# Patient Record
Sex: MALE | Race: WHITE | NOT HISPANIC OR LATINO | Employment: UNEMPLOYED | URBAN - METROPOLITAN AREA
[De-identification: names, ages, dates, MRNs, and addresses within clinical notes are randomized per-mention and may not be internally consistent; named-entity substitution may affect disease eponyms.]

---

## 2023-07-30 DIAGNOSIS — E78.2 MIXED HYPERLIPIDEMIA: Primary | ICD-10-CM

## 2023-07-31 RX ORDER — ATORVASTATIN CALCIUM 40 MG/1
40 TABLET, FILM COATED ORAL DAILY
Qty: 90 TABLET | Refills: 3 | Status: SHIPPED | OUTPATIENT
Start: 2023-07-31

## 2023-08-03 PROBLEM — R17 SERUM TOTAL BILIRUBIN ELEVATED: Status: ACTIVE | Noted: 2023-08-03

## 2023-08-03 PROBLEM — R20.2 NUMBNESS AND TINGLING OF BOTH FEET: Status: ACTIVE | Noted: 2023-08-03

## 2023-08-03 PROBLEM — R19.7 INTERMITTENT DIARRHEA: Status: ACTIVE | Noted: 2023-08-03

## 2023-08-03 PROBLEM — Z90.49 STATUS POST CHOLECYSTECTOMY: Status: ACTIVE | Noted: 2023-08-03

## 2023-08-03 PROBLEM — G89.29 CHRONIC PAIN OF TOE OF LEFT FOOT: Status: ACTIVE | Noted: 2023-08-03

## 2023-08-03 PROBLEM — R20.0 NUMBNESS AND TINGLING OF BOTH FEET: Status: ACTIVE | Noted: 2023-08-03

## 2023-08-03 PROBLEM — M79.674 CHRONIC PAIN OF TOE OF RIGHT FOOT: Status: ACTIVE | Noted: 2023-08-03

## 2023-08-03 PROBLEM — E78.5 HYPERLIPIDEMIA: Status: ACTIVE | Noted: 2023-08-03

## 2023-08-03 PROBLEM — M79.675 CHRONIC PAIN OF TOE OF LEFT FOOT: Status: ACTIVE | Noted: 2023-08-03

## 2023-08-03 PROBLEM — K22.70 BARRETT ESOPHAGUS: Status: ACTIVE | Noted: 2023-08-03

## 2023-08-03 PROBLEM — K86.9 PANCREATIC LESION (HHS-HCC): Status: ACTIVE | Noted: 2023-08-03

## 2023-08-03 PROBLEM — R10.9 ABDOMINAL PAIN: Status: ACTIVE | Noted: 2023-08-03

## 2023-08-03 PROBLEM — B35.1 ONYCHOMYCOSIS: Status: ACTIVE | Noted: 2023-08-03

## 2023-08-03 PROBLEM — G47.30 OBSERVED SLEEP APNEA: Status: ACTIVE | Noted: 2023-08-03

## 2023-08-03 PROBLEM — D49.0: Status: ACTIVE | Noted: 2023-08-03

## 2023-08-03 PROBLEM — R94.31 ABNORMAL EKG: Status: ACTIVE | Noted: 2023-08-03
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
## 2023-08-03 PROBLEM — G89.29 CHRONIC PAIN OF TOE OF RIGHT FOOT: Status: ACTIVE | Noted: 2023-08-03

## 2023-08-03 RX ORDER — FOLIC ACID/MULTIVIT,IRON,MINER 0.4MG-18MG
1 TABLET ORAL DAILY
COMMUNITY

## 2023-08-03 RX ORDER — NAPROXEN SODIUM 220 MG/1
81 TABLET, FILM COATED ORAL DAILY
COMMUNITY
End: 2024-01-02 | Stop reason: HOSPADM

## 2023-08-03 RX ORDER — ALBUTEROL SULFATE 90 UG/1
AEROSOL, METERED RESPIRATORY (INHALATION)
COMMUNITY
Start: 2022-12-07 | End: 2023-08-07 | Stop reason: ALTCHOICE

## 2023-08-03 RX ORDER — SILDENAFIL 100 MG/1
TABLET, FILM COATED ORAL
COMMUNITY
End: 2023-08-07 | Stop reason: SDUPTHER

## 2023-08-03 RX ORDER — PANTOPRAZOLE SODIUM 40 MG/1
TABLET, DELAYED RELEASE ORAL
COMMUNITY
End: 2023-10-10

## 2023-08-07 ENCOUNTER — LAB (OUTPATIENT)
Dept: LAB | Facility: LAB | Age: 68
End: 2023-08-07
Payer: MEDICARE

## 2023-08-07 ENCOUNTER — OFFICE VISIT (OUTPATIENT)
Dept: PRIMARY CARE | Facility: CLINIC | Age: 68
End: 2023-08-07
Payer: MEDICARE

## 2023-08-07 VITALS
HEIGHT: 67 IN | WEIGHT: 172 LBS | TEMPERATURE: 97.6 F | OXYGEN SATURATION: 97 % | DIASTOLIC BLOOD PRESSURE: 76 MMHG | RESPIRATION RATE: 18 BRPM | SYSTOLIC BLOOD PRESSURE: 110 MMHG | BODY MASS INDEX: 27 KG/M2 | HEART RATE: 92 BPM

## 2023-08-07 DIAGNOSIS — K22.719 BARRETT'S ESOPHAGUS WITH DYSPLASIA: ICD-10-CM

## 2023-08-07 DIAGNOSIS — R73.09 ELEVATED HEMOGLOBIN A1C: ICD-10-CM

## 2023-08-07 DIAGNOSIS — N52.9 ERECTILE DYSFUNCTION, UNSPECIFIED ERECTILE DYSFUNCTION TYPE: ICD-10-CM

## 2023-08-07 DIAGNOSIS — Z00.00 MEDICARE WELCOME EXAM: Primary | ICD-10-CM

## 2023-08-07 DIAGNOSIS — Z00.00 ROUTINE GENERAL MEDICAL EXAMINATION AT A HEALTH CARE FACILITY: ICD-10-CM

## 2023-08-07 DIAGNOSIS — Z12.5 PROSTATE CANCER SCREENING: ICD-10-CM

## 2023-08-07 DIAGNOSIS — R94.31 ABNORMAL EKG: ICD-10-CM

## 2023-08-07 DIAGNOSIS — E78.2 MIXED HYPERLIPIDEMIA: ICD-10-CM

## 2023-08-07 DIAGNOSIS — Z00.00 WELL ADULT EXAM: ICD-10-CM

## 2023-08-07 LAB
ALANINE AMINOTRANSFERASE (SGPT) (U/L) IN SER/PLAS: 29 U/L (ref 10–52)
ALBUMIN (G/DL) IN SER/PLAS: 4.5 G/DL (ref 3.4–5)
ALKALINE PHOSPHATASE (U/L) IN SER/PLAS: 90 U/L (ref 33–136)
ANION GAP IN SER/PLAS: 15 MMOL/L (ref 10–20)
ASPARTATE AMINOTRANSFERASE (SGOT) (U/L) IN SER/PLAS: 24 U/L (ref 9–39)
BILIRUBIN TOTAL (MG/DL) IN SER/PLAS: 1.6 MG/DL (ref 0–1.2)
CALCIUM (MG/DL) IN SER/PLAS: 9.7 MG/DL (ref 8.6–10.3)
CARBON DIOXIDE, TOTAL (MMOL/L) IN SER/PLAS: 26 MMOL/L (ref 21–32)
CHLORIDE (MMOL/L) IN SER/PLAS: 104 MMOL/L (ref 98–107)
CHOLESTEROL (MG/DL) IN SER/PLAS: 102 MG/DL (ref 0–199)
CHOLESTEROL IN HDL (MG/DL) IN SER/PLAS: 37.3 MG/DL
CHOLESTEROL/HDL RATIO: 2.7
CREATININE (MG/DL) IN SER/PLAS: 1.06 MG/DL (ref 0.5–1.3)
ERYTHROCYTE DISTRIBUTION WIDTH (RATIO) BY AUTOMATED COUNT: 11.8 % (ref 11.5–14.5)
ERYTHROCYTE MEAN CORPUSCULAR HEMOGLOBIN CONCENTRATION (G/DL) BY AUTOMATED: 34.6 G/DL (ref 32–36)
ERYTHROCYTE MEAN CORPUSCULAR VOLUME (FL) BY AUTOMATED COUNT: 90 FL (ref 80–100)
ERYTHROCYTES (10*6/UL) IN BLOOD BY AUTOMATED COUNT: 5.62 X10E12/L (ref 4.5–5.9)
ESTIMATED AVERAGE GLUCOSE FOR HBA1C: 117 MG/DL
GFR MALE: 78 ML/MIN/1.73M2
GLUCOSE (MG/DL) IN SER/PLAS: 113 MG/DL (ref 74–99)
HEMATOCRIT (%) IN BLOOD BY AUTOMATED COUNT: 50.8 % (ref 41–52)
HEMOGLOBIN (G/DL) IN BLOOD: 17.6 G/DL (ref 13.5–17.5)
HEMOGLOBIN A1C/HEMOGLOBIN TOTAL IN BLOOD: 5.7 %
LDL: 37 MG/DL (ref 0–99)
LEUKOCYTES (10*3/UL) IN BLOOD BY AUTOMATED COUNT: 5.5 X10E9/L (ref 4.4–11.3)
PLATELETS (10*3/UL) IN BLOOD AUTOMATED COUNT: 164 X10E9/L (ref 150–450)
POTASSIUM (MMOL/L) IN SER/PLAS: 4.7 MMOL/L (ref 3.5–5.3)
PROSTATE SPECIFIC AG (NG/ML) IN SER/PLAS: 0.4 NG/ML (ref 0–4)
PROTEIN TOTAL: 7.2 G/DL (ref 6.4–8.2)
SODIUM (MMOL/L) IN SER/PLAS: 140 MMOL/L (ref 136–145)
THYROTROPIN (MIU/L) IN SER/PLAS BY DETECTION LIMIT <= 0.05 MIU/L: 2.45 MIU/L (ref 0.44–3.98)
TRIGLYCERIDE (MG/DL) IN SER/PLAS: 141 MG/DL (ref 0–149)
UREA NITROGEN (MG/DL) IN SER/PLAS: 15 MG/DL (ref 6–23)
VLDL: 28 MG/DL (ref 0–40)

## 2023-08-07 PROCEDURE — 85027 COMPLETE CBC AUTOMATED: CPT

## 2023-08-07 PROCEDURE — 83036 HEMOGLOBIN GLYCOSYLATED A1C: CPT

## 2023-08-07 PROCEDURE — 80053 COMPREHEN METABOLIC PANEL: CPT

## 2023-08-07 PROCEDURE — 36415 COLL VENOUS BLD VENIPUNCTURE: CPT

## 2023-08-07 PROCEDURE — 93000 ELECTROCARDIOGRAM COMPLETE: CPT | Performed by: FAMILY MEDICINE

## 2023-08-07 PROCEDURE — G0103 PSA SCREENING: HCPCS

## 2023-08-07 PROCEDURE — 80061 LIPID PANEL: CPT

## 2023-08-07 PROCEDURE — 84443 ASSAY THYROID STIM HORMONE: CPT

## 2023-08-07 PROCEDURE — G0438 PPPS, INITIAL VISIT: HCPCS | Performed by: FAMILY MEDICINE

## 2023-08-07 PROCEDURE — 99397 PER PM REEVAL EST PAT 65+ YR: CPT | Performed by: FAMILY MEDICINE

## 2023-08-07 RX ORDER — SILDENAFIL 100 MG/1
100 TABLET, FILM COATED ORAL DAILY PRN
Qty: 10 TABLET | Refills: 11 | Status: SHIPPED | OUTPATIENT
Start: 2023-08-07 | End: 2024-02-19 | Stop reason: WASHOUT

## 2023-08-07 ASSESSMENT — PATIENT HEALTH QUESTIONNAIRE - PHQ9
2. FEELING DOWN, DEPRESSED OR HOPELESS: NOT AT ALL
1. LITTLE INTEREST OR PLEASURE IN DOING THINGS: NOT AT ALL
SUM OF ALL RESPONSES TO PHQ9 QUESTIONS 1 AND 2: 0

## 2023-08-07 ASSESSMENT — ACTIVITIES OF DAILY LIVING (ADL)
BATHING: INDEPENDENT
GROCERY_SHOPPING: INDEPENDENT
DRESSING: INDEPENDENT
TAKING_MEDICATION: INDEPENDENT
DOING_HOUSEWORK: INDEPENDENT
MANAGING_FINANCES: INDEPENDENT

## 2023-08-07 ASSESSMENT — ENCOUNTER SYMPTOMS
MUSCULOSKELETAL NEGATIVE: 1
CARDIOVASCULAR NEGATIVE: 1
EYES NEGATIVE: 1
GASTROINTESTINAL NEGATIVE: 1
RESPIRATORY NEGATIVE: 1
ALLERGIC/IMMUNOLOGIC NEGATIVE: 1
PSYCHIATRIC NEGATIVE: 1
ENDOCRINE NEGATIVE: 1
NEUROLOGICAL NEGATIVE: 1
CONSTITUTIONAL NEGATIVE: 1
HEMATOLOGIC/LYMPHATIC NEGATIVE: 1

## 2023-08-07 NOTE — PROGRESS NOTES
"Subjective   Reason for Visit: Enmanuel Ahumada is an 67 y.o. male here for a Medicare Wellness visit.     Past Medical, Surgical, and Family History reviewed and updated in chart.    Reviewed all medications by prescribing practitioner or clinical pharmacist (such as prescriptions, OTCs, herbal therapies and supplements) and documented in the medical record.    HPI    Patient Care Team:  Ricardo Youngblood DO as PCP - General     Review of Systems   Constitutional: Negative.    HENT: Negative.     Eyes: Negative.    Respiratory: Negative.     Cardiovascular: Negative.    Gastrointestinal: Negative.    Endocrine: Negative.    Genitourinary: Negative.    Musculoskeletal: Negative.    Skin: Negative.    Allergic/Immunologic: Negative.    Neurological: Negative.    Hematological: Negative.    Psychiatric/Behavioral: Negative.         Objective   Vitals:  /76   Pulse 92   Temp 36.4 °C (97.6 °F)   Resp 18   Ht 1.702 m (5' 7\")   Wt 78 kg (172 lb)   SpO2 97%   BMI 26.94 kg/m²       Physical Exam  Vitals and nursing note reviewed.   Constitutional:       Appearance: Normal appearance.   HENT:      Head: Normocephalic and atraumatic.      Nose: Nose normal.      Mouth/Throat:      Pharynx: Oropharynx is clear.   Eyes:      Extraocular Movements: Extraocular movements intact.      Conjunctiva/sclera: Conjunctivae normal.      Pupils: Pupils are equal, round, and reactive to light.   Neck:      Vascular: No carotid bruit.   Cardiovascular:      Rate and Rhythm: Normal rate and regular rhythm.      Pulses: Normal pulses.      Heart sounds: Normal heart sounds.   Pulmonary:      Effort: Pulmonary effort is normal. No respiratory distress.      Breath sounds: Normal breath sounds. No wheezing, rhonchi or rales.   Chest:          Comments: Slight chest wall pain in area of fall  Abdominal:      General: Abdomen is flat. Bowel sounds are normal. There is no distension.      Palpations: Abdomen is soft.      " Tenderness: There is no abdominal tenderness.      Hernia: No hernia is present.   Musculoskeletal:         General: No swelling or tenderness. Normal range of motion.      Cervical back: Normal range of motion and neck supple. No tenderness.   Lymphadenopathy:      Cervical: No cervical adenopathy.   Skin:     General: Skin is warm and dry.      Capillary Refill: Capillary refill takes less than 2 seconds.   Neurological:      General: No focal deficit present.      Mental Status: He is alert and oriented to person, place, and time.      Cranial Nerves: No cranial nerve deficit.   Psychiatric:         Attention and Perception: Attention and perception normal.         Mood and Affect: Mood normal.         Behavior: Behavior normal.         Thought Content: Thought content normal.         Judgment: Judgment normal.         Assessment/Plan   1. Medicare welcome exam        2. Well adult exam        3. Routine general medical examination at a health care facility  ECG 12 lead    Comprehensive Metabolic Panel    Hemoglobin A1C    TSH with reflex to Free T4 if abnormal    sildenafil (Viagra) 100 mg tablet      4. Mixed hyperlipidemia  CBC    Comprehensive Metabolic Panel    Hemoglobin A1C    Lipid Panel    TSH with reflex to Free T4 if abnormal      5. Osborn's esophagus with dysplasia  CBC    Comprehensive Metabolic Panel    Hemoglobin A1C    TSH with reflex to Free T4 if abnormal      6. Abnormal EKG  CBC    Comprehensive Metabolic Panel    Hemoglobin A1C    TSH with reflex to Free T4 if abnormal      7. Prostate cancer screening  CBC    Prostate Specific Antigen      8. Elevated hemoglobin A1c  Hemoglobin A1C      9. Erectile dysfunction, unspecified erectile dysfunction type  sildenafil (Viagra) 100 mg tablet

## 2023-08-08 NOTE — TELEPHONE ENCOUNTER
----- Message from Ricardo Youngblood DO sent at 8/7/2023  6:46 PM EDT -----  Call Enmanuel Ahumada Their labs were normal

## 2023-10-10 DIAGNOSIS — K22.70 BARRETT'S ESOPHAGUS WITHOUT DYSPLASIA: Primary | ICD-10-CM

## 2023-10-10 RX ORDER — PANTOPRAZOLE SODIUM 40 MG/1
TABLET, DELAYED RELEASE ORAL
Qty: 30 TABLET | Refills: 0 | Status: SHIPPED | OUTPATIENT
Start: 2023-10-10 | End: 2023-10-27

## 2023-10-27 DIAGNOSIS — K22.70 BARRETT'S ESOPHAGUS WITHOUT DYSPLASIA: ICD-10-CM

## 2023-10-27 RX ORDER — PANTOPRAZOLE SODIUM 40 MG/1
TABLET, DELAYED RELEASE ORAL
Qty: 30 TABLET | Refills: 0 | Status: SHIPPED | OUTPATIENT
Start: 2023-10-27 | End: 2023-11-20

## 2023-11-18 DIAGNOSIS — K22.70 BARRETT'S ESOPHAGUS WITHOUT DYSPLASIA: ICD-10-CM

## 2023-11-20 RX ORDER — PANTOPRAZOLE SODIUM 40 MG/1
TABLET, DELAYED RELEASE ORAL
Qty: 30 TABLET | Refills: 0 | Status: SHIPPED | OUTPATIENT
Start: 2023-11-20 | End: 2023-12-11

## 2023-12-10 DIAGNOSIS — K22.70 BARRETT'S ESOPHAGUS WITHOUT DYSPLASIA: ICD-10-CM

## 2023-12-11 RX ORDER — PANTOPRAZOLE SODIUM 40 MG/1
TABLET, DELAYED RELEASE ORAL
Qty: 30 TABLET | Refills: 0 | Status: SHIPPED | OUTPATIENT
Start: 2023-12-11 | End: 2024-01-15

## 2023-12-24 ENCOUNTER — HOSPITAL ENCOUNTER (EMERGENCY)
Facility: HOSPITAL | Age: 68
Discharge: OTHER INSTITUTION | End: 2023-12-25
Attending: STUDENT IN AN ORGANIZED HEALTH CARE EDUCATION/TRAINING PROGRAM
Payer: MEDICARE

## 2023-12-24 ENCOUNTER — APPOINTMENT (OUTPATIENT)
Dept: RADIOLOGY | Facility: HOSPITAL | Age: 68
End: 2023-12-24
Payer: MEDICARE

## 2023-12-24 ENCOUNTER — APPOINTMENT (OUTPATIENT)
Dept: CARDIOLOGY | Facility: HOSPITAL | Age: 68
End: 2023-12-24
Payer: MEDICARE

## 2023-12-24 DIAGNOSIS — G93.89 INTRACEREBRAL MASS: Primary | ICD-10-CM

## 2023-12-24 DIAGNOSIS — G93.5: ICD-10-CM

## 2023-12-24 LAB
ALBUMIN SERPL BCP-MCNC: 4.6 G/DL (ref 3.4–5)
ALP SERPL-CCNC: 100 U/L (ref 33–136)
ALT SERPL W P-5'-P-CCNC: 31 U/L (ref 10–52)
ANION GAP SERPL CALC-SCNC: 11 MMOL/L (ref 10–20)
APPEARANCE UR: CLEAR
AST SERPL W P-5'-P-CCNC: 20 U/L (ref 9–39)
BILIRUB SERPL-MCNC: 1.2 MG/DL (ref 0–1.2)
BILIRUB UR STRIP.AUTO-MCNC: NEGATIVE MG/DL
BUN SERPL-MCNC: 14 MG/DL (ref 6–23)
CALCIUM SERPL-MCNC: 9.4 MG/DL (ref 8.6–10.3)
CHLORIDE SERPL-SCNC: 103 MMOL/L (ref 98–107)
CO2 SERPL-SCNC: 30 MMOL/L (ref 21–32)
COLOR UR: NORMAL
CREAT SERPL-MCNC: 0.95 MG/DL (ref 0.5–1.3)
ERYTHROCYTE [DISTWIDTH] IN BLOOD BY AUTOMATED COUNT: 11.5 % (ref 11.5–14.5)
GFR SERPL CREATININE-BSD FRML MDRD: 89 ML/MIN/1.73M*2
GLUCOSE SERPL-MCNC: 135 MG/DL (ref 74–99)
GLUCOSE UR STRIP.AUTO-MCNC: NEGATIVE MG/DL
HCT VFR BLD AUTO: 49.9 % (ref 41–52)
HGB BLD-MCNC: 16.9 G/DL (ref 13.5–17.5)
KETONES UR STRIP.AUTO-MCNC: NEGATIVE MG/DL
LEUKOCYTE ESTERASE UR QL STRIP.AUTO: NEGATIVE
MCH RBC QN AUTO: 30.8 PG (ref 26–34)
MCHC RBC AUTO-ENTMCNC: 33.9 G/DL (ref 32–36)
MCV RBC AUTO: 91 FL (ref 80–100)
NITRITE UR QL STRIP.AUTO: NEGATIVE
NRBC BLD-RTO: 0 /100 WBCS (ref 0–0)
PH UR STRIP.AUTO: 6 [PH]
PLATELET # BLD AUTO: 197 X10*3/UL (ref 150–450)
POTASSIUM SERPL-SCNC: 3.7 MMOL/L (ref 3.5–5.3)
PROT SERPL-MCNC: 7.2 G/DL (ref 6.4–8.2)
PROT UR STRIP.AUTO-MCNC: NEGATIVE MG/DL
RBC # BLD AUTO: 5.48 X10*6/UL (ref 4.5–5.9)
RBC # UR STRIP.AUTO: NEGATIVE /UL
SODIUM SERPL-SCNC: 140 MMOL/L (ref 136–145)
SP GR UR STRIP.AUTO: 1.01
TSH SERPL-ACNC: 1.67 MIU/L (ref 0.44–3.98)
UROBILINOGEN UR STRIP.AUTO-MCNC: <2 MG/DL
WBC # BLD AUTO: 7.1 X10*3/UL (ref 4.4–11.3)

## 2023-12-24 PROCEDURE — 84443 ASSAY THYROID STIM HORMONE: CPT | Performed by: STUDENT IN AN ORGANIZED HEALTH CARE EDUCATION/TRAINING PROGRAM

## 2023-12-24 PROCEDURE — 80053 COMPREHEN METABOLIC PANEL: CPT | Performed by: STUDENT IN AN ORGANIZED HEALTH CARE EDUCATION/TRAINING PROGRAM

## 2023-12-24 PROCEDURE — 96372 THER/PROPH/DIAG INJ SC/IM: CPT | Mod: 59

## 2023-12-24 PROCEDURE — 93005 ELECTROCARDIOGRAM TRACING: CPT

## 2023-12-24 PROCEDURE — 99285 EMERGENCY DEPT VISIT HI MDM: CPT | Performed by: STUDENT IN AN ORGANIZED HEALTH CARE EDUCATION/TRAINING PROGRAM

## 2023-12-24 PROCEDURE — 36415 COLL VENOUS BLD VENIPUNCTURE: CPT | Performed by: STUDENT IN AN ORGANIZED HEALTH CARE EDUCATION/TRAINING PROGRAM

## 2023-12-24 PROCEDURE — 81003 URINALYSIS AUTO W/O SCOPE: CPT | Performed by: STUDENT IN AN ORGANIZED HEALTH CARE EDUCATION/TRAINING PROGRAM

## 2023-12-24 PROCEDURE — 70470 CT HEAD/BRAIN W/O & W/DYE: CPT

## 2023-12-24 PROCEDURE — 85027 COMPLETE CBC AUTOMATED: CPT | Performed by: STUDENT IN AN ORGANIZED HEALTH CARE EDUCATION/TRAINING PROGRAM

## 2023-12-24 PROCEDURE — 71045 X-RAY EXAM CHEST 1 VIEW: CPT

## 2023-12-24 PROCEDURE — 93010 ELECTROCARDIOGRAM REPORT: CPT | Performed by: STUDENT IN AN ORGANIZED HEALTH CARE EDUCATION/TRAINING PROGRAM

## 2023-12-24 PROCEDURE — 2500000004 HC RX 250 GENERAL PHARMACY W/ HCPCS (ALT 636 FOR OP/ED)

## 2023-12-24 PROCEDURE — 2550000001 HC RX 255 CONTRASTS: Performed by: STUDENT IN AN ORGANIZED HEALTH CARE EDUCATION/TRAINING PROGRAM

## 2023-12-24 PROCEDURE — 71045 X-RAY EXAM CHEST 1 VIEW: CPT | Mod: FOREIGN READ | Performed by: RADIOLOGY

## 2023-12-24 RX ORDER — DEXAMETHASONE SODIUM PHOSPHATE 4 MG/ML
10 INJECTION, SOLUTION INTRA-ARTICULAR; INTRALESIONAL; INTRAMUSCULAR; INTRAVENOUS; SOFT TISSUE ONCE
Status: COMPLETED | OUTPATIENT
Start: 2023-12-24 | End: 2023-12-24

## 2023-12-24 RX ADMIN — IOHEXOL 50 ML: 350 INJECTION, SOLUTION INTRAVENOUS at 17:52

## 2023-12-24 RX ADMIN — DEXAMETHASONE SODIUM PHOSPHATE 10 MG: 4 INJECTION, SOLUTION INTRAMUSCULAR; INTRAVENOUS at 20:04

## 2023-12-24 ASSESSMENT — PAIN DESCRIPTION - ORIENTATION: ORIENTATION: ANTERIOR

## 2023-12-24 ASSESSMENT — PAIN DESCRIPTION - LOCATION: LOCATION: HEAD

## 2023-12-24 ASSESSMENT — LIFESTYLE VARIABLES
HAVE YOU EVER FELT YOU SHOULD CUT DOWN ON YOUR DRINKING: NO
EVER HAD A DRINK FIRST THING IN THE MORNING TO STEADY YOUR NERVES TO GET RID OF A HANGOVER: NO
HAVE PEOPLE ANNOYED YOU BY CRITICIZING YOUR DRINKING: NO
EVER FELT BAD OR GUILTY ABOUT YOUR DRINKING: NO
REASON UNABLE TO ASSESS: NO

## 2023-12-24 ASSESSMENT — PAIN DESCRIPTION - DESCRIPTORS: DESCRIPTORS: ACHING

## 2023-12-24 ASSESSMENT — COLUMBIA-SUICIDE SEVERITY RATING SCALE - C-SSRS
1. IN THE PAST MONTH, HAVE YOU WISHED YOU WERE DEAD OR WISHED YOU COULD GO TO SLEEP AND NOT WAKE UP?: NO
2. HAVE YOU ACTUALLY HAD ANY THOUGHTS OF KILLING YOURSELF?: NO
6. HAVE YOU EVER DONE ANYTHING, STARTED TO DO ANYTHING, OR PREPARED TO DO ANYTHING TO END YOUR LIFE?: NO

## 2023-12-24 ASSESSMENT — PAIN DESCRIPTION - PAIN TYPE: TYPE: ACUTE PAIN

## 2023-12-24 ASSESSMENT — PAIN SCALES - GENERAL: PAINLEVEL_OUTOF10: 1

## 2023-12-24 ASSESSMENT — PAIN - FUNCTIONAL ASSESSMENT: PAIN_FUNCTIONAL_ASSESSMENT: 0-10

## 2023-12-24 ASSESSMENT — PAIN DESCRIPTION - FREQUENCY: FREQUENCY: CONSTANT/CONTINUOUS

## 2023-12-24 ASSESSMENT — PAIN DESCRIPTION - ONSET: ONSET: ONGOING

## 2023-12-24 ASSESSMENT — PAIN DESCRIPTION - PROGRESSION: CLINICAL_PROGRESSION: NOT CHANGED

## 2023-12-24 NOTE — ED PROVIDER NOTES
"EMERGENCY DEPARTMENT ENCOUNTER      Pt Name: Enmanuel Ahumada  MRN: [id number]  Birthdate 1955  Date of evaluation: 12/24/2023  Provider: Harry Romero MD    CHIEF COMPLAINT       Chief Complaint   Patient presents with    Headache     Intermittent headaches 3 weeks. Pt stated he was seen at [facility] stated to [facility] prob had covid on 12/6. Patient states continues to have intermittent headaches. Pt denies changes in vision or speech. Pt denies chest pain. Pt denies nausea, vomiting, or diarrhea. Pt states feels \" confused\" intermittently for 3 weeks          HISTORY OF PRESENT ILLNESS    67-year-old male presenting to the ED with complaint of a headache.  Reports the headache is to the front of his head at a 1-4/10 for the past 6 weeks in which she presented to urgent care 12/6 and was told it was possible residual COVID as he was having coughing at the time.  Reports presenting today as he has also had increased confusion for the past week in which he mistakenly attempted to go into the neighbors houses today and misspelled a 4 letter name 3 times which is new for him.  Wife denies any difficulty word finding or slurred speech.  Endorses his mother had dementia in her 80s.  Patient denies weakness, numbness, nausea, vomiting, fevers, urinary symptoms, or diarrhea.      History provided by:  Patient and spouse      Nursing Notes were reviewed.    PAST MEDICAL HISTORY   History reviewed. No pertinent past medical history.      SURGICAL HISTORY       Past Surgical History:   Procedure Laterality Date    OTHER SURGICAL HISTORY  07/24/2019    Colonoscopy    OTHER SURGICAL HISTORY  07/26/2021    Endoscopy         CURRENT MEDICATIONS       Previous Medications    ASPIRIN 81 MG CHEWABLE TABLET    Chew.    ATORVASTATIN (LIPITOR) 40 MG TABLET    TAKE 1 TABLET BY MOUTH EVERY DAY    CALCIUM CARBONATE-VITAMIN D3 ORAL    Take by mouth.    KRILL-OMEGA-3-DHA-EPA-LIPIDS 598-83-17-50 MG CAPSULE    Take by mouth.    PANTOPRAZOLE " (PROTONIX) 40 MG EC TABLET    TAKE 1 TABLET BY MOUTH IN THE MORNING 30 MINUTES BEFORE BREAKFAST.    SILDENAFIL (VIAGRA) 100 MG TABLET    Take 1 tablet (100 mg) by mouth once daily as needed for erectile dysfunction.    UBIDECARENONE (COENZYME Q10, BULK, MISC)    Take by mouth.       ALLERGIES     Patient has no known allergies.    FAMILY HISTORY     No family history on file.       SOCIAL HISTORY       Social History     Socioeconomic History    Marital status:      Spouse name: None    Number of children: None    Years of education: None    Highest education level: None   Occupational History    None   Tobacco Use    Smoking status: Never    Smokeless tobacco: Never   Substance and Sexual Activity    Alcohol use: Never    Drug use: Never    Sexual activity: Yes   Other Topics Concern    None   Social History Narrative    None     Social Determinants of Health     Financial Resource Strain: Not on file   Food Insecurity: Not on file   Transportation Needs: Not on file   Physical Activity: Not on file   Stress: Not on file   Social Connections: Not on file   Intimate Partner Violence: Not on file   Housing Stability: Not on file       SCREENINGS                        PHYSICAL EXAM    (up to 7 for level 4, 8 or more for level 5)     ED Triage Vitals   Temp Heart Rate Resp BP   12/24/23 1559 12/24/23 1559 12/24/23 1559 12/24/23 1605   36.7 °C (98.1 °F) 94 18 148/87      SpO2 Temp Source Heart Rate Source Patient Position   12/24/23 1559 12/24/23 1559 12/24/23 1559 12/24/23 1559   99 % Temporal Monitor Sitting      BP Location FiO2 (%)     12/24/23 1559 --     Right arm        Physical Exam  HENT:      Head: Normocephalic and atraumatic.      Mouth/Throat:      Mouth: Mucous membranes are moist.   Eyes:      General: Visual field deficit (Left-sided hemianopia) present. No scleral icterus.     Extraocular Movements: Extraocular movements intact.      Right eye: Normal extraocular motion.      Left eye: Normal  extraocular motion.      Pupils: Pupils are equal, round, and reactive to light.   Musculoskeletal:         General: No swelling or tenderness. Normal range of motion.      Cervical back: Normal range of motion. No rigidity.   Skin:     General: Skin is warm and dry.   Neurological:      Mental Status: He is alert and oriented to person, place, and time.      Cranial Nerves: No cranial nerve deficit, dysarthria or facial asymmetry.      Sensory: Sensation is intact.      Motor: Motor function is intact. No weakness, abnormal muscle tone or pronator drift.      Coordination: Coordination is intact. Coordination normal. Finger-Nose-Finger Test and Heel to Shin Test normal.   Psychiatric:         Mood and Affect: Mood normal.         Behavior: Behavior normal.          DIAGNOSTIC RESULTS     LABS:  Labs Reviewed   COMPREHENSIVE METABOLIC PANEL - Abnormal       Result Value    Glucose 135 (*)     Sodium 140      Potassium 3.7      Chloride 103      Bicarbonate 30      Anion Gap 11      Urea Nitrogen 14      Creatinine 0.95      eGFR 89      Calcium 9.4      Albumin 4.6      Alkaline Phosphatase 100      Total Protein 7.2      AST 20      Bilirubin, Total 1.2      ALT 31     CBC - Normal    WBC 7.1      nRBC 0.0      RBC 5.48      Hemoglobin 16.9      Hematocrit 49.9      MCV 91      MCH 30.8      MCHC 33.9      RDW 11.5      Platelets 197     TSH WITH REFLEX TO FREE T4 IF ABNORMAL - Normal    Thyroid Stimulating Hormone 1.67      Narrative:     TSH testing is performed using different testing methodology at [facility] than at other [facility] hospitals. Direct result comparisons should only be made within the same method.     URINALYSIS WITH REFLEX MICROSCOPIC - Normal    Color, Urine Straw      Appearance, Urine Clear      Specific Gravity, Urine 1.006      pH, Urine 6.0      Protein, Urine NEGATIVE      Glucose, Urine NEGATIVE      Blood, Urine NEGATIVE      Ketones, Urine NEGATIVE      Bilirubin, Urine  NEGATIVE      Urobilinogen, Urine <2.0      Nitrite, Urine NEGATIVE      Leukocyte Esterase, Urine NEGATIVE         All other labs were within normal range or not returned as of this dictation.    Imaging  CT head w and wo IV contrast   Final Result   1. Large centrally necrotic mass in the right parietal lobe measuring   7.3 cm x 7.1 cm x 5 cm resulting in 1 cm leftward midline shift,   effacement of the right perimesencephalic cistern, diffuse sulcal   effacement of the right cerebral hemisphere. Findings are most   suspicious for glioblastoma. Neuro surgical consultation is   recommended.        2. Possible 0.5 cm focus of petechial hemorrhage in the solid portion   of the mass. Otherwise no acute intracranial hemorrhage.             MACRO:   Jeff Diana discussed the significance and urgency of this   critical finding by telephone with  MICHAEL KYLE on 12/24/2023   at 7:28 pm.  (**-RCF-**) Findings:  See findings.        Signed by: Jeff Diana 12/24/2023 7:28 PM   Dictation workstation:   [id number]      XR chest 1 view   Final Result   No radiographic evidence of acute cardiopulmonary disease.   Signed by Tayo Morales MD           Procedures  Procedures     EMERGENCY DEPARTMENT COURSE/MDM:     ED Course as of 12/24/23 2141   Sun Dec 24, 2023   1925 CT head show 1-2mm midline shift with findings concerning for GBM. [ES]   1927 Spoke with neurosurgery, Dr. Davis, who recommends patient be transferred for likely need for biopsy. [ES]   1956 Spoke with neurosurgeon at [facility], Dr. Lamar, who agreed with transfer should beds be available. Recommended non-emergent transfer and anticipate 1-2 day delay. 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  Patient is afebrile, hemodynamically stable on room air, and in no acute distress.  Head with and without contrast ordered, CT head with and without contrast, CBC, CMP, UA, TSH, and CXR ordered.  CT head independently analyzed and revealed a large right-sided hypodensity.  EKG ordered and revealed rate of 80bpm, sinus rhythm with PVCs, and no acute ischemic changes.     CT head report notes 1 mm midline leftward shift with a 7.3 cm x 7.1 cm x 5 cm right parietal mass.  Consults per ED course.  Decadron 10 mg provided.    Family updated on findings and recommendation in which they are agreeable to transfer for further testing and treatment. Patient accepted for transfer to [facility] by neurosurgeon, Dr. Lamar.         Patient and or family in agreement and understanding of treatment plan.  All questions answered.      I reviewed the case with the attending ED physician. The attending ED physician agrees with the plan. Patient and/or patient´s representative was counseled regarding labs, imaging, likely diagnosis, and plan. All questions were answered.    ED Medications administered this visit:    Medications   iohexol (OMNIPaque) 350 mg iodine/mL solution 50 mL (50 mL intravenous Given 12/24/23 1752)   dexAMETHasone (Decadron) injection 10 mg (10 mg intramuscular Given 12/24/23 2004)       New Prescriptions from this visit:    New Prescriptions    No medications on file       Follow-up:  No follow-up provider specified.      Final Impression:   1. Intracerebral mass    2. 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  Efforts were made to edit the dictations but occasionally words are mis-transcribed.)     Harry Romero MD  Resident  12/24/23 2138       Harry Romero MD  Resident  12/24/23 2141

## 2023-12-25 ENCOUNTER — APPOINTMENT (OUTPATIENT)
Dept: RADIOLOGY | Facility: HOSPITAL | Age: 68
DRG: 025 | End: 2023-12-25
Payer: MEDICARE

## 2023-12-25 ENCOUNTER — HOSPITAL ENCOUNTER (INPATIENT)
Facility: HOSPITAL | Age: 68
LOS: 8 days | Discharge: HOME | DRG: 025 | End: 2024-01-02
Attending: NEUROLOGICAL SURGERY | Admitting: NEUROLOGICAL SURGERY
Payer: MEDICARE

## 2023-12-25 VITALS
TEMPERATURE: 98.1 F | HEIGHT: 67 IN | OXYGEN SATURATION: 95 % | BODY MASS INDEX: 26.68 KG/M2 | DIASTOLIC BLOOD PRESSURE: 89 MMHG | HEART RATE: 93 BPM | SYSTOLIC BLOOD PRESSURE: 126 MMHG | WEIGHT: 170 LBS | RESPIRATION RATE: 20 BRPM

## 2023-12-25 DIAGNOSIS — Z01.818 PRE-OPERATIVE EXAMINATION FOR INTERNAL MEDICINE: ICD-10-CM

## 2023-12-25 DIAGNOSIS — G93.89 BRAIN MASS: Primary | ICD-10-CM

## 2023-12-25 DIAGNOSIS — R94.31 ABNORMAL ELECTROCARDIOGRAM (ECG) (EKG): ICD-10-CM

## 2023-12-25 DIAGNOSIS — R94.31 ABNORMAL EKG: ICD-10-CM

## 2023-12-25 LAB
ABO GROUP (TYPE) IN BLOOD: NORMAL
ALBUMIN SERPL BCP-MCNC: 4.6 G/DL (ref 3.4–5)
ANION GAP SERPL CALC-SCNC: 17 MMOL/L (ref 10–20)
ANTIBODY SCREEN: NORMAL
APTT PPP: 31 SECONDS (ref 27–38)
BUN SERPL-MCNC: 13 MG/DL (ref 6–23)
CALCIUM SERPL-MCNC: 10 MG/DL (ref 8.6–10.6)
CHLORIDE SERPL-SCNC: 104 MMOL/L (ref 98–107)
CO2 SERPL-SCNC: 23 MMOL/L (ref 21–32)
CREAT SERPL-MCNC: 0.8 MG/DL (ref 0.5–1.3)
ERYTHROCYTE [DISTWIDTH] IN BLOOD BY AUTOMATED COUNT: 11.7 % (ref 11.5–14.5)
GFR SERPL CREATININE-BSD FRML MDRD: >90 ML/MIN/1.73M*2
GLUCOSE SERPL-MCNC: 123 MG/DL (ref 74–99)
HCT VFR BLD AUTO: 50.4 % (ref 41–52)
HGB BLD-MCNC: 17.2 G/DL (ref 13.5–17.5)
INR PPP: 1.2 (ref 0.9–1.1)
MCH RBC QN AUTO: 30.6 PG (ref 26–34)
MCHC RBC AUTO-ENTMCNC: 34.1 G/DL (ref 32–36)
MCV RBC AUTO: 90 FL (ref 80–100)
NRBC BLD-RTO: 0 /100 WBCS (ref 0–0)
PHOSPHATE SERPL-MCNC: 2.9 MG/DL (ref 2.5–4.9)
PLATELET # BLD AUTO: 222 X10*3/UL (ref 150–450)
POTASSIUM SERPL-SCNC: 3.8 MMOL/L (ref 3.5–5.3)
PROTHROMBIN TIME: 13.5 SECONDS (ref 9.8–12.8)
RBC # BLD AUTO: 5.62 X10*6/UL (ref 4.5–5.9)
RBC #/AREA URNS AUTO: NORMAL /HPF
RH FACTOR (ANTIGEN D): NORMAL
SODIUM SERPL-SCNC: 140 MMOL/L (ref 136–145)
WBC # BLD AUTO: 7.6 X10*3/UL (ref 4.4–11.3)
WBC #/AREA URNS AUTO: NORMAL /HPF

## 2023-12-25 PROCEDURE — 70553 MRI BRAIN STEM W/O & W/DYE: CPT | Performed by: RADIOLOGY

## 2023-12-25 PROCEDURE — 81001 URINALYSIS AUTO W/SCOPE: CPT | Performed by: STUDENT IN AN ORGANIZED HEALTH CARE EDUCATION/TRAINING PROGRAM

## 2023-12-25 PROCEDURE — 2500000001 HC RX 250 WO HCPCS SELF ADMINISTERED DRUGS (ALT 637 FOR MEDICARE OP): Performed by: STUDENT IN AN ORGANIZED HEALTH CARE EDUCATION/TRAINING PROGRAM

## 2023-12-25 PROCEDURE — 70553 MRI BRAIN STEM W/O & W/DYE: CPT

## 2023-12-25 PROCEDURE — 74177 CT ABD & PELVIS W/CONTRAST: CPT

## 2023-12-25 PROCEDURE — 82310 ASSAY OF CALCIUM: CPT | Performed by: STUDENT IN AN ORGANIZED HEALTH CARE EDUCATION/TRAINING PROGRAM

## 2023-12-25 PROCEDURE — 71045 X-RAY EXAM CHEST 1 VIEW: CPT

## 2023-12-25 PROCEDURE — 2550000001 HC RX 255 CONTRASTS: Performed by: NEUROLOGICAL SURGERY

## 2023-12-25 PROCEDURE — 85027 COMPLETE CBC AUTOMATED: CPT | Performed by: STUDENT IN AN ORGANIZED HEALTH CARE EDUCATION/TRAINING PROGRAM

## 2023-12-25 PROCEDURE — 96372 THER/PROPH/DIAG INJ SC/IM: CPT | Performed by: STUDENT IN AN ORGANIZED HEALTH CARE EDUCATION/TRAINING PROGRAM

## 2023-12-25 PROCEDURE — 74177 CT ABD & PELVIS W/CONTRAST: CPT | Performed by: RADIOLOGY

## 2023-12-25 PROCEDURE — 86900 BLOOD TYPING SEROLOGIC ABO: CPT | Mod: 91 | Performed by: STUDENT IN AN ORGANIZED HEALTH CARE EDUCATION/TRAINING PROGRAM

## 2023-12-25 PROCEDURE — 85730 THROMBOPLASTIN TIME PARTIAL: CPT | Performed by: STUDENT IN AN ORGANIZED HEALTH CARE EDUCATION/TRAINING PROGRAM

## 2023-12-25 PROCEDURE — 36415 COLL VENOUS BLD VENIPUNCTURE: CPT | Performed by: STUDENT IN AN ORGANIZED HEALTH CARE EDUCATION/TRAINING PROGRAM

## 2023-12-25 PROCEDURE — 71045 X-RAY EXAM CHEST 1 VIEW: CPT | Performed by: RADIOLOGY

## 2023-12-25 PROCEDURE — A9575 INJ GADOTERATE MEGLUMI 0.1ML: HCPCS | Performed by: NEUROLOGICAL SURGERY

## 2023-12-25 PROCEDURE — 2500000004 HC RX 250 GENERAL PHARMACY W/ HCPCS (ALT 636 FOR OP/ED): Performed by: STUDENT IN AN ORGANIZED HEALTH CARE EDUCATION/TRAINING PROGRAM

## 2023-12-25 PROCEDURE — 2500000002 HC RX 250 W HCPCS SELF ADMINISTERED DRUGS (ALT 637 FOR MEDICARE OP, ALT 636 FOR OP/ED)

## 2023-12-25 PROCEDURE — 1100000001 HC PRIVATE ROOM DAILY

## 2023-12-25 PROCEDURE — 94760 N-INVAS EAR/PLS OXIMETRY 1: CPT

## 2023-12-25 RX ORDER — AMOXICILLIN 250 MG
2 CAPSULE ORAL 2 TIMES DAILY
Status: DISCONTINUED | OUTPATIENT
Start: 2023-12-25 | End: 2023-12-29

## 2023-12-25 RX ORDER — HEPARIN SODIUM 5000 [USP'U]/ML
5000 INJECTION, SOLUTION INTRAVENOUS; SUBCUTANEOUS EVERY 8 HOURS
Status: COMPLETED | OUTPATIENT
Start: 2023-12-25 | End: 2023-12-26

## 2023-12-25 RX ORDER — ACETAMINOPHEN 500 MG
2000 TABLET ORAL DAILY
COMMUNITY
End: 2024-02-21 | Stop reason: ALTCHOICE

## 2023-12-25 RX ORDER — TALC
3 POWDER (GRAM) TOPICAL DAILY
Status: DISCONTINUED | OUTPATIENT
Start: 2023-12-25 | End: 2023-12-29

## 2023-12-25 RX ORDER — ATORVASTATIN CALCIUM 40 MG/1
40 TABLET, FILM COATED ORAL DAILY
Status: DISCONTINUED | OUTPATIENT
Start: 2023-12-25 | End: 2024-01-02 | Stop reason: HOSPADM

## 2023-12-25 RX ORDER — ZOLPIDEM TARTRATE 5 MG/1
5 TABLET ORAL ONCE
Status: COMPLETED | OUTPATIENT
Start: 2023-12-25 | End: 2023-12-25

## 2023-12-25 RX ORDER — ONDANSETRON 4 MG/1
4 TABLET, FILM COATED ORAL EVERY 8 HOURS PRN
Status: DISCONTINUED | OUTPATIENT
Start: 2023-12-25 | End: 2023-12-29

## 2023-12-25 RX ORDER — ONDANSETRON HYDROCHLORIDE 2 MG/ML
4 INJECTION, SOLUTION INTRAVENOUS EVERY 8 HOURS PRN
Status: DISCONTINUED | OUTPATIENT
Start: 2023-12-25 | End: 2023-12-29

## 2023-12-25 RX ORDER — PANTOPRAZOLE SODIUM 40 MG/1
40 TABLET, DELAYED RELEASE ORAL
Status: DISCONTINUED | OUTPATIENT
Start: 2023-12-25 | End: 2023-12-29

## 2023-12-25 RX ORDER — GADOTERATE MEGLUMINE 376.9 MG/ML
20 INJECTION INTRAVENOUS
Status: COMPLETED | OUTPATIENT
Start: 2023-12-25 | End: 2023-12-25

## 2023-12-25 RX ORDER — POLYETHYLENE GLYCOL 3350 17 G/17G
17 POWDER, FOR SOLUTION ORAL DAILY
Status: DISCONTINUED | OUTPATIENT
Start: 2023-12-25 | End: 2023-12-29

## 2023-12-25 RX ORDER — ACETAMINOPHEN 325 MG/1
650 TABLET ORAL EVERY 6 HOURS SCHEDULED
Status: DISCONTINUED | OUTPATIENT
Start: 2023-12-25 | End: 2023-12-29

## 2023-12-25 RX ORDER — LEVETIRACETAM 500 MG/1
500 TABLET ORAL 2 TIMES DAILY
Status: DISCONTINUED | OUTPATIENT
Start: 2023-12-25 | End: 2023-12-29

## 2023-12-25 RX ORDER — SODIUM CHLORIDE 9 MG/ML
75 INJECTION, SOLUTION INTRAVENOUS CONTINUOUS
Status: DISCONTINUED | OUTPATIENT
Start: 2023-12-25 | End: 2023-12-28

## 2023-12-25 RX ORDER — OXYCODONE HYDROCHLORIDE 5 MG/1
2.5 TABLET ORAL EVERY 4 HOURS PRN
Status: DISCONTINUED | OUTPATIENT
Start: 2023-12-25 | End: 2023-12-29

## 2023-12-25 RX ORDER — NALOXONE HYDROCHLORIDE 0.4 MG/ML
0.2 INJECTION, SOLUTION INTRAMUSCULAR; INTRAVENOUS; SUBCUTANEOUS EVERY 5 MIN PRN
Status: DISCONTINUED | OUTPATIENT
Start: 2023-12-25 | End: 2023-12-29

## 2023-12-25 RX ADMIN — SENNOSIDES AND DOCUSATE SODIUM 2 TABLET: 8.6; 5 TABLET ORAL at 08:52

## 2023-12-25 RX ADMIN — HEPARIN SODIUM 5000 UNITS: 5000 INJECTION INTRAVENOUS; SUBCUTANEOUS at 02:34

## 2023-12-25 RX ADMIN — ATORVASTATIN CALCIUM 40 MG: 40 TABLET, FILM COATED ORAL at 08:52

## 2023-12-25 RX ADMIN — HEPARIN SODIUM 5000 UNITS: 5000 INJECTION INTRAVENOUS; SUBCUTANEOUS at 10:15

## 2023-12-25 RX ADMIN — IOHEXOL 80 ML: 350 INJECTION, SOLUTION INTRAVENOUS at 07:41

## 2023-12-25 RX ADMIN — ZOLPIDEM TARTRATE 5 MG: 5 TABLET ORAL at 22:54

## 2023-12-25 RX ADMIN — SODIUM CHLORIDE 75 ML/HR: 9 INJECTION, SOLUTION INTRAVENOUS at 04:37

## 2023-12-25 RX ADMIN — PANTOPRAZOLE SODIUM 40 MG: 40 TABLET, DELAYED RELEASE ORAL at 06:38

## 2023-12-25 RX ADMIN — ACETAMINOPHEN 650 MG: 325 TABLET ORAL at 06:38

## 2023-12-25 RX ADMIN — GADOTERATE MEGLUMINE 20 ML: 376.9 INJECTION INTRAVENOUS at 03:17

## 2023-12-25 RX ADMIN — LEVETIRACETAM 500 MG: 250 TABLET, FILM COATED ORAL at 20:44

## 2023-12-25 RX ADMIN — ACETAMINOPHEN 650 MG: 325 TABLET ORAL at 12:40

## 2023-12-25 RX ADMIN — ACETAMINOPHEN 650 MG: 325 TABLET ORAL at 17:19

## 2023-12-25 RX ADMIN — LEVETIRACETAM 500 MG: 250 TABLET, FILM COATED ORAL at 02:34

## 2023-12-25 RX ADMIN — LEVETIRACETAM 500 MG: 250 TABLET, FILM COATED ORAL at 08:52

## 2023-12-25 RX ADMIN — MELATONIN 3 MG: 3 TAB ORAL at 22:54

## 2023-12-25 SDOH — ECONOMIC STABILITY: INCOME INSECURITY: HOW HARD IS IT FOR YOU TO PAY FOR THE VERY BASICS LIKE FOOD, HOUSING, MEDICAL CARE, AND HEATING?: VERY HARD

## 2023-12-25 SDOH — ECONOMIC STABILITY: INCOME INSECURITY: IN THE LAST 12 MONTHS, WAS THERE A TIME WHEN YOU WERE NOT ABLE TO PAY THE MORTGAGE OR RENT ON TIME?: NO

## 2023-12-25 SDOH — SOCIAL STABILITY: SOCIAL INSECURITY: ABUSE: ADULT

## 2023-12-25 SDOH — SOCIAL STABILITY: SOCIAL INSECURITY: ARE YOU OR HAVE YOU BEEN THREATENED OR ABUSED PHYSICALLY, EMOTIONALLY, OR SEXUALLY BY ANYONE?: NO

## 2023-12-25 SDOH — SOCIAL STABILITY: SOCIAL INSECURITY: WERE YOU ABLE TO COMPLETE ALL THE BEHAVIORAL HEALTH SCREENINGS?: YES

## 2023-12-25 SDOH — SOCIAL STABILITY: SOCIAL INSECURITY: DOES ANYONE TRY TO KEEP YOU FROM HAVING/CONTACTING OTHER FRIENDS OR DOING THINGS OUTSIDE YOUR HOME?: NO

## 2023-12-25 SDOH — SOCIAL STABILITY: SOCIAL INSECURITY: HAS ANYONE EVER THREATENED TO HURT YOUR FAMILY OR YOUR PETS?: NO

## 2023-12-25 SDOH — SOCIAL STABILITY: SOCIAL INSECURITY: HAVE YOU HAD THOUGHTS OF HARMING ANYONE ELSE?: NO

## 2023-12-25 SDOH — SOCIAL STABILITY: SOCIAL INSECURITY: DO YOU FEEL UNSAFE GOING BACK TO THE PLACE WHERE YOU ARE LIVING?: NO

## 2023-12-25 SDOH — SOCIAL STABILITY: SOCIAL INSECURITY: ARE THERE ANY APPARENT SIGNS OF INJURIES/BEHAVIORS THAT COULD BE RELATED TO ABUSE/NEGLECT?: NO

## 2023-12-25 SDOH — SOCIAL STABILITY: SOCIAL INSECURITY: DO YOU FEEL ANYONE HAS EXPLOITED OR TAKEN ADVANTAGE OF YOU FINANCIALLY OR OF YOUR PERSONAL PROPERTY?: NO

## 2023-12-25 ASSESSMENT — ACTIVITIES OF DAILY LIVING (ADL)
FEEDING YOURSELF: INDEPENDENT
BATHING: INDEPENDENT
ADEQUATE_TO_COMPLETE_ADL: YES
JUDGMENT_ADEQUATE_SAFELY_COMPLETE_DAILY_ACTIVITIES: YES
HEARING - LEFT EAR: FUNCTIONAL
PATIENT'S MEMORY ADEQUATE TO SAFELY COMPLETE DAILY ACTIVITIES?: YES
DRESSING YOURSELF: INDEPENDENT
LACK_OF_TRANSPORTATION: NO
WALKS IN HOME: INDEPENDENT
TOILETING: INDEPENDENT
HEARING - RIGHT EAR: FUNCTIONAL
GROOMING: INDEPENDENT

## 2023-12-25 ASSESSMENT — COGNITIVE AND FUNCTIONAL STATUS - GENERAL
PATIENT BASELINE BEDBOUND: NO
MOBILITY SCORE: 24
DAILY ACTIVITIY SCORE: 24

## 2023-12-25 ASSESSMENT — COLUMBIA-SUICIDE SEVERITY RATING SCALE - C-SSRS
1. IN THE PAST MONTH, HAVE YOU WISHED YOU WERE DEAD OR WISHED YOU COULD GO TO SLEEP AND NOT WAKE UP?: NO
6. HAVE YOU EVER DONE ANYTHING, STARTED TO DO ANYTHING, OR PREPARED TO DO ANYTHING TO END YOUR LIFE?: NO
2. HAVE YOU ACTUALLY HAD ANY THOUGHTS OF KILLING YOURSELF?: NO

## 2023-12-25 ASSESSMENT — LIFESTYLE VARIABLES
HOW OFTEN DO YOU HAVE A DRINK CONTAINING ALCOHOL: MONTHLY OR LESS
SKIP TO QUESTIONS 9-10: 1
HOW MANY STANDARD DRINKS CONTAINING ALCOHOL DO YOU HAVE ON A TYPICAL DAY: 1 OR 2
HOW OFTEN DO YOU HAVE 6 OR MORE DRINKS ON ONE OCCASION: NEVER
AUDIT-C TOTAL SCORE: 1
AUDIT-C TOTAL SCORE: 1

## 2023-12-25 ASSESSMENT — PAIN SCALES - GENERAL
PAINLEVEL_OUTOF10: 0 - NO PAIN

## 2023-12-25 ASSESSMENT — PAIN - FUNCTIONAL ASSESSMENT
PAIN_FUNCTIONAL_ASSESSMENT: 0-10

## 2023-12-25 ASSESSMENT — PATIENT HEALTH QUESTIONNAIRE - PHQ9
1. LITTLE INTEREST OR PLEASURE IN DOING THINGS: NOT AT ALL
SUM OF ALL RESPONSES TO PHQ9 QUESTIONS 1 & 2: 0
2. FEELING DOWN, DEPRESSED OR HOPELESS: NOT AT ALL

## 2023-12-25 NOTE — PROGRESS NOTES
"Pharmacy Medication History Review    Enmanuel Ahumada \"Dash\" is a 68 y.o. male admitted for Brain mass. Pharmacy reviewed the patient's [id number] medications and allergies for accuracy.    The list below reflects the updated PTA list. Comments regarding how patient may be taking medications differently can be found in the Admit Orders Activity  Prior to Admission Medications   Prescriptions Last Dose Informant   CALCIUM CARBONATE-VITAMIN D3 ORAL  Self   Sig: Take by mouth.   aspirin 81 mg chewable tablet  Self   Sig: Chew 1 tablet (81 mg) once daily.   atorvastatin (Lipitor) 40 mg tablet  Self   Sig: TAKE 1 TABLET BY MOUTH EVERY DAY   cholecalciferol (Vitamin D3) 50 mcg (2,000 unit) capsule  Self   Sig: Take 1 capsule (50 mcg) by mouth once daily.   krill-omega-3-dha-epa-lipids 410-81-97-50 mg capsule  Self   Sig: Take 1 capsule by mouth once daily.   pantoprazole (ProtoNix) 40 mg EC tablet  Self   Sig: TAKE 1 TABLET BY MOUTH IN THE MORNING 30 MINUTES BEFORE BREAKFAST.   sildenafil (Viagra) 100 mg tablet  Self   Sig: Take 1 tablet (100 mg) by mouth once daily as needed for erectile dysfunction.   ubidecarenone (COENZYME Q10, BULK, MISC)  Self   Sig: Take 1 Capful by mouth once daily.      Facility-Administered Medications: None        The list below reflects the updated allergy list. Please review each documented allergy for additional clarification and justification.  Allergies  Reviewed by Beverley Hubbard PharmD on 12/25/2023   No Known Allergies       Patient declines M2B at discharge. Pharmacy has been updated to [facility] [place], [place].    Sources used to complete the med history include medication dispense history, PCP visit 8/7/23 with Dr. Youngblood, and patient interview    Below are additional concerns with the patient's PTA list.  None, patient was a reliable historian. Patient verified he takes both the combination vitamin D3 and calcium capsule as well as the single vitamin D3 capsule. "     Beverley Hubbard, PharmD   [facility] PGY1 Community Pharmacy Resident   Medication Reconciliation Complete   Please reach out via EPIC chat with questions, or if no response call [phone] or vocera [facility]

## 2023-12-25 NOTE — H&P
"History Of Present Illness  Enmanuel Ahumada \"Dash\" is a 68 y.o. male HANDEDNESS: RIGHT presenting with 6 weeks of intermittent headache, and increased confusion noticed by his wife.    Per patient he was going to urgent care for headaches but never found the source.  Reports his headaches are moderate and usually resolve on their own.  He denies any associated symptoms with the headaches but did note that his wife has been noticing increased confusion for a few weeks and that he has noticed he has been running into things at the grocery store and into people in public for a similar time period.      Denies any current neurologic symptoms including current headache, weakness/numbness/parasthesias.        Patient denies etoh use, denies tobacco use, denies illicit drug use.      Past Medical History  He has no past medical history on file.    Surgical History  He has a past surgical history that includes Other surgical history (07/24/2019) and Other surgical history (07/26/2021).     Social History  He reports that he has never smoked. He has never used smokeless tobacco. He reports that he does not drink alcohol and does not use drugs.     Allergies  Patient has no known allergies.    Medications  Medications Prior to Admission   Medication Sig Dispense Refill Last Dose    aspirin 81 mg chewable tablet Chew.       atorvastatin (Lipitor) 40 mg tablet TAKE 1 TABLET BY MOUTH EVERY DAY 90 tablet 3     CALCIUM CARBONATE-VITAMIN D3 ORAL Take by mouth.       krill-omega-3-dha-epa-lipids 534-15-64-50 mg capsule Take by mouth.       pantoprazole (ProtoNix) 40 mg EC tablet TAKE 1 TABLET BY MOUTH IN THE MORNING 30 MINUTES BEFORE BREAKFAST. 30 tablet 0     sildenafil (Viagra) 100 mg tablet Take 1 tablet (100 mg) by mouth once daily as needed for erectile dysfunction. 10 tablet 11     ubidecarenone (COENZYME Q10, BULK, MISC) Take by mouth.          Review of Systems   10 point ROS is obtained and negative except the ones " mentioned in the HPI   Neurological Exam  Mental Status  Alert.    Cranial Nerves  CN III, IV, VI: Extraocular movements intact bilaterally. Pupils equal round and reactive to light bilaterally.  NAD, A&Ox3  Cranial Nerves II-XII: PERRL, EOMI, L HH, Face symmetric, Facial SILT, Palate/Tongue midline and symmetric, shoulder shrugs symmetric  Motor: 5/5, no dysmetria on finger to nose, no pronator drift  Sensation: SILT throughout all extremities  DTRS: 2+ Throughout, No Hoffmans or Clonus .    Physical Exam  Constitutional:       Appearance: Normal appearance.   HENT:      Head: Normocephalic and atraumatic.      Mouth/Throat:      Mouth: Mucous membranes are moist.   Eyes:      Extraocular Movements: Extraocular movements intact.      Pupils: Pupils are equal, round, and reactive to light.   Cardiovascular:      Rate and Rhythm: Normal rate and regular rhythm.   Pulmonary:      Effort: Pulmonary effort is normal.   Abdominal:      General: Abdomen is flat.      Palpations: Abdomen is soft.   Musculoskeletal:         General: Normal range of motion.      Cervical back: Normal range of motion.   Skin:     General: Skin is warm and dry.   Neurological:      Mental Status: He is alert.      Comments: NAD, A&Ox3  Cranial Nerves II-XII: PERRL, EOMI, L HH, Face symmetric, Facial SILT, Palate/Tongue midline and symmetric, shoulder shrugs symmetric  Motor: 5/5, no dysmetria on finger to nose, no pronator drift  Sensation: SILT throughout all extremities  DTRS: 2+ Throughout, No Hoffmans or Clonus        Spine Musculoskeletal Exam    Range of Motion    Cervical Spine    Cervical spine range of motion is normal.         General    Neurological: alert      Last Recorded Vitals  There were no vitals taken for this visit.    Relevant Results  Patient's imaging was personally reviewed and notable for : CTH without contrast with right occipital hypodense lesion.  There is mild brain compression and midline shift.        SCORES    GCS:   - Motor: 6 - Follows simple motor commands  - Verbal: 5 - Alert and oriented  - Eyes: 4 - Opens eyes on own  - TOTAL: 15      Assessment/Plan   Principal Problem:    Brain mass      Enmanuel Ahumada is a 68 year old M with h/o HLD, cholecystectomy, Osborn's esophagus, on ASA 81 for cardioprotection , who p/w 6 wks HA, 3 wks incr confusion and running into things, CTH RO hypodensity c/f mass    Plan:  Admit to Neurosurgery, floor  Continue kera   MRI brain with and without contrast (MR brain PPF if able)  Hold steroids until MRI   Home meds as applicable, hold ASA  SCDs, SQH   PTOT      Bernie Jiang MD  Note authored by resident on neurosurgery team, with all questions or to contact team please page at [phone]

## 2023-12-26 ENCOUNTER — APPOINTMENT (OUTPATIENT)
Dept: RADIOLOGY | Facility: HOSPITAL | Age: 68
DRG: 025 | End: 2023-12-26
Payer: MEDICARE

## 2023-12-26 ENCOUNTER — PREP FOR PROCEDURE (OUTPATIENT)
Dept: NEUROSURGERY | Facility: HOSPITAL | Age: 68
End: 2023-12-26

## 2023-12-26 LAB
ALBUMIN SERPL BCP-MCNC: 4.2 G/DL (ref 3.4–5)
ALP SERPL-CCNC: 82 U/L (ref 33–136)
ALT SERPL W P-5'-P-CCNC: 27 U/L (ref 10–52)
ANION GAP SERPL CALC-SCNC: 11 MMOL/L (ref 10–20)
AST SERPL W P-5'-P-CCNC: 20 U/L (ref 9–39)
BILIRUB SERPL-MCNC: 1.1 MG/DL (ref 0–1.2)
BUN SERPL-MCNC: 22 MG/DL (ref 6–23)
CALCIUM SERPL-MCNC: 9.5 MG/DL (ref 8.6–10.6)
CHLORIDE SERPL-SCNC: 104 MMOL/L (ref 98–107)
CO2 SERPL-SCNC: 30 MMOL/L (ref 21–32)
CREAT SERPL-MCNC: 0.94 MG/DL (ref 0.5–1.3)
GFR SERPL CREATININE-BSD FRML MDRD: 89 ML/MIN/1.73M*2
GLUCOSE SERPL-MCNC: 111 MG/DL (ref 74–99)
POTASSIUM SERPL-SCNC: 3.8 MMOL/L (ref 3.5–5.3)
PROT SERPL-MCNC: 6.1 G/DL (ref 6.4–8.2)
SODIUM SERPL-SCNC: 141 MMOL/L (ref 136–145)

## 2023-12-26 PROCEDURE — 96372 THER/PROPH/DIAG INJ SC/IM: CPT | Performed by: STUDENT IN AN ORGANIZED HEALTH CARE EDUCATION/TRAINING PROGRAM

## 2023-12-26 PROCEDURE — 2500000004 HC RX 250 GENERAL PHARMACY W/ HCPCS (ALT 636 FOR OP/ED): Performed by: STUDENT IN AN ORGANIZED HEALTH CARE EDUCATION/TRAINING PROGRAM

## 2023-12-26 PROCEDURE — 2500000001 HC RX 250 WO HCPCS SELF ADMINISTERED DRUGS (ALT 637 FOR MEDICARE OP): Performed by: STUDENT IN AN ORGANIZED HEALTH CARE EDUCATION/TRAINING PROGRAM

## 2023-12-26 PROCEDURE — 80053 COMPREHEN METABOLIC PANEL: CPT

## 2023-12-26 PROCEDURE — 2500000004 HC RX 250 GENERAL PHARMACY W/ HCPCS (ALT 636 FOR OP/ED)

## 2023-12-26 PROCEDURE — 96020 FUNCTIONAL BRAIN MAPPING: CPT

## 2023-12-26 PROCEDURE — 1100000001 HC PRIVATE ROOM DAILY

## 2023-12-26 PROCEDURE — 70551 MRI BRAIN STEM W/O DYE: CPT | Performed by: STUDENT IN AN ORGANIZED HEALTH CARE EDUCATION/TRAINING PROGRAM

## 2023-12-26 PROCEDURE — 36415 COLL VENOUS BLD VENIPUNCTURE: CPT

## 2023-12-26 PROCEDURE — 96372 THER/PROPH/DIAG INJ SC/IM: CPT

## 2023-12-26 PROCEDURE — 99223 1ST HOSP IP/OBS HIGH 75: CPT

## 2023-12-26 PROCEDURE — 99232 SBSQ HOSP IP/OBS MODERATE 35: CPT | Performed by: NEUROLOGICAL SURGERY

## 2023-12-26 RX ORDER — HEPARIN SODIUM 5000 [USP'U]/ML
5000 INJECTION, SOLUTION INTRAVENOUS; SUBCUTANEOUS EVERY 8 HOURS
Status: COMPLETED | OUTPATIENT
Start: 2023-12-27 | End: 2023-12-28

## 2023-12-26 RX ORDER — SODIUM CHLORIDE 9 MG/ML
75 INJECTION, SOLUTION INTRAVENOUS CONTINUOUS
Status: ACTIVE | OUTPATIENT
Start: 2023-12-27 | End: 2023-12-27

## 2023-12-26 RX ADMIN — ATORVASTATIN CALCIUM 40 MG: 40 TABLET, FILM COATED ORAL at 08:37

## 2023-12-26 RX ADMIN — HEPARIN SODIUM 5000 UNITS: 5000 INJECTION INTRAVENOUS; SUBCUTANEOUS at 16:33

## 2023-12-26 RX ADMIN — OXYCODONE HYDROCHLORIDE 2.5 MG: 5 TABLET ORAL at 16:33

## 2023-12-26 RX ADMIN — MELATONIN 3 MG: 3 TAB ORAL at 20:39

## 2023-12-26 RX ADMIN — OXYCODONE HYDROCHLORIDE 2.5 MG: 5 TABLET ORAL at 08:38

## 2023-12-26 RX ADMIN — HEPARIN SODIUM 5000 UNITS: 5000 INJECTION INTRAVENOUS; SUBCUTANEOUS at 08:38

## 2023-12-26 RX ADMIN — ACETAMINOPHEN 650 MG: 325 TABLET ORAL at 16:33

## 2023-12-26 RX ADMIN — POLYETHYLENE GLYCOL 3350 17 G: 17 POWDER, FOR SOLUTION ORAL at 08:38

## 2023-12-26 RX ADMIN — LEVETIRACETAM 500 MG: 250 TABLET, FILM COATED ORAL at 20:39

## 2023-12-26 RX ADMIN — LEVETIRACETAM 500 MG: 250 TABLET, FILM COATED ORAL at 08:37

## 2023-12-26 RX ADMIN — SENNOSIDES AND DOCUSATE SODIUM 2 TABLET: 8.6; 5 TABLET ORAL at 20:39

## 2023-12-26 RX ADMIN — SENNOSIDES AND DOCUSATE SODIUM 2 TABLET: 8.6; 5 TABLET ORAL at 08:38

## 2023-12-26 RX ADMIN — PANTOPRAZOLE SODIUM 40 MG: 40 TABLET, DELAYED RELEASE ORAL at 06:15

## 2023-12-26 RX ADMIN — HEPARIN SODIUM 5000 UNITS: 5000 INJECTION INTRAVENOUS; SUBCUTANEOUS at 03:11

## 2023-12-26 RX ADMIN — ACETAMINOPHEN 650 MG: 325 TABLET ORAL at 06:15

## 2023-12-26 ASSESSMENT — COGNITIVE AND FUNCTIONAL STATUS - GENERAL
DAILY ACTIVITIY SCORE: 24
MOBILITY SCORE: 24

## 2023-12-26 ASSESSMENT — PAIN SCALES - GENERAL
PAINLEVEL_OUTOF10: 0 - NO PAIN
PAINLEVEL_OUTOF10: 3
PAINLEVEL_OUTOF10: 0 - NO PAIN

## 2023-12-26 ASSESSMENT — ENCOUNTER SYMPTOMS
HEADACHES: 1
CONFUSION: 1
ACTIVITY CHANGE: 1

## 2023-12-26 ASSESSMENT — PAIN - FUNCTIONAL ASSESSMENT
PAIN_FUNCTIONAL_ASSESSMENT: 0-10

## 2023-12-26 NOTE — CONSULTS
Reason For Consult  Preoperative Medicine Consult. Evaluated 12/26/23      History Of Present Illness  Dash Ahumada is a 68 y.o. male, hx of HLD and Osborn's esophagus, who presents w/ 6-wk hx of HA and 3-wk hx of confusion, found to have rt parietal mass. Perioperative medicine consulted for preoperative evaluation.    Pt reports 6-wk hx of HA (abnormal for him) along w/ 3-wk hx of confusion (misplacing things, getting lost) which prompted him to go to the ED where he was found to have a rt parietal mass.    Pt denies hx of HTN, ACS, CHF, or current chest pain. Pt reports previous cardiac workup (prompted by abnormal EKG showing possible prior MI) to include a stress test, however, he doesn't remember anything abnormal with the results and he wasn't started on any new medications. Pt currently only takes a statin for HLD. Pt reports no issue completing ADLs (prior to sx) and would walk 1-2miles per day w/ his wife, workout 3x/week lifting weights, and use the rowing machine at home.     No evidence of asthma, COPD, ESRD, or cirrhosis.     Past Medical History  As above    Surgical History  Cholecystectomy      Social History  He reports that he has never smoked. He has never used smokeless tobacco. He reports that he does not drink alcohol and does not use drugs.    Family History  No family history on file.     Allergies  Patient has no known allergies.    Review of Systems  Review of Systems   Constitutional:  Positive for activity change.   Eyes:  Positive for visual disturbance (decreased vision in left peripheral field).   Neurological:  Positive for headaches.   Psychiatric/Behavioral:  Positive for confusion.    All other systems reviewed and are negative.    Physical Exam  Physical Exam  Vitals reviewed.   Constitutional:       Appearance: Normal appearance. He is normal weight.   HENT:      Head: Normocephalic and atraumatic.   Eyes:      General: Visual field deficit (Decreased left outer quadrant  "peripheral vision) present.      Extraocular Movements: Extraocular movements intact.   Cardiovascular:      Rate and Rhythm: Normal rate and regular rhythm.      Heart sounds: Normal heart sounds.   Pulmonary:      Effort: Pulmonary effort is normal.      Breath sounds: Normal breath sounds.   Musculoskeletal:      Cervical back: Normal range of motion.   Skin:     General: Skin is warm and dry.   Neurological:      Mental Status: He is alert and oriented to person, place, and time.   Psychiatric:         Mood and Affect: Mood normal.         Behavior: Behavior normal.        Last Recorded Vitals  Blood pressure 107/68, pulse 70, temperature 36.5 °C (97.7 °F), temperature source Temporal, resp. rate 18, height 1.702 m (5' 7.01\"), weight 77.1 kg (170 lb), SpO2 96 %.    Relevant Results  No results found for this or any previous visit (from the past 24 hour(s)).     CT abdomen pelvis w IV contrast    Result Date: 12/26/2023  Interpreted By:  Rosalind Maradiaga, STUDY: CT ABDOMEN PELVIS W IV CONTRAST;  12/25/2023 7:40 am   INDICATION: Signs/Symptoms:metastatic workup. 68-year-old male with a newly diagnosed brain mass.   COMPARISON: None.   ACCESSION NUMBER(S): [id number]   ORDERING CLINICIAN: CHERYL GOMEZ   TECHNIQUE: CT of the abdomen and pelvis was performed.  Standard contiguous axial images were obtained at 3 mm slice thickness through the abdomen and pelvis. Coronal and sagittal reconstructions at 3 mm slice thickness were performed.   80 ml of contrast Omnipaque 350 were administered intravenously without immediate complication.   FINDINGS: LOWER CHEST: The visualized lung base is unremarkable. The heart is normal in size without pericardial effusion. No pleural effusion is present. Small hiatal hernia.   ABDOMEN:   LIVER: The liver is normal in size and demonstrates diffusely decreased attenuation suggesting steatosis. 0.4 cm hypodensity within the left lobe of the liver (series 201, image 28), too small to " characterize.   BILE DUCTS: The intrahepatic and extrahepatic ducts are not dilated.   GALLBLADDER: The gallbladder is surgically absent.   PANCREAS: The pancreas appears unremarkable without evidence of ductal dilatation or masses.   SPLEEN: The spleen is normal in size without focal lesions.   ADRENAL GLANDS: Bilateral adrenal glands appear normal.   KIDNEYS AND URETERS: Scattered hypodensities throughout the bilateral kidneys, the largest measuring 1.4 cm in the left kidney, likely representing benign cysts. No hydroureteronephrosis or nephroureterolithiasis is identified.   PELVIS:   BLADDER: The urinary bladder appears normal without abnormal wall thickening.   REPRODUCTIVE ORGANS: The prostate is unremarkable.   BOWEL: Small hiatal hernia. The stomach is otherwise unremarkable. The small and large bowel are normal in caliber and demonstrate no wall thickening.   The appendix appears normal.   VESSELS: Mild atherosclerosis of the abdominal aorta and its major branches. The IVC is normal.   PERITONEUM/RETROPERITONEUM/LYMPH NODES: There is no free or loculated fluid collection, no free intraperitoneal air. The retroperitoneum appears normal.  No abdominopelvic lymphadenopathy is present.   BONES AND ABDOMINAL WALL: No suspicious osseous lesions are identified.  Small fat containing umbilical hernia. Otherwise the abdominal wall soft tissues appear normal.       1. No acute abnormality in the abdomen or pelvis. There is a punctate hypodensity in the left liver lobe that is too small to characterize. Recommend continued attention on follow-up versus consideration of liver MRI for further assessment.   I personally reviewed the images/study and resident's interpretation and I agree with the findings as stated by Rosalind Maradiaga MD (resident radiologist). This study was analyzed and interpreted at [facility], [place], [place].   MACRO: None     Dictation workstation:    [id number]    XR chest 1 view    Result Date: 12/25/2023  Interpreted By:  Kelly Corbin, STUDY: XR CHEST 1 VIEW;  12/25/2023 5:37 am   INDICATION: Signs/Symptoms:preop baseline.   COMPARISON: None   ACCESSION NUMBER(S): [id number]   ORDERING CLINICIAN: MUKUL LOBO   FINDINGS: The cardiac silhouette size is within normal limits. There is no focal consolidation, edema or pneumothorax. No sizeable pleural effusion. No acute osseous abnormality.       No radiographic evidence of acute cardiopulmonary process.     Signed by: Kelly Jacques 12/25/2023 11:13 AM Dictation workstation:   [id number]    ECG 12 lead    Result Date: 12/25/2023  Sinus rhythm with frequent and consecutive Premature ventricular complexes and Fusion complexes Left axis deviation Possible Anterior infarct (cited on or before 25-JAN-2020) Abnormal ECG When compared with ECG of 25-JAN-2020 20:04, Premature ventricular complexes are now Present Questionable change in QRS duration Criteria for Inferior infarct are no longer Present Questionable change in initial forces of Anterolateral leads    MR brain tumor perfusion protocol w and wo IV contrast    Result Date: 12/25/2023  Interpreted By:  Gen Phelps and Bera Kaustav STUDY: MR BRAIN TUMOR PERFUSION PROTOCOL W AND WO IV CONTRAST;  12/25/2023 3:40 am   INDICATION: New brain mass seen on CT.   COMPARISON: CT head with and without contrast on 12/24/2023.   ACCESSION NUMBER(S): [id number]   ORDERING CLINICIAN: MUKUL LOBO   TECHNIQUE: Axial T2, FLAIR, DWI, gradient echo T2 and T1 weighted images of the brain were acquired. After bolus administration of 20 mL Dotarem, both DSC and DCE perfusion weighted images of the brain were acquired. Perfusion maps were generated on an independent workstation and reviewed. Postcontrast axial and volumetric T1 weighted images of the brain were also obtained.   FINDINGS: There is a 7.4 x 3.7 x 6.3 cm predominantly peripherally  enhancing mass centered within the right parietal lobe, with a central cystic/necrotic area, and patchy central areas of susceptibility artifact seen on gradient echo T2 images. The mass abuts the right inner table of the skull, with dural enhancement visualized. There is deep extension to the ependymal surface of the right atrium. Nonenhancing expansile T2 FLAIR hyperintense signal involving the right periatrial white matter and lateral aspect of the right splenium of the corpus callosum.   There is associated extensive mass effect, sulcal effacement and 0.9 cm right-to-left midline shift. The mass also causes effacement of the right lateral ventricle, with the body and frontal horn appearing slit-like, and decreased in size as compared to the left lateral ventricle. There is also effacement of the 3rd ventricle which is deviated leftward. There is also partial effacement of the right mesencephalic cistern, asymmetric enlargement of the right temporal horn. There is no uncal herniation.   Punctate diffusion restriction within the cortex of the right frontal lobe (series 12, image 74). No associated hemorrhage or mass effect.   There are a few foci of predominantly T2 and FLAIR hyperintensities, which are nonspecific, however likely relate to sequela of chronic small vessel disease.   There is a tiny mucosal retention cyst/polyp within the right maxillary sinus as well as within the right posterior ethmoidal air cells. Visualized mastoids are clear.   Calvarium is unremarkable.       Please note perfusion imaging will be reported as an addendum after post processing is performed. 1. Centrally cystic/necrotic mass centered within the right parietal lobe with thick irregular rim demonstrating high cellularity, avid enhancement, and hemorrhage. The lesion abuts the overlying dura and extends to the ependymal surface of the atrium of the right lateral ventricle. 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  An addendum will be performed with interpretation of advanced imaging when post processing is performed. 2. Expansile nonenhancing FLAIR hyperintense signal extending into the right periatrial white matter and lateral right splenium of the corpus callosum, which may represent infiltrative tumor and/or edema. 3. Effacement of the right lateral ventricle, 3rd ventricle, right perimesencephalic cistern with about 0.9 cm midline shift, similar to prior CT.   I personally reviewed the images/study and I agree with the findings as stated. This study was interpreted at [place], [place].   Signed by: Gen Phelps 12/25/2023 8:43 AM Dictation workstation:   [id number]    CT head w and wo IV contrast    Result Date: 12/24/2023  Interpreted By:  Jeff Diana, STUDY: CT HEAD W AND WO IV CONTRAST;  12/24/2023 5:58 pm   INDICATION: Signs/Symptoms:L hemianopsia, headache, confusion x6 weeks.   COMPARISON: None.   ACCESSION NUMBER(S): [id number]   ORDERING CLINICIAN: MICHAEL WRIGHT   TECHNIQUE: Noncontrast axial CT images of head were obtained.   FINDINGS: BRAIN PARENCHYMA: There is a large centrally necrotic mass within the right parietal lobe measuring approximately 7.3 cm x 7.1 cm x 5 cm. It demonstrates a thick rind of solid tumor enhancement and central cystic area of non enhancement. There are areas of neovascularity within and adjacent to the mass. There is a 0.5 cm hyperdense focus within the solid portion of the tumor that may represent petechial hemorrhage. It result in up to 1 cm of leftward midline shift. There is no acute intraparenchymal hemorrhage. There is no evidence of a large territory acute ischemic infarct.   VENTRICLES and EXTRA-AXIAL SPACES: There is moderate mass effect on the right lateral ventricle with complete effacement of the body of right lateral ventricle. There is moderate mass effect on the 3rd ventricle with near complete effacement and  leftward deviation. There is diffuse sulcal effacement throughout the right cerebral hemisphere. There is partial effacement of the right perimesencephalic cistern.   PARANASAL SINUSES/MASTOIDS:  No hemorrhage or air-fluid levels within the visualized paranasal sinuses. The mastoids are well aerated.   CALVARIUM/ORBITS:  No skull fracture.  The orbits and globes are intact to the extent visualized.   EXTRACRANIAL SOFT TISSUES: No discernible abnormality.       1. Large centrally necrotic mass in the right parietal lobe measuring 7.3 cm x 7.1 cm x 5 cm resulting in 1 cm leftward midline shift, effacement of the right perimesencephalic cistern, diffuse sulcal effacement of the right cerebral hemisphere. Findings are most suspicious for glioblastoma. Neuro surgical consultation is recommended.   2. Possible 0.5 cm focus of petechial hemorrhage in the solid portion of the mass. Otherwise no acute intracranial hemorrhage.     MACRO: Jeff Diana discussed the significance and urgency of this critical finding by telephone with  MICHAEL WRIGHT on 12/24/2023 at 7:28 pm.  (**-RCF-**) Findings:  See findings.   Signed by: Jeff Diana 12/24/2023 7:28 PM Dictation workstation:   [id number]    XR chest 1 view    Result Date: 12/24/2023  STUDY: Chest Radiograph;  12/24/2023 at 4:50 PM INDICATION: Cough. COMPARISON: 01/28/20 XR Chest ACCESSION NUMBER(S): [id number] ORDERING CLINICIAN: MICHAEL WRIGHT TECHNIQUE:  Frontal chest was obtained at 16:50 hours. FINDINGS: CARDIOMEDIASTINAL SILHOUETTE: Cardiomediastinal silhouette is normal in size and configuration.  LUNGS: Lungs are clear.  ABDOMEN: No remarkable upper abdominal findings.  BONES: No acute osseous changes.    No radiographic evidence of acute cardiopulmonary disease. Signed by Tayo Morales MD       Assessment/Plan   68 y.o. male, hx of HLD and Osborn's esophagus, who presents w/ 6-wk hx of HA and 3-wk hx of confusion + balance issues, found to have  rt parietal mass. Perioperative medicine consulted for preoperative evaluation.     Timing- Urgent to Time-Sensitive     Surgery- Right Craniotomy w/ tumor resection     Pt does not have any evidence of ACS, Acute CHF, Valvular Abnormalities, or fatal arrhythmias (recent EKG shows evidence of PVCs and possible prior MI, recommend repeat EKG)     His RCRI risk score is 0/5 and he is due for intermediate risk procedure.  Recommend repeat EKG and new echo given prior cardiac hx (per pt)     Based on DASI, his functional capacity until a few weeks prior is >4 METS    Based on above info, pt is at an elevated but acceptable risk for right craniotomy w/ tumor resection.     #Cardiac risk stratification  -Recommend repeat EKG  -Recommend Echo (would not defer case if unable to get preoperatively)  -Recommend BNP    Staffed w/ Perioperative Medicine Attending, Dr. Lewis.    Discussed w/ NSGY    Jacky Lyon MD

## 2023-12-26 NOTE — PROGRESS NOTES
12/26/23        Transitional Care Coordination Progress Note:   Patient discussed during interdisciplinary rounds.   Team members present: RN TCC CLARK NUNO   Plan per Medical/Surgical team: Possible OR Wednesday 12/27  Continue keppra   MRI brain with and without contrast (MR brain PPF if able)  Hold steroids until MRI   Home meds as applicable, hold ASA  SCDs, SQH   PTOT  Discharge disposition: TBD   Status-Inpatient   Payer- Payor: [employer] [facility] MEDICARE / Plan: [employer] [facility] MEDICARE / Product Type: *No Product type* /    Potential Barriers: None   ADOD: 12/29/2023         Ward Davis RN [facility] [phone]

## 2023-12-26 NOTE — PROGRESS NOTES
"Enmanuel Ahumada \"Dash\" is a 68 y.o. male on day 1 of admission presenting with Brain mass.    Subjective   NAEO       Objective     Physical Exam  Extraocular movements intact bilaterally. Pupils equal round and reactive to light bilaterally.  NAD, A&Ox3  Cranial Nerves II-XII: PERRL, EOMI, L HH, Face symmetric, Facial SILT, Palate/Tongue midline and symmetric, shoulder shrugs symmetric  Motor: 5/5, no dysmetria on finger to nose, no pronator drift  Sensation: SILT throughout all extremities  DTRS: 2+ Throughout, No Hoffmans or Clonus .    Last Recorded Vitals  Blood pressure 115/74, pulse 83, temperature 35.9 °C (96.6 °F), temperature source Temporal, resp. rate 18, height 1.702 m (5' 7.01\"), weight 77.1 kg (170 lb), SpO2 97 %.  Intake/Output last 3 Shifts:  I/O last 3 completed shifts:  In: 318.8 (4.1 mL/kg) [I.V.:318.8 (4.1 mL/kg)]  Out: - (0 mL/kg)   Weight: 77.1 kg     Current Medications  acetaminophen, 650 mg, oral, q6h LA NENA  atorvastatin, 40 mg, oral, Daily  heparin (porcine), 5,000 Units, subcutaneous, q8h  levETIRAcetam, 500 mg, oral, BID  melatonin, 3 mg, oral, Daily  pantoprazole, 40 mg, oral, Daily before breakfast  perflutren lipid microspheres, 0.5-10 mL of dilution, intravenous, Once in imaging  perflutren protein A microsphere, 0.5 mL, intravenous, Once in imaging  polyethylene glycol, 17 g, oral, Daily  sennosides-docusate sodium, 2 tablet, oral, BID  sulfur hexafluoride microsphr, 2 mL, intravenous, Once in imaging         Assessment/Plan   Principal Problem:    Brain mass    Enmanuel Ahumada is a 68 year old M with h/o HLD, cholecystectomy, Osborn's esophagus, on ASA 81 for cardioprotection , who p/w 6 wks HA, 3 wks incr confusion and running into things, CTH RO hypodensity c/f mass     Plan:  Floor  Possible OR Wednesday 12/27  Continue [facility]   MRI brain with and without contrast (MR brain PPF if able)  Hold steroids until MRI   Home meds as applicable, hold ASA  SCDs, SQH   PTOT        " Notes are authored by overnight resident. Please page [phone] with any questions.    Darrian Cruz MD       [facility] [place], [facility]

## 2023-12-27 ENCOUNTER — APPOINTMENT (OUTPATIENT)
Dept: CARDIOLOGY | Facility: HOSPITAL | Age: 68
DRG: 025 | End: 2023-12-27
Payer: MEDICARE

## 2023-12-27 LAB
AORTIC VALVE PEAK VELOCITY: 1.38
AV PEAK GRADIENT: 7.6
AVA (PEAK VEL): 3.19
BNP SERPL-MCNC: 6 PG/ML (ref 0–99)
EJECTION FRACTION APICAL 4 CHAMBER: 52
EJECTION FRACTION: 58
LEFT ATRIUM VOLUME AREA LENGTH INDEX BSA: 11.3
LEFT VENTRICLE INTERNAL DIMENSION DIASTOLE: 4.6 (ref 3.5–6)
LEFT VENTRICULAR OUTFLOW TRACT DIAMETER: 2.3
MITRAL VALVE E/A RATIO: 0.59
MITRAL VALVE E/E' RATIO: 13.94
RIGHT VENTRICLE FREE WALL PEAK S': 6.75
RIGHT VENTRICLE PEAK SYSTOLIC PRESSURE: 27.8
TRICUSPID ANNULAR PLANE SYSTOLIC EXCURSION: 1.6

## 2023-12-27 PROCEDURE — 2500000004 HC RX 250 GENERAL PHARMACY W/ HCPCS (ALT 636 FOR OP/ED)

## 2023-12-27 PROCEDURE — 93306 TTE W/DOPPLER COMPLETE: CPT

## 2023-12-27 PROCEDURE — 93005 ELECTROCARDIOGRAM TRACING: CPT

## 2023-12-27 PROCEDURE — 93306 TTE W/DOPPLER COMPLETE: CPT | Performed by: INTERNAL MEDICINE

## 2023-12-27 PROCEDURE — 2500000005 HC RX 250 GENERAL PHARMACY W/O HCPCS: Performed by: STUDENT IN AN ORGANIZED HEALTH CARE EDUCATION/TRAINING PROGRAM

## 2023-12-27 PROCEDURE — 96372 THER/PROPH/DIAG INJ SC/IM: CPT

## 2023-12-27 PROCEDURE — 83880 ASSAY OF NATRIURETIC PEPTIDE: CPT | Performed by: STUDENT IN AN ORGANIZED HEALTH CARE EDUCATION/TRAINING PROGRAM

## 2023-12-27 PROCEDURE — 36415 COLL VENOUS BLD VENIPUNCTURE: CPT | Performed by: STUDENT IN AN ORGANIZED HEALTH CARE EDUCATION/TRAINING PROGRAM

## 2023-12-27 PROCEDURE — 93010 ELECTROCARDIOGRAM REPORT: CPT | Performed by: INTERNAL MEDICINE

## 2023-12-27 PROCEDURE — 2500000004 HC RX 250 GENERAL PHARMACY W/ HCPCS (ALT 636 FOR OP/ED): Performed by: STUDENT IN AN ORGANIZED HEALTH CARE EDUCATION/TRAINING PROGRAM

## 2023-12-27 PROCEDURE — 1100000001 HC PRIVATE ROOM DAILY

## 2023-12-27 PROCEDURE — 2500000001 HC RX 250 WO HCPCS SELF ADMINISTERED DRUGS (ALT 637 FOR MEDICARE OP): Performed by: STUDENT IN AN ORGANIZED HEALTH CARE EDUCATION/TRAINING PROGRAM

## 2023-12-27 PROCEDURE — 94760 N-INVAS EAR/PLS OXIMETRY 1: CPT

## 2023-12-27 RX ADMIN — SENNOSIDES AND DOCUSATE SODIUM 2 TABLET: 8.6; 5 TABLET ORAL at 08:26

## 2023-12-27 RX ADMIN — OXYCODONE HYDROCHLORIDE 2.5 MG: 5 TABLET ORAL at 19:44

## 2023-12-27 RX ADMIN — LEVETIRACETAM 500 MG: 250 TABLET, FILM COATED ORAL at 20:12

## 2023-12-27 RX ADMIN — ACETAMINOPHEN 650 MG: 325 TABLET ORAL at 00:06

## 2023-12-27 RX ADMIN — ACETAMINOPHEN 650 MG: 325 TABLET ORAL at 06:36

## 2023-12-27 RX ADMIN — PANTOPRAZOLE SODIUM 40 MG: 40 TABLET, DELAYED RELEASE ORAL at 06:36

## 2023-12-27 RX ADMIN — MELATONIN 3 MG: 3 TAB ORAL at 19:44

## 2023-12-27 RX ADMIN — OXYCODONE HYDROCHLORIDE 2.5 MG: 5 TABLET ORAL at 05:20

## 2023-12-27 RX ADMIN — LEVETIRACETAM 500 MG: 250 TABLET, FILM COATED ORAL at 08:26

## 2023-12-27 RX ADMIN — HEPARIN SODIUM 5000 UNITS: 5000 INJECTION INTRAVENOUS; SUBCUTANEOUS at 15:36

## 2023-12-27 RX ADMIN — HEPARIN SODIUM 5000 UNITS: 5000 INJECTION INTRAVENOUS; SUBCUTANEOUS at 00:06

## 2023-12-27 RX ADMIN — SENNOSIDES AND DOCUSATE SODIUM 2 TABLET: 8.6; 5 TABLET ORAL at 20:12

## 2023-12-27 RX ADMIN — ACETAMINOPHEN 650 MG: 325 TABLET ORAL at 11:23

## 2023-12-27 RX ADMIN — ONDANSETRON HYDROCHLORIDE 4 MG: 4 TABLET, FILM COATED ORAL at 11:23

## 2023-12-27 RX ADMIN — POLYETHYLENE GLYCOL 3350 17 G: 17 POWDER, FOR SOLUTION ORAL at 08:26

## 2023-12-27 RX ADMIN — OXYCODONE HYDROCHLORIDE 2.5 MG: 5 TABLET ORAL at 15:42

## 2023-12-27 RX ADMIN — ACETAMINOPHEN 650 MG: 325 TABLET ORAL at 15:43

## 2023-12-27 RX ADMIN — ATORVASTATIN CALCIUM 40 MG: 40 TABLET, FILM COATED ORAL at 08:26

## 2023-12-27 RX ADMIN — HEPARIN SODIUM 5000 UNITS: 5000 INJECTION INTRAVENOUS; SUBCUTANEOUS at 08:26

## 2023-12-27 RX ADMIN — OXYCODONE HYDROCHLORIDE 2.5 MG: 5 TABLET ORAL at 11:23

## 2023-12-27 ASSESSMENT — PAIN DESCRIPTION - LOCATION
LOCATION: HEAD
LOCATION: HEAD

## 2023-12-27 ASSESSMENT — PAIN SCALES - GENERAL
PAINLEVEL_OUTOF10: 5 - MODERATE PAIN
PAINLEVEL_OUTOF10: 2
PAINLEVEL_OUTOF10: 2
PAINLEVEL_OUTOF10: 3
PAINLEVEL_OUTOF10: 3

## 2023-12-27 ASSESSMENT — PAIN - FUNCTIONAL ASSESSMENT
PAIN_FUNCTIONAL_ASSESSMENT: 0-10

## 2023-12-27 ASSESSMENT — COGNITIVE AND FUNCTIONAL STATUS - GENERAL: MOBILITY SCORE: 24

## 2023-12-27 NOTE — PROGRESS NOTES
"Enmanuel Ahumada \"Dash\" is a 68 y.o. male on day 2 of admission presenting with Brain mass.    Subjective   NAEO       Objective     Physical Exam  Extraocular movements intact bilaterally. Pupils equal round and reactive to light bilaterally.  NAD, A&Ox3  Cranial Nerves II-XII: PERRL, EOMI, L HH, Face symmetric, Facial SILT, Palate/Tongue midline and symmetric, shoulder shrugs symmetric  Motor: 5/5, no dysmetria on finger to nose, no pronator drift  Sensation: SILT throughout all extremities  DTRS: 2+ Throughout, No Hoffmans or Clonus .    Last Recorded Vitals  Blood pressure 121/87, pulse 73, temperature 36.4 °C (97.5 °F), resp. rate 18, height 1.702 m (5' 7.01\"), weight 77.1 kg (170 lb), SpO2 96 %.  Intake/Output last 3 Shifts:  I/O last 3 completed shifts:  In: 318.8 (4.1 mL/kg) [I.V.:318.8 (4.1 mL/kg)]  Out: - (0 mL/kg)   Weight: 77.1 kg     Current Medications  acetaminophen, 650 mg, oral, q6h LA NENA  atorvastatin, 40 mg, oral, Daily  heparin (porcine), 5,000 Units, subcutaneous, q8h  levETIRAcetam, 500 mg, oral, BID  melatonin, 3 mg, oral, Daily  pantoprazole, 40 mg, oral, Daily before breakfast  perflutren lipid microspheres, 0.5-10 mL of dilution, intravenous, Once in imaging  perflutren protein A microsphere, 0.5 mL, intravenous, Once in imaging  polyethylene glycol, 17 g, oral, Daily  sennosides-docusate sodium, 2 tablet, oral, BID  sulfur hexafluoride microsphr, 2 mL, intravenous, Once in imaging         Assessment/Plan   Principal Problem:    Brain mass    Enmanuel Ahumada is a 68 year old M with h/o HLD, cholecystectomy, Osborn's esophagus, on ASA 81 for cardioprotection , who p/w 6 wks HA, 3 wks incr confusion and running into things, CTH RO hypodensity c/f mass     MRI PPF complete  fMRI L language dominant     Plan:  Floor  OR Fri 12/29 for Crani for tumor resection  Appreciate perioperative medicine recs - TTE today   Continue keppra   Hold steroids  Home meds as applicable, hold ASA  SCDs, SQH "   PTOT        Notes are authored by overnight resident. Please page [phone] with any questions.    Bernie Jiang MD

## 2023-12-27 NOTE — CARE PLAN
Problem: Fall/Injury  Goal: Not fall by end of shift  Outcome: Progressing     Problem: Fall/Injury  Goal: Not fall by end of shift  Outcome: Progressing  Goal: Be free from injury by end of the shift  Outcome: Progressing  Goal: Verbalize understanding of personal risk factors for fall in the hospital  Outcome: Progressing     Problem: Pain  Goal: Takes deep breaths with improved pain control throughout the shift  Outcome: Progressing  Goal: Turns in bed with improved pain control throughout the shift  Outcome: Progressing  Goal: Free from opioid side effects throughout the shift  Outcome: Progressing   The patient's goals for the shift include      The clinical goals for the shift include patient will remain HDS throughout the night.    Over the shift, the patient remained safe and free from injury. Vitals stable and pain controlled with PRN medication.

## 2023-12-28 ENCOUNTER — APPOINTMENT (OUTPATIENT)
Dept: RADIOLOGY | Facility: HOSPITAL | Age: 68
DRG: 025 | End: 2023-12-28
Payer: MEDICARE

## 2023-12-28 ENCOUNTER — ANESTHESIA EVENT (OUTPATIENT)
Dept: OPERATING ROOM | Facility: HOSPITAL | Age: 68
DRG: 025 | End: 2023-12-28
Payer: MEDICARE

## 2023-12-28 LAB
ABO GROUP (TYPE) IN BLOOD: NORMAL
ALBUMIN SERPL BCP-MCNC: 4.3 G/DL (ref 3.4–5)
ALBUMIN SERPL BCP-MCNC: 4.3 G/DL (ref 3.4–5)
ALP SERPL-CCNC: 75 U/L (ref 33–136)
ALT SERPL W P-5'-P-CCNC: 50 U/L (ref 10–52)
ANION GAP SERPL CALC-SCNC: 13 MMOL/L (ref 10–20)
ANTIBODY SCREEN: NORMAL
APTT PPP: 34 SECONDS (ref 27–38)
AST SERPL W P-5'-P-CCNC: 32 U/L (ref 9–39)
BILIRUB DIRECT SERPL-MCNC: 0.2 MG/DL (ref 0–0.3)
BILIRUB SERPL-MCNC: 1.6 MG/DL (ref 0–1.2)
BUN SERPL-MCNC: 17 MG/DL (ref 6–23)
CALCIUM SERPL-MCNC: 9.5 MG/DL (ref 8.6–10.6)
CHLORIDE SERPL-SCNC: 103 MMOL/L (ref 98–107)
CO2 SERPL-SCNC: 27 MMOL/L (ref 21–32)
CREAT SERPL-MCNC: 0.94 MG/DL (ref 0.5–1.3)
ERYTHROCYTE [DISTWIDTH] IN BLOOD BY AUTOMATED COUNT: 11.8 % (ref 11.5–14.5)
GFR SERPL CREATININE-BSD FRML MDRD: 89 ML/MIN/1.73M*2
GLUCOSE SERPL-MCNC: 96 MG/DL (ref 74–99)
HCT VFR BLD AUTO: 49.9 % (ref 41–52)
HGB BLD-MCNC: 17.1 G/DL (ref 13.5–17.5)
INR PPP: 1.1 (ref 0.9–1.1)
MCH RBC QN AUTO: 31.5 PG (ref 26–34)
MCHC RBC AUTO-ENTMCNC: 34.3 G/DL (ref 32–36)
MCV RBC AUTO: 92 FL (ref 80–100)
NRBC BLD-RTO: 0 /100 WBCS (ref 0–0)
PHOSPHATE SERPL-MCNC: 3 MG/DL (ref 2.5–4.9)
PLATELET # BLD AUTO: 197 X10*3/UL (ref 150–450)
POTASSIUM SERPL-SCNC: 4.4 MMOL/L (ref 3.5–5.3)
PROT SERPL-MCNC: 7 G/DL (ref 6.4–8.2)
PROTHROMBIN TIME: 12.4 SECONDS (ref 9.8–12.8)
RBC # BLD AUTO: 5.42 X10*6/UL (ref 4.5–5.9)
RH FACTOR (ANTIGEN D): NORMAL
SODIUM SERPL-SCNC: 139 MMOL/L (ref 136–145)
WBC # BLD AUTO: 6 X10*3/UL (ref 4.4–11.3)

## 2023-12-28 PROCEDURE — 2500000004 HC RX 250 GENERAL PHARMACY W/ HCPCS (ALT 636 FOR OP/ED)

## 2023-12-28 PROCEDURE — 96372 THER/PROPH/DIAG INJ SC/IM: CPT

## 2023-12-28 PROCEDURE — 85610 PROTHROMBIN TIME: CPT | Performed by: NURSE PRACTITIONER

## 2023-12-28 PROCEDURE — 36415 COLL VENOUS BLD VENIPUNCTURE: CPT | Performed by: STUDENT IN AN ORGANIZED HEALTH CARE EDUCATION/TRAINING PROGRAM

## 2023-12-28 PROCEDURE — 1100000001 HC PRIVATE ROOM DAILY

## 2023-12-28 PROCEDURE — 2500000001 HC RX 250 WO HCPCS SELF ADMINISTERED DRUGS (ALT 637 FOR MEDICARE OP): Performed by: STUDENT IN AN ORGANIZED HEALTH CARE EDUCATION/TRAINING PROGRAM

## 2023-12-28 PROCEDURE — 80069 RENAL FUNCTION PANEL: CPT | Performed by: NURSE PRACTITIONER

## 2023-12-28 PROCEDURE — 85027 COMPLETE CBC AUTOMATED: CPT | Performed by: NURSE PRACTITIONER

## 2023-12-28 PROCEDURE — 2500000004 HC RX 250 GENERAL PHARMACY W/ HCPCS (ALT 636 FOR OP/ED): Performed by: STUDENT IN AN ORGANIZED HEALTH CARE EDUCATION/TRAINING PROGRAM

## 2023-12-28 PROCEDURE — 76705 ECHO EXAM OF ABDOMEN: CPT

## 2023-12-28 PROCEDURE — 82040 ASSAY OF SERUM ALBUMIN: CPT | Performed by: STUDENT IN AN ORGANIZED HEALTH CARE EDUCATION/TRAINING PROGRAM

## 2023-12-28 PROCEDURE — 76705 ECHO EXAM OF ABDOMEN: CPT | Performed by: RADIOLOGY

## 2023-12-28 PROCEDURE — 36415 COLL VENOUS BLD VENIPUNCTURE: CPT | Performed by: NURSE PRACTITIONER

## 2023-12-28 PROCEDURE — 86901 BLOOD TYPING SEROLOGIC RH(D): CPT | Performed by: NURSE PRACTITIONER

## 2023-12-28 RX ORDER — ACETAMINOPHEN 500 MG
5 TABLET ORAL NIGHTLY PRN
Status: DISCONTINUED | OUTPATIENT
Start: 2023-12-28 | End: 2023-12-29

## 2023-12-28 RX ORDER — SODIUM CHLORIDE 9 MG/ML
75 INJECTION, SOLUTION INTRAVENOUS CONTINUOUS
Status: DISCONTINUED | OUTPATIENT
Start: 2023-12-28 | End: 2023-12-29

## 2023-12-28 RX ADMIN — MELATONIN 3 MG: 3 TAB ORAL at 20:03

## 2023-12-28 RX ADMIN — ATORVASTATIN CALCIUM 40 MG: 40 TABLET, FILM COATED ORAL at 08:09

## 2023-12-28 RX ADMIN — HEPARIN SODIUM 5000 UNITS: 5000 INJECTION INTRAVENOUS; SUBCUTANEOUS at 08:10

## 2023-12-28 RX ADMIN — LEVETIRACETAM 500 MG: 250 TABLET, FILM COATED ORAL at 08:09

## 2023-12-28 RX ADMIN — SENNOSIDES AND DOCUSATE SODIUM 2 TABLET: 8.6; 5 TABLET ORAL at 20:03

## 2023-12-28 RX ADMIN — LEVETIRACETAM 500 MG: 250 TABLET, FILM COATED ORAL at 20:02

## 2023-12-28 RX ADMIN — OXYCODONE HYDROCHLORIDE 2.5 MG: 5 TABLET ORAL at 16:05

## 2023-12-28 RX ADMIN — HEPARIN SODIUM 5000 UNITS: 5000 INJECTION INTRAVENOUS; SUBCUTANEOUS at 00:00

## 2023-12-28 RX ADMIN — ACETAMINOPHEN 650 MG: 325 TABLET ORAL at 23:56

## 2023-12-28 RX ADMIN — OXYCODONE HYDROCHLORIDE 2.5 MG: 5 TABLET ORAL at 08:10

## 2023-12-28 RX ADMIN — ACETAMINOPHEN 650 MG: 325 TABLET ORAL at 00:00

## 2023-12-28 RX ADMIN — ACETAMINOPHEN 650 MG: 325 TABLET ORAL at 06:00

## 2023-12-28 RX ADMIN — ACETAMINOPHEN 650 MG: 325 TABLET ORAL at 18:02

## 2023-12-28 RX ADMIN — ONDANSETRON 4 MG: 2 INJECTION INTRAMUSCULAR; INTRAVENOUS at 00:07

## 2023-12-28 RX ADMIN — ACETAMINOPHEN 650 MG: 325 TABLET ORAL at 12:32

## 2023-12-28 RX ADMIN — SODIUM CHLORIDE 75 ML/HR: 9 INJECTION, SOLUTION INTRAVENOUS at 23:56

## 2023-12-28 RX ADMIN — PANTOPRAZOLE SODIUM 40 MG: 40 TABLET, DELAYED RELEASE ORAL at 06:00

## 2023-12-28 ASSESSMENT — COGNITIVE AND FUNCTIONAL STATUS - GENERAL: MOBILITY SCORE: 24

## 2023-12-28 ASSESSMENT — PAIN SCALES - GENERAL
PAINLEVEL_OUTOF10: 3
PAINLEVEL_OUTOF10: 2
PAINLEVEL_OUTOF10: 2
PAINLEVEL_OUTOF10: 0 - NO PAIN
PAINLEVEL_OUTOF10: 2
PAINLEVEL_OUTOF10: 3
PAINLEVEL_OUTOF10: 0 - NO PAIN

## 2023-12-28 ASSESSMENT — PAIN DESCRIPTION - DESCRIPTORS
DESCRIPTORS: HEADACHE
DESCRIPTORS: DISCOMFORT

## 2023-12-28 ASSESSMENT — PAIN - FUNCTIONAL ASSESSMENT: PAIN_FUNCTIONAL_ASSESSMENT: 0-10

## 2023-12-28 NOTE — CARE PLAN
The patient's goals for the shift include getting sleep     The clinical goals for the shift include Patient will have no decline in neurologic exam overnight.    Over the shift, the patient had a stable exam, stable VS, and was able to get some sleep.     Patient

## 2023-12-28 NOTE — PROGRESS NOTES
"Enmanuel Ahumada \"Petey" is a 68 y.o. male on day 3 of admission presenting with Brain mass.    Subjective   NAEO       Objective     Physical Exam  Extraocular movements intact bilaterally. Pupils equal round and reactive to light bilaterally.  NAD, A&Ox3  Cranial Nerves II-XII: PERRL, EOMI, L HH, Face symmetric, Facial SILT, Palate/Tongue midline and symmetric, shoulder shrugs symmetric  Motor: 5/5, no dysmetria on finger to nose, no pronator drift  Sensation: SILT throughout all extremities  DTRS: 2+ Throughout, No Hoffmans or Clonus .    Last Recorded Vitals  Blood pressure 115/68, pulse 64, temperature 36 °C (96.8 °F), temperature source Temporal, resp. rate 18, height 1.702 m (5' 7.01\"), weight 77.1 kg (170 lb), SpO2 93 %.  Intake/Output last 3 Shifts:  No intake/output data recorded.    Current Medications  acetaminophen, 650 mg, oral, q6h LA NENA  atorvastatin, 40 mg, oral, Daily  heparin (porcine), 5,000 Units, subcutaneous, q8h  levETIRAcetam, 500 mg, oral, BID  melatonin, 3 mg, oral, Daily  pantoprazole, 40 mg, oral, Daily before breakfast  perflutren lipid microspheres, 0.5-10 mL of dilution, intravenous, Once in imaging  perflutren protein A microsphere, 0.5 mL, intravenous, Once in imaging  polyethylene glycol, 17 g, oral, Daily  sennosides-docusate sodium, 2 tablet, oral, BID  sulfur hexafluoride microsphr, 2 mL, intravenous, Once in imaging         Assessment/Plan   Principal Problem:    Brain mass    Enmanuel Ahumada is a 68 year old M with h/o HLD, cholecystectomy, Osborn's esophagus, on ASA 81 for cardioprotection , who p/w 6 wks HA, 3 wks incr confusion and running into things, CTH RO hypodensity c/f mass     MRI PPF complete  fMRI L language dominant   12/27 TTE EF 60%    Plan:  Floor  OR Fri 12/29 for Crani for tumor resection  Appreciate perioperative medicine recs- liver US prior to OR  Continue keppra   Hold steroids  Home meds as applicable, hold ASA  SCDs, SQH   PTOT        Notes are " authored by overnight resident. Please page [phone] with any questions.    Bernie Jiang MD

## 2023-12-28 NOTE — ANESTHESIA PREPROCEDURE EVALUATION
"Patient: Enmanuel Ahumada \"Dahs\"    Procedure Information       Date/Time: 12/29/23 0715    Procedure: Right craniotomy for tumor resection (Right)    Location: [facility] [place] OR 25 / [facility] OR    Surgeons: Ritu Carrasco MD        ALLERGIES:  No Known Allergies     MEDICAL HISTORY:  No past medical history on file.     Relevant Problems   Cardiovascular   (+) Abnormal EKG   (+) Hyperlipidemia      Infectious Disease   (+) Infected insect bite   (+) Onychomycosis      Nervous   (+) Abdominal pain      Respiratory   (+) Observed sleep apnea      Digestive   (+) Osborn esophagus   (+) Pancreatic lesion        SURGICAL HISTORY:  Past Surgical History:   Procedure Laterality Date    OTHER SURGICAL HISTORY  07/24/2019    Colonoscopy    OTHER SURGICAL HISTORY  07/26/2021    Endoscopy        VITALS:      12/28/2023    12:39 PM 12/28/2023     7:38 AM 12/28/2023     3:00 AM   Vitals   Systolic 118 131 115   Diastolic 80 79 68   Heart Rate 68 80 64   Temp 37.1 °C (98.8 °F) 36.7 °C (98.1 °F) 36 °C (96.8 °F)   Resp 18 18 18       LABS:   BMP   Lab Results   Component Value Date    GLUCOSE 96 12/28/2023    CALCIUM 9.5 12/28/2023     12/28/2023    K 4.4 12/28/2023    CO2 27 12/28/2023     12/28/2023    BUN 17 12/28/2023    CREATININE 0.94 12/28/2023   , CBC  Lab Results   Component Value Date    WBC 6.0 12/28/2023    HGB 17.1 12/28/2023    HCT 49.9 12/28/2023    MCV 92 12/28/2023     12/28/2023          , Coags   Lab Results   Component Value Date/Time    PROTIME 12.4 12/28/2023 0942    INR 1.1 12/28/2023 0942    APTT 34 12/28/2023 0942        IMAGES:  EKG          Encounter Date: 12/25/23   ECG 12 Lead   Result Value    Ventricular Rate 78    Atrial Rate 78    VT Interval 150    QRS Duration 96    QT Interval 394    QTC Calculation(Bazett) 449    P Axis 66    R Axis -8    T Axis 27    QRS Count 13    Q Onset 214    P Onset 139    P Offset 198    T Offset 411    QTC Fredericia 430    " Narrative    Sinus rhythm with frequent Premature ventricular complexes  Low voltage QRS  Inferior infarct , age undetermined  Abnormal ECG  When compared with ECG of 24-DEC-2023 18:24,  Fusion complexes are no longer Present  Borderline criteria for Anterior infarct are no longer Present  Inferior infarct is now Present      , ECHO         Transthoracic Echo (TTE) Complete 12/27/2023    Narrative  [facility], [id number] [place] [address]  Tel [phone] and Fax [phone]    TRANSTHORACIC ECHOCARDIOGRAM REPORT      Patient Name:      JULIENNE Hernández Physician:    [id number] Perico RIOS MD  Study Date:        12/27/2023           Ordering Provider:    [id number] RONNY COONEY  MRN/PID:           [id number]             Fellow:  Accession#:        [id number]         Nurse:  Date of Birth/Age: 1955 / 68      Sonographer:          Paulina Vergara RDCS  years  Gender:            M                    Additional Staff:  Height:            170.18 cm            Admit Date:           12/25/2023  Weight:            77.11 kg             Admission Status:     Inpatient -  Critical/Stat  (within 1-3 hours)  BSA:               1.89 m2              Encounter#:           [id number]  Department Location:  [facility] Non  Invasive  Blood Pressure: 112 /73 mmHg    Study Type:    TRANSTHORACIC ECHO (TTE) COMPLETE  Diagnosis/ICD: Abnormal electrocardiogram [ECG] [EKG]-R94.31  Indication:    Abnormal EKG; Brain mass  CPT Code:      Echo Complete w Full Doppler-75743    Patient History:  Pertinent History: Hyperlipidemia. brain mass, abnormal EKG.    Study Detail: The following Echo studies were performed: 2D, M-Mode, Doppler and  color flow. Image quality for this study is less than ideal.  Technically challenging study due to prominent lung artifact.      PHYSICIAN INTERPRETATION:  Left Ventricle: The left ventricular systolic function is normal,  with an estimated ejection fraction of 60%. There are no regional wall motion abnormalities. The left ventricular cavity size is normal. The left ventricular septal wall thickness is mildly increased. Spectral Doppler shows an impaired relaxation pattern of left ventricular diastolic filling.  Left Atrium: The left atrium is normal in size.  Right Ventricle: The right ventricle is normal in size. There is mildly reduced right ventricular systolic function.  Right Atrium: The right atrium is normal in size.  Aortic Valve: The aortic valve is trileaflet. There is mild to moderate aortic valve cusp calcification. There is no evidence of aortic valve stenosis.  There is no evidence of aortic valve regurgitation. The peak instantaneous gradient of the aortic valve is 7.6 mmHg.  Mitral Valve: The mitral valve is mildly thickened. There is mild mitral annular calcification. There is trace mitral valve regurgitation.  Tricuspid Valve: The tricuspid valve is structurally normal. There is trace to mild tricuspid regurgitation. The Doppler estimated RVSP is within normal limits at 27.8 mmHg.  Pulmonic Valve: The pulmonic valve is structurally normal. There is physiologic pulmonic valve regurgitation.  Pericardium: There is a trivial pericardial effusion.  Aorta: The aortic root is normal. There is mild dilatation of the ascending aorta. The aortic root is at the upper limits of normal size.  Systemic Veins: The inferior vena cava appears to be of normal size.  In comparison to the previous echocardiogram(s): There are no prior studies on this patient for comparison purposes.      CONCLUSIONS:  1. Left ventricular systolic function is normal with a 60% estimated ejection fraction.  2. Spectral Doppler shows an impaired relaxation pattern of left ventricular diastolic filling.  3. There is mildly reduced right ventricular systolic function.  4. RVSP within normal limits.  5. Aortic valve stenosis is not present.     , CXR        XR chest 1 view 12/25/2023    Narrative  Interpreted By:  Kelly Corbin,  STUDY:  XR CHEST 1 VIEW;  12/25/2023 5:37 am    INDICATION:  Signs/Symptoms:preop baseline.    COMPARISON:  None    ACCESSION NUMBER(S):  [id number]    ORDERING CLINICIAN:  MUKUL LOBO    FINDINGS:  The cardiac silhouette size is within normal limits. There is no  focal consolidation, edema or pneumothorax. No sizeable pleural  effusion. No acute osseous abnormality.    Impression  No radiographic evidence of acute cardiopulmonary process.       , CT Abdomin       CT abdomen pelvis w IV contrast 12/25/2023 (Preliminary)  This result has not been signed. Information might be incomplete.    Narrative  Interpreted By:  Rosalind Maradiaga,  STUDY:  CT ABDOMEN PELVIS W IV CONTRAST;  12/25/2023 7:40 am    INDICATION:  Signs/Symptoms:metastatic workup. 68-year-old male with a newly  diagnosed brain mass.    COMPARISON:  None.    ACCESSION NUMBER(S):  [id number]    ORDERING CLINICIAN:  CHERYL SUNSHINE    TECHNIQUE:  CT of the abdomen and pelvis was performed.  Standard contiguous  axial images were obtained at 3 mm slice thickness through the  abdomen and pelvis. Coronal and sagittal reconstructions at 3 mm  slice thickness were performed.    80 ml of contrast Omnipaque 350 were administered intravenously  without immediate complication.    FINDINGS:  LOWER CHEST:  The visualized lung base is unremarkable. The heart is normal in size  without pericardial effusion. No pleural effusion is present. Small  hiatal hernia.    ABDOMEN:    LIVER:  The liver is normal in size and demonstrates diffusely decreased  attenuation suggesting steatosis. 0.4 cm hypodensity within the left  lobe of the liver (series 201, image 28), too small to characterize.    BILE DUCTS:  The intrahepatic and extrahepatic ducts are not dilated.    GALLBLADDER:  The gallbladder is surgically absent.    PANCREAS:  The pancreas appears unremarkable without evidence of  ductal  dilatation or masses.    SPLEEN:  The spleen is normal in size without focal lesions.    ADRENAL GLANDS:  Bilateral adrenal glands appear normal.    KIDNEYS AND URETERS:  Scattered hypodensities throughout the bilateral kidneys, the largest  measuring 1.4 cm in the left kidney, likely representing benign  cysts. No hydroureteronephrosis or nephroureterolithiasis is  identified.    PELVIS:    BLADDER:  The urinary bladder appears normal without abnormal wall thickening.    REPRODUCTIVE ORGANS:  The prostate is unremarkable.    BOWEL:  Small hiatal hernia. The stomach is otherwise unremarkable. The small  and large bowel are normal in caliber and demonstrate no wall  thickening.   The appendix appears normal.    VESSELS:  Mild atherosclerosis of the abdominal aorta and its major branches.  The IVC is normal.    PERITONEUM/RETROPERITONEUM/LYMPH NODES:  There is no free or loculated fluid collection, no free  intraperitoneal air. The retroperitoneum appears normal.  No  abdominopelvic lymphadenopathy is present.    BONES AND ABDOMINAL WALL:  No suspicious osseous lesions are identified.  Small fat containing  umbilical hernia. Otherwise the abdominal wall soft tissues appear  normal.    Impression  1. No acute abnormality in the abdomen or pelvis. There is a punctate  hypodensity in the left liver lobe that is too small to characterize.  Recommend continued attention on follow-up versus consideration of  liver MRI for further assessment.    I personally reviewed the images/study and resident's interpretation  and I agree with the findings as stated by Rosalind Maradiaga MD (resident  radiologist). This study was analyzed and interpreted at [facility], [place], [place].    MACRO:  None      SOCIAL:  Social History     Tobacco Use   Smoking Status Never   Smokeless Tobacco Never      Social History     Substance and Sexual Activity   Alcohol Use Never      Social History     Substance  and Sexual Activity   Drug Use Never        NPO STATUS:  No data recorded    Clinical Areas Reviewed:    Allergies  Meds             Physical Exam    Airway  Mallampati: II  TM distance: >3 FB     Cardiovascular   Rate: normal     Dental    Pulmonary    Abdominal            Anesthesia Plan    ASA 3     general     The patient is not a current smoker.  Patient was not previously instructed to abstain from smoking on day of procedure.  Patient did not smoke on day of procedure.  Education provided regarding risk of obstructive sleep apnea.  intravenous induction   Postoperative administration of opioids is intended.  Anesthetic plan and risks discussed with patient.  Use of blood products discussed with patient who consented to blood products.    Plan discussed with CAA.

## 2023-12-28 NOTE — PROGRESS NOTES
12/28/23        Transitional Care Coordination Progress Note:   Patient discussed during interdisciplinary rounds.   Team members present: RN OSMAN RODAS MD   Plan per Medical/Surgical team:  HLD, cholecystectomy, Osborn's esophagus, on ASA 81 for cardioprotection ,   Discharge disposition: TBD  Status-Inpatient   Payer- Payor: [employer] [facility] MEDICARE / Plan: [employer] [facility] MEDICARE / Product Type: *No Product type* /    Potential Barriers: None  ADOD: 12/30/2023   Ward Davis RN [facility] [phone]

## 2023-12-29 ENCOUNTER — APPOINTMENT (OUTPATIENT)
Dept: NEUROLOGY | Facility: HOSPITAL | Age: 68
DRG: 025 | End: 2023-12-29
Payer: MEDICARE

## 2023-12-29 ENCOUNTER — ANESTHESIA (OUTPATIENT)
Dept: OPERATING ROOM | Facility: HOSPITAL | Age: 68
DRG: 025 | End: 2023-12-29
Payer: MEDICARE

## 2023-12-29 ENCOUNTER — APPOINTMENT (OUTPATIENT)
Dept: RADIOLOGY | Facility: HOSPITAL | Age: 68
DRG: 025 | End: 2023-12-29
Payer: MEDICARE

## 2023-12-29 LAB
ABO GROUP (TYPE) IN BLOOD: NORMAL
ANION GAP BLDA CALCULATED.4IONS-SCNC: 12 MMO/L (ref 10–25)
ANION GAP BLDA CALCULATED.4IONS-SCNC: 9 MMO/L (ref 10–25)
APPEARANCE UR: CLEAR
BASE EXCESS BLDA CALC-SCNC: -0.1 MMOL/L (ref -2–3)
BASE EXCESS BLDA CALC-SCNC: -1.6 MMOL/L (ref -2–3)
BILIRUB UR STRIP.AUTO-MCNC: NEGATIVE MG/DL
BLOOD EXPIRATION DATE: NORMAL
BLOOD EXPIRATION DATE: NORMAL
BODY TEMPERATURE: 37 DEGREES CELSIUS
BODY TEMPERATURE: 37 DEGREES CELSIUS
CA-I BLDA-SCNC: 1.13 MMOL/L (ref 1.1–1.33)
CA-I BLDA-SCNC: 1.22 MMOL/L (ref 1.1–1.33)
CHLORIDE BLDA-SCNC: 100 MMOL/L (ref 98–107)
CHLORIDE BLDA-SCNC: 104 MMOL/L (ref 98–107)
COLOR UR: YELLOW
DISPENSE STATUS: NORMAL
DISPENSE STATUS: NORMAL
GLUCOSE BLD MANUAL STRIP-MCNC: 172 MG/DL (ref 74–99)
GLUCOSE BLDA-MCNC: 144 MG/DL (ref 74–99)
GLUCOSE BLDA-MCNC: 191 MG/DL (ref 74–99)
GLUCOSE UR STRIP.AUTO-MCNC: NEGATIVE MG/DL
HCO3 BLDA-SCNC: 22.8 MMOL/L (ref 22–26)
HCO3 BLDA-SCNC: 23 MMOL/L (ref 22–26)
HCT VFR BLD EST: 47 % (ref 41–52)
HCT VFR BLD EST: 50 % (ref 41–52)
HGB BLDA-MCNC: 15.8 G/DL (ref 13.5–17.5)
HGB BLDA-MCNC: 16.8 G/DL (ref 13.5–17.5)
HOLD SPECIMEN: NORMAL
INHALED O2 CONCENTRATION: 42 %
INHALED O2 CONCENTRATION: 46 %
KETONES UR STRIP.AUTO-MCNC: ABNORMAL MG/DL
LACTATE BLDA-SCNC: 1.9 MMOL/L (ref 0.4–2)
LACTATE BLDA-SCNC: 2.6 MMOL/L (ref 0.4–2)
LEUKOCYTE ESTERASE UR QL STRIP.AUTO: NEGATIVE
NITRITE UR QL STRIP.AUTO: NEGATIVE
OXYHGB MFR BLDA: 96.4 % (ref 94–98)
OXYHGB MFR BLDA: 97.3 % (ref 94–98)
PCO2 BLDA: 32 MM HG (ref 38–42)
PCO2 BLDA: 38 MM HG (ref 38–42)
PH BLDA: 7.39 PH (ref 7.38–7.42)
PH BLDA: 7.46 PH (ref 7.38–7.42)
PH UR STRIP.AUTO: 6 [PH]
PO2 BLDA: 169 MM HG (ref 85–95)
PO2 BLDA: 94 MM HG (ref 85–95)
POTASSIUM BLDA-SCNC: 3.8 MMOL/L (ref 3.5–5.3)
POTASSIUM BLDA-SCNC: 3.8 MMOL/L (ref 3.5–5.3)
PRODUCT BLOOD TYPE: 7300
PRODUCT BLOOD TYPE: 7300
PRODUCT CODE: NORMAL
PRODUCT CODE: NORMAL
PROT UR STRIP.AUTO-MCNC: ABNORMAL MG/DL
RBC # UR STRIP.AUTO: NEGATIVE /UL
RBC #/AREA URNS AUTO: NORMAL /HPF
RH FACTOR (ANTIGEN D): NORMAL
SAO2 % BLDA: 100 % (ref 94–100)
SAO2 % BLDA: 99 % (ref 94–100)
SODIUM BLDA-SCNC: 131 MMOL/L (ref 136–145)
SODIUM BLDA-SCNC: 132 MMOL/L (ref 136–145)
SP GR UR STRIP.AUTO: 1.02
UNIT ABO: NORMAL
UNIT ABO: NORMAL
UNIT NUMBER: NORMAL
UNIT NUMBER: NORMAL
UNIT RH: NORMAL
UNIT RH: NORMAL
UNIT VOLUME: 277
UNIT VOLUME: 337
UROBILINOGEN UR STRIP.AUTO-MCNC: 2 MG/DL
WBC #/AREA URNS AUTO: NORMAL /HPF

## 2023-12-29 PROCEDURE — 36415 COLL VENOUS BLD VENIPUNCTURE: CPT | Performed by: ANESTHESIOLOGIST ASSISTANT

## 2023-12-29 PROCEDURE — 2500000004 HC RX 250 GENERAL PHARMACY W/ HCPCS (ALT 636 FOR OP/ED): Performed by: ANESTHESIOLOGIST ASSISTANT

## 2023-12-29 PROCEDURE — 3700000001 HC GENERAL ANESTHESIA TIME - INITIAL BASE CHARGE: Performed by: NEUROLOGICAL SURGERY

## 2023-12-29 PROCEDURE — 2500000004 HC RX 250 GENERAL PHARMACY W/ HCPCS (ALT 636 FOR OP/ED): Performed by: NEUROLOGICAL SURGERY

## 2023-12-29 PROCEDURE — 2500000004 HC RX 250 GENERAL PHARMACY W/ HCPCS (ALT 636 FOR OP/ED): Performed by: STUDENT IN AN ORGANIZED HEALTH CARE EDUCATION/TRAINING PROGRAM

## 2023-12-29 PROCEDURE — 70450 CT HEAD/BRAIN W/O DYE: CPT | Performed by: RADIOLOGY

## 2023-12-29 PROCEDURE — 95955 EEG DURING SURGERY: CPT | Performed by: STUDENT IN AN ORGANIZED HEALTH CARE EDUCATION/TRAINING PROGRAM

## 2023-12-29 PROCEDURE — 61510 CRNEC TREPH EXC BRN TUM STTL: CPT | Performed by: NEUROLOGICAL SURGERY

## 2023-12-29 PROCEDURE — 36430 TRANSFUSION BLD/BLD COMPNT: CPT | Mod: GC

## 2023-12-29 PROCEDURE — 82947 ASSAY GLUCOSE BLOOD QUANT: CPT

## 2023-12-29 PROCEDURE — 2720000007 HC OR 272 NO HCPCS: Performed by: NEUROLOGICAL SURGERY

## 2023-12-29 PROCEDURE — 2500000005 HC RX 250 GENERAL PHARMACY W/O HCPCS: Performed by: NEUROLOGICAL SURGERY

## 2023-12-29 PROCEDURE — 88307 TISSUE EXAM BY PATHOLOGIST: CPT | Performed by: PATHOLOGY

## 2023-12-29 PROCEDURE — 2500000005 HC RX 250 GENERAL PHARMACY W/O HCPCS

## 2023-12-29 PROCEDURE — C1889 IMPLANT/INSERT DEVICE, NOC: HCPCS | Performed by: NEUROLOGICAL SURGERY

## 2023-12-29 PROCEDURE — 81001 URINALYSIS AUTO W/SCOPE: CPT

## 2023-12-29 PROCEDURE — 96372 THER/PROPH/DIAG INJ SC/IM: CPT | Performed by: NEUROLOGICAL SURGERY

## 2023-12-29 PROCEDURE — 2500000005 HC RX 250 GENERAL PHARMACY W/O HCPCS: Performed by: ANESTHESIOLOGIST ASSISTANT

## 2023-12-29 PROCEDURE — 61781 SCAN PROC CRANIAL INTRA: CPT | Performed by: NEUROLOGICAL SURGERY

## 2023-12-29 PROCEDURE — C1713 ANCHOR/SCREW BN/BN,TIS/BN: HCPCS | Performed by: NEUROLOGICAL SURGERY

## 2023-12-29 PROCEDURE — C1760 CLOSURE DEV, VASC: HCPCS | Performed by: NEUROLOGICAL SURGERY

## 2023-12-29 PROCEDURE — 81287 MGMT GENE PRMTR MTHYLTN ALYS: CPT | Performed by: NEUROLOGICAL SURGERY

## 2023-12-29 PROCEDURE — 2500000001 HC RX 250 WO HCPCS SELF ADMINISTERED DRUGS (ALT 637 FOR MEDICARE OP): Performed by: NEUROLOGICAL SURGERY

## 2023-12-29 PROCEDURE — 3600000010 HC OR TIME - EACH INCREMENTAL 1 MINUTE - PROCEDURE LEVEL FIVE: Performed by: NEUROLOGICAL SURGERY

## 2023-12-29 PROCEDURE — A61510 PR EXCIS SUPRATENT BRAIN TUMOR: Performed by: ANESTHESIOLOGY

## 2023-12-29 PROCEDURE — 2780000003 HC OR 278 NO HCPCS: Performed by: NEUROLOGICAL SURGERY

## 2023-12-29 PROCEDURE — 3600000005 HC OR TIME - INITIAL BASE CHARGE - PROCEDURE LEVEL FIVE: Performed by: NEUROLOGICAL SURGERY

## 2023-12-29 PROCEDURE — 88331 PATH CONSLTJ SURG 1 BLK 1SPC: CPT | Performed by: PATHOLOGY

## 2023-12-29 PROCEDURE — 2500000004 HC RX 250 GENERAL PHARMACY W/ HCPCS (ALT 636 FOR OP/ED)

## 2023-12-29 PROCEDURE — 2020000001 HC ICU ROOM DAILY

## 2023-12-29 PROCEDURE — 88307 TISSUE EXAM BY PATHOLOGIST: CPT | Mod: TC,SUR | Performed by: NEUROLOGICAL SURGERY

## 2023-12-29 PROCEDURE — 88363 XM ARCHIVE TISSUE MOLEC ANAL: CPT | Performed by: PATHOLOGY

## 2023-12-29 PROCEDURE — 95938 SOMATOSENSORY TESTING: CPT | Performed by: STUDENT IN AN ORGANIZED HEALTH CARE EDUCATION/TRAINING PROGRAM

## 2023-12-29 PROCEDURE — 00B00ZZ EXCISION OF BRAIN, OPEN APPROACH: ICD-10-PCS | Performed by: NEUROLOGICAL SURGERY

## 2023-12-29 PROCEDURE — 88341 IMHCHEM/IMCYTCHM EA ADD ANTB: CPT | Performed by: PATHOLOGY

## 2023-12-29 PROCEDURE — 36620 INSERTION CATHETER ARTERY: CPT | Performed by: ANESTHESIOLOGY

## 2023-12-29 PROCEDURE — 70450 CT HEAD/BRAIN W/O DYE: CPT

## 2023-12-29 PROCEDURE — 4A10X4G MONITORING OF CENTRAL NERVOUS ELECTRICAL ACTIVITY, INTRAOPERATIVE, EXTERNAL APPROACH: ICD-10-PCS | Performed by: NEUROLOGICAL SURGERY

## 2023-12-29 PROCEDURE — 88342 IMHCHEM/IMCYTCHM 1ST ANTB: CPT | Performed by: PATHOLOGY

## 2023-12-29 PROCEDURE — A4217 STERILE WATER/SALINE, 500 ML: HCPCS | Performed by: NEUROLOGICAL SURGERY

## 2023-12-29 PROCEDURE — 95870 NDL EMG LMTD STD MUSC 1 XTR: CPT

## 2023-12-29 PROCEDURE — P9073 PLATELETS PHERESIS PATH REDU: HCPCS

## 2023-12-29 PROCEDURE — 99291 CRITICAL CARE FIRST HOUR: CPT | Performed by: REGISTERED NURSE

## 2023-12-29 PROCEDURE — 95941 IONM REMOTE/>1 PT OR PER HR: CPT | Performed by: STUDENT IN AN ORGANIZED HEALTH CARE EDUCATION/TRAINING PROGRAM

## 2023-12-29 PROCEDURE — 3700000002 HC GENERAL ANESTHESIA TIME - EACH INCREMENTAL 1 MINUTE: Performed by: NEUROLOGICAL SURGERY

## 2023-12-29 PROCEDURE — A61510 PR EXCIS SUPRATENT BRAIN TUMOR: Performed by: ANESTHESIOLOGIST ASSISTANT

## 2023-12-29 PROCEDURE — 95870 NDL EMG LMTD STD MUSC 1 XTR: CPT | Performed by: STUDENT IN AN ORGANIZED HEALTH CARE EDUCATION/TRAINING PROGRAM

## 2023-12-29 PROCEDURE — G0452 MOLECULAR PATHOLOGY INTERPR: HCPCS | Performed by: NEUROLOGICAL SURGERY

## 2023-12-29 PROCEDURE — 95939 C MOTOR EVOKED UPR&LWR LIMBS: CPT | Performed by: STUDENT IN AN ORGANIZED HEALTH CARE EDUCATION/TRAINING PROGRAM

## 2023-12-29 PROCEDURE — 84132 ASSAY OF SERUM POTASSIUM: CPT | Performed by: ANESTHESIOLOGIST ASSISTANT

## 2023-12-29 DEVICE — SCREW, NEURO, ONEDRIVE, 1.5 X 4MM: Type: IMPLANTABLE DEVICE | Site: CRANIAL | Status: FUNCTIONAL

## 2023-12-29 DEVICE — COVER, BURR HOLE, NEURO, 4H, 18MM, W/TAB: Type: IMPLANTABLE DEVICE | Site: CRANIAL | Status: FUNCTIONAL

## 2023-12-29 DEVICE — IMPLANTABLE DEVICE: Type: IMPLANTABLE DEVICE | Site: CRANIAL | Status: FUNCTIONAL

## 2023-12-29 DEVICE — DURAGEN® PLUS DURAL REGENERATION MATRIX , 4 IN X 5 IN
Type: IMPLANTABLE DEVICE | Site: CRANIAL | Status: FUNCTIONAL
Brand: DURAGEN® PLUS

## 2023-12-29 DEVICE — COLLAGEN DURA SUBSTITUTE MEMBRANE 4IN X 5IN (10.0CM X 12.5CM)
Type: IMPLANTABLE DEVICE | Site: BRAIN | Status: FUNCTIONAL
Brand: DURAMATRIX ONLAY

## 2023-12-29 DEVICE — IMPLANTABLE DEVICE: Type: IMPLANTABLE DEVICE | Site: CRANIAL | Status: NON-FUNCTIONAL

## 2023-12-29 RX ORDER — HYDROMORPHONE HYDROCHLORIDE 1 MG/ML
0.2 INJECTION, SOLUTION INTRAMUSCULAR; INTRAVENOUS; SUBCUTANEOUS EVERY 4 HOURS PRN
Status: DISCONTINUED | OUTPATIENT
Start: 2023-12-29 | End: 2024-01-02 | Stop reason: HOSPADM

## 2023-12-29 RX ORDER — LEVETIRACETAM 10 MG/ML
1000 INJECTION INTRAVASCULAR EVERY 12 HOURS SCHEDULED
Status: DISCONTINUED | OUTPATIENT
Start: 2023-12-29 | End: 2024-01-02

## 2023-12-29 RX ORDER — DEXTROSE 50 % IN WATER (D50W) INTRAVENOUS SYRINGE
25
Status: DISCONTINUED | OUTPATIENT
Start: 2023-12-29 | End: 2024-01-02 | Stop reason: HOSPADM

## 2023-12-29 RX ORDER — PHENYLEPHRINE HCL IN 0.9% NACL 0.4MG/10ML
SYRINGE (ML) INTRAVENOUS AS NEEDED
Status: DISCONTINUED | OUTPATIENT
Start: 2023-12-29 | End: 2023-12-29

## 2023-12-29 RX ORDER — DEXTROSE MONOHYDRATE 100 MG/ML
0.3 INJECTION, SOLUTION INTRAVENOUS ONCE AS NEEDED
Status: DISCONTINUED | OUTPATIENT
Start: 2023-12-29 | End: 2024-01-02 | Stop reason: HOSPADM

## 2023-12-29 RX ORDER — PHENYLEPHRINE 10 MG/250 ML(40 MCG/ML)IN 0.9 % SOD.CHLORIDE INTRAVENOUS
CONTINUOUS PRN
Status: DISCONTINUED | OUTPATIENT
Start: 2023-12-29 | End: 2023-12-29

## 2023-12-29 RX ORDER — SODIUM CHLORIDE 0.9 G/100ML
IRRIGANT IRRIGATION AS NEEDED
Status: DISCONTINUED | OUTPATIENT
Start: 2023-12-29 | End: 2023-12-29 | Stop reason: HOSPADM

## 2023-12-29 RX ORDER — POLYETHYLENE GLYCOL 3350 17 G/17G
17 POWDER, FOR SOLUTION ORAL DAILY
Status: DISCONTINUED | OUTPATIENT
Start: 2023-12-29 | End: 2024-01-02 | Stop reason: HOSPADM

## 2023-12-29 RX ORDER — PANTOPRAZOLE SODIUM 40 MG/1
40 TABLET, DELAYED RELEASE ORAL
Status: DISCONTINUED | OUTPATIENT
Start: 2023-12-30 | End: 2024-01-02 | Stop reason: HOSPADM

## 2023-12-29 RX ORDER — BACITRACIN 500 [USP'U]/G
OINTMENT TOPICAL AS NEEDED
Status: DISCONTINUED | OUTPATIENT
Start: 2023-12-29 | End: 2023-12-29 | Stop reason: HOSPADM

## 2023-12-29 RX ORDER — VANCOMYCIN HYDROCHLORIDE 1 G/200ML
INJECTION, SOLUTION INTRAVENOUS AS NEEDED
Status: DISCONTINUED | OUTPATIENT
Start: 2023-12-29 | End: 2023-12-29

## 2023-12-29 RX ORDER — HYDRALAZINE HYDROCHLORIDE 20 MG/ML
10 INJECTION INTRAMUSCULAR; INTRAVENOUS
Status: DISCONTINUED | OUTPATIENT
Start: 2023-12-29 | End: 2023-12-31

## 2023-12-29 RX ORDER — ROCURONIUM BROMIDE 10 MG/ML
INJECTION, SOLUTION INTRAVENOUS AS NEEDED
Status: DISCONTINUED | OUTPATIENT
Start: 2023-12-29 | End: 2023-12-29

## 2023-12-29 RX ORDER — LIDOCAINE HCL/PF 100 MG/5ML
SYRINGE (ML) INTRAVENOUS AS NEEDED
Status: DISCONTINUED | OUTPATIENT
Start: 2023-12-29 | End: 2023-12-29

## 2023-12-29 RX ORDER — CEFAZOLIN SODIUM 2 G/100ML
2 INJECTION, SOLUTION INTRAVENOUS EVERY 8 HOURS
Status: DISCONTINUED | OUTPATIENT
Start: 2023-12-29 | End: 2024-01-01

## 2023-12-29 RX ORDER — NALOXONE HYDROCHLORIDE 0.4 MG/ML
0.2 INJECTION, SOLUTION INTRAMUSCULAR; INTRAVENOUS; SUBCUTANEOUS EVERY 5 MIN PRN
Status: DISCONTINUED | OUTPATIENT
Start: 2023-12-29 | End: 2024-01-02 | Stop reason: HOSPADM

## 2023-12-29 RX ORDER — OXYCODONE HYDROCHLORIDE 5 MG/1
5 TABLET ORAL EVERY 4 HOURS PRN
Status: DISCONTINUED | OUTPATIENT
Start: 2023-12-29 | End: 2024-01-02 | Stop reason: HOSPADM

## 2023-12-29 RX ORDER — LIDOCAINE HYDROCHLORIDE AND EPINEPHRINE 5; 5 MG/ML; UG/ML
INJECTION, SOLUTION INFILTRATION; PERINEURAL AS NEEDED
Status: DISCONTINUED | OUTPATIENT
Start: 2023-12-29 | End: 2023-12-29 | Stop reason: HOSPADM

## 2023-12-29 RX ORDER — ONDANSETRON HYDROCHLORIDE 2 MG/ML
4 INJECTION, SOLUTION INTRAVENOUS EVERY 8 HOURS PRN
Status: DISCONTINUED | OUTPATIENT
Start: 2023-12-29 | End: 2024-01-02 | Stop reason: HOSPADM

## 2023-12-29 RX ORDER — OXYCODONE HYDROCHLORIDE 5 MG/1
10 TABLET ORAL EVERY 4 HOURS PRN
Status: DISCONTINUED | OUTPATIENT
Start: 2023-12-29 | End: 2024-01-02 | Stop reason: HOSPADM

## 2023-12-29 RX ORDER — DEXAMETHASONE SODIUM PHOSPHATE 4 MG/ML
INJECTION, SOLUTION INTRA-ARTICULAR; INTRALESIONAL; INTRAMUSCULAR; INTRAVENOUS; SOFT TISSUE AS NEEDED
Status: DISCONTINUED | OUTPATIENT
Start: 2023-12-29 | End: 2023-12-29

## 2023-12-29 RX ORDER — SODIUM CHLORIDE 9 MG/ML
75 INJECTION, SOLUTION INTRAVENOUS CONTINUOUS
Status: DISCONTINUED | OUTPATIENT
Start: 2023-12-29 | End: 2024-01-02 | Stop reason: HOSPADM

## 2023-12-29 RX ORDER — LABETALOL HYDROCHLORIDE 5 MG/ML
10 INJECTION, SOLUTION INTRAVENOUS EVERY 10 MIN PRN
Status: DISCONTINUED | OUTPATIENT
Start: 2023-12-29 | End: 2023-12-31

## 2023-12-29 RX ORDER — FOSPHENYTOIN SODIUM 50 MG/ML
INJECTION, SOLUTION INTRAMUSCULAR; INTRAVENOUS AS NEEDED
Status: DISCONTINUED | OUTPATIENT
Start: 2023-12-29 | End: 2023-12-29

## 2023-12-29 RX ORDER — LEVETIRACETAM 5 MG/ML
INJECTION INTRAVASCULAR AS NEEDED
Status: DISCONTINUED | OUTPATIENT
Start: 2023-12-29 | End: 2023-12-29

## 2023-12-29 RX ORDER — PROPOFOL 10 MG/ML
INJECTION, EMULSION INTRAVENOUS AS NEEDED
Status: DISCONTINUED | OUTPATIENT
Start: 2023-12-29 | End: 2023-12-29

## 2023-12-29 RX ORDER — CEFTRIAXONE 2 G/1
INJECTION, POWDER, FOR SOLUTION INTRAMUSCULAR; INTRAVENOUS AS NEEDED
Status: DISCONTINUED | OUTPATIENT
Start: 2023-12-29 | End: 2023-12-29

## 2023-12-29 RX ORDER — HYDROMORPHONE HYDROCHLORIDE 1 MG/ML
INJECTION, SOLUTION INTRAMUSCULAR; INTRAVENOUS; SUBCUTANEOUS AS NEEDED
Status: DISCONTINUED | OUTPATIENT
Start: 2023-12-29 | End: 2023-12-29

## 2023-12-29 RX ORDER — FUROSEMIDE 10 MG/ML
INJECTION INTRAMUSCULAR; INTRAVENOUS AS NEEDED
Status: DISCONTINUED | OUTPATIENT
Start: 2023-12-29 | End: 2023-12-29

## 2023-12-29 RX ORDER — LORAZEPAM 2 MG/ML
INJECTION INTRAMUSCULAR
Status: COMPLETED
Start: 2023-12-29 | End: 2023-12-29

## 2023-12-29 RX ORDER — PANTOPRAZOLE SODIUM 40 MG/10ML
40 INJECTION, POWDER, LYOPHILIZED, FOR SOLUTION INTRAVENOUS
Status: DISCONTINUED | OUTPATIENT
Start: 2023-12-30 | End: 2024-01-02 | Stop reason: HOSPADM

## 2023-12-29 RX ORDER — MANNITOL 20 G/100ML
INJECTION, SOLUTION INTRAVENOUS CONTINUOUS PRN
Status: DISCONTINUED | OUTPATIENT
Start: 2023-12-29 | End: 2023-12-29

## 2023-12-29 RX ORDER — REMIFENTANIL HYDROCHLORIDE 1 MG/ML
INJECTION, POWDER, LYOPHILIZED, FOR SOLUTION INTRAVENOUS CONTINUOUS PRN
Status: DISCONTINUED | OUTPATIENT
Start: 2023-12-29 | End: 2023-12-29

## 2023-12-29 RX ORDER — GLYCOPYRROLATE 0.2 MG/ML
INJECTION INTRAMUSCULAR; INTRAVENOUS AS NEEDED
Status: DISCONTINUED | OUTPATIENT
Start: 2023-12-29 | End: 2023-12-29

## 2023-12-29 RX ORDER — METOCLOPRAMIDE 10 MG/1
10 TABLET ORAL EVERY 6 HOURS PRN
Status: DISCONTINUED | OUTPATIENT
Start: 2023-12-29 | End: 2024-01-02 | Stop reason: HOSPADM

## 2023-12-29 RX ORDER — METOCLOPRAMIDE HYDROCHLORIDE 5 MG/ML
10 INJECTION INTRAMUSCULAR; INTRAVENOUS EVERY 6 HOURS PRN
Status: DISCONTINUED | OUTPATIENT
Start: 2023-12-29 | End: 2024-01-02 | Stop reason: HOSPADM

## 2023-12-29 RX ORDER — LORAZEPAM 2 MG/ML
1 INJECTION INTRAMUSCULAR ONCE
Status: COMPLETED | OUTPATIENT
Start: 2023-12-29 | End: 2023-12-29

## 2023-12-29 RX ORDER — DEXAMETHASONE SODIUM PHOSPHATE 4 MG/ML
4 INJECTION, SOLUTION INTRA-ARTICULAR; INTRALESIONAL; INTRAMUSCULAR; INTRAVENOUS; SOFT TISSUE EVERY 6 HOURS
Status: DISCONTINUED | OUTPATIENT
Start: 2023-12-29 | End: 2024-01-01

## 2023-12-29 RX ORDER — MIDAZOLAM HYDROCHLORIDE 1 MG/ML
INJECTION INTRAMUSCULAR; INTRAVENOUS AS NEEDED
Status: DISCONTINUED | OUTPATIENT
Start: 2023-12-29 | End: 2023-12-29

## 2023-12-29 RX ORDER — FENTANYL CITRATE 50 UG/ML
INJECTION, SOLUTION INTRAMUSCULAR; INTRAVENOUS AS NEEDED
Status: DISCONTINUED | OUTPATIENT
Start: 2023-12-29 | End: 2023-12-29

## 2023-12-29 RX ORDER — PROPOFOL 10 MG/ML
INJECTION, EMULSION INTRAVENOUS CONTINUOUS PRN
Status: DISCONTINUED | OUTPATIENT
Start: 2023-12-29 | End: 2023-12-29

## 2023-12-29 RX ORDER — ONDANSETRON 4 MG/1
4 TABLET, FILM COATED ORAL EVERY 8 HOURS PRN
Status: DISCONTINUED | OUTPATIENT
Start: 2023-12-29 | End: 2024-01-02 | Stop reason: HOSPADM

## 2023-12-29 RX ORDER — ESMOLOL HYDROCHLORIDE 10 MG/ML
INJECTION INTRAVENOUS AS NEEDED
Status: DISCONTINUED | OUTPATIENT
Start: 2023-12-29 | End: 2023-12-29

## 2023-12-29 RX ADMIN — Medication 120 MCG: at 17:53

## 2023-12-29 RX ADMIN — LORAZEPAM 1 MG: 2 INJECTION INTRAMUSCULAR; INTRAVENOUS at 22:59

## 2023-12-29 RX ADMIN — LABETALOL HYDROCHLORIDE 10 MG: 5 INJECTION, SOLUTION INTRAVENOUS at 20:43

## 2023-12-29 RX ADMIN — PROPOFOL 20 MG: 10 INJECTION, EMULSION INTRAVENOUS at 12:02

## 2023-12-29 RX ADMIN — LORAZEPAM 1 MG: 2 INJECTION INTRAMUSCULAR at 22:59

## 2023-12-29 RX ADMIN — Medication 1 UNITS: at 09:47

## 2023-12-29 RX ADMIN — CEFTRIAXONE SODIUM 2 G: 2 INJECTION, POWDER, FOR SOLUTION INTRAMUSCULAR; INTRAVENOUS at 08:22

## 2023-12-29 RX ADMIN — ROCURONIUM BROMIDE 10 MG: 10 INJECTION INTRAVENOUS at 09:05

## 2023-12-29 RX ADMIN — Medication 120 MCG: at 17:42

## 2023-12-29 RX ADMIN — MIDAZOLAM HYDROCHLORIDE 2 MG: 1 INJECTION, SOLUTION INTRAMUSCULAR; INTRAVENOUS at 12:02

## 2023-12-29 RX ADMIN — Medication 120 MCG: at 17:31

## 2023-12-29 RX ADMIN — Medication 120 MCG: at 13:56

## 2023-12-29 RX ADMIN — HYDROMORPHONE HYDROCHLORIDE 0.2 MG: 1 INJECTION, SOLUTION INTRAMUSCULAR; INTRAVENOUS; SUBCUTANEOUS at 22:28

## 2023-12-29 RX ADMIN — LEVETIRACETAM 500 MG: 5 INJECTION INTRAVENOUS at 12:10

## 2023-12-29 RX ADMIN — Medication 120 MCG: at 17:16

## 2023-12-29 RX ADMIN — MANNITOL: 20 INJECTION, SOLUTION INTRAVENOUS at 12:26

## 2023-12-29 RX ADMIN — DEXAMETHASONE SODIUM PHOSPHATE 4 MG: 4 INJECTION, SOLUTION INTRAMUSCULAR; INTRAVENOUS at 21:42

## 2023-12-29 RX ADMIN — LIDOCAINE HYDROCHLORIDE 100 MG: 20 INJECTION INTRAVENOUS at 07:51

## 2023-12-29 RX ADMIN — Medication 1 UNITS: at 09:41

## 2023-12-29 RX ADMIN — ROCURONIUM BROMIDE 30 MG: 10 INJECTION INTRAVENOUS at 16:46

## 2023-12-29 RX ADMIN — Medication 80 MCG: at 18:08

## 2023-12-29 RX ADMIN — Medication 80 MCG: at 13:33

## 2023-12-29 RX ADMIN — PROPOFOL 100 MG: 10 INJECTION, EMULSION INTRAVENOUS at 07:51

## 2023-12-29 RX ADMIN — Medication 40 MCG: at 13:29

## 2023-12-29 RX ADMIN — ROCURONIUM BROMIDE 50 MG: 10 INJECTION INTRAVENOUS at 07:52

## 2023-12-29 RX ADMIN — ROCURONIUM BROMIDE 10 MG: 10 INJECTION INTRAVENOUS at 09:00

## 2023-12-29 RX ADMIN — Medication 1 UNITS: at 14:02

## 2023-12-29 RX ADMIN — HYDROMORPHONE HYDROCHLORIDE 0.2 MG: 1 INJECTION, SOLUTION INTRAMUSCULAR; INTRAVENOUS; SUBCUTANEOUS at 17:33

## 2023-12-29 RX ADMIN — DEXAMETHASONE SODIUM PHOSPHATE 6 MG: 4 INJECTION, SOLUTION INTRA-ARTICULAR; INTRALESIONAL; INTRAMUSCULAR; INTRAVENOUS; SOFT TISSUE at 15:52

## 2023-12-29 RX ADMIN — ESMOLOL HYDROCHLORIDE 60 MG: 100 INJECTION, SOLUTION INTRAVENOUS at 07:53

## 2023-12-29 RX ADMIN — DEXAMETHASONE SODIUM PHOSPHATE 10 MG: 4 INJECTION, SOLUTION INTRA-ARTICULAR; INTRALESIONAL; INTRAMUSCULAR; INTRAVENOUS; SOFT TISSUE at 07:53

## 2023-12-29 RX ADMIN — HYDROMORPHONE HYDROCHLORIDE 0.2 MG: 1 INJECTION, SOLUTION INTRAMUSCULAR; INTRAVENOUS; SUBCUTANEOUS at 18:25

## 2023-12-29 RX ADMIN — PROPOFOL 40 MG: 10 INJECTION, EMULSION INTRAVENOUS at 08:53

## 2023-12-29 RX ADMIN — LEVETIRACETAM 1000 MG: 1000 INJECTION, SOLUTION INTRAVENOUS at 21:42

## 2023-12-29 RX ADMIN — ONDANSETRON 4 MG: 2 INJECTION INTRAMUSCULAR; INTRAVENOUS at 17:25

## 2023-12-29 RX ADMIN — FOSPHENYTOIN SODIUM 1150 MG PE: 50 INJECTION, SOLUTION INTRAMUSCULAR; INTRAVENOUS at 10:03

## 2023-12-29 RX ADMIN — REMIFENTANIL HYDROCHLORIDE 0.05 MCG/KG/MIN: 1 INJECTION, POWDER, LYOPHILIZED, FOR SOLUTION INTRAVENOUS at 08:25

## 2023-12-29 RX ADMIN — LEVETIRACETAM 1000 MG: 5 INJECTION INTRAVENOUS at 08:17

## 2023-12-29 RX ADMIN — Medication 120 MCG: at 14:29

## 2023-12-29 RX ADMIN — Medication 80 MCG: at 08:48

## 2023-12-29 RX ADMIN — Medication 120 MCG: at 17:38

## 2023-12-29 RX ADMIN — ESMOLOL HYDROCHLORIDE 20 MG: 100 INJECTION, SOLUTION INTRAVENOUS at 08:12

## 2023-12-29 RX ADMIN — SODIUM CHLORIDE, POTASSIUM CHLORIDE, SODIUM LACTATE AND CALCIUM CHLORIDE: 600; 310; 30; 20 INJECTION, SOLUTION INTRAVENOUS at 07:58

## 2023-12-29 RX ADMIN — Medication 120 MCG: at 09:30

## 2023-12-29 RX ADMIN — VANCOMYCIN HYDROCHLORIDE 1 G: 1 INJECTION, SOLUTION INTRAVENOUS at 07:55

## 2023-12-29 RX ADMIN — Medication 120 MCG: at 14:15

## 2023-12-29 RX ADMIN — Medication 120 MCG: at 12:05

## 2023-12-29 RX ADMIN — Medication 80 MCG: at 13:46

## 2023-12-29 RX ADMIN — FUROSEMIDE 20 MG: 10 INJECTION, SOLUTION INTRAMUSCULAR; INTRAVENOUS at 12:32

## 2023-12-29 RX ADMIN — PROPOFOL 100 MCG/KG/MIN: 10 INJECTION, EMULSION INTRAVENOUS at 07:57

## 2023-12-29 RX ADMIN — ROCURONIUM BROMIDE 10 MG: 10 INJECTION INTRAVENOUS at 08:48

## 2023-12-29 RX ADMIN — Medication 120 MCG: at 09:05

## 2023-12-29 RX ADMIN — Medication 1 UNITS: at 09:35

## 2023-12-29 RX ADMIN — SODIUM CHLORIDE, POTASSIUM CHLORIDE, SODIUM LACTATE AND CALCIUM CHLORIDE: 600; 310; 30; 20 INJECTION, SOLUTION INTRAVENOUS at 14:10

## 2023-12-29 RX ADMIN — Medication 120 MCG: at 09:21

## 2023-12-29 RX ADMIN — Medication 80 MCG: at 12:02

## 2023-12-29 RX ADMIN — SODIUM CHLORIDE 75 ML/HR: 9 INJECTION, SOLUTION INTRAVENOUS at 20:39

## 2023-12-29 RX ADMIN — PROPOFOL 40 MG: 10 INJECTION, EMULSION INTRAVENOUS at 08:30

## 2023-12-29 RX ADMIN — CEFAZOLIN SODIUM 2 G: 2 INJECTION, SOLUTION INTRAVENOUS at 20:30

## 2023-12-29 RX ADMIN — MANNITOL: 20 INJECTION, SOLUTION INTRAVENOUS at 10:46

## 2023-12-29 RX ADMIN — DEXAMETHASONE SODIUM PHOSPHATE 6 MG: 4 INJECTION, SOLUTION INTRA-ARTICULAR; INTRALESIONAL; INTRAMUSCULAR; INTRAVENOUS; SOFT TISSUE at 11:53

## 2023-12-29 RX ADMIN — Medication 120 MCG: at 18:09

## 2023-12-29 RX ADMIN — SUGAMMADEX 200 MG: 100 INJECTION, SOLUTION INTRAVENOUS at 18:50

## 2023-12-29 RX ADMIN — AMINOLEVULINIC ACID HYDROCHLORIDE 1.5 G: 1500 POWDER, FOR SOLUTION ORAL at 05:19

## 2023-12-29 RX ADMIN — ONDANSETRON 4 MG: 2 INJECTION INTRAMUSCULAR; INTRAVENOUS at 07:43

## 2023-12-29 RX ADMIN — GLYCOPYRROLATE 0.2 MG: 0.2 INJECTION, SOLUTION INTRAMUSCULAR; INTRAVENOUS at 09:40

## 2023-12-29 RX ADMIN — Medication 80 MCG: at 09:00

## 2023-12-29 RX ADMIN — Medication 0.1 MCG/KG/MIN: at 07:57

## 2023-12-29 SDOH — HEALTH STABILITY: MENTAL HEALTH: CURRENT SMOKER: 0

## 2023-12-29 ASSESSMENT — COGNITIVE AND FUNCTIONAL STATUS - GENERAL
MOBILITY SCORE: 8
MOVING TO AND FROM BED TO CHAIR: TOTAL
STANDING UP FROM CHAIR USING ARMS: TOTAL
TURNING FROM BACK TO SIDE WHILE IN FLAT BAD: A LOT
MOVING FROM LYING ON BACK TO SITTING ON SIDE OF FLAT BED WITH BEDRAILS: A LOT
CLIMB 3 TO 5 STEPS WITH RAILING: TOTAL
WALKING IN HOSPITAL ROOM: TOTAL

## 2023-12-29 ASSESSMENT — COLUMBIA-SUICIDE SEVERITY RATING SCALE - C-SSRS
2. HAVE YOU ACTUALLY HAD ANY THOUGHTS OF KILLING YOURSELF?: NO
1. IN THE PAST MONTH, HAVE YOU WISHED YOU WERE DEAD OR WISHED YOU COULD GO TO SLEEP AND NOT WAKE UP?: NO
6. HAVE YOU EVER DONE ANYTHING, STARTED TO DO ANYTHING, OR PREPARED TO DO ANYTHING TO END YOUR LIFE?: NO

## 2023-12-29 ASSESSMENT — PAIN - FUNCTIONAL ASSESSMENT: PAIN_FUNCTIONAL_ASSESSMENT: CPOT (CRITICAL CARE PAIN OBSERVATION TOOL)

## 2023-12-29 ASSESSMENT — PAIN SCALES - GENERAL: PAIN_LEVEL: 0

## 2023-12-29 NOTE — CARE PLAN
The patient's goals for the shift include  being ready for OR    The clinical goals for the shift include Patient will have no decline in neurologic exam overnight.    Over the shift, the patient had a stable exam, no pain, and is now ready to go to OR.

## 2023-12-29 NOTE — OP NOTE
"Right craniotomy for tumor resection (R) Operative Note     Date: 2023 - 2023  OR Location: [facility]    Name: Enmanuel Ahumada \"Dash\", : 1955, Age: 68 y.o., MRN: [id number], Sex: male    Diagnosis  Pre-op Diagnosis     * Brain mass [G93.89] Post-op Diagnosis     * Brain mass [G93.89]     Procedures  Right craniotomy for tumor resection  [address] - [place] CRANIEC TREPHINE BONE FLP BRAIN TUMOR SUPRTENTOR  Neuronavigation  Neuromonitoring with SSEP/MEP  Intraoperative microscope, with use of 5-ALA tumor fluorescence guided resection    Surgeons      * Ritu Carrasco - Primary     * Reed Lamar    Resident/Fellow/Other Assistant:  Surgeon(s) and Role:     * Corby Darden MD - Resident - Assisting     * Reed Lamar MD    Procedure Summary  Anesthesia: General  ASA: III  Anesthesia Staff: Anesthesiologist: Aleena Mei MD; Curt Priest MD; Kathleen Cleaning DO  C-AA: ALAN Tariq Capp; ALAN Olivarez  Anesthesia Resident: Oli Richard MD  HAFSA: [place]  Estimated Blood Loss: 200mL  Intra-op Medications:   Medication Name Total Dose   lidocaine-epinephrine (Xylocaine W/EPI) 0.5 %-1:200,000 injection 20 mL   gelatin absorbable (Gelfoam) 100 sponge 1 each   thrombin (recombinant) (Recothrom) topical solution 20,000 Units   bacitracin ointment 1 Application   thrombin (Human)-fibrinogen-aprotinin-calcium (Tisseel) 10 mL 10 mL   sodium chloride 0.9 % irrigation solution 4,000 mL              Anesthesia Record               Intraprocedure I/O Totals          Intake    PLATELETS 250.00 mL    LR 2600.00 mL    mannitol 20% 250.00 mL    Propofol Drip 0.00 mL    The total shown is the total volume documented since Anesthesia Start was filed.    Phenylephrine Drip 0.00 mL    The total shown is the total volume documented since Anesthesia Start was filed.    levETIRAcetam in NaCl (iso-os) 500 mg/100 mL 250.00 mL    Total Intake 3350 mL       Output    Urine 2905 mL    " Est. Blood Loss 250 mL    Total Output 3155 mL       Net    Net Volume 195 mL          Specimen:   ID Type Source Tests Collected by Time   1 : RIGHT BRAIN MASS Tissue BRAIN RESECTION SURGICAL PATHOLOGY EXAM Corby Darden MD 12/29/2023 1157   2 : RIGHT BRAIN MASS Tissue BRAIN RESECTION SURGICAL PATHOLOGY EXAM Ritu Carrasco MD 12/29/2023 1557        Staff:   Circulator: Burke Barlow RN; Catherine Lubin RN; Janet Chan RN  Relief Circulator: Ally Flanagan RN  Relief Scrub: Guanaco Diaz  Scrub Person: Ally Flanagan RN; Caden Magana         Drains and/or Catheters:   Closed/Suction Drain Right Scalp Accordion 10 Fr. (Active)       Urethral Catheter Non-latex 16 Fr. (Active)       Tourniquet Times:         Implants:  Implants       Type Name Action Serial No.      Neuro Interventional Implant CATHETER, VENTRICULAR - [id number] Used, Not Implanted      Neuro Interventional Implant CATHETER, VENTRICULAR - [id number] Used, Not Implanted      Graft GRAFT MATRIX, DURAL, DURAGEN PLUS 4X5 (1/PK) - [id number] Implanted      Implant COVER, MADIE HOLE, NEURO, 4H, 18MM, W/TAB - [id number] Implanted      Screw PLATE, NEURO, ULTRAONE X SHP, 4H, 14MM, W/TAB - [id number] Implanted      Graft GRAFT, DURAMATRIX ON-LAY COLLAGEN 4 X 5 - [id number] Implanted      Screw SCREW, NEURO, ONEDRIVE, 1.5 X 4MM - [id number] Implanted               Findings: h/o HLD, cholecystectomy, Osborn's esophagus, on ASA 81 for cardioprotection , who p/w 6 wks HA, 3 wks incr confusion and running into things.  Patient received an MRI brain showing a large right parieto-occipital enhancing lesion.  Also received received a functional MRI showing that the patient was left-sided dominant.    Indications: Dash Ahumada is an 68 y.o. male who is having surgery for Brain mass [G93.89].  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  Risk and benefits were discussed with the patient including progressive weakness, sensory  changes, hemorrhage, stroke, seizures.  Patient decided continue on the procedure at and consented in the preoperative time.    The patient was seen in the preoperative area. The risks, benefits, complications, treatment options, non-operative alternatives, expected recovery and outcomes were discussed with the patient. The possibilities of reaction to medication, pulmonary aspiration, injury to surrounding structures, bleeding, recurrent infection, the need for additional procedures, failure to diagnose a condition, and creating a complication requiring transfusion or operation were discussed with the patient. The patient concurred with the proposed plan, giving informed consent.  The site of surgery was properly noted/marked if necessary per policy. The patient has been actively warmed in preoperative area. Preoperative antibiotics have been ordered and given within 1 hours of incision. Venous thrombosis prophylaxis have been ordered including bilateral sequential compression devices    Procedure Details: Patient was consented in the preoperative area and there was taken back to the operating room where a preoperative timeout was done with multiple patient and fires.  Keppra 1000+ fosphenytoin 15 mg/kg were given in the preincision time.  Patient was intubated without any complication.  Patient was then hooked up to neuromonitoring for SSEPs and MEPs.  Patient was turned lateral onto beanbags with right side up.  His head was turned so the prior occipital region was the highest point.  He is closely taped at this point.  Neuronavigation was then registered at this point using tracer technique and the accuracy checked against landmarks.  A large U-shaped incision was then planned and the areas were prepped and draped in sterile fashion.  Absent lidocaine with epinephrine was used to infiltrate the skin.      10 blade was used for the incision.  Bovie electrocautery was used to help establish hemostasis.  Olu  clips were used on the scalp edges.  The flap was then flapped inferiorly and fixated with the Zee bar and fishhooks.  The crani was then planned with multiple bur holes, 4 in total.  The dura was then stripped and crani was then turned to B1 with footplate.  Multiple tack ups were then placed throughout with Gelfoam with thrombin in the epidural space.  Only 25 mg of mannitol was given at this point to avoid brain shift .  Dura was then opened carefully with a 15 blade and Metzenbaum.  The dura was then flapped medially and 4-0 Nurolon sutures were used to tack up the dura laterally. The grayish tumor borders were evident at the surface.     After this point attention was made toward the resection.  We used the intraoperative neuronavigation to stereotactically guided a shunt catheter the medial aspect into the deepest aspect of the tumor near the point where it enters the ventricle.  We use this as an internal fiducial for resection.  There seem to be good borders throughout the medial and superior aspect of the tumor.  We establish this border using microsurgical techniques.  We used the Spetzler  bipolars as well as [employer] suctions to establish a plane.  We started medially and went superiorly followed by inferiorly.  The lateral portion seen to have less defined borders and had overlying cortex.  We found a planeAnd carried resection deeper.  Using normal dissection techniques were able to dissect around the tumor circumferentially.  We took majority the tumor en bloc.      The operating microscope was then brought into the field, and the remainder of the resection was performed under microscopic magnification, using 5 alae fluorescence intermittently, and using microsurgical technique and micro instruments.  We then confirmed with 5 ALA  fluorescence under the microscope for residual tumor.  We saw residual tumor in the medial and lateral aspects of the tumor.  Using the fluorescence under the  microscope to guide us we used normal microsurgical techniques to dissect off the residual tumor on the lateral and medial aspects.  This was performed in a layered bilayer sequential fashion,; we visualized some deep fluorescence of tumor and was cognizant to dissect the tumor carefully.  Motors were done throughout this time with SSEPs and motors to be at baseline throughout the whole procedure.    Almost all of the fluorescein tumor was resected at by the end of the resection.  There is very minimal fluorescence at the very deep aspect, but based on image guidance, and visualization of the ependyma and the small amount of choroid, this was identified to be at the location of the ventricle and therefore further dissection was not pursued to avoid a larger ventricular entry.  Additionally anteriorly in the white matter there was concern for proximity to motor fibers, and therefore we felt that a maximal safe resection had been obtained with no grossly visible tumor, and with only small amount of  fluorescence remaining deep and anteriorly.  Gelfoam and thrombin was placed at the entry point of where the tumor entered into the ventricle.  We placed Gelfoam throughout to obtain hemostasis and carefully used Bovie bipolar electrocautery to cauterize any bleeding areas in the cavity.  Hemostasis was confirmed and Surgicel was placed around the cavity.  Tisseel was placed to seal  the Gelfoam that was covering the communication with the ventricle.  Once again hemostasis was then confirmed and the cavity was no bleeding visualized.    DuraMatrix onlay dural substitute was then placed inside and tacked to the dura with 4-0 Nurolon sutures.  Tisseel  was then placed around the cavity and irrigation was placed subdurally .  The B1 with a footplate was used to place multiple venting holes throughout the bone flap, and for central tackers.  Epidural tack ups throughout the Duramatrix onlay and the dura was were then placed  through the bone flap. Gelfoam with thrombin was placed on top of the dura matrix.  The tack also then placed through the bone flap and then tied to the bone flap.  The bone flap was then fixated with a hole covers and screws.  Hemostasis was then obtained.      A 10 round Hemovac drain was then placed in the medial superior aspect of the incision.  Copious irrigation was used at this point.  Incision was then closed with 2-0 Vicryl sutures followed by staples for skin.  The drain was secured with a 3-0 nylon suture.  Patient was then extubated and taken to ICU in stable fashion.  All counts were correct.        Complications:  None; patient tolerated the procedure well.    Disposition: ICU - extubated and stable.  Condition: stable         Additional Details: none    Attending Attestation: I, Dr. Ritu Carrasco, was available throughout with Dr Carl and Dr Ash as back-up surgeons, and was present for and performed the critical portions.       Ritu Carrasco  Phone Number: [phone]

## 2023-12-29 NOTE — PROGRESS NOTES
"Enmanuel Ahumada \"Dash\" is a 68 y.o. male on day 4 of admission presenting with Brain mass.    Subjective   NAEO. Patient is preop'd and ready for OR this morning.        Objective     Physical Exam  Extraocular movements intact bilaterally. Pupils equal round and reactive to light bilaterally.  NAD, A&Ox3  Cranial Nerves II-XII: PERRL, EOMI, L HH, Face symmetric, Facial SILT, Palate/Tongue midline and symmetric, shoulder shrugs symmetric  Motor: 5/5, no dysmetria on finger to nose, no pronator drift  Sensation: SILT throughout all extremities  DTRS: 2+ Throughout, No Hoffmans or Clonus .    Last Recorded Vitals  Blood pressure 120/72, pulse 59, temperature 36.4 °C (97.5 °F), temperature source Temporal, resp. rate 18, height 1.702 m (5' 7.01\"), weight 77.1 kg (170 lb), SpO2 97 %.  Intake/Output last 3 Shifts:  I/O last 3 completed shifts:  In: 920 (11.9 mL/kg) [P.O.:420; I.V.:500 (6.5 mL/kg)]  Out: - (0 mL/kg)   Weight: 77.1 kg     Current Medications  acetaminophen, 650 mg, oral, q6h LA NENA  aminolevulinic acid, 1,500 mg, oral, Once  atorvastatin, 40 mg, oral, Daily  levETIRAcetam, 500 mg, oral, BID  melatonin, 3 mg, oral, Daily  pantoprazole, 40 mg, oral, Daily before breakfast  perflutren lipid microspheres, 0.5-10 mL of dilution, intravenous, Once in imaging  perflutren protein A microsphere, 0.5 mL, intravenous, Once in imaging  polyethylene glycol, 17 g, oral, Daily  sennosides-docusate sodium, 2 tablet, oral, BID  sulfur hexafluoride microsphr, 2 mL, intravenous, Once in imaging         Assessment/Plan   Principal Problem:    Brain mass    Enmanuel Ahumada is a 68 year old M with h/o HLD, cholecystectomy, Osborn's esophagus, on ASA 81 for cardioprotection , who p/w 6 wks HA, 3 wks incr confusion and running into things, CTH RO hypodensity c/f mass     MRI PPF complete  fMRI L language dominant   12/27 TTE EF 60%, liver US diffusely hyperechoic liver, c/w hepatosteatosis vs hepatocellular " disease    Plan:  Floor  OR Fri 12/29 for Crani for tumor resection  Appreciate perioperative medicine recs  Continue keppra   Hold steroids  Home meds as applicable, hold ASA  SCDs, SQH   PTOT  Likely NSU vs KRYSTAL after OR         Notes are authored by overnight resident. Please page [phone] with any questions.    Arcelia Chester MD

## 2023-12-29 NOTE — ANESTHESIA PROCEDURE NOTES
Airway  Date/Time: 12/29/2023 7:58 AM  Urgency: elective    Airway not difficult    Staffing  Performed: attending   Authorized by: Curt Priest MD    Performed by: ALAN Tariq Capp  Patient location during procedure: OR    Indications and Patient Condition  Indications for airway management: airway protection and anesthesia  Spontaneous Ventilation: absent  Sedation level: deep  Preoxygenated: yes  Mask difficulty assessment: 1 - vent by mask    Final Airway Details  Final airway type: endotracheal airway      Successful airway: ETT  Cuffed: yes   Successful intubation technique: direct laryngoscopy  Facilitating devices/methods: intubating stylet  Endotracheal tube insertion site: oral  Blade: Lydia  Blade size: #4  ETT size (mm): 7.5  Cormack-Lehane Classification: grade IIa - partial view of glottis  Placement verified by: chest auscultation and capnometry   Measured from: gums  ETT to gums (cm): 22  Number of attempts at approach: 1  Number of other approaches attempted: 0    Additional Comments  Atraumatic Intubation. Lips and teeth in preanesthetic condition.

## 2023-12-29 NOTE — BRIEF OP NOTE
"Date: 2023 - 2023  OR Location: [facility] OR    Name: Enmanuel Ahumada \"Dash\", : 1955, Age: 68 y.o., MRN: [id number], Sex: male    Diagnosis  Pre-op Diagnosis     * Brain mass [G93.89] Post-op Diagnosis     * Brain mass [G93.89]     Procedures  Right craniotomy for tumor resection      Surgeons      * Ritu Carrasco - Primary     * Reed Lamar    Resident/Fellow/Other Assistant:  Surgeon(s) and Role:     * Corby Darden MD - Resident - Assisting     * Reed Lamar MD    Procedure Summary  Anesthesia: General  ASA: III  Anesthesia Staff: Anesthesiologist: Aleena Mei MD; Curt Priest MD; Kathleen Cleaning DO  C-AA: ALAN Tariq Capp; ALAN Olivarez  Anesthesia Resident: Oli Richard MD  HAFSA: [employer]  Estimated Blood Loss: 200mL  Intra-op Medications:   Medication Name Total Dose   lidocaine-epinephrine (Xylocaine W/EPI) 0.5 %-1:200,000 injection 20 mL   gelatin absorbable (Gelfoam) 100 sponge 1 each   thrombin (recombinant) (Recothrom) topical solution 20,000 Units   bacitracin ointment 1 Application   thrombin (Human)-fibrinogen-aprotinin-calcium (Tisseel) 10 mL 10 mL   sodium chloride 0.9 % irrigation solution 4,000 mL              Anesthesia Record               Intraprocedure I/O Totals          Intake    PLATELETS 250.00 mL    LR 2600.00 mL    mannitol 20% 250.00 mL    Propofol Drip 0.00 mL    The total shown is the total volume documented since Anesthesia Start was filed.    Phenylephrine Drip 0.00 mL    The total shown is the total volume documented since Anesthesia Start was filed.    levETIRAcetam in NaCl (iso-os) 500 mg/100 mL 250.00 mL    Total Intake 3350 mL       Output    Urine 2905 mL    Est. Blood Loss 250 mL    Total Output 3155 mL       Net    Net Volume 195 mL          Specimen:   ID Type Source Tests Collected by Time   1 : RIGHT BRAIN MASS Tissue BRAIN RESECTION SURGICAL PATHOLOGY EXAM Corby Darden MD 2023 1157   2 " : RIGHT BRAIN MASS Tissue BRAIN RESECTION SURGICAL PATHOLOGY EXAM Ritu Carrasco MD 12/29/2023 7434        Staff:   Circulator: Burke Barlow RN; Catherine Lubin, DANIEL; Janet Chan RN  Relief Circulator: Ally Flanagan RN  Relief Scrub: Guanaco Diaz  Scrub Person: Ally Flanagan RN; Caden Magana          Findings: prelim high grade glioma    Complications:  None; patient tolerated the procedure well.     Disposition: ICU - extubated and stable.  Condition: stable  Specimens Collected:   ID Type Source Tests Collected by Time   1 : RIGHT BRAIN MASS Tissue BRAIN RESECTION SURGICAL PATHOLOGY EXAM Corby Darden MD 12/29/2023 3246   2 : RIGHT BRAIN MASS Tissue BRAIN RESECTION SURGICAL PATHOLOGY EXAM Ritu Carrasco MD 12/29/2023 1368     Attending Attestation:     Ritu Carrasco  Phone Number: [phone]

## 2023-12-29 NOTE — ANESTHESIA PROCEDURE NOTES
Arterial Line:    Date/Time: 12/29/2023 8:05 AM    Staffing  Performed: attending   Authorized by: Curt Priest MD    Performed by: ALAN Tariq Capp    An arterial line was placed. in the OR for the following indication(s): continuous blood pressure monitoring and blood sampling needed.    A  (size), 1 and 3/4 inch (length), Angiocath (type) catheter was placed into the Left radial artery, secured by Tegaderm,   Seldinger technique not used.  Events:  patient tolerated procedure well with no complications.      Additional notes:  Sterile prep and glove. Two attempts. Sterile dressing placed.

## 2023-12-29 NOTE — ANESTHESIA PROCEDURE NOTES
Peripheral IV  Date/Time: 12/29/2023 7:58 AM  Inserted by: Curt Priest MD    Placement  Needle size: 14 G  Laterality: left  Location: hand  Local anesthetic: none  Site prep: alcohol  Attempts: 1

## 2023-12-30 ENCOUNTER — APPOINTMENT (OUTPATIENT)
Dept: CARDIOLOGY | Facility: HOSPITAL | Age: 68
DRG: 025 | End: 2023-12-30
Payer: MEDICARE

## 2023-12-30 ENCOUNTER — APPOINTMENT (OUTPATIENT)
Dept: NEUROLOGY | Facility: HOSPITAL | Age: 68
DRG: 025 | End: 2023-12-30
Payer: MEDICARE

## 2023-12-30 LAB
ALBUMIN SERPL BCP-MCNC: 3.5 G/DL (ref 3.4–5)
ALBUMIN SERPL BCP-MCNC: 4 G/DL (ref 3.4–5)
ANION GAP SERPL CALC-SCNC: 12 MMOL/L (ref 10–20)
ANION GAP SERPL CALC-SCNC: 15 MMOL/L (ref 10–20)
BUN SERPL-MCNC: 16 MG/DL (ref 6–23)
BUN SERPL-MCNC: 19 MG/DL (ref 6–23)
CALCIUM SERPL-MCNC: 8.5 MG/DL (ref 8.6–10.6)
CALCIUM SERPL-MCNC: 9 MG/DL (ref 8.6–10.6)
CHLORIDE SERPL-SCNC: 104 MMOL/L (ref 98–107)
CHLORIDE SERPL-SCNC: 109 MMOL/L (ref 98–107)
CO2 SERPL-SCNC: 24 MMOL/L (ref 21–32)
CO2 SERPL-SCNC: 25 MMOL/L (ref 21–32)
CREAT SERPL-MCNC: 0.9 MG/DL (ref 0.5–1.3)
CREAT SERPL-MCNC: 0.91 MG/DL (ref 0.5–1.3)
EJECTION FRACTION APICAL 4 CHAMBER: 55.9
EJECTION FRACTION: 61
ERYTHROCYTE [DISTWIDTH] IN BLOOD BY AUTOMATED COUNT: 11.7 % (ref 11.5–14.5)
GFR SERPL CREATININE-BSD FRML MDRD: >90 ML/MIN/1.73M*2
GFR SERPL CREATININE-BSD FRML MDRD: >90 ML/MIN/1.73M*2
GLUCOSE BLD MANUAL STRIP-MCNC: 107 MG/DL (ref 74–99)
GLUCOSE BLD MANUAL STRIP-MCNC: 109 MG/DL (ref 74–99)
GLUCOSE BLD MANUAL STRIP-MCNC: 124 MG/DL (ref 74–99)
GLUCOSE BLD MANUAL STRIP-MCNC: 143 MG/DL (ref 74–99)
GLUCOSE BLD MANUAL STRIP-MCNC: 156 MG/DL (ref 74–99)
GLUCOSE SERPL-MCNC: 124 MG/DL (ref 74–99)
GLUCOSE SERPL-MCNC: 157 MG/DL (ref 74–99)
HCT VFR BLD AUTO: 41.3 % (ref 41–52)
HGB BLD-MCNC: 15.2 G/DL (ref 13.5–17.5)
LEFT VENTRICLE INTERNAL DIMENSION DIASTOLE: 5.7 (ref 3.5–6)
MCH RBC QN AUTO: 31.7 PG (ref 26–34)
MCHC RBC AUTO-ENTMCNC: 36.8 G/DL (ref 32–36)
MCV RBC AUTO: 86 FL (ref 80–100)
NRBC BLD-RTO: 0 /100 WBCS (ref 0–0)
PHOSPHATE SERPL-MCNC: 2.5 MG/DL (ref 2.5–4.9)
PHOSPHATE SERPL-MCNC: 3.3 MG/DL (ref 2.5–4.9)
PLATELET # BLD AUTO: 196 X10*3/UL (ref 150–450)
POTASSIUM SERPL-SCNC: 3.6 MMOL/L (ref 3.5–5.3)
POTASSIUM SERPL-SCNC: 3.6 MMOL/L (ref 3.5–5.3)
RBC # BLD AUTO: 4.8 X10*6/UL (ref 4.5–5.9)
SODIUM SERPL-SCNC: 140 MMOL/L (ref 136–145)
SODIUM SERPL-SCNC: 141 MMOL/L (ref 136–145)
TRICUSPID ANNULAR PLANE SYSTOLIC EXCURSION: 1.4
WBC # BLD AUTO: 12.1 X10*3/UL (ref 4.4–11.3)

## 2023-12-30 PROCEDURE — 2500000002 HC RX 250 W HCPCS SELF ADMINISTERED DRUGS (ALT 637 FOR MEDICARE OP, ALT 636 FOR OP/ED): Performed by: REGISTERED NURSE

## 2023-12-30 PROCEDURE — 2500000004 HC RX 250 GENERAL PHARMACY W/ HCPCS (ALT 636 FOR OP/ED): Performed by: STUDENT IN AN ORGANIZED HEALTH CARE EDUCATION/TRAINING PROGRAM

## 2023-12-30 PROCEDURE — 36415 COLL VENOUS BLD VENIPUNCTURE: CPT | Performed by: STUDENT IN AN ORGANIZED HEALTH CARE EDUCATION/TRAINING PROGRAM

## 2023-12-30 PROCEDURE — 96372 THER/PROPH/DIAG INJ SC/IM: CPT | Performed by: STUDENT IN AN ORGANIZED HEALTH CARE EDUCATION/TRAINING PROGRAM

## 2023-12-30 PROCEDURE — 2500000001 HC RX 250 WO HCPCS SELF ADMINISTERED DRUGS (ALT 637 FOR MEDICARE OP): Performed by: STUDENT IN AN ORGANIZED HEALTH CARE EDUCATION/TRAINING PROGRAM

## 2023-12-30 PROCEDURE — 93308 TTE F-UP OR LMTD: CPT | Performed by: INTERNAL MEDICINE

## 2023-12-30 PROCEDURE — C9113 INJ PANTOPRAZOLE SODIUM, VIA: HCPCS | Performed by: STUDENT IN AN ORGANIZED HEALTH CARE EDUCATION/TRAINING PROGRAM

## 2023-12-30 PROCEDURE — 2500000004 HC RX 250 GENERAL PHARMACY W/ HCPCS (ALT 636 FOR OP/ED)

## 2023-12-30 PROCEDURE — 97162 PT EVAL MOD COMPLEX 30 MIN: CPT | Mod: GP

## 2023-12-30 PROCEDURE — 97530 THERAPEUTIC ACTIVITIES: CPT | Mod: GP

## 2023-12-30 PROCEDURE — 97166 OT EVAL MOD COMPLEX 45 MIN: CPT | Mod: GO

## 2023-12-30 PROCEDURE — 97530 THERAPEUTIC ACTIVITIES: CPT | Mod: GO

## 2023-12-30 PROCEDURE — 80069 RENAL FUNCTION PANEL: CPT | Performed by: STUDENT IN AN ORGANIZED HEALTH CARE EDUCATION/TRAINING PROGRAM

## 2023-12-30 PROCEDURE — 85027 COMPLETE CBC AUTOMATED: CPT | Performed by: STUDENT IN AN ORGANIZED HEALTH CARE EDUCATION/TRAINING PROGRAM

## 2023-12-30 PROCEDURE — 2500000004 HC RX 250 GENERAL PHARMACY W/ HCPCS (ALT 636 FOR OP/ED): Performed by: REGISTERED NURSE

## 2023-12-30 PROCEDURE — 2020000001 HC ICU ROOM DAILY

## 2023-12-30 PROCEDURE — 82947 ASSAY GLUCOSE BLOOD QUANT: CPT

## 2023-12-30 PROCEDURE — 93308 TTE F-UP OR LMTD: CPT

## 2023-12-30 PROCEDURE — 99291 CRITICAL CARE FIRST HOUR: CPT | Mod: GC | Performed by: NEUROLOGICAL SURGERY

## 2023-12-30 RX ORDER — CALCIUM GLUCONATE 20 MG/ML
2 INJECTION, SOLUTION INTRAVENOUS EVERY 6 HOURS PRN
Status: DISCONTINUED | OUTPATIENT
Start: 2023-12-30 | End: 2024-01-02 | Stop reason: HOSPADM

## 2023-12-30 RX ORDER — DEXMEDETOMIDINE HYDROCHLORIDE 4 UG/ML
INJECTION, SOLUTION INTRAVENOUS
Status: COMPLETED
Start: 2023-12-30 | End: 2023-12-30

## 2023-12-30 RX ORDER — HEPARIN SODIUM 5000 [USP'U]/ML
5000 INJECTION, SOLUTION INTRAVENOUS; SUBCUTANEOUS EVERY 12 HOURS
Status: DISCONTINUED | OUTPATIENT
Start: 2023-12-30 | End: 2023-12-31

## 2023-12-30 RX ORDER — POTASSIUM CHLORIDE 14.9 MG/ML
20 INJECTION INTRAVENOUS ONCE
Status: COMPLETED | OUTPATIENT
Start: 2023-12-30 | End: 2023-12-30

## 2023-12-30 RX ORDER — DEXMEDETOMIDINE HYDROCHLORIDE 4 UG/ML
.1-1.5 INJECTION, SOLUTION INTRAVENOUS CONTINUOUS
Status: DISCONTINUED | OUTPATIENT
Start: 2023-12-30 | End: 2023-12-30

## 2023-12-30 RX ORDER — POTASSIUM CHLORIDE 20 MEQ/1
40 TABLET, EXTENDED RELEASE ORAL ONCE
Status: COMPLETED | OUTPATIENT
Start: 2023-12-30 | End: 2023-12-30

## 2023-12-30 RX ORDER — MAGNESIUM SULFATE HEPTAHYDRATE 40 MG/ML
2 INJECTION, SOLUTION INTRAVENOUS EVERY 6 HOURS PRN
Status: DISCONTINUED | OUTPATIENT
Start: 2023-12-30 | End: 2024-01-02 | Stop reason: HOSPADM

## 2023-12-30 RX ORDER — INSULIN LISPRO 100 [IU]/ML
0-10 INJECTION, SOLUTION INTRAVENOUS; SUBCUTANEOUS EVERY 6 HOURS
Status: DISCONTINUED | OUTPATIENT
Start: 2023-12-30 | End: 2024-01-02 | Stop reason: HOSPADM

## 2023-12-30 RX ORDER — POTASSIUM CHLORIDE 14.9 MG/ML
20 INJECTION INTRAVENOUS EVERY 6 HOURS PRN
Status: DISCONTINUED | OUTPATIENT
Start: 2023-12-30 | End: 2024-01-02 | Stop reason: HOSPADM

## 2023-12-30 RX ORDER — MAGNESIUM SULFATE HEPTAHYDRATE 40 MG/ML
4 INJECTION, SOLUTION INTRAVENOUS EVERY 6 HOURS PRN
Status: DISCONTINUED | OUTPATIENT
Start: 2023-12-30 | End: 2024-01-02 | Stop reason: HOSPADM

## 2023-12-30 RX ORDER — CALCIUM GLUCONATE 20 MG/ML
1 INJECTION, SOLUTION INTRAVENOUS EVERY 6 HOURS PRN
Status: DISCONTINUED | OUTPATIENT
Start: 2023-12-30 | End: 2024-01-02 | Stop reason: HOSPADM

## 2023-12-30 RX ADMIN — CEFAZOLIN SODIUM 2 G: 2 INJECTION, SOLUTION INTRAVENOUS at 12:20

## 2023-12-30 RX ADMIN — INSULIN LISPRO 2 UNITS: 100 INJECTION, SOLUTION INTRAVENOUS; SUBCUTANEOUS at 01:47

## 2023-12-30 RX ADMIN — DEXMEDETOMIDINE HYDROCHLORIDE 0.2 MCG/KG/HR: 400 INJECTION INTRAVENOUS at 00:29

## 2023-12-30 RX ADMIN — HEPARIN SODIUM 5000 UNITS: 5000 INJECTION INTRAVENOUS; SUBCUTANEOUS at 01:57

## 2023-12-30 RX ADMIN — DEXAMETHASONE SODIUM PHOSPHATE 4 MG: 4 INJECTION, SOLUTION INTRAMUSCULAR; INTRAVENOUS at 15:31

## 2023-12-30 RX ADMIN — DEXAMETHASONE SODIUM PHOSPHATE 4 MG: 4 INJECTION, SOLUTION INTRAMUSCULAR; INTRAVENOUS at 09:50

## 2023-12-30 RX ADMIN — POTASSIUM CHLORIDE 20 MEQ: 14.9 INJECTION, SOLUTION INTRAVENOUS at 05:25

## 2023-12-30 RX ADMIN — CEFAZOLIN SODIUM 2 G: 2 INJECTION, SOLUTION INTRAVENOUS at 03:58

## 2023-12-30 RX ADMIN — PANTOPRAZOLE SODIUM 40 MG: 40 INJECTION, POWDER, FOR SOLUTION INTRAVENOUS at 06:49

## 2023-12-30 RX ADMIN — HYDRALAZINE HYDROCHLORIDE 10 MG: 20 INJECTION INTRAMUSCULAR; INTRAVENOUS at 19:07

## 2023-12-30 RX ADMIN — DEXMEDETOMIDINE HYDROCHLORIDE 0.4 MCG/KG/HR: 400 INJECTION INTRAVENOUS at 07:33

## 2023-12-30 RX ADMIN — DEXMEDETOMIDINE HYDROCHLORIDE 0.8 MCG/KG/HR: 400 INJECTION INTRAVENOUS at 05:42

## 2023-12-30 RX ADMIN — CEFAZOLIN SODIUM 2 G: 2 INJECTION, SOLUTION INTRAVENOUS at 23:21

## 2023-12-30 RX ADMIN — LEVETIRACETAM 1000 MG: 1000 INJECTION, SOLUTION INTRAVENOUS at 20:22

## 2023-12-30 RX ADMIN — OXYCODONE HYDROCHLORIDE 5 MG: 5 TABLET ORAL at 20:23

## 2023-12-30 RX ADMIN — INSULIN LISPRO 2 UNITS: 100 INJECTION, SOLUTION INTRAVENOUS; SUBCUTANEOUS at 07:22

## 2023-12-30 RX ADMIN — PERFLUTREN 10 ML OF DILUTION: 6.52 INJECTION, SUSPENSION INTRAVENOUS at 11:58

## 2023-12-30 RX ADMIN — DEXAMETHASONE SODIUM PHOSPHATE 4 MG: 4 INJECTION, SOLUTION INTRAMUSCULAR; INTRAVENOUS at 20:23

## 2023-12-30 RX ADMIN — LEVETIRACETAM 1000 MG: 1000 INJECTION, SOLUTION INTRAVENOUS at 08:58

## 2023-12-30 RX ADMIN — POTASSIUM CHLORIDE 40 MEQ: 1500 TABLET, EXTENDED RELEASE ORAL at 17:14

## 2023-12-30 RX ADMIN — DEXAMETHASONE SODIUM PHOSPHATE 4 MG: 4 INJECTION, SOLUTION INTRAMUSCULAR; INTRAVENOUS at 03:58

## 2023-12-30 RX ADMIN — LABETALOL HYDROCHLORIDE 10 MG: 5 INJECTION, SOLUTION INTRAVENOUS at 16:41

## 2023-12-30 RX ADMIN — SODIUM CHLORIDE 500 ML: 9 INJECTION, SOLUTION INTRAVENOUS at 09:47

## 2023-12-30 RX ADMIN — HEPARIN SODIUM 5000 UNITS: 5000 INJECTION INTRAVENOUS; SUBCUTANEOUS at 12:20

## 2023-12-30 ASSESSMENT — COGNITIVE AND FUNCTIONAL STATUS - GENERAL
PERSONAL GROOMING: A LITTLE
DRESSING REGULAR UPPER BODY CLOTHING: A LITTLE
MOBILITY SCORE: 14
CLIMB 3 TO 5 STEPS WITH RAILING: A LOT
TURNING FROM BACK TO SIDE WHILE IN FLAT BAD: A LITTLE
MOVING TO AND FROM BED TO CHAIR: A LOT
DRESSING REGULAR LOWER BODY CLOTHING: A LITTLE
WALKING IN HOSPITAL ROOM: A LOT
STANDING UP FROM CHAIR USING ARMS: A LOT
HELP NEEDED FOR BATHING: A LITTLE
TOILETING: A LITTLE
EATING MEALS: A LITTLE
DAILY ACTIVITIY SCORE: 18
MOVING FROM LYING ON BACK TO SITTING ON SIDE OF FLAT BED WITH BEDRAILS: A LITTLE

## 2023-12-30 ASSESSMENT — PAIN - FUNCTIONAL ASSESSMENT
PAIN_FUNCTIONAL_ASSESSMENT: 0-10

## 2023-12-30 ASSESSMENT — PAIN SCALES - GENERAL
PAINLEVEL_OUTOF10: 0 - NO PAIN
PAINLEVEL_OUTOF10: 2
PAINLEVEL_OUTOF10: 0 - NO PAIN
PAINLEVEL_OUTOF10: 5 - MODERATE PAIN
PAINLEVEL_OUTOF10: 0 - NO PAIN

## 2023-12-30 ASSESSMENT — ACTIVITIES OF DAILY LIVING (ADL)
BATHING_ASSISTANCE: MINIMAL
ADL_ASSISTANCE: INDEPENDENT
ADL_ASSISTANCE: INDEPENDENT

## 2023-12-30 ASSESSMENT — PAIN DESCRIPTION - DESCRIPTORS: DESCRIPTORS: ACHING

## 2023-12-30 NOTE — PROGRESS NOTES
Subjective   68 y.o. male with PMH HLD, cholecystectomy, Osborn's esophagus, on ASA 81 for cardioprotection. Admitted 12/25/2023 with Brain mass after presenting with 6 weeks of intermittent headache, and 3 weeks of increased confusion. CTH RO hypodensity c/f mass.      Interval Events:   12/29: s/p R craniotomy for tumor resection      Objective   Vitals 24 hour ranges:  Heart Rate:  []   Temp:  [35.9 °C (96.6 °F)-37.5 °C (99.5 °F)]   Resp:  [11-26]   BP: (120-137)/(69-89)   SpO2:  [94 %-100 %]      Intake/Output for last 24 hours:    Intake/Output Summary (Last 24 hours) at 12/30/2023 0019  Last data filed at 12/29/2023 2300  Gross per 24 hour   Intake 3625 ml   Output 4130 ml   Net -505 ml        Physical Exam:  NEURO:  Awake, AOx2 (name and hospital)  EOS, PERRL, EOMI, L HH  Intermittently FC (thumbs up, toe wiggle bilat), SANCHEZ 5/5 strength  L craniotomy with duvol drain to 1/2 suction   CV:  RRR on telemetry, NSR  RESP:  Regular, unlabored  On RA  :  Bourgeois draining clear yellow urine to gravity   GI:  Abdomen NT/ND, soft  SKIN:  Intact    Medications  Scheduled:  atorvastatin, 40 mg, oral, Daily  ceFAZolin, 2 g, intravenous, q8h  desmopressin, 0.3 mcg/kg, intravenous, Once  dexAMETHasone, 4 mg, intravenous, q6h  levETIRAcetam, 1,000 mg, intravenous, q12h LA NENA  pantoprazole, 40 mg, oral, Daily before breakfast   Or  pantoprazole, 40 mg, intravenous, Daily before breakfast  perflutren lipid microspheres, 0.5-10 mL of dilution, intravenous, Once in imaging  perflutren protein A microsphere, 0.5 mL, intravenous, Once in imaging  polyethylene glycol, 17 g, oral, Daily  sulfur hexafluoride microsphr, 2 mL, intravenous, Once in imaging    PRN:  PRN medications: dextrose 10 % in water (D10W), dextrose, glucagon, hydrALAZINE, HYDROmorphone, labetaloL, metoclopramide **OR** metoclopramide, naloxone, ondansetron **OR** ondansetron, oxyCODONE, oxyCODONE     Continuous:  sodium chloride 0.9%, 75 mL/hr, Last Rate: 75  mL/hr (12/29/23 2259)      Lab Results  Results from last 72 hours   Lab Units 12/28/23  1919 12/28/23  0942   GLUCOSE mg/dL  --  96   SODIUM mmol/L  --  139   POTASSIUM mmol/L  --  4.4   CHLORIDE mmol/L  --  103   CO2 mmol/L  --  27   ANION GAP mmol/L  --  13   BUN mg/dL  --  17   CREATININE mg/dL  --  0.94   EGFR mL/min/1.73m*2  --  89   CALCIUM mg/dL  --  9.5   PHOSPHORUS mg/dL  --  3.0   ALBUMIN g/dL 4.3 4.3      Results from last 72 hours   Lab Units 12/28/23  0942   WBC AUTO x10*3/uL 6.0   NRBC AUTO /100 WBCs 0.0   RBC AUTO x10*6/uL 5.42   HEMOGLOBIN g/dL 17.1   HEMATOCRIT % 49.9   MCV fL 92   MCH pg 31.5   MCHC g/dL 34.3   RDW % 11.8   PLATELETS AUTO x10*3/uL 197      Results from last 72 hours   Lab Units 12/28/23  0942   WBC AUTO x10*3/uL 6.0   NRBC AUTO /100 WBCs 0.0   RBC AUTO x10*6/uL 5.42   HEMOGLOBIN g/dL 17.1   HEMATOCRIT % 49.9   MCV fL 92   MCH pg 31.5   MCHC g/dL 34.3   RDW % 11.8   PLATELETS AUTO x10*3/uL 197      Results from last 72 hours   Lab Units 12/28/23  0942   PROTIME seconds 12.4   INR  1.1   APTT seconds 34        Imaging Results  CT head wo IV contrast         US abdomen limited liver   Final Result   Diffusely hyperechoic liver which may be seen in the setting of   hepatic steatosis/hepatocellular disease. Recommend correlation with   liver function tests. Hypodensity described on 12/25/2023 CT is not   visualized on ultrasound.        I personally reviewed the images/study and I agree with the findings   as stated by Ronny Nicholas MD. This study was interpreted at   [facility], [place], [place].        MACRO:   None        Signed by: Ethan Meier 12/28/2023 1:58 PM   Dictation workstation:   [id number]      Transthoracic Echo (TTE) Complete   Final Result      MR head functional motor   Final Result   Mildly limited exam consistent with left-sided language dominance.        MACRO:   None        Signed by: Martin Nails 12/27/2023 7:51 AM    Dictation workstation:   [id number]      CT abdomen pelvis w IV contrast   Final Result   1. No acute abnormality in the abdomen or pelvis. There is a punctate   hypodensity in the left liver lobe that is too small to characterize.   Recommend continued attention on follow-up versus consideration of   liver MRI for further assessment.        I personally reviewed the images/study and resident's interpretation   and I agree with the findings as stated by Rosalind Maradiaga MD (resident   radiologist). This study was analyzed and interpreted at [place], [place].        MACRO:   None        Signed by: Yinka Carter 12/29/2023 12:52 AM   Dictation workstation:   [id number]      XR chest 1 view   Final Result   No radiographic evidence of acute cardiopulmonary process.             Signed by: Kelly Jacques 12/25/2023 11:13 AM   Dictation workstation:   [id number]      MR brain tumor perfusion protocol w and wo IV contrast   Final Result   Please note perfusion imaging will be reported as an addendum after   post processing is performed.   1. Centrally cystic/necrotic mass centered within the right parietal   lobe with thick irregular rim demonstrating high cellularity, avid   enhancement, and hemorrhage. The lesion abuts the overlying dura and   extends to the ependymal surface of the atrium of the right lateral   ventricle. 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   An addendum will be performed with interpretation of advanced imaging   when post processing is performed.   2. Expansile nonenhancing FLAIR hyperintense signal extending into   the right periatrial white matter and lateral right splenium of the   corpus callosum, which may represent infiltrative tumor and/or edema.   3. Effacement of the right lateral ventricle, 3rd ventricle, right   perimesencephalic cistern with about 0.9 cm midline shift, similar to   prior CT.        I  personally reviewed the images/study and I agree with the findings   as stated. This study was interpreted at [facility] [place] [facility], [place], [place].        Signed by: Gen Phelps 12/25/2023 8:43 AM   Dictation workstation:   [id number]      Transthoracic Echo (TTE) Complete    (Results Pending)         Assessment/Plan   68 y.o. male with PMH HLD, cholecystectomy, Osborn's esophagus, on ASA 81 for cardioprotection. Admitted 12/25/2023 with Brain mass after presenting with 6 weeks of intermittent headache, and 3 weeks of increased confusion. CTH RO hypodensity c/f mass.      Interval Events:   12/29: s/p R craniotomy for tumor resection     NEURO:  #RO hypodensity c/f mass s/p R craniotomy for tumor resection   Assessment:  Neurologically: Awake, AOx2 (name and hospital), EOS, PERRL, EOMI, L HH. Intermittently FC (thumbs up, toe wiggle bilat), SANCHEZ 5/5 strength  L craniotomy with duvol drain to 1/2 suction  Plan:  NSU  Q1H neuro checks  Duvol drain to 1/2 suction   Pain: acetaminophen, oxycodone, hydromorphone PRN  Nausea: ondansetron PRN  Dex 4q6h  Keppra 1g BID  Ancef 2q8h while drain in place  PT/OT/SLP  Post-op CTH     CARDIOVASCULAR:  #HLD, on ASA 81 for cardioprotection   Assessment:  SR to ST on tele  Plan:  Continue to monitor on telemetry  Goal -140 - Nicardipine, Hydralazine and Labetalol PRN  C/w atorvastatin 40mg every day  Hold home ASA 81    RESPIRATORY:  Assessment:  On RA  Plan:  Continuous pulse oximetry   O2 PRN to maintain SpO2 > 94%, wean as tolerated  Incentive spirometry while awake     RENAL/:  Assessment:  Results from last 72 hours   Lab Units 12/28/23  0942   BUN mg/dL 17   CREATININE mg/dL 0.94       Plan:  Monitor with daily RFP  Remove valentine 12/30 0600    FEN/GI:  # cholecystectomy, Osborn's esophagus  Assessment:  Last BM: PTA  Plan:  Monitor and replace electrolytes per protocol  IVF: NS @ 75cc/hr  Diet: NPO until passes bedside swallow  Bowel  Regimen: Docusate-Senna    ENDOCRINE:  #hyperglycemia   Assessment:  Results from last 7 days   Lab Units 12/29/23 2014 12/28/23  0942 12/26/23  1640   POCT GLUCOSE mg/dL 172*  --   --    GLUCOSE mg/dL  --  96 111*      Plan:  Accuchecks & ISS Q6H  Hypoglycemia protocol     HEMATOLOGY:  Assessment:  Results from last 7 days   Lab Units 12/28/23  0942 12/25/23  0550   HEMOGLOBIN g/dL 17.1 17.2   HEMATOCRIT % 49.9 50.4   PLATELETS AUTO x10*3/uL 197 222     Plan:  Continue to monitor with daily CBC and Coag panel    INFECTIOUS DISEASE:  Assessment:  Results from last 7 days   Lab Units 12/28/23  0942 12/25/23  0550   WBC AUTO x10*3/uL 6.0 7.6     Afebrile  Plan:  Continue to monitor for s/sx of infection  Pan culture for temperature > 38.4 C    MUSCULOSKELETAL:  No acute issues    SKIN:  No acute issues  Turns and skin care per NSU protocol    ACCESS:  PIVs    PROPHYLAXIS:  DVT/VTE: SCDs, SQH POD1  GI: PPI    RESTRAINTS:  I agree with nursing assessment of the patient's need for restraints to protect the patient from injury and facilitate healing. The patient is unable to cooperate with the plan of care and at risk for disrupting critical therapy (i.e., removing medical devices, lines, tubes and/or dressings).  Please see order for specifics. Restraints can be removed when the patient is able to cooperate with plan of care and allow healing to occur, or the medical devices at risk are discontinued by the medical team.     Joaquina Schrader APRN-CNP  Neuroscience Intensive Care       Total critical care time of 60 minutes, with > 50% of time spent in direct contact with patient/family for education, counseling and coordination of care.

## 2023-12-30 NOTE — HOSPITAL COURSE
Enmanuel Ahumada is a 68 year old M with h/o HLD, cholecystectomy, Osborn's esophagus, on ASA 81 for cardioprotection , who p/w 6 wks HA, 3 wks incr confusion and running into things, CTH RO hypodensity c/f mass, MRI PPF complete, fMRI L language dominant   12/27 TTE EF 60%, liver US diffusely hyperechoic liver, c/w hepatosteatosis vs hepatocellular disease  12/29 s/p R crani for tumor resection, CTH POC  12/30 TTE EF 60%  12/31 EEG neg, dc'd, MRI GTR

## 2023-12-30 NOTE — PROGRESS NOTES
Subjective   68 y.o. male with PMH HLD, cholecystectomy, Osborn's esophagus, on ASA 81 for cardioprotection. Admitted 2023 with Brain mass after presenting with 6 weeks of intermittent headache, and 3 weeks of increased confusion. CTH RO hypodensity c/f mass. s/p R craniotomy for tumor resection .     Interval Events: Pt continued to be very restless ON, pulling lines. Required increased Precedex.     Objective   Vitals 24 hour ranges:  Heart Rate:  []   Temp:  [36.8 °C (98.2 °F)-37.5 °C (99.5 °F)]   Resp:  [11-26]   BP: ()/(58-89)   Weight:  [78.8 kg (173 lb 11.6 oz)]   SpO2:  [91 %-100 %]    sBPs high 90-110s    Intake/Output for last 24 hours:  Intake/Output Summary (Last 24 hours) at 2023 0647  Last data filed at 2023 0600  Gross per 24 hour   Intake 4411.75 ml   Output 4925 ml   Net -513.25 ml   UOP: 4.46L  R accoridian scalp drain: 215mL    Physical Exam:  NEURO:  Awake, requires repeat questioning to answer questions. Oriented to self, hospital. With choices, oriented to age, state/city, year but not month/date or .   Pupils 3mm and reactive b/l, EOMI, L HH (but also inconsistent with other answers)  FC (thumbs up, toe wiggle) with left-right confusion, 5/5 strength throughout  L craniotomy with duvol drain to 1/2 suction   CV: RRR on telemetry, NSR  RESP: Regular, unlabored. On RA  : Bourgeois draining clear yellow urine to gravity   GI: Abdomen NT/ND, soft    Medications  Scheduled:  atorvastatin, 40 mg, oral, Daily  ceFAZolin, 2 g, intravenous, q8h  desmopressin, 0.3 mcg/kg, intravenous, Once  dexAMETHasone, 4 mg, intravenous, q6h  heparin (porcine), 5,000 Units, subcutaneous, q12h  insulin lispro, 0-10 Units, subcutaneous, q6h  levETIRAcetam, 1,000 mg, intravenous, q12h LA NENA  pantoprazole, 40 mg, oral, Daily before breakfast   Or  pantoprazole, 40 mg, intravenous, Daily before breakfast  perflutren lipid microspheres, 0.5-10 mL of dilution, intravenous, Once in  imaging  perflutren protein A microsphere, 0.5 mL, intravenous, Once in imaging  polyethylene glycol, 17 g, oral, Daily  potassium chloride, 20 mEq, intravenous, Once  sulfur hexafluoride microsphr, 2 mL, intravenous, Once in imaging    PRN:  PRN medications: calcium gluconate, calcium gluconate, dextrose 10 % in water (D10W), dextrose, glucagon, hydrALAZINE, HYDROmorphone, labetaloL, magnesium sulfate, magnesium sulfate, metoclopramide **OR** metoclopramide, naloxone, ondansetron **OR** ondansetron, oxyCODONE, oxyCODONE, potassium chloride     Continuous:  dexmedeTOMIDine, 0.1-1.5 mcg/kg/hr, Last Rate: 0.8 mcg/kg/hr (12/30/23 0542)  sodium chloride 0.9%, 75 mL/hr, Last Rate: 75 mL/hr (12/30/23 0513)      Lab Results  Results from last 72 hours   Lab Units 12/30/23 0154 12/28/23 1919 12/28/23  0942   GLUCOSE mg/dL 157*  --  96   SODIUM mmol/L 140  --  139   POTASSIUM mmol/L 3.6  --  4.4   CHLORIDE mmol/L 104  --  103   CO2 mmol/L 25  --  27   ANION GAP mmol/L 15  --  13   BUN mg/dL 16  --  17   CREATININE mg/dL 0.90  --  0.94   EGFR mL/min/1.73m*2 >90  --  89   CALCIUM mg/dL 9.0  --  9.5   PHOSPHORUS mg/dL 3.3  --  3.0   ALBUMIN g/dL 4.0 4.3 4.3      Results from last 72 hours   Lab Units 12/30/23 0154 12/28/23  0942   WBC AUTO x10*3/uL 12.1* 6.0   NRBC AUTO /100 WBCs 0.0 0.0   RBC AUTO x10*6/uL 4.80 5.42   HEMOGLOBIN g/dL 15.2 17.1   HEMATOCRIT % 41.3 49.9   MCV fL 86 92   MCH pg 31.7 31.5   MCHC g/dL 36.8* 34.3   RDW % 11.7 11.8   PLATELETS AUTO x10*3/uL 196 197      Results from last 72 hours   Lab Units 12/30/23 0154 12/28/23  0942   WBC AUTO x10*3/uL 12.1* 6.0   NRBC AUTO /100 WBCs 0.0 0.0   RBC AUTO x10*6/uL 4.80 5.42   HEMOGLOBIN g/dL 15.2 17.1   HEMATOCRIT % 41.3 49.9   MCV fL 86 92   MCH pg 31.7 31.5   MCHC g/dL 36.8* 34.3   RDW % 11.7 11.8   PLATELETS AUTO x10*3/uL 196 197      Results from last 72 hours   Lab Units 12/28/23  0942   PROTIME seconds 12.4   INR  1.1   APTT seconds 34     Surgical path:  pend     Imaging Results  CTH (12/29):  1. Postoperative changes of right parietal craniotomy for mass resection as described above, with scattered likely intraparenchymal and trace subarachnoid hemorrhage as described above.  2. 1.2 cm right-to-left midline shift, which was previously 1 cm, with decompression of the right-greater-than-left lateral ventricles,  3rd and 4th ventricles, with decreased caliber as compared to prior.  3. Low-density subdural collection extending at the rightward aspect of the posterior facet of the right tentorium cerebelli new from prior.     MRI brain functional (12/26):   Mildly limited exam consistent with left-sided language dominance.     MRI brain tumor perfusion (12/25):   1. Centrally cystic/necrotic mass centered within the right parietal lobe with thick irregular rim demonstrating high cellularity, avid enhancement, and hemorrhage. The lesion abuts the overlying dura and extends to the ependymal surface of the atrium of the right lateral ventricle. 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  2. Expansile nonenhancing FLAIR hyperintense signal extending into the right periatrial white matter and lateral right splenium of the corpus callosum, which may represent infiltrative tumor and/or edema.  3. Effacement of the right lateral ventricle, 3rd ventricle, right perimesencephalic cistern with about 0.9 cm midline shift, similar to prior CT.     Assessment/Plan   68 y.o. male with PMH HLD, cholecystectomy, Osborn's esophagus, on ASA 81 for cardioprotection. Admitted 12/25/2023 with Brain mass after presenting with 6 weeks of intermittent headache, and 3 weeks of increased confusion. CTH RO hypodensity c/f mass.  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 s/p R craniotomy for tumor resection 12/29.    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 Intermittently FC (thumbs up, toe wiggle bilat), SANCHEZ 5/5 strength  L craniotomy with duvol drain to 1/2 suction  Pain: acetaminophen, oxycodone, hydromorphone PRN  Nausea: ondansetron PRN  Plan:  NSU  Q1H neuro checks  Duvol drain to 1/2 suction   Dex 4q6h  Keppra 1g BID  Ancef 2q8h while drain in place  PT/OT/SLP  Post-op CTH     CARDIOVASCULAR:  #HLD, on ASA 81 for cardioprotection   Assessment:  :;TTE (12/27): EF 60%, LA nl size  SR to ST on tele  Plan:  Continue to monitor on telemetry  Goal -140 - Nicardipine, Hydralazine and Labetalol PRN  C/w atorvastatin 40mg every day  Hold home ASA 81    RESPIRATORY:  Assessment:  On RA  Plan:  Continuous pulse oximetry   O2 PRN to maintain SpO2 > 94%, wean as tolerated  Incentive spirometry while awake     RENAL/:  Assessment:  Results from last 72 hours   Lab Units 12/30/23 0154 12/28/23  0942   BUN mg/dL 16 17   CREATININE mg/dL 0.90 0.94    Plan:  Monitor with daily RFP  Remove valentine 12/30 0600    FEN/GI:  # cholecystectomy, Osborn's esophagus  Assessment:  Last BM: PTA  Plan:  Monitor and replace electrolytes per protocol  IVF: NS @ 75cc/hr  Diet: NPO until passes bedside swallow  Bowel Regimen: Docusate-Senna    ENDOCRINE:  #hyperglycemia   Assessment:  Results from last 7 days   Lab Units 12/30/23  0531 12/30/23 0154 12/29/23 2014 12/28/23  0942   POCT GLUCOSE mg/dL 156*  --  172*  --    GLUCOSE mg/dL  --  157*  --  96   Plan:  Accuchecks & ISS Q6H  Hypoglycemia protocol     HEMATOLOGY:  Assessment:  Results from last 7 days   Lab Units 12/30/23 0154 12/28/23  0942   HEMOGLOBIN g/dL 15.2 17.1   HEMATOCRIT % 41.3 49.9   PLATELETS AUTO x10*3/uL 196 197    Plan:  Continue to monitor with daily CBC and Coag panel    INFECTIOUS DISEASE:  Assessment:  Results from last 7 days   Lab Units 12/30/23  0154 12/28/23  0942   WBC AUTO x10*3/uL 12.1* 6.0   Afebrile  Plan:  Continue to monitor for s/sx of infection  Pan culture for temperature > 38.4 C    MUSCULOSKELETAL:  No acute issues    SKIN:  No acute issues  Turns and skin care per NSU protocol    ACCESS:  PIVs    PROPHYLAXIS:  DVT/VTE: SCDs, SQH POD1  GI: PPI    RESTRAINTS:  I agree with nursing assessment of the patient's need for restraints to protect the patient from injury and facilitate healing. The patient is unable to cooperate with the plan of care and at risk for disrupting critical therapy (i.e., removing medical devices, lines, tubes and/or dressings).  Please see order for specifics. Restraints can be removed when the patient is able to cooperate with plan of care and allow healing to occur, or the medical devices at risk are discontinued by the medical team.     Monica Kim MD  Neuroscience Intensive Care     The patient is critically ill with acute encephalopathy in the setting of brain tumor with vasogenic cerebral edema and brain compression  Remains encephalopathic with some new aphasia  Cont keppra and steroids per neurosurgery  Wean sedation as tolerated    I have seen and examined the patient.  I have reviewed the patient's laboratory, radiographic, and clinical data.  I have spent 30 minutes providing critical care for the patient.      GUERO Carl M.D.

## 2023-12-30 NOTE — PROGRESS NOTES
"Dash Ahumada is a 68 y.o. male on day 5 of admission presenting with Brain mass.    Subjective   Pt mildly agitated but no acute distress       Objective     Physical Exam  Awake, eyes open to voice, OU 3R  LHH  Ox1 (ox2 w choice), paraphasic errors  Fcx4 with persistence, R more brisk than left    Last Recorded Vitals  Blood pressure 129/69, pulse 93, temperature 37.5 °C (99.5 °F), temperature source Temporal, resp. rate 22, height 1.702 m (5' 7.01\"), weight 77.1 kg (170 lb), SpO2 94 %.  Intake/Output last 3 Shifts:  I/O last 3 completed shifts:  In: 4270 (55.4 mL/kg) [P.O.:420; I.V.:500 (6.5 mL/kg); Blood:250; IV Piggyback:3100]  Out: 3155 (40.9 mL/kg) [Urine:2905 (1 mL/kg/hr); Blood:250]  Weight: 77.1 kg     Relevant Results                This patient has a urinary catheter   Reason for the urinary catheter remaining today? Urine catheter unnecessary, will be removed today               Assessment/Plan   Principal Problem:    Brain mass    Enmanuel Ahumada is a 68 year old M with h/o HLD, cholecystectomy, Osborn's esophagus, on ASA 81 for cardioprotection , who p/w 6 wks HA, 3 wks incr confusion and running into things, CTH RO hypodensity c/f mass, MRI PPF complete, fMRI L language dominant   12/27 TTE EF 60%, liver US diffusely hyperechoic liver, c/w hepatosteatosis vs hepatocellular disease  12/29 s/p R crani for tumor resection     Plan:  NSU  Drain (half suction)  SBP<160  Wean precedex  Void trial  Continue keppra, 1g BID  Dex 4q6  Home meds as applicable, hold ASA  SCDs, SQH   PTOT  SCDs, SQH           Irina Gilbert MD      "

## 2023-12-30 NOTE — PROGRESS NOTES
"Occupational Therapy    Evaluation/Treatment    Patient Name: Enmanuel Ahumada \"Dash\"  MRN: [id number]  : 1955  Today's Date: 23  Time Calculation  Start Time: 828  Stop Time: 859  Time Calculation (min): 31 min       Assessment:  Prognosis: Good  Evaluation/Treatment Tolerance: Patient tolerated treatment well  Medical Staff Made Aware: Yes  End of Session Communication: Bedside nurse  End of Session Patient Position: Bed, 3 rail up, Alarm on  OT Assessment Results: Decreased ADL status, Decreased upper extremity strength, Decreased cognition, Decreased safe judgment during ADL, Decreased endurance, Decreased sensation, Decreased functional mobility, Decreased gross motor control, Decreased IADLs, Decreased fine motor control, Visual deficit  Prognosis: Good  Evaluation/Treatment Tolerance: Patient tolerated treatment well  Medical Staff Made Aware: Yes  Strengths: Support and attitude of living partners  Plan:  Treatment Interventions: ADL retraining, Functional transfer training, Endurance training, Cognitive reorientation, Neuromuscular reeducation, Equipment evaluation/education, Fine motor coordination activities, Compensatory technique education, Patient/family training, UE strengthening/ROM  OT Frequency: 4 times per week  OT Discharge Recommendations: High intensity level of continued care  Equipment Recommended upon Discharge:  (TBD)  OT Recommended Transfer Status: Minimal assist  OT - OK to Discharge: Yes  Treatment Interventions: ADL retraining, Functional transfer training, Endurance training, Cognitive reorientation, Neuromuscular reeducation, Equipment evaluation/education, Fine motor coordination activities, Compensatory technique education, Patient/family training, UE strengthening/ROM    Subjective   Current Problem:  1. Brain mass  Transthoracic Echo (TTE) Complete    Case Request Operating Room: Right craniotomy for tumor resection    Case Request Operating Room: Right " craniotomy for tumor resection    Transthoracic Echo (TTE) Complete    Transthoracic Echo (TTE) Complete    Interoperative monitoring - IOM    Surgical Pathology Exam    Surgical Pathology Exam    Transthoracic Echo (TTE) Complete    CANCELED: Transthoracic Echo (TTE) Complete    CANCELED: Transthoracic Echo (TTE) Complete    CANCELED: Case Request Operating Room: Right craniotomy for tumor resection,navigation    CANCELED: Case Request Operating Room: Right craniotomy for tumor resection,navigation      2. Abnormal electrocardiogram (ECG) (EKG)  Transthoracic Echo (TTE) Complete    Transthoracic Echo (TTE) Complete    CANCELED: Transthoracic Echo (TTE) Complete    CANCELED: Transthoracic Echo (TTE) Complete      3. Pre-operative examination for internal medicine  Transthoracic Echo (TTE) Complete    Transthoracic Echo (TTE) Complete      4. Abnormal EKG  Transthoracic Echo (TTE) Limited    Transthoracic Echo (TTE) Limited        General:   OT Received On: 12/30/23  General  Reason for Referral: p/w HA and confusion for several weeks, CTH RO hypodensity c/f mass. s/p R craniotomy for tumor resection (12/29)  Past Medical History Relevant to Rehab: HLD, cholescyctectomy, Osborn's esophagus  Family/Caregiver Present: Yes  Caregiver Feedback: wife present and supportive at bedside, able to assist in providing PLOF and home environment details  Co-Treatment: PT  Co-Treatment Reason: to maximize pt safety with mobility  Prior to Session Communication: Bedside nurse  Patient Position Received: Bed, 3 rail up, Alarm on  General Comment: Pt tolerated session well, completed sitting EOB with CGA and mobility with Min A x 2. Pt with B wrist restraints on .4 precedex.  Precautions:  Hearing/Visual Limitations: WFL  Medical Precautions: Fall precautions  Precautions Comment: SBP goal 100-140  Vital Signs:  Heart Rate: 77 (post 78)  Heart Rate Source: Monitor  Resp: 17 (post 17)  SpO2: 91 % (post 93)  BP: 93/68 (during 91/79,  post 90/69)  MAP (mmHg): 76 (during 81, post 77)  BP Location: Left arm  BP Method: Automatic  Pain:  Pain Assessment  Pain Assessment: 0-10  Pain Score: 0 - No pain    Objective   Cognition:  Overall Cognitive Status: Impaired  Arousal/Alertness:  (drowsy, slightly delayed response time)  Orientation Level:  (oriented to person, place, month, and year)  Following Commands:  (Pt followed >90% 1-2 step commands with increased time, repetition, and cueing.)  Safety Judgment: Decreased awareness of need for assistance  Attention:  (distractible, requires cues for redirection)  Problem Solving: Exceptions to WFL  Complex Functional Tasks: Impaired  Safety/Judgement: Exceptions to WFL  Unable to Self-Monitor and Self-Correct Consistently: Minimal  Insight: Mild  Task Initiation: Delayed initiation (cues needed)  Processing Speed: Delayed  Cognition Test Scores  Cognition Tests: Cognition Test Performed  SBT Test Score: Pt scored 17/28 on SBT, demonstrating impairment consistent with dementia (normal =0-4). Pt unable to correctly state time of day, required cues (1 error) for counting backwards from 20, unable to state months of the year in reverse order and only able to recall 4/5 parts of memory test.     Kate Agitation Sedation Scale  Kate Agitation Sedation Scale (RASS): Drowsy  Home Living:  Type of Home: House  Lives With: Spouse (wife who is retired and able to assist)  Home Adaptive Equipment: Cane  Home Layout: Two level, 1/2 bath on main level, Bed/bath upstairs, Stairs to alternate level with rails  Alternate Level Stairs-Number of Steps: 12  Home Access: Stairs to enter with rails  Entrance Stairs-Number of Steps: 2  Bathroom Shower/Tub: Walk-in shower  Bathroom Toilet: Standard  Prior Function:  Level of Isabella: Independent with ADLs and functional transfers, Independent with homemaking with ambulation  ADL Assistance: Independent  Homemaking Assistance: Independent (Pt IND with cooking, wife  does cleaning and laundry)  Ambulatory Assistance: Independent (recent falls in past 2 weeks)  Vocational: Retired  Leisure: enjoys volunteering at [employer] with his wife  Hand Dominance: Left  IADL History:     ADL:  Eating Assistance: Stand by  Grooming Assistance:  (CGA)  Grooming Deficit: Verbal cueing  Bathing Assistance: Minimal  UE Dressing Assistance:  (CGA)  LE Dressing Assistance: Minimal  Toileting Assistance with Device: Minimal      LE Dressing  LE Dressing: Yes  Sock Level of Assistance: Minimum assistance  LE Dressing Where Assessed: Bed level  LE Dressing Comments: Provided cues for intitiation. Pt able to don L sock with Min A to thread over toes fully. Pt donned R sock with CGA. Increased time/effort to complete.       Activity Tolerance:  Early Mobility/Exercise Safety Screen: Proceed with mobilization - No exclusion criteria met  Activity Tolerance Comments: Pt sat EOB with CGA and stood with Min A x 1-2 with HHA, LOB to L side  Functional Standing Tolerance:     Bed Mobility/Transfers: Bed Mobility  Bed Mobility: Yes  Bed Mobility 1  Bed Mobility 1: Supine to sitting  Level of Assistance 1: Minimum assistance (x 1 assist)  Bed Mobility Comments 1: HOB elevated, cues for sequencing and initiation  Bed Mobility 2  Bed Mobility  2: Sitting to supine  Level of Assistance 2: Minimum assistance (x 2 assist)  Bed Mobility Comments 2: HOB flat, cues for sequencing and UE/LE placement    Transfers  Transfer: Yes  Transfer 1  Technique 1: Sit to stand, Stand to sit  Transfer Device 1:  (HHA)  Transfer Level of Assistance 1: Minimum assistance (x 1-2 assist)  Trials/Comments 1: cues for hand placement and sequencing    Sitting Balance:  Static Sitting Balance  Static Sitting-Level of Assistance:  (CGA-SBA at EOB)  Dynamic Sitting Balance  Dynamic Sitting-Comments: CGA at EOB  Standing Balance:  Static Standing Balance  Static Standing-Level of Assistance:  (Min A-CGA with HHA)  Dynamic Standing  Balance  Dynamic Standing-Comments: Min A x 1-2 with HHA, LOB to L side    Vision:Vision - Basic Assessment  Current Vision:  (wears glasses (not consistently per wife))   and Vision - Complex Assessment  Vision Comments: Pt with midline, slightly upward gaze. Pt with decreased visual tracking in all planes, did not track to end range R/L or downward. Pt unable to identify number of fingers held in L peripheral field and some inaccuracies in R upper peripheral field. Pt denying changes in vision or blurry vision this date. Of note, pt on sedation during session.  Sensation:  Sensation Comment: able to detect light touch in all extremities but appears slightly decreased on L LE.  Strength:     Other Activity:     Home Environment:     Perception:  Inattention/Neglect:  (slight inattention to L side, cues needed for L hemibody)  Initiation: Cues to initiate tasks  Coordination:  Movements are Fluid and Coordinated: No  Finger to Nose:  (L side moderately impaired, R UE WFL)  Rapid Alternating Movements: Impaired  Heel to Shin:  (L LE mod impairment, R LE WFL)   Hand Function:  Hand Function  Gross Grasp: Functional  Coordination: Impaired  Extremities: RUE   RUE : Exceptions to WFL  RUE AROM (degrees)  RUE AROM Comment: R UE AROM WFL  RUE Strength  RUE Overall Strength:  (R UE strength 5/5), LUE   LUE: Exceptions to WFL  LUE AROM (degrees)  LUE AROM Comment: L UE AROM WFL  LUE Strength  LUE Overall Strength:  (L UE strength 4/5 throughout), RLE   RLE :  (R LE AROM WFL, strength WFL), and LLE   LLE :  (L LE AROM WFL, strength >/= 3/5)      Outcome Measures: [facility] Daily Activity  Putting on and taking off regular lower body clothing: A little  Bathing (including washing, rinsing, drying): A little  Putting on and taking off regular upper body clothing: A little  Toileting, which includes using toilet, bedpan or urinal: A little  Taking care of personal grooming such as brushing teeth: A little  Eating Meals: A  little  Daily Activity - Total Score: 18        Education Documentation  Body Mechanics, taught by Simona Aparicio OT at 12/30/2023 11:32 AM.  Learner: Patient  Readiness: Acceptance  Method: Explanation, Demonstration  Response: Verbalizes Understanding, Needs Reinforcement    Precautions, taught by Simona Aparicio OT at 12/30/2023 11:32 AM.  Learner: Patient  Readiness: Acceptance  Method: Explanation, Demonstration  Response: Verbalizes Understanding, Needs Reinforcement    ADL Training, taught by Simona Aparicio OT at 12/30/2023 11:32 AM.  Learner: Patient  Readiness: Acceptance  Method: Explanation, Demonstration  Response: Verbalizes Understanding, Needs Reinforcement    Education Comments  No comments found.        OP EDUCATION:       Goals:  Encounter Problems       Encounter Problems (Active)       ADLs       Patient will perform UB and LB bathing with Mod I.       Start:  12/30/23    Expected End:  01/13/24            Patient with complete upper body dressing IND.       Start:  12/30/23    Expected End:  01/13/24            Patient with complete lower body dressing with Mod I.       Start:  12/30/23    Expected End:  01/13/24            Patient will complete toileting including hygiene clothing management/hygiene with Mod I.       Start:  12/30/23    Expected End:  01/13/24               COGNITION/SAFETY       Pt will be able to complete functional task involving executive functioning, attention, and memory with Mod I using compensatory strategies as needed with 100% accuracy.        Start:  12/30/23    Expected End:  01/13/24               MOBILITY       Patient will perform functional mobility household and community distances with Mod I using LRAD with good safety awareness and no acute LOB.       Start:  12/30/23    Expected End:  01/13/24               TRANSFERS       Patient will complete functional transfers from various surfaces with Mod I using LRAD with good safety awareness.       Start:   12/30/23    Expected End:  01/13/24               VISION       Pt will visually track across all fields and attend to therapist and objects appropriately without cueing and 100% accuracy.       Start:  12/30/23    Expected End:  01/13/24

## 2023-12-30 NOTE — PROGRESS NOTES
Physical Therapy    Physical Therapy Evaluation & Treatment    Patient Name: Dash Ahumada  MRN: [id number]  Today's Date: 12/30/2023   Time Calculation  Start Time: 0829  Stop Time: 0859  Time Calculation (min): 30 min    Assessment/Plan   PT Assessment  PT Assessment Results: Decreased strength, Decreased range of motion, Decreased endurance, Impaired balance, Decreased mobility  Rehab Prognosis: Good  Medical Staff Made Aware: Yes  End of Session Communication: Bedside nurse  Assessment Comment: Patient is a 67yo M s/p R craniotomy for tumor resection 12/29. Patient is independent at baselines, lives with supportive wife. Patient is currently below baseline and a high falls risk, presenting with deficits in strength, functional mobility and balance. dc rec high  End of Session Patient Position: Bed, 3 rail up, Alarm on   IP OR SWING BED PT PLAN  Inpatient or Swing Bed: Inpatient  PT Plan  Treatment/Interventions: Bed mobility, Transfer training, Gait training, Stair training, Balance training, Strengthening, Endurance training, Range of motion, Therapeutic exercise, Therapeutic activity, Neuromuscular re-education  PT Plan: Skilled PT  PT Frequency: 5 times per week  PT Discharge Recommendations: High intensity level of continued care  PT - OK to Discharge: Yes (indicates PT eval complete and dc rec determined.)      Subjective     General Visit Information:  General  Reason for Referral: s/p R craniotomy for tumor resection 12/29.  Past Medical History Relevant to Rehab: HLD, cholecystectomy, Osborn's esophagus, on ASA 81 for cardioprotection. Admitted 12/25/2023 with Brain mass after presenting with 6 weeks of intermittent headache, and 3 weeks of increased confusion  Family/Caregiver Present: Yes  Caregiver Feedback: wife at bedside and supportive  Co-Treatment: OT  Co-Treatment Reason: maximize patient safety and function with low AMPAC score  Prior to Session Communication: Bedside nurse  Patient  Position Received: Bed, 3 rail up  General Comment: patient supine in bed, pleasant and cooperative during session. mild lethargy and inattention with tasks. RN cleared for mobility. (.4 Precedex drip and BUE wrist restraints)  Home Living:  Home Living  Type of Home: House  Lives With: Spouse  Home Adaptive Equipment: Cane  Home Layout: Two level, 1/2 bath on main level, Bed/bath upstairs, Stairs to alternate level with rails  Alternate Level Stairs-Number of Steps: 12  Home Access: Stairs to enter with rails  Entrance Stairs-Number of Steps: 2  Bathroom Shower/Tub: Walk-in shower  Prior Level of Function:  Prior Function Per Pt/Caregiver Report  Level of [place]: Independent with ADLs and functional transfers, Independent with homemaking with ambulation  Receives Help From:  (indep but wife able to assist at dc)  ADL Assistance: Independent  Homemaking Assistance: Independent  Ambulatory Assistance: Independent  Vocational: Retired  Leisure: enjoys volunteering at [employer] with his wife  Prior Function Comments: 2 recent falls in the last 2 weeks  Precautions:  Precautions  Hearing/Visual Limitations: WFL  Medical Precautions: Fall precautions  Precautions Comment: SBP goal 100-140  Vital Signs:  Vital Signs  Heart Rate: 77  Heart Rate Source: Monitor  SpO2:  (91-94%)  BP: 93/68 (sitting EOB: 101/72, returned to supine 90/69)  MAP (mmHg): 76 (81 sitting EOB, 77 end of session)  BP Location: Left arm    Objective   Pain:  Pain Assessment  Pain Assessment: 0-10  Pain Score: 0 - No pain  Cognition:  Cognition  Overall Cognitive Status: Impaired  Arousal/Alertness:  (drowsy and somewhat delayed response times)  Orientation Level:  (AO x3)  Following Commands:  (increased time and cueing for simple commands, followed > 90%)    General Assessments:     Activity Tolerance  Early Mobility/Exercise Safety Screen: Proceed with mobilization - No exclusion criteria met    Sensation  Sensation Comment: able to detect  light touch in all extremities but appears slightly decreased on L LE.    Coordination  Movements are Fluid and Coordinated: No  Heel to Shin:  (RLE WFL; LLE impaired)    Static Sitting Balance  Static Sitting-Level of Assistance: Contact guard  Static Sitting-Comment/Number of Minutes: sat EOB for 15 min    Static Standing Balance  Static Standing-Level of Assistance: Moderate assistance  Static Standing-Comment/Number of Minutes: L lean  Functional Assessments:  Bed Mobility  Bed Mobility: Yes  Bed Mobility 1  Bed Mobility 1: Supine to sitting  Level of Assistance 1: Minimum assistance  Bed Mobility Comments 1: HOb elevated, cues for sequencing  Bed Mobility 2  Bed Mobility  2: Sitting to supine  Level of Assistance 2: Minimum assistance  Bed Mobility Comments 2: x2; assist with trunk and BLEs; HOB flat    Transfers  Transfer: Yes  Transfer 1  Transfer From 1: Sit to, Stand to  Transfer to 1: Stand, Sit  Technique 1: Sit to stand, Stand to sit  Transfer Level of Assistance 1: Hand held assistance, Minimum assistance (x2)  Trials/Comments 1: cueing required for sequencing, L sided lean    Ambulation/Gait Training  Ambulation/Gait Training Performed: Yes  Ambulation/Gait Training 1  Surface 1: Level tile  Device 1: No device  Assistance 1: Hand held assistance, Minimum assistance (x2)  Quality of Gait 1:  (unsteady, L lean and LOB.)  Comments/Distance (ft) 1: patient completed lateral steps for 3', and forward/backward 3'. LOB posterilateral with backward steps.  Extremity/Trunk Assessments:  RLE   RLE : Within Functional Limits  LLE   LLE :  (> 3/5)  Treatments:  Therapeutic Activity  Therapeutic Activity Performed: Yes  Therapeutic Activity 1: Patient required cueing for all tasks and sequencing. Sat EOB for 15 min with CGA-SBA. Patient completed transfer and short distance ambulation with min A x2. LOB to the L side and decreased LLE clearance with gait.  Outcome Measures:  [facility] Basic Mobility  Turning from your  back to your side while in a flat bed without using bedrails: A little  Moving from lying on your back to sitting on the side of a flat bed without using bedrails: A little  Moving to and from bed to chair (including a wheelchair): A lot  Standing up from a chair using your arms (e.g. wheelchair or bedside chair): A lot  To walk in hospital room: A lot  Climbing 3-5 steps with railing: A lot  Basic Mobility - Total Score: 14    FSS-ICU  Ambulation: Walks <50 feet with any assistance x1 or walks any distance with assistance x2 people  Rolling: Minimal assistance (performs 75% or more of task)  Sitting: Minimal assistance (performs 75% or more of task)  Transfer Sit-to-Stand: Moderate assistance (performs 50 - 74% of task)  Transfer Supine-to-Sit: Minimal assistance (performs 75% or more of task)  Total Score: 16      ICU Mobility Screen  Early Mobility/Exercise Safety Screen: Proceed with mobilization - No exclusion criteria met    Encounter Problems       Encounter Problems (Active)       Balance       STG - Maintains dynamic standing balance with upper extremity support LRAD SBA  (Progressing)       Start:  12/30/23    Expected End:  01/13/24       INTERVENTIONS:  1. Practice standing with minimal support.  2. Educate patient about standing tolerance.  3. Educate patient about independence with gait, transfers, and ADL's.  4. Educate patient about use of assistive device.  5. Educate patient about self-directed care.            Mobility       STG - Patient will ambulate 50' with LRAD SBA and progress,  (Progressing)       Start:  12/30/23    Expected End:  01/13/24               Transfers       STG - Patient will perform bed mobility indep  (Progressing)       Start:  12/30/23    Expected End:  01/13/24            STG - Patient will transfer sit to and from stand modif indep with LRAD  (Progressing)       Start:  12/30/23    Expected End:  01/13/24                   Education Documentation  Precautions, taught by  Anabell Mata PT at 12/30/2023  1:05 PM.  Learner: Family, Patient  Readiness: Acceptance  Method: Explanation  Response: Verbalizes Understanding  Comment: edu pt and wife on role of PT, dc planning    Mobility Training, taught by Anabell Mata PT at 12/30/2023  1:05 PM.  Learner: Family, Patient  Readiness: Acceptance  Method: Explanation  Response: Verbalizes Understanding  Comment: edu pt and wife on role of PT, dc planning    Education Comments  No comments found.

## 2023-12-30 NOTE — ANESTHESIA POSTPROCEDURE EVALUATION
"Patient: Enmanuel Ahumada \"Dash\"    Procedure Summary       Date: 12/29/23 Room / Location: [facility] OR 25 / [facility] [place] [place] [facility] OR    Anesthesia Start: 0738 Anesthesia Stop: 2005    Procedure: Right craniotomy for tumor resection (Right) Diagnosis:       Brain mass      (Brain mass [G93.89])    Surgeons: Ritu Carrasco MD Responsible Provider: Curt Priest MD    Anesthesia Type: general ASA Status: 3            Anesthesia Type: general    Vitals Value Taken Time   /78 12/29/23 2005   Temp 36.3 12/29/23 2005   Pulse 97 12/29/23 2004   Resp 14 12/29/23 2004   SpO2 100 % 12/29/23 2004   Vitals shown include unvalidated device data.    Anesthesia Post Evaluation    Patient location during evaluation: ICU  Patient participation: complete - patient cannot participate  Level of consciousness: responsive to painful stimuli and responsive to verbal stimuli  Pain score: 0  Pain management: adequate  Multimodal analgesia pain management approach  Airway patency: patent  Cardiovascular status: acceptable  Respiratory status: acceptable  Hydration status: acceptable  Postoperative Nausea and Vomiting: none        There were no known notable events for this encounter.    "

## 2023-12-31 ENCOUNTER — APPOINTMENT (OUTPATIENT)
Dept: RADIOLOGY | Facility: HOSPITAL | Age: 68
DRG: 025 | End: 2023-12-31
Payer: MEDICARE

## 2023-12-31 LAB
ALBUMIN SERPL BCP-MCNC: 3.8 G/DL (ref 3.4–5)
ANION GAP SERPL CALC-SCNC: 15 MMOL/L (ref 10–20)
BUN SERPL-MCNC: 16 MG/DL (ref 6–23)
CALCIUM SERPL-MCNC: 9.1 MG/DL (ref 8.6–10.6)
CHLORIDE SERPL-SCNC: 106 MMOL/L (ref 98–107)
CO2 SERPL-SCNC: 23 MMOL/L (ref 21–32)
CREAT SERPL-MCNC: 0.69 MG/DL (ref 0.5–1.3)
ERYTHROCYTE [DISTWIDTH] IN BLOOD BY AUTOMATED COUNT: 12.5 % (ref 11.5–14.5)
GFR SERPL CREATININE-BSD FRML MDRD: >90 ML/MIN/1.73M*2
GLUCOSE BLD MANUAL STRIP-MCNC: 121 MG/DL (ref 74–99)
GLUCOSE BLD MANUAL STRIP-MCNC: 126 MG/DL (ref 74–99)
GLUCOSE BLD MANUAL STRIP-MCNC: 128 MG/DL (ref 74–99)
GLUCOSE BLD MANUAL STRIP-MCNC: 145 MG/DL (ref 74–99)
GLUCOSE SERPL-MCNC: 102 MG/DL (ref 74–99)
HCT VFR BLD AUTO: 42.7 % (ref 41–52)
HGB BLD-MCNC: 14.6 G/DL (ref 13.5–17.5)
MAGNESIUM SERPL-MCNC: 2.01 MG/DL (ref 1.6–2.4)
MCH RBC QN AUTO: 31.7 PG (ref 26–34)
MCHC RBC AUTO-ENTMCNC: 34.2 G/DL (ref 32–36)
MCV RBC AUTO: 93 FL (ref 80–100)
NRBC BLD-RTO: 0 /100 WBCS (ref 0–0)
PHOSPHATE SERPL-MCNC: 2.5 MG/DL (ref 2.5–4.9)
PLATELET # BLD AUTO: 187 X10*3/UL (ref 150–450)
POTASSIUM SERPL-SCNC: 4 MMOL/L (ref 3.5–5.3)
RBC # BLD AUTO: 4.6 X10*6/UL (ref 4.5–5.9)
SODIUM SERPL-SCNC: 140 MMOL/L (ref 136–145)
WBC # BLD AUTO: 16 X10*3/UL (ref 4.4–11.3)

## 2023-12-31 PROCEDURE — 2500000001 HC RX 250 WO HCPCS SELF ADMINISTERED DRUGS (ALT 637 FOR MEDICARE OP): Performed by: STUDENT IN AN ORGANIZED HEALTH CARE EDUCATION/TRAINING PROGRAM

## 2023-12-31 PROCEDURE — 96372 THER/PROPH/DIAG INJ SC/IM: CPT

## 2023-12-31 PROCEDURE — A9575 INJ GADOTERATE MEGLUMI 0.1ML: HCPCS | Performed by: NEUROLOGICAL SURGERY

## 2023-12-31 PROCEDURE — 80069 RENAL FUNCTION PANEL: CPT

## 2023-12-31 PROCEDURE — 95720 EEG PHY/QHP EA INCR W/VEEG: CPT | Performed by: PSYCHIATRY & NEUROLOGY

## 2023-12-31 PROCEDURE — 95700 EEG CONT REC W/VID EEG TECH: CPT

## 2023-12-31 PROCEDURE — 2500000004 HC RX 250 GENERAL PHARMACY W/ HCPCS (ALT 636 FOR OP/ED): Performed by: STUDENT IN AN ORGANIZED HEALTH CARE EDUCATION/TRAINING PROGRAM

## 2023-12-31 PROCEDURE — 2500000004 HC RX 250 GENERAL PHARMACY W/ HCPCS (ALT 636 FOR OP/ED)

## 2023-12-31 PROCEDURE — 70553 MRI BRAIN STEM W/O & W/DYE: CPT | Performed by: RADIOLOGY

## 2023-12-31 PROCEDURE — 36415 COLL VENOUS BLD VENIPUNCTURE: CPT

## 2023-12-31 PROCEDURE — 70553 MRI BRAIN STEM W/O & W/DYE: CPT

## 2023-12-31 PROCEDURE — 2550000001 HC RX 255 CONTRASTS: Performed by: NEUROLOGICAL SURGERY

## 2023-12-31 PROCEDURE — 83735 ASSAY OF MAGNESIUM: CPT

## 2023-12-31 PROCEDURE — 2060000001 HC INTERMEDIATE ICU ROOM DAILY

## 2023-12-31 PROCEDURE — 82947 ASSAY GLUCOSE BLOOD QUANT: CPT

## 2023-12-31 PROCEDURE — 95716 VEEG EA 12-26HR CONT MNTR: CPT

## 2023-12-31 PROCEDURE — 96372 THER/PROPH/DIAG INJ SC/IM: CPT | Performed by: STUDENT IN AN ORGANIZED HEALTH CARE EDUCATION/TRAINING PROGRAM

## 2023-12-31 PROCEDURE — 85027 COMPLETE CBC AUTOMATED: CPT

## 2023-12-31 RX ORDER — GADOTERATE MEGLUMINE 376.9 MG/ML
15 INJECTION INTRAVENOUS
Status: COMPLETED | OUTPATIENT
Start: 2023-12-31 | End: 2023-12-31

## 2023-12-31 RX ORDER — LABETALOL HYDROCHLORIDE 5 MG/ML
10 INJECTION, SOLUTION INTRAVENOUS EVERY 10 MIN PRN
Status: DISCONTINUED | OUTPATIENT
Start: 2023-12-31 | End: 2024-01-02 | Stop reason: HOSPADM

## 2023-12-31 RX ORDER — HYDRALAZINE HYDROCHLORIDE 20 MG/ML
10 INJECTION INTRAMUSCULAR; INTRAVENOUS
Status: DISCONTINUED | OUTPATIENT
Start: 2023-12-31 | End: 2024-01-02 | Stop reason: HOSPADM

## 2023-12-31 RX ORDER — HEPARIN SODIUM 5000 [USP'U]/ML
5000 INJECTION, SOLUTION INTRAVENOUS; SUBCUTANEOUS EVERY 8 HOURS
Status: DISCONTINUED | OUTPATIENT
Start: 2023-12-31 | End: 2024-01-02 | Stop reason: HOSPADM

## 2023-12-31 RX ADMIN — ATORVASTATIN CALCIUM 40 MG: 40 TABLET, FILM COATED ORAL at 08:38

## 2023-12-31 RX ADMIN — CEFAZOLIN SODIUM 2 G: 2 INJECTION, SOLUTION INTRAVENOUS at 04:42

## 2023-12-31 RX ADMIN — HEPARIN SODIUM 5000 UNITS: 5000 INJECTION INTRAVENOUS; SUBCUTANEOUS at 00:23

## 2023-12-31 RX ADMIN — DEXAMETHASONE SODIUM PHOSPHATE 4 MG: 4 INJECTION, SOLUTION INTRAMUSCULAR; INTRAVENOUS at 16:29

## 2023-12-31 RX ADMIN — PANTOPRAZOLE SODIUM 40 MG: 40 TABLET, DELAYED RELEASE ORAL at 08:43

## 2023-12-31 RX ADMIN — HEPARIN SODIUM 5000 UNITS: 5000 INJECTION INTRAVENOUS; SUBCUTANEOUS at 08:38

## 2023-12-31 RX ADMIN — DEXAMETHASONE SODIUM PHOSPHATE 4 MG: 4 INJECTION, SOLUTION INTRAMUSCULAR; INTRAVENOUS at 11:33

## 2023-12-31 RX ADMIN — LEVETIRACETAM 1000 MG: 1000 INJECTION, SOLUTION INTRAVENOUS at 08:38

## 2023-12-31 RX ADMIN — OXYCODONE HYDROCHLORIDE 5 MG: 5 TABLET ORAL at 12:38

## 2023-12-31 RX ADMIN — HEPARIN SODIUM 5000 UNITS: 5000 INJECTION INTRAVENOUS; SUBCUTANEOUS at 23:37

## 2023-12-31 RX ADMIN — CEFAZOLIN SODIUM 2 G: 2 INJECTION, SOLUTION INTRAVENOUS at 22:00

## 2023-12-31 RX ADMIN — LEVETIRACETAM 1000 MG: 1000 INJECTION, SOLUTION INTRAVENOUS at 20:25

## 2023-12-31 RX ADMIN — CEFAZOLIN SODIUM 2 G: 2 INJECTION, SOLUTION INTRAVENOUS at 11:33

## 2023-12-31 RX ADMIN — DEXAMETHASONE SODIUM PHOSPHATE 4 MG: 4 INJECTION, SOLUTION INTRAMUSCULAR; INTRAVENOUS at 04:42

## 2023-12-31 RX ADMIN — DEXAMETHASONE SODIUM PHOSPHATE 4 MG: 4 INJECTION, SOLUTION INTRAMUSCULAR; INTRAVENOUS at 21:11

## 2023-12-31 RX ADMIN — POLYETHYLENE GLYCOL 3350 17 G: 17 POWDER, FOR SOLUTION ORAL at 08:38

## 2023-12-31 RX ADMIN — GADOTERATE MEGLUMINE 15 ML: 376.9 INJECTION INTRAVENOUS at 10:25

## 2023-12-31 ASSESSMENT — COGNITIVE AND FUNCTIONAL STATUS - GENERAL
TOILETING: A LITTLE
CLIMB 3 TO 5 STEPS WITH RAILING: A LITTLE
HELP NEEDED FOR BATHING: A LITTLE
WALKING IN HOSPITAL ROOM: A LITTLE
MOBILITY SCORE: 20
STANDING UP FROM CHAIR USING ARMS: A LITTLE
PERSONAL GROOMING: A LITTLE
DAILY ACTIVITIY SCORE: 21
MOVING TO AND FROM BED TO CHAIR: A LITTLE

## 2023-12-31 ASSESSMENT — PAIN - FUNCTIONAL ASSESSMENT
PAIN_FUNCTIONAL_ASSESSMENT: 0-10

## 2023-12-31 ASSESSMENT — PAIN DESCRIPTION - ORIENTATION: ORIENTATION: POSTERIOR

## 2023-12-31 ASSESSMENT — PAIN SCALES - GENERAL
PAINLEVEL_OUTOF10: 0 - NO PAIN
PAINLEVEL_OUTOF10: 4
PAINLEVEL_OUTOF10: 3

## 2023-12-31 ASSESSMENT — PAIN DESCRIPTION - LOCATION: LOCATION: HEAD

## 2023-12-31 NOTE — PROGRESS NOTES
"Enmanuel Ahumada \"Petey" is a 68 y.o. male on day 6 of admission presenting with Brain mass.    Subjective   Patient denied any complaints.     Objective     Physical Exam  Awake, Ox3  L HH, extraocular movements intact  5/5x4  SILT  Dressing removed, incision c/d/i    Last Recorded Vitals  Blood pressure 134/85, pulse 105, temperature 37.4 °C (99.3 °F), temperature source Temporal, resp. rate 20, height 1.702 m (5' 7.01\"), weight 78.7 kg (173 lb 8 oz), SpO2 98 %.  Intake/Output last 3 Shifts:  I/O last 3 completed shifts:  In: 5111.8 (64.9 mL/kg) [P.O.:700; I.V.:761.8 (9.7 mL/kg); Blood:250; IV Piggyback:3400]  Out: 5640 (71.6 mL/kg) [Urine:5060 (1.8 mL/kg/hr); Drains:330; Blood:250]  Weight: 78.8 kg     Relevant Results  Results for orders placed or performed during the hospital encounter of 12/25/23 (from the past 24 hour(s))   CBC   Result Value Ref Range    WBC 12.1 (H) 4.4 - 11.3 x10*3/uL    nRBC 0.0 0.0 - 0.0 /100 WBCs    RBC 4.80 4.50 - 5.90 x10*6/uL    Hemoglobin 15.2 13.5 - 17.5 g/dL    Hematocrit 41.3 41.0 - 52.0 %    MCV 86 80 - 100 fL    MCH 31.7 26.0 - 34.0 pg    MCHC 36.8 (H) 32.0 - 36.0 g/dL    RDW 11.7 11.5 - 14.5 %    Platelets 196 150 - 450 x10*3/uL   Renal Function Panel   Result Value Ref Range    Glucose 157 (H) 74 - 99 mg/dL    Sodium 140 136 - 145 mmol/L    Potassium 3.6 3.5 - 5.3 mmol/L    Chloride 104 98 - 107 mmol/L    Bicarbonate 25 21 - 32 mmol/L    Anion Gap 15 10 - 20 mmol/L    Urea Nitrogen 16 6 - 23 mg/dL    Creatinine 0.90 0.50 - 1.30 mg/dL    eGFR >90 >60 mL/min/1.73m*2    Calcium 9.0 8.6 - 10.6 mg/dL    Phosphorus 3.3 2.5 - 4.9 mg/dL    Albumin 4.0 3.4 - 5.0 g/dL   POCT GLUCOSE   Result Value Ref Range    POCT Glucose 156 (H) 74 - 99 mg/dL   POCT GLUCOSE   Result Value Ref Range    POCT Glucose 124 (H) 74 - 99 mg/dL   Transthoracic Echo (TTE) Limited   Result Value Ref Range    LV biplane EF 61     Tricuspid annular plane systolic excursion 1.4     LVIDd 5.70     LV A4C EF " 55.9    Renal function panel   Result Value Ref Range    Glucose 124 (H) 74 - 99 mg/dL    Sodium 141 136 - 145 mmol/L    Potassium 3.6 3.5 - 5.3 mmol/L    Chloride 109 (H) 98 - 107 mmol/L    Bicarbonate 24 21 - 32 mmol/L    Anion Gap 12 10 - 20 mmol/L    Urea Nitrogen 19 6 - 23 mg/dL    Creatinine 0.91 0.50 - 1.30 mg/dL    eGFR >90 >60 mL/min/1.73m*2    Calcium 8.5 (L) 8.6 - 10.6 mg/dL    Phosphorus 2.5 2.5 - 4.9 mg/dL    Albumin 3.5 3.4 - 5.0 g/dL   POCT GLUCOSE   Result Value Ref Range    POCT Glucose 107 (H) 74 - 99 mg/dL   POCT GLUCOSE   Result Value Ref Range    POCT Glucose 109 (H) 74 - 99 mg/dL   POCT GLUCOSE   Result Value Ref Range    POCT Glucose 143 (H) 74 - 99 mg/dL       Assessment/Plan   Principal Problem:    Brain mass    Enmanuel Ahumada is a 68 year old M with h/o HLD, cholecystectomy, Osborn's esophagus, on ASA 81 for cardioprotection , who p/w 6 wks HA, 3 wks incr confusion and running into things, CTH RO hypodensity c/f mass, MRI PPF complete, fMRI L language dominant   12/27 TTE EF 60%, liver US diffusely hyperechoic liver, c/w hepatosteatosis vs hepatocellular disease  12/29 s/p R crani for tumor resection, CTH POC  12/30 TTE EF 60%    Patient with significantly improved exam today, back to neurologic baseline preop.     PLAN    Downgrade to KRYSTAL today  Continue drain, continue ancef  EEG discontinue today  Continue keppra 1G BID  Continue dex 4q6  MRI brain w/wo contrast   -160   ASA restart plan  Tumor board recs  PTOT-rehab (eval'ed while on precedex, needs reeval)  SCDs, SQH    Guillermina Camarena MD

## 2024-01-01 LAB
ALBUMIN SERPL BCP-MCNC: 4.2 G/DL (ref 3.4–5)
ANION GAP SERPL CALC-SCNC: 14 MMOL/L (ref 10–20)
BUN SERPL-MCNC: 17 MG/DL (ref 6–23)
CALCIUM SERPL-MCNC: 9.2 MG/DL (ref 8.6–10.6)
CHLORIDE SERPL-SCNC: 104 MMOL/L (ref 98–107)
CO2 SERPL-SCNC: 23 MMOL/L (ref 21–32)
CREAT SERPL-MCNC: 0.74 MG/DL (ref 0.5–1.3)
ERYTHROCYTE [DISTWIDTH] IN BLOOD BY AUTOMATED COUNT: 12.3 % (ref 11.5–14.5)
GFR SERPL CREATININE-BSD FRML MDRD: >90 ML/MIN/1.73M*2
GLUCOSE BLD MANUAL STRIP-MCNC: 104 MG/DL (ref 74–99)
GLUCOSE BLD MANUAL STRIP-MCNC: 123 MG/DL (ref 74–99)
GLUCOSE BLD MANUAL STRIP-MCNC: 131 MG/DL (ref 74–99)
GLUCOSE BLD MANUAL STRIP-MCNC: 153 MG/DL (ref 74–99)
GLUCOSE SERPL-MCNC: 156 MG/DL (ref 74–99)
HCT VFR BLD AUTO: 43 % (ref 41–52)
HGB BLD-MCNC: 15.3 G/DL (ref 13.5–17.5)
MAGNESIUM SERPL-MCNC: 2.15 MG/DL (ref 1.6–2.4)
MCH RBC QN AUTO: 30.8 PG (ref 26–34)
MCHC RBC AUTO-ENTMCNC: 35.6 G/DL (ref 32–36)
MCV RBC AUTO: 87 FL (ref 80–100)
NRBC BLD-RTO: 0 /100 WBCS (ref 0–0)
PHOSPHATE SERPL-MCNC: 2.3 MG/DL (ref 2.5–4.9)
PLATELET # BLD AUTO: 189 X10*3/UL (ref 150–450)
POTASSIUM SERPL-SCNC: 4.1 MMOL/L (ref 3.5–5.3)
RBC # BLD AUTO: 4.96 X10*6/UL (ref 4.5–5.9)
SODIUM SERPL-SCNC: 137 MMOL/L (ref 136–145)
WBC # BLD AUTO: 9.6 X10*3/UL (ref 4.4–11.3)

## 2024-01-01 PROCEDURE — 2500000001 HC RX 250 WO HCPCS SELF ADMINISTERED DRUGS (ALT 637 FOR MEDICARE OP): Performed by: STUDENT IN AN ORGANIZED HEALTH CARE EDUCATION/TRAINING PROGRAM

## 2024-01-01 PROCEDURE — 82947 ASSAY GLUCOSE BLOOD QUANT: CPT

## 2024-01-01 PROCEDURE — 83735 ASSAY OF MAGNESIUM: CPT

## 2024-01-01 PROCEDURE — 80069 RENAL FUNCTION PANEL: CPT

## 2024-01-01 PROCEDURE — 36415 COLL VENOUS BLD VENIPUNCTURE: CPT

## 2024-01-01 PROCEDURE — 1100000001 HC PRIVATE ROOM DAILY

## 2024-01-01 PROCEDURE — 96372 THER/PROPH/DIAG INJ SC/IM: CPT

## 2024-01-01 PROCEDURE — 85027 COMPLETE CBC AUTOMATED: CPT

## 2024-01-01 PROCEDURE — 2500000004 HC RX 250 GENERAL PHARMACY W/ HCPCS (ALT 636 FOR OP/ED)

## 2024-01-01 PROCEDURE — 2500000004 HC RX 250 GENERAL PHARMACY W/ HCPCS (ALT 636 FOR OP/ED): Performed by: STUDENT IN AN ORGANIZED HEALTH CARE EDUCATION/TRAINING PROGRAM

## 2024-01-01 PROCEDURE — C9113 INJ PANTOPRAZOLE SODIUM, VIA: HCPCS | Performed by: STUDENT IN AN ORGANIZED HEALTH CARE EDUCATION/TRAINING PROGRAM

## 2024-01-01 RX ORDER — ACETAMINOPHEN 325 MG/1
650 TABLET ORAL EVERY 6 HOURS PRN
Status: DISCONTINUED | OUTPATIENT
Start: 2024-01-01 | End: 2024-01-02 | Stop reason: HOSPADM

## 2024-01-01 RX ORDER — DEXAMETHASONE SODIUM PHOSPHATE 4 MG/ML
4 INJECTION, SOLUTION INTRA-ARTICULAR; INTRALESIONAL; INTRAMUSCULAR; INTRAVENOUS; SOFT TISSUE EVERY 8 HOURS
Status: DISCONTINUED | OUTPATIENT
Start: 2024-01-01 | End: 2024-01-02

## 2024-01-01 RX ADMIN — ATORVASTATIN CALCIUM 40 MG: 40 TABLET, FILM COATED ORAL at 08:06

## 2024-01-01 RX ADMIN — DEXAMETHASONE SODIUM PHOSPHATE 4 MG: 4 INJECTION INTRA-ARTICULAR; INTRALESIONAL; INTRAMUSCULAR; INTRAVENOUS; SOFT TISSUE at 20:59

## 2024-01-01 RX ADMIN — POLYETHYLENE GLYCOL 3350 17 G: 17 POWDER, FOR SOLUTION ORAL at 08:05

## 2024-01-01 RX ADMIN — DEXAMETHASONE SODIUM PHOSPHATE 4 MG: 4 INJECTION INTRA-ARTICULAR; INTRALESIONAL; INTRAMUSCULAR; INTRAVENOUS; SOFT TISSUE at 12:27

## 2024-01-01 RX ADMIN — PANTOPRAZOLE SODIUM 40 MG: 40 INJECTION, POWDER, FOR SOLUTION INTRAVENOUS at 06:00

## 2024-01-01 RX ADMIN — CEFAZOLIN SODIUM 2 G: 2 INJECTION, SOLUTION INTRAVENOUS at 04:15

## 2024-01-01 RX ADMIN — ACETAMINOPHEN 650 MG: 325 TABLET ORAL at 12:34

## 2024-01-01 RX ADMIN — HEPARIN SODIUM 5000 UNITS: 5000 INJECTION INTRAVENOUS; SUBCUTANEOUS at 16:56

## 2024-01-01 RX ADMIN — LEVETIRACETAM 1000 MG: 1000 INJECTION, SOLUTION INTRAVENOUS at 20:59

## 2024-01-01 RX ADMIN — DEXAMETHASONE SODIUM PHOSPHATE 4 MG: 4 INJECTION INTRA-ARTICULAR; INTRALESIONAL; INTRAMUSCULAR; INTRAVENOUS; SOFT TISSUE at 04:16

## 2024-01-01 RX ADMIN — ACETAMINOPHEN 650 MG: 325 TABLET ORAL at 03:14

## 2024-01-01 RX ADMIN — HEPARIN SODIUM 5000 UNITS: 5000 INJECTION INTRAVENOUS; SUBCUTANEOUS at 08:06

## 2024-01-01 RX ADMIN — HEPARIN SODIUM 5000 UNITS: 5000 INJECTION INTRAVENOUS; SUBCUTANEOUS at 23:41

## 2024-01-01 RX ADMIN — OXYCODONE HYDROCHLORIDE 5 MG: 5 TABLET ORAL at 06:02

## 2024-01-01 RX ADMIN — LEVETIRACETAM 1000 MG: 1000 INJECTION, SOLUTION INTRAVENOUS at 08:06

## 2024-01-01 RX ADMIN — ACETAMINOPHEN 650 MG: 325 TABLET ORAL at 23:49

## 2024-01-01 ASSESSMENT — PAIN - FUNCTIONAL ASSESSMENT
PAIN_FUNCTIONAL_ASSESSMENT: 0-10

## 2024-01-01 ASSESSMENT — PAIN SCALES - GENERAL
PAINLEVEL_OUTOF10: 0 - NO PAIN
PAINLEVEL_OUTOF10: 2
PAINLEVEL_OUTOF10: 0 - NO PAIN
PAINLEVEL_OUTOF10: 2
PAINLEVEL_OUTOF10: 5 - MODERATE PAIN
PAINLEVEL_OUTOF10: 3

## 2024-01-01 ASSESSMENT — PAIN DESCRIPTION - LOCATION
LOCATION: HEAD
LOCATION: HEAD

## 2024-01-01 ASSESSMENT — PAIN DESCRIPTION - DESCRIPTORS: DESCRIPTORS: ACHING

## 2024-01-01 NOTE — CONSULTS
Name: Enmanuel Ahumada  MRN: [id number]  Admit Date: 12/25/2023  Encounter Date: 1/1/2024  PCP: Ricardo Youngblood DO    Reason for consult: new glioma  Attending provider: Ritu Carrasco MD  Consult attending provider: Dr. Rolon    Oncology Consult Note      History of Present Illness   Enmanuel Ahumada is a 68 y.o. male with a notable history of Osborn esophagus, HLD presented on 12/25 for 6 weeks of headaches, 3 weeks of confusion and left visual field deficit and neglect was found to have a 7.4 x 3.7 x 6.3 cm mass in the right parietal lobe with extensive mass effect and right to left midline shift, s/p right craniotomy and tumor resection on 12/29. Preliminary pathology suggests high grade glioma.    Patient feels well today and wants to go home. No neuro deficits.      Onc History   None    Oncology History    No history exists.       Past Medical History   History reviewed. No pertinent past medical history.      Past Surgical History     Past Surgical History:   Procedure Laterality Date    OTHER SURGICAL HISTORY  07/24/2019    Colonoscopy    OTHER SURGICAL HISTORY  07/26/2021    Endoscopy         Family History    No known cancer in family members.      Social History     Social History     Socioeconomic History    Marital status:      Spouse name: None    Number of children: None    Years of education: None    Highest education level: None   Occupational History    None   Tobacco Use    Smoking status: Never    Smokeless tobacco: Never   Substance and Sexual Activity    Alcohol use: Never    Drug use: Never    Sexual activity: Yes   Other Topics Concern    None   Social History Narrative    None     Social Determinants of Health     Financial Resource Strain: High Risk (12/25/2023)    Overall Financial Resource Strain (CARDIA)     Difficulty of Paying Living Expenses: Very hard   Food Insecurity: Not on file   Transportation Needs: No Transportation Needs (12/25/2023)    PRAPARE -  "Transportation     Lack of Transportation (Medical): No     Lack of Transportation (Non-Medical): No   Physical Activity: Not on file   Stress: Not on file   Social Connections: Not on file   Intimate Partner Violence: Not on file   Housing Stability: Unknown (12/25/2023)    Housing Stability Vital Sign     Unable to Pay for Housing in the Last Year: No     Number of Places Lived in the Last Year: Not on file     Unstable Housing in the Last Year: No         Allergies   No Known Allergies    Medications   atorvastatin, 40 mg, Daily  desmopressin, 0.3 mcg/kg, Once  dexAMETHasone, 4 mg, q8h  heparin (porcine), 5,000 Units, q8h  insulin lispro, 0-10 Units, q6h  levETIRAcetam, 1,000 mg, q12h LA NENA  pantoprazole, 40 mg, Daily before breakfast   Or  pantoprazole, 40 mg, Daily before breakfast  perflutren protein A microsphere, 0.5 mL, Once in imaging  polyethylene glycol, 17 g, Daily  sulfur hexafluoride microsphr, 2 mL, Once in imaging      sodium chloride 0.9%, Last Rate: Stopped (12/30/23 1213)      acetaminophen, 650 mg, q6h PRN  calcium gluconate, 1 g, q6h PRN  calcium gluconate, 2 g, q6h PRN  dextrose 10 % in water (D10W), 0.3 g/kg/hr, Once PRN  dextrose, 25 g, q15 min PRN  glucagon, 1 mg, q15 min PRN  hydrALAZINE, 10 mg, q20 min PRN  HYDROmorphone, 0.2 mg, q4h PRN  labetaloL, 10 mg, q10 min PRN  magnesium sulfate, 2 g, q6h PRN  magnesium sulfate, 4 g, q6h PRN  metoclopramide, 10 mg, q6h PRN   Or  metoclopramide, 10 mg, q6h PRN  naloxone, 0.2 mg, q5 min PRN  ondansetron, 4 mg, q8h PRN   Or  ondansetron, 4 mg, q8h PRN  oxyCODONE, 10 mg, q4h PRN  oxyCODONE, 5 mg, q4h PRN  potassium chloride, 20 mEq, q6h PRN  sodium hypochlorite, , Daily PRN        Review of Systems   12-point ROS negative, except as specified in the HPI.    Physical Exam   /90   Pulse 103   Temp 36.6 °C (97.9 °F)   Resp 14   Ht 1.702 m (5' 7.01\")   Wt 78 kg (171 lb 15.3 oz)   SpO2 97%   BMI 26.93 kg/m²     General: awake, alert, no acute " distress  HEENT: staples in the back of the head, healing well  CV: normal rate, regular rhythm, no MRG  Resp: CTAB, no labored breathing  Abdominal: soft, non-tender, non-distended, active bowel sounds  Extremities: full ROM, no lower extremity swelling  Neuro: no focal neuro deficits  Skin: no rashes, erythema, or ecchymoses  Psych: normal affect and mood, appropriate judgment    Labs   Reviewed    Imaging   Reviewed    Assessment/Plan     Enmanuel Ahumada is a 68 y.o. male with a notable history of Osborn esophagus, HLD presented on 12/25 for 6 weeks of headaches, 3 weeks of confusion and left visual field deficit and neglect was found to have a 7.4 x 3.7 x 6.3 cm mass in the right parietal lobe with extensive mass effect and right to left midline shift, s/p right craniotomy and tumor resection on 12/29. Preliminary pathology suggests high grade glioma.    Recommendations:  Please follow up final pathology recs.  Patient will follow up with Dr. Rolon in neuro-oncology clinic to establish care.    Thank you for this consult. Patient was discussed with Dr. Rolon.    Philipp Campos MD  Hematology/Oncology Fellow  1/1/2024

## 2024-01-01 NOTE — CARE PLAN
The patient's goals for the shift include  Patient pain and comfort managed and controlled throughout the shift     The clinical goals for the shift include Patient will remain hemodynamically stable throughout the shift    Over the shift, the patient did not make progress toward the following goals. Barriers to progression include pain, impaired mobility, and weakness. Recommendations to address these barriers include   Problem: Fall/Injury  Goal: Use assistive devices by end of the shift  1/1/2024 0143 by Venkatesh Webb RN  Outcome: Progressing  1/1/2024 0143 by Venkatesh Webb RN  Outcome: Progressing  Goal: Pace activities to prevent fatigue by end of the shift  1/1/2024 0143 by Venkatesh Webb RN  Outcome: Progressing  1/1/2024 0143 by Venkatesh Webb RN  Outcome: Progressing     Problem: Pain  Goal: My pain/discomfort is manageable  Outcome: Progressing     Problem: Safety  Goal: Patient will be injury free during hospitalization  Outcome: Progressing  Goal: I will remain free of falls  Outcome: Progressing     Problem: Daily Care  Goal: Daily care needs are met  Outcome: Progressing     Problem: Psychosocial Needs  Goal: Demonstrates ability to cope with hospitalization/illness  Outcome: Progressing  Goal: Collaborate with me, my family, and caregiver to identify my specific goals  Outcome: Progressing     Problem: Discharge Barriers  Goal: My discharge needs are met  Outcome: Progressing   .

## 2024-01-01 NOTE — PROGRESS NOTES
"Dash Ahumada is a 68 y.o. male on day 7 of admission presenting with Brain mass.    Subjective   No events overnight       Objective     Physical Exam  Ox3  5/5x4  L HH  Incision c/d/i    Last Recorded Vitals  Blood pressure (!) 143/100, pulse 89, temperature 37.1 °C (98.8 °F), temperature source Temporal, resp. rate 12, height 1.702 m (5' 7.01\"), weight 78 kg (171 lb 15.3 oz), SpO2 96 %.  Intake/Output last 3 Shifts:  I/O last 3 completed shifts:  In: 700 (8.9 mL/kg) [P.O.:700]  Out: 2915 (37 mL/kg) [Urine:2725 (1 mL/kg/hr); Drains:190]  Weight: 78.7 kg     Relevant Results                             Assessment/Plan   Principal Problem:    Brain mass    Enmanuel Ahumada is a 68 year old M with h/o HLD, cholecystectomy, Osborn's esophagus, on ASA 81 for cardioprotection , who p/w 6 wks HA, 3 wks incr confusion and running into things, CTH RO hypodensity c/f mass, MRI PPF complete, fMRI L language dominant   12/27 TTE EF 60%, liver US diffusely hyperechoic liver, c/w hepatosteatosis vs hepatocellular disease  12/29 s/p R crani for tumor resection, CTH POC  12/30 TTE EF 60%  12/31 EEG neg, dc'd, MRI GTR     Patient with significantly improved exam today, back to neurologic baseline preop.      PLAN     Downgrade to floor  Q4 hour neuro checks  Dc drain, dc Ancef  Continue keppra 1G BID  Decrease dex to 4q8  -160   ASA restart at dispo  Tumor board recs  PTOT-rehab - needs reevaluation today given significant improvement in exam  SCDs, SQH           Irina Gilbert MD      "

## 2024-01-02 VITALS
BODY MASS INDEX: 26.99 KG/M2 | WEIGHT: 171.96 LBS | DIASTOLIC BLOOD PRESSURE: 88 MMHG | RESPIRATION RATE: 18 BRPM | HEART RATE: 86 BPM | HEIGHT: 67 IN | SYSTOLIC BLOOD PRESSURE: 143 MMHG | TEMPERATURE: 98.2 F | OXYGEN SATURATION: 95 %

## 2024-01-02 LAB
ALBUMIN SERPL BCP-MCNC: 4.2 G/DL (ref 3.4–5)
ANION GAP SERPL CALC-SCNC: 14 MMOL/L (ref 10–20)
BUN SERPL-MCNC: 26 MG/DL (ref 6–23)
CALCIUM SERPL-MCNC: 9.5 MG/DL (ref 8.6–10.6)
CHLORIDE SERPL-SCNC: 103 MMOL/L (ref 98–107)
CO2 SERPL-SCNC: 27 MMOL/L (ref 21–32)
CREAT SERPL-MCNC: 0.92 MG/DL (ref 0.5–1.3)
ERYTHROCYTE [DISTWIDTH] IN BLOOD BY AUTOMATED COUNT: 12.2 % (ref 11.5–14.5)
GFR SERPL CREATININE-BSD FRML MDRD: >90 ML/MIN/1.73M*2
GLUCOSE BLD MANUAL STRIP-MCNC: 211 MG/DL (ref 74–99)
GLUCOSE BLD MANUAL STRIP-MCNC: 94 MG/DL (ref 74–99)
GLUCOSE SERPL-MCNC: 147 MG/DL (ref 74–99)
HCT VFR BLD AUTO: 48.4 % (ref 41–52)
HGB BLD-MCNC: 16.2 G/DL (ref 13.5–17.5)
MAGNESIUM SERPL-MCNC: 2.13 MG/DL (ref 1.6–2.4)
MCH RBC QN AUTO: 31.3 PG (ref 26–34)
MCHC RBC AUTO-ENTMCNC: 33.5 G/DL (ref 32–36)
MCV RBC AUTO: 94 FL (ref 80–100)
NRBC BLD-RTO: 0 /100 WBCS (ref 0–0)
PHOSPHATE SERPL-MCNC: 3.7 MG/DL (ref 2.5–4.9)
PLATELET # BLD AUTO: 235 X10*3/UL (ref 150–450)
POTASSIUM SERPL-SCNC: 3.5 MMOL/L (ref 3.5–5.3)
RBC # BLD AUTO: 5.17 X10*6/UL (ref 4.5–5.9)
SODIUM SERPL-SCNC: 140 MMOL/L (ref 136–145)
WBC # BLD AUTO: 10.6 X10*3/UL (ref 4.4–11.3)

## 2024-01-02 PROCEDURE — 2500000004 HC RX 250 GENERAL PHARMACY W/ HCPCS (ALT 636 FOR OP/ED): Performed by: STUDENT IN AN ORGANIZED HEALTH CARE EDUCATION/TRAINING PROGRAM

## 2024-01-02 PROCEDURE — 36415 COLL VENOUS BLD VENIPUNCTURE: CPT

## 2024-01-02 PROCEDURE — 2500000004 HC RX 250 GENERAL PHARMACY W/ HCPCS (ALT 636 FOR OP/ED)

## 2024-01-02 PROCEDURE — 82947 ASSAY GLUCOSE BLOOD QUANT: CPT

## 2024-01-02 PROCEDURE — 2500000001 HC RX 250 WO HCPCS SELF ADMINISTERED DRUGS (ALT 637 FOR MEDICARE OP)

## 2024-01-02 PROCEDURE — 97116 GAIT TRAINING THERAPY: CPT | Mod: GP

## 2024-01-02 PROCEDURE — 99221 1ST HOSP IP/OBS SF/LOW 40: CPT | Performed by: STUDENT IN AN ORGANIZED HEALTH CARE EDUCATION/TRAINING PROGRAM

## 2024-01-02 PROCEDURE — 2500000001 HC RX 250 WO HCPCS SELF ADMINISTERED DRUGS (ALT 637 FOR MEDICARE OP): Performed by: STUDENT IN AN ORGANIZED HEALTH CARE EDUCATION/TRAINING PROGRAM

## 2024-01-02 PROCEDURE — 96372 THER/PROPH/DIAG INJ SC/IM: CPT

## 2024-01-02 PROCEDURE — 80069 RENAL FUNCTION PANEL: CPT

## 2024-01-02 PROCEDURE — 85027 COMPLETE CBC AUTOMATED: CPT

## 2024-01-02 PROCEDURE — 83735 ASSAY OF MAGNESIUM: CPT

## 2024-01-02 RX ORDER — ACETAMINOPHEN 325 MG/1
650 TABLET ORAL EVERY 6 HOURS PRN
Qty: 30 TABLET | Refills: 0 | Status: SHIPPED | OUTPATIENT
Start: 2024-01-02

## 2024-01-02 RX ORDER — DEXAMETHASONE 0.5 MG/1
TABLET ORAL ONCE
Status: CANCELLED | OUTPATIENT
Start: 2024-01-02 | End: 2024-01-02

## 2024-01-02 RX ORDER — LEVETIRACETAM 500 MG/1
1000 TABLET ORAL 2 TIMES DAILY
Status: DISCONTINUED | OUTPATIENT
Start: 2024-01-02 | End: 2024-01-02 | Stop reason: HOSPADM

## 2024-01-02 RX ORDER — OXYCODONE HYDROCHLORIDE 5 MG/1
5 TABLET ORAL EVERY 4 HOURS PRN
Qty: 15 TABLET | Refills: 0 | Status: SHIPPED | OUTPATIENT
Start: 2024-01-02 | End: 2024-01-07

## 2024-01-02 RX ORDER — DEXAMETHASONE 4 MG/1
4 TABLET ORAL EVERY 8 HOURS SCHEDULED
Status: DISCONTINUED | OUTPATIENT
Start: 2024-01-02 | End: 2024-01-02

## 2024-01-02 RX ORDER — DEXAMETHASONE 2 MG/1
3 TABLET ORAL EVERY 8 HOURS SCHEDULED
Status: DISCONTINUED | OUTPATIENT
Start: 2024-01-02 | End: 2024-01-02

## 2024-01-02 RX ORDER — DEXAMETHASONE 4 MG/1
4 TABLET ORAL EVERY 8 HOURS SCHEDULED
Status: DISCONTINUED | OUTPATIENT
Start: 2024-01-02 | End: 2024-01-02 | Stop reason: HOSPADM

## 2024-01-02 RX ORDER — ZOLPIDEM TARTRATE 5 MG/1
10 TABLET ORAL ONCE
Status: DISCONTINUED | OUTPATIENT
Start: 2024-01-02 | End: 2024-01-02 | Stop reason: HOSPADM

## 2024-01-02 RX ORDER — DEXAMETHASONE 1 MG/1
TABLET ORAL
Qty: 51 TABLET | Refills: 0 | Status: SHIPPED | OUTPATIENT
Start: 2024-01-02 | End: 2024-02-19 | Stop reason: ALTCHOICE

## 2024-01-02 RX ORDER — LEVETIRACETAM 1000 MG/1
1000 TABLET ORAL 2 TIMES DAILY
Qty: 60 TABLET | Refills: 1 | Status: SHIPPED | OUTPATIENT
Start: 2024-01-02 | End: 2024-02-19 | Stop reason: SDUPTHER

## 2024-01-02 RX ADMIN — HEPARIN SODIUM 5000 UNITS: 5000 INJECTION INTRAVENOUS; SUBCUTANEOUS at 17:25

## 2024-01-02 RX ADMIN — HEPARIN SODIUM 5000 UNITS: 5000 INJECTION INTRAVENOUS; SUBCUTANEOUS at 08:42

## 2024-01-02 RX ADMIN — DEXAMETHASONE 4 MG: 4 TABLET ORAL at 14:09

## 2024-01-02 RX ADMIN — LEVETIRACETAM 1000 MG: 500 TABLET, FILM COATED ORAL at 08:42

## 2024-01-02 RX ADMIN — DEXAMETHASONE 4 MG: 4 TABLET ORAL at 06:12

## 2024-01-02 RX ADMIN — ATORVASTATIN CALCIUM 40 MG: 40 TABLET, FILM COATED ORAL at 08:42

## 2024-01-02 RX ADMIN — INSULIN LISPRO 4 UNITS: 100 INJECTION, SOLUTION INTRAVENOUS; SUBCUTANEOUS at 17:19

## 2024-01-02 RX ADMIN — PANTOPRAZOLE SODIUM 40 MG: 40 TABLET, DELAYED RELEASE ORAL at 06:12

## 2024-01-02 ASSESSMENT — COGNITIVE AND FUNCTIONAL STATUS - GENERAL
WALKING IN HOSPITAL ROOM: A LITTLE
PERSONAL GROOMING: A LITTLE
MOBILITY SCORE: 24
MOBILITY SCORE: 20
MOVING TO AND FROM BED TO CHAIR: A LITTLE
CLIMB 3 TO 5 STEPS WITH RAILING: A LITTLE
DAILY ACTIVITIY SCORE: 21
TOILETING: A LITTLE
STANDING UP FROM CHAIR USING ARMS: A LITTLE
HELP NEEDED FOR BATHING: A LITTLE

## 2024-01-02 ASSESSMENT — PAIN - FUNCTIONAL ASSESSMENT: PAIN_FUNCTIONAL_ASSESSMENT: 0-10

## 2024-01-02 ASSESSMENT — PAIN SCALES - GENERAL: PAINLEVEL_OUTOF10: 0 - NO PAIN

## 2024-01-02 NOTE — SIGNIFICANT EVENT
Mr. Ahumada is a 68 year old male with a history of 6 weeks of intermittent headaches and increasing confusion. He was brought into the ED, in which CTH revealed a large R parietal lobe lesion measuring 7.3 cm x 7.1cm x 5cm with 1cm mid-line shift to the left, suspicious for high-grade glioma. The patient subsequently underwent Right craniotomy with tumor resection with Dr. Carrasco on 12/29/23, and post-operative MRI revealed GTR with no nodular enhancement. Path is still pending.     The patient is recovering well post-operatively. Radiation oncology is being consulted to discuss post-operative radiation therapy options. Discussed with patient that will plan to arrange for 2-3 week post-op consultation with Dr. Evgeny Palomino, CNS Radiation Oncology, to discuss post-op radiation therapy once final pathology is complete and patient is healing well post-op.     Radiation Oncology will sign off at this time.    Georges Glasgow MD   PGY-3 Radiation Oncology  [employer] Preferred for Urgent Messages

## 2024-01-02 NOTE — DOCUMENTATION CLARIFICATION NOTE
"    PATIENT:               JULIENNE RIOS  ACCT #:                  [id number]  MRN:                       [id number]  :                       1955  ADMIT DATE:       2023 1:47 AM  DISCH DATE:  RESPONDING PROVIDER #:        [id number]          PROVIDER RESPONSE TEXT:    Cerebral Edema    CDI QUERY TEXT:    [facility]_Cerebral Edema    Instruction:    Based on your assessment of the patient and the clinical information, please provide the requested documentation by clicking on the appropriate radio button and enter any additional information if prompted.    Question: Please further clarify if there is a diagnosis related to the clinical information    When answering this query, please exercise your independent professional judgment. The fact that a question is being asked, does not imply that any particular answer is desired or expected.    The patient's clinical indicators include:  Clinical Information:  Patient is a 68 year old male who presented with intermittent headaches and increased confusion.  Work up revealed a brain mass.    Clinical Indicators:  From the MRI of brain profusion protocol on , \" Expansile non-enhancing FLAIR hyperintense signal extending into the right periatrial white matter and lateral right splenium of the corpus callosum, which may represent infiltrative tumor and/or edema \"    Treatment:  Scheduled for craniotomy to have tumor resected, frequent neuro checks, Dexamethasone, Keppra    Risk Factors:  Brain mass with brain compression, headache, disorientation, visual loss  Options provided:  -- Cerebral Edema  -- Radiological findings are clinically insignificant  -- Other - I will add my own diagnosis  -- Refer to Clinical Documentation Reviewer    Query created by: Rachel Hensley on 2023 12:12 PM      Electronically signed by:  MUKUL LOBO MD 2024 5:09 AM          "

## 2024-01-02 NOTE — DISCHARGE SUMMARY
Discharge Diagnosis  Brain mass    Issues Requiring Follow-Up  none    Test Results Pending At Discharge  Pending Labs       Order Current Status    MGMT Promoter Methylation In process    Surgical Pathology Exam In process            Hospital Course  Enmanuel Ahumada is a 68 year old M with h/o HLD, cholecystectomy, Osborn's esophagus, on ASA 81 for cardioprotection , who p/w 6 wks HA, 3 wks incr confusion and running into things, CTH RO hypodensity c/f mass, MRI PPF complete, fMRI L language dominant   12/27 TTE EF 60%, liver US diffusely hyperechoic liver, c/w hepatosteatosis vs hepatocellular disease  12/29 s/p R crani for tumor resection, CTH POC  12/30 TTE EF 60%  12/31 EEG neg, dc'd, MRI GTR     PT evaluated the patient and cleared for discharge home.     The patient was stable for discharge from a neurosurgical stand point.     Pertinent Physical Exam At Time of Discharge  Physical Exam  Awake, Ox3  L HH, extraocular movements intact  5/5x4  SILT  incision c/d/i  Home Medications  See discharge med rec  DO NOT RESTART ASPIRIN    Outpatient Follow-Up  Future Appointments   Date Time Provider Department Center   1/18/2024  2:40 PM Ricardo Youngblood DO [id number]   8/7/2024  9:00 AM Ricardo Youngblood DO [id number] Samuel Darden MD

## 2024-01-02 NOTE — PROGRESS NOTES
"Enmanuel Ahumada \"Petey" is a 68 y.o. male on day 8 of admission presenting with Brain mass.    Subjective   Patient denied any complaints.     Objective     Physical Exam  Awake, Ox3  L HH, extraocular movements intact  5/5x4  SILT  incision c/d/i    Last Recorded Vitals  Blood pressure 118/80, pulse 75, temperature 36.7 °C (98.1 °F), resp. rate 18, height 1.702 m (5' 7.01\"), weight 78 kg (171 lb 15.3 oz), SpO2 96 %.  Intake/Output last 3 Shifts:  I/O last 3 completed shifts:  In: 1738.8 (22.3 mL/kg) [P.O.:240; I.V.:298.8 (3.8 mL/kg); IV Piggyback:1200]  Out: 1245 (16 mL/kg) [Urine:1175 (0.4 mL/kg/hr); Drains:70]  Weight: 78 kg     Relevant Results  Results for orders placed or performed during the hospital encounter of 12/25/23 (from the past 24 hour(s))   POCT GLUCOSE   Result Value Ref Range    POCT Glucose 131 (H) 74 - 99 mg/dL   POCT GLUCOSE   Result Value Ref Range    POCT Glucose 153 (H) 74 - 99 mg/dL   POCT GLUCOSE   Result Value Ref Range    POCT Glucose 104 (H) 74 - 99 mg/dL       Assessment/Plan   Principal Problem:    Brain mass    Enmanuel Ahumada is a 68 year old M with h/o HLD, cholecystectomy, Osborn's esophagus, on ASA 81 for cardioprotection , who p/w 6 wks HA, 3 wks incr confusion and running into things, CTH RO hypodensity c/f mass, MRI PPF complete, fMRI L language dominant   12/27 TTE EF 60%, liver US diffusely hyperechoic liver, c/w hepatosteatosis vs hepatocellular disease  12/29 s/p R crani for tumor resection, CTH POC  12/30 TTE EF 60%  12/31 EEG neg, dc'd, MRI GTR     Patient doing well postoperatively    PLAN    Floor  Q4 neurochecks  EEG discontinue today  Continue keppra 1G BID  Wean dex 3q8  -160   ASA restart plan  Tumor board recs  PTOT-rehab (eval'ed while on precedex, needs reeval)  SCDs, H    Guillermina Camarena MD      "

## 2024-01-02 NOTE — PROGRESS NOTES
"Physical Therapy    Physical Therapy Treatment    Patient Name: Enmanuel Ahumada \"Dash\"  MRN: [id number]  Today's Date: 1/2/2024  Time Calculation  Start Time: 0940  Stop Time: 1006  Time Calculation (min): 26 min       Assessment/Plan   PT Assessment  PT Assessment Results:  (Ind with mobility)  End of Session Communication: Bedside nurse, Care Coordinator (NP)  Assessment Comment: Pt progressed with mobility significantly since eval.  Ind with transfers, ambulation and stairs.  Scoring high on balance measures indicating low falls risk.  Update d/c recc to home with no further PT needs.  End of Session Patient Position: Bed, 3 rail up, Alarm off, not on at start of session, Alarm off, caregiver present     PT Plan  Treatment/Interventions: Bed mobility, Transfer training, Gait training, Stair training, Balance training, Strengthening, Endurance training, Range of motion, Therapeutic exercise, Therapeutic activity, Neuromuscular re-education  PT Plan: Skilled PT  PT Frequency: Other (Comment)  PT Discharge Recommendations:  (D/C PT, pt is Ind)  Equipment Recommended upon Discharge:  (no AD)  PT Recommended Transfer Status: Independent  PT - OK to Discharge: Yes      General Visit Information:   PT  Visit  PT Received On: 01/02/24  Response to Previous Treatment: Patient with no complaints from previous session.  General  Family/Caregiver Present: Yes  Caregiver Feedback: wife at bedside and supportive  Prior to Session Communication: Bedside nurse  Patient Position Received: Bed, 3 rail up, Alarm off, not on at start of session, Alarm off, caregiver present  General Comment: Supine.  Very pleasant, cooperative, agreeable to PT.  Pt able to perform all transfers, ambulate community distance, and navigate full flight of stairs Ind.  Pt tolerated well, needing initial cues for scanning to his L to compensate for L field cut.    Objective   Pain:  Pain Assessment  Pain Assessment: 0-10  Pain Score: 0 - No " pain  Cognition:  Cognition  Overall Cognitive Status: Within Functional Limits  Arousal/Alertness: Appropriate responses to stimuli  Orientation Level: Oriented X4  Following Commands: Follows all commands and directions without difficulty  Postural Control:  Postural Control  Postural Control: Within Functional Limits  Extremity/Trunk Assessments:    RLE   RLE : Within Functional Limits  LLE   LLE : Within Functional Limits  Activity Tolerance:  Activity Tolerance  Endurance: Endurance does not limit participation in activity  Treatments:    Bed Mobility  Bed Mobility: Yes  Bed Mobility 1  Bed Mobility 1: Supine to sitting, Sitting to supine  Level of Assistance 1: Independent    Ambulation/Gait Training 1  Surface 1: Level tile, Carpet  Device 1: No device  Assistance 1: Independent (Close supervision with min VCs, progressing to Ind)  Quality of Gait 1:  (No marked gait deviations. Needing cues for L visual scanning to compensate for L field cut, with occasional  bumping into things on L, but w no LOB.  Reacting well to balance challenges inc head turns, changes in speed, walking backward and eyes closed.)  Comments/Distance (ft) 1: 300 feet  Transfers  Transfer: Yes  Transfer 1  Transfer From 1: Sit to, Stand to  Transfer to 1: Sit  Transfer Level of Assistance 1: Independent    Stairs  Stairs: Yes  Stairs  Rails 1: Left  Device 1: Railing  Assistance 1: Independent (Close supervision progressing to Ind)  Comment/Number of Steps 1: up/dn 4, then up/dn 12    Outcome Measures:    [facility] Basic Mobility  Turning from your back to your side while in a flat bed without using bedrails: None  Moving from lying on your back to sitting on the side of a flat bed without using bedrails: None  Moving to and from bed to chair (including a wheelchair): None  Standing up from a chair using your arms (e.g. wheelchair or bedside chair): None  To walk in hospital room: None  Climbing 3-5 steps with railing: None  Basic Mobility -  Total Score: 24    FGA - Functional Gait Assessment  Gait level surface: 3  Change in gait speed: 3  Gait with horizontal head turns: 3  Gait with vertical head turns: 3  Gait and pivot turn: 3  Step over obstacle: 3  Gait with narrow base of support: 3  Gait with eyes closed: 3  Ambulating backwards: 3  Steps: 2  FGA Total Score: 29    Tinetti  Sitting Balance: Steady, safe  Arises: Able without using arms  Attempts to Arise: Able to arise, one attempt  Immediate Standing Balance (First 5 Seconds): Steady without walker or other support  Standing Balance: Steady but wide stance, uses cane or other support  Nudged: Steady without walker or other support  Eyes Closed: Steady  Turned 360 Degrees: Steadiness: Steady  Turned 360 Degrees: Continuity of Steps: Continuous  Sitting Down: Safe, smooth motion  Balance Score: 15  Initiation of Gait: No hesitancy  Step Height: R Swing Foot: Right foot complete clears floor  Step Length: R Swing Foot: Passes left stance foot  Step Height: L Swing Foot: Left foot complete clears floor  Step Length: L Swing Foot: Passes right stance foot  Step Symmetry: Right and left step appear equal  Step Continuity: Steps appear continuous  Path: Straight without walking aid  Trunk: No sway, no flexion, no use of arms, no walking aid  Walking Time: Heels almost touching while walking  Gait Score: 12  Total Score: 27              Education Documentation  Precautions, taught by Guanaco West PT at 1/2/2024 12:44 PM.  Learner: Family, Patient  Readiness: Eager  Method: Explanation  Response: Verbalizes Understanding    Mobility Training, taught by Guanaco West PT at 1/2/2024 12:44 PM.  Learner: Family, Patient  Readiness: Eager  Method: Explanation  Response: Verbalizes Understanding    Education Comments  No comments found.        OP EDUCATION:       Encounter Problems       Encounter Problems (Resolved)       Balance       STG - Maintains dynamic standing balance with upper extremity  support LRAD SBA  (Met)       Start:  12/30/23    Expected End:  01/13/24    Resolved:  01/02/24    INTERVENTIONS:  1. Practice standing with minimal support.  2. Educate patient about standing tolerance.  3. Educate patient about independence with gait, transfers, and ADL's.  4. Educate patient about use of assistive device.  5. Educate patient about self-directed care.            Mobility       STG - Patient will ambulate 50' with LRAD SBA and progress,  (Met)       Start:  12/30/23    Expected End:  01/13/24    Resolved:  01/02/24            Transfers       STG - Patient will perform bed mobility indep  (Met)       Start:  12/30/23    Expected End:  01/13/24    Resolved:  01/02/24         STG - Patient will transfer sit to and from stand modif indep with LRAD  (Met)       Start:  12/30/23    Expected End:  01/13/24    Resolved:  01/02/24 01/02/24 at 12:47 PM - Guanaco West, PT

## 2024-01-02 NOTE — CARE PLAN
The patient's goals for the shift include  improved neuro exam.    The clinical goals for the shift include stable neuro exam.    Over the shift, the patient did not make progress toward the following goals. Barriers to progression include none. Recommendations to address these barriers include PT/OT reevaluate.

## 2024-01-03 ENCOUNTER — TUMOR BOARD CONFERENCE (OUTPATIENT)
Dept: HEMATOLOGY/ONCOLOGY | Facility: HOSPITAL | Age: 69
End: 2024-01-03
Payer: MEDICARE

## 2024-01-04 LAB
ATRIAL RATE: 78 BPM
P AXIS: 66 DEGREES
P OFFSET: 198 MS
P ONSET: 139 MS
PR INTERVAL: 150 MS
Q ONSET: 214 MS
QRS COUNT: 13 BEATS
QRS DURATION: 96 MS
QT INTERVAL: 394 MS
QTC CALCULATION(BAZETT): 449 MS
QTC FREDERICIA: 430 MS
R AXIS: -8 DEGREES
T AXIS: 27 DEGREES
T OFFSET: 411 MS
VENTRICULAR RATE: 78 BPM

## 2024-01-04 NOTE — TUMOR BOARD NOTE
CNS Tumor Board Recommendations    Patient was presented by Dr. Ritu Carrasco at our CNS Tumor Board on 01/03/24 which included representatives from Radiation oncology, Surgical oncology, Neuro-oncology, Pathology, Radiology, Research, Neurosurgery, Social Work (Neurosurgery).     Current patient presents with history of the following treatment history: PMH HLD, Osborn's esophagus, ASA for cardioprotection, who presented with headache x 6 weeks, confusion x 3 weeks, and gait instability. CTH with RO hypodensity. MRI PPF and fMRI with L language dominant lesion. TTE EF 60%. S/p R crani for tumor resection with 5ALA on 12/29/23. MRI with GTR. Prelim pathology concerning for glioblastoma - immunostains pending.     The CNS Tumor Board tumor board considered available treatment options and made the following recommendations: Referral to radiation oncology and neuro-oncology.     Clinical Trial Status: N/A      National site-specific guidelines were discussed with respect to the case.

## 2024-01-11 ENCOUNTER — TELEPHONE (OUTPATIENT)
Dept: RADIATION ONCOLOGY | Facility: HOSPITAL | Age: 69
End: 2024-01-11
Payer: MEDICARE

## 2024-01-11 NOTE — TELEPHONE ENCOUNTER
Called pt. to remind of appointment on 1/17/2024 at 11:00 am with Dr. Palomino. Pt answered and confirmed  appointment.   no

## 2024-01-14 DIAGNOSIS — K22.70 BARRETT'S ESOPHAGUS WITHOUT DYSPLASIA: ICD-10-CM

## 2024-01-15 ENCOUNTER — OFFICE VISIT (OUTPATIENT)
Dept: NEUROSURGERY | Facility: HOSPITAL | Age: 69
End: 2024-01-15
Payer: MEDICARE

## 2024-01-15 ENCOUNTER — OFFICE VISIT (OUTPATIENT)
Dept: HEMATOLOGY/ONCOLOGY | Facility: HOSPITAL | Age: 69
End: 2024-01-15
Payer: MEDICARE

## 2024-01-15 ENCOUNTER — TELEPHONE (OUTPATIENT)
Dept: HEMATOLOGY/ONCOLOGY | Facility: HOSPITAL | Age: 69
End: 2024-01-15
Payer: MEDICARE

## 2024-01-15 VITALS
WEIGHT: 156.53 LBS | DIASTOLIC BLOOD PRESSURE: 82 MMHG | RESPIRATION RATE: 17 BRPM | HEIGHT: 67 IN | HEART RATE: 91 BPM | SYSTOLIC BLOOD PRESSURE: 121 MMHG | BODY MASS INDEX: 24.57 KG/M2

## 2024-01-15 VITALS
TEMPERATURE: 97.7 F | HEIGHT: 66 IN | DIASTOLIC BLOOD PRESSURE: 83 MMHG | BODY MASS INDEX: 26.22 KG/M2 | HEART RATE: 88 BPM | SYSTOLIC BLOOD PRESSURE: 125 MMHG | OXYGEN SATURATION: 98 % | WEIGHT: 163.14 LBS | RESPIRATION RATE: 18 BRPM

## 2024-01-15 DIAGNOSIS — G93.89 BRAIN MASS: ICD-10-CM

## 2024-01-15 DIAGNOSIS — C71.9 GBM (GLIOBLASTOMA MULTIFORME) (MULTI): Primary | ICD-10-CM

## 2024-01-15 PROCEDURE — 1159F MED LIST DOCD IN RCRD: CPT | Performed by: PSYCHIATRY & NEUROLOGY

## 2024-01-15 PROCEDURE — 1126F AMNT PAIN NOTED NONE PRSNT: CPT | Performed by: NURSE PRACTITIONER

## 2024-01-15 PROCEDURE — 1126F AMNT PAIN NOTED NONE PRSNT: CPT | Performed by: PSYCHIATRY & NEUROLOGY

## 2024-01-15 PROCEDURE — 1159F MED LIST DOCD IN RCRD: CPT | Performed by: NURSE PRACTITIONER

## 2024-01-15 PROCEDURE — 1111F DSCHRG MED/CURRENT MED MERGE: CPT | Performed by: NURSE PRACTITIONER

## 2024-01-15 PROCEDURE — 99024 POSTOP FOLLOW-UP VISIT: CPT | Performed by: NURSE PRACTITIONER

## 2024-01-15 PROCEDURE — 99215 OFFICE O/P EST HI 40 MIN: CPT | Performed by: PSYCHIATRY & NEUROLOGY

## 2024-01-15 PROCEDURE — 1036F TOBACCO NON-USER: CPT | Performed by: NURSE PRACTITIONER

## 2024-01-15 PROCEDURE — 1036F TOBACCO NON-USER: CPT | Performed by: PSYCHIATRY & NEUROLOGY

## 2024-01-15 PROCEDURE — 1111F DSCHRG MED/CURRENT MED MERGE: CPT | Performed by: PSYCHIATRY & NEUROLOGY

## 2024-01-15 RX ORDER — TEMOZOLOMIDE 100 MG/1
100 CAPSULE ORAL DAILY
Qty: 42 CAPSULE | Refills: 0 | Status: SHIPPED | OUTPATIENT
Start: 2024-01-29 | End: 2024-04-29 | Stop reason: WASHOUT

## 2024-01-15 RX ORDER — TEMOZOLOMIDE 5 MG/1
15 CAPSULE ORAL DAILY
Qty: 126 CAPSULE | Refills: 0 | Status: SHIPPED | OUTPATIENT
Start: 2024-01-29 | End: 2024-04-29 | Stop reason: WASHOUT

## 2024-01-15 RX ORDER — PANTOPRAZOLE SODIUM 40 MG/1
TABLET, DELAYED RELEASE ORAL
Qty: 30 TABLET | Refills: 0 | Status: SHIPPED | OUTPATIENT
Start: 2024-01-15 | End: 2024-02-06

## 2024-01-15 RX ORDER — PROCHLORPERAZINE MALEATE 10 MG
10 TABLET ORAL EVERY 6 HOURS PRN
Qty: 60 TABLET | Refills: 5 | Status: SHIPPED | OUTPATIENT
Start: 2024-01-15

## 2024-01-15 RX ORDER — ONDANSETRON HYDROCHLORIDE 8 MG/1
8 TABLET, FILM COATED ORAL EVERY 8 HOURS PRN
Qty: 60 TABLET | Refills: 5 | Status: SHIPPED | OUTPATIENT
Start: 2024-01-15

## 2024-01-15 RX ORDER — TEMOZOLOMIDE 20 MG/1
20 CAPSULE ORAL DAILY
Qty: 42 CAPSULE | Refills: 0 | Status: SHIPPED | OUTPATIENT
Start: 2024-01-29 | End: 2024-04-29 | Stop reason: WASHOUT

## 2024-01-15 RX ORDER — SULFAMETHOXAZOLE AND TRIMETHOPRIM 800; 160 MG/1; MG/1
1 TABLET ORAL 3 TIMES WEEKLY
Qty: 12 TABLET | Refills: 11 | Status: SHIPPED | OUTPATIENT
Start: 2024-01-15 | End: 2024-05-23 | Stop reason: ALTCHOICE

## 2024-01-15 RX ORDER — SENNOSIDES 8.6 MG/1
1-2 TABLET ORAL 2 TIMES DAILY PRN
Qty: 120 TABLET | Refills: 5 | Status: SHIPPED | OUTPATIENT
Start: 2024-01-15

## 2024-01-15 ASSESSMENT — COLUMBIA-SUICIDE SEVERITY RATING SCALE - C-SSRS
6. HAVE YOU EVER DONE ANYTHING, STARTED TO DO ANYTHING, OR PREPARED TO DO ANYTHING TO END YOUR LIFE?: NO
2. HAVE YOU ACTUALLY HAD ANY THOUGHTS OF KILLING YOURSELF?: NO
1. IN THE PAST MONTH, HAVE YOU WISHED YOU WERE DEAD OR WISHED YOU COULD GO TO SLEEP AND NOT WAKE UP?: NO

## 2024-01-15 ASSESSMENT — PATIENT HEALTH QUESTIONNAIRE - PHQ9
1. LITTLE INTEREST OR PLEASURE IN DOING THINGS: NOT AT ALL
SUM OF ALL RESPONSES TO PHQ9 QUESTIONS 1 AND 2: 0
2. FEELING DOWN, DEPRESSED OR HOPELESS: NOT AT ALL
1. LITTLE INTEREST OR PLEASURE IN DOING THINGS: NOT AT ALL
2. FEELING DOWN, DEPRESSED OR HOPELESS: NOT AT ALL
SUM OF ALL RESPONSES TO PHQ9 QUESTIONS 1 AND 2: 0

## 2024-01-15 ASSESSMENT — PAIN SCALES - GENERAL: PAINLEVEL: 0-NO PAIN

## 2024-01-15 ASSESSMENT — ENCOUNTER SYMPTOMS
LOSS OF SENSATION IN FEET: 0
OCCASIONAL FEELINGS OF UNSTEADINESS: 1
DEPRESSION: 0

## 2024-01-15 NOTE — PATIENT INSTRUCTIONS
Follow up with Jacki Simpson PA-C in 5 weeks.  Please call us with any questions or concerns at [phone] opt. 5, opt. 2  For scheduling concerns please call [phone] option 1

## 2024-01-15 NOTE — PROGRESS NOTES
"Patient ID: Dash Ahumada is a 68 y.o. male.  Referring Physician: No referring provider defined for this encounter.  Primary Care Provider: DO Eve Zuleta    Dash Ahumada is a 67 yo RH  male with a PMH significant for HLD, Osborn's esophagus, ASA for cardioprotection, who presented with headache x 6 weeks, confusion x 3 weeks, and gait instability. CTH with RO hypodensity. MRI PPF and fMRI with L language dominant lesion. TTE EF 60%. S/p R crani for tumor resection with 5ALA on 12/29/23. MRI with GTR. Prelim pathology concerning for glioblastoma - immunostains pending.      INTERVAL HISTORY (1/15/2024): Since getting discharged to home, he has been slowly recovering from the surgery. The final pathology is still pending, but it is likely GBM. He notes some persistent incisional pain around the staples but no regular headaches now (resolved). He has some minor confusion, mild loss of balance, difficulty using his computer and phone, and a loss of vision to the left. He remains on Keppra 1000 mg bid (although he claims he has never had a verified seizure), and is tapering off of Decadron. He is seeing Dr. Palomino from Rad Onc soon to begin RT planning. He is seeing Neurosurgery today to remove the staples.       The HPI is as documented in the HPI.     Objective   BSA: 1.86 meters squared  /83 (BP Location: Left arm, Patient Position: Sitting)   Pulse 88   Temp 36.5 °C (97.7 °F)   Resp 18   Ht (S) 1.687 m (5' 6.42\")   Wt 74 kg (163 lb 2.3 oz)   SpO2 98%   BMI 26.00 kg/m²     No family history on file.  Oncology History    No history exists.       Enmanuel Ahumada \"Dash\"  reports that he has never smoked. He has never used smokeless tobacco.  He  reports no history of alcohol use.  He  reports no history of drug use.    Physical Exam  Constitutional:       Appearance: Normal appearance.   Eyes:      General: Visual field deficit present.      Extraocular " Movements: Extraocular movements intact.      Pupils: Pupils are equal, round, and reactive to light.   Musculoskeletal:      Cervical back: Normal range of motion.   Neurological:      Mental Status: He is alert and oriented to person, place, and time.      Sensory: Sensation is intact.      Motor: Motor function is intact.      Coordination: Coordination is intact.      Deep Tendon Reflexes: Babinski sign absent on the right side. Babinski sign absent on the left side.      Reflex Scores:       Tricep reflexes are 2+ on the right side and 2+ on the left side.       Bicep reflexes are 2+ on the right side and 2+ on the left side.       Brachioradialis reflexes are 2+ on the right side and 2+ on the left side.       Patellar reflexes are 2+ on the right side and 2+ on the left side.       Achilles reflexes are 2+ on the right side and 2+ on the left side.     Comments: Minimal loss of STM and cognition, speech fluent without dysnomia, CN's II - XII intact except for partial left homonymous hemianopsia, strength 5/5 in UE/LE, mild gait imbalance, DTR's symmetric, sensation intact.    Psychiatric:         Mood and Affect: Mood normal.         Behavior: Behavior normal.       Performance Status:  Symptomatic; fully ambulatory      Lab Results   Component Value Date    WBC 10.6 01/02/2024    HGB 16.2 01/02/2024    HCT 48.4 01/02/2024    MCV 94 01/02/2024     01/02/2024       The recent brain MRI scans were reviewed with the patient and family:        === 12/25/23 ===    MR BRAIN W AND WO CONTRAST    - Impression -  Postoperative changes as noted above. There has been significant  improvement in mass effect since resection of the mass; the margins  of the surgical bed have minimal if any enhancement with no nodular  enhancement suggestive of residual tumor noted.    MACRO:  None    Signed by: Cecil Aguiar 12/31/2023 11:23 AM  Dictation workstation:   [id number]      Assessment/Plan      -Dash has been recovering  well from surgery thus far, with mild residual symptoms.     -The pathology is still not finalized, but it is likely a GBM; IDH and MGMT status pending.     -We reviewed the MRI scans and discussed further treatment with chemo-RT -- he is in agreement.     -He is to see Dr. Palomino soon to begin RT planning x 30 days.     -We discussed Temodar (75 mg/m2/day) with the RT x 42 days and adjuvant (5 days per month) -- he is in agreement and signed the consent form,.     -We also discussed sending off tumor tissue for more advanced molecular phenotyping to [facility] -- he is interested.     -We also reviewed the OptKudarom electrical array device for after RT -- he is interested.     -We will have him return to this clinic for further evaluation, and to monitor him during early chemo-RT.     -I spent > 60 minutes in face to face consultation to review and discuss the above; 50% of which or more was dedicated to counseling.       Kyle Rolon MD

## 2024-01-15 NOTE — TELEPHONE ENCOUNTER
Rn called Dash to see if he was available to see Dr. Rolon this afternoon at 1:30.  Rn spoke with his wife. They are agreeable. Confirmed clinic location with spouse.

## 2024-01-16 NOTE — PROGRESS NOTES
"NEUROSURGERY EVALUATION    Diagnosis  Diagnoses and all orders for this visit:  GBM (glioblastoma multiforme) (CMS/HCC)          History of Present Illness  Enmanuel Ahumada is a 68-year-old male with a PMH significant for HLD, Osborn's esophagus, s/p cholecystectomy, on ASA 81mg for cardioprotection. Patient presented with headaches x 6 weeks, increased confusion, and gait instability where he was running into objects. CTH with RO hypodensity c/f mass. MRI PPF with complex fMRI with language dominant. TTE with EF of 60%. He is now s/p R craniotomy for tumor resection. CT with POC. EEG negative. MRI with GTR. Final surgical pathology consistent for glioblastoma. Patient was referred to radiation oncology and neuro-oncology. He was seen by Dr. Rolon with plans to continue Keppra 1000mg BID and decadron taper. Plan to begin RT and treatment with Temodar. He presents to clinic for 2 week POV and wound check.     Denies any headaches at this time. Minor dizziness with position changes. Feels he is not sleeping enough and on average sleeping 4-5 hours per night and a nap throughout the day. Persistent L peripheral vision field.       Vitals  /82   Pulse 91   Resp 17   Ht 1.702 m (5' 7\")   Wt 71 kg (156 lb 8.4 oz)   BMI 24.52 kg/m²       Physical Exam  A&Ox3   Fluent speech   EOMI: L HH   SANCHEZ; strength 5/5; no drift   SILT  Gait mild unsteadiness   Incision without drainage or erythema; Majority of staples removed more distal staples were left in placed - needs more healing time     Relevant Results  MRI 12/31 with POC consistent with GTR       Provider Impressions  Patient is a 68-year-old male presenting with headaches found to have RO hypodensity mass. MRI PPF with complex fMRI with language dominant. TTE with EF of 60%. He is now s/p R craniotomy for tumor resection. CT with POC. EEG negative. MRI with GTR. Final surgical pathology consistent for glioblastoma. Incision C/D/I and staples were removed until " the more distal aspect as he needs more time for closure/ healing. No drainage, swelling, or erythema. He remains on Keppra 1000mg BID. Does not need oxycodone refilled at this time. He is taking Tylenol as needed. Patient is scheduled for rad onc evaluation on 01/17/24.     - RTC next week for wound assessment and removal of remaining sutures removal   - Follow up with radiation oncology and neuro-oncology as scheduled   - Continue with activity restriction with no lifting >10lbs   - Ok to shower 24 hours post staple removal; dry thoroughly         Previous History  No past medical history on file.      Social History     Tobacco Use    Smoking status: Never    Smokeless tobacco: Never   Substance Use Topics    Alcohol use: Never    Drug use: Never     No family history on file.  No Known Allergies  Current Outpatient Medications   Medication Instructions    acetaminophen (TYLENOL) 650 mg, oral, Every 6 hours PRN    atorvastatin (LIPITOR) 40 mg, oral, Daily    CALCIUM CARBONATE-VITAMIN D3 ORAL 1 capsule, oral, Daily    cholecalciferol (VITAMIN D3) 2,000 Units, oral, Daily    dexAMETHasone (Decadron) 1 mg tablet Take 3 tablets (3 mg) by mouth 3 times a day for 2 days, THEN 2 tablets (2 mg) 3 times a day for 3 days, THEN 1 tablet (1 mg) 3 times a day for 3 days, THEN 1 tablet (1 mg) 2 times a day for 2 days, THEN 1 tablet (1 mg) once daily for 2 days.    krill-omega-3-dha-epa-lipids 173-27-37-50 mg capsule 1 capsule, oral, Daily    levETIRAcetam (KEPPRA) 1,000 mg, oral, 2 times daily    ondansetron (ZOFRAN) 8 mg, oral, Every 8 hours PRN, Take one hour prior to each chemotherapy dose in addition to every 8 hours as needed for nausea/vomiting    pantoprazole (ProtoNix) 40 mg EC tablet TAKE 1 TABLET BY MOUTH IN THE MORNING 30  minutes BEFORE breakfast    prochlorperazine (COMPAZINE) 10 mg, oral, Every 6 hours PRN    sennosides (SENOKOT) 8.6-17.2 mg, oral, 2 times daily PRN    sildenafil (VIAGRA) 100 mg, oral, Daily PRN     sulfamethoxazole-trimethoprim (Bactrim DS) 800-160 mg tablet 1 tablet, oral, 3 times weekly, Take once daily on Monday, Wednesday, and Friday.    [START ON 1/29/2024] temozolomide (TEMODAR) 75 mg/m2, oral, Daily, Swallow capsules whole.  Do not crush, chew, open, or split capsule.    ubidecarenone (COENZYME Q10, BULK, MISC) 1 Capful, oral, Daily

## 2024-01-17 ENCOUNTER — HOSPITAL ENCOUNTER (OUTPATIENT)
Dept: RADIATION ONCOLOGY | Facility: HOSPITAL | Age: 69
Setting detail: RADIATION/ONCOLOGY SERIES
Discharge: HOME | End: 2024-01-17
Payer: MEDICARE

## 2024-01-17 ENCOUNTER — SPECIALTY PHARMACY (OUTPATIENT)
Dept: PHARMACY | Facility: CLINIC | Age: 69
End: 2024-01-17

## 2024-01-17 VITALS
SYSTOLIC BLOOD PRESSURE: 121 MMHG | HEART RATE: 89 BPM | RESPIRATION RATE: 18 BRPM | BODY MASS INDEX: 25.61 KG/M2 | TEMPERATURE: 98.4 F | WEIGHT: 163.14 LBS | DIASTOLIC BLOOD PRESSURE: 85 MMHG | HEIGHT: 67 IN | OXYGEN SATURATION: 97 %

## 2024-01-17 DIAGNOSIS — H53.9 VISUAL CHANGES: ICD-10-CM

## 2024-01-17 DIAGNOSIS — R94.31 ABNORMAL EKG: ICD-10-CM

## 2024-01-17 DIAGNOSIS — G93.89 BRAIN MASS: ICD-10-CM

## 2024-01-17 DIAGNOSIS — C71.9 GBM (GLIOBLASTOMA MULTIFORME) (MULTI): Primary | ICD-10-CM

## 2024-01-17 LAB
ELECTRONICALLY SIGNED BY: NORMAL
MGMT METHYLATION RESULT: NORMAL

## 2024-01-17 PROCEDURE — 99215 OFFICE O/P EST HI 40 MIN: CPT | Performed by: STUDENT IN AN ORGANIZED HEALTH CARE EDUCATION/TRAINING PROGRAM

## 2024-01-17 PROCEDURE — 77263 THER RADIOLOGY TX PLNG CPLX: CPT | Performed by: STUDENT IN AN ORGANIZED HEALTH CARE EDUCATION/TRAINING PROGRAM

## 2024-01-17 PROCEDURE — 99205 OFFICE O/P NEW HI 60 MIN: CPT | Performed by: STUDENT IN AN ORGANIZED HEALTH CARE EDUCATION/TRAINING PROGRAM

## 2024-01-17 ASSESSMENT — PATIENT HEALTH QUESTIONNAIRE - PHQ9
2. FEELING DOWN, DEPRESSED OR HOPELESS: NOT AT ALL
SUM OF ALL RESPONSES TO PHQ9 QUESTIONS 1 AND 2: 0
1. LITTLE INTEREST OR PLEASURE IN DOING THINGS: NOT AT ALL

## 2024-01-17 ASSESSMENT — COLUMBIA-SUICIDE SEVERITY RATING SCALE - C-SSRS
2. HAVE YOU ACTUALLY HAD ANY THOUGHTS OF KILLING YOURSELF?: NO
6. HAVE YOU EVER DONE ANYTHING, STARTED TO DO ANYTHING, OR PREPARED TO DO ANYTHING TO END YOUR LIFE?: NO
1. IN THE PAST MONTH, HAVE YOU WISHED YOU WERE DEAD OR WISHED YOU COULD GO TO SLEEP AND NOT WAKE UP?: NO

## 2024-01-17 ASSESSMENT — ENCOUNTER SYMPTOMS
OCCASIONAL FEELINGS OF UNSTEADINESS: 0
DEPRESSION: 0
LOSS OF SENSATION IN FEET: 1

## 2024-01-18 ENCOUNTER — LAB (OUTPATIENT)
Dept: LAB | Facility: LAB | Age: 69
End: 2024-01-18
Payer: MEDICARE

## 2024-01-18 ENCOUNTER — APPOINTMENT (OUTPATIENT)
Dept: PRIMARY CARE | Facility: CLINIC | Age: 69
End: 2024-01-18
Payer: MEDICARE

## 2024-01-18 DIAGNOSIS — C71.9 GBM (GLIOBLASTOMA MULTIFORME) (MULTI): ICD-10-CM

## 2024-01-18 LAB
ALBUMIN SERPL BCP-MCNC: 4.2 G/DL (ref 3.4–5)
ALP SERPL-CCNC: 98 U/L (ref 33–136)
ALT SERPL W P-5'-P-CCNC: 33 U/L (ref 10–52)
ANION GAP SERPL CALC-SCNC: 10 MMOL/L (ref 10–20)
AST SERPL W P-5'-P-CCNC: 18 U/L (ref 9–39)
BASOPHILS # BLD AUTO: 0.02 X10*3/UL (ref 0–0.1)
BASOPHILS NFR BLD AUTO: 0.3 %
BILIRUB SERPL-MCNC: 1.1 MG/DL (ref 0–1.2)
BUN SERPL-MCNC: 15 MG/DL (ref 6–23)
CALCIUM SERPL-MCNC: 9.5 MG/DL (ref 8.6–10.3)
CHLORIDE SERPL-SCNC: 103 MMOL/L (ref 98–107)
CO2 SERPL-SCNC: 31 MMOL/L (ref 21–32)
CREAT SERPL-MCNC: 0.89 MG/DL (ref 0.5–1.3)
EGFRCR SERPLBLD CKD-EPI 2021: >90 ML/MIN/1.73M*2
EOSINOPHIL # BLD AUTO: 0.12 X10*3/UL (ref 0–0.7)
EOSINOPHIL NFR BLD AUTO: 1.7 %
ERYTHROCYTE [DISTWIDTH] IN BLOOD BY AUTOMATED COUNT: 12.9 % (ref 11.5–14.5)
GLUCOSE SERPL-MCNC: 98 MG/DL (ref 74–99)
HBV CORE AB SER QL: NONREACTIVE
HBV SURFACE AB SER-ACNC: <3.1 MIU/ML
HBV SURFACE AG SERPL QL IA: NONREACTIVE
HCT VFR BLD AUTO: 45.9 % (ref 41–52)
HGB BLD-MCNC: 15.4 G/DL (ref 13.5–17.5)
IMM GRANULOCYTES # BLD AUTO: 0.02 X10*3/UL (ref 0–0.7)
IMM GRANULOCYTES NFR BLD AUTO: 0.3 % (ref 0–0.9)
LYMPHOCYTES # BLD AUTO: 2.48 X10*3/UL (ref 1.2–4.8)
LYMPHOCYTES NFR BLD AUTO: 34.2 %
MCH RBC QN AUTO: 31.8 PG (ref 26–34)
MCHC RBC AUTO-ENTMCNC: 33.6 G/DL (ref 32–36)
MCV RBC AUTO: 95 FL (ref 80–100)
MONOCYTES # BLD AUTO: 0.63 X10*3/UL (ref 0.1–1)
MONOCYTES NFR BLD AUTO: 8.7 %
NEUTROPHILS # BLD AUTO: 3.99 X10*3/UL (ref 1.2–7.7)
NEUTROPHILS NFR BLD AUTO: 54.8 %
NRBC BLD-RTO: 0 /100 WBCS (ref 0–0)
PLATELET # BLD AUTO: 201 X10*3/UL (ref 150–450)
POTASSIUM SERPL-SCNC: 4.2 MMOL/L (ref 3.5–5.3)
PROT SERPL-MCNC: 6.4 G/DL (ref 6.4–8.2)
RBC # BLD AUTO: 4.85 X10*6/UL (ref 4.5–5.9)
SODIUM SERPL-SCNC: 140 MMOL/L (ref 136–145)
WBC # BLD AUTO: 7.3 X10*3/UL (ref 4.4–11.3)

## 2024-01-18 PROCEDURE — 85025 COMPLETE CBC W/AUTO DIFF WBC: CPT

## 2024-01-18 PROCEDURE — 36415 COLL VENOUS BLD VENIPUNCTURE: CPT

## 2024-01-18 PROCEDURE — 86704 HEP B CORE ANTIBODY TOTAL: CPT

## 2024-01-18 PROCEDURE — 87340 HEPATITIS B SURFACE AG IA: CPT

## 2024-01-18 PROCEDURE — 86706 HEP B SURFACE ANTIBODY: CPT

## 2024-01-18 PROCEDURE — 80053 COMPREHEN METABOLIC PANEL: CPT

## 2024-01-18 ASSESSMENT — ENCOUNTER SYMPTOMS
SPEECH DIFFICULTY: 0
HEADACHES: 0
GASTROINTESTINAL NEGATIVE: 1
EYE PROBLEMS: 1
RESPIRATORY NEGATIVE: 1
CONSTITUTIONAL NEGATIVE: 1
EXTREMITY WEAKNESS: 0
SEIZURES: 0
DIZZINESS: 0
NUMBNESS: 0
CARDIOVASCULAR NEGATIVE: 1
PSYCHIATRIC NEGATIVE: 1

## 2024-01-18 NOTE — PROGRESS NOTES
Radiation Oncology Outpatient Consult    Patient Name:  Enmanuel Ahumada  MRN:  [id number]  :  1955    Referring Provider: No ref. provider found  Primary Care Provider: Ricardo Youngblood DO  Care Team: Patient Care Team:  Ricardo Youngblood DO as PCP - General  Ricardo Youngblood DO as PCP - [employer]vantage PCP  Kyle Rolon MD as Consulting Physician (Hematology and Oncology)    Date of Service: 2024     SUBJECTIVE  History of Present Illness:  Dash Ahumada is a 68 y.o. male with new diagnosis of likely glioblastoma of the right parieto-occipital lobe s/p GTR on 23. MGMT methylated, final pathology pending but suggestive of glioblastoma. Patient had initially presented with headache x 6 weeks, confusion x 3 weeks, gait instability, and left visual field cut.     The patient saw Dr. Rolon this week and has been consented for Temodar. He remains on Keppra 1000 mg bid and dexamethasone taper. He is accompanied by his wife at this visit. He endorses persistent left visual field cut and mild gait disturbance. He remains physically active and walks for exercise daily despite the above. He currently denies headaches or new sensorimotor deficits since his surgery.    Prior Radiotherapy:  No  Current Systemic Treatment:  No     Presence of Pacemaker or ICD:  No    Past Medical History:    Past Medical History:   Diagnosis Date    Brain cancer (CMS/HCC)         Past Surgical History:    Past Surgical History:   Procedure Laterality Date    BRAIN SURGERY      CHOLECYSTECTOMY  2020    OTHER SURGICAL HISTORY  2019    Colonoscopy    OTHER SURGICAL HISTORY  2021    Endoscopy    VASECTOMY  Abt         Family History:  Cancer-related family history is not on file.    Social History:    Social History     Tobacco Use    Smoking status: Never    Smokeless tobacco: Never   Substance Use Topics    Alcohol use: Not Currently    Drug use: Never       Allergies:  No Known  Allergies     Medications:    Current Outpatient Medications:     acetaminophen (Tylenol) 325 mg tablet, Take 2 tablets (650 mg) by mouth every 6 hours if needed for mild pain (1 - 3) (for mild post surgical pain)., Disp: 30 tablet, Rfl: 0    atorvastatin (Lipitor) 40 mg tablet, TAKE 1 TABLET BY MOUTH EVERY DAY, Disp: 90 tablet, Rfl: 3    CALCIUM CARBONATE-VITAMIN D3 ORAL, Take 1 capsule by mouth once daily., Disp: , Rfl:     krill-omega-3-dha-epa-lipids 851-42-25-50 mg capsule, Take 1 capsule by mouth once daily., Disp: , Rfl:     levETIRAcetam (Keppra) 1,000 mg tablet, Take 1 tablet (1,000 mg) by mouth 2 times a day., Disp: 60 tablet, Rfl: 1    ondansetron (Zofran) 8 mg tablet, Take 1 tablet (8 mg) by mouth every 8 hours if needed for nausea or vomiting. Take one hour prior to each chemotherapy dose in addition to every 8 hours as needed for nausea/vomiting, Disp: 60 tablet, Rfl: 5    pantoprazole (ProtoNix) 40 mg EC tablet, TAKE 1 TABLET BY MOUTH IN THE MORNING 30  minutes BEFORE breakfast, Disp: 30 tablet, Rfl: 0    prochlorperazine (Compazine) 10 mg tablet, Take 1 tablet (10 mg) by mouth every 6 hours if needed for nausea or vomiting., Disp: 60 tablet, Rfl: 5    sennosides (Senokot) 8.6 mg tablet, Take 1-2 tablets (8.6-17.2 mg) by mouth 2 times a day as needed for constipation., Disp: 120 tablet, Rfl: 5    sildenafil (Viagra) 100 mg tablet, Take 1 tablet (100 mg) by mouth once daily as needed for erectile dysfunction., Disp: 10 tablet, Rfl: 11    sulfamethoxazole-trimethoprim (Bactrim DS) 800-160 mg tablet, Take 1 tablet by mouth 3 times a week. Take once daily on Monday, Wednesday, and Friday., Disp: 12 tablet, Rfl: 11    [START ON 1/29/2024] temozolomide (Temodar) 100 mg capsule, Take 1 capsule (100 mg total) by mouth once daily.  Swallow capsules whole.  Do not crush, chew, open, or split capsule., Disp: 42 capsule, Rfl: 0    [START ON 1/29/2024] temozolomide (Temodar) 20 mg capsule, Take 1 capsule (20 mg  "total) by mouth once daily.  Swallow capsules whole.  Do not crush, chew, open, or split capsule., Disp: 42 capsule, Rfl: 0    [START ON 1/29/2024] temozolomide (Temodar) 5 mg capsule, Take 3 capsules (15 mg total) by mouth once daily.  Swallow capsules whole.  Do not crush, chew, open, or split capsule., Disp: 126 capsule, Rfl: 0    ubidecarenone (COENZYME Q10, BULK, MISC), Take 1 Capful by mouth once daily., Disp: , Rfl:     cholecalciferol (Vitamin D3) 50 mcg (2,000 unit) capsule, Take 1 capsule (50 mcg) by mouth once daily., Disp: , Rfl:     dexAMETHasone (Decadron) 1 mg tablet, Take 3 tablets (3 mg) by mouth 3 times a day for 2 days, THEN 2 tablets (2 mg) 3 times a day for 3 days, THEN 1 tablet (1 mg) 3 times a day for 3 days, THEN 1 tablet (1 mg) 2 times a day for 2 days, THEN 1 tablet (1 mg) once daily for 2 days., Disp: 51 tablet, Rfl: 0      Review of Systems:  Review of Systems   Constitutional: Negative.    HENT:   Negative for hearing loss.    Eyes:  Positive for eye problems.   Respiratory: Negative.     Cardiovascular: Negative.    Gastrointestinal: Negative.    Musculoskeletal:  Positive for gait problem.   Skin: Negative.    Neurological:  Positive for gait problem. Negative for dizziness, extremity weakness, headaches, numbness, seizures and speech difficulty.   Psychiatric/Behavioral: Negative.       The patient's current pain level was assessed.  They report currently having a pain of 0 out of 10.      Performance Status:  The Karnofsky performance scale today is 90, Able to carry on normal activity; minor signs or symptoms of disease (ECOG equivalent 0).        OBJECTIVE  Physical Exam:  /85   Pulse 89   Temp 36.9 °C (98.4 °F) (Temporal)   Resp 18   Ht 1.702 m (5' 7\")   Wt 74 kg (163 lb 2.3 oz)   SpO2 97%   BMI 25.55 kg/m²    Physical Exam  Constitutional:       General: He is not in acute distress.  Eyes:      General: Visual field deficit present.      Pupils: Pupils are equal, " round, and reactive to light.      Comments: No vision in left visual fields of b/l eyes (left temporal and right nasal)     Cardiovascular:      Rate and Rhythm: Normal rate.   Pulmonary:      Effort: No respiratory distress.   Abdominal:      General: There is no distension.   Musculoskeletal:      Right lower leg: No edema.      Left lower leg: No edema.   Skin:     General: Skin is warm and dry.   Neurological:      Mental Status: He is alert.      Cranial Nerves: No facial asymmetry.      Sensory: Sensation is intact.      Motor: No weakness.      Coordination: Finger-Nose-Finger Test normal.      Gait: Tandem walk abnormal. Gait normal.      Comments: Left visual field cut as noted above  CN III, IV, V, VI, VII, VIII, IX, XI, XII intact  Finger to nose impaired due to left visual field cut; however coordination intact   Psychiatric:         Mood and Affect: Mood normal.         Behavior: Behavior normal.       Laboratory Review:  There are no laboratory contraindications to radiation therapy.    Lab Results   Component Value Date    WBC 10.6 01/02/2024    HGB 16.2 01/02/2024    HCT 48.4 01/02/2024    MCV 94 01/02/2024     01/02/2024     Lab Results   Component Value Date    GLUCOSE 147 (H) 01/02/2024    CALCIUM 9.5 01/02/2024     01/02/2024    K 3.5 01/02/2024    CO2 27 01/02/2024     01/02/2024    BUN 26 (H) 01/02/2024    CREATININE 0.92 01/02/2024     Pathology Review:  pathology in process    Imaging:    MR BRAIN W AND WO IV CONTRAST;  12/31/2023 10:40 am        COMPARISON:  12/29/2023 postop CT and 12/26/2023 preop MR.      FINDINGS:  The right parieto-occipital mass has been resected. The surgical bed  has small amounts of fluid and thin band of hemorrhage around the  margin. There is minimal enhancement around the margin with no  nodular like enhancement identified.      The amount of air within the cavity has decreased from the  postoperative CT The surgical bed remains surrounded by  a band of  nonenhancing FLAIR/T2 white matter hyperintensity.      No new infarct, hemorrhage or mass is noted.      Overall mass-effect has significantly improved with better  visualization of the ambient cistern. The atrium of the right lateral  ventricle remains effaced and slightly displaced anteriorly in the  region of the surgical bed. The right cerebral sulci remain mildly  effaced in the area of the surgical bed as well.      The remaining ventricular system is now normal.      IMPRESSION:  Postoperative changes as noted above. There has been significant  improvement in mass effect since resection of the mass; the margins  of the surgical bed have minimal if any enhancement with no nodular  enhancement suggestive of residual tumor noted.     ASSESSMENT:  Enmanuel Ahumada is a 68 y.o. 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 Patient with residual gait instability and left visual field cut. He presents to discuss postoperative radiation as part of Stupp regimen (temodar-RT x 6 weeks) contingent on final pathology.      The role of radiation in postoperative treatment regimen was discussed today. Due to the location of Mr. Ahumada's cancer. He will be an excellent candidate for proton therapy. This will decrease integral dose to the brain stem, hippocampus and base of skull. I explained to Mr. Ahumada that unfortunately, proton therapy can only be delivered if approved by insurance company.     Short-term side effects of radiation including headache, fatigue, alopecia, and skin irritation were reviewed. Possible long term side effects after radiation completion including cerebral edema, radiation necrosis, damage to normal structures near the treatment area leading to neurologic deficits were also reviewed. The patient wished to proceed with radiation therapy and provided informed consent.    PLAN:  Concurrent chemoRT  Stupp regimen with radiation to postop cavity +margin 60Gy/30fx and 46 Gy In 23 fractions to the flair + margin.  Patient also agreeable to participating in [facility] imaging trial. Research team will review eligibility.    NCCN Guidelines were applicable to guide this patient's treatment plan.     The patient was assessed and plan discussed with the attending radiation oncologist Dr. Palomino.    Kateryna Rodriguez MD  PGY-2 Radiation Oncology Resident  On-call pager [phone]  Available on Epic Secure Chat

## 2024-01-19 ENCOUNTER — SPECIALTY PHARMACY (OUTPATIENT)
Dept: PHARMACY | Facility: CLINIC | Age: 69
End: 2024-01-19

## 2024-01-20 NOTE — ADDENDUM NOTE
Encounter addended by: Kateryna Rodriguez MD on: 1/20/2024 4:15 PM   Actions taken: Clinical Note Signed

## 2024-01-22 ENCOUNTER — HOSPITAL ENCOUNTER (OUTPATIENT)
Dept: RADIOLOGY | Facility: HOSPITAL | Age: 69
Discharge: HOME | End: 2024-01-22
Payer: MEDICARE

## 2024-01-22 ENCOUNTER — HOSPITAL ENCOUNTER (OUTPATIENT)
Dept: RADIOLOGY | Facility: EXTERNAL LOCATION | Age: 69
Discharge: HOME | End: 2024-01-22

## 2024-01-22 DIAGNOSIS — C71.9 GBM (GLIOBLASTOMA MULTIFORME) (MULTI): ICD-10-CM

## 2024-01-23 ENCOUNTER — HOSPITAL ENCOUNTER (OUTPATIENT)
Dept: RADIATION ONCOLOGY | Facility: HOSPITAL | Age: 69
Setting detail: RADIATION/ONCOLOGY SERIES
Discharge: HOME | End: 2024-01-23
Payer: MEDICARE

## 2024-01-23 ENCOUNTER — OFFICE VISIT (OUTPATIENT)
Dept: NEUROSURGERY | Facility: HOSPITAL | Age: 69
End: 2024-01-23
Payer: MEDICARE

## 2024-01-23 VITALS
WEIGHT: 156.53 LBS | DIASTOLIC BLOOD PRESSURE: 78 MMHG | BODY MASS INDEX: 27.73 KG/M2 | SYSTOLIC BLOOD PRESSURE: 127 MMHG | HEIGHT: 63 IN

## 2024-01-23 DIAGNOSIS — C71.9 GBM (GLIOBLASTOMA MULTIFORME) (MULTI): Primary | ICD-10-CM

## 2024-01-23 LAB
LAB AP ASR DISCLAIMER: NORMAL
LABORATORY COMMENT REPORT: NORMAL
Lab: NORMAL
PATH REPORT.ADDENDUM SPEC: NORMAL
PATH REPORT.COMMENTS IMP SPEC: NORMAL
PATH REPORT.FINAL DX SPEC: NORMAL
PATH REPORT.GROSS SPEC: NORMAL
PATH REPORT.RELEVANT HX SPEC: NORMAL
PATH REPORT.TOTAL CANCER: NORMAL

## 2024-01-23 PROCEDURE — 1111F DSCHRG MED/CURRENT MED MERGE: CPT | Performed by: NEUROLOGICAL SURGERY

## 2024-01-23 PROCEDURE — 88333 PATH CONSLTJ SURG CYTO XM 1: CPT | Performed by: PATHOLOGY

## 2024-01-23 PROCEDURE — 1126F AMNT PAIN NOTED NONE PRSNT: CPT | Performed by: NEUROLOGICAL SURGERY

## 2024-01-23 PROCEDURE — 99024 POSTOP FOLLOW-UP VISIT: CPT | Performed by: NEUROLOGICAL SURGERY

## 2024-01-23 PROCEDURE — 77334 RADIATION TREATMENT AID(S): CPT | Performed by: STUDENT IN AN ORGANIZED HEALTH CARE EDUCATION/TRAINING PROGRAM

## 2024-01-23 PROCEDURE — 1036F TOBACCO NON-USER: CPT | Performed by: NEUROLOGICAL SURGERY

## 2024-01-23 PROCEDURE — 88342 IMHCHEM/IMCYTCHM 1ST ANTB: CPT | Mod: TC,SUR | Performed by: NEUROLOGICAL SURGERY

## 2024-01-23 PROCEDURE — 77290 THER RAD SIMULAJ FIELD CPLX: CPT | Performed by: STUDENT IN AN ORGANIZED HEALTH CARE EDUCATION/TRAINING PROGRAM

## 2024-01-23 PROCEDURE — 88342 IMHCHEM/IMCYTCHM 1ST ANTB: CPT | Performed by: PATHOLOGY

## 2024-01-23 PROCEDURE — 88341 IMHCHEM/IMCYTCHM EA ADD ANTB: CPT | Performed by: PATHOLOGY

## 2024-01-23 PROCEDURE — 1159F MED LIST DOCD IN RCRD: CPT | Performed by: NEUROLOGICAL SURGERY

## 2024-01-23 NOTE — PROGRESS NOTES
"NEUROSURGERY EVALUATION    Diagnosis  Diagnoses and all orders for this visit:  GBM (glioblastoma multiforme) (CMS/HCC)        History of Present Illness  Enmanuel Ahumada is a 68-year-old male with a PMH significant for HLD, Osborn's esophagus, s/p cholecystectomy, on ASA 81mg for cardioprotection. Patient presented with headaches x 6 weeks, increased confusion, and gait instability where he was running into objects. CTH with RO hypodensity c/f mass. MRI PPF with complex fMRI with language dominant. TTE with EF of 60%. He is now s/p R craniotomy for tumor resection. CT with POC. EEG negative. MRI with GTR. Final surgical pathology consistent for glioblastoma. Patient was referred to radiation oncology and neuro-oncology. He was seen by Dr. Rolon with plans to continue Keppra  and decadron taper. Plan for concurrent chemoRT Stupp protocol with Temodar. Participating in [facility] imaging trial. Patient was seen for his wound check last Monday and returns to clinic to have his remaining staples to be removed.     He reports minimal headache; has some spatial difficulties and continued L visual field deficti      Vitals  /78   Ht 1.6 m (5' 3\")   Wt 71 kg (156 lb 8.4 oz)   BMI 27.73 kg/m²       Physical Exam  Awake and alert  Incision well healed - remaining staples removed  No drift  L VF cut  Gait steady  SANCHEZ with full strength      Relevant Results  MRI 12/31 with POC consistent with GTR       Provider Impressions    Patient doing well s/p GTR GBM approximately 6 weeks ago.  Advised to decrease Keppra to 500 mg po bid (no h/o seizure amd EEG inpatient negative) - will defer thereafter to Dr Rolon  Patient is undergoing RT planning; due for imaging next week  Will refer to OT for evaluation of residual spatial issues  We will see him in clinic prn       Previous History  Past Medical History:   Diagnosis Date    Brain cancer (CMS/HCC)          Social History     Tobacco Use    Smoking status: Never    " Smokeless tobacco: Never   Substance Use Topics    Alcohol use: Not Currently    Drug use: Never     No family history on file.  No Known Allergies  Current Outpatient Medications   Medication Instructions    acetaminophen (TYLENOL) 650 mg, oral, Every 6 hours PRN    atorvastatin (LIPITOR) 40 mg, oral, Daily    CALCIUM CARBONATE-VITAMIN D3 ORAL 1 capsule, oral, Daily    cholecalciferol (VITAMIN D3) 2,000 Units, oral, Daily    dexAMETHasone (Decadron) 1 mg tablet Take 3 tablets (3 mg) by mouth 3 times a day for 2 days, THEN 2 tablets (2 mg) 3 times a day for 3 days, THEN 1 tablet (1 mg) 3 times a day for 3 days, THEN 1 tablet (1 mg) 2 times a day for 2 days, THEN 1 tablet (1 mg) once daily for 2 days.    krill-omega-3-dha-epa-lipids 577-44-29-50 mg capsule 1 capsule, oral, Daily    levETIRAcetam (KEPPRA) 1,000 mg, oral, 2 times daily    ondansetron (ZOFRAN) 8 mg, oral, Every 8 hours PRN, Take one hour prior to each chemotherapy dose in addition to every 8 hours as needed for nausea/vomiting    pantoprazole (ProtoNix) 40 mg EC tablet TAKE 1 TABLET BY MOUTH IN THE MORNING 30  minutes BEFORE breakfast    prochlorperazine (COMPAZINE) 10 mg, oral, Every 6 hours PRN    sennosides (SENOKOT) 8.6-17.2 mg, oral, 2 times daily PRN    sildenafil (VIAGRA) 100 mg, oral, Daily PRN    sulfamethoxazole-trimethoprim (Bactrim DS) 800-160 mg tablet 1 tablet, oral, 3 times weekly, Take once daily on Monday, Wednesday, and Friday.    [START ON 1/29/2024] temozolomide (TEMODAR) 75 mg/m2, oral, Daily, Swallow capsules whole.  Do not crush, chew, open, or split capsule.    ubidecarenone (COENZYME Q10, BULK, MISC) 1 Capful, oral, Daily

## 2024-01-24 NOTE — DOCUMENTATION CLARIFICATION NOTE
"    PATIENT:               [facility]EN[facility] RIOS  [id number]  MRN:                       [id number]  :                       1955  ADMIT DATE:       2023 1:47 AM  DISCH DATE:        2024 6:59 PM  RESPONDING PROVIDER #:        [id number]          PROVIDER RESPONSE TEXT:    Pathology findings Primary malignant tumor    CDI QUERY TEXT:    [facility]_Path Results Complicated    Instruction:    Based on your assessment of the patient and the clinical information, please provide the requested documentation by clicking on the appropriate radio button and enter any additional information if prompted.    Question: Based on your assessment of the patient and the pathology findings, can the pathology findings be endorsed by providing the requested documentation to include if specimen is benign or malignant, primary or secondary and any metastatic sites.  Please answer all questions above and include diagnosis of disease    When answering this query, please exercise your independent professional judgment. The fact that a question is being asked, does not imply that any particular answer is desired or expected.    The patient's clinical indicators include:  Clinical Information:  Patient is a 68 year old male who presented with intermittent headaches and increased confusion.  Work up revealed a brain mass.    Clinical Indicators:  Surgical Pathology collected on 2023 and on 2024 resulted as, \" Final Diagnosis, Right brain mass, biopsy and removal: Malignant glioma, morphologically consistent with glioblastoma; positive for MGMT methylation \"    Treatment:  Craniotomy for tumor resection, frequent neuro checks, Dexamethasone, Keppra, follow up outpatient    Risk Factors:  Brain mass with brain compression and cerebral edema , headache, disorientation, visual loss, encephalopathy, visual disturbances  Options provided:  -- Pathology findings, Please specify additional information below  -- Other - " I will add my own diagnosis  -- Refer to Clinical Documentation Reviewer    Query created by: Rachel Hensley on 1/22/2024 2:32 PM      Electronically signed by:  RONNY COONEY MD 1/24/2024 8:54 AM

## 2024-01-25 DIAGNOSIS — Z00.6 ENCOUNTER FOR EXAMINATION FOR NORMAL COMPARISON AND CONTROL IN CLINICAL RESEARCH PROGRAM: Primary | ICD-10-CM

## 2024-01-25 DIAGNOSIS — C71.9 GBM (GLIOBLASTOMA MULTIFORME) (MULTI): Primary | ICD-10-CM

## 2024-01-25 NOTE — ADDENDUM NOTE
Encounter addended by: Evgeny Palomino MD on: 1/25/2024 12:38 PM   Actions taken: Level of Service modified, Clinical Note Signed

## 2024-01-26 ENCOUNTER — HOSPITAL ENCOUNTER (OUTPATIENT)
Dept: RADIOLOGY | Facility: HOSPITAL | Age: 69
End: 2024-01-26

## 2024-01-26 ENCOUNTER — HOSPITAL ENCOUNTER (OUTPATIENT)
Dept: RADIOLOGY | Facility: HOSPITAL | Age: 69
Discharge: HOME | End: 2024-01-26

## 2024-01-26 DIAGNOSIS — C71.9 GBM (GLIOBLASTOMA MULTIFORME) (MULTI): ICD-10-CM

## 2024-01-26 DIAGNOSIS — C71.9 MALIGNANT NEOPLASM OF BRAIN, UNSPECIFIED (MULTI): ICD-10-CM

## 2024-01-27 ENCOUNTER — HOSPITAL ENCOUNTER (OUTPATIENT)
Dept: RADIOLOGY | Facility: HOSPITAL | Age: 69
Discharge: HOME | End: 2024-01-27
Payer: MEDICARE

## 2024-01-27 PROCEDURE — 70553 MRI BRAIN STEM W/O & W/DYE: CPT | Performed by: RADIOLOGY

## 2024-01-27 PROCEDURE — 2550000001 HC RX 255 CONTRASTS: Performed by: STUDENT IN AN ORGANIZED HEALTH CARE EDUCATION/TRAINING PROGRAM

## 2024-01-27 PROCEDURE — A9575 INJ GADOTERATE MEGLUMI 0.1ML: HCPCS | Performed by: STUDENT IN AN ORGANIZED HEALTH CARE EDUCATION/TRAINING PROGRAM

## 2024-01-27 PROCEDURE — 70553 MRI BRAIN STEM W/O & W/DYE: CPT

## 2024-01-27 RX ORDER — GADOTERATE MEGLUMINE 376.9 MG/ML
15 INJECTION INTRAVENOUS
Status: COMPLETED | OUTPATIENT
Start: 2024-01-27 | End: 2024-01-27

## 2024-01-27 RX ADMIN — GADOTERATE MEGLUMINE 15 ML: 376.9 INJECTION INTRAVENOUS at 17:38

## 2024-01-29 ENCOUNTER — APPOINTMENT (OUTPATIENT)
Dept: HEMATOLOGY/ONCOLOGY | Facility: HOSPITAL | Age: 69
End: 2024-01-29
Payer: MEDICARE

## 2024-01-30 ENCOUNTER — TELEPHONE (OUTPATIENT)
Dept: ADMISSION | Facility: HOSPITAL | Age: 69
End: 2024-01-30
Payer: MEDICARE

## 2024-01-30 NOTE — TELEPHONE ENCOUNTER
Spouse called for pathology results noting that final path is now available.  She also inquires if a start date for chemo-radiation has been established?

## 2024-01-31 ENCOUNTER — SPECIALTY PHARMACY (OUTPATIENT)
Dept: PHARMACY | Facility: CLINIC | Age: 69
End: 2024-01-31

## 2024-02-01 ENCOUNTER — APPOINTMENT (OUTPATIENT)
Dept: RADIOLOGY | Facility: HOSPITAL | Age: 69
End: 2024-02-01
Payer: MEDICARE

## 2024-02-01 ENCOUNTER — HOSPITAL ENCOUNTER (OUTPATIENT)
Dept: RADIOLOGY | Facility: HOSPITAL | Age: 69
Discharge: HOME | End: 2024-02-01
Payer: MEDICARE

## 2024-02-01 DIAGNOSIS — C71.9 MALIGNANT NEOPLASM OF BRAIN, UNSPECIFIED (MULTI): ICD-10-CM

## 2024-02-01 PROCEDURE — 78815 PET IMAGE W/CT SKULL-THIGH: CPT

## 2024-02-02 ENCOUNTER — SPECIALTY PHARMACY (OUTPATIENT)
Dept: PHARMACY | Facility: CLINIC | Age: 69
End: 2024-02-02

## 2024-02-02 NOTE — TELEPHONE ENCOUNTER
"Rn explained that the pathology results are Glioblastoma MGMT Methylated and that the \"MGMT Methylated\" portion means that the tumor is more likely to respond to treatment.    Brittany inquires if there are special precautions that Dash should take while on treatment. Rn informed her that he should wear a mask if in a large crowd and if he is around someone ill. Frequent hand washing also emphasized.    Dash does not have his oral chemo yet.  specialty pharmacy reached out to them about a  large copay and sent paperwork to be completed to apply for financial assistance. Rn encouraged Brittany to complete the paperwork.  Brittany reports that Dash did see rad onc for radiation planning and to make his mask. However, they do not know when they are to start treatment.   Message sent to RadOnc  and NeuroOnc teams to coordinate start date.       "

## 2024-02-05 NOTE — ADDENDUM NOTE
Encounter addended by: Elizabeth Jaeger on: 2/5/2024 1:17 PM   Actions taken: Imaging Exam ended, MAR administration edited, MAR administration accepted

## 2024-02-06 ENCOUNTER — SPECIALTY PHARMACY (OUTPATIENT)
Dept: PHARMACY | Facility: CLINIC | Age: 69
End: 2024-02-06

## 2024-02-06 ENCOUNTER — APPOINTMENT (OUTPATIENT)
Dept: OCCUPATIONAL THERAPY | Facility: CLINIC | Age: 69
End: 2024-02-06

## 2024-02-06 DIAGNOSIS — K22.70 BARRETT'S ESOPHAGUS WITHOUT DYSPLASIA: ICD-10-CM

## 2024-02-06 RX ORDER — PANTOPRAZOLE SODIUM 40 MG/1
TABLET, DELAYED RELEASE ORAL
Qty: 30 TABLET | Refills: 0 | Status: SHIPPED | OUTPATIENT
Start: 2024-02-06 | End: 2024-04-12 | Stop reason: SDUPTHER

## 2024-02-07 ENCOUNTER — SPECIALTY PHARMACY (OUTPATIENT)
Dept: PHARMACY | Facility: CLINIC | Age: 69
End: 2024-02-07

## 2024-02-07 NOTE — TELEPHONE ENCOUNTER
An update for reference:    Pending financial assistance approval for temozolomide from [facility] - application submitted on 2/6/24 and can take up to 5 business days to receive a response. Estimated chemoradiation start date to be on/around 2/14/24.

## 2024-02-08 ENCOUNTER — HOSPITAL ENCOUNTER (OUTPATIENT)
Dept: RADIATION ONCOLOGY | Facility: HOSPITAL | Age: 69
Setting detail: RADIATION/ONCOLOGY SERIES
Discharge: HOME | End: 2024-02-08
Payer: MEDICARE

## 2024-02-08 PROCEDURE — 77295 3-D RADIOTHERAPY PLAN: CPT | Performed by: STUDENT IN AN ORGANIZED HEALTH CARE EDUCATION/TRAINING PROGRAM

## 2024-02-08 PROCEDURE — 77300 RADIATION THERAPY DOSE PLAN: CPT | Performed by: STUDENT IN AN ORGANIZED HEALTH CARE EDUCATION/TRAINING PROGRAM

## 2024-02-09 ENCOUNTER — SPECIALTY PHARMACY (OUTPATIENT)
Dept: PHARMACY | Facility: CLINIC | Age: 69
End: 2024-02-09

## 2024-02-09 PROCEDURE — RXMED WILLOW AMBULATORY MEDICATION CHARGE

## 2024-02-13 ENCOUNTER — SPECIALTY PHARMACY (OUTPATIENT)
Dept: PHARMACY | Facility: CLINIC | Age: 69
End: 2024-02-13

## 2024-02-13 ENCOUNTER — PHARMACY VISIT (OUTPATIENT)
Dept: PHARMACY | Facility: CLINIC | Age: 69
End: 2024-02-13
Payer: COMMERCIAL

## 2024-02-13 LAB
ATRIAL RATE: 80 BPM
P AXIS: 55 DEGREES
P OFFSET: 174 MS
P ONSET: 152 MS
PR INTERVAL: 118 MS
Q ONSET: 211 MS
QRS COUNT: 13 BEATS
QRS DURATION: 92 MS
QT INTERVAL: 376 MS
QTC CALCULATION(BAZETT): 433 MS
QTC FREDERICIA: 414 MS
R AXIS: -34 DEGREES
T AXIS: 6 DEGREES
T OFFSET: 399 MS
VENTRICULAR RATE: 80 BPM

## 2024-02-13 NOTE — PROGRESS NOTES
[facility] Clinical Note    Dash Ahumada is a 68 y.o. male, who is on the specialty pharmacy service for management of: Oncology Core with status of: (Enrolled)     Enmanuel was contacted on 2/13/2024.    Refer to the encounter summary report for documentation details about patient counseling and education.      Medication Adherence  The importance of adherence was discussed with the patient and they were advised to take the medication as prescribed by their provider. Enmanuel was encouraged to call his physician's office if they have a question regarding a missed dose.        Patient advised to contact the pharmacy if there are any changes to her medication list, including prescriptions, OTC medications, herbal products, or supplements. Patient was advised of [facility]’s dispensing process, refill timeline, contact information ([phone]), and patient management follow up. Patient confirmed understanding of education conducted during assessment. All patient questions and concerns were addressed to the best of my ability. Patient was encouraged to contact the specialty pharmacy with any questions or concerns.    Confirmed follow-up outreaches are properly scheduled. Reviewed goals of therapy in the program targets.    Sonia Paul, PharmD

## 2024-02-15 ENCOUNTER — HOSPITAL ENCOUNTER (OUTPATIENT)
Dept: RADIATION ONCOLOGY | Facility: HOSPITAL | Age: 69
Setting detail: RADIATION/ONCOLOGY SERIES
Discharge: HOME | End: 2024-02-15
Payer: MEDICARE

## 2024-02-15 ENCOUNTER — APPOINTMENT (OUTPATIENT)
Dept: RADIATION ONCOLOGY | Facility: HOSPITAL | Age: 69
End: 2024-02-15
Payer: MEDICARE

## 2024-02-15 DIAGNOSIS — C71.8 MALIGNANT NEOPLASM OF OVERLAPPING SITES OF BRAIN (MULTI): ICD-10-CM

## 2024-02-15 LAB
RAD ONC MSQ ACTUAL FRACTIONS DELIVERED: 1
RAD ONC MSQ ACTUAL SESSION BIOLOGICAL DOSE: 200 CCGE
RAD ONC MSQ ACTUAL SESSION DELIVERED DOSE: 182 CGRAY
RAD ONC MSQ ACTUAL TOTAL BIOLOGICAL DOSE: 200 CCGE
RAD ONC MSQ ACTUAL TOTAL DOSE: 182 CGRAY
RAD ONC MSQ ELAPSED DAYS: 0
RAD ONC MSQ LAST DATE: NORMAL
RAD ONC MSQ PRESCRIBED BIOLOGICAL FRACTIONAL DOSE: 200 CCGE
RAD ONC MSQ PRESCRIBED BIOLOGICAL TOTAL DOSE: 4600 CCGE
RAD ONC MSQ PRESCRIBED FRACTIONAL DOSE: 182 CGRAY
RAD ONC MSQ PRESCRIBED NUMBER OF FRACTIONS: 23
RAD ONC MSQ PRESCRIBED TECHNIQUE: NORMAL
RAD ONC MSQ PRESCRIBED TOTAL DOSE: 4182 CGRAY
RAD ONC MSQ START DATE: NORMAL
RAD ONC MSQ TREATMENT COURSE NUMBER: 1
RAD ONC MSQ TREATMENT SITE: NORMAL

## 2024-02-15 PROCEDURE — 77280 THER RAD SIMULAJ FIELD SMPL: CPT | Performed by: STUDENT IN AN ORGANIZED HEALTH CARE EDUCATION/TRAINING PROGRAM

## 2024-02-15 PROCEDURE — 77321 SPECIAL TELETX PORT PLAN: CPT | Performed by: STUDENT IN AN ORGANIZED HEALTH CARE EDUCATION/TRAINING PROGRAM

## 2024-02-15 PROCEDURE — 77523 PROTON TRMT INTERMEDIATE: CPT | Performed by: STUDENT IN AN ORGANIZED HEALTH CARE EDUCATION/TRAINING PROGRAM

## 2024-02-15 PROCEDURE — 77334 RADIATION TREATMENT AID(S): CPT | Mod: 59 | Performed by: STUDENT IN AN ORGANIZED HEALTH CARE EDUCATION/TRAINING PROGRAM

## 2024-02-15 PROCEDURE — 77334 RADIATION TREATMENT AID(S): CPT | Performed by: STUDENT IN AN ORGANIZED HEALTH CARE EDUCATION/TRAINING PROGRAM

## 2024-02-16 ENCOUNTER — HOSPITAL ENCOUNTER (OUTPATIENT)
Dept: RADIATION ONCOLOGY | Facility: HOSPITAL | Age: 69
Setting detail: RADIATION/ONCOLOGY SERIES
Discharge: HOME | End: 2024-02-16
Payer: MEDICARE

## 2024-02-16 ENCOUNTER — TELEPHONE (OUTPATIENT)
Dept: RADIATION ONCOLOGY | Facility: HOSPITAL | Age: 69
End: 2024-02-16
Payer: MEDICARE

## 2024-02-16 DIAGNOSIS — Z51.0 ENCOUNTER FOR ANTINEOPLASTIC RADIATION THERAPY: ICD-10-CM

## 2024-02-16 DIAGNOSIS — C71.8 MALIGNANT NEOPLASM OF OVERLAPPING SITES OF BRAIN (MULTI): ICD-10-CM

## 2024-02-16 LAB
RAD ONC MSQ ACTUAL FRACTIONS DELIVERED: 2
RAD ONC MSQ ACTUAL SESSION BIOLOGICAL DOSE: 200 CCGE
RAD ONC MSQ ACTUAL SESSION DELIVERED DOSE: 182 CGRAY
RAD ONC MSQ ACTUAL TOTAL BIOLOGICAL DOSE: 400 CCGE
RAD ONC MSQ ACTUAL TOTAL DOSE: 364 CGRAY
RAD ONC MSQ ELAPSED DAYS: 1
RAD ONC MSQ LAST DATE: NORMAL
RAD ONC MSQ PRESCRIBED BIOLOGICAL FRACTIONAL DOSE: 200 CCGE
RAD ONC MSQ PRESCRIBED BIOLOGICAL TOTAL DOSE: 4600 CCGE
RAD ONC MSQ PRESCRIBED FRACTIONAL DOSE: 182 CGRAY
RAD ONC MSQ PRESCRIBED NUMBER OF FRACTIONS: 23
RAD ONC MSQ PRESCRIBED TECHNIQUE: NORMAL
RAD ONC MSQ PRESCRIBED TOTAL DOSE: 4182 CGRAY
RAD ONC MSQ START DATE: NORMAL
RAD ONC MSQ TREATMENT COURSE NUMBER: 1
RAD ONC MSQ TREATMENT SITE: NORMAL

## 2024-02-16 PROCEDURE — 77523 PROTON TRMT INTERMEDIATE: CPT | Performed by: STUDENT IN AN ORGANIZED HEALTH CARE EDUCATION/TRAINING PROGRAM

## 2024-02-16 PROCEDURE — 77336 RADIATION PHYSICS CONSULT: CPT | Performed by: STUDENT IN AN ORGANIZED HEALTH CARE EDUCATION/TRAINING PROGRAM

## 2024-02-16 PROCEDURE — G6002 STEREOSCOPIC X-RAY GUIDANCE: HCPCS | Performed by: STUDENT IN AN ORGANIZED HEALTH CARE EDUCATION/TRAINING PROGRAM

## 2024-02-16 PROCEDURE — 77387 GUIDANCE FOR RADJ TX DLVR: CPT | Performed by: STUDENT IN AN ORGANIZED HEALTH CARE EDUCATION/TRAINING PROGRAM

## 2024-02-16 NOTE — TELEPHONE ENCOUNTER
Spoke to patient about their treatment being on hold for now due to machine issues. Will keep patient updated if and when machine comes back up.

## 2024-02-19 ENCOUNTER — HOSPITAL ENCOUNTER (OUTPATIENT)
Dept: RADIATION ONCOLOGY | Facility: HOSPITAL | Age: 69
Setting detail: RADIATION/ONCOLOGY SERIES
Discharge: HOME | End: 2024-02-19
Payer: MEDICARE

## 2024-02-19 ENCOUNTER — OFFICE VISIT (OUTPATIENT)
Dept: HEMATOLOGY/ONCOLOGY | Facility: HOSPITAL | Age: 69
End: 2024-02-19
Payer: MEDICARE

## 2024-02-19 ENCOUNTER — TELEPHONE (OUTPATIENT)
Dept: HEMATOLOGY/ONCOLOGY | Facility: HOSPITAL | Age: 69
End: 2024-02-19

## 2024-02-19 ENCOUNTER — APPOINTMENT (OUTPATIENT)
Dept: HEMATOLOGY/ONCOLOGY | Facility: HOSPITAL | Age: 69
End: 2024-02-19
Payer: MEDICARE

## 2024-02-19 VITALS
SYSTOLIC BLOOD PRESSURE: 117 MMHG | BODY MASS INDEX: 25.57 KG/M2 | DIASTOLIC BLOOD PRESSURE: 67 MMHG | RESPIRATION RATE: 18 BRPM | HEART RATE: 82 BPM | TEMPERATURE: 98.4 F | OXYGEN SATURATION: 100 % | WEIGHT: 162.9 LBS | HEIGHT: 67 IN

## 2024-02-19 DIAGNOSIS — C71.8 MALIGNANT NEOPLASM OF OVERLAPPING SITES OF BRAIN (MULTI): ICD-10-CM

## 2024-02-19 DIAGNOSIS — Z51.0 ENCOUNTER FOR ANTINEOPLASTIC RADIATION THERAPY: ICD-10-CM

## 2024-02-19 DIAGNOSIS — Z51.11 ENCOUNTER FOR CHEMOTHERAPY MANAGEMENT: Primary | ICD-10-CM

## 2024-02-19 DIAGNOSIS — C71.9 GBM (GLIOBLASTOMA MULTIFORME) (MULTI): ICD-10-CM

## 2024-02-19 LAB
RAD ONC MSQ ACTUAL FRACTIONS DELIVERED: 3
RAD ONC MSQ ACTUAL SESSION BIOLOGICAL DOSE: 200 CCGE
RAD ONC MSQ ACTUAL SESSION DELIVERED DOSE: 182 CGRAY
RAD ONC MSQ ACTUAL TOTAL BIOLOGICAL DOSE: 601 CCGE
RAD ONC MSQ ACTUAL TOTAL DOSE: 546 CGRAY
RAD ONC MSQ ELAPSED DAYS: 4
RAD ONC MSQ LAST DATE: NORMAL
RAD ONC MSQ PRESCRIBED BIOLOGICAL FRACTIONAL DOSE: 200 CCGE
RAD ONC MSQ PRESCRIBED BIOLOGICAL TOTAL DOSE: 4600 CCGE
RAD ONC MSQ PRESCRIBED FRACTIONAL DOSE: 182 CGRAY
RAD ONC MSQ PRESCRIBED NUMBER OF FRACTIONS: 23
RAD ONC MSQ PRESCRIBED TECHNIQUE: NORMAL
RAD ONC MSQ PRESCRIBED TOTAL DOSE: 4182 CGRAY
RAD ONC MSQ START DATE: NORMAL
RAD ONC MSQ TREATMENT COURSE NUMBER: 1
RAD ONC MSQ TREATMENT SITE: NORMAL

## 2024-02-19 PROCEDURE — 1157F ADVNC CARE PLAN IN RCRD: CPT

## 2024-02-19 PROCEDURE — 1126F AMNT PAIN NOTED NONE PRSNT: CPT

## 2024-02-19 PROCEDURE — G6002 STEREOSCOPIC X-RAY GUIDANCE: HCPCS | Performed by: STUDENT IN AN ORGANIZED HEALTH CARE EDUCATION/TRAINING PROGRAM

## 2024-02-19 PROCEDURE — 1159F MED LIST DOCD IN RCRD: CPT

## 2024-02-19 PROCEDURE — 99215 OFFICE O/P EST HI 40 MIN: CPT

## 2024-02-19 PROCEDURE — 77387 GUIDANCE FOR RADJ TX DLVR: CPT | Performed by: STUDENT IN AN ORGANIZED HEALTH CARE EDUCATION/TRAINING PROGRAM

## 2024-02-19 PROCEDURE — 1036F TOBACCO NON-USER: CPT

## 2024-02-19 PROCEDURE — 77523 PROTON TRMT INTERMEDIATE: CPT | Performed by: STUDENT IN AN ORGANIZED HEALTH CARE EDUCATION/TRAINING PROGRAM

## 2024-02-19 RX ORDER — LEVETIRACETAM 500 MG/1
500 TABLET ORAL 2 TIMES DAILY
Qty: 60 TABLET | Refills: 3 | Status: SHIPPED | OUTPATIENT
Start: 2024-02-19 | End: 2024-06-18

## 2024-02-19 ASSESSMENT — ENCOUNTER SYMPTOMS
LEG SWELLING: 0
COUGH: 0
SEIZURES: 0
VOMITING: 0
ABDOMINAL PAIN: 0
SHORTNESS OF BREATH: 0
HEADACHES: 1
EXTREMITY WEAKNESS: 0
CONSTIPATION: 1
CHILLS: 0
FEVER: 0
NAUSEA: 0
SPEECH DIFFICULTY: 0

## 2024-02-19 ASSESSMENT — PAIN SCALES - GENERAL: PAINLEVEL: 0-NO PAIN

## 2024-02-19 ASSESSMENT — PATIENT HEALTH QUESTIONNAIRE - PHQ9
1. LITTLE INTEREST OR PLEASURE IN DOING THINGS: NOT AT ALL
2. FEELING DOWN, DEPRESSED OR HOPELESS: NOT AT ALL
SUM OF ALL RESPONSES TO PHQ9 QUESTIONS 1 AND 2: 0

## 2024-02-19 NOTE — PROGRESS NOTES
[facility]  Neuro-Oncology    Patient: Enmanuel Ahumada  MRN: [id number]  Date of visit: 02/19/24  Visit type: In-person clinic visit  Clinician: Jacki Simpson PA-C    Oncological & Treatment History:  Oncology History Overview Note   68 year old left-handed male with h/o HLD, cholecystectomy, Osborn's esophagus, on ASA 81 for cardioprotection presented to [facility] ED on 12/24/23 with 6 weeks of headaches, 3 weeks of increased confusion and running into things. Left-sided hemianopia on exam. CT head revealed a 1 mm midline leftward shift with a 7.3 cm x 7.1 cm x 5 cm right parietal mass. Transferred to [facility] for neurosurgical management.     GBM (glioblastoma multiforme) (CMS/HCC)   12/29/2023 Surgery    Right craniotomy for tumor resection (Ritu Carrasco MD)     12/29/2023 Pathology    Final diagnosis reported 1/18/24:  A, B.  Right brain mass, biopsy and removal:  - Malignant glioma, morphologically consistent with glioblastoma; positive for MGMT methylation     12/29/2023 Initial Diagnosis    GBM (glioblastoma multiforme) (CMS/HCC)     1/3/2024 Tumor Board    [facility] Tumor Board tumor board recommendations: Referral to radiation oncology and neuro-oncology.       2/15/2024 -  Chemotherapy    Temozolomide with Concurrent Radiation, 42 Day Cycles           Interval History (NEWEST at BOTTOM of section):  1/15/2024: Since getting discharged to home, he has been slowly recovering from the surgery. The final pathology is still pending, but it is likely GBM. He notes some persistent incisional pain around the staples but no regular headaches now (resolved). He has some minor confusion, mild loss of balance, difficulty using his computer and phone, and a loss of vision to the left. He remains on Keppra 1000 mg bid (although he claims he has never had a verified seizure), and is tapering off of Decadron. He is seeing Dr. Palomino from Rad Onc soon to begin RT planning. He is  seeing Neurosurgery today to remove the staples.    02/19/24: Enmanuel Ahumada presents to neuro-oncology clinic today for follow-up during early chemoradiation (2/15/24 to 3/27/24). He is accompanied by his wife, Brittany, and granddaughter, Rosendo. He is tolerating chemoRT well thus far without nausea, vomiting or significant fatigue. He does have constipation, but it is manageable with sennosides. States his symptoms at initial presentation have improved - much less confusion, still has the left visual field deficit but is learning to adapt and not run into things as much. Endorses occasional mild headaches (1 or 2/10) that resolve spontaneously or with Tylenol. Not on any dexamethasone at this time. Remains on Keppra 500 mg BID. No history of seizure. No focal weakness or gait difficulty. Notes walking up stairs requires more concentration than in the past and he is unable to walk 2-3 miles like he used to. Prior to diagnosis, he went to the gym 3x/week. Still able to walk 1 mile outside if the weather is nice.    Review of Systems:  Review of Systems   Constitutional:  Negative for chills and fever.   Respiratory:  Negative for cough and shortness of breath.    Cardiovascular:  Negative for chest pain and leg swelling.   Gastrointestinal:  Positive for constipation. Negative for abdominal pain, nausea and vomiting.   Musculoskeletal:  Negative for gait problem.   Neurological:  Positive for headaches. Negative for extremity weakness, gait problem, seizures and speech difficulty.   All other systems reviewed and are negative.      Social History:  Social History     Tobacco Use    Smoking status: Never    Smokeless tobacco: Never   Substance Use Topics    Alcohol use: Not Currently    Drug use: Never     Maintains a gratitude journal  Walks about 1 mile when the weather allows   (wife, Brittany)    Past Medical History:  Past Medical History:  No date: Brain cancer (CMS/HCC)    Past Surgical  "History:  Past Surgical History:  No date: BRAIN SURGERY  January 25, 2020: CHOLECYSTECTOMY  07/24/2019: OTHER SURGICAL HISTORY      Comment:  Colonoscopy  07/26/2021: OTHER SURGICAL HISTORY      Comment:  Endoscopy  Abt 1991: VASECTOMY    Family History:  family history is not on file.    Performance Score:  Karnofsky Score: 90 - Able to carry on normal activity; minor signs or symptoms of disease     Vital Signs:  /67 (BP Location: Left arm, Patient Position: Sitting, BP Cuff Size: Adult)   Pulse 82   Temp 36.9 °C (98.4 °F)   Resp 18   Ht 1.702 m (5' 7.01\")   Wt 73.9 kg (162 lb 14.4 oz)   SpO2 100%   BMI 25.51 kg/m²     Physical Exam:  Physical Exam  Vitals reviewed.   Constitutional:       General: He is not in acute distress.     Appearance: Normal appearance. He is not toxic-appearing.   HENT:      Head: Normocephalic and atraumatic.   Eyes:      Extraocular Movements: Extraocular movements intact.      Conjunctiva/sclera: Conjunctivae normal.      Pupils: Pupils are equal, round, and reactive to light.   Pulmonary:      Effort: Pulmonary effort is normal. No respiratory distress.   Abdominal:      General: Abdomen is flat.   Musculoskeletal:         General: Normal range of motion.      Cervical back: Normal range of motion and neck supple.   Skin:     General: Skin is dry.   Neurological:      General: No focal deficit present.      Mental Status: He is alert and oriented to person, place, and time.      Gait: Gait normal.   Psychiatric:         Mood and Affect: Mood and affect normal.         Behavior: Behavior normal.         Results:  Lab Results   Component Value Date    WBC 7.3 01/18/2024    HGB 15.4 01/18/2024    HCT 45.9 01/18/2024    MCV 95 01/18/2024     01/18/2024       Lab Results   Component Value Date    GLUCOSE 98 01/18/2024    CALCIUM 9.5 01/18/2024     01/18/2024    K 4.2 01/18/2024    CO2 31 01/18/2024     01/18/2024    BUN 15 01/18/2024    CREATININE 0.89 " 01/18/2024       Lab Results   Component Value Date    ALT 33 01/18/2024    AST 18 01/18/2024    ALKPHOS 98 01/18/2024    BILITOT 1.1 01/18/2024     === 01/22/24 ===    MR BRAIN W AND WO CONTRAST    - Impression -  Redemonstrated are postsurgical changes of a right sided craniotomy.  There is an extra-axial fluid collection deep to the craniotomy site  measuring 1 cm, increased from the prior exam. There is thickened  nodular enhancement along the margins of the fluid collection.    There is a surgical resection cavity within the right parietal lobe  which is increased in size since the prior exam measuring 4.2 x 3.1  cm containing blood products. There is a rim of enhancement around  the surgical resection cavity. There is T2 and FLAIR hyperintense  signal surrounding the surgical resection cavity most pronounced  along the anterior margin within the temporoparietal region. There is  increased signal within the splenium of the corpus callosum on the  right.    Few nonspecific punctate foci of white matter signal are presumed  microangiopathy.    Mild mucosal thickening within the left frontal and right maxillary  sinus.    MACRO:  None    Signed by: Kavya Chauhan 1/29/2024 9:10 AM  Dictation workstation:   [id number]      Assessment & Plan:  Enmanuel Ahumada is a 68 y.o. male with PMH of HLD, Osborn's esophagus, ASA for cardioprotection, and a malignant glioma s/p surgical resection (12/2023). Final path morphologically consistent with glioblastoma, +MGMT promoter methylation. Currently undergoing chemoradiation (started 2/15/24). Tolerating well. Clinically stable. Continue current treatment.    1. Glioblastoma  Treatment: Temozolomide daily concurrently with radiation  Dose: 75 mg/m2/day  Supportive meds: Zofran 30-60 min prior to TMZ, sennosides for constipation, Compazine if needed for breakthrough n/v, Bactrim MWF for PJP prophylaxis  Weekly labs to monitor for toxicities -- will go on  Thursdays  Optune -- patient would like to pursue this line of treatment, which would be after chemoradiation and in addition to temozolomide cycles  [facility] advanced molecular testing -- will follow-up with team whether this was already sent out; if not, will send new requisition today  Follow-up on 3/25 for reassessment near the end of chemoradiation

## 2024-02-20 ENCOUNTER — HOSPITAL ENCOUNTER (OUTPATIENT)
Dept: RADIATION ONCOLOGY | Facility: HOSPITAL | Age: 69
Setting detail: RADIATION/ONCOLOGY SERIES
Discharge: HOME | End: 2024-02-20
Payer: MEDICARE

## 2024-02-20 DIAGNOSIS — Z51.0 ENCOUNTER FOR ANTINEOPLASTIC RADIATION THERAPY: ICD-10-CM

## 2024-02-20 DIAGNOSIS — C71.8 MALIGNANT NEOPLASM OF OVERLAPPING SITES OF BRAIN (MULTI): ICD-10-CM

## 2024-02-20 LAB
RAD ONC MSQ ACTUAL FRACTIONS DELIVERED: 4
RAD ONC MSQ ACTUAL SESSION BIOLOGICAL DOSE: 200 CCGE
RAD ONC MSQ ACTUAL SESSION DELIVERED DOSE: 182 CGRAY
RAD ONC MSQ ACTUAL TOTAL BIOLOGICAL DOSE: 801 CCGE
RAD ONC MSQ ACTUAL TOTAL DOSE: 728 CGRAY
RAD ONC MSQ ELAPSED DAYS: 5
RAD ONC MSQ LAST DATE: NORMAL
RAD ONC MSQ PRESCRIBED BIOLOGICAL FRACTIONAL DOSE: 200 CCGE
RAD ONC MSQ PRESCRIBED BIOLOGICAL TOTAL DOSE: 4600 CCGE
RAD ONC MSQ PRESCRIBED FRACTIONAL DOSE: 182 CGRAY
RAD ONC MSQ PRESCRIBED NUMBER OF FRACTIONS: 23
RAD ONC MSQ PRESCRIBED TECHNIQUE: NORMAL
RAD ONC MSQ PRESCRIBED TOTAL DOSE: 4182 CGRAY
RAD ONC MSQ START DATE: NORMAL
RAD ONC MSQ TREATMENT COURSE NUMBER: 1
RAD ONC MSQ TREATMENT SITE: NORMAL

## 2024-02-20 PROCEDURE — 77387 GUIDANCE FOR RADJ TX DLVR: CPT | Performed by: STUDENT IN AN ORGANIZED HEALTH CARE EDUCATION/TRAINING PROGRAM

## 2024-02-20 PROCEDURE — G6002 STEREOSCOPIC X-RAY GUIDANCE: HCPCS | Performed by: STUDENT IN AN ORGANIZED HEALTH CARE EDUCATION/TRAINING PROGRAM

## 2024-02-20 PROCEDURE — 77523 PROTON TRMT INTERMEDIATE: CPT | Performed by: STUDENT IN AN ORGANIZED HEALTH CARE EDUCATION/TRAINING PROGRAM

## 2024-02-21 ENCOUNTER — HOSPITAL ENCOUNTER (OUTPATIENT)
Dept: RADIATION ONCOLOGY | Facility: HOSPITAL | Age: 69
Setting detail: RADIATION/ONCOLOGY SERIES
Discharge: HOME | End: 2024-02-21
Payer: MEDICARE

## 2024-02-21 ENCOUNTER — RADIATION ONCOLOGY OTV (OUTPATIENT)
Dept: RADIATION ONCOLOGY | Facility: HOSPITAL | Age: 69
End: 2024-02-21
Payer: MEDICARE

## 2024-02-21 VITALS
TEMPERATURE: 97.2 F | DIASTOLIC BLOOD PRESSURE: 84 MMHG | OXYGEN SATURATION: 96 % | BODY MASS INDEX: 25.43 KG/M2 | RESPIRATION RATE: 18 BRPM | SYSTOLIC BLOOD PRESSURE: 122 MMHG | WEIGHT: 162.4 LBS | HEART RATE: 76 BPM

## 2024-02-21 DIAGNOSIS — C71.8 MALIGNANT NEOPLASM OF OVERLAPPING SITES OF BRAIN (MULTI): ICD-10-CM

## 2024-02-21 DIAGNOSIS — Z51.0 ENCOUNTER FOR ANTINEOPLASTIC RADIATION THERAPY: ICD-10-CM

## 2024-02-21 LAB
RAD ONC MSQ ACTUAL FRACTIONS DELIVERED: 5
RAD ONC MSQ ACTUAL SESSION BIOLOGICAL DOSE: 200 CCGE
RAD ONC MSQ ACTUAL SESSION DELIVERED DOSE: 182 CGRAY
RAD ONC MSQ ACTUAL TOTAL BIOLOGICAL DOSE: 1001 CCGE
RAD ONC MSQ ACTUAL TOTAL DOSE: 910 CGRAY
RAD ONC MSQ ELAPSED DAYS: 6
RAD ONC MSQ LAST DATE: NORMAL
RAD ONC MSQ PRESCRIBED BIOLOGICAL FRACTIONAL DOSE: 200 CCGE
RAD ONC MSQ PRESCRIBED BIOLOGICAL TOTAL DOSE: 4600 CCGE
RAD ONC MSQ PRESCRIBED FRACTIONAL DOSE: 182 CGRAY
RAD ONC MSQ PRESCRIBED NUMBER OF FRACTIONS: 23
RAD ONC MSQ PRESCRIBED TECHNIQUE: NORMAL
RAD ONC MSQ PRESCRIBED TOTAL DOSE: 4182 CGRAY
RAD ONC MSQ START DATE: NORMAL
RAD ONC MSQ TREATMENT COURSE NUMBER: 1
RAD ONC MSQ TREATMENT SITE: NORMAL

## 2024-02-21 PROCEDURE — 77427 RADIATION TX MANAGEMENT X5: CPT | Performed by: STUDENT IN AN ORGANIZED HEALTH CARE EDUCATION/TRAINING PROGRAM

## 2024-02-21 PROCEDURE — G6002 STEREOSCOPIC X-RAY GUIDANCE: HCPCS | Performed by: STUDENT IN AN ORGANIZED HEALTH CARE EDUCATION/TRAINING PROGRAM

## 2024-02-21 PROCEDURE — 77523 PROTON TRMT INTERMEDIATE: CPT | Performed by: STUDENT IN AN ORGANIZED HEALTH CARE EDUCATION/TRAINING PROGRAM

## 2024-02-21 PROCEDURE — 77387 GUIDANCE FOR RADJ TX DLVR: CPT | Performed by: STUDENT IN AN ORGANIZED HEALTH CARE EDUCATION/TRAINING PROGRAM

## 2024-02-21 ASSESSMENT — PAIN SCALES - GENERAL: PAINLEVEL: 0-NO PAIN

## 2024-02-21 NOTE — PROGRESS NOTES
Radiation Oncology On Treatment Visit    Patient Name:  Enmanuel Ahumada  MRN:  [id number]  :  1955    Referring Provider: No ref. provider found  Primary Care Provider: Ricardo Youngblood DO  Care Team: Patient Care Team:  Ricardo Younglbood DO as PCP - General  Ricardo Youngblood DO as PCP - [employer] Advantage PCP  Kyle Rolon MD as Consulting Physician (Hematology and Oncology)    Date of Service: 2024     Diagnosis:   Specialty Problems          Radiation Oncology Problems    GBM (glioblastoma multiforme) (CMS/HCC)         Treatment Summary:  Radiation Treatments       Active   Partial brain R (Started on 2/15/2024)   Most recent fraction: 182 cGy given on 2024   Total given: 910 cGy / 4,182 cGy  (5 of 23 fractions)   Elapsed Days: 6   Technique: 3D           Completed   No historical radiation treatments to show.             SUBJECTIVE: otv. No new complaints.       OBJECTIVE:   Vital Signs:  /84   Pulse 76   Temp 36.2 °C (97.2 °F)   Resp 18   Wt 73.7 kg (162 lb 6.4 oz)   SpO2 96%   BMI 25.43 kg/m²    Pain Scale: The patient's current pain level was assessed.  They report currently having a pain of 0 out of 10.    Other Pertinent Findings:     Toxicity Assessment          2024    10:49   Toxicity Assessment   Treatment Site Brain   Anorexia Grade 0   Anxiety Grade 0   Dehydration Grade 0   Depression Grade 0   Dermatitis Radiation Grade 0   Diarrhea Grade 0   Fatigue Grade 0   Nausea Grade 0   Pain Grade 0   Vomiting Grade 0   Blurred Vision Grade 0   Dry Eye Grade 0   Eye Pain Grade 0   Headache Grade 0   Memory Impairment Grade 0   Seizure Grade 0        Assessment / Plan:  The patient is tolerating radiation therapy as anticipated.  Continue per current treatment plan.   Skin looks clean dry and intact. Continue RT as planned.

## 2024-02-22 ENCOUNTER — HOSPITAL ENCOUNTER (OUTPATIENT)
Dept: RADIATION ONCOLOGY | Facility: HOSPITAL | Age: 69
Setting detail: RADIATION/ONCOLOGY SERIES
Discharge: HOME | End: 2024-02-22
Payer: MEDICARE

## 2024-02-22 ENCOUNTER — LAB (OUTPATIENT)
Dept: LAB | Facility: LAB | Age: 69
End: 2024-02-22
Payer: MEDICARE

## 2024-02-22 DIAGNOSIS — Z51.0 ENCOUNTER FOR ANTINEOPLASTIC RADIATION THERAPY: ICD-10-CM

## 2024-02-22 DIAGNOSIS — C71.8 MALIGNANT NEOPLASM OF OVERLAPPING SITES OF BRAIN (MULTI): ICD-10-CM

## 2024-02-22 DIAGNOSIS — C71.9 GBM (GLIOBLASTOMA MULTIFORME) (MULTI): ICD-10-CM

## 2024-02-22 LAB
ALBUMIN SERPL BCP-MCNC: 4.2 G/DL (ref 3.4–5)
ALP SERPL-CCNC: 105 U/L (ref 33–136)
ALT SERPL W P-5'-P-CCNC: 22 U/L (ref 10–52)
ANION GAP SERPL CALC-SCNC: 11 MMOL/L (ref 10–20)
APPEARANCE UR: CLEAR
AST SERPL W P-5'-P-CCNC: 17 U/L (ref 9–39)
BASOPHILS # BLD AUTO: 0.04 X10*3/UL (ref 0–0.1)
BASOPHILS NFR BLD AUTO: 0.5 %
BILIRUB SERPL-MCNC: 0.8 MG/DL (ref 0–1.2)
BILIRUB UR STRIP.AUTO-MCNC: NEGATIVE MG/DL
BUN SERPL-MCNC: 14 MG/DL (ref 6–23)
CALCIUM SERPL-MCNC: 9.7 MG/DL (ref 8.6–10.3)
CHLORIDE SERPL-SCNC: 102 MMOL/L (ref 98–107)
CO2 SERPL-SCNC: 30 MMOL/L (ref 21–32)
COLOR UR: YELLOW
CREAT SERPL-MCNC: 1.04 MG/DL (ref 0.5–1.3)
EGFRCR SERPLBLD CKD-EPI 2021: 79 ML/MIN/1.73M*2
EOSINOPHIL # BLD AUTO: 0.17 X10*3/UL (ref 0–0.7)
EOSINOPHIL NFR BLD AUTO: 2 %
ERYTHROCYTE [DISTWIDTH] IN BLOOD BY AUTOMATED COUNT: 12.5 % (ref 11.5–14.5)
GLUCOSE SERPL-MCNC: 126 MG/DL (ref 74–99)
GLUCOSE UR STRIP.AUTO-MCNC: NEGATIVE MG/DL
HCT VFR BLD AUTO: 45.2 % (ref 41–52)
HGB BLD-MCNC: 14.9 G/DL (ref 13.5–17.5)
HOLD SPECIMEN: NORMAL
IMM GRANULOCYTES # BLD AUTO: 0.03 X10*3/UL (ref 0–0.7)
IMM GRANULOCYTES NFR BLD AUTO: 0.3 % (ref 0–0.9)
KETONES UR STRIP.AUTO-MCNC: NEGATIVE MG/DL
LEUKOCYTE ESTERASE UR QL STRIP.AUTO: NEGATIVE
LYMPHOCYTES # BLD AUTO: 2.66 X10*3/UL (ref 1.2–4.8)
LYMPHOCYTES NFR BLD AUTO: 31 %
MCH RBC QN AUTO: 30.8 PG (ref 26–34)
MCHC RBC AUTO-ENTMCNC: 33 G/DL (ref 32–36)
MCV RBC AUTO: 93 FL (ref 80–100)
MONOCYTES # BLD AUTO: 0.56 X10*3/UL (ref 0.1–1)
MONOCYTES NFR BLD AUTO: 6.5 %
NEUTROPHILS # BLD AUTO: 5.13 X10*3/UL (ref 1.2–7.7)
NEUTROPHILS NFR BLD AUTO: 59.7 %
NITRITE UR QL STRIP.AUTO: NEGATIVE
NRBC BLD-RTO: 0 /100 WBCS (ref 0–0)
PH UR STRIP.AUTO: 7 [PH]
PLATELET # BLD AUTO: 214 X10*3/UL (ref 150–450)
POTASSIUM SERPL-SCNC: 4.3 MMOL/L (ref 3.5–5.3)
PROT SERPL-MCNC: 6.8 G/DL (ref 6.4–8.2)
PROT UR STRIP.AUTO-MCNC: NEGATIVE MG/DL
RAD ONC MSQ ACTUAL FRACTIONS DELIVERED: 6
RAD ONC MSQ ACTUAL SESSION BIOLOGICAL DOSE: 200 CCGE
RAD ONC MSQ ACTUAL SESSION DELIVERED DOSE: 182 CGRAY
RAD ONC MSQ ACTUAL TOTAL BIOLOGICAL DOSE: 1201 CCGE
RAD ONC MSQ ACTUAL TOTAL DOSE: 1092 CGRAY
RAD ONC MSQ ELAPSED DAYS: 7
RAD ONC MSQ LAST DATE: NORMAL
RAD ONC MSQ PRESCRIBED BIOLOGICAL FRACTIONAL DOSE: 200 CCGE
RAD ONC MSQ PRESCRIBED BIOLOGICAL TOTAL DOSE: 4600 CCGE
RAD ONC MSQ PRESCRIBED FRACTIONAL DOSE: 182 CGRAY
RAD ONC MSQ PRESCRIBED NUMBER OF FRACTIONS: 23
RAD ONC MSQ PRESCRIBED TECHNIQUE: NORMAL
RAD ONC MSQ PRESCRIBED TOTAL DOSE: 4182 CGRAY
RAD ONC MSQ START DATE: NORMAL
RAD ONC MSQ TREATMENT COURSE NUMBER: 1
RAD ONC MSQ TREATMENT SITE: NORMAL
RBC # BLD AUTO: 4.84 X10*6/UL (ref 4.5–5.9)
RBC # UR STRIP.AUTO: NEGATIVE /UL
SODIUM SERPL-SCNC: 139 MMOL/L (ref 136–145)
SP GR UR STRIP.AUTO: 1.01
UROBILINOGEN UR STRIP.AUTO-MCNC: <2 MG/DL
WBC # BLD AUTO: 8.6 X10*3/UL (ref 4.4–11.3)

## 2024-02-22 PROCEDURE — G6002 STEREOSCOPIC X-RAY GUIDANCE: HCPCS | Performed by: STUDENT IN AN ORGANIZED HEALTH CARE EDUCATION/TRAINING PROGRAM

## 2024-02-22 PROCEDURE — 81003 URINALYSIS AUTO W/O SCOPE: CPT

## 2024-02-22 PROCEDURE — 80053 COMPREHEN METABOLIC PANEL: CPT

## 2024-02-22 PROCEDURE — 36415 COLL VENOUS BLD VENIPUNCTURE: CPT

## 2024-02-22 PROCEDURE — 85025 COMPLETE CBC W/AUTO DIFF WBC: CPT

## 2024-02-22 PROCEDURE — 77387 GUIDANCE FOR RADJ TX DLVR: CPT | Performed by: STUDENT IN AN ORGANIZED HEALTH CARE EDUCATION/TRAINING PROGRAM

## 2024-02-22 PROCEDURE — 77523 PROTON TRMT INTERMEDIATE: CPT | Performed by: STUDENT IN AN ORGANIZED HEALTH CARE EDUCATION/TRAINING PROGRAM

## 2024-02-23 ENCOUNTER — HOSPITAL ENCOUNTER (OUTPATIENT)
Dept: RADIATION ONCOLOGY | Facility: HOSPITAL | Age: 69
Setting detail: RADIATION/ONCOLOGY SERIES
Discharge: HOME | End: 2024-02-23
Payer: MEDICARE

## 2024-02-23 ENCOUNTER — TELEPHONE (OUTPATIENT)
Dept: RADIATION ONCOLOGY | Facility: HOSPITAL | Age: 69
End: 2024-02-23
Payer: MEDICARE

## 2024-02-23 DIAGNOSIS — C71.8 MALIGNANT NEOPLASM OF OVERLAPPING SITES OF BRAIN (MULTI): ICD-10-CM

## 2024-02-23 DIAGNOSIS — Z51.0 ENCOUNTER FOR ANTINEOPLASTIC RADIATION THERAPY: ICD-10-CM

## 2024-02-23 LAB
RAD ONC MSQ ACTUAL FRACTIONS DELIVERED: 7
RAD ONC MSQ ACTUAL SESSION BIOLOGICAL DOSE: 200 CCGE
RAD ONC MSQ ACTUAL SESSION DELIVERED DOSE: 182 CGRAY
RAD ONC MSQ ACTUAL TOTAL BIOLOGICAL DOSE: 1401 CCGE
RAD ONC MSQ ACTUAL TOTAL DOSE: 1274 CGRAY
RAD ONC MSQ ELAPSED DAYS: 8
RAD ONC MSQ LAST DATE: NORMAL
RAD ONC MSQ PRESCRIBED BIOLOGICAL FRACTIONAL DOSE: 200 CCGE
RAD ONC MSQ PRESCRIBED BIOLOGICAL TOTAL DOSE: 4600 CCGE
RAD ONC MSQ PRESCRIBED FRACTIONAL DOSE: 182 CGRAY
RAD ONC MSQ PRESCRIBED NUMBER OF FRACTIONS: 23
RAD ONC MSQ PRESCRIBED TECHNIQUE: NORMAL
RAD ONC MSQ PRESCRIBED TOTAL DOSE: 4182 CGRAY
RAD ONC MSQ START DATE: NORMAL
RAD ONC MSQ TREATMENT COURSE NUMBER: 1
RAD ONC MSQ TREATMENT SITE: NORMAL

## 2024-02-23 PROCEDURE — 77523 PROTON TRMT INTERMEDIATE: CPT | Performed by: STUDENT IN AN ORGANIZED HEALTH CARE EDUCATION/TRAINING PROGRAM

## 2024-02-23 PROCEDURE — G6002 STEREOSCOPIC X-RAY GUIDANCE: HCPCS | Performed by: STUDENT IN AN ORGANIZED HEALTH CARE EDUCATION/TRAINING PROGRAM

## 2024-02-23 PROCEDURE — 77387 GUIDANCE FOR RADJ TX DLVR: CPT | Performed by: STUDENT IN AN ORGANIZED HEALTH CARE EDUCATION/TRAINING PROGRAM

## 2024-02-23 PROCEDURE — 77336 RADIATION PHYSICS CONSULT: CPT | Performed by: STUDENT IN AN ORGANIZED HEALTH CARE EDUCATION/TRAINING PROGRAM

## 2024-02-23 NOTE — TELEPHONE ENCOUNTER
Patients spouse called concerned with patients BS from recent lab draw on 2/22 of 126.  Educated spouse on fasting and non fasting BS.  Will notify physician of patients concerns.

## 2024-02-26 ENCOUNTER — TELEPHONE (OUTPATIENT)
Dept: RADIATION ONCOLOGY | Facility: HOSPITAL | Age: 69
End: 2024-02-26
Payer: MEDICARE

## 2024-02-26 ENCOUNTER — APPOINTMENT (OUTPATIENT)
Dept: RADIATION ONCOLOGY | Facility: HOSPITAL | Age: 69
End: 2024-02-26
Payer: MEDICARE

## 2024-02-26 NOTE — TELEPHONE ENCOUNTER
Left message on home phone @ 7:23 am to let pt know the proton machine is down for day and we hope to get it back up tomorrow sometime. Let them know we will keep them updated.

## 2024-02-27 ENCOUNTER — HOSPITAL ENCOUNTER (OUTPATIENT)
Dept: RADIATION ONCOLOGY | Facility: HOSPITAL | Age: 69
Setting detail: RADIATION/ONCOLOGY SERIES
Discharge: HOME | End: 2024-02-27
Payer: MEDICARE

## 2024-02-27 DIAGNOSIS — C71.8 MALIGNANT NEOPLASM OF OVERLAPPING SITES OF BRAIN (MULTI): ICD-10-CM

## 2024-02-27 DIAGNOSIS — Z51.0 ENCOUNTER FOR ANTINEOPLASTIC RADIATION THERAPY: ICD-10-CM

## 2024-02-27 LAB
RAD ONC MSQ ACTUAL FRACTIONS DELIVERED: 8
RAD ONC MSQ ACTUAL SESSION BIOLOGICAL DOSE: 200 CCGE
RAD ONC MSQ ACTUAL SESSION DELIVERED DOSE: 182 CGRAY
RAD ONC MSQ ACTUAL TOTAL BIOLOGICAL DOSE: 1602 CCGE
RAD ONC MSQ ACTUAL TOTAL DOSE: 1456 CGRAY
RAD ONC MSQ ELAPSED DAYS: 12
RAD ONC MSQ LAST DATE: NORMAL
RAD ONC MSQ PRESCRIBED BIOLOGICAL FRACTIONAL DOSE: 200 CCGE
RAD ONC MSQ PRESCRIBED BIOLOGICAL TOTAL DOSE: 4600 CCGE
RAD ONC MSQ PRESCRIBED FRACTIONAL DOSE: 182 CGRAY
RAD ONC MSQ PRESCRIBED NUMBER OF FRACTIONS: 23
RAD ONC MSQ PRESCRIBED TECHNIQUE: NORMAL
RAD ONC MSQ PRESCRIBED TOTAL DOSE: 4182 CGRAY
RAD ONC MSQ START DATE: NORMAL
RAD ONC MSQ TREATMENT COURSE NUMBER: 1
RAD ONC MSQ TREATMENT SITE: NORMAL

## 2024-02-27 PROCEDURE — 77523 PROTON TRMT INTERMEDIATE: CPT | Performed by: STUDENT IN AN ORGANIZED HEALTH CARE EDUCATION/TRAINING PROGRAM

## 2024-02-27 PROCEDURE — 77387 GUIDANCE FOR RADJ TX DLVR: CPT | Performed by: STUDENT IN AN ORGANIZED HEALTH CARE EDUCATION/TRAINING PROGRAM

## 2024-02-27 PROCEDURE — G6002 STEREOSCOPIC X-RAY GUIDANCE: HCPCS | Performed by: STUDENT IN AN ORGANIZED HEALTH CARE EDUCATION/TRAINING PROGRAM

## 2024-02-28 ENCOUNTER — HOSPITAL ENCOUNTER (OUTPATIENT)
Dept: RADIATION ONCOLOGY | Facility: HOSPITAL | Age: 69
Setting detail: RADIATION/ONCOLOGY SERIES
Discharge: HOME | End: 2024-02-28
Payer: MEDICARE

## 2024-02-28 DIAGNOSIS — Z51.0 ENCOUNTER FOR ANTINEOPLASTIC RADIATION THERAPY: ICD-10-CM

## 2024-02-28 DIAGNOSIS — C71.8 MALIGNANT NEOPLASM OF OVERLAPPING SITES OF BRAIN (MULTI): ICD-10-CM

## 2024-02-28 LAB
RAD ONC MSQ ACTUAL FRACTIONS DELIVERED: 9
RAD ONC MSQ ACTUAL SESSION BIOLOGICAL DOSE: 200 CCGE
RAD ONC MSQ ACTUAL SESSION DELIVERED DOSE: 182 CGRAY
RAD ONC MSQ ACTUAL TOTAL BIOLOGICAL DOSE: 1802 CCGE
RAD ONC MSQ ACTUAL TOTAL DOSE: 1638 CGRAY
RAD ONC MSQ ELAPSED DAYS: 13
RAD ONC MSQ LAST DATE: NORMAL
RAD ONC MSQ PRESCRIBED BIOLOGICAL FRACTIONAL DOSE: 200 CCGE
RAD ONC MSQ PRESCRIBED BIOLOGICAL TOTAL DOSE: 4600 CCGE
RAD ONC MSQ PRESCRIBED FRACTIONAL DOSE: 182 CGRAY
RAD ONC MSQ PRESCRIBED NUMBER OF FRACTIONS: 23
RAD ONC MSQ PRESCRIBED TECHNIQUE: NORMAL
RAD ONC MSQ PRESCRIBED TOTAL DOSE: 4182 CGRAY
RAD ONC MSQ START DATE: NORMAL
RAD ONC MSQ TREATMENT COURSE NUMBER: 1
RAD ONC MSQ TREATMENT SITE: NORMAL

## 2024-02-28 PROCEDURE — 77523 PROTON TRMT INTERMEDIATE: CPT | Performed by: STUDENT IN AN ORGANIZED HEALTH CARE EDUCATION/TRAINING PROGRAM

## 2024-02-28 PROCEDURE — 77387 GUIDANCE FOR RADJ TX DLVR: CPT | Performed by: STUDENT IN AN ORGANIZED HEALTH CARE EDUCATION/TRAINING PROGRAM

## 2024-02-28 PROCEDURE — G6002 STEREOSCOPIC X-RAY GUIDANCE: HCPCS | Performed by: STUDENT IN AN ORGANIZED HEALTH CARE EDUCATION/TRAINING PROGRAM

## 2024-02-29 ENCOUNTER — RADIATION ONCOLOGY OTV (OUTPATIENT)
Dept: RADIATION ONCOLOGY | Facility: HOSPITAL | Age: 69
End: 2024-02-29
Payer: MEDICARE

## 2024-02-29 ENCOUNTER — LAB (OUTPATIENT)
Dept: LAB | Facility: LAB | Age: 69
End: 2024-02-29
Payer: MEDICARE

## 2024-02-29 ENCOUNTER — HOSPITAL ENCOUNTER (OUTPATIENT)
Dept: RADIATION ONCOLOGY | Facility: HOSPITAL | Age: 69
Setting detail: RADIATION/ONCOLOGY SERIES
Discharge: HOME | End: 2024-02-29
Payer: MEDICARE

## 2024-02-29 VITALS
OXYGEN SATURATION: 97 % | RESPIRATION RATE: 18 BRPM | SYSTOLIC BLOOD PRESSURE: 128 MMHG | DIASTOLIC BLOOD PRESSURE: 83 MMHG | TEMPERATURE: 97.5 F | HEART RATE: 75 BPM | WEIGHT: 163.6 LBS | BODY MASS INDEX: 25.62 KG/M2

## 2024-02-29 DIAGNOSIS — C71.8 MALIGNANT NEOPLASM OF OVERLAPPING SITES OF BRAIN (MULTI): ICD-10-CM

## 2024-02-29 DIAGNOSIS — Z51.0 ENCOUNTER FOR ANTINEOPLASTIC RADIATION THERAPY: ICD-10-CM

## 2024-02-29 DIAGNOSIS — C71.9 GBM (GLIOBLASTOMA MULTIFORME) (MULTI): ICD-10-CM

## 2024-02-29 LAB
ALBUMIN SERPL BCP-MCNC: 4.3 G/DL (ref 3.4–5)
ALP SERPL-CCNC: 102 U/L (ref 33–136)
ALT SERPL W P-5'-P-CCNC: 30 U/L (ref 10–52)
ANION GAP SERPL CALC-SCNC: 12 MMOL/L (ref 10–20)
AST SERPL W P-5'-P-CCNC: 26 U/L (ref 9–39)
BASOPHILS # BLD AUTO: 0.04 X10*3/UL (ref 0–0.1)
BASOPHILS NFR BLD AUTO: 0.5 %
BILIRUB SERPL-MCNC: 1.1 MG/DL (ref 0–1.2)
BUN SERPL-MCNC: 15 MG/DL (ref 6–23)
CALCIUM SERPL-MCNC: 9.7 MG/DL (ref 8.6–10.3)
CHLORIDE SERPL-SCNC: 100 MMOL/L (ref 98–107)
CO2 SERPL-SCNC: 30 MMOL/L (ref 21–32)
CREAT SERPL-MCNC: 0.93 MG/DL (ref 0.5–1.3)
EGFRCR SERPLBLD CKD-EPI 2021: >90 ML/MIN/1.73M*2
EOSINOPHIL # BLD AUTO: 0.17 X10*3/UL (ref 0–0.7)
EOSINOPHIL NFR BLD AUTO: 2.2 %
ERYTHROCYTE [DISTWIDTH] IN BLOOD BY AUTOMATED COUNT: 12.4 % (ref 11.5–14.5)
GLUCOSE SERPL-MCNC: 110 MG/DL (ref 74–99)
HCT VFR BLD AUTO: 46.4 % (ref 41–52)
HGB BLD-MCNC: 15.3 G/DL (ref 13.5–17.5)
IMM GRANULOCYTES # BLD AUTO: 0.01 X10*3/UL (ref 0–0.7)
IMM GRANULOCYTES NFR BLD AUTO: 0.1 % (ref 0–0.9)
LYMPHOCYTES # BLD AUTO: 2.17 X10*3/UL (ref 1.2–4.8)
LYMPHOCYTES NFR BLD AUTO: 28.7 %
MCH RBC QN AUTO: 30.8 PG (ref 26–34)
MCHC RBC AUTO-ENTMCNC: 33 G/DL (ref 32–36)
MCV RBC AUTO: 94 FL (ref 80–100)
MONOCYTES # BLD AUTO: 0.61 X10*3/UL (ref 0.1–1)
MONOCYTES NFR BLD AUTO: 8.1 %
NEUTROPHILS # BLD AUTO: 4.57 X10*3/UL (ref 1.2–7.7)
NEUTROPHILS NFR BLD AUTO: 60.4 %
NRBC BLD-RTO: 0 /100 WBCS (ref 0–0)
PLATELET # BLD AUTO: 218 X10*3/UL (ref 150–450)
POTASSIUM SERPL-SCNC: 4 MMOL/L (ref 3.5–5.3)
PROT SERPL-MCNC: 7 G/DL (ref 6.4–8.2)
RAD ONC MSQ ACTUAL FRACTIONS DELIVERED: 10
RAD ONC MSQ ACTUAL SESSION BIOLOGICAL DOSE: 200 CCGE
RAD ONC MSQ ACTUAL SESSION DELIVERED DOSE: 182 CGRAY
RAD ONC MSQ ACTUAL TOTAL BIOLOGICAL DOSE: 2002 CCGE
RAD ONC MSQ ACTUAL TOTAL DOSE: 1820 CGRAY
RAD ONC MSQ ELAPSED DAYS: 14
RAD ONC MSQ LAST DATE: NORMAL
RAD ONC MSQ PRESCRIBED BIOLOGICAL FRACTIONAL DOSE: 200 CCGE
RAD ONC MSQ PRESCRIBED BIOLOGICAL TOTAL DOSE: 4600 CCGE
RAD ONC MSQ PRESCRIBED FRACTIONAL DOSE: 182 CGRAY
RAD ONC MSQ PRESCRIBED NUMBER OF FRACTIONS: 23
RAD ONC MSQ PRESCRIBED TECHNIQUE: NORMAL
RAD ONC MSQ PRESCRIBED TOTAL DOSE: 4182 CGRAY
RAD ONC MSQ START DATE: NORMAL
RAD ONC MSQ TREATMENT COURSE NUMBER: 1
RAD ONC MSQ TREATMENT SITE: NORMAL
RBC # BLD AUTO: 4.96 X10*6/UL (ref 4.5–5.9)
SODIUM SERPL-SCNC: 138 MMOL/L (ref 136–145)
WBC # BLD AUTO: 7.6 X10*3/UL (ref 4.4–11.3)

## 2024-02-29 PROCEDURE — G6002 STEREOSCOPIC X-RAY GUIDANCE: HCPCS | Performed by: STUDENT IN AN ORGANIZED HEALTH CARE EDUCATION/TRAINING PROGRAM

## 2024-02-29 PROCEDURE — 77427 RADIATION TX MANAGEMENT X5: CPT | Performed by: STUDENT IN AN ORGANIZED HEALTH CARE EDUCATION/TRAINING PROGRAM

## 2024-02-29 PROCEDURE — 77387 GUIDANCE FOR RADJ TX DLVR: CPT | Performed by: STUDENT IN AN ORGANIZED HEALTH CARE EDUCATION/TRAINING PROGRAM

## 2024-02-29 PROCEDURE — 36415 COLL VENOUS BLD VENIPUNCTURE: CPT

## 2024-02-29 PROCEDURE — 77523 PROTON TRMT INTERMEDIATE: CPT | Performed by: STUDENT IN AN ORGANIZED HEALTH CARE EDUCATION/TRAINING PROGRAM

## 2024-02-29 PROCEDURE — 85025 COMPLETE CBC W/AUTO DIFF WBC: CPT

## 2024-02-29 PROCEDURE — 80053 COMPREHEN METABOLIC PANEL: CPT

## 2024-02-29 ASSESSMENT — PAIN SCALES - GENERAL: PAINLEVEL: 0-NO PAIN

## 2024-02-29 NOTE — PROGRESS NOTES
Radiation Oncology On Treatment Visit    Patient Name:  Enmanuel Ahumada  MRN:  [id number]  :  1955    Referring Provider: No ref. provider found  Primary Care Provider: Ricardo Youngblood DO  Care Team: Patient Care Team:  Ricardo Youngblood DO as PCP - General  Ricardo Youngblood DO as PCP - [employer] Medicare Advantage PCP  Kyle Rolon MD as Consulting Physician (Hematology and Oncology)    Date of Service: 2024     Diagnosis:   Specialty Problems          Radiation Oncology Problems    GBM (glioblastoma multiforme) (CMS/HCC)         Treatment Summary:  Radiation Treatments       Active   Partial brain R (Started on 2/15/2024)   Most recent fraction: 182 cGy given on 2024   Total given: 1,638 cGy / 4,182 cGy  (9 of 23 fractions)   Elapsed Days: 13   Technique: 3D           Completed   No historical radiation treatments to show.             SUBJECTIVE: otv      OBJECTIVE:   Vital Signs:  /83   Pulse 75   Temp 36.4 °C (97.5 °F)   Resp 18   Wt 74.2 kg (163 lb 9.6 oz)   SpO2 97%   BMI 25.62 kg/m²    Pain Scale: The patient's current pain level was assessed.  They report currently having a pain of 0 out of 10.    Other Pertinent Findings:     Toxicity Assessment          2024    10:49   Toxicity Assessment   Treatment Site Brain   Anorexia Grade 0   Anxiety Grade 0   Dehydration Grade 0   Depression Grade 0   Dermatitis Radiation Grade 0   Diarrhea Grade 0   Fatigue Grade 0   Nausea Grade 0   Pain Grade 0   Vomiting Grade 0   Blurred Vision Grade 0   Dry Eye Grade 0   Eye Pain Grade 0   Headache Grade 0   Memory Impairment Grade 0   Seizure Grade 0        Assessment / Plan:  The patient is tolerating radiation therapy as anticipated.  Continue per current treatment plan.

## 2024-03-01 ENCOUNTER — EVALUATION (OUTPATIENT)
Dept: OCCUPATIONAL THERAPY | Facility: CLINIC | Age: 69
End: 2024-03-01
Payer: MEDICARE

## 2024-03-01 ENCOUNTER — HOSPITAL ENCOUNTER (OUTPATIENT)
Dept: RADIATION ONCOLOGY | Facility: HOSPITAL | Age: 69
Setting detail: RADIATION/ONCOLOGY SERIES
Discharge: HOME | End: 2024-03-01
Payer: MEDICARE

## 2024-03-01 DIAGNOSIS — R27.8 COORDINATION ABNORMAL: ICD-10-CM

## 2024-03-01 DIAGNOSIS — C71.9 GBM (GLIOBLASTOMA MULTIFORME) (MULTI): ICD-10-CM

## 2024-03-01 DIAGNOSIS — Z51.0 ENCOUNTER FOR ANTINEOPLASTIC RADIATION THERAPY: ICD-10-CM

## 2024-03-01 DIAGNOSIS — R29.818 IMPAIRED PROPRIOCEPTION: ICD-10-CM

## 2024-03-01 DIAGNOSIS — H53.9 IMPAIRED VISUAL PERCEPTION: ICD-10-CM

## 2024-03-01 DIAGNOSIS — H53.452 PERIPHERAL VISION LOSS, LEFT: ICD-10-CM

## 2024-03-01 DIAGNOSIS — C71.8 MALIGNANT NEOPLASM OF OVERLAPPING SITES OF BRAIN (MULTI): ICD-10-CM

## 2024-03-01 DIAGNOSIS — R29.898 WEAKNESS OF LEFT HAND: Primary | ICD-10-CM

## 2024-03-01 LAB
RAD ONC MSQ ACTUAL FRACTIONS DELIVERED: 11
RAD ONC MSQ ACTUAL SESSION BIOLOGICAL DOSE: 200 CCGE
RAD ONC MSQ ACTUAL SESSION DELIVERED DOSE: 182 CGRAY
RAD ONC MSQ ACTUAL TOTAL BIOLOGICAL DOSE: 2202 CCGE
RAD ONC MSQ ACTUAL TOTAL DOSE: 2002 CGRAY
RAD ONC MSQ ELAPSED DAYS: 15
RAD ONC MSQ LAST DATE: NORMAL
RAD ONC MSQ PRESCRIBED BIOLOGICAL FRACTIONAL DOSE: 200 CCGE
RAD ONC MSQ PRESCRIBED BIOLOGICAL TOTAL DOSE: 4600 CCGE
RAD ONC MSQ PRESCRIBED FRACTIONAL DOSE: 182 CGRAY
RAD ONC MSQ PRESCRIBED NUMBER OF FRACTIONS: 23
RAD ONC MSQ PRESCRIBED TECHNIQUE: NORMAL
RAD ONC MSQ PRESCRIBED TOTAL DOSE: 4182 CGRAY
RAD ONC MSQ START DATE: NORMAL
RAD ONC MSQ TREATMENT COURSE NUMBER: 1
RAD ONC MSQ TREATMENT SITE: NORMAL

## 2024-03-01 PROCEDURE — 97535 SELF CARE MNGMENT TRAINING: CPT | Mod: GO | Performed by: OCCUPATIONAL THERAPIST

## 2024-03-01 PROCEDURE — 77336 RADIATION PHYSICS CONSULT: CPT | Performed by: STUDENT IN AN ORGANIZED HEALTH CARE EDUCATION/TRAINING PROGRAM

## 2024-03-01 PROCEDURE — 97166 OT EVAL MOD COMPLEX 45 MIN: CPT | Mod: GO | Performed by: OCCUPATIONAL THERAPIST

## 2024-03-01 PROCEDURE — G6002 STEREOSCOPIC X-RAY GUIDANCE: HCPCS | Performed by: STUDENT IN AN ORGANIZED HEALTH CARE EDUCATION/TRAINING PROGRAM

## 2024-03-01 PROCEDURE — 77387 GUIDANCE FOR RADJ TX DLVR: CPT | Performed by: STUDENT IN AN ORGANIZED HEALTH CARE EDUCATION/TRAINING PROGRAM

## 2024-03-01 PROCEDURE — 77523 PROTON TRMT INTERMEDIATE: CPT | Performed by: STUDENT IN AN ORGANIZED HEALTH CARE EDUCATION/TRAINING PROGRAM

## 2024-03-01 ASSESSMENT — ACTIVITIES OF DAILY LIVING (ADL): HOME_MANAGEMENT_TIME_ENTRY: 10

## 2024-03-01 NOTE — PROGRESS NOTES
"Occupational Therapy    Evaluation    Patient Name: Enmanuel Ahumada \"Dash\"  MRN: [id number]  Today's Date: 3/1/2024       Visit: #1     Assessment:   Enmanuel Ahumada presents to occupational therapy with complaint of decreased left peripheral vision, decreased proprioception, left hand weakness, and fine motor coordination changes secondary to glioblastoma all limiting independence with ADLs, functional mobility, IADLs, and leisure activities. Standardized testing and measures administered today reveal that the patient has multiple impairments in body structures and functions, activity limitations, and participation restrictions. The patient has personal factors and comorbidities that may serve as barriers affecting the plan of care, including current chemotherapy and radiation treatments. Skilled OT services are warranted in order to realize measurable change in the above outcome measures and achieve improvements in patient's functional status and individual goals. Pt verbalized understanding and is in agreement with goals and plan of care.        Plan:      1-2x/wk for 8-12 weeks   -Neuromuscular reeducation, Therapeutic Exercise, Self Care/ADL       Subjective   Current Problem:  1. Weakness of left hand        2. GBM (glioblastoma multiforme) (CMS/[facility])  Referral to Occupational Therapy      3. Impaired visual perception        4. Impaired proprioception        5. Coordination abnormal        6. Peripheral vision loss, left          Patient arrives to OT with chief complaint of decreased left peripheral vision, decreased proprioception, left hand weakness, and fine motor coordination changes secondary to glioblastoma.  Patient reports noting changes since tumor resection 12/29/23.  However, patient reports he is gradually improving since procedure.  Patient is currently being treated by radiation and chemotherapy; reports he is feeling well.       ADL/IADL profile:   Pt is retired and lives with spouse   Pt " "ambulates without use of device; reports no falls   Pt is active and goes for walks daily   Pt is not currently driving   Highlight of deficits:  Patient with noted decreased spatial awareness; runs into objects on left side   Patient notes difficulty pouring into a glass with left hand   Pt report difficulty gauging spacing when trying to write or type within a box   Pt reports difficulty typing on his ipad   Pt reports difficulty gauging portions when dishing meals  Patient reports he avoid cutting with is left dominant hand   Difficulty with opening a jar with left hand     Per Neurosurgery on 1/23/24:   History of Present Illness  Enmanuel Ahumada is a 68-year-old male with a PMH significant for HLD, Osborn's esophagus, s/p cholecystectomy, on ASA 81mg for cardioprotection. Patient presented with headaches x 6 weeks, increased confusion, and gait instability where he was running into objects. CTH with RO hypodensity c/f mass. MRI PPF with complex fMRI with language dominant. TTE with EF of 60%. He is now s/p R craniotomy for tumor resection. CT with POC. EEG negative. MRI with GTR. Final surgical pathology consistent for glioblastoma. Patient was referred to radiation oncology and neuro-oncology. He was seen by Dr. Rolon with plans to continue Keppra  and decadron taper. Plan for concurrent chemoRT Stupp protocol with Temodar. Participating in [employer] imaging trial. Patient was seen for his wound check last Monday and returns to clinic to have his remaining staples to be removed    Precautions:   Undergoing 30 rounds of radiation treatment     Objective   Cognition:  Pt reports mild short term memory changes; has improved since   -notes he is forgetful at times     ROM:   BUES WNL    Vision:  Patient reports his vision is \"wonky\"   -depth perception   -impaired left periphial vision     Wears corrective lenses     Completed portion of Motor Free Visual Perception Test (MVPT)   -difficulty with visual memory, " visual discrimination, figure ground and spatial relationships subtests     Strength:    strength:   R- 109# L- 93#     Tripod pinch:   R- 22#  L- 20#     Lateral Pinch:   R-26#  L- 24#     LUE- grossly 4/5     Perception:   Decreased spatial awareness     -Running into things on left side     Coordination:    9 Hole Peg:  R- 21.32 L- 27.50    Box and blocks:   R-50 L- 45 blocks     Finger to nose: left impaired   Rapid alternating movements: left impaired     Hand Function:   Pt is left hand dominant     Outcome Measures:  QuickDASH: 12    OP EDUCATION:   OT educated pt on role of OT in outpatient setting. OT developed goals and plan of care with patient.  Patient in agreement.     Goals:    Pt will demo independence with HEP  Pt will demo and verbalize use of energy conservation/work simplification techniques to increase activity tolerance will completing ADL/IADL tasks.   Pt will increase L  strength by 5-10# to increase independence with ADLs/IADLs (opening jars, medicine caps, etcs)  Pt will demonstrated good left visual scanning techniques by completing obstacle course without running into objects placed on left side  Pt will demonstrated improved proprioception by completing high level functional reaching task with accuracy   Pt will demo increased L FMC by decreasing 3 seconds on the 9 Hole Peg test, to maximize independence with ADLs/IADLs

## 2024-03-04 ENCOUNTER — HOSPITAL ENCOUNTER (OUTPATIENT)
Dept: RADIATION ONCOLOGY | Facility: HOSPITAL | Age: 69
Setting detail: RADIATION/ONCOLOGY SERIES
Discharge: HOME | End: 2024-03-04
Payer: MEDICARE

## 2024-03-04 DIAGNOSIS — Z51.0 ENCOUNTER FOR ANTINEOPLASTIC RADIATION THERAPY: ICD-10-CM

## 2024-03-04 DIAGNOSIS — C71.8 MALIGNANT NEOPLASM OF OVERLAPPING SITES OF BRAIN (MULTI): ICD-10-CM

## 2024-03-04 LAB
RAD ONC MSQ ACTUAL FRACTIONS DELIVERED: 12
RAD ONC MSQ ACTUAL SESSION BIOLOGICAL DOSE: 200 CCGE
RAD ONC MSQ ACTUAL SESSION DELIVERED DOSE: 182 CGRAY
RAD ONC MSQ ACTUAL TOTAL BIOLOGICAL DOSE: 2402 CCGE
RAD ONC MSQ ACTUAL TOTAL DOSE: 2184 CGRAY
RAD ONC MSQ ELAPSED DAYS: 18
RAD ONC MSQ LAST DATE: NORMAL
RAD ONC MSQ PRESCRIBED BIOLOGICAL FRACTIONAL DOSE: 200 CCGE
RAD ONC MSQ PRESCRIBED BIOLOGICAL TOTAL DOSE: 4600 CCGE
RAD ONC MSQ PRESCRIBED FRACTIONAL DOSE: 182 CGRAY
RAD ONC MSQ PRESCRIBED NUMBER OF FRACTIONS: 23
RAD ONC MSQ PRESCRIBED TECHNIQUE: NORMAL
RAD ONC MSQ PRESCRIBED TOTAL DOSE: 4182 CGRAY
RAD ONC MSQ START DATE: NORMAL
RAD ONC MSQ TREATMENT COURSE NUMBER: 1
RAD ONC MSQ TREATMENT SITE: NORMAL

## 2024-03-04 PROCEDURE — G6002 STEREOSCOPIC X-RAY GUIDANCE: HCPCS | Performed by: STUDENT IN AN ORGANIZED HEALTH CARE EDUCATION/TRAINING PROGRAM

## 2024-03-04 PROCEDURE — 77523 PROTON TRMT INTERMEDIATE: CPT | Performed by: STUDENT IN AN ORGANIZED HEALTH CARE EDUCATION/TRAINING PROGRAM

## 2024-03-04 PROCEDURE — 77387 GUIDANCE FOR RADJ TX DLVR: CPT | Performed by: STUDENT IN AN ORGANIZED HEALTH CARE EDUCATION/TRAINING PROGRAM

## 2024-03-05 ENCOUNTER — RADIATION ONCOLOGY OTV (OUTPATIENT)
Dept: RADIATION ONCOLOGY | Facility: HOSPITAL | Age: 69
End: 2024-03-05
Payer: MEDICARE

## 2024-03-05 ENCOUNTER — HOSPITAL ENCOUNTER (OUTPATIENT)
Dept: RADIATION ONCOLOGY | Facility: HOSPITAL | Age: 69
Setting detail: RADIATION/ONCOLOGY SERIES
Discharge: HOME | End: 2024-03-05
Payer: MEDICARE

## 2024-03-05 VITALS
RESPIRATION RATE: 18 BRPM | OXYGEN SATURATION: 96 % | DIASTOLIC BLOOD PRESSURE: 81 MMHG | TEMPERATURE: 97.7 F | SYSTOLIC BLOOD PRESSURE: 119 MMHG | HEART RATE: 85 BPM | BODY MASS INDEX: 25.9 KG/M2 | WEIGHT: 165.4 LBS

## 2024-03-05 DIAGNOSIS — Z51.0 ENCOUNTER FOR ANTINEOPLASTIC RADIATION THERAPY: ICD-10-CM

## 2024-03-05 DIAGNOSIS — C71.8 MALIGNANT NEOPLASM OF OVERLAPPING SITES OF BRAIN (MULTI): ICD-10-CM

## 2024-03-05 LAB
RAD ONC MSQ ACTUAL FRACTIONS DELIVERED: 13
RAD ONC MSQ ACTUAL SESSION BIOLOGICAL DOSE: 200 CCGE
RAD ONC MSQ ACTUAL SESSION DELIVERED DOSE: 182 CGRAY
RAD ONC MSQ ACTUAL TOTAL BIOLOGICAL DOSE: 2603 CCGE
RAD ONC MSQ ACTUAL TOTAL DOSE: 2366 CGRAY
RAD ONC MSQ ELAPSED DAYS: 19
RAD ONC MSQ LAST DATE: NORMAL
RAD ONC MSQ PRESCRIBED BIOLOGICAL FRACTIONAL DOSE: 200 CCGE
RAD ONC MSQ PRESCRIBED BIOLOGICAL TOTAL DOSE: 4600 CCGE
RAD ONC MSQ PRESCRIBED FRACTIONAL DOSE: 182 CGRAY
RAD ONC MSQ PRESCRIBED NUMBER OF FRACTIONS: 23
RAD ONC MSQ PRESCRIBED TECHNIQUE: NORMAL
RAD ONC MSQ PRESCRIBED TOTAL DOSE: 4182 CGRAY
RAD ONC MSQ START DATE: NORMAL
RAD ONC MSQ TREATMENT COURSE NUMBER: 1
RAD ONC MSQ TREATMENT SITE: NORMAL

## 2024-03-05 PROCEDURE — 77523 PROTON TRMT INTERMEDIATE: CPT | Performed by: STUDENT IN AN ORGANIZED HEALTH CARE EDUCATION/TRAINING PROGRAM

## 2024-03-05 PROCEDURE — 77427 RADIATION TX MANAGEMENT X5: CPT | Performed by: STUDENT IN AN ORGANIZED HEALTH CARE EDUCATION/TRAINING PROGRAM

## 2024-03-05 PROCEDURE — G6002 STEREOSCOPIC X-RAY GUIDANCE: HCPCS | Performed by: STUDENT IN AN ORGANIZED HEALTH CARE EDUCATION/TRAINING PROGRAM

## 2024-03-05 PROCEDURE — 77387 GUIDANCE FOR RADJ TX DLVR: CPT | Performed by: STUDENT IN AN ORGANIZED HEALTH CARE EDUCATION/TRAINING PROGRAM

## 2024-03-05 ASSESSMENT — PAIN SCALES - GENERAL: PAINLEVEL: 0-NO PAIN

## 2024-03-05 NOTE — PROGRESS NOTES
Radiation Oncology On Treatment Visit    Patient Name:  Enmanuel Ahumada  MRN:  [id number]  :  1955    Referring Provider: No ref. provider found  Primary Care Provider: Ricardo Youngblood DO  Care Team: Patient Care Team:  Ricardo Youngblood DO as PCP - General  Ricardo Youngblood DO as PCP - [employer] Medicare Advantage PCP  Kyle Rolon MD as Consulting Physician (Hematology and Oncology)    Date of Service: 3/5/2024     Diagnosis:   Specialty Problems          Radiation Oncology Problems    GBM (glioblastoma multiforme) (CMS/HCC)         Treatment Summary:  Radiation Treatments       Active   Partial brain R (Started on 2/15/2024)   Most recent fraction: 182 cGy given on 3/5/2024   Total given: 2,366 cGy / 4,182 cGy  (13 of 23 fractions)   Elapsed Days:    Technique: 3D           Completed   No historical radiation treatments to show.             SUBJECTIVE: otv      OBJECTIVE:   Vital Signs:  /81   Pulse 85   Temp 36.5 °C (97.7 °F)   Resp 18   Wt 75 kg (165 lb 6.4 oz)   SpO2 96%   BMI 25.90 kg/m²    Pain Scale: The patient's current pain level was assessed.  They report currently having a pain of 0 out of 10.    Other Pertinent Findings:     Toxicity Assessment          2024    10:49 3/5/2024    11:17   Toxicity Assessment   Treatment Site Brain Brain   Anorexia Grade 0 Grade 0   Anxiety Grade 0 Grade 0   Dehydration Grade 0 Grade 0   Depression Grade 0 Grade 0   Dermatitis Radiation Grade 0 Grade 1       getting darker and was instructed to stop shaving his head   Diarrhea Grade 0 Grade 0   Fatigue Grade 0 Grade 0       walking every day   Nausea Grade 0 Grade 0   Pain Grade 0 Grade 0   Tumor Pain  Grade 0   Vomiting Grade 0 Grade 0   Blurred Vision Grade 0    Dry Eye Grade 0    Eye Pain Grade 0    Cognitive Disturbance  Grade 0   Headache Grade 0 Grade 0   Memory Impairment Grade 0 Grade 0   Seizure Grade 0 Grade 0        Assessment / Plan:  The patient is tolerating radiation therapy  as anticipated.  Continue per current treatment plan.

## 2024-03-06 ENCOUNTER — HOSPITAL ENCOUNTER (OUTPATIENT)
Dept: RADIATION ONCOLOGY | Facility: HOSPITAL | Age: 69
Setting detail: RADIATION/ONCOLOGY SERIES
Discharge: HOME | End: 2024-03-06
Payer: MEDICARE

## 2024-03-06 DIAGNOSIS — Z51.0 ENCOUNTER FOR ANTINEOPLASTIC RADIATION THERAPY: ICD-10-CM

## 2024-03-06 DIAGNOSIS — C71.8 MALIGNANT NEOPLASM OF OVERLAPPING SITES OF BRAIN (MULTI): ICD-10-CM

## 2024-03-06 LAB
RAD ONC MSQ ACTUAL FRACTIONS DELIVERED: 14
RAD ONC MSQ ACTUAL SESSION BIOLOGICAL DOSE: 200 CCGE
RAD ONC MSQ ACTUAL SESSION DELIVERED DOSE: 182 CGRAY
RAD ONC MSQ ACTUAL TOTAL BIOLOGICAL DOSE: 2803 CCGE
RAD ONC MSQ ACTUAL TOTAL DOSE: 2548 CGRAY
RAD ONC MSQ ELAPSED DAYS: 20
RAD ONC MSQ LAST DATE: NORMAL
RAD ONC MSQ PRESCRIBED BIOLOGICAL FRACTIONAL DOSE: 200 CCGE
RAD ONC MSQ PRESCRIBED BIOLOGICAL TOTAL DOSE: 4600 CCGE
RAD ONC MSQ PRESCRIBED FRACTIONAL DOSE: 182 CGRAY
RAD ONC MSQ PRESCRIBED NUMBER OF FRACTIONS: 23
RAD ONC MSQ PRESCRIBED TECHNIQUE: NORMAL
RAD ONC MSQ PRESCRIBED TOTAL DOSE: 4182 CGRAY
RAD ONC MSQ START DATE: NORMAL
RAD ONC MSQ TREATMENT COURSE NUMBER: 1
RAD ONC MSQ TREATMENT SITE: NORMAL

## 2024-03-06 PROCEDURE — 77523 PROTON TRMT INTERMEDIATE: CPT | Performed by: STUDENT IN AN ORGANIZED HEALTH CARE EDUCATION/TRAINING PROGRAM

## 2024-03-06 PROCEDURE — 77387 GUIDANCE FOR RADJ TX DLVR: CPT | Performed by: STUDENT IN AN ORGANIZED HEALTH CARE EDUCATION/TRAINING PROGRAM

## 2024-03-06 PROCEDURE — G6002 STEREOSCOPIC X-RAY GUIDANCE: HCPCS | Performed by: STUDENT IN AN ORGANIZED HEALTH CARE EDUCATION/TRAINING PROGRAM

## 2024-03-07 ENCOUNTER — HOSPITAL ENCOUNTER (OUTPATIENT)
Dept: RADIATION ONCOLOGY | Facility: HOSPITAL | Age: 69
Setting detail: RADIATION/ONCOLOGY SERIES
Discharge: HOME | End: 2024-03-07
Payer: MEDICARE

## 2024-03-07 ENCOUNTER — LAB (OUTPATIENT)
Dept: LAB | Facility: LAB | Age: 69
End: 2024-03-07
Payer: MEDICARE

## 2024-03-07 DIAGNOSIS — Z51.0 ENCOUNTER FOR ANTINEOPLASTIC RADIATION THERAPY: ICD-10-CM

## 2024-03-07 DIAGNOSIS — C71.9 GBM (GLIOBLASTOMA MULTIFORME) (MULTI): ICD-10-CM

## 2024-03-07 DIAGNOSIS — C71.8 MALIGNANT NEOPLASM OF OVERLAPPING SITES OF BRAIN (MULTI): ICD-10-CM

## 2024-03-07 LAB
ALBUMIN SERPL BCP-MCNC: 4.2 G/DL (ref 3.4–5)
ALP SERPL-CCNC: 96 U/L (ref 33–136)
ALT SERPL W P-5'-P-CCNC: 19 U/L (ref 10–52)
ANION GAP SERPL CALC-SCNC: 10 MMOL/L (ref 10–20)
AST SERPL W P-5'-P-CCNC: 17 U/L (ref 9–39)
BASOPHILS # BLD AUTO: 0.04 X10*3/UL (ref 0–0.1)
BASOPHILS NFR BLD AUTO: 0.5 %
BILIRUB SERPL-MCNC: 0.8 MG/DL (ref 0–1.2)
BUN SERPL-MCNC: 16 MG/DL (ref 6–23)
CALCIUM SERPL-MCNC: 9.7 MG/DL (ref 8.6–10.3)
CHLORIDE SERPL-SCNC: 104 MMOL/L (ref 98–107)
CO2 SERPL-SCNC: 29 MMOL/L (ref 21–32)
CREAT SERPL-MCNC: 0.95 MG/DL (ref 0.5–1.3)
EGFRCR SERPLBLD CKD-EPI 2021: 88 ML/MIN/1.73M*2
EOSINOPHIL # BLD AUTO: 0.17 X10*3/UL (ref 0–0.7)
EOSINOPHIL NFR BLD AUTO: 2.1 %
ERYTHROCYTE [DISTWIDTH] IN BLOOD BY AUTOMATED COUNT: 13 % (ref 11.5–14.5)
GLUCOSE SERPL-MCNC: 107 MG/DL (ref 74–99)
HCT VFR BLD AUTO: 45.4 % (ref 41–52)
HGB BLD-MCNC: 15.1 G/DL (ref 13.5–17.5)
IMM GRANULOCYTES # BLD AUTO: 0.02 X10*3/UL (ref 0–0.7)
IMM GRANULOCYTES NFR BLD AUTO: 0.2 % (ref 0–0.9)
LYMPHOCYTES # BLD AUTO: 2.09 X10*3/UL (ref 1.2–4.8)
LYMPHOCYTES NFR BLD AUTO: 25.4 %
MCH RBC QN AUTO: 31.2 PG (ref 26–34)
MCHC RBC AUTO-ENTMCNC: 33.3 G/DL (ref 32–36)
MCV RBC AUTO: 94 FL (ref 80–100)
MONOCYTES # BLD AUTO: 0.76 X10*3/UL (ref 0.1–1)
MONOCYTES NFR BLD AUTO: 9.2 %
NEUTROPHILS # BLD AUTO: 5.14 X10*3/UL (ref 1.2–7.7)
NEUTROPHILS NFR BLD AUTO: 62.6 %
NRBC BLD-RTO: 0 /100 WBCS (ref 0–0)
PLATELET # BLD AUTO: 236 X10*3/UL (ref 150–450)
POTASSIUM SERPL-SCNC: 4.3 MMOL/L (ref 3.5–5.3)
PROT SERPL-MCNC: 6.9 G/DL (ref 6.4–8.2)
RAD ONC MSQ ACTUAL FRACTIONS DELIVERED: 15
RAD ONC MSQ ACTUAL SESSION BIOLOGICAL DOSE: 200 CCGE
RAD ONC MSQ ACTUAL SESSION DELIVERED DOSE: 182 CGRAY
RAD ONC MSQ ACTUAL TOTAL BIOLOGICAL DOSE: 3003 CCGE
RAD ONC MSQ ACTUAL TOTAL DOSE: 2730 CGRAY
RAD ONC MSQ ELAPSED DAYS: 21
RAD ONC MSQ LAST DATE: NORMAL
RAD ONC MSQ PRESCRIBED BIOLOGICAL FRACTIONAL DOSE: 200 CCGE
RAD ONC MSQ PRESCRIBED BIOLOGICAL TOTAL DOSE: 4600 CCGE
RAD ONC MSQ PRESCRIBED FRACTIONAL DOSE: 182 CGRAY
RAD ONC MSQ PRESCRIBED NUMBER OF FRACTIONS: 23
RAD ONC MSQ PRESCRIBED TECHNIQUE: NORMAL
RAD ONC MSQ PRESCRIBED TOTAL DOSE: 4182 CGRAY
RAD ONC MSQ START DATE: NORMAL
RAD ONC MSQ TREATMENT COURSE NUMBER: 1
RAD ONC MSQ TREATMENT SITE: NORMAL
RBC # BLD AUTO: 4.84 X10*6/UL (ref 4.5–5.9)
SODIUM SERPL-SCNC: 139 MMOL/L (ref 136–145)
WBC # BLD AUTO: 8.2 X10*3/UL (ref 4.4–11.3)

## 2024-03-07 PROCEDURE — G6002 STEREOSCOPIC X-RAY GUIDANCE: HCPCS | Performed by: STUDENT IN AN ORGANIZED HEALTH CARE EDUCATION/TRAINING PROGRAM

## 2024-03-07 PROCEDURE — 77523 PROTON TRMT INTERMEDIATE: CPT | Performed by: STUDENT IN AN ORGANIZED HEALTH CARE EDUCATION/TRAINING PROGRAM

## 2024-03-07 PROCEDURE — 80053 COMPREHEN METABOLIC PANEL: CPT

## 2024-03-07 PROCEDURE — 85025 COMPLETE CBC W/AUTO DIFF WBC: CPT

## 2024-03-07 PROCEDURE — 36415 COLL VENOUS BLD VENIPUNCTURE: CPT

## 2024-03-07 PROCEDURE — 77387 GUIDANCE FOR RADJ TX DLVR: CPT | Performed by: STUDENT IN AN ORGANIZED HEALTH CARE EDUCATION/TRAINING PROGRAM

## 2024-03-08 ENCOUNTER — HOSPITAL ENCOUNTER (OUTPATIENT)
Dept: RADIATION ONCOLOGY | Facility: HOSPITAL | Age: 69
Setting detail: RADIATION/ONCOLOGY SERIES
Discharge: HOME | End: 2024-03-08
Payer: MEDICARE

## 2024-03-08 DIAGNOSIS — C71.8 MALIGNANT NEOPLASM OF OVERLAPPING SITES OF BRAIN (MULTI): ICD-10-CM

## 2024-03-08 DIAGNOSIS — Z51.0 ENCOUNTER FOR ANTINEOPLASTIC RADIATION THERAPY: ICD-10-CM

## 2024-03-08 LAB
RAD ONC MSQ ACTUAL FRACTIONS DELIVERED: 16
RAD ONC MSQ ACTUAL SESSION BIOLOGICAL DOSE: 200 CCGE
RAD ONC MSQ ACTUAL SESSION DELIVERED DOSE: 182 CGRAY
RAD ONC MSQ ACTUAL TOTAL BIOLOGICAL DOSE: 3203 CCGE
RAD ONC MSQ ACTUAL TOTAL DOSE: 2912 CGRAY
RAD ONC MSQ ELAPSED DAYS: 22
RAD ONC MSQ LAST DATE: NORMAL
RAD ONC MSQ PRESCRIBED BIOLOGICAL FRACTIONAL DOSE: 200 CCGE
RAD ONC MSQ PRESCRIBED BIOLOGICAL TOTAL DOSE: 4600 CCGE
RAD ONC MSQ PRESCRIBED FRACTIONAL DOSE: 182 CGRAY
RAD ONC MSQ PRESCRIBED NUMBER OF FRACTIONS: 23
RAD ONC MSQ PRESCRIBED TECHNIQUE: NORMAL
RAD ONC MSQ PRESCRIBED TOTAL DOSE: 4182 CGRAY
RAD ONC MSQ START DATE: NORMAL
RAD ONC MSQ TREATMENT COURSE NUMBER: 1
RAD ONC MSQ TREATMENT SITE: NORMAL

## 2024-03-08 PROCEDURE — 77336 RADIATION PHYSICS CONSULT: CPT | Performed by: STUDENT IN AN ORGANIZED HEALTH CARE EDUCATION/TRAINING PROGRAM

## 2024-03-08 PROCEDURE — G6002 STEREOSCOPIC X-RAY GUIDANCE: HCPCS | Performed by: STUDENT IN AN ORGANIZED HEALTH CARE EDUCATION/TRAINING PROGRAM

## 2024-03-08 PROCEDURE — 77387 GUIDANCE FOR RADJ TX DLVR: CPT | Performed by: STUDENT IN AN ORGANIZED HEALTH CARE EDUCATION/TRAINING PROGRAM

## 2024-03-08 PROCEDURE — 77523 PROTON TRMT INTERMEDIATE: CPT | Performed by: STUDENT IN AN ORGANIZED HEALTH CARE EDUCATION/TRAINING PROGRAM

## 2024-03-11 ENCOUNTER — HOSPITAL ENCOUNTER (OUTPATIENT)
Dept: RADIATION ONCOLOGY | Facility: HOSPITAL | Age: 69
Setting detail: RADIATION/ONCOLOGY SERIES
Discharge: HOME | End: 2024-03-11
Payer: MEDICARE

## 2024-03-11 DIAGNOSIS — C71.8 MALIGNANT NEOPLASM OF OVERLAPPING SITES OF BRAIN (MULTI): ICD-10-CM

## 2024-03-11 DIAGNOSIS — Z51.0 ENCOUNTER FOR ANTINEOPLASTIC RADIATION THERAPY: ICD-10-CM

## 2024-03-11 LAB
RAD ONC MSQ ACTUAL FRACTIONS DELIVERED: 17
RAD ONC MSQ ACTUAL SESSION BIOLOGICAL DOSE: 200 CCGE
RAD ONC MSQ ACTUAL SESSION DELIVERED DOSE: 182 CGRAY
RAD ONC MSQ ACTUAL TOTAL BIOLOGICAL DOSE: 3403 CCGE
RAD ONC MSQ ACTUAL TOTAL DOSE: 3094 CGRAY
RAD ONC MSQ ELAPSED DAYS: 25
RAD ONC MSQ LAST DATE: NORMAL
RAD ONC MSQ PRESCRIBED BIOLOGICAL FRACTIONAL DOSE: 200 CCGE
RAD ONC MSQ PRESCRIBED BIOLOGICAL TOTAL DOSE: 4600 CCGE
RAD ONC MSQ PRESCRIBED FRACTIONAL DOSE: 182 CGRAY
RAD ONC MSQ PRESCRIBED NUMBER OF FRACTIONS: 23
RAD ONC MSQ PRESCRIBED TECHNIQUE: NORMAL
RAD ONC MSQ PRESCRIBED TOTAL DOSE: 4182 CGRAY
RAD ONC MSQ START DATE: NORMAL
RAD ONC MSQ TREATMENT COURSE NUMBER: 1
RAD ONC MSQ TREATMENT SITE: NORMAL

## 2024-03-11 PROCEDURE — G6002 STEREOSCOPIC X-RAY GUIDANCE: HCPCS | Performed by: STUDENT IN AN ORGANIZED HEALTH CARE EDUCATION/TRAINING PROGRAM

## 2024-03-11 PROCEDURE — 77387 GUIDANCE FOR RADJ TX DLVR: CPT | Performed by: STUDENT IN AN ORGANIZED HEALTH CARE EDUCATION/TRAINING PROGRAM

## 2024-03-11 PROCEDURE — 77523 PROTON TRMT INTERMEDIATE: CPT | Performed by: STUDENT IN AN ORGANIZED HEALTH CARE EDUCATION/TRAINING PROGRAM

## 2024-03-12 ENCOUNTER — RADIATION ONCOLOGY OTV (OUTPATIENT)
Dept: RADIATION ONCOLOGY | Facility: HOSPITAL | Age: 69
End: 2024-03-12
Payer: MEDICARE

## 2024-03-12 ENCOUNTER — HOSPITAL ENCOUNTER (OUTPATIENT)
Dept: RADIATION ONCOLOGY | Facility: HOSPITAL | Age: 69
Setting detail: RADIATION/ONCOLOGY SERIES
Discharge: HOME | End: 2024-03-12
Payer: MEDICARE

## 2024-03-12 VITALS
TEMPERATURE: 96.6 F | DIASTOLIC BLOOD PRESSURE: 84 MMHG | RESPIRATION RATE: 18 BRPM | SYSTOLIC BLOOD PRESSURE: 127 MMHG | HEART RATE: 83 BPM | OXYGEN SATURATION: 97 % | BODY MASS INDEX: 25.65 KG/M2 | WEIGHT: 163.8 LBS

## 2024-03-12 DIAGNOSIS — C71.8 MALIGNANT NEOPLASM OF OVERLAPPING SITES OF BRAIN (MULTI): ICD-10-CM

## 2024-03-12 DIAGNOSIS — Z51.0 ENCOUNTER FOR ANTINEOPLASTIC RADIATION THERAPY: ICD-10-CM

## 2024-03-12 LAB
RAD ONC MSQ ACTUAL FRACTIONS DELIVERED: 18
RAD ONC MSQ ACTUAL SESSION BIOLOGICAL DOSE: 200 CCGE
RAD ONC MSQ ACTUAL SESSION DELIVERED DOSE: 182 CGRAY
RAD ONC MSQ ACTUAL TOTAL BIOLOGICAL DOSE: 3604 CCGE
RAD ONC MSQ ACTUAL TOTAL DOSE: 3276 CGRAY
RAD ONC MSQ ELAPSED DAYS: 26
RAD ONC MSQ LAST DATE: NORMAL
RAD ONC MSQ PRESCRIBED BIOLOGICAL FRACTIONAL DOSE: 200 CCGE
RAD ONC MSQ PRESCRIBED BIOLOGICAL TOTAL DOSE: 4600 CCGE
RAD ONC MSQ PRESCRIBED FRACTIONAL DOSE: 182 CGRAY
RAD ONC MSQ PRESCRIBED NUMBER OF FRACTIONS: 23
RAD ONC MSQ PRESCRIBED TECHNIQUE: NORMAL
RAD ONC MSQ PRESCRIBED TOTAL DOSE: 4182 CGRAY
RAD ONC MSQ START DATE: NORMAL
RAD ONC MSQ TREATMENT COURSE NUMBER: 1
RAD ONC MSQ TREATMENT SITE: NORMAL

## 2024-03-12 PROCEDURE — 77387 GUIDANCE FOR RADJ TX DLVR: CPT | Performed by: STUDENT IN AN ORGANIZED HEALTH CARE EDUCATION/TRAINING PROGRAM

## 2024-03-12 PROCEDURE — G6002 STEREOSCOPIC X-RAY GUIDANCE: HCPCS | Performed by: STUDENT IN AN ORGANIZED HEALTH CARE EDUCATION/TRAINING PROGRAM

## 2024-03-12 PROCEDURE — 77523 PROTON TRMT INTERMEDIATE: CPT | Performed by: STUDENT IN AN ORGANIZED HEALTH CARE EDUCATION/TRAINING PROGRAM

## 2024-03-12 PROCEDURE — 77427 RADIATION TX MANAGEMENT X5: CPT | Performed by: STUDENT IN AN ORGANIZED HEALTH CARE EDUCATION/TRAINING PROGRAM

## 2024-03-12 ASSESSMENT — PAIN SCALES - GENERAL: PAINLEVEL: 0-NO PAIN

## 2024-03-12 NOTE — PROGRESS NOTES
Radiation Oncology On Treatment Visit    Patient Name:  Enmanuel Ahumada  MRN:  [id number]  :  1955    Referring Provider: No ref. provider found  Primary Care Provider: Ricardo Youngblood DO  Care Team: Patient Care Team:  Ricardo Youngblood DO as PCP - General  Ricardo Youngblood DO as PCP - MMO Medicare Advantage PCP  Kyle Rolon MD as Consulting Physician (Hematology and Oncology)    Date of Service: 3/12/2024     Diagnosis:   Specialty Problems          Radiation Oncology Problems    GBM (glioblastoma multiforme) (CMS/HCC)         Treatment Summary:  Radiation Treatments       Active   Partial brain R (Started on 2/15/2024)   Most recent fraction: 182 cGy given on 3/12/2024   Total given: 3,276 cGy / 4,182 cGy  (18 of 23 fractions)   Elapsed Days:    Technique: 3D           Completed   No historical radiation treatments to show.             SUBJECTIVE: otv      OBJECTIVE:   Vital Signs:  /84   Pulse 83   Temp 35.9 °C (96.6 °F)   Resp 18   Wt 74.3 kg (163 lb 12.8 oz)   SpO2 97%   BMI 25.65 kg/m²    Pain Scale: The patient's current pain level was assessed.  They report currently having a pain of 0 out of 10.    Other Pertinent Findings:     Toxicity Assessment          2024    10:49 3/5/2024    11:17 3/12/2024    12:02   Toxicity Assessment   Treatment Site Brain Brain Brain   Anorexia Grade 0 Grade 0 Grade 0   Anxiety Grade 0 Grade 0 Grade 0   Dehydration Grade 0 Grade 0 Grade 0   Depression Grade 0 Grade 0 Grade 0   Dermatitis Radiation Grade 0 Grade 1       getting darker and was instructed to stop shaving his head Grade 1       skin is getting dark in color   Diarrhea Grade 0 Grade 0 Grade 0   Fatigue Grade 0 Grade 0       walking every day Grade 1   Nausea Grade 0 Grade 0 Grade 0   Pain Grade 0 Grade 0 Grade 0   Tumor Pain  Grade 0    Vomiting Grade 0 Grade 0 Grade 0   Blurred Vision Grade 0     Dry Eye Grade 0  Grade 0   Eye Pain Grade 0  Grade 0   Cognitive Disturbance   Grade 0 Grade 0   Headache Grade 0 Grade 0 Grade 0   Memory Impairment Grade 0 Grade 0 Grade 0   Seizure Grade 0 Grade 0 Grade 0        Assessment / Plan:  The patient is tolerating radiation therapy as anticipated.  Continue per current treatment plan.   Dermatitis is getting worse. Will keep a close eye. Skin care discussed again.

## 2024-03-13 ENCOUNTER — HOSPITAL ENCOUNTER (OUTPATIENT)
Dept: RADIATION ONCOLOGY | Facility: HOSPITAL | Age: 69
Setting detail: RADIATION/ONCOLOGY SERIES
Discharge: HOME | End: 2024-03-13
Payer: MEDICARE

## 2024-03-13 DIAGNOSIS — C71.8 MALIGNANT NEOPLASM OF OVERLAPPING SITES OF BRAIN (MULTI): ICD-10-CM

## 2024-03-13 DIAGNOSIS — C71.9 GBM (GLIOBLASTOMA MULTIFORME) (MULTI): ICD-10-CM

## 2024-03-13 DIAGNOSIS — Z51.0 ENCOUNTER FOR ANTINEOPLASTIC RADIATION THERAPY: ICD-10-CM

## 2024-03-13 LAB
RAD ONC MSQ ACTUAL FRACTIONS DELIVERED: 19
RAD ONC MSQ ACTUAL SESSION BIOLOGICAL DOSE: 200 CCGE
RAD ONC MSQ ACTUAL SESSION DELIVERED DOSE: 182 CGRAY
RAD ONC MSQ ACTUAL TOTAL BIOLOGICAL DOSE: 3804 CCGE
RAD ONC MSQ ACTUAL TOTAL DOSE: 3458 CGRAY
RAD ONC MSQ ELAPSED DAYS: 27
RAD ONC MSQ LAST DATE: NORMAL
RAD ONC MSQ PRESCRIBED BIOLOGICAL FRACTIONAL DOSE: 200 CCGE
RAD ONC MSQ PRESCRIBED BIOLOGICAL TOTAL DOSE: 4600 CCGE
RAD ONC MSQ PRESCRIBED FRACTIONAL DOSE: 182 CGRAY
RAD ONC MSQ PRESCRIBED NUMBER OF FRACTIONS: 23
RAD ONC MSQ PRESCRIBED TECHNIQUE: NORMAL
RAD ONC MSQ PRESCRIBED TOTAL DOSE: 4182 CGRAY
RAD ONC MSQ START DATE: NORMAL
RAD ONC MSQ TREATMENT COURSE NUMBER: 1
RAD ONC MSQ TREATMENT SITE: NORMAL

## 2024-03-13 PROCEDURE — 77387 GUIDANCE FOR RADJ TX DLVR: CPT | Performed by: STUDENT IN AN ORGANIZED HEALTH CARE EDUCATION/TRAINING PROGRAM

## 2024-03-13 PROCEDURE — 77523 PROTON TRMT INTERMEDIATE: CPT | Performed by: STUDENT IN AN ORGANIZED HEALTH CARE EDUCATION/TRAINING PROGRAM

## 2024-03-13 PROCEDURE — G6002 STEREOSCOPIC X-RAY GUIDANCE: HCPCS | Performed by: STUDENT IN AN ORGANIZED HEALTH CARE EDUCATION/TRAINING PROGRAM

## 2024-03-13 RX ORDER — TEMOZOLOMIDE 100 MG/1
100 CAPSULE ORAL DAILY
Qty: 42 CAPSULE | Refills: 0 | Status: CANCELLED | OUTPATIENT
Start: 2024-03-13 | End: 2024-04-24

## 2024-03-14 ENCOUNTER — LAB (OUTPATIENT)
Dept: LAB | Facility: LAB | Age: 69
End: 2024-03-14
Payer: MEDICARE

## 2024-03-14 ENCOUNTER — HOSPITAL ENCOUNTER (OUTPATIENT)
Dept: RADIATION ONCOLOGY | Facility: HOSPITAL | Age: 69
Setting detail: RADIATION/ONCOLOGY SERIES
Discharge: HOME | End: 2024-03-14
Payer: MEDICARE

## 2024-03-14 DIAGNOSIS — C71.9 GBM (GLIOBLASTOMA MULTIFORME) (MULTI): ICD-10-CM

## 2024-03-14 DIAGNOSIS — Z51.0 ENCOUNTER FOR ANTINEOPLASTIC RADIATION THERAPY: ICD-10-CM

## 2024-03-14 DIAGNOSIS — C71.8 MALIGNANT NEOPLASM OF OVERLAPPING SITES OF BRAIN (MULTI): ICD-10-CM

## 2024-03-14 LAB
ALBUMIN SERPL BCP-MCNC: 4.2 G/DL (ref 3.4–5)
ALP SERPL-CCNC: 91 U/L (ref 33–136)
ALT SERPL W P-5'-P-CCNC: 21 U/L (ref 10–52)
ANION GAP SERPL CALC-SCNC: 11 MMOL/L (ref 10–20)
AST SERPL W P-5'-P-CCNC: 19 U/L (ref 9–39)
BASOPHILS # BLD AUTO: 0.04 X10*3/UL (ref 0–0.1)
BASOPHILS NFR BLD AUTO: 0.6 %
BILIRUB SERPL-MCNC: 0.8 MG/DL (ref 0–1.2)
BUN SERPL-MCNC: 17 MG/DL (ref 6–23)
CALCIUM SERPL-MCNC: 9.3 MG/DL (ref 8.6–10.3)
CHLORIDE SERPL-SCNC: 104 MMOL/L (ref 98–107)
CO2 SERPL-SCNC: 28 MMOL/L (ref 21–32)
CREAT SERPL-MCNC: 0.84 MG/DL (ref 0.5–1.3)
EGFRCR SERPLBLD CKD-EPI 2021: >90 ML/MIN/1.73M*2
EOSINOPHIL # BLD AUTO: 0.19 X10*3/UL (ref 0–0.7)
EOSINOPHIL NFR BLD AUTO: 3 %
ERYTHROCYTE [DISTWIDTH] IN BLOOD BY AUTOMATED COUNT: 12.8 % (ref 11.5–14.5)
GLUCOSE SERPL-MCNC: 121 MG/DL (ref 74–99)
HCT VFR BLD AUTO: 45.9 % (ref 41–52)
HGB BLD-MCNC: 15 G/DL (ref 13.5–17.5)
IMM GRANULOCYTES # BLD AUTO: 0.02 X10*3/UL (ref 0–0.7)
IMM GRANULOCYTES NFR BLD AUTO: 0.3 % (ref 0–0.9)
LYMPHOCYTES # BLD AUTO: 1.75 X10*3/UL (ref 1.2–4.8)
LYMPHOCYTES NFR BLD AUTO: 28 %
MCH RBC QN AUTO: 30.9 PG (ref 26–34)
MCHC RBC AUTO-ENTMCNC: 32.7 G/DL (ref 32–36)
MCV RBC AUTO: 95 FL (ref 80–100)
MONOCYTES # BLD AUTO: 0.56 X10*3/UL (ref 0.1–1)
MONOCYTES NFR BLD AUTO: 9 %
NEUTROPHILS # BLD AUTO: 3.69 X10*3/UL (ref 1.2–7.7)
NEUTROPHILS NFR BLD AUTO: 59.1 %
NRBC BLD-RTO: 0 /100 WBCS (ref 0–0)
PLATELET # BLD AUTO: 231 X10*3/UL (ref 150–450)
POTASSIUM SERPL-SCNC: 4.1 MMOL/L (ref 3.5–5.3)
PROT SERPL-MCNC: 6.6 G/DL (ref 6.4–8.2)
RAD ONC MSQ ACTUAL FRACTIONS DELIVERED: 20
RAD ONC MSQ ACTUAL SESSION BIOLOGICAL DOSE: 200 CCGE
RAD ONC MSQ ACTUAL SESSION DELIVERED DOSE: 182 CGRAY
RAD ONC MSQ ACTUAL TOTAL BIOLOGICAL DOSE: 4004 CCGE
RAD ONC MSQ ACTUAL TOTAL DOSE: 3640 CGRAY
RAD ONC MSQ ELAPSED DAYS: 28
RAD ONC MSQ LAST DATE: NORMAL
RAD ONC MSQ PRESCRIBED BIOLOGICAL FRACTIONAL DOSE: 200 CCGE
RAD ONC MSQ PRESCRIBED BIOLOGICAL TOTAL DOSE: 4600 CCGE
RAD ONC MSQ PRESCRIBED FRACTIONAL DOSE: 182 CGRAY
RAD ONC MSQ PRESCRIBED NUMBER OF FRACTIONS: 23
RAD ONC MSQ PRESCRIBED TECHNIQUE: NORMAL
RAD ONC MSQ PRESCRIBED TOTAL DOSE: 4182 CGRAY
RAD ONC MSQ START DATE: NORMAL
RAD ONC MSQ TREATMENT COURSE NUMBER: 1
RAD ONC MSQ TREATMENT SITE: NORMAL
RBC # BLD AUTO: 4.85 X10*6/UL (ref 4.5–5.9)
SODIUM SERPL-SCNC: 139 MMOL/L (ref 136–145)
WBC # BLD AUTO: 6.3 X10*3/UL (ref 4.4–11.3)

## 2024-03-14 PROCEDURE — 77523 PROTON TRMT INTERMEDIATE: CPT | Performed by: STUDENT IN AN ORGANIZED HEALTH CARE EDUCATION/TRAINING PROGRAM

## 2024-03-14 PROCEDURE — 85025 COMPLETE CBC W/AUTO DIFF WBC: CPT

## 2024-03-14 PROCEDURE — 77387 GUIDANCE FOR RADJ TX DLVR: CPT | Performed by: STUDENT IN AN ORGANIZED HEALTH CARE EDUCATION/TRAINING PROGRAM

## 2024-03-14 PROCEDURE — 36415 COLL VENOUS BLD VENIPUNCTURE: CPT

## 2024-03-14 PROCEDURE — G6002 STEREOSCOPIC X-RAY GUIDANCE: HCPCS | Performed by: STUDENT IN AN ORGANIZED HEALTH CARE EDUCATION/TRAINING PROGRAM

## 2024-03-14 PROCEDURE — 80053 COMPREHEN METABOLIC PANEL: CPT

## 2024-03-15 ENCOUNTER — HOSPITAL ENCOUNTER (OUTPATIENT)
Dept: RADIATION ONCOLOGY | Facility: HOSPITAL | Age: 69
Setting detail: RADIATION/ONCOLOGY SERIES
Discharge: HOME | End: 2024-03-15
Payer: MEDICARE

## 2024-03-15 DIAGNOSIS — C71.8 MALIGNANT NEOPLASM OF OVERLAPPING SITES OF BRAIN (MULTI): ICD-10-CM

## 2024-03-15 DIAGNOSIS — Z51.0 ENCOUNTER FOR ANTINEOPLASTIC RADIATION THERAPY: ICD-10-CM

## 2024-03-15 LAB
RAD ONC MSQ ACTUAL FRACTIONS DELIVERED: 21
RAD ONC MSQ ACTUAL SESSION BIOLOGICAL DOSE: 200 CCGE
RAD ONC MSQ ACTUAL SESSION DELIVERED DOSE: 182 CGRAY
RAD ONC MSQ ACTUAL TOTAL BIOLOGICAL DOSE: 4204 CCGE
RAD ONC MSQ ACTUAL TOTAL DOSE: 3822 CGRAY
RAD ONC MSQ ELAPSED DAYS: 29
RAD ONC MSQ LAST DATE: NORMAL
RAD ONC MSQ PRESCRIBED BIOLOGICAL FRACTIONAL DOSE: 200 CCGE
RAD ONC MSQ PRESCRIBED BIOLOGICAL TOTAL DOSE: 4600 CCGE
RAD ONC MSQ PRESCRIBED FRACTIONAL DOSE: 182 CGRAY
RAD ONC MSQ PRESCRIBED NUMBER OF FRACTIONS: 23
RAD ONC MSQ PRESCRIBED TECHNIQUE: NORMAL
RAD ONC MSQ PRESCRIBED TOTAL DOSE: 4182 CGRAY
RAD ONC MSQ START DATE: NORMAL
RAD ONC MSQ TREATMENT COURSE NUMBER: 1
RAD ONC MSQ TREATMENT SITE: NORMAL

## 2024-03-15 PROCEDURE — 77334 RADIATION TREATMENT AID(S): CPT | Performed by: STUDENT IN AN ORGANIZED HEALTH CARE EDUCATION/TRAINING PROGRAM

## 2024-03-15 PROCEDURE — 77280 THER RAD SIMULAJ FIELD SMPL: CPT | Performed by: STUDENT IN AN ORGANIZED HEALTH CARE EDUCATION/TRAINING PROGRAM

## 2024-03-15 PROCEDURE — 77336 RADIATION PHYSICS CONSULT: CPT | Mod: 59 | Performed by: STUDENT IN AN ORGANIZED HEALTH CARE EDUCATION/TRAINING PROGRAM

## 2024-03-15 PROCEDURE — 77523 PROTON TRMT INTERMEDIATE: CPT | Performed by: STUDENT IN AN ORGANIZED HEALTH CARE EDUCATION/TRAINING PROGRAM

## 2024-03-18 ENCOUNTER — HOSPITAL ENCOUNTER (OUTPATIENT)
Dept: RADIATION ONCOLOGY | Facility: HOSPITAL | Age: 69
Setting detail: RADIATION/ONCOLOGY SERIES
Discharge: HOME | End: 2024-03-18
Payer: MEDICARE

## 2024-03-18 DIAGNOSIS — C71.8 MALIGNANT NEOPLASM OF OVERLAPPING SITES OF BRAIN (MULTI): ICD-10-CM

## 2024-03-18 DIAGNOSIS — Z51.0 ENCOUNTER FOR ANTINEOPLASTIC RADIATION THERAPY: ICD-10-CM

## 2024-03-18 LAB
RAD ONC MSQ ACTUAL FRACTIONS DELIVERED: 22
RAD ONC MSQ ACTUAL SESSION BIOLOGICAL DOSE: 200 CCGE
RAD ONC MSQ ACTUAL SESSION DELIVERED DOSE: 182 CGRAY
RAD ONC MSQ ACTUAL TOTAL BIOLOGICAL DOSE: 4404 CCGE
RAD ONC MSQ ACTUAL TOTAL DOSE: 4004 CGRAY
RAD ONC MSQ ELAPSED DAYS: 32
RAD ONC MSQ LAST DATE: NORMAL
RAD ONC MSQ PRESCRIBED BIOLOGICAL FRACTIONAL DOSE: 200 CCGE
RAD ONC MSQ PRESCRIBED BIOLOGICAL TOTAL DOSE: 4600 CCGE
RAD ONC MSQ PRESCRIBED FRACTIONAL DOSE: 182 CGRAY
RAD ONC MSQ PRESCRIBED NUMBER OF FRACTIONS: 23
RAD ONC MSQ PRESCRIBED TECHNIQUE: NORMAL
RAD ONC MSQ PRESCRIBED TOTAL DOSE: 4182 CGRAY
RAD ONC MSQ START DATE: NORMAL
RAD ONC MSQ TREATMENT COURSE NUMBER: 1
RAD ONC MSQ TREATMENT SITE: NORMAL

## 2024-03-18 PROCEDURE — 77523 PROTON TRMT INTERMEDIATE: CPT | Performed by: STUDENT IN AN ORGANIZED HEALTH CARE EDUCATION/TRAINING PROGRAM

## 2024-03-18 PROCEDURE — 77387 GUIDANCE FOR RADJ TX DLVR: CPT | Performed by: STUDENT IN AN ORGANIZED HEALTH CARE EDUCATION/TRAINING PROGRAM

## 2024-03-18 PROCEDURE — G6002 STEREOSCOPIC X-RAY GUIDANCE: HCPCS | Performed by: STUDENT IN AN ORGANIZED HEALTH CARE EDUCATION/TRAINING PROGRAM

## 2024-03-19 ENCOUNTER — RADIATION ONCOLOGY OTV (OUTPATIENT)
Dept: RADIATION ONCOLOGY | Facility: HOSPITAL | Age: 69
End: 2024-03-19
Payer: MEDICARE

## 2024-03-19 ENCOUNTER — HOSPITAL ENCOUNTER (OUTPATIENT)
Dept: RADIATION ONCOLOGY | Facility: HOSPITAL | Age: 69
Setting detail: RADIATION/ONCOLOGY SERIES
Discharge: HOME | End: 2024-03-19
Payer: MEDICARE

## 2024-03-19 VITALS
WEIGHT: 164.2 LBS | SYSTOLIC BLOOD PRESSURE: 116 MMHG | BODY MASS INDEX: 25.71 KG/M2 | HEART RATE: 70 BPM | TEMPERATURE: 97.7 F | OXYGEN SATURATION: 96 % | RESPIRATION RATE: 18 BRPM | DIASTOLIC BLOOD PRESSURE: 77 MMHG

## 2024-03-19 DIAGNOSIS — C71.8 MALIGNANT NEOPLASM OF OVERLAPPING SITES OF BRAIN (MULTI): ICD-10-CM

## 2024-03-19 DIAGNOSIS — Z51.0 ENCOUNTER FOR ANTINEOPLASTIC RADIATION THERAPY: ICD-10-CM

## 2024-03-19 DIAGNOSIS — C71.9 GBM (GLIOBLASTOMA MULTIFORME) (MULTI): Primary | ICD-10-CM

## 2024-03-19 LAB
RAD ONC MSQ ACTUAL FRACTIONS DELIVERED: 23
RAD ONC MSQ ACTUAL SESSION BIOLOGICAL DOSE: 200 CCGE
RAD ONC MSQ ACTUAL SESSION DELIVERED DOSE: 182 CGRAY
RAD ONC MSQ ACTUAL TOTAL BIOLOGICAL DOSE: 4605 CCGE
RAD ONC MSQ ACTUAL TOTAL DOSE: 4186 CGRAY
RAD ONC MSQ ELAPSED DAYS: 33
RAD ONC MSQ LAST DATE: NORMAL
RAD ONC MSQ PRESCRIBED BIOLOGICAL FRACTIONAL DOSE: 200 CCGE
RAD ONC MSQ PRESCRIBED BIOLOGICAL TOTAL DOSE: 4600 CCGE
RAD ONC MSQ PRESCRIBED FRACTIONAL DOSE: 182 CGRAY
RAD ONC MSQ PRESCRIBED NUMBER OF FRACTIONS: 23
RAD ONC MSQ PRESCRIBED TECHNIQUE: NORMAL
RAD ONC MSQ PRESCRIBED TOTAL DOSE: 4182 CGRAY
RAD ONC MSQ START DATE: NORMAL
RAD ONC MSQ TREATMENT COURSE NUMBER: 1
RAD ONC MSQ TREATMENT SITE: NORMAL

## 2024-03-19 PROCEDURE — G6002 STEREOSCOPIC X-RAY GUIDANCE: HCPCS | Performed by: STUDENT IN AN ORGANIZED HEALTH CARE EDUCATION/TRAINING PROGRAM

## 2024-03-19 PROCEDURE — 77427 RADIATION TX MANAGEMENT X5: CPT | Performed by: STUDENT IN AN ORGANIZED HEALTH CARE EDUCATION/TRAINING PROGRAM

## 2024-03-19 PROCEDURE — 77387 GUIDANCE FOR RADJ TX DLVR: CPT | Performed by: STUDENT IN AN ORGANIZED HEALTH CARE EDUCATION/TRAINING PROGRAM

## 2024-03-19 PROCEDURE — 77523 PROTON TRMT INTERMEDIATE: CPT | Performed by: STUDENT IN AN ORGANIZED HEALTH CARE EDUCATION/TRAINING PROGRAM

## 2024-03-19 RX ORDER — SILVER SULFADIAZINE 10 G/1000G
CREAM TOPICAL DAILY
Qty: 400 G | Refills: 0 | Status: SHIPPED | OUTPATIENT
Start: 2024-03-19

## 2024-03-19 ASSESSMENT — PAIN SCALES - GENERAL: PAINLEVEL: 0-NO PAIN

## 2024-03-19 NOTE — PROGRESS NOTES
Radiation Oncology On Treatment Visit    Patient Name:  Enmanuel Ahumada  MRN:  [id number]  :  1955    Referring Provider: No ref. provider found  Primary Care Provider: Ricardo Youngblood DO  Care Team: Patient Care Team:  Ricardo Youngblood DO as PCP - General  Ricardo Youngblood DO as PCP - [employer]age PCP  Kyle Rolon MD as Consulting Physician (Hematology and Oncology)    Date of Service: 3/19/2024     Diagnosis:   Specialty Problems          Radiation Oncology Problems    GBM (glioblastoma multiforme) (CMS/HCC)         Treatment Summary:  Radiation Treatments       Active   No active radiation treatments to show.     Completed   Partial brain R (Started on 2/15/2024)   Most recent fraction: 182 cGy given on 3/19/2024   Total given: 4,186 cGy / 4,182 cGy  (23 of 23 fractions)   Elapsed Days: 33   Technique: 3D                   SUBJECTIVE: otv      OBJECTIVE:   Vital Signs:  /77   Pulse 70   Temp 36.5 °C (97.7 °F)   Resp 18   Wt 74.5 kg (164 lb 3.2 oz)   SpO2 96%   BMI 25.71 kg/m²    Pain Scale: The patient's current pain level was assessed.  They report currently having a pain of 0 out of 10.    Other Pertinent Findings:     Toxicity Assessment          2024    10:49 3/5/2024    11:17 3/12/2024    12:02 3/19/2024    11:26   Toxicity Assessment   Treatment Site Brain Brain Brain Brain   Anorexia Grade 0 Grade 0 Grade 0 Grade 0   Anxiety Grade 0 Grade 0 Grade 0 Grade 0   Dehydration Grade 0 Grade 0 Grade 0 Grade 0   Depression Grade 0 Grade 0 Grade 0 Grade 0   Dermatitis Radiation Grade 0 Grade 1       getting darker and was instructed to stop shaving his head Grade 1       skin is getting dark in color Grade 2   Diarrhea Grade 0 Grade 0 Grade 0 Grade 0   Fatigue Grade 0 Grade 0       walking every day Grade 1 Grade 1   Nausea Grade 0 Grade 0 Grade 0 Grade 0   Pain Grade 0 Grade 0 Grade 0 Grade 0   Tumor Pain  Grade 0     Vomiting Grade 0 Grade 0 Grade 0 Grade 0   Hearing  Impaired    Grade 0   Middle Ear Inflammation    Grade 0   Blurred Vision Grade 0   Grade 0   Dry Eye Grade 0  Grade 0 Grade 0   Eye Pain Grade 0  Grade 0 Grade 0   Cognitive Disturbance  Grade 0 Grade 0 Grade 0   Headache Grade 0 Grade 0 Grade 0 Grade 0   Memory Impairment Grade 0 Grade 0 Grade 0 Grade 0   Seizure Grade 0 Grade 0 Grade 0 Grade 0        Assessment / Plan:  The patient is tolerating radiation therapy as anticipated.  Continue per current treatment plan.   Discussed Hypersight study. Patient is interested and signed consent.

## 2024-03-20 ENCOUNTER — HOSPITAL ENCOUNTER (OUTPATIENT)
Dept: RADIATION ONCOLOGY | Facility: HOSPITAL | Age: 69
Setting detail: RADIATION/ONCOLOGY SERIES
Discharge: HOME | End: 2024-03-20
Payer: MEDICARE

## 2024-03-20 DIAGNOSIS — C71.8 MALIGNANT NEOPLASM OF OVERLAPPING SITES OF BRAIN (MULTI): ICD-10-CM

## 2024-03-20 DIAGNOSIS — Z51.0 ENCOUNTER FOR ANTINEOPLASTIC RADIATION THERAPY: ICD-10-CM

## 2024-03-20 LAB
RAD ONC MSQ ACTUAL FRACTIONS DELIVERED: 1
RAD ONC MSQ ACTUAL SESSION BIOLOGICAL DOSE: 200 CCGE
RAD ONC MSQ ACTUAL SESSION DELIVERED DOSE: 182 CGRAY
RAD ONC MSQ ACTUAL TOTAL BIOLOGICAL DOSE: 200 CCGE
RAD ONC MSQ ACTUAL TOTAL DOSE: 182 CGRAY
RAD ONC MSQ ELAPSED DAYS: 0
RAD ONC MSQ LAST DATE: NORMAL
RAD ONC MSQ PRESCRIBED BIOLOGICAL FRACTIONAL DOSE: 200 CCGE
RAD ONC MSQ PRESCRIBED BIOLOGICAL TOTAL DOSE: 1400 CCGE
RAD ONC MSQ PRESCRIBED FRACTIONAL DOSE: 182 CGRAY
RAD ONC MSQ PRESCRIBED NUMBER OF FRACTIONS: 7
RAD ONC MSQ PRESCRIBED TECHNIQUE: NORMAL
RAD ONC MSQ PRESCRIBED TOTAL DOSE: 1273 CGRAY
RAD ONC MSQ START DATE: NORMAL
RAD ONC MSQ TREATMENT COURSE NUMBER: 1
RAD ONC MSQ TREATMENT SITE: NORMAL

## 2024-03-20 PROCEDURE — 77387 GUIDANCE FOR RADJ TX DLVR: CPT | Performed by: STUDENT IN AN ORGANIZED HEALTH CARE EDUCATION/TRAINING PROGRAM

## 2024-03-20 PROCEDURE — 77523 PROTON TRMT INTERMEDIATE: CPT | Performed by: STUDENT IN AN ORGANIZED HEALTH CARE EDUCATION/TRAINING PROGRAM

## 2024-03-20 PROCEDURE — G6002 STEREOSCOPIC X-RAY GUIDANCE: HCPCS | Performed by: STUDENT IN AN ORGANIZED HEALTH CARE EDUCATION/TRAINING PROGRAM

## 2024-03-21 ENCOUNTER — TREATMENT (OUTPATIENT)
Dept: OCCUPATIONAL THERAPY | Facility: CLINIC | Age: 69
End: 2024-03-21
Payer: MEDICARE

## 2024-03-21 ENCOUNTER — HOSPITAL ENCOUNTER (OUTPATIENT)
Dept: RADIATION ONCOLOGY | Facility: HOSPITAL | Age: 69
Setting detail: RADIATION/ONCOLOGY SERIES
Discharge: HOME | End: 2024-03-21
Payer: MEDICARE

## 2024-03-21 ENCOUNTER — LAB (OUTPATIENT)
Dept: LAB | Facility: LAB | Age: 69
End: 2024-03-21
Payer: MEDICARE

## 2024-03-21 DIAGNOSIS — Z51.0 ENCOUNTER FOR ANTINEOPLASTIC RADIATION THERAPY: ICD-10-CM

## 2024-03-21 DIAGNOSIS — R27.8 COORDINATION ABNORMAL: Primary | ICD-10-CM

## 2024-03-21 DIAGNOSIS — C71.9 GBM (GLIOBLASTOMA MULTIFORME) (MULTI): ICD-10-CM

## 2024-03-21 DIAGNOSIS — C71.8 MALIGNANT NEOPLASM OF OVERLAPPING SITES OF BRAIN (MULTI): ICD-10-CM

## 2024-03-21 DIAGNOSIS — H53.452 PERIPHERAL VISION LOSS, LEFT: ICD-10-CM

## 2024-03-21 DIAGNOSIS — R29.898 WEAKNESS OF LEFT HAND: ICD-10-CM

## 2024-03-21 DIAGNOSIS — R29.818 IMPAIRED PROPRIOCEPTION: ICD-10-CM

## 2024-03-21 DIAGNOSIS — H53.9 IMPAIRED VISUAL PERCEPTION: ICD-10-CM

## 2024-03-21 LAB
ALBUMIN SERPL BCP-MCNC: 4.3 G/DL (ref 3.4–5)
ALP SERPL-CCNC: 91 U/L (ref 33–136)
ALT SERPL W P-5'-P-CCNC: 24 U/L (ref 10–52)
ANION GAP SERPL CALC-SCNC: 9 MMOL/L (ref 10–20)
AST SERPL W P-5'-P-CCNC: 19 U/L (ref 9–39)
BASOPHILS # BLD AUTO: 0.03 X10*3/UL (ref 0–0.1)
BASOPHILS NFR BLD AUTO: 0.6 %
BILIRUB SERPL-MCNC: 1 MG/DL (ref 0–1.2)
BUN SERPL-MCNC: 19 MG/DL (ref 6–23)
CALCIUM SERPL-MCNC: 9.4 MG/DL (ref 8.6–10.3)
CHLORIDE SERPL-SCNC: 103 MMOL/L (ref 98–107)
CO2 SERPL-SCNC: 31 MMOL/L (ref 21–32)
CREAT SERPL-MCNC: 1 MG/DL (ref 0.5–1.3)
EGFRCR SERPLBLD CKD-EPI 2021: 83 ML/MIN/1.73M*2
EOSINOPHIL # BLD AUTO: 0.13 X10*3/UL (ref 0–0.7)
EOSINOPHIL NFR BLD AUTO: 2.5 %
ERYTHROCYTE [DISTWIDTH] IN BLOOD BY AUTOMATED COUNT: 12.7 % (ref 11.5–14.5)
GLUCOSE SERPL-MCNC: 127 MG/DL (ref 74–99)
HCT VFR BLD AUTO: 46.2 % (ref 41–52)
HGB BLD-MCNC: 15.3 G/DL (ref 13.5–17.5)
IMM GRANULOCYTES # BLD AUTO: 0.01 X10*3/UL (ref 0–0.7)
IMM GRANULOCYTES NFR BLD AUTO: 0.2 % (ref 0–0.9)
LYMPHOCYTES # BLD AUTO: 1.48 X10*3/UL (ref 1.2–4.8)
LYMPHOCYTES NFR BLD AUTO: 28.5 %
MCH RBC QN AUTO: 31.2 PG (ref 26–34)
MCHC RBC AUTO-ENTMCNC: 33.1 G/DL (ref 32–36)
MCV RBC AUTO: 94 FL (ref 80–100)
MONOCYTES # BLD AUTO: 0.5 X10*3/UL (ref 0.1–1)
MONOCYTES NFR BLD AUTO: 9.6 %
NEUTROPHILS # BLD AUTO: 3.04 X10*3/UL (ref 1.2–7.7)
NEUTROPHILS NFR BLD AUTO: 58.6 %
NRBC BLD-RTO: 0 /100 WBCS (ref 0–0)
PLATELET # BLD AUTO: 174 X10*3/UL (ref 150–450)
POTASSIUM SERPL-SCNC: 4.3 MMOL/L (ref 3.5–5.3)
PROT SERPL-MCNC: 6.7 G/DL (ref 6.4–8.2)
RAD ONC MSQ ACTUAL FRACTIONS DELIVERED: 2
RAD ONC MSQ ACTUAL SESSION BIOLOGICAL DOSE: 200 CCGE
RAD ONC MSQ ACTUAL SESSION DELIVERED DOSE: 182 CGRAY
RAD ONC MSQ ACTUAL TOTAL BIOLOGICAL DOSE: 400 CCGE
RAD ONC MSQ ACTUAL TOTAL DOSE: 364 CGRAY
RAD ONC MSQ ELAPSED DAYS: 1
RAD ONC MSQ LAST DATE: NORMAL
RAD ONC MSQ PRESCRIBED BIOLOGICAL FRACTIONAL DOSE: 200 CCGE
RAD ONC MSQ PRESCRIBED BIOLOGICAL TOTAL DOSE: 1400 CCGE
RAD ONC MSQ PRESCRIBED FRACTIONAL DOSE: 182 CGRAY
RAD ONC MSQ PRESCRIBED NUMBER OF FRACTIONS: 7
RAD ONC MSQ PRESCRIBED TECHNIQUE: NORMAL
RAD ONC MSQ PRESCRIBED TOTAL DOSE: 1273 CGRAY
RAD ONC MSQ START DATE: NORMAL
RAD ONC MSQ TREATMENT COURSE NUMBER: 1
RAD ONC MSQ TREATMENT SITE: NORMAL
RBC # BLD AUTO: 4.91 X10*6/UL (ref 4.5–5.9)
SODIUM SERPL-SCNC: 139 MMOL/L (ref 136–145)
WBC # BLD AUTO: 5.2 X10*3/UL (ref 4.4–11.3)

## 2024-03-21 PROCEDURE — 77387 GUIDANCE FOR RADJ TX DLVR: CPT | Performed by: STUDENT IN AN ORGANIZED HEALTH CARE EDUCATION/TRAINING PROGRAM

## 2024-03-21 PROCEDURE — 80053 COMPREHEN METABOLIC PANEL: CPT

## 2024-03-21 PROCEDURE — G6002 STEREOSCOPIC X-RAY GUIDANCE: HCPCS | Performed by: STUDENT IN AN ORGANIZED HEALTH CARE EDUCATION/TRAINING PROGRAM

## 2024-03-21 PROCEDURE — 97112 NEUROMUSCULAR REEDUCATION: CPT | Mod: GO | Performed by: OCCUPATIONAL THERAPIST

## 2024-03-21 PROCEDURE — 85025 COMPLETE CBC W/AUTO DIFF WBC: CPT

## 2024-03-21 PROCEDURE — 77523 PROTON TRMT INTERMEDIATE: CPT | Performed by: STUDENT IN AN ORGANIZED HEALTH CARE EDUCATION/TRAINING PROGRAM

## 2024-03-21 PROCEDURE — 36415 COLL VENOUS BLD VENIPUNCTURE: CPT

## 2024-03-21 NOTE — PROGRESS NOTES
"Occupational Therapy    Evaluation    Patient Name: Enmanuel Ahumada \"Dash\"  MRN: [id number]  Today's Date: 3/21/2024       Visit: #2    Assessment:  Patient participated in treatment well with no complaints of pain.  Patient demonstrated good left visual scanning techniques during treatment to compensate for decreased left peripheral vision      Plan:      1-2x/wk for 8-12 weeks   -Neuromuscular reeducation, Therapeutic Exercise, Self Care/ADL       Subjective   Current Problem:  1. Coordination abnormal        2. Impaired proprioception        3. Impaired visual perception        4. Peripheral vision loss, left        5. Weakness of left hand          Patient reports he is continuing to gradually improve       Objective   83 pegs placed in peg board in 20:30 seconds  Blaze pods reaction best time 1.061; most # pods hit in 1 minute = 50 hits     Treatment:     Neuro:   Forearm pronation and supination with use of red paresh bar  - 2x10    L [facility], visual scanning and dynamic standing balance addressed by having patient place pegs into ped board with use of tweezers in left hand.  While patient placed pegs, OT held up playing card on patient's left side.  Pt to scan and identify card on left side.   Pt completed while standing on foam pad - 20:30    Reaction time, left visual scanning and functional reaching addressed with completion of blaze pods while standing at harness.  OT placed 6 blaze pods around patient.  Pt to quickly tap each pod as it lights up.    OT upgraded task by OT placing cones between each blaze pod. Pt to tap each pod without hitting cone              "

## 2024-03-22 ENCOUNTER — TELEPHONE (OUTPATIENT)
Dept: HEMATOLOGY/ONCOLOGY | Facility: HOSPITAL | Age: 69
End: 2024-03-22
Payer: MEDICARE

## 2024-03-22 ENCOUNTER — HOSPITAL ENCOUNTER (OUTPATIENT)
Dept: RADIATION ONCOLOGY | Facility: HOSPITAL | Age: 69
Setting detail: RADIATION/ONCOLOGY SERIES
Discharge: HOME | End: 2024-03-22
Payer: MEDICARE

## 2024-03-22 DIAGNOSIS — C71.8 MALIGNANT NEOPLASM OF OVERLAPPING SITES OF BRAIN (MULTI): ICD-10-CM

## 2024-03-22 DIAGNOSIS — Z51.0 ENCOUNTER FOR ANTINEOPLASTIC RADIATION THERAPY: ICD-10-CM

## 2024-03-22 LAB
RAD ONC MSQ ACTUAL FRACTIONS DELIVERED: 3
RAD ONC MSQ ACTUAL SESSION BIOLOGICAL DOSE: 200 CCGE
RAD ONC MSQ ACTUAL SESSION DELIVERED DOSE: 182 CGRAY
RAD ONC MSQ ACTUAL TOTAL BIOLOGICAL DOSE: 601 CCGE
RAD ONC MSQ ACTUAL TOTAL DOSE: 546 CGRAY
RAD ONC MSQ ELAPSED DAYS: 2
RAD ONC MSQ LAST DATE: NORMAL
RAD ONC MSQ PRESCRIBED BIOLOGICAL FRACTIONAL DOSE: 200 CCGE
RAD ONC MSQ PRESCRIBED BIOLOGICAL TOTAL DOSE: 1400 CCGE
RAD ONC MSQ PRESCRIBED FRACTIONAL DOSE: 182 CGRAY
RAD ONC MSQ PRESCRIBED NUMBER OF FRACTIONS: 7
RAD ONC MSQ PRESCRIBED TECHNIQUE: NORMAL
RAD ONC MSQ PRESCRIBED TOTAL DOSE: 1273 CGRAY
RAD ONC MSQ START DATE: NORMAL
RAD ONC MSQ TREATMENT COURSE NUMBER: 1
RAD ONC MSQ TREATMENT SITE: NORMAL

## 2024-03-22 PROCEDURE — 77523 PROTON TRMT INTERMEDIATE: CPT | Performed by: STUDENT IN AN ORGANIZED HEALTH CARE EDUCATION/TRAINING PROGRAM

## 2024-03-22 PROCEDURE — 77336 RADIATION PHYSICS CONSULT: CPT | Performed by: STUDENT IN AN ORGANIZED HEALTH CARE EDUCATION/TRAINING PROGRAM

## 2024-03-22 PROCEDURE — 77387 GUIDANCE FOR RADJ TX DLVR: CPT | Performed by: RADIOLOGY

## 2024-03-22 PROCEDURE — G6002 STEREOSCOPIC X-RAY GUIDANCE: HCPCS | Performed by: RADIOLOGY

## 2024-03-22 NOTE — TELEPHONE ENCOUNTER
Pt's radiation treatment was delayed by a day because the machine was down.  He had already taken his temodar the night before so will be short one day of medication.  Final day of RT is 3/28.  Will he need to make up the extra dose or is cycle considered complete?

## 2024-03-22 NOTE — TELEPHONE ENCOUNTER
Per Dr. Rolon, no need to make up additional dose since it was just one day.  Spouse aware, no further needs at this time.

## 2024-03-25 ENCOUNTER — HOSPITAL ENCOUNTER (OUTPATIENT)
Dept: RADIATION ONCOLOGY | Facility: HOSPITAL | Age: 69
Setting detail: RADIATION/ONCOLOGY SERIES
Discharge: HOME | End: 2024-03-25
Payer: MEDICARE

## 2024-03-25 ENCOUNTER — OFFICE VISIT (OUTPATIENT)
Dept: HEMATOLOGY/ONCOLOGY | Facility: HOSPITAL | Age: 69
End: 2024-03-25
Payer: MEDICARE

## 2024-03-25 VITALS
RESPIRATION RATE: 16 BRPM | DIASTOLIC BLOOD PRESSURE: 60 MMHG | SYSTOLIC BLOOD PRESSURE: 114 MMHG | OXYGEN SATURATION: 98 % | BODY MASS INDEX: 25.84 KG/M2 | TEMPERATURE: 98.1 F | HEART RATE: 98 BPM | WEIGHT: 165 LBS

## 2024-03-25 DIAGNOSIS — C71.9 GBM (GLIOBLASTOMA MULTIFORME) (MULTI): Primary | ICD-10-CM

## 2024-03-25 DIAGNOSIS — Z51.0 ENCOUNTER FOR ANTINEOPLASTIC RADIATION THERAPY: ICD-10-CM

## 2024-03-25 DIAGNOSIS — C71.8 MALIGNANT NEOPLASM OF OVERLAPPING SITES OF BRAIN (MULTI): ICD-10-CM

## 2024-03-25 LAB
RAD ONC MSQ ACTUAL FRACTIONS DELIVERED: 4
RAD ONC MSQ ACTUAL SESSION BIOLOGICAL DOSE: 200 CCGE
RAD ONC MSQ ACTUAL SESSION DELIVERED DOSE: 182 CGRAY
RAD ONC MSQ ACTUAL TOTAL BIOLOGICAL DOSE: 801 CCGE
RAD ONC MSQ ACTUAL TOTAL DOSE: 728 CGRAY
RAD ONC MSQ ELAPSED DAYS: 5
RAD ONC MSQ LAST DATE: NORMAL
RAD ONC MSQ PRESCRIBED BIOLOGICAL FRACTIONAL DOSE: 200 CCGE
RAD ONC MSQ PRESCRIBED BIOLOGICAL TOTAL DOSE: 1400 CCGE
RAD ONC MSQ PRESCRIBED FRACTIONAL DOSE: 182 CGRAY
RAD ONC MSQ PRESCRIBED NUMBER OF FRACTIONS: 7
RAD ONC MSQ PRESCRIBED TECHNIQUE: NORMAL
RAD ONC MSQ PRESCRIBED TOTAL DOSE: 1273 CGRAY
RAD ONC MSQ START DATE: NORMAL
RAD ONC MSQ TREATMENT COURSE NUMBER: 1
RAD ONC MSQ TREATMENT SITE: NORMAL

## 2024-03-25 PROCEDURE — 1126F AMNT PAIN NOTED NONE PRSNT: CPT | Performed by: PSYCHIATRY & NEUROLOGY

## 2024-03-25 PROCEDURE — 1159F MED LIST DOCD IN RCRD: CPT | Performed by: PSYCHIATRY & NEUROLOGY

## 2024-03-25 PROCEDURE — 99215 OFFICE O/P EST HI 40 MIN: CPT | Performed by: PSYCHIATRY & NEUROLOGY

## 2024-03-25 PROCEDURE — 1036F TOBACCO NON-USER: CPT | Performed by: PSYCHIATRY & NEUROLOGY

## 2024-03-25 PROCEDURE — 1157F ADVNC CARE PLAN IN RCRD: CPT | Performed by: PSYCHIATRY & NEUROLOGY

## 2024-03-25 PROCEDURE — 77523 PROTON TRMT INTERMEDIATE: CPT | Performed by: STUDENT IN AN ORGANIZED HEALTH CARE EDUCATION/TRAINING PROGRAM

## 2024-03-25 PROCEDURE — G6002 STEREOSCOPIC X-RAY GUIDANCE: HCPCS | Performed by: STUDENT IN AN ORGANIZED HEALTH CARE EDUCATION/TRAINING PROGRAM

## 2024-03-25 PROCEDURE — 77387 GUIDANCE FOR RADJ TX DLVR: CPT | Performed by: STUDENT IN AN ORGANIZED HEALTH CARE EDUCATION/TRAINING PROGRAM

## 2024-03-25 ASSESSMENT — PATIENT HEALTH QUESTIONNAIRE - PHQ9
SUM OF ALL RESPONSES TO PHQ9 QUESTIONS 1 AND 2: 0
2. FEELING DOWN, DEPRESSED OR HOPELESS: NOT AT ALL
1. LITTLE INTEREST OR PLEASURE IN DOING THINGS: NOT AT ALL

## 2024-03-25 ASSESSMENT — PAIN SCALES - GENERAL: PAINLEVEL: 0-NO PAIN

## 2024-03-25 NOTE — PATIENT INSTRUCTIONS
Please follow up with Dr. Rolon in 5 weeks. You will need to have an MRI prior to your visit. You can have this done at [facility]. Please call [phone] option 5, option 2 for any questions or concerns. Please call [phone] option 1, for any scheduling concerns or issues.

## 2024-03-25 NOTE — PROGRESS NOTES
Patient ID: Dash Ahumada is a 68 y.o. male.  Referring Physician: No referring provider defined for this encounter.  Primary Care Provider: Ricardo Youngblood DO      Subjective    Dash Ahumada is a 67 yo RH  male with a PMH significant for HLD, Osborn's esophagus, ASA for cardioprotection, who presented with headache x 6 weeks, confusion x 3 weeks, and gait instability. CTH with RO hypodensity. MRI PPF and fMRI with L language dominant lesion. TTE EF 60%. S/p R crani for tumor resection with 5ALA on 12/29/23. MRI with GTR. Prelim pathology concerning for glioblastoma - immunostains pending.      INTERVAL HISTORY (1/15/2024): Since getting discharged to home, he has been slowly recovering from the surgery. The final pathology is still pending, but it is likely GBM. He notes some persistent incisional pain around the staples but no regular headaches now (resolved). He has some minor confusion, mild loss of balance, difficulty using his computer and phone, and a loss of vision to the left. He remains on Keppra 1000 mg bid (although he claims he has never had a verified seizure), and is tapering off of Decadron. He is seeing Dr. Palomino from Rad Onc soon to begin RT planning. He is seeing Neurosurgery today to remove the staples.     INTERVAL HISTORY (3/25/2024): Since the last visit, he has continued the course of chemo-RT and is now s/p RT Fraction #27 of 30 -- finalize this week. He has fatigue from the treatment, but no GI upset, emesis, diarrhea, loss of appetite, or change in taste. He is off of the steroids now. The [facility] results are in and demonstrate EGFR amplification, TERT mutations, and loss of CDKN2A/B. He is active at home with chores and daily walking. He remains on Keppra 500 mg bid.     The HPI is as documented in the HPI.     Objective   BSA: 1.88 meters squared  /60 (BP Location: Left arm, Patient Position: Sitting)   Pulse 98   Temp 36.7 °C (98.1 °F) (Temporal)   Resp 16   Wt  "74.8 kg (165 lb)   SpO2 98%   BMI 25.84 kg/m²     No family history on file.  Oncology History Overview Note   68 year old left-handed male with h/o HLD, cholecystectomy, Osborn's esophagus, on ASA 81 for cardioprotection presented to [facility] ED on 12/24/23 with 6 weeks of headaches, 3 weeks of increased confusion and running into things. Left-sided hemianopia on exam. CT head revealed a 1 mm midline leftward shift with a 7.3 cm x 7.1 cm x 5 cm right parietal mass. Transferred to [facility] for neurosurgical management.     GBM (glioblastoma multiforme) (CMS/HCC)   12/29/2023 Surgery    Right craniotomy for tumor resection (Ritu Carrasco MD)     12/29/2023 Pathology    Final diagnosis reported 1/18/24:  A, B.  Right brain mass, biopsy and removal:  - Malignant glioma, morphologically consistent with glioblastoma; positive for MGMT methylation     12/29/2023 Initial Diagnosis    GBM (glioblastoma multiforme) (CMS/HCC)     1/3/2024 Tumor Board    [facility] Tumor Board tumor board recommendations: Referral to radiation oncology and neuro-oncology.       2/15/2024 -  Chemotherapy    Temozolomide with Concurrent Radiation, 42 Day Cycles         Enmanuel Ahumada \"Dash\"  reports that he has never smoked. He has never used smokeless tobacco.  He  reports that he does not currently use alcohol.  He  reports no history of drug use.    Physical Exam  Constitutional:       Appearance: Normal appearance.   Eyes:      General: Visual field deficit present.      Extraocular Movements: Extraocular movements intact.      Pupils: Pupils are equal, round, and reactive to light.   Musculoskeletal:      Cervical back: Normal range of motion.   Neurological:      Mental Status: He is alert and oriented to person, place, and time.      Sensory: Sensation is intact.      Motor: Motor function is intact.      Coordination: Coordination is intact.      Deep Tendon Reflexes: Babinski sign absent on the right side. Babinski sign " absent on the left side.      Reflex Scores:       Tricep reflexes are 2+ on the right side and 2+ on the left side.       Bicep reflexes are 2+ on the right side and 2+ on the left side.       Brachioradialis reflexes are 2+ on the right side and 2+ on the left side.       Patellar reflexes are 2+ on the right side and 2+ on the left side.       Achilles reflexes are 2+ on the right side and 2+ on the left side.     Comments: Minimal loss of STM and cognition, speech fluent without dysnomia, CN's II - XII intact except for partial left homonymous hemianopsia, strength 5/5 in UE/LE, mild gait imbalance, DTR's symmetric, sensation intact.    Psychiatric:         Mood and Affect: Mood normal.         Behavior: Behavior normal.       Performance Status:  Symptomatic; fully ambulatory      Lab Results   Component Value Date    WBC 5.2 03/21/2024    HGB 15.3 03/21/2024    HCT 46.2 03/21/2024    MCV 94 03/21/2024     03/21/2024       The recent brain MRI scans were reviewed with the patient and family:        === 12/25/23 ===    MR BRAIN W AND WO CONTRAST    - Impression -  Postoperative changes as noted above. There has been significant  improvement in mass effect since resection of the mass; the margins  of the surgical bed have minimal if any enhancement with no nodular  enhancement suggestive of residual tumor noted.    MACRO:  None    Signed by: Cecil Aguiar 12/31/2023 11:23 AM  Dictation workstation:   [id number]      Assessment/Plan      -Dash has recovered well from surgery, with mild residual symptoms.     -The pathology is a GBM; IDH wildtype, MGMT methylated.     -He has continued the course of chemo-RT with Dr. Palomino and will complete all 30 Fractions this week -- tolerating very well.     -The advanced molecular phenotyping from [facility] has been reported, and shows EGFR amplification, which could be targeted with an EGFR TKI (e.g., Osimertinib) at a later date.     -We also reviewed the [facility]  electrical array device for after RT -- he is interested.     -He is to remain off of the steroids for now, and remain on the Keppra 500 mg bid.     -We will have him return to this clinic for further evaluation, and to monitor him after the completion of chemo-RT.     -Prior to that visit he will undergo a new post-RT MRI brain at [facility].     -I spent > 40 minutes in face to face consultation to review and discuss the above; 50% of which or more was dedicated to counseling.       Kyle Rolon MD

## 2024-03-26 ENCOUNTER — HOSPITAL ENCOUNTER (OUTPATIENT)
Dept: RADIATION ONCOLOGY | Facility: HOSPITAL | Age: 69
Setting detail: RADIATION/ONCOLOGY SERIES
Discharge: HOME | End: 2024-03-26
Payer: MEDICARE

## 2024-03-26 ENCOUNTER — RADIATION ONCOLOGY OTV (OUTPATIENT)
Dept: RADIATION ONCOLOGY | Facility: HOSPITAL | Age: 69
End: 2024-03-26
Payer: MEDICARE

## 2024-03-26 VITALS
HEART RATE: 73 BPM | RESPIRATION RATE: 18 BRPM | DIASTOLIC BLOOD PRESSURE: 93 MMHG | TEMPERATURE: 96.8 F | WEIGHT: 165.4 LBS | BODY MASS INDEX: 25.9 KG/M2 | OXYGEN SATURATION: 94 % | SYSTOLIC BLOOD PRESSURE: 133 MMHG

## 2024-03-26 DIAGNOSIS — C71.8 MALIGNANT NEOPLASM OF OVERLAPPING SITES OF BRAIN (MULTI): ICD-10-CM

## 2024-03-26 DIAGNOSIS — Z51.0 ENCOUNTER FOR ANTINEOPLASTIC RADIATION THERAPY: ICD-10-CM

## 2024-03-26 DIAGNOSIS — C71.9 GBM (GLIOBLASTOMA MULTIFORME) (MULTI): Primary | ICD-10-CM

## 2024-03-26 LAB
RAD ONC MSQ ACTUAL FRACTIONS DELIVERED: 5
RAD ONC MSQ ACTUAL SESSION BIOLOGICAL DOSE: 200 CCGE
RAD ONC MSQ ACTUAL SESSION DELIVERED DOSE: 182 CGRAY
RAD ONC MSQ ACTUAL TOTAL BIOLOGICAL DOSE: 1001 CCGE
RAD ONC MSQ ACTUAL TOTAL DOSE: 910 CGRAY
RAD ONC MSQ ELAPSED DAYS: 6
RAD ONC MSQ LAST DATE: NORMAL
RAD ONC MSQ PRESCRIBED BIOLOGICAL FRACTIONAL DOSE: 200 CCGE
RAD ONC MSQ PRESCRIBED BIOLOGICAL TOTAL DOSE: 1400 CCGE
RAD ONC MSQ PRESCRIBED FRACTIONAL DOSE: 182 CGRAY
RAD ONC MSQ PRESCRIBED NUMBER OF FRACTIONS: 7
RAD ONC MSQ PRESCRIBED TECHNIQUE: NORMAL
RAD ONC MSQ PRESCRIBED TOTAL DOSE: 1273 CGRAY
RAD ONC MSQ START DATE: NORMAL
RAD ONC MSQ TREATMENT COURSE NUMBER: 1
RAD ONC MSQ TREATMENT SITE: NORMAL

## 2024-03-26 PROCEDURE — 77387 GUIDANCE FOR RADJ TX DLVR: CPT | Performed by: STUDENT IN AN ORGANIZED HEALTH CARE EDUCATION/TRAINING PROGRAM

## 2024-03-26 PROCEDURE — G6002 STEREOSCOPIC X-RAY GUIDANCE: HCPCS | Performed by: STUDENT IN AN ORGANIZED HEALTH CARE EDUCATION/TRAINING PROGRAM

## 2024-03-26 PROCEDURE — 77427 RADIATION TX MANAGEMENT X5: CPT | Performed by: STUDENT IN AN ORGANIZED HEALTH CARE EDUCATION/TRAINING PROGRAM

## 2024-03-26 PROCEDURE — 77523 PROTON TRMT INTERMEDIATE: CPT | Performed by: STUDENT IN AN ORGANIZED HEALTH CARE EDUCATION/TRAINING PROGRAM

## 2024-03-26 ASSESSMENT — PAIN SCALES - GENERAL: PAINLEVEL: 0-NO PAIN

## 2024-03-26 NOTE — PROGRESS NOTES
Radiation Oncology On Treatment Visit    Patient Name:  Enmanuel Ahumada  MRN:  [id number]  :  1955    Referring Provider: No ref. provider found  Primary Care Provider: Ricardo Youngblood DO  Care Team: Patient Care Team:  Ricardo Youngblood DO as PCP - General  Ricardo Youngblood DO as PCP - [employer] Advantage PCP  Kyle Rolon MD as Consulting Physician (Hematology and Oncology)    Date of Service: 3/26/2024     Diagnosis:   Specialty Problems          Radiation Oncology Problems    GBM (glioblastoma multiforme) (CMS/HCC)         Treatment Summary:  Radiation Treatments       Active   Brain Boost_R (Started on 3/20/2024)   Most recent fraction: 182 cGy given on 3/26/2024   Total given: 910 cGy / 1,273 cGy  (5 of 7 fractions)   Elapsed Days: 6   Technique: 3D           Completed   Partial brain R (Started on 2/15/2024)   Most recent fraction: 182 cGy given on 3/19/2024   Total given: 4,186 cGy / 4,182 cGy  (23 of 23 fractions)   Elapsed Days: 33   Technique: 3D                   SUBJECTIVE: otv      OBJECTIVE:   Vital Signs:  BP (!) 133/93   Pulse 73   Temp 36 °C (96.8 °F)   Resp 18   Wt 75 kg (165 lb 6.4 oz)   SpO2 94%   BMI 25.90 kg/m²    Pain Scale: The patient's current pain level was assessed.  They report currently having a pain of 0 out of 10.    Other Pertinent Findings:     Toxicity Assessment          2024    10:49 3/5/2024    11:17 3/12/2024    12:02 3/19/2024    11:26 3/26/2024    10:54   Toxicity Assessment   Treatment Site Brain Brain Brain Brain Brain   Anorexia Grade 0 Grade 0 Grade 0 Grade 0 Grade 0   Anxiety Grade 0 Grade 0 Grade 0 Grade 0 Grade 0   Dehydration Grade 0 Grade 0 Grade 0 Grade 0 Grade 0   Depression Grade 0 Grade 0 Grade 0 Grade 0 Grade 0   Dermatitis Radiation Grade 0 Grade 1       getting darker and was instructed to stop shaving his head Grade 1       skin is getting dark in color Grade 2 Grade 2   Diarrhea Grade 0 Grade 0 Grade 0 Grade 0 Grade 0    Fatigue Grade 0 Grade 0       walking every day Grade 1 Grade 1 Grade 1   Nausea Grade 0 Grade 0 Grade 0 Grade 0 Grade 0   Pain Grade 0 Grade 0 Grade 0 Grade 0 Grade 0   Tumor Pain  Grade 0      Vomiting Grade 0 Grade 0 Grade 0 Grade 0 Grade 0   Hearing Impaired    Grade 0    Middle Ear Inflammation    Grade 0    Blurred Vision Grade 0   Grade 0 Grade 0   Dry Eye Grade 0  Grade 0 Grade 0 Grade 0   Eye Pain Grade 0  Grade 0 Grade 0 Grade 0   Cognitive Disturbance  Grade 0 Grade 0 Grade 0    Headache Grade 0 Grade 0 Grade 0 Grade 0 Grade 0   Memory Impairment Grade 0 Grade 0 Grade 0 Grade 0 Grade 0   Seizure Grade 0 Grade 0 Grade 0 Grade 0 Grade 0        Assessment / Plan:  The patient is tolerating radiation therapy as anticipated.  Continue per current treatment plan.

## 2024-03-27 ENCOUNTER — HOSPITAL ENCOUNTER (OUTPATIENT)
Dept: RADIATION ONCOLOGY | Facility: HOSPITAL | Age: 69
Setting detail: RADIATION/ONCOLOGY SERIES
Discharge: HOME | End: 2024-03-27
Payer: MEDICARE

## 2024-03-27 DIAGNOSIS — C71.8 MALIGNANT NEOPLASM OF OVERLAPPING SITES OF BRAIN (MULTI): ICD-10-CM

## 2024-03-27 DIAGNOSIS — Z51.0 ENCOUNTER FOR ANTINEOPLASTIC RADIATION THERAPY: ICD-10-CM

## 2024-03-27 LAB
RAD ONC MSQ ACTUAL FRACTIONS DELIVERED: 6
RAD ONC MSQ ACTUAL SESSION BIOLOGICAL DOSE: 200 CCGE
RAD ONC MSQ ACTUAL SESSION DELIVERED DOSE: 182 CGRAY
RAD ONC MSQ ACTUAL TOTAL BIOLOGICAL DOSE: 1201 CCGE
RAD ONC MSQ ACTUAL TOTAL DOSE: 1092 CGRAY
RAD ONC MSQ ELAPSED DAYS: 7
RAD ONC MSQ LAST DATE: NORMAL
RAD ONC MSQ PRESCRIBED BIOLOGICAL FRACTIONAL DOSE: 200 CCGE
RAD ONC MSQ PRESCRIBED BIOLOGICAL TOTAL DOSE: 1400 CCGE
RAD ONC MSQ PRESCRIBED FRACTIONAL DOSE: 182 CGRAY
RAD ONC MSQ PRESCRIBED NUMBER OF FRACTIONS: 7
RAD ONC MSQ PRESCRIBED TECHNIQUE: NORMAL
RAD ONC MSQ PRESCRIBED TOTAL DOSE: 1273 CGRAY
RAD ONC MSQ START DATE: NORMAL
RAD ONC MSQ TREATMENT COURSE NUMBER: 1
RAD ONC MSQ TREATMENT SITE: NORMAL

## 2024-03-27 PROCEDURE — G6002 STEREOSCOPIC X-RAY GUIDANCE: HCPCS | Performed by: STUDENT IN AN ORGANIZED HEALTH CARE EDUCATION/TRAINING PROGRAM

## 2024-03-27 PROCEDURE — 77387 GUIDANCE FOR RADJ TX DLVR: CPT | Performed by: STUDENT IN AN ORGANIZED HEALTH CARE EDUCATION/TRAINING PROGRAM

## 2024-03-27 PROCEDURE — 77523 PROTON TRMT INTERMEDIATE: CPT | Performed by: STUDENT IN AN ORGANIZED HEALTH CARE EDUCATION/TRAINING PROGRAM

## 2024-03-28 ENCOUNTER — HOSPITAL ENCOUNTER (OUTPATIENT)
Dept: RADIATION ONCOLOGY | Facility: HOSPITAL | Age: 69
Setting detail: RADIATION/ONCOLOGY SERIES
Discharge: HOME | End: 2024-03-28
Payer: MEDICARE

## 2024-03-28 ENCOUNTER — DOCUMENTATION (OUTPATIENT)
Dept: RADIATION ONCOLOGY | Facility: HOSPITAL | Age: 69
End: 2024-03-28
Payer: MEDICARE

## 2024-03-28 DIAGNOSIS — Z51.0 ENCOUNTER FOR ANTINEOPLASTIC RADIATION THERAPY: ICD-10-CM

## 2024-03-28 DIAGNOSIS — C71.8 MALIGNANT NEOPLASM OF OVERLAPPING SITES OF BRAIN (MULTI): ICD-10-CM

## 2024-03-28 LAB
RAD ONC MSQ ACTUAL FRACTIONS DELIVERED: 7
RAD ONC MSQ ACTUAL SESSION BIOLOGICAL DOSE: 200 CCGE
RAD ONC MSQ ACTUAL SESSION DELIVERED DOSE: 182 CGRAY
RAD ONC MSQ ACTUAL TOTAL BIOLOGICAL DOSE: 1401 CCGE
RAD ONC MSQ ACTUAL TOTAL DOSE: 1274 CGRAY
RAD ONC MSQ ELAPSED DAYS: 8
RAD ONC MSQ LAST DATE: NORMAL
RAD ONC MSQ PRESCRIBED BIOLOGICAL FRACTIONAL DOSE: 200 CCGE
RAD ONC MSQ PRESCRIBED BIOLOGICAL TOTAL DOSE: 1400 CCGE
RAD ONC MSQ PRESCRIBED FRACTIONAL DOSE: 182 CGRAY
RAD ONC MSQ PRESCRIBED NUMBER OF FRACTIONS: 7
RAD ONC MSQ PRESCRIBED TECHNIQUE: NORMAL
RAD ONC MSQ PRESCRIBED TOTAL DOSE: 1273 CGRAY
RAD ONC MSQ START DATE: NORMAL
RAD ONC MSQ TREATMENT COURSE NUMBER: 1
RAD ONC MSQ TREATMENT SITE: NORMAL

## 2024-03-28 PROCEDURE — 77387 GUIDANCE FOR RADJ TX DLVR: CPT | Performed by: STUDENT IN AN ORGANIZED HEALTH CARE EDUCATION/TRAINING PROGRAM

## 2024-03-28 PROCEDURE — 77523 PROTON TRMT INTERMEDIATE: CPT | Performed by: STUDENT IN AN ORGANIZED HEALTH CARE EDUCATION/TRAINING PROGRAM

## 2024-03-28 PROCEDURE — G6002 STEREOSCOPIC X-RAY GUIDANCE: HCPCS | Performed by: STUDENT IN AN ORGANIZED HEALTH CARE EDUCATION/TRAINING PROGRAM

## 2024-03-28 NOTE — PROGRESS NOTES
Radiation Oncology Treatment Summary    Patient Name:  Enmanuel Ahumada  MRN:  [id number]  :  1955    Radiation Oncologist:No care team member to display   Referring Provider: No ref. provider found  Primary Care Provider: Ricardo Youngblood DO    Brief History: Enmanuel Ahumada is a 68 y.o. male with No matching staging information was found for the patient..  The patient completed radiotherapy as outlined below.    Radiation Treatment Summary:    Radiation Treatments       Active   No active radiation treatments to show.     Completed   Brain Boost_R (Started on 3/20/2024)   Most recent fraction: 182 cGy given on 3/28/2024   Total given: 1,274 cGy / 1,273 cGy  (7 of 7 fractions)   Elapsed Days: 8   Technique: 3D        Partial brain R (Started on 2/15/2024)   Most recent fraction: 182 cGy given on 3/19/2024   Total given: 4,186 cGy / 4,182 cGy  (23 of 23 fractions)   Elapsed Days: 33   Technique: 3D                   Concurrent Chemotherapy:  Temozolomide with Concurrent Radiation, 42 Day Cycles   Treatment goal Control   Treatment line First Line   Status Active   Start Date 2/15/2024   End Date 3/21/2024 (Planned)   Treatment Medications temozolomide (Temodar) capsule 135 mg, 75 mg/m2 = 135 mg, oral, Daily, 1 of 1 cycle, Start date: 2024, End date: 3/11/2024         CTCAE Toxicity Overview:   Toxicity Assessment          2024    10:49 3/5/2024    11:17 3/12/2024    12:02 3/19/2024    11:26 3/26/2024    10:54   Toxicity Assessment   Treatment Site Brain Brain Brain Brain Brain   Anorexia Grade 0 Grade 0 Grade 0 Grade 0 Grade 0   Anxiety Grade 0 Grade 0 Grade 0 Grade 0 Grade 0   Dehydration Grade 0 Grade 0 Grade 0 Grade 0 Grade 0   Depression Grade 0 Grade 0 Grade 0 Grade 0 Grade 0   Dermatitis Radiation Grade 0 Grade 1       getting darker and was instructed to stop shaving his head Grade 1       skin is getting dark in color Grade 2 Grade 2   Diarrhea Grade 0 Grade 0 Grade 0 Grade 0  Grade 0   Fatigue Grade 0 Grade 0       walking every day Grade 1 Grade 1 Grade 1   Nausea Grade 0 Grade 0 Grade 0 Grade 0 Grade 0   Pain Grade 0 Grade 0 Grade 0 Grade 0 Grade 0   Tumor Pain  Grade 0      Vomiting Grade 0 Grade 0 Grade 0 Grade 0 Grade 0   Hearing Impaired    Grade 0    Middle Ear Inflammation    Grade 0    Blurred Vision Grade 0   Grade 0 Grade 0   Dry Eye Grade 0  Grade 0 Grade 0 Grade 0   Eye Pain Grade 0  Grade 0 Grade 0 Grade 0   Cognitive Disturbance  Grade 0 Grade 0 Grade 0    Headache Grade 0 Grade 0 Grade 0 Grade 0 Grade 0   Memory Impairment Grade 0 Grade 0 Grade 0 Grade 0 Grade 0   Seizure Grade 0 Grade 0 Grade 0 Grade 0 Grade 0       Patient Disposition:   Future Appointments       Date / Time Provider Department Dept Phone    2024 9:00 AM Loretta Santiago OPAL [facility] [phone]    2024 9:00 AM Zoila Sanchez OT [facility] [phone]    4/15/2024 9:00 AM Loretta Santiago OPAL [facility] [phone]    2024 9:00 AM Zoila Sanchez, OT [facility] [phone]    2024 10:00 AM Zoila Sanchez OT [facility] [phone]    2024 9:00 AM Loretta Santiago OPAL [facility] [phone]    2024 6:00 PM (Arrive by 5:45 PM) [facility] [phone]    2024 9:30 AM (Arrive by 9:15 AM) Jacki Simpson PA-C [facility] [phone]    2024 10:00 AM Zoila Sanchez OT [facility] [phone]    2024 9:00 AM Zoila Sanchez OT [facility] [phone]    2024 1:00 PM Evgeny Palomino MD, MS [facility] [phone]    2024 9:00 AM (Arrive by 8:45 AM) Ricardo Youngblood DO [facility] [phone]         Radiation Oncology Treatment Summary    Patient Name:  Enmanuel Ahumada  MRN:  [id number]  :  1955    Radiation Oncologist:No care team member to  display   Referring Provider: No ref. provider found  Primary Care Provider: Ricardo Youngblood DO    Brief History: Enmanuel Ahumada is a 68 y.o. male with No matching staging information was found for the patient..  The patient completed radiotherapy as outlined below.    Radiation Treatment Summary:    Radiation Treatments       Active   No active radiation treatments to show.     Completed   Brain Boost_R (Started on 3/20/2024)   Most recent fraction: 182 cGy given on 3/28/2024   Total given: 1,274 cGy / 1,273 cGy  (7 of 7 fractions)   Elapsed Days: 8   Technique: 3D        Partial brain R (Started on 2/15/2024)   Most recent fraction: 182 cGy given on 3/19/2024   Total given: 4,186 cGy / 4,182 cGy  (23 of 23 fractions)   Elapsed Days: 33   Technique: 3D                   Concurrent Chemotherapy:  Temozolomide with Concurrent Radiation, 42 Day Cycles   Treatment goal Control   Treatment line First Line   Status Active   Start Date 2/15/2024   End Date 3/21/2024 (Planned)   Treatment Medications temozolomide (Temodar) capsule 135 mg, 75 mg/m2 = 135 mg, oral, Daily, 1 of 1 cycle, Start date: 1/29/2024, End date: 3/11/2024         CTCAE Toxicity Overview:   Toxicity Assessment          2/21/2024    10:49 3/5/2024    11:17 3/12/2024    12:02 3/19/2024    11:26 3/26/2024    10:54   Toxicity Assessment   Treatment Site Brain Brain Brain Brain Brain   Anorexia Grade 0 Grade 0 Grade 0 Grade 0 Grade 0   Anxiety Grade 0 Grade 0 Grade 0 Grade 0 Grade 0   Dehydration Grade 0 Grade 0 Grade 0 Grade 0 Grade 0   Depression Grade 0 Grade 0 Grade 0 Grade 0 Grade 0   Dermatitis Radiation Grade 0 Grade 1       getting darker and was instructed to stop shaving his head Grade 1       skin is getting dark in color Grade 2 Grade 2   Diarrhea Grade 0 Grade 0 Grade 0 Grade 0 Grade 0   Fatigue Grade 0 Grade 0       walking every day Grade 1 Grade 1 Grade 1   Nausea Grade 0 Grade 0 Grade 0 Grade 0 Grade 0   Pain Grade 0 Grade 0 Grade 0  Grade 0 Grade 0   Tumor Pain  Grade 0      Vomiting Grade 0 Grade 0 Grade 0 Grade 0 Grade 0   Hearing Impaired    Grade 0    Middle Ear Inflammation    Grade 0    Blurred Vision Grade 0   Grade 0 Grade 0   Dry Eye Grade 0  Grade 0 Grade 0 Grade 0   Eye Pain Grade 0  Grade 0 Grade 0 Grade 0   Cognitive Disturbance  Grade 0 Grade 0 Grade 0    Headache Grade 0 Grade 0 Grade 0 Grade 0 Grade 0   Memory Impairment Grade 0 Grade 0 Grade 0 Grade 0 Grade 0   Seizure Grade 0 Grade 0 Grade 0 Grade 0 Grade 0       Patient Disposition:   Future Appointments       Date / Time Provider Department Dept Phone    2024 9:00 AM Loretta Santiago OPAL [facility] [phone]    2024 9:00 AM Zoila Sanchez OT [facility] [phone]    4/15/2024 9:00 AM Loretta Santiago OPAL [facility] [phone]    2024 9:00 AM Zoila Sanchez OT [facility] [phone]    2024 10:00 AM Zoila Sanchez OT [facility] [phone]    2024 9:00 AM Loretta Santiago OPAL [facility] [phone]    2024 6:00 PM (Arrive by 5:45 PM) [facility] [phone]    2024 9:30 AM (Arrive by 9:15 AM) Jacki Simpson PA-C [facility] [phone]    2024 10:00 AM Zoila Sanchez OT [facility] [phone]    2024 9:00 AM Zoila Sanchez OT [facility] [phone]    2024 1:00 PM Evgeny Palomino MD, MS [facility] [phone]    2024 9:00 AM (Arrive by 8:45 AM) Ricardo Youngblood,  [facility] [phone]         Radiation Oncology Treatment Summary    Patient Name:  Enmanuel Ahumada  MRN:  [id number]  :  1955    Referring Provider: No ref. provider found  Primary Care Provider: Ricardo Youngblood DO    Brief History: Enmanuel Ahumada is a 68 y.o. male with No matching staging information was found for the patient..   The patient completed radiotherapy as outlined below.    Radiation Treatment Summary:    Radiation Treatments       Active   No active radiation treatments to show.     Completed   Brain Boost_R (Started on 3/20/2024)   Most recent fraction: 182 cGy given on 3/28/2024   Total given: 1,274 cGy / 1,273 cGy  (7 of 7 fractions)   Elapsed Days: 8   Technique: 3D        Partial brain R (Started on 2/15/2024)   Most recent fraction: 182 cGy given on 3/19/2024   Total given: 4,186 cGy / 4,182 cGy  (23 of 23 fractions)   Elapsed Days: 33   Technique: 3D                     Please see the patient's Mos[facility] chart for further details regarding the radiation plan, including beam energy.    Concurrent Chemotherapy:  Treatment Plans       Name Type Plan Dates Plan Provider         Active    Temozolomide with Concurrent Radiation, 42 Day Cycles Oncology Treatment  1/15/2024 - Present Kyle Rolon MD                    CTCAE Toxicity Overview:   Toxicity Assessment          2/21/2024    10:49 3/5/2024    11:17 3/12/2024    12:02 3/19/2024    11:26 3/26/2024    10:54   Toxicity Assessment   Treatment Site Brain Brain Brain Brain Brain   Anorexia Grade 0 Grade 0 Grade 0 Grade 0 Grade 0   Anxiety Grade 0 Grade 0 Grade 0 Grade 0 Grade 0   Dehydration Grade 0 Grade 0 Grade 0 Grade 0 Grade 0   Depression Grade 0 Grade 0 Grade 0 Grade 0 Grade 0   Dermatitis Radiation Grade 0 Grade 1       getting darker and was instructed to stop shaving his head Grade 1       skin is getting dark in color Grade 2 Grade 2   Diarrhea Grade 0 Grade 0 Grade 0 Grade 0 Grade 0   Fatigue Grade 0 Grade 0       walking every day Grade 1 Grade 1 Grade 1   Nausea Grade 0 Grade 0 Grade 0 Grade 0 Grade 0   Pain Grade 0 Grade 0 Grade 0 Grade 0 Grade 0   Tumor Pain  Grade 0      Vomiting Grade 0 Grade 0 Grade 0 Grade 0 Grade 0   Hearing Impaired    Grade 0    Middle Ear Inflammation    Grade 0    Blurred Vision Grade 0   Grade 0 Grade 0   Dry Eye Grade 0  Grade 0  Grade 0 Grade 0   Eye Pain Grade 0  Grade 0 Grade 0 Grade 0   Cognitive Disturbance  Grade 0 Grade 0 Grade 0    Headache Grade 0 Grade 0 Grade 0 Grade 0 Grade 0   Memory Impairment Grade 0 Grade 0 Grade 0 Grade 0 Grade 0   Seizure Grade 0 Grade 0 Grade 0 Grade 0 Grade 0     Patient Disposition: follow up with Dr. Palomino on 7/1/2024  scans per oncology

## 2024-04-04 ENCOUNTER — TREATMENT (OUTPATIENT)
Dept: OCCUPATIONAL THERAPY | Facility: CLINIC | Age: 69
End: 2024-04-04
Payer: MEDICARE

## 2024-04-04 DIAGNOSIS — H53.452 PERIPHERAL VISION LOSS, LEFT: ICD-10-CM

## 2024-04-04 DIAGNOSIS — R29.898 WEAKNESS OF LEFT HAND: ICD-10-CM

## 2024-04-04 DIAGNOSIS — H53.9 IMPAIRED VISUAL PERCEPTION: ICD-10-CM

## 2024-04-04 DIAGNOSIS — R27.8 COORDINATION ABNORMAL: Primary | ICD-10-CM

## 2024-04-04 DIAGNOSIS — R29.818 IMPAIRED PROPRIOCEPTION: ICD-10-CM

## 2024-04-04 PROCEDURE — 97112 NEUROMUSCULAR REEDUCATION: CPT | Mod: GO,CO

## 2024-04-04 NOTE — PROGRESS NOTES
"Occupational Therapy      Patient Name: Enmanuel Ahumada \"Dash\"  MRN: [id number]  Today's Date: 4/4/2024      Time Calculation  Start Time: 0902  Stop Time: 0948  Time Calculation (min): 46 min  Visit: #3    Assessment:  Pt appears to tolerate treatment well with no complaints of pain. Rest breaks taken as needed. Verbal cues for HIQ puzzle with good follow through. Home program issued with handout and theraputty.     Plan:      1-2x/wk for 8-12 weeks   -Neuromuscular reeducation, Therapeutic Exercise, Self Care/ADL       Subjective     Current Problem:  1. Coordination abnormal        2. Impaired proprioception        3. Weakness of left hand        4. Peripheral vision loss, left        5. Impaired visual perception              Patient reports he is continuing to gradually improve     Pain   0/10    Objective       Treatment: 46    Neuro: 46  B UE scifit arm bike 5 minutes, alternating petal position each minute    Dynamic standing on river rocks with feet staggered, laterally reaches with left hand to grasp one peg at a time, overhead reach and place peg on peg board. Pt removes pegs by grasping 3-4 at a time with left hand, releases into bin one at a time to promote in hand manipulation.    Dynamic standing on blue balance pad pt completes HIQ puzzle, verbal cues as needed.    LOVE educates pt on theraputty techniques, provides putty for home going with home exercise program as follows  -  -finger extension  -pincher grasp with each finger and thumb, one at a time  -key  pinch   Handout issued        "

## 2024-04-11 ENCOUNTER — PATIENT MESSAGE (OUTPATIENT)
Dept: PRIMARY CARE | Facility: CLINIC | Age: 69
End: 2024-04-11

## 2024-04-11 ENCOUNTER — TREATMENT (OUTPATIENT)
Dept: OCCUPATIONAL THERAPY | Facility: CLINIC | Age: 69
End: 2024-04-11
Payer: MEDICARE

## 2024-04-11 DIAGNOSIS — R29.818 IMPAIRED PROPRIOCEPTION: ICD-10-CM

## 2024-04-11 DIAGNOSIS — H53.452 PERIPHERAL VISION LOSS, LEFT: ICD-10-CM

## 2024-04-11 DIAGNOSIS — H53.9 IMPAIRED VISUAL PERCEPTION: ICD-10-CM

## 2024-04-11 DIAGNOSIS — R27.8 COORDINATION ABNORMAL: Primary | ICD-10-CM

## 2024-04-11 DIAGNOSIS — R29.898 WEAKNESS OF LEFT HAND: ICD-10-CM

## 2024-04-11 DIAGNOSIS — K22.70 BARRETT'S ESOPHAGUS WITHOUT DYSPLASIA: ICD-10-CM

## 2024-04-11 PROCEDURE — 97112 NEUROMUSCULAR REEDUCATION: CPT | Mod: GO | Performed by: OCCUPATIONAL THERAPIST

## 2024-04-11 NOTE — PROGRESS NOTES
"Occupational Therapy      Patient Name: Enmanuel Ahumada \"Dash\"  MRN: [id number]  Today's Date: 4/11/2024         Visit: #4    Assessment:  Pt participated in treatment well.  Pt demonstrated good motor planning skills and GMC during high level exercises. Pt required mild verbal cues for problem solving in logic game.      Plan:      1-2x/wk for 8-12 weeks   -Neuromuscular reeducation, Therapeutic Exercise, Self Care/ADL     Subjective     Current Problem:  1. Coordination abnormal        2. Impaired proprioception        3. Impaired visual perception        4. Peripheral vision loss, left        5. Weakness of left hand            Pt reports he is continuing to complete is issued home programs regularly.     Pain   0/10    Objective   Rush hour logic game:   Level 5: 2:50   Level 8: 1:58   Level 12: 7:12    Pt participated in high level GMC activity well; no loss of balance noted     Treatment: 46    Neuro: 46    Dynamic reaching/standing balance, multitasking, and GMC addressed with the following:  OT placed four colored post its on standing mirror in front of patient and four colored dots on floor in front of patient.  OT provided commands for patient to perform contra and ipsilateral movements.  For example, \"Left arm pink post it and right foot green dot.\" Pt to then reach and tap each dot and post it as instructed.      OT addressed GMC, dynamic standing balance and multitasking with the following task:   -OT sat out 4 colored dots; each dot had a number written on it.  As OT called out the number, patient to step on the colored dot with written number and vice versa.  As patient stepped on dot, pt to toss ball in upside down cone with L arm.  -OT upgraded by assigned each dot a state and capitol; OT would call out either a state or capitol and patient to step on the corresponding dot.  OT then assigned each dot a food pairing     Problem solving addressed by having patient complete Rush Hour logic game "   -completed while standing at mat table

## 2024-04-12 RX ORDER — PANTOPRAZOLE SODIUM 40 MG/1
TABLET, DELAYED RELEASE ORAL
Qty: 90 TABLET | Refills: 3 | Status: SHIPPED | OUTPATIENT
Start: 2024-04-12

## 2024-04-15 ENCOUNTER — TREATMENT (OUTPATIENT)
Dept: OCCUPATIONAL THERAPY | Facility: CLINIC | Age: 69
End: 2024-04-15
Payer: MEDICARE

## 2024-04-15 DIAGNOSIS — R29.818 IMPAIRED PROPRIOCEPTION: ICD-10-CM

## 2024-04-15 DIAGNOSIS — R29.898 WEAKNESS OF LEFT HAND: ICD-10-CM

## 2024-04-15 DIAGNOSIS — R27.8 COORDINATION ABNORMAL: Primary | ICD-10-CM

## 2024-04-15 DIAGNOSIS — H53.9 IMPAIRED VISUAL PERCEPTION: ICD-10-CM

## 2024-04-15 DIAGNOSIS — H53.452 PERIPHERAL VISION LOSS, LEFT: ICD-10-CM

## 2024-04-15 PROCEDURE — 97112 NEUROMUSCULAR REEDUCATION: CPT | Mod: GO,CO

## 2024-04-15 NOTE — PROGRESS NOTES
"Occupational Therapy      Patient Name: Enmanuel Ahumada \"Dash\"  MRN: [id number]  Today's Date: 4/15/2024      Time Calculation  Start Time: 0857  Stop Time: 0941  Time Calculation (min): 44 min    Visit: #5    Assessment:  Pt appears to tolerate treatment well with no complaints of pain. Rest breaks taken as needed. Pt steps to left without difficulty, no LOB stepping over or onto uneven surfaces, turns head to left to visually scan for cup during task.  Plan:     Continue occupational therapy for increased coordination, balance, fine and gross motor control to promote ease of functional ADL and IADL tasks.   1-2x/wk for 8-12 weeks   -Neuromuscular reeducation, Therapeutic Exercise, Self Care/ADL     Subjective    Pt reports no changes since last visit  Current Problem:  1. Coordination abnormal        2. Impaired proprioception        3. Weakness of left hand        4. Peripheral vision loss, left        5. Impaired visual perception                Pain   0/10    Objective     Treatment: 44    Neuro: 44   B UE scifit arm bike 5 minutes    Dynamic reaching/standing balance, multitasking, and GMC addressed with the following:  Pt completes ipsilateral and contralateral movements while standing on blue balance pad. Simultaneously pt participates in word recognition game.     Side to side stepping, towards the right stepping over an item, back to balance pad, towards left onto river rock.    OT addressed fine motor control, visual scanning, dynamic balance by having pt stand on blue balance pad, use tweezers to  one small bead one at a time, scan to left side to find cup and drop bead into cup. LOVE holds cup on left side and moves in height with each repetition to promote further visual scanning.     Problem solving addressed by having patient complete IQ logic puzzle game      "

## 2024-04-18 ENCOUNTER — TREATMENT (OUTPATIENT)
Dept: OCCUPATIONAL THERAPY | Facility: CLINIC | Age: 69
End: 2024-04-18
Payer: MEDICARE

## 2024-04-18 DIAGNOSIS — H53.9 IMPAIRED VISUAL PERCEPTION: ICD-10-CM

## 2024-04-18 DIAGNOSIS — H53.452 PERIPHERAL VISION LOSS, LEFT: ICD-10-CM

## 2024-04-18 DIAGNOSIS — R27.8 COORDINATION ABNORMAL: ICD-10-CM

## 2024-04-18 DIAGNOSIS — R29.898 WEAKNESS OF LEFT HAND: Primary | ICD-10-CM

## 2024-04-18 DIAGNOSIS — R29.818 IMPAIRED PROPRIOCEPTION: ICD-10-CM

## 2024-04-18 PROCEDURE — 97112 NEUROMUSCULAR REEDUCATION: CPT | Mod: GO | Performed by: OCCUPATIONAL THERAPIST

## 2024-04-18 NOTE — PROGRESS NOTES
"Occupational Therapy      Patient Name: Enmanuel Ahumada \"Dash\"  MRN: [id number]  Today's Date: 4/18/2024           Visit: #6    Assessment:  Patient participated in treatment well with no complaints of pain.  Pt demonstrated good technique with left UE resistance strengthening exercises and demonstrated no loss of balances in treatment.  Pt demonstrated mild left coordination deficits while manipulating playing cards.     Plan:     Continue occupational therapy for increased coordination, balance, fine and gross motor control to promote ease of functional ADL and IADL tasks.   1-2x/wk for 8-12 weeks   -Neuromuscular reeducation, Therapeutic Exercise, Self Care/ADL     Subjective    Pt reports he has been better with not running into obstacles placed on his left side       Current Problem:  1. Weakness of left hand        2. Peripheral vision loss, left        3. Impaired visual perception        4. Impaired proprioception        5. Coordination abnormal            Pain   0/10    Objective   Pt required mild verbal cues for re-instruction of game rules   Pt demonstrated good visual scanning techniques to his left side     Treatment: 47    Neuro: 47    LUE strengthening with use of green theraband - all exercises completed 2x10  -shoulder flexion  -abduction   -external rotation at 90/90*  -rows     Counting, FMC and problem solving addressed with participation in [employer] card game.   -OT facilitated left visual scanning by placing the card deck on patient's left side   -OT implemented FMC by having patient shuffle cards between before each game   -Patient completed while standing while standing on foam balance pad         "

## 2024-04-22 ENCOUNTER — TREATMENT (OUTPATIENT)
Dept: OCCUPATIONAL THERAPY | Facility: CLINIC | Age: 69
End: 2024-04-22
Payer: MEDICARE

## 2024-04-22 DIAGNOSIS — R27.8 COORDINATION ABNORMAL: ICD-10-CM

## 2024-04-22 DIAGNOSIS — H53.9 IMPAIRED VISUAL PERCEPTION: ICD-10-CM

## 2024-04-22 DIAGNOSIS — H53.452 PERIPHERAL VISION LOSS, LEFT: ICD-10-CM

## 2024-04-22 DIAGNOSIS — R29.818 IMPAIRED PROPRIOCEPTION: ICD-10-CM

## 2024-04-22 DIAGNOSIS — R29.898 WEAKNESS OF LEFT HAND: Primary | ICD-10-CM

## 2024-04-22 PROCEDURE — 97112 NEUROMUSCULAR REEDUCATION: CPT | Mod: GO | Performed by: OCCUPATIONAL THERAPIST

## 2024-04-22 NOTE — PROGRESS NOTES
"Occupational Therapy      Patient Name: Enmanuel Ahumada \"Dash\"  MRN: [id number]  Today's Date: 4/22/2024           Visit: #7    Assessment:  Pt demonstrated mild difficulty with delayed recall when unable to apply association strategies    Plan:     Continue occupational therapy for increased coordination, balance, fine and gross motor control to promote ease of functional ADL and IADL tasks.   1-2x/wk for 8-12 weeks   -Neuromuscular reeducation, Therapeutic Exercise, Self Care/ADL     Subjective    Pt reports no new changes     Current Problem:  1. Weakness of left hand        2. Impaired visual perception        3. Peripheral vision loss, left        4. Impaired proprioception        5. Coordination abnormal            Pain   0/10    Objective   Bananagrams completed in 12:32  Concentration game - 1/6 correct and 3/6 correct  Cues to scan to left side     Treatment: 45    Neuro: 45    Standing arm bike for 10 minutes at workload 5   -completed concentration game during arm bike     Problem solving and L FMC addressed with completion of bananagrams game  -while pt completed, OT held card out on patient's left side.  Pt to scan to left side ot identify card   -Patient completed while standing while standing on foam balance pad     Memory addressed with completion of concentration game     GMC addressed by having pt walk in gym while tossing ball in upside down with left arm  -left visual scanning addressed by having pt complete Conferensum game         "

## 2024-04-25 ENCOUNTER — HOSPITAL ENCOUNTER (OUTPATIENT)
Dept: RADIOLOGY | Facility: HOSPITAL | Age: 69
Discharge: HOME | End: 2024-04-25
Payer: MEDICARE

## 2024-04-25 ENCOUNTER — TREATMENT (OUTPATIENT)
Dept: OCCUPATIONAL THERAPY | Facility: CLINIC | Age: 69
End: 2024-04-25
Payer: MEDICARE

## 2024-04-25 VITALS — HEIGHT: 67 IN | WEIGHT: 165.34 LBS | BODY MASS INDEX: 25.95 KG/M2

## 2024-04-25 DIAGNOSIS — H53.9 IMPAIRED VISUAL PERCEPTION: ICD-10-CM

## 2024-04-25 DIAGNOSIS — R27.8 COORDINATION ABNORMAL: ICD-10-CM

## 2024-04-25 DIAGNOSIS — R29.898 WEAKNESS OF LEFT HAND: Primary | ICD-10-CM

## 2024-04-25 DIAGNOSIS — R29.818 IMPAIRED PROPRIOCEPTION: ICD-10-CM

## 2024-04-25 DIAGNOSIS — H53.452 PERIPHERAL VISION LOSS, LEFT: ICD-10-CM

## 2024-04-25 DIAGNOSIS — C71.9 GBM (GLIOBLASTOMA MULTIFORME) (MULTI): ICD-10-CM

## 2024-04-25 PROCEDURE — A9575 INJ GADOTERATE MEGLUMI 0.1ML: HCPCS | Performed by: PSYCHIATRY & NEUROLOGY

## 2024-04-25 PROCEDURE — 97112 NEUROMUSCULAR REEDUCATION: CPT | Mod: GO,CO

## 2024-04-25 PROCEDURE — 2550000001 HC RX 255 CONTRASTS: Performed by: PSYCHIATRY & NEUROLOGY

## 2024-04-25 PROCEDURE — 70553 MRI BRAIN STEM W/O & W/DYE: CPT | Performed by: RADIOLOGY

## 2024-04-25 PROCEDURE — 70553 MRI BRAIN STEM W/O & W/DYE: CPT

## 2024-04-25 RX ORDER — GADOTERATE MEGLUMINE 376.9 MG/ML
15 INJECTION INTRAVENOUS
Status: COMPLETED | OUTPATIENT
Start: 2024-04-25 | End: 2024-04-25

## 2024-04-25 RX ADMIN — GADOTERATE MEGLUMINE 15 ML: 376.9 INJECTION INTRAVENOUS at 18:40

## 2024-04-25 NOTE — PROGRESS NOTES
"Occupational Therapy      Patient Name: Enmanuel Ahumada \"Dash\"  MRN: [id number]  Today's Date: 4/25/2024      Time Calculation  Start Time: 0901  Stop Time: 0945  Time Calculation (min): 44 min    Visit: #8    Assessment:  Pt appears to tolerate treatment well. Pt is able to recall 4/4 words given at beginning of treatment, recall requested after 15 minutes of treatment. 5 more words provided, pt able to recall 4/5 words at end of session.     Plan:     Continue occupational therapy for increased coordination, balance, fine and gross motor control to promote ease of functional ADL and IADL tasks.   1-2x/wk for 8-12 weeks   -Neuromuscular reeducation, Therapeutic Exercise, Self Care/ADL     Subjective    Pt reports being able to pour water out of pitcher with more ease using left hand, scanning more to the left side and bumping into less things.     Current Problem:  1. Weakness of left hand        2. Impaired proprioception        3. Coordination abnormal        4. Impaired visual perception        5. Peripheral vision loss, left                Pain   0/10    Objective     Treatment: 44    Neuro: 44    Wrist flexion and extension with blue therabar  3x10 each  Pronation/supination 2x10  Left UE Pronation/supination 4# dumbbell, 2x15  Standing arm bike for 8 minutes at workload 5   -completed visual scanning to left side game   -short story memory recall, correctly answers 8/8 questions between two stories    [facility] addressed with completion in hand manipulation table top activities  Pt picks up 10 small rods one at a time, places on Bal board, repeats with small parlor beads, placing one bead over one cali at a time. Pt repeats with both rods and beads in hand at same time, in hand manipulation to place cali then bead.     Dynamic standing on blue balance pad pt completes ipsilateral and contralateral movements 1x10 each. Pt repeats using 4# dumbbell, 1x10 each. Pt repeats contralateral movements with 4# " racheal for shoulder press while visual scanning to left side to identify color of cones.

## 2024-04-28 ASSESSMENT — ENCOUNTER SYMPTOMS
CHILLS: 0
COUGH: 0
ABDOMINAL PAIN: 0
LEG SWELLING: 0
FEVER: 0
EXTREMITY WEAKNESS: 0
SPEECH DIFFICULTY: 0
SEIZURES: 0
VOMITING: 0
SHORTNESS OF BREATH: 0
NAUSEA: 0

## 2024-04-29 ENCOUNTER — OFFICE VISIT (OUTPATIENT)
Dept: HEMATOLOGY/ONCOLOGY | Facility: HOSPITAL | Age: 69
End: 2024-04-29
Payer: MEDICARE

## 2024-04-29 ENCOUNTER — APPOINTMENT (OUTPATIENT)
Dept: OCCUPATIONAL THERAPY | Facility: CLINIC | Age: 69
End: 2024-04-29
Payer: MEDICARE

## 2024-04-29 VITALS
OXYGEN SATURATION: 100 % | DIASTOLIC BLOOD PRESSURE: 77 MMHG | TEMPERATURE: 98.1 F | BODY MASS INDEX: 25.93 KG/M2 | WEIGHT: 165.6 LBS | RESPIRATION RATE: 16 BRPM | SYSTOLIC BLOOD PRESSURE: 115 MMHG | HEART RATE: 93 BPM

## 2024-04-29 DIAGNOSIS — C71.9 GBM (GLIOBLASTOMA MULTIFORME) (MULTI): Primary | ICD-10-CM

## 2024-04-29 DIAGNOSIS — Z51.11 ENCOUNTER FOR CHEMOTHERAPY MANAGEMENT: Primary | ICD-10-CM

## 2024-04-29 DIAGNOSIS — C71.9 GBM (GLIOBLASTOMA MULTIFORME) (MULTI): ICD-10-CM

## 2024-04-29 PROCEDURE — RXMED WILLOW AMBULATORY MEDICATION CHARGE

## 2024-04-29 PROCEDURE — 1159F MED LIST DOCD IN RCRD: CPT

## 2024-04-29 PROCEDURE — 1157F ADVNC CARE PLAN IN RCRD: CPT

## 2024-04-29 PROCEDURE — 99214 OFFICE O/P EST MOD 30 MIN: CPT

## 2024-04-29 PROCEDURE — 1126F AMNT PAIN NOTED NONE PRSNT: CPT

## 2024-04-29 RX ORDER — TEMOZOLOMIDE 140 MG/1
150 CAPSULE ORAL DAILY
Qty: 10 CAPSULE | Refills: 0 | Status: SHIPPED | OUTPATIENT
Start: 2024-04-29 | End: 2024-05-28

## 2024-04-29 ASSESSMENT — PAIN SCALES - GENERAL: PAINLEVEL: 0-NO PAIN

## 2024-04-29 NOTE — PROGRESS NOTES
[facility] [place]  Neuro-Oncology    Patient: Enmanuel Ahumada  MRN: [id number]  Date of visit: 04/30/24  Visit type: In-person clinic visit  Clinician: Jacki Simpson PA-C    Oncological & Treatment History:  Oncology History Overview Note   68 year old left-handed male with h/o HLD, cholecystectomy, Osborn's esophagus, on ASA 81 for cardioprotection presented to [facility] ED on 12/24/23 with 6 weeks of headaches, 3 weeks of increased confusion and running into things. Left-sided hemianopia on exam. CT head revealed a 1 mm midline leftward shift with a 7.3 cm x 7.1 cm x 5 cm right parietal mass. Transferred to [facility] for neurosurgical management.     GBM (glioblastoma multiforme) (Multi)   12/29/2023 Surgery    Right craniotomy for tumor resection (Ritu Carrasco MD)     12/29/2023 Pathology    Final diagnosis reported 1/18/24:  A, B.  Right brain mass, biopsy and removal:  - Malignant glioma, morphologically consistent with glioblastoma; positive for MGMT methylation     12/29/2023 Initial Diagnosis    GBM (glioblastoma multiforme) (CMS/HCC)     1/3/2024 Tumor Board    [facility] Tumor Board tumor board recommendations: Referral to radiation oncology and neuro-oncology.       2/15/2024 - 3/21/2024 Chemotherapy    Temozolomide with Concurrent Radiation, 42 Day Cycles     2/15/2024 - 3/28/2024 Radiation Therapy    Radiation Treatments       Active   No active radiation treatments to show.     Completed   Brain Boost_R (Started on 3/20/2024)   Most recent fraction: 182 cGy given on 3/28/2024   Total given: 1,274 cGy / 1,273 cGy  (7 of 7 fractions)   Elapsed Days: 8   Technique: 3D        Partial brain R (Started on 2/15/2024)   Most recent fraction: 182 cGy given on 3/19/2024   Total given: 4,186 cGy / 4,182 cGy  (23 of 23 fractions)   Elapsed Days: 33   Technique: 3D                        5/1/2024 -  Chemotherapy    Temozolomide (5/28), 28 Day Cycles           Interval History  (NEWEST at BOTTOM of section):  1/15/2024: Since getting discharged to home, he has been slowly recovering from the surgery. The final pathology is still pending, but it is likely GBM. He notes some persistent incisional pain around the staples but no regular headaches now (resolved). He has some minor confusion, mild loss of balance, difficulty using his computer and phone, and a loss of vision to the left. He remains on Keppra 1000 mg bid (although he claims he has never had a verified seizure), and is tapering off of Decadron. He is seeing Dr. Palomino from [facility] soon to begin RT planning. He is seeing Neurosurgery today to remove the staples.    02/19/24: Enmanuel Ahumada presents to neuro-oncology clinic today for follow-up during early chemoradiation (2/15/24 to 3/27/24). He is accompanied by his wife, Brittany, and granddaughter, Rosendo. He is tolerating chemoRT well thus far without nausea, vomiting or significant fatigue. He does have constipation, but it is manageable with sennosides. States his symptoms at initial presentation have improved - much less confusion, still has the left visual field deficit but is learning to adapt and not run into things as much. Endorses occasional mild headaches (1 or 2/10) that resolve spontaneously or with Tylenol. Not on any dexamethasone at this time. Remains on Keppra 500 mg BID. No history of seizure. No focal weakness or gait difficulty. Notes walking up stairs requires more concentration than in the past and he is unable to walk 2-3 miles like he used to. Prior to diagnosis, he went to the gym 3x/week. Still able to walk 1 mile outside if the weather is nice.    3/25/2024: Since the last visit, he has continued the course of chemo-RT and is now s/p RT Fraction #27 of 30 -- finalize this week. He has fatigue from the treatment, but no GI upset, emesis, diarrhea, loss of appetite, or change in taste. He is off of the steroids now. The [facility] results are in and  demonstrate EGFR amplification, TERT mutations, and loss of CDKN2A/B. He is active at home with chores and daily walking. He remains on Keppra 500 mg bid.    04/29/24: Dash presents to clinic accompanied by wife and daughter for follow-up prior to starting temozolomide cycle 1. He is well overall. Sleeping well, has a strong appetite, is walking 1-2 miles/day. States there's definite improvement in his brain fog and trouble reading that have been present daily since surgery. He has had only a couple 1 out of 10 headaches that spontaneously resolved. Denies seizures, speech changes, memory changes, focal weakness, and gait difficulty.    Review of Systems:  Review of Systems   Constitutional:  Negative for chills and fever.   Respiratory:  Negative for cough and shortness of breath.    Cardiovascular:  Negative for chest pain and leg swelling.   Gastrointestinal:  Negative for abdominal pain, constipation, diarrhea, nausea and vomiting.   Musculoskeletal:  Negative for gait problem.   Neurological:  Negative for extremity weakness, gait problem, headaches, seizures and speech difficulty.   All other systems reviewed and are negative.      Social History:  Social History     Tobacco Use    Smoking status: Never    Smokeless tobacco: Never   Substance Use Topics    Alcohol use: Not Currently    Drug use: Never     Maintains a gratitude journal  Walks about 1 mile when the weather allows   (wife, Brittany)    Past Medical History:  Past Medical History:  No date: Brain cancer (Multi)    Past Surgical History:  Past Surgical History:  No date: BRAIN SURGERY  January 25, 2020: CHOLECYSTECTOMY  07/24/2019: OTHER SURGICAL HISTORY      Comment:  Colonoscopy  07/26/2021: OTHER SURGICAL HISTORY      Comment:  Endoscopy  Abt 1991: VASECTOMY    Family History:  family history is not on file.    Performance Score:  Karnofsky Score: 90 - Able to carry on normal activity; minor signs or symptoms of disease     Vital Signs:  BP  115/77 (BP Location: Left arm, Patient Position: Sitting)   Pulse 93   Temp 36.7 °C (98.1 °F) (Temporal)   Resp 16   Wt 75.1 kg (165 lb 9.6 oz)   SpO2 100%   BMI 25.93 kg/m²     Physical Exam:  Physical Exam  Vitals reviewed.   Constitutional:       General: He is not in acute distress.     Appearance: Normal appearance. He is not toxic-appearing.   HENT:      Head: Normocephalic and atraumatic.   Eyes:      Extraocular Movements: Extraocular movements intact.      Conjunctiva/sclera: Conjunctivae normal.      Pupils: Pupils are equal, round, and reactive to light.   Pulmonary:      Effort: Pulmonary effort is normal. No respiratory distress.   Abdominal:      General: Abdomen is flat.   Musculoskeletal:         General: Normal range of motion.      Cervical back: Normal range of motion and neck supple.   Skin:     General: Skin is dry.   Neurological:      General: No focal deficit present.      Mental Status: He is alert and oriented to person, place, and time.      Cranial Nerves: No cranial nerve deficit.      Gait: Gait normal.   Psychiatric:         Mood and Affect: Mood and affect normal.         Behavior: Behavior normal.         Results:  Lab Results   Component Value Date    WBC 5.2 03/21/2024    HGB 15.3 03/21/2024    HCT 46.2 03/21/2024    MCV 94 03/21/2024     03/21/2024       Lab Results   Component Value Date    GLUCOSE 127 (H) 03/21/2024    CALCIUM 9.4 03/21/2024     03/21/2024    K 4.3 03/21/2024    CO2 31 03/21/2024     03/21/2024    BUN 19 03/21/2024    CREATININE 1.00 03/21/2024       Lab Results   Component Value Date    ALT 24 03/21/2024    AST 19 03/21/2024    ALKPHOS 91 03/21/2024    BILITOT 1.0 03/21/2024     === 04/25/24 ===    MR BRAIN W AND WO CONTRAST    - Impression -  * Postoperative changes as described  *Abnormal enhancement and nodularity in the right splenium and  subependymal white matter have developed at the deepest aspect of the  cortical resection  "defect and could represent residual postoperative  changes; however, residual or recurrent neoplasm in the right  splenium and subependymal white matter are not excluded.    MACRO:  none    Signed by: Sebastian Macias 4/26/2024 9:03 AM  Dictation workstation:   [id number]      Assessment & Plan:  Enmanuel Ahumada is a 68 y.o. male with PMH of HLD, Osborn's esophagus, ASA for cardioprotection, and a malignant glioma s/p surgical resection (12/2023). Final path morphologically consistent with glioblastoma, +MGMT promoter methylation. S/p chemoradiation (2/15-3/28/24). He is neurologically intact and will proceed with temozolomide cycle 1 as soon as he receives the chemo supply from the pharmacy. 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    1. Glioblastoma  Treatment: Temozolomide on days 1-5 of each 28-day cycle  Dose: 140 mg/day (150 mg/m2/day)  C1 start 5/1/24  C1 D22 labs on/around 5/22/24  C1 D29 labs on/around 5/29/24  Optune/tumor-treating fields -- consent signed; RN partner to send order to [facility] advanced molecular phenotyping results yielded EGFR amplification  Follow up in about 4-5 weeks for reassessment and monitoring      Addendum by Jacki Simpson PA-C 05/03/24 5:19 PM  Added radiation dates to oncological history to clarify last day of radiation (3/28/24).    "

## 2024-04-30 ENCOUNTER — PHARMACY VISIT (OUTPATIENT)
Dept: PHARMACY | Facility: CLINIC | Age: 69
End: 2024-04-30
Payer: COMMERCIAL

## 2024-04-30 ENCOUNTER — SPECIALTY PHARMACY (OUTPATIENT)
Dept: PHARMACY | Facility: CLINIC | Age: 69
End: 2024-04-30

## 2024-04-30 ASSESSMENT — ENCOUNTER SYMPTOMS
DIARRHEA: 0
CONSTIPATION: 0
HEADACHES: 0

## 2024-05-02 ENCOUNTER — TREATMENT (OUTPATIENT)
Dept: OCCUPATIONAL THERAPY | Facility: CLINIC | Age: 69
End: 2024-05-02
Payer: MEDICARE

## 2024-05-02 DIAGNOSIS — R29.898 WEAKNESS OF LEFT HAND: Primary | ICD-10-CM

## 2024-05-02 DIAGNOSIS — H53.9 IMPAIRED VISUAL PERCEPTION: ICD-10-CM

## 2024-05-02 DIAGNOSIS — H53.452 PERIPHERAL VISION LOSS, LEFT: ICD-10-CM

## 2024-05-02 DIAGNOSIS — R29.818 IMPAIRED PROPRIOCEPTION: ICD-10-CM

## 2024-05-02 DIAGNOSIS — R27.8 COORDINATION ABNORMAL: ICD-10-CM

## 2024-05-02 PROCEDURE — 97112 NEUROMUSCULAR REEDUCATION: CPT | Mod: GO | Performed by: OCCUPATIONAL THERAPIST

## 2024-05-02 NOTE — PROGRESS NOTES
"Occupational Therapy        Patient Name: Enmanuel Ahumada \"Dash\"  MRN: [id number]  Today's Date: 5/2/2024           Visit: #9    Assessment:  Patient recalled mild verbal cues for delayed recall while multitasking.  Patient demonstrated good activity tolerance during treatment and required no rest breaks throughout.     Plan:     Continue occupational therapy for increased coordination, balance, fine and gross motor control to promote ease of functional ADL and IADL tasks.   1-2x/wk for 8-12 weeks   -Neuromuscular reeducation, Therapeutic Exercise, Self Care/ADL     Subjective    Pt reports he started back on chemotherapy yesterday and he is feeling great.    Current Problem:  1. Weakness of left hand        2. Peripheral vision loss, left        3. Impaired visual perception        4. Impaired proprioception        5. Coordination abnormal            Pain   0/10    Objective     Pt completed word retention activity with 10/10 accuracy     Hi Q game:  4 pieces left   5 pieces left       Treatment: 46    Neuro: 46    Standing arm bike for 10 minutes at workload 5   -completed word retention activity while on arm bike     OT instructed pt to provide 5 different items.  OT assigned each item a designated color.  OT instructed pt to memorize the color associated with the word.     Meanwhile pt completed Hi-Q logic game. OT would point to a color and have patient provide item associated with the color.    *Completed HiQ 3x w/ total of 3 words per color for memory task        "

## 2024-05-06 LAB
AP SUMMARY REPORT: NORMAL
SCAN RESULT: NORMAL

## 2024-05-08 ENCOUNTER — TREATMENT (OUTPATIENT)
Dept: OCCUPATIONAL THERAPY | Facility: CLINIC | Age: 69
End: 2024-05-08
Payer: MEDICARE

## 2024-05-08 DIAGNOSIS — R29.818 IMPAIRED PROPRIOCEPTION: ICD-10-CM

## 2024-05-08 DIAGNOSIS — H53.9 IMPAIRED VISUAL PERCEPTION: ICD-10-CM

## 2024-05-08 DIAGNOSIS — R29.898 WEAKNESS OF LEFT HAND: Primary | ICD-10-CM

## 2024-05-08 DIAGNOSIS — H53.452 PERIPHERAL VISION LOSS, LEFT: ICD-10-CM

## 2024-05-08 PROCEDURE — 97112 NEUROMUSCULAR REEDUCATION: CPT | Mod: GO,KX

## 2024-05-08 NOTE — PROGRESS NOTES
"Occupational Therapy    Occupational Therapy Treatment    Name: Enmanuel Ahumada \"Dash\"  MRN: [id number]  : 1955  Date: 2024  Time Calculation  Start Time: 1000  Stop Time: 1047  Time Calculation (min): 47 min    Visit: #10    Assessment:  Patient demonstrated mild verbal recall for delayed recall while multitasking.    Patient demonstrated good activity tolerance during treatment, cued to pace self with cognitive attention tasks.    Plan:     Continue occupational therapy for increased coordination, balance, fine and gross motor control to promote ease of functional ADL and IADL tasks.   1-2x/wk for 8-12 weeks   -Neuromuscular reeducation, Therapeutic Exercise, Self Care/ADL     Subjective    Pt reports he is working on home programs.    Current Problem:  1. Weakness of left hand        2. Impaired proprioception        3. Impaired visual perception        4. Peripheral vision loss, left            Pain   0/10    Objective   Coordination:  9-Hole Peg Test  R= 21 seconds  L= 26.5 seconds  Improvement noted.     Pt demonstrates good attention to task though fatigue, required cues for high level problem solving strategies.    Treatment: 47    Neuro: 47  Pt given 4 words to recall: dog, pony, cake, car.  Pt able to recall through session, when engaged in activity, at end of session order changed with recall, cued for order..    SciFit arm bike, standing, alternate direction,  5 minutes at start of session,  4 minutes at end of session.    Word search with number sequence instead of words, cues to locate numbers, completed only 2.  Pt reported task was difficult due to number sequence vs words;  rest breaks needed.  OTYgive pt sheet to complete at home.    Completed [employer] logic game 2x,   -first attempt 6 pieces left  -second attempt OT instruct game sequence. Pt able to complete with structured problem solving, cues with second attempt.     IQ Fit board (requires problem solving with visual perception) " -cues, slow pace to complete, OT provide modified structure, visual cues to complete task.    Pt fatigued after session, ended session with arm bike.    OT instruct pt to pace self,  alternate fine motor concentration tasks with gross motor tasks.

## 2024-05-10 ENCOUNTER — SPECIALTY PHARMACY (OUTPATIENT)
Dept: PHARMACY | Facility: CLINIC | Age: 69
End: 2024-05-10

## 2024-05-20 ENCOUNTER — APPOINTMENT (OUTPATIENT)
Dept: HEMATOLOGY/ONCOLOGY | Facility: HOSPITAL | Age: 69
End: 2024-05-20
Payer: MEDICARE

## 2024-05-22 ENCOUNTER — LAB (OUTPATIENT)
Dept: LAB | Facility: LAB | Age: 69
End: 2024-05-22
Payer: MEDICARE

## 2024-05-22 DIAGNOSIS — C71.9 GBM (GLIOBLASTOMA MULTIFORME) (MULTI): ICD-10-CM

## 2024-05-22 LAB
ALBUMIN SERPL BCP-MCNC: 4.6 G/DL (ref 3.4–5)
ALP SERPL-CCNC: 96 U/L (ref 33–136)
ALT SERPL W P-5'-P-CCNC: 17 U/L (ref 10–52)
ANION GAP SERPL CALC-SCNC: 11 MMOL/L (ref 10–20)
AST SERPL W P-5'-P-CCNC: 16 U/L (ref 9–39)
BASOPHILS # BLD AUTO: 0.01 X10*3/UL (ref 0–0.1)
BASOPHILS NFR BLD AUTO: 0.2 %
BILIRUB SERPL-MCNC: 1.2 MG/DL (ref 0–1.2)
BUN SERPL-MCNC: 16 MG/DL (ref 6–23)
CALCIUM SERPL-MCNC: 9.6 MG/DL (ref 8.6–10.3)
CHLORIDE SERPL-SCNC: 104 MMOL/L (ref 98–107)
CO2 SERPL-SCNC: 29 MMOL/L (ref 21–32)
CREAT SERPL-MCNC: 1.1 MG/DL (ref 0.5–1.3)
EGFRCR SERPLBLD CKD-EPI 2021: 74 ML/MIN/1.73M*2
EOSINOPHIL # BLD AUTO: 0.13 X10*3/UL (ref 0–0.7)
EOSINOPHIL NFR BLD AUTO: 2.7 %
ERYTHROCYTE [DISTWIDTH] IN BLOOD BY AUTOMATED COUNT: 12 % (ref 11.5–14.5)
GLUCOSE SERPL-MCNC: 101 MG/DL (ref 74–99)
HCT VFR BLD AUTO: 47.4 % (ref 41–52)
HGB BLD-MCNC: 16.3 G/DL (ref 13.5–17.5)
IMM GRANULOCYTES # BLD AUTO: 0.01 X10*3/UL (ref 0–0.7)
IMM GRANULOCYTES NFR BLD AUTO: 0.2 % (ref 0–0.9)
LYMPHOCYTES # BLD AUTO: 1.65 X10*3/UL (ref 1.2–4.8)
LYMPHOCYTES NFR BLD AUTO: 34.2 %
MCH RBC QN AUTO: 30.7 PG (ref 26–34)
MCHC RBC AUTO-ENTMCNC: 34.4 G/DL (ref 32–36)
MCV RBC AUTO: 89 FL (ref 80–100)
MONOCYTES # BLD AUTO: 0.45 X10*3/UL (ref 0.1–1)
MONOCYTES NFR BLD AUTO: 9.3 %
NEUTROPHILS # BLD AUTO: 2.58 X10*3/UL (ref 1.2–7.7)
NEUTROPHILS NFR BLD AUTO: 53.4 %
NRBC BLD-RTO: 0 /100 WBCS (ref 0–0)
PLATELET # BLD AUTO: 136 X10*3/UL (ref 150–450)
POTASSIUM SERPL-SCNC: 4.4 MMOL/L (ref 3.5–5.3)
PROT SERPL-MCNC: 7 G/DL (ref 6.4–8.2)
RBC # BLD AUTO: 5.31 X10*6/UL (ref 4.5–5.9)
SODIUM SERPL-SCNC: 140 MMOL/L (ref 136–145)
WBC # BLD AUTO: 4.8 X10*3/UL (ref 4.4–11.3)

## 2024-05-22 PROCEDURE — 85025 COMPLETE CBC W/AUTO DIFF WBC: CPT

## 2024-05-22 PROCEDURE — 36415 COLL VENOUS BLD VENIPUNCTURE: CPT

## 2024-05-22 PROCEDURE — 80053 COMPREHEN METABOLIC PANEL: CPT

## 2024-05-23 ENCOUNTER — SPECIALTY PHARMACY (OUTPATIENT)
Dept: PHARMACY | Facility: CLINIC | Age: 69
End: 2024-05-23

## 2024-05-23 ENCOUNTER — OFFICE VISIT (OUTPATIENT)
Dept: HEMATOLOGY/ONCOLOGY | Facility: HOSPITAL | Age: 69
End: 2024-05-23
Payer: MEDICARE

## 2024-05-23 ENCOUNTER — PATIENT MESSAGE (OUTPATIENT)
Dept: HEMATOLOGY/ONCOLOGY | Facility: HOSPITAL | Age: 69
End: 2024-05-23

## 2024-05-23 VITALS
SYSTOLIC BLOOD PRESSURE: 132 MMHG | DIASTOLIC BLOOD PRESSURE: 69 MMHG | RESPIRATION RATE: 16 BRPM | HEART RATE: 68 BPM | OXYGEN SATURATION: 99 % | WEIGHT: 162.4 LBS | TEMPERATURE: 97.7 F | BODY MASS INDEX: 25.43 KG/M2

## 2024-05-23 DIAGNOSIS — L30.9 DERMATITIS: Primary | ICD-10-CM

## 2024-05-23 DIAGNOSIS — C71.9 GBM (GLIOBLASTOMA MULTIFORME) (MULTI): ICD-10-CM

## 2024-05-23 DIAGNOSIS — C71.9 GBM (GLIOBLASTOMA MULTIFORME) (MULTI): Primary | ICD-10-CM

## 2024-05-23 DIAGNOSIS — Z51.11 ENCOUNTER FOR CHEMOTHERAPY MANAGEMENT: Primary | ICD-10-CM

## 2024-05-23 PROCEDURE — 99214 OFFICE O/P EST MOD 30 MIN: CPT

## 2024-05-23 PROCEDURE — RXMED WILLOW AMBULATORY MEDICATION CHARGE

## 2024-05-23 RX ORDER — TEMOZOLOMIDE 100 MG/1
100 CAPSULE ORAL DAILY
Qty: 5 CAPSULE | Refills: 0 | Status: SHIPPED | OUTPATIENT
Start: 2024-05-29 | End: 2024-06-21

## 2024-05-23 RX ORDER — TEMOZOLOMIDE 20 MG/1
20 CAPSULE ORAL DAILY
Qty: 5 CAPSULE | Refills: 0 | Status: SHIPPED | OUTPATIENT
Start: 2024-05-29 | End: 2024-06-21

## 2024-05-23 RX ORDER — TEMOZOLOMIDE 250 MG/1
250 CAPSULE ORAL DAILY
Qty: 5 CAPSULE | Refills: 0 | Status: SHIPPED | OUTPATIENT
Start: 2024-05-29 | End: 2024-06-21

## 2024-05-23 RX ORDER — CLOBETASOL PROPIONATE 0.5 MG/G
CREAM TOPICAL 2 TIMES DAILY
Qty: 30 G | Refills: 0 | Status: SHIPPED | OUTPATIENT
Start: 2024-05-23

## 2024-05-23 ASSESSMENT — ENCOUNTER SYMPTOMS
EXTREMITY WEAKNESS: 0
VOMITING: 0
SEIZURES: 0
NAUSEA: 0
CHILLS: 0
ABDOMINAL PAIN: 0
CONSTIPATION: 1
HEADACHES: 0
FEVER: 0
SPEECH DIFFICULTY: 0
FATIGUE: 1
DIARRHEA: 0

## 2024-05-23 ASSESSMENT — PAIN SCALES - GENERAL: PAINLEVEL: 0-NO PAIN

## 2024-05-23 NOTE — PROGRESS NOTES
[facility] [place]  Neuro-Oncology    Patient: Enmanuel Ahumada  MRN: [id number]  Date of visit: 05/23/24  Visit type: In-person clinic visit  Clinician: Jacki Simpson PA-C    Oncological & Treatment History:  Oncology History Overview Note   68 year old left-handed male with h/o HLD, cholecystectomy, Osborn's esophagus, on ASA 81 for cardioprotection presented to [facility] ED on 12/24/23 with 6 weeks of headaches, 3 weeks of increased confusion and running into things. Left-sided hemianopia on exam. CT head revealed a 1 mm midline leftward shift with a 7.3 cm x 7.1 cm x 5 cm right parietal mass. Transferred to [facility] for neurosurgical management.     GBM (glioblastoma multiforme) (Multi)   12/29/2023 Surgery    Right craniotomy for tumor resection (Ritu Carrasco MD)     12/29/2023 Pathology    Final diagnosis reported 1/18/24:  A, B.  Right brain mass, biopsy and removal:  - Malignant glioma, morphologically consistent with glioblastoma; positive for MGMT methylation     12/29/2023 Initial Diagnosis    GBM (glioblastoma multiforme) (CMS/HCC)     1/3/2024 Tumor Board    [facility] Tumor Board tumor board recommendations: Referral to radiation oncology and neuro-oncology.       2/15/2024 - 3/21/2024 Chemotherapy    Temozolomide with Concurrent Radiation, 42 Day Cycles     2/15/2024 - 3/28/2024 Radiation Therapy    Radiation Treatments       Active   No active radiation treatments to show.     Completed   Brain Boost_R (Started on 3/20/2024)   Most recent fraction: 182 cGy given on 3/28/2024   Total given: 1,274 cGy / 1,273 cGy  (7 of 7 fractions)   Elapsed Days: 8   Technique: 3D        Partial brain R (Started on 2/15/2024)   Most recent fraction: 182 cGy given on 3/19/2024   Total given: 4,186 cGy / 4,182 cGy  (23 of 23 fractions)   Elapsed Days: 33   Technique: 3D                        5/1/2024 -  Chemotherapy    Temozolomide (5/28), 28 Day Cycles           Interval History  (NEWEST at BOTTOM of section):  1/15/2024: Since getting discharged to home, he has been slowly recovering from the surgery. The final pathology is still pending, but it is likely GBM. He notes some persistent incisional pain around the staples but no regular headaches now (resolved). He has some minor confusion, mild loss of balance, difficulty using his computer and phone, and a loss of vision to the left. He remains on Keppra 1000 mg bid (although he claims he has never had a verified seizure), and is tapering off of Decadron. He is seeing Dr. Palomino from [facility] soon to begin RT planning. He is seeing Neurosurgery today to remove the staples.    02/19/24: Enmanuel Ahumada presents to neuro-oncology clinic today for follow-up during early chemoradiation (2/15/24 to 3/27/24). He is accompanied by his wife, Brittany, and granddaughter, Rosendo. He is tolerating chemoRT well thus far without nausea, vomiting or significant fatigue. He does have constipation, but it is manageable with sennosides. States his symptoms at initial presentation have improved - much less confusion, still has the left visual field deficit but is learning to adapt and not run into things as much. Endorses occasional mild headaches (1 or 2/10) that resolve spontaneously or with Tylenol. Not on any dexamethasone at this time. Remains on Keppra 500 mg BID. No history of seizure. No focal weakness or gait difficulty. Notes walking up stairs requires more concentration than in the past and he is unable to walk 2-3 miles like he used to. Prior to diagnosis, he went to the gym 3x/week. Still able to walk 1 mile outside if the weather is nice.    3/25/2024: Since the last visit, he has continued the course of chemo-RT and is now s/p RT Fraction #27 of 30 -- finalize this week. He has fatigue from the treatment, but no GI upset, emesis, diarrhea, loss of appetite, or change in taste. He is off of the steroids now. The [facility] results are in and  demonstrate EGFR amplification, TERT mutations, and loss of CDKN2A/B. He is active at home with chores and daily walking. He remains on Keppra 500 mg bid.    04/29/24: Dash presents to clinic accompanied by wife and daughter for follow-up prior to starting temozolomide cycle 1. He is well overall. Sleeping well, has a strong appetite, is walking 1-2 miles/day. States there's definite improvement in his brain fog and trouble reading that have been present daily since surgery. He has had only a couple 1 out of 10 headaches that spontaneously resolved. Denies seizures, speech changes, memory changes, focal weakness, and gait difficulty.    05/23/24: Dash presents to clinic today accompanied by his wife for follow-up after temozolomide cycle 1. Tolerated the cycle very well without any n/v/d. He had constipation towards the end of the chemo week, but this relieved with Senna. He states he's more tired now and naps daily. Sleeps well at night with >8 uninterrupted hours/night and has no trouble falling asleep. Endorses a slightly decreased appetite. Drinks a lot of iced tea every day. Notes he's had brief infrequent headaches that spontaneously and quickly resolve. Has been wearing Optune for 2 weeks with >90% compliance (wearing >18 hours/day). Wife notes he has slowed down a bit, physically and cognitively. Now takes them 30 min to do a mile walk each day rather than 20 min. Also had an instance of possible confusion where he repeatedly asked her which door they were going in to enter their house after a walk. Otherwise, no focal weakness, gait difficulty, or seizures.    Review of Systems:  Review of Systems   Constitutional:  Positive for fatigue. Negative for chills and fever.   Gastrointestinal:  Positive for constipation (during chemo days -- resolved). Negative for abdominal pain, diarrhea, nausea and vomiting.   Musculoskeletal:  Negative for gait problem.   Neurological:  Negative for extremity weakness, gait  problem, headaches, seizures and speech difficulty.   All other systems reviewed and are negative.      Social History:  Social History     Tobacco Use    Smoking status: Never    Smokeless tobacco: Never   Substance Use Topics    Alcohol use: Not Currently    Drug use: Never     Maintains a gratitude journal  Walks about 1 mile when the weather allows   (wife, Brittany)    Past Medical History:  Past Medical History:  No date: Brain cancer (Multi)    Past Surgical History:  Past Surgical History:  No date: BRAIN SURGERY  January 25, 2020: CHOLECYSTECTOMY  07/24/2019: OTHER SURGICAL HISTORY      Comment:  Colonoscopy  07/26/2021: OTHER SURGICAL HISTORY      Comment:  Endoscopy  Abt 1991: VASECTOMY    Family History:  family history is not on file.    Performance Score:  Karnofsky Score: 90 - Able to carry on normal activity; minor signs or symptoms of disease     Vital Signs:  /69 (BP Location: Left arm, Patient Position: Sitting)   Pulse 68   Temp 36.5 °C (97.7 °F) (Temporal)   Resp 16   Wt 73.7 kg (162 lb 6.4 oz)   SpO2 99%   BMI 25.43 kg/m²     Physical Exam:  Physical Exam  Vitals reviewed.   Constitutional:       General: He is not in acute distress.     Appearance: Normal appearance. He is not toxic-appearing.      Comments: Alert, but more subdued/evidently tired   HENT:      Head: Normocephalic and atraumatic.      Comments: Wearing Optune/tumor-treating fields  Eyes:      Extraocular Movements: Extraocular movements intact.      Conjunctiva/sclera: Conjunctivae normal.      Pupils: Pupils are equal, round, and reactive to light.   Pulmonary:      Effort: Pulmonary effort is normal. No respiratory distress.   Abdominal:      General: Abdomen is flat.   Musculoskeletal:         General: Normal range of motion.      Cervical back: Normal range of motion and neck supple.      Right lower leg: No edema.      Left lower leg: No edema.   Skin:     General: Skin is dry.   Neurological:      General:  "No focal deficit present.      Mental Status: He is oriented to person, place, and time. Mental status is at baseline.      Cranial Nerves: No cranial nerve deficit.      Gait: Gait normal.   Psychiatric:         Mood and Affect: Mood and affect normal.         Behavior: Behavior normal.         Results:  Lab Results   Component Value Date    WBC 4.8 05/22/2024    HGB 16.3 05/22/2024    HCT 47.4 05/22/2024    MCV 89 05/22/2024     (L) 05/22/2024       Lab Results   Component Value Date    GLUCOSE 101 (H) 05/22/2024    CALCIUM 9.6 05/22/2024     05/22/2024    K 4.4 05/22/2024    CO2 29 05/22/2024     05/22/2024    BUN 16 05/22/2024    CREATININE 1.10 05/22/2024       Lab Results   Component Value Date    ALT 17 05/22/2024    AST 16 05/22/2024    ALKPHOS 96 05/22/2024    BILITOT 1.2 05/22/2024     === 04/25/24 ===    MR BRAIN W AND WO CONTRAST    - Impression -  * Postoperative changes as described  *Abnormal enhancement and nodularity in the right splenium and  subependymal white matter have developed at the deepest aspect of the  cortical resection defect and could represent residual postoperative  changes; however, residual or recurrent neoplasm in the right  splenium and subependymal white matter are not excluded.    MACRO:  none    Signed by: Sebastian Macias 4/26/2024 9:03 AM  Dictation workstation:   [id number]      Assessment & Plan:  Enmanuel Ahumada is a 68 y.o. male with PMH of HLD, Osborn's esophagus, ASA for cardioprotection, and a malignant glioma s/p surgical resection (12/2023). Final path morphologically consistent with glioblastoma, +MGMT promoter methylation. S/p chemoradiation (2/15-3/28/24). 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    Today, he is still doing well clinically overall, but more fatigued. We will obtain his C1 D29 labs next week with plans to start TMZ C2 afterwards -- patient aware C2 dose is increased per protocol. Advised patient to contact us if he has increased/persistent headaches or confusion in the meantime.    1. Glioblastoma  Treatment 1: Temozolomide on days 1-5 of each 28-day cycle  Dose: 370 mg/day (200 mg/m2/day) -- intentional increase from C1  C2 estimated start on 5/29/24 after reviewing labs that day  C2 D22 labs estimated to be on 6/19  C2 D29 labs estimated to be on 6/26  Treatment 2: Optune/tumor-treating fields -- continue  [facility] advanced molecular phenotyping results yielded EGFR amplification  MRI brain prior to next visit  Follow up on 6/28 with Dr. Rolon for reassessment, monitoring, and MRI review

## 2024-05-24 ENCOUNTER — PHARMACY VISIT (OUTPATIENT)
Dept: PHARMACY | Facility: CLINIC | Age: 69
End: 2024-05-24
Payer: COMMERCIAL

## 2024-05-29 ENCOUNTER — LAB (OUTPATIENT)
Dept: LAB | Facility: LAB | Age: 69
End: 2024-05-29
Payer: MEDICARE

## 2024-05-29 DIAGNOSIS — C71.9 GBM (GLIOBLASTOMA MULTIFORME) (MULTI): ICD-10-CM

## 2024-05-29 LAB
ALBUMIN SERPL BCP-MCNC: 4.5 G/DL (ref 3.4–5)
ALP SERPL-CCNC: 99 U/L (ref 33–136)
ALT SERPL W P-5'-P-CCNC: 15 U/L (ref 10–52)
ANION GAP SERPL CALC-SCNC: 9 MMOL/L (ref 10–20)
AST SERPL W P-5'-P-CCNC: 13 U/L (ref 9–39)
BASOPHILS # BLD AUTO: 0.02 X10*3/UL (ref 0–0.1)
BASOPHILS NFR BLD AUTO: 0.4 %
BILIRUB SERPL-MCNC: 1.3 MG/DL (ref 0–1.2)
BUN SERPL-MCNC: 18 MG/DL (ref 6–23)
CALCIUM SERPL-MCNC: 9.5 MG/DL (ref 8.6–10.3)
CHLORIDE SERPL-SCNC: 103 MMOL/L (ref 98–107)
CO2 SERPL-SCNC: 30 MMOL/L (ref 21–32)
CREAT SERPL-MCNC: 1.03 MG/DL (ref 0.5–1.3)
EGFRCR SERPLBLD CKD-EPI 2021: 80 ML/MIN/1.73M*2
EOSINOPHIL # BLD AUTO: 0.08 X10*3/UL (ref 0–0.7)
EOSINOPHIL NFR BLD AUTO: 1.6 %
ERYTHROCYTE [DISTWIDTH] IN BLOOD BY AUTOMATED COUNT: 12.1 % (ref 11.5–14.5)
GLUCOSE SERPL-MCNC: 99 MG/DL (ref 74–99)
HCT VFR BLD AUTO: 46.2 % (ref 41–52)
HGB BLD-MCNC: 15.9 G/DL (ref 13.5–17.5)
IMM GRANULOCYTES # BLD AUTO: 0.01 X10*3/UL (ref 0–0.7)
IMM GRANULOCYTES NFR BLD AUTO: 0.2 % (ref 0–0.9)
LYMPHOCYTES # BLD AUTO: 1.44 X10*3/UL (ref 1.2–4.8)
LYMPHOCYTES NFR BLD AUTO: 28.7 %
MCH RBC QN AUTO: 30.8 PG (ref 26–34)
MCHC RBC AUTO-ENTMCNC: 34.4 G/DL (ref 32–36)
MCV RBC AUTO: 89 FL (ref 80–100)
MONOCYTES # BLD AUTO: 0.52 X10*3/UL (ref 0.1–1)
MONOCYTES NFR BLD AUTO: 10.4 %
NEUTROPHILS # BLD AUTO: 2.94 X10*3/UL (ref 1.2–7.7)
NEUTROPHILS NFR BLD AUTO: 58.7 %
NRBC BLD-RTO: 0 /100 WBCS (ref 0–0)
PLATELET # BLD AUTO: 118 X10*3/UL (ref 150–450)
POTASSIUM SERPL-SCNC: 4.3 MMOL/L (ref 3.5–5.3)
PROT SERPL-MCNC: 6.7 G/DL (ref 6.4–8.2)
RBC # BLD AUTO: 5.17 X10*6/UL (ref 4.5–5.9)
SODIUM SERPL-SCNC: 138 MMOL/L (ref 136–145)
WBC # BLD AUTO: 5 X10*3/UL (ref 4.4–11.3)

## 2024-05-29 PROCEDURE — 80053 COMPREHEN METABOLIC PANEL: CPT

## 2024-05-29 PROCEDURE — 85025 COMPLETE CBC W/AUTO DIFF WBC: CPT

## 2024-06-10 ENCOUNTER — TREATMENT (OUTPATIENT)
Dept: OCCUPATIONAL THERAPY | Facility: CLINIC | Age: 69
End: 2024-06-10
Payer: MEDICARE

## 2024-06-10 DIAGNOSIS — H53.452 PERIPHERAL VISION LOSS, LEFT: ICD-10-CM

## 2024-06-10 DIAGNOSIS — H53.9 IMPAIRED VISUAL PERCEPTION: ICD-10-CM

## 2024-06-10 DIAGNOSIS — R29.898 WEAKNESS OF LEFT HAND: ICD-10-CM

## 2024-06-10 DIAGNOSIS — R29.818 IMPAIRED PROPRIOCEPTION: ICD-10-CM

## 2024-06-10 DIAGNOSIS — R27.8 COORDINATION ABNORMAL: Primary | ICD-10-CM

## 2024-06-10 PROCEDURE — 97535 SELF CARE MNGMENT TRAINING: CPT | Mod: GO | Performed by: OCCUPATIONAL THERAPIST

## 2024-06-10 PROCEDURE — 97530 THERAPEUTIC ACTIVITIES: CPT | Mod: GO | Performed by: OCCUPATIONAL THERAPIST

## 2024-06-10 ASSESSMENT — ACTIVITIES OF DAILY LIVING (ADL): HOME_MANAGEMENT_TIME_ENTRY: 20

## 2024-06-10 NOTE — PROGRESS NOTES
"Occupational Therapy    Reassessment     Patient Name: Enmanuel Ahumada \"Dash\"  MRN: [id number]  Today's Date: 6/10/2024       Visit: #11    Assessment:  Patient has progressed well towards therapy goals with noted improved objective measures: pinch strength, and 9 hole peg.  In addition, patient demonstrates improved left visual scanning techniques, motor planning and coordination as indicated by ability to independently complete ADL, IADL and leisure tasks.  Patient demonstrates readiness to transition to home programs.  Pt in agreement     Plan:  Pt with last appointment scheduled on today's date; instructed to follow up as needed     Subjective   Current Problem:  1. Coordination abnormal        2. Impaired proprioception        3. Impaired visual perception        4. Peripheral vision loss, left        5. Weakness of left hand            Patient arrives with OptQu[employer]ra device applied for GBM treatment.  Pt wears 24/7.  Patient reports his chemotherapy dose has been tripled; notes fatigue with increased dosage.     From Evaluation; information in parenthesis indicates current status:     ADL/IADL profile:   Pt is retired and lives with spouse   Pt ambulates without use of device; reports no falls   Pt is active and goes for walks daily   Pt is not currently driving     Highlight of deficits:  Patient with noted decreased spatial awareness; runs into objects on left side (resolved)   Patient notes difficulty pouring into a glass with left hand (can complete without difficulty)   Pt report difficulty gauging spacing when trying to write or type within a box (resolved)   Pt reports difficulty typing on his ipad (resolved)    Pt reports difficulty gauging portions when dishing meals (resolved)  Patient reports he avoid cutting with is left dominant hand (avoids due to low platelets from chemotherapy; but able to physically complete)   Difficulty with opening a jar with left hand (resolved)       Objective "   Cognition:  Pt reports mild short term memory changes (improved)    ROM:   BUES WNL    Vision:  -depth perception (improved since evaluation)  -impaired left periphial vision     Wears corrective lenses      Strength:    strength:   R- 100# L- 91#     Tripod pinch:   R- 23#  L- 23#     Lateral Pinch:   R-31#   L- 30#     LUE- grossly 4/5     Perception:   Decreased spatial awareness     -Running into things on left side     Coordination:    9 Hole Peg:  R- 24.27  L- 25.89    Box and blocks:   R-48 L- 44 blocks     Treatment:     Therapeutic Activity:   Pt completion of standardized tests for reassessment: box and blocks, 9 hole peg,  and pinch strength     Self Care/ADL:     Patient with last appointment scheduled on today's date. OT discussed goals and plan of care with patient. Patient ready to transition to home programs. OT reviewed HEPs; patient expressed carryover. OT instructed patient to follow as needed.

## 2024-06-19 ENCOUNTER — LAB (OUTPATIENT)
Dept: LAB | Facility: LAB | Age: 69
End: 2024-06-19
Payer: MEDICARE

## 2024-06-19 DIAGNOSIS — C71.9 GBM (GLIOBLASTOMA MULTIFORME) (MULTI): ICD-10-CM

## 2024-06-19 LAB
ALBUMIN SERPL BCP-MCNC: 4.3 G/DL (ref 3.4–5)
ALP SERPL-CCNC: 90 U/L (ref 33–136)
ALT SERPL W P-5'-P-CCNC: 13 U/L (ref 10–52)
ANION GAP SERPL CALC-SCNC: 11 MMOL/L (ref 10–20)
AST SERPL W P-5'-P-CCNC: 13 U/L (ref 9–39)
BASOPHILS # BLD AUTO: 0 X10*3/UL (ref 0–0.1)
BASOPHILS NFR BLD AUTO: 0 %
BILIRUB SERPL-MCNC: 1.4 MG/DL (ref 0–1.2)
BUN SERPL-MCNC: 20 MG/DL (ref 6–23)
CALCIUM SERPL-MCNC: 9.4 MG/DL (ref 8.6–10.3)
CHLORIDE SERPL-SCNC: 104 MMOL/L (ref 98–107)
CO2 SERPL-SCNC: 29 MMOL/L (ref 21–32)
CREAT SERPL-MCNC: 0.89 MG/DL (ref 0.5–1.3)
EGFRCR SERPLBLD CKD-EPI 2021: >90 ML/MIN/1.73M*2
EOSINOPHIL # BLD AUTO: 0.09 X10*3/UL (ref 0–0.7)
EOSINOPHIL NFR BLD AUTO: 2.3 %
ERYTHROCYTE [DISTWIDTH] IN BLOOD BY AUTOMATED COUNT: 12.7 % (ref 11.5–14.5)
GLUCOSE SERPL-MCNC: 142 MG/DL (ref 74–99)
HCT VFR BLD AUTO: 43.6 % (ref 41–52)
HGB BLD-MCNC: 14.8 G/DL (ref 13.5–17.5)
IMM GRANULOCYTES # BLD AUTO: 0.01 X10*3/UL (ref 0–0.7)
IMM GRANULOCYTES NFR BLD AUTO: 0.3 % (ref 0–0.9)
LYMPHOCYTES # BLD AUTO: 1.05 X10*3/UL (ref 1.2–4.8)
LYMPHOCYTES NFR BLD AUTO: 27.3 %
MCH RBC QN AUTO: 31 PG (ref 26–34)
MCHC RBC AUTO-ENTMCNC: 33.9 G/DL (ref 32–36)
MCV RBC AUTO: 91 FL (ref 80–100)
MONOCYTES # BLD AUTO: 0.27 X10*3/UL (ref 0.1–1)
MONOCYTES NFR BLD AUTO: 7 %
NEUTROPHILS # BLD AUTO: 2.43 X10*3/UL (ref 1.2–7.7)
NEUTROPHILS NFR BLD AUTO: 63.1 %
NRBC BLD-RTO: 0 /100 WBCS (ref 0–0)
PLATELET # BLD AUTO: 51 X10*3/UL (ref 150–450)
POTASSIUM SERPL-SCNC: 4.1 MMOL/L (ref 3.5–5.3)
PROT SERPL-MCNC: 6.4 G/DL (ref 6.4–8.2)
RBC # BLD AUTO: 4.78 X10*6/UL (ref 4.5–5.9)
SODIUM SERPL-SCNC: 140 MMOL/L (ref 136–145)
WBC # BLD AUTO: 3.9 X10*3/UL (ref 4.4–11.3)

## 2024-06-19 PROCEDURE — 80053 COMPREHEN METABOLIC PANEL: CPT

## 2024-06-19 PROCEDURE — 85025 COMPLETE CBC W/AUTO DIFF WBC: CPT

## 2024-06-24 ENCOUNTER — HOSPITAL ENCOUNTER (OUTPATIENT)
Dept: RADIOLOGY | Facility: HOSPITAL | Age: 69
Discharge: HOME | End: 2024-06-24
Payer: MEDICARE

## 2024-06-24 VITALS — BODY MASS INDEX: 24.33 KG/M2 | WEIGHT: 155 LBS | HEIGHT: 67 IN

## 2024-06-24 DIAGNOSIS — C71.9 GBM (GLIOBLASTOMA MULTIFORME) (MULTI): ICD-10-CM

## 2024-06-24 PROCEDURE — A9575 INJ GADOTERATE MEGLUMI 0.1ML: HCPCS

## 2024-06-24 PROCEDURE — 70553 MRI BRAIN STEM W/O & W/DYE: CPT

## 2024-06-24 PROCEDURE — 2500000004 HC RX 250 GENERAL PHARMACY W/ HCPCS (ALT 636 FOR OP/ED)

## 2024-06-24 RX ORDER — GADOTERATE MEGLUMINE 376.9 MG/ML
14 INJECTION INTRAVENOUS
Status: COMPLETED | OUTPATIENT
Start: 2024-06-24 | End: 2024-06-24

## 2024-06-26 ENCOUNTER — LAB (OUTPATIENT)
Dept: LAB | Facility: LAB | Age: 69
End: 2024-06-26
Payer: MEDICARE

## 2024-06-26 DIAGNOSIS — C71.9 GBM (GLIOBLASTOMA MULTIFORME) (MULTI): ICD-10-CM

## 2024-06-26 LAB
ALBUMIN SERPL BCP-MCNC: 4.4 G/DL (ref 3.4–5)
ALP SERPL-CCNC: 88 U/L (ref 33–136)
ALT SERPL W P-5'-P-CCNC: 14 U/L (ref 10–52)
ANION GAP SERPL CALC-SCNC: 9 MMOL/L (ref 10–20)
AST SERPL W P-5'-P-CCNC: 13 U/L (ref 9–39)
BASOPHILS # BLD AUTO: 0 X10*3/UL (ref 0–0.1)
BASOPHILS NFR BLD AUTO: 0 %
BILIRUB SERPL-MCNC: 1.3 MG/DL (ref 0–1.2)
BUN SERPL-MCNC: 17 MG/DL (ref 6–23)
CALCIUM SERPL-MCNC: 9.4 MG/DL (ref 8.6–10.3)
CHLORIDE SERPL-SCNC: 104 MMOL/L (ref 98–107)
CO2 SERPL-SCNC: 31 MMOL/L (ref 21–32)
CREAT SERPL-MCNC: 0.98 MG/DL (ref 0.5–1.3)
EGFRCR SERPLBLD CKD-EPI 2021: 85 ML/MIN/1.73M*2
EOSINOPHIL # BLD AUTO: 0.03 X10*3/UL (ref 0–0.7)
EOSINOPHIL NFR BLD AUTO: 1.1 %
ERYTHROCYTE [DISTWIDTH] IN BLOOD BY AUTOMATED COUNT: 12.9 % (ref 11.5–14.5)
GLUCOSE SERPL-MCNC: 117 MG/DL (ref 74–99)
HCT VFR BLD AUTO: 41.4 % (ref 41–52)
HGB BLD-MCNC: 14.4 G/DL (ref 13.5–17.5)
IMM GRANULOCYTES # BLD AUTO: 0.01 X10*3/UL (ref 0–0.7)
IMM GRANULOCYTES NFR BLD AUTO: 0.4 % (ref 0–0.9)
LYMPHOCYTES # BLD AUTO: 1.21 X10*3/UL (ref 1.2–4.8)
LYMPHOCYTES NFR BLD AUTO: 44.2 %
MCH RBC QN AUTO: 31.2 PG (ref 26–34)
MCHC RBC AUTO-ENTMCNC: 34.8 G/DL (ref 32–36)
MCV RBC AUTO: 90 FL (ref 80–100)
MONOCYTES # BLD AUTO: 0.19 X10*3/UL (ref 0.1–1)
MONOCYTES NFR BLD AUTO: 6.9 %
NEUTROPHILS # BLD AUTO: 1.3 X10*3/UL (ref 1.2–7.7)
NEUTROPHILS NFR BLD AUTO: 47.4 %
NRBC BLD-RTO: 0 /100 WBCS (ref 0–0)
PLATELET # BLD AUTO: 37 X10*3/UL (ref 150–450)
POTASSIUM SERPL-SCNC: 4 MMOL/L (ref 3.5–5.3)
PROT SERPL-MCNC: 6.6 G/DL (ref 6.4–8.2)
RBC # BLD AUTO: 4.61 X10*6/UL (ref 4.5–5.9)
SODIUM SERPL-SCNC: 140 MMOL/L (ref 136–145)
WBC # BLD AUTO: 2.7 X10*3/UL (ref 4.4–11.3)

## 2024-06-27 ENCOUNTER — TELEPHONE (OUTPATIENT)
Dept: RADIATION ONCOLOGY | Facility: HOSPITAL | Age: 69
End: 2024-06-27
Payer: MEDICARE

## 2024-06-27 LAB — PATH REVIEW-CBC DIFFERENTIAL: NORMAL

## 2024-06-28 ENCOUNTER — OFFICE VISIT (OUTPATIENT)
Dept: HEMATOLOGY/ONCOLOGY | Facility: HOSPITAL | Age: 69
End: 2024-06-28
Payer: MEDICARE

## 2024-06-28 VITALS
BODY MASS INDEX: 24.79 KG/M2 | WEIGHT: 158.29 LBS | RESPIRATION RATE: 18 BRPM | SYSTOLIC BLOOD PRESSURE: 119 MMHG | OXYGEN SATURATION: 98 % | DIASTOLIC BLOOD PRESSURE: 74 MMHG | TEMPERATURE: 97 F | HEART RATE: 89 BPM

## 2024-06-28 DIAGNOSIS — C71.9 GBM (GLIOBLASTOMA MULTIFORME) (MULTI): ICD-10-CM

## 2024-06-28 PROCEDURE — 99215 OFFICE O/P EST HI 40 MIN: CPT | Performed by: PSYCHIATRY & NEUROLOGY

## 2024-06-28 ASSESSMENT — PAIN SCALES - GENERAL: PAINLEVEL: 0-NO PAIN

## 2024-06-28 NOTE — PATIENT INSTRUCTIONS
Your next appointment will be in 6 weeks with Jacki Simpson PA-C.  Please have your labs drawn on Wednesday 7/3.    Please contact [phone] option 5 then option 2 with any questions or concerns.   For any scheduling concerns please call [phone] option 1

## 2024-06-28 NOTE — PROGRESS NOTES
Patient ID: Dash Ahumada is a 68 y.o. male.  Referring Physician: No referring provider defined for this encounter.  Primary Care Provider: DO vEe Zuleta    Dash Ahumada is a 67 yo RH  male with a PMH significant for HLD, Osborn's esophagus, ASA for cardioprotection, who presented with headache x 6 weeks, confusion x 3 weeks, and gait instability. CTH with RO hypodensity. MRI PPF and fMRI with L language dominant lesion. TTE EF 60%. S/p R crani for tumor resection with 5ALA on 12/29/23. MRI with GTR. Prelim pathology concerning for glioblastoma - immunostains pending.      INTERVAL HISTORY (1/15/2024): Since getting discharged to home, he has been slowly recovering from the surgery. The final pathology is still pending, but it is likely GBM. He notes some persistent incisional pain around the staples but no regular headaches now (resolved). He has some minor confusion, mild loss of balance, difficulty using his computer and phone, and a loss of vision to the left. He remains on Keppra 1000 mg bid (although he claims he has never had a verified seizure), and is tapering off of Decadron. He is seeing Dr. Palomino from Rad Onc soon to begin RT planning. He is seeing Neurosurgery today to remove the staples.     INTERVAL HISTORY (3/25/2024): Since the last visit, he has continued the course of chemo-RT and is now s/p RT Fraction #27 of 30 -- finalize this week. He has fatigue from the treatment, but no GI upset, emesis, diarrhea, loss of appetite, or change in taste. He is off of the steroids now. The [facility] results are in and demonstrate EGFR amplification, TERT mutations, and loss of CDKN2A/B. He is active at home with chores and daily walking. He remains on Keppra 500 mg bid.     INTERVAL HISTORY (6/28/2024): Since the last visit, he has completed the course of chemo-RT in late March, and then started adjuvant Temodar -- now s/p cycle #2. He has tolerated the chemo quite well so  far, with persistent fatigue, but no GI upset, nausea, emesis, or diarrhea. However, he has developed significant thrombocytopenia -- 51k last week and 37k this week. He is also now on the [employer] electrical array device, and is tolerating it well, without any scalp issues; > 18 hours per day. He is doing well neurologically, with a few minor headaches, but no seizures, speech issues, visual changes, new focal weakness, gait difficulty, or sensory loss. He is not on any Decadron. He remains active at home with chores and daily walks. He remains on Keppra 500 mg bid.     The HPI is as documented in the HPI.     Objective   BSA: There is no height or weight on file to calculate BSA.  There were no vitals taken for this visit.    No family history on file.  Oncology History Overview Note   68 year old left-handed male with h/o HLD, cholecystectomy, Osborn's esophagus, on ASA 81 for cardioprotection presented to [facility] ED on 12/24/23 with 6 weeks of headaches, 3 weeks of increased confusion and running into things. Left-sided hemianopia on exam. CT head revealed a 1 mm midline leftward shift with a 7.3 cm x 7.1 cm x 5 cm right parietal mass. Transferred to [facility] for neurosurgical management.     GBM (glioblastoma multiforme) (Multi)   12/29/2023 Surgery    Right craniotomy for tumor resection (Ritu Carrasco MD)     12/29/2023 Pathology    Final diagnosis reported 1/18/24:  A, B.  Right brain mass, biopsy and removal:  - Malignant glioma, morphologically consistent with glioblastoma; positive for MGMT methylation     12/29/2023 Initial Diagnosis    GBM (glioblastoma multiforme) (CMS/HCC)     1/3/2024 Tumor Board    [facility] Tumor Board tumor board recommendations: Referral to radiation oncology and neuro-oncology.       2/15/2024 - 3/21/2024 Chemotherapy    Temozolomide with Concurrent Radiation, 42 Day Cycles     2/15/2024 - 3/28/2024 Radiation Therapy    Radiation Treatments       Active   No active radiation  "treatments to show.     Completed   Brain Boost_R (Started on 3/20/2024)   Most recent fraction: 182 cGy given on 3/28/2024   Total given: 1,274 cGy / 1,273 cGy  (7 of 7 fractions)   Elapsed Days: 8   Technique: 3D        Partial brain R (Started on 2/15/2024)   Most recent fraction: 182 cGy given on 3/19/2024   Total given: 4,186 cGy / 4,182 cGy  (23 of 23 fractions)   Elapsed Days: 33   Technique: 3D                        5/1/2024 -  Chemotherapy    Temozolomide (5/28), 28 Day Cycles         Enmanuel Ahumada \"Dash\"  reports that he has never smoked. He has never used smokeless tobacco.  He  reports that he does not currently use alcohol.  He  reports no history of drug use.    Physical Exam  Constitutional:       Appearance: Normal appearance.   Eyes:      General: Visual field deficit present.      Extraocular Movements: Extraocular movements intact.      Pupils: Pupils are equal, round, and reactive to light.   Musculoskeletal:      Cervical back: Normal range of motion.   Neurological:      Mental Status: He is alert and oriented to person, place, and time.      Sensory: Sensation is intact.      Motor: Motor function is intact.      Coordination: Coordination is intact.      Deep Tendon Reflexes: Babinski sign absent on the right side. Babinski sign absent on the left side.      Reflex Scores:       Tricep reflexes are 2+ on the right side and 2+ on the left side.       Bicep reflexes are 2+ on the right side and 2+ on the left side.       Brachioradialis reflexes are 2+ on the right side and 2+ on the left side.       Patellar reflexes are 2+ on the right side and 2+ on the left side.       Achilles reflexes are 2+ on the right side and 2+ on the left side.     Comments: Minimal loss of STM and cognition, speech fluent without dysnomia, CN's II - XII intact except for partial left homonymous hemianopsia, strength 5/5 in UE/LE, mild gait imbalance, DTR's symmetric, sensation intact.    Psychiatric:    "      Mood and Affect: Mood normal.         Behavior: Behavior normal.     Performance Status:  Symptomatic; fully ambulatory      Lab Results   Component Value Date    WBC 2.7 (L) 06/26/2024    HGB 14.4 06/26/2024    HCT 41.4 06/26/2024    MCV 90 06/26/2024    PLT 37 (LL) 06/26/2024       The new and recent brain MRI scans were reviewed with the patient and family:      === 06/24/24 ===    MR BRAIN W AND WO CONTRAST    - Impression -  Status post right posterior craniotomy for resection of right  parietal glioblastoma. Evolving blood products within extra-axial  collection underlying the craniotomy flap again measuring 9 mm  thickness and resulting in mild mass effect on underlying parenchyma.  No midline shift.    Stable appearance irregular enhancement involving the periatrial  white matter and right splenium of the corpus callosum, margins of  the resection cavity, and dura underlying the craniotomy flap.    However, new 1.3 x 0.8 cm bilobed rim enhancing lesion in the right  parietal lobe anterior to the resection cavity as well as progression  of T2 hyperintense FLAIR signal now extending anteriorly to involve  the anterior right periatrial white matter and lateral perirolandic  cortex. 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 However, disease progression  can not be excluded. Continued follow-up recommended.    I personally reviewed the images/study and I agree with the findings  as stated by Geraldo Plummer MD. This study was interpreted at  [facility], [place], [place].    MACRO:  None    Signed by: Gen Phelps 6/24/2024 4:51 PM  Dictation workstation:   [id number]      === 12/25/23 ===    MR BRAIN W AND WO CONTRAST    - Impression -  Postoperative changes as noted above. There has been significant  improvement in mass effect since resection of the mass; the margins  of the surgical bed have minimal if any  enhancement with no nodular  enhancement suggestive of residual tumor noted.    MACRO:  None    Signed by: Cecil Aguiar 12/31/2023 11:23 AM  Dictation workstation:   [id number]      Assessment/Plan      -Dash has recovered well from surgery, with mild residual symptoms.     -The pathology is a GBM; IDH wildtype, MGMT methylated.     -He has completed the course of chemo-RT in late March.     -Since then he has started the adjuvant phase of treatment with Temodar -- now s/p cycle #2.    -He has had slowly resolving thrombycytopenia -- 37k this week. The CBC will be re-checked next week.     -The new brain MRI is mostly stable, but with some increased high signal on the T2 and FLAIR sequences, including a new small region anterior to the resection cavity, that has minor enhancement.     -Considering how good he looks clinically, this seems most consistent with early pseudo-progression.     -He will start TEM cycle #3 in the near future, once the platelets are > 100k. We will likely have to reduce the dose of TEM.     -He has also started using the [employer] electrical array device, which he is tolerating well and using > 18 hours per day.     -The advanced molecular phenotyping from [employer] has been reported, and shows EGFR amplification, which could be targeted with an EGFR TKI (e.g., Osimertinib) at a later date.     -He is to remain off of the steroids for now, and remain on the Keppra 500 mg bid.     -We will have him return to this clinic in 5.5 weeks for further evaluation, and to monitor him after TEM cycle #3.     -I spent > 40 minutes in face to face consultation to review and discuss the above; 50% of which or more was dedicated to counseling.       Kyle Rolon MD

## 2024-07-01 ENCOUNTER — HOSPITAL ENCOUNTER (OUTPATIENT)
Dept: RADIATION ONCOLOGY | Facility: HOSPITAL | Age: 69
Setting detail: RADIATION/ONCOLOGY SERIES
Discharge: HOME | End: 2024-07-01
Payer: MEDICARE

## 2024-07-01 VITALS
HEART RATE: 94 BPM | OXYGEN SATURATION: 97 % | RESPIRATION RATE: 18 BRPM | BODY MASS INDEX: 24.75 KG/M2 | DIASTOLIC BLOOD PRESSURE: 76 MMHG | SYSTOLIC BLOOD PRESSURE: 123 MMHG | TEMPERATURE: 98.4 F | WEIGHT: 158.07 LBS

## 2024-07-01 DIAGNOSIS — C71.9 GBM (GLIOBLASTOMA MULTIFORME) (MULTI): ICD-10-CM

## 2024-07-01 PROCEDURE — 99213 OFFICE O/P EST LOW 20 MIN: CPT | Performed by: STUDENT IN AN ORGANIZED HEALTH CARE EDUCATION/TRAINING PROGRAM

## 2024-07-01 ASSESSMENT — ENCOUNTER SYMPTOMS
DECREASED CONCENTRATION: 1
FATIGUE: 1
DEPRESSION: 0
CONFUSION: 1
DIZZINESS: 1
GASTROINTESTINAL NEGATIVE: 1
LOSS OF SENSATION IN FEET: 0
EYES NEGATIVE: 1
ENDOCRINE NEGATIVE: 1
HEMATOLOGIC/LYMPHATIC NEGATIVE: 1
CARDIOVASCULAR NEGATIVE: 1
RESPIRATORY NEGATIVE: 1
OCCASIONAL FEELINGS OF UNSTEADINESS: 0

## 2024-07-01 NOTE — PROGRESS NOTES
Radiation Oncology Nursing Note    Pain: The patient's current pain level was assessed.  They report currently having a pain of 0 out of 10.  They feel their pain is under control without the use of pain medications.    Review of Systems:  Review of Systems   Constitutional:  Positive for fatigue.   HENT:  Negative.     Eyes: Negative.    Respiratory: Negative.     Cardiovascular: Negative.    Gastrointestinal: Negative.    Endocrine: Negative.    Genitourinary: Negative.     Musculoskeletal:  Positive for gait problem.   Neurological:  Positive for dizziness and gait problem.   Hematological: Negative.    Psychiatric/Behavioral:  Positive for confusion and decreased concentration.

## 2024-07-03 ENCOUNTER — LAB (OUTPATIENT)
Dept: LAB | Facility: LAB | Age: 69
End: 2024-07-03
Payer: MEDICARE

## 2024-07-03 DIAGNOSIS — C71.9 GBM (GLIOBLASTOMA MULTIFORME) (MULTI): Primary | ICD-10-CM

## 2024-07-03 DIAGNOSIS — C71.9 GBM (GLIOBLASTOMA MULTIFORME) (MULTI): ICD-10-CM

## 2024-07-03 LAB
ALBUMIN SERPL BCP-MCNC: 4.5 G/DL (ref 3.4–5)
ALP SERPL-CCNC: 88 U/L (ref 33–136)
ALT SERPL W P-5'-P-CCNC: 14 U/L (ref 10–52)
ANION GAP SERPL CALC-SCNC: 11 MMOL/L (ref 10–20)
AST SERPL W P-5'-P-CCNC: 13 U/L (ref 9–39)
BASOPHILS # BLD AUTO: 0.01 X10*3/UL (ref 0–0.1)
BASOPHILS NFR BLD AUTO: 0.5 %
BILIRUB SERPL-MCNC: 0.9 MG/DL (ref 0–1.2)
BUN SERPL-MCNC: 16 MG/DL (ref 6–23)
CALCIUM SERPL-MCNC: 9.3 MG/DL (ref 8.6–10.3)
CHLORIDE SERPL-SCNC: 106 MMOL/L (ref 98–107)
CO2 SERPL-SCNC: 29 MMOL/L (ref 21–32)
CREAT SERPL-MCNC: 0.8 MG/DL (ref 0.5–1.3)
EGFRCR SERPLBLD CKD-EPI 2021: >90 ML/MIN/1.73M*2
EOSINOPHIL # BLD AUTO: 0.03 X10*3/UL (ref 0–0.7)
EOSINOPHIL NFR BLD AUTO: 1.5 %
ERYTHROCYTE [DISTWIDTH] IN BLOOD BY AUTOMATED COUNT: 13.8 % (ref 11.5–14.5)
GLUCOSE SERPL-MCNC: 107 MG/DL (ref 74–99)
HCT VFR BLD AUTO: 41.2 % (ref 41–52)
HGB BLD-MCNC: 14.3 G/DL (ref 13.5–17.5)
IMM GRANULOCYTES # BLD AUTO: 0 X10*3/UL (ref 0–0.7)
IMM GRANULOCYTES NFR BLD AUTO: 0 % (ref 0–0.9)
LYMPHOCYTES # BLD AUTO: 1.16 X10*3/UL (ref 1.2–4.8)
LYMPHOCYTES NFR BLD AUTO: 58.3 %
MCH RBC QN AUTO: 31.7 PG (ref 26–34)
MCHC RBC AUTO-ENTMCNC: 34.7 G/DL (ref 32–36)
MCV RBC AUTO: 91 FL (ref 80–100)
MONOCYTES # BLD AUTO: 0.22 X10*3/UL (ref 0.1–1)
MONOCYTES NFR BLD AUTO: 11.1 %
NEUTROPHILS # BLD AUTO: 0.57 X10*3/UL (ref 1.2–7.7)
NEUTROPHILS NFR BLD AUTO: 28.6 %
NRBC BLD-RTO: 0 /100 WBCS (ref 0–0)
PLATELET # BLD AUTO: 132 X10*3/UL (ref 150–450)
POTASSIUM SERPL-SCNC: 4.2 MMOL/L (ref 3.5–5.3)
PROT SERPL-MCNC: 6.8 G/DL (ref 6.4–8.2)
RBC # BLD AUTO: 4.51 X10*6/UL (ref 4.5–5.9)
SODIUM SERPL-SCNC: 142 MMOL/L (ref 136–145)
WBC # BLD AUTO: 2 X10*3/UL (ref 4.4–11.3)

## 2024-07-03 PROCEDURE — 85025 COMPLETE CBC W/AUTO DIFF WBC: CPT

## 2024-07-03 PROCEDURE — 80053 COMPREHEN METABOLIC PANEL: CPT

## 2024-07-05 ENCOUNTER — PATIENT MESSAGE (OUTPATIENT)
Dept: HEMATOLOGY/ONCOLOGY | Facility: HOSPITAL | Age: 69
End: 2024-07-05
Payer: MEDICARE

## 2024-07-05 DIAGNOSIS — D69.59 CHEMOTHERAPY-INDUCED THROMBOCYTOPENIA: ICD-10-CM

## 2024-07-05 DIAGNOSIS — D70.1 CHEMOTHERAPY INDUCED NEUTROPENIA (CMS-HCC): ICD-10-CM

## 2024-07-05 DIAGNOSIS — T45.1X5A CHEMOTHERAPY-INDUCED THROMBOCYTOPENIA: ICD-10-CM

## 2024-07-05 DIAGNOSIS — T45.1X5A CHEMOTHERAPY INDUCED NEUTROPENIA (CMS-HCC): ICD-10-CM

## 2024-07-05 DIAGNOSIS — C71.9 GBM (GLIOBLASTOMA MULTIFORME) (MULTI): Primary | ICD-10-CM

## 2024-07-05 NOTE — PATIENT COMMUNICATION
Reviewed temozolomide C2 labs. Platelet alisson of 37k on D29 labs. Platelets recovered to 132k on recheck; however, his ANC dropped to 570. We will obtain repeat labs in ~1 week. Temozolomide dose will be reduced by 50 mg/m2/day for subsequent cycles due to thrombocytopenia <50k and neutropenia <1000. Will pend C3 supply to Dr. Rolon to sign/release to [facility].

## 2024-07-08 DIAGNOSIS — C71.9 GBM (GLIOBLASTOMA MULTIFORME) (MULTI): Primary | ICD-10-CM

## 2024-07-08 RX ORDER — TEMOZOLOMIDE 140 MG/1
150 CAPSULE ORAL DAILY
Qty: 10 CAPSULE | Refills: 0 | Status: SHIPPED | OUTPATIENT
Start: 2024-07-08 | End: 2024-08-06

## 2024-07-09 ENCOUNTER — PHARMACY VISIT (OUTPATIENT)
Dept: PHARMACY | Facility: CLINIC | Age: 69
End: 2024-07-09
Payer: COMMERCIAL

## 2024-07-09 ENCOUNTER — LAB (OUTPATIENT)
Dept: LAB | Facility: LAB | Age: 69
End: 2024-07-09
Payer: MEDICARE

## 2024-07-09 ENCOUNTER — TELEPHONE (OUTPATIENT)
Dept: HEMATOLOGY/ONCOLOGY | Facility: HOSPITAL | Age: 69
End: 2024-07-09

## 2024-07-09 ENCOUNTER — SPECIALTY PHARMACY (OUTPATIENT)
Dept: PHARMACY | Facility: CLINIC | Age: 69
End: 2024-07-09

## 2024-07-09 DIAGNOSIS — D69.59 CHEMOTHERAPY-INDUCED THROMBOCYTOPENIA: ICD-10-CM

## 2024-07-09 DIAGNOSIS — D70.1 CHEMOTHERAPY INDUCED NEUTROPENIA (CMS-HCC): ICD-10-CM

## 2024-07-09 DIAGNOSIS — C71.9 GBM (GLIOBLASTOMA MULTIFORME) (MULTI): ICD-10-CM

## 2024-07-09 DIAGNOSIS — T45.1X5A CHEMOTHERAPY-INDUCED THROMBOCYTOPENIA: ICD-10-CM

## 2024-07-09 DIAGNOSIS — T45.1X5A CHEMOTHERAPY INDUCED NEUTROPENIA (CMS-HCC): ICD-10-CM

## 2024-07-09 LAB
ALBUMIN SERPL BCP-MCNC: 4.4 G/DL (ref 3.4–5)
ALP SERPL-CCNC: 86 U/L (ref 33–136)
ALT SERPL W P-5'-P-CCNC: 13 U/L (ref 10–52)
ANION GAP SERPL CALC-SCNC: 10 MMOL/L (ref 10–20)
AST SERPL W P-5'-P-CCNC: 12 U/L (ref 9–39)
BASOPHILS # BLD AUTO: 0.01 X10*3/UL (ref 0–0.1)
BASOPHILS NFR BLD AUTO: 0.3 %
BILIRUB SERPL-MCNC: 1.4 MG/DL (ref 0–1.2)
BUN SERPL-MCNC: 16 MG/DL (ref 6–23)
CALCIUM SERPL-MCNC: 9.7 MG/DL (ref 8.6–10.3)
CHLORIDE SERPL-SCNC: 103 MMOL/L (ref 98–107)
CO2 SERPL-SCNC: 30 MMOL/L (ref 21–32)
CREAT SERPL-MCNC: 0.95 MG/DL (ref 0.5–1.3)
EGFRCR SERPLBLD CKD-EPI 2021: 88 ML/MIN/1.73M*2
EOSINOPHIL # BLD AUTO: 0.02 X10*3/UL (ref 0–0.7)
EOSINOPHIL NFR BLD AUTO: 0.7 %
ERYTHROCYTE [DISTWIDTH] IN BLOOD BY AUTOMATED COUNT: 14.5 % (ref 11.5–14.5)
GLUCOSE SERPL-MCNC: 104 MG/DL (ref 74–99)
HCT VFR BLD AUTO: 42.3 % (ref 41–52)
HGB BLD-MCNC: 14.7 G/DL (ref 13.5–17.5)
IMM GRANULOCYTES # BLD AUTO: 0.01 X10*3/UL (ref 0–0.7)
IMM GRANULOCYTES NFR BLD AUTO: 0.3 % (ref 0–0.9)
LYMPHOCYTES # BLD AUTO: 1.33 X10*3/UL (ref 1.2–4.8)
LYMPHOCYTES NFR BLD AUTO: 44.9 %
MCH RBC QN AUTO: 31.8 PG (ref 26–34)
MCHC RBC AUTO-ENTMCNC: 34.8 G/DL (ref 32–36)
MCV RBC AUTO: 92 FL (ref 80–100)
MONOCYTES # BLD AUTO: 0.44 X10*3/UL (ref 0.1–1)
MONOCYTES NFR BLD AUTO: 14.9 %
NEUTROPHILS # BLD AUTO: 1.15 X10*3/UL (ref 1.2–7.7)
NEUTROPHILS NFR BLD AUTO: 38.9 %
NRBC BLD-RTO: 0 /100 WBCS (ref 0–0)
PLATELET # BLD AUTO: 180 X10*3/UL (ref 150–450)
POTASSIUM SERPL-SCNC: 4.4 MMOL/L (ref 3.5–5.3)
PROT SERPL-MCNC: 6.7 G/DL (ref 6.4–8.2)
RBC # BLD AUTO: 4.62 X10*6/UL (ref 4.5–5.9)
SODIUM SERPL-SCNC: 139 MMOL/L (ref 136–145)
WBC # BLD AUTO: 3 X10*3/UL (ref 4.4–11.3)

## 2024-07-09 PROCEDURE — 85025 COMPLETE CBC W/AUTO DIFF WBC: CPT

## 2024-07-09 PROCEDURE — RXMED WILLOW AMBULATORY MEDICATION CHARGE

## 2024-07-09 PROCEDURE — 80053 COMPREHEN METABOLIC PANEL: CPT

## 2024-07-09 RX ORDER — LEVETIRACETAM 500 MG/1
500 TABLET ORAL 2 TIMES DAILY
Qty: 60 TABLET | Refills: 3 | Status: SHIPPED | OUTPATIENT
Start: 2024-07-09 | End: 2024-11-06

## 2024-07-11 ENCOUNTER — TELEPHONE (OUTPATIENT)
Dept: HEMATOLOGY/ONCOLOGY | Facility: HOSPITAL | Age: 69
End: 2024-07-11
Payer: MEDICARE

## 2024-07-11 DIAGNOSIS — D70.1 CHEMOTHERAPY INDUCED NEUTROPENIA (CMS-HCC): Primary | ICD-10-CM

## 2024-07-11 DIAGNOSIS — T45.1X5A CHEMOTHERAPY INDUCED NEUTROPENIA (CMS-HCC): Primary | ICD-10-CM

## 2024-07-11 NOTE — TELEPHONE ENCOUNTER
Reviewed repeat CBC -- ANC 1.15. Needs to be at or above 1.5 to start next cycle of TMZ. Entered repeat CBC order for Monday, 7/15. He should NOT start chemo cycle 3 until we give him the instruction to do so.

## 2024-07-11 NOTE — TELEPHONE ENCOUNTER
Unable to contact Dash and instruct him to have blood work drawn Monday and NOT to start chemo cycle 3 until he hears from the office.

## 2024-07-12 NOTE — TELEPHONE ENCOUNTER
Pt left a voicemail on the RN triage line returning call from yesterday.  Call returned, no answer.  Called again at 1045, left message with office call back number on generic voicemail.

## 2024-07-12 NOTE — TELEPHONE ENCOUNTER
Dash called to return voice mail message. Instructions to hold chemo for low ANC and have labs redrawn on Monday 7//15 provided to pt. Understanding verbalized with teach back. He understands not to start chemotherapy until instructed. No further needs.    Team updated.

## 2024-07-15 ENCOUNTER — LAB (OUTPATIENT)
Dept: LAB | Facility: LAB | Age: 69
End: 2024-07-15
Payer: MEDICARE

## 2024-07-15 DIAGNOSIS — D70.1 CHEMOTHERAPY INDUCED NEUTROPENIA (CMS-HCC): ICD-10-CM

## 2024-07-15 DIAGNOSIS — T45.1X5A CHEMOTHERAPY INDUCED NEUTROPENIA (CMS-HCC): ICD-10-CM

## 2024-07-15 LAB
BASOPHILS # BLD AUTO: 0.02 X10*3/UL (ref 0–0.1)
BASOPHILS NFR BLD AUTO: 0.5 %
EOSINOPHIL # BLD AUTO: 0.02 X10*3/UL (ref 0–0.7)
EOSINOPHIL NFR BLD AUTO: 0.5 %
ERYTHROCYTE [DISTWIDTH] IN BLOOD BY AUTOMATED COUNT: 15 % (ref 11.5–14.5)
HCT VFR BLD AUTO: 41.8 % (ref 41–52)
HGB BLD-MCNC: 14.4 G/DL (ref 13.5–17.5)
IMM GRANULOCYTES # BLD AUTO: 0.01 X10*3/UL (ref 0–0.7)
IMM GRANULOCYTES NFR BLD AUTO: 0.3 % (ref 0–0.9)
LYMPHOCYTES # BLD AUTO: 1.61 X10*3/UL (ref 1.2–4.8)
LYMPHOCYTES NFR BLD AUTO: 41.2 %
MCH RBC QN AUTO: 31.2 PG (ref 26–34)
MCHC RBC AUTO-ENTMCNC: 34.4 G/DL (ref 32–36)
MCV RBC AUTO: 91 FL (ref 80–100)
MONOCYTES # BLD AUTO: 0.52 X10*3/UL (ref 0.1–1)
MONOCYTES NFR BLD AUTO: 13.3 %
NEUTROPHILS # BLD AUTO: 1.73 X10*3/UL (ref 1.2–7.7)
NEUTROPHILS NFR BLD AUTO: 44.2 %
NRBC BLD-RTO: 0 /100 WBCS (ref 0–0)
PLATELET # BLD AUTO: 146 X10*3/UL (ref 150–450)
RBC # BLD AUTO: 4.62 X10*6/UL (ref 4.5–5.9)
WBC # BLD AUTO: 3.9 X10*3/UL (ref 4.4–11.3)

## 2024-07-15 PROCEDURE — 36415 COLL VENOUS BLD VENIPUNCTURE: CPT

## 2024-07-15 PROCEDURE — 85025 COMPLETE CBC W/AUTO DIFF WBC: CPT

## 2024-07-15 NOTE — TELEPHONE ENCOUNTER
Reviewed results from today's repeat CBC. ANC improved to >1.5. Plts >100k. Patient is able to start temozolomide cycle 3 tonight.    TMZ C3 start 7/15/24  C3 D22 labs due 8/5/24 +/- 48 hours  C3 D29 labs due 8/12/24 +/- 48 hours

## 2024-07-15 NOTE — TELEPHONE ENCOUNTER
Rn informed Dash that he is able to start chemo tonight. Reviewed future lab dates. Dash verbalized understanding of instruction.

## 2024-07-31 DIAGNOSIS — E78.2 MIXED HYPERLIPIDEMIA: ICD-10-CM

## 2024-08-01 RX ORDER — ATORVASTATIN CALCIUM 40 MG/1
40 TABLET, FILM COATED ORAL DAILY
Qty: 90 TABLET | Refills: 3 | Status: SHIPPED | OUTPATIENT
Start: 2024-08-01

## 2024-08-05 ENCOUNTER — LAB (OUTPATIENT)
Dept: LAB | Facility: LAB | Age: 69
End: 2024-08-05
Payer: MEDICARE

## 2024-08-05 DIAGNOSIS — C71.9 GBM (GLIOBLASTOMA MULTIFORME) (MULTI): ICD-10-CM

## 2024-08-05 LAB
ALBUMIN SERPL BCP-MCNC: 4.3 G/DL (ref 3.4–5)
ALP SERPL-CCNC: 88 U/L (ref 33–136)
ALT SERPL W P-5'-P-CCNC: 15 U/L (ref 10–52)
ANION GAP SERPL CALC-SCNC: 12 MMOL/L (ref 10–20)
AST SERPL W P-5'-P-CCNC: 14 U/L (ref 9–39)
BASOPHILS # BLD AUTO: 0.01 X10*3/UL (ref 0–0.1)
BASOPHILS NFR BLD AUTO: 0.2 %
BILIRUB SERPL-MCNC: 1.4 MG/DL (ref 0–1.2)
BUN SERPL-MCNC: 19 MG/DL (ref 6–23)
CALCIUM SERPL-MCNC: 9.6 MG/DL (ref 8.6–10.3)
CHLORIDE SERPL-SCNC: 104 MMOL/L (ref 98–107)
CO2 SERPL-SCNC: 28 MMOL/L (ref 21–32)
CREAT SERPL-MCNC: 0.92 MG/DL (ref 0.5–1.3)
EGFRCR SERPLBLD CKD-EPI 2021: >90 ML/MIN/1.73M*2
EOSINOPHIL # BLD AUTO: 0.08 X10*3/UL (ref 0–0.7)
EOSINOPHIL NFR BLD AUTO: 1.9 %
ERYTHROCYTE [DISTWIDTH] IN BLOOD BY AUTOMATED COUNT: 15.1 % (ref 11.5–14.5)
GLUCOSE SERPL-MCNC: 91 MG/DL (ref 74–99)
HCT VFR BLD AUTO: 44.2 % (ref 41–52)
HGB BLD-MCNC: 14.9 G/DL (ref 13.5–17.5)
IMM GRANULOCYTES # BLD AUTO: 0.01 X10*3/UL (ref 0–0.7)
IMM GRANULOCYTES NFR BLD AUTO: 0.2 % (ref 0–0.9)
LYMPHOCYTES # BLD AUTO: 1.35 X10*3/UL (ref 1.2–4.8)
LYMPHOCYTES NFR BLD AUTO: 31.5 %
MCH RBC QN AUTO: 32.8 PG (ref 26–34)
MCHC RBC AUTO-ENTMCNC: 33.7 G/DL (ref 32–36)
MCV RBC AUTO: 97 FL (ref 80–100)
MONOCYTES # BLD AUTO: 0.48 X10*3/UL (ref 0.1–1)
MONOCYTES NFR BLD AUTO: 11.2 %
NEUTROPHILS # BLD AUTO: 2.35 X10*3/UL (ref 1.2–7.7)
NEUTROPHILS NFR BLD AUTO: 55 %
NRBC BLD-RTO: 0 /100 WBCS (ref 0–0)
PLATELET # BLD AUTO: 138 X10*3/UL (ref 150–450)
POTASSIUM SERPL-SCNC: 4.1 MMOL/L (ref 3.5–5.3)
PROT SERPL-MCNC: 6.7 G/DL (ref 6.4–8.2)
RBC # BLD AUTO: 4.54 X10*6/UL (ref 4.5–5.9)
SODIUM SERPL-SCNC: 140 MMOL/L (ref 136–145)
WBC # BLD AUTO: 4.3 X10*3/UL (ref 4.4–11.3)

## 2024-08-05 PROCEDURE — 36415 COLL VENOUS BLD VENIPUNCTURE: CPT

## 2024-08-05 PROCEDURE — 80053 COMPREHEN METABOLIC PANEL: CPT

## 2024-08-05 PROCEDURE — 85025 COMPLETE CBC W/AUTO DIFF WBC: CPT

## 2024-08-06 ENCOUNTER — TELEPHONE (OUTPATIENT)
Dept: HEMATOLOGY/ONCOLOGY | Facility: HOSPITAL | Age: 69
End: 2024-08-06
Payer: MEDICARE

## 2024-08-06 DIAGNOSIS — C71.9 GBM (GLIOBLASTOMA MULTIFORME) (MULTI): Primary | ICD-10-CM

## 2024-08-06 NOTE — TELEPHONE ENCOUNTER
Reviewed temozolomide C3 D22 labs. No significant abnormalities. Pended the next cycle's TMZ to Dr. Rolon to sign/release to [facility] (dose: 280 mg/day due to h/o thrombocytopenia <50k and neutropenia <1000). Patient will have their D29 labs drawn on/around 8/12/24 before we give the instruction to start the next cycle.

## 2024-08-07 ENCOUNTER — APPOINTMENT (OUTPATIENT)
Dept: PRIMARY CARE | Facility: CLINIC | Age: 69
End: 2024-08-07
Payer: MEDICARE

## 2024-08-07 ENCOUNTER — SPECIALTY PHARMACY (OUTPATIENT)
Dept: PHARMACY | Facility: CLINIC | Age: 69
End: 2024-08-07

## 2024-08-07 VITALS
HEIGHT: 67 IN | TEMPERATURE: 97.5 F | BODY MASS INDEX: 24.01 KG/M2 | DIASTOLIC BLOOD PRESSURE: 72 MMHG | SYSTOLIC BLOOD PRESSURE: 110 MMHG | OXYGEN SATURATION: 97 % | HEART RATE: 87 BPM | RESPIRATION RATE: 17 BRPM | WEIGHT: 153 LBS

## 2024-08-07 DIAGNOSIS — R73.09 ELEVATED HEMOGLOBIN A1C: ICD-10-CM

## 2024-08-07 DIAGNOSIS — C71.9 GBM (GLIOBLASTOMA MULTIFORME) (MULTI): Primary | ICD-10-CM

## 2024-08-07 DIAGNOSIS — Z00.00 MEDICARE ANNUAL WELLNESS VISIT, SUBSEQUENT: ICD-10-CM

## 2024-08-07 DIAGNOSIS — E78.2 MIXED HYPERLIPIDEMIA: Primary | ICD-10-CM

## 2024-08-07 DIAGNOSIS — Z12.5 PROSTATE CANCER SCREENING: ICD-10-CM

## 2024-08-07 PROCEDURE — RXMED WILLOW AMBULATORY MEDICATION CHARGE

## 2024-08-07 PROCEDURE — 99397 PER PM REEVAL EST PAT 65+ YR: CPT | Performed by: FAMILY MEDICINE

## 2024-08-07 PROCEDURE — G0439 PPPS, SUBSEQ VISIT: HCPCS | Performed by: FAMILY MEDICINE

## 2024-08-07 RX ORDER — TEMOZOLOMIDE 140 MG/1
150 CAPSULE ORAL DAILY
Qty: 10 CAPSULE | Refills: 0 | Status: SHIPPED | OUTPATIENT
Start: 2024-08-12 | End: 2024-09-05

## 2024-08-07 ASSESSMENT — ENCOUNTER SYMPTOMS
FREQUENCY: 0
DIARRHEA: 0
HEADACHES: 1
POLYPHAGIA: 0
MYALGIAS: 0
SHORTNESS OF BREATH: 0
NAUSEA: 0
WEAKNESS: 0
FATIGUE: 0
APPETITE CHANGE: 0
ARTHRALGIAS: 0
NUMBNESS: 0
CHEST TIGHTNESS: 0
POLYDIPSIA: 0
VOMITING: 0
DIZZINESS: 0

## 2024-08-07 ASSESSMENT — ACTIVITIES OF DAILY LIVING (ADL)
BATHING: INDEPENDENT
GROCERY_SHOPPING: INDEPENDENT
DRESSING: INDEPENDENT
DOING_HOUSEWORK: INDEPENDENT
MANAGING_FINANCES: INDEPENDENT
TAKING_MEDICATION: INDEPENDENT

## 2024-08-07 ASSESSMENT — PATIENT HEALTH QUESTIONNAIRE - PHQ9
SUM OF ALL RESPONSES TO PHQ9 QUESTIONS 1 AND 2: 0
1. LITTLE INTEREST OR PLEASURE IN DOING THINGS: NOT AT ALL
2. FEELING DOWN, DEPRESSED OR HOPELESS: NOT AT ALL

## 2024-08-07 NOTE — PROGRESS NOTES
"Subjective   Reason for Visit: Enmanuel Ahumada is an 68 y.o. male here for a Medicare Wellness visit.     Past Medical, Surgical, and Family History reviewed and updated in chart.    Reviewed all medications by prescribing practitioner or clinical pharmacist (such as prescriptions, OTCs, herbal therapies and supplements) and documented in the medical record.    HPI    Patient Care Team:  Ricardo Youngblood DO as PCP - General  Ricardo Youngblood DO as PCP - O Medicare Advantage PCP  Kyle Rolon MD as Consulting Physician (Hematology and Oncology)     Review of Systems   Constitutional:  Negative for appetite change and fatigue.   Eyes:  Positive for visual disturbance.   Respiratory:  Negative for chest tightness and shortness of breath.    Cardiovascular:  Negative for chest pain and leg swelling.   Gastrointestinal:  Negative for diarrhea, nausea and vomiting.   Endocrine: Negative for polydipsia, polyphagia and polyuria.   Genitourinary:  Negative for frequency and urgency.   Musculoskeletal:  Negative for arthralgias and myalgias.   Neurological:  Positive for headaches. Negative for dizziness, syncope, weakness and numbness.   Psychiatric/Behavioral:          Intermittent confusion       Objective   Vitals:  /72   Pulse 87   Temp 36.4 °C (97.5 °F)   Resp 17   Ht 1.689 m (5' 6.5\")   Wt 69.4 kg (153 lb)   SpO2 97%   BMI 24.32 kg/m²       Physical Exam  Constitutional:       Appearance: Normal appearance.   HENT:      Head: Normocephalic.      Right Ear: Tympanic membrane, ear canal and external ear normal.      Left Ear: Tympanic membrane, ear canal and external ear normal.      Nose: Nose normal.      Mouth/Throat:      Mouth: Mucous membranes are moist.      Pharynx: Oropharynx is clear.   Eyes:      Conjunctiva/sclera: Conjunctivae normal.   Cardiovascular:      Rate and Rhythm: Normal rate and regular rhythm.   Pulmonary:      Effort: Pulmonary effort is normal.      Breath sounds: " Normal breath sounds.   Musculoskeletal:         General: Normal range of motion.      Cervical back: Neck supple.   Skin:     General: Skin is warm and dry.   Neurological:      General: No focal deficit present.      Mental Status: He is alert and oriented to person, place, and time.   Psychiatric:         Mood and Affect: Mood normal.         Assessment/Plan   Problem List Items Addressed This Visit             ICD-10-CM    Hyperlipidemia - Primary E78.5    Relevant Orders    Hemoglobin A1C    Lipid Panel    Prostate Specific Antigen    Thyroid Stimulating Hormone    T4, free     Other Visit Diagnoses         Codes    Medicare annual wellness visit, subsequent     Z00.00    Relevant Orders    Follow Up In [facility] - PCP    Hemoglobin A1C    Lipid Panel    Prostate Specific Antigen    Thyroid Stimulating Hormone    T4, free    Elevated hemoglobin A1c     R73.09    Relevant Orders    Hemoglobin A1C    Lipid Panel    Prostate Specific Antigen    Thyroid Stimulating Hormone    T4, free    Prostate cancer screening     Z12.5    Relevant Orders    Prostate Specific Antigen

## 2024-08-08 ENCOUNTER — PHARMACY VISIT (OUTPATIENT)
Dept: PHARMACY | Facility: CLINIC | Age: 69
End: 2024-08-08
Payer: COMMERCIAL

## 2024-08-08 ENCOUNTER — OFFICE VISIT (OUTPATIENT)
Dept: HEMATOLOGY/ONCOLOGY | Facility: HOSPITAL | Age: 69
End: 2024-08-08
Payer: MEDICARE

## 2024-08-08 ENCOUNTER — SPECIALTY PHARMACY (OUTPATIENT)
Dept: PHARMACY | Facility: CLINIC | Age: 69
End: 2024-08-08

## 2024-08-08 VITALS
BODY MASS INDEX: 24.61 KG/M2 | OXYGEN SATURATION: 100 % | RESPIRATION RATE: 20 BRPM | SYSTOLIC BLOOD PRESSURE: 124 MMHG | HEART RATE: 74 BPM | DIASTOLIC BLOOD PRESSURE: 73 MMHG | TEMPERATURE: 99 F | WEIGHT: 154.76 LBS

## 2024-08-08 DIAGNOSIS — Z51.11 ENCOUNTER FOR CHEMOTHERAPY MANAGEMENT: Primary | ICD-10-CM

## 2024-08-08 DIAGNOSIS — C71.9 GBM (GLIOBLASTOMA MULTIFORME) (MULTI): ICD-10-CM

## 2024-08-08 PROCEDURE — 99215 OFFICE O/P EST HI 40 MIN: CPT

## 2024-08-08 RX ORDER — DEXAMETHASONE 2 MG/1
2 TABLET ORAL EVERY MORNING
Qty: 30 TABLET | Refills: 1 | Status: SHIPPED | OUTPATIENT
Start: 2024-08-08 | End: 2024-10-07

## 2024-08-08 ASSESSMENT — ENCOUNTER SYMPTOMS
ABDOMINAL PAIN: 0
FATIGUE: 1
NAUSEA: 0
SEIZURES: 0
HEADACHES: 0
FEVER: 0
CHILLS: 0
SPEECH DIFFICULTY: 0
CONSTIPATION: 0
VOMITING: 0
EXTREMITY WEAKNESS: 0
DIARRHEA: 0
CONFUSION: 1

## 2024-08-08 ASSESSMENT — PAIN SCALES - GENERAL: PAINLEVEL: 0-NO PAIN

## 2024-08-08 NOTE — PROGRESS NOTES
"[facility] Clinical Note    Enmanuel Ahumada \"Dash\" is a 68 y.o. male, who is on the specialty pharmacy service for management of:  Oncology Core.    Enmanuel Ahumada \"Dash\" is taking: Temodar.    Medication Receipt Date: 8/9/24  Medication Start Date (planned or actual): has been on    Enmanuel was contacted on 8/8/2024 at 11:24 AM for a virtual pharmacy visit with encounter number [id number] from:   [facility]  [address] [place] [id number]  Dept: [phone]  Dept Fax: [phone]    Enmanuel was offered a Telemedicine Video visit or Telephone visit.  Enmanuel consented to a telephone visit, which was performed.    The most recent encounter visit with the referring prescriber VIVEK Simpson on 8/8/24 (Date) was reviewed.  Pharmacy will continue to collaborate in the care of this patient with the referring prescriber VIVEK Simpson.    General Assessment      Impression/Plan  IMPRESSION/PLAN:  Is patient high risk (potential patients:  pregnancy, geriatric, pediatric)?  no  Is laboratory follow-up needed? At physician discretion  Is a clinical intervention needed? no  Next reassessment date? 11/8/24  Additional comments:     Refer to the encounter summary report for documentation details about patient counseling and education.      Medication Adherence    The importance of adherence was discussed with the patient and they were advised to take the medication as prescribed by their provider. Patient was encouraged to call their physician's office if they have a question regarding a missed dose.        Patient was advised to contact the pharmacy if there are any changes to their medication list, including prescriptions, OTC medications, herbal products, or supplements. Patient was advised of [facility]'s dispensing process, refill timeline, contact information ([phone]), and patient management follow up. Patient " confirmed understanding of education conducted during assessment. All patient questions and concerns were addressed to the best of my ability. Patient was encouraged to contact the specialty pharmacy with any questions or concerns.    Confirmed follow-up outreaches are properly scheduled and reviewed goals of therapy with the patient.        Tu Simpson, PharmD

## 2024-08-08 NOTE — PROGRESS NOTES
Joint venture between [facility]  Neuro-Oncology    Patient: Enmanuel Ahumada  MRN: [id number]  Date of visit: 08/08/24  Visit type: In-person clinic visit  Clinician: Jacki Simpson PA-C    Oncological & Treatment History:  Oncology History Overview Note   68 year old left-handed male with h/o HLD, cholecystectomy, Osborn's esophagus, on ASA 81 for cardioprotection presented to [facility] ED on 12/24/23 with 6 weeks of headaches, 3 weeks of increased confusion and running into things. Left-sided hemianopia on exam. CT head revealed a 1 mm midline leftward shift with a 7.3 cm x 7.1 cm x 5 cm right parietal mass. Transferred to [facility] for neurosurgical management.     GBM (glioblastoma multiforme) (Multi)   12/29/2023 Surgery    Right craniotomy for tumor resection (Ritu Carrasco MD)     12/29/2023 Pathology    Final diagnosis reported 1/18/24:  A, B.  Right brain mass, biopsy and removal:  - Malignant glioma, morphologically consistent with glioblastoma; positive for MGMT methylation     12/29/2023 Initial Diagnosis    GBM (glioblastoma multiforme) (CMS/HCC)     1/3/2024 Tumor Board    [facility] Tumor Board tumor board recommendations: Referral to radiation oncology and neuro-oncology.       2/15/2024 - 3/21/2024 Chemotherapy    Temozolomide with Concurrent Radiation, 42 Day Cycles     2/15/2024 - 3/28/2024 Radiation Therapy    Radiation Treatments       Active   No active radiation treatments to show.     Completed   Brain Boost_R (Started on 3/20/2024)   Most recent fraction: 182 cGy given on 3/28/2024   Total given: 1,274 cGy / 1,273 cGy  (7 of 7 fractions)   Elapsed Days: 8   Technique: 3D        Partial brain R (Started on 2/15/2024)   Most recent fraction: 182 cGy given on 3/19/2024   Total given: 4,186 cGy / 4,182 cGy  (23 of 23 fractions)   Elapsed Days: 33   Technique: 3D                        5/1/2024 -  Chemotherapy    Temozolomide (5/28), 28 Day Cycles           Interval History  (NEWEST at BOTTOM of section):  1/15/2024: Since getting discharged to home, he has been slowly recovering from the surgery. The final pathology is still pending, but it is likely GBM. He notes some persistent incisional pain around the staples but no regular headaches now (resolved). He has some minor confusion, mild loss of balance, difficulty using his computer and phone, and a loss of vision to the left. He remains on Keppra 1000 mg bid (although he claims he has never had a verified seizure), and is tapering off of Decadron. He is seeing Dr. Palomino from [facility] soon to begin RT planning. He is seeing Neurosurgery today to remove the staples.    02/19/24: Enmanuel Ahumada presents to neuro-oncology clinic today for follow-up during early chemoradiation (2/15/24 to 3/27/24). He is accompanied by his wife, Brittany, and granddaughter, Rosendo. He is tolerating chemoRT well thus far without nausea, vomiting or significant fatigue. He does have constipation, but it is manageable with sennosides. States his symptoms at initial presentation have improved - much less confusion, still has the left visual field deficit but is learning to adapt and not run into things as much. Endorses occasional mild headaches (1 or 2/10) that resolve spontaneously or with Tylenol. Not on any dexamethasone at this time. Remains on Keppra 500 mg BID. No history of seizure. No focal weakness or gait difficulty. Notes walking up stairs requires more concentration than in the past and he is unable to walk 2-3 miles like he used to. Prior to diagnosis, he went to the gym 3x/week. Still able to walk 1 mile outside if the weather is nice.    3/25/2024: Since the last visit, he has continued the course of chemo-RT and is now s/p RT Fraction #27 of 30 -- finalize this week. He has fatigue from the treatment, but no GI upset, emesis, diarrhea, loss of appetite, or change in taste. He is off of the steroids now. The [facility] results are in and  demonstrate EGFR amplification, TERT mutations, and loss of CDKN2A/B. He is active at home with chores and daily walking. He remains on Keppra 500 mg bid.    04/29/24: Dash presents to clinic accompanied by wife and daughter for follow-up prior to starting temozolomide cycle 1. He is well overall. Sleeping well, has a strong appetite, is walking 1-2 miles/day. States there's definite improvement in his brain fog and trouble reading that have been present daily since surgery. He has had only a couple 1 out of 10 headaches that spontaneously resolved. Denies seizures, speech changes, memory changes, focal weakness, and gait difficulty.    05/23/24: Dash presents to clinic today accompanied by his wife for follow-up after temozolomide cycle 1. Tolerated the cycle very well without any n/v/d. He had constipation towards the end of the chemo week, but this relieved with Senna. He states he's more tired now and naps daily. Sleeps well at night with >8 uninterrupted hours/night and has no trouble falling asleep. Endorses a slightly decreased appetite. Drinks a lot of iced tea every day. Notes he's had brief infrequent headaches that spontaneously and quickly resolve. Has been wearing Optune for 2 weeks with >90% compliance (wearing >18 hours/day). Wife notes he has slowed down a bit, physically and cognitively. Now takes them 30 min to do a mile walk each day rather than 20 min. Also had an instance of possible confusion where he repeatedly asked her which door they were going in to enter their house after a walk. Otherwise, no focal weakness, gait difficulty, or seizures.    6/28/2024: Since the last visit, he has completed the course of chemo-RT in late March, and then started adjuvant Temodar -- now s/p cycle #2. He has tolerated the chemo quite well so far, with persistent fatigue, but no GI upset, nausea, emesis, or diarrhea. However, he has developed significant thrombocytopenia -- 51k last week and 37k this week. He  is also now on the Optune electrical array device, and is tolerating it well, without any scalp issues; > 18 hours per day. He is doing well neurologically, with a few minor headaches, but no seizures, speech issues, visual changes, new focal weakness, gait difficulty, or sensory loss. He is not on any Decadron. He remains active at home with chores and daily walks. He remains on Keppra 500 mg bid.    08/08/24: Dash presents to clinic for follow-up on temozolomide C3 D25. He tolerated the chemo days well without any nausea or vomiting. Endorses persistent fatigue, but feels it may be slightly less than normal. His wife reports increased confusion, decreased attention, and a shorter fuse since last week. Provides examples of Dash holding a sock in his hand, but then asking where his sock is, and then also being abnormally irritated by his grandchildren. Dash denies recent headaches except for one day when he felt the Optune cords were irritating his head; that HA was relieved by Tylenol. He is not currently on any steroids. Appetite is fair. He denies seizures, speech changes, focal weakness, and gait difficulty. Continues wearing Optune >18 hours/day.       Review of Systems:  Review of Systems   Constitutional:  Positive for fatigue. Negative for chills and fever.   Gastrointestinal:  Negative for abdominal pain, constipation, diarrhea, nausea and vomiting.   Musculoskeletal:  Negative for gait problem.   Neurological:  Negative for extremity weakness, gait problem, headaches, seizures and speech difficulty.   Psychiatric/Behavioral:  Positive for confusion.    All other systems reviewed and are negative.      Social History:  Social History     Tobacco Use    Smoking status: Never    Smokeless tobacco: Never   Substance Use Topics    Alcohol use: Not Currently    Drug use: Never      (wife, Brittany)    Past Medical History:  Past Medical History:  No date: Brain cancer (Multi)    Past Surgical History:  Past  Surgical History:  No date: BRAIN SURGERY  January 25, 2020: CHOLECYSTECTOMY  07/24/2019: OTHER SURGICAL HISTORY      Comment:  Colonoscopy  07/26/2021: OTHER SURGICAL HISTORY      Comment:  Endoscopy  Abt 1991: VASECTOMY    Family History:  family history is not on file.    Performance Score:  Karnofsky Score: 80 - Normal activity with effort; some signs or symptoms of disease    Vital Signs:  /73 (BP Location: Left arm, Patient Position: Sitting, BP Cuff Size: Adult)   Pulse 74   Temp 37.2 °C (99 °F) (Temporal)   Resp 20   Wt 70.2 kg (154 lb 12.2 oz)   SpO2 100%   BMI 24.61 kg/m²     Physical Exam:  Physical Exam  Vitals reviewed.   Constitutional:       General: He is not in acute distress.     Appearance: He is not toxic-appearing.   HENT:      Head: Normocephalic and atraumatic.      Comments: Wearing Optune/tumor-treating fields  Eyes:      Extraocular Movements: Extraocular movements intact.      Conjunctiva/sclera: Conjunctivae normal.      Pupils: Pupils are equal, round, and reactive to light.   Pulmonary:      Effort: Pulmonary effort is normal. No respiratory distress.   Abdominal:      General: Abdomen is flat.   Musculoskeletal:         General: Normal range of motion.      Cervical back: Normal range of motion and neck supple.      Right lower leg: No edema.      Left lower leg: No edema.   Skin:     General: Skin is dry.   Neurological:      General: No focal deficit present.      Mental Status: He is alert and oriented to person, place, and time. Mental status is at baseline.      Cranial Nerves: No cranial nerve deficit.      Gait: Gait normal.   Psychiatric:         Mood and Affect: Mood and affect normal.         Behavior: Behavior normal.         Results:  Lab Results   Component Value Date    WBC 4.3 (L) 08/05/2024    HGB 14.9 08/05/2024    HCT 44.2 08/05/2024    MCV 97 08/05/2024     (L) 08/05/2024       Lab Results   Component Value Date    GLUCOSE 91 08/05/2024    CALCIUM  9.6 08/05/2024     08/05/2024    K 4.1 08/05/2024    CO2 28 08/05/2024     08/05/2024    BUN 19 08/05/2024    CREATININE 0.92 08/05/2024       Lab Results   Component Value Date    ALT 15 08/05/2024    AST 14 08/05/2024    ALKPHOS 88 08/05/2024    BILITOT 1.4 (H) 08/05/2024     === 06/24/24 ===    MR BRAIN W AND WO CONTRAST    - Impression -  Status post right posterior craniotomy for resection of right  parietal glioblastoma. Evolving blood products within extra-axial  collection underlying the craniotomy flap again measuring 9 mm  thickness and resulting in mild mass effect on underlying parenchyma.  No midline shift.    Stable appearance irregular enhancement involving the periatrial  white matter and right splenium of the corpus callosum, margins of  the resection cavity, and dura underlying the craniotomy flap.    However, new 1.3 x 0.8 cm bilobed rim enhancing lesion in the right  parietal lobe anterior to the resection cavity as well as progression  of T2 hyperintense FLAIR signal now extending anteriorly to involve  the anterior right periatrial white matter and lateral perirolandic  cortex. 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 However, disease progression  can not be excluded. Continued follow-up recommended.    I personally reviewed the images/study and I agree with the findings  as stated by Geraldo Plummer MD. This study was interpreted at  [facility], [place], [place].    MACRO:  None    Signed by: Gen Phelps 6/24/2024 4:51 PM  Dictation workstation:   [id number]        Assessment & Plan:  Enmanuel Ahumada is a 68 y.o. male with PMH of HLD, Osborn's esophagus, ASA for cardioprotection, and a malignant glioma s/p surgical resection (12/2023). Final path morphologically consistent with glioblastoma, +MGMT promoter methylation. S/p chemoradiation (2/15-3/28/24). Currently on adjuvant  temozolomide cycles and utilizing Optune/tumor-treating fields.    Today, he is more symptomatic with recently worsened cognitive function over the past week. We will start him on dexamethasone 2 mg/day to see if his symptoms improve; otherwise, we will consider referral to neuropsych rehab. He is otherwise neurologically stable. Recent labs without significant abnormalities. For his chemo, he will need new labs on 8/12 and, if those are adequate, he will be able to start TMZ C4 that evening. 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    1. Glioblastoma  Treatment 1: Temozolomide on days 1-5 of each 28-day cycle  Dose: 280 mg/day (150 mg/m2/day x BSA d/t h/o low plts, low ANC)  TMZ C4 estimated start 8/12/24 pending labs  Treatment 2: Optune/tumor-treating fields -- continue  Steroids: Start dexamethasone 2 mg/day  Advised patient/spouse to contact us in 7-14 days with update  Consider referral to NeuroPsych Rehab for cognitive eval/treatment if symptoms don't improve with dexamethasone  Kim advanced molecular phenotyping results yielded EGFR amplification  MRI brain w/wo contrast prior to next visit  Follow up in about 4 weeks for reassessment, monitoring, and MRI review

## 2024-08-09 ENCOUNTER — OFFICE VISIT (OUTPATIENT)
Dept: DERMATOLOGY | Facility: CLINIC | Age: 69
End: 2024-08-09
Payer: MEDICARE

## 2024-08-09 ENCOUNTER — APPOINTMENT (OUTPATIENT)
Dept: DERMATOLOGY | Facility: CLINIC | Age: 69
End: 2024-08-09
Payer: MEDICARE

## 2024-08-09 DIAGNOSIS — L81.4 LENTIGO: ICD-10-CM

## 2024-08-09 DIAGNOSIS — L57.8 OTHER SKIN CHANGES DUE TO CHRONIC EXPOSURE TO NONIONIZING RADIATION: Primary | ICD-10-CM

## 2024-08-09 DIAGNOSIS — L82.1 SEBORRHEIC KERATOSIS: ICD-10-CM

## 2024-08-09 DIAGNOSIS — D18.01 HEMANGIOMA OF SKIN: ICD-10-CM

## 2024-08-09 NOTE — PROGRESS NOTES
Subjective     Dash Ahumada is a 68 y.o. male who presents for the following: Skin Check (FBSE. No history of skin cancer.Patient is concerned with dark true on right side of scalp. He did have brain cancer and radiation to that area.).     Review of Systems:  No other skin or systemic complaints other than what is documented elsewhere in the note.    The following portions of the chart were reviewed this encounter and updated as appropriate:         Skin Cancer History  No skin cancer on file.      Specialty Problems          Dermatology Problems    Infected insect bite    Onychomycosis        Objective   Well appearing patient in no apparent distress; mood and affect are within normal limits.    A full examination was performed including scalp, head, eyes, ears, nose, lips, neck, chest, axillae, abdomen, back, buttocks, bilateral upper extremities, bilateral lower extremities, hands, feet, fingers, toes, fingernails, and toenails. All findings within normal limits unless otherwise noted below.    Assessment/Plan   1. Other skin changes due to chronic exposure to nonionizing radiation  Mottled pigmentation, telangiectasias and brown reticular macules in sun exposed areas on the face, extremities, trunk    The risk of chronic, cumulative sun damage and risk of development of skin cancer was reviewed with the patient today. Discussed important of sun protection with sun protective clothing and/or broad spectrum sunscreen spf 30 or above.  Warning signs of skin cancer were reviewed. Patient to contact office should they notice any new or changing pre-existing skin lesion.    2. Seborrheic keratosis  Stuck on verrucous, tan-brown papules and plaques.      The benign nature of these skin lesions were reviewed with the patient today and reassurance was provided. Patient advised to return for f/u if the lesions change in size, shape, color, become painful, tender, itch or bleed.    3. Hemangioma of skin  Bright red  papules    Discussed benign nature of condition, reassured. Reviewed warning signs of skin cancer with patient.    4. Lentigo  Scattered tan macules in sun-exposed areas.    Benign nature of these skin lesions reviewed and relation to sun exposure discussed. Reassurance provided. Reviewed warning signs of skin cancer.

## 2024-08-12 ENCOUNTER — TELEPHONE (OUTPATIENT)
Dept: HEMATOLOGY/ONCOLOGY | Facility: HOSPITAL | Age: 69
End: 2024-08-12

## 2024-08-12 ENCOUNTER — LAB (OUTPATIENT)
Dept: LAB | Facility: LAB | Age: 69
End: 2024-08-12
Payer: MEDICARE

## 2024-08-12 DIAGNOSIS — D69.59 CHEMOTHERAPY-INDUCED THROMBOCYTOPENIA: Primary | ICD-10-CM

## 2024-08-12 DIAGNOSIS — R73.09 ELEVATED HEMOGLOBIN A1C: ICD-10-CM

## 2024-08-12 DIAGNOSIS — C71.9 GBM (GLIOBLASTOMA MULTIFORME) (MULTI): ICD-10-CM

## 2024-08-12 DIAGNOSIS — E78.2 MIXED HYPERLIPIDEMIA: ICD-10-CM

## 2024-08-12 DIAGNOSIS — Z00.00 MEDICARE ANNUAL WELLNESS VISIT, SUBSEQUENT: ICD-10-CM

## 2024-08-12 DIAGNOSIS — Z12.5 PROSTATE CANCER SCREENING: ICD-10-CM

## 2024-08-12 DIAGNOSIS — T45.1X5A CHEMOTHERAPY-INDUCED THROMBOCYTOPENIA: Primary | ICD-10-CM

## 2024-08-12 LAB
ALBUMIN SERPL BCP-MCNC: 4.2 G/DL (ref 3.4–5)
ALP SERPL-CCNC: 89 U/L (ref 33–136)
ALT SERPL W P-5'-P-CCNC: 16 U/L (ref 10–52)
ANION GAP SERPL CALC-SCNC: 11 MMOL/L (ref 10–20)
AST SERPL W P-5'-P-CCNC: 12 U/L (ref 9–39)
BASOPHILS # BLD AUTO: 0.03 X10*3/UL (ref 0–0.1)
BASOPHILS NFR BLD AUTO: 0.5 %
BILIRUB SERPL-MCNC: 1.1 MG/DL (ref 0–1.2)
BUN SERPL-MCNC: 15 MG/DL (ref 6–23)
CALCIUM SERPL-MCNC: 9.7 MG/DL (ref 8.6–10.3)
CHLORIDE SERPL-SCNC: 104 MMOL/L (ref 98–107)
CHOLEST SERPL-MCNC: 84 MG/DL (ref 0–199)
CHOLESTEROL/HDL RATIO: 2
CO2 SERPL-SCNC: 31 MMOL/L (ref 21–32)
CREAT SERPL-MCNC: 0.89 MG/DL (ref 0.5–1.3)
EGFRCR SERPLBLD CKD-EPI 2021: >90 ML/MIN/1.73M*2
EOSINOPHIL # BLD AUTO: 0.07 X10*3/UL (ref 0–0.7)
EOSINOPHIL NFR BLD AUTO: 1.3 %
ERYTHROCYTE [DISTWIDTH] IN BLOOD BY AUTOMATED COUNT: 14.4 % (ref 11.5–14.5)
EST. AVERAGE GLUCOSE BLD GHB EST-MCNC: 105 MG/DL
GLUCOSE SERPL-MCNC: 96 MG/DL (ref 74–99)
HBA1C MFR BLD: 5.3 %
HCT VFR BLD AUTO: 43.3 % (ref 41–52)
HDLC SERPL-MCNC: 42.1 MG/DL
HGB BLD-MCNC: 14.7 G/DL (ref 13.5–17.5)
IMM GRANULOCYTES # BLD AUTO: 0.01 X10*3/UL (ref 0–0.7)
IMM GRANULOCYTES NFR BLD AUTO: 0.2 % (ref 0–0.9)
LDLC SERPL CALC-MCNC: 22 MG/DL
LYMPHOCYTES # BLD AUTO: 1.93 X10*3/UL (ref 1.2–4.8)
LYMPHOCYTES NFR BLD AUTO: 35 %
MCH RBC QN AUTO: 32.2 PG (ref 26–34)
MCHC RBC AUTO-ENTMCNC: 33.9 G/DL (ref 32–36)
MCV RBC AUTO: 95 FL (ref 80–100)
MONOCYTES # BLD AUTO: 0.51 X10*3/UL (ref 0.1–1)
MONOCYTES NFR BLD AUTO: 9.3 %
NEUTROPHILS # BLD AUTO: 2.96 X10*3/UL (ref 1.2–7.7)
NEUTROPHILS NFR BLD AUTO: 53.7 %
NON HDL CHOLESTEROL: 42 MG/DL (ref 0–149)
NRBC BLD-RTO: 0 /100 WBCS (ref 0–0)
PLATELET # BLD AUTO: 84 X10*3/UL (ref 150–450)
POTASSIUM SERPL-SCNC: 3.8 MMOL/L (ref 3.5–5.3)
PROT SERPL-MCNC: 6.5 G/DL (ref 6.4–8.2)
PSA SERPL-MCNC: 0.39 NG/ML
RBC # BLD AUTO: 4.56 X10*6/UL (ref 4.5–5.9)
SODIUM SERPL-SCNC: 142 MMOL/L (ref 136–145)
T4 FREE SERPL-MCNC: 0.89 NG/DL (ref 0.61–1.12)
TRIGL SERPL-MCNC: 101 MG/DL (ref 0–149)
TSH SERPL-ACNC: 0.96 MIU/L (ref 0.44–3.98)
VLDL: 20 MG/DL (ref 0–40)
WBC # BLD AUTO: 5.5 X10*3/UL (ref 4.4–11.3)

## 2024-08-12 PROCEDURE — 84439 ASSAY OF FREE THYROXINE: CPT

## 2024-08-12 PROCEDURE — 84443 ASSAY THYROID STIM HORMONE: CPT

## 2024-08-12 PROCEDURE — 85025 COMPLETE CBC W/AUTO DIFF WBC: CPT

## 2024-08-12 PROCEDURE — 80061 LIPID PANEL: CPT

## 2024-08-12 PROCEDURE — 83036 HEMOGLOBIN GLYCOSYLATED A1C: CPT

## 2024-08-12 PROCEDURE — 80053 COMPREHEN METABOLIC PANEL: CPT

## 2024-08-12 PROCEDURE — G0103 PSA SCREENING: HCPCS

## 2024-08-12 NOTE — TELEPHONE ENCOUNTER
Rn instructed Dash not to start his chemo tonight. Instructed him to have labs drawn next Monday 8/19/24. He verbalized understanding.   Also reports he has been feeling well.

## 2024-08-12 NOTE — TELEPHONE ENCOUNTER
Reviewed patient's new labs from today. Platelets 84k. Patient is NOT permitted to start TMZ C4 as we need plts to be >100k. Repeat CBC w/diff in 1 week.

## 2024-08-13 NOTE — TELEPHONE ENCOUNTER
----- Message from Ricardo Youngblood sent at 8/12/2024 12:52 PM EDT -----  Call Enmanuel Ahumada Their labs were normal

## 2024-08-19 ENCOUNTER — TELEPHONE (OUTPATIENT)
Dept: HEMATOLOGY/ONCOLOGY | Facility: HOSPITAL | Age: 69
End: 2024-08-19

## 2024-08-19 ENCOUNTER — LAB (OUTPATIENT)
Dept: LAB | Facility: LAB | Age: 69
End: 2024-08-19
Payer: MEDICARE

## 2024-08-19 DIAGNOSIS — D69.59 CHEMOTHERAPY-INDUCED THROMBOCYTOPENIA: ICD-10-CM

## 2024-08-19 DIAGNOSIS — C71.9 GBM (GLIOBLASTOMA MULTIFORME) (MULTI): Primary | ICD-10-CM

## 2024-08-19 DIAGNOSIS — T45.1X5A CHEMOTHERAPY-INDUCED THROMBOCYTOPENIA: ICD-10-CM

## 2024-08-19 DIAGNOSIS — C71.9 GBM (GLIOBLASTOMA MULTIFORME) (MULTI): ICD-10-CM

## 2024-08-19 LAB
ALBUMIN SERPL BCP-MCNC: 4.3 G/DL (ref 3.4–5)
ALP SERPL-CCNC: 87 U/L (ref 33–136)
ALT SERPL W P-5'-P-CCNC: 18 U/L (ref 10–52)
ANION GAP SERPL CALC-SCNC: 12 MMOL/L (ref 10–20)
AST SERPL W P-5'-P-CCNC: 13 U/L (ref 9–39)
BASOPHILS # BLD AUTO: 0.01 X10*3/UL (ref 0–0.1)
BASOPHILS NFR BLD AUTO: 0.1 %
BILIRUB SERPL-MCNC: 1 MG/DL (ref 0–1.2)
BUN SERPL-MCNC: 21 MG/DL (ref 6–23)
CALCIUM SERPL-MCNC: 9.7 MG/DL (ref 8.6–10.3)
CHLORIDE SERPL-SCNC: 103 MMOL/L (ref 98–107)
CO2 SERPL-SCNC: 29 MMOL/L (ref 21–32)
CREAT SERPL-MCNC: 0.89 MG/DL (ref 0.5–1.3)
EGFRCR SERPLBLD CKD-EPI 2021: >90 ML/MIN/1.73M*2
EOSINOPHIL # BLD AUTO: 0.03 X10*3/UL (ref 0–0.7)
EOSINOPHIL NFR BLD AUTO: 0.4 %
ERYTHROCYTE [DISTWIDTH] IN BLOOD BY AUTOMATED COUNT: 15.2 % (ref 11.5–14.5)
GLUCOSE SERPL-MCNC: 129 MG/DL (ref 74–99)
HCT VFR BLD AUTO: 43.8 % (ref 41–52)
HGB BLD-MCNC: 15 G/DL (ref 13.5–17.5)
IMM GRANULOCYTES # BLD AUTO: 0.02 X10*3/UL (ref 0–0.7)
IMM GRANULOCYTES NFR BLD AUTO: 0.3 % (ref 0–0.9)
LYMPHOCYTES # BLD AUTO: 1.24 X10*3/UL (ref 1.2–4.8)
LYMPHOCYTES NFR BLD AUTO: 17.7 %
MCH RBC QN AUTO: 33.1 PG (ref 26–34)
MCHC RBC AUTO-ENTMCNC: 34.2 G/DL (ref 32–36)
MCV RBC AUTO: 97 FL (ref 80–100)
MONOCYTES # BLD AUTO: 0.36 X10*3/UL (ref 0.1–1)
MONOCYTES NFR BLD AUTO: 5.1 %
NEUTROPHILS # BLD AUTO: 5.35 X10*3/UL (ref 1.2–7.7)
NEUTROPHILS NFR BLD AUTO: 76.4 %
NRBC BLD-RTO: 0 /100 WBCS (ref 0–0)
PLATELET # BLD AUTO: 123 X10*3/UL (ref 150–450)
POTASSIUM SERPL-SCNC: 3.8 MMOL/L (ref 3.5–5.3)
PROT SERPL-MCNC: 6.7 G/DL (ref 6.4–8.2)
RBC # BLD AUTO: 4.53 X10*6/UL (ref 4.5–5.9)
SODIUM SERPL-SCNC: 140 MMOL/L (ref 136–145)
WBC # BLD AUTO: 7 X10*3/UL (ref 4.4–11.3)

## 2024-08-19 PROCEDURE — 80053 COMPREHEN METABOLIC PANEL: CPT

## 2024-08-19 PROCEDURE — 85025 COMPLETE CBC W/AUTO DIFF WBC: CPT

## 2024-08-19 PROCEDURE — 36415 COLL VENOUS BLD VENIPUNCTURE: CPT

## 2024-08-19 NOTE — TELEPHONE ENCOUNTER
Rn inform Dash that he is able to start his chemo tonight. He will then need labs on 9/9 and 9/16. He verbalized understanding.

## 2024-08-19 NOTE — TELEPHONE ENCOUNTER
Reviewed patient's repeat CBC. Plts >100k. He may start TMZ C4 tonight.    TMZ C4 start 8/19/24  C4 D22 labs due 9/9/24 +/- 48 hours  C4 D29 labs due 9/16/24 +/- 48 hours

## 2024-09-03 ENCOUNTER — TELEPHONE (OUTPATIENT)
Dept: HEMATOLOGY/ONCOLOGY | Facility: HOSPITAL | Age: 69
End: 2024-09-03
Payer: MEDICARE

## 2024-09-03 NOTE — TELEPHONE ENCOUNTER
Informed Dash that we do not have any messages to relay to him. Per Epic all encounters were taken care of.   He did question if the missed call could be about the MRI machine being down and needing to reschedule his scan. RN gave him radiology scheduling's number to confirm MRI appointment.   Dash was appreciative.

## 2024-09-09 ENCOUNTER — LAB (OUTPATIENT)
Dept: LAB | Facility: LAB | Age: 69
End: 2024-09-09
Payer: MEDICARE

## 2024-09-09 ENCOUNTER — HOSPITAL ENCOUNTER (OUTPATIENT)
Dept: RADIOLOGY | Facility: HOSPITAL | Age: 69
Discharge: HOME | End: 2024-09-09
Payer: MEDICARE

## 2024-09-09 DIAGNOSIS — C71.9 GBM (GLIOBLASTOMA MULTIFORME) (MULTI): ICD-10-CM

## 2024-09-09 LAB
ALBUMIN SERPL BCP-MCNC: 4.6 G/DL (ref 3.4–5)
ALP SERPL-CCNC: 78 U/L (ref 33–136)
ALT SERPL W P-5'-P-CCNC: 30 U/L (ref 10–52)
ANION GAP SERPL CALC-SCNC: 17 MMOL/L (ref 10–20)
AST SERPL W P-5'-P-CCNC: 17 U/L (ref 9–39)
BASOPHILS # BLD AUTO: 0.01 X10*3/UL (ref 0–0.1)
BASOPHILS NFR BLD AUTO: 0.1 %
BILIRUB SERPL-MCNC: 1.2 MG/DL (ref 0–1.2)
BUN SERPL-MCNC: 21 MG/DL (ref 6–23)
CALCIUM SERPL-MCNC: 9.7 MG/DL (ref 8.6–10.3)
CHLORIDE SERPL-SCNC: 103 MMOL/L (ref 98–107)
CO2 SERPL-SCNC: 25 MMOL/L (ref 21–32)
CREAT SERPL-MCNC: 0.81 MG/DL (ref 0.5–1.3)
EGFRCR SERPLBLD CKD-EPI 2021: >90 ML/MIN/1.73M*2
EOSINOPHIL # BLD AUTO: 0.01 X10*3/UL (ref 0–0.7)
EOSINOPHIL NFR BLD AUTO: 0.1 %
ERYTHROCYTE [DISTWIDTH] IN BLOOD BY AUTOMATED COUNT: 14 % (ref 11.5–14.5)
GLUCOSE SERPL-MCNC: 108 MG/DL (ref 74–99)
HCT VFR BLD AUTO: 48.7 % (ref 41–52)
HGB BLD-MCNC: 16.1 G/DL (ref 13.5–17.5)
IMM GRANULOCYTES # BLD AUTO: 0.02 X10*3/UL (ref 0–0.7)
IMM GRANULOCYTES NFR BLD AUTO: 0.3 % (ref 0–0.9)
LYMPHOCYTES # BLD AUTO: 1.05 X10*3/UL (ref 1.2–4.8)
LYMPHOCYTES NFR BLD AUTO: 15.1 %
MCH RBC QN AUTO: 33.5 PG (ref 26–34)
MCHC RBC AUTO-ENTMCNC: 33.1 G/DL (ref 32–36)
MCV RBC AUTO: 101 FL (ref 80–100)
MONOCYTES # BLD AUTO: 0.39 X10*3/UL (ref 0.1–1)
MONOCYTES NFR BLD AUTO: 5.6 %
NEUTROPHILS # BLD AUTO: 5.47 X10*3/UL (ref 1.2–7.7)
NEUTROPHILS NFR BLD AUTO: 78.8 %
NRBC BLD-RTO: 0 /100 WBCS (ref 0–0)
PLATELET # BLD AUTO: 78 X10*3/UL (ref 150–450)
POTASSIUM SERPL-SCNC: 4 MMOL/L (ref 3.5–5.3)
PROT SERPL-MCNC: 6.8 G/DL (ref 6.4–8.2)
RBC # BLD AUTO: 4.81 X10*6/UL (ref 4.5–5.9)
SODIUM SERPL-SCNC: 141 MMOL/L (ref 136–145)
WBC # BLD AUTO: 7 X10*3/UL (ref 4.4–11.3)

## 2024-09-09 PROCEDURE — 70553 MRI BRAIN STEM W/O & W/DYE: CPT

## 2024-09-09 PROCEDURE — 2550000001 HC RX 255 CONTRASTS

## 2024-09-09 PROCEDURE — 85025 COMPLETE CBC W/AUTO DIFF WBC: CPT

## 2024-09-09 PROCEDURE — 80053 COMPREHEN METABOLIC PANEL: CPT

## 2024-09-09 PROCEDURE — A9575 INJ GADOTERATE MEGLUMI 0.1ML: HCPCS

## 2024-09-09 RX ORDER — GADOTERATE MEGLUMINE 376.9 MG/ML
14 INJECTION INTRAVENOUS
Status: COMPLETED | OUTPATIENT
Start: 2024-09-09 | End: 2024-09-09

## 2024-09-16 ENCOUNTER — LAB (OUTPATIENT)
Dept: LAB | Facility: HOSPITAL | Age: 69
End: 2024-09-16
Payer: MEDICARE

## 2024-09-16 ENCOUNTER — OFFICE VISIT (OUTPATIENT)
Dept: HEMATOLOGY/ONCOLOGY | Facility: HOSPITAL | Age: 69
End: 2024-09-16
Payer: MEDICARE

## 2024-09-16 VITALS
OXYGEN SATURATION: 100 % | HEART RATE: 82 BPM | SYSTOLIC BLOOD PRESSURE: 130 MMHG | BODY MASS INDEX: 24.27 KG/M2 | TEMPERATURE: 96.3 F | WEIGHT: 154.98 LBS | DIASTOLIC BLOOD PRESSURE: 84 MMHG

## 2024-09-16 DIAGNOSIS — C71.9 GBM (GLIOBLASTOMA MULTIFORME) (MULTI): ICD-10-CM

## 2024-09-16 DIAGNOSIS — C71.9 GBM (GLIOBLASTOMA MULTIFORME) (MULTI): Primary | ICD-10-CM

## 2024-09-16 LAB
ALBUMIN SERPL BCP-MCNC: 4.3 G/DL (ref 3.4–5)
ALP SERPL-CCNC: 74 U/L (ref 33–136)
ALT SERPL W P-5'-P-CCNC: 27 U/L (ref 10–52)
ANION GAP SERPL CALC-SCNC: 13 MMOL/L (ref 10–20)
AST SERPL W P-5'-P-CCNC: 13 U/L (ref 9–39)
BASOPHILS # BLD AUTO: 0.01 X10*3/UL (ref 0–0.1)
BASOPHILS NFR BLD AUTO: 0.2 %
BILIRUB SERPL-MCNC: 1.5 MG/DL (ref 0–1.2)
BUN SERPL-MCNC: 18 MG/DL (ref 6–23)
CALCIUM SERPL-MCNC: 9.6 MG/DL (ref 8.6–10.3)
CHLORIDE SERPL-SCNC: 103 MMOL/L (ref 98–107)
CO2 SERPL-SCNC: 30 MMOL/L (ref 21–32)
CREAT SERPL-MCNC: 0.87 MG/DL (ref 0.5–1.3)
EGFRCR SERPLBLD CKD-EPI 2021: >90 ML/MIN/1.73M*2
EOSINOPHIL # BLD AUTO: 0.01 X10*3/UL (ref 0–0.7)
EOSINOPHIL NFR BLD AUTO: 0.2 %
ERYTHROCYTE [DISTWIDTH] IN BLOOD BY AUTOMATED COUNT: 13.8 % (ref 11.5–14.5)
GLUCOSE SERPL-MCNC: 137 MG/DL (ref 74–99)
HCT VFR BLD AUTO: 47.8 % (ref 41–52)
HGB BLD-MCNC: 16.5 G/DL (ref 13.5–17.5)
IMM GRANULOCYTES # BLD AUTO: 0.02 X10*3/UL (ref 0–0.7)
IMM GRANULOCYTES NFR BLD AUTO: 0.3 % (ref 0–0.9)
LYMPHOCYTES # BLD AUTO: 0.83 X10*3/UL (ref 1.2–4.8)
LYMPHOCYTES NFR BLD AUTO: 13 %
MCH RBC QN AUTO: 34.4 PG (ref 26–34)
MCHC RBC AUTO-ENTMCNC: 34.5 G/DL (ref 32–36)
MCV RBC AUTO: 100 FL (ref 80–100)
MONOCYTES # BLD AUTO: 0.35 X10*3/UL (ref 0.1–1)
MONOCYTES NFR BLD AUTO: 5.5 %
NEUTROPHILS # BLD AUTO: 5.15 X10*3/UL (ref 1.2–7.7)
NEUTROPHILS NFR BLD AUTO: 80.8 %
NRBC BLD-RTO: 0 /100 WBCS (ref 0–0)
PLATELET # BLD AUTO: 66 X10*3/UL (ref 150–450)
POTASSIUM SERPL-SCNC: 3.9 MMOL/L (ref 3.5–5.3)
PROT SERPL-MCNC: 6.5 G/DL (ref 6.4–8.2)
RBC # BLD AUTO: 4.8 X10*6/UL (ref 4.5–5.9)
SODIUM SERPL-SCNC: 142 MMOL/L (ref 136–145)
WBC # BLD AUTO: 6.4 X10*3/UL (ref 4.4–11.3)

## 2024-09-16 PROCEDURE — 85025 COMPLETE CBC W/AUTO DIFF WBC: CPT

## 2024-09-16 PROCEDURE — 36415 COLL VENOUS BLD VENIPUNCTURE: CPT

## 2024-09-16 PROCEDURE — 80053 COMPREHEN METABOLIC PANEL: CPT

## 2024-09-16 PROCEDURE — 99215 OFFICE O/P EST HI 40 MIN: CPT | Performed by: PSYCHIATRY & NEUROLOGY

## 2024-09-16 RX ORDER — DEXAMETHASONE 2 MG/1
2 TABLET ORAL DAILY
Qty: 30 TABLET | Refills: 3 | Status: SHIPPED | OUTPATIENT
Start: 2024-09-16 | End: 2024-10-16

## 2024-09-16 ASSESSMENT — PAIN SCALES - GENERAL: PAINLEVEL: 0-NO PAIN

## 2024-09-16 NOTE — PROGRESS NOTES
Patient ID: Dash Ahumada is a 68 y.o. male.  Referring Physician: No referring provider defined for this encounter.  Primary Care Provider: DO Eve Zuleta    Dash Ahumada is a 67 yo RH  male with a PMH significant for HLD, Osborn's esophagus, ASA for cardioprotection, who presented with headache x 6 weeks, confusion x 3 weeks, and gait instability. CTH with RO hypodensity. MRI PPF and fMRI with L language dominant lesion. TTE EF 60%. S/p R crani for tumor resection with 5ALA on 12/29/23. MRI with GTR. Prelim pathology concerning for glioblastoma - immunostains pending.      INTERVAL HISTORY (1/15/2024): Since getting discharged to home, he has been slowly recovering from the surgery. The final pathology is still pending, but it is likely GBM. He notes some persistent incisional pain around the staples but no regular headaches now (resolved). He has some minor confusion, mild loss of balance, difficulty using his computer and phone, and a loss of vision to the left. He remains on Keppra 1000 mg bid (although he claims he has never had a verified seizure), and is tapering off of Decadron. He is seeing Dr. Palomino from Rad Onc soon to begin RT planning. He is seeing Neurosurgery today to remove the staples.     INTERVAL HISTORY (3/25/2024): Since the last visit, he has continued the course of chemo-RT and is now s/p RT Fraction #27 of 30 -- finalize this week. He has fatigue from the treatment, but no GI upset, emesis, diarrhea, loss of appetite, or change in taste. He is off of the steroids now. The [facility] results are in and demonstrate EGFR amplification, TERT mutations, and loss of CDKN2A/B. He is active at home with chores and daily walking. He remains on Keppra 500 mg bid.     INTERVAL HISTORY (9/16/2024): Since the last visit, he has completed the course of chemo-RT in late March, and then continued adjuvant Temodar -- now s/p cycle #4. He has tolerated the chemo quite well so  far, with persistent fatigue, but no GI upset, nausea, emesis, or diarrhea. However, he has developed significant thrombocytopenia -- 78k last week. He is also now on the [employer] electrical array device, and is tolerating it well, without any scalp issues; > 18 hours per day (compliance 93%). He is doing well neurologically, with a few minor headaches, but no seizures, speech issues, visual changes, new focal weakness, gait difficulty, or sensory loss. He is on Decadron 2 mg/day. He remains active at home with chores and daily walks. He remains on Keppra 500 mg bid. KPS = 80.     The HPI is as documented in the HPI.     Objective   BSA: 1.82 meters squared  /84 (BP Location: Left arm, Patient Position: Sitting, BP Cuff Size: Adult)   Pulse 82   Temp 35.7 °C (96.3 °F) (Temporal)   Wt 70.3 kg (154 lb 15.7 oz)   SpO2 100%   BMI 24.27 kg/m²     No family history on file.  Oncology History Overview Note   68 year old left-handed male with h/o HLD, cholecystectomy, Osborn's esophagus, on ASA 81 for cardioprotection presented to [facility] ED on 12/24/23 with 6 weeks of headaches, 3 weeks of increased confusion and running into things. Left-sided hemianopia on exam. CT head revealed a 1 mm midline leftward shift with a 7.3 cm x 7.1 cm x 5 cm right parietal mass. Transferred to [facility] for neurosurgical management.     GBM (glioblastoma multiforme) (Multi)   12/29/2023 Surgery    Right craniotomy for tumor resection (Ritu Carrasco MD)     12/29/2023 Pathology    Final diagnosis reported 1/18/24:  A, B.  Right brain mass, biopsy and removal:  - Malignant glioma, morphologically consistent with glioblastoma; positive for MGMT methylation     12/29/2023 Initial Diagnosis    GBM (glioblastoma multiforme) (CMS/HCC)     1/3/2024 Tumor Board    [facility] Tumor Board tumor board recommendations: Referral to radiation oncology and neuro-oncology.       2/15/2024 - 3/21/2024 Chemotherapy    Temozolomide with Concurrent  "Radiation, 42 Day Cycles     2/15/2024 - 3/28/2024 Radiation Therapy    Radiation Treatments       Active   No active radiation treatments to show.     Completed   Brain Boost_R (Started on 3/20/2024)   Most recent fraction: 182 cGy given on 3/28/2024   Total given: 1,274 cGy / 1,273 cGy  (7 of 7 fractions)   Elapsed Days: 8   Technique: 3D        Partial brain R (Started on 2/15/2024)   Most recent fraction: 182 cGy given on 3/19/2024   Total given: 4,186 cGy / 4,182 cGy  (23 of 23 fractions)   Elapsed Days: 33   Technique: 3D                        5/1/2024 -  Chemotherapy    Temozolomide (5/28), 28 Day Cycles         Enmanuel Ahumada \"Dash\"  reports that he has never smoked. He has never used smokeless tobacco.  He  reports that he does not currently use alcohol.  He  reports no history of drug use.    Physical Exam  Constitutional:       Appearance: Normal appearance.   Eyes:      General: Visual field deficit present.      Extraocular Movements: Extraocular movements intact.      Pupils: Pupils are equal, round, and reactive to light.   Musculoskeletal:      Cervical back: Normal range of motion.   Neurological:      Mental Status: He is alert and oriented to person, place, and time.      Sensory: Sensation is intact.      Motor: Motor function is intact.      Coordination: Coordination is intact.      Deep Tendon Reflexes: Babinski sign absent on the right side. Babinski sign absent on the left side.      Reflex Scores:       Tricep reflexes are 2+ on the right side and 2+ on the left side.       Bicep reflexes are 2+ on the right side and 2+ on the left side.       Brachioradialis reflexes are 2+ on the right side and 2+ on the left side.       Patellar reflexes are 2+ on the right side and 2+ on the left side.       Achilles reflexes are 2+ on the right side and 2+ on the left side.     Comments: Minimal loss of STM and cognition, speech fluent without dysnomia, CN's II - XII intact except for partial " left homonymous hemianopsia, strength 5/5 in UE/LE, mild gait imbalance, DTR's symmetric, sensation intact.    Psychiatric:         Mood and Affect: Mood normal.         Behavior: Behavior normal.     Performance Status:  Symptomatic; fully ambulatory      Lab Results   Component Value Date    WBC 7.0 09/09/2024    HGB 16.1 09/09/2024    HCT 48.7 09/09/2024     (H) 09/09/2024    PLT 78 (L) 09/09/2024       The new and recent brain MRI scans were reviewed with the patient and family:      === 06/24/24 ===    MR BRAIN W AND WO CONTRAST    - Impression -  Status post right posterior craniotomy for resection of right  parietal glioblastoma. Evolving blood products within extra-axial  collection underlying the craniotomy flap again measuring 9 mm  thickness and resulting in mild mass effect on underlying parenchyma.  No midline shift.    Stable appearance irregular enhancement involving the periatrial  white matter and right splenium of the corpus callosum, margins of  the resection cavity, and dura underlying the craniotomy flap.    However, new 1.3 x 0.8 cm bilobed rim enhancing lesion in the right  parietal lobe anterior to the resection cavity as well as progression  of T2 hyperintense FLAIR signal now extending anteriorly to involve  the anterior right periatrial white matter and lateral perirolandic  cortex. 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 However, disease progression  can not be excluded. Continued follow-up recommended.    I personally reviewed the images/study and I agree with the findings  as stated by Geraldo Plummer MD. This study was interpreted at  [facility], [place], [place].    MACRO:  None    Signed by: Gen Phelps 6/24/2024 4:51 PM  Dictation workstation:   [id number]      === 12/25/23 ===    MR BRAIN W AND WO CONTRAST    - Impression -  Postoperative changes as noted above. There has been  significant  improvement in mass effect since resection of the mass; the margins  of the surgical bed have minimal if any enhancement with no nodular  enhancement suggestive of residual tumor noted.    MACRO:  None    Signed by: Cecil Aguiar 12/31/2023 11:23 AM  Dictation workstation:   [id number]      Assessment/Plan      -Dash has recovered well from surgery, with mild residual symptoms.     -The pathology is a GBM; IDH wildtype, MGMT methylated.     -He has completed the course of chemo-RT in late March.     -Since then he has continued the adjuvant phase of treatment with Temodar -- now s/p cycle #4.    -He has had slowly resolving thrombycytopenia -- 78k last week. The CBC will be re-checked today.     -The new brain MRI is mostly stable, but with some subtle increase in high signal on the T2 and FLAIR sequences, along with minor changes in enhancement.     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     -He will start TEM cycle #5 in the near future, once the platelets are > 100k. We will likely have to reduce the dose of TEM.     -He has also started using the [employer] electrical array device, which he is tolerating well and using > 18 hours per day.     -The advanced molecular phenotyping from [facility] has been reported, and shows EGFR amplification, which could be targeted with an EGFR TKI (e.g., Osimertinib) at a later date.     -He is to remain on the Decadron, but at a lower dose of 2 mg/day alternating with 1 mg/day, as well as on the Keppra 500 mg bid.     -We will have him return to this clinic in 5 weeks for further evaluation, and to monitor him after TEM cycle #5.     -I spent > 40 minutes in face to face consultation to review and discuss the above; 50% of which or more was dedicated to counseling.       Kyle Rolon MD

## 2024-09-16 NOTE — PROGRESS NOTES
Pt here today with wife. States feeling well had one bout of diarrhea this morning and resolved.Medications, pharmacy preference and allergies were reviewed with patient. Patient verbalized understanding and agreement regarding discussed information via verbal feedback.

## 2024-09-23 ENCOUNTER — LAB (OUTPATIENT)
Dept: LAB | Facility: LAB | Age: 69
End: 2024-09-23
Payer: MEDICARE

## 2024-09-23 ENCOUNTER — PATIENT MESSAGE (OUTPATIENT)
Dept: HEMATOLOGY/ONCOLOGY | Facility: HOSPITAL | Age: 69
End: 2024-09-23

## 2024-09-23 DIAGNOSIS — C71.9 GBM (GLIOBLASTOMA MULTIFORME) (MULTI): ICD-10-CM

## 2024-09-23 DIAGNOSIS — C71.9 GBM (GLIOBLASTOMA MULTIFORME) (MULTI): Primary | ICD-10-CM

## 2024-09-23 LAB
ALBUMIN SERPL BCP-MCNC: 4.3 G/DL (ref 3.4–5)
ALP SERPL-CCNC: 81 U/L (ref 33–136)
ALT SERPL W P-5'-P-CCNC: 24 U/L (ref 10–52)
ANION GAP SERPL CALC-SCNC: 10 MMOL/L (ref 10–20)
AST SERPL W P-5'-P-CCNC: 14 U/L (ref 9–39)
BASOPHILS # BLD AUTO: 0.01 X10*3/UL (ref 0–0.1)
BASOPHILS NFR BLD AUTO: 0.2 %
BILIRUB SERPL-MCNC: 1.2 MG/DL (ref 0–1.2)
BUN SERPL-MCNC: 19 MG/DL (ref 6–23)
CALCIUM SERPL-MCNC: 9.4 MG/DL (ref 8.6–10.3)
CHLORIDE SERPL-SCNC: 103 MMOL/L (ref 98–107)
CO2 SERPL-SCNC: 30 MMOL/L (ref 21–32)
CREAT SERPL-MCNC: 0.85 MG/DL (ref 0.5–1.3)
EGFRCR SERPLBLD CKD-EPI 2021: >90 ML/MIN/1.73M*2
EOSINOPHIL # BLD AUTO: 0.01 X10*3/UL (ref 0–0.7)
EOSINOPHIL NFR BLD AUTO: 0.2 %
ERYTHROCYTE [DISTWIDTH] IN BLOOD BY AUTOMATED COUNT: 13.8 % (ref 11.5–14.5)
GLUCOSE SERPL-MCNC: 147 MG/DL (ref 74–99)
HCT VFR BLD AUTO: 45.8 % (ref 41–52)
HGB BLD-MCNC: 15.8 G/DL (ref 13.5–17.5)
IMM GRANULOCYTES # BLD AUTO: 0.01 X10*3/UL (ref 0–0.7)
IMM GRANULOCYTES NFR BLD AUTO: 0.2 % (ref 0–0.9)
LYMPHOCYTES # BLD AUTO: 1.09 X10*3/UL (ref 1.2–4.8)
LYMPHOCYTES NFR BLD AUTO: 20.9 %
MCH RBC QN AUTO: 34.3 PG (ref 26–34)
MCHC RBC AUTO-ENTMCNC: 34.5 G/DL (ref 32–36)
MCV RBC AUTO: 100 FL (ref 80–100)
MONOCYTES # BLD AUTO: 0.35 X10*3/UL (ref 0.1–1)
MONOCYTES NFR BLD AUTO: 6.7 %
NEUTROPHILS # BLD AUTO: 3.74 X10*3/UL (ref 1.2–7.7)
NEUTROPHILS NFR BLD AUTO: 71.8 %
NRBC BLD-RTO: 0 /100 WBCS (ref 0–0)
PLATELET # BLD AUTO: 114 X10*3/UL (ref 150–450)
POTASSIUM SERPL-SCNC: 4 MMOL/L (ref 3.5–5.3)
PROT SERPL-MCNC: 6.4 G/DL (ref 6.4–8.2)
RBC # BLD AUTO: 4.6 X10*6/UL (ref 4.5–5.9)
SODIUM SERPL-SCNC: 139 MMOL/L (ref 136–145)
WBC # BLD AUTO: 5.2 X10*3/UL (ref 4.4–11.3)

## 2024-09-24 ENCOUNTER — TELEPHONE (OUTPATIENT)
Dept: RADIATION ONCOLOGY | Facility: HOSPITAL | Age: 69
End: 2024-09-24
Payer: MEDICARE

## 2024-09-24 DIAGNOSIS — C71.9 GBM (GLIOBLASTOMA MULTIFORME) (MULTI): Primary | ICD-10-CM

## 2024-09-24 RX ORDER — TEMOZOLOMIDE 20 MG/1
20 CAPSULE ORAL DAILY
Qty: 5 CAPSULE | Refills: 0 | Status: SHIPPED | OUTPATIENT
Start: 2024-09-24 | End: 2024-10-23

## 2024-09-24 RX ORDER — TEMOZOLOMIDE 250 MG/1
250 CAPSULE ORAL DAILY
Qty: 5 CAPSULE | Refills: 0 | Status: SHIPPED | OUTPATIENT
Start: 2024-09-24 | End: 2024-10-23

## 2024-09-24 NOTE — PATIENT COMMUNICATION
Reviewed temozolomide C4 D22 and D29 labs. Platelets were <100k for D22 (78k) and then dropped further to 66k. Repeat CBC done yesterday showed recovery to 114k. No other significant lab abnormalities. No indication for dose reduction from toxicity/lab standpoint, but the dose will be slightly reduced due to his updated BSA (new dose 270 mg/day instead of 280 mg/day).    TMZ C5 start 9/24/24  C5 D22 labs due 10/15/24 +/- 48 hours  C5 D29 labs due 10/22/24 +/- 48 hours

## 2024-09-25 ENCOUNTER — HOSPITAL ENCOUNTER (OUTPATIENT)
Dept: RADIATION ONCOLOGY | Facility: HOSPITAL | Age: 69
Setting detail: RADIATION/ONCOLOGY SERIES
Discharge: HOME | End: 2024-09-25
Payer: MEDICARE

## 2024-09-25 ENCOUNTER — PHARMACY VISIT (OUTPATIENT)
Dept: PHARMACY | Facility: CLINIC | Age: 69
End: 2024-09-25
Payer: COMMERCIAL

## 2024-09-25 VITALS
OXYGEN SATURATION: 98 % | RESPIRATION RATE: 18 BRPM | TEMPERATURE: 97.5 F | WEIGHT: 156.1 LBS | HEIGHT: 67 IN | BODY MASS INDEX: 24.5 KG/M2 | SYSTOLIC BLOOD PRESSURE: 119 MMHG | DIASTOLIC BLOOD PRESSURE: 86 MMHG | HEART RATE: 69 BPM

## 2024-09-25 DIAGNOSIS — C71.9 GBM (GLIOBLASTOMA MULTIFORME) (MULTI): ICD-10-CM

## 2024-09-25 PROCEDURE — 99214 OFFICE O/P EST MOD 30 MIN: CPT | Performed by: STUDENT IN AN ORGANIZED HEALTH CARE EDUCATION/TRAINING PROGRAM

## 2024-09-25 PROCEDURE — RXMED WILLOW AMBULATORY MEDICATION CHARGE

## 2024-09-25 ASSESSMENT — ENCOUNTER SYMPTOMS
PSYCHIATRIC NEGATIVE: 1
NEUROLOGICAL NEGATIVE: 1
CONSTITUTIONAL NEGATIVE: 1
RESPIRATORY NEGATIVE: 1
MUSCULOSKELETAL NEGATIVE: 1
HEMATOLOGIC/LYMPHATIC NEGATIVE: 1
ENDOCRINE NEGATIVE: 1
EYES NEGATIVE: 1
GASTROINTESTINAL NEGATIVE: 1
CARDIOVASCULAR NEGATIVE: 1

## 2024-09-25 NOTE — PATIENT COMMUNICATION
TMZ C5 start 9/26/24  C5 D22 labs due 10/17/24 +/- 48 hours  C5 D29 labs due 10/24/24 +/- 48 hours

## 2024-09-26 NOTE — PROGRESS NOTES
"Radiation Oncology Follow-Up    Patient Name:  Enmanuel Ahumada  MRN:  [id number]  :  1955    Referring Provider: Evgeny Palomino MD, *  Primary Care Provider: Ricardo Youngblood DO  Care Team: Patient Care Team:  Ricardo Youngblood DO as PCP - General  Ricardo Youngblood DO as PCP - [employer] Advantage PCP  Kyle Rolon MD as Consulting Physician (Hematology and Oncology)    Date of Service: 2024    SUBJECTIVE  History of Present Illness:  Enmanuel Ahumada \"Petey" is a 68 y.o. male who was previously seen at the [facility] Department of Radiation Oncology for Routine Follow-up.    Mr. Ahumada is a 68 year old male with glioblastoma of the right parieto-occipital lobe s/p GTR on 23, MGMT methylated. S/p 60 Gy concurrent chemoRT completed treatment on 3/28/24. He is here for a routine follow-up.     He is currently getting adjuvant TMZ. He is also using optListnerd device daily with minimal side effects.         Treatment Rendered:   Proton Beam: Not Applicable Brain    Treatment Period Technique Fraction Dose Fractions Total Dose   Course 1 2/15/2024-3/28/2024  (days elapsed: 42)         Partial brain R 2/15/2024-3/19/2024 3D 182 / 182 cGy  4186 / 4,182 cGy         Brain Boost_R 3/20/2024-3/28/2024 3D 182 / 182 cGy  1274 / 1,273 cGy       Review of Systems:  Please see the nurses note for a complete ROS.     Performance Status:   The Karnofsky performance scale today is 90, Able to carry on normal activity; minor signs or symptoms of disease (ECOG equivalent 0).       OBJECTIVE  Vital Signs:  /86   Pulse 69   Temp 36.4 °C (97.5 °F) (Temporal)   Resp 18   Ht 1.702 m (5' 7\")   Wt 70.8 kg (156 lb 1.6 oz)   SpO2 98%   BMI 24.45 kg/m²   Physical Exam  Constitutional:       General: He is not in acute distress.  Eyes:      Extraocular Movements: Extraocular movements intact.      Pupils: Pupils are equal, round, and reactive to light. "   Neurological:      General: No focal deficit present.      Mental Status: He is alert and oriented to person, place, and time.      Cranial Nerves: Cranial nerves 2-12 are intact.      Motor: Motor function is intact. No weakness or pronator drift.      Coordination: Finger-Nose-Finger Test normal. Rapid alternating movements normal.      Gait: Gait is intact.      Deep Tendon Reflexes: Reflexes are normal and symmetric.      Comments: Grossly decreased left hearing compared to the right.    Psychiatric:         Mood and Affect: Mood normal.        MR brain w and wo IV contrast    Result Date: 9/9/2024  Interpreted By:  Neil Boyd, STUDY: MR BRAIN W AND WO IV CONTRAST; ;  9/9/2024 11:08 am   INDICATION: Signs/Symptoms:hx gbm, on temozolomide, treatment response.   ,C71.9 Malignant neoplasm of brain, unspecified (Multi)   COMPARISON: MRI brain from 06/24/2024.   ACCESSION NUMBER(S): [id number]   ORDERING CLINICIAN: MONCHO ACEVEDO   TECHNIQUE: MRI of the brain was performed with the acquisition of axial diffusion-weighted, axial FLAIR, axial T2 gradient echo, axial T1, axial T1 fat saturated postcontrast sequence, and volumetric axial T1 post contrast sequence with multiplanar reformats.   Contrast: 14 mL of Dotarem was injected intravenously.   FINDINGS: There are postoperative changes from right posterior craniotomy for resection of a mass in the right parietal lobe. There is a 1.4 cm in maximum thickness extra-axial fluid collection along the right parietal convexity which is unchanged in thickness and contains internal T1 hyperintense signal and restricted diffusion, most consistent with blood products. This fluid communicates with a surgical cavity in the right parietal lobe which has slightly decreased in size since prior as it now measures 2.4 cm in AP diameter compared to 2.7 cm previously. Within the surgical cavity there is T1 hyperintense signal consistent with blood products. There is also hemosiderin  deposition along the linings of the surgical cavity which communicates with the overlying extracranial fluid.   There are small amount of susceptibility artifact located within the brain parenchyma adjacent to the surgical cavity and small amount of blood products are again seen within the dependent portion of the right occipital horn.   On postcontrast sequence, there is enhancement located within the brain parenchyma surrounding the resection cavity which measures 7 mm in maximum thickness and is slightly more pronounced in appearance since prior when it was more ill-defined and linear. Overall thickness of enhancement is stable to slightly increased since prior when it measured 5 mm. Enhancement again extends to the border of the atrium of the right lateral ventricle with subtle subependymal enhancement not excluded.   There is another small focus of enhancement located more laterally within the right parietal lobe which now measures 1.5 x 0.9 cm compared to 1.3 x 0.8 cm. Thickness of the enhancement has increased since prior as it now measures up to 4 mm and previously measured up to 2 mm. There is also linear enhancement located within the central nonenhancing portion of the lesion.   There is persistent increased signal on the diffusion-weighted sequence located within the brain parenchyma surrounding the surgical cavity and extending into the right splenium of the corpus callosum and adjacent to the right periatrial white matter as well as in the white matter adjacent to the right occipital horn.   There is FLAIR hyperintense signal within the brain parenchyma surrounding the resection cavity located within the right parietal, temporal, and occipital lobes. There is new FLAIR hyperintense signal extending into the right frontal lobe centrum semiovale (series 4, image 32). There is more pronounced FLAIR hyperintense signal extending across the midline via the splenium of the corpus callosum.   Narrowing of the  atrium of the right lateral ventricle has decreased since prior. There is no striking midline shift.   There are few scattered foci of T2 hyperintensity within the bilateral cerebral hemispheric white matter which are nonspecific. There is no acute intracranial hemorrhage or infarct.   There is a small mucosal cyst located within the left frontal sinus and frontal recess with internal T1 hyperintense signal which probably reflects inspissated mucus versus noninvasive fungal elements. There is partial opacification of the right mastoid air cells with nonspecific fluid.       1. Evolving postoperative changes from resection of a right parietal lobe mass.   2. Enhancement surrounding the surgical cavity is slightly more conspicuous and enhancing lesion located more laterally within the right parietal lobe, but contiguous with the enhancement surrounding the resection cavity, is slightly increased in size and demonstrates increased thickness of rim enhancement. This interval change is favored to reflect sequela of treatment related changes.   3. Signal abnormality in the brain parenchyma surrounding the resection cavity has overall slightly increased in extent, most pronounced within the right frontal lobe and within the splenium of the corpus callosum and is favored to primarily treatment related changes. Associated narrowing of the posterior portion of the right lateral ventricle has decreased.   This study was interpreted at [facility].   MACRO: None   Signed by: Neil Boyd 9/9/2024 3:37 PM Dictation workstation:   [id number]    MR brain w and wo IV contrast    Result Date: 6/24/2024  Interpreted By:  Gen Phelps and Nakamoto Kent STUDY: MR BRAIN W AND WO IV CONTRAST;  6/24/2024 12:15 pm   INDICATION: Signs/Symptoms:brain tumor treatment response. Per EMR 68-year-old male with past medical history of malignant glioma MGMT methylated status post surgical resection in 12/2023.  Patient is also status post chemoradiation (3/28/24). Patient is currently receiving treatment with temozolomide.   COMPARISON: MRI brain 04/25/2024   ACCESSION NUMBER(S): [id number]   ORDERING CLINICIAN: MONCHO ACEVEDO   TECHNIQUE: Axial T2, FLAIR, DWI, gradient echo T2 and T1 weighted images of brain were acquired. Post contrast T1 weighted images were acquired after administration of 14 mL of gadolinium based intravenous contrast.   FINDINGS: Postoperative changes from right-sided craniotomy are again noted with similar blooming artifact near the surgical site. The extra-axial fluid collection deep to the craniotomy site measures 0.9 cm, which is similar compared to prior exam. Right parietal lobe surgical resection cavity is slightly decreased in size measuring 2.9 x 2.1 cm (series 8, image 18), previously 2.9 x 2.7 cm. There is persistent rim enhancement surrounding the surgical resection cavity within the right parietal lobe.   There is stable ill-defined and nodular focus of enhancement along the white matter of the right ventricular atrium and right splenium as well as along the margins of the resection cavity and along the dura underlying the craniotomy flap. There is a new 1.3 x 0.8 cm bilobed rim enhancing lesion within the right parietal lobe anterior to the resection cavity (series 9, image 17). Trace susceptibility along the margins of the lesion. No associated diffusion restriction..   The T2/FLAIR hyperintense signal surrounding the surgical resection site is more pronounced along the anterior margin compared to prior exam. The T2/FLAIR hyperintense signal also appears to extend more inferiorly compared to the prior exam to the level of the manuel (series 5, image 28).   Redemonstrated diffusion restriction along the surgical resection cavity margins, which could be compatible with tumor or blood products.   No diffusion restriction abnormality to suggest acute infarct. No herniation, midline shift, or  mass effect. There is minimal to mild brain parenchymal volume loss with associated enlargement of the ventricles and sulci. Minimal ovoid subcortical and periventricular T2/FLAIR hyperintense signal that likely represents sequela of chronic microangiopathy.   Paranasal Sinuses and Mastoids: Similar right-sided mastoid effusion. Redemonstrated opacification within the left frontal sinus, and left frontal ethmoid air cells. Otherwise, visualized paranasal sinuses and mastoid air cells are unremarkable.       Status post right posterior craniotomy for resection of right parietal glioblastoma. Evolving blood products within extra-axial collection underlying the craniotomy flap again measuring 9 mm thickness and resulting in mild mass effect on underlying parenchyma. No midline shift.   Stable appearance irregular enhancement involving the periatrial white matter and right splenium of the corpus callosum, margins of the resection cavity, and dura underlying the craniotomy flap.   However, new 1.3 x 0.8 cm bilobed rim enhancing lesion in the right parietal lobe anterior to the resection cavity as well as progression of T2 hyperintense FLAIR signal now extending anteriorly to involve the anterior right periatrial white matter and lateral perirolandic cortex. 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 However, disease progression can not be excluded. Continued follow-up recommended.   I personally reviewed the images/study and I agree with the findings as stated by Geraldo Plummer MD. This study was interpreted at [facility], [place], [place].   MACRO: None   Signed by: Gen Phelps 6/24/2024 4:51 PM Dictation workstation:   [id number]    MR brain w and wo IV contrast    Result Date: 4/26/2024  Interpreted By:  Sebastian Macias, STUDY: MR BRAIN W AND WO IV CONTRAST;  4/25/2024 6:55 pm   INDICATION: Signs/Symptoms:Follow up of GBM  after chemo-RT.   COMPARISON: January 27.   ACCESSION NUMBER(S): [id number]   ORDERING CLINICIAN: MARIUM MEZA   TECHNIQUE: The brain was studied in the sagittal, axial and coronal planes utilizing FLAIR, T1 and T2 weighted images both before and following intravenous injection of Dotarem   FINDINGS: * Cortical resection defect in the right parietal lobe measures a proximally 2 cm x 3 cm in size with hemosiderin lined margins consistent with previous surgery *Following contrast injection, there is linear enhancement with ill-defined enhancement peripheral to the hemosiderin which extends anteromedially into the subependymal white matter of the right ventricular atrium. A poorly defined focus of abnormal enhancement is present in the right splenium and subependymal white matter at this location measuring a proximally 1.5 cm x 2.5 cm in size. Residual/recurrent neoplasm is not excluded at this location. *Normal size ventricular system *There is diffusion restriction within the surgical site and the surgical resection defect; however, there is no abnormal diffusion restriction elsewhere to suggest infarction. *Gradient echo T2 weighted images demonstrate hemosiderin deposition or mineralization at the margins of the cortical resection defect without other foci of hemorrhage. *The skull base, paranasal sinuses and orbital structures are normal. There is retained fluid in the right mastoid air cells.       * Postoperative changes as described *Abnormal enhancement and nodularity in the right splenium and subependymal white matter have developed at the deepest aspect of the cortical resection defect and could represent residual postoperative changes; however, residual or recurrent neoplasm in the right splenium and subependymal white matter are not excluded.   MACRO: none   Signed by: Sebastian Macias 4/26/2024 9:03 AM Dictation workstation:   [id number]    MR brain w and wo IV contrast    Result Date:  1/29/2024  Interpreted By:  Kavya Chauhan, STUDY: MR BRAIN W AND WO IV CONTRAST;  1/27/2024 6:09 pm   INDICATION: Signs/Symptoms:stat order for scheduling only. radiation treatment planning.   COMPARISON: 01/31/2023.   ACCESSION NUMBER(S): [id number]   ORDERING CLINICIAN: LISA SHEEHAN   TECHNIQUE: Axial T2, FLAIR, DWI, gradient echo T2 and sagittal and coronal T1 weighted images of brain were acquired. Post contrast T1 weighted images were acquired after administration of 15 mL Dotarem gadolinium based intravenous contrast.   FINDINGS: Redemonstrated are postsurgical changes of a right sided craniotomy. There is an extra-axial fluid collection deep to the craniotomy site measuring 1 cm, increased from the prior exam.   There is a surgical resection cavity within the right parietal lobe which has increased since the prior exam measuring 4.2 x 3.1 cm containing intrinsic T1 hyperintense material compatible with blood products. There is a rim of enhancement surrounding the surgical resection cavity. There is a rim of enhancement surrounding the extra-axial fluid collection adjacent to the craniotomy site. The enhancement along the craniotomy site is thickened and nodular along the medial aspect, which is also the posterior margin of the surgical resection cavity.   There is T2 and FLAIR hyperintense signal surrounding the surgical resection cavity most pronounced along the anterior margin within the temporoparietal region. There is also increased signal within the splenium of the corpus callosum on the right. Susceptibility artifact along the surgical resection margins likely represents postsurgical change, mineralization and or blood products.   Diffusion-weighted imaging demonstrates no evidence of an acute infarct. There is diffusion restriction along the margin of the surgical resection cavity which may represent blood products, and or tumor.   Ventricles, cortical sulci and basal cisterns are within normal limits  given the patient's stated age. There is mass effect upon the right occipital horn which is effaced. No midline shift.   There are few nonspecific punctate foci of subcortical and periventricular T2 and FLAIR hyperintense signal which are presumed microangiopathy.   Major intracranial flow voids at the skull base are unremarkable. There is partial opacification of the left frontal sinus, right maxillary sinus and ethmoid air cells.   Cerebellar tonsils are above the foramen magnum. Pituitary and sella are not enlarged. No additional abnormal parenchymal enhancement.       Redemonstrated are postsurgical changes of a right sided craniotomy. There is an extra-axial fluid collection deep to the craniotomy site measuring 1 cm, increased from the prior exam. There is thickened nodular enhancement along the margins of the fluid collection.   There is a surgical resection cavity within the right parietal lobe which is increased in size since the prior exam measuring 4.2 x 3.1 cm containing blood products. There is a rim of enhancement around the surgical resection cavity. There is T2 and FLAIR hyperintense signal surrounding the surgical resection cavity most pronounced along the anterior margin within the temporoparietal region. There is increased signal within the splenium of the corpus callosum on the right.   Few nonspecific punctate foci of white matter signal are presumed microangiopathy.   Mild mucosal thickening within the left frontal and right maxillary sinus.   MACRO: None   Signed by: Kavya Chauhan 1/29/2024 9:10 AM Dictation workstation:   [id number]    MR brain w and wo IV contrast    Result Date: 12/31/2023  Interpreted By:  Cecil Aguiar, STUDY: MR BRAIN W AND WO IV CONTRAST;  12/31/2023 10:40 am   INDICATION: Signs/Symptoms:post op.  Preliminary pathology high-grade glioma.   COMPARISON: 12/29/2023 postop CT and 12/26/2023 preop MR.   ACCESSION NUMBER(S): [id number]   ORDERING CLINICIAN: ORQUIDEA HERRMANN    TECHNIQUE: Axial T2, FLAIR, DWI, gradient echo T2 and sagittal and coronal T1 weighted images of brain were acquired. Post contrast T1 weighted images were acquired after administration of 15 mL gadobenate (Multihance) gadolinium based intravenous contrast.   FINDINGS: The right parieto-occipital mass has been resected. The surgical bed has small amounts of fluid and thin band of hemorrhage around the margin. There is minimal enhancement around the margin with no nodular like enhancement identified.   The amount of air within the cavity has decreased from the postoperative CT The surgical bed remains surrounded by a band of nonenhancing FLAIR/T2 white matter hyperintensity.   No new infarct, hemorrhage or mass is noted.   Overall mass-effect has significantly improved with better visualization of the ambient cistern. The atrium of the right lateral ventricle remains effaced and slightly displaced anteriorly in the region of the surgical bed. The right cerebral sulci remain mildly effaced in the area of the surgical bed as well.   The remaining ventricular system is now normal.       Postoperative changes as noted above. There has been significant improvement in mass effect since resection of the mass; the margins of the surgical bed have minimal if any enhancement with no nodular enhancement suggestive of residual tumor noted.   MACRO: None   Signed by: Cecil Aguiar 12/31/2023 11:23 AM Dictation workstation:   [id number]      ASSESSMENT:   Enmanuel Ahumada is a 68 y.o. male with glioblastoma, MGMT methylated, s/p GTR, 60 Gy of concurrent ChemoRT with TMZ completing treatment on 3/28/24. She is currently receiving adjuvant chemotherapy and TTF.     PLAN:  I personally reviewed his imaging, there is more flair abnormality and some enhancement compared to his previous MRI. 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 He has minimal residual side effects  of RT. We may need to order MRI brain with perfusion for his next MRI brain. Return to clinic in 3 months.     Evgeny Palomino MD, MS

## 2024-09-30 DIAGNOSIS — C71.9 GBM (GLIOBLASTOMA MULTIFORME) (MULTI): Primary | ICD-10-CM

## 2024-09-30 RX ORDER — DEXAMETHASONE 1 MG/1
1 TABLET ORAL 2 TIMES DAILY
Qty: 60 TABLET | Refills: 2 | Status: SHIPPED | OUTPATIENT
Start: 2024-09-30 | End: 2024-10-30

## 2024-10-16 ENCOUNTER — TELEPHONE (OUTPATIENT)
Dept: HEMATOLOGY/ONCOLOGY | Facility: HOSPITAL | Age: 69
End: 2024-10-16
Payer: MEDICARE

## 2024-10-16 NOTE — TELEPHONE ENCOUNTER
Called patient regarding rescheduling appointment and lab work orders. Advised orders were in for his labs, and that we could reschedule his appointment for Friday 11/1/24 at 10:00am, and if there were any there issues with scheduling we would reach out to him. Patient agreeable and appreciative of phone call.

## 2024-10-17 ENCOUNTER — LAB (OUTPATIENT)
Dept: LAB | Facility: LAB | Age: 69
End: 2024-10-17
Payer: MEDICARE

## 2024-10-17 DIAGNOSIS — C71.9 GBM (GLIOBLASTOMA MULTIFORME) (MULTI): ICD-10-CM

## 2024-10-17 LAB
ALBUMIN SERPL BCP-MCNC: 4.2 G/DL (ref 3.4–5)
ALP SERPL-CCNC: 81 U/L (ref 33–136)
ALT SERPL W P-5'-P-CCNC: 22 U/L (ref 10–52)
ANION GAP SERPL CALC-SCNC: 12 MMOL/L (ref 10–20)
AST SERPL W P-5'-P-CCNC: 13 U/L (ref 9–39)
BASOPHILS # BLD AUTO: 0.02 X10*3/UL (ref 0–0.1)
BASOPHILS NFR BLD AUTO: 0.2 %
BILIRUB SERPL-MCNC: 1.7 MG/DL (ref 0–1.2)
BUN SERPL-MCNC: 24 MG/DL (ref 6–23)
CALCIUM SERPL-MCNC: 9.4 MG/DL (ref 8.6–10.3)
CHLORIDE SERPL-SCNC: 103 MMOL/L (ref 98–107)
CO2 SERPL-SCNC: 30 MMOL/L (ref 21–32)
CREAT SERPL-MCNC: 0.91 MG/DL (ref 0.5–1.3)
EGFRCR SERPLBLD CKD-EPI 2021: >90 ML/MIN/1.73M*2
EOSINOPHIL # BLD AUTO: 0.05 X10*3/UL (ref 0–0.7)
EOSINOPHIL NFR BLD AUTO: 0.6 %
ERYTHROCYTE [DISTWIDTH] IN BLOOD BY AUTOMATED COUNT: 13.2 % (ref 11.5–14.5)
GLUCOSE SERPL-MCNC: 126 MG/DL (ref 74–99)
HCT VFR BLD AUTO: 47.7 % (ref 41–52)
HGB BLD-MCNC: 16.6 G/DL (ref 13.5–17.5)
IMM GRANULOCYTES # BLD AUTO: 0.04 X10*3/UL (ref 0–0.7)
IMM GRANULOCYTES NFR BLD AUTO: 0.5 % (ref 0–0.9)
LYMPHOCYTES # BLD AUTO: 1.01 X10*3/UL (ref 1.2–4.8)
LYMPHOCYTES NFR BLD AUTO: 12.4 %
MCH RBC QN AUTO: 34.9 PG (ref 26–34)
MCHC RBC AUTO-ENTMCNC: 34.8 G/DL (ref 32–36)
MCV RBC AUTO: 100 FL (ref 80–100)
MONOCYTES # BLD AUTO: 0.56 X10*3/UL (ref 0.1–1)
MONOCYTES NFR BLD AUTO: 6.9 %
NEUTROPHILS # BLD AUTO: 6.47 X10*3/UL (ref 1.2–7.7)
NEUTROPHILS NFR BLD AUTO: 79.4 %
NRBC BLD-RTO: 0 /100 WBCS (ref 0–0)
PLATELET # BLD AUTO: 122 X10*3/UL (ref 150–450)
POTASSIUM SERPL-SCNC: 3.8 MMOL/L (ref 3.5–5.3)
PROT SERPL-MCNC: 6.3 G/DL (ref 6.4–8.2)
RBC # BLD AUTO: 4.76 X10*6/UL (ref 4.5–5.9)
SODIUM SERPL-SCNC: 141 MMOL/L (ref 136–145)
WBC # BLD AUTO: 8.2 X10*3/UL (ref 4.4–11.3)

## 2024-10-22 ENCOUNTER — APPOINTMENT (OUTPATIENT)
Dept: HEMATOLOGY/ONCOLOGY | Facility: HOSPITAL | Age: 69
End: 2024-10-22
Payer: MEDICARE

## 2024-10-24 ENCOUNTER — LAB (OUTPATIENT)
Dept: LAB | Facility: LAB | Age: 69
End: 2024-10-24
Payer: MEDICARE

## 2024-10-24 DIAGNOSIS — C71.9 GBM (GLIOBLASTOMA MULTIFORME) (MULTI): ICD-10-CM

## 2024-10-24 LAB
ALBUMIN SERPL BCP-MCNC: 4.2 G/DL (ref 3.4–5)
ALP SERPL-CCNC: 93 U/L (ref 33–136)
ALT SERPL W P-5'-P-CCNC: 26 U/L (ref 10–52)
ANION GAP SERPL CALC-SCNC: 10 MMOL/L (ref 10–20)
AST SERPL W P-5'-P-CCNC: 13 U/L (ref 9–39)
BASOPHILS # BLD AUTO: 0.04 X10*3/UL (ref 0–0.1)
BASOPHILS NFR BLD AUTO: 0.5 %
BILIRUB SERPL-MCNC: 1.4 MG/DL (ref 0–1.2)
BUN SERPL-MCNC: 15 MG/DL (ref 6–23)
CALCIUM SERPL-MCNC: 9.7 MG/DL (ref 8.6–10.3)
CHLORIDE SERPL-SCNC: 105 MMOL/L (ref 98–107)
CO2 SERPL-SCNC: 32 MMOL/L (ref 21–32)
CREAT SERPL-MCNC: 0.94 MG/DL (ref 0.5–1.3)
EGFRCR SERPLBLD CKD-EPI 2021: 89 ML/MIN/1.73M*2
EOSINOPHIL # BLD AUTO: 0.11 X10*3/UL (ref 0–0.7)
EOSINOPHIL NFR BLD AUTO: 1.5 %
ERYTHROCYTE [DISTWIDTH] IN BLOOD BY AUTOMATED COUNT: 13.2 % (ref 11.5–14.5)
GLUCOSE SERPL-MCNC: 90 MG/DL (ref 74–99)
HCT VFR BLD AUTO: 46.8 % (ref 41–52)
HGB BLD-MCNC: 15.7 G/DL (ref 13.5–17.5)
IMM GRANULOCYTES # BLD AUTO: 0.06 X10*3/UL (ref 0–0.7)
IMM GRANULOCYTES NFR BLD AUTO: 0.8 % (ref 0–0.9)
LYMPHOCYTES # BLD AUTO: 1.14 X10*3/UL (ref 1.2–4.8)
LYMPHOCYTES NFR BLD AUTO: 15.1 %
MCH RBC QN AUTO: 34.1 PG (ref 26–34)
MCHC RBC AUTO-ENTMCNC: 33.5 G/DL (ref 32–36)
MCV RBC AUTO: 102 FL (ref 80–100)
MONOCYTES # BLD AUTO: 0.72 X10*3/UL (ref 0.1–1)
MONOCYTES NFR BLD AUTO: 9.6 %
NEUTROPHILS # BLD AUTO: 5.46 X10*3/UL (ref 1.2–7.7)
NEUTROPHILS NFR BLD AUTO: 72.5 %
NRBC BLD-RTO: 0 /100 WBCS (ref 0–0)
PLATELET # BLD AUTO: 100 X10*3/UL (ref 150–450)
POTASSIUM SERPL-SCNC: 4.1 MMOL/L (ref 3.5–5.3)
PROT SERPL-MCNC: 5.9 G/DL (ref 6.4–8.2)
RBC # BLD AUTO: 4.61 X10*6/UL (ref 4.5–5.9)
SODIUM SERPL-SCNC: 143 MMOL/L (ref 136–145)
WBC # BLD AUTO: 7.5 X10*3/UL (ref 4.4–11.3)

## 2024-10-24 PROCEDURE — 85025 COMPLETE CBC W/AUTO DIFF WBC: CPT

## 2024-10-24 PROCEDURE — 80053 COMPREHEN METABOLIC PANEL: CPT

## 2024-10-25 DIAGNOSIS — C71.9 GBM (GLIOBLASTOMA MULTIFORME) (MULTI): Primary | ICD-10-CM

## 2024-10-28 ENCOUNTER — LAB (OUTPATIENT)
Dept: LAB | Facility: LAB | Age: 69
End: 2024-10-28
Payer: MEDICARE

## 2024-10-28 DIAGNOSIS — C71.9 GBM (GLIOBLASTOMA MULTIFORME) (MULTI): ICD-10-CM

## 2024-10-28 LAB
BASOPHILS # BLD AUTO: 0.04 X10*3/UL (ref 0–0.1)
BASOPHILS NFR BLD AUTO: 0.7 %
EOSINOPHIL # BLD AUTO: 0.08 X10*3/UL (ref 0–0.7)
EOSINOPHIL NFR BLD AUTO: 1.4 %
ERYTHROCYTE [DISTWIDTH] IN BLOOD BY AUTOMATED COUNT: 13.2 % (ref 11.5–14.5)
HCT VFR BLD AUTO: 43.9 % (ref 41–52)
HGB BLD-MCNC: 15.2 G/DL (ref 13.5–17.5)
IMM GRANULOCYTES # BLD AUTO: 0.03 X10*3/UL (ref 0–0.7)
IMM GRANULOCYTES NFR BLD AUTO: 0.5 % (ref 0–0.9)
LYMPHOCYTES # BLD AUTO: 2.08 X10*3/UL (ref 1.2–4.8)
LYMPHOCYTES NFR BLD AUTO: 35.2 %
MCH RBC QN AUTO: 34.5 PG (ref 26–34)
MCHC RBC AUTO-ENTMCNC: 34.6 G/DL (ref 32–36)
MCV RBC AUTO: 100 FL (ref 80–100)
MONOCYTES # BLD AUTO: 0.62 X10*3/UL (ref 0.1–1)
MONOCYTES NFR BLD AUTO: 10.5 %
NEUTROPHILS # BLD AUTO: 3.06 X10*3/UL (ref 1.2–7.7)
NEUTROPHILS NFR BLD AUTO: 51.7 %
NRBC BLD-RTO: 0 /100 WBCS (ref 0–0)
PLATELET # BLD AUTO: 102 X10*3/UL (ref 150–450)
RBC # BLD AUTO: 4.4 X10*6/UL (ref 4.5–5.9)
WBC # BLD AUTO: 5.9 X10*3/UL (ref 4.4–11.3)

## 2024-10-28 PROCEDURE — 85025 COMPLETE CBC W/AUTO DIFF WBC: CPT

## 2024-10-29 ENCOUNTER — APPOINTMENT (OUTPATIENT)
Dept: HEMATOLOGY/ONCOLOGY | Facility: HOSPITAL | Age: 69
End: 2024-10-29
Payer: MEDICARE

## 2024-10-29 ENCOUNTER — TELEPHONE (OUTPATIENT)
Dept: HEMATOLOGY/ONCOLOGY | Facility: HOSPITAL | Age: 69
End: 2024-10-29
Payer: MEDICARE

## 2024-11-01 ENCOUNTER — OFFICE VISIT (OUTPATIENT)
Dept: HEMATOLOGY/ONCOLOGY | Facility: HOSPITAL | Age: 69
End: 2024-11-01
Payer: MEDICARE

## 2024-11-01 ENCOUNTER — SPECIALTY PHARMACY (OUTPATIENT)
Dept: PHARMACY | Facility: CLINIC | Age: 69
End: 2024-11-01

## 2024-11-01 ENCOUNTER — PHARMACY VISIT (OUTPATIENT)
Dept: PHARMACY | Facility: CLINIC | Age: 69
End: 2024-11-01
Payer: COMMERCIAL

## 2024-11-01 VITALS
RESPIRATION RATE: 16 BRPM | DIASTOLIC BLOOD PRESSURE: 83 MMHG | HEART RATE: 77 BPM | OXYGEN SATURATION: 100 % | BODY MASS INDEX: 26.21 KG/M2 | WEIGHT: 167.33 LBS | SYSTOLIC BLOOD PRESSURE: 131 MMHG | TEMPERATURE: 95.7 F

## 2024-11-01 DIAGNOSIS — Z51.11 ENCOUNTER FOR CHEMOTHERAPY MANAGEMENT: Primary | ICD-10-CM

## 2024-11-01 DIAGNOSIS — C71.9 GBM (GLIOBLASTOMA MULTIFORME) (MULTI): Primary | ICD-10-CM

## 2024-11-01 DIAGNOSIS — C71.9 GBM (GLIOBLASTOMA MULTIFORME) (MULTI): ICD-10-CM

## 2024-11-01 PROCEDURE — RXMED WILLOW AMBULATORY MEDICATION CHARGE

## 2024-11-01 PROCEDURE — 99214 OFFICE O/P EST MOD 30 MIN: CPT

## 2024-11-01 RX ORDER — TEMOZOLOMIDE 250 MG/1
250 CAPSULE ORAL DAILY
Qty: 5 CAPSULE | Refills: 0 | Status: SHIPPED | OUTPATIENT
Start: 2024-11-01 | End: 2024-11-29

## 2024-11-01 RX ORDER — TEMOZOLOMIDE 20 MG/1
20 CAPSULE ORAL DAILY
Qty: 5 CAPSULE | Refills: 0 | Status: SHIPPED | OUTPATIENT
Start: 2024-11-01 | End: 2024-11-29

## 2024-11-01 ASSESSMENT — ENCOUNTER SYMPTOMS
SPEECH DIFFICULTY: 0
FEVER: 0
CHILLS: 0
SEIZURES: 0
FATIGUE: 1
EXTREMITY WEAKNESS: 0
DIARRHEA: 0
CONFUSION: 0
VOMITING: 0
HEADACHES: 0
ABDOMINAL PAIN: 0
CONSTIPATION: 1
NAUSEA: 0

## 2024-11-01 ASSESSMENT — PAIN SCALES - GENERAL: PAINLEVEL_OUTOF10: 0-NO PAIN

## 2024-11-05 ENCOUNTER — SPECIALTY PHARMACY (OUTPATIENT)
Dept: PHARMACY | Facility: CLINIC | Age: 69
End: 2024-11-05

## 2024-11-05 NOTE — PROGRESS NOTES
"[facility] Clinical Note    Enmanuel Ahumada \"Dash\" is a 68 y.o. male, who is on the specialty pharmacy service for management of:  Oncology Core.    Enmanuel Ahumada \"Dash\" is taking: Temozolomide.    Medication Start Date (planned or actual): 11/1/24    Enmanuel was contacted on 11/5/2024 at 1:39 PM for a virtual pharmacy visit with encounter number [id number] from:   [facility]  [address] [place] [id number]  Dept: [phone]  Dept Fax: [phone]    Enmanuel was offered a Telemedicine Video visit or Telephone visit.  Enmanuel consented to a telephoen visit, which was performed.    The most recent encounter visit with the referring prescriber Jacki Simpson on 11/1/24 (Date) was reviewed.  Pharmacy will continue to collaborate in the care of this patient with the referring prescriber Jacki Simpson.    General Assessment      Impression/Plan  IMPRESSION/PLAN:  Is patient high risk (potential patients:  pregnancy, geriatric, pediatric)?  geriatric  Is laboratory follow-up needed? Per prescriber  Is a clinical intervention needed? no  Next reassessment date? 3 months   Additional comments:     Refer to the encounter summary report for documentation details about patient counseling and education.      Medication Adherence    The importance of adherence was discussed with the patient and they were advised to take the medication as prescribed by their provider. Patient was encouraged to call their physician's office if they have a question regarding a missed dose.     QOL/Patient Satisfaction  Rate your quality of life on scale of 1-10: 9  Rate your satisfaction with  Specialty Pharmacy on scale of 1-10: 10 - Completely satisfied      Patient was advised to contact the pharmacy if there are any changes to their medication list, including prescriptions, OTC medications, herbal products, or supplements. Patient was advised of [facility]" [facility]'s dispensing process, refill timeline, contact information ([phone]), and patient management follow up. Patient confirmed understanding of education conducted during assessment. All patient questions and concerns were addressed to the best of my ability. Patient was encouraged to contact the specialty pharmacy with any questions or concerns.    Confirmed follow-up outreaches are properly scheduled and reviewed goals of therapy with the patient.        Taco Martinez, PharmD

## 2024-11-22 ENCOUNTER — HOSPITAL ENCOUNTER (OUTPATIENT)
Dept: RADIOLOGY | Facility: HOSPITAL | Age: 69
Discharge: HOME | End: 2024-11-22
Payer: MEDICARE

## 2024-11-22 ENCOUNTER — LAB (OUTPATIENT)
Dept: LAB | Facility: LAB | Age: 69
End: 2024-11-22
Payer: MEDICARE

## 2024-11-22 DIAGNOSIS — C71.9 GBM (GLIOBLASTOMA MULTIFORME) (MULTI): ICD-10-CM

## 2024-11-22 LAB
ALBUMIN SERPL BCP-MCNC: 4.3 G/DL (ref 3.4–5)
ALP SERPL-CCNC: 95 U/L (ref 33–136)
ALT SERPL W P-5'-P-CCNC: 33 U/L (ref 10–52)
ANION GAP SERPL CALC-SCNC: 13 MMOL/L (ref 10–20)
AST SERPL W P-5'-P-CCNC: 16 U/L (ref 9–39)
BASOPHILS # BLD AUTO: 0.02 X10*3/UL (ref 0–0.1)
BASOPHILS NFR BLD AUTO: 0.3 %
BILIRUB SERPL-MCNC: 1.4 MG/DL (ref 0–1.2)
BUN SERPL-MCNC: 18 MG/DL (ref 6–23)
CALCIUM SERPL-MCNC: 9.7 MG/DL (ref 8.6–10.3)
CHLORIDE SERPL-SCNC: 106 MMOL/L (ref 98–107)
CO2 SERPL-SCNC: 26 MMOL/L (ref 21–32)
CREAT SERPL-MCNC: 0.84 MG/DL (ref 0.5–1.3)
EGFRCR SERPLBLD CKD-EPI 2021: >90 ML/MIN/1.73M*2
EOSINOPHIL # BLD AUTO: 0.05 X10*3/UL (ref 0–0.7)
EOSINOPHIL NFR BLD AUTO: 0.8 %
ERYTHROCYTE [DISTWIDTH] IN BLOOD BY AUTOMATED COUNT: 12.6 % (ref 11.5–14.5)
GLUCOSE SERPL-MCNC: 124 MG/DL (ref 74–99)
HCT VFR BLD AUTO: 45.5 % (ref 41–52)
HGB BLD-MCNC: 15.4 G/DL (ref 13.5–17.5)
IMM GRANULOCYTES # BLD AUTO: 0.03 X10*3/UL (ref 0–0.7)
IMM GRANULOCYTES NFR BLD AUTO: 0.5 % (ref 0–0.9)
LYMPHOCYTES # BLD AUTO: 1.99 X10*3/UL (ref 1.2–4.8)
LYMPHOCYTES NFR BLD AUTO: 33.5 %
MCH RBC QN AUTO: 34.5 PG (ref 26–34)
MCHC RBC AUTO-ENTMCNC: 33.8 G/DL (ref 32–36)
MCV RBC AUTO: 102 FL (ref 80–100)
MONOCYTES # BLD AUTO: 0.64 X10*3/UL (ref 0.1–1)
MONOCYTES NFR BLD AUTO: 10.8 %
NEUTROPHILS # BLD AUTO: 3.21 X10*3/UL (ref 1.2–7.7)
NEUTROPHILS NFR BLD AUTO: 54.1 %
NRBC BLD-RTO: 0 /100 WBCS (ref 0–0)
PLATELET # BLD AUTO: 143 X10*3/UL (ref 150–450)
POTASSIUM SERPL-SCNC: 3.8 MMOL/L (ref 3.5–5.3)
PROT SERPL-MCNC: 6.5 G/DL (ref 6.4–8.2)
RBC # BLD AUTO: 4.46 X10*6/UL (ref 4.5–5.9)
SODIUM SERPL-SCNC: 141 MMOL/L (ref 136–145)
WBC # BLD AUTO: 5.9 X10*3/UL (ref 4.4–11.3)

## 2024-11-22 PROCEDURE — 80053 COMPREHEN METABOLIC PANEL: CPT

## 2024-11-22 PROCEDURE — 70553 MRI BRAIN STEM W/O & W/DYE: CPT

## 2024-11-22 PROCEDURE — 2550000001 HC RX 255 CONTRASTS

## 2024-11-22 PROCEDURE — A9575 INJ GADOTERATE MEGLUMI 0.1ML: HCPCS

## 2024-11-22 PROCEDURE — 85025 COMPLETE CBC W/AUTO DIFF WBC: CPT

## 2024-11-22 RX ORDER — GADOTERATE MEGLUMINE 376.9 MG/ML
24 INJECTION INTRAVENOUS
Status: COMPLETED | OUTPATIENT
Start: 2024-11-22 | End: 2024-11-22

## 2024-11-27 ENCOUNTER — LAB (OUTPATIENT)
Dept: LAB | Facility: LAB | Age: 69
End: 2024-11-27
Payer: MEDICARE

## 2024-11-27 DIAGNOSIS — C71.9 GBM (GLIOBLASTOMA MULTIFORME) (MULTI): ICD-10-CM

## 2024-11-27 LAB
ALBUMIN SERPL BCP-MCNC: 4.4 G/DL (ref 3.4–5)
ALP SERPL-CCNC: 85 U/L (ref 33–136)
ALT SERPL W P-5'-P-CCNC: 25 U/L (ref 10–52)
ANION GAP SERPL CALC-SCNC: 12 MMOL/L (ref 10–20)
AST SERPL W P-5'-P-CCNC: 18 U/L (ref 9–39)
BASOPHILS # BLD AUTO: 0.02 X10*3/UL (ref 0–0.1)
BASOPHILS NFR BLD AUTO: 0.4 %
BILIRUB SERPL-MCNC: 1.5 MG/DL (ref 0–1.2)
BUN SERPL-MCNC: 18 MG/DL (ref 6–23)
CALCIUM SERPL-MCNC: 9.7 MG/DL (ref 8.6–10.3)
CHLORIDE SERPL-SCNC: 105 MMOL/L (ref 98–107)
CO2 SERPL-SCNC: 29 MMOL/L (ref 21–32)
CREAT SERPL-MCNC: 0.87 MG/DL (ref 0.5–1.3)
EGFRCR SERPLBLD CKD-EPI 2021: >90 ML/MIN/1.73M*2
EOSINOPHIL # BLD AUTO: 0.04 X10*3/UL (ref 0–0.7)
EOSINOPHIL NFR BLD AUTO: 0.8 %
ERYTHROCYTE [DISTWIDTH] IN BLOOD BY AUTOMATED COUNT: 12.6 % (ref 11.5–14.5)
GLUCOSE SERPL-MCNC: 86 MG/DL (ref 74–99)
HCT VFR BLD AUTO: 44.1 % (ref 41–52)
HGB BLD-MCNC: 15.7 G/DL (ref 13.5–17.5)
IMM GRANULOCYTES # BLD AUTO: 0.02 X10*3/UL (ref 0–0.7)
IMM GRANULOCYTES NFR BLD AUTO: 0.4 % (ref 0–0.9)
LYMPHOCYTES # BLD AUTO: 1.61 X10*3/UL (ref 1.2–4.8)
LYMPHOCYTES NFR BLD AUTO: 31.8 %
MCH RBC QN AUTO: 35.6 PG (ref 26–34)
MCHC RBC AUTO-ENTMCNC: 35.6 G/DL (ref 32–36)
MCV RBC AUTO: 100 FL (ref 80–100)
MONOCYTES # BLD AUTO: 0.65 X10*3/UL (ref 0.1–1)
MONOCYTES NFR BLD AUTO: 12.8 %
NEUTROPHILS # BLD AUTO: 2.72 X10*3/UL (ref 1.2–7.7)
NEUTROPHILS NFR BLD AUTO: 53.8 %
NRBC BLD-RTO: 0 /100 WBCS (ref 0–0)
PLATELET # BLD AUTO: 123 X10*3/UL (ref 150–450)
POTASSIUM SERPL-SCNC: 4 MMOL/L (ref 3.5–5.3)
PROT SERPL-MCNC: 6.6 G/DL (ref 6.4–8.2)
RBC # BLD AUTO: 4.41 X10*6/UL (ref 4.5–5.9)
SODIUM SERPL-SCNC: 142 MMOL/L (ref 136–145)
WBC # BLD AUTO: 5.1 X10*3/UL (ref 4.4–11.3)

## 2024-11-27 PROCEDURE — 36415 COLL VENOUS BLD VENIPUNCTURE: CPT

## 2024-11-27 PROCEDURE — 80053 COMPREHEN METABOLIC PANEL: CPT

## 2024-11-27 PROCEDURE — 85025 COMPLETE CBC W/AUTO DIFF WBC: CPT

## 2024-11-29 ENCOUNTER — APPOINTMENT (OUTPATIENT)
Dept: HEMATOLOGY/ONCOLOGY | Facility: HOSPITAL | Age: 69
End: 2024-11-29
Payer: MEDICARE

## 2024-12-05 ENCOUNTER — TELEMEDICINE (OUTPATIENT)
Dept: HEMATOLOGY/ONCOLOGY | Facility: HOSPITAL | Age: 69
End: 2024-12-05
Payer: MEDICARE

## 2024-12-05 ENCOUNTER — SPECIALTY PHARMACY (OUTPATIENT)
Dept: PHARMACY | Facility: CLINIC | Age: 69
End: 2024-12-05

## 2024-12-05 DIAGNOSIS — C71.9 GBM (GLIOBLASTOMA MULTIFORME) (MULTI): Primary | ICD-10-CM

## 2024-12-05 PROCEDURE — 1157F ADVNC CARE PLAN IN RCRD: CPT | Performed by: PSYCHIATRY & NEUROLOGY

## 2024-12-05 PROCEDURE — 99214 OFFICE O/P EST MOD 30 MIN: CPT | Performed by: PSYCHIATRY & NEUROLOGY

## 2024-12-05 PROCEDURE — RXMED WILLOW AMBULATORY MEDICATION CHARGE

## 2024-12-05 PROCEDURE — 1123F ACP DISCUSS/DSCN MKR DOCD: CPT | Performed by: PSYCHIATRY & NEUROLOGY

## 2024-12-05 RX ORDER — TEMOZOLOMIDE 20 MG/1
20 CAPSULE ORAL DAILY
Qty: 5 CAPSULE | Refills: 0 | Status: SHIPPED | OUTPATIENT
Start: 2024-12-05 | End: 2025-01-03

## 2024-12-05 RX ORDER — TEMOZOLOMIDE 250 MG/1
250 CAPSULE ORAL DAILY
Qty: 5 CAPSULE | Refills: 0 | Status: SHIPPED | OUTPATIENT
Start: 2024-12-05 | End: 2025-01-03

## 2024-12-05 NOTE — PROGRESS NOTES
Patient ID: Dash Ahumada is a 68 y.o. male.  Referring Physician: No referring provider defined for this encounter.  Primary Care Provider: DO Eve Zuleta    Dash Ahumada is a 67 yo RH  male with a PMH significant for HLD, Osborn's esophagus, ASA for cardioprotection, who presented with headache x 6 weeks, confusion x 3 weeks, and gait instability. CTH with RO hypodensity. MRI PPF and fMRI with L language dominant lesion. TTE EF 60%. S/p R crani for tumor resection with 5ALA on 12/29/23. MRI with GTR. Prelim pathology concerning for glioblastoma - immunostains pending.      INTERVAL HISTORY (1/15/2024): Since getting discharged to home, he has been slowly recovering from the surgery. The final pathology is still pending, but it is likely GBM. He notes some persistent incisional pain around the staples but no regular headaches now (resolved). He has some minor confusion, mild loss of balance, difficulty using his computer and phone, and a loss of vision to the left. He remains on Keppra 1000 mg bid (although he claims he has never had a verified seizure), and is tapering off of Decadron. He is seeing Dr. Palomino from Rad Onc soon to begin RT planning. He is seeing Neurosurgery today to remove the staples.     INTERVAL HISTORY (3/25/2024): Since the last visit, he has continued the course of chemo-RT and is now s/p RT Fraction #27 of 30 -- finalize this week. He has fatigue from the treatment, but no GI upset, emesis, diarrhea, loss of appetite, or change in taste. He is off of the steroids now. The [facility] results are in and demonstrate EGFR amplification, TERT mutations, and loss of CDKN2A/B. He is active at home with chores and daily walking. He remains on Keppra 500 mg bid.     INTERVAL HISTORY (9/16/2024): Since the last visit, he has completed the course of chemo-RT in late March, and then continued adjuvant Temodar -- now s/p cycle #4. He has tolerated the chemo quite well so  far, with persistent fatigue, but no GI upset, nausea, emesis, or diarrhea. However, he has developed significant thrombocytopenia -- 78k last week. He is also now on the [employer] electrical array device, and is tolerating it well, without any scalp issues; > 18 hours per day (compliance 93%). He is doing well neurologically, with a few minor headaches, but no seizures, speech issues, visual changes, new focal weakness, gait difficulty, or sensory loss. He is on Decadron 2 mg/day. He remains active at home with chores and daily walks. He remains on Keppra 500 mg bid. KPS = 80.     INTERVAL HISTORY (12/05/2024): Since the last visit, he has completed the course of chemo-RT in late March, and then continued adjuvant Temodar -- now s/p cycle #6. He has tolerated the chemo quite well so far, with persistent fatigue, but no GI upset, nausea, emesis, or diarrhea. However, he has had some significant thrombocytopenia -- alisson 102k several weeks ago. He is also now on the [employer] electrical array device, and is tolerating it well, without any scalp issues; > 18 hours per day (compliance > 90%). He is doing well neurologically, with a few minor headaches, but no seizures, speech issues, visual changes, new focal weakness, gait difficulty, or sensory loss. He is on Decadron 1 mg/day. He remains active at home with chores and daily walks. He remains on Keppra 500 mg bid. KPS = 80.     The HPI is as documented in the HPI.     Objective   BSA: There is no height or weight on file to calculate BSA.  There were no vitals taken for this visit.    No family history on file.  Oncology History Overview Note   68 year old left-handed male with h/o HLD, cholecystectomy, Osborn's esophagus, on ASA 81 for cardioprotection presented to [facility] ED on 12/24/23 with 6 weeks of headaches, 3 weeks of increased confusion and running into things. Left-sided hemianopia on exam. CT head revealed a 1 mm midline leftward shift with a 7.3 cm x 7.1  "cm x 5 cm right parietal mass. Transferred to [facility] for neurosurgical management.     GBM (glioblastoma multiforme) (Multi)   12/29/2023 Surgery    Right craniotomy for tumor resection (Ritu Carrasco MD)     12/29/2023 Pathology    Final diagnosis reported 1/18/24:  A, B.  Right brain mass, biopsy and removal:  - Malignant glioma, morphologically consistent with glioblastoma; positive for MGMT methylation     12/29/2023 Initial Diagnosis    GBM (glioblastoma multiforme) (CMS/HCC)     1/3/2024 Tumor Board    [facility] Tumor Board tumor board recommendations: Referral to radiation oncology and neuro-oncology.       2/15/2024 - 3/21/2024 Chemotherapy    Temozolomide with Concurrent Radiation, 42 Day Cycles     2/15/2024 - 3/28/2024 Radiation Therapy    Radiation Treatments       Active   No active radiation treatments to show.     Completed   Brain Boost_R (Started on 3/20/2024)   Most recent fraction: 182 cGy given on 3/28/2024   Total given: 1,274 cGy / 1,273 cGy  (7 of 7 fractions)   Elapsed Days: 8   Technique: 3D        Partial brain R (Started on 2/15/2024)   Most recent fraction: 182 cGy given on 3/19/2024   Total given: 4,186 cGy / 4,182 cGy  (23 of 23 fractions)   Elapsed Days: 33   Technique: 3D                        5/1/2024 -  Chemotherapy    Temozolomide (5/28), 28 Day Cycles         Enmanuel Ahumada \"Dash\"  reports that he has never smoked. He has never used smokeless tobacco.  He  reports that he does not currently use alcohol.  He  reports no history of drug use.    Physical Exam  Constitutional:       Appearance: Normal appearance.   Eyes:      General: Visual field deficit present.      Extraocular Movements: Extraocular movements intact.      Pupils: Pupils are equal, round, and reactive to light.   Musculoskeletal:      Cervical back: Normal range of motion.   Neurological:      Mental Status: He is alert and oriented to person, place, and time.      Sensory: Sensation is intact.      " Motor: Motor function is intact.      Coordination: Coordination is intact.      Deep Tendon Reflexes: Babinski sign absent on the right side. Babinski sign absent on the left side.      Reflex Scores:       Tricep reflexes are 2+ on the right side and 2+ on the left side.       Bicep reflexes are 2+ on the right side and 2+ on the left side.       Brachioradialis reflexes are 2+ on the right side and 2+ on the left side.       Patellar reflexes are 2+ on the right side and 2+ on the left side.       Achilles reflexes are 2+ on the right side and 2+ on the left side.     Comments: Minimal loss of STM and cognition, speech fluent without dysnomia, CN's II - XII intact except for partial left homonymous hemianopsia, strength 5/5 in UE/LE, mild gait imbalance, DTR's symmetric, sensation intact.    Psychiatric:         Mood and Affect: Mood normal.         Behavior: Behavior normal.       Performance Status:  Symptomatic; fully ambulatory      Lab Results   Component Value Date    WBC 5.1 11/27/2024    HGB 15.7 11/27/2024    HCT 44.1 11/27/2024     11/27/2024     (L) 11/27/2024       The new and recent brain MRI scans were reviewed with the patient and family:      === 11/22/24 ===    MR BRAIN TUMOR PERFUSION PROTOCOL W AND WO CONTRAST    - Impression -  Postoperative changes are again identified compatible with a previous  right-sided craniotomy.    Immediately deep to the craniotomy site, there is again evidence of a  extra-axial likely epidural mixed signal hemorrhagic and/or  proteinaceous fluid collection measuring approximate 18 mm in  greatest thickness on both the current and prior study. There is  again evidence of nonspecific irregular dural thickening and  enhancement deep to and surrounding the craniotomy site mildly more  pronounced when compared with the prior study dated 09/09/2024.    There is again evidence of a surgical resection cavity deep to the  craniotomy site within the right  parieto-occipital region. Mixed  signal hemorrhagic and/or proteinaceous material is again noted  within and along the margins of the surgical resection cavity. The  current post gadolinium images demonstrate interval increased  nonspecific irregular inhomogeneous enhancement surrounding the  margins of the surgical resection cavity when compared with  09/09/2024 most pronounced superiorly and anteriorly. On the advanced  MRI images, ROIs were placed over areas of this nonspecific enlarging  irregular inhomogeneous enhancement surrounding the surgical  resection cavity with several ROIs demonstrating a lack of  significantly elevated corrected rCBV ratios compared with the  contralateral normal appearing brain parenchyma and a more slow  progressive uptake of contrast on the DCE permeability curves  favoring a component of therapy related changes; however, there are  also a few areas demonstrating elevated corrected rCBV ratios  compared with contralateral normal appearing white matter calculated  as high as 1.69 at least raising the possibility of a component of  underlying viable neoplasm as well. There is nonspecific nonenhancing  abnormal bright signal on the FLAIR and T2 images surrounding the  surgical resection cavity within the right cerebral hemisphere more  pronounced when compared with 09/09/2024 compatible with interval  increased surrounding edema with the possibility of underlying  nonenhancing infiltrative neoplasm not excluded. The nonenhancing  abnormal bright signal on the FLAIR and T2 images is again noted to  extend across midline along the splenium of the corpus callosum.  There is mild interval increased mass effect within the right  cerebral hemisphere with interval increased partial effacement of the  posterior body and atrium of the right lateral ventricle when  compared with 09/09/2024. There is mild 2-3 mm of bowing of the  midline structures from right to left more pronounced when  compared  with 09/09/2024.    Additional nonspecific white matter changes are again noted within  the left cerebral hemisphere again most pronounced within the left  parieto-occipital periventricular white matter mildly more pronounced  when compared with 09/09/2024.    The above findings are again noted be superimposed upon mild diffuse  brain parenchymal volume loss.    The diffusion-weighted images again demonstrate increased diffusion  signal corresponding diminished signal on ADC map associated with the  extra-axial likely epidural mixed signal fluid collection immediately  deep to the right craniotomy site. The diffusion signal changes are  nonspecific and likely represent diffusion signal change related to  evolving blood products although diffusion restriction can also be  seen with underlying infection and this latter possibility therefore  can not be completely excluded.    The gradient echo T2 weighted images again demonstrate blooming dark  signal immediately deep to the right craniotomy site as well as along  the margins of the surgical resection cavity within the right  parieto-occipital region compatible with blood products/hemosiderin  deposition anterior dystrophic calcification/mineralization.    I personally reviewed the images/study and I agree with Yolanda Mac DO's (radiology resident) findings as stated. This study  was interpreted at [place], [place].    MACRO:  None.    Signed by: Guanaco Sheldon 11/25/2024 1:15 PM  Dictation workstation:   [id number]        === 06/24/24 ===    MR BRAIN W AND WO CONTRAST    - Impression -  Status post right posterior craniotomy for resection of right  parietal glioblastoma. Evolving blood products within extra-axial  collection underlying the craniotomy flap again measuring 9 mm  thickness and resulting in mild mass effect on underlying parenchyma.  No midline shift.    Stable appearance irregular  enhancement involving the periatrial  white matter and right splenium of the corpus callosum, margins of  the resection cavity, and dura underlying the craniotomy flap.    However, new 1.3 x 0.8 cm bilobed rim enhancing lesion in the right  parietal lobe anterior to the resection cavity as well as progression  of T2 hyperintense FLAIR signal now extending anteriorly to involve  the anterior right periatrial white matter and lateral perirolandic  cortex. 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 However, disease progression  can not be excluded. Continued follow-up recommended.    I personally reviewed the images/study and I agree with the findings  as stated by Geraldo Plummer MD. This study was interpreted at  [facility], [place], [place].    MACRO:  None    Signed by: Gen Phelps 6/24/2024 4:51 PM  Dictation workstation:   [id number]      Assessment/Plan      -Dash has recovered well from surgery, with mild residual symptoms.     -The pathology is a GBM; IDH wildtype, MGMT methylated.     -He has completed the course of chemo-RT in late March.     -Since then he has continued the adjuvant phase of treatment with Temodar -- now s/p cycle #6.    -He has had milder thrombycytopenia with the last cycle -- 102k recently.     -The new brain MRI is mostly stable, but with some subtle increase in high signal on the T2 and FLAIR sequences, along with minor changes in enhancement. The Perfusion images were most consistent with RT treatment changes.     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     -He will start TEM cycle #7 in the near future, since the platelets are > 100k.     -He has also started using the [employer] electrical array device, which he is tolerating well and using > 18 hours per day.     -The advanced  molecular phenotyping from [facility] has been reported, and shows EGFR amplification, which could be targeted with an EGFR TKI (e.g., Osimertinib) at a later date.     -He is to remain on the Decadron, at the current dose of 1 mg/day, as well as on the Keppra 500 mg bid.     -We will have him return to this clinic in 4.5 weeks for further evaluation, and to monitor him after TEM cycle #7.     -I spent > 30 minutes in Televideo consultation to review and discuss the above; 50% of which or more was dedicated to counseling.       Kyle Rolon MD

## 2024-12-06 ENCOUNTER — PHARMACY VISIT (OUTPATIENT)
Dept: PHARMACY | Facility: CLINIC | Age: 69
End: 2024-12-06
Payer: COMMERCIAL

## 2024-12-06 NOTE — PATIENT INSTRUCTIONS
Your next appointment will be with Jacki Simpson PA-C in about 4 weeks.   Please call scheduling to set up your appointment time.     Please call us with any questions or concerns at [phone] opt. 5, opt. 2  For scheduling concerns please call [phone] option 1

## 2024-12-30 ENCOUNTER — LAB (OUTPATIENT)
Dept: LAB | Facility: LAB | Age: 69
End: 2024-12-30
Payer: MEDICARE

## 2024-12-30 ENCOUNTER — TELEPHONE (OUTPATIENT)
Dept: HEMATOLOGY/ONCOLOGY | Facility: HOSPITAL | Age: 69
End: 2024-12-30

## 2024-12-30 DIAGNOSIS — C71.9 GBM (GLIOBLASTOMA MULTIFORME) (MULTI): Primary | ICD-10-CM

## 2024-12-30 DIAGNOSIS — C71.9 GBM (GLIOBLASTOMA MULTIFORME) (MULTI): ICD-10-CM

## 2024-12-30 LAB
ALBUMIN SERPL BCP-MCNC: 4.5 G/DL (ref 3.4–5)
ALP SERPL-CCNC: 78 U/L (ref 33–136)
ALT SERPL W P-5'-P-CCNC: 21 U/L (ref 10–52)
ANION GAP SERPL CALC-SCNC: 8 MMOL/L (ref 10–20)
AST SERPL W P-5'-P-CCNC: 14 U/L (ref 9–39)
BASOPHILS # BLD AUTO: 0.01 X10*3/UL (ref 0–0.1)
BASOPHILS NFR BLD AUTO: 0.2 %
BILIRUB SERPL-MCNC: 2 MG/DL (ref 0–1.2)
BUN SERPL-MCNC: 19 MG/DL (ref 6–23)
CALCIUM SERPL-MCNC: 9.6 MG/DL (ref 8.6–10.3)
CHLORIDE SERPL-SCNC: 106 MMOL/L (ref 98–107)
CO2 SERPL-SCNC: 31 MMOL/L (ref 21–32)
CREAT SERPL-MCNC: 0.98 MG/DL (ref 0.5–1.3)
EGFRCR SERPLBLD CKD-EPI 2021: 85 ML/MIN/1.73M*2
EOSINOPHIL # BLD AUTO: 0.08 X10*3/UL (ref 0–0.7)
EOSINOPHIL NFR BLD AUTO: 1.8 %
ERYTHROCYTE [DISTWIDTH] IN BLOOD BY AUTOMATED COUNT: 12.1 % (ref 11.5–14.5)
GLUCOSE SERPL-MCNC: 133 MG/DL (ref 74–99)
HCT VFR BLD AUTO: 45.4 % (ref 41–52)
HGB BLD-MCNC: 15.9 G/DL (ref 13.5–17.5)
IMM GRANULOCYTES # BLD AUTO: 0.01 X10*3/UL (ref 0–0.7)
IMM GRANULOCYTES NFR BLD AUTO: 0.2 % (ref 0–0.9)
LYMPHOCYTES # BLD AUTO: 1.32 X10*3/UL (ref 1.2–4.8)
LYMPHOCYTES NFR BLD AUTO: 29.7 %
MCH RBC QN AUTO: 34.6 PG (ref 26–34)
MCHC RBC AUTO-ENTMCNC: 35 G/DL (ref 32–36)
MCV RBC AUTO: 99 FL (ref 80–100)
MONOCYTES # BLD AUTO: 0.38 X10*3/UL (ref 0.1–1)
MONOCYTES NFR BLD AUTO: 8.5 %
NEUTROPHILS # BLD AUTO: 2.65 X10*3/UL (ref 1.2–7.7)
NEUTROPHILS NFR BLD AUTO: 59.6 %
NRBC BLD-RTO: 0 /100 WBCS (ref 0–0)
PLATELET # BLD AUTO: 123 X10*3/UL (ref 150–450)
POTASSIUM SERPL-SCNC: 3.8 MMOL/L (ref 3.5–5.3)
PROT SERPL-MCNC: 6.6 G/DL (ref 6.4–8.2)
RBC # BLD AUTO: 4.59 X10*6/UL (ref 4.5–5.9)
SODIUM SERPL-SCNC: 141 MMOL/L (ref 136–145)
WBC # BLD AUTO: 4.5 X10*3/UL (ref 4.4–11.3)

## 2024-12-30 PROCEDURE — 85025 COMPLETE CBC W/AUTO DIFF WBC: CPT

## 2024-12-30 PROCEDURE — 80053 COMPREHEN METABOLIC PANEL: CPT

## 2024-12-30 NOTE — TELEPHONE ENCOUNTER
TMZ C7 start 12/9/24 -- (chemo was delivered on this date per pharmacy note; patient was possibly off a week with his dates in [employer] message)  C7 D22 labs due 12/30/24 +/- 48 hours  C7 D29 labs due 1/6/25 +/- 48 hours    Reviewed temozolomide C7 D22 labs drawn today. No significant abnormalities. Pended the next cycle's TMZ to Dr. Rolon to sign/release to [facility]. Patient will have their D29 labs drawn on/around 1/6/25 before we give the instruction to start the next cycle.

## 2024-12-31 DIAGNOSIS — C71.9 GBM (GLIOBLASTOMA MULTIFORME) (MULTI): Primary | ICD-10-CM

## 2024-12-31 RX ORDER — TEMOZOLOMIDE 250 MG/1
250 CAPSULE ORAL DAILY
Qty: 5 CAPSULE | Refills: 0 | Status: SHIPPED | OUTPATIENT
Start: 2025-01-06 | End: 2025-01-30

## 2024-12-31 RX ORDER — TEMOZOLOMIDE 20 MG/1
20 CAPSULE ORAL DAILY
Qty: 5 CAPSULE | Refills: 0 | Status: SHIPPED | OUTPATIENT
Start: 2025-01-06 | End: 2025-01-30

## 2025-01-03 ENCOUNTER — TELEPHONE (OUTPATIENT)
Dept: RADIATION ONCOLOGY | Facility: HOSPITAL | Age: 70
End: 2025-01-03
Payer: MEDICARE

## 2025-01-03 ASSESSMENT — ENCOUNTER SYMPTOMS
DIARRHEA: 0
FATIGUE: 1
SEIZURES: 0
CHILLS: 0
VOMITING: 0
HEADACHES: 0
EXTREMITY WEAKNESS: 0
NAUSEA: 0
FEVER: 0
ABDOMINAL PAIN: 0
SPEECH DIFFICULTY: 0
CONFUSION: 0

## 2025-01-03 NOTE — PROGRESS NOTES
[facility] [place]  Neuro-Oncology    Patient: Enmanuel Ahumada  MRN: [id number]  Date of visit: 01/06/25  Visit type: In-person clinic visit  Clinician: Jacki Simpson PA-C    Oncological & Treatment History:  Oncology History Overview Note   68 year old left-handed male with h/o HLD, cholecystectomy, Osborn's esophagus, on ASA 81 for cardioprotection presented to [facility] ED on 12/24/23 with 6 weeks of headaches, 3 weeks of increased confusion and running into things. Left-sided hemianopia on exam. CT head revealed a 1 mm midline leftward shift with a 7.3 cm x 7.1 cm x 5 cm right parietal mass. Transferred to [facility] for neurosurgical management.     GBM (glioblastoma multiforme) (Multi)   12/29/2023 Surgery    Right craniotomy for tumor resection (Ritu Carrasco MD)     12/29/2023 Pathology    Final diagnosis reported 1/18/24:  A, B.  Right brain mass, biopsy and removal:  - Malignant glioma, morphologically consistent with glioblastoma; positive for MGMT methylation     12/29/2023 Initial Diagnosis    GBM (glioblastoma multiforme) (CMS/HCC)     1/3/2024 Tumor Board    [facility] Tumor Board tumor board recommendations: Referral to radiation oncology and neuro-oncology.       2/15/2024 - 3/21/2024 Chemotherapy    Temozolomide with Concurrent Radiation, 42 Day Cycles     2/15/2024 - 3/28/2024 Radiation Therapy    Radiation Treatments       Active   No active radiation treatments to show.     Completed   Brain Boost_R (Started on 3/20/2024)   Most recent fraction: 182 cGy given on 3/28/2024   Total given: 1,274 cGy / 1,273 cGy  (7 of 7 fractions)   Elapsed Days: 8   Technique: 3D        Partial brain R (Started on 2/15/2024)   Most recent fraction: 182 cGy given on 3/19/2024   Total given: 4,186 cGy / 4,182 cGy  (23 of 23 fractions)   Elapsed Days: 33   Technique: 3D                        5/1/2024 -  Chemotherapy    Temozolomide (5/28), 28 Day Cycles           Interval History  (NEWEST at BOTTOM of section):  1/15/2024: Since getting discharged to home, he has been slowly recovering from the surgery. The final pathology is still pending, but it is likely GBM. He notes some persistent incisional pain around the staples but no regular headaches now (resolved). He has some minor confusion, mild loss of balance, difficulty using his computer and phone, and a loss of vision to the left. He remains on Keppra 1000 mg bid (although he claims he has never had a verified seizure), and is tapering off of Decadron. He is seeing Dr. Palomino from [facility] soon to begin RT planning. He is seeing Neurosurgery today to remove the staples.    02/19/24: Enmanuel Ahumada presents to neuro-oncology clinic today for follow-up during early chemoradiation (2/15/24 to 3/27/24). He is accompanied by his wife, Brittany, and granddaughter, Rosendo. He is tolerating chemoRT well thus far without nausea, vomiting or significant fatigue. He does have constipation, but it is manageable with sennosides. States his symptoms at initial presentation have improved - much less confusion, still has the left visual field deficit but is learning to adapt and not run into things as much. Endorses occasional mild headaches (1 or 2/10) that resolve spontaneously or with Tylenol. Not on any dexamethasone at this time. Remains on Keppra 500 mg BID. No history of seizure. No focal weakness or gait difficulty. Notes walking up stairs requires more concentration than in the past and he is unable to walk 2-3 miles like he used to. Prior to diagnosis, he went to the gym 3x/week. Still able to walk 1 mile outside if the weather is nice.    3/25/2024: Since the last visit, he has continued the course of chemo-RT and is now s/p RT Fraction #27 of 30 -- finalize this week. He has fatigue from the treatment, but no GI upset, emesis, diarrhea, loss of appetite, or change in taste. He is off of the steroids now. The Caris results are in and  demonstrate EGFR amplification, TERT mutations, and loss of CDKN2A/B. He is active at home with chores and daily walking. He remains on Keppra 500 mg bid.    04/29/24: Dash presents to clinic accompanied by wife and daughter for follow-up prior to starting temozolomide cycle 1. He is well overall. Sleeping well, has a strong appetite, is walking 1-2 miles/day. States there's definite improvement in his brain fog and trouble reading that have been present daily since surgery. He has had only a couple 1 out of 10 headaches that spontaneously resolved. Denies seizures, speech changes, memory changes, focal weakness, and gait difficulty.    05/23/24: Dash presents to clinic today accompanied by his wife for follow-up after temozolomide cycle 1. Tolerated the cycle very well without any n/v/d. He had constipation towards the end of the chemo week, but this relieved with Senna. He states he's more tired now and naps daily. Sleeps well at night with >8 uninterrupted hours/night and has no trouble falling asleep. Endorses a slightly decreased appetite. Drinks a lot of iced tea every day. Notes he's had brief infrequent headaches that spontaneously and quickly resolve. Has been wearing Optune for 2 weeks with >90% compliance (wearing >18 hours/day). Wife notes he has slowed down a bit, physically and cognitively. Now takes them 30 min to do a mile walk each day rather than 20 min. Also had an instance of possible confusion where he repeatedly asked her which door they were going in to enter their house after a walk. Otherwise, no focal weakness, gait difficulty, or seizures.    6/28/2024: Since the last visit, he has completed the course of chemo-RT in late March, and then started adjuvant Temodar -- now s/p cycle #2. He has tolerated the chemo quite well so far, with persistent fatigue, but no GI upset, nausea, emesis, or diarrhea. However, he has developed significant thrombocytopenia -- 51k last week and 37k this week. He  is also now on the Optune electrical array device, and is tolerating it well, without any scalp issues; > 18 hours per day. He is doing well neurologically, with a few minor headaches, but no seizures, speech issues, visual changes, new focal weakness, gait difficulty, or sensory loss. He is not on any Decadron. He remains active at home with chores and daily walks. He remains on Keppra 500 mg bid.    08/08/24: Dash presents to clinic for follow-up on temozolomide C3 D25. He tolerated the chemo days well without any nausea or vomiting. Endorses persistent fatigue, but feels it may be slightly less than normal. His wife reports increased confusion, decreased attention, and a shorter fuse since last week. Provides examples of Dash holding a sock in his hand, but then asking where his sock is, and then also being abnormally irritated by his grandchildren. Dash denies recent headaches except for one day when he felt the Optune cords were irritating his head; that HA was relieved by Tylenol. He is not currently on any steroids. Appetite is fair. He denies seizures, speech changes, focal weakness, and gait difficulty. Continues wearing Optune >18 hours/day.    9/16/2024: Since the last visit, he has completed the course of chemo-RT in late March, and then continued adjuvant Temodar -- now s/p cycle #4. He has tolerated the chemo quite well so far, with persistent fatigue, but no GI upset, nausea, emesis, or diarrhea. However, he has developed significant thrombocytopenia -- 78k last week. He is also now on the Optune electrical array device, and is tolerating it well, without any scalp issues; > 18 hours per day (compliance 93%). He is doing well neurologically, with a few minor headaches, but no seizures, speech issues, visual changes, new focal weakness, gait difficulty, or sensory loss. He is on Decadron 2 mg/day. He remains active at home with chores and daily walks. He remains on Keppra 500 mg bid. KPS =  "80.    11/01/24: Dash presents to clinic for follow-up on TMZ C6 D1. He tolerated the previous cycle well without n/v/d or headaches. He notes sometimes getting a mild headache from the Optune cords being unintentionally tugged. He also reports having constipation during chemo days, but they manage with stool softeners and laxatives. Dash states he has persistent brain fog or tiredness throughout his cycles. This does not interfere with his ADLs. He sleeps well at night. Mood is good; still maintains his gratitude journal. Currently tapering off dexamethasone -- at 1 mg/day right now.    12/05/2024: Since the last visit, he has completed the course of chemo-RT in late March, and then continued adjuvant Temodar -- now s/p cycle #6. He has tolerated the chemo quite well so far, with persistent fatigue, but no GI upset, nausea, emesis, or diarrhea. However, he has had some significant thrombocytopenia -- alisson 102k several weeks ago. He is also now on the Optune electrical array device, and is tolerating it well, without any scalp issues; > 18 hours per day (compliance > 90%). He is doing well neurologically, with a few minor headaches, but no seizures, speech issues, visual changes, new focal weakness, gait difficulty, or sensory loss. He is on Decadron 1 mg/day. He remains active at home with chores and daily walks. He remains on Keppra 500 mg bid. KPS = 80.    01/06/25: Dash presents today accompanied by his wife and granddaughter on TMZ C8D1. He tolerated the cycle well and states he feels mostly well overall with the exception of some \"annoying\" symptoms. He notes fatigue that is worse after chemo days and improves as his cycle progresses; fatigue does not limit his ADLs. He notes his STM is a little worse when he's tired. He continues to use his row machine and go on walks with his wife when the weather allows. He reports a few episodes of leaning forward on his walks that makes them cut the walk short; no falls; " has not had this issue with other instances of walking. His dyscalculia persists; he occasionally reads prices incorrectly when online or grocery shopping. He remains on dexamethasone 1 mg every other day and doesn't notice a difference in how he feels on off days. He otherwise denies headaches, seizures, numbness/tingling, speech changes, memory changes, vision changes, focal weakness, or gait difficulty. Remains on Keppra 500 mg BID without issue. They are going on a cruise 2/6/25-2/16/25.       Review of Systems:  Review of Systems   Constitutional:  Positive for fatigue (malaise/brain fog). Negative for chills and fever.   Gastrointestinal:  Negative for abdominal pain, constipation, diarrhea, nausea and vomiting.   Skin:         Denies scalp irritation from Optune   Neurological:  Negative for extremity weakness, headaches, seizures and speech difficulty.   Psychiatric/Behavioral:  Negative for confusion.    All other systems reviewed and are negative.      Social History:  Social History     Tobacco Use    Smoking status: Never    Smokeless tobacco: Never   Substance Use Topics    Alcohol use: Not Currently    Drug use: Never      (wife, Brittany)    Past Medical History:  Past Medical History:  No date: Brain cancer (Multi)    Past Surgical History:  Past Surgical History:  No date: BRAIN SURGERY  January 25, 2020: CHOLECYSTECTOMY  07/24/2019: OTHER SURGICAL HISTORY      Comment:  Colonoscopy  07/26/2021: OTHER SURGICAL HISTORY      Comment:  Endoscopy  Abt 1991: VASECTOMY    Family History:  family history is not on file.    Performance Score:  Karnofsky Score: 90 - Able to carry on normal activity; minor signs or symptoms of disease     Vital Signs:  /79 (BP Location: Left arm, Patient Position: Sitting, BP Cuff Size: Adult)   Pulse 94   Temp 36.6 °C (97.9 °F) (Temporal)   Resp 16   Wt 77.8 kg (171 lb 7.9 oz)   SpO2 99%   BMI 26.86 kg/m²     Physical Exam:  Physical Exam  Vitals reviewed.    Constitutional:       General: He is not in acute distress.     Appearance: He is not toxic-appearing.   HENT:      Head: Normocephalic and atraumatic.      Comments: Scalp without any erythema or wounds  Eyes:      Extraocular Movements: Extraocular movements intact.      Conjunctiva/sclera: Conjunctivae normal.      Pupils: Pupils are equal, round, and reactive to light.   Pulmonary:      Effort: Pulmonary effort is normal. No respiratory distress.   Abdominal:      General: Abdomen is flat.   Musculoskeletal:         General: Normal range of motion.      Cervical back: Normal range of motion and neck supple.   Skin:     General: Skin is warm and dry.   Neurological:      Mental Status: He is alert and oriented to person, place, and time. Mental status is at baseline.      Cranial Nerves: No cranial nerve deficit.      Gait: Gait normal.      Comments: Impaired graphesthesia   Psychiatric:         Mood and Affect: Mood and affect normal.         Behavior: Behavior normal.         Results:  Lab Results   Component Value Date    WBC 5.8 01/06/2025    HGB 16.0 01/06/2025    HCT 45.0 01/06/2025    MCV 96 01/06/2025     (L) 01/06/2025       Lab Results   Component Value Date    GLUCOSE 104 (H) 01/06/2025    CALCIUM 9.6 01/06/2025     01/06/2025    K 3.6 01/06/2025    CO2 29 01/06/2025     01/06/2025    BUN 21 01/06/2025    CREATININE 0.95 01/06/2025       Lab Results   Component Value Date    ALT 23 01/06/2025    AST 14 01/06/2025    ALKPHOS 87 01/06/2025    BILITOT 1.7 (H) 01/06/2025     === 09/09/24 ===    MR BRAIN W AND WO CONTRAST    - Impression -  1. Evolving postoperative changes from resection of a right parietal  lobe mass.    2. Enhancement surrounding the surgical cavity is slightly more  conspicuous and enhancing lesion located more laterally within the  right parietal lobe, but contiguous with the enhancement surrounding  the resection cavity, is slightly increased in size and  demonstrates  increased thickness of rim enhancement. This interval change is  favored to reflect sequela of treatment related changes.    3. Signal abnormality in the brain parenchyma surrounding the  resection cavity has overall slightly increased in extent, most  pronounced within the right frontal lobe and within the splenium of  the corpus callosum and is favored to primarily treatment related  changes. Associated narrowing of the posterior portion of the right  lateral ventricle has decreased.    This study was interpreted at [facility].    MACRO:  None    Signed by: Neil Boyd 9/9/2024 3:37 PM  Dictation workstation:   [id number]      Assessment & Plan:  Enmaneul Ahumada is a 69 y.o. male with PMH of HLD, Osborn's esophagus, ASA for cardioprotection, and a malignant glioma s/p surgical resection (12/2023). Final path morphologically consistent with glioblastoma, +MGMT promoter methylation. S/p chemoradiation (2/15-3/28/24). Currently on adjuvant temozolomide cycles and utilizing Optune/tumor-treating vu.    Dash continues to tolerate treatment fairly well with some mild symptoms and no recent neurological changes. His labs are stable without any significant abnormalities. He may proceed with TMZ C8 tonight.    1. Glioblastoma, MGMT promoter methylated  Treatment 1: Temozolomide on days 1-5 of each 28-day cycle  Dose: 270 mg/day (150 mg/m2/day x BSA d/t h/o low plts, low ANC)  TMZ C8 start tonight, 1/6/25  C8 D22 labs due 1/27/25 +/- 48 hours  C8 D29 labs due 2/3/25 +/- 48 hours  Treatment 2: Optune/tumor-treating fields -- continue  Steroids: Has been doing dexamethasone 1 mg every other day -- can discontinue as of today; advised to take 1-2 mg if neuro sx develop and then call us  Seizure prophylaxis: Continue Keppra 500 mg BID  Kim advanced molecular phenotyping results yielded EGFR amplification  MRI brain tumor perfusion protocol prior to next  visit  Follow up in about 4 weeks for reassessment, monitoring, and MRI review      Orders Placed This Encounter   Procedures    MR brain tumor perfusion protocol w and wo IV contrast    CBC and Auto Differential    Comprehensive metabolic panel    CBC and Auto Differential    Comprehensive metabolic panel

## 2025-01-06 ENCOUNTER — OFFICE VISIT (OUTPATIENT)
Dept: HEMATOLOGY/ONCOLOGY | Facility: HOSPITAL | Age: 70
End: 2025-01-06
Payer: MEDICARE

## 2025-01-06 ENCOUNTER — HOSPITAL ENCOUNTER (OUTPATIENT)
Dept: RADIATION ONCOLOGY | Facility: HOSPITAL | Age: 70
Setting detail: RADIATION/ONCOLOGY SERIES
Discharge: HOME | End: 2025-01-06
Payer: MEDICARE

## 2025-01-06 ENCOUNTER — TELEPHONE (OUTPATIENT)
Dept: PRIMARY CARE | Facility: CLINIC | Age: 70
End: 2025-01-06

## 2025-01-06 ENCOUNTER — LAB (OUTPATIENT)
Dept: LAB | Facility: HOSPITAL | Age: 70
End: 2025-01-06
Payer: MEDICARE

## 2025-01-06 ENCOUNTER — SPECIALTY PHARMACY (OUTPATIENT)
Dept: PHARMACY | Facility: CLINIC | Age: 70
End: 2025-01-06

## 2025-01-06 ENCOUNTER — PHARMACY VISIT (OUTPATIENT)
Dept: PHARMACY | Facility: CLINIC | Age: 70
End: 2025-01-06
Payer: COMMERCIAL

## 2025-01-06 VITALS
SYSTOLIC BLOOD PRESSURE: 101 MMHG | BODY MASS INDEX: 27.19 KG/M2 | DIASTOLIC BLOOD PRESSURE: 70 MMHG | OXYGEN SATURATION: 96 % | TEMPERATURE: 98.2 F | RESPIRATION RATE: 18 BRPM | HEART RATE: 99 BPM | WEIGHT: 173.6 LBS

## 2025-01-06 VITALS
TEMPERATURE: 97.9 F | WEIGHT: 171.5 LBS | HEART RATE: 94 BPM | OXYGEN SATURATION: 99 % | RESPIRATION RATE: 16 BRPM | BODY MASS INDEX: 26.86 KG/M2 | DIASTOLIC BLOOD PRESSURE: 79 MMHG | SYSTOLIC BLOOD PRESSURE: 129 MMHG

## 2025-01-06 DIAGNOSIS — C71.9 GBM (GLIOBLASTOMA MULTIFORME) (MULTI): ICD-10-CM

## 2025-01-06 DIAGNOSIS — Z51.11 ENCOUNTER FOR CHEMOTHERAPY MANAGEMENT: Primary | ICD-10-CM

## 2025-01-06 DIAGNOSIS — J06.9 UPPER RESPIRATORY TRACT INFECTION, UNSPECIFIED TYPE: Primary | ICD-10-CM

## 2025-01-06 LAB
ALBUMIN SERPL BCP-MCNC: 4.5 G/DL (ref 3.4–5)
ALP SERPL-CCNC: 87 U/L (ref 33–136)
ALT SERPL W P-5'-P-CCNC: 23 U/L (ref 10–52)
ANION GAP SERPL CALC-SCNC: 13 MMOL/L (ref 10–20)
AST SERPL W P-5'-P-CCNC: 14 U/L (ref 9–39)
BASOPHILS # BLD AUTO: 0.01 X10*3/UL (ref 0–0.1)
BASOPHILS NFR BLD AUTO: 0.2 %
BILIRUB SERPL-MCNC: 1.7 MG/DL (ref 0–1.2)
BUN SERPL-MCNC: 21 MG/DL (ref 6–23)
CALCIUM SERPL-MCNC: 9.6 MG/DL (ref 8.6–10.3)
CHLORIDE SERPL-SCNC: 104 MMOL/L (ref 98–107)
CO2 SERPL-SCNC: 29 MMOL/L (ref 21–32)
CREAT SERPL-MCNC: 0.95 MG/DL (ref 0.5–1.3)
EGFRCR SERPLBLD CKD-EPI 2021: 88 ML/MIN/1.73M*2
EOSINOPHIL # BLD AUTO: 0.07 X10*3/UL (ref 0–0.7)
EOSINOPHIL NFR BLD AUTO: 1.2 %
ERYTHROCYTE [DISTWIDTH] IN BLOOD BY AUTOMATED COUNT: 11.7 % (ref 11.5–14.5)
GLUCOSE SERPL-MCNC: 104 MG/DL (ref 74–99)
HCT VFR BLD AUTO: 45 % (ref 41–52)
HGB BLD-MCNC: 16 G/DL (ref 13.5–17.5)
IMM GRANULOCYTES # BLD AUTO: 0.02 X10*3/UL (ref 0–0.7)
IMM GRANULOCYTES NFR BLD AUTO: 0.3 % (ref 0–0.9)
LYMPHOCYTES # BLD AUTO: 1.76 X10*3/UL (ref 1.2–4.8)
LYMPHOCYTES NFR BLD AUTO: 30.3 %
MCH RBC QN AUTO: 34 PG (ref 26–34)
MCHC RBC AUTO-ENTMCNC: 35.6 G/DL (ref 32–36)
MCV RBC AUTO: 96 FL (ref 80–100)
MONOCYTES # BLD AUTO: 0.81 X10*3/UL (ref 0.1–1)
MONOCYTES NFR BLD AUTO: 14 %
NEUTROPHILS # BLD AUTO: 3.13 X10*3/UL (ref 1.2–7.7)
NEUTROPHILS NFR BLD AUTO: 54 %
NRBC BLD-RTO: 0 /100 WBCS (ref 0–0)
PLATELET # BLD AUTO: 103 X10*3/UL (ref 150–450)
POTASSIUM SERPL-SCNC: 3.6 MMOL/L (ref 3.5–5.3)
PROT SERPL-MCNC: 6.5 G/DL (ref 6.4–8.2)
RBC # BLD AUTO: 4.7 X10*6/UL (ref 4.5–5.9)
SODIUM SERPL-SCNC: 142 MMOL/L (ref 136–145)
WBC # BLD AUTO: 5.8 X10*3/UL (ref 4.4–11.3)

## 2025-01-06 PROCEDURE — 80053 COMPREHEN METABOLIC PANEL: CPT

## 2025-01-06 PROCEDURE — 99214 OFFICE O/P EST MOD 30 MIN: CPT

## 2025-01-06 PROCEDURE — 85025 COMPLETE CBC W/AUTO DIFF WBC: CPT

## 2025-01-06 PROCEDURE — 99214 OFFICE O/P EST MOD 30 MIN: CPT | Performed by: STUDENT IN AN ORGANIZED HEALTH CARE EDUCATION/TRAINING PROGRAM

## 2025-01-06 PROCEDURE — 36415 COLL VENOUS BLD VENIPUNCTURE: CPT

## 2025-01-06 PROCEDURE — RXMED WILLOW AMBULATORY MEDICATION CHARGE

## 2025-01-06 RX ORDER — AMOXICILLIN AND CLAVULANATE POTASSIUM 875; 125 MG/1; MG/1
875 TABLET, FILM COATED ORAL 2 TIMES DAILY
Qty: 20 TABLET | Refills: 0 | Status: SHIPPED | OUTPATIENT
Start: 2025-01-06 | End: 2025-01-16

## 2025-01-06 ASSESSMENT — ENCOUNTER SYMPTOMS
ENDOCRINE NEGATIVE: 1
MUSCULOSKELETAL NEGATIVE: 1
PSYCHIATRIC NEGATIVE: 1
GASTROINTESTINAL NEGATIVE: 1
EYES NEGATIVE: 1
NEUROLOGICAL NEGATIVE: 1
CARDIOVASCULAR NEGATIVE: 1
FATIGUE: 1
HEMATOLOGIC/LYMPHATIC NEGATIVE: 1
CONSTIPATION: 0
RESPIRATORY NEGATIVE: 1

## 2025-01-06 ASSESSMENT — PAIN SCALES - GENERAL: PAINLEVEL_OUTOF10: 0-NO PAIN

## 2025-01-06 NOTE — PROGRESS NOTES
"Radiation Oncology Follow-Up    Patient Name:  Enmanuel Ahumada  MRN:  [id number]  :  1955    Referring Provider: Evgeny Palomino MD, *  Primary Care Provider: Ricardo Youngblood DO  Care Team: Patient Care Team:  Ricardo Youngblood DO as PCP - General  Ricardo Youngblood DO as PCP - [employer] PCP  Kyle Rolon MD as Consulting Physician (Hematology and Oncology)    Date of Service: 2025    SUBJECTIVE  History of Present Illness:  Enmanuel Ahumada \"Petey" is a 69 y.o. male who was previously seen at the [facility] Department of Radiation Oncology for routine follow up.    Mr. Ahumada is a 68 year old male with glioblastoma of the right parieto-occipital lobe s/p GTR on 23, MGMT methylated. S/p 60 Gy concurrent chemoRT completed treatment on 3/28/24. He is here for a routine follow-up.     He reports he has been active with walking and rowing alternately daily. Reports some fatigue and weakness while walking, not rowing. He finished his steroid taper this morning. He is currently getting adjuvant TMZ. He is also using optune device daily with minimal side effects.       Treatment Rendered:   Proton Beam: Not Applicable Brain    Treatment Period Technique Fraction Dose Fractions Total Dose   Course 1 2/15/2024-3/28/2024  (days elapsed: 42)         Partial brain R 2/15/2024-3/19/2024 3D 182 / 182 cGy  4186 / 4,182 cGy         Brain Boost_R 3/20/2024-3/28/2024 3D 182 / 182 cGy  1274 / 1,273 cGy       Review of Systems:   Review of Systems - Oncology  See nursing notes.    Performance Status:   The Karnofsky performance scale today is 80, Normal activity with effort; some signs or symptoms of disease (ECOG equivalent 1).       OBJECTIVE  Vital Signs:  There were no vitals taken for this visit.  Physical Exam   Physical Exam  Constitutional:       General: He is not in acute distress.  Eyes:      Extraocular Movements: Extraocular " movements intact.      Pupils: Pupils are equal, round, and reactive to light.   Neurological:      General: No focal deficit present.      Mental Status: He is alert and oriented to person, place, and time.      Cranial Nerves: Cranial nerves 2-12 are intact.      Motor: Motor function is intact. No weakness or pronator drift.      Coordination: Finger-Nose-Finger Test normal. Rapid alternating movements normal.      Gait: Gait is intact.      Deep Tendon Reflexes: Reflexes are normal and symmetric.      Comments: Grossly decreased left hearing compared to the right.    Psychiatric:         Mood and Affect: Mood normal.     === 11/22/24 ===    MR BRAIN TUMOR PERFUSION PROTOCOL W AND WO CONTRAST    - Impression -  Postoperative changes are again identified compatible with a previous  right-sided craniotomy.    Immediately deep to the craniotomy site, there is again evidence of a  extra-axial likely epidural mixed signal hemorrhagic and/or  proteinaceous fluid collection measuring approximate 18 mm in  greatest thickness on both the current and prior study. There is  again evidence of nonspecific irregular dural thickening and  enhancement deep to and surrounding the craniotomy site mildly more  pronounced when compared with the prior study dated 09/09/2024.    There is again evidence of a surgical resection cavity deep to the  craniotomy site within the right parieto-occipital region. Mixed  signal hemorrhagic and/or proteinaceous material is again noted  within and along the margins of the surgical resection cavity. The  current post gadolinium images demonstrate interval increased  nonspecific irregular inhomogeneous enhancement surrounding the  margins of the surgical resection cavity when compared with  09/09/2024 most pronounced superiorly and anteriorly. On the advanced  MRI images, ROIs were placed over areas of this nonspecific enlarging  irregular inhomogeneous enhancement surrounding the  surgical  resection cavity with several ROIs demonstrating a lack of  significantly elevated corrected rCBV ratios compared with the  contralateral normal appearing brain parenchyma and a more slow  progressive uptake of contrast on the DCE permeability curves  favoring a component of therapy related changes; however, there are  also a few areas demonstrating elevated corrected rCBV ratios  compared with contralateral normal appearing white matter calculated  as high as 1.69 at least raising the possibility of a component of  underlying viable neoplasm as well. There is nonspecific nonenhancing  abnormal bright signal on the FLAIR and T2 images surrounding the  surgical resection cavity within the right cerebral hemisphere more  pronounced when compared with 09/09/2024 compatible with interval  increased surrounding edema with the possibility of underlying  nonenhancing infiltrative neoplasm not excluded. The nonenhancing  abnormal bright signal on the FLAIR and T2 images is again noted to  extend across midline along the splenium of the corpus callosum.  There is mild interval increased mass effect within the right  cerebral hemisphere with interval increased partial effacement of the  posterior body and atrium of the right lateral ventricle when  compared with 09/09/2024. There is mild 2-3 mm of bowing of the  midline structures from right to left more pronounced when compared  with 09/09/2024.    Additional nonspecific white matter changes are again noted within  the left cerebral hemisphere again most pronounced within the left  parieto-occipital periventricular white matter mildly more pronounced  when compared with 09/09/2024.    The above findings are again noted be superimposed upon mild diffuse  brain parenchymal volume loss.    The diffusion-weighted images again demonstrate increased diffusion  signal corresponding diminished signal on ADC map associated with the  extra-axial likely epidural mixed signal  fluid collection immediately  deep to the right craniotomy site. The diffusion signal changes are  nonspecific and likely represent diffusion signal change related to  evolving blood products although diffusion restriction can also be  seen with underlying infection and this latter possibility therefore  can not be completely excluded.    The gradient echo T2 weighted images again demonstrate blooming dark  signal immediately deep to the right craniotomy site as well as along  the margins of the surgical resection cavity within the right  parieto-occipital region compatible with blood products/hemosiderin  deposition anterior dystrophic calcification/mineralization.    I personally reviewed the images/study and I agree with Yolanda Mac DO's (radiology resident) findings as stated. This study  was interpreted at [facility],  [place], [place].    MACRO:  None.    Signed by: Guanaco Sheldon 11/25/2024 1:15 PM  Dictation workstation:   [id number]      ASSESSMENT:   Enmanuel Ahumada is a 69 y.o. male with glioblastoma, MGMT methylated, s/p GTR, 60 Gy of concurrent ChemoRT with TMZ completing treatment on 3/28/24. She is currently receiving adjuvant chemotherapy and TTF.     We reviewed his imaging. His perfusion scan shows there is again evidence of nonspecific irregular dural thickening and enhancement deep to and surrounding the craniotomy site mildly more pronounced when compared to the previous scan on 09/09/2024. Whether this is pseudoprogression or recurrence is not clear. He was part of a [employer] trial which looked at Zr-89 girentuximab based imaging for glioblastoma. We will reach out to the institutional PI and the company to see if we can repeat the imaging to see if it would be able to differentiate between radiation necrosis vs tumor recurrence. If we are unable to obtain repeat imaging, we would consider FET-PET scan.     PLAN:  repeat scan and follow up in  3 months.      Omayra Miranda MD PhD,   PGY-2, Radiation Oncology

## 2025-01-06 NOTE — TELEPHONE ENCOUNTER
Pt wife called and said they are going on a cruise next month. He was just seen by his Oncologist for a brain tumor and it was suggested to get a antibiotic from PCP just to have just incase he were to get sick while on the cruise. Can this be sent in?       appt scheduled for 3/22

## 2025-01-06 NOTE — PROGRESS NOTES
Radiation Oncology Nursing Note    Pain: The patient's current pain level was assessed.  They report currently having a pain of 0 out of 10.  They feel their pain is under control without the use of pain medications.    Review of Systems:  Review of Systems   Constitutional:  Positive for fatigue.   HENT:  Negative.     Eyes: Negative.    Respiratory: Negative.     Cardiovascular: Negative.    Gastrointestinal: Negative.    Endocrine: Negative.    Genitourinary: Negative.     Musculoskeletal: Negative.    Skin: Negative.    Neurological: Negative.    Hematological: Negative.    Psychiatric/Behavioral: Negative.

## 2025-01-16 ENCOUNTER — DOCUMENTATION (OUTPATIENT)
Dept: HEMATOLOGY/ONCOLOGY | Facility: HOSPITAL | Age: 70
End: 2025-01-16
Payer: MEDICARE

## 2025-01-27 ENCOUNTER — LAB (OUTPATIENT)
Dept: LAB | Facility: HOSPITAL | Age: 70
End: 2025-01-27
Payer: MEDICARE

## 2025-01-27 ENCOUNTER — TELEPHONE (OUTPATIENT)
Dept: HEMATOLOGY/ONCOLOGY | Facility: HOSPITAL | Age: 70
End: 2025-01-27
Payer: MEDICARE

## 2025-01-27 ENCOUNTER — HOSPITAL ENCOUNTER (OUTPATIENT)
Dept: RADIOLOGY | Facility: HOSPITAL | Age: 70
Discharge: HOME | End: 2025-01-27
Payer: MEDICARE

## 2025-01-27 DIAGNOSIS — C71.9 GBM (GLIOBLASTOMA MULTIFORME) (MULTI): Primary | ICD-10-CM

## 2025-01-27 DIAGNOSIS — C71.9 GBM (GLIOBLASTOMA MULTIFORME) (MULTI): ICD-10-CM

## 2025-01-27 LAB
ALBUMIN SERPL BCP-MCNC: 4.4 G/DL (ref 3.4–5)
ALP SERPL-CCNC: 106 U/L (ref 33–136)
ALT SERPL W P-5'-P-CCNC: 23 U/L (ref 10–52)
ANION GAP SERPL CALC-SCNC: 13 MMOL/L (ref 10–20)
AST SERPL W P-5'-P-CCNC: 16 U/L (ref 9–39)
BASOPHILS # BLD AUTO: 0.02 X10*3/UL (ref 0–0.1)
BASOPHILS NFR BLD AUTO: 0.5 %
BILIRUB SERPL-MCNC: 1.2 MG/DL (ref 0–1.2)
BUN SERPL-MCNC: 12 MG/DL (ref 6–23)
CALCIUM SERPL-MCNC: 9.5 MG/DL (ref 8.6–10.3)
CHLORIDE SERPL-SCNC: 102 MMOL/L (ref 98–107)
CO2 SERPL-SCNC: 29 MMOL/L (ref 21–32)
CREAT SERPL-MCNC: 0.92 MG/DL (ref 0.5–1.3)
EGFRCR SERPLBLD CKD-EPI 2021: >90 ML/MIN/1.73M*2
EOSINOPHIL # BLD AUTO: 0.1 X10*3/UL (ref 0–0.7)
EOSINOPHIL NFR BLD AUTO: 2.5 %
ERYTHROCYTE [DISTWIDTH] IN BLOOD BY AUTOMATED COUNT: 11.6 % (ref 11.5–14.5)
GLUCOSE SERPL-MCNC: 102 MG/DL (ref 74–99)
HCT VFR BLD AUTO: 45.5 % (ref 41–52)
HGB BLD-MCNC: 16.2 G/DL (ref 13.5–17.5)
IMM GRANULOCYTES # BLD AUTO: 0.01 X10*3/UL (ref 0–0.7)
IMM GRANULOCYTES NFR BLD AUTO: 0.3 % (ref 0–0.9)
LYMPHOCYTES # BLD AUTO: 1.8 X10*3/UL (ref 1.2–4.8)
LYMPHOCYTES NFR BLD AUTO: 45.1 %
MCH RBC QN AUTO: 34.5 PG (ref 26–34)
MCHC RBC AUTO-ENTMCNC: 35.6 G/DL (ref 32–36)
MCV RBC AUTO: 97 FL (ref 80–100)
MONOCYTES # BLD AUTO: 0.66 X10*3/UL (ref 0.1–1)
MONOCYTES NFR BLD AUTO: 16.5 %
NEUTROPHILS # BLD AUTO: 1.4 X10*3/UL (ref 1.2–7.7)
NEUTROPHILS NFR BLD AUTO: 35.1 %
NRBC BLD-RTO: 0 /100 WBCS (ref 0–0)
PLATELET # BLD AUTO: 142 X10*3/UL (ref 150–450)
POTASSIUM SERPL-SCNC: 4.1 MMOL/L (ref 3.5–5.3)
PROT SERPL-MCNC: 7.1 G/DL (ref 6.4–8.2)
RBC # BLD AUTO: 4.69 X10*6/UL (ref 4.5–5.9)
SODIUM SERPL-SCNC: 140 MMOL/L (ref 136–145)
WBC # BLD AUTO: 4 X10*3/UL (ref 4.4–11.3)

## 2025-01-27 PROCEDURE — 2550000001 HC RX 255 CONTRASTS

## 2025-01-27 PROCEDURE — 85025 COMPLETE CBC W/AUTO DIFF WBC: CPT

## 2025-01-27 PROCEDURE — 70553 MRI BRAIN STEM W/O & W/DYE: CPT

## 2025-01-27 PROCEDURE — 36415 COLL VENOUS BLD VENIPUNCTURE: CPT

## 2025-01-27 PROCEDURE — 84450 TRANSFERASE (AST) (SGOT): CPT

## 2025-01-27 PROCEDURE — A9575 INJ GADOTERATE MEGLUMI 0.1ML: HCPCS

## 2025-01-27 RX ORDER — TEMOZOLOMIDE 20 MG/1
20 CAPSULE ORAL DAILY
Qty: 5 CAPSULE | Refills: 0 | Status: SHIPPED | OUTPATIENT
Start: 2025-02-03 | End: 2025-01-31

## 2025-01-27 RX ORDER — TEMOZOLOMIDE 250 MG/1
250 CAPSULE ORAL DAILY
Qty: 5 CAPSULE | Refills: 0 | Status: SHIPPED | OUTPATIENT
Start: 2025-02-03 | End: 2025-01-31

## 2025-01-27 RX ORDER — GADOTERATE MEGLUMINE 376.9 MG/ML
21 INJECTION INTRAVENOUS
Status: COMPLETED | OUTPATIENT
Start: 2025-01-27 | End: 2025-01-27

## 2025-01-27 RX ADMIN — GADOTERATE MEGLUMINE 21 ML: 376.9 INJECTION INTRAVENOUS at 16:52

## 2025-01-27 NOTE — TELEPHONE ENCOUNTER
Reviewed temozolomide C8 D22 labs. No significant abnormalities. Pended the next cycle's TMZ to Dr. Rolon to sign/release to [facility]. Patient will have their D29 labs drawn on/around 2/3/25 before we give the instruction to start the next cycle.

## 2025-01-28 ENCOUNTER — TELEPHONE (OUTPATIENT)
Dept: HEMATOLOGY/ONCOLOGY | Facility: HOSPITAL | Age: 70
End: 2025-01-28
Payer: MEDICARE

## 2025-01-28 DIAGNOSIS — C71.9 GBM (GLIOBLASTOMA MULTIFORME) (MULTI): Primary | ICD-10-CM

## 2025-01-28 NOTE — TELEPHONE ENCOUNTER
Spoke with Dash and Brittany about moving his FUV with Dr. Rolon from 2/3/25 to Thursday or Friday of this week. They are agreeable to a visit on Thur. 1/30 at 10am.   Also informed them that his chemotherapy has been placed on hold and not to worry if they did not hear from  specialty about delivery. Dr. Rolon will discuss this more at the appointment.  They verbalized understanding.

## 2025-01-28 NOTE — PROGRESS NOTES
Entered order for urgent add-on for 1/29/25 tumor board. Put TMZ C9 order on hold with Rudolph/SP until we confirm treatment plans.   None

## 2025-01-29 ENCOUNTER — TUMOR BOARD CONFERENCE (OUTPATIENT)
Dept: HEMATOLOGY/ONCOLOGY | Facility: HOSPITAL | Age: 70
End: 2025-01-29
Payer: MEDICARE

## 2025-01-30 ENCOUNTER — OFFICE VISIT (OUTPATIENT)
Dept: HEMATOLOGY/ONCOLOGY | Facility: HOSPITAL | Age: 70
End: 2025-01-30
Payer: MEDICARE

## 2025-01-30 VITALS
WEIGHT: 164.24 LBS | SYSTOLIC BLOOD PRESSURE: 112 MMHG | HEART RATE: 98 BPM | RESPIRATION RATE: 22 BRPM | OXYGEN SATURATION: 100 % | BODY MASS INDEX: 25.72 KG/M2 | DIASTOLIC BLOOD PRESSURE: 73 MMHG | TEMPERATURE: 97.9 F

## 2025-01-30 DIAGNOSIS — C71.9 GBM (GLIOBLASTOMA MULTIFORME) (MULTI): Primary | ICD-10-CM

## 2025-01-30 PROCEDURE — 1123F ACP DISCUSS/DSCN MKR DOCD: CPT | Performed by: PSYCHIATRY & NEUROLOGY

## 2025-01-30 PROCEDURE — 99215 OFFICE O/P EST HI 40 MIN: CPT | Performed by: PSYCHIATRY & NEUROLOGY

## 2025-01-30 PROCEDURE — 1159F MED LIST DOCD IN RCRD: CPT | Performed by: PSYCHIATRY & NEUROLOGY

## 2025-01-30 PROCEDURE — 1126F AMNT PAIN NOTED NONE PRSNT: CPT | Performed by: PSYCHIATRY & NEUROLOGY

## 2025-01-30 PROCEDURE — 1036F TOBACCO NON-USER: CPT | Performed by: PSYCHIATRY & NEUROLOGY

## 2025-01-30 PROCEDURE — 1157F ADVNC CARE PLAN IN RCRD: CPT | Performed by: PSYCHIATRY & NEUROLOGY

## 2025-01-30 ASSESSMENT — PAIN SCALES - GENERAL: PAINLEVEL_OUTOF10: 0-NO PAIN

## 2025-01-30 NOTE — PROGRESS NOTES
Patient ID: Dash Ahumada is a 69 y.o. male.  Referring Physician: No referring provider defined for this encounter.  Primary Care Provider: DO Eve Zuleta    Dash Ahumada is a 67 yo RH  male with a PMH significant for HLD, Osborn's esophagus, ASA for cardioprotection, who presented with headache x 6 weeks, confusion x 3 weeks, and gait instability. CTH with RO hypodensity. MRI PPF and fMRI with L language dominant lesion. TTE EF 60%. S/p R crani for tumor resection with 5ALA on 12/29/23. MRI with GTR. Prelim pathology concerning for glioblastoma - immunostains pending.      INTERVAL HISTORY (1/15/2024): Since getting discharged to home, he has been slowly recovering from the surgery. The final pathology is still pending, but it is likely GBM. He notes some persistent incisional pain around the staples but no regular headaches now (resolved). He has some minor confusion, mild loss of balance, difficulty using his computer and phone, and a loss of vision to the left. He remains on Keppra 1000 mg bid (although he claims he has never had a verified seizure), and is tapering off of Decadron. He is seeing Dr. Palomino from Rad Onc soon to begin RT planning. He is seeing Neurosurgery today to remove the staples.     INTERVAL HISTORY (3/25/2024): Since the last visit, he has continued the course of chemo-RT and is now s/p RT Fraction #27 of 30 -- finalize this week. He has fatigue from the treatment, but no GI upset, emesis, diarrhea, loss of appetite, or change in taste. He is off of the steroids now. The [facility] results are in and demonstrate EGFR amplification, TERT mutations, and loss of CDKN2A/B. He is active at home with chores and daily walking. He remains on Keppra 500 mg bid.     INTERVAL HISTORY (9/16/2024): Since the last visit, he has completed the course of chemo-RT in late March, and then continued adjuvant Temodar -- now s/p cycle #4. He has tolerated the chemo quite well so  far, with persistent fatigue, but no GI upset, nausea, emesis, or diarrhea. However, he has developed significant thrombocytopenia -- 78k last week. He is also now on the [employer] electrical array device, and is tolerating it well, without any scalp issues; > 18 hours per day (compliance 93%). He is doing well neurologically, with a few minor headaches, but no seizures, speech issues, visual changes, new focal weakness, gait difficulty, or sensory loss. He is on Decadron 2 mg/day. He remains active at home with chores and daily walks. He remains on Keppra 500 mg bid. KPS = 80.     INTERVAL HISTORY (12/05/2024): Since the last visit, he has completed the course of chemo-RT in late March, and then continued adjuvant Temodar -- now s/p cycle #6. He has tolerated the chemo quite well so far, with persistent fatigue, but no GI upset, nausea, emesis, or diarrhea. However, he has had some significant thrombocytopenia -- alisson 102k several weeks ago. He is also now on the Optune electrical array device, and is tolerating it well, without any scalp issues; > 18 hours per day (compliance > 90%). He is doing well neurologically, with a few minor headaches, but no seizures, speech issues, visual changes, new focal weakness, gait difficulty, or sensory loss. He is on Decadron 1 mg/day. He remains active at home with chores and daily walks. He remains on Keppra 500 mg bid. KPS = 80.     INTERVAL HISTORY (1/30/2025): Since the last visit, he has completed the course of chemo-RT in late March, and then continued adjuvant Temodar -- now s/p cycle #8. He has tolerated the chemo quite well so far, with persistent fatigue, but no GI upset, nausea, emesis, or diarrhea. However, he has had some significant thrombocytopenia -- alisson 102k several weeks ago. Unfortunately, the new brain MRI shows progression on T2/FLAIR, enhancement, and Perfusion. He was discussed at the Neuro-Oncology Tumor Board, with the recommendation to consider a new  surgery and a clinical trial. He is also now on the [employer] electrical array device, and is tolerating it well, without any scalp issues; > 18 hours per day (compliance > 90%). He is doing well neurologically, with a few minor headaches, but no seizures, speech issues, visual changes, new focal weakness, gait difficulty, or sensory loss. He is now off of Decadron, and had a flare-up of leg and general weakness for a few weeks; now doing better. He remains active at home with chores and daily walks. He remains on Keppra 500 mg bid. He is supposed to go on a [employer] cruise to [place] next week. KPS = 80.     The HPI is as documented in the HPI.     Objective   BSA: There is no height or weight on file to calculate BSA.  There were no vitals taken for this visit.    No family history on file.  Oncology History Overview Note   68 year old left-handed male with h/o HLD, cholecystectomy, Osborn's esophagus, on ASA 81 for cardioprotection presented to [facility] ED on 12/24/23 with 6 weeks of headaches, 3 weeks of increased confusion and running into things. Left-sided hemianopia on exam. CT head revealed a 1 mm midline leftward shift with a 7.3 cm x 7.1 cm x 5 cm right parietal mass. Transferred to [facility] for neurosurgical management.     GBM (glioblastoma multiforme) (Multi)   12/29/2023 Surgery    Right craniotomy for tumor resection (Ritu Carrasco MD)     12/29/2023 Pathology    Final diagnosis reported 1/18/24:  A, B.  Right brain mass, biopsy and removal:  - Malignant glioma, morphologically consistent with glioblastoma; positive for MGMT methylation     12/29/2023 Initial Diagnosis    GBM (glioblastoma multiforme) (CMS/HCC)     1/3/2024 Tumor Board    [facility] Tumor Board tumor board recommendations: Referral to radiation oncology and neuro-oncology.       2/15/2024 - 3/21/2024 Chemotherapy    Temozolomide with Concurrent Radiation, 42 Day Cycles     2/15/2024 - 3/28/2024 Radiation Therapy    Radiation Treatments   "     Active   No active radiation treatments to show.     Completed   Brain Boost_R (Started on 3/20/2024)   Most recent fraction: 182 cGy given on 3/28/2024   Total given: 1,274 cGy / 1,273 cGy  (7 of 7 fractions)   Elapsed Days: 8   Technique: 3D        Partial brain R (Started on 2/15/2024)   Most recent fraction: 182 cGy given on 3/19/2024   Total given: 4,186 cGy / 4,182 cGy  (23 of 23 fractions)   Elapsed Days: 33   Technique: 3D                        5/1/2024 -  Chemotherapy    Temozolomide (5/28), 28 Day Cycles         Enmanuel Ahumada \"Dash\"  reports that he has never smoked. He has never used smokeless tobacco.  He  reports that he does not currently use alcohol.  He  reports no history of drug use.    Physical Exam  Constitutional:       Appearance: Normal appearance.   Eyes:      General: Visual field deficit present.      Extraocular Movements: Extraocular movements intact.      Pupils: Pupils are equal, round, and reactive to light.   Musculoskeletal:      Cervical back: Normal range of motion.   Neurological:      Mental Status: He is alert and oriented to person, place, and time.      Sensory: Sensation is intact.      Motor: Motor function is intact.      Coordination: Coordination is intact.      Deep Tendon Reflexes: Babinski sign absent on the right side. Babinski sign absent on the left side.      Reflex Scores:       Tricep reflexes are 2+ on the right side and 2+ on the left side.       Bicep reflexes are 2+ on the right side and 2+ on the left side.       Brachioradialis reflexes are 2+ on the right side and 2+ on the left side.       Patellar reflexes are 2+ on the right side and 2+ on the left side.       Achilles reflexes are 2+ on the right side and 2+ on the left side.     Comments: Minimal loss of STM and cognition, speech fluent without dysnomia, CN's II - XII intact except for partial left homonymous hemianopsia, strength 5/5 in UE/LE, mild gait imbalance, DTR's symmetric, " sensation intact.    Psychiatric:         Mood and Affect: Mood normal.         Behavior: Behavior normal.     Performance Status:  Symptomatic; fully ambulatory      Lab Results   Component Value Date    WBC 4.0 (L) 01/27/2025    HGB 16.2 01/27/2025    HCT 45.5 01/27/2025    MCV 97 01/27/2025     (L) 01/27/2025       The new and recent brain MRI scans were reviewed with the patient and family:      === 01/27/25 ===    MR BRAIN PRESURGICAL PERFUSION AND FINGERPRINTING    - Impression -  Redemonstration of postoperative changes for right parieto-occipital  mass resection. There has been interval progression in irregular,  thick enhancement along the periphery of the surgical resection  cavity particularly along the anterior and deep margins. There is  abnormal elevation of corrected relative cerebral blood volume ratios  compared with contralateral normal white matter worrisome for disease  progression. Additionally there has been progression of nonenhancing  signal abnormality on FLAIR and T2 weighted imaging which could  reflect therapy changes or infiltrative neoplasm. There is increased  mass effect including leftward midline shift and partial effacement  of the basilar cisterns.    I personally reviewed the images/study and I agree with the findings  as stated.    MACRO:  Murtaza Ovalle discussed the significance and urgency of this  critical finding by epic secure chat with  MONCHO ACEVEDO on 1/28/2025  at 9:44 am.  (**-RCF-**) Findings:  See findings.    Signed by: Tamara Garcias 1/28/2025 10:31 AM  Dictation workstation:   [id number]      === 11/22/24 ===    MR BRAIN TUMOR PERFUSION PROTOCOL W AND WO CONTRAST    - Impression -  Postoperative changes are again identified compatible with a previous  right-sided craniotomy.    Immediately deep to the craniotomy site, there is again evidence of a  extra-axial likely epidural mixed signal hemorrhagic and/or  proteinaceous fluid collection measuring  approximate 18 mm in  greatest thickness on both the current and prior study. There is  again evidence of nonspecific irregular dural thickening and  enhancement deep to and surrounding the craniotomy site mildly more  pronounced when compared with the prior study dated 09/09/2024.    There is again evidence of a surgical resection cavity deep to the  craniotomy site within the right parieto-occipital region. Mixed  signal hemorrhagic and/or proteinaceous material is again noted  within and along the margins of the surgical resection cavity. The  current post gadolinium images demonstrate interval increased  nonspecific irregular inhomogeneous enhancement surrounding the  margins of the surgical resection cavity when compared with  09/09/2024 most pronounced superiorly and anteriorly. On the advanced  MRI images, ROIs were placed over areas of this nonspecific enlarging  irregular inhomogeneous enhancement surrounding the surgical  resection cavity with several ROIs demonstrating a lack of  significantly elevated corrected rCBV ratios compared with the  contralateral normal appearing brain parenchyma and a more slow  progressive uptake of contrast on the DCE permeability curves  favoring a component of therapy related changes; however, there are  also a few areas demonstrating elevated corrected rCBV ratios  compared with contralateral normal appearing white matter calculated  as high as 1.69 at least raising the possibility of a component of  underlying viable neoplasm as well. There is nonspecific nonenhancing  abnormal bright signal on the FLAIR and T2 images surrounding the  surgical resection cavity within the right cerebral hemisphere more  pronounced when compared with 09/09/2024 compatible with interval  increased surrounding edema with the possibility of underlying  nonenhancing infiltrative neoplasm not excluded. The nonenhancing  abnormal bright signal on the FLAIR and T2 images is again noted to  extend  across midline along the splenium of the corpus callosum.  There is mild interval increased mass effect within the right  cerebral hemisphere with interval increased partial effacement of the  posterior body and atrium of the right lateral ventricle when  compared with 09/09/2024. There is mild 2-3 mm of bowing of the  midline structures from right to left more pronounced when compared  with 09/09/2024.    Additional nonspecific white matter changes are again noted within  the left cerebral hemisphere again most pronounced within the left  parieto-occipital periventricular white matter mildly more pronounced  when compared with 09/09/2024.    The above findings are again noted be superimposed upon mild diffuse  brain parenchymal volume loss.    The diffusion-weighted images again demonstrate increased diffusion  signal corresponding diminished signal on ADC map associated with the  extra-axial likely epidural mixed signal fluid collection immediately  deep to the right craniotomy site. The diffusion signal changes are  nonspecific and likely represent diffusion signal change related to  evolving blood products although diffusion restriction can also be  seen with underlying infection and this latter possibility therefore  can not be completely excluded.    The gradient echo T2 weighted images again demonstrate blooming dark  signal immediately deep to the right craniotomy site as well as along  the margins of the surgical resection cavity within the right  parieto-occipital region compatible with blood products/hemosiderin  deposition anterior dystrophic calcification/mineralization.    I personally reviewed the images/study and I agree with Yolanda Mac DO's (radiology resident) findings as stated. This study  was interpreted at [facility],  [place], [place].    MACRO:  None.    Signed by: Guanaco Sheldon 11/25/2024 1:15 PM  Dictation workstation:    [id number]      Assessment/Plan      -Dash has recovered well from the initial surgery, with mild residual symptoms.     -The pathology is a GBM; IDH wildtype, MGMT methylated.     -He has completed the course of chemo-RT in late March.     -Since then he has continued the adjuvant phase of treatment with Temodar -- now s/p cycle #8.    -The new brain MRI is definitely progressive, with increased high signal on T2/FLAIR and enhancement, and positive Perfusion in several areas.     -His case was presented at the Neuro-Onc TB, with the recommendation to consider another surgical resection (with Dr. Cortez or Dr. Carrasco), as well as a clinical trial -- the NOVA 1324 trial with nuclear medicine theranostics.     -He is interested and will review the consent at home.     -He is also still using the [employer] electrical array device, which he is tolerating well and using > 18 hours per day.     -The advanced molecular phenotyping from [facility] has been reported, and shows EGFR amplification, which could be targeted with an EGFR TKI (e.g., Osimertinib) at a later date.     -He will be placed back on a low dose of Decadron -- 2 mg/day -- to be used while he is on the  cruise -- to help make sure he remains clinically stable.     -We will have him return to this clinic in the near future once he returns from the cruise, to finalize a decision about the surgery and clinical trial.     -I spent > 40 minutes in face to face consultation to review and discuss the above; 50% of which or more was dedicated to counseling.       Kyle Rolon MD

## 2025-02-02 PROCEDURE — 99284 EMERGENCY DEPT VISIT MOD MDM: CPT | Performed by: EMERGENCY MEDICINE

## 2025-02-02 ASSESSMENT — PAIN - FUNCTIONAL ASSESSMENT: PAIN_FUNCTIONAL_ASSESSMENT: 0-10

## 2025-02-02 ASSESSMENT — PAIN SCALES - GENERAL: PAINLEVEL_OUTOF10: 0 - NO PAIN

## 2025-02-03 ENCOUNTER — NURSE TRIAGE (OUTPATIENT)
Dept: HEMATOLOGY/ONCOLOGY | Facility: HOSPITAL | Age: 70
End: 2025-02-03
Payer: MEDICARE

## 2025-02-03 ENCOUNTER — HOSPITAL ENCOUNTER (EMERGENCY)
Facility: HOSPITAL | Age: 70
Discharge: HOME | End: 2025-02-03
Attending: EMERGENCY MEDICINE
Payer: MEDICARE

## 2025-02-03 ENCOUNTER — APPOINTMENT (OUTPATIENT)
Dept: HEMATOLOGY/ONCOLOGY | Facility: HOSPITAL | Age: 70
End: 2025-02-03
Payer: MEDICARE

## 2025-02-03 VITALS
DIASTOLIC BLOOD PRESSURE: 67 MMHG | RESPIRATION RATE: 18 BRPM | BODY MASS INDEX: 25.11 KG/M2 | WEIGHT: 160 LBS | TEMPERATURE: 99.1 F | HEART RATE: 109 BPM | OXYGEN SATURATION: 95 % | SYSTOLIC BLOOD PRESSURE: 107 MMHG | HEIGHT: 67 IN

## 2025-02-03 VITALS
TEMPERATURE: 99.5 F | DIASTOLIC BLOOD PRESSURE: 62 MMHG | RESPIRATION RATE: 20 BRPM | WEIGHT: 164 LBS | OXYGEN SATURATION: 95 % | BODY MASS INDEX: 24.86 KG/M2 | SYSTOLIC BLOOD PRESSURE: 107 MMHG | HEIGHT: 68 IN | HEART RATE: 89 BPM

## 2025-02-03 DIAGNOSIS — B34.9 VIRAL SYNDROME: Primary | ICD-10-CM

## 2025-02-03 DIAGNOSIS — K52.9 GASTROENTERITIS: ICD-10-CM
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
## 2025-02-03 LAB
ALBUMIN SERPL BCP-MCNC: 4.6 G/DL (ref 3.4–5)
ALP SERPL-CCNC: 82 U/L (ref 33–136)
ALT SERPL W P-5'-P-CCNC: 19 U/L (ref 10–52)
ANION GAP SERPL CALC-SCNC: 12 MMOL/L (ref 10–20)
ANION GAP SERPL CALC-SCNC: 16 MMOL/L (ref 10–20)
AST SERPL W P-5'-P-CCNC: 43 U/L (ref 9–39)
BASOPHILS # BLD AUTO: 0.02 X10*3/UL (ref 0–0.1)
BASOPHILS # BLD AUTO: 0.03 X10*3/UL (ref 0–0.1)
BASOPHILS NFR BLD AUTO: 0.6 %
BASOPHILS NFR BLD AUTO: 0.6 %
BILIRUB SERPL-MCNC: 1.3 MG/DL (ref 0–1.2)
BUN SERPL-MCNC: 19 MG/DL (ref 6–23)
BUN SERPL-MCNC: 23 MG/DL (ref 6–23)
CALCIUM SERPL-MCNC: 9 MG/DL (ref 8.6–10.3)
CALCIUM SERPL-MCNC: 9 MG/DL (ref 8.6–10.6)
CHLORIDE SERPL-SCNC: 103 MMOL/L (ref 98–107)
CHLORIDE SERPL-SCNC: 105 MMOL/L (ref 98–107)
CO2 SERPL-SCNC: 22 MMOL/L (ref 21–32)
CO2 SERPL-SCNC: 25 MMOL/L (ref 21–32)
CREAT SERPL-MCNC: 0.89 MG/DL (ref 0.5–1.3)
CREAT SERPL-MCNC: 0.98 MG/DL (ref 0.5–1.3)
EGFRCR SERPLBLD CKD-EPI 2021: 85 ML/MIN/1.73M*2
EGFRCR SERPLBLD CKD-EPI 2021: >90 ML/MIN/1.73M*2
EOSINOPHIL # BLD AUTO: 0.02 X10*3/UL (ref 0–0.7)
EOSINOPHIL # BLD AUTO: 0.02 X10*3/UL (ref 0–0.7)
EOSINOPHIL NFR BLD AUTO: 0.4 %
EOSINOPHIL NFR BLD AUTO: 0.6 %
ERYTHROCYTE [DISTWIDTH] IN BLOOD BY AUTOMATED COUNT: 11.5 % (ref 11.5–14.5)
ERYTHROCYTE [DISTWIDTH] IN BLOOD BY AUTOMATED COUNT: 11.5 % (ref 11.5–14.5)
FLUAV RNA RESP QL NAA+PROBE: NOT DETECTED
FLUBV RNA RESP QL NAA+PROBE: NOT DETECTED
GLUCOSE SERPL-MCNC: 102 MG/DL (ref 74–99)
GLUCOSE SERPL-MCNC: 129 MG/DL (ref 74–99)
HCT VFR BLD AUTO: 40 % (ref 41–52)
HCT VFR BLD AUTO: 45.3 % (ref 41–52)
HGB BLD-MCNC: 14.6 G/DL (ref 13.5–17.5)
HGB BLD-MCNC: 16 G/DL (ref 13.5–17.5)
IMM GRANULOCYTES # BLD AUTO: 0.01 X10*3/UL (ref 0–0.7)
IMM GRANULOCYTES # BLD AUTO: 0.01 X10*3/UL (ref 0–0.7)
IMM GRANULOCYTES NFR BLD AUTO: 0.2 % (ref 0–0.9)
IMM GRANULOCYTES NFR BLD AUTO: 0.3 % (ref 0–0.9)
LACTATE SERPL-SCNC: 1.3 MMOL/L (ref 0.4–2)
LYMPHOCYTES # BLD AUTO: 0.29 X10*3/UL (ref 1.2–4.8)
LYMPHOCYTES # BLD AUTO: 0.34 X10*3/UL (ref 1.2–4.8)
LYMPHOCYTES NFR BLD AUTO: 10.5 %
LYMPHOCYTES NFR BLD AUTO: 5.8 %
MAGNESIUM SERPL-MCNC: 1.79 MG/DL (ref 1.6–2.4)
MAGNESIUM SERPL-MCNC: 1.99 MG/DL (ref 1.6–2.4)
MCH RBC QN AUTO: 33.8 PG (ref 26–34)
MCH RBC QN AUTO: 34 PG (ref 26–34)
MCHC RBC AUTO-ENTMCNC: 35.3 G/DL (ref 32–36)
MCHC RBC AUTO-ENTMCNC: 36.5 G/DL (ref 32–36)
MCV RBC AUTO: 93 FL (ref 80–100)
MCV RBC AUTO: 96 FL (ref 80–100)
MONOCYTES # BLD AUTO: 0.44 X10*3/UL (ref 0.1–1)
MONOCYTES # BLD AUTO: 0.49 X10*3/UL (ref 0.1–1)
MONOCYTES NFR BLD AUTO: 13.6 %
MONOCYTES NFR BLD AUTO: 9.8 %
NEUTROPHILS # BLD AUTO: 2.41 X10*3/UL (ref 1.2–7.7)
NEUTROPHILS # BLD AUTO: 4.18 X10*3/UL (ref 1.2–7.7)
NEUTROPHILS NFR BLD AUTO: 74.4 %
NEUTROPHILS NFR BLD AUTO: 83.2 %
NRBC BLD-RTO: 0 /100 WBCS (ref 0–0)
NRBC BLD-RTO: 0 /100 WBCS (ref 0–0)
PLATELET # BLD AUTO: 112 X10*3/UL (ref 150–450)
PLATELET # BLD AUTO: 127 X10*3/UL (ref 150–450)
POTASSIUM SERPL-SCNC: 3.6 MMOL/L (ref 3.5–5.3)
POTASSIUM SERPL-SCNC: 5.4 MMOL/L (ref 3.5–5.3)
PROT SERPL-MCNC: 7.5 G/DL (ref 6.4–8.2)
RBC # BLD AUTO: 4.3 X10*6/UL (ref 4.5–5.9)
RBC # BLD AUTO: 4.74 X10*6/UL (ref 4.5–5.9)
SARS-COV-2 RNA RESP QL NAA+PROBE: NOT DETECTED
SODIUM SERPL-SCNC: 136 MMOL/L (ref 136–145)
SODIUM SERPL-SCNC: 138 MMOL/L (ref 136–145)
WBC # BLD AUTO: 3.2 X10*3/UL (ref 4.4–11.3)
WBC # BLD AUTO: 5 X10*3/UL (ref 4.4–11.3)

## 2025-02-03 PROCEDURE — 96374 THER/PROPH/DIAG INJ IV PUSH: CPT

## 2025-02-03 PROCEDURE — 87636 SARSCOV2 & INF A&B AMP PRB: CPT

## 2025-02-03 PROCEDURE — 83735 ASSAY OF MAGNESIUM: CPT | Performed by: STUDENT IN AN ORGANIZED HEALTH CARE EDUCATION/TRAINING PROGRAM

## 2025-02-03 PROCEDURE — 2500000004 HC RX 250 GENERAL PHARMACY W/ HCPCS (ALT 636 FOR OP/ED)

## 2025-02-03 PROCEDURE — 87075 CULTR BACTERIA EXCEPT BLOOD: CPT | Mod: 59,STJLAB

## 2025-02-03 PROCEDURE — 85025 COMPLETE CBC W/AUTO DIFF WBC: CPT | Performed by: STUDENT IN AN ORGANIZED HEALTH CARE EDUCATION/TRAINING PROGRAM

## 2025-02-03 PROCEDURE — 36415 COLL VENOUS BLD VENIPUNCTURE: CPT

## 2025-02-03 PROCEDURE — 83735 ASSAY OF MAGNESIUM: CPT

## 2025-02-03 PROCEDURE — 84075 ASSAY ALKALINE PHOSPHATASE: CPT

## 2025-02-03 PROCEDURE — 80048 BASIC METABOLIC PNL TOTAL CA: CPT | Performed by: STUDENT IN AN ORGANIZED HEALTH CARE EDUCATION/TRAINING PROGRAM

## 2025-02-03 PROCEDURE — 96361 HYDRATE IV INFUSION ADD-ON: CPT

## 2025-02-03 PROCEDURE — 96360 HYDRATION IV INFUSION INIT: CPT

## 2025-02-03 PROCEDURE — 85025 COMPLETE CBC W/AUTO DIFF WBC: CPT

## 2025-02-03 PROCEDURE — 2500000001 HC RX 250 WO HCPCS SELF ADMINISTERED DRUGS (ALT 637 FOR MEDICARE OP)

## 2025-02-03 PROCEDURE — 99284 EMERGENCY DEPT VISIT MOD MDM: CPT | Performed by: EMERGENCY MEDICINE

## 2025-02-03 PROCEDURE — 83605 ASSAY OF LACTIC ACID: CPT

## 2025-02-03 RX ORDER — ACETAMINOPHEN 325 MG/1
975 TABLET ORAL ONCE
Status: COMPLETED | OUTPATIENT
Start: 2025-02-03 | End: 2025-02-03

## 2025-02-03 RX ORDER — ONDANSETRON HYDROCHLORIDE 2 MG/ML
4 INJECTION, SOLUTION INTRAVENOUS ONCE
Status: COMPLETED | OUTPATIENT
Start: 2025-02-03 | End: 2025-02-03

## 2025-02-03 RX ADMIN — SODIUM CHLORIDE, POTASSIUM CHLORIDE, SODIUM LACTATE AND CALCIUM CHLORIDE 1000 ML: 600; 310; 30; 20 INJECTION, SOLUTION INTRAVENOUS at 15:11

## 2025-02-03 RX ADMIN — SODIUM CHLORIDE, POTASSIUM CHLORIDE, SODIUM LACTATE AND CALCIUM CHLORIDE 1000 ML: 600; 310; 30; 20 INJECTION, SOLUTION INTRAVENOUS at 02:56

## 2025-02-03 RX ADMIN — SODIUM CHLORIDE, POTASSIUM CHLORIDE, SODIUM LACTATE AND CALCIUM CHLORIDE 1000 ML: 600; 310; 30; 20 INJECTION, SOLUTION INTRAVENOUS at 01:08

## 2025-02-03 RX ADMIN — ACETAMINOPHEN 975 MG: 325 TABLET ORAL at 00:35

## 2025-02-03 RX ADMIN — ONDANSETRON 4 MG: 2 INJECTION INTRAMUSCULAR; INTRAVENOUS at 01:06

## 2025-02-03 ASSESSMENT — COLUMBIA-SUICIDE SEVERITY RATING SCALE - C-SSRS
2. HAVE YOU ACTUALLY HAD ANY THOUGHTS OF KILLING YOURSELF?: NO
1. IN THE PAST MONTH, HAVE YOU WISHED YOU WERE DEAD OR WISHED YOU COULD GO TO SLEEP AND NOT WAKE UP?: NO
2. HAVE YOU ACTUALLY HAD ANY THOUGHTS OF KILLING YOURSELF?: NO
1. IN THE PAST MONTH, HAVE YOU WISHED YOU WERE DEAD OR WISHED YOU COULD GO TO SLEEP AND NOT WAKE UP?: NO
6. HAVE YOU EVER DONE ANYTHING, STARTED TO DO ANYTHING, OR PREPARED TO DO ANYTHING TO END YOUR LIFE?: NO
6. HAVE YOU EVER DONE ANYTHING, STARTED TO DO ANYTHING, OR PREPARED TO DO ANYTHING TO END YOUR LIFE?: NO

## 2025-02-03 ASSESSMENT — PAIN SCALES - GENERAL
PAINLEVEL_OUTOF10: 0 - NO PAIN

## 2025-02-03 ASSESSMENT — PAIN - FUNCTIONAL ASSESSMENT
PAIN_FUNCTIONAL_ASSESSMENT: 0-10
PAIN_FUNCTIONAL_ASSESSMENT: 0-10

## 2025-02-03 NOTE — ED PROVIDER NOTES
EMERGENCY DEPARTMENT ENCOUNTER      Pt Name: Enmanuel Ahumada  MRN: [id number]  Birthdate 1955  Date of evaluation: 2/2/2025  Provider: Marvin Ghosh MD    CHIEF COMPLAINT       Chief Complaint   Patient presents with    Nausea Vomiting Diarrhea     HISTORY OF PRESENT ILLNESS    HPI  69-year-old male presents emergency department chief complaint of nausea vomiting diarrhea.  Patient is currently undergoing chemotherapy for glioblastoma.  Last chemo session was January 10.  Patient indicates that he had sick contacts at home with the family having nausea vomiting diarrhea he developed symptoms earlier this afternoon.  He claims he has had several bouts of nausea vomiting as well as multiple bouts of diarrhea.  He denies any blood in his emesis or bowel movements.  The patient indicates he feels otherwise well currently no chest pain abdominal pain or any other concerning findings.  The patient is febrile here in the emergency department with increased heart rate meeting SIRS criteria we have admitted SIRS management this is most likely due to a viral source however.  Nursing Notes were reviewed.    PAST MEDICAL HISTORY     Past Medical History:   Diagnosis Date    Brain cancer (Multi)          SURGICAL HISTORY       Past Surgical History:   Procedure Laterality Date    BRAIN SURGERY      CHOLECYSTECTOMY  January 25, 2020    OTHER SURGICAL HISTORY  07/24/2019    Colonoscopy    OTHER SURGICAL HISTORY  07/26/2021    Endoscopy    VASECTOMY  Abt 1991         CURRENT MEDICATIONS       Previous Medications    ACETAMINOPHEN (TYLENOL) 325 MG TABLET    Take 2 tablets (650 mg) by mouth every 6 hours if needed for mild pain (1 - 3) (for mild post surgical pain).    ATORVASTATIN (LIPITOR) 40 MG TABLET    TAKE 1 TABLET BY MOUTH EVERY DAY    CALCIUM CARBONATE-VITAMIN D3 ORAL    Take 1 capsule by mouth once daily.    CLOBETASOL (TEMOVATE) 0.05 % CREAM    Apply topically 2 times a day.    DEXAMETHASONE (DECADRON) 1 MG  TABLET    Take 1 tablet (1 mg) by mouth 2 times a day.    DEXAMETHASONE (DECADRON) 2 MG TABLET    Take 1 tablet (2 mg) by mouth once daily.    KRILL-OMEGA-3-DHA-EPA-LIPIDS 814-89-78-50 MG CAPSULE    Take 1 capsule by mouth once daily.    LEVETIRACETAM (KEPPRA) 500 MG TABLET    Take 1 tablet (500 mg) by mouth 2 times a day.    ONDANSETRON (ZOFRAN) 8 MG TABLET    Take 1 tablet (8 mg) by mouth every 8 hours if needed for nausea or vomiting. Take one hour prior to each chemotherapy dose in addition to every 8 hours as needed for nausea/vomiting    PANTOPRAZOLE (PROTONIX) 40 MG EC TABLET    TAKE 1 TABLET BY MOUTH IN THE MORNING 30 MINUTES BEFORE BREAKFAST.    PROCHLORPERAZINE (COMPAZINE) 10 MG TABLET    Take 1 tablet (10 mg) by mouth every 6 hours if needed for nausea or vomiting.    SENNOSIDES (SENOKOT) 8.6 MG TABLET    Take 1-2 tablets (8.6-17.2 mg) by mouth 2 times a day as needed for constipation.    SILVER SULFADIAZINE (SILVADENE) 1 % CREAM    Apply topically once daily.    UBIDECARENONE (COENZYME Q10, BULK, MISC)    Take 1 Capful by mouth once daily.       ALLERGIES     Patient has no known allergies.    FAMILY HISTORY     No family history on file.       SOCIAL HISTORY       Social History     Socioeconomic History    Marital status:    Tobacco Use    Smoking status: Never    Smokeless tobacco: Never   Substance and Sexual Activity    Alcohol use: Not Currently    Drug use: Never    Sexual activity: Yes     Partners: Female     Birth control/protection: Male Sterilization     Social Drivers of Health     Financial Resource Strain: High Risk (12/25/2023)    Overall Financial Resource Strain (CARDIA)     Difficulty of Paying Living Expenses: Very hard   Transportation Needs: No Transportation Needs (12/25/2023)    PRAPARE - Transportation     Lack of Transportation (Medical): No     Lack of Transportation (Non-Medical): No   Housing Stability: Unknown (1/2/2024)    Housing Stability Vital Sign     Unable to  Pay for Housing in the Last Year: No     Unstable Housing in the Last Year: No       SCREENINGS                        PHYSICAL EXAM    (up to 7 for level 4, 8 or more for level 5)     ED Triage Vitals [02/02/25 2356]   Temperature Heart Rate Respirations BP   (!) 39.5 °C (103.1 °F) (!) 135 18 112/69      Pulse Ox Temp Source Heart Rate Source Patient Position   94 % Temporal -- Sitting      BP Location FiO2 (%)     Right arm --       Physical Exam  Constitutional:       Appearance: Normal appearance.   HENT:      Head: Normocephalic.      Right Ear: Tympanic membrane and ear canal normal.      Left Ear: Tympanic membrane and ear canal normal.      Nose: Nose normal.      Mouth/Throat:      Mouth: Mucous membranes are moist.      Pharynx: Oropharynx is clear.   Eyes:      Extraocular Movements: Extraocular movements intact.      Pupils: Pupils are equal, round, and reactive to light.   Cardiovascular:      Rate and Rhythm: Normal rate and regular rhythm.      Pulses: Normal pulses.      Heart sounds: Normal heart sounds.   Pulmonary:      Effort: Pulmonary effort is normal.      Breath sounds: Normal breath sounds.   Abdominal:      General: Abdomen is flat.      Palpations: Abdomen is soft.   Musculoskeletal:         General: Normal range of motion.   Skin:     General: Skin is warm.      Capillary Refill: Capillary refill takes less than 2 seconds.   Neurological:      General: No focal deficit present.      Mental Status: He is alert.   Psychiatric:         Mood and Affect: Mood normal.          DIAGNOSTIC RESULTS     LABS:  Labs Reviewed   CBC WITH AUTO DIFFERENTIAL - Abnormal       Result Value    WBC 5.0      nRBC 0.0      RBC 4.74      Hemoglobin 16.0      Hematocrit 45.3      MCV 96      MCH 33.8      MCHC 35.3      RDW 11.5      Platelets 127 (*)     Neutrophils % 83.2      Immature Granulocytes %, Automated 0.2      Lymphocytes % 5.8      Monocytes % 9.8      Eosinophils % 0.4      Basophils % 0.6       Neutrophils Absolute 4.18      Immature Granulocytes Absolute, Automated 0.01      Lymphocytes Absolute 0.29 (*)     Monocytes Absolute 0.49      Eosinophils Absolute 0.02      Basophils Absolute 0.03     BLOOD CULTURE   BLOOD CULTURE   MAGNESIUM   COMPREHENSIVE METABOLIC PANEL   LACTATE   INFLUENZA A AND B PCR   SARS-COV-2 PCR       All other labs were within normal range or not returned as of this dictation.    Imaging  No orders to display        Procedures  Procedures     EMERGENCY DEPARTMENT COURSE/MDM:   Medical Decision Making  69-year-old male presents emergency department chief complaint of nausea vomiting diarrhea.  Medical management treatment emergency department will consist of viral swabs.  This patient is most likely suffering from viral etiology diarrhea and vomiting.  We will also be checking basic labs as the patient is currently immunocompromised we have admitted SIRS criteria we are drawing blood cultures and lactate.  We have also given the patient a liter of fluids empirically.  Orthostatic vitals the patient initially did not pass.  We have implemented another liter of fluids.  On reevaluation the patient was more suitable for discharge and feeling better afterwards that her vitals.  Patient's fever has resolved.  He indicates he would like to go home.  Patient is negative for influenza or COVID however he most likely has norovirus based off of his description of symptoms.  The patient will be discharged home with strict return precautions.    Diagnoses as of 02/03/25 0439   Viral syndrome        Patient and or family in agreement and understanding of treatment plan.  All questions answered.      I reviewed the case with the attending ED physician. The attending ED physician agrees with the plan. Patient and/or patient´s representative was counseled regarding labs, imaging, likely diagnosis, and plan. All questions were answered.    ED Medications administered this visit:    Medications    ondansetron (Zofran) injection 4 mg (has no administration in time range)   lactated Ringer's bolus 1,000 mL (has no administration in time range)   acetaminophen (Tylenol) tablet 975 mg (975 mg oral Given 2/3/25 0035)       New Prescriptions from this visit:    New Prescriptions    No medications on file       Follow-up:  No follow-up provider specified.      Final Impression: No diagnosis found.      (Please note that portions of this note were completed with a voice recognition program.  Efforts were made to edit the dictations but occasionally words are mis-transcribed.)     Marvin Ghosh MD  Resident  02/03/25 5293

## 2025-02-03 NOTE — DISCHARGE INSTRUCTIONS
Lots of hydration over the next couple days.  Make sure you are eating well as you can.  Get recheck with your doctor if not feeling better.  We are happy to help you if you feel like you are worsening.  Make sure you are taking medication for any fever or discomfort.

## 2025-02-03 NOTE — ED TRIAGE NOTES
"   TRIAGE NOTE   I saw the patient as the Clinician in Triage and performed a brief history and physical exam, established acuity, and ordered appropriate tests to develop basic plan of care. Patient will be seen by an [facility], resident and/or physician who will independently evaluate the patient. Please see subsequent provider notes for further details and disposition.     Brief HPI: In brief, Enmanuel Ahumada \"Petey" is a 69 y.o. male that presents for weakness. Patient has a history of glioblastoma, recently diagnosed with the norovirus, was seen yesterday at [facility], received IV fluids, felt better and was discharged. Patient returns today after a fall. Patient states he woke up this morning, was weak, stumbled and feel into the door. Patient did not hit his head or lost consciousness. He states he still feels weak and therefore came back to the ED. He denies any ongoing nausea or vomiting does report some nonbloody diarrhea. No additional symptoms reported. .     Focused Physical exam:     General:  Well appearing, age appropriate male, sitting comfortably in the wheelchair  Pulmonary:   Non-labored breathing. Breath sounds clear bilaterally  Cardiac:  Regular rate   Abdomen:  Soft. Non-tender. Non-distended   Skin:  Dry, no rashes  Neuro:  Alert and oriented x4, GCS 15, moves all four extremities spontaneously. Does have a slight unsteady gait but no ataxia appreciated.     Plan/MDM:     Patient presenting with ongoing weakness in the setting of viral illness. Fell into the doorway today but did not hit head, low suspicion for intracranial injury as a result. Will plan to obtain basic labs and reassess. Patient is appropriate to wait in the lobby until an ED bed becomes available.     Please see subsequent provider note for further details and disposition     " [facility] (Discharge Huddle; Vulnerable Patient)

## 2025-02-03 NOTE — TELEPHONE ENCOUNTER
Spouse  was seen in ED yesterday for nausea, vomiting and diarrhea. Was told it was norovirus, given fluids and sent home. States patient still extremely weak and fell this AM. Advised to return to ED. Spouse  will come to [facility] [place].

## 2025-02-03 NOTE — DISCHARGE INSTRUCTIONS
Diarrhea itself is usually not dangerous but dehydration is so it is important that you stay very well hydrated to make up for the loss of fluid. Please drink small sips frequently and consider drinking something with electrolytes like a sports drink (gatorade), pedialyte (or store brand), or liquid IV. You can take peptobismol (or generic store brand) for your symptoms. The maximum dose of chewable tablets is 16 in a 24 hour period. Take doses frequently (every 1-2 hours) until the diarrhea has slowed. Please note that peptobismol can turn your stool dark black and this is normal. If you have dark black stool without taking peptobismol or your stool is still black 5-7 days after taking your last dose of peptobismol you should seek medical treatment.    Please return to the ER for any blood in the stool, fever >100.4, severe abdominal pain, nausea or vomiting that prevent you from eating, or you experience any other symptoms you feel warrant emergent evaluation by a physician.

## 2025-02-03 NOTE — ED PROVIDER NOTES
Emergency Department Provider Note        History of Present Illness     History provided by: Patient and Family Member  Limitations to History: None  External Records Reviewed with Brief Summary: Discharge Summary from 2/3/25 which showed nausea and vomiting for the last few days and a history of GBM    HPI:  Enmanuel Ahumada is a 69 y.o. male with a PMH of GBM presenting with 2 falls over the last few days in the setting of gastroenteritis. For the last few days, he has has nausea, vomiting, and diarrhea and has felt generally weak. He lives with his wife, son, and granddaughter who have all had similar GI symptoms. He denies blood in his stool. He has taken Imodium for the diarrhea.  Yesterday, he felt weak and tripped while in the bathroom, falling backwards and landing on his butt. He denied dizziness, palpitations, loss of consciousness, light-headedness, or a head strike. He then presented to the emergency room at [facility] where he was febrile and tachycardic, so he was given IV fluids which significantly improved his symptoms and was discharged. This morning, he once again felt weak and off-balance, and fell on his arm. He did not hit his head or lose consciousness. He now has a scratch on his right arm but no pain. He is not on blood thinners.  He has been on and off steroids over the last few weeks per his oncologist's recommendations. He had an MRI last week which showed an increase in the size of his GBM, and will require additional surgery. His last dose of chemo was 1/10/25.    Physical Exam   Triage vitals:  T 37.3 °C (99.1 °F)  HR (!) 109  /67  RR 18  O2 95 % None (Room air)    General: Awake, alert, in no acute distress  Eyes: Gaze conjugate.  No scleral icterus or injection. Impaired peripheral vision in his left eye.  HENT: Normo-cephalic, atraumatic. No stridor  CV: Tachycardic rate, regular rhythm. Radial pulses 2+ bilaterally  Resp: Breathing non-labored, speaking in full  sentences.  Clear to auscultation bilaterally  GI: Soft, non-distended, non-tender. No rebound or guarding.  MSK/Extremities: No gross bony deformities. Moving all extremities. Superficial abrasion on the distal aspect of his right arm.  Skin: Warm. Appropriate color  Neuro: Alert. Oriented. Face symmetric. Speech is fluent.  Gross strength and sensation intact in b/l UE and LEs  Psych: Appropriate mood and affect      Medical Decision Making & ED Course   Medical Decision Makin y.o. male with a PMH of GBM presenting with 2 falls in the setting of viral gastroenteritis. He has has decreased appetite, vomiting, and diarrhea for the last few days likely causing him to be dehydrated. Given stable neurologic exam, no electrolyte abnormalities, and no evidence of a syncopal event, his recent falls are most likely due to dehydration. He did not have loss of consciousness or a head strike, so concern for intracranial injury is very low. He recently had a brain MRI and is following with oncology and neurosurgery, so further brain imaging is not needed at this time. Patient given IV fluids and discharged home.    Differential diagnoses considered include but are not limited to: Weakness secondary to dehydration in the setting of viral gastroenteritis, intermittent weakness due to known brain mass     Social Determinants of Health which Significantly Impact Care: None identified     EKG Independent Interpretation: EKG not obtained    Independent Result Review and Interpretation: None obtained    Chronic conditions affecting the patient's care: As documented above in [facility]    The patient was discussed with the following consultants/services: None    Care Considerations: As documented above in [facility]    ED Course:  Diagnoses as of 25 1643   Fall, initial encounter   Gastroenteritis     Disposition   Discharge home    Procedures   Procedures    Patient seen and discussed with ED attending physician.    Fabiana Young,  MS-3        Ricardo Hardy,   Resident  02/06/25 1735

## 2025-02-03 NOTE — ED TRIAGE NOTES
Pt states he started with N/V around 1800 tonight. Diarrhea started at 2100. Pt states he saw his oncologist on Thursday and everything was fine at that time. Last Chemo was on Moshe 10 th. Pt did express a fall earlier tonight d/t weakness. Denies any injury, denies head trauma, denies LOC.

## 2025-02-03 NOTE — ED TRIAGE NOTES
Pt presents to the ED d/t frequent falls and diarrhea. Pt weas seen at [facility] and sent home due to feeling better. However pt fell again today while in the restroom. Pt denies pain or hitting his head. Denies blood thinner use. Last chemo on Moshe 10. Pt was advised to come here by his oncologist.

## 2025-02-07 ENCOUNTER — LAB (OUTPATIENT)
Dept: LAB | Facility: HOSPITAL | Age: 70
End: 2025-02-07
Payer: MEDICARE

## 2025-02-07 ENCOUNTER — OFFICE VISIT (OUTPATIENT)
Dept: NEUROSURGERY | Facility: HOSPITAL | Age: 70
End: 2025-02-07
Payer: MEDICARE

## 2025-02-07 VITALS
DIASTOLIC BLOOD PRESSURE: 69 MMHG | BODY MASS INDEX: 25.04 KG/M2 | SYSTOLIC BLOOD PRESSURE: 107 MMHG | WEIGHT: 159.9 LBS | HEART RATE: 81 BPM | OXYGEN SATURATION: 100 % | TEMPERATURE: 97.9 F

## 2025-02-07 DIAGNOSIS — C71.9 GBM (GLIOBLASTOMA MULTIFORME) (MULTI): Primary | ICD-10-CM

## 2025-02-07 DIAGNOSIS — Z01.818 PREOPERATIVE EXAMINATION: ICD-10-CM

## 2025-02-07 DIAGNOSIS — Z79.899 ON ANTINEOPLASTIC CHEMOTHERAPY: ICD-10-CM

## 2025-02-07 DIAGNOSIS — C71.9 GBM (GLIOBLASTOMA MULTIFORME) (MULTI): ICD-10-CM

## 2025-02-07 DIAGNOSIS — Z51.81 ENCOUNTER FOR MONITORING ASPIRIN THERAPY: ICD-10-CM

## 2025-02-07 DIAGNOSIS — Z79.82 ENCOUNTER FOR MONITORING ASPIRIN THERAPY: ICD-10-CM

## 2025-02-07 LAB
ABO GROUP (TYPE) IN BLOOD: NORMAL
ALBUMIN SERPL BCP-MCNC: 4.3 G/DL (ref 3.4–5)
ALP SERPL-CCNC: 83 U/L (ref 33–136)
ALT SERPL W P-5'-P-CCNC: 35 U/L (ref 10–52)
ANION GAP SERPL CALC-SCNC: 13 MMOL/L (ref 10–20)
ANTIBODY SCREEN: NORMAL
APTT PPP: 31 SECONDS (ref 27–38)
AST SERPL W P-5'-P-CCNC: 17 U/L (ref 9–39)
BACTERIA BLD CULT: NORMAL
BACTERIA BLD CULT: NORMAL
BASOPHILS # BLD AUTO: 0.01 X10*3/UL (ref 0–0.1)
BASOPHILS NFR BLD AUTO: 0.2 %
BILIRUB SERPL-MCNC: 1 MG/DL (ref 0–1.2)
BUN SERPL-MCNC: 12 MG/DL (ref 6–23)
CALCIUM SERPL-MCNC: 9.4 MG/DL (ref 8.6–10.3)
CHLORIDE SERPL-SCNC: 103 MMOL/L (ref 98–107)
CO2 SERPL-SCNC: 30 MMOL/L (ref 21–32)
CREAT SERPL-MCNC: 0.84 MG/DL (ref 0.5–1.3)
EGFRCR SERPLBLD CKD-EPI 2021: >90 ML/MIN/1.73M*2
EOSINOPHIL # BLD AUTO: 0 X10*3/UL (ref 0–0.7)
EOSINOPHIL NFR BLD AUTO: 0 %
ERYTHROCYTE [DISTWIDTH] IN BLOOD BY AUTOMATED COUNT: 11.4 % (ref 11.5–14.5)
GLUCOSE SERPL-MCNC: 142 MG/DL (ref 74–99)
HCT VFR BLD AUTO: 42.9 % (ref 41–52)
HGB BLD-MCNC: 15.2 G/DL (ref 13.5–17.5)
IMM GRANULOCYTES # BLD AUTO: 0.03 X10*3/UL (ref 0–0.7)
IMM GRANULOCYTES NFR BLD AUTO: 0.5 % (ref 0–0.9)
INR PPP: 1.1 (ref 0.9–1.1)
LYMPHOCYTES # BLD AUTO: 1.33 X10*3/UL (ref 1.2–4.8)
LYMPHOCYTES NFR BLD AUTO: 21.5 %
MCH RBC QN AUTO: 33.9 PG (ref 26–34)
MCHC RBC AUTO-ENTMCNC: 35.4 G/DL (ref 32–36)
MCV RBC AUTO: 96 FL (ref 80–100)
MONOCYTES # BLD AUTO: 0.28 X10*3/UL (ref 0.1–1)
MONOCYTES NFR BLD AUTO: 4.5 %
NEUTROPHILS # BLD AUTO: 4.54 X10*3/UL (ref 1.2–7.7)
NEUTROPHILS NFR BLD AUTO: 73.3 %
NRBC BLD-RTO: 0 /100 WBCS (ref 0–0)
PLATELET # BLD AUTO: 138 X10*3/UL (ref 150–450)
POTASSIUM SERPL-SCNC: 4.2 MMOL/L (ref 3.5–5.3)
PROT SERPL-MCNC: 6.9 G/DL (ref 6.4–8.2)
PROTHROMBIN TIME: 12.8 SECONDS (ref 9.8–12.8)
RBC # BLD AUTO: 4.48 X10*6/UL (ref 4.5–5.9)
RH FACTOR (ANTIGEN D): NORMAL
SODIUM SERPL-SCNC: 142 MMOL/L (ref 136–145)
WBC # BLD AUTO: 6.2 X10*3/UL (ref 4.4–11.3)

## 2025-02-07 PROCEDURE — 86901 BLOOD TYPING SEROLOGIC RH(D): CPT | Performed by: NEUROLOGICAL SURGERY

## 2025-02-07 PROCEDURE — 80053 COMPREHEN METABOLIC PANEL: CPT

## 2025-02-07 PROCEDURE — 36415 COLL VENOUS BLD VENIPUNCTURE: CPT

## 2025-02-07 PROCEDURE — 85025 COMPLETE CBC W/AUTO DIFF WBC: CPT

## 2025-02-07 PROCEDURE — 99215 OFFICE O/P EST HI 40 MIN: CPT | Performed by: NEUROLOGICAL SURGERY

## 2025-02-07 PROCEDURE — 85610 PROTHROMBIN TIME: CPT | Performed by: NEUROLOGICAL SURGERY

## 2025-02-07 RX ORDER — CHLORHEXIDINE GLUCONATE ORAL RINSE 1.2 MG/ML
SOLUTION DENTAL
Qty: 118 ML | Refills: 0 | Status: SHIPPED | OUTPATIENT
Start: 2025-02-07

## 2025-02-07 RX ORDER — CHLORHEXIDINE GLUCONATE 40 MG/ML
SOLUTION TOPICAL
Qty: 473 ML | Refills: 0 | Status: SHIPPED | OUTPATIENT
Start: 2025-02-07

## 2025-02-07 ASSESSMENT — PAIN SCALES - GENERAL: PAINLEVEL_OUTOF10: 0-NO PAIN

## 2025-02-07 NOTE — PROGRESS NOTES
[facility]  Neurosurgery    Subjective     Enmanuel Ahumada is a right handed  69 y.o. year old male presenting for initial evaluation , referral from Dr. Kyle Rolon, neurooncology.     PMH HLD, Osborn's esophagus, s/p cholecystectomy, who presented with gait instability and headaches back in December 2023. Workup was significant for CTH with RO hypodensity. MRI PPF and fMRI with language dominant lesion. He is now s/p right craniotomy for tumor resection with 5ALA with Dr. Carrasco on 12/29/23. Postoperative MRI for GTR. Final surgical pathology for GBM IDH wildytpe, MGMT methylated. Caris testing was completed for EGFR amplification, TERT mutations, and loss of CDKN2A/B. Treatment course significant for chemoRT on adjuvant temodar c/b thrombocytopenia. Optune electrical array device was placed.  Remains on adjuvant temodar now s/p cycle 8. Case was discussed at [facility] due to MRI brain with progression. He was recommended for surgical resection and clinical trial NOVA 1324 with nuclear medicine 4D Energetics vs GCAR clinical trail. Patient presents to clinic for surgical discussion.     He has a baseline left complete field cut (homonymous hemianopsia). Patient denies fevers, headaches, nausea, vomiting, speech difficulty, seizures, double/blurry vision, sensory loss, weakness, incontinence, pain.      Review of Systems   Eyes:  Positive for visual disturbance.   All other systems reviewed and are negative.      Treatment Synopsis:   Brain Tumor: Glioblastoma, IDH wildtype grade 4  Molecular: IDH wildtype  Location: occipital lobe  Age at diagnosis: 67  Prior history of Radiation: yes    Objective     Performance and Vitals:  KARNOFSKY SCALE WITH ECOG EQUIVALENT:90, Able to carry on normal activity; minor signs or symptoms of disease (ECOG equivalent 0)    Vitals:    02/07/25 1029   BP: 107/69   Pulse: 81   Temp: 36.6 °C (97.9 °F)   SpO2: 100%         Neurological  Exam  Mental Status   Oriented to person, place, time and situation. Recent and remote memory are intact. Speech is normal. Language is fluent with no aphasia. Attention and concentration are normal.    Cranial Nerves  CN II: Left homonymous hemianopsia.  CN III, IV, VI: Extraocular movements intact bilaterally. Normal lids and orbits bilaterally. Pupils equal round and reactive to light bilaterally.  CN V: Facial sensation is normal.  CN VII: Full and symmetric facial movement.  CN VIII: Hearing is normal.  CN IX, X: Palate elevates symmetrically. Normal gag reflex.  CN XI: Shoulder shrug strength is normal.  CN XII: Tongue midline without atrophy or fasciculations.    Motor  Normal muscle bulk throughout. Left pronator drift. Mild.    Sensory  Sensation is intact to light touch, pinprick, vibration and proprioception in all four extremities.    Reflexes  Deep tendon reflexes are 2+ and symmetric in all four extremities.    Coordination    Finger-to-nose, rapid alternating movements and heel-to-shin normal bilaterally without dysmetria.    Gait  Casual gait is normal including stance, stride, and arm swing.    Physical Exam  Eyes:      General: Lids are normal.      Extraocular Movements: Extraocular movements intact.      Pupils: Pupils are equal, round, and reactive to light.   Skin:     Comments: Occipital Cranial incision well-healed   Neurological:      Coordination: Coordination is intact.      Deep Tendon Reflexes: Reflexes are normal and symmetric.   Psychiatric:         Speech: Speech normal.         Imaging:   Imaging Results: MR brain w and wo IV contrast 09/09/2024    Narrative  Interpreted By:  Neil Boyd,  STUDY:  MR BRAIN W AND WO IV CONTRAST; ;  9/9/2024 11:08 am    INDICATION:  Signs/Symptoms:hx gbm, on temozolomide, treatment response.    ,C71.9 Malignant neoplasm of brain, unspecified (Multi)    COMPARISON:  MRI brain from 06/24/2024.    ACCESSION NUMBER(S):  [id number]    ORDERING  CLINICIAN:  MONCHO ACEVEDO    TECHNIQUE:  MRI of the brain was performed with the acquisition of axial  diffusion-weighted, axial FLAIR, axial T2 gradient echo, axial T1,  axial T1 fat saturated postcontrast sequence, and volumetric axial T1  post contrast sequence with multiplanar reformats.    Contrast: 14 mL of Dotarem was injected intravenously.    FINDINGS:  There are postoperative changes from right posterior craniotomy for  resection of a mass in the right parietal lobe. There is a 1.4 cm in  maximum thickness extra-axial fluid collection along the right  parietal convexity which is unchanged in thickness and contains  internal T1 hyperintense signal and restricted diffusion, most  consistent with blood products. This fluid communicates with a  surgical cavity in the right parietal lobe which has slightly  decreased in size since prior as it now measures 2.4 cm in AP  diameter compared to 2.7 cm previously. Within the surgical cavity  there is T1 hyperintense signal consistent with blood products. There  is also hemosiderin deposition along the linings of the surgical  cavity which communicates with the overlying extracranial fluid.    There are small amount of susceptibility artifact located within the  brain parenchyma adjacent to the surgical cavity and small amount of  blood products are again seen within the dependent portion of the  right occipital horn.    On postcontrast sequence, there is enhancement located within the  brain parenchyma surrounding the resection cavity which measures 7 mm  in maximum thickness and is slightly more pronounced in appearance  since prior when it was more ill-defined and linear. Overall  thickness of enhancement is stable to slightly increased since prior  when it measured 5 mm. Enhancement again extends to the border of the  atrium of the right lateral ventricle with subtle subependymal  enhancement not excluded.    There is another small focus of enhancement located more  laterally  within the right parietal lobe which now measures 1.5 x 0.9 cm  compared to 1.3 x 0.8 cm. Thickness of the enhancement has increased  since prior as it now measures up to 4 mm and previously measured up  to 2 mm. There is also linear enhancement located within the central  nonenhancing portion of the lesion.    There is persistent increased signal on the diffusion-weighted  sequence located within the brain parenchyma surrounding the surgical  cavity and extending into the right splenium of the corpus callosum  and adjacent to the right periatrial white matter as well as in the  white matter adjacent to the right occipital horn.    There is FLAIR hyperintense signal within the brain parenchyma  surrounding the resection cavity located within the right parietal,  temporal, and occipital lobes. There is new FLAIR hyperintense signal  extending into the right frontal lobe centrum semiovale (series 4,  image 32). There is more pronounced FLAIR hyperintense signal  extending across the midline via the splenium of the corpus callosum.    Narrowing of the atrium of the right lateral ventricle has decreased  since prior. There is no striking midline shift.    There are few scattered foci of T2 hyperintensity within the  bilateral cerebral hemispheric white matter which are nonspecific.  There is no acute intracranial hemorrhage or infarct.    There is a small mucosal cyst located within the left frontal sinus  and frontal recess with internal T1 hyperintense signal which  probably reflects inspissated mucus versus noninvasive fungal  elements. There is partial opacification of the right mastoid air  cells with nonspecific fluid.    Impression  1. Evolving postoperative changes from resection of a right parietal  lobe mass.    2. Enhancement surrounding the surgical cavity is slightly more  conspicuous and enhancing lesion located more laterally within the  right parietal lobe, but contiguous with the enhancement  surrounding  the resection cavity, is slightly increased in size and demonstrates  increased thickness of rim enhancement. This interval change is  favored to reflect sequela of treatment related changes.    3. Signal abnormality in the brain parenchyma surrounding the  resection cavity has overall slightly increased in extent, most  pronounced within the right frontal lobe and within the splenium of  the corpus callosum and is favored to primarily treatment related  changes. Associated narrowing of the posterior portion of the right  lateral ventricle has decreased.    This study was interpreted at [facility].    MACRO:  None    Signed by: Neil Boyd 9/9/2024 3:37 PM  Dictation workstation:   [id number]      MR brain w and wo IV contrast 06/24/2024    Narrative  Interpreted By:  Gen Phelps  and Kavitha Chow  STUDY:  MR BRAIN W AND WO IV CONTRAST;  6/24/2024 12:15 pm    INDICATION:  Signs/Symptoms:brain tumor treatment response.  Per EMR  68-year-old male with past medical history of malignant glioma MGMT  methylated status post surgical resection in 12/2023. Patient is also  status post chemoradiation (3/28/24). Patient is currently receiving  treatment with temozolomide.    COMPARISON:  MRI brain 04/25/2024    ACCESSION NUMBER(S):  [id number]    ORDERING CLINICIAN:  MONCHO ACEVEDO    TECHNIQUE:  Axial T2, FLAIR, DWI, gradient echo T2 and T1 weighted images of  brain were acquired. Post contrast T1 weighted images were acquired  after administration of 14 mL of gadolinium based intravenous  contrast.    FINDINGS:  Postoperative changes from right-sided craniotomy are again noted  with similar blooming artifact near the surgical site. The  extra-axial fluid collection deep to the craniotomy site measures 0.9  cm, which is similar compared to prior exam. Right parietal lobe  surgical resection cavity is slightly decreased in size measuring 2.9  x 2.1 cm (series 8, image  18), previously 2.9 x 2.7 cm. There is  persistent rim enhancement surrounding the surgical resection cavity  within the right parietal lobe.    There is stable ill-defined and nodular focus of enhancement along  the white matter of the right ventricular atrium and right splenium  as well as along the margins of the resection cavity and along the  dura underlying the craniotomy flap. There is a new 1.3 x 0.8 cm  bilobed rim enhancing lesion within the right parietal lobe anterior  to the resection cavity (series 9, image 17). Trace susceptibility  along the margins of the lesion. No associated diffusion restriction..    The T2/FLAIR hyperintense signal surrounding the surgical resection  site is more pronounced along the anterior margin compared to prior  exam. The T2/FLAIR hyperintense signal also appears to extend more  inferiorly compared to the prior exam to the level of the manuel  (series 5, image 28).    Redemonstrated diffusion restriction along the surgical resection  cavity margins, which could be compatible with tumor or blood  products.    No diffusion restriction abnormality to suggest acute infarct. No  herniation, midline shift, or mass effect. There is minimal to mild  brain parenchymal volume loss with associated enlargement of the  ventricles and sulci. Minimal ovoid subcortical and periventricular  T2/FLAIR hyperintense signal that likely represents sequela of  chronic microangiopathy.    Paranasal Sinuses and Mastoids: Similar right-sided mastoid effusion.  Redemonstrated opacification within the left frontal sinus, and left  frontal ethmoid air cells. Otherwise, visualized paranasal sinuses  and mastoid air cells are unremarkable.    Impression  Status post right posterior craniotomy for resection of right  parietal glioblastoma. Evolving blood products within extra-axial  collection underlying the craniotomy flap again measuring 9 mm  thickness and resulting in mild mass effect on underlying  parenchyma.  No midline shift.    Stable appearance irregular enhancement involving the periatrial  white matter and right splenium of the corpus callosum, margins of  the resection cavity, and dura underlying the craniotomy flap.    However, new 1.3 x 0.8 cm bilobed rim enhancing lesion in the right  parietal lobe anterior to the resection cavity as well as progression  of T2 hyperintense FLAIR signal now extending anteriorly to involve  the anterior right periatrial white matter and lateral perirolandic  cortex. 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 However, disease progression  can not be excluded. Continued follow-up recommended.    I personally reviewed the images/study and I agree with the findings  as stated by Geraldo Plummer MD. This study was interpreted at  [facility], [place], [place].    MACRO:  None    Signed by: Gen Phelps 6/24/2024 4:51 PM  Dictation workstation:   [id number]    MR brain presurgical perfusion and fingerprinting 01/27/2025    Narrative  Interpreted By:  Tamara Garcias and Hooper Grayson  STUDY:  MR BRAIN PRESURGICAL PERFUSION AND FINGERPRINTING;  1/27/2025 5:25 pm    INDICATION:  Signs/Symptoms:h/o glioblastoma, on temozolomide, treatment response.  Stroke protocol.    ,C71.9 Malignant neoplasm of brain, unspecified    IDH wild-type MGMT promoter methylated.  Status post concurrent chemoradiotherapy completed March 28, 2024.    COMPARISON:  MR perfusion of the brain conducted November 22, 2024    ACCESSION NUMBER(S):  [id number]    ORDERING CLINICIAN:  MONCHO ACEVEDO    TECHNIQUE:  MRI of the brain was obtained on a 3 Chastity magnet before and after  the fractionated IV administration of 21 mL Dotarem gadolinium  contrast agent. Axial diffusion-weighted imaging with ADC map,  precontrast T1, precontrast axial FLAIR, axial T2 star, axial T2 with  fat saturation, and multiplanar volumetric  postcontrast T1 sequences.  Furthermore, perfusion imaging was obtained after the placement of  regions of interest along the circumferential enhancing right  frontoparietal mass with contralateral white matter selected as the  region of interest control. Perfusion and permeability maps were  generated using Kat[employer] software.    FINDINGS:  Status post right parieto-occipital craniotomy for subjacent neoplasm  resection. There is again a mixed signal extra-axial collection deep  to the craniotomy and overlying the right parietal and occipital  lobes measuring approximately 2 cm in thickness. There are again  components demonstrating abnormal signal on diffusion weighted  imaging which could be due to artifact from blood products noting the  sterility of the collection can not be determined on the basis of  imaging. There is susceptibility artifact on gradient echo T2  weighted imaging deep to the craniotomy and within and along the  periphery of the collection as well as extending into the underlying  parenchyma which could be due to blood products or mineralization.  There is again nonspecific dural thickening and enhancement deep to  the craniotomy and overlying the right cerebral hemisphere thought to  be postoperative in nature, at least in part.    There is again an irregularly-shaped surgical resection cavity  centered deep to the craniotomy. There is frondlike and irregular  enhancement observed in the operative bed of the right parietal and  occipital lobes, measuring in aggregate 5.4 x 5.8 x 7.5 cm.  Additional overlying dural enhancement is noted. Comparable  measurements on comparison examination dated November 22, 2024 reveal  these measurements to be approximately 5.4 x 5.8 x 7.4 cm. However,  some of the more nodular and masslike areas of enhancement along the  anterior and deep margins of the surgical resection cavity have  increased in size. These areas are difficult to accurately measure  due to  irregular shape. However, a dominant component along the  anterior margin of the surgical resection cavity as seen on image 133  of 240, series 21 currently measures approximately 2.7 cm in AP  dimension by 2.2 cm in transverse dimension compared with 1.9 cm in  AP dimension by 2.3 cm in transverse dimension.    There has been interval increase in nonenhancing signal abnormality  with involvement of the right frontal, parietal, occipital, and  temporal lobes. There is again extension across the corpus callosum,  most significantly the splenium with similar to mildly increased  signal abnormality in the white matter adjacent to the posterior body  of the left lateral ventricle.    There has been significant increase in right to left midline shift,  now measuring 0.6 cm (axial volumetric T1 postcontrast series 21,  image 145) versus 0.1 cm on the comparison exam. Effacement of the  right ventricular system has worsened in the interval. Partial  effacement of the basilar cisterns is observed, most notably the  right ambient cistern. There is increased in asymmetric sulcal  effacement as well.    Diffusion-weighted imaging demonstrates very minimal signal  abnormality surrounding the surgical resection cavity. There is  otherwise no evidence of diffusion restriction suggestive of acute  ischemic injury.      There is mucosal thickening in the left frontal sinus and to a lesser  extent scattered through the remaining paranasal sinuses. There is  opacification of a few mastoid air cells.    MR PERFUSION:    Multiple regions of interest were placed circumferentially along the  enhancing right parieto-occipital mass with a single contralateral  control region of interest placed over normal white matter.    Relative blood volume on initial region of interest placement is  observed at 2.58. There is marked asymmetric high-amplitude profusion  wash-in and returned to baseline. Permeability imaging demonstrates  rapid upslope  and subsequent rise.    Subsequent relative cerebral blood volumes ratios were calculated to  be 2.26, 2.55, 1.47, 0.66, 1.41, 1.61, 1.38, 1.17, 1.13, and 1.37  compared with contralateral normal white matter.    Increased relative cerebral blood flow volumes at 2.26 and 2.55  correspond to increasing enhancement and anteromedial progression of  enhancement as compared to the examination dated November 22, 2024.  These lesions demonstrate high amplitude rapid wash-in and return to  baseline as well as significant permeability upstrokes and climbing  plateaus.    Impression  Redemonstration of postoperative changes for right parieto-occipital  mass resection. There has been interval progression in irregular,  thick enhancement along the periphery of the surgical resection  cavity particularly along the anterior and deep margins. There is  abnormal elevation of corrected relative cerebral blood volume ratios  compared with contralateral normal white matter worrisome for disease  progression. Additionally there has been progression of nonenhancing  signal abnormality on FLAIR and T2 weighted imaging which could  reflect therapy changes or infiltrative neoplasm. There is increased  mass effect including leftward midline shift and partial effacement  of the basilar cisterns.    I personally reviewed the images/study and I agree with the findings  as stated.    MACRO:  Murtaza Ovalle discussed the significance and urgency of this  critical finding by epic secure chat with  MONCHO ACEVEDO on 1/28/2025  at 9:44 am.  (**-RCF-**) Findings:  See findings.    Signed by: Tamara Garcias 1/28/2025 10:31 AM  Dictation workstation:   [id number]  not applicable        Assessment & Plan     Assessment/Plan   Diagnoses and all orders for this visit:  GBM (glioblastoma multiforme) (Multi)  -     Coagulation Screen; Future  -     Urinalysis with Reflex Culture and Microscopic; Future  -     Type And Screen Is this order related to  pregnancy or an upcoming surgery? Yes; Where will this surgery/delivery be performed? [facility]; What is the date of the surgery? 2/20/2025; Has this patient ever had a transfusion? No; I,  (...; Future  -     Case Request Operating Room: CRANIOTOMY, WITH TISSUE EXCISION USING COMPUTER-ASSISTED NAVIGATION; Standing  Preoperative examination  -     Coagulation Screen; Future  -     Urinalysis with Reflex Culture and Microscopic; Future  -     chlorhexidine (Peridex) 0.12 % solution; RINSE MOUTH WITH 15ML (1 CAPFUL) FOR 30 SECONDS AM AND PM PRIOR TO SURGERY AFTER BRUSHING TEETH. EXPECTORATE AFTER.  -     chlorhexidine (Hibiclens) 4 % external liquid; Begin using nightly x 5 nights prior to surgery. Shower as normal then apply soap avoiding face and groin. Leave on for 2-3 minutes and rinse off after.  -     Type And Screen Is this order related to pregnancy or an upcoming surgery? Yes; Where will this surgery/delivery be performed? [facility]; What is the date of the surgery? 2/20/2025; Has this patient ever had a transfusion? No; I,  (...; Future  Encounter for monitoring aspirin therapy  -     Coagulation Screen; Future  On antineoplastic chemotherapy  -     Urinalysis with Reflex Culture and Microscopic; Future  Other orders  -     NPO Diet Except: Sips with meds; Effective now; Standing  -     Height and weight; Standing  -     Insert and maintain peripheral IV; Standing  -     Saline lock IV; Standing  -     Type And Screen; Standing  -     Admit to inpatient; Standing  -     Full code; Standing  -     Vital Signs; Standing  -     Apply CADEN hose knee length; Standing    I had a lengthy discussion regarding the clinical and surgical plan for the patient.  I discussed with the patient surgical option of craniotomy and surgical resection of mass. Patient and family had many questions. I discussed risks including stroke, infection, bleeding, weakness/plegia, sensory loss, seizures, post  op pain, visual field cut/deficit, CSF leak, pseudomeningocele, worsening speech, need for further surgery, prolonged hospitalization, risks of anesthesia, blood clot, pneumonia, and death, etc. Benefits include histopathological diagnosis and resection. No guarantees were given. Patient verbalized understanding, and would like to proceed with right occipital craniotomy for brain tumor resection with 5ALA 2/20/2025.                Medical History     Past Medical History:   Diagnosis Date    Brain cancer (Multi)      Past Surgical History:   Procedure Laterality Date    BRAIN SURGERY      CHOLECYSTECTOMY  January 25, 2020    OTHER SURGICAL HISTORY  07/24/2019    Colonoscopy    OTHER SURGICAL HISTORY  07/26/2021    Endoscopy    VASECTOMY  Abt 1991     Social History     Tobacco Use    Smoking status: Never    Smokeless tobacco: Never   Substance Use Topics    Alcohol use: Not Currently    Drug use: Never     No family history on file.  No Known Allergies  Current Outpatient Medications   Medication Instructions    acetaminophen (TYLENOL) 650 mg, oral, Every 6 hours PRN    atorvastatin (LIPITOR) 40 mg, oral, Daily    CALCIUM CARBONATE-VITAMIN D3 ORAL 1 capsule, Daily    chlorhexidine (Hibiclens) 4 % external liquid Begin using nightly x 5 nights prior to surgery. Shower as normal then apply soap avoiding face and groin. Leave on for 2-3 minutes and rinse off after.    chlorhexidine (Peridex) 0.12 % solution RINSE MOUTH WITH 15ML (1 CAPFUL) FOR 30 SECONDS AM AND PM PRIOR TO SURGERY AFTER BRUSHING TEETH. EXPECTORATE AFTER.    clobetasol (Temovate) 0.05 % cream Topical, 2 times daily    dexAMETHasone (DECADRON) 2 mg, oral, Daily    dexAMETHasone (DECADRON) 1 mg, oral, 2 times daily    krill-omega-3-dha-epa-lipids 775-31-30-50 mg capsule 1 capsule, Daily    levETIRAcetam (KEPPRA) 500 mg, oral, 2 times daily    ondansetron (ZOFRAN) 8 mg, oral, Every 8 hours PRN, Take one hour prior to each chemotherapy dose in addition to  every 8 hours as needed for nausea/vomiting    pantoprazole (ProtoNix) 40 mg EC tablet TAKE 1 TABLET BY MOUTH IN THE MORNING 30 MINUTES BEFORE BREAKFAST.    prochlorperazine (COMPAZINE) 10 mg, oral, Every 6 hours PRN    sennosides (SENOKOT) 8.6-17.2 mg, oral, 2 times daily PRN    silver sulfADIAZINE (Silvadene) 1 % cream Topical, Daily    ubidecarenone (COENZYME Q10, BULK, MISC) 1 Capful, Daily

## 2025-02-07 NOTE — H&P (VIEW-ONLY)
[facility]  Neurosurgery    Subjective     Enmanuel Ahumada is a right handed  69 y.o. year old male presenting for initial evaluation , referral from Dr. Kyle Rolon, neurooncology.     PMH HLD, Osborn's esophagus, s/p cholecystectomy, who presented with gait instability and headaches back in December 2023. Workup was significant for CTH with RO hypodensity. MRI PPF and fMRI with language dominant lesion. He is now s/p right craniotomy for tumor resection with 5ALA with Dr. Carrasco on 12/29/23. Postoperative MRI for GTR. Final surgical pathology for GBM IDH wildytpe, MGMT methylated. Caris testing was completed for EGFR amplification, TERT mutations, and loss of CDKN2A/B. Treatment course significant for chemoRT on adjuvant temodar c/b thrombocytopenia. Optune electrical array device was placed.  Remains on adjuvant temodar now s/p cycle 8. Case was discussed at [facility] due to MRI brain with progression. He was recommended for surgical resection and clinical trial NOVA 1324 with nuclear medicine [email or web address] vs [facility] clinical trail. Patient presents to clinic for surgical discussion.     He has a baseline left complete field cut (homonymous hemianopsia). Patient denies fevers, headaches, nausea, vomiting, speech difficulty, seizures, double/blurry vision, sensory loss, weakness, incontinence, pain.      Review of Systems   Eyes:  Positive for visual disturbance.   All other systems reviewed and are negative.      Treatment Synopsis:   Brain Tumor: Glioblastoma, IDH wildtype grade 4  Molecular: IDH wildtype  Location: occipital lobe  Age at diagnosis: 67  Prior history of Radiation: yes    Objective     Performance and Vitals:  KARNOFSKY SCALE WITH ECOG EQUIVALENT:90, Able to carry on normal activity; minor signs or symptoms of disease (ECOG equivalent 0)    Vitals:    02/07/25 1029   BP: 107/69   Pulse: 81   Temp: 36.6 °C (97.9 °F)   SpO2: 100%         Neurological  Exam  Mental Status   Oriented to person, place, time and situation. Recent and remote memory are intact. Speech is normal. Language is fluent with no aphasia. Attention and concentration are normal.    Cranial Nerves  CN II: Left homonymous hemianopsia.  CN III, IV, VI: Extraocular movements intact bilaterally. Normal lids and orbits bilaterally. Pupils equal round and reactive to light bilaterally.  CN V: Facial sensation is normal.  CN VII: Full and symmetric facial movement.  CN VIII: Hearing is normal.  CN IX, X: Palate elevates symmetrically. Normal gag reflex.  CN XI: Shoulder shrug strength is normal.  CN XII: Tongue midline without atrophy or fasciculations.    Motor  Normal muscle bulk throughout. Left pronator drift. Mild.    Sensory  Sensation is intact to light touch, pinprick, vibration and proprioception in all four extremities.    Reflexes  Deep tendon reflexes are 2+ and symmetric in all four extremities.    Coordination    Finger-to-nose, rapid alternating movements and heel-to-shin normal bilaterally without dysmetria.    Gait  Casual gait is normal including stance, stride, and arm swing.    Physical Exam  Eyes:      General: Lids are normal.      Extraocular Movements: Extraocular movements intact.      Pupils: Pupils are equal, round, and reactive to light.   Skin:     Comments: Occipital Cranial incision well-healed   Neurological:      Coordination: Coordination is intact.      Deep Tendon Reflexes: Reflexes are normal and symmetric.   Psychiatric:         Speech: Speech normal.         Imaging:   Imaging Results: MR brain w and wo IV contrast 09/09/2024    Narrative  Interpreted By:  Neil Boyd,  STUDY:  MR BRAIN W AND WO IV CONTRAST; ;  9/9/2024 11:08 am    INDICATION:  Signs/Symptoms:hx gbm, on temozolomide, treatment response.    ,C71.9 Malignant neoplasm of brain, unspecified (Multi)    COMPARISON:  MRI brain from 06/24/2024.    ACCESSION NUMBER(S):  [id number]    ORDERING  CLINICIAN:  MONCHO ACEVEDO    TECHNIQUE:  MRI of the brain was performed with the acquisition of axial  diffusion-weighted, axial FLAIR, axial T2 gradient echo, axial T1,  axial T1 fat saturated postcontrast sequence, and volumetric axial T1  post contrast sequence with multiplanar reformats.    Contrast: 14 mL of Dotarem was injected intravenously.    FINDINGS:  There are postoperative changes from right posterior craniotomy for  resection of a mass in the right parietal lobe. There is a 1.4 cm in  maximum thickness extra-axial fluid collection along the right  parietal convexity which is unchanged in thickness and contains  internal T1 hyperintense signal and restricted diffusion, most  consistent with blood products. This fluid communicates with a  surgical cavity in the right parietal lobe which has slightly  decreased in size since prior as it now measures 2.4 cm in AP  diameter compared to 2.7 cm previously. Within the surgical cavity  there is T1 hyperintense signal consistent with blood products. There  is also hemosiderin deposition along the linings of the surgical  cavity which communicates with the overlying extracranial fluid.    There are small amount of susceptibility artifact located within the  brain parenchyma adjacent to the surgical cavity and small amount of  blood products are again seen within the dependent portion of the  right occipital horn.    On postcontrast sequence, there is enhancement located within the  brain parenchyma surrounding the resection cavity which measures 7 mm  in maximum thickness and is slightly more pronounced in appearance  since prior when it was more ill-defined and linear. Overall  thickness of enhancement is stable to slightly increased since prior  when it measured 5 mm. Enhancement again extends to the border of the  atrium of the right lateral ventricle with subtle subependymal  enhancement not excluded.    There is another small focus of enhancement located more  laterally  within the right parietal lobe which now measures 1.5 x 0.9 cm  compared to 1.3 x 0.8 cm. Thickness of the enhancement has increased  since prior as it now measures up to 4 mm and previously measured up  to 2 mm. There is also linear enhancement located within the central  nonenhancing portion of the lesion.    There is persistent increased signal on the diffusion-weighted  sequence located within the brain parenchyma surrounding the surgical  cavity and extending into the right splenium of the corpus callosum  and adjacent to the right periatrial white matter as well as in the  white matter adjacent to the right occipital horn.    There is FLAIR hyperintense signal within the brain parenchyma  surrounding the resection cavity located within the right parietal,  temporal, and occipital lobes. There is new FLAIR hyperintense signal  extending into the right frontal lobe centrum semiovale (series 4,  image 32). There is more pronounced FLAIR hyperintense signal  extending across the midline via the splenium of the corpus callosum.    Narrowing of the atrium of the right lateral ventricle has decreased  since prior. There is no striking midline shift.    There are few scattered foci of T2 hyperintensity within the  bilateral cerebral hemispheric white matter which are nonspecific.  There is no acute intracranial hemorrhage or infarct.    There is a small mucosal cyst located within the left frontal sinus  and frontal recess with internal T1 hyperintense signal which  probably reflects inspissated mucus versus noninvasive fungal  elements. There is partial opacification of the right mastoid air  cells with nonspecific fluid.    Impression  1. Evolving postoperative changes from resection of a right parietal  lobe mass.    2. Enhancement surrounding the surgical cavity is slightly more  conspicuous and enhancing lesion located more laterally within the  right parietal lobe, but contiguous with the enhancement  surrounding  the resection cavity, is slightly increased in size and demonstrates  increased thickness of rim enhancement. This interval change is  favored to reflect sequela of treatment related changes.    3. Signal abnormality in the brain parenchyma surrounding the  resection cavity has overall slightly increased in extent, most  pronounced within the right frontal lobe and within the splenium of  the corpus callosum and is favored to primarily treatment related  changes. Associated narrowing of the posterior portion of the right  lateral ventricle has decreased.    This study was interpreted at [facility].    MACRO:  None    Signed by: Neil Boyd 9/9/2024 3:37 PM  Dictation workstation:   [id number]      MR brain w and wo IV contrast 06/24/2024    Narrative  Interpreted By:  Gen Phelps  and Kavitha Chow  STUDY:  MR BRAIN W AND WO IV CONTRAST;  6/24/2024 12:15 pm    INDICATION:  Signs/Symptoms:brain tumor treatment response.  Per EMR  68-year-old male with past medical history of malignant glioma MGMT  methylated status post surgical resection in 12/2023. Patient is also  status post chemoradiation (3/28/24). Patient is currently receiving  treatment with temozolomide.    COMPARISON:  MRI brain 04/25/2024    ACCESSION NUMBER(S):  [id number]    ORDERING CLINICIAN:  MONCHO ACEVEDO    TECHNIQUE:  Axial T2, FLAIR, DWI, gradient echo T2 and T1 weighted images of  brain were acquired. Post contrast T1 weighted images were acquired  after administration of 14 mL of gadolinium based intravenous  contrast.    FINDINGS:  Postoperative changes from right-sided craniotomy are again noted  with similar blooming artifact near the surgical site. The  extra-axial fluid collection deep to the craniotomy site measures 0.9  cm, which is similar compared to prior exam. Right parietal lobe  surgical resection cavity is slightly decreased in size measuring 2.9  x 2.1 cm (series 8, image  18), previously 2.9 x 2.7 cm. There is  persistent rim enhancement surrounding the surgical resection cavity  within the right parietal lobe.    There is stable ill-defined and nodular focus of enhancement along  the white matter of the right ventricular atrium and right splenium  as well as along the margins of the resection cavity and along the  dura underlying the craniotomy flap. There is a new 1.3 x 0.8 cm  bilobed rim enhancing lesion within the right parietal lobe anterior  to the resection cavity (series 9, image 17). Trace susceptibility  along the margins of the lesion. No associated diffusion restriction..    The T2/FLAIR hyperintense signal surrounding the surgical resection  site is more pronounced along the anterior margin compared to prior  exam. The T2/FLAIR hyperintense signal also appears to extend more  inferiorly compared to the prior exam to the level of the manuel  (series 5, image 28).    Redemonstrated diffusion restriction along the surgical resection  cavity margins, which could be compatible with tumor or blood  products.    No diffusion restriction abnormality to suggest acute infarct. No  herniation, midline shift, or mass effect. There is minimal to mild  brain parenchymal volume loss with associated enlargement of the  ventricles and sulci. Minimal ovoid subcortical and periventricular  T2/FLAIR hyperintense signal that likely represents sequela of  chronic microangiopathy.    Paranasal Sinuses and Mastoids: Similar right-sided mastoid effusion.  Redemonstrated opacification within the left frontal sinus, and left  frontal ethmoid air cells. Otherwise, visualized paranasal sinuses  and mastoid air cells are unremarkable.    Impression  Status post right posterior craniotomy for resection of right  parietal glioblastoma. Evolving blood products within extra-axial  collection underlying the craniotomy flap again measuring 9 mm  thickness and resulting in mild mass effect on underlying  parenchyma.  No midline shift.    Stable appearance irregular enhancement involving the periatrial  white matter and right splenium of the corpus callosum, margins of  the resection cavity, and dura underlying the craniotomy flap.    However, new 1.3 x 0.8 cm bilobed rim enhancing lesion in the right  parietal lobe anterior to the resection cavity as well as progression  of T2 hyperintense FLAIR signal now extending anteriorly to involve  the anterior right periatrial white matter and lateral perirolandic  cortex. 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 However, disease progression  can not be excluded. Continued follow-up recommended.    I personally reviewed the images/study and I agree with the findings  as stated by Geraldo Plummer MD. This study was interpreted at  [facility], [place], [place].    MACRO:  None    Signed by: Gen Phelps 6/24/2024 4:51 PM  Dictation workstation:   [id number]    MR brain presurgical perfusion and fingerprinting 01/27/2025    Narrative  Interpreted By:  Tamara Garcias and Hooper Grayson  STUDY:  MR BRAIN PRESURGICAL PERFUSION AND FINGERPRINTING;  1/27/2025 5:25 pm    INDICATION:  Signs/Symptoms:h/o glioblastoma, on temozolomide, treatment response.  Stroke protocol.    ,C71.9 Malignant neoplasm of brain, unspecified    IDH wild-type MGMT promoter methylated.  Status post concurrent chemoradiotherapy completed March 28, 2024.    COMPARISON:  MR perfusion of the brain conducted November 22, 2024    ACCESSION NUMBER(S):  [id number]    ORDERING CLINICIAN:  MONCHO ACEVEDO    TECHNIQUE:  MRI of the brain was obtained on a 3 Chastity magnet before and after  the fractionated IV administration of 21 mL Dotarem gadolinium  contrast agent. Axial diffusion-weighted imaging with ADC map,  precontrast T1, precontrast axial FLAIR, axial T2 star, axial T2 with  fat saturation, and multiplanar volumetric  postcontrast T1 sequences.  Furthermore, perfusion imaging was obtained after the placement of  regions of interest along the circumferential enhancing right  frontoparietal mass with contralateral white matter selected as the  region of interest control. Perfusion and permeability maps were  generated using Kat[employer] software.    FINDINGS:  Status post right parieto-occipital craniotomy for subjacent neoplasm  resection. There is again a mixed signal extra-axial collection deep  to the craniotomy and overlying the right parietal and occipital  lobes measuring approximately 2 cm in thickness. There are again  components demonstrating abnormal signal on diffusion weighted  imaging which could be due to artifact from blood products noting the  sterility of the collection can not be determined on the basis of  imaging. There is susceptibility artifact on gradient echo T2  weighted imaging deep to the craniotomy and within and along the  periphery of the collection as well as extending into the underlying  parenchyma which could be due to blood products or mineralization.  There is again nonspecific dural thickening and enhancement deep to  the craniotomy and overlying the right cerebral hemisphere thought to  be postoperative in nature, at least in part.    There is again an irregularly-shaped surgical resection cavity  centered deep to the craniotomy. There is frondlike and irregular  enhancement observed in the operative bed of the right parietal and  occipital lobes, measuring in aggregate 5.4 x 5.8 x 7.5 cm.  Additional overlying dural enhancement is noted. Comparable  measurements on comparison examination dated November 22, 2024 reveal  these measurements to be approximately 5.4 x 5.8 x 7.4 cm. However,  some of the more nodular and masslike areas of enhancement along the  anterior and deep margins of the surgical resection cavity have  increased in size. These areas are difficult to accurately measure  due to  irregular shape. However, a dominant component along the  anterior margin of the surgical resection cavity as seen on image 133  of 240, series 21 currently measures approximately 2.7 cm in AP  dimension by 2.2 cm in transverse dimension compared with 1.9 cm in  AP dimension by 2.3 cm in transverse dimension.    There has been interval increase in nonenhancing signal abnormality  with involvement of the right frontal, parietal, occipital, and  temporal lobes. There is again extension across the corpus callosum,  most significantly the splenium with similar to mildly increased  signal abnormality in the white matter adjacent to the posterior body  of the left lateral ventricle.    There has been significant increase in right to left midline shift,  now measuring 0.6 cm (axial volumetric T1 postcontrast series 21,  image 145) versus 0.1 cm on the comparison exam. Effacement of the  right ventricular system has worsened in the interval. Partial  effacement of the basilar cisterns is observed, most notably the  right ambient cistern. There is increased in asymmetric sulcal  effacement as well.    Diffusion-weighted imaging demonstrates very minimal signal  abnormality surrounding the surgical resection cavity. There is  otherwise no evidence of diffusion restriction suggestive of acute  ischemic injury.      There is mucosal thickening in the left frontal sinus and to a lesser  extent scattered through the remaining paranasal sinuses. There is  opacification of a few mastoid air cells.    MR PERFUSION:    Multiple regions of interest were placed circumferentially along the  enhancing right parieto-occipital mass with a single contralateral  control region of interest placed over normal white matter.    Relative blood volume on initial region of interest placement is  observed at 2.58. There is marked asymmetric high-amplitude profusion  wash-in and returned to baseline. Permeability imaging demonstrates  rapid upslope  and subsequent rise.    Subsequent relative cerebral blood volumes ratios were calculated to  be 2.26, 2.55, 1.47, 0.66, 1.41, 1.61, 1.38, 1.17, 1.13, and 1.37  compared with contralateral normal white matter.    Increased relative cerebral blood flow volumes at 2.26 and 2.55  correspond to increasing enhancement and anteromedial progression of  enhancement as compared to the examination dated November 22, 2024.  These lesions demonstrate high amplitude rapid wash-in and return to  baseline as well as significant permeability upstrokes and climbing  plateaus.    Impression  Redemonstration of postoperative changes for right parieto-occipital  mass resection. There has been interval progression in irregular,  thick enhancement along the periphery of the surgical resection  cavity particularly along the anterior and deep margins. There is  abnormal elevation of corrected relative cerebral blood volume ratios  compared with contralateral normal white matter worrisome for disease  progression. Additionally there has been progression of nonenhancing  signal abnormality on FLAIR and T2 weighted imaging which could  reflect therapy changes or infiltrative neoplasm. There is increased  mass effect including leftward midline shift and partial effacement  of the basilar cisterns.    I personally reviewed the images/study and I agree with the findings  as stated.    MACRO:  Murtaza Ovalle discussed the significance and urgency of this  critical finding by epic secure chat with  MONCHO ACEVEDO on 1/28/2025  at 9:44 am.  (**-RCF-**) Findings:  See findings.    Signed by: Tamara Garcias 1/28/2025 10:31 AM  Dictation workstation:   [id number]  not applicable        Assessment & Plan     Assessment/Plan   Diagnoses and all orders for this visit:  GBM (glioblastoma multiforme) (Multi)  -     Coagulation Screen; Future  -     Urinalysis with Reflex Culture and Microscopic; Future  -     Type And Screen Is this order related to  pregnancy or an upcoming surgery? Yes; Where will this surgery/delivery be performed? [facility]; What is the date of the surgery? 2/20/2025; Has this patient ever had a transfusion? No; I,  (...; Future  -     Case Request Operating Room: CRANIOTOMY, WITH TISSUE EXCISION USING COMPUTER-ASSISTED NAVIGATION; Standing  Preoperative examination  -     Coagulation Screen; Future  -     Urinalysis with Reflex Culture and Microscopic; Future  -     chlorhexidine (Peridex) 0.12 % solution; RINSE MOUTH WITH 15ML (1 CAPFUL) FOR 30 SECONDS AM AND PM PRIOR TO SURGERY AFTER BRUSHING TEETH. EXPECTORATE AFTER.  -     chlorhexidine (Hibiclens) 4 % external liquid; Begin using nightly x 5 nights prior to surgery. Shower as normal then apply soap avoiding face and groin. Leave on for 2-3 minutes and rinse off after.  -     Type And Screen Is this order related to pregnancy or an upcoming surgery? Yes; Where will this surgery/delivery be performed? [facility]; What is the date of the surgery? 2/20/2025; Has this patient ever had a transfusion? No; I,  (...; Future  Encounter for monitoring aspirin therapy  -     Coagulation Screen; Future  On antineoplastic chemotherapy  -     Urinalysis with Reflex Culture and Microscopic; Future  Other orders  -     NPO Diet Except: Sips with meds; Effective now; Standing  -     Height and weight; Standing  -     Insert and maintain peripheral IV; Standing  -     Saline lock IV; Standing  -     Type And Screen; Standing  -     Admit to inpatient; Standing  -     Full code; Standing  -     Vital Signs; Standing  -     Apply CADEN hose knee length; Standing    I had a lengthy discussion regarding the clinical and surgical plan for the patient.  I discussed with the patient surgical option of craniotomy and surgical resection of mass. Patient and family had many questions. I discussed risks including stroke, infection, bleeding, weakness/plegia, sensory loss, seizures, post  op pain, visual field cut/deficit, CSF leak, pseudomeningocele, worsening speech, need for further surgery, prolonged hospitalization, risks of anesthesia, blood clot, pneumonia, and death, etc. Benefits include histopathological diagnosis and resection. No guarantees were given. Patient verbalized understanding, and would like to proceed with right occipital craniotomy for brain tumor resection with 5ALA 2/20/2025.                Medical History     Past Medical History:   Diagnosis Date    Brain cancer (Multi)      Past Surgical History:   Procedure Laterality Date    BRAIN SURGERY      CHOLECYSTECTOMY  January 25, 2020    OTHER SURGICAL HISTORY  07/24/2019    Colonoscopy    OTHER SURGICAL HISTORY  07/26/2021    Endoscopy    VASECTOMY  Abt 1991     Social History     Tobacco Use    Smoking status: Never    Smokeless tobacco: Never   Substance Use Topics    Alcohol use: Not Currently    Drug use: Never     No family history on file.  No Known Allergies  Current Outpatient Medications   Medication Instructions    acetaminophen (TYLENOL) 650 mg, oral, Every 6 hours PRN    atorvastatin (LIPITOR) 40 mg, oral, Daily    CALCIUM CARBONATE-VITAMIN D3 ORAL 1 capsule, Daily    chlorhexidine (Hibiclens) 4 % external liquid Begin using nightly x 5 nights prior to surgery. Shower as normal then apply soap avoiding face and groin. Leave on for 2-3 minutes and rinse off after.    chlorhexidine (Peridex) 0.12 % solution RINSE MOUTH WITH 15ML (1 CAPFUL) FOR 30 SECONDS AM AND PM PRIOR TO SURGERY AFTER BRUSHING TEETH. EXPECTORATE AFTER.    clobetasol (Temovate) 0.05 % cream Topical, 2 times daily    dexAMETHasone (DECADRON) 2 mg, oral, Daily    dexAMETHasone (DECADRON) 1 mg, oral, 2 times daily    krill-omega-3-dha-epa-lipids [id number] mg capsule 1 capsule, Daily    levETIRAcetam (KEPPRA) 500 mg, oral, 2 times daily    ondansetron (ZOFRAN) 8 mg, oral, Every 8 hours PRN, Take one hour prior to each chemotherapy dose in addition to  every 8 hours as needed for nausea/vomiting    pantoprazole (ProtoNix) 40 mg EC tablet TAKE 1 TABLET BY MOUTH IN THE MORNING 30 MINUTES BEFORE BREAKFAST.    prochlorperazine (COMPAZINE) 10 mg, oral, Every 6 hours PRN    sennosides (SENOKOT) 8.6-17.2 mg, oral, 2 times daily PRN    silver sulfADIAZINE (Silvadene) 1 % cream Topical, Daily    ubidecarenone (COENZYME Q10, BULK, MISC) 1 Capful, Daily

## 2025-02-19 ENCOUNTER — ANESTHESIA EVENT (OUTPATIENT)
Dept: OPERATING ROOM | Facility: HOSPITAL | Age: 70
End: 2025-02-19
Payer: MEDICARE

## 2025-02-19 NOTE — PROGRESS NOTES
"Pharmacy Medication History Review    Enmanuel Ahumada \"Dash\" is a 69 y.o. male who is planned to be admitted for GBM (glioblastoma multiforme) (Multi). Pharmacy called the patient prior to their scheduled procedure and reviewed the patient's svvjd-ya-skorownaz medications for accuracy.    Medications ADDED:  none  Medications CHANGED:  Dexamethasone 1mg directions from #1BID to #1QAM  Senna 8.6mg directions from #1-2BID prn TO #1-2QD  Medications REMOVED:   Dexamethasone 2mg - marked not taking- taking 1mg every morning    Please review updated prior to admission medication list and comments regarding how patient may be taking medications differently by going to Admission tab --> Admission Orders --> Admit Orders / Review prior to admission medications.     Preferred pharmacy, last doses of medications, and allergies to be confirmed with patient by nursing the day of procedure.     Sources used to complete the med history include:  [facility]  Pharmacy dispense history  Patient interview  Spouse  Chart Review  Care Everywhere     Below are additional concerns with the patient's PTA list.  Cynthia parker patient and spouse - iqra  Patient states they are currently taking #1 tablet of dexamethasone 1mg in the morning. L.F. 11/25/24 #60/30d  Patient states they are taking their senna 8.6mg every day.     Melvina Youngblood [facility]  Please reach out via Secure Chat for questions   "

## 2025-02-20 ENCOUNTER — ANESTHESIA (OUTPATIENT)
Dept: OPERATING ROOM | Facility: HOSPITAL | Age: 70
End: 2025-02-20
Payer: MEDICARE

## 2025-02-20 ENCOUNTER — APPOINTMENT (OUTPATIENT)
Dept: RADIOLOGY | Facility: HOSPITAL | Age: 70
DRG: 027 | End: 2025-02-20
Payer: MEDICARE

## 2025-02-20 ENCOUNTER — HOSPITAL ENCOUNTER (INPATIENT)
Facility: HOSPITAL | Age: 70
LOS: 2 days | Discharge: HOME | DRG: 027 | End: 2025-02-22
Attending: NEUROLOGICAL SURGERY | Admitting: NEUROLOGICAL SURGERY
Payer: MEDICARE

## 2025-02-20 DIAGNOSIS — C71.9 GBM (GLIOBLASTOMA MULTIFORME) (MULTI): Primary | ICD-10-CM

## 2025-02-20 DIAGNOSIS — C71.9 GLIOBLASTOMA (MULTI): Primary | ICD-10-CM

## 2025-02-20 DIAGNOSIS — G93.89 BRAIN MASS: ICD-10-CM

## 2025-02-20 DIAGNOSIS — K22.70 BARRETT'S ESOPHAGUS WITHOUT DYSPLASIA: ICD-10-CM

## 2025-02-20 LAB
ABO GROUP (TYPE) IN BLOOD: NORMAL
ANION GAP BLDA CALCULATED.4IONS-SCNC: 11 MMO/L (ref 10–25)
ANION GAP BLDA CALCULATED.4IONS-SCNC: 12 MMO/L (ref 10–25)
ANTIBODY SCREEN: NORMAL
BASE EXCESS BLDA CALC-SCNC: -0.5 MMOL/L (ref -2–3)
BASE EXCESS BLDA CALC-SCNC: 1.2 MMOL/L (ref -2–3)
BODY TEMPERATURE: 37 DEGREES CELSIUS
BODY TEMPERATURE: 37 DEGREES CELSIUS
CA-I BLDA-SCNC: 1.12 MMOL/L (ref 1.1–1.33)
CA-I BLDA-SCNC: 1.13 MMOL/L (ref 1.1–1.33)
CHLORIDE BLDA-SCNC: 102 MMOL/L (ref 98–107)
CHLORIDE BLDA-SCNC: 102 MMOL/L (ref 98–107)
GLUCOSE BLDA-MCNC: 104 MG/DL (ref 74–99)
GLUCOSE BLDA-MCNC: 153 MG/DL (ref 74–99)
HCO3 BLDA-SCNC: 24.2 MMOL/L (ref 22–26)
HCO3 BLDA-SCNC: 25.1 MMOL/L (ref 22–26)
HCT VFR BLD EST: 39 % (ref 41–52)
HCT VFR BLD EST: 45 % (ref 41–52)
HGB BLDA-MCNC: 13.1 G/DL (ref 13.5–17.5)
HGB BLDA-MCNC: 14.9 G/DL (ref 13.5–17.5)
INHALED O2 CONCENTRATION: 40 %
INHALED O2 CONCENTRATION: 50 %
LACTATE BLDA-SCNC: 0.9 MMOL/L (ref 0.4–2)
LACTATE BLDA-SCNC: 1.3 MMOL/L (ref 0.4–2)
OXYHGB MFR BLDA: 96.4 % (ref 94–98)
OXYHGB MFR BLDA: 96.8 % (ref 94–98)
PCO2 BLDA: 37 MM HG (ref 38–42)
PCO2 BLDA: 39 MM HG (ref 38–42)
PH BLDA: 7.4 PH (ref 7.38–7.42)
PH BLDA: 7.44 PH (ref 7.38–7.42)
PO2 BLDA: 103 MM HG (ref 85–95)
PO2 BLDA: 119 MM HG (ref 85–95)
POTASSIUM BLDA-SCNC: 3.5 MMOL/L (ref 3.5–5.3)
POTASSIUM BLDA-SCNC: 4 MMOL/L (ref 3.5–5.3)
RH FACTOR (ANTIGEN D): NORMAL
SAO2 % BLDA: 99 % (ref 94–100)
SAO2 % BLDA: 99 % (ref 94–100)
SODIUM BLDA-SCNC: 134 MMOL/L (ref 136–145)
SODIUM BLDA-SCNC: 135 MMOL/L (ref 136–145)

## 2025-02-20 PROCEDURE — 00B70ZZ EXCISION OF CEREBRAL HEMISPHERE, OPEN APPROACH: ICD-10-PCS | Performed by: NEUROLOGICAL SURGERY

## 2025-02-20 PROCEDURE — 2500000004 HC RX 250 GENERAL PHARMACY W/ HCPCS (ALT 636 FOR OP/ED): Performed by: NEUROLOGICAL SURGERY

## 2025-02-20 PROCEDURE — C1763 CONN TISS, NON-HUMAN: HCPCS | Performed by: NEUROLOGICAL SURGERY

## 2025-02-20 PROCEDURE — 88307 TISSUE EXAM BY PATHOLOGIST: CPT | Mod: TC,SUR | Performed by: NEUROLOGICAL SURGERY

## 2025-02-20 PROCEDURE — A61510 PR EXCIS SUPRATENT BRAIN TUMOR: Performed by: STUDENT IN AN ORGANIZED HEALTH CARE EDUCATION/TRAINING PROGRAM

## 2025-02-20 PROCEDURE — 2720000007 HC OR 272 NO HCPCS: Performed by: NEUROLOGICAL SURGERY

## 2025-02-20 PROCEDURE — 70450 CT HEAD/BRAIN W/O DYE: CPT | Performed by: RADIOLOGY

## 2025-02-20 PROCEDURE — 3600000018 HC OR TIME - INITIAL BASE CHARGE - PROCEDURE LEVEL SIX: Performed by: NEUROLOGICAL SURGERY

## 2025-02-20 PROCEDURE — 2500000002 HC RX 250 W HCPCS SELF ADMINISTERED DRUGS (ALT 637 FOR MEDICARE OP, ALT 636 FOR OP/ED): Performed by: NEUROLOGICAL SURGERY

## 2025-02-20 PROCEDURE — 36415 COLL VENOUS BLD VENIPUNCTURE: CPT | Performed by: NEUROLOGICAL SURGERY

## 2025-02-20 PROCEDURE — 69990 MICROSURGERY ADD-ON: CPT | Performed by: NEUROLOGICAL SURGERY

## 2025-02-20 PROCEDURE — 86901 BLOOD TYPING SEROLOGIC RH(D): CPT | Performed by: NEUROLOGICAL SURGERY

## 2025-02-20 PROCEDURE — 84132 ASSAY OF SERUM POTASSIUM: CPT

## 2025-02-20 PROCEDURE — 8E09XBZ COMPUTER ASSISTED PROCEDURE OF HEAD AND NECK REGION: ICD-10-PCS | Performed by: NEUROLOGICAL SURGERY

## 2025-02-20 PROCEDURE — 3600000017 HC OR TIME - EACH INCREMENTAL 1 MINUTE - PROCEDURE LEVEL SIX: Performed by: NEUROLOGICAL SURGERY

## 2025-02-20 PROCEDURE — 70450 CT HEAD/BRAIN W/O DYE: CPT

## 2025-02-20 PROCEDURE — 2500000004 HC RX 250 GENERAL PHARMACY W/ HCPCS (ALT 636 FOR OP/ED)

## 2025-02-20 PROCEDURE — 36620 INSERTION CATHETER ARTERY: CPT

## 2025-02-20 PROCEDURE — 2780000003 HC OR 278 NO HCPCS: Performed by: NEUROLOGICAL SURGERY

## 2025-02-20 PROCEDURE — 82947 ASSAY GLUCOSE BLOOD QUANT: CPT

## 2025-02-20 PROCEDURE — 61510 CRNEC TREPH EXC BRN TUM STTL: CPT | Performed by: NEUROLOGICAL SURGERY

## 2025-02-20 PROCEDURE — 88331 PATH CONSLTJ SURG 1 BLK 1SPC: CPT | Performed by: PATHOLOGY

## 2025-02-20 PROCEDURE — 2500000004 HC RX 250 GENERAL PHARMACY W/ HCPCS (ALT 636 FOR OP/ED): Performed by: STUDENT IN AN ORGANIZED HEALTH CARE EDUCATION/TRAINING PROGRAM

## 2025-02-20 PROCEDURE — 7100000024 HC EXTENDED STAY RECOVERY PER MINUTE- PACU: Performed by: NEUROLOGICAL SURGERY

## 2025-02-20 PROCEDURE — 88307 TISSUE EXAM BY PATHOLOGIST: CPT | Performed by: PATHOLOGY

## 2025-02-20 PROCEDURE — 7100000002 HC RECOVERY ROOM TIME - EACH INCREMENTAL 1 MINUTE: Performed by: NEUROLOGICAL SURGERY

## 2025-02-20 PROCEDURE — 3700000002 HC GENERAL ANESTHESIA TIME - EACH INCREMENTAL 1 MINUTE: Performed by: NEUROLOGICAL SURGERY

## 2025-02-20 PROCEDURE — C1713 ANCHOR/SCREW BN/BN,TIS/BN: HCPCS | Performed by: NEUROLOGICAL SURGERY

## 2025-02-20 PROCEDURE — 2500000005 HC RX 250 GENERAL PHARMACY W/O HCPCS: Performed by: NEUROLOGICAL SURGERY

## 2025-02-20 PROCEDURE — 2500000005 HC RX 250 GENERAL PHARMACY W/O HCPCS

## 2025-02-20 PROCEDURE — 3700000001 HC GENERAL ANESTHESIA TIME - INITIAL BASE CHARGE: Performed by: NEUROLOGICAL SURGERY

## 2025-02-20 PROCEDURE — 2060000001 HC INTERMEDIATE ICU ROOM DAILY

## 2025-02-20 PROCEDURE — 7100000001 HC RECOVERY ROOM TIME - INITIAL BASE CHARGE: Performed by: NEUROLOGICAL SURGERY

## 2025-02-20 PROCEDURE — 2500000001 HC RX 250 WO HCPCS SELF ADMINISTERED DRUGS (ALT 637 FOR MEDICARE OP): Performed by: STUDENT IN AN ORGANIZED HEALTH CARE EDUCATION/TRAINING PROGRAM

## 2025-02-20 PROCEDURE — 2500000004 HC RX 250 GENERAL PHARMACY W/ HCPCS (ALT 636 FOR OP/ED): Mod: JZ,TB

## 2025-02-20 DEVICE — CROSS PIN, AXS SELF-TAP, 1.5 X 4MM: Type: IMPLANTABLE DEVICE | Site: SKULL | Status: FUNCTIONAL

## 2025-02-20 DEVICE — DURAGEN® PLUS DURAL REGENERATION MATRIX , 4 IN X 5 IN
Type: IMPLANTABLE DEVICE | Site: BRAIN | Status: FUNCTIONAL
Brand: DURAGEN® PLUS

## 2025-02-20 DEVICE — PLATE, BOX, LOW PROFILE 2X2: Type: IMPLANTABLE DEVICE | Site: SKULL | Status: FUNCTIONAL

## 2025-02-20 DEVICE — IMPLANTABLE DEVICE: Type: IMPLANTABLE DEVICE | Site: SKULL | Status: FUNCTIONAL

## 2025-02-20 RX ORDER — POLYETHYLENE GLYCOL 3350 17 G/17G
17 POWDER, FOR SOLUTION ORAL DAILY
Status: DISCONTINUED | OUTPATIENT
Start: 2025-02-21 | End: 2025-02-22 | Stop reason: HOSPADM

## 2025-02-20 RX ORDER — DEXTROSE 50 % IN WATER (D50W) INTRAVENOUS SYRINGE
25
Status: DISCONTINUED | OUTPATIENT
Start: 2025-02-20 | End: 2025-02-22 | Stop reason: HOSPADM

## 2025-02-20 RX ORDER — CEFAZOLIN 1 G/1
INJECTION, POWDER, FOR SOLUTION INTRAVENOUS AS NEEDED
Status: DISCONTINUED | OUTPATIENT
Start: 2025-02-20 | End: 2025-02-20

## 2025-02-20 RX ORDER — PANTOPRAZOLE SODIUM 40 MG/1
40 TABLET, DELAYED RELEASE ORAL
Status: DISCONTINUED | OUTPATIENT
Start: 2025-02-21 | End: 2025-02-22 | Stop reason: HOSPADM

## 2025-02-20 RX ORDER — LIDOCAINE HYDROCHLORIDE 10 MG/ML
0.1 INJECTION, SOLUTION INFILTRATION; PERINEURAL ONCE
Status: DISCONTINUED | OUTPATIENT
Start: 2025-02-20 | End: 2025-02-20 | Stop reason: HOSPADM

## 2025-02-20 RX ORDER — ACETAMINOPHEN 325 MG/1
650 TABLET ORAL EVERY 6 HOURS PRN
Status: DISCONTINUED | OUTPATIENT
Start: 2025-02-20 | End: 2025-02-22 | Stop reason: HOSPADM

## 2025-02-20 RX ORDER — ACETAMINOPHEN 10 MG/ML
1000 INJECTION, SOLUTION INTRAVENOUS ONCE
Status: COMPLETED | OUTPATIENT
Start: 2025-02-20 | End: 2025-02-20

## 2025-02-20 RX ORDER — ATORVASTATIN CALCIUM 40 MG/1
40 TABLET, FILM COATED ORAL DAILY
Status: DISCONTINUED | OUTPATIENT
Start: 2025-02-21 | End: 2025-02-22 | Stop reason: HOSPADM

## 2025-02-20 RX ORDER — ONDANSETRON HYDROCHLORIDE 2 MG/ML
4 INJECTION, SOLUTION INTRAVENOUS ONCE AS NEEDED
Status: DISCONTINUED | OUTPATIENT
Start: 2025-02-20 | End: 2025-02-20 | Stop reason: HOSPADM

## 2025-02-20 RX ORDER — LIDOCAINE HCL/PF 100 MG/5ML
SYRINGE (ML) INTRAVENOUS AS NEEDED
Status: DISCONTINUED | OUTPATIENT
Start: 2025-02-20 | End: 2025-02-20

## 2025-02-20 RX ORDER — NALOXONE HYDROCHLORIDE 0.4 MG/ML
0.2 INJECTION, SOLUTION INTRAMUSCULAR; INTRAVENOUS; SUBCUTANEOUS EVERY 5 MIN PRN
Status: DISCONTINUED | OUTPATIENT
Start: 2025-02-20 | End: 2025-02-22 | Stop reason: HOSPADM

## 2025-02-20 RX ORDER — INSULIN LISPRO 100 [IU]/ML
0-5 INJECTION, SOLUTION INTRAVENOUS; SUBCUTANEOUS
Status: DISCONTINUED | OUTPATIENT
Start: 2025-02-21 | End: 2025-02-22 | Stop reason: HOSPADM

## 2025-02-20 RX ORDER — ESMOLOL HYDROCHLORIDE 10 MG/ML
INJECTION INTRAVENOUS AS NEEDED
Status: DISCONTINUED | OUTPATIENT
Start: 2025-02-20 | End: 2025-02-20

## 2025-02-20 RX ORDER — SODIUM CHLORIDE, SODIUM GLUCONATE, SODIUM ACETATE, POTASSIUM CHLORIDE AND MAGNESIUM CHLORIDE 30; 37; 368; 526; 502 MG/100ML; MG/100ML; MG/100ML; MG/100ML; MG/100ML
INJECTION, SOLUTION INTRAVENOUS CONTINUOUS PRN
Status: DISCONTINUED | OUTPATIENT
Start: 2025-02-20 | End: 2025-02-20

## 2025-02-20 RX ORDER — LIDOCAINE HYDROCHLORIDE AND EPINEPHRINE 5; 5 MG/ML; UG/ML
INJECTION, SOLUTION INFILTRATION; PERINEURAL AS NEEDED
Status: DISCONTINUED | OUTPATIENT
Start: 2025-02-20 | End: 2025-02-20 | Stop reason: HOSPADM

## 2025-02-20 RX ORDER — HYDROMORPHONE HYDROCHLORIDE 0.2 MG/ML
0.2 INJECTION INTRAMUSCULAR; INTRAVENOUS; SUBCUTANEOUS
Status: DISCONTINUED | OUTPATIENT
Start: 2025-02-20 | End: 2025-02-22 | Stop reason: HOSPADM

## 2025-02-20 RX ORDER — ONDANSETRON HYDROCHLORIDE 2 MG/ML
4 INJECTION, SOLUTION INTRAVENOUS EVERY 8 HOURS PRN
Status: DISCONTINUED | OUTPATIENT
Start: 2025-02-20 | End: 2025-02-22 | Stop reason: HOSPADM

## 2025-02-20 RX ORDER — PHENYLEPHRINE HCL IN 0.9% NACL 0.4MG/10ML
SYRINGE (ML) INTRAVENOUS AS NEEDED
Status: DISCONTINUED | OUTPATIENT
Start: 2025-02-20 | End: 2025-02-20

## 2025-02-20 RX ORDER — SENNOSIDES 8.6 MG/1
2 TABLET ORAL 2 TIMES DAILY
Status: DISCONTINUED | OUTPATIENT
Start: 2025-02-20 | End: 2025-02-22 | Stop reason: HOSPADM

## 2025-02-20 RX ORDER — LEVETIRACETAM 500 MG/1
500 TABLET ORAL 2 TIMES DAILY
Status: DISCONTINUED | OUTPATIENT
Start: 2025-02-20 | End: 2025-02-22 | Stop reason: HOSPADM

## 2025-02-20 RX ORDER — MIDAZOLAM HYDROCHLORIDE 1 MG/ML
INJECTION INTRAMUSCULAR; INTRAVENOUS AS NEEDED
Status: DISCONTINUED | OUTPATIENT
Start: 2025-02-20 | End: 2025-02-20

## 2025-02-20 RX ORDER — FENTANYL CITRATE 50 UG/ML
INJECTION, SOLUTION INTRAMUSCULAR; INTRAVENOUS AS NEEDED
Status: DISCONTINUED | OUTPATIENT
Start: 2025-02-20 | End: 2025-02-20

## 2025-02-20 RX ORDER — ROCURONIUM BROMIDE 10 MG/ML
INJECTION, SOLUTION INTRAVENOUS AS NEEDED
Status: DISCONTINUED | OUTPATIENT
Start: 2025-02-20 | End: 2025-02-20

## 2025-02-20 RX ORDER — SODIUM CHLORIDE, SODIUM LACTATE, POTASSIUM CHLORIDE, CALCIUM CHLORIDE 600; 310; 30; 20 MG/100ML; MG/100ML; MG/100ML; MG/100ML
100 INJECTION, SOLUTION INTRAVENOUS CONTINUOUS
Status: DISCONTINUED | OUTPATIENT
Start: 2025-02-20 | End: 2025-02-20 | Stop reason: HOSPADM

## 2025-02-20 RX ORDER — PROPOFOL 10 MG/ML
INJECTION, EMULSION INTRAVENOUS AS NEEDED
Status: DISCONTINUED | OUTPATIENT
Start: 2025-02-20 | End: 2025-02-20

## 2025-02-20 RX ORDER — ONDANSETRON 4 MG/1
4 TABLET, FILM COATED ORAL EVERY 8 HOURS PRN
Status: DISCONTINUED | OUTPATIENT
Start: 2025-02-20 | End: 2025-02-22 | Stop reason: HOSPADM

## 2025-02-20 RX ORDER — ACETAMINOPHEN 10 MG/ML
INJECTION, SOLUTION INTRAVENOUS AS NEEDED
Status: DISCONTINUED | OUTPATIENT
Start: 2025-02-20 | End: 2025-02-20

## 2025-02-20 RX ORDER — SODIUM CHLORIDE 0.9 G/100ML
INJECTION, SOLUTION IRRIGATION AS NEEDED
Status: DISCONTINUED | OUTPATIENT
Start: 2025-02-20 | End: 2025-02-20 | Stop reason: HOSPADM

## 2025-02-20 RX ORDER — OXYCODONE HYDROCHLORIDE 5 MG/1
10 TABLET ORAL EVERY 4 HOURS PRN
Status: DISCONTINUED | OUTPATIENT
Start: 2025-02-20 | End: 2025-02-22 | Stop reason: HOSPADM

## 2025-02-20 RX ORDER — DEXTROSE 50 % IN WATER (D50W) INTRAVENOUS SYRINGE
12.5
Status: DISCONTINUED | OUTPATIENT
Start: 2025-02-20 | End: 2025-02-22 | Stop reason: HOSPADM

## 2025-02-20 RX ORDER — MANNITOL 20 G/100ML
INJECTION, SOLUTION INTRAVENOUS AS NEEDED
Status: DISCONTINUED | OUTPATIENT
Start: 2025-02-20 | End: 2025-02-20

## 2025-02-20 RX ORDER — BACITRACIN 500 [USP'U]/G
OINTMENT TOPICAL AS NEEDED
Status: DISCONTINUED | OUTPATIENT
Start: 2025-02-20 | End: 2025-02-20 | Stop reason: HOSPADM

## 2025-02-20 RX ORDER — ONDANSETRON HYDROCHLORIDE 2 MG/ML
INJECTION, SOLUTION INTRAVENOUS AS NEEDED
Status: DISCONTINUED | OUTPATIENT
Start: 2025-02-20 | End: 2025-02-20

## 2025-02-20 RX ORDER — HYDROMORPHONE HYDROCHLORIDE 1 MG/ML
INJECTION, SOLUTION INTRAMUSCULAR; INTRAVENOUS; SUBCUTANEOUS AS NEEDED
Status: DISCONTINUED | OUTPATIENT
Start: 2025-02-20 | End: 2025-02-20

## 2025-02-20 RX ORDER — LIDOCAINE HYDROCHLORIDE 20 MG/ML
INJECTION, SOLUTION INFILTRATION; PERINEURAL AS NEEDED
Status: DISCONTINUED | OUTPATIENT
Start: 2025-02-20 | End: 2025-02-20

## 2025-02-20 RX ORDER — LEVETIRACETAM 500 MG/5ML
INJECTION, SOLUTION, CONCENTRATE INTRAVENOUS AS NEEDED
Status: DISCONTINUED | OUTPATIENT
Start: 2025-02-20 | End: 2025-02-20

## 2025-02-20 RX ORDER — PHENYLEPHRINE 10 MG/250 ML(40 MCG/ML)IN 0.9 % SOD.CHLORIDE INTRAVENOUS
CONTINUOUS PRN
Status: DISCONTINUED | OUTPATIENT
Start: 2025-02-20 | End: 2025-02-20

## 2025-02-20 RX ORDER — DEXAMETHASONE 4 MG/1
4 TABLET ORAL EVERY 6 HOURS SCHEDULED
Status: DISCONTINUED | OUTPATIENT
Start: 2025-02-21 | End: 2025-02-22 | Stop reason: HOSPADM

## 2025-02-20 RX ORDER — HYDROMORPHONE HYDROCHLORIDE 0.2 MG/ML
0.2 INJECTION INTRAMUSCULAR; INTRAVENOUS; SUBCUTANEOUS EVERY 5 MIN PRN
Status: DISCONTINUED | OUTPATIENT
Start: 2025-02-20 | End: 2025-02-20 | Stop reason: HOSPADM

## 2025-02-20 RX ORDER — HYDRALAZINE HYDROCHLORIDE 20 MG/ML
10 INJECTION INTRAMUSCULAR; INTRAVENOUS
Status: DISCONTINUED | OUTPATIENT
Start: 2025-02-20 | End: 2025-02-22 | Stop reason: HOSPADM

## 2025-02-20 RX ORDER — OXYCODONE HYDROCHLORIDE 5 MG/1
5 TABLET ORAL EVERY 4 HOURS PRN
Status: DISCONTINUED | OUTPATIENT
Start: 2025-02-20 | End: 2025-02-22 | Stop reason: HOSPADM

## 2025-02-20 RX ADMIN — Medication 160 MCG: at 07:45

## 2025-02-20 RX ADMIN — OXYCODONE 5 MG: 5 TABLET ORAL at 21:54

## 2025-02-20 RX ADMIN — HYDROMORPHONE HYDROCHLORIDE 0.2 MG: 0.2 INJECTION, SOLUTION INTRAMUSCULAR; INTRAVENOUS; SUBCUTANEOUS at 15:44

## 2025-02-20 RX ADMIN — CEFAZOLIN 2 G: 1 INJECTION, POWDER, FOR SOLUTION INTRAMUSCULAR; INTRAVENOUS at 12:03

## 2025-02-20 RX ADMIN — FENTANYL CITRATE 50 MCG: 50 INJECTION, SOLUTION INTRAMUSCULAR; INTRAVENOUS at 07:43

## 2025-02-20 RX ADMIN — CEFAZOLIN 2 G: 1 INJECTION, POWDER, FOR SOLUTION INTRAMUSCULAR; INTRAVENOUS at 08:16

## 2025-02-20 RX ADMIN — PROPOFOL 100 MG: 10 INJECTION, EMULSION INTRAVENOUS at 07:43

## 2025-02-20 RX ADMIN — ROCURONIUM 20 MG: 50 INJECTION, SOLUTION INTRAVENOUS at 10:05

## 2025-02-20 RX ADMIN — FENTANYL CITRATE 50 MCG: 50 INJECTION, SOLUTION INTRAMUSCULAR; INTRAVENOUS at 10:44

## 2025-02-20 RX ADMIN — DEXAMETHASONE SODIUM PHOSPHATE 10 MG: 4 INJECTION INTRA-ARTICULAR; INTRALESIONAL; INTRAMUSCULAR; INTRAVENOUS; SOFT TISSUE at 08:03

## 2025-02-20 RX ADMIN — Medication 80 MCG: at 10:45

## 2025-02-20 RX ADMIN — SENNOSIDES 17.2 MG: 8.6 TABLET, FILM COATED ORAL at 22:00

## 2025-02-20 RX ADMIN — Medication 120 MCG: at 11:02

## 2025-02-20 RX ADMIN — MIDAZOLAM HYDROCHLORIDE 2 MG: 1 INJECTION, SOLUTION INTRAMUSCULAR; INTRAVENOUS at 07:26

## 2025-02-20 RX ADMIN — Medication 3 L/MIN: at 13:30

## 2025-02-20 RX ADMIN — AMINOLEVULINIC ACID HYDROCHLORIDE 1.44 G: 1500 POWDER, FOR SOLUTION ORAL at 06:34

## 2025-02-20 RX ADMIN — MANNITOL 37 G: 20 INJECTION, SOLUTION INTRAVENOUS at 08:16

## 2025-02-20 RX ADMIN — Medication 80 MCG: at 08:39

## 2025-02-20 RX ADMIN — ESMOLOL HYDROCHLORIDE 30 MG: 10 INJECTION, SOLUTION INTRAVENOUS at 08:28

## 2025-02-20 RX ADMIN — DEXAMETHASONE 4 MG: 4 TABLET ORAL at 23:35

## 2025-02-20 RX ADMIN — ROCURONIUM 20 MG: 50 INJECTION, SOLUTION INTRAVENOUS at 09:25

## 2025-02-20 RX ADMIN — ROCURONIUM 60 MG: 50 INJECTION, SOLUTION INTRAVENOUS at 07:43

## 2025-02-20 RX ADMIN — ROCURONIUM 10 MG: 50 INJECTION, SOLUTION INTRAVENOUS at 11:13

## 2025-02-20 RX ADMIN — LIDOCAINE HYDROCHLORIDE 80 MG: 20 INJECTION, SOLUTION INFILTRATION; PERINEURAL at 07:43

## 2025-02-20 RX ADMIN — ROCURONIUM 30 MG: 50 INJECTION, SOLUTION INTRAVENOUS at 10:39

## 2025-02-20 RX ADMIN — ACETAMINOPHEN 1000 MG: 10 INJECTION INTRAVENOUS at 19:34

## 2025-02-20 RX ADMIN — Medication 80 MCG: at 08:45

## 2025-02-20 RX ADMIN — ROCURONIUM 20 MG: 50 INJECTION, SOLUTION INTRAVENOUS at 08:53

## 2025-02-20 RX ADMIN — PROPOFOL 20 MG: 10 INJECTION, EMULSION INTRAVENOUS at 08:28

## 2025-02-20 RX ADMIN — SODIUM CHLORIDE, SODIUM GLUCONATE, SODIUM ACETATE, POTASSIUM CHLORIDE AND MAGNESIUM CHLORIDE: 526; 502; 368; 37; 30 INJECTION, SOLUTION INTRAVENOUS at 08:40

## 2025-02-20 RX ADMIN — SODIUM CHLORIDE, SODIUM GLUCONATE, SODIUM ACETATE, POTASSIUM CHLORIDE AND MAGNESIUM CHLORIDE: 30; 37; 368; 526; 502 INJECTION, SOLUTION INTRAVENOUS at 07:36

## 2025-02-20 RX ADMIN — FENTANYL CITRATE 50 MCG: 50 INJECTION, SOLUTION INTRAMUSCULAR; INTRAVENOUS at 10:43

## 2025-02-20 RX ADMIN — FENTANYL CITRATE 50 MCG: 50 INJECTION, SOLUTION INTRAMUSCULAR; INTRAVENOUS at 08:28

## 2025-02-20 RX ADMIN — SUGAMMADEX 200 MG: 100 INJECTION, SOLUTION INTRAVENOUS at 12:57

## 2025-02-20 RX ADMIN — LEVETIRACETAM 1000 MG: 500 INJECTION, SOLUTION, CONCENTRATE INTRAVENOUS at 08:03

## 2025-02-20 RX ADMIN — ACETAMINOPHEN 650 MG: 325 TABLET ORAL at 23:39

## 2025-02-20 RX ADMIN — SUGAMMADEX 200 MG: 100 INJECTION, SOLUTION INTRAVENOUS at 12:56

## 2025-02-20 RX ADMIN — HYDROMORPHONE HYDROCHLORIDE 0.5 MG: 1 INJECTION, SOLUTION INTRAMUSCULAR; INTRAVENOUS; SUBCUTANEOUS at 12:01

## 2025-02-20 RX ADMIN — SODIUM CHLORIDE, SODIUM GLUCONATE, SODIUM ACETATE, POTASSIUM CHLORIDE AND MAGNESIUM CHLORIDE: 526; 502; 368; 37; 30 INJECTION, SOLUTION INTRAVENOUS at 07:42

## 2025-02-20 RX ADMIN — LEVETIRACETAM 500 MG: 500 TABLET, FILM COATED ORAL at 22:00

## 2025-02-20 RX ADMIN — Medication 100 MCG: at 12:10

## 2025-02-20 RX ADMIN — PROPOFOL 100 MCG/KG/MIN: 10 INJECTION, EMULSION INTRAVENOUS at 08:36

## 2025-02-20 RX ADMIN — ONDANSETRON 4 MG: 2 INJECTION, SOLUTION INTRAMUSCULAR; INTRAVENOUS at 12:01

## 2025-02-20 RX ADMIN — ACETAMINOPHEN 1000 MG: 10 INJECTION, SOLUTION INTRAVENOUS at 08:41

## 2025-02-20 RX ADMIN — PHENYLEPHRINE-NACL IV SOLUTION 10 MG/250ML-0.9% 0.3 MCG/KG/MIN: 10-0.9/25 SOLUTION at 10:53

## 2025-02-20 RX ADMIN — ROCURONIUM 20 MG: 50 INJECTION, SOLUTION INTRAVENOUS at 11:31

## 2025-02-20 RX ADMIN — LIDOCAINE HYDROCHLORIDE 80 MG: 20 INJECTION, SOLUTION INTRAVENOUS at 07:43

## 2025-02-20 RX ADMIN — PROPOFOL 40 MG: 10 INJECTION, EMULSION INTRAVENOUS at 08:26

## 2025-02-20 SDOH — HEALTH STABILITY: MENTAL HEALTH: CURRENT SMOKER: 0

## 2025-02-20 ASSESSMENT — PAIN - FUNCTIONAL ASSESSMENT
PAIN_FUNCTIONAL_ASSESSMENT: 0-10
PAIN_FUNCTIONAL_ASSESSMENT: UNABLE TO SELF-REPORT
PAIN_FUNCTIONAL_ASSESSMENT: 0-10
PAIN_FUNCTIONAL_ASSESSMENT: UNABLE TO SELF-REPORT
PAIN_FUNCTIONAL_ASSESSMENT: 0-10
PAIN_FUNCTIONAL_ASSESSMENT: UNABLE TO SELF-REPORT
PAIN_FUNCTIONAL_ASSESSMENT: 0-10

## 2025-02-20 ASSESSMENT — PAIN SCALES - GENERAL
PAINLEVEL_OUTOF10: 0 - NO PAIN
PAINLEVEL_OUTOF10: 2
PAINLEVEL_OUTOF10: 0 - NO PAIN
PAINLEVEL_OUTOF10: 5 - MODERATE PAIN
PAINLEVEL_OUTOF10: 0 - NO PAIN
PAINLEVEL_OUTOF10: 0 - NO PAIN
PAINLEVEL_OUTOF10: 3
PAIN_LEVEL: 0
PAINLEVEL_OUTOF10: 0 - NO PAIN
PAINLEVEL_OUTOF10: 5 - MODERATE PAIN
PAINLEVEL_OUTOF10: 5 - MODERATE PAIN
PAINLEVEL_OUTOF10: 0 - NO PAIN
PAINLEVEL_OUTOF10: 0 - NO PAIN
PAINLEVEL_OUTOF10: 5 - MODERATE PAIN
PAINLEVEL_OUTOF10: 5 - MODERATE PAIN
PAINLEVEL_OUTOF10: 3
PAINLEVEL_OUTOF10: 2
PAINLEVEL_OUTOF10: 0 - NO PAIN

## 2025-02-20 ASSESSMENT — PAIN DESCRIPTION - ORIENTATION: ORIENTATION: POSTERIOR

## 2025-02-20 ASSESSMENT — PAIN DESCRIPTION - LOCATION: LOCATION: HEAD

## 2025-02-20 NOTE — ANESTHESIA PROCEDURE NOTES
Peripheral IV  Date/Time: 2/20/2025 7:45 AM      Placement  Needle size: 16 G  Laterality: right  Location: hand  Local anesthetic: injectable  Site prep: alcohol  Technique: anatomical landmarks  Attempts: 1

## 2025-02-20 NOTE — HOSPITAL COURSE
Enmanuel Ahumada is a 69 y.o. male with a past medical history of  HTN, HLD, smalls's esophagus, RO GBM s/p resection ([facility], 12/2023) with resultant L HH s/p chemo/RT presenting with tumor progression. He was then admitted to Neurosurgery for further management.   2/20 s/p redo craniotomy for tumor resection w 5 ALA, CTH POC, pneumocephalus  2/21 MRI brain GTR        PT/OT evaluated patient and recommended outpatient     On the day of discharge, the patient was seen and evaluated by the neurosurgery team and deemed suitable for discharge. The patient was given detailed discharge instructions and were scheduled to follow up as an outpatient.

## 2025-02-20 NOTE — ANESTHESIA PROCEDURE NOTES
Airway  Date/Time: 2/20/2025 7:46 AM  Urgency: elective    Airway not difficult    Staffing  Performed: resident   Authorized by: Roslyn Bautista MD    Performed by: Una Fried MD  Patient location during procedure: OR    Indications and Patient Condition  Indications for airway management: anesthesia  Spontaneous Ventilation: absent  Sedation level: deep  Preoxygenated: yes  Patient position: sniffing  Mask difficulty assessment: 1 - vent by mask  Planned trial extubation    Final Airway Details  Final airway type: endotracheal airway      Successful airway: ETT  Cuffed: yes   Successful intubation technique: direct laryngoscopy  Facilitating devices/methods: intubating stylet  Endotracheal tube insertion site: oral  Blade: Lydia  Blade size: #4  ETT size (mm): 7.5  Cormack-Lehane Classification: grade IIa - partial view of glottis  Placement verified by: chest auscultation and capnometry   Measured from: lips  ETT to lips (cm): 23  Number of attempts at approach: 1

## 2025-02-20 NOTE — ANESTHESIA POSTPROCEDURE EVALUATION
"Patient: Enmanuel Ahumada \"Dash\"    Procedure Summary       Date: 02/20/25 Room / Location: [facility] OR 25 / [facility]    Anesthesia Start: 0737 Anesthesia Stop: 1320    Procedure: CRANIOTOMY, WITH TISSUE EXCISION USING COMPUTER-ASSISTED NAVIGATION (Right) Diagnosis:       GBM (glioblastoma multiforme) (Multi)      (GBM (glioblastoma multiforme) (Multi) [C71.9])    Surgeons: Nataliya Cortez MD Responsible Provider: Roslyn Bautista MD    Anesthesia Type: general ASA Status: 3            Anesthesia Type: general    Vitals Value Taken Time   /72 02/20/25 1320   Temp 36.1 02/20/25 1320   Pulse 106 02/20/25 1315   Resp 12 02/20/25 1315   SpO2 97 % 02/20/25 1315   Vitals shown include unfiled device data.    Anesthesia Post Evaluation    Patient location during evaluation: PACU  Patient participation: complete - patient participated  Level of consciousness: awake  Pain score: 0  Pain management: adequate  Airway patency: patent  Cardiovascular status: acceptable  Respiratory status: acceptable  Hydration status: acceptable  Postoperative Nausea and Vomiting: none      No notable events documented.    "

## 2025-02-20 NOTE — OP NOTE
"CRANIOTOMY, WITH TISSUE EXCISION USING COMPUTER-ASSISTED NAVIGATION (R) Operative Note     Date: 2025  OR Location: [facility] OR    Name: Enmanuel Ahumada \"Dash\", : 1955, Age: 69 y.o., MRN: [id number], Sex: male    Diagnosis  Pre-op Diagnosis      * GBM (glioblastoma multiforme) (Multi) [C71.9] Post-op Diagnosis     * GBM (glioblastoma multiforme) (Multi) [C71.9]     Procedures  CRANIOTOMY, WITH TISSUE EXCISION USING COMPUTER-ASSISTED NAVIGATION  62091 - CA CRNEC TREPH BONE FLAP CRNOT EXC BRAIN TUMOR STTL      Surgeons      * Nataliya Cortez - Primary    Resident/Fellow/Other Assistant:  Surgeons and Role:     * Kate Rose MD - Resident - Assisting    Staff:   Carmenulator: Guanaco Bernal Person: Theo  Circulator: Bharath Alfredoub Person: Ana Maria    Anesthesia Staff: Anesthesiologist: Roslyn Bautista MD  C-AA: ALAN Merritt  Anesthesia Resident: Una Fried MD    Procedure Summary  Anesthesia: General  ASA: III  Estimated Blood Loss: 100mL  Intra-op Medications:   Administrations occurring from 0700 to 1345 on 25:   Medication Name Total Dose   lidocaine-epinephrine (Xylocaine W/EPI) 0.5 %-1:200,000 injection 10 mL   thrombin-bovine (JMI) 5,000 unit topical solution 10,000 Units   bacitracin ointment 1 Application   sodium chloride 0.9 % irrigation solution 2,000 mL   polymyxin B 1,000,000 Units in sodium chloride 0.9% 2,000 mL irrigation 1,000,000 Units   oxygen (O2) therapy 45 L   acetaminophen (Ofirmev) injection 1,000 mg   ceFAZolin (Ancef) vial 1 g 4 g   dexAMETHasone (Decadron) injection 4 mg/mL 10 mg   electrolyte-A (Plasmalyte-A) 186.73 mL   electrolyte-A (Plasmalyte-A) Cannot be calculated   esmolol (Brevibloc) 10 mcg/mL  IV bolus 30 mg   fentaNYL (Sublimaze) injection 50 mcg/mL 200 mcg   HYDROmorphone (Dilaudid) injection 1 mg/mL 0.5 mg   levETIRAcetam (Keppra) 500 mg/5mL 1,000 mg   lidocaine (cardiac) injection 2% prefilled syringe 80 mg   lidocaine (Xylocaine) " injection 2 % 80 mg   mannitol 20% 37 g   midazolam PF (Versed) injection 1 mg/mL 2 mg   ondansetron (Zofran) 2 mg/mL injection 4 mg   phenylephrine (Brian-Synephrine) 10 mg in sodium chloride 0.9% 250 mL (0.04 mg/mL) infusion (premix) 4.62 mg   phenylephrine 40 mcg/mL syringe 10 mL 620 mcg   propofol (Diprivan) injection 10 mg/mL 1,618.77 mg   rocuronium (ZeMuron) 50 mg/5 mL injection 180 mg   sugammadex (Bridion) 200 mg/2 mL injection 400 mg              Anesthesia Record               Intraprocedure I/O Totals          Intake    Phenylephrine Drip 0.00 mL    The total shown is the total volume documented since Anesthesia Start was filed.    Total Intake 0 mL       Output    Urine 500 mL    Est. Blood Loss 100 mL    Total Output 600 mL       Net    Net Volume -600 mL          Specimen:   ID Type Source Tests Collected by Time   1 : right flare brain mass for permanent Tissue BRAIN BIOPSY SURGICAL PATHOLOGY EXAM Nataliya Cortez MD 2/20/2025 1026   2 : right brain mass high cbv for permanent Tissue BRAIN BIOPSY SURGICAL PATHOLOGY EXAM Nataliya Cortez MD 2/20/2025 1028   3 : right brain mass for frozen Tissue BRAIN RESECTION SURGICAL PATHOLOGY EXAM Nataliya Cortez MD 2/20/2025 1029   4 : right brain mass low cbv for permanent Tissue BRAIN BIOPSY SURGICAL PATHOLOGY EXAM Nataliya Cortez MD 2/20/2025 1032   5 : right brain mass for permanent Tissue BRAIN RESECTION SURGICAL PATHOLOGY EXAM Nataliya Cortez MD 2/20/2025 1038   6 : right deep flare brain mass for permanent Tissue BRAIN BIOPSY SURGICAL PATHOLOGY EXAM Nataliya Cortez MD 2/20/2025 1116   7 : right brain mass for permanent 2 Tissue BRAIN RESECTION SURGICAL PATHOLOGY EXAM Nataliya Cortez MD 2/20/2025 1151                 Drains and/or Catheters:   Urethral Catheter Double-lumen;Non-latex 16 Fr. (Active)   Site Assessment Clean;Skin intact 02/20/25 1315   Collection Container Standard drainage bag 02/20/25 1315   Securement Method Securing device  "(Describe) 02/20/25 1315   Reason for Continuing Urinary Catheterization accurate hourly measurement of urine volume in a critically ill patient that cannot be assessed by other volumes and urine collection strategies 02/20/25 1315       Tourniquet Times:         Implants:  Implants       Type Name Action Serial No.      Graft GRAFT MATRIX, DURAL, DURAGEN PLUS 4X5 (1/PK) - [id number] Implanted      Neuro Interventional Implant CROSS PIN, AXS SELF-TAP, 1.5 X 4MM - [id number] Implanted      Screw COVER, LW PROF MADIE HOLE, 20MM W/ - [id number] Implanted      Screw COVER, LW PROF MADIE HOLE, 14MM W/ - [id number] Implanted      Screw PLATE, BOX, LOW PROFILE 2X2 - [id number] Implanted               Findings: prelim pathology consistent with glial tumor    Indications: Enmanuel Ahumada \"Petey" is an 69 y.o. male who is having surgery for GBM (glioblastoma multiforme) (Multi) [C71.9].     The patient was seen in the preoperative area. The risks, benefits, complications, treatment options, non-operative alternatives, expected recovery and outcomes were discussed with the patient. The possibilities of reaction to medication, pulmonary aspiration, injury to surrounding structures, bleeding, recurrent infection, the need for additional procedures, failure to diagnose a condition, and creating a complication requiring transfusion or operation were discussed with the patient. The patient concurred with the proposed plan, giving informed consent.  The site of surgery was properly noted/marked if necessary per policy. The patient has been actively warmed in preoperative area. Preoperative antibiotics have been ordered and given within 1 hours of incision. Venous thrombosis prophylaxis have been ordered including bilateral sequential compression devices    Procedure Details:   Patient was brought to the OR where a timeout was performed with the team. General anesthesia was obtained and endotracheal intubation. The bed was turned " 90 degrees and the shoulder was bumped. Dickey head witt was applied and secured. All pressure areas were well padded and the patient was secured. Neuronavigation was registered to appropriate accuracy. The area was prepped and draped in the usual sterile fashion. The existing incision was infiltrated with lidocaine and opened with a skin knife. A trap door style flap was raised carefully and dissected free from the underlying hardware. The scalp flap was reflected inferiorly and secured with fish hooks. Careful hemostasis was obtained. The existing plates and screws were removed and the bone flap edges were dissected free and the bone flap was elevated. The existing dura and dural substitute was dissected in layers and opened in curvilinear fashion. The dura was noted to be adherent to the underlying nazia and this was dissected free with a anitra and microscissors. Due to the adherent nature of the remaining dura, this was left on the brain and tumor surface at the middle of the surgical bed. We then identified the margins of the tumor and dissected circumferentially around the tumor. At the deep aspect of the resection, the tumor was noted to invade the ventricle and was adherent to the choroid plexus. This was cauterized and divided. The tumor was then removed from the cavity and sent to pathology which resulted as preliminarily consistent with glioma.  The operating room microscope was then brought into the field and we identified some areas of fluorescence with the blue  light setting. The CUSA was used to remove the remaining fluorescing tumor. We inspected the margins and removed any remaining gross tumor. There was no remaining gross tumor or fluorescing tissue at the completion of the dissection. We then turned our attention to hemostasis with floseal and bipolar electrocautery. The cavity was lined with surgicel. A duragen patch was overlaid on the dural defect and sealed with duraseal. The bone was  replaced and fixated to the skull with bone flap fixation plates and 4mm screws. Again the field was copiously irrigated and the skin were closed with 2-0 vicryl sutures. Nylon suture was used to close the skin and sterile dressing applied. The patient was taken out of [facility] and awakened by the anesthesia team. All counts were correct at the completion of the case.      Complications:  None; patient tolerated the procedure well.    Disposition: PACU - hemodynamically stable.  Condition: stable           Additional Details: none    Attending Attestation: I was present and scrubbed for the key portions of the procedure.    Nataliya Cortez  Phone Number: [phone]

## 2025-02-20 NOTE — ANESTHESIA PROCEDURE NOTES
Arterial Line:    Date/Time: 2/20/2025 7:48 AM    Staffing  Performed: resident   Authorized by: Roslyn Bautista MD    Performed by: Una Fried MD    An arterial line was placed. Procedure performed using surface landmarks.in the OR for the following indication(s): continuous blood pressure monitoring and blood sampling needed.    A 20 gauge (size), 1 and 3/4 inch (length), Angiocath (type) catheter was placed into the Left radial artery, secured by Tegaderm,   Seldinger technique used.  Events:  patient tolerated procedure well with no complications.

## 2025-02-20 NOTE — ANESTHESIA PREPROCEDURE EVALUATION
"Patient: Enmanuel Ahumada \"Dash\"    Procedure Information       Date/Time: 02/20/25 0700    Procedure: CRANIOTOMY, WITH TISSUE EXCISION USING COMPUTER-ASSISTED NAVIGATION (Right) - Right occipital craniotomy (secondary) for recurrent glioblastoma resection with 5ALA    Location: [facility] OR 25 / [facility] OR    Surgeons: Nataliya Cortez MD            Relevant Problems   Anesthesia (within normal limits)      Cardiac   (+) Abnormal EKG   (+) Hyperlipidemia      Pulmonary (within normal limits)      Neuro (within normal limits)      GI (within normal limits)      Liver (within normal limits)      Endocrine (within normal limits)      Hematology (within normal limits)      Musculoskeletal (within normal limits)      HEENT   (+) Impaired visual perception      ID   (+) Infected insect bite   (+) Onychomycosis       Clinical information reviewed:   Tobacco  Allergies  Meds   Med Hx  Surg Hx   Fam Hx  Soc Hx        NPO Detail:  NPO/Void Status  Carbohydrate Drink Given Prior to Surgery? : N  Date of Last Liquid: 02/19/25  Time of Last Liquid: 2100  Date of Last Solid: 02/19/25  Time of Last Solid: 2100  Last Intake Type: Clear fluids  Time of Last Void: 0600         Physical Exam    Airway  Mallampati: II     Cardiovascular - normal exam     Dental - normal exam     Pulmonary - normal exam     Abdominal - normal exam             Anesthesia Plan    History of general anesthesia?: yes  History of complications of general anesthesia?: no    ASA 3     general     The patient is not a current smoker.  Patient was not previously instructed to abstain from smoking on day of procedure.  Patient did not smoke on day of procedure.    intravenous induction   Anesthetic plan and risks discussed with patient.    Plan discussed with resident.      "

## 2025-02-21 ENCOUNTER — APPOINTMENT (OUTPATIENT)
Dept: RADIOLOGY | Facility: HOSPITAL | Age: 70
DRG: 027 | End: 2025-02-21
Payer: MEDICARE

## 2025-02-21 LAB
ALBUMIN SERPL BCP-MCNC: 4.1 G/DL (ref 3.4–5)
ANION GAP SERPL CALC-SCNC: 15 MMOL/L (ref 10–20)
BUN SERPL-MCNC: 17 MG/DL (ref 6–23)
CALCIUM SERPL-MCNC: 9.1 MG/DL (ref 8.6–10.6)
CHLORIDE SERPL-SCNC: 106 MMOL/L (ref 98–107)
CO2 SERPL-SCNC: 26 MMOL/L (ref 21–32)
CREAT SERPL-MCNC: 0.84 MG/DL (ref 0.5–1.3)
EGFRCR SERPLBLD CKD-EPI 2021: >90 ML/MIN/1.73M*2
ERYTHROCYTE [DISTWIDTH] IN BLOOD BY AUTOMATED COUNT: 11.9 % (ref 11.5–14.5)
GLUCOSE BLD MANUAL STRIP-MCNC: 164 MG/DL (ref 74–99)
GLUCOSE BLD MANUAL STRIP-MCNC: 166 MG/DL (ref 74–99)
GLUCOSE BLD MANUAL STRIP-MCNC: 167 MG/DL (ref 74–99)
GLUCOSE BLD MANUAL STRIP-MCNC: 198 MG/DL (ref 74–99)
GLUCOSE BLD MANUAL STRIP-MCNC: 232 MG/DL (ref 74–99)
GLUCOSE SERPL-MCNC: 142 MG/DL (ref 74–99)
HCT VFR BLD AUTO: 41.8 % (ref 41–52)
HGB BLD-MCNC: 14.6 G/DL (ref 13.5–17.5)
MCH RBC QN AUTO: 33.5 PG (ref 26–34)
MCHC RBC AUTO-ENTMCNC: 34.9 G/DL (ref 32–36)
MCV RBC AUTO: 96 FL (ref 80–100)
NRBC BLD-RTO: 0 /100 WBCS (ref 0–0)
PHOSPHATE SERPL-MCNC: 3.2 MG/DL (ref 2.5–4.9)
PLATELET # BLD AUTO: 146 X10*3/UL (ref 150–450)
POTASSIUM SERPL-SCNC: 4.8 MMOL/L (ref 3.5–5.3)
RBC # BLD AUTO: 4.36 X10*6/UL (ref 4.5–5.9)
SODIUM SERPL-SCNC: 142 MMOL/L (ref 136–145)
WBC # BLD AUTO: 8.2 X10*3/UL (ref 4.4–11.3)

## 2025-02-21 PROCEDURE — 36415 COLL VENOUS BLD VENIPUNCTURE: CPT | Performed by: STUDENT IN AN ORGANIZED HEALTH CARE EDUCATION/TRAINING PROGRAM

## 2025-02-21 PROCEDURE — A9575 INJ GADOTERATE MEGLUMI 0.1ML: HCPCS | Performed by: NEUROLOGICAL SURGERY

## 2025-02-21 PROCEDURE — 2500000002 HC RX 250 W HCPCS SELF ADMINISTERED DRUGS (ALT 637 FOR MEDICARE OP, ALT 636 FOR OP/ED): Performed by: STUDENT IN AN ORGANIZED HEALTH CARE EDUCATION/TRAINING PROGRAM

## 2025-02-21 PROCEDURE — 1200000002 HC GENERAL ROOM WITH TELEMETRY DAILY

## 2025-02-21 PROCEDURE — 97116 GAIT TRAINING THERAPY: CPT | Mod: GP

## 2025-02-21 PROCEDURE — 70553 MRI BRAIN STEM W/O & W/DYE: CPT | Performed by: RADIOLOGY

## 2025-02-21 PROCEDURE — 2500000004 HC RX 250 GENERAL PHARMACY W/ HCPCS (ALT 636 FOR OP/ED): Mod: JZ,TB | Performed by: STUDENT IN AN ORGANIZED HEALTH CARE EDUCATION/TRAINING PROGRAM

## 2025-02-21 PROCEDURE — 80069 RENAL FUNCTION PANEL: CPT | Performed by: STUDENT IN AN ORGANIZED HEALTH CARE EDUCATION/TRAINING PROGRAM

## 2025-02-21 PROCEDURE — 97165 OT EVAL LOW COMPLEX 30 MIN: CPT | Mod: GO

## 2025-02-21 PROCEDURE — 82947 ASSAY GLUCOSE BLOOD QUANT: CPT

## 2025-02-21 PROCEDURE — 2500000001 HC RX 250 WO HCPCS SELF ADMINISTERED DRUGS (ALT 637 FOR MEDICARE OP): Performed by: STUDENT IN AN ORGANIZED HEALTH CARE EDUCATION/TRAINING PROGRAM

## 2025-02-21 PROCEDURE — 97161 PT EVAL LOW COMPLEX 20 MIN: CPT | Mod: GP

## 2025-02-21 PROCEDURE — 85027 COMPLETE CBC AUTOMATED: CPT | Performed by: STUDENT IN AN ORGANIZED HEALTH CARE EDUCATION/TRAINING PROGRAM

## 2025-02-21 PROCEDURE — 2550000001 HC RX 255 CONTRASTS: Performed by: NEUROLOGICAL SURGERY

## 2025-02-21 PROCEDURE — 70553 MRI BRAIN STEM W/O & W/DYE: CPT

## 2025-02-21 RX ORDER — GADOTERATE MEGLUMINE 376.9 MG/ML
15 INJECTION INTRAVENOUS
Status: COMPLETED | OUTPATIENT
Start: 2025-02-21 | End: 2025-02-21

## 2025-02-21 RX ADMIN — ACETAMINOPHEN 650 MG: 325 TABLET ORAL at 17:45

## 2025-02-21 RX ADMIN — DEXAMETHASONE 4 MG: 4 TABLET ORAL at 17:45

## 2025-02-21 RX ADMIN — LEVETIRACETAM 500 MG: 500 TABLET, FILM COATED ORAL at 08:52

## 2025-02-21 RX ADMIN — GADOTERATE MEGLUMINE 15 ML: 376.9 INJECTION INTRAVENOUS at 09:33

## 2025-02-21 RX ADMIN — INSULIN LISPRO 1 UNITS: 100 INJECTION, SOLUTION INTRAVENOUS; SUBCUTANEOUS at 17:12

## 2025-02-21 RX ADMIN — SENNOSIDES 17.2 MG: 8.6 TABLET, FILM COATED ORAL at 20:41

## 2025-02-21 RX ADMIN — ATORVASTATIN CALCIUM 40 MG: 40 TABLET, FILM COATED ORAL at 08:52

## 2025-02-21 RX ADMIN — ACETAMINOPHEN 650 MG: 325 TABLET ORAL at 08:52

## 2025-02-21 RX ADMIN — DEXAMETHASONE 4 MG: 4 TABLET ORAL at 05:44

## 2025-02-21 RX ADMIN — OXYCODONE 5 MG: 5 TABLET ORAL at 10:55

## 2025-02-21 RX ADMIN — LEVETIRACETAM 500 MG: 500 TABLET, FILM COATED ORAL at 20:41

## 2025-02-21 RX ADMIN — OXYCODONE 10 MG: 5 TABLET ORAL at 05:50

## 2025-02-21 RX ADMIN — HYDROMORPHONE HYDROCHLORIDE 0.2 MG: 0.2 INJECTION, SOLUTION INTRAMUSCULAR; INTRAVENOUS; SUBCUTANEOUS at 02:03

## 2025-02-21 RX ADMIN — INSULIN LISPRO 2 UNITS: 100 INJECTION, SOLUTION INTRAVENOUS; SUBCUTANEOUS at 12:03

## 2025-02-21 RX ADMIN — PANTOPRAZOLE SODIUM 40 MG: 40 TABLET, DELAYED RELEASE ORAL at 06:18

## 2025-02-21 RX ADMIN — DEXAMETHASONE 4 MG: 4 TABLET ORAL at 11:01

## 2025-02-21 ASSESSMENT — COGNITIVE AND FUNCTIONAL STATUS - GENERAL
TOILETING: A LITTLE
TURNING FROM BACK TO SIDE WHILE IN FLAT BAD: A LITTLE
MOBILITY SCORE: 19
CLIMB 3 TO 5 STEPS WITH RAILING: A LITTLE
HELP NEEDED FOR BATHING: A LITTLE
DRESSING REGULAR LOWER BODY CLOTHING: A LITTLE
DRESSING REGULAR UPPER BODY CLOTHING: A LITTLE
STANDING UP FROM CHAIR USING ARMS: A LITTLE
MOVING TO AND FROM BED TO CHAIR: A LITTLE
DAILY ACTIVITIY SCORE: 20
WALKING IN HOSPITAL ROOM: A LITTLE

## 2025-02-21 ASSESSMENT — PAIN SCALES - GENERAL
PAINLEVEL_OUTOF10: 5 - MODERATE PAIN
PAINLEVEL_OUTOF10: 3
PAINLEVEL_OUTOF10: 7
PAINLEVEL_OUTOF10: 3
PAINLEVEL_OUTOF10: 2
PAINLEVEL_OUTOF10: 3
PAINLEVEL_OUTOF10: 0 - NO PAIN
PAINLEVEL_OUTOF10: 2
PAINLEVEL_OUTOF10: 7

## 2025-02-21 ASSESSMENT — PAIN DESCRIPTION - DESCRIPTORS: DESCRIPTORS: PRESSURE;ACHING

## 2025-02-21 ASSESSMENT — ACTIVITIES OF DAILY LIVING (ADL)
ADL_ASSISTANCE: INDEPENDENT
ADL_ASSISTANCE: INDEPENDENT

## 2025-02-21 ASSESSMENT — PAIN - FUNCTIONAL ASSESSMENT
PAIN_FUNCTIONAL_ASSESSMENT: 0-10

## 2025-02-21 ASSESSMENT — PAIN DESCRIPTION - LOCATION: LOCATION: HEAD

## 2025-02-21 NOTE — PROGRESS NOTES
Physical Therapy    Physical Therapy Evaluation & Treatment    Patient Name: Dash Ahumada  MRN: [id number]  Department: [facility]  Room: 16/16-A  Today's Date: 2/21/2025   Time Calculation  Start Time: 1145  Stop Time: 1214  Time Calculation (min): 29 min    Assessment/Plan   PT Assessment  PT Assessment Results: Impaired balance, Decreased mobility, Decreased coordination, Impaired vision  Rehab Prognosis: Excellent  Barriers to Discharge Home: No anticipated barriers  Evaluation/Treatment Tolerance: Patient tolerated treatment well  Strengths: Attitude of self  End of Session Communication: Bedside nurse  Assessment Comment: Pt to benefit from ongoing PT services to address the above limitations and to facilitate timely return to prior level of function.  End of Session Patient Position: Up in chair, Alarm off, not on at start of session   IP OR SWING BED PT PLAN  Inpatient or Swing Bed: Inpatient  PT Plan  Treatment/Interventions: Bed mobility, Transfer training, Gait training, Stair training, Balance training, Neuromuscular re-education, Strengthening, Endurance training, Therapeutic exercise, Therapeutic activity, Home exercise program, Postural re-education  PT Plan: Ongoing PT  PT Frequency: 3 times per week  PT Discharge Recommendations: Low intensity level of continued care  Equipment Recommended upon Discharge:  (Pt owns cane.)  PT Recommended Transfer Status: Stand by assist  PT - OK to Discharge: Yes (When medically ready)      Subjective     Vitals     02/21/25 1145 02/21/25 1214   Vital Signs   Vitals Session Pre PT Post PT   Heart Rate 94 107   Resp 14 19   SpO2 97 % 97 %   BP (!) 143/101 (!) 153/106   MAP (mmHg) 113 115   BP Method Automatic Automatic   Patient Position Lying Sitting     General Visit Information:  General  Reason for Referral: Pt p/w tumor progression. 2/20 s/p redo craniotomy for tumor resection.  Past Medical History Relevant to Rehab: HTN, HLD, smalls's esophagus, RO GBM s/p  resection ([facility], 12/2023) with resultant L Homonymous Hemianopsia s/p chemo/RT  Family/Caregiver Present: Yes  Caregiver Feedback: Pt's daughter and spouse present and supportive.  Prior to Session Communication: Bedside nurse  Patient Position Received: Bed, 3 rail up, Alarm off, not on at start of session  Home Living:  Home Living  Type of Home: House  Lives With: Spouse  Home Adaptive Equipment: Cane  Home Layout: Two level, 1/2 bath on main level, Stairs to alternate level with rails  Alternate Level Stairs-Number of Steps: 12  Home Access: Stairs to enter with rails  Entrance Stairs-Number of Steps: 2  Bathroom Shower/Tub: Walk-in shower  Bathroom Equipment: Grab bars in shower, Built-in shower seat  Prior Level of Function:  Prior Function Per Pt/Caregiver Report  Level of [place]: Independent with ADLs and functional transfers, Independent with homemaking with ambulation  ADL Assistance: Independent  Homemaking Assistance: Independent  Ambulatory Assistance: Independent  Vocational: Retired  Leisure: Volunteering  Hand Dominance: Right  Prior Function Comments: (-) drives, endorses 2 falls in past year due to loss of balance/weakness while he was ill with norovirus  Precautions:  Precautions  Hearing/Visual Limitations: Chronic L homonymous hemianopsia  Medical Precautions: Fall precautions  Precautions Comment: SBP <160           Objective   Pain:  Pain Assessment  Pain Assessment: 0-10  0-10 (Numeric) Pain Score: 0 - No pain  Cognition:  Cognition  Overall Cognitive Status: Within Functional Limits  Orientation Level: Oriented X4  Insight: Within function limits  Impulsive: Within functional limits  Processing Speed: Within funtional limits    General Assessments:  General Observation  General Observation: Strength 5/5 BLE               Activity Tolerance  Endurance: Endurance does not limit participation in activity  Early Mobility/Exercise Safety Screen: Proceed with mobilization - No exclusion  criteria met    Sensation  Light Touch: No apparent deficits            Perception  Inattention/Neglect:  (Mild inattention to L due to chronic homonymous hemianopsia)      Coordination  Movements are Fluid and Coordinated: No  Finger to Nose: Dysmetria  Rapid Alternating Movements: Intact  Alternating Toe Taps: Intact  Heel to Miguel: Intact  Finger to Target: Dysmetria    Postural Control  Postural Control: Within Functional Limits  Head Control: WFL  Trunk Control: WFL  Righting Reactions: WFL  Protective Responses: WFL  Posture Comment: WFL    Static Sitting Balance  Static Sitting-Balance Support: Bilateral upper extremity supported, Feet supported  Static Sitting-Level of Assistance: Distant supervision  Dynamic Sitting Balance  Dynamic Sitting-Balance Support: Bilateral upper extremity supported, Feet supported  Dynamic Sitting-Level of Assistance: Distant supervision  Dynamic Sitting-Balance: Forward lean    Static Standing Balance  Static Standing-Balance Support: Bilateral upper extremity supported  Static Standing-Level of Assistance: Close supervision  Dynamic Standing Balance  Dynamic Standing-Balance Support: Bilateral upper extremity supported  Dynamic Standing-Level of Assistance: Contact guard  Dynamic Standing-Balance: Turning  Functional Assessments:  Bed Mobility  Bed Mobility: Yes  Bed Mobility 1  Bed Mobility 1: Supine to sitting  Level of Assistance 1: Modified independent  Bed Mobility Comments 1: HOB elevated.    Transfers  Transfer: Yes  Transfer 1  Technique 1: Sit to stand, Stand to sit  Transfer Device 1: Gait belt  Transfer Level of Assistance 1: Close supervision  Trials/Comments 1: 3x from low surfaces  Extremity/Trunk Assessments:  RUE   RUE : Within Functional Limits  LUE   LUE: Within Functional Limits  RLE   RLE : Within Functional Limits  LLE   LLE : Within Functional Limits  Treatments:  Ambulation/Gait Training  Ambulation/Gait Training Performed: Yes  Ambulation/Gait Training  1  Surface 1: Level tile  Device 1: No device  Gait Support Devices: Gait belt  Assistance 1:  (CGA progressing to close sup.)  Quality of Gait 1: Narrow base of support, Diminished heel strike, Decreased step length (1 occurrence of posterior sway when crossing doorway, resulting in stepping strategy to correct. Cues for increased B step length. Practiced turning head L/R/up/down, changing speeds, and turning 180 deg. Pt able to perform all actions without LOB.)  Comments/Distance (ft) 1: 200 ft  Outcome Measures:  [facility] Basic Mobility  Turning from your back to your side while in a flat bed without using bedrails: None  Moving from lying on your back to sitting on the side of a flat bed without using bedrails: A little  Moving to and from bed to chair (including a wheelchair): A little  Standing up from a chair using your arms (e.g. wheelchair or bedside chair): A little  To walk in hospital room: A little  Climbing 3-5 steps with railing: A little  Basic Mobility - Total Score: 19    FSS-ICU  Ambulation: Walks >/ or equal to 150 feet with supervision  Rolling: Complete independence  Sitting: Supervision or set-up only  Transfer Sit-to-Stand: Supervision or set-up only  Transfer Supine-to-Sit: Modified independence, requires use of assistive device  Total Score: 28      Early Mobility/Exercise Safety Screen: Proceed with mobilization - No exclusion criteria met  ICU Mobility Scale: Walking with assistance of 1 person [8]  E = Exercise and Early Mobility  Early Mobility/Exercise Safety Screen: Proceed with mobilization - No exclusion criteria met  ICU Mobility Scale: Walking with assistance of 1 person  Other Measures  Other Outcome Measures: Modified CTSIB: 30 sec standing firm surface eyes open, 30 sec standing firm surface eyes closed, 30 sec standing foam surface eyes open, 30 sec standing foam surface eyes closed. No overt loss of balance, but pt with significant multidirectional sway in last condition,  indicating pt does not use vestibular system cues well.    Encounter Problems       Encounter Problems (Active)       PT Problem       Patient will score >/= 22/30 points on the Functional Gait Assessment to indicate decreased risk of falling. (MDC: 5 points stroke, 6 points vestibular, 4 points Parkinson's Disease)        Start:  02/21/25    Expected End:  03/07/25            Patient will ambulate at least 250  ft. MOD-I with LRD to improve tolerance of community distances.           Start:  02/21/25    Expected End:  03/07/25            Patient will ascend and descend >/= 12 steps MOD-I with railing  to facilitate safe navigation of stairs in the home.           Start:  02/21/25    Expected End:  03/07/25            Patient will perform sit to stand and stand to sit transfers MOD-I with LRD to increase functional strength.         Start:  02/21/25    Expected End:  03/07/25            Patient will perform introductory home exercise program as prescribed without cues.         Start:  02/21/25    Expected End:  03/07/25                   Education Documentation  Body Mechanics, taught by Shira Coates PT at 2/21/2025  4:18 PM.  Learner: Family, Patient  Readiness: Acceptance  Method: Explanation  Response: Verbalizes Understanding  Comment: PT d/c recs, mobility safety, importance of staying active to maintain balance and stength    Precautions, taught by Shira Coates PT at 2/21/2025  4:18 PM.  Learner: Family, Patient  Readiness: Acceptance  Method: Explanation  Response: Verbalizes Understanding  Comment: PT d/c recs, mobility safety, importance of staying active to maintain balance and stength    Mobility Training, taught by Shira Coates PT at 2/21/2025  4:18 PM.  Learner: Family, Patient  Readiness: Acceptance  Method: Explanation  Response: Verbalizes Understanding  Comment: PT d/c recs, mobility safety, importance of staying active to maintain balance and stength    Education Comments  No  comments found.      02/21/25  at 4:20 PM   Shira Coates, PT   Rehab Office: [phone]

## 2025-02-21 NOTE — CARE PLAN
The patient's goals for the shift include pain management     The clinical goals for the shift include rest      Over the shift, the patient did not make progress toward the following goals. Barriers to progression include sleep disturbances. Recommendations to address these barriers include minimize sleep disturbances.

## 2025-02-21 NOTE — PROGRESS NOTES
"Enmanuel Ahumada \"Petey" is a 69 y.o. male on day 1 of admission presenting with GBM (glioblastoma multiforme) (Multi).    Subjective   No acute events       Objective     Physical Exam  A&Ox3  Face symmetric  L HH  RUE 5/5  LUE 4+/5  RLE 5/5  LLE 5/5  Sensation intact to light touch throughout all extremities      Last Recorded Vitals  Blood pressure 151/81, pulse 82, temperature 37 °C (98.6 °F), temperature source Temporal, resp. rate 15, height 1.702 m (5' 7\"), weight 74.5 kg (164 lb 3.9 oz), SpO2 94%.  Intake/Output last 3 Shifts:  I/O last 3 completed shifts:  In: 1390 (18.7 mL/kg) [P.O.:1190; I.V.:200 (2.7 mL/kg)]  Out: 2890 (38.8 mL/kg) [Urine:2790 (1 mL/kg/hr); Blood:100]  Weight: 74.5 kg     Relevant Results      Assessment/Plan   Assessment & Plan  GBM (glioblastoma multiforme) (Multi)    Dash Ahumada is a 70 y/o M with a history of HTN, HLD, smalls's esophagus, RO GBM s/p resection ([facility], 12/2023) with resultant L HH s/p chemo/RT presenting with tumor progression    2/20 s/p redo craniotomy for tumor resection w 5 ALA, CTH POC    PLAN  Downgrade to floor  Continue headwrap  O2 cycling for pneumocephalus  MRI today  SBP <160  PTOT  SCDs    Guillermina Camarena MD      "

## 2025-02-21 NOTE — PROGRESS NOTES
"Occupational Therapy    Evaluation    Patient Name: Enmanuel Ahumada \"Dash\"  MRN: [id number]  Today's Date: 2/21/2025  Room: 16/16A  Time Calculation  Start Time: 1404  Stop Time: 1426  Time Calculation (min): 22 min    Assessment  IP OT Assessment  Prognosis: Good  Barriers to Discharge Home: No anticipated barriers  Evaluation/Treatment Tolerance: Patient tolerated treatment well  Medical Staff Made Aware: Yes  End of Session Communication: Bedside nurse  End of Session Patient Position: Bed, 3 rail up, Alarm off, not on at start of session  Plan:  Inpatient Plan  Treatment Interventions: ADL retraining, Functional transfer training, Neuromuscular reeducation, Fine motor coordination activities, Compensatory technique education  OT Frequency: 2 times per week  OT Discharge Recommendations: Low intensity level of continued care  Equipment Recommended upon Discharge:  (n/a)  OT Recommended Transfer Status: Stand by assist  OT - OK to Discharge: Yes  OT Assessment  OT Assessment Results: Decreased ADL status, Decreased fine motor control, Decreased functional mobility, Decreased gross motor control, Decreased IADLs  Prognosis: Good  Barriers to Discharge: None  Evaluation/Treatment Tolerance: Patient tolerated treatment well  Medical Staff Made Aware: Yes  Strengths: Attitude of self, Support of extended family/friends, Support and attitude of living partners    Subjective   Current Problem:  1. GBM (glioblastoma multiforme) (Multi)  Surgical Pathology Exam    Surgical Pathology Exam        General:  Reason for Referral: Pt p/w tumor progression. 2/20 s/p redo craniotomy for tumor resection.  Past Medical History Relevant to Rehab: HTN, HLD, smalls's esophagus, RO GBM s/p resection ([facility], 12/2023) with resultant L Homonymous Hemianopsia s/p chemo/RT  Prior to Session Communication: Bedside nurse  Patient Position Received: Bed, 3 rail up, Alarm off, not on at start of session  Family/Caregiver Present: " Yes  Caregiver Feedback: Pt's spouse present and supportive.  General Comment: Pt pleasant and agreeable to therapy, toelrated session well.   Precautions:  Hearing/Visual Limitations: Chronic L homonymous hemianopsia, hearing WFL  Medical Precautions: Fall precautions  Precautions Comment: SBP <160  Vital Signs:    Vital Signs     Vitals Session Pre OT During OT Post OT   Heart Rate 107 100s 93   Resp  20  12   SpO2 96 95 95   /87 151/99 151/99    115 115   ICP            Pain:  Pain Assessment  Pain Assessment: 0-10  0-10 (Numeric) Pain Score: 3  Pain Type: Acute pain, Surgical pain  Pain Location: Head  Lines/Tubes/Drains:         Objective   Cognition:  Overall Cognitive Status: Within Functional Limits  Orientation Level: Oriented X4  Insight: Within function limits  Impulsive: Within functional limits  Processing Speed: Within funtional limits  Cognition Test Scores  Cognition Tests: Cognition Test Performed  SBT Test Score: Pt scored 4/28 on SBT, demonstrating normal cognition. Made 2 errors with delayed recall. Normal =0-4.     Kate Agitation Sedation Scale  Kate Agitation Sedation Scale (RASS): Alert and calm  Home Living:  Type of Home: House  Lives With: Spouse  Home Adaptive Equipment: Cane  Home Layout: Two level, 1/2 bath on main level, Stairs to alternate level with rails  Alternate Level Stairs-Number of Steps: 12  Home Access: Stairs to enter with rails  Entrance Stairs-Number of Steps: 2  Bathroom Shower/Tub: Walk-in shower  Bathroom Toilet: Standard  Bathroom Equipment: Grab bars in shower, Built-in shower seat   Prior Function:  Level of [place]: Independent with ADLs and functional transfers, Independent with homemaking with ambulation  ADL Assistance: Independent  Homemaking Assistance: Independent  Ambulatory Assistance: Independent  Vocational: Retired  Leisure: Volunteering  Hand Dominance: Right  Prior Function Comments: (-) drives, endorses 2 falls in past year  due to loss of balance/weakness while he was ill with norovirus  IADL History:     ADL:  Eating Assistance: Modified independent (Device)  Grooming Assistance: Modified independent (Device)     During brushing teeth in standing at sink, pt with decreased awareness of items in L hand and dropping/maintaining grasp on items without knowing during task.     Bathing Assistance:  (CGA)  UE Dressing Assistance: Stand by  LE Dressing Assistance:  (CGA)  Toileting Assistance with Device:  (CGA)  Activity Tolerance:  Endurance: Endurance does not limit participation in activity  Early Mobility/Exercise Safety Screen: Proceed with mobilization - No exclusion criteria met  Balance:  Dynamic Sitting Balance  Dynamic Sitting-Level of Assistance: Independent  Dynamic Standing Balance  Dynamic Standing-Level of Assistance: Close supervision, Contact guard  Static Sitting Balance  Static Sitting-Level of Assistance: Independent  Static Standing Balance  Static Standing-Level of Assistance: Close supervision  Bed Mobility/Transfers: Bed Mobility  Bed Mobility: Yes  Bed Mobility 1  Bed Mobility 1: Supine to sitting, Sitting to supine  Level of Assistance 1: Modified independent  Bed Mobility Comments 1: HOB flat  Functional Mobility  Functional Mobility Performed: Yes  Functional Mobility 1  Comments 1: completed functional mobility with SBA-CGA without LOB, no AD.   and Transfers  Transfer: Yes  Transfer 1  Technique 1: Sit to stand, Stand to sit  Transfer Level of Assistance 1: Close supervision  Trials/Comments 1: completed 3 trials  IADL's:      Vision:     and Vision - Complex Assessment  Visual Screen Results: Left homonymous hemianopsia (chronic)  Sensation:  Light Touch:  (slight impairment in L hand only, otherwise WFL)  Proprioception:  (slight impairment in L hand, otherwise WFL)  Strength:     Perception:  Inattention/Neglect:  (Mild inattention to L due to chronic homonymous hemianopsia)  Coordination:  Movements are  Fluid and Coordinated: No  Finger to Nose:  (L UE dysmetria, R UE WFL)  Rapid Alternating Movements: Intact  Alternating Toe Taps: Intact  Heel to Miguel: Intact  Finger to Target:  (L UE dysmetria, R UE WFL)   Hand Function:  Hand Function  Gross Grasp: Functional  Coordination: Functional  Extremities: RUE   RUE : Within Functional Limits  RUE AROM (degrees)  RUE AROM Comment: WFL  RUE Strength  R Shoulder Flexion: 5/5  R Shoulder Extension: 5/5  R Shoulder ABduction: 5/5  R Elbow Flexion: 5/5  R Elbow Extension: 5/5  R Forearm Pronation: 5/5  R Forearm Supination: 5/5  R Wrist Flexion: 5/5  R Wrist Extension: 5/5, LUE   LUE: Within Functional Limits  LUE AROM (degrees)  LUE AROM Comment: WFL  LUE Strength  L Shoulder Flexion: 4/5  L Shoulder Extension: 5/5  L Shoulder ABduction: 4/5  L Elbow Flexion: 5/5  L Elbow Extension: 5/5  L Forearm Pronation: 5/5  L Forearm Supination: 5/5  L Wrist Flexion: 4/5  L Wrist Extension: 4/5, RLE   RLE : Within Functional Limits (strength 5/5), and LLE   LLE : Within Functional Limits (strength 5/5)    Outcome Measures: [facility] Daily Activity  Putting on and taking off regular lower body clothing: A little  Bathing (including washing, rinsing, drying): A little  Putting on and taking off regular upper body clothing: A little  Toileting, which includes using toilet, bedpan or urinal: A little  Taking care of personal grooming such as brushing teeth: None  Eating Meals: None  Daily Activity - Total Score: 20    Confusion Assessment Method-ICU (CAM-ICU)  Feature 3: Altered Level of Consciousness: Negative   ICU Mobility Screen  Early Mobility/Exercise Safety Screen: Proceed with mobilization - No exclusion criteria met  ICU Mobility Scale: Walking with assistance of 1 person,   Kate Agitation Sedation Scale  Kate Agitation Sedation Scale (RASS): Alert and calm      Education Documentation  Body Mechanics, taught by Simona Aparicio OT at 2/21/2025  6:09 PM.  Learner:  Patient  Readiness: Acceptance  Method: Demonstration, Explanation  Response: Verbalizes Understanding  Comment: OT POC    Precautions, taught by Simona Aparicio OT at 2/21/2025  6:09 PM.  Learner: Patient  Readiness: Acceptance  Method: Demonstration, Explanation  Response: Verbalizes Understanding  Comment: OT POC    ADL Training, taught by Simona Aparicio OT at 2/21/2025  6:09 PM.  Learner: Patient  Readiness: Acceptance  Method: Demonstration, Explanation  Response: Verbalizes Understanding  Comment: OT POC    Education Comments  No comments found.        Goals:     Encounter Problems       Encounter Problems (Active)       ADLs       Patient will perform UB and LB bathing with Mod I.       Start:  02/21/25    Expected End:  03/07/25            Patient with complete lower body dressing with Mod I.       Start:  02/21/25    Expected End:  03/07/25            Patient will complete toileting including hygiene clothing management/hygiene with Mod I.       Start:  02/21/25    Expected End:  03/07/25               ADLs       Pt will complete simulated IADL tasks IND, demonstrating appropriate cognitive functioning to attend to tasks, problem solve through difficulties, and demonstrate appropriate safety awareness and memory without cues.        Start:  02/21/25    Expected End:  03/07/25               EXERCISE/STRENGTHENING       Pt will improve L UE FMC/GMC to WNL to complete ADLs independently using bilateral integration without increase in time.         Start:  02/21/25    Expected End:  03/07/25               MOBILITY       Pt will perform functional mobility household distances with Mod I using LRAD without LOB and demonstrating good safety awareness.        Start:  02/21/25    Expected End:  03/07/25               TRANSFERS       Pt will perform functional transfers on various surfaces with Mod I using LRAD with good safety awareness.        Start:  02/21/25    Expected End:  03/07/25               VISION        Patient will visually attend to Left  side of Body and environment using compensatory strategies and  modified independent level of assistance.        Start:  02/21/25    Expected End:  03/07/25            Patient will scan a visually distracting environment to locate materials needed for an ADL Task with  modified independent level of assistance or less for locating items on Left.       Start:  02/21/25    Expected End:  03/07/25 02/21/25 at 6:10 PM   Simona Aparicio, OT   Rehab Office: [phone]

## 2025-02-22 VITALS
RESPIRATION RATE: 13 BRPM | HEART RATE: 77 BPM | DIASTOLIC BLOOD PRESSURE: 93 MMHG | TEMPERATURE: 98.6 F | WEIGHT: 162.26 LBS | SYSTOLIC BLOOD PRESSURE: 136 MMHG | HEIGHT: 67 IN | OXYGEN SATURATION: 91 % | BODY MASS INDEX: 25.47 KG/M2

## 2025-02-22 LAB — GLUCOSE BLD MANUAL STRIP-MCNC: 138 MG/DL (ref 74–99)

## 2025-02-22 PROCEDURE — 82947 ASSAY GLUCOSE BLOOD QUANT: CPT

## 2025-02-22 PROCEDURE — 2500000004 HC RX 250 GENERAL PHARMACY W/ HCPCS (ALT 636 FOR OP/ED): Performed by: STUDENT IN AN ORGANIZED HEALTH CARE EDUCATION/TRAINING PROGRAM

## 2025-02-22 PROCEDURE — 2500000001 HC RX 250 WO HCPCS SELF ADMINISTERED DRUGS (ALT 637 FOR MEDICARE OP): Performed by: STUDENT IN AN ORGANIZED HEALTH CARE EDUCATION/TRAINING PROGRAM

## 2025-02-22 RX ORDER — LEVETIRACETAM 500 MG/1
500 TABLET ORAL 2 TIMES DAILY
Qty: 60 TABLET | Refills: 1 | Status: ON HOLD | OUTPATIENT
Start: 2025-02-22 | End: 2025-04-23

## 2025-02-22 RX ORDER — ACETAMINOPHEN 325 MG/1
650 TABLET ORAL EVERY 6 HOURS PRN
Qty: 56 TABLET | Refills: 0 | Status: SHIPPED | OUTPATIENT
Start: 2025-02-22 | End: 2025-03-01

## 2025-02-22 RX ORDER — DEXAMETHASONE 1 MG/1
TABLET ORAL
Qty: 318 TABLET | Refills: 0 | Status: ON HOLD | OUTPATIENT
Start: 2025-02-22 | End: 2025-05-02

## 2025-02-22 RX ORDER — OXYCODONE HYDROCHLORIDE 5 MG/1
5 TABLET ORAL EVERY 6 HOURS PRN
Qty: 28 TABLET | Refills: 0 | Status: SHIPPED | OUTPATIENT
Start: 2025-02-22 | End: 2025-03-01

## 2025-02-22 RX ORDER — PANTOPRAZOLE SODIUM 40 MG/1
TABLET, DELAYED RELEASE ORAL
Qty: 90 TABLET | Refills: 3 | Status: ON HOLD | OUTPATIENT
Start: 2025-02-22

## 2025-02-22 RX ORDER — SENNOSIDES 8.6 MG/1
2 TABLET ORAL 2 TIMES DAILY
Qty: 28 TABLET | Refills: 0 | Status: SHIPPED | OUTPATIENT
Start: 2025-02-22 | End: 2025-03-01

## 2025-02-22 RX ADMIN — LEVETIRACETAM 500 MG: 500 TABLET, FILM COATED ORAL at 09:04

## 2025-02-22 RX ADMIN — DEXAMETHASONE 4 MG: 4 TABLET ORAL at 05:10

## 2025-02-22 RX ADMIN — PANTOPRAZOLE SODIUM 40 MG: 40 TABLET, DELAYED RELEASE ORAL at 06:11

## 2025-02-22 RX ADMIN — ACETAMINOPHEN 650 MG: 325 TABLET ORAL at 05:10

## 2025-02-22 RX ADMIN — DEXAMETHASONE 4 MG: 4 TABLET ORAL at 00:18

## 2025-02-22 RX ADMIN — ACETAMINOPHEN 650 MG: 325 TABLET ORAL at 00:18

## 2025-02-22 RX ADMIN — ATORVASTATIN CALCIUM 40 MG: 40 TABLET, FILM COATED ORAL at 09:04

## 2025-02-22 ASSESSMENT — PAIN SCALES - GENERAL
PAINLEVEL_OUTOF10: 0 - NO PAIN
PAINLEVEL_OUTOF10: 2
PAINLEVEL_OUTOF10: 3

## 2025-02-22 ASSESSMENT — PAIN - FUNCTIONAL ASSESSMENT
PAIN_FUNCTIONAL_ASSESSMENT: 0-10

## 2025-02-22 NOTE — DISCHARGE SUMMARY
Discharge Diagnosis  GBM (glioblastoma multiforme) (Multi)    Issues Requiring Follow-Up  wound    Test Results Pending At Discharge  Pending Labs       Order Current Status    Surgical Pathology Exam In process            Hospital Course  Enmanuel Ahumada is a 69 y.o. male with a past medical history of  HTN, HLD, smalls's esophagus, RO GBM s/p resection ([facility], 12/2023) with resultant L HH s/p chemo/RT presenting with tumor progression. He was then admitted to Neurosurgery for further management.   2/20 s/p redo craniotomy for tumor resection w 5 ALA, CTH POC, pneumocephalus  2/21 MRI brain GTR        PT/OT evaluated patient and recommended outpatient     On the day of discharge, the patient was seen and evaluated by the neurosurgery team and deemed suitable for discharge. The patient was given detailed discharge instructions and were scheduled to follow up as an outpatient.      Pertinent Physical Exam At Time of Discharge  Physical Exam  A&Ox3  Face symmetric  L HH  RUE 5/5  LUE 4+/5  RLE 5/5  LLE 5/5  Sensation intact to light touch throughout all extremities  Home Medications     Medication List      START taking these medications     oxyCODONE 5 mg immediate release tablet; Commonly known as: Roxicodone;   Take 1 tablet (5 mg) by mouth every 6 hours if needed for severe pain (7 -   10) for up to 7 days.     CHANGE how you take these medications     acetaminophen 325 mg tablet; Commonly known as: Tylenol; Take 2 tablets   (650 mg) by mouth every 6 hours if needed for mild pain (1 - 3) or   moderate pain (4 - 6) (for mild post surgical pain) for up to 7 days.;   What changed: reasons to take this   dexAMETHasone 1 mg tablet; Commonly known as: Decadron; Take 4 tablets   (4 mg) by mouth every 8 hours for 3 days, THEN 4 tablets (4 mg) every 12   hours for 3 days, THEN 2 tablets (2 mg) every 8 hours for 3 days, THEN 2   tablets (2 mg) every 12 hours. Stay on 2mg every 12 hours until follow up   with   Diego.; Start taking on: February 22, 2025; What changed: See   the new instructions., Another medication with the same name was removed.   Continue taking this medication, and follow the directions you see here.   sennosides 8.6 mg tablet; Commonly known as: Senokot; Take 2 tablets   (17.2 mg) by mouth 2 times a day for 7 days.; What changed: how much to   take, when to take this, reasons to take this     CONTINUE taking these medications     atorvastatin 40 mg tablet; Commonly known as: Lipitor; TAKE 1 TABLET BY   MOUTH EVERY DAY   CALCIUM CARBONATE-VITAMIN D3 ORAL   clobetasol 0.05 % cream; Commonly known as: Temovate; Apply topically 2   times a day.   COENZYME Q10 (BULK) MISC   krill-omega-3-dha-epa-lipids 246-11-30-50 mg capsule   levETIRAcetam 500 mg tablet; Commonly known as: Keppra; Take 1 tablet   (500 mg) by mouth 2 times a day.   ondansetron 8 mg tablet; Commonly known as: Zofran; Take 1 tablet (8 mg)   by mouth every 8 hours if needed for nausea or vomiting. Take one hour   prior to each chemotherapy dose in addition to every 8 hours as needed for   nausea/vomiting   pantoprazole 40 mg EC tablet; Commonly known as: ProtoNix; TAKE 1 TABLET   BY MOUTH IN THE MORNING 30 MINUTES BEFORE BREAKFAST.   prochlorperazine 10 mg tablet; Commonly known as: Compazine; Take 1   tablet (10 mg) by mouth every 6 hours if needed for nausea or vomiting.     ASK your doctor about these medications     silver sulfADIAZINE 1 % cream; Commonly known as: Silvadene; Apply   topically once daily.       Outpatient Follow-Up  Future Appointments   Date Time Provider Department Center   3/7/2025 10:00 AM Nataliya Cortez MD [id number] Academic   4/14/2025  1:30 PM Evgeny Palomino MD, MS [id number] Academic   5/12/2025 10:15 AM Nataliya Cortez MD [id number] Academic   8/8/2025  9:40 AM Ricardo Youngblood DO [id number] Samuel Castro MD

## 2025-02-22 NOTE — PROGRESS NOTES
"Enmanuel Ahumada \"Petey" is a 69 y.o. male on day 2 of admission presenting with GBM (glioblastoma multiforme) (Multi).    Subjective   No acute events       Objective     Physical Exam  A&Ox3  Face symmetric  L HH  RUE 5/5  LUE 4+/5  RLE 5/5  LLE 5/5  Sensation intact to light touch throughout all extremities      Last Recorded Vitals  Blood pressure 139/87, pulse 77, temperature 37 °C (98.6 °F), temperature source Temporal, resp. rate 12, height 1.702 m (5' 7\"), weight 73.6 kg (162 lb 4.1 oz), SpO2 94%.  Intake/Output last 3 Shifts:  I/O last 3 completed shifts:  In: 1390 (18.7 mL/kg) [P.O.:1190; I.V.:200 (2.7 mL/kg)]  Out: 3440 (46.2 mL/kg) [Urine:3340 (1.2 mL/kg/hr); Blood:100]  Weight: 74.5 kg     Relevant Results      Assessment/Plan   Assessment & Plan  GBM (glioblastoma multiforme) (Multi)    Dash Ahumada is a 68 y/o M with a history of HTN, HLD, smalls's esophagus, RO GBM s/p resection ([facility], 12/2023) with resultant L HH s/p chemo/RT presenting with tumor progression    2/20 s/p redo craniotomy for tumor resection w 5 ALA, CTH POC  2/21 MRI intended NTR    PLAN  floor  SBP <160  PTOT - OP  SCDs    Valeriano Castro MD      "

## 2025-02-24 LAB
LABORATORY COMMENT REPORT: NORMAL
Lab: NORMAL
PATH REPORT.FINAL DX SPEC: NORMAL
PATH REPORT.GROSS SPEC: NORMAL
PATH REPORT.RELEVANT HX SPEC: NORMAL
PATH REPORT.TOTAL CANCER: NORMAL

## 2025-02-26 ENCOUNTER — APPOINTMENT (OUTPATIENT)
Dept: HEMATOLOGY/ONCOLOGY | Facility: HOSPITAL | Age: 70
End: 2025-02-26
Payer: MEDICARE

## 2025-02-26 NOTE — TUMOR BOARD NOTE
CNS Tumor Board Recommendations       Patient was presented by Dr. Nataliya Cortez at our CNS Tumor Board on 02/26/2025 which included representatives from Radiation oncology, Surgical oncology, Neuro-oncology, Pathology, Radiology, Research, Neurosurgery, Social Work (Neurosurgery).     Current patient presents with history of the following treatment history: PMH HLD, Osborn's esophagus, s/p cholecystectomy, who presented with gait instability and headaches back in December 2023. Workup was significant for CTH with RO hypodensity. MRI PPF and fMRI with language dominant lesion. He is now s/p right craniotomy for tumor resection with 5ALA with Dr. Carrasco on 12/29/23. Postoperative MRI for GTR. Final surgical pathology for GBM IDH wildytpe, MGMT methylated. Caris testing was completed for EGFR amplification, TERT mutations, and loss of CDKN2A/B. Treatment course significant for chemoRT on adjuvant temodar c/b thrombocytopenia. Optune electrical array device was placed. Remains on adjuvant temodar now s/p cycle 8. Mri with progression.       Now s/p re-do craniotomy for tumor progression on 02/20/25. Post op MRI with small enhancement which could be postoperative in nature vs residual. Pathology consistent with recurrent glioblastoma. 70% viable tumor with necrosis.     The CNS Tumor Board tumor board considered available treatment options and made the following recommendations: Treatment per GCAR tiral.     Clinical Trial Status: GCAR Trial      National site-specific guidelines were discussed with respect to the case.

## 2025-03-03 ENCOUNTER — TELEMEDICINE (OUTPATIENT)
Dept: HEMATOLOGY/ONCOLOGY | Facility: HOSPITAL | Age: 70
End: 2025-03-03
Payer: MEDICARE

## 2025-03-03 DIAGNOSIS — C71.9 GBM (GLIOBLASTOMA MULTIFORME) (MULTI): Primary | ICD-10-CM

## 2025-03-03 PROCEDURE — 99214 OFFICE O/P EST MOD 30 MIN: CPT | Performed by: PSYCHIATRY & NEUROLOGY

## 2025-03-03 PROCEDURE — 1157F ADVNC CARE PLAN IN RCRD: CPT | Performed by: PSYCHIATRY & NEUROLOGY

## 2025-03-03 PROCEDURE — 1123F ACP DISCUSS/DSCN MKR DOCD: CPT | Performed by: PSYCHIATRY & NEUROLOGY

## 2025-03-03 PROCEDURE — 1111F DSCHRG MED/CURRENT MED MERGE: CPT | Performed by: PSYCHIATRY & NEUROLOGY

## 2025-03-03 NOTE — PROGRESS NOTES
Patient ID: Dash Ahumada is a 69 y.o. male.  Referring Physician: No referring provider defined for this encounter.  Primary Care Provider: DO Eve Zuleta    Dash Ahumada is a 67 yo RH  male with a PMH significant for HLD, Osborn's esophagus, ASA for cardioprotection, who presented with headache x 6 weeks, confusion x 3 weeks, and gait instability. CTH with RO hypodensity. MRI PPF and fMRI with L language dominant lesion. TTE EF 60%. S/p R crani for tumor resection with 5ALA on 12/29/23. MRI with GTR. Prelim pathology concerning for glioblastoma - immunostains pending.      INTERVAL HISTORY (1/15/2024): Since getting discharged to home, he has been slowly recovering from the surgery. The final pathology is still pending, but it is likely GBM. He notes some persistent incisional pain around the staples but no regular headaches now (resolved). He has some minor confusion, mild loss of balance, difficulty using his computer and phone, and a loss of vision to the left. He remains on Keppra 1000 mg bid (although he claims he has never had a verified seizure), and is tapering off of Decadron. He is seeing Dr. Palomino from Rad Onc soon to begin RT planning. He is seeing Neurosurgery today to remove the staples.     INTERVAL HISTORY (3/25/2024): Since the last visit, he has continued the course of chemo-RT and is now s/p RT Fraction #27 of 30 -- finalize this week. He has fatigue from the treatment, but no GI upset, emesis, diarrhea, loss of appetite, or change in taste. He is off of the steroids now. The Caris results are in and demonstrate EGFR amplification, TERT mutations, and loss of CDKN2A/B. He is active at home with chores and daily walking. He remains on Keppra 500 mg bid.     INTERVAL HISTORY (9/16/2024): Since the last visit, he has completed the course of chemo-RT in late March, and then continued adjuvant Temodar -- now s/p cycle #4. He has tolerated the chemo quite well so  far, with persistent fatigue, but no GI upset, nausea, emesis, or diarrhea. However, he has developed significant thrombocytopenia -- 78k last week. He is also now on the Optune electrical array device, and is tolerating it well, without any scalp issues; > 18 hours per day (compliance 93%). He is doing well neurologically, with a few minor headaches, but no seizures, speech issues, visual changes, new focal weakness, gait difficulty, or sensory loss. He is on Decadron 2 mg/day. He remains active at home with chores and daily walks. He remains on Keppra 500 mg bid. KPS = 80.     INTERVAL HISTORY (12/05/2024): Since the last visit, he has completed the course of chemo-RT in late March, and then continued adjuvant Temodar -- now s/p cycle #6. He has tolerated the chemo quite well so far, with persistent fatigue, but no GI upset, nausea, emesis, or diarrhea. However, he has had some significant thrombocytopenia -- alisson 102k several weeks ago. He is also now on the Optune electrical array device, and is tolerating it well, without any scalp issues; > 18 hours per day (compliance > 90%). He is doing well neurologically, with a few minor headaches, but no seizures, speech issues, visual changes, new focal weakness, gait difficulty, or sensory loss. He is on Decadron 1 mg/day. He remains active at home with chores and daily walks. He remains on Keppra 500 mg bid. KPS = 80.     INTERVAL HISTORY (1/30/2025): Since the last visit, he has completed the course of chemo-RT in late March, and then continued adjuvant Temodar -- now s/p cycle #8. He has tolerated the chemo quite well so far, with persistent fatigue, but no GI upset, nausea, emesis, or diarrhea. However, he has had some significant thrombocytopenia -- alisson 102k several weeks ago. Unfortunately, the new brain MRI shows progression on T2/FLAIR, enhancement, and Perfusion. He was discussed at the Neuro-Oncology Tumor Board, with the recommendation to consider a new  surgery and a clinical trial. He is also now on the [employer] electrical array device, and is tolerating it well, without any scalp issues; > 18 hours per day (compliance > 90%). He is doing well neurologically, with a few minor headaches, but no seizures, speech issues, visual changes, new focal weakness, gait difficulty, or sensory loss. He is now off of Decadron, and had a flare-up of leg and general weakness for a few weeks; now doing better. He remains active at home with chores and daily walks. He remains on Keppra 500 mg bid. He is supposed to go on a [employer] cruise to [place] next week. KPS = 80.     INTERVAL HISTORY (3/03/2025): Since the last visit, Dash has had recent progression of the brain tumor on MRI. He has had a new surgical resection with Dr. Cortez on 2/20 and tolerated it well. He is considering going on the GCAR study for recurrent GBM, but is worried about the [id number] IV arm that would require multiple trips to [facility] each week -- he is too busy with family commitments to do it (grandchild). He would be fine with the options on the other 3 arms. He is doing well since surgery and denies any severe headaches, seizures, new focal weakness, gait difficulty, sensory loss, or falls. He is on Decadron 2 mg/day.     The HPI is as documented in the HPI.     Objective   BSA: There is no height or weight on file to calculate BSA.  There were no vitals taken for this visit.    No family history on file.  Oncology History Overview Note   68 year old left-handed male with h/o HLD, cholecystectomy, Osborn's esophagus, on ASA 81 for cardioprotection presented to [facility] ED on 12/24/23 with 6 weeks of headaches, 3 weeks of increased confusion and running into things. Left-sided hemianopia on exam. CT head revealed a 1 mm midline leftward shift with a 7.3 cm x 7.1 cm x 5 cm right parietal mass. Transferred to [facility] for neurosurgical management.     GBM (glioblastoma multiforme) (Multi)   12/29/2023 Surgery     "Right craniotomy for tumor resection (Ritu Carrasco MD)     12/29/2023 Pathology    Final diagnosis reported 1/18/24:  A, B.  Right brain mass, biopsy and removal:  - Malignant glioma, morphologically consistent with glioblastoma; positive for MGMT methylation     12/29/2023 Initial Diagnosis    GBM (glioblastoma multiforme) (CMS/HCC)     1/3/2024 Tumor Board    CNS Tumor Board tumor board recommendations: Referral to radiation oncology and neuro-oncology.       2/15/2024 - 3/21/2024 Chemotherapy    Temozolomide with Concurrent Radiation, 42 Day Cycles     2/15/2024 - 3/28/2024 Radiation Therapy    Radiation Treatments       Active   No active radiation treatments to show.     Completed   Brain Boost_R (Started on 3/20/2024)   Most recent fraction: 182 cGy given on 3/28/2024   Total given: 1,274 cGy / 1,273 cGy  (7 of 7 fractions)   Elapsed Days: 8   Technique: 3D        Partial brain R (Started on 2/15/2024)   Most recent fraction: 182 cGy given on 3/19/2024   Total given: 4,186 cGy / 4,182 cGy  (23 of 23 fractions)   Elapsed Days: 33   Technique: 3D                        5/1/2024 -  Chemotherapy    Temozolomide (5/28), 28 Day Cycles         Enmanuel Evans Zita \"Dash\"  reports that he has never smoked. He has never used smokeless tobacco.  He  reports that he does not currently use alcohol.  He  reports no history of drug use.    Physical Exam  Constitutional:       Appearance: Normal appearance.   Eyes:      General: Visual field deficit present.      Extraocular Movements: Extraocular movements intact.      Pupils: Pupils are equal, round, and reactive to light.   Musculoskeletal:      Cervical back: Normal range of motion.   Neurological:      Mental Status: He is alert and oriented to person, place, and time.      Sensory: Sensation is intact.      Motor: Motor function is intact.      Coordination: Coordination is intact.      Deep Tendon Reflexes: Babinski sign absent on the right side. Babinski " sign absent on the left side.      Reflex Scores:       Tricep reflexes are 2+ on the right side and 2+ on the left side.       Bicep reflexes are 2+ on the right side and 2+ on the left side.       Brachioradialis reflexes are 2+ on the right side and 2+ on the left side.       Patellar reflexes are 2+ on the right side and 2+ on the left side.       Achilles reflexes are 2+ on the right side and 2+ on the left side.     Comments: Minimal loss of STM and cognition, speech fluent without dysnomia, CN's II - XII intact except for partial left homonymous hemianopsia, strength 5/5 in UE/LE, mild gait imbalance, DTR's symmetric, sensation intact.    Psychiatric:         Mood and Affect: Mood normal.         Behavior: Behavior normal.     Performance Status:  Symptomatic; fully ambulatory      Lab Results   Component Value Date    WBC 8.2 02/21/2025    HGB 14.6 02/21/2025    HCT 41.8 02/21/2025    MCV 96 02/21/2025     (L) 02/21/2025       The recent brain MRI scans were reviewed with the patient and family:      === 02/20/25 ===    MR BRAIN W AND WO CONTRAST    - Impression -  1. Patient is status post debulking of a right parietal GBM. Today's  examination represents the 1st postoperative MRI since debulking of  the tumor on February 20, 2025.  2. There is a triangular area of enhancement involving the posterior,  mesial surgical cavity. This has potential to represent residual  tumor. Attention to this region on follow-up is recommended.  3. While tumor was noted to enter into the ventricular system and  attached of the choroid plexus, no intraventricular tumor is noted on  today's exam.  4. The remainder of the examination demonstrates expected  postoperative changes including residual edema, pneumocephalus,  fluid, and blood products.    MACRO:  None    Signed by: Curt Santiago 2/21/2025 11:49 AM  Dictation workstation:   [id number]      === 01/27/25 ===    MR BRAIN PRESURGICAL PERFUSION AND  FINGERPRINTING    - Impression -  Redemonstration of postoperative changes for right parieto-occipital  mass resection. There has been interval progression in irregular,  thick enhancement along the periphery of the surgical resection  cavity particularly along the anterior and deep margins. There is  abnormal elevation of corrected relative cerebral blood volume ratios  compared with contralateral normal white matter worrisome for disease  progression. Additionally there has been progression of nonenhancing  signal abnormality on FLAIR and T2 weighted imaging which could  reflect therapy changes or infiltrative neoplasm. There is increased  mass effect including leftward midline shift and partial effacement  of the basilar cisterns.    I personally reviewed the images/study and I agree with the findings  as stated.    MACRO:  Murtaza Ovalle discussed the significance and urgency of this  critical finding by epic secure chat with  MONCHO ACEVEDO on 1/28/2025  at 9:44 am.  (**-RCF-**) Findings:  See findings.    Signed by: Tamara Garcias 1/28/2025 10:31 AM  Dictation workstation:   [id number]      Assessment/Plan      -Dash has recovered well from the initial surgery, with mild residual symptoms.     -The pathology is a GBM; IDH wildtype, MGMT methylated.     -He has completed the course of chemo-RT in late March.     -Since then he had continued the adjuvant phase of treatment with Temodar -- now s/p cycle #8.    -The recent brain MRI is definitely progressive, with increased high signal on T2/FLAIR and enhancement, and positive Perfusion in several areas.     -His case was presented at the Neuro-Onc TB, with the recommendation to consider another surgical resection with Dr. Cortez.     -He has had the surgery on 2/20 and tolerated it well.     -He is considering the GCAR trial for recurrent GBM, and will likely enroll later this week and undergo randomization (hopefully not [id number], which will be very difficult with  family commitments).     -He is also still using the [employer] electrical array device, which he is tolerating well and using > 18 hours per day.     -The advanced molecular phenotyping from [facility] has been reported, and shows EGFR amplification, which could be targeted with an EGFR TKI (e.g., Osimertinib) at a later date.     -He will remain on Decadron -- 2 mg/day.     -We will have him return to this clinic in the near future once we finalize the plan with the clinical trial.     -I spent > 30 minutes in Televideo consultation to review and discuss the above; 50% of which or more was dedicated to counseling.       Kyle Rolon MD

## 2025-03-03 NOTE — PATIENT INSTRUCTIONS
Your next appointment will be determined by our clinical trials team.   You will be speaking with DANIEL Madsen on Friday after your appointment with Dr. Cortez.     Please call us with any questions or concerns at [phone] opt. 5, opt. 2  For scheduling concerns please call [phone] option 1

## 2025-03-07 ENCOUNTER — HOSPITAL ENCOUNTER (OUTPATIENT)
Dept: CARDIOLOGY | Facility: HOSPITAL | Age: 70
Discharge: HOME | End: 2025-03-07
Payer: MEDICARE

## 2025-03-07 ENCOUNTER — LAB (OUTPATIENT)
Dept: LAB | Facility: HOSPITAL | Age: 70
End: 2025-03-07
Payer: MEDICARE

## 2025-03-07 ENCOUNTER — OFFICE VISIT (OUTPATIENT)
Dept: NEUROSURGERY | Facility: HOSPITAL | Age: 70
End: 2025-03-07
Payer: MEDICARE

## 2025-03-07 ENCOUNTER — OFFICE VISIT (OUTPATIENT)
Dept: HEMATOLOGY/ONCOLOGY | Facility: HOSPITAL | Age: 70
End: 2025-03-07
Payer: MEDICARE

## 2025-03-07 ENCOUNTER — EDUCATION (OUTPATIENT)
Dept: HEMATOLOGY/ONCOLOGY | Facility: HOSPITAL | Age: 70
End: 2025-03-07

## 2025-03-07 VITALS
TEMPERATURE: 96.3 F | HEART RATE: 88 BPM | OXYGEN SATURATION: 100 % | BODY MASS INDEX: 24.54 KG/M2 | SYSTOLIC BLOOD PRESSURE: 111 MMHG | DIASTOLIC BLOOD PRESSURE: 67 MMHG | RESPIRATION RATE: 16 BRPM | WEIGHT: 156.7 LBS

## 2025-03-07 DIAGNOSIS — Z00.6 RESEARCH STUDY PATIENT: ICD-10-CM

## 2025-03-07 DIAGNOSIS — C71.9 GBM (GLIOBLASTOMA MULTIFORME) (MULTI): ICD-10-CM

## 2025-03-07 DIAGNOSIS — C71.9 GLIOBLASTOMA (MULTI): ICD-10-CM

## 2025-03-07 DIAGNOSIS — C71.9 GBM (GLIOBLASTOMA MULTIFORME) (MULTI): Primary | ICD-10-CM

## 2025-03-07 LAB
ALBUMIN SERPL BCP-MCNC: 4.5 G/DL (ref 3.4–5)
ALP SERPL-CCNC: 98 U/L (ref 33–136)
ALT SERPL W P-5'-P-CCNC: 30 U/L (ref 10–52)
AMYLASE SERPL-CCNC: 80 U/L (ref 29–103)
ANION GAP SERPL CALC-SCNC: 13 MMOL/L (ref 10–20)
APTT PPP: 22 SECONDS (ref 26–36)
AST SERPL W P-5'-P-CCNC: 12 U/L (ref 9–39)
BASOPHILS # BLD AUTO: 0.01 X10*3/UL (ref 0–0.1)
BASOPHILS NFR BLD AUTO: 0.1 %
BILIRUB SERPL-MCNC: 1.7 MG/DL (ref 0–1.2)
BUN SERPL-MCNC: 29 MG/DL (ref 6–23)
CALCIUM SERPL-MCNC: 9.7 MG/DL (ref 8.6–10.3)
CHLORIDE SERPL-SCNC: 98 MMOL/L (ref 98–107)
CO2 SERPL-SCNC: 32 MMOL/L (ref 21–32)
CREAT SERPL-MCNC: 0.85 MG/DL (ref 0.5–1.3)
EGFRCR SERPLBLD CKD-EPI 2021: >90 ML/MIN/1.73M*2
EOSINOPHIL # BLD AUTO: 0.02 X10*3/UL (ref 0–0.7)
EOSINOPHIL NFR BLD AUTO: 0.2 %
ERYTHROCYTE [DISTWIDTH] IN BLOOD BY AUTOMATED COUNT: 12.6 % (ref 11.5–14.5)
GLUCOSE SERPL-MCNC: 89 MG/DL (ref 74–99)
HCT VFR BLD AUTO: 49.1 % (ref 41–52)
HGB BLD-MCNC: 17.3 G/DL (ref 13.5–17.5)
HOLD SPECIMEN: NORMAL
IMM GRANULOCYTES # BLD AUTO: 0.05 X10*3/UL (ref 0–0.7)
IMM GRANULOCYTES NFR BLD AUTO: 0.5 % (ref 0–0.9)
INR PPP: 1 (ref 0.9–1.1)
LIPASE SERPL-CCNC: 45 U/L (ref 9–82)
LYMPHOCYTES # BLD AUTO: 2.64 X10*3/UL (ref 1.2–4.8)
LYMPHOCYTES NFR BLD AUTO: 24.5 %
MCH RBC QN AUTO: 33.3 PG (ref 26–34)
MCHC RBC AUTO-ENTMCNC: 35.2 G/DL (ref 32–36)
MCV RBC AUTO: 95 FL (ref 80–100)
MONOCYTES # BLD AUTO: 0.98 X10*3/UL (ref 0.1–1)
MONOCYTES NFR BLD AUTO: 9.1 %
NEUTROPHILS # BLD AUTO: 7.09 X10*3/UL (ref 1.2–7.7)
NEUTROPHILS NFR BLD AUTO: 65.6 %
NRBC BLD-RTO: 0 /100 WBCS (ref 0–0)
PLATELET # BLD AUTO: 136 X10*3/UL (ref 150–450)
POTASSIUM SERPL-SCNC: 4.9 MMOL/L (ref 3.5–5.3)
PROT SERPL-MCNC: 6.8 G/DL (ref 6.4–8.2)
PROTHROMBIN TIME: 11.4 SECONDS (ref 9.8–12.4)
RBC # BLD AUTO: 5.19 X10*6/UL (ref 4.5–5.9)
SODIUM SERPL-SCNC: 138 MMOL/L (ref 136–145)
WBC # BLD AUTO: 10.8 X10*3/UL (ref 4.4–11.3)

## 2025-03-07 PROCEDURE — 93005 ELECTROCARDIOGRAM TRACING: CPT

## 2025-03-07 PROCEDURE — 82150 ASSAY OF AMYLASE: CPT

## 2025-03-07 PROCEDURE — 85610 PROTHROMBIN TIME: CPT

## 2025-03-07 PROCEDURE — 36415 COLL VENOUS BLD VENIPUNCTURE: CPT

## 2025-03-07 PROCEDURE — 99215 OFFICE O/P EST HI 40 MIN: CPT | Performed by: PSYCHIATRY & NEUROLOGY

## 2025-03-07 PROCEDURE — 84075 ASSAY ALKALINE PHOSPHATASE: CPT

## 2025-03-07 PROCEDURE — 85025 COMPLETE CBC W/AUTO DIFF WBC: CPT

## 2025-03-07 PROCEDURE — 83690 ASSAY OF LIPASE: CPT

## 2025-03-07 PROCEDURE — 99211 OFF/OP EST MAY X REQ PHY/QHP: CPT | Performed by: NEUROLOGICAL SURGERY

## 2025-03-07 RX ORDER — CEPHALEXIN 500 MG/1
500 CAPSULE ORAL 4 TIMES DAILY
Qty: 20 CAPSULE | Refills: 0 | Status: SHIPPED | OUTPATIENT
Start: 2025-03-07 | End: 2025-03-12

## 2025-03-07 ASSESSMENT — PAIN SCALES - GENERAL
PAINLEVEL_OUTOF10: 0-NO PAIN
PAINLEVEL_OUTOF10: 0-NO PAIN

## 2025-03-07 NOTE — PROGRESS NOTES
"[facility]  Neurosurgery    Subjective     Enmanuel Ahumada is a {handedness:207303}  69 y.o. year old male presenting for {Reason for Visit:57639::\"neurologic evaluation\"}.     PMH HLD, Osborn's esophagus, s/p cholecystectomy, who presented with gait instability and headaches back in December 2023. Workup was significant for CTH with RO hypodensity. MRI PPF and fMRI with language dominant lesion. He is now s/p right craniotomy for tumor resection with 5ALA with Dr. Carrasco on 12/29/23. Postoperative MRI for GTR. Final surgical pathology for GBM IDH wildytpe, MGMT methylated. Caris testing was completed for EGFR amplification, TERT mutations, and loss of CDKN2A/B. Treatment course significant for chemoRT on adjuvant temodar c/b thrombocytopenia. Optune electrical array device was placed. Remains on adjuvant temodar now s/p cycle 8. Case was discussed at [facility] due to MRI brain with progression. He was recommended for surgical resection. Patient is now s/p redo craniotomy for tumor resection with 5 ALA on 02/20/25. CTH with postoperative changes and pneumocephalus. MRI with small enhancement that is consistent with postoperative changes vs residual but likely to be GTR. Pathology for recurrent glioblastoma with 70% viable tumor and necrosis. Patient presents for 2 week POV.     Review of Systems    Treatment Synopsis:   Brain Tumor: {Brain Tumor:94107}  Molecular: {Molecular:11696}  Location: {Location:58041}  Age at diagnosis: ***  Prior history of Radiation: {Prior history of Radiation:39924}    Objective     Performance and Vitals:  KARNOFSKY SCALE WITH ECOG EQUIVALENT:{DESC; KARNOFSKY SCALE WITH ECOG EQUIVALENT:81722904}    There were no vitals filed for this visit.      Neurological Exam  Physical Exam    Imaging:   {Imaging Results:48886}  {Recent labs:14173}      Procedure:  Procedures    Assessment & Plan     Assessment/Plan "   {Assess/PlanSmartLinks:97199}                    Medical History     Past Medical History:   Diagnosis Date    Brain cancer (Multi)     GERD (gastroesophageal reflux disease)     Seizure disorder (Multi)      Past Surgical History:   Procedure Laterality Date    BRAIN SURGERY      CHOLECYSTECTOMY  January 25, 2020    OTHER SURGICAL HISTORY  07/24/2019    Colonoscopy    OTHER SURGICAL HISTORY  07/26/2021    Endoscopy    VASECTOMY  Abt 1991     Social History     Tobacco Use    Smoking status: Never    Smokeless tobacco: Never   Substance Use Topics    Alcohol use: Not Currently    Drug use: Never     No family history on file.  No Known Allergies  Current Outpatient Medications   Medication Instructions    atorvastatin (LIPITOR) 40 mg, oral, Daily    CALCIUM CARBONATE-VITAMIN D3 ORAL 1 capsule, Daily    clobetasol (Temovate) 0.05 % cream Topical, 2 times daily    dexAMETHasone (Decadron) 1 mg tablet Take 4 tablets (4 mg) by mouth every 8 hours for 3 days, THEN 4 tablets (4 mg) every 12 hours for 3 days, THEN 2 tablets (2 mg) every 8 hours for 3 days, THEN 2 tablets (2 mg) every 12 hours. Stay on 2mg every 12 hours until follow up with Dr. Cortez.    lpfjf-sxmfc-2-dha-epa-lipids 263-64-58-50 mg capsule 1 capsule, Daily    levETIRAcetam (KEPPRA) 500 mg, oral, 2 times daily    ondansetron (ZOFRAN) 8 mg, oral, Every 8 hours PRN, Take one hour prior to each chemotherapy dose in addition to every 8 hours as needed for nausea/vomiting    pantoprazole (ProtoNix) 40 mg EC tablet TAKE 1 TABLET BY MOUTH IN THE MORNING 30 MINUTES BEFORE BREAKFAST.    prochlorperazine (COMPAZINE) 10 mg, oral, Every 6 hours PRN    silver sulfADIAZINE (Silvadene) 1 % cream Topical, Daily    ubidecarenone (COENZYME Q10, BULK, MISC) 1 Capful, Daily         pm    INDICATION:  Signs/Symptoms:postop craniotomy.      COMPARISON:  MRI brain 01/27/2025. CT head 01/23/2024.    ACCESSION NUMBER(S):  [id number]    ORDERING CLINICIAN:  NED CHAIREZ    TECHNIQUE:  Noncontrast axial CT scan of head was performed. Angled reformats in  brain and bone windows were generated. The images were reviewed in  bone, brain, blood and soft tissue windows.    FINDINGS:  Status post right-sided posterior craniotomy. Fluid and gas  collection deep to the craniotomy flap measures up to 84 x 36 mm  greatest axial dimensions. Along the deep periphery of this  collection is mixed gas and low-density material with presumed blood  products present.    Extent of confluent edema involving the superior and posterior right  cerebral hemisphere appears stable.    Right-to-left midline shift is currently 5 mm and was previously 8  mm. There is no evidence of new large evolving cortical infarction.  Ventricular size and configuration are not significantly changed.    Impression  Status post right posterior craniotomy with large fluid and gas  collection deep to the craniotomy flap.  Along the deep periphery of  this collection is mixed gas and low-density material with presumed  blood products present. See above for details.    MACRO:  None    Signed by: Robert Sanches 2/20/2025 3:24 PM  Dictation workstation:   [id number]    MR brain w and wo IV contrast 02/21/2025    Narrative  Interpreted By:  Curt Santiago,  STUDY:  MR BRAIN W AND WO IV CONTRAST;  2/21/2025 9:53 am    INDICATION:  Signs/Symptoms:postop craniotomy.  Patient is status post GBM  resection on February 20, 2025      COMPARISON:  CT of February 20, 2025 and MRI of January 27, 2020.    ACCESSION NUMBER(S):  [id number]    ORDERING CLINICIAN:  NED CHAIREZ    TECHNIQUE:  Standard multiplanar multisequence MR imaging was performed through  the brain prior to and following administration of 15 ML Dotarem  intravenous  contrast.    FINDINGS:  Postoperative changes are present from a craniotomy and tissue  resection. This includes fluid blood products and pneumocephalus, all  of which are to be expected. There is a rim of restricted diffusion  around the surgical cavity which is to be expected. Wispy enhancement  is noted near the surgical cavity and this is attributed to  postoperative change. Axial series 100, image 29 and series 10, image  15 demonstrates a triangular area of enhancement involving the mesial  surgical cavity. The base measures on the order of 8 mm. The tip to  base measures on the order of 9 mm. While tumor was noted to enter  into the ventricular system and attached of the choroid plexus, no  intraventricular tumor is noted on today's exam.    Again noted is extensive vasogenic edema and mass effect on the right  lateral ventricle and right-to-left shift on the order of 6 mm. There  is continued narrowing of the right ambient cistern.      Ventricles: There is no hydrocephalus.    Extra-axial spaces: No abnormal extra-axial fluid collection. Basal  cisterns are patent.    Vessels: T2 flow voids are maintained.    Orbits: The imaged orbits are unremarkable.    Paranasal sinuses and mastoid air cells: No fluid levels are present.  Mucoperiosteal thickening and mucous retention cyst formation are  present    Impression  1. Patient is status post debulking of a right parietal GBM. Today's  examination represents the 1st postoperative MRI since debulking of  the tumor on February 20, 2025.  2. There is a triangular area of enhancement involving the posterior,  mesial surgical cavity. This has potential to represent residual  tumor. Attention to this region on follow-up is recommended.  3. While tumor was noted to enter into the ventricular system and  attached of the choroid plexus, no intraventricular tumor is noted on  today's exam.  4. The remainder of the examination demonstrates expected  postoperative changes  including residual edema, pneumocephalus,  fluid, and blood products.    MACRO:  None    Signed by: Curt Santiago 2/21/2025 11:49 AM  Dictation workstation:   [id number]      MR brain w and wo IV contrast 09/09/2024    Narrative  Interpreted By:  Neil Boyd,  STUDY:  MR BRAIN W AND WO IV CONTRAST; ;  9/9/2024 11:08 am    INDICATION:  Signs/Symptoms:hx gbm, on temozolomide, treatment response.    ,C71.9 Malignant neoplasm of brain, unspecified (Multi)    COMPARISON:  MRI brain from 06/24/2024.    ACCESSION NUMBER(S):  [id number]    ORDERING CLINICIAN:  MONCHO ACEVEDO    TECHNIQUE:  MRI of the brain was performed with the acquisition of axial  diffusion-weighted, axial FLAIR, axial T2 gradient echo, axial T1,  axial T1 fat saturated postcontrast sequence, and volumetric axial T1  post contrast sequence with multiplanar reformats.    Contrast: 14 mL of Dotarem was injected intravenously.    FINDINGS:  There are postoperative changes from right posterior craniotomy for  resection of a mass in the right parietal lobe. There is a 1.4 cm in  maximum thickness extra-axial fluid collection along the right  parietal convexity which is unchanged in thickness and contains  internal T1 hyperintense signal and restricted diffusion, most  consistent with blood products. This fluid communicates with a  surgical cavity in the right parietal lobe which has slightly  decreased in size since prior as it now measures 2.4 cm in AP  diameter compared to 2.7 cm previously. Within the surgical cavity  there is T1 hyperintense signal consistent with blood products. There  is also hemosiderin deposition along the linings of the surgical  cavity which communicates with the overlying extracranial fluid.    There are small amount of susceptibility artifact located within the  brain parenchyma adjacent to the surgical cavity and small amount of  blood products are again seen within the dependent portion of the  right occipital horn.    On  postcontrast sequence, there is enhancement located within the  brain parenchyma surrounding the resection cavity which measures 7 mm  in maximum thickness and is slightly more pronounced in appearance  since prior when it was more ill-defined and linear. Overall  thickness of enhancement is stable to slightly increased since prior  when it measured 5 mm. Enhancement again extends to the border of the  atrium of the right lateral ventricle with subtle subependymal  enhancement not excluded.    There is another small focus of enhancement located more laterally  within the right parietal lobe which now measures 1.5 x 0.9 cm  compared to 1.3 x 0.8 cm. Thickness of the enhancement has increased  since prior as it now measures up to 4 mm and previously measured up  to 2 mm. There is also linear enhancement located within the central  nonenhancing portion of the lesion.    There is persistent increased signal on the diffusion-weighted  sequence located within the brain parenchyma surrounding the surgical  cavity and extending into the right splenium of the corpus callosum  and adjacent to the right periatrial white matter as well as in the  white matter adjacent to the right occipital horn.    There is FLAIR hyperintense signal within the brain parenchyma  surrounding the resection cavity located within the right parietal,  temporal, and occipital lobes. There is new FLAIR hyperintense signal  extending into the right frontal lobe centrum semiovale (series 4,  image 32). There is more pronounced FLAIR hyperintense signal  extending across the midline via the splenium of the corpus callosum.    Narrowing of the atrium of the right lateral ventricle has decreased  since prior. There is no striking midline shift.    There are few scattered foci of T2 hyperintensity within the  bilateral cerebral hemispheric white matter which are nonspecific.  There is no acute intracranial hemorrhage or infarct.    There is a small mucosal  cyst located within the left frontal sinus  and frontal recess with internal T1 hyperintense signal which  probably reflects inspissated mucus versus noninvasive fungal  elements. There is partial opacification of the right mastoid air  cells with nonspecific fluid.    Impression  1. Evolving postoperative changes from resection of a right parietal  lobe mass.    2. Enhancement surrounding the surgical cavity is slightly more  conspicuous and enhancing lesion located more laterally within the  right parietal lobe, but contiguous with the enhancement surrounding  the resection cavity, is slightly increased in size and demonstrates  increased thickness of rim enhancement. This interval change is  favored to reflect sequela of treatment related changes.    3. Signal abnormality in the brain parenchyma surrounding the  resection cavity has overall slightly increased in extent, most  pronounced within the right frontal lobe and within the splenium of  the corpus callosum and is favored to primarily treatment related  changes. Associated narrowing of the posterior portion of the right  lateral ventricle has decreased.    This study was interpreted at [facility].    MACRO:  None    Signed by: Neil Boyd 9/9/2024 3:37 PM  Dictation workstation:   [id number]    MR brain presurgical perfusion and fingerprinting 01/27/2025    Narrative  Interpreted By:  Tamara Garcias and Hooper Grayson  STUDY:  MR BRAIN PRESURGICAL PERFUSION AND FINGERPRINTING;  1/27/2025 5:25 pm    INDICATION:  Signs/Symptoms:h/o glioblastoma, on temozolomide, treatment response.  Stroke protocol.    ,C71.9 Malignant neoplasm of brain, unspecified    IDH wild-type MGMT promoter methylated.  Status post concurrent chemoradiotherapy completed March 28, 2024.    COMPARISON:  MR perfusion of the brain conducted November 22, 2024    ACCESSION NUMBER(S):  [id number]    ORDERING CLINICIAN:  MONCHO  SOTAK    TECHNIQUE:  MRI of the brain was obtained on a 3 Chastity magnet before and after  the fractionated IV administration of 21 mL Dotarem gadolinium  contrast agent. Axial diffusion-weighted imaging with ADC map,  precontrast T1, precontrast axial FLAIR, axial T2 star, axial T2 with  fat saturation, and multiplanar volumetric postcontrast T1 sequences.  Furthermore, perfusion imaging was obtained after the placement of  regions of interest along the circumferential enhancing right  frontoparietal mass with contralateral white matter selected as the  region of interest control. Perfusion and permeability maps were  generated using Kat[employer] software.    FINDINGS:  Status post right parieto-occipital craniotomy for subjacent neoplasm  resection. There is again a mixed signal extra-axial collection deep  to the craniotomy and overlying the right parietal and occipital  lobes measuring approximately 2 cm in thickness. There are again  components demonstrating abnormal signal on diffusion weighted  imaging which could be due to artifact from blood products noting the  sterility of the collection can not be determined on the basis of  imaging. There is susceptibility artifact on gradient echo T2  weighted imaging deep to the craniotomy and within and along the  periphery of the collection as well as extending into the underlying  parenchyma which could be due to blood products or mineralization.  There is again nonspecific dural thickening and enhancement deep to  the craniotomy and overlying the right cerebral hemisphere thought to  be postoperative in nature, at least in part.    There is again an irregularly-shaped surgical resection cavity  centered deep to the craniotomy. There is frondlike and irregular  enhancement observed in the operative bed of the right parietal and  occipital lobes, measuring in aggregate 5.4 x 5.8 x 7.5 cm.  Additional overlying dural enhancement is noted. Comparable  measurements on comparison  examination dated November 22, 2024 reveal  these measurements to be approximately 5.4 x 5.8 x 7.4 cm. However,  some of the more nodular and masslike areas of enhancement along the  anterior and deep margins of the surgical resection cavity have  increased in size. These areas are difficult to accurately measure  due to irregular shape. However, a dominant component along the  anterior margin of the surgical resection cavity as seen on image 133  of 240, series 21 currently measures approximately 2.7 cm in AP  dimension by 2.2 cm in transverse dimension compared with 1.9 cm in  AP dimension by 2.3 cm in transverse dimension.    There has been interval increase in nonenhancing signal abnormality  with involvement of the right frontal, parietal, occipital, and  temporal lobes. There is again extension across the corpus callosum,  most significantly the splenium with similar to mildly increased  signal abnormality in the white matter adjacent to the posterior body  of the left lateral ventricle.    There has been significant increase in right to left midline shift,  now measuring 0.6 cm (axial volumetric T1 postcontrast series 21,  image 145) versus 0.1 cm on the comparison exam. Effacement of the  right ventricular system has worsened in the interval. Partial  effacement of the basilar cisterns is observed, most notably the  right ambient cistern. There is increased in asymmetric sulcal  effacement as well.    Diffusion-weighted imaging demonstrates very minimal signal  abnormality surrounding the surgical resection cavity. There is  otherwise no evidence of diffusion restriction suggestive of acute  ischemic injury.      There is mucosal thickening in the left frontal sinus and to a lesser  extent scattered through the remaining paranasal sinuses. There is  opacification of a few mastoid air cells.    MR PERFUSION:    Multiple regions of interest were placed circumferentially along the  enhancing right  parieto-occipital mass with a single contralateral  control region of interest placed over normal white matter.    Relative blood volume on initial region of interest placement is  observed at 2.58. There is marked asymmetric high-amplitude profusion  wash-in and returned to baseline. Permeability imaging demonstrates  rapid upslope and subsequent rise.    Subsequent relative cerebral blood volumes ratios were calculated to  be 2.26, 2.55, 1.47, 0.66, 1.41, 1.61, 1.38, 1.17, 1.13, and 1.37  compared with contralateral normal white matter.    Increased relative cerebral blood flow volumes at 2.26 and 2.55  correspond to increasing enhancement and anteromedial progression of  enhancement as compared to the examination dated November 22, 2024.  These lesions demonstrate high amplitude rapid wash-in and return to  baseline as well as significant permeability upstrokes and climbing  plateaus.    Impression  Redemonstration of postoperative changes for right parieto-occipital  mass resection. There has been interval progression in irregular,  thick enhancement along the periphery of the surgical resection  cavity particularly along the anterior and deep margins. There is  abnormal elevation of corrected relative cerebral blood volume ratios  compared with contralateral normal white matter worrisome for disease  progression. Additionally there has been progression of nonenhancing  signal abnormality on FLAIR and T2 weighted imaging which could  reflect therapy changes or infiltrative neoplasm. There is increased  mass effect including leftward midline shift and partial effacement  of the basilar cisterns.    I personally reviewed the images/study and I agree with the findings  as stated.    MACRO:  Murtaza Ovalle discussed the significance and urgency of this  critical finding by epic secure chat with  MONCHO ACEVEDO on 1/28/2025  at 9:44 am.  (**-RCF-**) Findings:  See findings.    Signed by: Tamara Garcias 1/28/2025 10:31  AM  Dictation workstation:   [id number]  not applicable        Assessment & Plan     Assessment/Plan   Diagnoses and all orders for this visit:  GBM (glioblastoma multiforme) (Multi)  -     cephalexin (Keflex) 500 mg capsule; Take 1 capsule (500 mg) by mouth 4 times a day for 5 days.    Patient is doing well post op. Incision healing well with a small superficial stitch abscess. The stitches were removed today. Will start Keflex for 5 days.    I discussed CNS Tumor board recommendations and plan for clinical trial consideration.    Patient is doing well post operatively from GTR of recurrent GBM.     Incision healing well. Proper wound care discussed with the patient.    NeuroOnc and Rad Onc followup    Neurosurgery followup in 3 months                   Medical History     Past Medical History:   Diagnosis Date    Brain cancer (Multi)     GERD (gastroesophageal reflux disease)     Seizure disorder (Multi)      Past Surgical History:   Procedure Laterality Date    BRAIN SURGERY      CHOLECYSTECTOMY  January 25, 2020    OTHER SURGICAL HISTORY  07/24/2019    Colonoscopy    OTHER SURGICAL HISTORY  07/26/2021    Endoscopy    VASECTOMY  Abt 1991     Social History     Tobacco Use    Smoking status: Never    Smokeless tobacco: Never   Substance Use Topics    Alcohol use: Not Currently    Drug use: Never     No family history on file.  No Known Allergies  Current Outpatient Medications   Medication Instructions    atorvastatin (LIPITOR) 40 mg, oral, Daily    CALCIUM CARBONATE-VITAMIN D3 ORAL 1 capsule, Daily    clobetasol (Temovate) 0.05 % cream Topical, 2 times daily    dexAMETHasone (Decadron) 1 mg tablet Take 4 tablets (4 mg) by mouth every 8 hours for 3 days, THEN 4 tablets (4 mg) every 12 hours for 3 days, THEN 2 tablets (2 mg) every 8 hours for 3 days, THEN 2 tablets (2 mg) every 12 hours. Stay on 2mg every 12 hours until follow up with Dr. Cortez.    wzfkx-coloz-5-dha-epa-lipids 318-03-97-50 mg capsule 1 capsule,  Daily    levETIRAcetam (KEPPRA) 500 mg, oral, 2 times daily    ondansetron (ZOFRAN) 8 mg, oral, Every 8 hours PRN, Take one hour prior to each chemotherapy dose in addition to every 8 hours as needed for nausea/vomiting    pantoprazole (ProtoNix) 40 mg EC tablet TAKE 1 TABLET BY MOUTH IN THE MORNING 30 MINUTES BEFORE BREAKFAST.    prochlorperazine (COMPAZINE) 10 mg, oral, Every 6 hours PRN    silver sulfADIAZINE (Silvadene) 1 % cream Topical, Daily    ubidecarenone (COENZYME Q10, BULK, MISC) 1 Capful, Daily

## 2025-03-07 NOTE — RESEARCH NOTES
Research Note Screening    Enmanuel Ahumada is here today to screen for [id number].   Consent reviewed and signed. Patient given copy.  Procedures completed per protocol. AE's and con-meds reviewed with patient.   Potential treatment start date is 20MAR2025.    Education Documentation  Escort, Parking, Transportation Home, taught by Tena Davis RN at 3/7/2025  3:47 PM.  Learner: Patient, Significant Other  Readiness: Acceptance  Method: Explanation  Response: Verbalizes Understanding    Comprehensive Metabolic Panel (CMP), taught by Tena Davis RN at 3/7/2025  3:47 PM.  Learner: Patient, Significant Other  Readiness: Acceptance  Method: Explanation  Response: Verbalizes Understanding    Complete Blood Count with Differential (CBC w/ Diff), taught by Tena Davis RN at 3/7/2025  3:47 PM.  Learner: Patient, Significant Other  Readiness: Acceptance  Method: Explanation  Response: Verbalizes Understanding    Education Comments  No comments found.

## 2025-03-07 NOTE — PROGRESS NOTES
Patient ID: Dash Ahumada is a 69 y.o. male.  Referring Physician: No referring provider defined for this encounter.  Primary Care Provider: DO Eve Zuleta    Dash Ahumada is a 67 yo RH  male with a PMH significant for HLD, Osborn's esophagus, ASA for cardioprotection, who presented with headache x 6 weeks, confusion x 3 weeks, and gait instability. CTH with RO hypodensity. MRI PPF and fMRI with L language dominant lesion. TTE EF 60%. S/p R crani for tumor resection with 5ALA on 12/29/23. MRI with GTR. Prelim pathology concerning for glioblastoma - immunostains pending.      INTERVAL HISTORY (1/15/2024): Since getting discharged to home, he has been slowly recovering from the surgery. The final pathology is still pending, but it is likely GBM. He notes some persistent incisional pain around the staples but no regular headaches now (resolved). He has some minor confusion, mild loss of balance, difficulty using his computer and phone, and a loss of vision to the left. He remains on Keppra 1000 mg bid (although he claims he has never had a verified seizure), and is tapering off of Decadron. He is seeing Dr. Palomino from Rad Onc soon to begin RT planning. He is seeing Neurosurgery today to remove the staples.     INTERVAL HISTORY (3/25/2024): Since the last visit, he has continued the course of chemo-RT and is now s/p RT Fraction #27 of 30 -- finalize this week. He has fatigue from the treatment, but no GI upset, emesis, diarrhea, loss of appetite, or change in taste. He is off of the steroids now. The [facility] results are in and demonstrate EGFR amplification, TERT mutations, and loss of CDKN2A/B. He is active at home with chores and daily walking. He remains on Keppra 500 mg bid.     INTERVAL HISTORY (9/16/2024): Since the last visit, he has completed the course of chemo-RT in late March, and then continued adjuvant Temodar -- now s/p cycle #4. He has tolerated the chemo quite well so  far, with persistent fatigue, but no GI upset, nausea, emesis, or diarrhea. However, he has developed significant thrombocytopenia -- 78k last week. He is also now on the Optune electrical array device, and is tolerating it well, without any scalp issues; > 18 hours per day (compliance 93%). He is doing well neurologically, with a few minor headaches, but no seizures, speech issues, visual changes, new focal weakness, gait difficulty, or sensory loss. He is on Decadron 2 mg/day. He remains active at home with chores and daily walks. He remains on Keppra 500 mg bid. KPS = 80.     INTERVAL HISTORY (12/05/2024): Since the last visit, he has completed the course of chemo-RT in late March, and then continued adjuvant Temodar -- now s/p cycle #6. He has tolerated the chemo quite well so far, with persistent fatigue, but no GI upset, nausea, emesis, or diarrhea. However, he has had some significant thrombocytopenia -- alisson 102k several weeks ago. He is also now on the Optune electrical array device, and is tolerating it well, without any scalp issues; > 18 hours per day (compliance > 90%). He is doing well neurologically, with a few minor headaches, but no seizures, speech issues, visual changes, new focal weakness, gait difficulty, or sensory loss. He is on Decadron 1 mg/day. He remains active at home with chores and daily walks. He remains on Keppra 500 mg bid. KPS = 80.     INTERVAL HISTORY (1/30/2025): Since the last visit, he has completed the course of chemo-RT in late March, and then continued adjuvant Temodar -- now s/p cycle #8. He has tolerated the chemo quite well so far, with persistent fatigue, but no GI upset, nausea, emesis, or diarrhea. However, he has had some significant thrombocytopenia -- alisson 102k several weeks ago. Unfortunately, the new brain MRI shows progression on T2/FLAIR, enhancement, and Perfusion. He was discussed at the Neuro-Oncology Tumor Board, with the recommendation to consider a new  surgery and a clinical trial. He is also now on the OptoptionsXpress electrical array device, and is tolerating it well, without any scalp issues; > 18 hours per day (compliance > 90%). He is doing well neurologically, with a few minor headaches, but no seizures, speech issues, visual changes, new focal weakness, gait difficulty, or sensory loss. He is now off of Decadron, and had a flare-up of leg and general weakness for a few weeks; now doing better. He remains active at home with chores and daily walks. He remains on Keppra 500 mg bid. He is supposed to go on a [employer] cruise to [place] next week. KPS = 80.     INTERVAL HISTORY (3/03/2025): Since the last visit, Dash has had recent progression of the brain tumor on MRI. He has had a new surgical resection with Dr. Cortez on 2/20 and tolerated it well. He is considering going on the GCAR study for recurrent GBM, but is worried about the [id number] IV arm that would require multiple trips to [facility] each week -- he is too busy with family commitments to do it (grandchild). He would be fine with the options on the other 3 arms. He is doing well since surgery and denies any severe headaches, seizures, new focal weakness, gait difficulty, sensory loss, or falls. He is on Decadron 2 mg/day.     INTERVAL HISTORY (3/07/2025): Since the last visit, Dash has had recent progression of the brain tumor on MRI. He has had a new surgical resection with Dr. Cortez on 2/20 and tolerated it well. He is here today to sign up for the GCAR study for recurrent GBM. He is still worried about the ET1975 IV arm, but would be fine with the options on the other 3 arms. He is doing well since surgery and denies any severe headaches, seizures, new focal weakness, gait difficulty, sensory loss, or falls. He is on Decadron 2 mg bid and also remains on Keppra 500 mg bid. A course of PT is pending.     The HPI is as documented in the HPI.     Objective   BSA: There is no height or weight on file to calculate  "BSA.  There were no vitals taken for this visit.    No family history on file.  Oncology History Overview Note   68 year old left-handed male with h/o HLD, cholecystectomy, Osborn's esophagus, on ASA 81 for cardioprotection presented to [facility] ED on 12/24/23 with 6 weeks of headaches, 3 weeks of increased confusion and running into things. Left-sided hemianopia on exam. CT head revealed a 1 mm midline leftward shift with a 7.3 cm x 7.1 cm x 5 cm right parietal mass. Transferred to [facility] for neurosurgical management.     GBM (glioblastoma multiforme) (Multi)   12/29/2023 Surgery    Right craniotomy for tumor resection (Ritu Carrasco MD)     12/29/2023 Pathology    Final diagnosis reported 1/18/24:  A, B.  Right brain mass, biopsy and removal:  - Malignant glioma, morphologically consistent with glioblastoma; positive for MGMT methylation     12/29/2023 Initial Diagnosis    GBM (glioblastoma multiforme) (CMS/HCC)     1/3/2024 Tumor Board    [facility] Tumor Board tumor board recommendations: Referral to radiation oncology and neuro-oncology.       2/15/2024 - 3/21/2024 Chemotherapy    Temozolomide with Concurrent Radiation, 42 Day Cycles     2/15/2024 - 3/28/2024 Radiation Therapy    Radiation Treatments       Active   No active radiation treatments to show.     Completed   Brain Boost_R (Started on 3/20/2024)   Most recent fraction: 182 cGy given on 3/28/2024   Total given: 1,274 cGy / 1,273 cGy  (7 of 7 fractions)   Elapsed Days: 8   Technique: 3D        Partial brain R (Started on 2/15/2024)   Most recent fraction: 182 cGy given on 3/19/2024   Total given: 4,186 cGy / 4,182 cGy  (23 of 23 fractions)   Elapsed Days: 33   Technique: 3D                        5/1/2024 -  Chemotherapy    Temozolomide (5/28), 28 Day Cycles         Enmanuel Ahumada \"Dash\"  reports that he has never smoked. He has never used smokeless tobacco.  He  reports that he does not currently use alcohol.  He  reports no history of " drug use.    Physical Exam  Constitutional:       Appearance: Normal appearance.   Eyes:      General: Visual field deficit present.      Extraocular Movements: Extraocular movements intact.      Pupils: Pupils are equal, round, and reactive to light.   Musculoskeletal:      Cervical back: Normal range of motion.   Neurological:      Mental Status: He is alert and oriented to person, place, and time.      Sensory: Sensation is intact.      Motor: Motor function is intact.      Coordination: Coordination is intact.      Deep Tendon Reflexes: Babinski sign absent on the right side. Babinski sign absent on the left side.      Reflex Scores:       Tricep reflexes are 2+ on the right side and 2+ on the left side.       Bicep reflexes are 2+ on the right side and 2+ on the left side.       Brachioradialis reflexes are 2+ on the right side and 2+ on the left side.       Patellar reflexes are 2+ on the right side and 2+ on the left side.       Achilles reflexes are 2+ on the right side and 2+ on the left side.     Comments: Minimal loss of STM and cognition, speech fluent without dysnomia, CN's II - XII intact except for partial left homonymous hemianopsia, strength 5/5 in UE/LE, mild gait imbalance, DTR's symmetric, sensation intact.    Psychiatric:         Mood and Affect: Mood normal.         Behavior: Behavior normal.     Performance Status:  Symptomatic; fully ambulatory      Lab Results   Component Value Date    WBC 8.2 02/21/2025    HGB 14.6 02/21/2025    HCT 41.8 02/21/2025    MCV 96 02/21/2025     (L) 02/21/2025       The recent brain MRI scans were reviewed with the patient and family:      === 02/20/25 ===    MR BRAIN W AND WO CONTRAST    - Impression -  1. Patient is status post debulking of a right parietal GBM. Today's  examination represents the 1st postoperative MRI since debulking of  the tumor on February 20, 2025.  2. There is a triangular area of enhancement involving the posterior,  mesial  surgical cavity. This has potential to represent residual  tumor. Attention to this region on follow-up is recommended.  3. While tumor was noted to enter into the ventricular system and  attached of the choroid plexus, no intraventricular tumor is noted on  today's exam.  4. The remainder of the examination demonstrates expected  postoperative changes including residual edema, pneumocephalus,  fluid, and blood products.    MACRO:  None    Signed by: Curt Santiago 2/21/2025 11:49 AM  Dictation workstation:   [id number]      === 01/27/25 ===    MR BRAIN PRESURGICAL PERFUSION AND FINGERPRINTING    - Impression -  Redemonstration of postoperative changes for right parieto-occipital  mass resection. There has been interval progression in irregular,  thick enhancement along the periphery of the surgical resection  cavity particularly along the anterior and deep margins. There is  abnormal elevation of corrected relative cerebral blood volume ratios  compared with contralateral normal white matter worrisome for disease  progression. Additionally there has been progression of nonenhancing  signal abnormality on FLAIR and T2 weighted imaging which could  reflect therapy changes or infiltrative neoplasm. There is increased  mass effect including leftward midline shift and partial effacement  of the basilar cisterns.    I personally reviewed the images/study and I agree with the findings  as stated.    MACRO:  Murtaza Ovalle discussed the significance and urgency of this  critical finding by epic secure chat with  MONCHO ACEVEDO on 1/28/2025  at 9:44 am.  (**-RCF-**) Findings:  See findings.    Signed by: Tamara Garcias 1/28/2025 10:31 AM  Dictation workstation:   [id number]      Assessment/Plan      -Dash has recovered well from the initial surgery, with mild residual symptoms.     -The pathology is a GBM; IDH wildtype, MGMT methylated.     -He has completed the course of chemo-RT in late March.     -Since then he had  continued the adjuvant phase of treatment with Temodar -- s/p cycle #8.    -The recent brain MRI is definitely progressive, with increased high signal on T2/FLAIR and enhancement, and positive Perfusion in several areas.     -His case was presented at the Neuro-Onc TB, with the recommendation to consider another surgical resection with Dr. Cortez.     -He has had the surgery on 2/20 and tolerated it well.     -He has decided to sign up for the GCAR trial for recurrent GBM, and signed the consent form today.     -He has also had labwork and an EKG per protocol today. He is due for a new baseline brain MRI next week.     -After the MRI is completed, he will be randomized to one of the GCAR recurrent GBM arms.     -He had been using the Nexio electrical array device prior to the recent surgery. This will have to be discontinued while he is on the GCAR trial.     -The advanced molecular phenotyping from [facility] has been reported, and shows EGFR amplification, which could be targeted with an EGFR TKI (e.g., Osimertinib) at a later date.     -He will remain on Decadron -- but at a lower dose of 2 mg bid alternating with 2 mg/day.    -We will also begin a slow taper of the Keppra (since he is still seizure-free) -- 500 mg bid alternating with 500 mg/day.      -He is to remain active with rowing and other exercise, as well as the PT.     -We will have him return to this clinic in roughly 4 weeks per protocol for further evaluation.     -I spent > 40 minutes in face to face consultation to review and discuss the above; 50% of which or more was dedicated to counseling.       Kyle Rolon MD

## 2025-03-07 NOTE — PATIENT INSTRUCTIONS
Welcome to Dr. Nataliya Cortez clinic. We are here to assist you through your Neurological Surgery care at [facility]. My office number is [phone]. This number is the most direct way to communicate with the office. The office fax is [phone].      Please wean your steroid:   Take 2 mg BID today THEN   Take 1mg BID tomorrow THEN   Take 1mg once then STOP

## 2025-03-10 LAB
ATRIAL RATE: 77 BPM
P AXIS: 55 DEGREES
P OFFSET: 208 MS
P ONSET: 157 MS
PR INTERVAL: 136 MS
Q ONSET: 225 MS
QRS COUNT: 13 BEATS
QRS DURATION: 88 MS
QT INTERVAL: 370 MS
QTC CALCULATION(BAZETT): 418 MS
QTC FREDERICIA: 402 MS
R AXIS: -21 DEGREES
T AXIS: 41 DEGREES
T OFFSET: 410 MS
VENTRICULAR RATE: 77 BPM

## 2025-03-13 DIAGNOSIS — Z00.6 RESEARCH STUDY PATIENT: ICD-10-CM

## 2025-03-13 DIAGNOSIS — C71.9 GBM (GLIOBLASTOMA MULTIFORME) (MULTI): ICD-10-CM

## 2025-03-13 NOTE — PROGRESS NOTES
Entered GCAR SJ-PEG + Lomustine 42-day cycles Treatment Plan (previously had one entered, then deleted with intention of replacing with [facility] [id number] plan per research RN instruction; however, correct plan is indeed the SJ-PEG with lomustine, so plan is now re-entered).

## 2025-03-14 ENCOUNTER — HOSPITAL ENCOUNTER (OUTPATIENT)
Facility: HOSPITAL | Age: 70
Setting detail: OBSERVATION
DRG: 054 | End: 2025-03-14
Attending: STUDENT IN AN ORGANIZED HEALTH CARE EDUCATION/TRAINING PROGRAM | Admitting: INTERNAL MEDICINE
Payer: MEDICARE

## 2025-03-14 ENCOUNTER — APPOINTMENT (OUTPATIENT)
Dept: RADIOLOGY | Facility: HOSPITAL | Age: 70
End: 2025-03-14
Payer: MEDICARE

## 2025-03-14 ENCOUNTER — TELEPHONE (OUTPATIENT)
Dept: NEUROSURGERY | Facility: HOSPITAL | Age: 70
End: 2025-03-14
Payer: MEDICARE

## 2025-03-14 DIAGNOSIS — R53.1 WEAKNESS: ICD-10-CM

## 2025-03-14 DIAGNOSIS — C71.9 GBM (GLIOBLASTOMA MULTIFORME) (MULTI): Primary | ICD-10-CM

## 2025-03-14 DIAGNOSIS — G93.89 BRAIN MASS: ICD-10-CM

## 2025-03-14 LAB
ANION GAP SERPL CALC-SCNC: 15 MMOL/L (ref 10–20)
APPEARANCE UR: CLEAR
BASOPHILS # BLD AUTO: 0.02 X10*3/UL (ref 0–0.1)
BASOPHILS NFR BLD AUTO: 0.2 %
BILIRUB UR STRIP.AUTO-MCNC: NEGATIVE MG/DL
BUN SERPL-MCNC: 19 MG/DL (ref 6–23)
CALCIUM SERPL-MCNC: 9.2 MG/DL (ref 8.6–10.6)
CHLORIDE SERPL-SCNC: 100 MMOL/L (ref 98–107)
CO2 SERPL-SCNC: 27 MMOL/L (ref 21–32)
COLOR UR: NORMAL
CREAT SERPL-MCNC: 0.75 MG/DL (ref 0.5–1.3)
CRP SERPL-MCNC: 4.38 MG/DL
EGFRCR SERPLBLD CKD-EPI 2021: >90 ML/MIN/1.73M*2
EOSINOPHIL # BLD AUTO: 0.04 X10*3/UL (ref 0–0.7)
EOSINOPHIL NFR BLD AUTO: 0.5 %
ERYTHROCYTE [DISTWIDTH] IN BLOOD BY AUTOMATED COUNT: 13.1 % (ref 11.5–14.5)
ERYTHROCYTE [SEDIMENTATION RATE] IN BLOOD BY WESTERGREN METHOD: 2 MM/H (ref 0–20)
FLUAV RNA RESP QL NAA+PROBE: NOT DETECTED
FLUBV RNA RESP QL NAA+PROBE: NOT DETECTED
GLUCOSE SERPL-MCNC: 154 MG/DL (ref 74–99)
GLUCOSE UR STRIP.AUTO-MCNC: NORMAL MG/DL
HCT VFR BLD AUTO: 46.6 % (ref 41–52)
HGB BLD-MCNC: 16.6 G/DL (ref 13.5–17.5)
HOLD SPECIMEN: NORMAL
HOLD SPECIMEN: NORMAL
IMM GRANULOCYTES # BLD AUTO: 0.03 X10*3/UL (ref 0–0.7)
IMM GRANULOCYTES NFR BLD AUTO: 0.3 % (ref 0–0.9)
KETONES UR STRIP.AUTO-MCNC: NEGATIVE MG/DL
LEUKOCYTE ESTERASE UR QL STRIP.AUTO: NEGATIVE
LYMPHOCYTES # BLD AUTO: 1.75 X10*3/UL (ref 1.2–4.8)
LYMPHOCYTES NFR BLD AUTO: 20.2 %
MCH RBC QN AUTO: 32.4 PG (ref 26–34)
MCHC RBC AUTO-ENTMCNC: 35.6 G/DL (ref 32–36)
MCV RBC AUTO: 91 FL (ref 80–100)
MONOCYTES # BLD AUTO: 0.98 X10*3/UL (ref 0.1–1)
MONOCYTES NFR BLD AUTO: 11.3 %
NEUTROPHILS # BLD AUTO: 5.84 X10*3/UL (ref 1.2–7.7)
NEUTROPHILS NFR BLD AUTO: 67.5 %
NITRITE UR QL STRIP.AUTO: NEGATIVE
NRBC BLD-RTO: 0 /100 WBCS (ref 0–0)
PH UR STRIP.AUTO: 6.5 [PH]
PLATELET # BLD AUTO: 74 X10*3/UL (ref 150–450)
POTASSIUM SERPL-SCNC: 3.9 MMOL/L (ref 3.5–5.3)
PROT UR STRIP.AUTO-MCNC: NEGATIVE MG/DL
RBC # BLD AUTO: 5.13 X10*6/UL (ref 4.5–5.9)
RBC # UR STRIP.AUTO: NEGATIVE MG/DL
SARS-COV-2 RNA RESP QL NAA+PROBE: NOT DETECTED
SODIUM SERPL-SCNC: 138 MMOL/L (ref 136–145)
SP GR UR STRIP.AUTO: 1.02
UROBILINOGEN UR STRIP.AUTO-MCNC: NORMAL MG/DL
WBC # BLD AUTO: 8.7 X10*3/UL (ref 4.4–11.3)

## 2025-03-14 PROCEDURE — 96361 HYDRATE IV INFUSION ADD-ON: CPT

## 2025-03-14 PROCEDURE — 2500000001 HC RX 250 WO HCPCS SELF ADMINISTERED DRUGS (ALT 637 FOR MEDICARE OP)

## 2025-03-14 PROCEDURE — 36415 COLL VENOUS BLD VENIPUNCTURE: CPT | Performed by: STUDENT IN AN ORGANIZED HEALTH CARE EDUCATION/TRAINING PROGRAM

## 2025-03-14 PROCEDURE — 99285 EMERGENCY DEPT VISIT HI MDM: CPT | Performed by: STUDENT IN AN ORGANIZED HEALTH CARE EDUCATION/TRAINING PROGRAM

## 2025-03-14 PROCEDURE — 87636 SARSCOV2 & INF A&B AMP PRB: CPT | Performed by: STUDENT IN AN ORGANIZED HEALTH CARE EDUCATION/TRAINING PROGRAM

## 2025-03-14 PROCEDURE — 85652 RBC SED RATE AUTOMATED: CPT | Performed by: STUDENT IN AN ORGANIZED HEALTH CARE EDUCATION/TRAINING PROGRAM

## 2025-03-14 PROCEDURE — 2500000004 HC RX 250 GENERAL PHARMACY W/ HCPCS (ALT 636 FOR OP/ED)

## 2025-03-14 PROCEDURE — 86140 C-REACTIVE PROTEIN: CPT | Performed by: STUDENT IN AN ORGANIZED HEALTH CARE EDUCATION/TRAINING PROGRAM

## 2025-03-14 PROCEDURE — 70450 CT HEAD/BRAIN W/O DYE: CPT | Performed by: STUDENT IN AN ORGANIZED HEALTH CARE EDUCATION/TRAINING PROGRAM

## 2025-03-14 PROCEDURE — 81003 URINALYSIS AUTO W/O SCOPE: CPT | Performed by: STUDENT IN AN ORGANIZED HEALTH CARE EDUCATION/TRAINING PROGRAM

## 2025-03-14 PROCEDURE — 80048 BASIC METABOLIC PNL TOTAL CA: CPT | Performed by: STUDENT IN AN ORGANIZED HEALTH CARE EDUCATION/TRAINING PROGRAM

## 2025-03-14 PROCEDURE — 36415 COLL VENOUS BLD VENIPUNCTURE: CPT

## 2025-03-14 PROCEDURE — 85025 COMPLETE CBC W/AUTO DIFF WBC: CPT | Performed by: STUDENT IN AN ORGANIZED HEALTH CARE EDUCATION/TRAINING PROGRAM

## 2025-03-14 PROCEDURE — 96360 HYDRATION IV INFUSION INIT: CPT

## 2025-03-14 PROCEDURE — 70450 CT HEAD/BRAIN W/O DYE: CPT

## 2025-03-14 PROCEDURE — 99285 EMERGENCY DEPT VISIT HI MDM: CPT | Mod: 25 | Performed by: STUDENT IN AN ORGANIZED HEALTH CARE EDUCATION/TRAINING PROGRAM

## 2025-03-14 RX ORDER — DEXAMETHASONE 2 MG/1
4 TABLET ORAL ONCE
Status: COMPLETED | OUTPATIENT
Start: 2025-03-14 | End: 2025-03-15

## 2025-03-14 RX ORDER — OXYCODONE HYDROCHLORIDE 5 MG/1
5 TABLET ORAL ONCE
Status: COMPLETED | OUTPATIENT
Start: 2025-03-14 | End: 2025-03-14

## 2025-03-14 RX ADMIN — SODIUM CHLORIDE, POTASSIUM CHLORIDE, SODIUM LACTATE AND CALCIUM CHLORIDE 1000 ML: 600; 310; 30; 20 INJECTION, SOLUTION INTRAVENOUS at 20:05

## 2025-03-14 RX ADMIN — OXYCODONE 5 MG: 5 TABLET ORAL at 20:05

## 2025-03-14 ASSESSMENT — PAIN SCALES - GENERAL: PAINLEVEL_OUTOF10: 4

## 2025-03-14 ASSESSMENT — PAIN DESCRIPTION - LOCATION: LOCATION: HEAD

## 2025-03-14 NOTE — ED PROVIDER NOTES
History of Present Illness     History provided by: Patient  Limitations to History: None  External Records Reviewed with Brief Summary: Discharge Summary from 2025 which showed admission for craniotomy and resection with neurosurgery    HPI:  Enmanuel Ahumada is a 69 y.o. male with past medical history of hypertension, hyperlipidemia, glioblastoma status post resection that required a re-vision craniotomy on 2025 who is presenting today with a headache and swelling near his surgical wound. Denies fevers, chills. Reports that symptoms began today. Denies nausea vomiting. Endorses baseline weakness in the left worse than the right.    Physical Exam   Triage vitals:  T 36.9 °C (98.4 °F)  HR 98  /83  RR 16  O2 95 % None (Room air)    General: Awake, alert, in no acute distress  Eyes: Gaze conjugate.  No scleral icterus or injection  HENT: Normo-cephalic, atraumatic. No stridor  CV: Regular rate, regular rhythm. Radial pulses 2+ bilaterally  Resp: Breathing non-labored, speaking in full sentences.  Clear to auscultation bilaterally  GI: Soft, non-distended, non-tender. No rebound or guarding.  MSK/Extremities: No gross bony deformities. Moving all extremities  Skin: Warm. Appropriate color. Incision is clean dry and intact. Mild swelling at R aspect of incision near the ear. No erythema. No induration or crepitus.   Neuro: Alert. Oriented. Face symmetric. Speech is fluent. Decreased during the left upper and lower extremity compared to the right reports this is baseline.  Psych: Appropriate mood and affect      Medical Decision Making & ED Course   Medical Decision Makin y.o. male  with past medical history of hypertension, hyperlipidemia, glioblastoma status post resection that required a re-vision craniotomy on 2025 who is presenting today with a headache and swelling. Vital signs within normal limits. On exam, patient incision was clean dry and intact. Had a little bit of  swelling. No overlying erythema. Differential included a fluid collection, abscess. Will obtain CBC, CMP, ESR CRP. Will discuss with neurosurgery. Will also obtain a CT to further visualize the swelling. CT showed post operative changes that were slightly increased fluid collection. However lab work was reviewed and without leukocytosis, normal ESR and normal CRP. Discussed with neurosurgery who recommended a steroid taper. Patient was given first dose. On reevaluation, patient was having difficulty transfering from the bed to the chair. Discussed with patient's family who did not feel comfortable with him at home and safe at this time. Patient was discussed with medicine for admission.   ----      Differential diagnoses considered include but are not limited to: Please see MDM.     Social Determinants of Health which Significantly Impact Care: None identified     EKG Independent Interpretation: If an EKG interpretation is available. Please see ED Course and MDM for full interpretation.    Independent Result Review and Interpretation: Results were independently reviewed and interpreted by myself. Please see ED course and MDM for full interpretation.    Chronic conditions affecting the patient's care: As documented in the MDM    The patient was discussed with the following consultants/services: Admission Coordinator who accepted the patient for admission and NSGY regarding post op wound    Care Considerations: As per MDM    ED Course:  ED Course as of 03/15/25 1125   Fri Mar 14, 2025   1838 External chart review:  DC summary from 2/22/2025 which showed a hx of glioblastoma s/p resection     [DENISE]   1944 Discussed with neurosurgery who will evaluate the patient regarding a postoperative complication [DENISE]   8697 NSGY recommended steroids, 4q8 for 3 days, then 4q12 for 3 days, 2q12 for 3 days, then 2q24 for 3 days. We'll have him follow up with neurosurgery [DENISE]      ED Course User Index  [DENISE] Marysol Burch MD          Diagnoses as of 03/15/25 1125   Weakness     Disposition   As a result of their workup, the patient will require admission to the hospital.  The patient was informed of his diagnosis.  The patient was given the opportunity to ask questions and I answered them. The patient agreed to be admitted to the hospital.    Procedures   Procedures    Patient seen and discussed with ED attending physician.    Marysol Burch MD  Emergency Medicine     Marysol Burch MD  Resident  03/15/25 1127    Emergency Medicine Attending Attestation:     The patient was seen by the resident/fellow.  I have personally performed a substantive portion of the encounter.  I have seen and examined the patient; agree with the workup, evaluation, MDM, management and diagnosis.  The care plan has been discussed with the resident; I have reviewed the resident’s note and agree with the documented findings.            Steffen Simerlink, MD Steffen Simerlink, MD  03/16/25 0209

## 2025-03-14 NOTE — CONSULTS
"Date of Service:  3/14/2025 Attending Provider:  Steffen Simerlink, MD     Reason for Consultation:  Enmanuel Ahumada \"Dash\" is being seen today for a consult requested by Steffen Simerlink, MD for headache, swelling around incision.    Subjective   History of Present Illness:  Dash is a 69 y.o. male with history of HTN, HLD, smalls's esophagus, RO GBM s/p resection ([facility], 12/2023) with resultant L HH s/p chemo/RT, 2/20/2025 s/p redo craniotomy for tumor resection w 5 ALA, presenting with swelling at incision site and headache. Patient notes that he has had a headache for one day, new swelling along the bilateral ears, and general malaise and sleepiness starting today. Prior to these symptoms, recover from surgery has been largely unremarkable. Headache is described as frontal, is not dependent on position, tylenol has marginally helped the pain. He saw Dr. Cortez in clinic a week ago, had sutures removed, and was started on keflex for 5 days for a small superficial stitch abscess. Patient denied any weakness, numbness, vision changes, objective fevers, or chills.    Review of Systems negative other than listed in HPI.    Objective   Vitals:  Vitals:    03/14/25 1510   BP: 126/83   Pulse: 98   Resp: 16   Temp: 36.9 °C (98.4 °F)   SpO2: 95%         Exam:  Constitutional: No acute distress  Resp: breathing comfortably on room air  Neuro: Awake, Ox2-3  face symmetric  L HH  RUE 5/5  LUE 5/5  RLE 5/5  LLE 5/5  sensation intact to light touch  Psych: appropriate  Skin: incision well healed with no leakage, small swelling in front of R ear noted    Medical History  Past Medical History:   Diagnosis Date    Brain cancer (Multi)     GERD (gastroesophageal reflux disease)     Seizure disorder (Multi)        Surgical History  Past Surgical History:   Procedure Laterality Date    BRAIN SURGERY      CHOLECYSTECTOMY  January 25, 2020    OTHER SURGICAL HISTORY  07/24/2019    Colonoscopy    OTHER SURGICAL HISTORY  07/26/2021 "    Endoscopy    VASECTOMY  Abt 1991        Medications  Current Outpatient Medications   Medication Instructions    atorvastatin (LIPITOR) 40 mg, oral, Daily    CALCIUM CARBONATE-VITAMIN D3 ORAL 1 capsule, Daily    clobetasol (Temovate) 0.05 % cream Topical, 2 times daily    dexAMETHasone (Decadron) 1 mg tablet Take 4 tablets (4 mg) by mouth every 8 hours for 3 days, THEN 4 tablets (4 mg) every 12 hours for 3 days, THEN 2 tablets (2 mg) every 8 hours for 3 days, THEN 2 tablets (2 mg) every 12 hours. Stay on 2mg every 12 hours until follow up with Dr. Cortez.    cable-exxcb-8-dha-epa-lipids 953-46-54-50 mg capsule 1 capsule, Daily    levETIRAcetam (KEPPRA) 500 mg, oral, 2 times daily    ondansetron (ZOFRAN) 8 mg, oral, Every 8 hours PRN, Take one hour prior to each chemotherapy dose in addition to every 8 hours as needed for nausea/vomiting    pantoprazole (ProtoNix) 40 mg EC tablet TAKE 1 TABLET BY MOUTH IN THE MORNING 30 MINUTES BEFORE BREAKFAST.    prochlorperazine (COMPAZINE) 10 mg, oral, Every 6 hours PRN    silver sulfADIAZINE (Silvadene) 1 % cream Topical, Daily    ubidecarenone (COENZYME Q10, BULK, MISC) 1 Capful, Daily        Diagnostic Results:    Lab Results   Component Value Date    WBC 10.8 03/07/2025    HGB 17.3 03/07/2025    HCT 49.1 03/07/2025    MCV 95 03/07/2025     (L) 03/07/2025     Lab Results   Component Value Date    CREATININE 0.85 03/07/2025    BUN 29 (H) 03/07/2025     03/07/2025    K 4.9 03/07/2025    CL 98 03/07/2025    CO2 32 03/07/2025     Lab Results   Component Value Date    INR 1.0 03/07/2025    INR 1.1 02/07/2025    INR 1.1 12/28/2023    PROTIME 11.4 03/07/2025    PROTIME 12.8 02/07/2025    PROTIME 12.4 12/28/2023       === 02/20/25 ===    CT HEAD WO IV CONTRAST    - Impression -  Status post right posterior craniotomy with large fluid and gas  collection deep to the craniotomy flap.  Along the deep periphery of  this collection is mixed gas and low-density material with  presumed  blood products present. See above for details.    MACRO:  None    Signed by: Robert Sanches 2/20/2025 3:24 PM  Dictation workstation:   [id number]  === 02/20/25 ===    MR BRAIN W AND WO CONTRAST    - Impression -  1. Patient is status post debulking of a right parietal GBM. Today's  examination represents the 1st postoperative MRI since debulking of  the tumor on February 20, 2025.  2. There is a triangular area of enhancement involving the posterior,  mesial surgical cavity. This has potential to represent residual  tumor. Attention to this region on follow-up is recommended.  3. While tumor was noted to enter into the ventricular system and  attached of the choroid plexus, no intraventricular tumor is noted on  today's exam.  4. The remainder of the examination demonstrates expected  postoperative changes including residual edema, pneumocephalus,  fluid, and blood products.    MACRO:  None    Signed by: Curt Santiago 2/21/2025 11:49 AM  Dictation workstation:   [id number]    Assessment/Plan   Assessment:  Enmanuel Ahumada is a 69 year old male with h/o HTN, HLD, smalls's esophagus, RO GBM s/p resection ([facility], 12/2023) with resultant L HH s/p chemo/RT, 2/20/2025 s/p redo craniotomy for tumor resection w 5 ALA, p/w swelling at incision site, HA, malaise x1 day, CTH expected POC    Patient with craniotomy for tumor resection 3 weeks ago, presenting with swelling around incision, headache, and general malaise. Underwent CTH, which showed expected postoperative changes, no pseudomeningocele, no hemorrhage or significant shift. Incision is clean, dry, intact, with no leakage. The etiology of his malaise and headache are unclear. Does not appear to be infectious, with normal ESR, and downtrending CRP from surgery. No neurosurgical intervention is indicated. Would recommend going home on dose of steroids that would taper to off over a couple weeks. We will schedule follow up with neurosurgery to assess how he  is doing.    Plan:  Patient okay to be discharged with steroids to taper to off over a couple weeks  Will plan on patient following up with neurosurgery in a couple weeks, which we will arrange      Guanaco Duenas MD

## 2025-03-14 NOTE — TELEPHONE ENCOUNTER
Callback: Wife states patient has really bad headache since last night, new onset of swelling is noticeable about a size of a mandarin, he is shaking from discomfort. Patient instructed to go to [facility] [place]. Nataliya Castro CNP aware.     Krissy Waggoner RN

## 2025-03-15 ENCOUNTER — APPOINTMENT (OUTPATIENT)
Dept: RADIOLOGY | Facility: HOSPITAL | Age: 70
End: 2025-03-15
Payer: MEDICARE

## 2025-03-15 PROBLEM — R53.1 WEAKNESS: Status: ACTIVE | Noted: 2025-03-15

## 2025-03-15 LAB
ALBUMIN SERPL BCP-MCNC: 4 G/DL (ref 3.4–5)
ANION GAP SERPL CALC-SCNC: 16 MMOL/L (ref 10–20)
BASOPHILS # BLD AUTO: 0.01 X10*3/UL (ref 0–0.1)
BASOPHILS NFR BLD AUTO: 0.1 %
BUN SERPL-MCNC: 18 MG/DL (ref 6–23)
CALCIUM SERPL-MCNC: 9.1 MG/DL (ref 8.6–10.6)
CHLORIDE SERPL-SCNC: 100 MMOL/L (ref 98–107)
CO2 SERPL-SCNC: 22 MMOL/L (ref 21–32)
CREAT SERPL-MCNC: 0.71 MG/DL (ref 0.5–1.3)
EGFRCR SERPLBLD CKD-EPI 2021: >90 ML/MIN/1.73M*2
EOSINOPHIL # BLD AUTO: 0 X10*3/UL (ref 0–0.7)
EOSINOPHIL NFR BLD AUTO: 0 %
ERYTHROCYTE [DISTWIDTH] IN BLOOD BY AUTOMATED COUNT: 12.9 % (ref 11.5–14.5)
GLUCOSE SERPL-MCNC: 159 MG/DL (ref 74–99)
HCT VFR BLD AUTO: 44.1 % (ref 41–52)
HGB BLD-MCNC: 15.7 G/DL (ref 13.5–17.5)
HOLD SPECIMEN: NORMAL
IMM GRANULOCYTES # BLD AUTO: 0.03 X10*3/UL (ref 0–0.7)
IMM GRANULOCYTES NFR BLD AUTO: 0.4 % (ref 0–0.9)
LYMPHOCYTES # BLD AUTO: 0.64 X10*3/UL (ref 1.2–4.8)
LYMPHOCYTES NFR BLD AUTO: 7.8 %
MAGNESIUM SERPL-MCNC: 2 MG/DL (ref 1.6–2.4)
MCH RBC QN AUTO: 33.2 PG (ref 26–34)
MCHC RBC AUTO-ENTMCNC: 35.6 G/DL (ref 32–36)
MCV RBC AUTO: 93 FL (ref 80–100)
MONOCYTES # BLD AUTO: 0.45 X10*3/UL (ref 0.1–1)
MONOCYTES NFR BLD AUTO: 5.5 %
NEUTROPHILS # BLD AUTO: 7.1 X10*3/UL (ref 1.2–7.7)
NEUTROPHILS NFR BLD AUTO: 86.2 %
NRBC BLD-RTO: 0 /100 WBCS (ref 0–0)
PHOSPHATE SERPL-MCNC: 2.6 MG/DL (ref 2.5–4.9)
PLATELET # BLD AUTO: 83 X10*3/UL (ref 150–450)
POTASSIUM SERPL-SCNC: 4.6 MMOL/L (ref 3.5–5.3)
RBC # BLD AUTO: 4.73 X10*6/UL (ref 4.5–5.9)
SODIUM SERPL-SCNC: 133 MMOL/L (ref 136–145)
WBC # BLD AUTO: 8.2 X10*3/UL (ref 4.4–11.3)

## 2025-03-15 PROCEDURE — 96372 THER/PROPH/DIAG INJ SC/IM: CPT

## 2025-03-15 PROCEDURE — G0378 HOSPITAL OBSERVATION PER HR: HCPCS

## 2025-03-15 PROCEDURE — 71045 X-RAY EXAM CHEST 1 VIEW: CPT

## 2025-03-15 PROCEDURE — 2500000004 HC RX 250 GENERAL PHARMACY W/ HCPCS (ALT 636 FOR OP/ED)

## 2025-03-15 PROCEDURE — 84100 ASSAY OF PHOSPHORUS: CPT

## 2025-03-15 PROCEDURE — 36415 COLL VENOUS BLD VENIPUNCTURE: CPT

## 2025-03-15 PROCEDURE — 71045 X-RAY EXAM CHEST 1 VIEW: CPT | Performed by: STUDENT IN AN ORGANIZED HEALTH CARE EDUCATION/TRAINING PROGRAM

## 2025-03-15 PROCEDURE — 99223 1ST HOSP IP/OBS HIGH 75: CPT

## 2025-03-15 PROCEDURE — 85025 COMPLETE CBC W/AUTO DIFF WBC: CPT

## 2025-03-15 PROCEDURE — 80069 RENAL FUNCTION PANEL: CPT

## 2025-03-15 PROCEDURE — 2500000001 HC RX 250 WO HCPCS SELF ADMINISTERED DRUGS (ALT 637 FOR MEDICARE OP)

## 2025-03-15 PROCEDURE — 83735 ASSAY OF MAGNESIUM: CPT

## 2025-03-15 PROCEDURE — 2500000005 HC RX 250 GENERAL PHARMACY W/O HCPCS

## 2025-03-15 RX ORDER — ATORVASTATIN CALCIUM 40 MG/1
40 TABLET, FILM COATED ORAL DAILY
Status: DISCONTINUED | OUTPATIENT
Start: 2025-03-15 | End: 2025-03-18 | Stop reason: HOSPADM

## 2025-03-15 RX ORDER — DEXAMETHASONE 2 MG/1
2 TABLET ORAL EVERY 8 HOURS SCHEDULED
Status: DISCONTINUED | OUTPATIENT
Start: 2025-03-21 | End: 2025-03-17

## 2025-03-15 RX ORDER — DEXAMETHASONE 2 MG/1
2 TABLET ORAL EVERY 8 HOURS
Status: DISCONTINUED | OUTPATIENT
Start: 2025-03-18 | End: 2025-03-15

## 2025-03-15 RX ORDER — ACETAMINOPHEN 325 MG/1
650 TABLET ORAL EVERY 6 HOURS PRN
Status: ON HOLD | COMMUNITY

## 2025-03-15 RX ORDER — DEXAMETHASONE 2 MG/1
2 TABLET ORAL EVERY 12 HOURS
Status: DISCONTINUED | OUTPATIENT
Start: 2025-03-21 | End: 2025-03-15

## 2025-03-15 RX ORDER — PANTOPRAZOLE SODIUM 40 MG/1
40 TABLET, DELAYED RELEASE ORAL
Status: DISCONTINUED | OUTPATIENT
Start: 2025-03-15 | End: 2025-03-18 | Stop reason: HOSPADM

## 2025-03-15 RX ORDER — POLYETHYLENE GLYCOL 3350 17 G/17G
17 POWDER, FOR SOLUTION ORAL DAILY
Status: DISCONTINUED | OUTPATIENT
Start: 2025-03-15 | End: 2025-03-18 | Stop reason: HOSPADM

## 2025-03-15 RX ORDER — SENNOSIDES 8.6 MG/1
2 TABLET ORAL 2 TIMES DAILY
Status: DISCONTINUED | OUTPATIENT
Start: 2025-03-15 | End: 2025-03-18 | Stop reason: HOSPADM

## 2025-03-15 RX ORDER — DEXAMETHASONE 4 MG/1
4 TABLET ORAL EVERY 8 HOURS SCHEDULED
Status: DISCONTINUED | OUTPATIENT
Start: 2025-03-15 | End: 2025-03-17

## 2025-03-15 RX ORDER — DEXAMETHASONE 4 MG/1
4 TABLET ORAL EVERY 12 HOURS SCHEDULED
Status: DISCONTINUED | OUTPATIENT
Start: 2025-03-18 | End: 2025-03-17

## 2025-03-15 RX ORDER — ACETAMINOPHEN 325 MG/1
650 TABLET ORAL EVERY 6 HOURS PRN
Status: DISCONTINUED | OUTPATIENT
Start: 2025-03-15 | End: 2025-03-18 | Stop reason: HOSPADM

## 2025-03-15 RX ORDER — ACETAMINOPHEN 325 MG/1
650 TABLET ORAL ONCE
Status: COMPLETED | OUTPATIENT
Start: 2025-03-15 | End: 2025-03-15

## 2025-03-15 RX ORDER — DEXAMETHASONE 2 MG/1
4 TABLET ORAL EVERY 12 HOURS
Status: DISCONTINUED | OUTPATIENT
Start: 2025-03-15 | End: 2025-03-15

## 2025-03-15 RX ORDER — TALC
6 POWDER (GRAM) TOPICAL NIGHTLY
Status: DISCONTINUED | OUTPATIENT
Start: 2025-03-15 | End: 2025-03-18 | Stop reason: HOSPADM

## 2025-03-15 RX ORDER — ENOXAPARIN SODIUM 100 MG/ML
40 INJECTION SUBCUTANEOUS EVERY 24 HOURS
Status: DISCONTINUED | OUTPATIENT
Start: 2025-03-15 | End: 2025-03-18 | Stop reason: HOSPADM

## 2025-03-15 RX ORDER — LEVETIRACETAM 500 MG/1
500 TABLET ORAL 2 TIMES DAILY
Status: DISCONTINUED | OUTPATIENT
Start: 2025-03-15 | End: 2025-03-17

## 2025-03-15 RX ADMIN — STANDARDIZED SENNA CONCENTRATE 17.2 MG: 8.6 TABLET ORAL at 07:41

## 2025-03-15 RX ADMIN — STANDARDIZED SENNA CONCENTRATE 17.2 MG: 8.6 TABLET ORAL at 21:48

## 2025-03-15 RX ADMIN — ATORVASTATIN CALCIUM 40 MG: 40 TABLET, FILM COATED ORAL at 07:41

## 2025-03-15 RX ADMIN — DEXAMETHASONE 4 MG: 2 TABLET ORAL at 06:07

## 2025-03-15 RX ADMIN — DEXAMETHASONE 4 MG: 2 TABLET ORAL at 00:25

## 2025-03-15 RX ADMIN — Medication 6 MG: at 05:10

## 2025-03-15 RX ADMIN — Medication 6 MG: at 21:48

## 2025-03-15 RX ADMIN — DEXAMETHASONE 4 MG: 2 TABLET ORAL at 21:48

## 2025-03-15 RX ADMIN — LEVETIRACETAM 500 MG: 500 TABLET, FILM COATED ORAL at 21:48

## 2025-03-15 RX ADMIN — ACETAMINOPHEN 650 MG: 325 TABLET, FILM COATED ORAL at 13:05

## 2025-03-15 RX ADMIN — PANTOPRAZOLE SODIUM 40 MG: 40 TABLET, DELAYED RELEASE ORAL at 07:41

## 2025-03-15 RX ADMIN — ACETAMINOPHEN 650 MG: 325 TABLET, FILM COATED ORAL at 21:47

## 2025-03-15 RX ADMIN — ACETAMINOPHEN 650 MG: 325 TABLET, FILM COATED ORAL at 05:15

## 2025-03-15 RX ADMIN — ENOXAPARIN SODIUM 40 MG: 40 INJECTION SUBCUTANEOUS at 07:41

## 2025-03-15 RX ADMIN — DEXAMETHASONE 4 MG: 2 TABLET ORAL at 13:05

## 2025-03-15 RX ADMIN — POLYETHYLENE GLYCOL 3350 17 G: 17 POWDER, FOR SOLUTION ORAL at 07:41

## 2025-03-15 RX ADMIN — LEVETIRACETAM 500 MG: 500 TABLET, FILM COATED ORAL at 07:50

## 2025-03-15 ASSESSMENT — PAIN DESCRIPTION - PROGRESSION: CLINICAL_PROGRESSION: NOT CHANGED

## 2025-03-15 ASSESSMENT — LIFESTYLE VARIABLES
HAVE YOU EVER FELT YOU SHOULD CUT DOWN ON YOUR DRINKING: NO
EVER HAD A DRINK FIRST THING IN THE MORNING TO STEADY YOUR NERVES TO GET RID OF A HANGOVER: NO
TOTAL SCORE: 0
EVER FELT BAD OR GUILTY ABOUT YOUR DRINKING: NO
HAVE PEOPLE ANNOYED YOU BY CRITICIZING YOUR DRINKING: NO

## 2025-03-15 ASSESSMENT — PAIN SCALES - GENERAL
PAINLEVEL_OUTOF10: 5 - MODERATE PAIN
PAINLEVEL_OUTOF10: 0 - NO PAIN
PAINLEVEL_OUTOF10: 5 - MODERATE PAIN
PAINLEVEL_OUTOF10: 2
PAINLEVEL_OUTOF10: 3
PAINLEVEL_OUTOF10: 2

## 2025-03-15 ASSESSMENT — PAIN DESCRIPTION - LOCATION
LOCATION: HEAD

## 2025-03-15 ASSESSMENT — PAIN - FUNCTIONAL ASSESSMENT: PAIN_FUNCTIONAL_ASSESSMENT: 0-10

## 2025-03-15 ASSESSMENT — PAIN DESCRIPTION - PAIN TYPE: TYPE: ACUTE PAIN

## 2025-03-15 NOTE — PROGRESS NOTES
"Pharmacy Medication History Review    Enmanuel Ahumada \"Dash\" is a 69 y.o. male admitted for Weakness. Pharmacy reviewed the patient's uyxuz-xf-vhydupxxy medications and allergies for accuracy.    Medications ADDED:  Tylenol  Senakot   Medications CHANGED:  Decadron   Keppra   Medications REMOVED/NOT TAKING:   Keflex   Temovate   Zofran   Compazine   Silvadene      The list below reflects the updated PTA list.   Prior to Admission Medications   Prescriptions Last Dose Informant   CALCIUM CARBONATE-VITAMIN D3 ORAL 3/13/2025 Morning Spouse/Significant Other   Sig: Take 1 capsule by mouth once daily.   acetaminophen (Tylenol) 325 mg tablet 3/14/2025 Noon Spouse/Significant Other   Sig: Take 2 tablets (650 mg) by mouth every 6 hours if needed for mild pain (1 - 3).   atorvastatin (Lipitor) 40 mg tablet 3/13/2025 Evening Spouse/Significant Other   Sig: TAKE 1 TABLET BY MOUTH EVERY DAY   cephalexin (Keflex) 500 mg capsule Not Taking    Sig: Take 1 capsule (500 mg) by mouth 4 times a day for 5 days.   Patient not taking: Reported on 3/15/2025   clobetasol (Temovate) 0.05 % cream Not Taking Spouse/Significant Other   Sig: Apply topically 2 times a day.   Patient not taking: Reported on 3/15/2025   dexAMETHasone (Decadron) 1 mg tablet 3/13/2025 Morning Spouse/Significant Other   Sig: Take 4 tablets (4 mg) by mouth every 8 hours for 3 days, THEN 4 tablets (4 mg) every 12 hours for 3 days, THEN 2 tablets (2 mg) every 8 hours for 3 days, THEN 2 tablets (2 mg) every 12 hours. Stay on 2mg every 12 hours until follow up with Dr. Cortez.   unoyv-fvrjy-8-dha-epa-lipids 832-74-00-50 mg capsule 3/13/2025 Morning Spouse/Significant Other   Sig: Take 1 capsule by mouth once daily.   levETIRAcetam (Keppra) 500 mg tablet  Spouse/Significant Other   Sig: Take 1 tablet (500 mg) by mouth 2 times a day.   Patient taking differently: Take 1 tablet (500 mg) by mouth once daily.   ondansetron (Zofran) 8 mg tablet Not Taking " "Spouse/Significant Other   Sig: Take 1 tablet (8 mg) by mouth every 8 hours if needed for nausea or vomiting. Take one hour prior to each chemotherapy dose in addition to every 8 hours as needed for nausea/vomiting   Patient not taking: Reported on 3/15/2025   pantoprazole (ProtoNix) 40 mg EC tablet 3/13/2025 Morning Spouse/Significant Other   Sig: TAKE 1 TABLET BY MOUTH IN THE MORNING 30 MINUTES BEFORE BREAKFAST.   prochlorperazine (Compazine) 10 mg tablet Not Taking Spouse/Significant Other   Sig: Take 1 tablet (10 mg) by mouth every 6 hours if needed for nausea or vomiting.   Patient not taking: Reported on 3/15/2025   sennosides (SENNA LAX ORAL) 3/13/2025 Morning Spouse/Significant Other   Sig: Take 1 tablet by mouth once daily.   silver sulfADIAZINE (Silvadene) 1 % cream  Spouse/Significant Other   Sig: Apply topically once daily.   Patient not taking: Reported on 2/19/2025   ubidecarenone (COENZYME Q10, BULK, MISC) 3/11/2025 Morning Spouse/Significant Other   Sig: Take 1 Capful by mouth once daily.      Facility-Administered Medications: None        The list below reflects the updated allergy list. Please review each documented allergy for additional clarification and justification.  Allergies  Reviewed by Breanna Phoenix on 3/15/2025   No Known Allergies         Patient accepts M2B at discharge.     Sources:   [facility]  Pharmacy dispense history  Patient Interview Unable to provide any details  Spouse   Good historian  Chart Review     Additional Comments:  Pts wife was at bedside, Pt was asleep so Pts wife did the med rec with me.   Pts wife was able to confirm the meds, dosages, and last taken at home.       SAMEERA NEVES [place]  Pharmacy Technician  03/15/25     Secure Chat preferred   If no response call [phone] or [email or web address] \"Med Rec\"   "

## 2025-03-15 NOTE — PROGRESS NOTES
"Gastroenterology Consult Service Progress Note  Department of Gastroenterology & Hepatology  [facility]    [facility]  Date of Service  March 15, 2025   Patient: Enmanuel Ahumada    Medical Record: [id number]    Interval History: Patient reports improving headache since admission.     Physical Exam:  Alert and oriented  Craniotomy scar visualized.  Clear sclera  CTAB  RRR  Abd soft,   Skin warm and dry      Vital Signs:    Vitals:    03/15/25 0700 03/15/25 0800 03/15/25 0900 03/15/25 1229   BP: 121/76 (!) 137/97 (!) 131/100 (!) 134/97   BP Location:    Left arm   Patient Position:    Lying   Pulse: 90 94 89 (!) 101   Resp: 18 20 19 14   Temp:    36.7 °C (98.1 °F)   TempSrc:    Temporal   SpO2: (!) 92% (!) 93% (!) 92% 94%   Weight:       Height:           No intake or output data in the 24 hours ending 03/15/25 1237        Assessment:     Enmanuel Ahumada \"Petey" is a 69 y.o. male presenting with HTN, HLD, smalls's esophagus, RO GBM s/p resection ([facility], 12/2023) with resultant L HH s/p chemo/RT, 2/20/2025 s/p redo craniotomy for tumor resection admitted for further evaluation of his weakness and confusion. Infectious work up were negative. No leukocytosis and his UA has no c/f for infection. No cough and has clear auscultation. CXR pending. No electrolyte abnormalities as well. CT head with out contrast did not show any intracranial hemorrhage, stroke or worsening midline shift. Most of this findings per Neurosurgery note are expected post operative findings.     Update 03/15/25  - Radiation oncology consulted  - Improving headache   - MRI Brain as per outpatient oncologist recommendation - to be completed while inpatient as he was scheduled for it to be done on 03/16/25    # Delirium  # Brain Mass  s/p resection ([facility], 12/2023) with resultant L HH s/p chemo/RT,  s/p redo craniotomy for tumor resection (2/20/2025)  - most likely related to post op changes.   - " delirium precautions  - Neurosurgery consulted, appreciate recs - No acute surgical indications  - per neurosx, start Dexamethasone 4 mg q8 taper   - c/w Levetiracetam 500 mb BID for seizure prophylaxis  - MRI brain     # Weakness  - PT OT ordered     # HTN  # HLD  - c/w Atorvastatin 40 mg at bedtime      F: Replete PRN  E: Replete PRN  N: Regular Diet  DVT Ppx: Lovenox   GI Ppx: Pantoprazole  Access/Lines: PIV  Abx: None  O2: None     Pain regimen: Tylenol  GI Laxative: Docusate/Senna / Miralax      Code status: Full Code  Emergency contact:   Brittany Ahumada (Spouse)  [phone] (Mobile)     Evy Bowden  PGY-6

## 2025-03-15 NOTE — H&P
"History of present illness:  Enmanuel Ahumada \"Dash\" is a 69 y.o. male presenting with HTN, HLD, smalls's esophagus, RO GBM s/p resection ([facility], 12/2023) with resultant L HH s/p chemo/RT, 2/20/2025 s/p redo craniotomy for tumor resection who was brought by his wife due to concern for lower extremity weakness and headache.     Last known normal was last night  (3/13/25). This morning patient was noted to be have difficulty in standing up and needs assistance to ambulate per wife. Also endorses HA on the occipital area but no N/V, LOC, seizure or fall noted. Also patient was noted to be confused in the afternoon. Example when he was in the ED, his wife told him that he needs to go home as he will get admitted but patient was confused why she needs to go home and seems he does not know what is happening.     This note writer spoke and assess the patient in the ED. He was able to tell his name, date of birth, current location and name of his wife. It was noted that he was having a hard time remembering why he is currently in the hospital. Denies HA, N/V SOB or chest pain.     Due to concern for weakness and confusion, his wife requested him to get admitted for further evaluation and management.          Current Vitals:  /83   Pulse 98   Temp 36.9 °C (98.4 °F) (Temporal)   Resp 16   SpO2 95%      Labs:   CBC: WBC 8.7 , HGB 16.6, PLT 74  BMP: , K 3.9, Cl 100, HCO3 27, BUN 19, CR 0.75, Glu 154  LFTS: AST 12 , ALT 30, ALKPHOS 98 , TBILI 1.7 ,     COAGS: PT 11.4 , PTT 22  , INR 1.0  UA:   Results from last 7 days   Lab Units 03/14/25  1945   COLOR U  Light-Yellow   PH U  6.5   SPEC GRAV UR  1.016   PROTEIN U mg/dL NEGATIVE   BLOOD UR mg/dL NEGATIVE   NITRITE U  NEGATIVE     ABG:    CALCIUM 9.2     EKG  Encounter Date: 03/07/25   ECG 12 Lead   Result Value    Ventricular Rate 77    Atrial Rate 77    TX Interval 136    QRS Duration 88    QT Interval 370    QTC Calculation(Bazett) 418    P Axis 55    R " Axis -21    T Axis 41    QRS Count 13    Q Onset 225    P Onset 157    P Offset 208    T Offset 410    QTC Fredericia 402    Narrative    Normal sinus rhythm  Inferior infarct (cited on or before 24-JAN-2020)  Abnormal ECG  When compared with ECG of 01-FEB-2024 12:19,  Fusion complexes are no longer Present  Premature ventricular complexes are no longer Present        Imaging:  CT head wo IV contrast    Result Date: 3/14/2025  Interpreted By:  Johanne Carty  and Kavitha Chow STUDY: CT HEAD WO IV CONTRAST;  3/14/2025 8:41 pm   INDICATION: Signs/Symptoms:swelling after craniotomy 3 weeks ago.   COMPARISON: CT head 02/20/25; MRI of the brain 02/21/2025.   ACCESSION NUMBER(S): [id number]   ORDERING CLINICIAN: STEFFEN SIMERLINK   TECHNIQUE: Noncontrast axial CT images of head were obtained with coronal and sagittal reconstructed images.   FINDINGS: Surgical changes of right vertex craniotomy are again present with interval resolution of the subcutaneous emphysema overlying the craniotomy site compared to prior exam on 02/20/2025. Scalp soft tissues do not demonstrate any acute abnormality. No scalp fluid collection is identified in the provided images.   There is suggestion of the small subdural collection underlying the craniotomy site, measuring 5-6 mm in size (series 206, image 79) without significant local mass effect.   Fluid-filled resection/debulking cavity in the posterior right cerebral hemisphere underlying the craniotomy, with near-complete interval resolution of previously visualized pneumocephalus, except for a punctate focus present along the superomedial margin.   The surgical resection cavity appears slightly enlarged compared to prior exam, measuring 7.7 x 6.2 cm in size in transaxial dimensions compared to 6.8 x 6.0 cm on previous imaging. There is some local mass effect, with 6-7 mm leftward shift of midline structures, similar in appearance to prior exam on 02/20/2025. Low-density  edema/gliosis is present in the white matter of the right corona radiata similar to prior exam.   No new hyperdense intracranial hemorrhage is identified hyperdense structure along the superomedial aspect of the surgical resection is unchanged in appearance to prior exam.   Gray-white differentiation is intact, without evidence of new CT apparent transcortical infarct. Minimal volume of low-density attenuation changes in the left cerebral hemispheres likely represent component of chronic microvascular disease related changes.   There is unchanged mild subfalcine herniation, with the effacement of the perimesencephalic cisterns bilaterally.   Calvarium does not demonstrate any acute abnormality. Mastoid air cells and middle ear cavities are clear. There is near-complete opacification of the left maxillary sinus, slightly worsened since prior study on 02/20/2025.       1. Postsurgical changes of the right vertex craniotomy and resection/debulking of right posterior cerebral mass. In the interim since prior CT on 02/20/2025, there has been interval resolution of the subcutaneous emphysema within the scalp soft tissues overlying the craniotomy site, without evidence of new scalp collection or abnormality. 2. Fluid-filled surgical cavity is present in the posterior right cerebral hemisphere underlying the craniotomy site, slightly increased in size compared to prior imaging on 02/20/2025, with near-complete interval resolution of previously seen pneumocephalus. There is persistent local mass effect, with mild effacement of the right lateral ventricle and a 6-7 mm leftward shift of midline structures at the septum pellucidum, similar to prior exam. Geographic low-density edema/gliosis in the white matter of the right frontal lobe is similar or slightly worsened compared to prior study on 02/20/2025. 3. No evidence of new hemorrhage or CT apparent transcortical infarct.   I personally reviewed the images/study and I agree  with the findings as stated by Geraldo Plummer MD. This study was interpreted at [facility], [place], [place].   MACRO: None.   Signed by: Johanne Carty 3/14/2025 10:14 PM Dictation workstation:   [id number]      ED Interventions:  Medications   lactated Ringer's bolus 1,000 mL (0 mL intravenous Stopped 3/14/25 2200)   oxyCODONE (Roxicodone) immediate release tablet 5 mg (5 mg oral Given 3/14/25 2005)   dexAMETHasone (Decadron) tablet 4 mg (4 mg oral Given 3/15/25 0025)       Cardiac Hx:    Encounter Date: 03/07/25   ECG 12 Lead   Result Value    Ventricular Rate 77    Atrial Rate 77    CA Interval 136    QRS Duration 88    QT Interval 370    QTC Calculation(Bazett) 418    P Axis 55    R Axis -21    T Axis 41    QRS Count 13    Q Onset 225    P Onset 157    P Offset 208    T Offset 410    QTC Fredericia 402    Narrative    Normal sinus rhythm  Inferior infarct (cited on or before 24-JAN-2020)  Abnormal ECG  When compared with ECG of 01-FEB-2024 12:19,  Fusion complexes are no longer Present  Premature ventricular complexes are no longer Present        GI Hx:  Last EGD: === Results for orders placed in visit on 06/20/22 ===    Esophagogastroduodenoscopy (EGD) [GI2]     Status: Normal  Last Colonoscopy:  === Results for orders placed in visit on 09/21/22 ===    Colonoscopy [GI6]     Status: Normal    Past Medical History:  He has a past medical history of Brain cancer (Multi), GERD (gastroesophageal reflux disease), and Seizure disorder (Multi).    Past Surgical History:  He has a past surgical history that includes Other surgical history (07/24/2019); Other surgical history (07/26/2021); Brain surgery; Cholecystectomy (January 25, 2020); and Vasectomy (Abt 1991).     Social history:  Home:  Smoking:  Alcohol:   Drugs:     Allergies:  Patient has no known allergies.    Home medications:  Prior to Admission medications    Medication Sig Start Date End Date Taking? Authorizing  Provider   atorvastatin (Lipitor) 40 mg tablet TAKE 1 TABLET BY MOUTH EVERY DAY 8/1/24   Ricardo Youngblood DO   CALCIUM CARBONATE-VITAMIN D3 ORAL Take 1 capsule by mouth once daily.    Historical Provider, MD   cephalexin (Keflex) 500 mg capsule Take 1 capsule (500 mg) by mouth 4 times a day for 5 days. 3/7/25 3/12/25  Nataliya Briseno, APRN-CNP   clobetasol (Temovate) 0.05 % cream Apply topically 2 times a day. 5/23/24   Jacki Simpson PA-C   dexAMETHasone (Decadron) 1 mg tablet Take 4 tablets (4 mg) by mouth every 8 hours for 3 days, THEN 4 tablets (4 mg) every 12 hours for 3 days, THEN 2 tablets (2 mg) every 8 hours for 3 days, THEN 2 tablets (2 mg) every 12 hours. Stay on 2mg every 12 hours until follow up with Dr. Cortez. 2/22/25 5/2/25  Valeriano Castro MD   fnknc-cyfyv-9-dha-epa-lipids 269-99-57-50 mg capsule Take 1 capsule by mouth once daily.    Historical Provider, MD   levETIRAcetam (Keppra) 500 mg tablet Take 1 tablet (500 mg) by mouth 2 times a day. 2/22/25 4/23/25  Valeriano Castro MD   ondansetron (Zofran) 8 mg tablet Take 1 tablet (8 mg) by mouth every 8 hours if needed for nausea or vomiting. Take one hour prior to each chemotherapy dose in addition to every 8 hours as needed for nausea/vomiting 1/15/24   Kyle Rolon MD   pantoprazole (ProtoNix) 40 mg EC tablet TAKE 1 TABLET BY MOUTH IN THE MORNING 30 MINUTES BEFORE BREAKFAST. 2/22/25   Valeriano Castro MD   prochlorperazine (Compazine) 10 mg tablet Take 1 tablet (10 mg) by mouth every 6 hours if needed for nausea or vomiting. 1/15/24   Kyle Rolon MD   silver sulfADIAZINE (Silvadene) 1 % cream Apply topically once daily.  Patient not taking: Reported on 2/19/2025 3/19/24   Evgeny Palomino MD, MS   ubidecarenone (COENZYME Q10, BULK, MISC) Take 1 Capful by mouth once daily.    Historical Provider, MD          Objective     Vital signs:  Blood pressure 126/83, pulse 98, temperature 36.9 °C (98.4 °F), temperature source Temporal, resp. rate 16,  SpO2 95%.    Physical Exam  General: No acute distress, appropriate conversation  HEENT:  + clean incision in the occipital area. No drainage or discharge noted. Non tender on palpation  lesions, MMM, nares patent  Neck:  No JVD detectable, no LAD  Cardiac:  RRR, no m/r/g  Pulm:  CTAB, equal and bilateral chest rise  GI: soft, nontender, nondistended, +BS, no HSM  Extremities:  No edema noted, no chronic scars noted  Neuro:  AOx3, preserved strength in upper and lower extremities, preserved sensation, CN2-12 intact     Meds:  No current facility-administered medications on file prior to encounter.     Current Outpatient Medications on File Prior to Encounter   Medication Sig Dispense Refill    atorvastatin (Lipitor) 40 mg tablet TAKE 1 TABLET BY MOUTH EVERY DAY 90 tablet 3    CALCIUM CARBONATE-VITAMIN D3 ORAL Take 1 capsule by mouth once daily.      [] cephalexin (Keflex) 500 mg capsule Take 1 capsule (500 mg) by mouth 4 times a day for 5 days. 20 capsule 0    clobetasol (Temovate) 0.05 % cream Apply topically 2 times a day. 30 g 0    dexAMETHasone (Decadron) 1 mg tablet Take 4 tablets (4 mg) by mouth every 8 hours for 3 days, THEN 4 tablets (4 mg) every 12 hours for 3 days, THEN 2 tablets (2 mg) every 8 hours for 3 days, THEN 2 tablets (2 mg) every 12 hours. Stay on 2mg every 12 hours until follow up with Dr. Cortez. 318 tablet 0    krill-omega-3-dha-epa-lipids 154-43-40-50 mg capsule Take 1 capsule by mouth once daily.      levETIRAcetam (Keppra) 500 mg tablet Take 1 tablet (500 mg) by mouth 2 times a day. 60 tablet 1    ondansetron (Zofran) 8 mg tablet Take 1 tablet (8 mg) by mouth every 8 hours if needed for nausea or vomiting. Take one hour prior to each chemotherapy dose in addition to every 8 hours as needed for nausea/vomiting 60 tablet 5    pantoprazole (ProtoNix) 40 mg EC tablet TAKE 1 TABLET BY MOUTH IN THE MORNING 30 MINUTES BEFORE BREAKFAST. 90 tablet 3    prochlorperazine (Compazine) 10 mg  "tablet Take 1 tablet (10 mg) by mouth every 6 hours if needed for nausea or vomiting. 60 tablet 5    silver sulfADIAZINE (Silvadene) 1 % cream Apply topically once daily. (Patient not taking: Reported on 2/19/2025) 400 g 0    ubidecarenone (COENZYME Q10, BULK, MISC) Take 1 Capful by mouth once daily.            Assessment/Plan     Enmanuel Ahumada \"Petey" is a 69 y.o. male presenting with HTN, HLD, smalls's esophagus, RO GBM s/p resection ([facility], 12/2023) with resultant L HH s/p chemo/RT, 2/20/2025 s/p redo craniotomy for tumor resection admitted for further evaluation of his weakness and confusion. Infectious work up were negative. No leukocytosis and his UA has no c/f for infection. No cough and has clear auscultation. CXR pending. No electrolyte abnormalities as well. CT head with out contrast did not show any intracranial hemorrhage, stroke or worsening midline shift. Most of this findings per Neurosurgery note are expected post operative findings.      # Delirium  # Brain Mass  s/p resection ([facility]H, 12/2023) with resultant L HH s/p chemo/RT,  s/p redo craniotomy for tumor resection (2/20/2025)  - most likely related to post op changes.   - delirium precautions  - Neurosurgery consulted, appreciate recs.   - per neurosx, start Dexamethasone 4 mg q8 taper   - c/w Levetiracetam 500 mb BID for seizure prophylaxis  - consider Neurology evaluation if confusion persists.    # Weakness  - PT OT ordered    # HTN  # HLD  - c/w Atorvastatin 40 mg at bedtime     F: Replete PRN  E: Replete PRN  N: Regular Diet  DVT Ppx: Lovenox   GI Ppx: Pantoprazole  Access/Lines: PIV  Abx: None  O2: None    Pain regimen: Tylenol  GI Laxative: Docusate/Senna / Miralax     Code status: Full Code  Emergency contact:   Brittany Ahumada (Spouse)  [phone] (Mobile)        Dominguez Rodriguez MD  Internal Medicine PGY-3  "

## 2025-03-16 ENCOUNTER — APPOINTMENT (OUTPATIENT)
Dept: RADIOLOGY | Facility: HOSPITAL | Age: 70
End: 2025-03-16
Payer: MEDICARE

## 2025-03-16 ENCOUNTER — HOSPITAL ENCOUNTER (OUTPATIENT)
Dept: RADIOLOGY | Facility: HOSPITAL | Age: 70
End: 2025-03-16
Payer: MEDICARE

## 2025-03-16 VITALS
OXYGEN SATURATION: 96 % | BODY MASS INDEX: 24.48 KG/M2 | HEART RATE: 84 BPM | WEIGHT: 156 LBS | RESPIRATION RATE: 16 BRPM | SYSTOLIC BLOOD PRESSURE: 116 MMHG | HEIGHT: 67 IN | TEMPERATURE: 97 F | DIASTOLIC BLOOD PRESSURE: 77 MMHG

## 2025-03-16 LAB
ALBUMIN SERPL BCP-MCNC: 4.1 G/DL (ref 3.4–5)
ALP SERPL-CCNC: 63 U/L (ref 33–136)
ALT SERPL W P-5'-P-CCNC: 16 U/L (ref 10–52)
ANION GAP SERPL CALC-SCNC: 15 MMOL/L (ref 10–20)
AST SERPL W P-5'-P-CCNC: 15 U/L (ref 9–39)
BASOPHILS # BLD AUTO: 0.01 X10*3/UL (ref 0–0.1)
BASOPHILS NFR BLD AUTO: 0.1 %
BILIRUB SERPL-MCNC: 2.3 MG/DL (ref 0–1.2)
BUN SERPL-MCNC: 23 MG/DL (ref 6–23)
CALCIUM SERPL-MCNC: 9.6 MG/DL (ref 8.6–10.6)
CHLORIDE SERPL-SCNC: 100 MMOL/L (ref 98–107)
CO2 SERPL-SCNC: 25 MMOL/L (ref 21–32)
CREAT SERPL-MCNC: 0.8 MG/DL (ref 0.5–1.3)
EGFRCR SERPLBLD CKD-EPI 2021: >90 ML/MIN/1.73M*2
EOSINOPHIL # BLD AUTO: 0 X10*3/UL (ref 0–0.7)
EOSINOPHIL NFR BLD AUTO: 0 %
ERYTHROCYTE [DISTWIDTH] IN BLOOD BY AUTOMATED COUNT: 12.4 % (ref 11.5–14.5)
GLUCOSE SERPL-MCNC: 166 MG/DL (ref 74–99)
HCT VFR BLD AUTO: 44.9 % (ref 41–52)
HGB BLD-MCNC: 16.4 G/DL (ref 13.5–17.5)
IMM GRANULOCYTES # BLD AUTO: 0.05 X10*3/UL (ref 0–0.7)
IMM GRANULOCYTES NFR BLD AUTO: 0.5 % (ref 0–0.9)
LYMPHOCYTES # BLD AUTO: 1.09 X10*3/UL (ref 1.2–4.8)
LYMPHOCYTES NFR BLD AUTO: 11.3 %
MCH RBC QN AUTO: 32.7 PG (ref 26–34)
MCHC RBC AUTO-ENTMCNC: 36.5 G/DL (ref 32–36)
MCV RBC AUTO: 89 FL (ref 80–100)
MONOCYTES # BLD AUTO: 0.53 X10*3/UL (ref 0.1–1)
MONOCYTES NFR BLD AUTO: 5.5 %
NEUTROPHILS # BLD AUTO: 7.97 X10*3/UL (ref 1.2–7.7)
NEUTROPHILS NFR BLD AUTO: 82.6 %
NRBC BLD-RTO: 0 /100 WBCS (ref 0–0)
PLATELET # BLD AUTO: 87 X10*3/UL (ref 150–450)
POTASSIUM SERPL-SCNC: 4.5 MMOL/L (ref 3.5–5.3)
PROT SERPL-MCNC: 6.7 G/DL (ref 6.4–8.2)
RBC # BLD AUTO: 5.02 X10*6/UL (ref 4.5–5.9)
SODIUM SERPL-SCNC: 135 MMOL/L (ref 136–145)
WBC # BLD AUTO: 9.7 X10*3/UL (ref 4.4–11.3)

## 2025-03-16 PROCEDURE — 2550000001 HC RX 255 CONTRASTS: Performed by: INTERNAL MEDICINE

## 2025-03-16 PROCEDURE — 80053 COMPREHEN METABOLIC PANEL: CPT | Performed by: STUDENT IN AN ORGANIZED HEALTH CARE EDUCATION/TRAINING PROGRAM

## 2025-03-16 PROCEDURE — A9575 INJ GADOTERATE MEGLUMI 0.1ML: HCPCS | Performed by: INTERNAL MEDICINE

## 2025-03-16 PROCEDURE — 99233 SBSQ HOSP IP/OBS HIGH 50: CPT | Performed by: STUDENT IN AN ORGANIZED HEALTH CARE EDUCATION/TRAINING PROGRAM

## 2025-03-16 PROCEDURE — 2500000004 HC RX 250 GENERAL PHARMACY W/ HCPCS (ALT 636 FOR OP/ED)

## 2025-03-16 PROCEDURE — 36415 COLL VENOUS BLD VENIPUNCTURE: CPT | Performed by: STUDENT IN AN ORGANIZED HEALTH CARE EDUCATION/TRAINING PROGRAM

## 2025-03-16 PROCEDURE — 70553 MRI BRAIN STEM W/O & W/DYE: CPT | Performed by: STUDENT IN AN ORGANIZED HEALTH CARE EDUCATION/TRAINING PROGRAM

## 2025-03-16 PROCEDURE — 2500000005 HC RX 250 GENERAL PHARMACY W/O HCPCS

## 2025-03-16 PROCEDURE — 96372 THER/PROPH/DIAG INJ SC/IM: CPT

## 2025-03-16 PROCEDURE — G0378 HOSPITAL OBSERVATION PER HR: HCPCS

## 2025-03-16 PROCEDURE — 70553 MRI BRAIN STEM W/O & W/DYE: CPT

## 2025-03-16 PROCEDURE — 85025 COMPLETE CBC W/AUTO DIFF WBC: CPT | Performed by: STUDENT IN AN ORGANIZED HEALTH CARE EDUCATION/TRAINING PROGRAM

## 2025-03-16 PROCEDURE — 2500000001 HC RX 250 WO HCPCS SELF ADMINISTERED DRUGS (ALT 637 FOR MEDICARE OP)

## 2025-03-16 RX ORDER — GADOTERATE MEGLUMINE 376.9 MG/ML
14 INJECTION INTRAVENOUS
Status: COMPLETED | OUTPATIENT
Start: 2025-03-16 | End: 2025-03-16

## 2025-03-16 RX ADMIN — ATORVASTATIN CALCIUM 40 MG: 40 TABLET, FILM COATED ORAL at 08:14

## 2025-03-16 RX ADMIN — DEXAMETHASONE 4 MG: 2 TABLET ORAL at 15:22

## 2025-03-16 RX ADMIN — DEXAMETHASONE 4 MG: 2 TABLET ORAL at 06:00

## 2025-03-16 RX ADMIN — LEVETIRACETAM 500 MG: 500 TABLET, FILM COATED ORAL at 20:55

## 2025-03-16 RX ADMIN — PANTOPRAZOLE SODIUM 40 MG: 40 TABLET, DELAYED RELEASE ORAL at 08:13

## 2025-03-16 RX ADMIN — STANDARDIZED SENNA CONCENTRATE 17.2 MG: 8.6 TABLET ORAL at 08:14

## 2025-03-16 RX ADMIN — DEXAMETHASONE 4 MG: 2 TABLET ORAL at 23:00

## 2025-03-16 RX ADMIN — POLYETHYLENE GLYCOL 3350 17 G: 17 POWDER, FOR SOLUTION ORAL at 08:14

## 2025-03-16 RX ADMIN — GADOTERATE MEGLUMINE 14 ML: 376.9 INJECTION INTRAVENOUS at 14:38

## 2025-03-16 RX ADMIN — Medication 6 MG: at 20:55

## 2025-03-16 RX ADMIN — ENOXAPARIN SODIUM 40 MG: 40 INJECTION SUBCUTANEOUS at 08:14

## 2025-03-16 RX ADMIN — LEVETIRACETAM 500 MG: 500 TABLET, FILM COATED ORAL at 08:17

## 2025-03-16 SDOH — SOCIAL STABILITY: SOCIAL INSECURITY: WERE YOU ABLE TO COMPLETE ALL THE BEHAVIORAL HEALTH SCREENINGS?: YES

## 2025-03-16 SDOH — SOCIAL STABILITY: SOCIAL INSECURITY: WITHIN THE LAST YEAR, HAVE YOU BEEN HUMILIATED OR EMOTIONALLY ABUSED IN OTHER WAYS BY YOUR PARTNER OR EX-PARTNER?: NO

## 2025-03-16 SDOH — SOCIAL STABILITY: SOCIAL INSECURITY
WITHIN THE LAST YEAR, HAVE YOU BEEN RAPED OR FORCED TO HAVE ANY KIND OF SEXUAL ACTIVITY BY YOUR PARTNER OR EX-PARTNER?: NO

## 2025-03-16 SDOH — SOCIAL STABILITY: SOCIAL INSECURITY: DO YOU FEEL ANYONE HAS EXPLOITED OR TAKEN ADVANTAGE OF YOU FINANCIALLY OR OF YOUR PERSONAL PROPERTY?: NO

## 2025-03-16 SDOH — HEALTH STABILITY: MENTAL HEALTH: HOW OFTEN DO YOU HAVE A DRINK CONTAINING ALCOHOL?: MONTHLY OR LESS

## 2025-03-16 SDOH — SOCIAL STABILITY: SOCIAL INSECURITY: DOES ANYONE TRY TO KEEP YOU FROM HAVING/CONTACTING OTHER FRIENDS OR DOING THINGS OUTSIDE YOUR HOME?: NO

## 2025-03-16 SDOH — ECONOMIC STABILITY: INCOME INSECURITY: IN THE PAST 12 MONTHS HAS THE ELECTRIC, GAS, OIL, OR WATER COMPANY THREATENED TO SHUT OFF SERVICES IN YOUR HOME?: NO

## 2025-03-16 SDOH — SOCIAL STABILITY: SOCIAL INSECURITY: HAVE YOU HAD THOUGHTS OF HARMING ANYONE ELSE?: NO

## 2025-03-16 SDOH — SOCIAL STABILITY: SOCIAL INSECURITY: WITHIN THE LAST YEAR, HAVE YOU BEEN AFRAID OF YOUR PARTNER OR EX-PARTNER?: NO

## 2025-03-16 SDOH — HEALTH STABILITY: MENTAL HEALTH: HOW MANY DRINKS CONTAINING ALCOHOL DO YOU HAVE ON A TYPICAL DAY WHEN YOU ARE DRINKING?: 1 OR 2

## 2025-03-16 SDOH — ECONOMIC STABILITY: FOOD INSECURITY: WITHIN THE PAST 12 MONTHS, THE FOOD YOU BOUGHT JUST DIDN'T LAST AND YOU DIDN'T HAVE MONEY TO GET MORE.: NEVER TRUE

## 2025-03-16 SDOH — SOCIAL STABILITY: SOCIAL INSECURITY
WITHIN THE LAST YEAR, HAVE YOU BEEN KICKED, HIT, SLAPPED, OR OTHERWISE PHYSICALLY HURT BY YOUR PARTNER OR EX-PARTNER?: NO

## 2025-03-16 SDOH — HEALTH STABILITY: MENTAL HEALTH: HOW OFTEN DO YOU HAVE SIX OR MORE DRINKS ON ONE OCCASION?: NEVER

## 2025-03-16 SDOH — SOCIAL STABILITY: SOCIAL INSECURITY: ARE YOU OR HAVE YOU BEEN THREATENED OR ABUSED PHYSICALLY, EMOTIONALLY, OR SEXUALLY BY ANYONE?: NO

## 2025-03-16 SDOH — ECONOMIC STABILITY: FOOD INSECURITY: WITHIN THE PAST 12 MONTHS, YOU WORRIED THAT YOUR FOOD WOULD RUN OUT BEFORE YOU GOT THE MONEY TO BUY MORE.: NEVER TRUE

## 2025-03-16 SDOH — SOCIAL STABILITY: SOCIAL INSECURITY: ARE THERE ANY APPARENT SIGNS OF INJURIES/BEHAVIORS THAT COULD BE RELATED TO ABUSE/NEGLECT?: NO

## 2025-03-16 SDOH — SOCIAL STABILITY: SOCIAL INSECURITY: DO YOU FEEL UNSAFE GOING BACK TO THE PLACE WHERE YOU ARE LIVING?: NO

## 2025-03-16 SDOH — SOCIAL STABILITY: SOCIAL INSECURITY: HAS ANYONE EVER THREATENED TO HURT YOUR FAMILY OR YOUR PETS?: NO

## 2025-03-16 SDOH — SOCIAL STABILITY: SOCIAL INSECURITY: ABUSE: ADULT

## 2025-03-16 ASSESSMENT — PAIN SCALES - GENERAL
PAINLEVEL_OUTOF10: 0 - NO PAIN
PAINLEVEL_OUTOF10: 1

## 2025-03-16 ASSESSMENT — COGNITIVE AND FUNCTIONAL STATUS - GENERAL
DAILY ACTIVITIY SCORE: 24
PATIENT BASELINE BEDBOUND: NO
MOBILITY SCORE: 24

## 2025-03-16 ASSESSMENT — PAIN DESCRIPTION - PAIN TYPE: TYPE: ACUTE PAIN

## 2025-03-16 ASSESSMENT — ACTIVITIES OF DAILY LIVING (ADL)
HEARING - RIGHT EAR: FUNCTIONAL
JUDGMENT_ADEQUATE_SAFELY_COMPLETE_DAILY_ACTIVITIES: YES
HEARING - LEFT EAR: FUNCTIONAL
GROOMING: INDEPENDENT
BATHING: INDEPENDENT
FEEDING YOURSELF: INDEPENDENT
ADEQUATE_TO_COMPLETE_ADL: YES
ASSISTIVE_DEVICE: EYEGLASSES
LACK_OF_TRANSPORTATION: NO
PATIENT'S MEMORY ADEQUATE TO SAFELY COMPLETE DAILY ACTIVITIES?: YES
TOILETING: INDEPENDENT
WALKS IN HOME: INDEPENDENT
DRESSING YOURSELF: INDEPENDENT

## 2025-03-16 ASSESSMENT — PAIN DESCRIPTION - LOCATION: LOCATION: HEAD

## 2025-03-16 ASSESSMENT — PATIENT HEALTH QUESTIONNAIRE - PHQ9
1. LITTLE INTEREST OR PLEASURE IN DOING THINGS: NOT AT ALL
2. FEELING DOWN, DEPRESSED OR HOPELESS: NOT AT ALL
SUM OF ALL RESPONSES TO PHQ9 QUESTIONS 1 & 2: 0

## 2025-03-16 ASSESSMENT — PAIN - FUNCTIONAL ASSESSMENT: PAIN_FUNCTIONAL_ASSESSMENT: 0-10

## 2025-03-16 ASSESSMENT — LIFESTYLE VARIABLES
AUDIT-C TOTAL SCORE: 1
SKIP TO QUESTIONS 9-10: 1

## 2025-03-16 NOTE — PROGRESS NOTES
"Physical Therapy    Physical Therapy Evaluation and Treatment      Patient Name: Enmanuel Ahumada \"Dash\"  MRN: [id number]  Today's Date: 3/16/2025    Time Entry:                           2025 40 COPAY, 0 DED, 4300 OOP MAX, VS MED NEC, MC 90 DAY 6/17/2025, NO AUTH   C71.9 (ICD-10-CM) - GBM (glioblastoma multiforme) (Multi)   Nataliya Cortez MD     Assessment:      Enmanuel Ahumada presents to physical therapy today with *** .      Standardized testing and measures administered today, including *** , reveal that the patient has multiple impairments in body structures and functions, activity limitations, and participation restrictions.  These include subjective and objective findings such as *** .  The patient has ... personal factors and comorbidities that may serve as barriers affecting the plan of care, including *** [gender, age, cultural factors, educational level, low socioeconomic status, low health literacy, coping style, emotional status, personality, motivation, transportation, social support, lifestyle, drug use, specific medial comorbidities, etc].  The patient's clinical presentation includes *** [stable & uncomplicated/evolving & changing/unstable & unpredictable] characteristics as noted during today's evaluation, including *** . These findings indicate that this patient is of [low/moderate/high] complexity, and skilled PT services are warranted in order to realize measurable change in the above outcome measures and achieve improvements in the patient's functional status and individual goals.  The patient verbalized understanding and is in agreement with all goals and plan of care.      Plan:       Current Problem:   No diagnosis found.    Subjective    General:     Pt arrives at with ***, with chief complaint of unsteadiness on feet/imbalance secondary to GBM. Pt is s/p R crani for tumor resection with 5ALA on 12/29/23, s/p resection that required revision craniotomy 2/20/25. He initially " "presented to the ED on 12/24/23 with 6 weeks of headaches, 3 weeks of increased confusion and running into things. Left-sided hemianopia on exam. CT head revealed a 1 mm midline leftward shift with a 7.3 cm x 7.1 cm x 5 cm right parietal mass.     Per ED note 3/14/25:  \"Enmanuel Ahumada is a 69 y.o. male with past medical history of hypertension, hyperlipidemia, glioblastoma status post resection that required a re-vision craniotomy on 2/20/2025 who is presenting today with a headache and swelling near his surgical wound. Denies fevers, chills. Reports that symptoms began today. Denies nausea vomiting. Endorses baseline weakness in the left worse than the right.\"    ***   To do RT with oncology.  Has L homonymous hemianopsia     Per Neurosurgery note 3/7/25  Treatment Synopsis:   Brain Tumor: Glioblastoma, IDH wildtype grade 4  Molecular: IDH wildtype  Location: occipital lobe  Age at diagnosis: 67  Prior history of Radiation: yes      Falls/STEADI: ***  At least 2 falls in the last year  --On 2/2/25 felt weak and tripped while in the bathroom, falling backwards and landing on his buttocks.   --Also fell onto his R arm, again felt weak and off-balance, and fell on his arm.     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     Strength:   Formal MMT not performed, however strength assessed through functional means.     MMT:   Joint Strength /5   Hip: R L   Flexion *** ***   Extension *** ***   ABD *** ***   IR *** ***   ER *** ***        Knee: R L   Extension *** ***   Flexion *** ***        Ankle:  R L   DF *** ***   PF *** ***     Coordination:   Finger to nose  Heel to shin  Rapid Alternating Movements   Toe tapping  Opposition    Proprioception:     Transfers:   Sit <> stand: ***  Supine <> Sit: ***    Gait:   Pt ambulated with *** demonstrating ***     Stairs:   Pt ascended/descended ***    Balance:   BETITO Position Time Held/Observations   Level Surface:     Normal ***   NBOS ***   R Tandem Stance ***   L Tandem Stance ***   R SLS ***   L SLS ***       Foam:     Normal ***   NBOS ***   R Tandem Stance ***   L Tandem Stance ***   R SLS ***   L SLS ***     360 Deg Turn  Tandem walk  Perturbations  Walking with head turns     Functional Tests:       Outcome Measures:  {PT Outcome Measures:34666}     Treatments:  Therapeutic Exercise (08982): *** minutes    Manual Therapy (88382): *** minutes    Neuromuscular Re-education (26971): *** minutes    Therapeutic Activity (80029): *** minutes    Gait Training (69760): *** minutes      EDUCATION:       Goals:  By discharge, pt will meet the following goals:     Pt will demonstrate independence with home exercise program.  Pt will tolerate increased exercise without adverse reaction.   Pt will increase ROM of ***   Pt will increase strength of ***  Pt will improve score of *** by *** points to meet MCID.  Pt will report decrease in pain by 2 points to meet MCID.  Pt will meet self-reported goal of ***

## 2025-03-16 NOTE — CONSULTS
"Radiation Oncology Inpatient Consult    Patient Name:  Enmanuel Ahumada  MRN:  [id number]  :  1955    Referring Provider: No ref. provider found  Primary Care Provider: Ricardo Youngblood DO  Care Team: Patient Care Team:  Ricardo Youngblood DO as PCP - General  Ricardo Youngblood DO as PCP - [employer] Medicare Advantage PCP  Kyle Rolon MD as Consulting Physician (Hematology and Oncology)    Date of Service: 3/14/2025       Reason for Consult:  Consideration for radiation    History of Present Illness:  Enmanuel Ahumada \"Petey" is a 69 y.o. male who was referred by No ref. provider found, for a consultation to the [facility] Department of Radiation Oncology.  He is presenting for evaluation and management of glioblastoma.    Mr. Ahumada is a known patient of Dr. Moreno.  He is a 69-year-old gentleman with history of glioblastoma s/p resection 2023 complicated by subarachnoid hemorrhage and underwent chemo RT at that time (RT completing 3/2024) s/p redo craniotomy for additional resection 2025 and admitted 3/14 for bilateral lower extremity weakness, confusion, and headache.  Patient reports developing a headache on Thursday and noticed some swelling on the right side of his head Friday as well as increased difficulty standing up/ambulating on Friday and presented to the ED.  Confusion, swelling, and headache have since resolved.  Patient has not attempted ambulating since arriving to the ED.  Of note, patient states he was instructed to begin weaning his steroids about a week ago.    He was evaluated at bedside and accompanied by his wife.        Pathology Review:  The pertinent pathology results were reviewed from EMR and discussed with the patient.       Imaging:  The pertinent imaging results were reviewed from EMR/PACS with key results discussed with the patient.    Review of Systems: As per HPI.    RADIATION SCREENING QUESTIONS:  Prior radiation therapy: " Yes, describe: 60 Gy / 30 fxs protons completed 3/28/24   Pacemaker: No  Other implantable devices: No  Connective tissue disease: No    Current Systemic Treatment:  Yes, describe: Plan for [id number] study. Not received any systemic treatment yet. Last TMZ 1/6/25.     Past Medical History:    Past Medical History:   Diagnosis Date    Brain cancer (Multi)     GERD (gastroesophageal reflux disease)     Seizure disorder (Multi)      Past Surgical History:    Past Surgical History:   Procedure Laterality Date    BRAIN SURGERY      CHOLECYSTECTOMY  January 25, 2020    OTHER SURGICAL HISTORY  07/24/2019    Colonoscopy    OTHER SURGICAL HISTORY  07/26/2021    Endoscopy    VASECTOMY  Abt 1991      Family History:  Cancer-related family history is not on file.  Social History:    Social History     Tobacco Use    Smoking status: Never    Smokeless tobacco: Never   Substance Use Topics    Alcohol use: Not Currently    Drug use: Never     Allergies:  No Known Allergies  Medications:    Current Facility-Administered Medications:     acetaminophen (Tylenol) tablet 650 mg, 650 mg, oral, q6h PRN, Dominguez Rodriguez MD, 650 mg at 03/15/25 1305    atorvastatin (Lipitor) tablet 40 mg, 40 mg, oral, Daily, Dominguez Rodriguez MD, 40 mg at 03/16/25 0814    dexAMETHasone (Decadron) tablet 4 mg, 4 mg, oral, q8h LA NENA, 4 mg at 03/16/25 0600 **FOLLOWED BY** [START ON 3/18/2025] dexAMETHasone (Decadron) tablet 4 mg, 4 mg, oral, q12h LA NENA **FOLLOWED BY** [START ON 3/21/2025] dexAMETHasone (Decadron) tablet 2 mg, 2 mg, oral, q8h LA NENA, Dominguez Rodriguez MD    enoxaparin (Lovenox) syringe 40 mg, 40 mg, subcutaneous, q24h, Dominguez Rodriguez MD, 40 mg at 03/16/25 0814    levETIRAcetam (Keppra) tablet 500 mg, 500 mg, oral, BID, Dominguez Rodriguez MD, 500 mg at 03/16/25 0817    melatonin tablet 6 mg, 6 mg, oral, Nightly, Dominguez Rodriguez MD, 6 mg at 03/15/25 2148    pantoprazole (ProtoNix) EC tablet 40 mg, 40 mg, oral, Daily before breakfast, Dominguez NAM  MD Jennifer, 40 mg at 03/16/25 0813    polyethylene glycol (Glycolax, Miralax) packet 17 g, 17 g, oral, Daily, Dominguez Rodriguez MD, 17 g at 03/16/25 0814    sennosides (Senokot) tablet 17.2 mg, 2 tablet, oral, BID, Dominguez Rodriguez MD, 17.2 mg at 03/16/25 0814    Current Outpatient Medications:     acetaminophen (Tylenol) 325 mg tablet, Take 2 tablets (650 mg) by mouth every 6 hours if needed for mild pain (1 - 3)., Disp: , Rfl:     pantoprazole (ProtoNix) 40 mg EC tablet, TAKE 1 TABLET BY MOUTH IN THE MORNING 30 MINUTES BEFORE BREAKFAST., Disp: 90 tablet, Rfl: 3    atorvastatin (Lipitor) 40 mg tablet, TAKE 1 TABLET BY MOUTH EVERY DAY, Disp: 90 tablet, Rfl: 3    CALCIUM CARBONATE-VITAMIN D3 ORAL, Take 1 capsule by mouth once daily., Disp: , Rfl:     clobetasol (Temovate) 0.05 % cream, Apply topically 2 times a day. (Patient not taking: Reported on 3/15/2025), Disp: 30 g, Rfl: 0    dexAMETHasone (Decadron) 1 mg tablet, Take 4 tablets (4 mg) by mouth every 8 hours for 3 days, THEN 4 tablets (4 mg) every 12 hours for 3 days, THEN 2 tablets (2 mg) every 8 hours for 3 days, THEN 2 tablets (2 mg) every 12 hours. Stay on 2mg every 12 hours until follow up with Dr. Cortez., Disp: 318 tablet, Rfl: 0    krill-omega-3-dha-epa-lipids 553-55-60-50 mg capsule, Take 1 capsule by mouth once daily., Disp: , Rfl:     levETIRAcetam (Keppra) 500 mg tablet, Take 1 tablet (500 mg) by mouth 2 times a day. (Patient taking differently: Take 1 tablet (500 mg) by mouth once daily.), Disp: 60 tablet, Rfl: 1    ondansetron (Zofran) 8 mg tablet, Take 1 tablet (8 mg) by mouth every 8 hours if needed for nausea or vomiting. Take one hour prior to each chemotherapy dose in addition to every 8 hours as needed for nausea/vomiting (Patient not taking: Reported on 3/15/2025), Disp: 60 tablet, Rfl: 5    prochlorperazine (Compazine) 10 mg tablet, Take 1 tablet (10 mg) by mouth every 6 hours if needed for nausea or vomiting. (Patient not taking: Reported  "on 3/15/2025), Disp: 60 tablet, Rfl: 5    sennosides (SENNA LAX ORAL), Take 1 tablet by mouth once daily., Disp: , Rfl:     silver sulfADIAZINE (Silvadene) 1 % cream, Apply topically once daily. (Patient not taking: Reported on 2/19/2025), Disp: 400 g, Rfl: 0    ubidecarenone (COENZYME Q10, BULK, MISC), Take 1 Capful by mouth once daily., Disp: , Rfl:       Performance Status:  The Karnofsky performance scale today is 70, Cares for self; unable to carry on normal activity or to do active work (ECOG equivalent 1).     OBJECTIVE  Physical Exam:  /79   Pulse (!) 110   Temp 36.7 °C (98.1 °F) (Temporal)   Resp 15   Ht 1.702 m (5' 7\")   Wt 70.8 kg (156 lb)   SpO2 95%   BMI 24.43 kg/m²    Physical Exam  Constitutional:       General: He is not in acute distress.     Appearance: He is normal weight.   Eyes:      Extraocular Movements: Extraocular movements intact.      Pupils: Pupils are equal, round, and reactive to light.   Cardiovascular:      Rate and Rhythm: Normal rate and regular rhythm.   Pulmonary:      Effort: Pulmonary effort is normal. No respiratory distress.      Breath sounds: Normal breath sounds.   Abdominal:      General: Abdomen is flat.      Palpations: Abdomen is soft.   Skin:     General: Skin is warm and dry.      Coloration: Skin is not jaundiced.   Neurological:      Mental Status: He is alert and oriented to person, place, and time.      Cranial Nerves: No cranial nerve deficit.      Comments: Upper extremities strength and sensation equal and intact bilaterally. RLE 4+/5, LLE 4-/5. Sensation in lower extremities intact.          Laboratory Review:  The pertinent lab results were reviewed and discussed with the patient.      ASSESSMENT:  Enmanuel Ahumada is a 69 y.o. male with glioblastoma s/p resection 12/2023 complicated by subarachnoid hemorrhage and underwent chemo RT at that time (RT completing 3/2024) s/p redo craniotomy for additional resection 2/20/2025 and admitted 3/14 " for bilateral lower extremity weakness, confusion, and headache.    We discussed that his prior radiation makes additional radiation more tricky, and though is not entirely out of the question. We will await his MRI brain and review with Dr. Palomino.    PLAN:    Mr. Ahumada's pertinent history, exam, imaging and pathology details were reviewed.   Accompanied by his wife.    - No emergent indication for radiation therapy, as we will need updated MRI as well as review dose composite with prior RT plan.   - Agree with steroids, PPI   - Will review with Dr. Palomino feasibility/safety of additional treatment    Justice Gamboa MD  PGY-2, Radiation Oncology Resident  On-call pager [phone]  Available on Epic Secure Chat    Staffed with Dr. Nancy Ross, DO

## 2025-03-16 NOTE — PROGRESS NOTES
"   [facility]  Date of Service  March 16, 2025   Patient: Enmanuel Ahumada    Medical Record: [id number]    Interval History: Patient continues to report improving headache. 1/10 in severity this AM    Physical Exam:  Alert and oriented  Craniotomy scar visualized.  Clear sclera  CTAB  RRR  Abd soft,   Skin warm and dry    Vital Signs:    Vitals:    03/16/25 0300 03/16/25 0500 03/16/25 0600 03/16/25 0700   BP: 119/88 121/81 109/88 114/83   BP Location:       Patient Position:       Pulse: 76 84 (!) 101 90   Resp: 16  15 10   Temp:       TempSrc:       SpO2: 98% 97% 96% 94%   Weight:       Height:           No intake or output data in the 24 hours ending 03/16/25 1013    Assessment:     Enmanuel Ahumada \"Petey" is a 69 y.o. male presenting with HTN, HLD, smalls's esophagus, RO GBM s/p resection (SAH, 12/2023) with resultant L HH s/p chemo/RT, 2/20/2025 s/p redo craniotomy for tumor resection admitted for further evaluation of his weakness and confusion. Infectious work up were negative.  CT head with out contrast did not show any intracranial hemorrhage, stroke or worsening midline shift. Most of this findings per Neurosurgery note are expected post operative findings. Discharge pending MRI brain, rad onc and PT/OT recs.      Update 03/16/25  - Radiation oncology consulted  - Improving headache   - MRI Brain as per outpatient oncologist recommendation - to be completed while inpatient as he was scheduled for it to be done on 03/16/25     # Delirium  # Brain Mass  s/p resection (SAH, 12/2023) with resultant L HH s/p chemo/RT,  s/p redo craniotomy for tumor resection (2/20/2025)  - most likely related to post op changes.   - delirium precautions  - Neurosurgery consulted, appreciate recs - No acute surgical indications  - per neurosx, start Dexamethasone 4 mg q8 taper   - c/w Levetiracetam 500 mb BID for seizure prophylaxis  - MRI brain     # Weakness  - PT OT ordered     # " HTN  # HLD  - c/w Atorvastatin 40 mg at bedtime      F: Replete PRN  E: Replete PRN  N: Regular Diet  DVT Ppx: Lovenox   GI Ppx: Pantoprazole  Access/Lines: PIV  Abx: None  O2: None     Pain regimen: Tylenol  GI Laxative: Docusate/Senna / Miralax      Code status: Full Code  Emergency contact:   Brittany Ahumada (Spouse)  [phone] (Mobile)      Evy Bowden  PGY-6

## 2025-03-17 ENCOUNTER — APPOINTMENT (OUTPATIENT)
Dept: PHYSICAL THERAPY | Facility: CLINIC | Age: 70
End: 2025-03-17

## 2025-03-17 DIAGNOSIS — C71.9 GBM (GLIOBLASTOMA MULTIFORME) (MULTI): Primary | ICD-10-CM

## 2025-03-17 LAB
ALBUMIN SERPL BCP-MCNC: 3.9 G/DL (ref 3.4–5)
ALP SERPL-CCNC: 63 U/L (ref 33–136)
ALT SERPL W P-5'-P-CCNC: 18 U/L (ref 10–52)
AMYLASE SERPL-CCNC: 44 U/L (ref 29–103)
ANION GAP SERPL CALC-SCNC: 16 MMOL/L (ref 10–20)
AST SERPL W P-5'-P-CCNC: 7 U/L (ref 9–39)
BASOPHILS # BLD AUTO: 0 X10*3/UL (ref 0–0.1)
BASOPHILS NFR BLD AUTO: 0 %
BILIRUB SERPL-MCNC: 1.6 MG/DL (ref 0–1.2)
BUN SERPL-MCNC: 27 MG/DL (ref 6–23)
CALCIUM SERPL-MCNC: 9.6 MG/DL (ref 8.6–10.6)
CHLORIDE SERPL-SCNC: 99 MMOL/L (ref 98–107)
CO2 SERPL-SCNC: 26 MMOL/L (ref 21–32)
CREAT SERPL-MCNC: 0.84 MG/DL (ref 0.5–1.3)
EGFRCR SERPLBLD CKD-EPI 2021: >90 ML/MIN/1.73M*2
EOSINOPHIL # BLD AUTO: 0 X10*3/UL (ref 0–0.7)
EOSINOPHIL NFR BLD AUTO: 0 %
ERYTHROCYTE [DISTWIDTH] IN BLOOD BY AUTOMATED COUNT: 12.5 % (ref 11.5–14.5)
GLUCOSE SERPL-MCNC: 131 MG/DL (ref 74–99)
HCT VFR BLD AUTO: 46.6 % (ref 41–52)
HGB BLD-MCNC: 16.1 G/DL (ref 13.5–17.5)
IMM GRANULOCYTES # BLD AUTO: 0.06 X10*3/UL (ref 0–0.7)
IMM GRANULOCYTES NFR BLD AUTO: 0.8 % (ref 0–0.9)
LIPASE SERPL-CCNC: 28 U/L (ref 9–82)
LYMPHOCYTES # BLD AUTO: 1.02 X10*3/UL (ref 1.2–4.8)
LYMPHOCYTES NFR BLD AUTO: 13.6 %
MCH RBC QN AUTO: 31.9 PG (ref 26–34)
MCHC RBC AUTO-ENTMCNC: 34.5 G/DL (ref 32–36)
MCV RBC AUTO: 92 FL (ref 80–100)
MONOCYTES # BLD AUTO: 0.49 X10*3/UL (ref 0.1–1)
MONOCYTES NFR BLD AUTO: 6.6 %
NEUTROPHILS # BLD AUTO: 5.91 X10*3/UL (ref 1.2–7.7)
NEUTROPHILS NFR BLD AUTO: 79 %
NRBC BLD-RTO: 0 /100 WBCS (ref 0–0)
PLATELET # BLD AUTO: 112 X10*3/UL (ref 150–450)
POTASSIUM SERPL-SCNC: 3.9 MMOL/L (ref 3.5–5.3)
PROT SERPL-MCNC: 6.6 G/DL (ref 6.4–8.2)
RBC # BLD AUTO: 5.05 X10*6/UL (ref 4.5–5.9)
SODIUM SERPL-SCNC: 137 MMOL/L (ref 136–145)
WBC # BLD AUTO: 7.5 X10*3/UL (ref 4.4–11.3)

## 2025-03-17 PROCEDURE — 84075 ASSAY ALKALINE PHOSPHATASE: CPT | Performed by: STUDENT IN AN ORGANIZED HEALTH CARE EDUCATION/TRAINING PROGRAM

## 2025-03-17 PROCEDURE — 2500000005 HC RX 250 GENERAL PHARMACY W/O HCPCS

## 2025-03-17 PROCEDURE — 82150 ASSAY OF AMYLASE: CPT | Performed by: NURSE PRACTITIONER

## 2025-03-17 PROCEDURE — 2500000004 HC RX 250 GENERAL PHARMACY W/ HCPCS (ALT 636 FOR OP/ED): Performed by: NURSE PRACTITIONER

## 2025-03-17 PROCEDURE — 97161 PT EVAL LOW COMPLEX 20 MIN: CPT | Mod: GP | Performed by: PHYSICAL THERAPIST

## 2025-03-17 PROCEDURE — 83690 ASSAY OF LIPASE: CPT | Performed by: NURSE PRACTITIONER

## 2025-03-17 PROCEDURE — 96372 THER/PROPH/DIAG INJ SC/IM: CPT

## 2025-03-17 PROCEDURE — 2500000001 HC RX 250 WO HCPCS SELF ADMINISTERED DRUGS (ALT 637 FOR MEDICARE OP)

## 2025-03-17 PROCEDURE — 99233 SBSQ HOSP IP/OBS HIGH 50: CPT | Performed by: NURSE PRACTITIONER

## 2025-03-17 PROCEDURE — 97530 THERAPEUTIC ACTIVITIES: CPT | Mod: GP | Performed by: PHYSICAL THERAPIST

## 2025-03-17 PROCEDURE — 36415 COLL VENOUS BLD VENIPUNCTURE: CPT | Performed by: STUDENT IN AN ORGANIZED HEALTH CARE EDUCATION/TRAINING PROGRAM

## 2025-03-17 PROCEDURE — 2500000004 HC RX 250 GENERAL PHARMACY W/ HCPCS (ALT 636 FOR OP/ED)

## 2025-03-17 PROCEDURE — 99223 1ST HOSP IP/OBS HIGH 75: CPT | Performed by: STUDENT IN AN ORGANIZED HEALTH CARE EDUCATION/TRAINING PROGRAM

## 2025-03-17 PROCEDURE — 2500000001 HC RX 250 WO HCPCS SELF ADMINISTERED DRUGS (ALT 637 FOR MEDICARE OP): Performed by: NURSE PRACTITIONER

## 2025-03-17 PROCEDURE — G0378 HOSPITAL OBSERVATION PER HR: HCPCS

## 2025-03-17 PROCEDURE — 85025 COMPLETE CBC W/AUTO DIFF WBC: CPT | Performed by: STUDENT IN AN ORGANIZED HEALTH CARE EDUCATION/TRAINING PROGRAM

## 2025-03-17 PROCEDURE — 1170000001 HC PRIVATE ONCOLOGY ROOM DAILY

## 2025-03-17 RX ORDER — DEXAMETHASONE 2 MG/1
2 TABLET ORAL EVERY 12 HOURS SCHEDULED
Status: DISCONTINUED | OUTPATIENT
Start: 2025-03-21 | End: 2025-03-17

## 2025-03-17 RX ORDER — DEXAMETHASONE 4 MG/1
4 TABLET ORAL EVERY 12 HOURS SCHEDULED
Status: DISCONTINUED | OUTPATIENT
Start: 2025-03-18 | End: 2025-03-18 | Stop reason: HOSPADM

## 2025-03-17 RX ORDER — LEVETIRACETAM 500 MG/1
500 TABLET ORAL DAILY
Status: DISCONTINUED | OUTPATIENT
Start: 2025-03-17 | End: 2025-03-18 | Stop reason: HOSPADM

## 2025-03-17 RX ORDER — DEXAMETHASONE 4 MG/1
4 TABLET ORAL EVERY 8 HOURS SCHEDULED
Status: COMPLETED | OUTPATIENT
Start: 2025-03-17 | End: 2025-03-17

## 2025-03-17 RX ORDER — LEVETIRACETAM 500 MG/1
500 TABLET ORAL
Status: DISCONTINUED | OUTPATIENT
Start: 2025-03-17 | End: 2025-03-18 | Stop reason: HOSPADM

## 2025-03-17 RX ORDER — DEXAMETHASONE 4 MG/1
4 TABLET ORAL EVERY 8 HOURS SCHEDULED
Status: DISCONTINUED | OUTPATIENT
Start: 2025-03-17 | End: 2025-03-17

## 2025-03-17 RX ORDER — DEXAMETHASONE 2 MG/1
2 TABLET ORAL EVERY 12 HOURS SCHEDULED
Status: DISCONTINUED | OUTPATIENT
Start: 2025-03-21 | End: 2025-03-18 | Stop reason: HOSPADM

## 2025-03-17 RX ORDER — DEXAMETHASONE 4 MG/1
4 TABLET ORAL EVERY 12 HOURS SCHEDULED
Status: DISCONTINUED | OUTPATIENT
Start: 2025-03-18 | End: 2025-03-17

## 2025-03-17 RX ADMIN — ATORVASTATIN CALCIUM 40 MG: 40 TABLET, FILM COATED ORAL at 08:36

## 2025-03-17 RX ADMIN — STANDARDIZED SENNA CONCENTRATE 17.2 MG: 8.6 TABLET ORAL at 21:23

## 2025-03-17 RX ADMIN — LEVETIRACETAM 500 MG: 500 TABLET, FILM COATED ORAL at 08:36

## 2025-03-17 RX ADMIN — STANDARDIZED SENNA CONCENTRATE 17.2 MG: 8.6 TABLET ORAL at 08:36

## 2025-03-17 RX ADMIN — DEXAMETHASONE 4 MG: 2 TABLET ORAL at 14:22

## 2025-03-17 RX ADMIN — DEXAMETHASONE 4 MG: 2 TABLET ORAL at 05:37

## 2025-03-17 RX ADMIN — ENOXAPARIN SODIUM 40 MG: 40 INJECTION SUBCUTANEOUS at 08:36

## 2025-03-17 RX ADMIN — PANTOPRAZOLE SODIUM 40 MG: 40 TABLET, DELAYED RELEASE ORAL at 08:36

## 2025-03-17 RX ADMIN — DEXAMETHASONE 4 MG: 4 TABLET ORAL at 21:23

## 2025-03-17 RX ADMIN — Medication 6 MG: at 21:23

## 2025-03-17 RX ADMIN — LEVETIRACETAM 500 MG: 500 TABLET, FILM COATED ORAL at 21:23

## 2025-03-17 ASSESSMENT — ACTIVITIES OF DAILY LIVING (ADL): ADL_ASSISTANCE: INDEPENDENT

## 2025-03-17 ASSESSMENT — COGNITIVE AND FUNCTIONAL STATUS - GENERAL
MOBILITY SCORE: 18
CLIMB 3 TO 5 STEPS WITH RAILING: A LITTLE
MOVING TO AND FROM BED TO CHAIR: A LITTLE
TURNING FROM BACK TO SIDE WHILE IN FLAT BAD: A LITTLE
STANDING UP FROM CHAIR USING ARMS: A LITTLE
WALKING IN HOSPITAL ROOM: A LITTLE
MOVING FROM LYING ON BACK TO SITTING ON SIDE OF FLAT BED WITH BEDRAILS: A LITTLE
MOBILITY SCORE: 24
DAILY ACTIVITIY SCORE: 24

## 2025-03-17 ASSESSMENT — PAIN SCALES - GENERAL
PAINLEVEL_OUTOF10: 0 - NO PAIN

## 2025-03-17 ASSESSMENT — PAIN - FUNCTIONAL ASSESSMENT
PAIN_FUNCTIONAL_ASSESSMENT: 0-10
PAIN_FUNCTIONAL_ASSESSMENT: 0-10

## 2025-03-17 NOTE — PROGRESS NOTES
"Enmanuel Ahumada \"Petey" is a 69 y.o. male on day 0 of admission presenting with Weakness.    Subjective   Feeling ok this morning, denies any pain. We discussed that Rad Onc and NSGY both said no acute interventions. Discussed plan for Neuro Onc consult prior to discharge to discuss next steps. He wants to go home.     Denies any HA, dizziness/lightheadedness, fevers/chills, cough, congestion, rhinorrhea, sore throat, SOB, CP, palpitations, abdominal pain, n/v/d/c, melena, dysuria, urgency/frequency, hematuria, numbness/tingling, bruising/bleeding or rashes. Denies any sick contacts. ROS otherwise negative.       Objective     Physical Exam  Vitals reviewed.   Constitutional:       Appearance: Normal appearance.   HENT:      Head: Normocephalic and atraumatic.      Nose: Nose normal.      Mouth/Throat:      Mouth: Mucous membranes are moist.      Pharynx: Oropharynx is clear.   Eyes:      Extraocular Movements: Extraocular movements intact.      Pupils: Pupils are equal, round, and reactive to light.   Cardiovascular:      Rate and Rhythm: Normal rate and regular rhythm.      Pulses: Normal pulses.      Heart sounds: Normal heart sounds.   Pulmonary:      Effort: Pulmonary effort is normal.      Breath sounds: Normal breath sounds.   Abdominal:      General: Bowel sounds are normal.      Palpations: Abdomen is soft.   Musculoskeletal:         General: Normal range of motion.   Skin:     General: Skin is warm.   Neurological:      General: No focal deficit present.      Mental Status: He is alert and oriented to person, place, and time. Mental status is at baseline.   Psychiatric:         Mood and Affect: Mood normal.         Behavior: Behavior normal.       Last Recorded Vitals  Blood pressure 137/89, pulse 91, temperature 36.6 °C (97.9 °F), temperature source Temporal, resp. rate 18, height 1.702 m (5' 7\"), weight 70.8 kg (156 lb), SpO2 98%.  Intake/Output last 3 Shifts:  I/O last 3 completed shifts:  In: 240 " "(3.4 mL/kg) [P.O.:240]  Out: - (0 mL/kg)   Weight: 70.8 kg        Assessment/Plan   Assessment & Plan  Weakness    Enmanuel Ahumada \"Petey" is a 69 y.o. male presenting with HTN, HLD, smalls's esophagus,  GBM s/p resection (12/2023) c/b SAH s/p chemo/RT (completed RT 3/2024), s/p 2/20/2025 redo craniotomy for additional resection who presented to the ED with BLE weakness, confusion, and a headache. Admitted for management. CT head 3/14 without acute process, MRI brain w/wo 3/16 demonstrates with small extra- axial fluid collection but otherwise without acute process. Per NSGY, no acute interventions at this time. Rad Onc consulted, no emergent XRT indicated at this time. Patient's dexamethasone dose was increased on admission, and per clinical trials team, no longer a candidate for trial at this time. Neuro- Oncology consulted and following for next steps.     Updates 3/17  - Neuro- Oncology consulted and recs pending  - per clinical trial nurse- pt now off trial due to dose of dexamethasone   - Rad Onc plan pending but likely will not require emergent XRT if any     # weakness/ confusion  # GBM s/p recent second resection   - dx 11/2023, oncologist Dr. Rolon- aware of admission  - s/p initial resection 12/2023 c/b SAH, s/p chemo/XRT 2/2024 + adjuvant temodar s/p 8 cycles  - progression late 2024, rec'd by TB for second resection with NSGY - s/p craniotomy for additional tumor resection 2/20/25  - signed for clinical trial [facility] - however as of 3/17, alerted by trial team that now off trial due to steroid dose (was increased on admission to hospital)   - last week, dexamethasone dose was decreased to 2mg BID/ alternating with 2mg daily - per patient and clinical trial team, this is when weakness and confusion began   - continue dexamethasone 4mg TID x 3 days (3/15- 3/17), 4mg BID x 3 days (3/18- 3/20), 2mg BID to continue + PPI support  - continue home keppra 500 mg bid alternating with 500 mg/day per otpt " oncologist   - CT head 3/14 without acute process  - MRI brain w/wo 3/16 demonstrates with small extra- axial fluid collection but otherwise without acute process  - Per NSGY, no acute interventions at this time  - Rad Onc consulted and recs pending, though likely will not require emergent XRT   - Neuro- Oncology consulted and recs pending 3/17  - PT/OT pending   - chronic hyperbilirubinemia and chronic thrombocytopenia noted      # HTN/ HLD  - c/w Atorvastatin 40 mg at bedtime      PPX  - lovenox subcutaneous, encourage ambulation as able     DISPO  - FULL CODE, confirmed on admission  - DC home +/- HHC pending PT/OT eval, Rad Onc and Neuro- Onc plans  - FUVs Dr. Cho (Rad Onc) 4/14, 5/12 Dr. Cortez (NSGY)  - GIRISHLUCIO Soto ALMA Zita (wife) [phone]        I spent 60 minutes in the professional and overall care of this patient.    Patient discussed with service attending Dr. German.     Ana Maria Palacio, APRN-CNP       none

## 2025-03-17 NOTE — PROGRESS NOTES
03/17/25 1300   Discharge Planning   Living Arrangements Spouse/significant other;Children   Support Systems Spouse/significant other;Children;Family members   Assistance Needed Needs TBD   Type of Residence Private residence   Number of Stairs to Enter Residence 3   Number of Stairs Within Residence 9   Home or Post Acute Services In home services   Type of Home Care Services Home PT;Home OT   Expected Discharge Disposition Home H   Does the patient need discharge transport arranged? No   Financial Resource Strain   How hard is it for you to pay for the very basics like food, housing, medical care, and heating? Not very   Housing Stability   In the last 12 months, was there a time when you were not able to pay the mortgage or rent on time? N   At any time in the past 12 months, were you homeless or living in a shelter (including now)? N     Care Transitions Note  3/17/25  Met with patient at bedside for admission assessment. Pt lives at home with his wife. His son and granddaughter also live with them, but his son recently bought his own house. Pt has about 3 J[address] MIGU[address]. States he was doing okay at home but notes that he was weaker and it had been harder to get around just before admit. Was able to complete ADLs with limited assistance at home. No DME or O2 at home. Has a shower with a built in seat and grab bars. No prior home care or SNF placements. He is retired, has Medicare. States he had two falls recently and went to the ED after each time. PT/OT pending for patient, as well as rad onc recs.  Bonita Fulton, MSW, LSW

## 2025-03-17 NOTE — CONSULTS
Name: Enmanuel Ahumada  MRN: [id number]  Encounter Date: 3/17/2025  PCP: Ricardo Youngblood DO  Heme-Onc: Greta    Reason for consult: GBM w/ new AMS and weakness  Attending Provider: Dr. German    Hematology/ Oncology Consult Note      History of Present Illness   Enmanuel Ahumada is a 69 y.o. male with HTN, HLD, smalls's esophagus, RO GBM s/p resection (SAH, 12/2023) with resultant L HH s/p chemo/RT, 2/20/2025 s/p redo craniotomy for tumor resection admitted for further evaluation of his weakness and confusion. Infectious work up were negative.  CT head with out contrast did not show any intracranial hemorrhage, stroke or worsening midline shift. Most of this findings per Neurosurgery note are expected post operative findings. Onc consulted for further guidance.    Pt seen at bedside, doing better today since starting higher dose of dex 4 BID, walked halls with PT, no headaches or confusion.    Past Medical history     Past Medical History:   Diagnosis Date    Brain cancer (Multi)     GERD (gastroesophageal reflux disease)     Seizure disorder (Multi)          Past Surgical History     Past Surgical History:   Procedure Laterality Date    BRAIN SURGERY      CHOLECYSTECTOMY  January 25, 2020    OTHER SURGICAL HISTORY  07/24/2019    Colonoscopy    OTHER SURGICAL HISTORY  07/26/2021    Endoscopy    VASECTOMY  Abt 1991         Family History    No family history on file.      Social History     Social History     Socioeconomic History    Marital status:    Tobacco Use    Smoking status: Never    Smokeless tobacco: Never   Substance and Sexual Activity    Alcohol use: Not Currently    Drug use: Never    Sexual activity: Yes     Partners: Female     Birth control/protection: Male Sterilization     Social Drivers of Health     Financial Resource Strain: Low Risk  (3/17/2025)    Overall Financial Resource Strain (CARDIA)     Difficulty of Paying Living Expenses: Not very hard   Food Insecurity: No Food  "Insecurity (3/16/2025)    Hunger Vital Sign     Worried About Running Out of Food in the Last Year: Never true     Ran Out of Food in the Last Year: Never true   Transportation Needs: No Transportation Needs (3/16/2025)    PRAPARE - Transportation     Lack of Transportation (Medical): No     Lack of Transportation (Non-Medical): No   Intimate Partner Violence: Not At Risk (3/16/2025)    Humiliation, Afraid, Rape, and Kick questionnaire     Fear of Current or Ex-Partner: No     Emotionally Abused: No     Physically Abused: No     Sexually Abused: No   Housing Stability: Low Risk  (3/17/2025)    Housing Stability Vital Sign     Unable to Pay for Housing in the Last Year: No     Number of Times Moved in the Last Year: 0     Homeless in the Last Year: No         Allergies   No Known Allergies    Medications   atorvastatin, 40 mg, Daily  dexAMETHasone, 4 mg, q8h LA NENA   Followed by  [START ON 3/18/2025] dexAMETHasone, 4 mg, q12h LA NENA   Followed by  [START ON 3/21/2025] dexAMETHasone, 2 mg, q8h LA NENA  enoxaparin, 40 mg, q24h  levETIRAcetam, 500 mg, BID  melatonin, 6 mg, Nightly  pantoprazole, 40 mg, Daily before breakfast  polyethylene glycol, 17 g, Daily  sennosides, 2 tablet, BID         acetaminophen, 650 mg, q6h PRN        Review of Systems   Review of Systems   10 pt ROS reviewed and negative aside from above    Physical Exam   Blood pressure 137/89, pulse 91, temperature 36.6 °C (97.9 °F), temperature source Temporal, resp. rate 18, height 1.702 m (5' 7\"), weight 70.8 kg (156 lb), SpO2 98%.    GEN: NAD  HEENT: NC/AT, MMM  CV: RRR no m/r/g  Chest: CTAB  GI: soft, NTND  MSK: no edema  Skin: no obvious rashes  Neuro: AOx3, moves all limbs, face symmetric      Labs     Lab Results   Component Value Date    GLUCOSE 131 (H) 03/17/2025    CALCIUM 9.6 03/17/2025     03/17/2025    K 3.9 03/17/2025    CO2 26 03/17/2025    CL 99 03/17/2025    BUN 27 (H) 03/17/2025    CREATININE 0.84 03/17/2025       Lab Results   Component " Value Date    WBC 7.5 03/17/2025    HGB 16.1 03/17/2025    HCT 46.6 03/17/2025    MCV 92 03/17/2025     (L) 03/17/2025       Lab Results   Component Value Date    ALT 18 03/17/2025    AST 7 (L) 03/17/2025    ALKPHOS 63 03/17/2025    BILITOT 1.6 (H) 03/17/2025       Imaging   MRI 3/16/25 done  1. No evidence of diffusion restriction within fluid-filled resection  site cavity to suggest abscess, the overall size of which is similar  to prior study. No evidence of scalp collection or discernible  increase in scalp edema compared to prior study.      2. New small extra-axial fluid collection between the dura and the  craniotomy that measures 7.3 cm x 2.5 cm x 0.9 cm (craniocaudal X AP  X thickness).      3. Compared to prior study, slight increase in extent of infiltrative  white matter disease as specified above, namely within the right  subinsular white matter, anterior right temporal white matter, and  along the subependymal aspect of the trigone and occipital horn of  the left lateral ventricle via the splenium of the callosum, which is  concerning for worsening infiltrative disease.      4. Previously described area of triangular enhancement along the  posteromedial aspect of the resection site demonstrates slightly  thicker and more irregular enhancement not entirely accounted for by  blood products. Therefore close attention on follow-up is recommended  to evaluate for the possibility of marginal recurrence.      4. Slight improved mass effect on the posterior body/trigone of the  right lateral ventricle indicating improving postoperative vasogenic  edema.    Oncologic History    Primary onc: Dr. Rolon    -The pathology is a GBM; IDH wildtype, MGMT methylated.   -The advanced molecular phenotyping from [facility] has been reported, and shows EGFR amplification, which could be targeted with an EGFR TKI (e.g., Osimertinib) at a later date.     -His case was presented at the Neuro-Onc TB, with the recommendation  to consider another surgical resection with Dr. Cortez.      -He has had the surgery on 2/20 and tolerated it well.      -He has decided to sign up for the GCAR trial for recurrent GBM, and signed the consent form today.   Assessment/Plan     Enmanuel Ahumada is a 69 y.o. male with HTN, HLD, smalls's esophagus, RO GBM s/p resection (SAH, 12/2023) with resultant L HH s/p chemo/RT, 2/20/2025 s/p redo craniotomy for tumor resection admitted for further evaluation of his weakness and confusion. Infectious work up were negative.  CT head with out contrast did not show any intracranial hemorrhage, stroke or worsening midline shift. Most of this findings per Neurosurgery note are expected post operative findings. Onc consulted for further guidance.    Patient appears improved clinically on higher dose of dex, unfortunately this excludes him from clinical trial. Will reassess at TB Wed to discuss subsequent treatment options, likely lomustine and avastin. Discussed with pt all of the above. We will contact the patient Wed after TB to discuss treatment plan and arrange outpt follow up.    Thank you for this consult, we will continue to follow. Pt seen and discussed with Dr. Rolon.    Guanaco Aguiar DO  Hematology- Oncology Fellow, PGY-7  Hematology Consult Pager: [phone]  Oncology Consult Pager: [phone]

## 2025-03-17 NOTE — PROGRESS NOTES
Physical Therapy    Physical Therapy Evaluation & Treatment    Patient Name: Dash Ahumada  MRN: [id number]  Department: [facility]  Room: [address] [place] [address]  Today's Date: 3/17/2025   Time Calculation  Start Time: 1356  Stop Time: 1422  Time Calculation (min): 26 min    Assessment/Plan   PT Assessment  PT Assessment Results: Decreased strength, Decreased endurance, Impaired balance, Decreased mobility, Impaired vision  Rehab Prognosis: Good  Barriers to Discharge Home: No anticipated barriers  Barriers to Participation: Comorbidities  End of Session Communication: Bedside nurse  End of Session Patient Position: Bed, 3 rail up, Alarm on   IP OR SWING BED PT PLAN  Inpatient or Swing Bed: Inpatient  PT Plan  Treatment/Interventions: Bed mobility, Transfer training, Gait training, Stair training, Balance training, Strengthening, Endurance training, Therapeutic exercise, Therapeutic activity  PT Plan: Ongoing PT  PT Frequency: 3 times per week  PT Discharge Recommendations: Low intensity level of continued care (24 HR S/assist for safety)  Equipment Recommended upon Discharge: Wheeled walker  PT Recommended Transfer Status: Assist x1  PT - OK to Discharge: Yes      Subjective     General Visit Information:  General  Reason for Referral: presented to the ED with BLE weakness, confusion, and a headache. CT head 3/14 without acute process, MRI brain w/wo 3/16 demonstrates with small extra- axial fluid collection but otherwise without acute process. Per NSGY, no acute interventions at this time.  Past Medical History Relevant to Rehab: HTN, HLD, smalls's esophagus,  GBM s/p resection (12/2023) c/b SAH s/p chemo/RT (completed RT 3/2024), s/p 2/20/2025 redo craniotomy for additional resection  Family/Caregiver Present: Yes (sposue present and was supportive)  Prior to Session Communication: Bedside nurse  Patient Position Received: Bed, 3 rail up, Alarm off, not on at start of session  Preferred Learning Style: verbal  General  Comment: Pt supine in bed upon arrival and willing to participate in PT session  Home Living:  Home Living  Type of Home: House  Lives With: Spouse (pt has 24/7 S/assist at home)  Home Adaptive Equipment: Cane  Home Layout: 1/2 bath on main level, Bed/bath upstairs, Two level  Alternate Level Stairs-Number of Steps: 12 (1 handrail)  Home Access: Stairs to enter with rails  Entrance Stairs-Number of Steps: 3 (1 handrail)  Prior Level of Function:  Prior Function Per Pt/Caregiver Report  Level of [place]: Independent with ADLs and functional transfers  ADL Assistance: Independent  Ambulatory Assistance:  (S for ambulation and SBA for stairs)  Hand Dominance: Left  Prior Function Comments: 2 falls in the past 6 months when he was ill with norovirus, -ve drives, spouse assiists with cooking, cleaning, laundry  Precautions:  Precautions  Hearing/Visual Limitations: +glasses, L peripheral vision loss  Medical Precautions: Fall precautions     Date/Time Vitals Session Patient Position Pulse Resp SpO2 BP MAP (mmHg)    03/17/25    96  97 %  111/72                Objective   Pain:  Pain Assessment  Pain Assessment: 0-10  0-10 (Numeric) Pain Score: 0 - No pain  Cognition:  Cognition  Arousal/Alertness: Appropriate responses to stimuli  Orientation Level: Oriented X4 (VC's for date)  Following Commands: Follows one step commands without difficulty    General Assessments:       Sensation  Light Touch: No apparent deficits (denies)    Strength  Strength Comments: BLE's 4/5 based on mobility    Static Sitting Balance  Static Sitting-Level of Assistance: Distant supervision  Dynamic Sitting Balance  Dynamic Sitting-Level of Assistance: Close supervision    Static Standing Balance  Static Standing-Level of Assistance: Contact guard  Dynamic Standing Balance  Dynamic Standing-Level of Assistance: Contact guard  Functional Assessments:  Bed Mobility  Bed Mobility: Yes  Bed Mobility 1  Bed Mobility 1: Supine to sitting, Sitting to  supine  Level of Assistance 1: Close supervision, Minimal verbal cues  Bed Mobility Comments 1: HOB elevated    Transfers  Transfer: Yes  Transfer 1  Transfer From 1: Sit to, Stand to  Transfer to 1: Sit, Stand  Technique 1: Sit to stand, Stand to sit  Transfer Device 1: Walker (no AD)  Transfer Level of Assistance 1: Contact guard, Minimal verbal cues  Trials/Comments 1: cues for safe hand placement, walker safety    Ambulation/Gait Training  Ambulation/Gait Training Performed: Yes  Ambulation/Gait Training 1  Surface 1: Level tile  Device 1: Rolling walker, No device  Assistance 1: Contact guard, Minimal verbal cues  Quality of Gait 1: Decreased step length, Diminished heel strike, Narrow base of support  Comments/Distance (ft) 1: 100 ft with RW, 100 ft with no assistive device, mild unsteadienss occasionally, but no LOB (cues for safe walker placement with ambulation, sequencing, safety, turn head to see objects on left side,  and taking time between positional changes)    Stairs  Stairs: No (educated on having assist on stairs)       Treatments:    Ambulation/Gait Training  Ambulation/Gait Training Performed: Yes  Ambulation/Gait Training 1  Surface 1: Level tile  Device 1: Rolling walker, No device  Assistance 1: Contact guard, Minimal verbal cues  Quality of Gait 1: Decreased step length, Diminished heel strike, Narrow base of support  Comments/Distance (ft) 1: 100 ft with RW, 100 ft with no assistive device, mild unsteadienss occasionally, but no LOB cues for safe walker placement with ambulation, sequencing, safety, turn head to see objects on left side,  and taking time between positional changes)  Transfers  Transfer: Yes  Transfer 1  Transfer From 1: Sit to, Stand to  Transfer to 1: Sit, Stand  Technique 1: Sit to stand, Stand to sit  Transfer Device 1: Walker (no AD)  Transfer Level of Assistance 1: Contact guard, Minimal verbal cues  Trials/Comments 1: cues for safe hand placement, walker  safety    Outcome Measures:  [facility] Basic Mobility  Turning from your back to your side while in a flat bed without using bedrails: A little  Moving from lying on your back to sitting on the side of a flat bed without using bedrails: A little  Moving to and from bed to chair (including a wheelchair): A little  Standing up from a chair using your arms (e.g. wheelchair or bedside chair): A little  To walk in hospital room: A little  Climbing 3-5 steps with railing: A little  Basic Mobility - Total Score: 18    Encounter Problems       Encounter Problems (Active)       Balance       Pt will score>/=24/28 on Tinetti to demonstrate decreased falls risk (Progressing)       Start:  03/17/25    Expected End:  03/31/25               Mobility       Pt will be supervision ambulation >250 ft with LRAD (Progressing)       Start:  03/17/25    Expected End:  03/31/25            Pt will be SBA to ascend/descend 12 steps with 1 handrail (Progressing)       Start:  03/17/25    Expected End:  03/31/25               PT Transfers       Pt will be Omid with bed mobility (Progressing)       Start:  03/17/25    Expected End:  03/31/25            Pt will be Omid with sit to stand and bed to chair transfers with LRAD (Progressing)       Start:  03/17/25    Expected End:  03/31/25               Pain - Adult              Education Documentation  Body Mechanics, taught by Darlyn Evans PT at 3/17/2025  4:39 PM.  Learner: Patient  Readiness: Acceptance  Method: Explanation  Response: Verbalizes Understanding, Needs Reinforcement    Precautions, taught by Darlyn Evans PT at 3/17/2025  4:39 PM.  Learner: Patient  Readiness: Acceptance  Method: Explanation  Response: Verbalizes Understanding, Needs Reinforcement    Mobility Training, taught by Darlyn Evans PT at 3/17/2025  4:39 PM.  Learner: Patient  Readiness: Acceptance  Method: Explanation  Response: Verbalizes Understanding, Needs Reinforcement    Education Comments  No comments found.

## 2025-03-17 NOTE — CARE PLAN
The patient's goals for the shift include      The clinical goals for the shift include patient will remain safe and free from injury this shift 3/17/25 1900      Problem: Pain - Adult  Goal: Verbalizes/displays adequate comfort level or baseline comfort level  Outcome: Progressing     Problem: Safety - Adult  Goal: Free from fall injury  Outcome: Progressing     Problem: Discharge Planning  Goal: Discharge to home or other facility with appropriate resources  Outcome: Progressing

## 2025-03-18 ENCOUNTER — HOME HEALTH ADMISSION (OUTPATIENT)
Dept: HOME HEALTH SERVICES | Facility: HOME HEALTH | Age: 70
End: 2025-03-18
Payer: MEDICARE

## 2025-03-18 ENCOUNTER — APPOINTMENT (OUTPATIENT)
Dept: HEMATOLOGY/ONCOLOGY | Facility: HOSPITAL | Age: 70
End: 2025-03-18
Payer: MEDICARE

## 2025-03-18 ENCOUNTER — PHARMACY VISIT (OUTPATIENT)
Dept: PHARMACY | Facility: CLINIC | Age: 70
End: 2025-03-18
Payer: COMMERCIAL

## 2025-03-18 ENCOUNTER — DOCUMENTATION (OUTPATIENT)
Dept: HOME HEALTH SERVICES | Facility: HOME HEALTH | Age: 70
End: 2025-03-18
Payer: MEDICARE

## 2025-03-18 ENCOUNTER — APPOINTMENT (OUTPATIENT)
Dept: RESEARCH | Facility: HOSPITAL | Age: 70
End: 2025-03-18
Payer: MEDICARE

## 2025-03-18 VITALS
HEART RATE: 53 BPM | DIASTOLIC BLOOD PRESSURE: 82 MMHG | OXYGEN SATURATION: 97 % | TEMPERATURE: 97.7 F | SYSTOLIC BLOOD PRESSURE: 121 MMHG | BODY MASS INDEX: 24.48 KG/M2 | HEIGHT: 67 IN | WEIGHT: 156 LBS | RESPIRATION RATE: 14 BRPM

## 2025-03-18 PROCEDURE — G0378 HOSPITAL OBSERVATION PER HR: HCPCS

## 2025-03-18 PROCEDURE — 2500000004 HC RX 250 GENERAL PHARMACY W/ HCPCS (ALT 636 FOR OP/ED): Performed by: NURSE PRACTITIONER

## 2025-03-18 PROCEDURE — RXMED WILLOW AMBULATORY MEDICATION CHARGE

## 2025-03-18 PROCEDURE — 99239 HOSP IP/OBS DSCHRG MGMT >30: CPT | Performed by: NURSE PRACTITIONER

## 2025-03-18 PROCEDURE — 2500000001 HC RX 250 WO HCPCS SELF ADMINISTERED DRUGS (ALT 637 FOR MEDICARE OP)

## 2025-03-18 PROCEDURE — 2500000001 HC RX 250 WO HCPCS SELF ADMINISTERED DRUGS (ALT 637 FOR MEDICARE OP): Performed by: NURSE PRACTITIONER

## 2025-03-18 PROCEDURE — 2500000004 HC RX 250 GENERAL PHARMACY W/ HCPCS (ALT 636 FOR OP/ED)

## 2025-03-18 RX ORDER — DEXAMETHASONE 2 MG/1
TABLET ORAL
Qty: 18 TABLET | Refills: 0 | Status: ON HOLD | OUTPATIENT
Start: 2025-03-18

## 2025-03-18 RX ORDER — LEVETIRACETAM 500 MG/1
TABLET ORAL
Qty: 45 TABLET | Refills: 0 | Status: ON HOLD | OUTPATIENT
Start: 2025-03-18

## 2025-03-18 RX ADMIN — PANTOPRAZOLE SODIUM 40 MG: 40 TABLET, DELAYED RELEASE ORAL at 08:02

## 2025-03-18 RX ADMIN — ENOXAPARIN SODIUM 40 MG: 40 INJECTION SUBCUTANEOUS at 08:03

## 2025-03-18 RX ADMIN — LEVETIRACETAM 500 MG: 500 TABLET, FILM COATED ORAL at 08:02

## 2025-03-18 RX ADMIN — DEXAMETHASONE 4 MG: 4 TABLET ORAL at 08:02

## 2025-03-18 RX ADMIN — ATORVASTATIN CALCIUM 40 MG: 40 TABLET, FILM COATED ORAL at 08:02

## 2025-03-18 ASSESSMENT — COGNITIVE AND FUNCTIONAL STATUS - GENERAL
DAILY ACTIVITIY SCORE: 24
MOBILITY SCORE: 24

## 2025-03-18 ASSESSMENT — PAIN SCALES - GENERAL: PAINLEVEL_OUTOF10: 0 - NO PAIN

## 2025-03-18 NOTE — NURSING NOTE
Pt discharged safely with transport. IV removed. All belongings returned. Meds to beds delivered. Discharge paperwork given and reviewed with patient. All questions answered.

## 2025-03-18 NOTE — DISCHARGE SUMMARY
"Discharge Diagnosis  Weakness    Issues Requiring Follow-Up  Tumor Board 3/19    Test Results Pending At Discharge  Pending Labs       No current pending labs.          Hospital Course  Enmanuel Ahumada \"Petey" is a 69 y.o. male presenting with HTN, HLD, smalls's esophagus, GBM s/p resection (12/2023) c/b SAH s/p chemo/RT (completed RT 3/2024), s/p 2/20/2025 redo craniotomy for additional resection who presented to the ED 3/15 with BLE weakness, confusion, and a headache. Admitted for management. CT head 3/14 without acute process, MRI brain w/wo 3/16 demonstrates with small extra- axial fluid collection but otherwise without acute process. Per NSGY, no acute interventions at this time. Rad Onc consulted, no emergent XRT indicated at this time. Patient's dexamethasone dose was increased on admission, and per clinical trials team, no longer a candidate for trial at this time. Confusion and HA improved with Dex increase. Neuro-Oncology consulted, rec reassessing at tumor board 3/19 for next steps. 3/18 pt requesting discharge. Per onc fellow, ok to discharge and they will call him after tumor board with next steps. He was dc in stable condition with HC for PT/OT.    Dex taper 4mg BID x3 days (3/18-3/20) then 2mg BID x3 days (3/21-3/23) Rx sent to [facility] for meds to beds delivery and delivered to bedside prior to dc    Keppra Rx sent to [facility] for meds to beds delivery and delivered to bedside prior to dc    Tumor Board 3/19, Dr. Rolon [facility] 3/31, Rad Onc [facility] 4/14, NSGY [facility] 5/12, PCP [facility]UV 8/8    Pertinent Physical Exam At Time of Discharge  On the day of discharge, the patient reported feeling well and pain was controlled. Vitals and labs were stable.   On exam:  Constitutional: Awake, NAD,  ENMT: mucous membranes moist, no apparent injury, no lesions seen  Head/Neck: NCAT  Respiratory/Thorax: CTAB, no wheezing  Cardiovascular: RRR, S1+S2, no murmur  Gastrointestinal: Soft, NT, nondistended, +BS  Extremities: No " LE edema  Psychological: Appropriate mood and behavior  Skin: No rash noted      Home Medications     Medication List      CHANGE how you take these medications     dexAMETHasone 2 mg tablet; Commonly known as: Decadron; Take 2 tabs   (4mg) 2 times daily x3 days (3/18-3/20) then take 1 tab (2mg) 2 times   daily x3 days (3/21-3/23) then stop; What changed: medication strength,   See the new instructions.   levETIRAcetam 500 mg tablet; Commonly known as: Keppra; Alternate back   and fourth with 500mg 2 times per day (every other day) with 500mg once   per day (every other day); What changed: how much to take, how to take   this, when to take this, additional instructions     CONTINUE taking these medications     acetaminophen 325 mg tablet; Commonly known as: Tylenol   atorvastatin 40 mg tablet; Commonly known as: Lipitor; TAKE 1 TABLET BY   MOUTH EVERY DAY   CALCIUM CARBONATE-VITAMIN D3 ORAL   COENZYME Q10 (BULK) MISC   krill-omega-3-dha-epa-lipids 221-80-22-50 mg capsule   pantoprazole 40 mg EC tablet; Commonly known as: ProtoNix; TAKE 1 TABLET   BY MOUTH IN THE MORNING 30 MINUTES BEFORE BREAKFAST.   SENNA LAX ORAL     STOP taking these medications     cephalexin 500 mg capsule; Commonly known as: Keflex   clobetasol 0.05 % cream; Commonly known as: Temovate   silver sulfADIAZINE 1 % cream; Commonly known as: Silvadene     ASK your doctor about these medications     ondansetron 8 mg tablet; Commonly known as: Zofran; Take 1 tablet (8 mg)   by mouth every 8 hours if needed for nausea or vomiting. Take one hour   prior to each chemotherapy dose in addition to every 8 hours as needed for   nausea/vomiting   prochlorperazine 10 mg tablet; Commonly known as: Compazine; Take 1   tablet (10 mg) by mouth every 6 hours if needed for nausea or vomiting.       Outpatient Follow-Up  Future Appointments   Date Time Provider Department Center   3/31/2025 10:00 AM Kyle Rolon MD [id number] [facility]   4/14/2025  1:30 PM  Evgeny Palomino MD, MS [id number] [facility]   5/12/2025 10:15 AM Nataliya Cortez MD [id number] [facility]   8/8/2025  9:40 AM Ricardo Youngblood DO [id number] [facility]     >30 minutes was spent on the assessment/discharge plan of this patient    Assessment and plan as above discussed with attending physician  Dr. Lorraine Rodriguez, APRN-CNP

## 2025-03-18 NOTE — CARE PLAN
Problem: Pain - Adult  Goal: Verbalizes/displays adequate comfort level or baseline comfort level  Outcome: Progressing     Problem: Safety - Adult  Goal: Free from fall injury  Outcome: Progressing     Problem: Discharge Planning  Goal: Discharge to home or other facility with appropriate resources  Outcome: Progressing     Problem: Chronic Conditions and Co-morbidities  Goal: Patient's chronic conditions and co-morbidity symptoms are monitored and maintained or improved  Outcome: Progressing     Problem: Nutrition  Goal: Nutrient intake appropriate for maintaining nutritional needs  Outcome: Progressing     Problem: Fall/Injury  Goal: Not fall by end of shift  Outcome: Progressing  Goal: Be free from injury by end of the shift  Outcome: Progressing  Goal: Verbalize understanding of personal risk factors for fall in the hospital  Outcome: Progressing  Goal: Verbalize understanding of risk factor reduction measures to prevent injury from fall in the home  Outcome: Progressing  Goal: Use assistive devices by end of the shift  Outcome: Progressing  Goal: Pace activities to prevent fatigue by end of the shift  Outcome: Progressing       The clinical goals for the shift include Pt will be safe and free from injury throughout shift.

## 2025-03-18 NOTE — HH CARE COORDINATION
Home Care received a Referral for Physical Therapy and Occupational Therapy. We have processed the referral for a Start of Care on 03-19-25, 24-48 hours.     If you have any questions or concerns, please feel free to contact us at [phone]. Follow the prompts, enter your five digit zip code, and you will be directed to your care team on WEST 2.

## 2025-03-18 NOTE — CARE PLAN
Problem: Pain - Adult  Goal: Verbalizes/displays adequate comfort level or baseline comfort level  Outcome: Progressing     Problem: Safety - Adult  Goal: Free from fall injury  Outcome: Progressing     Problem: Discharge Planning  Goal: Discharge to home or other facility with appropriate resources  Outcome: Progressing     Problem: Chronic Conditions and Co-morbidities  Goal: Patient's chronic conditions and co-morbidity symptoms are monitored and maintained or improved  Outcome: Progressing     Problem: Nutrition  Goal: Nutrient intake appropriate for maintaining nutritional needs  Outcome: Progressing     Problem: Fall/Injury  Goal: Not fall by end of shift  Outcome: Progressing  Goal: Be free from injury by end of the shift  Outcome: Progressing  Goal: Verbalize understanding of personal risk factors for fall in the hospital  Outcome: Progressing  Goal: Verbalize understanding of risk factor reduction measures to prevent injury from fall in the home  Outcome: Progressing  Goal: Use assistive devices by end of the shift  Outcome: Progressing  Goal: Pace activities to prevent fatigue by end of the shift  Outcome: Progressing   The patient's goals for the shift include      The clinical goals for the shift include patient will remain safe and free from injury throughout shift on 3/17/25 at 0700    Patient remained safe and free from injury throughout shift and was able to get rest with minimal distractions.

## 2025-03-19 ENCOUNTER — TELEPHONE (OUTPATIENT)
Dept: ADMISSION | Facility: HOSPITAL | Age: 70
End: 2025-03-19

## 2025-03-19 ENCOUNTER — HOME CARE VISIT (OUTPATIENT)
Dept: HOME HEALTH SERVICES | Facility: HOME HEALTH | Age: 70
End: 2025-03-19
Payer: MEDICARE

## 2025-03-19 ENCOUNTER — TUMOR BOARD CONFERENCE (OUTPATIENT)
Dept: HEMATOLOGY/ONCOLOGY | Facility: HOSPITAL | Age: 70
End: 2025-03-19
Payer: MEDICARE

## 2025-03-19 VITALS — SYSTOLIC BLOOD PRESSURE: 118 MMHG | OXYGEN SATURATION: 98 % | HEART RATE: 82 BPM | DIASTOLIC BLOOD PRESSURE: 75 MMHG

## 2025-03-19 VITALS
WEIGHT: 160 LBS | HEART RATE: 82 BPM | OXYGEN SATURATION: 98 % | DIASTOLIC BLOOD PRESSURE: 82 MMHG | SYSTOLIC BLOOD PRESSURE: 115 MMHG | RESPIRATION RATE: 18 BRPM | HEIGHT: 78 IN | BODY MASS INDEX: 18.51 KG/M2

## 2025-03-19 PROCEDURE — G0152 HHCP-SERV OF OT,EA 15 MIN: HCPCS | Mod: HHH | Performed by: OCCUPATIONAL THERAPIST

## 2025-03-19 PROCEDURE — G0151 HHCP-SERV OF PT,EA 15 MIN: HCPCS | Mod: HHH

## 2025-03-19 PROCEDURE — 169592 NO-PAY CLAIM PROCEDURE

## 2025-03-19 ASSESSMENT — ENCOUNTER SYMPTOMS
PERSON REPORTING PAIN: PATIENT
MUSCLE WEAKNESS: 1
DENIES PAIN: 1
DENIES PAIN: 1

## 2025-03-19 ASSESSMENT — ACTIVITIES OF DAILY LIVING (ADL)
DRESSING_LB_CURRENT_FUNCTION: INDEPENDENT
DRESSING_UB_CURRENT_FUNCTION: INDEPENDENT
PHYSICAL TRANSFERS ASSESSED: 1
ENTERING_EXITING_HOME: STAND BY ASSIST
AMBULATION ASSISTANCE: ONE PERSON
CURRENT_FUNCTION: ONE PERSON
BATHING ASSESSED: 1
AMBULATION ASSISTANCE: STAND BY ASSIST
LAUNDRY: NEEDS ASSISTANCE
TOILETING: INDEPENDENT
CURRENT_FUNCTION: SUPERVISION
LAUNDRY ASSESSED: 1
CURRENT_FUNCTION: STAND BY ASSIST
AMBULATION_DISTANCE/DURATION_TOLERATED: 80 FT
OASIS_M1830: 00
AMBULATION ASSISTANCE: 1
LAUNDRY EQUIPMENT USED: WIFE ASSISTS
BATHING_CURRENT_FUNCTION: SUPERVISION
TOILETING: 1
AMBULATION ASSISTANCE ON FLAT SURFACES: 1
PREPARING MEALS: NEEDS ASSISTANCE
AMBULATION ASSISTANCE: STAND BY ASSIST

## 2025-03-19 NOTE — TUMOR BOARD NOTE
CNS Tumor Board Recommendations       Patient was presented by Dr. Kyle Rolon at our CNS Tumor Board on 03/19/2025 which included representatives from Radiation oncology, Surgical oncology, Neuro-oncology, Pathology, Radiology, Research, Neurosurgery, Social Work (Neurosurgery).     Current patient presents with history of the following treatment history: PMH HLD, Osborn's esophagus, s/p cholecystectomy, who presented with gait instability and headaches back in December 2023. Workup was significant for CTH with RO hypodensity. MRI PPF and fMRI with language dominant lesion. He is now s/p right craniotomy for tumor resection with 5ALA with Dr. Carrasco on 12/29/23. Postoperative MRI for GTR. Final surgical pathology for GBM IDH wildytpe, MGMT methylated. Caris testing was completed for EGFR amplification, TERT mutations, and loss of CDKN2A/B. Treatment course significant for chemoRT on adjuvant temodar c/b thrombocytopenia. Optune electrical array device was placed. Remains on adjuvant temodar now s/p cycle 8. MRI with progression now s/p redo craniotomy for tumor resection with 5 ALA on 02/20/25. CTH with postoperative changes and pneumocephalus. MRI with small enhancement that is consistent with postoperative changes vs residual but likely to be GTR. Pathology for recurrent glioblastoma with 70% viable tumor and necrosis.     MRI 03/16/25: Postoperative changes with fluid collection at dura and craniotomy site, increase in infiltrative WMD, Enhancement along posteromedial aspect of resection site that is nonspecific.     The CNS Tumor Board tumor board considered available treatment options and made the following recommendations: Neuro-oncology for treatment plan now no longer a trial candidate due to high dose steroids.     Clinical Trial Status: N/.A     National site-specific guidelines were discussed with respect to the case.

## 2025-03-19 NOTE — TELEPHONE ENCOUNTER
Updated wife, Brittany, on a slower tapering instructions for dexamethasone per Jacki Simpson  8 mg/day x 5 days (4 mg BID)  6 mg/day x 7 days (3 mg BID)  4 mg/day x 7 days (2 mg BID) -- will see Dr. Rolon at the start of this  2 mg/day x 7 days (1 mg BID)  1 mg/day x 7 days    Wife agreeable and verbalized understanding. States she thinks she will have enough medication until FU on 3/31 still has 1 mg tabs available as well. She will call if more medication is needed.

## 2025-03-19 NOTE — TELEPHONE ENCOUNTER
Slow dexamethasone taper:  8 mg/day x 5 days (4 mg BID)  6 mg/day x 7 days (3 mg BID)  4 mg/day x 7 days (2 mg BID) -- will see Dr. Rolon at the start of this  2 mg/day x 7 days (1 mg BID)  1 mg/day x 7 days

## 2025-03-19 NOTE — TELEPHONE ENCOUNTER
Wife calls and reports Patient was recently discharged from hospitalization r/t confusion, weakness.  At time of discharged he was instructed to take Decadron  2mg, 2 tabs twice daily, then tapering instructions every 3 days.    She is concerned about tapering the decadron, as she feels the symptoms of confusion and weakness was r/t the last time he started tapering down the decadron in the end of Feb.   Has next FUV with Dr. Rolon on 3/31 would like to know if he could extend the decadron dosing, or taper as previously instructed  Forwarding to provider

## 2025-03-24 ENCOUNTER — HOME CARE VISIT (OUTPATIENT)
Dept: HOME HEALTH SERVICES | Facility: HOME HEALTH | Age: 70
End: 2025-03-24
Payer: MEDICARE

## 2025-03-24 ENCOUNTER — APPOINTMENT (OUTPATIENT)
Dept: RESEARCH | Facility: HOSPITAL | Age: 70
End: 2025-03-24
Payer: MEDICARE

## 2025-03-24 VITALS — OXYGEN SATURATION: 96 %

## 2025-03-24 PROCEDURE — G0157 HHC PT ASSISTANT EA 15: HCPCS | Mod: HHH

## 2025-03-24 ASSESSMENT — ENCOUNTER SYMPTOMS
DENIES PAIN: 1
PERSON REPORTING PAIN: PATIENT

## 2025-03-24 NOTE — DOCUMENTATION CLARIFICATION NOTE
"    PATIENT:               JULIENNE RIOS  ACCT #:                  [id number]  MRN:                       [id number]  :                       1955  ADMIT DATE:       3/14/2025 6:34 PM  DISCH DATE:        3/18/2025 11:25 AM  RESPONDING PROVIDER #:        [id number]          PROVIDER RESPONSE TEXT:    Cerebral Edema    CDI QUERY TEXT:    Clarification        Instruction:    Based on your assessment of the patient and the clinical information, please provide the requested documentation by clicking on the appropriate radio button and enter any additional information if prompted.    Question: Please further clarify if there is a diagnosis related to the clinical information    When answering this query, please exercise your independent professional judgment. The fact that a question is being asked, does not imply that any particular answer is desired or expected.    The patient's clinical indicators include:  Clinical Information: 68 yo M with GBM presents with weakness.    Clinical Indicators:  3/16 MRI:  \" -Slight improved mass effect on the posterior body/trigone of the  right lateral ventricle indicating improving postoperative vasogenic  edema.\"    3/18 DC Summary:  \" -Confusion and HA improved with Dex increase.\"    Treatment:  -dexamethasone 4mg TID x 3 days (3/15- 3/17), 4mg BID x 3 days (3/18- 3/20), 2mg BID x 3 days (3/21-3/23)  -imaging    Risk Factors:  -GBM s/p resection  Options provided:  -- Cerebral Edema  -- Other - I will add my own diagnosis  -- Refer to Clinical Documentation Reviewer    Query created by: Ros Ortega on 3/20/2025 5:16 PM      Electronically signed by:  WAYNE LIU-CNP 3/24/2025 6:50 AM          "

## 2025-03-25 ENCOUNTER — HOME CARE VISIT (OUTPATIENT)
Dept: HOME HEALTH SERVICES | Facility: HOME HEALTH | Age: 70
End: 2025-03-25
Payer: MEDICARE

## 2025-03-25 PROCEDURE — G0158 HHC OT ASSISTANT EA 15: HCPCS | Mod: CO,HHH

## 2025-03-26 ENCOUNTER — APPOINTMENT (OUTPATIENT)
Dept: HEMATOLOGY/ONCOLOGY | Facility: HOSPITAL | Age: 70
End: 2025-03-26
Payer: MEDICARE

## 2025-03-26 ASSESSMENT — ENCOUNTER SYMPTOMS
SUBJECTIVE PAIN PROGRESSION: WAXING AND WANING
PAIN: 1
PERSON REPORTING PAIN: PATIENT
PAIN LOCATION: HEAD
HIGHEST PAIN SEVERITY IN PAST 24 HOURS: 1/10

## 2025-03-27 ENCOUNTER — HOME CARE VISIT (OUTPATIENT)
Dept: HOME HEALTH SERVICES | Facility: HOME HEALTH | Age: 70
End: 2025-03-27
Payer: MEDICARE

## 2025-03-27 VITALS — OXYGEN SATURATION: 98 %

## 2025-03-27 PROCEDURE — G0157 HHC PT ASSISTANT EA 15: HCPCS | Mod: HHH

## 2025-03-27 ASSESSMENT — ENCOUNTER SYMPTOMS
DENIES PAIN: 1
PERSON REPORTING PAIN: PATIENT

## 2025-03-31 ENCOUNTER — SOCIAL WORK (OUTPATIENT)
Dept: CASE MANAGEMENT | Facility: HOSPITAL | Age: 70
End: 2025-03-31

## 2025-03-31 ENCOUNTER — TELEPHONE (OUTPATIENT)
Dept: NEUROSURGERY | Facility: HOSPITAL | Age: 70
End: 2025-03-31

## 2025-03-31 ENCOUNTER — OFFICE VISIT (OUTPATIENT)
Dept: HEMATOLOGY/ONCOLOGY | Facility: HOSPITAL | Age: 70
End: 2025-03-31
Payer: MEDICARE

## 2025-03-31 ENCOUNTER — TELEPHONE (OUTPATIENT)
Dept: HEMATOLOGY/ONCOLOGY | Facility: HOSPITAL | Age: 70
End: 2025-03-31

## 2025-03-31 VITALS
TEMPERATURE: 97.7 F | OXYGEN SATURATION: 98 % | BODY MASS INDEX: 17.16 KG/M2 | SYSTOLIC BLOOD PRESSURE: 114 MMHG | DIASTOLIC BLOOD PRESSURE: 80 MMHG | RESPIRATION RATE: 18 BRPM | HEART RATE: 96 BPM | WEIGHT: 152.34 LBS

## 2025-03-31 DIAGNOSIS — C71.9 GBM (GLIOBLASTOMA MULTIFORME) (MULTI): Primary | ICD-10-CM

## 2025-03-31 PROCEDURE — RXMED WILLOW AMBULATORY MEDICATION CHARGE

## 2025-03-31 PROCEDURE — 99215 OFFICE O/P EST HI 40 MIN: CPT | Performed by: PSYCHIATRY & NEUROLOGY

## 2025-03-31 RX ORDER — PROCHLORPERAZINE MALEATE 10 MG
10 TABLET ORAL EVERY 6 HOURS PRN
Qty: 60 TABLET | Refills: 5 | Status: ON HOLD | OUTPATIENT
Start: 2025-03-31

## 2025-03-31 RX ORDER — FAMOTIDINE 10 MG/ML
20 INJECTION, SOLUTION INTRAVENOUS ONCE AS NEEDED
OUTPATIENT
Start: 2025-04-07

## 2025-03-31 RX ORDER — DIPHENHYDRAMINE HYDROCHLORIDE 50 MG/ML
50 INJECTION, SOLUTION INTRAMUSCULAR; INTRAVENOUS AS NEEDED
OUTPATIENT
Start: 2025-04-07

## 2025-03-31 RX ORDER — EPINEPHRINE 0.3 MG/.3ML
0.3 INJECTION SUBCUTANEOUS EVERY 5 MIN PRN
OUTPATIENT
Start: 2025-04-07

## 2025-03-31 RX ORDER — ONDANSETRON HYDROCHLORIDE 8 MG/1
8 TABLET, FILM COATED ORAL EVERY 8 HOURS PRN
Qty: 60 TABLET | Refills: 5 | Status: ON HOLD | OUTPATIENT
Start: 2025-03-31

## 2025-03-31 RX ORDER — ALBUTEROL SULFATE 0.83 MG/ML
3 SOLUTION RESPIRATORY (INHALATION) AS NEEDED
OUTPATIENT
Start: 2025-04-07

## 2025-03-31 ASSESSMENT — PAIN SCALES - GENERAL: PAINLEVEL_OUTOF10: 0-NO PAIN

## 2025-03-31 NOTE — TELEPHONE ENCOUNTER
Spouse notified per Dr Rolon, we are holding off on restarting optune until we see how this new treatment affects him. Spouse verbalized understanding and states has been in communication with Dr Cortez office regarding incision.

## 2025-03-31 NOTE — TELEPHONE ENCOUNTER
Callback: returned call to patient, spouse stated patient has some brown to reddish drainage with scabbing at incision site,. Patient pillowcase has some drainage and patient stated there were some spots on the floor in the bathroom. Educated spouse to send a photo via [employer]. Patient had an appointment with Dr. Rolon today and did not tell anyone nor was any incisional drainage assessed at the time of appointment. Will continue to monitor and discuss with Dr. Cortez.     Krissy Waggoner RN

## 2025-03-31 NOTE — TELEPHONE ENCOUNTER
Spouse states when they arrived home from follow up noticed some drainage on patient's pillow and it appears to be from incision. Transferred to Dr Cortez office to discuss. Also inquiring if patient should resume Optune, and if so will need new rx sent as device was returned when patient stopped using previously.

## 2025-03-31 NOTE — PATIENT INSTRUCTIONS
Please schedule a follow up visit in 4weeks with Jacki Simpson PA-C.    We are starting Avastin (bevacizumab) and Carmustine (CCNU) as soon as possible.    Please call us with any questions or concerns at [phone] opt. 5, opt. 2  For scheduling concerns please call [phone] option 1

## 2025-03-31 NOTE — PROGRESS NOTES
Patient ID: Dash Ahumada is a 69 y.o. male.  Referring Physician: No referring provider defined for this encounter.  Primary Care Provider: DO Eve Zuleta    Dash Ahumada is a 69 yo RH  male with a PMH significant for HLD, Osborn's esophagus, ASA for cardioprotection, who presented with headache x 6 weeks, confusion x 3 weeks, and gait instability. CTH with RO hypodensity. MRI PPF and fMRI with L language dominant lesion. TTE EF 60%. S/p R crani for tumor resection with 5ALA on 12/29/23. MRI with GTR. Prelim pathology concerning for glioblastoma - immunostains pending.      INTERVAL HISTORY (1/15/2024): Since getting discharged to home, he has been slowly recovering from the surgery. The final pathology is still pending, but it is likely GBM. He notes some persistent incisional pain around the staples but no regular headaches now (resolved). He has some minor confusion, mild loss of balance, difficulty using his computer and phone, and a loss of vision to the left. He remains on Keppra 1000 mg bid (although he claims he has never had a verified seizure), and is tapering off of Decadron. He is seeing Dr. Palomino from Rad Onc soon to begin RT planning. He is seeing Neurosurgery today to remove the staples.     INTERVAL HISTORY (3/25/2024): Since the last visit, he has continued the course of chemo-RT and is now s/p RT Fraction #27 of 30 -- finalize this week. He has fatigue from the treatment, but no GI upset, emesis, diarrhea, loss of appetite, or change in taste. He is off of the steroids now. The Caris results are in and demonstrate EGFR amplification, TERT mutations, and loss of CDKN2A/B. He is active at home with chores and daily walking. He remains on Keppra 500 mg bid.     INTERVAL HISTORY (9/16/2024): Since the last visit, he has completed the course of chemo-RT in late March, and then continued adjuvant Temodar -- now s/p cycle #4. He has tolerated the chemo quite well so  far, with persistent fatigue, but no GI upset, nausea, emesis, or diarrhea. However, he has developed significant thrombocytopenia -- 78k last week. He is also now on the Optune electrical array device, and is tolerating it well, without any scalp issues; > 18 hours per day (compliance 93%). He is doing well neurologically, with a few minor headaches, but no seizures, speech issues, visual changes, new focal weakness, gait difficulty, or sensory loss. He is on Decadron 2 mg/day. He remains active at home with chores and daily walks. He remains on Keppra 500 mg bid. KPS = 80.     INTERVAL HISTORY (12/05/2024): Since the last visit, he has completed the course of chemo-RT in late March, and then continued adjuvant Temodar -- now s/p cycle #6. He has tolerated the chemo quite well so far, with persistent fatigue, but no GI upset, nausea, emesis, or diarrhea. However, he has had some significant thrombocytopenia -- alisson 102k several weeks ago. He is also now on the Optune electrical array device, and is tolerating it well, without any scalp issues; > 18 hours per day (compliance > 90%). He is doing well neurologically, with a few minor headaches, but no seizures, speech issues, visual changes, new focal weakness, gait difficulty, or sensory loss. He is on Decadron 1 mg/day. He remains active at home with chores and daily walks. He remains on Keppra 500 mg bid. KPS = 80.     INTERVAL HISTORY (1/30/2025): Since the last visit, he has completed the course of chemo-RT in late March, and then continued adjuvant Temodar -- now s/p cycle #8. He has tolerated the chemo quite well so far, with persistent fatigue, but no GI upset, nausea, emesis, or diarrhea. However, he has had some significant thrombocytopenia -- alisson 102k several weeks ago. Unfortunately, the new brain MRI shows progression on T2/FLAIR, enhancement, and Perfusion. He was discussed at the Neuro-Oncology Tumor Board, with the recommendation to consider a new  surgery and a clinical trial. He is also now on the OptSelf-A-r-T electrical array device, and is tolerating it well, without any scalp issues; > 18 hours per day (compliance > 90%). He is doing well neurologically, with a few minor headaches, but no seizures, speech issues, visual changes, new focal weakness, gait difficulty, or sensory loss. He is now off of Decadron, and had a flare-up of leg and general weakness for a few weeks; now doing better. He remains active at home with chores and daily walks. He remains on Keppra 500 mg bid. He is supposed to go on a [employer] cruise to [place] next week. KPS = 80.     INTERVAL HISTORY (3/03/2025): Since the last visit, Dash has had recent progression of the brain tumor on MRI. He has had a new surgical resection with Dr. Cortez on 2/20 and tolerated it well. He is considering going on the GCAR study for recurrent GBM, but is worried about the [id number] IV arm that would require multiple trips to [facility] each week -- he is too busy with family commitments to do it (grandchild). He would be fine with the options on the other 3 arms. He is doing well since surgery and denies any severe headaches, seizures, new focal weakness, gait difficulty, sensory loss, or falls. He is on Decadron 2 mg/day.     INTERVAL HISTORY (3/07/2025): Since the last visit, Dash has had recent progression of the brain tumor on MRI. He has had a new surgical resection with Dr. Cortez on 2/20 and tolerated it well. He is here today to sign up for the GCAR study for recurrent GBM. He is still worried about the [id number] IV arm, but would be fine with the options on the other 3 arms. He is doing well since surgery and denies any severe headaches, seizures, new focal weakness, gait difficulty, sensory loss, or falls. He is on Decadron 2 mg bid and also remains on Keppra 500 mg bid. A course of PT is pending.     INTERVAL HISTORY (3/31/2025): Since the last visit, Dash has had recent progression of the brain tumor on MRI. He  has had a new surgical resection with Dr. Cortez on 2/20 and tolerated it well. He was then admitted for a few days with weakness and mental status changes. While in the hospital, the Decadron had to be increased to 4 mg bid, which disqualified him for the GCAR study. He responded well to the steroids, and was able to go home after a few days. He feels weak all over and very tired, along with a few mild headaches, no seizures, no new focal weakness, falls, or sensory changes. He is on Decadron 1 mg bid and also remains on Keppra 500 mg bid.     The HPI is as documented in the HPI.     Objective   BSA: 1.96 meters squared  /80 (BP Location: Left arm, Patient Position: Sitting, BP Cuff Size: Adult)   Pulse 96   Temp 36.5 °C (97.7 °F) (Temporal)   Resp 18   Wt 69.1 kg (152 lb 5.4 oz)   SpO2 98%   BMI 17.16 kg/m²     No family history on file.  Oncology History Overview Note   68 year old left-handed male with h/o HLD, cholecystectomy, Osborn's esophagus, on ASA 81 for cardioprotection presented to [facility] ED on 12/24/23 with 6 weeks of headaches, 3 weeks of increased confusion and running into things. Left-sided hemianopia on exam. CT head revealed a 1 mm midline leftward shift with a 7.3 cm x 7.1 cm x 5 cm right parietal mass. Transferred to [facility] for neurosurgical management.     GBM (glioblastoma multiforme) (Multi)   12/29/2023 Surgery    Right craniotomy for tumor resection (Ritu Carrasco MD)     12/29/2023 Pathology    Final diagnosis reported 1/18/24:  A, B.  Right brain mass, biopsy and removal:  - Malignant glioma, morphologically consistent with glioblastoma; positive for MGMT methylation     12/29/2023 Initial Diagnosis    GBM (glioblastoma multiforme) (CMS/HCC)     1/3/2024 Tumor Board    CNS Tumor Board tumor board recommendations: Referral to radiation oncology and neuro-oncology.       2/15/2024 - 3/21/2024 Chemotherapy    Temozolomide with Concurrent Radiation, 42 Day Cycles    "  2/15/2024 - 3/28/2024 Radiation Therapy    Radiation Treatments       Active   No active radiation treatments to show.     Completed   Brain Boost_R (Started on 3/20/2024)   Most recent fraction: 182 cGy given on 3/28/2024   Total given: 1,274 cGy / 1,273 cGy  (7 of 7 fractions)   Elapsed Days: 8   Technique: 3D        Partial brain R (Started on 2/15/2024)   Most recent fraction: 182 cGy given on 3/19/2024   Total given: 4,186 cGy / 4,182 cGy  (23 of 23 fractions)   Elapsed Days: 33   Technique: 3D                        5/1/2024 - 1/27/2025 Chemotherapy    Temozolomide (5/28), 28 Day Cycles     3/18/2025 -  Research Study Participant    ([facility]) [id number] RD GBM - SJ-PEG 20 / [place], 42 Day Cycles  Plan Provider: Kyle Rolon MD  Treatment goal: [No plan goal]  Line of treatment: [No plan line of treatment]  Associated studies: GBM AGILE GLOBAL ADAPTIVE TRIAL MASTER PROTOCOL     3/20/2025 - 3/20/2025 Research Study Participant    ([facility]) [id number] RD GBM - [place]-PEG 20 / [place], 42 Day Cycles  Plan Provider: Kyle Rolon MD  Treatment goal: [No plan goal]  Line of treatment: [No plan line of treatment]  Associated studies: GBM AGILE GLOBAL ADAPTIVE TRIAL MASTER PROTOCOL         Enmanuel Ahumada \"Dash\"  reports that he has never smoked. He has never used smokeless tobacco.  He  reports that he does not currently use alcohol.  He  reports no history of drug use.    Physical Exam  Constitutional:       Appearance: Normal appearance.   Eyes:      General: Visual field deficit present.      Extraocular Movements: Extraocular movements intact.      Pupils: Pupils are equal, round, and reactive to light.   Musculoskeletal:      Cervical back: Normal range of motion.   Neurological:      Mental Status: He is alert and oriented to person, place, and time.      Sensory: Sensation is intact.      Motor: Motor function is intact.      Coordination: Coordination is intact.      Deep Tendon Reflexes: Babinski " sign absent on the right side. Babinski sign absent on the left side.      Reflex Scores:       Tricep reflexes are 2+ on the right side and 2+ on the left side.       Bicep reflexes are 2+ on the right side and 2+ on the left side.       Brachioradialis reflexes are 2+ on the right side and 2+ on the left side.       Patellar reflexes are 2+ on the right side and 2+ on the left side.       Achilles reflexes are 2+ on the right side and 2+ on the left side.     Comments: Minimal loss of STM and cognition, speech fluent without dysnomia, CN's II - XII intact except for partial left homonymous hemianopsia, strength 5/5 in UE/LE, mild gait imbalance, DTR's symmetric, sensation intact.    Psychiatric:         Mood and Affect: Mood normal.         Behavior: Behavior normal.     Performance Status:  Symptomatic; fully ambulatory      Lab Results   Component Value Date    WBC 7.5 03/17/2025    HGB 16.1 03/17/2025    HCT 46.6 03/17/2025    MCV 92 03/17/2025     (L) 03/17/2025       The recent brain MRI scans were reviewed with the patient and family:      === 03/14/25 ===    MR BRAIN W AND WO CONTRAST    - Impression -  Redemonstrated right posterior craniotomy for glioblastoma debulking  in the right parieto-occipital lobe.    1. No evidence of diffusion restriction within fluid-filled resection  site cavity to suggest abscess, the overall size of which is similar  to prior study. No evidence of scalp collection or discernible  increase in scalp edema compared to prior study.    2. New small extra-axial fluid collection between the dura and the  craniotomy that measures 7.3 cm x 2.5 cm x 0.9 cm (craniocaudal X AP  X thickness).    3. Compared to prior study, slight increase in extent of infiltrative  white matter disease as specified above, namely within the right  subinsular white matter, anterior right temporal white matter, and  along the subependymal aspect of the trigone and occipital horn of  the left lateral  ventricle via the splenium of the callosum, which is  concerning for worsening infiltrative disease.    4. Previously described area of triangular enhancement along the  posteromedial aspect of the resection site demonstrates slightly  thicker and more irregular enhancement not entirely accounted for by  blood products. Therefore close attention on follow-up is recommended  to evaluate for the possibility of marginal recurrence.    4. Slight improved mass effect on the posterior body/trigone of the  right lateral ventricle indicating improving postoperative vasogenic  edema.    I personally reviewed the images/study and I agree with the findings  as stated above by resident physician, Dr. Cyrus Sue.    MACRO:  None    Signed by: Jeff Diana 3/16/2025 4:59 PM  Dictation workstation:   [id number]      === 02/20/25 ===    MR BRAIN W AND WO CONTRAST    - Impression -  1. Patient is status post debulking of a right parietal GBM. Today's  examination represents the 1st postoperative MRI since debulking of  the tumor on February 20, 2025.  2. There is a triangular area of enhancement involving the posterior,  mesial surgical cavity. This has potential to represent residual  tumor. Attention to this region on follow-up is recommended.  3. While tumor was noted to enter into the ventricular system and  attached of the choroid plexus, no intraventricular tumor is noted on  today's exam.  4. The remainder of the examination demonstrates expected  postoperative changes including residual edema, pneumocephalus,  fluid, and blood products.    MACRO:  None    Signed by: Curt Santiago 2/21/2025 11:49 AM  Dictation workstation:   [id number]      Assessment/Plan      -Dash has recovered well from the initial surgery, with mild residual symptoms.     -The pathology is a GBM; IDH wildtype, MGMT methylated.     -He has completed the course of chemo-RT in late March.     -Since then he had continued the adjuvant phase of  treatment with Temodar -- s/p cycle #8.    -The recent brain MRI is definitely progressive, with increased high signal on T2/FLAIR and enhancement, and positive Perfusion in several areas.     -His case was presented at the Neuro-Onc TB, with the recommendation to consider another surgical resection with Dr. Cortez.     -He has had the surgery on 2/20 and tolerated it well.     -He had decided to sign up for the GCAR trial for recurrent GBM, and signed the consent form today. However, he was disqualified for the study while an inpatient recently, due to the increased steroid dose.     -His case was then reviewed at the Neuro-Oncology Tumor Board, with the recommendation to proceed with SoC CCNU & Avastin.    -The new regimen of CCNU & Avastin was reviewed with him and he signed the consent.     -CCNU cycle #1 will be started in the near future, while Avastin cycle #1, day #1 will be started next week.     -He had been using the Dom[employer] electrical array device prior to the recent surgery -- this can be re-started in the near future.     -The advanced molecular phenotyping from [facility] has been reported, and shows EGFR amplification, which could be targeted with an EGFR TKI (e.g., Osimertinib) at a later date.     -He will remain on Decadron -- 1 mg bid.     -We will also begin a slow taper of the Keppra (since he is still seizure-free) -- 500 mg bid alternating with 500 mg/day.      -He is to remain active with rowing and other exercise, as well as the PT.     -We will have him return to this clinic in 4.5 weeks per protocol for further evaluation, and to monitor after starting CCNU & Avastin.     -I spent > 40 minutes in face to face consultation to review and discuss the above; 50% of which or more was dedicated to counseling.       Kyle Rolon MD

## 2025-03-31 NOTE — PROGRESS NOTES
This SW was asked to say hello to pt/wife as wife was tearful following info re: progression and new tx plan. SW acknowledged that pt's wife told neuro-onc she didn't want to talk, provided contact info and supportive resources including book and brochure for [facility]. SW addressed difficulties of caregiver role, harrison with their adult children. SW followed up with email with brain tumor-specific individual and group support and enc her to reach out for more info/support.  SW to follow as needed.

## 2025-04-01 ENCOUNTER — PHARMACY VISIT (OUTPATIENT)
Dept: PHARMACY | Facility: CLINIC | Age: 70
End: 2025-04-01
Payer: COMMERCIAL

## 2025-04-01 ENCOUNTER — HOME CARE VISIT (OUTPATIENT)
Dept: HOME HEALTH SERVICES | Facility: HOME HEALTH | Age: 70
End: 2025-04-01
Payer: MEDICARE

## 2025-04-01 ENCOUNTER — TELEPHONE (OUTPATIENT)
Dept: HEMATOLOGY/ONCOLOGY | Facility: HOSPITAL | Age: 70
End: 2025-04-01
Payer: MEDICARE

## 2025-04-01 ENCOUNTER — DOCUMENTATION (OUTPATIENT)
Dept: NEUROSURGERY | Facility: HOSPITAL | Age: 70
End: 2025-04-01
Payer: MEDICARE

## 2025-04-01 ENCOUNTER — TELEPHONE (OUTPATIENT)
Dept: NEUROSURGERY | Facility: HOSPITAL | Age: 70
End: 2025-04-01
Payer: MEDICARE

## 2025-04-01 VITALS — OXYGEN SATURATION: 96 %

## 2025-04-01 PROCEDURE — G0157 HHC PT ASSISTANT EA 15: HCPCS | Mod: HHH

## 2025-04-01 ASSESSMENT — ENCOUNTER SYMPTOMS
DENIES PAIN: 1
PERSON REPORTING PAIN: PATIENT

## 2025-04-01 NOTE — TELEPHONE ENCOUNTER
Spouse calling to see if patient can come in after 12pm for infusion on 4/4/25.  Patient has another appointment that same day in morning.

## 2025-04-01 NOTE — TELEPHONE ENCOUNTER
Returning spouse call related to incisional drainage. Educated patient and spouse to go to ED, patient is refusing related wait time of previous visit to ED. Spouse stated she wanted to speak to Dr. Cortez's NP and would like to be seen in the office. Educated both patient and spouse the ED is where the patient will be sent after appointment. We are unable to treat at office visit. NP will call back, Patient/Spouse educated on signs and symptoms of infection and when to call back go to ED.    Krissy Waggoner RN

## 2025-04-01 NOTE — PROGRESS NOTES
"Patient with incisional drainage over the last two days. Images reviewed and with small opening. Fluid is \"reddish brown\". At this time there is no fever, chills, or malaise concerning for infection. Previously patient with incisional swelling with drainage back in March at which time his CRP and Sed rate were normal. MRI was obtained at that time with no concern for infection.     Discussed with Dr. Cortez and we will plan to see patient in follow up this Friday. If he is to develop any fever, chills, malaise, or symptoms concerning for infection recommend he be seen here at [facility] ED.   "

## 2025-04-02 ENCOUNTER — HOME CARE VISIT (OUTPATIENT)
Dept: HOME HEALTH SERVICES | Facility: HOME HEALTH | Age: 70
End: 2025-04-02
Payer: MEDICARE

## 2025-04-02 PROCEDURE — G0158 HHC OT ASSISTANT EA 15: HCPCS | Mod: CO,HHH

## 2025-04-03 ENCOUNTER — SPECIALTY PHARMACY (OUTPATIENT)
Dept: HEMATOLOGY/ONCOLOGY | Facility: HOSPITAL | Age: 70
End: 2025-04-03
Payer: MEDICARE

## 2025-04-03 ENCOUNTER — HOME CARE VISIT (OUTPATIENT)
Dept: HOME HEALTH SERVICES | Facility: HOME HEALTH | Age: 70
End: 2025-04-03
Payer: MEDICARE

## 2025-04-03 VITALS — OXYGEN SATURATION: 96 %

## 2025-04-03 VITALS — HEART RATE: 77 BPM | OXYGEN SATURATION: 97 %

## 2025-04-03 DIAGNOSIS — C71.9 GBM (GLIOBLASTOMA MULTIFORME) (MULTI): Primary | ICD-10-CM

## 2025-04-03 PROCEDURE — G0157 HHC PT ASSISTANT EA 15: HCPCS | Mod: HHH

## 2025-04-03 ASSESSMENT — ENCOUNTER SYMPTOMS
DENIES PAIN: 1
DENIES PAIN: 1
PERSON REPORTING PAIN: PATIENT
PERSON REPORTING PAIN: PATIENT

## 2025-04-03 NOTE — PROGRESS NOTES
"[facility] Clinical Note  Initial Patient Education     Introduction  Enmanuel Ahumada \"Dash\" is a 69 y.o. male who is on the specialty pharmacy service for management of: Oncology Core.    Enmanuel Ahumada \"Dash\" is initiating the following therapy: lomustine - Take 250 mg (multiple capsules) by mouth 1 time for 1 dose every 42 days.  Take on Day 1 of each treatment cycle. Taking on an empty stomach may reduce nausea and vomiting.  Call your doctor if you vomit right after taking a lomustine capsule.  (name, dose, directions)    Medication receipt date: 04/01/25  Duration of therapy: Until drug toxicity or progression    The most recent encounter visit with the referring prescriber Dr. Rolon on 03/31/25 was reviewed.  Pharmacy will continue to collaborate in the care of this patient with the referring prescriber.    Clinical Background  An initial assessment was conducted prior to first fill of the medication to determine the appropriateness of therapy given the patient's diagnosis, medication list, comorbidities, allergies, medical history, patient's ability to self administer medication, and therapeutic goals based on possible outcomes of therapy. Refer to initial assessment task completed on 03/31/25.    Labs/Procedures for clinical appropriateness that were reviewed include:   Oncology - CBC-diff:   Lab Results   Component Value Date    WBC 7.5 03/17/2025    RBC 5.05 03/17/2025    HGB 16.1 03/17/2025    HCT 46.6 03/17/2025    MCV 92 03/17/2025    MCHC 34.5 03/17/2025     (L) 03/17/2025    RDW 12.5 03/17/2025    NEUTOPHILPCT 79.0 03/17/2025    IGPCT 0.8 03/17/2025    LYMPHOPCT 13.6 03/17/2025    MONOPCT 6.6 03/17/2025    EOSPCT 0.0 03/17/2025    BASOPCT 0.0 03/17/2025    NEUTROABS 5.91 03/17/2025    LYMPHSABS 1.02 (L) 03/17/2025    MONOSABS 0.49 03/17/2025    EOSABS 0.00 03/17/2025    BASOSABS 0.00 03/17/2025    and CMP:   Lab Results   Component Value Date    " GLUCOSE 131 (H) 03/17/2025     03/17/2025    K 3.9 03/17/2025    CL 99 03/17/2025    CO2 26 03/17/2025    ANIONGAP 16 03/17/2025    BUN 27 (H) 03/17/2025    CREATININE 0.84 03/17/2025    CALCIUM 9.6 03/17/2025    ALBUMIN 3.9 03/17/2025    ALKPHOS 63 03/17/2025    PROT 6.6 03/17/2025    AST 7 (L) 03/17/2025    BILITOT 1.6 (H) 03/17/2025    ALT 18 03/17/2025       Education/Discussion  Enmanuel was contacted on 4/3/2025 at 12:20 PM for a pharmacy visit with encounter number [phone] from:   [facility]  [address]  [address] [facility] [address]  Dept: [phone]  Loc: [phone]  Enmanuel consented to a/an Telephone visit, which was performed.    Medication Start Date (planned or actual): 04/02/25 - Treatment team aware of early start  Education was conducted prior to start of therapy? Yes    Education discussed includes the following:  Patient Education  Counseled the Patient on the Following : Theraputic rationale and expected outcomes, Expected duration of therapy, Doses and administration, Possible drug interactions, Possible side effects and management, Adherence and missed doses, Lab monitoring and follow-up, Safe handling, storage, and disposal, Contraindications and precautions, Use of contraception, Pharmacy contact information, Cost of medications, Associated vaccinations  Learner: Patient  Education Method: Explanation  Education Response: Verbalizes understanding  Additional details of the medication specific counseling are found within the linked patient education flowsheet.     The follow up timeline was discussed. Every person responds to and reacts to therapy differently. Patient should be assessed for efficacy and tolerability in approximately: 10-14 days    Provided education on goals and possible outcomes of therapy:  Adherence with therapy  Timely completion of appropriate labs  Timely and appropriate follow up with provider  Identify and  address medication interactions with presciption medications, OTC medications and supplements  Optimize or maintain quality of life  Oncology: Prolong life/No disease progression  Manage side effects (ex: nausea/vomiting, constipation, fatigue) in conjunction with care team    The importance of adherence was discussed and they were advised to take the medication as prescribed by their provider.     Impression/Plan  - Patient started lomustine on 04/02/25, team aware of early start and okay with utilizing labs from 03/17 as baseline.  - Patient aware moving forward to not start lomustine until directed by team with labs.  - Will plan to obtain labs prior to bevacizumab appt on 04/04    Lomustine  Start Date: 04/02/25 - Team aware of early start  Dose: 130 mg/m2 - 250 mg total  Administration: Administering on an empty stomach may reduce the incidence of nausea and vomiting.   Warnings: Pulmonary toxicity, secondary malignancies,hepatotoxicity, nephrotoxicity, embryo-fetal toxicity  Side Effects: Delayed myelosupression, nausea, vomiting, stomatitis, and alopecia  Storage: Store at 25°C (77°F); excursions permitted between 15°C and 30°C (59°F and 86°F). Avoid temperatures over 40°C (104°F).     DDIs Identified: No    Bevacizumab  Start Date: 04/04/25 (Scheduled)  Dose: 5 mg/kg  Warnings: Non-gastrointestinal fistula, Arterial thromboembolic events, hypertension, RPLs, proteinuria, and infusion reactions  Side Effects: epistaxis, headache, hypertension, rhinitis, proteinuria, taste alteration, dry skin, rectal hemorrhage, lacrimation disorder, and exfoliative dermatitis    Review and Assessment   Reviewed During This Encounter: Allergies, Immunizations, Medications, Problem list  Medications Assessed for Appropriate Use, Dose, Route, Frequency, and Duration: Yes  Medication Reconciliation Completed: Yes  Drug Interactions Evaluated: Yes  Clinically Relevant Drug Interactions Identified: Yes    This patient has been  identified as high risk due to Co-morbidities or Geriatric (over 65 years of age).  The following action was taken: Patient/caregiver encouraged to participate in patient management program.    QOL/Patient Satisfaction  Rate your quality of life on scale of 1-10: 7  Rate your satisfaction with  Specialty Pharmacy on scale of 1-10: 10 - Completely satisfied    The  Specialty Pharmacy Welcome packet may be viewed here:   Specialty Pharmacy Welcome Packet     Or by scanning QR code:      Provided contact information ([phone]) for [facility] and reviewed dispensing process, refill timeline and patient management follow up. Advised to contact the pharmacy if there are any adverse effects and/or changes to medication list, including prescriptions, OTC medications, herbal products, or supplements. Confirmed understanding of education conducted during assessment. All questions and concerns were addressed and patient was encouraged to reach out for additional questions or concerns.    Enmanuel Lei, PharmD, CSP  Clinical Pharm Specialist -  Oncology  [facility]  Phone #: [phone]  Fax #: [phone]  Email: [email or web address]

## 2025-04-04 ENCOUNTER — OFFICE VISIT (OUTPATIENT)
Dept: NEUROSURGERY | Facility: HOSPITAL | Age: 70
End: 2025-04-04
Payer: MEDICARE

## 2025-04-04 ENCOUNTER — APPOINTMENT (OUTPATIENT)
Dept: HEMATOLOGY/ONCOLOGY | Facility: CLINIC | Age: 70
DRG: 907 | End: 2025-04-04
Payer: MEDICARE

## 2025-04-04 ENCOUNTER — HOSPITAL ENCOUNTER (OUTPATIENT)
Dept: RADIOLOGY | Facility: CLINIC | Age: 70
Discharge: HOME | End: 2025-04-04
Payer: MEDICARE

## 2025-04-04 ENCOUNTER — TELEPHONE (OUTPATIENT)
Dept: NEUROSURGERY | Facility: HOSPITAL | Age: 70
End: 2025-04-04
Payer: MEDICARE

## 2025-04-04 VITALS
HEART RATE: 97 BPM | BODY MASS INDEX: 16.91 KG/M2 | TEMPERATURE: 98.2 F | RESPIRATION RATE: 18 BRPM | OXYGEN SATURATION: 98 % | DIASTOLIC BLOOD PRESSURE: 77 MMHG | WEIGHT: 150.13 LBS | SYSTOLIC BLOOD PRESSURE: 98 MMHG

## 2025-04-04 DIAGNOSIS — C71.9 GBM (GLIOBLASTOMA MULTIFORME) (MULTI): ICD-10-CM

## 2025-04-04 DIAGNOSIS — C71.9 GBM (GLIOBLASTOMA MULTIFORME) (MULTI): Primary | ICD-10-CM

## 2025-04-04 PROCEDURE — 70450 CT HEAD/BRAIN W/O DYE: CPT

## 2025-04-04 PROCEDURE — 99214 OFFICE O/P EST MOD 30 MIN: CPT | Mod: 25 | Performed by: NEUROLOGICAL SURGERY

## 2025-04-04 RX ORDER — CEPHALEXIN 500 MG/1
500 CAPSULE ORAL 3 TIMES DAILY
Qty: 42 CAPSULE | Refills: 0 | Status: ON HOLD | OUTPATIENT
Start: 2025-04-04 | End: 2025-04-18

## 2025-04-04 ASSESSMENT — PAIN SCALES - GENERAL: PAINLEVEL_OUTOF10: 3

## 2025-04-04 NOTE — PROGRESS NOTES
"[facility]  Neurosurgery    Subjective     Enmanuel Ahumada is a {handedness:074404}  69 y.o. year old male presenting for {Reason for Visit:06915::\"neurologic evaluation\"}.      PMH HLD, Osborn's esophagus, s/p cholecystectomy, who presented with gait instability and headaches back in December 2023. Workup was significant for CTH with RO hypodensity. MRI PPF and fMRI with language dominant lesion. He is now s/p right craniotomy for tumor resection with 5ALA with Dr. Carrasco on 12/29/23. Postoperative MRI for GTR. Final surgical pathology for GBM IDH wildytpe, MGMT methylated. Caris testing was completed for EGFR amplification, TERT mutations, and loss of CDKN2A/B. Treatment course significant for chemoRT on adjuvant temodar c/b thrombocytopenia. [employer] electrical array device was placed. Remains on adjuvant temodar now s/p cycle 8. MRI with progression now s/p redo craniotomy for tumor resection with 5 ALA on 02/20/25. CTH with postoperative changes and pneumocephalus. MRI with small enhancement that is consistent with postoperative changes vs residual but likely to be GTR. Pathology for recurrent glioblastoma with 70% viable tumor and necrosis. MRI 03/16/25: Postoperative changes with fluid collection at dura and craniotomy site, increase in infiltrative WMD, Enhancement along posteromedial aspect of resection site that is nonspecific. He presents to clinic for incision evaluation due to concerns for opening with drainage x 1 week. Previously had incisional swelling with drainage back in March at which time CRP and Sed rate were normal. No additional neurosurgical intervention was required at that time. Patient presents to clinic for FUV.     Review of Systems    Treatment Synopsis:   Brain Tumor: {Brain Tumor:39810}  Molecular: {Molecular:53755}  Location: {Location:78928}  Age at diagnosis: ***  Prior history of Radiation: {Prior history of " Radiation:03527}    Objective     Performance and Vitals:  KARNOFSKY SCALE WITH ECOG EQUIVALENT:{DESC; KARNOFSKY SCALE WITH ECOG EQUIVALENT:83365084}    There were no vitals filed for this visit.      Neurological Exam  Physical Exam    Imaging:   {Imaging Results:85672}  {Recent labs:09489}      Procedure:  Procedures    Assessment & Plan     Assessment/Plan   {Assess/PlanSmartLinks:83880}                    Medical History     Past Medical History:   Diagnosis Date    Brain cancer (Multi)     GERD (gastroesophageal reflux disease)     Seizure disorder (Multi)      Past Surgical History:   Procedure Laterality Date    BRAIN SURGERY      CHOLECYSTECTOMY  January 25, 2020    OTHER SURGICAL HISTORY  07/24/2019    Colonoscopy    OTHER SURGICAL HISTORY  07/26/2021    Endoscopy    VASECTOMY  Abt 1991     Social History     Tobacco Use    Smoking status: Never    Smokeless tobacco: Never   Substance Use Topics    Alcohol use: Not Currently    Drug use: Never     No family history on file.  No Known Allergies  Current Outpatient Medications   Medication Instructions    acetaminophen (TYLENOL) 650 mg, Every 6 hours PRN    atorvastatin (LIPITOR) 40 mg, oral, Daily    CALCIUM CARBONATE-VITAMIN D3 ORAL 1 capsule, Daily    dexAMETHasone (Decadron) 2 mg tablet Take 2 tabs (4mg) 2 times daily x3 days (3/18-3/20) then take 1 tab (2mg) 2 times daily x3 days (3/21-3/23) then stop    Gleostine 130 mg/m2, oral, Once, Take one Day 1 of each treatment cycle. Taking on an empty stomach may reduce nausea and vomiting.  Call your doctor if you vomit right after taking a lomustine capsule.    krill-omega-3-dha-epa-lipids 348-71-00-50 mg capsule 1 capsule, Daily    levETIRAcetam (Keppra) 500 mg tablet Alternate back and fourth with 500mg 2 times per day (every other day) with 500mg once per day (every other day)    ondansetron (ZOFRAN) 8 mg, oral, Every 8 hours PRN, Take one hour prior to each chemotherapy dose in addition to every 8 hours  as needed for nausea/vomiting    ondansetron (ZOFRAN) 8 mg, oral, Every 8 hours PRN    pantoprazole (ProtoNix) 40 mg EC tablet TAKE 1 TABLET BY MOUTH IN THE MORNING 30 MINUTES BEFORE BREAKFAST.    prochlorperazine (COMPAZINE) 10 mg, oral, Every 6 hours PRN    sennosides (SENNA LAX ORAL) 1 tablet, Daily    ubidecarenone (COENZYME Q10, BULK, MISC) 1 Capful, Daily        1 capsule, Daily    cephalexin (KEFLEX) 500 mg, oral, 3 times daily    dexAMETHasone (Decadron) 2 mg tablet Take 2 tabs (4mg) 2 times daily x3 days (3/18-3/20) then take 1 tab (2mg) 2 times daily x3 days (3/21-3/23) then stop    krill-omega-3-dha-epa-lipids 178-68-19-50 mg capsule 1 capsule, Daily    levETIRAcetam (Keppra) 500 mg tablet Alternate back and fourth with 500mg 2 times per day (every other day) with 500mg once per day (every other day)    ondansetron (ZOFRAN) 8 mg, oral, Every 8 hours PRN, Take one hour prior to each chemotherapy dose in addition to every 8 hours as needed for nausea/vomiting    ondansetron (ZOFRAN) 8 mg, oral, Every 8 hours PRN    pantoprazole (ProtoNix) 40 mg EC tablet TAKE 1 TABLET BY MOUTH IN THE MORNING 30 MINUTES BEFORE BREAKFAST.    prochlorperazine (COMPAZINE) 10 mg, oral, Every 6 hours PRN    sennosides (SENNA LAX ORAL) 1 tablet, Daily    ubidecarenone (COENZYME Q10, BULK, MISC) 1 Capful, Daily

## 2025-04-04 NOTE — TELEPHONE ENCOUNTER
Called patient/spouse related to plan for direct admit. [facility] will call patient when bed is ready. Patient/spouse understands and was able to read back plan.     Krissy Waggoner RN

## 2025-04-05 ENCOUNTER — APPOINTMENT (OUTPATIENT)
Dept: RADIOLOGY | Facility: HOSPITAL | Age: 70
End: 2025-04-05
Payer: MEDICARE

## 2025-04-05 ENCOUNTER — APPOINTMENT (OUTPATIENT)
Dept: CARDIOLOGY | Facility: HOSPITAL | Age: 70
DRG: 907 | End: 2025-04-05
Payer: MEDICARE

## 2025-04-05 ENCOUNTER — HOSPITAL ENCOUNTER (INPATIENT)
Facility: HOSPITAL | Age: 70
End: 2025-04-05
Attending: NEUROLOGICAL SURGERY | Admitting: NEUROLOGICAL SURGERY
Payer: MEDICARE

## 2025-04-05 ENCOUNTER — APPOINTMENT (OUTPATIENT)
Dept: RADIOLOGY | Facility: HOSPITAL | Age: 70
DRG: 907 | End: 2025-04-05
Payer: MEDICARE

## 2025-04-05 DIAGNOSIS — K59.00 CONSTIPATION, UNSPECIFIED CONSTIPATION TYPE: ICD-10-CM

## 2025-04-05 DIAGNOSIS — C71.9 GBM (GLIOBLASTOMA MULTIFORME) (MULTI): ICD-10-CM

## 2025-04-05 DIAGNOSIS — Z29.89 SEIZURE PROPHYLAXIS: ICD-10-CM

## 2025-04-05 DIAGNOSIS — R94.31 ABNORMAL EKG: ICD-10-CM

## 2025-04-05 DIAGNOSIS — G93.89 BRAIN MASS: ICD-10-CM

## 2025-04-05 DIAGNOSIS — T81.30XA WOUND DEHISCENCE: Primary | ICD-10-CM

## 2025-04-05 DIAGNOSIS — R60.9 EDEMA, UNSPECIFIED TYPE: ICD-10-CM

## 2025-04-05 DIAGNOSIS — R60.1 GENERALIZED EDEMA: ICD-10-CM

## 2025-04-05 LAB
ABO GROUP (TYPE) IN BLOOD: NORMAL
ALBUMIN SERPL BCP-MCNC: 3.5 G/DL (ref 3.4–5)
ANION GAP SERPL CALC-SCNC: 11 MMOL/L (ref 10–20)
ANTIBODY SCREEN: NORMAL
APPEARANCE UR: CLEAR
APTT PPP: 24 SECONDS (ref 26–36)
BILIRUB UR STRIP.AUTO-MCNC: NEGATIVE MG/DL
BUN SERPL-MCNC: 30 MG/DL (ref 6–23)
CALCIUM SERPL-MCNC: 8.5 MG/DL (ref 8.6–10.6)
CHLORIDE SERPL-SCNC: 98 MMOL/L (ref 98–107)
CO2 SERPL-SCNC: 27 MMOL/L (ref 21–32)
COLOR UR: NORMAL
CREAT SERPL-MCNC: 0.8 MG/DL (ref 0.5–1.3)
CRP SERPL-MCNC: 0.17 MG/DL
EGFRCR SERPLBLD CKD-EPI 2021: >90 ML/MIN/1.73M*2
ERYTHROCYTE [DISTWIDTH] IN BLOOD BY AUTOMATED COUNT: 13.5 % (ref 11.5–14.5)
ERYTHROCYTE [SEDIMENTATION RATE] IN BLOOD BY WESTERGREN METHOD: 2 MM/H (ref 0–20)
GLUCOSE SERPL-MCNC: 168 MG/DL (ref 74–99)
GLUCOSE UR STRIP.AUTO-MCNC: NORMAL MG/DL
HCT VFR BLD AUTO: 39.5 % (ref 41–52)
HGB BLD-MCNC: 14.2 G/DL (ref 13.5–17.5)
INR PPP: 1.1 (ref 0.9–1.1)
KETONES UR STRIP.AUTO-MCNC: NEGATIVE MG/DL
LEUKOCYTE ESTERASE UR QL STRIP.AUTO: NEGATIVE
MCH RBC QN AUTO: 32.6 PG (ref 26–34)
MCHC RBC AUTO-ENTMCNC: 35.9 G/DL (ref 32–36)
MCV RBC AUTO: 91 FL (ref 80–100)
NITRITE UR QL STRIP.AUTO: NEGATIVE
NRBC BLD-RTO: 0 /100 WBCS (ref 0–0)
PH UR STRIP.AUTO: 5.5 [PH]
PHOSPHATE SERPL-MCNC: 3.2 MG/DL (ref 2.5–4.9)
PLATELET # BLD AUTO: 96 X10*3/UL (ref 150–450)
POTASSIUM SERPL-SCNC: 3.4 MMOL/L (ref 3.5–5.3)
PROT UR STRIP.AUTO-MCNC: NEGATIVE MG/DL
PROTHROMBIN TIME: 12.2 SECONDS (ref 9.8–12.4)
RBC # BLD AUTO: 4.35 X10*6/UL (ref 4.5–5.9)
RBC # UR STRIP.AUTO: NEGATIVE MG/DL
RH FACTOR (ANTIGEN D): NORMAL
SODIUM SERPL-SCNC: 133 MMOL/L (ref 136–145)
SP GR UR STRIP.AUTO: 1.02
UROBILINOGEN UR STRIP.AUTO-MCNC: NORMAL MG/DL
WBC # BLD AUTO: 5.8 X10*3/UL (ref 4.4–11.3)

## 2025-04-05 PROCEDURE — 36415 COLL VENOUS BLD VENIPUNCTURE: CPT

## 2025-04-05 PROCEDURE — 71045 X-RAY EXAM CHEST 1 VIEW: CPT | Performed by: STUDENT IN AN ORGANIZED HEALTH CARE EDUCATION/TRAINING PROGRAM

## 2025-04-05 PROCEDURE — 70553 MRI BRAIN STEM W/O & W/DYE: CPT | Performed by: RADIOLOGY

## 2025-04-05 PROCEDURE — 87040 BLOOD CULTURE FOR BACTERIA: CPT

## 2025-04-05 PROCEDURE — 99222 1ST HOSP IP/OBS MODERATE 55: CPT | Performed by: NEUROLOGICAL SURGERY

## 2025-04-05 PROCEDURE — 1100000001 HC PRIVATE ROOM DAILY

## 2025-04-05 PROCEDURE — 86140 C-REACTIVE PROTEIN: CPT

## 2025-04-05 PROCEDURE — 81003 URINALYSIS AUTO W/O SCOPE: CPT

## 2025-04-05 PROCEDURE — 71045 X-RAY EXAM CHEST 1 VIEW: CPT

## 2025-04-05 PROCEDURE — 85652 RBC SED RATE AUTOMATED: CPT

## 2025-04-05 PROCEDURE — 70553 MRI BRAIN STEM W/O & W/DYE: CPT

## 2025-04-05 PROCEDURE — 93010 ELECTROCARDIOGRAM REPORT: CPT | Performed by: STUDENT IN AN ORGANIZED HEALTH CARE EDUCATION/TRAINING PROGRAM

## 2025-04-05 PROCEDURE — 2500000001 HC RX 250 WO HCPCS SELF ADMINISTERED DRUGS (ALT 637 FOR MEDICARE OP)

## 2025-04-05 PROCEDURE — A9575 INJ GADOTERATE MEGLUMI 0.1ML: HCPCS | Performed by: NEUROLOGICAL SURGERY

## 2025-04-05 PROCEDURE — 2550000001 HC RX 255 CONTRASTS: Performed by: NEUROLOGICAL SURGERY

## 2025-04-05 PROCEDURE — 2500000002 HC RX 250 W HCPCS SELF ADMINISTERED DRUGS (ALT 637 FOR MEDICARE OP, ALT 636 FOR OP/ED)

## 2025-04-05 PROCEDURE — 80069 RENAL FUNCTION PANEL: CPT

## 2025-04-05 PROCEDURE — 85730 THROMBOPLASTIN TIME PARTIAL: CPT

## 2025-04-05 PROCEDURE — 2500000004 HC RX 250 GENERAL PHARMACY W/ HCPCS (ALT 636 FOR OP/ED)

## 2025-04-05 PROCEDURE — 86850 RBC ANTIBODY SCREEN: CPT

## 2025-04-05 PROCEDURE — 93005 ELECTROCARDIOGRAM TRACING: CPT

## 2025-04-05 PROCEDURE — 85027 COMPLETE CBC AUTOMATED: CPT

## 2025-04-05 RX ORDER — PANTOPRAZOLE SODIUM 40 MG/1
40 TABLET, DELAYED RELEASE ORAL
Status: DISCONTINUED | OUTPATIENT
Start: 2025-04-06 | End: 2025-04-08

## 2025-04-05 RX ORDER — POLYETHYLENE GLYCOL 3350 17 G/17G
17 POWDER, FOR SOLUTION ORAL DAILY
Status: DISCONTINUED | OUTPATIENT
Start: 2025-04-05 | End: 2025-04-08

## 2025-04-05 RX ORDER — DEXAMETHASONE 2 MG/1
2 TABLET ORAL EVERY 12 HOURS SCHEDULED
Status: DISCONTINUED | OUTPATIENT
Start: 2025-04-05 | End: 2025-04-06

## 2025-04-05 RX ORDER — GADOTERATE MEGLUMINE 376.9 MG/ML
13 INJECTION INTRAVENOUS
Status: COMPLETED | OUTPATIENT
Start: 2025-04-05 | End: 2025-04-05

## 2025-04-05 RX ORDER — POTASSIUM CHLORIDE 20 MEQ/1
20 TABLET, EXTENDED RELEASE ORAL ONCE
Status: COMPLETED | OUTPATIENT
Start: 2025-04-05 | End: 2025-04-05

## 2025-04-05 RX ORDER — ONDANSETRON HYDROCHLORIDE 2 MG/ML
4 INJECTION, SOLUTION INTRAVENOUS EVERY 8 HOURS PRN
Status: DISCONTINUED | OUTPATIENT
Start: 2025-04-05 | End: 2025-04-08

## 2025-04-05 RX ORDER — ONDANSETRON 4 MG/1
4 TABLET, FILM COATED ORAL EVERY 8 HOURS PRN
Status: DISCONTINUED | OUTPATIENT
Start: 2025-04-05 | End: 2025-04-08

## 2025-04-05 RX ORDER — ATORVASTATIN CALCIUM 40 MG/1
40 TABLET, FILM COATED ORAL DAILY
Status: DISCONTINUED | OUTPATIENT
Start: 2025-04-05 | End: 2025-04-08

## 2025-04-05 RX ADMIN — GADOTERATE MEGLUMINE 13 ML: 376.9 INJECTION INTRAVENOUS at 20:27

## 2025-04-05 RX ADMIN — POLYETHYLENE GLYCOL 3350 17 G: 17 POWDER, FOR SOLUTION ORAL at 17:33

## 2025-04-05 RX ADMIN — POTASSIUM CHLORIDE 20 MEQ: 1500 TABLET, EXTENDED RELEASE ORAL at 21:05

## 2025-04-05 RX ADMIN — ATORVASTATIN CALCIUM 40 MG: 40 TABLET, FILM COATED ORAL at 21:05

## 2025-04-05 RX ADMIN — DEXAMETHASONE 2 MG: 2 TABLET ORAL at 21:05

## 2025-04-05 SDOH — SOCIAL STABILITY: SOCIAL INSECURITY: DO YOU FEEL UNSAFE GOING BACK TO THE PLACE WHERE YOU ARE LIVING?: NO

## 2025-04-05 SDOH — SOCIAL STABILITY: SOCIAL INSECURITY: WITHIN THE LAST YEAR, HAVE YOU BEEN AFRAID OF YOUR PARTNER OR EX-PARTNER?: NO

## 2025-04-05 SDOH — ECONOMIC STABILITY: HOUSING INSECURITY: IN THE LAST 12 MONTHS, WAS THERE A TIME WHEN YOU WERE NOT ABLE TO PAY THE MORTGAGE OR RENT ON TIME?: NO

## 2025-04-05 SDOH — ECONOMIC STABILITY: INCOME INSECURITY: IN THE PAST 12 MONTHS HAS THE ELECTRIC, GAS, OIL, OR WATER COMPANY THREATENED TO SHUT OFF SERVICES IN YOUR HOME?: NO

## 2025-04-05 SDOH — ECONOMIC STABILITY: FOOD INSECURITY: HOW HARD IS IT FOR YOU TO PAY FOR THE VERY BASICS LIKE FOOD, HOUSING, MEDICAL CARE, AND HEATING?: NOT HARD AT ALL

## 2025-04-05 SDOH — SOCIAL STABILITY: SOCIAL INSECURITY: WITHIN THE LAST YEAR, HAVE YOU BEEN HUMILIATED OR EMOTIONALLY ABUSED IN OTHER WAYS BY YOUR PARTNER OR EX-PARTNER?: NO

## 2025-04-05 SDOH — ECONOMIC STABILITY: TRANSPORTATION INSECURITY: IN THE PAST 12 MONTHS, HAS LACK OF TRANSPORTATION KEPT YOU FROM MEDICAL APPOINTMENTS OR FROM GETTING MEDICATIONS?: NO

## 2025-04-05 SDOH — SOCIAL STABILITY: SOCIAL INSECURITY: ABUSE: ADULT

## 2025-04-05 SDOH — ECONOMIC STABILITY: FOOD INSECURITY: WITHIN THE PAST 12 MONTHS, YOU WORRIED THAT YOUR FOOD WOULD RUN OUT BEFORE YOU GOT THE MONEY TO BUY MORE.: NEVER TRUE

## 2025-04-05 SDOH — HEALTH STABILITY: PHYSICAL HEALTH: ON AVERAGE, HOW MANY MINUTES DO YOU ENGAGE IN EXERCISE AT THIS LEVEL?: 0 MIN

## 2025-04-05 SDOH — SOCIAL STABILITY: SOCIAL INSECURITY: DOES ANYONE TRY TO KEEP YOU FROM HAVING/CONTACTING OTHER FRIENDS OR DOING THINGS OUTSIDE YOUR HOME?: NO

## 2025-04-05 SDOH — SOCIAL STABILITY: SOCIAL INSECURITY: HAS ANYONE EVER THREATENED TO HURT YOUR FAMILY OR YOUR PETS?: NO

## 2025-04-05 SDOH — HEALTH STABILITY: PHYSICAL HEALTH
HOW OFTEN DO YOU NEED TO HAVE SOMEONE HELP YOU WHEN YOU READ INSTRUCTIONS, PAMPHLETS, OR OTHER WRITTEN MATERIAL FROM YOUR DOCTOR OR PHARMACY?: SOMETIMES

## 2025-04-05 SDOH — ECONOMIC STABILITY: HOUSING INSECURITY: AT ANY TIME IN THE PAST 12 MONTHS, WERE YOU HOMELESS OR LIVING IN A SHELTER (INCLUDING NOW)?: NO

## 2025-04-05 SDOH — HEALTH STABILITY: PHYSICAL HEALTH: ON AVERAGE, HOW MANY DAYS PER WEEK DO YOU ENGAGE IN MODERATE TO STRENUOUS EXERCISE (LIKE A BRISK WALK)?: 0 DAYS

## 2025-04-05 SDOH — SOCIAL STABILITY: SOCIAL INSECURITY: HAVE YOU HAD THOUGHTS OF HARMING ANYONE ELSE?: NO

## 2025-04-05 SDOH — ECONOMIC STABILITY: HOUSING INSECURITY: IN THE PAST 12 MONTHS, HOW MANY TIMES HAVE YOU MOVED WHERE YOU WERE LIVING?: 0

## 2025-04-05 SDOH — ECONOMIC STABILITY: FOOD INSECURITY: WITHIN THE PAST 12 MONTHS, THE FOOD YOU BOUGHT JUST DIDN'T LAST AND YOU DIDN'T HAVE MONEY TO GET MORE.: NEVER TRUE

## 2025-04-05 SDOH — SOCIAL STABILITY: SOCIAL INSECURITY: DO YOU FEEL ANYONE HAS EXPLOITED OR TAKEN ADVANTAGE OF YOU FINANCIALLY OR OF YOUR PERSONAL PROPERTY?: NO

## 2025-04-05 SDOH — SOCIAL STABILITY: SOCIAL INSECURITY: ARE YOU OR HAVE YOU BEEN THREATENED OR ABUSED PHYSICALLY, EMOTIONALLY, OR SEXUALLY BY ANYONE?: NO

## 2025-04-05 SDOH — SOCIAL STABILITY: SOCIAL INSECURITY: ARE THERE ANY APPARENT SIGNS OF INJURIES/BEHAVIORS THAT COULD BE RELATED TO ABUSE/NEGLECT?: NO

## 2025-04-05 SDOH — SOCIAL STABILITY: SOCIAL INSECURITY: WERE YOU ABLE TO COMPLETE ALL THE BEHAVIORAL HEALTH SCREENINGS?: YES

## 2025-04-05 SDOH — SOCIAL STABILITY: SOCIAL INSECURITY: HAVE YOU HAD ANY THOUGHTS OF HARMING ANYONE ELSE?: NO

## 2025-04-05 ASSESSMENT — COGNITIVE AND FUNCTIONAL STATUS - GENERAL
PATIENT BASELINE BEDBOUND: NO
MOBILITY SCORE: 22
DAILY ACTIVITIY SCORE: 24
CLIMB 3 TO 5 STEPS WITH RAILING: A LITTLE
CLIMB 3 TO 5 STEPS WITH RAILING: A LITTLE
MOBILITY SCORE: 23
WALKING IN HOSPITAL ROOM: A LITTLE

## 2025-04-05 ASSESSMENT — ACTIVITIES OF DAILY LIVING (ADL)
BATHING: INDEPENDENT
HEARING - LEFT EAR: DIFFICULTY WITH NOISE
DRESSING YOURSELF: INDEPENDENT
FEEDING YOURSELF: INDEPENDENT
JUDGMENT_ADEQUATE_SAFELY_COMPLETE_DAILY_ACTIVITIES: YES
LACK_OF_TRANSPORTATION: NO
PATIENT'S MEMORY ADEQUATE TO SAFELY COMPLETE DAILY ACTIVITIES?: YES
ADEQUATE_TO_COMPLETE_ADL: YES
WALKS IN HOME: INDEPENDENT
HEARING - RIGHT EAR: DIFFICULTY WITH NOISE
TOILETING: INDEPENDENT
LACK_OF_TRANSPORTATION: NO
GROOMING: INDEPENDENT

## 2025-04-05 ASSESSMENT — LIFESTYLE VARIABLES
AUDIT-C TOTAL SCORE: 0
SKIP TO QUESTIONS 9-10: 1
SUBSTANCE_ABUSE_PAST_12_MONTHS: NO
HOW MANY STANDARD DRINKS CONTAINING ALCOHOL DO YOU HAVE ON A TYPICAL DAY: PATIENT DOES NOT DRINK
HOW OFTEN DO YOU HAVE A DRINK CONTAINING ALCOHOL: NEVER
AUDIT-C TOTAL SCORE: 0
PRESCIPTION_ABUSE_PAST_12_MONTHS: NO
HOW OFTEN DO YOU HAVE 6 OR MORE DRINKS ON ONE OCCASION: NEVER

## 2025-04-05 ASSESSMENT — PATIENT HEALTH QUESTIONNAIRE - PHQ9
2. FEELING DOWN, DEPRESSED OR HOPELESS: NOT AT ALL
SUM OF ALL RESPONSES TO PHQ9 QUESTIONS 1 & 2: 0
1. LITTLE INTEREST OR PLEASURE IN DOING THINGS: NOT AT ALL

## 2025-04-05 ASSESSMENT — PAIN - FUNCTIONAL ASSESSMENT
PAIN_FUNCTIONAL_ASSESSMENT: 0-10

## 2025-04-05 ASSESSMENT — PAIN DESCRIPTION - DESCRIPTORS
DESCRIPTORS: DISCOMFORT
DESCRIPTORS: DISCOMFORT

## 2025-04-05 ASSESSMENT — PAIN SCALES - GENERAL
PAINLEVEL_OUTOF10: 0 - NO PAIN
PAINLEVEL_OUTOF10: 2
PAINLEVEL_OUTOF10: 2

## 2025-04-05 NOTE — H&P
"History Of Present Illness  Enmanuel Ahumada \"Dash\" is a 69 y.o. male presenting with incisional drainage since 3/31.    Patient presenting with 1 week of incisional drainage. Wife at bedside. He reported that the fluid was yellowish. Was seen in clinic yesterday and started on keflex. Denied any fevers or chills.     Patient's imaging was personally reviewed and notable for CTH airfluid level in resection cavity.    Past Medical History  He has a past medical history of Brain cancer (Multi), GERD (gastroesophageal reflux disease), and Seizure disorder (Multi).    Surgical History  He has a past surgical history that includes Other surgical history (07/24/2019); Other surgical history (07/26/2021); Brain surgery; Cholecystectomy (January 25, 2020); and Vasectomy (Abt 1991).     Social History  He reports that he has never smoked. He has never used smokeless tobacco. He reports that he does not currently use alcohol. He reports that he does not use drugs.     Allergies  Patient has no known allergies.    Medications  Medications Prior to Admission   Medication Sig Dispense Refill Last Dose/Taking    acetaminophen (Tylenol) 325 mg tablet Take 2 tablets (650 mg) by mouth every 6 hours if needed for mild pain (1 - 3).       atorvastatin (Lipitor) 40 mg tablet TAKE 1 TABLET BY MOUTH EVERY DAY 90 tablet 3     CALCIUM CARBONATE-VITAMIN D3 ORAL Take 1 capsule by mouth once daily.       cephalexin (Keflex) 500 mg capsule Take 1 capsule (500 mg) by mouth 3 times a day for 14 days. 42 capsule 0     dexAMETHasone (Decadron) 2 mg tablet Take 2 tabs (4mg) 2 times daily x3 days (3/18-3/20) then take 1 tab (2mg) 2 times daily x3 days (3/21-3/23) then stop 18 tablet 0     krill-omega-3-dha-epa-lipids 428-52-37-50 mg capsule Take 1 capsule by mouth once daily.       levETIRAcetam (Keppra) 500 mg tablet Alternate back and fourth with 500mg 2 times per day (every other day) with 500mg once per day (every other day) 45 tablet 0  "    [] lomustine (CeeNU) 10 mg capsule Take 1 capsule (10 mg) by mouth 1 time for 1 dose.  Take on Day 1 of each treatment cycle. Taking on an empty stomach may reduce nausea and vomiting.  Call your doctor if you vomit right after taking a lomustine capsule. 1 capsule 0     [] lomustine (CeeNU) 100 mg capsule Take 2 capsules (200 mg) by mouth 1 time for 1 dose.  Take on Day 1 of each treatment cycle. Taking on an empty stomach may reduce nausea and vomiting.  Call your doctor if you vomit right after taking a lomustine capsule. 2 capsule 0     [] lomustine (CeeNU) 40 mg capsule Take 1 capsule (40 mg) by mouth 1 time for 1 dose.  Take on Day 1 of each treatment cycle. Taking on an empty stomach may reduce nausea and vomiting.  Call your doctor if you vomit right after taking a lomustine capsule. 1 capsule 0     ondansetron (Zofran) 8 mg tablet Take 1 tablet (8 mg) by mouth every 8 hours if needed for nausea or vomiting. Take one hour prior to each chemotherapy dose in addition to every 8 hours as needed for nausea/vomiting 60 tablet 5     ondansetron (Zofran) 8 mg tablet Take 1 tablet (8 mg) by mouth every 8 hours if needed for nausea or vomiting. 60 tablet 5     pantoprazole (ProtoNix) 40 mg EC tablet TAKE 1 TABLET BY MOUTH IN THE MORNING 30 MINUTES BEFORE BREAKFAST. 90 tablet 3     prochlorperazine (Compazine) 10 mg tablet Take 1 tablet (10 mg) by mouth every 6 hours if needed for nausea or vomiting. 60 tablet 5     sennosides (SENNA LAX ORAL) Take 1 tablet by mouth once daily.       ubidecarenone (COENZYME Q10, BULK, MISC) Take 1 Capful by mouth once daily.          Review of Systems negative other than listed in HPI.     Physical Exam  A&Ox3  Face symmetric  L HH  RUE 5/5  LUE 4+/5  RLE 5/5  LLE 5/5    Last Recorded Vitals  There were no vitals taken for this visit.    Relevant Results  === 25 ===    CT HEAD WO IV CONTRAST    - Impression -  Compared to prior CT dated 2025,  1. No  new soft tissue gas or fluid collection overlying the right  vertex craniotomy site.  2. New air-fluid level within the posterior right cerebral hemisphere  resection cavity which is slightly decreased in size.  3. Persistent mass effect surrounding the above resection cavity with  similar right lateral ventricular effacement and 6 mm leftward  midline shift.  4. Grossly similar edema/gliosis in the white matter of right frontal  and right temporal lobes.    MACRO  None    Signed by: Franklyn Pickard 4/4/2025 3:43 PM  Dictation workstation:   [id number]      Assessment/Plan   Assessment & Plan      69yM h/o HTN, HLD, smalls's esophagus, RO GBM s/p resection ([facility], 12/2023) with resultant L HH s/p chemo/RT presenting with tumor progression, 2/20 s/p redo craniotomy for tumor resection w 5 ALA, CTH POC, 2/21 MRI intended NTR, 3/16 MRI postop changes w fluid collection at dura and craniotomy site, worsening infiltrative disease, enhancement along posteromedial aspect of resection site, 4/5 p/w 1wk incisional drainage, 4/4 CTH airfluid level in resection cavity    PLAN  Floor  OR Sunday 4/6 for R cranial wound washout, possible mesh cranioplasty  Needs CBC/RFP/coag/T&S/UA/UPT (if applicable)/EKG/CXR  Infectious work up, Bcx, ESR, CRP  MRI brain with and without contrast      Guillermina Camarena MD  Note authored by resident on neurosurgery team, with all questions or to contact team please page at [phone]

## 2025-04-05 NOTE — CARE PLAN
The patient's goals for the shift include  pain management    The clinical goals for the shift include  Free from fall    Over the shift, the patient did make progress toward the following goals. Barriers to progression include disease condition. Recommendations to address these barriers include pt/ot evaluation, encourage to call for help.

## 2025-04-05 NOTE — Clinical Note
L basilic PICC placed. 5F x 39 length. Sutured in place, with CHG dressing over top. VSS. No sedation given.

## 2025-04-05 NOTE — PROGRESS NOTES
04/05/25 1300   Discharge Planning   Living Arrangements Spouse/significant other   Support Systems Spouse/significant other   Assistance Needed n/a   Type of Residence Private residence   Number of Stairs to Enter Residence 3   Number of Stairs Within Residence 8   Do you have animals or pets at home? No   Who is requesting discharge planning? Provider   Home or Post Acute Services None   Expected Discharge Disposition Home   Does the patient need discharge transport arranged? No   Financial Resource Strain   How hard is it for you to pay for the very basics like food, housing, medical care, and heating? Not hard   Housing Stability   In the last 12 months, was there a time when you were not able to pay the mortgage or rent on time? N   In the past 12 months, how many times have you moved where you were living? 0   At any time in the past 12 months, were you homeless or living in a shelter (including now)? N   Transportation Needs   In the past 12 months, has lack of transportation kept you from medical appointments or from getting medications? no   In the past 12 months, has lack of transportation kept you from meetings, work, or from getting things needed for daily living? No     Met with pt and introduced myself as care coordinator with Care Transitions Team for discharge planning. Pt reports being independent with ADL's for the most part. Pt is independent with ambulation. Pt Resides with Wife. Pt reported no safety concerns at home, he stated two falls in last year. Pt's address, phone number, and contact information was verified.      PCP: [facility],  DATE OF LAST VISIT: 2 Months Ago  PHARMACY: [facility]  MEDICATIONS AFFORDABLE:Yes  FEELS SAFE AT HOME: Yes  RECENT FALLS: 2  EQUIPMENT USED IN HOME: n/a  HOME O2/CPAP/NEBS: n/a  DME SUPPLIER: Unknown  TRANSPORT HOME: No  DIABETIC/SUPPLIES NEEDED: n/a  HD SCHEDULE: n/a

## 2025-04-06 ENCOUNTER — ANESTHESIA (OUTPATIENT)
Dept: OPERATING ROOM | Facility: HOSPITAL | Age: 70
End: 2025-04-06
Payer: MEDICARE

## 2025-04-06 ENCOUNTER — APPOINTMENT (OUTPATIENT)
Dept: RADIOLOGY | Facility: HOSPITAL | Age: 70
DRG: 907 | End: 2025-04-06
Payer: MEDICARE

## 2025-04-06 ENCOUNTER — ANESTHESIA EVENT (OUTPATIENT)
Dept: OPERATING ROOM | Facility: HOSPITAL | Age: 70
End: 2025-04-06
Payer: MEDICARE

## 2025-04-06 ENCOUNTER — APPOINTMENT (OUTPATIENT)
Dept: NEUROLOGY | Facility: HOSPITAL | Age: 70
DRG: 907 | End: 2025-04-06
Payer: MEDICARE

## 2025-04-06 VITALS
HEART RATE: 56 BPM | DIASTOLIC BLOOD PRESSURE: 52 MMHG | RESPIRATION RATE: 15 BRPM | HEIGHT: 67 IN | TEMPERATURE: 96.8 F | BODY MASS INDEX: 23.54 KG/M2 | OXYGEN SATURATION: 97 % | WEIGHT: 150 LBS | SYSTOLIC BLOOD PRESSURE: 114 MMHG

## 2025-04-06 LAB
ALBUMIN SERPL BCP-MCNC: 2.9 G/DL (ref 3.4–5)
ANION GAP SERPL CALC-SCNC: 12 MMOL/L (ref 10–20)
BACTERIA BLD CULT: NORMAL
BACTERIA BLD CULT: NORMAL
BLOOD EXPIRATION DATE: NORMAL
BUN SERPL-MCNC: 28 MG/DL (ref 6–23)
CA-I BLD-SCNC: 1.04 MMOL/L (ref 1.1–1.33)
CALCIUM SERPL-MCNC: 7.6 MG/DL (ref 8.6–10.6)
CARDIAC TROPONIN I PNL SERPL HS: 3 NG/L (ref 0–53)
CHLORIDE SERPL-SCNC: 105 MMOL/L (ref 98–107)
CO2 SERPL-SCNC: 21 MMOL/L (ref 21–32)
CREAT SERPL-MCNC: 0.71 MG/DL (ref 0.5–1.3)
DISPENSE STATUS: NORMAL
EGFRCR SERPLBLD CKD-EPI 2021: >90 ML/MIN/1.73M*2
ERYTHROCYTE [DISTWIDTH] IN BLOOD BY AUTOMATED COUNT: 13.2 % (ref 11.5–14.5)
GLUCOSE SERPL-MCNC: 177 MG/DL (ref 74–99)
HCT VFR BLD AUTO: 35.1 % (ref 41–52)
HGB BLD-MCNC: 11.7 G/DL (ref 13.5–17.5)
HOLD SPECIMEN: NORMAL
MAGNESIUM SERPL-MCNC: 2.1 MG/DL (ref 1.6–2.4)
MCH RBC QN AUTO: 32.5 PG (ref 26–34)
MCHC RBC AUTO-ENTMCNC: 33.3 G/DL (ref 32–36)
MCV RBC AUTO: 98 FL (ref 80–100)
NRBC BLD-RTO: 0 /100 WBCS (ref 0–0)
PHOSPHATE SERPL-MCNC: 3.5 MG/DL (ref 2.5–4.9)
PLATELET # BLD AUTO: 81 X10*3/UL (ref 150–450)
POTASSIUM SERPL-SCNC: 4.4 MMOL/L (ref 3.5–5.3)
PRODUCT BLOOD TYPE: 6200
PRODUCT CODE: NORMAL
RBC # BLD AUTO: 3.6 X10*6/UL (ref 4.5–5.9)
SODIUM SERPL-SCNC: 134 MMOL/L (ref 136–145)
UNIT ABO: NORMAL
UNIT NUMBER: NORMAL
UNIT RH: NORMAL
UNIT VOLUME: 345
WBC # BLD AUTO: 8.1 X10*3/UL (ref 4.4–11.3)

## 2025-04-06 PROCEDURE — 61320 CRNEC/CRNOT DRG ICR ABS STTL: CPT | Performed by: NEUROLOGICAL SURGERY

## 2025-04-06 PROCEDURE — 7100000002 HC RECOVERY ROOM TIME - EACH INCREMENTAL 1 MINUTE: Performed by: NEUROLOGICAL SURGERY

## 2025-04-06 PROCEDURE — 3700000001 HC GENERAL ANESTHESIA TIME - INITIAL BASE CHARGE: Performed by: NEUROLOGICAL SURGERY

## 2025-04-06 PROCEDURE — 2500000004 HC RX 250 GENERAL PHARMACY W/ HCPCS (ALT 636 FOR OP/ED)

## 2025-04-06 PROCEDURE — 2500000005 HC RX 250 GENERAL PHARMACY W/O HCPCS

## 2025-04-06 PROCEDURE — 99291 CRITICAL CARE FIRST HOUR: CPT

## 2025-04-06 PROCEDURE — 3600000002 HC OR TIME - INITIAL BASE CHARGE - PROCEDURE LEVEL TWO: Performed by: NEUROLOGICAL SURGERY

## 2025-04-06 PROCEDURE — 70450 CT HEAD/BRAIN W/O DYE: CPT

## 2025-04-06 PROCEDURE — 3600000007 HC OR TIME - EACH INCREMENTAL 1 MINUTE - PROCEDURE LEVEL TWO: Performed by: NEUROLOGICAL SURGERY

## 2025-04-06 PROCEDURE — 80069 RENAL FUNCTION PANEL: CPT

## 2025-04-06 PROCEDURE — 84484 ASSAY OF TROPONIN QUANT: CPT

## 2025-04-06 PROCEDURE — 51701 INSERT BLADDER CATHETER: CPT

## 2025-04-06 PROCEDURE — 2020000001 HC ICU ROOM DAILY

## 2025-04-06 PROCEDURE — 87075 CULTR BACTERIA EXCEPT BLOOD: CPT | Performed by: NEUROLOGICAL SURGERY

## 2025-04-06 PROCEDURE — 2780000003 HC OR 278 NO HCPCS: Performed by: NEUROLOGICAL SURGERY

## 2025-04-06 PROCEDURE — 2500000005 HC RX 250 GENERAL PHARMACY W/O HCPCS: Performed by: NEUROLOGICAL SURGERY

## 2025-04-06 PROCEDURE — 84443 ASSAY THYROID STIM HORMONE: CPT

## 2025-04-06 PROCEDURE — 2500000004 HC RX 250 GENERAL PHARMACY W/ HCPCS (ALT 636 FOR OP/ED): Performed by: STUDENT IN AN ORGANIZED HEALTH CARE EDUCATION/TRAINING PROGRAM

## 2025-04-06 PROCEDURE — 7100000001 HC RECOVERY ROOM TIME - INITIAL BASE CHARGE: Performed by: NEUROLOGICAL SURGERY

## 2025-04-06 PROCEDURE — 2720000007 HC OR 272 NO HCPCS: Performed by: NEUROLOGICAL SURGERY

## 2025-04-06 PROCEDURE — C1713 ANCHOR/SCREW BN/BN,TIS/BN: HCPCS | Performed by: NEUROLOGICAL SURGERY

## 2025-04-06 PROCEDURE — 00B00ZZ EXCISION OF BRAIN, OPEN APPROACH: ICD-10-PCS | Performed by: NEUROLOGICAL SURGERY

## 2025-04-06 PROCEDURE — 36415 COLL VENOUS BLD VENIPUNCTURE: CPT

## 2025-04-06 PROCEDURE — 83735 ASSAY OF MAGNESIUM: CPT

## 2025-04-06 PROCEDURE — 36430 TRANSFUSION BLD/BLD COMPNT: CPT

## 2025-04-06 PROCEDURE — 70450 CT HEAD/BRAIN W/O DYE: CPT | Performed by: RADIOLOGY

## 2025-04-06 PROCEDURE — P9073 PLATELETS PHERESIS PATH REDU: HCPCS

## 2025-04-06 PROCEDURE — 82330 ASSAY OF CALCIUM: CPT

## 2025-04-06 PROCEDURE — C1729 CATH, DRAINAGE: HCPCS | Performed by: NEUROLOGICAL SURGERY

## 2025-04-06 PROCEDURE — 99222 1ST HOSP IP/OBS MODERATE 55: CPT | Performed by: INTERNAL MEDICINE

## 2025-04-06 PROCEDURE — C1763 CONN TISS, NON-HUMAN: HCPCS | Performed by: NEUROLOGICAL SURGERY

## 2025-04-06 PROCEDURE — 2500000001 HC RX 250 WO HCPCS SELF ADMINISTERED DRUGS (ALT 637 FOR MEDICARE OP)

## 2025-04-06 PROCEDURE — 3700000002 HC GENERAL ANESTHESIA TIME - EACH INCREMENTAL 1 MINUTE: Performed by: NEUROLOGICAL SURGERY

## 2025-04-06 PROCEDURE — G0545 PR INHERENT VISIT TO INPT: HCPCS | Performed by: INTERNAL MEDICINE

## 2025-04-06 PROCEDURE — 85027 COMPLETE CBC AUTOMATED: CPT

## 2025-04-06 PROCEDURE — C1889 IMPLANT/INSERT DEVICE, NOC: HCPCS | Performed by: NEUROLOGICAL SURGERY

## 2025-04-06 DEVICE — VENTRICLEAR II VENTRICULAR DRAINAGE CATHETER
Type: IMPLANTABLE DEVICE | Site: SKULL | Status: FUNCTIONAL
Brand: VENTRICLEAR

## 2025-04-06 DEVICE — CROSS PIN, AXS SELF-TAP, 1.5 X 4MM: Type: IMPLANTABLE DEVICE | Site: SKULL | Status: FUNCTIONAL

## 2025-04-06 DEVICE — CROSS PIN, AXS SELF-TAP, 1.5 X 5MM: Type: IMPLANTABLE DEVICE | Site: SKULL | Status: FUNCTIONAL

## 2025-04-06 DEVICE — IMPLANTABLE DEVICE: Type: IMPLANTABLE DEVICE | Site: SKULL | Status: FUNCTIONAL

## 2025-04-06 DEVICE — COLLAGEN DURAL REGENERATION MEMBRANE 4IN X 5IN (10.0CM X 12.5CM)
Type: IMPLANTABLE DEVICE | Site: SKULL | Status: FUNCTIONAL
Brand: DURAMATRIX-ONLAY PLUS

## 2025-04-06 RX ORDER — SODIUM CHLORIDE 9 MG/ML
75 INJECTION, SOLUTION INTRAVENOUS CONTINUOUS
Status: ACTIVE | OUTPATIENT
Start: 2025-04-06 | End: 2025-04-07

## 2025-04-06 RX ORDER — OXYCODONE HYDROCHLORIDE 5 MG/1
5 TABLET ORAL EVERY 4 HOURS PRN
Status: DISCONTINUED | OUTPATIENT
Start: 2025-04-06 | End: 2025-04-06 | Stop reason: HOSPADM

## 2025-04-06 RX ORDER — LEVETIRACETAM 15 MG/ML
1500 INJECTION INTRAVASCULAR EVERY 12 HOURS
Status: DISCONTINUED | OUTPATIENT
Start: 2025-04-06 | End: 2025-04-06

## 2025-04-06 RX ORDER — CEFEPIME 1 G/50ML
2 INJECTION, SOLUTION INTRAVENOUS EVERY 8 HOURS
Status: DISCONTINUED | OUTPATIENT
Start: 2025-04-06 | End: 2025-04-06

## 2025-04-06 RX ORDER — DEXMEDETOMIDINE IN 0.9 % NACL 20 MCG/5ML
SYRINGE (ML) INTRAVENOUS AS NEEDED
Status: DISCONTINUED | OUTPATIENT
Start: 2025-04-06 | End: 2025-04-06

## 2025-04-06 RX ORDER — LABETALOL HYDROCHLORIDE 5 MG/ML
10 INJECTION, SOLUTION INTRAVENOUS EVERY 10 MIN PRN
Status: CANCELLED | OUTPATIENT
Start: 2025-04-06

## 2025-04-06 RX ORDER — MAGNESIUM SULFATE HEPTAHYDRATE 500 MG/ML
INJECTION, SOLUTION INTRAMUSCULAR; INTRAVENOUS AS NEEDED
Status: DISCONTINUED | OUTPATIENT
Start: 2025-04-06 | End: 2025-04-06

## 2025-04-06 RX ORDER — ONDANSETRON HYDROCHLORIDE 2 MG/ML
INJECTION, SOLUTION INTRAVENOUS AS NEEDED
Status: DISCONTINUED | OUTPATIENT
Start: 2025-04-06 | End: 2025-04-06

## 2025-04-06 RX ORDER — ESMOLOL HYDROCHLORIDE 10 MG/ML
INJECTION INTRAVENOUS AS NEEDED
Status: DISCONTINUED | OUTPATIENT
Start: 2025-04-06 | End: 2025-04-06

## 2025-04-06 RX ORDER — LEVETIRACETAM 10 MG/ML
INJECTION INTRAVASCULAR
Status: COMPLETED
Start: 2025-04-06 | End: 2025-04-06

## 2025-04-06 RX ORDER — OXYCODONE HYDROCHLORIDE 5 MG/1
10 TABLET ORAL EVERY 4 HOURS PRN
Status: DISCONTINUED | OUTPATIENT
Start: 2025-04-06 | End: 2025-04-06 | Stop reason: HOSPADM

## 2025-04-06 RX ORDER — OLANZAPINE 10 MG/2ML
5 INJECTION, POWDER, FOR SOLUTION INTRAMUSCULAR EVERY 6 HOURS PRN
Status: DISCONTINUED | OUTPATIENT
Start: 2025-04-06 | End: 2025-04-06

## 2025-04-06 RX ORDER — FENTANYL CITRATE 50 UG/ML
INJECTION, SOLUTION INTRAMUSCULAR; INTRAVENOUS AS NEEDED
Status: DISCONTINUED | OUTPATIENT
Start: 2025-04-06 | End: 2025-04-06

## 2025-04-06 RX ORDER — PHENYLEPHRINE HCL IN 0.9% NACL 0.4MG/10ML
SYRINGE (ML) INTRAVENOUS AS NEEDED
Status: DISCONTINUED | OUTPATIENT
Start: 2025-04-06 | End: 2025-04-06

## 2025-04-06 RX ORDER — PHENYLEPHRINE HYDROCHLORIDE 10 MG/ML
INJECTION INTRAVENOUS
Status: DISPENSED
Start: 2025-04-06 | End: 2025-04-07

## 2025-04-06 RX ORDER — PHENYLEPHRINE HCL IN 0.9% NACL 0.4MG/10ML
SYRINGE (ML) INTRAVENOUS
Status: DISPENSED
Start: 2025-04-06 | End: 2025-04-07

## 2025-04-06 RX ORDER — CEFAZOLIN 1 G/1
INJECTION, POWDER, FOR SOLUTION INTRAVENOUS AS NEEDED
Status: DISCONTINUED | OUTPATIENT
Start: 2025-04-06 | End: 2025-04-06

## 2025-04-06 RX ORDER — LEVETIRACETAM 5 MG/ML
500 INJECTION INTRAVASCULAR EVERY 12 HOURS
Status: DISCONTINUED | OUTPATIENT
Start: 2025-04-06 | End: 2025-04-08

## 2025-04-06 RX ORDER — DROPERIDOL 2.5 MG/ML
0.62 INJECTION, SOLUTION INTRAMUSCULAR; INTRAVENOUS ONCE AS NEEDED
Status: DISCONTINUED | OUTPATIENT
Start: 2025-04-06 | End: 2025-04-06 | Stop reason: HOSPADM

## 2025-04-06 RX ORDER — VANCOMYCIN HYDROCHLORIDE 1 G/20ML
INJECTION, POWDER, LYOPHILIZED, FOR SOLUTION INTRAVENOUS AS NEEDED
Status: DISCONTINUED | OUTPATIENT
Start: 2025-04-06 | End: 2025-04-06

## 2025-04-06 RX ORDER — OLANZAPINE 10 MG/2ML
5 INJECTION, POWDER, FOR SOLUTION INTRAMUSCULAR ONCE
Status: COMPLETED | OUTPATIENT
Start: 2025-04-06 | End: 2025-04-06

## 2025-04-06 RX ORDER — HYDRALAZINE HYDROCHLORIDE 20 MG/ML
10 INJECTION INTRAMUSCULAR; INTRAVENOUS
Status: CANCELLED | OUTPATIENT
Start: 2025-04-06

## 2025-04-06 RX ORDER — HYDROMORPHONE HYDROCHLORIDE 1 MG/ML
INJECTION, SOLUTION INTRAMUSCULAR; INTRAVENOUS; SUBCUTANEOUS AS NEEDED
Status: DISCONTINUED | OUTPATIENT
Start: 2025-04-06 | End: 2025-04-06

## 2025-04-06 RX ORDER — LIDOCAINE HCL/PF 100 MG/5ML
SYRINGE (ML) INTRAVENOUS AS NEEDED
Status: DISCONTINUED | OUTPATIENT
Start: 2025-04-06 | End: 2025-04-06

## 2025-04-06 RX ORDER — VANCOMYCIN HYDROCHLORIDE 1 G/20ML
INJECTION, POWDER, LYOPHILIZED, FOR SOLUTION INTRAVENOUS DAILY PRN
Status: DISCONTINUED | OUTPATIENT
Start: 2025-04-06 | End: 2025-05-06 | Stop reason: HOSPADM

## 2025-04-06 RX ORDER — ONDANSETRON HYDROCHLORIDE 2 MG/ML
4 INJECTION, SOLUTION INTRAVENOUS ONCE AS NEEDED
Status: DISCONTINUED | OUTPATIENT
Start: 2025-04-06 | End: 2025-04-06 | Stop reason: HOSPADM

## 2025-04-06 RX ORDER — HYDROMORPHONE HYDROCHLORIDE 0.2 MG/ML
0.2 INJECTION INTRAMUSCULAR; INTRAVENOUS; SUBCUTANEOUS EVERY 5 MIN PRN
Status: DISCONTINUED | OUTPATIENT
Start: 2025-04-06 | End: 2025-04-06 | Stop reason: HOSPADM

## 2025-04-06 RX ORDER — DEXMEDETOMIDINE HYDROCHLORIDE 4 UG/ML
.1-1.5 INJECTION, SOLUTION INTRAVENOUS CONTINUOUS
Status: DISCONTINUED | OUTPATIENT
Start: 2025-04-06 | End: 2025-04-10

## 2025-04-06 RX ORDER — POTASSIUM CHLORIDE 14.9 MG/ML
20 INJECTION INTRAVENOUS EVERY 6 HOURS PRN
Status: DISCONTINUED | OUTPATIENT
Start: 2025-04-06 | End: 2025-04-15

## 2025-04-06 RX ORDER — MAGNESIUM SULFATE HEPTAHYDRATE 40 MG/ML
2 INJECTION, SOLUTION INTRAVENOUS EVERY 6 HOURS PRN
Status: DISCONTINUED | OUTPATIENT
Start: 2025-04-06 | End: 2025-04-16

## 2025-04-06 RX ORDER — SODIUM CHLORIDE 0.9 G/100ML
INJECTION, SOLUTION IRRIGATION AS NEEDED
Status: DISCONTINUED | OUTPATIENT
Start: 2025-04-06 | End: 2025-04-06 | Stop reason: HOSPADM

## 2025-04-06 RX ORDER — MAGNESIUM SULFATE HEPTAHYDRATE 40 MG/ML
4 INJECTION, SOLUTION INTRAVENOUS EVERY 6 HOURS PRN
Status: DISCONTINUED | OUTPATIENT
Start: 2025-04-06 | End: 2025-04-16

## 2025-04-06 RX ORDER — SODIUM CHLORIDE 9 MG/ML
75 INJECTION, SOLUTION INTRAVENOUS CONTINUOUS
Status: CANCELLED | OUTPATIENT
Start: 2025-04-06 | End: 2025-04-07

## 2025-04-06 RX ORDER — HEPARIN SODIUM 5000 [USP'U]/ML
5000 INJECTION, SOLUTION INTRAVENOUS; SUBCUTANEOUS EVERY 8 HOURS
Status: CANCELLED | OUTPATIENT
Start: 2025-04-08

## 2025-04-06 RX ORDER — ATROPINE SULFATE 0.1 MG/ML
INJECTION INTRAVENOUS
Status: DISPENSED
Start: 2025-04-06 | End: 2025-04-07

## 2025-04-06 RX ORDER — DEXMEDETOMIDINE HYDROCHLORIDE 4 UG/ML
INJECTION, SOLUTION INTRAVENOUS
Status: COMPLETED
Start: 2025-04-06 | End: 2025-04-06

## 2025-04-06 RX ORDER — OLANZAPINE 10 MG/2ML
5 INJECTION, POWDER, FOR SOLUTION INTRAMUSCULAR EVERY 6 HOURS PRN
Status: COMPLETED | OUTPATIENT
Start: 2025-04-06 | End: 2025-04-06

## 2025-04-06 RX ORDER — CEFEPIME HYDROCHLORIDE 2 G/1
INJECTION, POWDER, FOR SOLUTION INTRAVENOUS AS NEEDED
Status: DISCONTINUED | OUTPATIENT
Start: 2025-04-06 | End: 2025-04-06

## 2025-04-06 RX ORDER — HALOPERIDOL LACTATE 5 MG/ML
2 INJECTION, SOLUTION INTRAMUSCULAR ONCE
Status: DISCONTINUED | OUTPATIENT
Start: 2025-04-06 | End: 2025-04-06

## 2025-04-06 RX ORDER — HYDROMORPHONE HYDROCHLORIDE 1 MG/ML
0.2 INJECTION, SOLUTION INTRAMUSCULAR; INTRAVENOUS; SUBCUTANEOUS
Status: DISCONTINUED | OUTPATIENT
Start: 2025-04-06 | End: 2025-04-23

## 2025-04-06 RX ORDER — PROPOFOL 10 MG/ML
INJECTION, EMULSION INTRAVENOUS AS NEEDED
Status: DISCONTINUED | OUTPATIENT
Start: 2025-04-06 | End: 2025-04-06

## 2025-04-06 RX ORDER — METOPROLOL TARTRATE 1 MG/ML
10 INJECTION, SOLUTION INTRAVENOUS ONCE
Status: COMPLETED | OUTPATIENT
Start: 2025-04-06 | End: 2025-04-06

## 2025-04-06 RX ORDER — ACETAMINOPHEN 325 MG/1
650 TABLET ORAL EVERY 4 HOURS PRN
Status: DISCONTINUED | OUTPATIENT
Start: 2025-04-06 | End: 2025-04-06 | Stop reason: HOSPADM

## 2025-04-06 RX ORDER — LEVETIRACETAM 15 MG/ML
1500 INJECTION INTRAVASCULAR ONCE
Status: COMPLETED | OUTPATIENT
Start: 2025-04-06 | End: 2025-04-06

## 2025-04-06 RX ORDER — CALCIUM GLUCONATE 20 MG/ML
1 INJECTION, SOLUTION INTRAVENOUS EVERY 6 HOURS PRN
Status: DISCONTINUED | OUTPATIENT
Start: 2025-04-06 | End: 2025-04-16

## 2025-04-06 RX ORDER — CALCIUM GLUCONATE 20 MG/ML
2 INJECTION, SOLUTION INTRAVENOUS EVERY 6 HOURS PRN
Status: DISCONTINUED | OUTPATIENT
Start: 2025-04-06 | End: 2025-04-16

## 2025-04-06 RX ORDER — ROCURONIUM BROMIDE 10 MG/ML
INJECTION, SOLUTION INTRAVENOUS AS NEEDED
Status: DISCONTINUED | OUTPATIENT
Start: 2025-04-06 | End: 2025-04-06

## 2025-04-06 RX ORDER — PHENYLEPHRINE 10 MG/250 ML(40 MCG/ML)IN 0.9 % SOD.CHLORIDE INTRAVENOUS
0-2 CONTINUOUS
Status: DISCONTINUED | OUTPATIENT
Start: 2025-04-06 | End: 2025-04-07

## 2025-04-06 RX ORDER — VANCOMYCIN HYDROCHLORIDE 1 G/200ML
1000 INJECTION, SOLUTION INTRAVENOUS EVERY 12 HOURS
Status: DISCONTINUED | OUTPATIENT
Start: 2025-04-06 | End: 2025-04-07

## 2025-04-06 RX ADMIN — PROPOFOL 20 MG: 10 INJECTION, EMULSION INTRAVENOUS at 09:40

## 2025-04-06 RX ADMIN — HYDROMORPHONE HYDROCHLORIDE 0.2 MG: 1 INJECTION, SOLUTION INTRAMUSCULAR; INTRAVENOUS; SUBCUTANEOUS at 09:37

## 2025-04-06 RX ADMIN — HYDROMORPHONE HYDROCHLORIDE 0.2 MG: 1 INJECTION, SOLUTION INTRAMUSCULAR; INTRAVENOUS; SUBCUTANEOUS at 10:42

## 2025-04-06 RX ADMIN — MAGNESIUM SULFATE HEPTAHYDRATE 2 G: 500 INJECTION, SOLUTION INTRAMUSCULAR; INTRAVENOUS at 09:45

## 2025-04-06 RX ADMIN — HYDROMORPHONE HYDROCHLORIDE 0.2 MG: 1 INJECTION, SOLUTION INTRAMUSCULAR; INTRAVENOUS; SUBCUTANEOUS at 10:36

## 2025-04-06 RX ADMIN — METOPROLOL TARTRATE 10 MG: 1 INJECTION, SOLUTION INTRAVENOUS at 12:17

## 2025-04-06 RX ADMIN — PROPOFOL 50 MG: 10 INJECTION, EMULSION INTRAVENOUS at 12:50

## 2025-04-06 RX ADMIN — SODIUM CHLORIDE 500 ML: 9 INJECTION, SOLUTION INTRAVENOUS at 15:25

## 2025-04-06 RX ADMIN — HYDROMORPHONE HYDROCHLORIDE 0.2 MG: 1 INJECTION, SOLUTION INTRAMUSCULAR; INTRAVENOUS; SUBCUTANEOUS at 10:54

## 2025-04-06 RX ADMIN — Medication 3 L/MIN: at 11:45

## 2025-04-06 RX ADMIN — LEVETIRACETAM 500 MG: 5 INJECTION INTRAVENOUS at 18:01

## 2025-04-06 RX ADMIN — FENTANYL CITRATE 50 MCG: 50 INJECTION, SOLUTION INTRAMUSCULAR; INTRAVENOUS at 08:19

## 2025-04-06 RX ADMIN — Medication 40 MCG: at 10:12

## 2025-04-06 RX ADMIN — ONDANSETRON 4 MG: 2 INJECTION INTRAMUSCULAR; INTRAVENOUS at 11:25

## 2025-04-06 RX ADMIN — HYDROMORPHONE HYDROCHLORIDE 0.4 MG: 1 INJECTION, SOLUTION INTRAMUSCULAR; INTRAVENOUS; SUBCUTANEOUS at 08:32

## 2025-04-06 RX ADMIN — SUGAMMADEX 200 MG: 100 INJECTION, SOLUTION INTRAVENOUS at 11:25

## 2025-04-06 RX ADMIN — OLANZAPINE 5 MG: 10 INJECTION, POWDER, LYOPHILIZED, FOR SOLUTION INTRAMUSCULAR at 13:38

## 2025-04-06 RX ADMIN — HYDROMORPHONE HYDROCHLORIDE 0.2 MG: 1 INJECTION, SOLUTION INTRAMUSCULAR; INTRAVENOUS; SUBCUTANEOUS at 14:26

## 2025-04-06 RX ADMIN — ESMOLOL HYDROCHLORIDE 100 MG: 100 INJECTION, SOLUTION INTRAVENOUS at 11:32

## 2025-04-06 RX ADMIN — ESMOLOL HYDROCHLORIDE 50 MG: 100 INJECTION, SOLUTION INTRAVENOUS at 07:48

## 2025-04-06 RX ADMIN — HYDROMORPHONE HYDROCHLORIDE 0.2 MG: 1 INJECTION, SOLUTION INTRAMUSCULAR; INTRAVENOUS; SUBCUTANEOUS at 09:08

## 2025-04-06 RX ADMIN — SODIUM CHLORIDE 75 ML/HR: 9 INJECTION, SOLUTION INTRAVENOUS at 17:27

## 2025-04-06 RX ADMIN — VANCOMYCIN HYDROCHLORIDE 1 G: 1025 INJECTION, POWDER, FOR SOLUTION INTRAVENOUS; ORAL at 08:02

## 2025-04-06 RX ADMIN — DEXMEDETOMIDINE HYDROCHLORIDE 0.2 MCG/KG/HR: 400 INJECTION INTRAVENOUS at 13:15

## 2025-04-06 RX ADMIN — ROCURONIUM BROMIDE 50 MG: 10 INJECTION INTRAVENOUS at 07:44

## 2025-04-06 RX ADMIN — VANCOMYCIN HYDROCHLORIDE 1000 MG: 1 INJECTION, SOLUTION INTRAVENOUS at 21:04

## 2025-04-06 RX ADMIN — PHENYLEPHRINE-NACL IV SOLUTION 10 MG/250ML-0.9% 0.5 MCG/KG/MIN: 10-0.9/25 SOLUTION at 16:15

## 2025-04-06 RX ADMIN — HYDROMORPHONE HYDROCHLORIDE 0.2 MG: 1 INJECTION, SOLUTION INTRAMUSCULAR; INTRAVENOUS; SUBCUTANEOUS at 09:24

## 2025-04-06 RX ADMIN — ROCURONIUM BROMIDE 20 MG: 10 INJECTION INTRAVENOUS at 08:19

## 2025-04-06 RX ADMIN — MEROPENEM 2 G: 1 INJECTION INTRAVENOUS at 21:00

## 2025-04-06 RX ADMIN — PANTOPRAZOLE SODIUM 40 MG: 40 TABLET, DELAYED RELEASE ORAL at 06:06

## 2025-04-06 RX ADMIN — DEXMEDETOMIDINE HYDROCHLORIDE 1.2 MCG/KG/HR: 400 INJECTION INTRAVENOUS at 17:25

## 2025-04-06 RX ADMIN — DEXMEDETOMIDINE HYDROCHLORIDE 1.5 MCG/KG/HR: 400 INJECTION INTRAVENOUS at 21:37

## 2025-04-06 RX ADMIN — PROPOFOL 80 MG: 10 INJECTION, EMULSION INTRAVENOUS at 07:48

## 2025-04-06 RX ADMIN — OLANZAPINE 5 MG: 10 INJECTION, POWDER, LYOPHILIZED, FOR SOLUTION INTRAMUSCULAR at 23:22

## 2025-04-06 RX ADMIN — LEVETIRACETAM 1000 MG: 10 INJECTION INTRAVENOUS at 17:26

## 2025-04-06 RX ADMIN — DEXAMETHASONE SODIUM PHOSPHATE 2 MG: 4 INJECTION, SOLUTION INTRA-ARTICULAR; INTRALESIONAL; INTRAMUSCULAR; INTRAVENOUS; SOFT TISSUE at 21:04

## 2025-04-06 RX ADMIN — LIDOCAINE HYDROCHLORIDE 100 MG: 20 INJECTION INTRAVENOUS at 09:55

## 2025-04-06 RX ADMIN — LIDOCAINE HYDROCHLORIDE 100 MG: 20 INJECTION INTRAVENOUS at 07:44

## 2025-04-06 RX ADMIN — PROPOFOL 30 MG: 10 INJECTION, EMULSION INTRAVENOUS at 11:05

## 2025-04-06 RX ADMIN — PROPOFOL 20 MG: 10 INJECTION, EMULSION INTRAVENOUS at 09:33

## 2025-04-06 RX ADMIN — ROCURONIUM BROMIDE 10 MG: 10 INJECTION INTRAVENOUS at 09:06

## 2025-04-06 RX ADMIN — CEFEPIME 2 G: 1 INJECTION, SOLUTION INTRAVENOUS at 18:50

## 2025-04-06 RX ADMIN — PROPOFOL 120 MG: 10 INJECTION, EMULSION INTRAVENOUS at 07:44

## 2025-04-06 RX ADMIN — ROCURONIUM BROMIDE 20 MG: 10 INJECTION INTRAVENOUS at 09:42

## 2025-04-06 RX ADMIN — ESMOLOL HYDROCHLORIDE 50 MG: 100 INJECTION, SOLUTION INTRAVENOUS at 07:44

## 2025-04-06 RX ADMIN — PROPOFOL 20 MG: 10 INJECTION, EMULSION INTRAVENOUS at 11:09

## 2025-04-06 RX ADMIN — SODIUM CHLORIDE, SODIUM LACTATE, POTASSIUM CHLORIDE, AND CALCIUM CHLORIDE: 600; 310; 30; 20 INJECTION, SOLUTION INTRAVENOUS at 07:35

## 2025-04-06 RX ADMIN — DEXAMETHASONE SODIUM PHOSPHATE 10 MG: 4 INJECTION INTRA-ARTICULAR; INTRALESIONAL; INTRAMUSCULAR; INTRAVENOUS; SOFT TISSUE at 08:04

## 2025-04-06 RX ADMIN — Medication 20 MCG: at 11:50

## 2025-04-06 RX ADMIN — CEFEPIME HYDROCHLORIDE 2 G: 2 INJECTION, POWDER, FOR SOLUTION INTRAVENOUS at 08:44

## 2025-04-06 RX ADMIN — LEVETIRACETAM 1500 MG: 15 INJECTION INTRAVENOUS at 17:30

## 2025-04-06 RX ADMIN — FENTANYL CITRATE 50 MCG: 50 INJECTION, SOLUTION INTRAMUSCULAR; INTRAVENOUS at 07:44

## 2025-04-06 RX ADMIN — CEFAZOLIN 2 G: 330 INJECTION, POWDER, FOR SOLUTION INTRAMUSCULAR; INTRAVENOUS at 07:54

## 2025-04-06 SDOH — HEALTH STABILITY: MENTAL HEALTH: CURRENT SMOKER: 0

## 2025-04-06 ASSESSMENT — COGNITIVE AND FUNCTIONAL STATUS - GENERAL
MOBILITY SCORE: 18
TOILETING: A LITTLE
DAILY ACTIVITIY SCORE: 18
MOVING TO AND FROM BED TO CHAIR: A LITTLE
HELP NEEDED FOR BATHING: A LITTLE
MOVING FROM LYING ON BACK TO SITTING ON SIDE OF FLAT BED WITH BEDRAILS: A LITTLE
DRESSING REGULAR UPPER BODY CLOTHING: A LITTLE
WALKING IN HOSPITAL ROOM: A LITTLE
EATING MEALS: A LITTLE
CLIMB 3 TO 5 STEPS WITH RAILING: A LITTLE
STANDING UP FROM CHAIR USING ARMS: A LITTLE
PERSONAL GROOMING: A LITTLE
DRESSING REGULAR LOWER BODY CLOTHING: A LITTLE
TURNING FROM BACK TO SIDE WHILE IN FLAT BAD: A LITTLE

## 2025-04-06 ASSESSMENT — PAIN SCALES - GENERAL
PAINLEVEL_OUTOF10: 0 - NO PAIN
PAIN_LEVEL: 2
PAINLEVEL_OUTOF10: 0 - NO PAIN
PAINLEVEL_OUTOF10: 10 - WORST POSSIBLE PAIN

## 2025-04-06 ASSESSMENT — PAIN - FUNCTIONAL ASSESSMENT
PAIN_FUNCTIONAL_ASSESSMENT: 0-10
PAIN_FUNCTIONAL_ASSESSMENT: 0-10
PAIN_FUNCTIONAL_ASSESSMENT: UNABLE TO SELF-REPORT
PAIN_FUNCTIONAL_ASSESSMENT: 0-10
PAIN_FUNCTIONAL_ASSESSMENT: UNABLE TO SELF-REPORT
PAIN_FUNCTIONAL_ASSESSMENT: 0-10
PAIN_FUNCTIONAL_ASSESSMENT: UNABLE TO SELF-REPORT
PAIN_FUNCTIONAL_ASSESSMENT: UNABLE TO SELF-REPORT
PAIN_FUNCTIONAL_ASSESSMENT: CPOT (CRITICAL CARE PAIN OBSERVATION TOOL)
PAIN_FUNCTIONAL_ASSESSMENT: UNABLE TO SELF-REPORT
PAIN_FUNCTIONAL_ASSESSMENT: 0-10

## 2025-04-06 NOTE — CONSULTS
Vancomycin Dosing by Pharmacy- INITIAL    Enmanuel Ahumada is a 69 y.o. year old male who Pharmacy has been consulted for vancomycin dosing for CNS/meningoencephalitis. Based on the patient's indication and renal status this patient will be dosed based on a goal AUC of 500-600.     Renal function is currently stable.    Visit Vitals  /88   Pulse 97   Temp 36.6 °C (97.9 °F)   Resp 23        Lab Results   Component Value Date    CREATININE 0.80 2025    CREATININE 0.84 2025    CREATININE 0.80 2025    CREATININE 0.71 03/15/2025        Patient weight is as follows:   Vitals:    25 0646   Weight: 68 kg (150 lb)       Cultures:  No results found for the encounter in last 14 days.        I/O last 3 completed shifts:  In: 320 (4.7 mL/kg) [P.O.:320]  Out: 1375 (20.2 mL/kg) [Urine:1375 (0.6 mL/kg/hr)]  Weight: 68 kg   I/O during current shift:  I/O this shift:  In: 1000 [IV Piggyback:1000]  Out: -     Temp (24hrs), Av.5 °C (97.7 °F), Min:36 °C (96.8 °F), Max:36.8 °C (98.2 °F)         Assessment/Plan     Patient already got 1g during procedure  @~0800. Giving next 1g dose ~8 hours after  Will initiate vancomycin maintenance,  1000 mg every 12 hours.    This dosing regimen is predicted by [employer] to result in the following pharmacokinetic parameters:  Loading dose: N/A  Regimen: 1000 mg IV every 12 hours.  Start time: 20:02 on 2025  Exposure target: AUC24 (range)400-600 mg/L.hr   NPF60-48: 489 mg/L.hr  AUC24,ss: 566 mg/L.hr  Probability of AUC24 > 400: 84 %  Ctrough,ss: 17.8 mg/L  Probability of Ctrough,ss > 20: 40 %  Follow-up level will be ordered on  at 1000 unless clinically indicated sooner.  Will continue to monitor renal function daily while on vancomycin and order serum creatinine at least every 48 hours if not already ordered.  Follow for continued vancomycin needs, clinical response, and signs/symptoms of toxicity.       CRISTINO AGUILAR

## 2025-04-06 NOTE — PROGRESS NOTES
Patient stable at the end of shift, no signs or symptoms of distress or discomfort noted at this time. Assessment complete. SDD remains in place and open at 10, continues to not drain, neurosurgery team aware, nno at this time. SGD remains in place and off suction as ordered by neurosurgery. This nurse will given night RN handoff report.

## 2025-04-06 NOTE — PROGRESS NOTES
[facility] (formerly [facility])  NEUROSCIENCE INTENSIVE CARE UNIT  DAILY PROGRESS NOTE       Patient Name: Enmanuel Ahumada   MRN: [id number]     Admit Date: 2025     : 1955 AGE: 69 y.o. GENDER: male        Subjective  Uriel Ahumada is a 69 y.o. male with PMH of HTN, HLD, smlals's esophagus, RO GBM s/p resection (SAH, 2023) with resultant L HH s/p chemo/RT presenting with tumor progression,  s/p redo craniotomy for tumor resection w 5 ALA. CTH POC,  MRI intended NTR, 3/16 MRI postop changes w fluid collection at dura and craniotomy site, worsening infiltrative disease, enhancement along posteromedial aspect of resection site. Presented on  with 1 week of incisional drainage.   MRI brain w/wo with increased resection cavity enhancement and diffusion restriction.  s/p right cranial wound washout. Pt is admitted to NSU post-operatively for AMS & agitation requiring Precedex gtt.     Interval Events: Admitted to NSU     Objective   VITALS (24H):  Temp:  [36 °C (96.8 °F)-36.8 °C (98.2 °F)] 36.6 °C (97.9 °F)  Heart Rate:  [] 97  Resp:  [11-23] 23  BP: ()/(64-98) 133/88  INTAKE/OUTPUT:  Intake/Output Summary (Last 24 hours) at 2025 1341  Last data filed at 2025 0946  Gross per 24 hour   Intake 1120 ml   Output 1375 ml   Net -255 ml     VENT SETTINGS:        PHYSICAL EXAM:  NEURO:  - EOS  - PERRL 4->2 mm bilaterally  - SANCHEZ spontaneously, not following commands, agitated, laying down on his belly    - RRR on telemetry, NSR    RESP:  - No signs of resp distress  - Oxygen: RA    :  - external urianry catheter in place  GI:  - Abdomen NT/ND, soft  SKIN:  - crani site drsg slightly open from patient moving around in the bed    MEDICATIONS:  Scheduled: PRN: Continuous:   atorvastatin, 40 mg, oral, Daily  cefepime, 2 g, intravenous, q8h  dexAMETHasone, 2 mg, oral, q12h LA NENA  pantoprazole, 40 mg, oral, Daily before breakfast  polyethylene glycol, 17 g, oral, Daily     PRN  medications: ondansetron **OR** ondansetron, oxygen, vancomycin dexmedeTOMIDine, 0.1-1.5 mcg/kg/hr, Last Rate: 0.2 mcg/kg/hr (04/06/25 1315)         LAB RESULTS:      IMAGING RESULTS:  MR brain w and wo IV contrast   Final Result   1.  Postsurgical changes status post right posterior craniotomy for   glioblastoma debulking in the right parieto-occipital lobe.   2.  Interval increase in extra-axial fluid collection overlying the   deep surgical craniotomy bed with focal areas of diffusion   restriction and peripheral enhancement. Additionally there is   interval development of layering contents deep within surgical cavity   and surrounding the cavity which demonstrate diffusion restriction.   Overall the constellation of findings are suggestive of superimposed   infection/abscess particularly given clinical suspicion of such.   3. Additional findings remain similar compared to prior imaging and   as described above.             I personally reviewed the images/study and I agree with the findings   as stated. This study was interpreted at [place], [place].        MACRO:   None        Signed by: Tony Terrazas 4/6/2025 7:16 AM   Dictation workstation:   [id number]      XR chest 1 view   Final Result   1.  No evidence of acute cardiopulmonary process.        I personally reviewed the images/study and I agree with the findings   as stated by Dayo Romero MD (resident).        MACRO:   None        Signed by: Marbin Cameron 4/5/2025 12:56 PM   Dictation workstation:   [id number]             Assessment/Plan    69 y.o. male with PMH HTN, HLD, smalls's esophagus, RO GBM s/p resection (SAH, 12/2023) with resultant L HH s/p chemo/RT presenting with tumor progression, 2/20 s/p redo craniotomy for tumor resection w 5 ALA.  Admitted 4/5/2025 with Wound dehiscence after presenting with 1 week of incisional drainage.     NEURO:  #GBM s/p resection 12/2023, 2/20/25  #Crani site  incisional drainage S/p cranial wound washout (25)  Assessment:  - Neurologically as above   Plan:  - NSU  - Neuro Checks: Q1H  - Agitation: Precedex gtt, s/p 1 time 2 mg Haldol, 5 mg PRN IM Zyprexa  - Pain: hydromorphone PRN  - Nausea: ondansetron  - post-op CTH when safe  - EEG after CTH  - Fu OR cx  - Maintain SDD open at 10 ml/hr  - SGD to full suction  - Continue Dex 2q12  - PT/OT/SLP    CARDIOVASCULAR:  #HTN  #HLD  Assessment:  - NSR  - ECHO: 23  LVEF 60%, reduced RV systolic function, no PFO  .echo  Plan:  - Continue to monitor on telemetry  - BP goal: SBP < 160  - C/w Lipitor    RESPIRATORY:  #GALDINO  Assessment:  - RA  Plan:  - Continuous pulse oximetry   - O2 PRN to maintain SpO2 > 94%, wean as tolerated  - Incentive spirometry while awake    RENAL/:  #GALDINO  Assessment:  - Baseline BUN/Cr: 23/.80  Results from last 72 hours   Lab Units 25  1421   BUN mg/dL 30*   CREATININE mg/dL 0.80       Plan:  - Monitor with daily RFP  - Maintain external urinary diversion device for strict I&O    FEN/GI:  #Hyponatremia  #GERD  #Mahad's esophagus   Assessment:  -  Last BM: PTA  Plan:  - Monitor and replace electrolytes per protocol  - IVF: NS @ 75 mL/hr  - Diet: NPO pending swallow eval  - Bowel Regimen: Miralax QD  - c/w Protonix     ENDOCRINE:  #Hyperglycemia   Assessment:  Results from last 7 days   Lab Units 25  1421   GLUCOSE mg/dL 168*   SODIUM mmol/L 133*        Plan:  - Accuchecks & ISS PRN     HEMATOLOGY:  #thrombocytopenia   Assessment:  - Baseline Hgb: 16.1  - Baseline Plts:   Results from last 7 days   Lab Units 25  1421   HEMOGLOBIN g/dL 14.2   HEMATOCRIT % 39.5*   PLATELETS AUTO x10*3/uL 96*     Plan:  - Continue to monitor with daily CBC and Coag panel    INFECTIOUS DISEASE:  #C/f cranial wound infection   Assessment:  Results from last 7 days   Lab Units 25  1421   WBC AUTO x10*3/uL 5.8    - Temp (24hrs), Av.5 °C (97.7 °F), Min:36 °C (96.8 °F), Max:36.8 °C (98.2  °F)     Plan:  - Continue to monitor for s/sx of infection  - Pan culture for temperature > 38.4 C  - Cotinue Cefepime & Vanc  - Fu ID recs   - Fu OR cx from 5/6  - Fu Bcx from 5/5    MUSCULOSKELETAL:  - No acute issues    SKIN:  - No acute issues  - Turns and skin care per NSU protocol    ACCESS:  - PIVs    PROPHYLAXIS:  - DVT Ppx: SCDs and Hold SQH until POD #2(+thrombocytopenia)  - GI Ppx: Pantoprazole    RESTRAINTS:  I agree with nursing assessment of the patient's need for restraints to protect the patient from injury and facilitate healing. The patient is unable to cooperate with the plan of care and at risk for disrupting critical therapy (i.e., removing medical devices, lines, tubes and/or dressings).  Please see order for specifics. Restraints can be removed when the patient is able to cooperate with plan of care and allow healing to occur, or the medical devices at risk are discontinued by the medical team.     Michelle Rees, APRN-CNP  Neuroscience Intensive Care       Total critical care time of 60 minutes, with > 50% of time spent in direct contact with patient/family for education, counseling and coordination of care.

## 2025-04-06 NOTE — ANESTHESIA PROCEDURE NOTES
Airway  Date/Time: 4/6/2025 7:47 AM  Urgency: elective    Airway not difficult    Staffing  Performed: resident   Authorized by: Raghu Ivey DO    Performed by: Itzel Garcia MD  Patient location during procedure: OR    Indications and Patient Condition  Indications for airway management: anesthesia  Spontaneous Ventilation: absent  Sedation level: deep  Preoxygenated: yes  Patient position: sniffing  Mask difficulty assessment: 1 - vent by mask  Planned trial extubation    Final Airway Details  Final airway type: endotracheal airway      Successful airway: ETT  Cuffed: yes   Successful intubation technique: direct laryngoscopy  Facilitating devices/methods: intubating stylet and anterior pressure/BURP  Endotracheal tube insertion site: oral  Blade: Lydia  Blade size: #4  ETT size (mm): 7.0  Cormack-Lehane Classification: grade IIa - partial view of glottis  Placement verified by: chest auscultation and capnometry   Measured from: lips  ETT to lips (cm): 23  Number of attempts at approach: 1

## 2025-04-06 NOTE — SIGNIFICANT EVENT
Admit to PACU with neuro at bedside 1140, report received that pt was intact pre-operatively except minor LUE weakness and visual field defecit. Completed neuro assessment together. Patient stated name, did not answer other questions. PERRLA 2mm bilaterally at this time. Pt seemed to follow commands moving extremities x4 with multiple prompts and encouragement, but was not consistent. At this time, order for CT and return to LT4 when recover is complete.

## 2025-04-06 NOTE — CONSULTS
"Inpatient consult to Infectious Diseases  Consult performed by: Amy DALE MD  Consult ordered by: Joe Carl MD      Referred by Joe Carl MD    Primary MD: Ricardo Youngblood DO    Reason For Consult  Cranial wound dehiscence    History Of Present Illness  Enmanuel Ahumada \"Dash\" is a 69 y.o. male presenting with ***.     Past Medical History  He has a past medical history of Brain cancer (Multi), GERD (gastroesophageal reflux disease), Hyperlipidemia, Hypertension, and Seizure disorder (Multi).    Surgical History  He has a past surgical history that includes Other surgical history (07/24/2019); Other surgical history (07/26/2021); Brain surgery; Cholecystectomy (January 25, 2020); and Vasectomy (Abt 1991).     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  Allergies  Patient has no known allergies.     Immunization History   Administered Date(s) Administered    Flu vaccine, quadrivalent, no egg protein, age 6 month or greater (FLUCELVAX) 10/23/2020    Flu vaccine, trivalent, preservative free, no egg protein, age 6 months or greater (Flucelvax) 10/09/2017    Moderna COVID-19 vaccine, bivalent, blue cap/gray label *Check age/dose* 09/16/2022, 06/15/2023    Moderna SARS-CoV-2 Vaccination 01/07/2021, 02/03/2021, 11/23/2021    Pneumococcal polysaccharide vaccine, 23-valent, age 2 years and older (PNEUMOVAX 23) 10/20/2010    " Zoster vaccine, recombinant, adult (SHINGRIX) 10/23/2020, 2020     Medications  Home medications:  Medications Prior to Admission   Medication Sig Dispense Refill Last Dose/Taking    atorvastatin (Lipitor) 40 mg tablet TAKE 1 TABLET BY MOUTH EVERY DAY 90 tablet 3 2025 Bedtime    CALCIUM CARBONATE-VITAMIN D3 ORAL Take 1 capsule by mouth once daily.   2025 Morning    cephalexin (Keflex) 500 mg capsule Take 1 capsule (500 mg) by mouth 3 times a day for 14 days. 42 capsule 0 2025 Morning    dexAMETHasone (Decadron) 2 mg tablet Take 2 tabs (4mg) 2 times daily x3 days (3/18-3/20) then take 1 tab (2mg) 2 times daily x3 days (3/21-3/23) then stop 18 tablet 0 2025 Morning    ondansetron (Zofran) 8 mg tablet Take 1 tablet (8 mg) by mouth every 8 hours if needed for nausea or vomiting. 60 tablet 5 2025 Morning    ubidecarenone (COENZYME Q10, BULK, MISC) Take 1 Capful by mouth once daily.   2025 Morning    acetaminophen (Tylenol) 325 mg tablet Take 2 tablets (650 mg) by mouth every 6 hours if needed for mild pain (1 - 3).       krill-omega-3-dha-epa-lipids 935-48-78-50 mg capsule Take 1 capsule by mouth once daily.       levETIRAcetam (Keppra) 500 mg tablet Alternate back and fourth with 500mg 2 times per day (every other day) with 500mg once per day (every other day) 45 tablet 0     [] lomustine (CeeNU) 10 mg capsule Take 1 capsule (10 mg) by mouth 1 time for 1 dose.  Take on Day 1 of each treatment cycle. Taking on an empty stomach may reduce nausea and vomiting.  Call your doctor if you vomit right after taking a lomustine capsule. 1 capsule 0     [] lomustine (CeeNU) 100 mg capsule Take 2 capsules (200 mg) by mouth 1 time for 1 dose.  Take on Day 1 of each treatment cycle. Taking on an empty stomach may reduce nausea and vomiting.  Call your doctor if you vomit right after taking a lomustine capsule. 2 capsule 0     [] lomustine (CeeNU) 40 mg capsule Take 1 capsule (40 mg)  "by mouth 1 time for 1 dose.  Take on Day 1 of each treatment cycle. Taking on an empty stomach may reduce nausea and vomiting.  Call your doctor if you vomit right after taking a lomustine capsule. 1 capsule 0     ondansetron (Zofran) 8 mg tablet Take 1 tablet (8 mg) by mouth every 8 hours if needed for nausea or vomiting. Take one hour prior to each chemotherapy dose in addition to every 8 hours as needed for nausea/vomiting 60 tablet 5     pantoprazole (ProtoNix) 40 mg EC tablet TAKE 1 TABLET BY MOUTH IN THE MORNING 30 MINUTES BEFORE BREAKFAST. 90 tablet 3     prochlorperazine (Compazine) 10 mg tablet Take 1 tablet (10 mg) by mouth every 6 hours if needed for nausea or vomiting. 60 tablet 5     sennosides (SENNA LAX ORAL) Take 1 tablet by mouth once daily.        Current medications:  Scheduled medications  atorvastatin, 40 mg, oral, Daily  cefepime, 2 g, intravenous, q8h  dexAMETHasone, 2 mg, oral, q12h LA NENA  haloperidol lactate, 2 mg, intramuscular, Once  pantoprazole, 40 mg, oral, Daily before breakfast  polyethylene glycol, 17 g, oral, Daily  vancomycin, 1,000 mg, intravenous, q12h      Continuous medications    PRN medications  PRN medications: HYDROmorphone, OLANZapine, ondansetron **OR** ondansetron, oxygen, vancomycin    Review of Systems     Objective  Range of Vitals (last 24 hours)  Heart Rate:  []   Temp:  [36 °C (96.8 °F)-36.8 °C (98.2 °F)]   Resp:  [11-23]   BP: (112-145)/(69-98)   Height:  [170.2 cm (5' 7\")]   Weight:  [68 kg (150 lb)]   SpO2:  [93 %-100 %]   Daily Weight  04/06/25 : 68 kg (150 lb)    Body mass index is 23.49 kg/m².     Physical Exam     Relevant Results    Results from last 72 hours   Lab Units 04/05/25  1421   WBC AUTO x10*3/uL 5.8   HEMOGLOBIN g/dL 14.2   HEMATOCRIT % 39.5*   PLATELETS AUTO x10*3/uL 96*     Results from last 72 hours   Lab Units 04/05/25  1421   SODIUM mmol/L 133*   POTASSIUM mmol/L 3.4*   CHLORIDE mmol/L 98   CO2 mmol/L 27   BUN mg/dL 30*   CREATININE mg/dL " 0.80   GLUCOSE mg/dL 168*   CALCIUM mg/dL 8.5*   ANION GAP mmol/L 11   EGFR mL/min/1.73m*2 >90   PHOSPHORUS mg/dL 3.2     Results from last 72 hours   Lab Units 04/05/25  1421   ALBUMIN g/dL 3.5     Estimated Creatinine Clearance: 83.8 mL/min (by C-G formula based on SCr of 0.8 mg/dL).  C-Reactive Protein   Date Value Ref Range Status   04/05/2025 0.17 <1.00 mg/dL Final   03/14/2025 4.38 (H) <1.00 mg/dL Final     Sedimentation Rate   Date Value Ref Range Status   04/05/2025 2 0 - 20 mm/h Final   03/14/2025 2 0 - 20 mm/h Final       Microbiology    4/6 OR specimens pending      Imaging    MR brain w and wo IV contrast [[id number]] 04/05/2025       1.  Postsurgical changes status post right posterior craniotomy for  glioblastoma debulking in the right parieto-occipital lobe.  2.  Interval increase in extra-axial fluid collection overlying the  deep surgical craniotomy bed with focal areas of diffusion  restriction and peripheral enhancement. Additionally there is  interval development of layering contents deep within surgical cavity  and surrounding the cavity which demonstrate diffusion restriction.  Overall the constellation of findings are suggestive of superimposed  infection/abscess particularly given clinical suspicion of such.  3. Additional findings remain similar compared to prior imaging and  as described above.    I personally reviewed the images/study and I agree with the findings  as stated. This study was interpreted at [place], [place], [place].      Assessment/Plan     ***  RECOMMENDATIONS:   Continue Vancomycin and Meropenem pending culture results.  Will request extended incubation given recent use of Cephalexin orally as may have suppressed growth of sensitive bacteria.    I spent *** minutes in the professional and overall care of this patient.      Amy York MD     Weight:  [68 kg (150 lb)]   SpO2:  [93 %-100 %]   Daily Weight  04/06/25 : 68 kg (150 lb)    Body mass index is 23.49 kg/m².     Physical Exam  Patient is asleep lying on his left side.  Cranial surgical wound is covered with iodoform and Aquacel with overlying Adaptic.  Drain is in place.  There is no surrounding erythema on his skin.  Lungs are clear in the posterior lung fields without rhonchi.  Heart sounds are soft but regular.  No murmur appreciated.  He has petechial rash on his left arm in a bandlike fashion that is consistent with prior site of blood pressure cuff.  There are scattered petechial lesions over his arms bilaterally.  (He has chronic thrombocytopenia due to Temodar)  Relevant Results    Results from last 72 hours   Lab Units 04/05/25  1421   WBC AUTO x10*3/uL 5.8   HEMOGLOBIN g/dL 14.2   HEMATOCRIT % 39.5*   PLATELETS AUTO x10*3/uL 96*     Results from last 72 hours   Lab Units 04/05/25  1421   SODIUM mmol/L 133*   POTASSIUM mmol/L 3.4*   CHLORIDE mmol/L 98   CO2 mmol/L 27   BUN mg/dL 30*   CREATININE mg/dL 0.80   GLUCOSE mg/dL 168*   CALCIUM mg/dL 8.5*   ANION GAP mmol/L 11   EGFR mL/min/1.73m*2 >90   PHOSPHORUS mg/dL 3.2     Results from last 72 hours   Lab Units 04/05/25  1421   ALBUMIN g/dL 3.5     Estimated Creatinine Clearance: 83.8 mL/min (by C-G formula based on SCr of 0.8 mg/dL).  C-Reactive Protein   Date Value Ref Range Status   04/05/2025 0.17 <1.00 mg/dL Final   03/14/2025 4.38 (H) <1.00 mg/dL Final     Sedimentation Rate   Date Value Ref Range Status   04/05/2025 2 0 - 20 mm/h Final   03/14/2025 2 0 - 20 mm/h Final       Microbiology    4/6 OR specimens pending      Imaging    MR brain w and wo IV contrast [[id number]] 04/05/2025       1.  Postsurgical changes status post right posterior craniotomy for  glioblastoma debulking in the right parieto-occipital lobe.  2.  Interval increase in extra-axial fluid collection overlying the  deep surgical craniotomy bed with focal areas of  diffusion  restriction and peripheral enhancement. Additionally there is  interval development of layering contents deep within surgical cavity  and surrounding the cavity which demonstrate diffusion restriction.  Overall the constellation of findings are suggestive of superimposed  infection/abscess particularly given clinical suspicion of such.  3. Additional findings remain similar compared to prior imaging and  as described above.    I personally reviewed the images/study and I agree with the findings  as stated. This study was interpreted at [facility], [place], [place].      Assessment/Plan     69-year-old gentleman diagnosed with glioblastoma multiforme he and his occipital lobe in December 2023 he is status post resection, chemoradiation (completed March 2024) and remains on adjuvant Temodar complicated by thrombocytopenia.  He is now presenting with surgical wound dehiscence likely prompted by prior radiation therapy to the site.  This is now complicated by intracranial wound infection.  Cultures from the subgaleal, epidural, and subdural spaces have been submitted and are pending.  There may be some delay in growth due to a very brief course of cephalexin taken in mid March.  I am requesting the microbiology lab to extend incubation.      RECOMMENDATIONS:   Continue Vancomycin with goal -600 and managed by pharmacy   Continue meropenem 2 g IV every 8 hours pending culture results.  Will request extended incubation given recent use of Cephalexin orally as may have suppressed growth of sensitive bacteria.    I spent 60 minutes in the professional and overall care of this patient.    This is a complex infectious disease issue and the following was performed today (for more details please see the above note):   Management decisions reflecting the added complexity (e.g., changes in antimicrobial therapy, infection control strategies).     Amy York MD   (please reach through EPIC Chat)  Infectious Diseases, Senior Attending Physician

## 2025-04-06 NOTE — SIGNIFICANT EVENT
"1220 Neuro Arnoldo Acuna MD called and informed me that pt will be NSU status now. I let her know that patient's mental status has not improved as expected as patient is waking up more. He is more alert but agitated, needed restraints. He is still only verbalizing name and the word \"yes\", though not appropriately/correctly at this time. Pt not following commands but moving all extremities spontaneously. MD asked if CT could be obtained, I stated yes but this patient would need sedation to lay for one, and requested a neuro resident then transport with pt and RN to CT. MD stated to just bring pt to NSU if there is a bed available. Report given tp Tena SANCHEZ receiving patient, informed that patient still needs CT, and patient transported to NSU with anesthesia attending and neuro resident at bedside at 1248.  "

## 2025-04-06 NOTE — CARE PLAN
The patient's goals for the shift include free from fall    The clinical goals for the shift include Patient will have adequate pain control. MET    No neurological changes. Vitals within parameters. C/o pain however decline PRN medication interventions. NPO except sips with meds for procedure since Midnight. Up with standby assist, gait steady. Using urinal. Head incision drainage, dressing changed throughout the shift when saturated.       Problem: Skin  Goal: Prevent/manage excess moisture  4/6/2025 0619 by Juliana Little RN  Flowsheets (Taken 4/6/2025 0619)  Prevent/manage excess moisture: Monitor for/manage infection if present  4/6/2025 0619 by Juliana Little RN  Outcome: Progressing     Problem: Pain - Adult  Goal: Verbalizes/displays adequate comfort level or baseline comfort level  Outcome: Progressing    Problem: Safety - Adult  Goal: Free from fall injury  Outcome: Progressing

## 2025-04-06 NOTE — ANESTHESIA PROCEDURE NOTES
Peripheral IV  Date/Time: 4/6/2025 7:50 AM      Placement  Needle size: 16 G  Laterality: left  Location: hand  Local anesthetic: none  Site prep: chlorhexidine  Technique: anatomical landmarks

## 2025-04-06 NOTE — OP NOTE
"Right cranial wound washout (R) Operative Note     Date: 2025  OR Location: [facility] OR    Name: Enmanuel Ahumada \"Dash\", : 1955, Age: 69 y.o., MRN: [id number], Sex: male    Diagnosis  Pre-op Diagnosis      * Wound dehiscence [T81.30XA] Post-op Diagnosis     * Wound dehiscence [T81.30XA]     Procedures  Right cranial wound washout  57885 - AK TX SUPERFICIAL WOUND DEHISCENCE W/PACKING      Surgeons      * Joe Carl - Primary    Resident/Fellow/Other Assistant:  Surgeons and Role:     * Curtis Kelley MD - Resident - Assisting     * Arnoldo Acuna MD - Resident - Assisting    Staff:   Scrub Person: Renetta  Circulator: Nigel    Anesthesia Staff: Anesthesiologist: Raghu Ivey DO  CRNA: ALEXA Novak-CRNA  Anesthesia Resident: Itzel Garcia MD    Procedure Summary  Anesthesia: General  ASA: ASA status not filed in the log.  Estimated Blood Loss: 300 mL  Intra-op Medications: Administrations occurring from 25 1425 to 25 1425:  * No intraprocedure medications in log *           Anesthesia Record               Intraprocedure I/O Totals          Intake    LR bolus 1000.00 mL    Total Intake 1000 mL          Specimen:   ID Type Source Tests Collected by Time   A : SUBGALEAL Swab BRAIN BIOPSY TISSUE/WOUND CULTURE/SMEAR Joe Carl MD 2025 1005   B : EPIDURAL Swab BRAIN BIOPSY TISSUE/WOUND CULTURE/SMEAR Joe Carl MD 2025 1006   C : SUBDURAL Swab BRAIN BIOPSY TISSUE/WOUND CULTURE/SMEAR Joe Carl MD 2025 1007                 Drains and/or Catheters:   Closed/Suction Drain 1 Right Scalp Bulb 10 Fr. (Active)       Tourniquet Times:         Implants:  Implants       Type Name Action Serial No.      Neuro Interventional Implant CATHETER SET, NEURO VENTRICULAR DRAINAGE W/ANTIBIOTIC IMPREGNATION - [id number] Implanted      Graft DURAMATRIX 4 X 5 ONLAY PLUS - [id number] Implanted      Neuro Interventional Implant CROSS PIN, AXS SELF-TAP, 1.5 X 5MM - [id number] " "Implanted      Neuro Interventional Implant CROSS PIN, AXS SELF-TAP, 1.5 X 4MM - [id number] Implanted      Surgical Mesh Sling Implant MESH, PRO FORM, [id number], STANDARD, LARGE - [id number] Implanted               Findings: subgaleal, epidural, and subdural purulence    Indications: Enmanuel Ahumada \"Petey" is an 69 y.o. male who is having surgery for Wound dehiscence [T81.30XA].     The patient was seen in the preoperative area. The risks, benefits, complications, treatment options, non-operative alternatives, expected recovery and outcomes were discussed with the patient. The possibilities of reaction to medication, pulmonary aspiration, injury to surrounding structures, bleeding, recurrent infection, the need for additional procedures, failure to diagnose a condition, and creating a complication requiring transfusion or operation were discussed with the patient. The patient concurred with the proposed plan, giving informed consent.  The site of surgery was properly noted/marked if necessary per policy. The patient has been actively warmed in preoperative area. Preoperative antibiotics are not indicated. Venous thrombosis prophylaxis have been ordered including bilateral sequential compression devices    Procedure Details:   The patient was brought back from preop to OR by the anesthesia and OR staff. A preoperative timeout was performed in order to confirm the correct procedure, patient, and laterality. The patient underwent induction and intubation by the anesthesia team.  The patient was placed in the lateral position with an axillary roll on bean bag, which was inflated.  The arms were padded.  The right scalp was cleansed, prepped and draped in usual sterile fashion, and the prior incision was marked.  Another timeout was performed.     The prior incision was fully opened to expose the prior craniotomy flap. Subgaleal purulence under the inferior portion of the incision was noted and sent for " culture. The bone flap was removed using the [employer] screw drivers and a penfield dissector to elevate the bone flap. Epidural purulence was seen as well which was cultured. Gigi CSF was seen coming out after bone flap elevation which was clear. The dural sealant/duragen that was previously placed was noted and removed without issue. The underlying brain was noted to have purulence coated throughout which was debrided using suction, irrigation, and a blunt dissection. Again, a culture was sent from this space as well. All edges of the dura and brain was cleaned of necrotic debris, after which the resection cavity was copiously irrigated and hemostasis was obtained using surgicel and cautery. A surgicel/gel foam patch was placed over the ventricle to attempt reconstruction of the ventricular wall. Resection bed was irrigated again, and a layer of surgicel was placed throughout. Dural defect was covered with a duramatrix onlay plus onlay, which was sutured to the native dura using 4-0 nurolon. A drain was placed underneath this into the resection cavity and tunnelled out percutaneously. The dura was sealed with adheris dural sealant. A preformed piece of titanium mesh ([employer]) was affixed to the native bone using 4mm screws. Incision was irrigated one last time and incision was closed with a mixture of 2-0 vicryls and PDS on galea, and prolene suture for skin. A subgaleal drain was placed and tunneled percutaneously. Patient was turned over to anesthesia without complication. All counts were correct at end of case.    Complications:  None; patient tolerated the procedure well.    Disposition: PACU - hemodynamically stable.  Condition: stable     Additional Details: none    Attending Attestation:     Joe Carl  Phone Number: [phone]

## 2025-04-06 NOTE — ANESTHESIA POSTPROCEDURE EVALUATION
"Patient: Enmanuel Ahumada \"Dash\"    Procedure Summary       Date: 04/06/25 Room / Location: [facility] OR 25 / [facility]    Anesthesia Start: 0735 Anesthesia Stop: 1139    Procedure: Right cranial wound washout (Right: Head) Diagnosis:       Wound dehiscence      (Wound dehiscence [T81.30XA])    Surgeons: Joe Carl MD Responsible Provider: Raghu Ivey DO    Anesthesia Type: general ASA Status: 3            Anesthesia Type: general    Vitals Value Taken Time   /90 04/06/25 1215   Temp 36.5 04/06/25 1251   Pulse 100 04/06/25 1220   Resp 23 04/06/25 1215   SpO2 99 % 04/06/25 1220       Anesthesia Post Evaluation    Patient location during evaluation: PACU  Patient participation: complete - patient participated  Level of consciousness: sleepy but conscious  Pain score: 2  Pain management: adequate  Airway patency: patent  Two or more strategies used to mitigate risk of obstructive sleep apnea  Cardiovascular status: acceptable and blood pressure returned to baseline  Respiratory status: acceptable, face mask and spontaneous ventilation  Hydration status: acceptable  Postoperative Nausea and Vomiting: none        No notable events documented.    "

## 2025-04-06 NOTE — PROGRESS NOTES
"Enmanuel Ahumada \"Petey" is a 69 y.o. male on day 1 of admission presenting with Wound dehiscence.    Subjective   NAEON       Objective     Physical Exam  A&Ox3  Face symmetric  L HH  RUE 5/5  LUE 4+/5  RLE 5/5  LLE 5/5  Last Recorded Vitals  Blood pressure 119/76, pulse 103, temperature 36.8 °C (98.2 °F), resp. rate 18, height 1.702 m (5' 7\"), weight 68 kg (150 lb), SpO2 94%.  Intake/Output last 3 Shifts:  I/O last 3 completed shifts:  In: 320 (4.7 mL/kg) [P.O.:320]  Out: 600 (8.8 mL/kg) [Urine:600 (0.2 mL/kg/hr)]  Weight: 68 kg     Relevant Results                              Assessment/Plan   Assessment & Plan  Wound dehiscence    69 M h/o HTN, HLD, smalls's esophagus, RO GBM s/p resection ([facility], 12/2023) with resultant L HH s/p chemo/RT presenting with tumor progression, 2/20 s/p redo craniotomy for tumor resection w 5 ALA, CTH POC, 2/21 MRI intended NTR, 3/16 MRI postop changes w fluid collection at dura and craniotomy site, worsening infiltrative disease, enhancement along posteromedial aspect of resection site, 4/5 p/w 1wk incisional drainage, 4/4 CTH airfluid level in resection cavity, 4/5 MRI brain w/wo incr'd resection cavity enhancement and diffusion restrictio    Floor  OR today for R cranial wound washout  Bcx  PTOT  ID c/s after OR  Home dex             Valeriano Castro MD      "

## 2025-04-06 NOTE — ANESTHESIA PREPROCEDURE EVALUATION
"Patient: Enmanuel Ahumada \"Dash\"    Procedure Information       Date/Time: 04/06/25 0730    Procedure: Right cranial wound washout (Right)    Location: [facility] OR 25 / [facility]    Surgeons: Joe Carl MD            Relevant Problems   Cardiac   (+) Abnormal EKG   (+) Hyperlipidemia      HEENT   (+) Impaired visual perception      ID   (+) Infected insect bite   (+) Onychomycosis       Clinical information reviewed:   Tobacco  Allergies  Meds   Med Hx  Surg Hx   Fam Hx  Soc Hx        NPO Detail:  NPO/Void Status  Carbohydrate Drink Given Prior to Surgery? : N  Date of Last Liquid: 04/05/25  Time of Last Liquid: 2100  Date of Last Solid: 04/05/25  Time of Last Solid: 2100  Last Intake Type: Clear fluids  Time of Last Void: 0600         Physical Exam    Airway  Mallampati: II  TM distance: >3 FB  Neck ROM: full     Cardiovascular    Dental    Pulmonary    Abdominal            Anesthesia Plan    History of general anesthesia?: yes  History of complications of general anesthesia?: no    ASA 3     general     The patient is not a current smoker.    intravenous induction   Postoperative administration of opioids is intended.  Trial extubation is planned.  Anesthetic plan and risks discussed with patient.  Use of blood products discussed with patient who consented to blood products.    Plan discussed with resident.      "

## 2025-04-07 ENCOUNTER — APPOINTMENT (OUTPATIENT)
Dept: RADIOLOGY | Facility: HOSPITAL | Age: 70
End: 2025-04-07
Payer: MEDICARE

## 2025-04-07 LAB
ALBUMIN SERPL BCP-MCNC: 2.8 G/DL (ref 3.4–5)
ALBUMIN SERPL BCP-MCNC: 2.8 G/DL (ref 3.4–5)
ALBUMIN SERPL BCP-MCNC: 3.1 G/DL (ref 3.4–5)
ALP SERPL-CCNC: 48 U/L (ref 33–136)
ALT SERPL W P-5'-P-CCNC: 44 U/L (ref 10–52)
AMMONIA PLAS-SCNC: 27 UMOL/L (ref 16–53)
ANION GAP SERPL CALC-SCNC: 10 MMOL/L (ref 10–20)
ANION GAP SERPL CALC-SCNC: 12 MMOL/L (ref 10–20)
AST SERPL W P-5'-P-CCNC: 17 U/L (ref 9–39)
BASOPHILS # BLD AUTO: 0.01 X10*3/UL (ref 0–0.1)
BASOPHILS NFR BLD AUTO: 0.2 %
BILIRUB DIRECT SERPL-MCNC: 0.3 MG/DL (ref 0–0.3)
BILIRUB DIRECT SERPL-MCNC: 0.3 MG/DL (ref 0–0.3)
BILIRUB SERPL-MCNC: 1.4 MG/DL (ref 0–1.2)
BILIRUB SERPL-MCNC: 1.5 MG/DL (ref 0–1.2)
BUN SERPL-MCNC: 20 MG/DL (ref 6–23)
BUN SERPL-MCNC: 27 MG/DL (ref 6–23)
CA-I BLD-SCNC: 1.15 MMOL/L (ref 1.1–1.33)
CALCIUM SERPL-MCNC: 7.8 MG/DL (ref 8.6–10.6)
CALCIUM SERPL-MCNC: 8.1 MG/DL (ref 8.6–10.6)
CHLORIDE SERPL-SCNC: 104 MMOL/L (ref 98–107)
CHLORIDE SERPL-SCNC: 105 MMOL/L (ref 98–107)
CO2 SERPL-SCNC: 24 MMOL/L (ref 21–32)
CO2 SERPL-SCNC: 26 MMOL/L (ref 21–32)
CORTIS SERPL-MCNC: 5.2 UG/DL (ref 2.5–20)
CREAT SERPL-MCNC: 0.54 MG/DL (ref 0.5–1.3)
CREAT SERPL-MCNC: 0.55 MG/DL (ref 0.5–1.3)
EGFRCR SERPLBLD CKD-EPI 2021: >90 ML/MIN/1.73M*2
EGFRCR SERPLBLD CKD-EPI 2021: >90 ML/MIN/1.73M*2
EOSINOPHIL # BLD AUTO: 0 X10*3/UL (ref 0–0.7)
EOSINOPHIL NFR BLD AUTO: 0 %
ERYTHROCYTE [DISTWIDTH] IN BLOOD BY AUTOMATED COUNT: 12.7 % (ref 11.5–14.5)
ERYTHROCYTE [DISTWIDTH] IN BLOOD BY AUTOMATED COUNT: 12.8 % (ref 11.5–14.5)
GLUCOSE BLD MANUAL STRIP-MCNC: 115 MG/DL (ref 74–99)
GLUCOSE BLD MANUAL STRIP-MCNC: 119 MG/DL (ref 74–99)
GLUCOSE BLD MANUAL STRIP-MCNC: 128 MG/DL (ref 74–99)
GLUCOSE BLD MANUAL STRIP-MCNC: 131 MG/DL (ref 74–99)
GLUCOSE BLD MANUAL STRIP-MCNC: 92 MG/DL (ref 74–99)
GLUCOSE SERPL-MCNC: 128 MG/DL (ref 74–99)
GLUCOSE SERPL-MCNC: 160 MG/DL (ref 74–99)
HCT VFR BLD AUTO: 27.7 % (ref 41–52)
HCT VFR BLD AUTO: 29.7 % (ref 41–52)
HGB BLD-MCNC: 10.1 G/DL (ref 13.5–17.5)
HGB BLD-MCNC: 10.9 G/DL (ref 13.5–17.5)
IMM GRANULOCYTES # BLD AUTO: 0.03 X10*3/UL (ref 0–0.7)
IMM GRANULOCYTES NFR BLD AUTO: 0.5 % (ref 0–0.9)
LYMPHOCYTES # BLD AUTO: 0.66 X10*3/UL (ref 1.2–4.8)
LYMPHOCYTES NFR BLD AUTO: 11.6 %
MAGNESIUM SERPL-MCNC: 1.96 MG/DL (ref 1.6–2.4)
MCH RBC QN AUTO: 32.3 PG (ref 26–34)
MCH RBC QN AUTO: 32.5 PG (ref 26–34)
MCHC RBC AUTO-ENTMCNC: 36.5 G/DL (ref 32–36)
MCHC RBC AUTO-ENTMCNC: 36.7 G/DL (ref 32–36)
MCV RBC AUTO: 88 FL (ref 80–100)
MCV RBC AUTO: 89 FL (ref 80–100)
MONOCYTES # BLD AUTO: 0.26 X10*3/UL (ref 0.1–1)
MONOCYTES NFR BLD AUTO: 4.6 %
NEUTROPHILS # BLD AUTO: 4.75 X10*3/UL (ref 1.2–7.7)
NEUTROPHILS NFR BLD AUTO: 83.1 %
NRBC BLD-RTO: 0 /100 WBCS (ref 0–0)
NRBC BLD-RTO: 0 /100 WBCS (ref 0–0)
OSMOLALITY SERPL: 285 MOSM/KG (ref 280–300)
PHOSPHATE SERPL-MCNC: 2.7 MG/DL (ref 2.5–4.9)
PHOSPHATE SERPL-MCNC: 3.1 MG/DL (ref 2.5–4.9)
PLATELET # BLD AUTO: 103 X10*3/UL (ref 150–450)
PLATELET # BLD AUTO: 97 X10*3/UL (ref 150–450)
POTASSIUM SERPL-SCNC: 3.8 MMOL/L (ref 3.5–5.3)
POTASSIUM SERPL-SCNC: 4.3 MMOL/L (ref 3.5–5.3)
PROT SERPL-MCNC: 4.4 G/DL (ref 6.4–8.2)
RBC # BLD AUTO: 3.11 X10*6/UL (ref 4.5–5.9)
RBC # BLD AUTO: 3.37 X10*6/UL (ref 4.5–5.9)
SODIUM SERPL-SCNC: 136 MMOL/L (ref 136–145)
SODIUM SERPL-SCNC: 137 MMOL/L (ref 136–145)
TSH SERPL-ACNC: 0.34 MIU/L (ref 0.44–3.98)
VANCOMYCIN SERPL-MCNC: 42.9 UG/ML (ref 5–20)
VIT B12 SERPL-MCNC: 522 PG/ML (ref 211–911)
WBC # BLD AUTO: 5.5 X10*3/UL (ref 4.4–11.3)
WBC # BLD AUTO: 5.7 X10*3/UL (ref 4.4–11.3)

## 2025-04-07 PROCEDURE — 82247 BILIRUBIN TOTAL: CPT

## 2025-04-07 PROCEDURE — 2500000004 HC RX 250 GENERAL PHARMACY W/ HCPCS (ALT 636 FOR OP/ED)

## 2025-04-07 PROCEDURE — 2500000004 HC RX 250 GENERAL PHARMACY W/ HCPCS (ALT 636 FOR OP/ED): Mod: JW

## 2025-04-07 PROCEDURE — 82607 VITAMIN B-12: CPT

## 2025-04-07 PROCEDURE — 82533 TOTAL CORTISOL: CPT

## 2025-04-07 PROCEDURE — 85027 COMPLETE CBC AUTOMATED: CPT

## 2025-04-07 PROCEDURE — 82330 ASSAY OF CALCIUM: CPT | Performed by: REGISTERED NURSE

## 2025-04-07 PROCEDURE — 84100 ASSAY OF PHOSPHORUS: CPT

## 2025-04-07 PROCEDURE — 95716 VEEG EA 12-26HR CONT MNTR: CPT

## 2025-04-07 PROCEDURE — 36430 TRANSFUSION BLD/BLD COMPNT: CPT

## 2025-04-07 PROCEDURE — 70450 CT HEAD/BRAIN W/O DYE: CPT

## 2025-04-07 PROCEDURE — 70450 CT HEAD/BRAIN W/O DYE: CPT | Performed by: RADIOLOGY

## 2025-04-07 PROCEDURE — 36415 COLL VENOUS BLD VENIPUNCTURE: CPT | Performed by: REGISTERED NURSE

## 2025-04-07 PROCEDURE — 80069 RENAL FUNCTION PANEL: CPT | Mod: CCI

## 2025-04-07 PROCEDURE — 83735 ASSAY OF MAGNESIUM: CPT | Performed by: REGISTERED NURSE

## 2025-04-07 PROCEDURE — 2500000004 HC RX 250 GENERAL PHARMACY W/ HCPCS (ALT 636 FOR OP/ED): Performed by: REGISTERED NURSE

## 2025-04-07 PROCEDURE — P9073 PLATELETS PHERESIS PATH REDU: HCPCS

## 2025-04-07 PROCEDURE — 82140 ASSAY OF AMMONIA: CPT

## 2025-04-07 PROCEDURE — 85025 COMPLETE CBC W/AUTO DIFF WBC: CPT | Performed by: REGISTERED NURSE

## 2025-04-07 PROCEDURE — 80069 RENAL FUNCTION PANEL: CPT | Performed by: REGISTERED NURSE

## 2025-04-07 PROCEDURE — 82947 ASSAY GLUCOSE BLOOD QUANT: CPT

## 2025-04-07 PROCEDURE — 95720 EEG PHY/QHP EA INCR W/VEEG: CPT | Performed by: STUDENT IN AN ORGANIZED HEALTH CARE EDUCATION/TRAINING PROGRAM

## 2025-04-07 PROCEDURE — 82248 BILIRUBIN DIRECT: CPT

## 2025-04-07 PROCEDURE — 84075 ASSAY ALKALINE PHOSPHATASE: CPT

## 2025-04-07 PROCEDURE — 95700 EEG CONT REC W/VID EEG TECH: CPT

## 2025-04-07 PROCEDURE — 80202 ASSAY OF VANCOMYCIN: CPT

## 2025-04-07 PROCEDURE — 2020000001 HC ICU ROOM DAILY

## 2025-04-07 PROCEDURE — 83930 ASSAY OF BLOOD OSMOLALITY: CPT

## 2025-04-07 RX ORDER — VANCOMYCIN HYDROCHLORIDE 750 MG/150ML
750 INJECTION, SOLUTION INTRAVENOUS EVERY 8 HOURS
Status: DISCONTINUED | OUTPATIENT
Start: 2025-04-07 | End: 2025-04-09

## 2025-04-07 RX ORDER — OLANZAPINE 10 MG/2ML
INJECTION, POWDER, FOR SOLUTION INTRAMUSCULAR
Status: COMPLETED
Start: 2025-04-07 | End: 2025-04-07

## 2025-04-07 RX ORDER — SODIUM CHLORIDE 9 MG/ML
75 INJECTION, SOLUTION INTRAVENOUS CONTINUOUS
Status: DISCONTINUED | OUTPATIENT
Start: 2025-04-07 | End: 2025-04-08

## 2025-04-07 RX ORDER — OLANZAPINE 10 MG/2ML
5 INJECTION, POWDER, FOR SOLUTION INTRAMUSCULAR ONCE
Status: COMPLETED | OUTPATIENT
Start: 2025-04-07 | End: 2025-04-07

## 2025-04-07 RX ORDER — MAGNESIUM SULFATE HEPTAHYDRATE 40 MG/ML
2 INJECTION, SOLUTION INTRAVENOUS ONCE
Status: COMPLETED | OUTPATIENT
Start: 2025-04-07 | End: 2025-04-07

## 2025-04-07 RX ORDER — INSULIN LISPRO 100 [IU]/ML
0-5 INJECTION, SOLUTION INTRAVENOUS; SUBCUTANEOUS EVERY 4 HOURS
Status: DISCONTINUED | OUTPATIENT
Start: 2025-04-07 | End: 2025-04-10

## 2025-04-07 RX ORDER — DEXTROSE 50 % IN WATER (D50W) INTRAVENOUS SYRINGE
25
Status: DISCONTINUED | OUTPATIENT
Start: 2025-04-07 | End: 2025-05-06 | Stop reason: HOSPADM

## 2025-04-07 RX ORDER — DEXTROSE 50 % IN WATER (D50W) INTRAVENOUS SYRINGE
12.5
Status: DISCONTINUED | OUTPATIENT
Start: 2025-04-07 | End: 2025-05-06 | Stop reason: HOSPADM

## 2025-04-07 RX ADMIN — LEVETIRACETAM 500 MG: 5 INJECTION INTRAVENOUS at 03:45

## 2025-04-07 RX ADMIN — OLANZAPINE 5 MG: 10 INJECTION, POWDER, FOR SOLUTION INTRAMUSCULAR at 10:48

## 2025-04-07 RX ADMIN — DEXMEDETOMIDINE HYDROCHLORIDE 1.5 MCG/KG/HR: 400 INJECTION INTRAVENOUS at 10:18

## 2025-04-07 RX ADMIN — MAGNESIUM SULFATE HEPTAHYDRATE 2 G: 40 INJECTION, SOLUTION INTRAVENOUS at 08:34

## 2025-04-07 RX ADMIN — DEXMEDETOMIDINE HYDROCHLORIDE 1.3 MCG/KG/HR: 400 INJECTION INTRAVENOUS at 20:02

## 2025-04-07 RX ADMIN — DEXAMETHASONE SODIUM PHOSPHATE 2 MG: 4 INJECTION, SOLUTION INTRA-ARTICULAR; INTRALESIONAL; INTRAMUSCULAR; INTRAVENOUS; SOFT TISSUE at 08:33

## 2025-04-07 RX ADMIN — MEROPENEM 2 G: 1 INJECTION INTRAVENOUS at 12:11

## 2025-04-07 RX ADMIN — LEVETIRACETAM 500 MG: 5 INJECTION INTRAVENOUS at 16:18

## 2025-04-07 RX ADMIN — DEXAMETHASONE SODIUM PHOSPHATE 2 MG: 4 INJECTION, SOLUTION INTRA-ARTICULAR; INTRALESIONAL; INTRAMUSCULAR; INTRAVENOUS; SOFT TISSUE at 20:40

## 2025-04-07 RX ADMIN — DEXMEDETOMIDINE HYDROCHLORIDE 1 MCG/KG/HR: 400 INJECTION INTRAVENOUS at 05:55

## 2025-04-07 RX ADMIN — MEROPENEM 2 G: 1 INJECTION INTRAVENOUS at 20:40

## 2025-04-07 RX ADMIN — SODIUM CHLORIDE 75 ML/HR: 9 INJECTION, SOLUTION INTRAVENOUS at 21:07

## 2025-04-07 RX ADMIN — OLANZAPINE 5 MG: 10 INJECTION, POWDER, LYOPHILIZED, FOR SOLUTION INTRAMUSCULAR at 17:55

## 2025-04-07 RX ADMIN — VANCOMYCIN HYDROCHLORIDE 1000 MG: 1 INJECTION, SOLUTION INTRAVENOUS at 08:57

## 2025-04-07 RX ADMIN — MEROPENEM 2 G: 1 INJECTION INTRAVENOUS at 04:38

## 2025-04-07 RX ADMIN — DEXMEDETOMIDINE HYDROCHLORIDE 1.25 MCG/KG/HR: 400 INJECTION INTRAVENOUS at 15:04

## 2025-04-07 RX ADMIN — VANCOMYCIN HYDROCHLORIDE 750 MG: 750 INJECTION, SOLUTION INTRAVENOUS at 16:58

## 2025-04-07 RX ADMIN — DEXMEDETOMIDINE HYDROCHLORIDE 1.5 MCG/KG/HR: 400 INJECTION INTRAVENOUS at 01:14

## 2025-04-07 RX ADMIN — OLANZAPINE 5 MG: 10 INJECTION, POWDER, LYOPHILIZED, FOR SOLUTION INTRAMUSCULAR at 10:48

## 2025-04-07 ASSESSMENT — COGNITIVE AND FUNCTIONAL STATUS - GENERAL
DAILY ACTIVITIY SCORE: 18
STANDING UP FROM CHAIR USING ARMS: A LITTLE
TOILETING: TOTAL
PERSONAL GROOMING: A LITTLE
STANDING UP FROM CHAIR USING ARMS: A LITTLE
TOILETING: A LITTLE
TURNING FROM BACK TO SIDE WHILE IN FLAT BAD: A LITTLE
MOBILITY SCORE: 18
DRESSING REGULAR LOWER BODY CLOTHING: A LITTLE
HELP NEEDED FOR BATHING: A LITTLE
MOVING TO AND FROM BED TO CHAIR: TOTAL
EATING MEALS: TOTAL
DRESSING REGULAR UPPER BODY CLOTHING: A LITTLE
WALKING IN HOSPITAL ROOM: TOTAL
DRESSING REGULAR UPPER BODY CLOTHING: A LITTLE
HELP NEEDED FOR BATHING: A LITTLE
TOILETING: A LITTLE
DRESSING REGULAR LOWER BODY CLOTHING: TOTAL
TURNING FROM BACK TO SIDE WHILE IN FLAT BAD: TOTAL
MOBILITY SCORE: 18
CLIMB 3 TO 5 STEPS WITH RAILING: A LITTLE
HELP NEEDED FOR BATHING: TOTAL
MOVING TO AND FROM BED TO CHAIR: A LITTLE
WALKING IN HOSPITAL ROOM: A LITTLE
CLIMB 3 TO 5 STEPS WITH RAILING: A LITTLE
WALKING IN HOSPITAL ROOM: A LITTLE
DRESSING REGULAR UPPER BODY CLOTHING: TOTAL
DRESSING REGULAR LOWER BODY CLOTHING: A LITTLE
MOVING TO AND FROM BED TO CHAIR: A LITTLE
MOVING FROM LYING ON BACK TO SITTING ON SIDE OF FLAT BED WITH BEDRAILS: A LITTLE
DAILY ACTIVITIY SCORE: 18
DAILY ACTIVITIY SCORE: 6
PERSONAL GROOMING: TOTAL
STANDING UP FROM CHAIR USING ARMS: TOTAL
TURNING FROM BACK TO SIDE WHILE IN FLAT BAD: A LITTLE
CLIMB 3 TO 5 STEPS WITH RAILING: TOTAL
PERSONAL GROOMING: A LITTLE
MOVING FROM LYING ON BACK TO SITTING ON SIDE OF FLAT BED WITH BEDRAILS: A LITTLE
MOBILITY SCORE: 9
EATING MEALS: A LITTLE
EATING MEALS: A LITTLE

## 2025-04-07 ASSESSMENT — PAIN - FUNCTIONAL ASSESSMENT
PAIN_FUNCTIONAL_ASSESSMENT: CPOT (CRITICAL CARE PAIN OBSERVATION TOOL)

## 2025-04-07 NOTE — PROGRESS NOTES
"  Communication Note    Patient Name: Enmanuel Ahumada \"Dash\"  MRN: [id number]  Today's Date: 4/7/2025   Room: 02/02-A    Discipline: Physical Therapy      PT Missed Visit: Yes  Missed Visit Reason:  (Hold PT 2/2 agitation.)      04/07/25 at 8:58 AM   Shira Coates, PT   Rehab Office: [phone]        "

## 2025-04-07 NOTE — CARE PLAN
Problem: Fall/Injury  Goal: Not fall by end of shift  Outcome: Progressing  Goal: Be free from injury by end of the shift  Outcome: Progressing     Problem: Skin  Goal: Participates in plan/prevention/treatment measures  Outcome: Progressing  Goal: Prevent/manage excess moisture  Outcome: Progressing  Goal: Promote/optimize nutrition  Outcome: Progressing     Problem: Pain - Adult  Goal: Verbalizes/displays adequate comfort level or baseline comfort level  Outcome: Progressing     Problem: Safety - Adult  Goal: Free from fall injury  Outcome: Progressing     Problem: Chronic Conditions and Co-morbidities  Goal: Patient's chronic conditions and co-morbidity symptoms are monitored and maintained or improved  Outcome: Progressing     Problem: Safety - Medical Restraint  Goal: Remains free of injury from restraints (Restraint for Interference with Medical Device)  Outcome: Progressing

## 2025-04-07 NOTE — SIGNIFICANT EVENT
"Preliminary EEG Report     This vEEG is consistent with a severe diffuse encephalopathy and a right hemispheric skull defect. No clear epileptiform discharges are seen. Bradycardia noted with PVCs on EKG.    This EEG was read from 18:02 to 18:28 on 04/06/25 .     The final impression will be available tomorrow under Chart Review in the Media tab. If the plan is to discontinue the video EEG, please place a \"Discontinue Continuous VEEG\" order in the EMR; otherwise the EEG tech will not receive the notification.      Della Flores MD  Adult Epilepsy Fellow  [facility] [place]     "

## 2025-04-07 NOTE — PROGRESS NOTES
04/07/25 1223   Discharge Planning   Living Arrangements Spouse/significant other   Support Systems Spouse/significant other   Type of Residence Private residence   Number of Stairs to Enter Residence 2   Number of Stairs Within Residence 14   Home or Post Acute Services   (TBD - PT and OT will evaluate and make discharge level of care recommendations.)   Does the patient need discharge transport arranged? Yes   RoundTrip coordination needed? Yes     Social Work Discharge Planning note:    -Patient discussed during interdisciplinary rounds.   -Team members present: Fellow, PT and OT, and SW  -Plan per medical team: Pt is not medically ready for discharge.   -Payer: O Medicare  -Status: Inpatient  -Discharge disposition: TBD - SW will follow for PT and OT recommendations, and to assist with discharge planning.   -Support to Pt/family: SW met with Pt and his wife, Brittany, to offer support and to further discuss discharge planning. Pt did not appear to be fully oriented during the time of this contact (in restraints and had a sitter). Brittany reported that Pt was independent with his ADL's at baseline, but she did provide supervision/standby assist whenever Pt used the stairs in their home. Brittany reported that, if needed, she can arrange a first floor set up, and she would be able to provide 24 hour assist once Pt returns home. Brittany reported no other issues or concerns. SW will continue to follow for emotional/incidental support to Pt/family.    -Potential Barriers: none  -Anticipated Date of Discharge:  4/11/25    This KAYCEE Odell, LSW    Office: [phone]  Secure chat via [employer]

## 2025-04-07 NOTE — PROGRESS NOTES
[facility]  NEUROSCIENCE INTENSIVE CARE UNIT  DAILY PROGRESS NOTE       Patient Name: Enmanuel Ahumada   MRN: [id number]     Admit Date: 2025     : 1955 AGE: 69 y.o. GENDER: male      Subjective    69 year-old male with past medical history of hypertension, hyperlipidemia, smalls's esophagus, and RO GBM s/p resection (2023) with resultant left HH s/p chemotherapy/radiation therapy who presented with tumor progression. Of note, patient recently s/p redo craniotomy for resection (25). Post-operative MRI demonstrated fluid collection at dura and craniotomy site, worsening infiltrative disease, and enhancement along posteromedial aspect of resection site. Patient presented on 25 with 1 week of incisional drainage. On 25, MRI brain demonstrated increased cavity enhancement and diffusion restriction. Patient admitted to NSU post-operative for frequent monitoring and resuscitation as needed.    Significant Events:  - : S/p right cranial wound washout with neurosurgery, agitated on arrival to NSU    Interval Events: NAEON.    Objective   VITALS:  Temp:  [35.2 °C (95.4 °F)-36.6 °C (97.9 °F)] 35.5 °C (95.9 °F)  Heart Rate:  [] 59  Resp:  [11-26] 14  BP: ()/() 104/56  INTAKE/OUTPUT:  Intake/Output Summary (Last 24 hours) at 2025 0644  Last data filed at 2025 0400  Gross per 24 hour   Intake 2743.83 ml   Output 1330 ml   Net 1413.83 ml         PHYSICAL EXAM:  NEURO:  - Agitated, does not follow commands, nondirectable, aphasic  - Does not open eyes to voice, pupils 3mm/reactive bilaterally  - SANCHEZ spontaneously/AG  CV:  - RRR on telemetry, NSR-ST  RESP:  - Regular, unlabored  - Oxygen: RA  :  - Indwelling catheter in place  GI:  - Abdomen NT/ND, soft  SKIN:  - Surgical site clean/dry/intact  - 2 post-operative drains in place    MEDICATIONS:  Scheduled: PRN: Continuous:   atorvastatin, 40 mg, oral, Daily  dexAMETHasone, 2 mg, intravenous,  q12h  levETIRAcetam, 500 mg, intravenous, q12h  meropenem, 2 g, intravenous, q8h  pantoprazole, 40 mg, oral, Daily before breakfast  polyethylene glycol, 17 g, oral, Daily  vancomycin, 1,000 mg, intravenous, q12h     PRN medications: calcium gluconate, calcium gluconate, HYDROmorphone, magnesium sulfate, magnesium sulfate, ondansetron **OR** ondansetron, oxygen, potassium chloride, vancomycin dexmedeTOMIDine, 0.1-1.5 mcg/kg/hr, Last Rate: 1 mcg/kg/hr (04/07/25 2888)  sodium chloride 0.9%, 75 mL/hr, Last Rate: 75 mL/hr (04/06/25 3103)         LAB RESULTS:  Results from last 72 hours   Lab Units 04/07/25  0023 04/06/25  1632   GLUCOSE mg/dL 160* 177*   SODIUM mmol/L 136 134*   POTASSIUM mmol/L 4.3 4.4   CHLORIDE mmol/L 104 105   CO2 mmol/L 24 21   ANION GAP mmol/L 12 12   BUN mg/dL 27* 28*   CREATININE mg/dL 0.54 0.71   EGFR mL/min/1.73m*2 >90 >90   CALCIUM mg/dL 8.1* 7.6*   PHOSPHORUS mg/dL 3.1 3.5   ALBUMIN g/dL 3.1* 2.9*   MAGNESIUM mg/dL 1.96 2.10   POCT CALCIUM IONIZED (MMOL/L) IN BLOOD mmol/L 1.15 1.04*      Results from last 72 hours   Lab Units 04/07/25  0444 04/07/25  0015   WBC AUTO x10*3/uL 5.5 5.7   NRBC AUTO /100 WBCs 0.0 0.0   RBC AUTO x10*6/uL 3.11* 3.37*   HEMOGLOBIN g/dL 10.1* 10.9*   HEMATOCRIT % 27.7* 29.7*   MCV fL 89 88   MCH pg 32.5 32.3   MCHC g/dL 36.5* 36.7*   RDW % 12.7 12.8   PLATELETS AUTO x10*3/uL 103* 97*      Results from last 72 hours   Lab Units 04/07/25  0444 04/07/25  0015   WBC AUTO x10*3/uL 5.5 5.7   NRBC AUTO /100 WBCs 0.0 0.0   RBC AUTO x10*6/uL 3.11* 3.37*   HEMOGLOBIN g/dL 10.1* 10.9*   HEMATOCRIT % 27.7* 29.7*   MCV fL 89 88   MCH pg 32.5 32.3   MCHC g/dL 36.5* 36.7*   RDW % 12.7 12.8   PLATELETS AUTO x10*3/uL 103* 97*   NEUTROS PCT AUTO %  --  83.1   IG PCT AUTO %  --  0.5   LYMPHS PCT AUTO %  --  11.6   MONOS PCT AUTO %  --  4.6   EOS PCT AUTO %  --  0.0   BASOS PCT AUTO %  --  0.2   NEUTROS ABS x10*3/uL  --  4.75   IG AUTO x10*3/uL  --  0.03   LYMPHS ABS AUTO x10*3/uL  --   0.66*   MONOS ABS AUTO x10*3/uL  --  0.26   EOS ABS AUTO x10*3/uL  --  0.00   BASOS ABS AUTO x10*3/uL  --  0.01      Results from last 72 hours   Lab Units 04/05/25  1421   PROTIME seconds 12.2   INR  1.1   APTT seconds 24*      Results from last 72 hours   Lab Units 04/07/25  0023 04/06/25  1632   ALBUMIN g/dL 3.1* 2.9*      Results from last 7 days   Lab Units 04/06/25  1007 04/06/25  1006 04/06/25  1005 04/05/25  1234   TISSUE/WOUND CULTURE/SMEAR  Culture in progress Culture in progress   < >  --    BLOOD CULTURE   --   --   --  No growth at 1 day  No growth at 1 day    < > = values in this interval not displayed.     IMAGING RESULTS:  CT head wo IV contrast   Final Result   There has been intercurrent formation of an intraparenchymal hematoma   within the left temporal lobe with superimposed subarachnoid   hemorrhage involving the left temporal and occipital lobes. There is   early downward transtentorial herniation as demonstrated by narrowing   of the left crural cistern.        Interval washout of right parieto-occipital surgical cavity with   placement of overlying cranial mesh. Washout cavity communicates with   the atrium of the right lateral ventricle with associated air foci   within the frontal horn bilaterally.        I personally reviewed the image(s)/study and interpretation by   Haseeb Vasquez MD (resident).        MACRO:   Haseeb Vasquez discussed the significance and urgency of this critical   finding by telephone with YAHIR ANGEL on 4/6/2025 at 5:52 pm.   (**-RCF-**) Findings:  See findings.        Signed by: Curt Santiago 4/6/2025 6:06 PM   Dictation workstation:   [id number]      MR brain w and wo IV contrast   Final Result   1.  Postsurgical changes status post right posterior craniotomy for   glioblastoma debulking in the right parieto-occipital lobe.   2.  Interval increase in extra-axial fluid collection overlying the   deep surgical craniotomy bed with focal areas of diffusion    restriction and peripheral enhancement. Additionally there is   interval development of layering contents deep within surgical cavity   and surrounding the cavity which demonstrate diffusion restriction.   Overall the constellation of findings are suggestive of superimposed   infection/abscess particularly given clinical suspicion of such.   3. Additional findings remain similar compared to prior imaging and   as described above.             I personally reviewed the images/study and I agree with the findings   as stated. This study was interpreted at [facility], [place], [place].        MACRO:   None        Signed by: Tony Terrazas 4/6/2025 7:16 AM   Dictation workstation:   [id number]      XR chest 1 view   Final Result   1.  No evidence of acute cardiopulmonary process.        I personally reviewed the images/study and I agree with the findings   as stated by Dayo Romero MD (resident).        MACRO:   None        Signed by: Marbin Cameron 4/5/2025 12:56 PM   Dictation workstation:   [id number]        Assessment/Plan    NEURO:  #GBM s/p resection (12/2023, 2/20/2025)  #Craniotomy site with incisional drainage, s/p cranial wound washout (4/6/2025)  #New left temporal ICH  #Agitation  Assessment:  - Neurologically: see above  - See above history and significant events  - 2 post-operative drains in place  Plan:  - NSU  - Neuro Checks: Q1H  - CTH this AM  - Discontinue EEG  - Maintain SDD open at 10mL/hour  - SGD off suction  - Sedation: Dexmedetomidine, wean as tolerated  - Olanzapine once, followed by PRN if improvement in agitation  - Drain management per neurosurgery  - Dexamethasone 2mg BID  - Keppra 500mg BID  - Pain: hydromorphone PRN  - Nausea: ondansetron PRN  - PT/OT    CARDIOVASCULAR:  #HLD  Assessment:  - Hemodynamically stable  - 12/30/23 ECHO: ejection fraction 60%  Plan:  - Continue to monitor on telemetry  - Atorvastatin 40mg daily  - BP goal: SBP <  160    RESPIRATORY:  #No active issues  Assessment:  - RA  Plan:  - Continuous pulse oximetry   - O2 PRN to maintain SpO2 > 94%, wean as tolerated  - Incentive spirometry while awake    RENAL/:  #No active issues  Assessment:  Results from last 72 hours   Lab Units 25  0023 25  1632   BUN mg/dL 27* 28*   CREATININE mg/dL 0.54 0.71   Plan:  - Monitor with daily RFP  - Maintain indwelling catheter for: perioperative use for selected surgical procedures    FEN/GI:  #Hypocalcemia  #Hypomagnesemia  Assessment:  - Last BM Date: PTA  Plan:  - Monitor and replace electrolytes per protocol  - IVF: NS @ 75 mL/hr  - Diet: NPO Diet; Effective midnight    - Bowel regimen: Miralax    ENDOCRINE:  #Mild hyperglycemia  Assessment:  Results from last 7 days   Lab Units 25  0023 25  1632 25  1421   GLUCOSE mg/dL 160* 177* 168*   SODIUM mmol/L 136 134* 133*   Plan:  - Accuchecks & ISS Q4H while NPO    HEMATOLOGY:  #Anemia, stable  #Thrombocytopenia  Assessment:  Results from last 7 days   Lab Units 25  0444 25  0015 25  1632   HEMOGLOBIN g/dL 10.1* 10.9* 11.7*   HEMATOCRIT % 27.7* 29.7* 35.1*   PLATELETS AUTO x10*3/uL 103* 97* 81*   - 4/6: Transfused with 2units platelets overnight, post-transfusion CBC above goal  Plan:  - Continue to monitor with daily CBC and Coag panel    INFECTIOUS DISEASE:  #Intracranial wound infection  Assessment:  Results from last 7 days   Lab Units 25  0444 25  0015 25  1632   WBC AUTO x10*3/uL 5.5 5.7 8.1   - Temp (24hrs), Av.9 °C (96.7 °F), Min:35.2 °C (95.4 °F), Max:36.6 °C (97.9 °F)  - Afebrile  Plan:  - Continue to monitor for s/sx of infection  - Pan culture for temperature > 38.4 C  - Follow-up infectious work-up  - Continue meropenem 2g Q8H, vancomycin 1000mg BID (dosed per pharmacy)    MUSCULOSKELETAL:  - No acute issues    SKIN:  - No acute issues  - Turns and skin care per NSU protocol    ACCESS:  - PIVs    PROPHYLAXIS:  - DVT  Ppx: SCDs, Hold SQH until POD #2  - GI Ppx: Pantoprazole    RESTRAINTS:  I agree with nursing assessment of the patient's need for restraints to protect the patient from injury and facilitate healing. The patient is unable to cooperate with the plan of care and at risk for disrupting critical therapy (i.e., removing medical devices, lines, tubes and/or dressings).  Please see order for specifics. Restraints can be removed when the patient is able to cooperate with plan of care and allow healing to occur, or the medical devices at risk are discontinued by the medical team.     José Luis Fontenot, APRN-CNP  Neuroscience Intensive Care    Total critical care time of 60 minutes, with > 50% of time spent in direct contact with patient/family for education, counseling and coordination of care.

## 2025-04-07 NOTE — PROGRESS NOTES
Vancomycin Dosing by Pharmacy- FOLLOW UP    Enmanuel Ahumada is a 69 y.o. year old male who Pharmacy has been consulted for vancomycin dosing for CNS/meningoencephalitis. Based on the patient's indication and renal status this patient is being dosed based on a goal AUC of 500-600.     Renal function is currently stable.    Current vancomycin dose: 1000 mg given every 12 hours    Estimated vancomycin AUC on current dose: 451 mg/L.hr     Visit Vitals  BP 94/56   Pulse 63   Temp 36.1 °C (97 °F) (Temporal)   Resp 18        Lab Results   Component Value Date    CREATININE 0.54 2025    CREATININE 0.71 2025    CREATININE 0.80 2025    CREATININE 0.84 2025        Patient weight is as follows:   Vitals:    25 0400   Weight: 71.8 kg (158 lb 4.6 oz)       Cultures:  No results found for the encounter in last 14 days.       I/O last 3 completed shifts:  In: 3217.8 (44.8 mL/kg) [I.V.:1437.8 (20 mL/kg); IV Piggyback:1780]  Out: 2255 (31.4 mL/kg) [Urine:2195 (0.8 mL/kg/hr); Drains:60]  Weight: 71.8 kg   I/O during current shift:  I/O this shift:  In: 965.2 [I.V.:625.2; IV Piggyback:340]  Out: 1102 [Urine:1060; Drains:42]    Temp (24hrs), Av.9 °C (96.6 °F), Min:35.2 °C (95.4 °F), Max:36.6 °C (97.9 °F)      Assessment/Plan    Below goal AUC. Orders placed for new vancomcyin regimen of 750 mg every 8 hours to begin at 1700.     This dosing regimen is predicted by [employer] to result in the following pharmacokinetic parameters:  [id number]: 494 mg/L.hr  AUC24,ss: 507 mg/L.hr  Probability of AUC24 > 400: 81 %  Ctrough,ss: 13.4 mg/L  Probability of Ctrough,ss > 20: 15 %    The next level will be obtained on  at 1400. May be obtained sooner if clinically indicated.   Will continue to monitor renal function daily while on vancomycin and order serum creatinine at least every 48 hours if not already ordered.  Follow for continued vancomycin needs, clinical response, and signs/symptoms of toxicity.        NORI VARGAS, PharmD

## 2025-04-07 NOTE — PROGRESS NOTES
"Enmanuel Ahumada \"Petey" is a 69 y.o. male on day 2 of admission presenting with Wound dehiscence.    Subjective   Patient was agitated postop and needed to be restrained otherwise no acute events overnight        Objective     Physical Exam  Eyes open to light stim   Ox0   Aphasic   Spontaneous x4 AG    Last Recorded Vitals  Blood pressure 110/88, pulse 91, temperature 36.2 °C (97.2 °F), resp. rate 19, height 1.702 m (5' 7\"), weight 71.8 kg (158 lb 4.6 oz), SpO2 96%.  Intake/Output last 3 Shifts:  I/O last 3 completed shifts:  In: 3217.8 (44.8 mL/kg) [I.V.:1437.8 (20 mL/kg); IV Piggyback:1780]  Out: 2255 (31.4 mL/kg) [Urine:2195 (0.8 mL/kg/hr); Drains:60]  Weight: 71.8 kg     Relevant Results                 Assessment/Plan   Assessment & Plan  Wound dehiscence    69 M h/o HTN, HLD, smalls's esophagus, RO GBM s/p resection (SAH, 12/2023) with resultant L HH s/p chemo/RT presenting with tumor progression, 2/20 s/p redo craniotomy for tumor resection w 5 ALA, CTH POC, 2/21 MRI intended NTR, 3/16 MRI postop changes w fluid collection at dura and craniotomy site, worsening infiltrative disease, enhancement along posteromedial aspect of resection site, 4/5 p/w 1wk incisional drainage, 4/4 CTH airfluid level in resection cavity, 4/5 MRI brain w/wo incr'd resection cavity enhancement and diffusion restriction  4/6 s/p R cranial wound washout and mesh cranioplasty (kaleb purulence), CTH POC, L temporal ICH, plts 81 s/p 2u plts, 4/7 rCTH incr SAH, min incr ICH    Plan:  NSU  Maintain SDD at 10  Maintain and monitor SGD  Follow up EEG  ID recs   OR cultures   Bcx   Ok to work with PTOT    Jj Hernandez MD      "

## 2025-04-08 ENCOUNTER — APPOINTMENT (OUTPATIENT)
Dept: RADIOLOGY | Facility: HOSPITAL | Age: 70
DRG: 907 | End: 2025-04-08
Payer: MEDICARE

## 2025-04-08 ENCOUNTER — HOME CARE VISIT (OUTPATIENT)
Dept: HOME HEALTH SERVICES | Facility: HOME HEALTH | Age: 70
End: 2025-04-08
Payer: MEDICARE

## 2025-04-08 ENCOUNTER — APPOINTMENT (OUTPATIENT)
Dept: CARDIOLOGY | Facility: HOSPITAL | Age: 70
DRG: 907 | End: 2025-04-08
Payer: MEDICARE

## 2025-04-08 ENCOUNTER — APPOINTMENT (OUTPATIENT)
Dept: RADIOLOGY | Facility: HOSPITAL | Age: 70
End: 2025-04-08
Payer: MEDICARE

## 2025-04-08 LAB
ALBUMIN SERPL BCP-MCNC: 2.8 G/DL (ref 3.4–5)
ALBUMIN SERPL BCP-MCNC: 3 G/DL (ref 3.4–5)
ALBUMIN SERPL BCP-MCNC: 3.5 G/DL (ref 3.4–5)
ANION GAP SERPL CALC-SCNC: 10 MMOL/L (ref 10–20)
ANION GAP SERPL CALC-SCNC: 16 MMOL/L (ref 10–20)
ANION GAP SERPL CALC-SCNC: 16 MMOL/L (ref 10–20)
BASOPHILS # BLD AUTO: 0.01 X10*3/UL (ref 0–0.1)
BASOPHILS NFR BLD AUTO: 0.2 %
BLOOD EXPIRATION DATE: NORMAL
BLOOD EXPIRATION DATE: NORMAL
BUN SERPL-MCNC: 16 MG/DL (ref 6–23)
BUN SERPL-MCNC: 17 MG/DL (ref 6–23)
BUN SERPL-MCNC: 22 MG/DL (ref 6–23)
CA-I BLD-SCNC: 1.16 MMOL/L (ref 1.1–1.33)
CALCIUM SERPL-MCNC: 7.8 MG/DL (ref 8.6–10.6)
CALCIUM SERPL-MCNC: 8 MG/DL (ref 8.6–10.6)
CALCIUM SERPL-MCNC: 8.6 MG/DL (ref 8.6–10.6)
CHLORIDE SERPL-SCNC: 102 MMOL/L (ref 98–107)
CHLORIDE SERPL-SCNC: 104 MMOL/L (ref 98–107)
CHLORIDE SERPL-SCNC: 106 MMOL/L (ref 98–107)
CO2 SERPL-SCNC: 22 MMOL/L (ref 21–32)
CO2 SERPL-SCNC: 23 MMOL/L (ref 21–32)
CO2 SERPL-SCNC: 26 MMOL/L (ref 21–32)
CREAT SERPL-MCNC: 0.46 MG/DL (ref 0.5–1.3)
CREAT SERPL-MCNC: 0.53 MG/DL (ref 0.5–1.3)
CREAT SERPL-MCNC: 0.59 MG/DL (ref 0.5–1.3)
DISPENSE STATUS: NORMAL
DISPENSE STATUS: NORMAL
EGFRCR SERPLBLD CKD-EPI 2021: >90 ML/MIN/1.73M*2
EOSINOPHIL # BLD AUTO: 0.01 X10*3/UL (ref 0–0.7)
EOSINOPHIL NFR BLD AUTO: 0.2 %
ERYTHROCYTE [DISTWIDTH] IN BLOOD BY AUTOMATED COUNT: 12.9 % (ref 11.5–14.5)
ERYTHROCYTE [DISTWIDTH] IN BLOOD BY AUTOMATED COUNT: 13 % (ref 11.5–14.5)
GLUCOSE BLD MANUAL STRIP-MCNC: 102 MG/DL (ref 74–99)
GLUCOSE BLD MANUAL STRIP-MCNC: 103 MG/DL (ref 74–99)
GLUCOSE BLD MANUAL STRIP-MCNC: 110 MG/DL (ref 74–99)
GLUCOSE BLD MANUAL STRIP-MCNC: 72 MG/DL (ref 74–99)
GLUCOSE BLD MANUAL STRIP-MCNC: 85 MG/DL (ref 74–99)
GLUCOSE BLD MANUAL STRIP-MCNC: 90 MG/DL (ref 74–99)
GLUCOSE BLD MANUAL STRIP-MCNC: 93 MG/DL (ref 74–99)
GLUCOSE SERPL-MCNC: 114 MG/DL (ref 74–99)
GLUCOSE SERPL-MCNC: 121 MG/DL (ref 74–99)
GLUCOSE SERPL-MCNC: 74 MG/DL (ref 74–99)
HCT VFR BLD AUTO: 26.3 % (ref 41–52)
HCT VFR BLD AUTO: 26.5 % (ref 41–52)
HGB BLD-MCNC: 10 G/DL (ref 13.5–17.5)
HGB BLD-MCNC: 9.8 G/DL (ref 13.5–17.5)
IMM GRANULOCYTES # BLD AUTO: 0.04 X10*3/UL (ref 0–0.7)
IMM GRANULOCYTES NFR BLD AUTO: 1 % (ref 0–0.9)
LYMPHOCYTES # BLD AUTO: 0.56 X10*3/UL (ref 1.2–4.8)
LYMPHOCYTES NFR BLD AUTO: 13.4 %
MAGNESIUM SERPL-MCNC: 2.27 MG/DL (ref 1.6–2.4)
MCH RBC QN AUTO: 32.9 PG (ref 26–34)
MCH RBC QN AUTO: 33 PG (ref 26–34)
MCHC RBC AUTO-ENTMCNC: 37.3 G/DL (ref 32–36)
MCHC RBC AUTO-ENTMCNC: 37.7 G/DL (ref 32–36)
MCV RBC AUTO: 88 FL (ref 80–100)
MCV RBC AUTO: 88 FL (ref 80–100)
MONOCYTES # BLD AUTO: 0.2 X10*3/UL (ref 0.1–1)
MONOCYTES NFR BLD AUTO: 4.8 %
NEUTROPHILS # BLD AUTO: 3.36 X10*3/UL (ref 1.2–7.7)
NEUTROPHILS NFR BLD AUTO: 80.4 %
NRBC BLD-RTO: 0 /100 WBCS (ref 0–0)
NRBC BLD-RTO: 0 /100 WBCS (ref 0–0)
PHOSPHATE SERPL-MCNC: 2.4 MG/DL (ref 2.5–4.9)
PHOSPHATE SERPL-MCNC: 2.8 MG/DL (ref 2.5–4.9)
PHOSPHATE SERPL-MCNC: 2.9 MG/DL (ref 2.5–4.9)
PLATELET # BLD AUTO: 130 X10*3/UL (ref 150–450)
PLATELET # BLD AUTO: 90 X10*3/UL (ref 150–450)
POTASSIUM SERPL-SCNC: 3.8 MMOL/L (ref 3.5–5.3)
POTASSIUM SERPL-SCNC: 3.9 MMOL/L (ref 3.5–5.3)
POTASSIUM SERPL-SCNC: 4.1 MMOL/L (ref 3.5–5.3)
PRODUCT BLOOD TYPE: 5100
PRODUCT BLOOD TYPE: 6200
PRODUCT CODE: NORMAL
PRODUCT CODE: NORMAL
RBC # BLD AUTO: 2.98 X10*6/UL (ref 4.5–5.9)
RBC # BLD AUTO: 3.03 X10*6/UL (ref 4.5–5.9)
SODIUM SERPL-SCNC: 137 MMOL/L (ref 136–145)
SODIUM SERPL-SCNC: 138 MMOL/L (ref 136–145)
SODIUM SERPL-SCNC: 138 MMOL/L (ref 136–145)
UNIT ABO: NORMAL
UNIT ABO: NORMAL
UNIT NUMBER: NORMAL
UNIT NUMBER: NORMAL
UNIT RH: NORMAL
UNIT RH: NORMAL
UNIT VOLUME: 289
UNIT VOLUME: 345
VANCOMYCIN SERPL-MCNC: 18.4 UG/ML (ref 5–20)
WBC # BLD AUTO: 3.9 X10*3/UL (ref 4.4–11.3)
WBC # BLD AUTO: 4.2 X10*3/UL (ref 4.4–11.3)

## 2025-04-08 PROCEDURE — 82947 ASSAY GLUCOSE BLOOD QUANT: CPT

## 2025-04-08 PROCEDURE — 93005 ELECTROCARDIOGRAM TRACING: CPT

## 2025-04-08 PROCEDURE — 2500000004 HC RX 250 GENERAL PHARMACY W/ HCPCS (ALT 636 FOR OP/ED)

## 2025-04-08 PROCEDURE — 87081 CULTURE SCREEN ONLY: CPT

## 2025-04-08 PROCEDURE — 85027 COMPLETE CBC AUTOMATED: CPT

## 2025-04-08 PROCEDURE — 70450 CT HEAD/BRAIN W/O DYE: CPT | Performed by: RADIOLOGY

## 2025-04-08 PROCEDURE — 80069 RENAL FUNCTION PANEL: CPT

## 2025-04-08 PROCEDURE — 97166 OT EVAL MOD COMPLEX 45 MIN: CPT | Mod: GO

## 2025-04-08 PROCEDURE — 93010 ELECTROCARDIOGRAM REPORT: CPT | Performed by: INTERNAL MEDICINE

## 2025-04-08 PROCEDURE — 2500000004 HC RX 250 GENERAL PHARMACY W/ HCPCS (ALT 636 FOR OP/ED): Performed by: REGISTERED NURSE

## 2025-04-08 PROCEDURE — 74018 RADEX ABDOMEN 1 VIEW: CPT | Performed by: RADIOLOGY

## 2025-04-08 PROCEDURE — 2780000003 HC OR 278 NO HCPCS

## 2025-04-08 PROCEDURE — 36430 TRANSFUSION BLD/BLD COMPNT: CPT

## 2025-04-08 PROCEDURE — 43761 REPOSITION GASTROSTOMY TUBE: CPT

## 2025-04-08 PROCEDURE — 36415 COLL VENOUS BLD VENIPUNCTURE: CPT

## 2025-04-08 PROCEDURE — 80202 ASSAY OF VANCOMYCIN: CPT

## 2025-04-08 PROCEDURE — 83735 ASSAY OF MAGNESIUM: CPT | Performed by: REGISTERED NURSE

## 2025-04-08 PROCEDURE — 2020000001 HC ICU ROOM DAILY

## 2025-04-08 PROCEDURE — 99291 CRITICAL CARE FIRST HOUR: CPT

## 2025-04-08 PROCEDURE — 85025 COMPLETE CBC W/AUTO DIFF WBC: CPT | Performed by: REGISTERED NURSE

## 2025-04-08 PROCEDURE — C1751 CATH, INF, PER/CENT/MIDLINE: HCPCS

## 2025-04-08 PROCEDURE — 74018 RADEX ABDOMEN 1 VIEW: CPT

## 2025-04-08 PROCEDURE — G0545 PR INHERENT VISIT TO INPT: HCPCS | Performed by: INTERNAL MEDICINE

## 2025-04-08 PROCEDURE — 82330 ASSAY OF CALCIUM: CPT | Performed by: REGISTERED NURSE

## 2025-04-08 PROCEDURE — 70450 CT HEAD/BRAIN W/O DYE: CPT

## 2025-04-08 PROCEDURE — 97162 PT EVAL MOD COMPLEX 30 MIN: CPT | Mod: GP

## 2025-04-08 PROCEDURE — 2500000001 HC RX 250 WO HCPCS SELF ADMINISTERED DRUGS (ALT 637 FOR MEDICARE OP)

## 2025-04-08 PROCEDURE — P9073 PLATELETS PHERESIS PATH REDU: HCPCS

## 2025-04-08 PROCEDURE — 99233 SBSQ HOSP IP/OBS HIGH 50: CPT | Performed by: INTERNAL MEDICINE

## 2025-04-08 PROCEDURE — 36573 INSJ PICC RS&I 5 YR+: CPT

## 2025-04-08 PROCEDURE — 2500000005 HC RX 250 GENERAL PHARMACY W/O HCPCS

## 2025-04-08 PROCEDURE — 36415 COLL VENOUS BLD VENIPUNCTURE: CPT | Performed by: REGISTERED NURSE

## 2025-04-08 RX ORDER — CLONIDINE 0.2 MG/24H
1 PATCH, EXTENDED RELEASE TRANSDERMAL WEEKLY
Status: DISCONTINUED | OUTPATIENT
Start: 2025-04-08 | End: 2025-04-08

## 2025-04-08 RX ORDER — CLONIDINE HYDROCHLORIDE 0.1 MG/1
0.1 TABLET ORAL 2 TIMES DAILY
Status: DISCONTINUED | OUTPATIENT
Start: 2025-04-08 | End: 2025-04-09

## 2025-04-08 RX ORDER — LORAZEPAM 2 MG/ML
0.5 INJECTION INTRAMUSCULAR ONCE
Status: COMPLETED | OUTPATIENT
Start: 2025-04-08 | End: 2025-04-08

## 2025-04-08 RX ORDER — CLONIDINE 0.1 MG/24H
1 PATCH, EXTENDED RELEASE TRANSDERMAL WEEKLY
Status: DISCONTINUED | OUTPATIENT
Start: 2025-04-08 | End: 2025-04-08

## 2025-04-08 RX ORDER — PANTOPRAZOLE SODIUM 40 MG/10ML
40 INJECTION, POWDER, LYOPHILIZED, FOR SOLUTION INTRAVENOUS DAILY
Status: DISCONTINUED | OUTPATIENT
Start: 2025-04-08 | End: 2025-04-24

## 2025-04-08 RX ORDER — BISACODYL 10 MG/1
10 SUPPOSITORY RECTAL DAILY PRN
Status: DISCONTINUED | OUTPATIENT
Start: 2025-04-08 | End: 2025-05-06 | Stop reason: HOSPADM

## 2025-04-08 RX ORDER — ONDANSETRON 4 MG/1
4 TABLET, FILM COATED ORAL EVERY 8 HOURS PRN
Status: DISCONTINUED | OUTPATIENT
Start: 2025-04-08 | End: 2025-05-06 | Stop reason: HOSPADM

## 2025-04-08 RX ORDER — SODIUM CHLORIDE 9 MG/ML
75 INJECTION, SOLUTION INTRAVENOUS CONTINUOUS
Status: DISCONTINUED | OUTPATIENT
Start: 2025-04-08 | End: 2025-04-09

## 2025-04-08 RX ORDER — HYDROXYZINE HYDROCHLORIDE 10 MG/1
10 TABLET, FILM COATED ORAL EVERY 6 HOURS PRN
Status: DISCONTINUED | OUTPATIENT
Start: 2025-04-08 | End: 2025-04-24

## 2025-04-08 RX ORDER — SODIUM CHLORIDE 9 MG/ML
75 INJECTION, SOLUTION INTRAVENOUS CONTINUOUS
Status: DISCONTINUED | OUTPATIENT
Start: 2025-04-08 | End: 2025-04-08

## 2025-04-08 RX ORDER — AMOXICILLIN 250 MG
1 CAPSULE ORAL NIGHTLY
Status: DISCONTINUED | OUTPATIENT
Start: 2025-04-08 | End: 2025-04-24

## 2025-04-08 RX ORDER — ONDANSETRON HYDROCHLORIDE 2 MG/ML
4 INJECTION, SOLUTION INTRAVENOUS EVERY 8 HOURS PRN
Status: DISCONTINUED | OUTPATIENT
Start: 2025-04-08 | End: 2025-05-06 | Stop reason: HOSPADM

## 2025-04-08 RX ORDER — CLONIDINE HYDROCHLORIDE 0.1 MG/1
0.1 TABLET ORAL 2 TIMES DAILY
Status: DISCONTINUED | OUTPATIENT
Start: 2025-04-08 | End: 2025-04-08

## 2025-04-08 RX ORDER — OLANZAPINE 10 MG/2ML
5 INJECTION, POWDER, FOR SOLUTION INTRAMUSCULAR ONCE
Status: COMPLETED | OUTPATIENT
Start: 2025-04-08 | End: 2025-04-08

## 2025-04-08 RX ORDER — LORAZEPAM 2 MG/ML
1 INJECTION INTRAMUSCULAR ONCE
Status: COMPLETED | OUTPATIENT
Start: 2025-04-08 | End: 2025-04-08

## 2025-04-08 RX ORDER — QUETIAPINE FUMARATE 25 MG/1
12.5 TABLET, FILM COATED ORAL NIGHTLY
Status: DISCONTINUED | OUTPATIENT
Start: 2025-04-08 | End: 2025-04-13

## 2025-04-08 RX ORDER — LIDOCAINE HYDROCHLORIDE 10 MG/ML
5 INJECTION, SOLUTION INFILTRATION; PERINEURAL ONCE
Status: DISCONTINUED | OUTPATIENT
Start: 2025-04-08 | End: 2025-04-09

## 2025-04-08 RX ORDER — POLYETHYLENE GLYCOL 3350 17 G/17G
17 POWDER, FOR SOLUTION ORAL DAILY
Status: DISCONTINUED | OUTPATIENT
Start: 2025-04-09 | End: 2025-04-24

## 2025-04-08 RX ORDER — LORAZEPAM 2 MG/ML
INJECTION INTRAMUSCULAR
Status: COMPLETED
Start: 2025-04-08 | End: 2025-04-08

## 2025-04-08 RX ORDER — ATORVASTATIN CALCIUM 40 MG/1
40 TABLET, FILM COATED ORAL DAILY
Status: DISCONTINUED | OUTPATIENT
Start: 2025-04-08 | End: 2025-04-24

## 2025-04-08 RX ADMIN — MEROPENEM 2 G: 1 INJECTION INTRAVENOUS at 21:46

## 2025-04-08 RX ADMIN — DEXMEDETOMIDINE HYDROCHLORIDE 1.5 MCG/KG/HR: 400 INJECTION INTRAVENOUS at 06:52

## 2025-04-08 RX ADMIN — POTASSIUM CHLORIDE 20 MEQ: 14.9 INJECTION, SOLUTION INTRAVENOUS at 05:57

## 2025-04-08 RX ADMIN — MEROPENEM 2 G: 1 INJECTION INTRAVENOUS at 11:40

## 2025-04-08 RX ADMIN — MEROPENEM 2 G: 1 INJECTION INTRAVENOUS at 05:53

## 2025-04-08 RX ADMIN — DEXAMETHASONE SODIUM PHOSPHATE 2 MG: 4 INJECTION, SOLUTION INTRA-ARTICULAR; INTRALESIONAL; INTRAMUSCULAR; INTRAVENOUS; SOFT TISSUE at 21:45

## 2025-04-08 RX ADMIN — LORAZEPAM 0.5 MG: 2 INJECTION, SOLUTION INTRAMUSCULAR; INTRAVENOUS at 16:30

## 2025-04-08 RX ADMIN — DEXMEDETOMIDINE HYDROCHLORIDE 1.3 MCG/KG/HR: 400 INJECTION INTRAVENOUS at 20:40

## 2025-04-08 RX ADMIN — PANTOPRAZOLE SODIUM 40 MG: 40 INJECTION, POWDER, FOR SOLUTION INTRAVENOUS at 09:09

## 2025-04-08 RX ADMIN — VANCOMYCIN HYDROCHLORIDE 750 MG: 750 INJECTION, SOLUTION INTRAVENOUS at 01:26

## 2025-04-08 RX ADMIN — DEXMEDETOMIDINE HYDROCHLORIDE 0.8 MCG/KG/HR: 400 INJECTION INTRAVENOUS at 11:16

## 2025-04-08 RX ADMIN — SODIUM CHLORIDE 75 ML/HR: 0.9 INJECTION, SOLUTION INTRAVENOUS at 17:31

## 2025-04-08 RX ADMIN — VANCOMYCIN HYDROCHLORIDE 750 MG: 750 INJECTION, SOLUTION INTRAVENOUS at 08:53

## 2025-04-08 RX ADMIN — VANCOMYCIN HYDROCHLORIDE 750 MG: 750 INJECTION, SOLUTION INTRAVENOUS at 18:22

## 2025-04-08 RX ADMIN — DEXMEDETOMIDINE HYDROCHLORIDE 1.5 MCG/KG/HR: 400 INJECTION INTRAVENOUS at 16:37

## 2025-04-08 RX ADMIN — DEXMEDETOMIDINE HYDROCHLORIDE 1.5 MCG/KG/HR: 400 INJECTION INTRAVENOUS at 04:30

## 2025-04-08 RX ADMIN — OLANZAPINE 5 MG: 10 INJECTION, POWDER, LYOPHILIZED, FOR SOLUTION INTRAMUSCULAR at 13:55

## 2025-04-08 RX ADMIN — DEXMEDETOMIDINE HYDROCHLORIDE 1.5 MCG/KG/HR: 400 INJECTION INTRAVENOUS at 00:05

## 2025-04-08 RX ADMIN — VALPROATE SODIUM 250 MG: 100 INJECTION, SOLUTION INTRAVENOUS at 21:46

## 2025-04-08 RX ADMIN — CLONIDINE 1 PATCH: 0.2 PATCH TRANSDERMAL at 10:13

## 2025-04-08 RX ADMIN — VALPROATE SODIUM 1000 MG: 100 INJECTION, SOLUTION INTRAVENOUS at 10:12

## 2025-04-08 RX ADMIN — LEVETIRACETAM 500 MG: 5 INJECTION INTRAVENOUS at 05:23

## 2025-04-08 RX ADMIN — LORAZEPAM 0.5 MG: 2 INJECTION INTRAMUSCULAR at 16:30

## 2025-04-08 RX ADMIN — VALPROATE SODIUM 250 MG: 100 INJECTION, SOLUTION INTRAVENOUS at 17:04

## 2025-04-08 RX ADMIN — LORAZEPAM 1 MG: 2 INJECTION, SOLUTION INTRAMUSCULAR; INTRAVENOUS at 15:05

## 2025-04-08 RX ADMIN — DEXAMETHASONE SODIUM PHOSPHATE 2 MG: 4 INJECTION, SOLUTION INTRA-ARTICULAR; INTRALESIONAL; INTRAMUSCULAR; INTRAVENOUS; SOFT TISSUE at 08:53

## 2025-04-08 ASSESSMENT — COGNITIVE AND FUNCTIONAL STATUS - GENERAL
MOVING TO AND FROM BED TO CHAIR: TOTAL
MOBILITY SCORE: 9
PERSONAL GROOMING: TOTAL
EATING MEALS: TOTAL
MOBILITY SCORE: 7
DRESSING REGULAR LOWER BODY CLOTHING: TOTAL
WALKING IN HOSPITAL ROOM: TOTAL
TOILETING: TOTAL
TURNING FROM BACK TO SIDE WHILE IN FLAT BAD: TOTAL
MOVING TO AND FROM BED TO CHAIR: TOTAL
DAILY ACTIVITIY SCORE: 12
DRESSING REGULAR LOWER BODY CLOTHING: TOTAL
MOVING TO AND FROM BED TO CHAIR: TOTAL
WALKING IN HOSPITAL ROOM: TOTAL
MOVING FROM LYING ON BACK TO SITTING ON SIDE OF FLAT BED WITH BEDRAILS: A LOT
TOILETING: TOTAL
STANDING UP FROM CHAIR USING ARMS: TOTAL
DRESSING REGULAR UPPER BODY CLOTHING: A LOT
CLIMB 3 TO 5 STEPS WITH RAILING: TOTAL
MOBILITY SCORE: 8
CLIMB 3 TO 5 STEPS WITH RAILING: TOTAL
HELP NEEDED FOR BATHING: A LOT
EATING MEALS: TOTAL
DAILY ACTIVITIY SCORE: 6
DRESSING REGULAR UPPER BODY CLOTHING: TOTAL
STANDING UP FROM CHAIR USING ARMS: TOTAL
MOBILITY SCORE: 9
HELP NEEDED FOR BATHING: TOTAL
DRESSING REGULAR LOWER BODY CLOTHING: TOTAL
MOVING FROM LYING ON BACK TO SITTING ON SIDE OF FLAT BED WITH BEDRAILS: A LITTLE
DAILY ACTIVITIY SCORE: 6
DAILY ACTIVITIY SCORE: 6
PERSONAL GROOMING: A LOT
MOVING TO AND FROM BED TO CHAIR: TOTAL
WALKING IN HOSPITAL ROOM: TOTAL
TURNING FROM BACK TO SIDE WHILE IN FLAT BAD: TOTAL
CLIMB 3 TO 5 STEPS WITH RAILING: TOTAL
WALKING IN HOSPITAL ROOM: TOTAL
TURNING FROM BACK TO SIDE WHILE IN FLAT BAD: TOTAL
DRESSING REGULAR UPPER BODY CLOTHING: TOTAL
DRESSING REGULAR LOWER BODY CLOTHING: A LOT
EATING MEALS: A LOT
CLIMB 3 TO 5 STEPS WITH RAILING: TOTAL
HELP NEEDED FOR BATHING: TOTAL
EATING MEALS: TOTAL
STANDING UP FROM CHAIR USING ARMS: TOTAL
PERSONAL GROOMING: TOTAL
TOILETING: A LOT
TURNING FROM BACK TO SIDE WHILE IN FLAT BAD: TOTAL
DRESSING REGULAR UPPER BODY CLOTHING: TOTAL
PERSONAL GROOMING: TOTAL
STANDING UP FROM CHAIR USING ARMS: TOTAL
TOILETING: TOTAL
HELP NEEDED FOR BATHING: TOTAL

## 2025-04-08 ASSESSMENT — PAIN - FUNCTIONAL ASSESSMENT
PAIN_FUNCTIONAL_ASSESSMENT: CPOT (CRITICAL CARE PAIN OBSERVATION TOOL)
PAIN_FUNCTIONAL_ASSESSMENT: 0-10
PAIN_FUNCTIONAL_ASSESSMENT: CPOT (CRITICAL CARE PAIN OBSERVATION TOOL)

## 2025-04-08 ASSESSMENT — ENCOUNTER SYMPTOMS: FATIGUE: 1

## 2025-04-08 ASSESSMENT — PAIN SCALES - GENERAL
PAINLEVEL_OUTOF10: 0 - NO PAIN
PAINLEVEL_OUTOF10: 0 - NO PAIN

## 2025-04-08 ASSESSMENT — VISUAL ACUITY: VA_NORMAL: 1

## 2025-04-08 ASSESSMENT — ACTIVITIES OF DAILY LIVING (ADL)
ADL_ASSISTANCE: INDEPENDENT
BATHING_ASSISTANCE: MAXIMAL
ADL_ASSISTANCE: INDEPENDENT
ADLS_ADDRESSED: NO

## 2025-04-08 NOTE — DOCUMENTATION CLARIFICATION NOTE
"    PATIENT:               JULIENNE RIOS  ACCT #:                  [id number]  MRN:                       [id number]  :                       1955  ADMIT DATE:       2025 9:59 AM  DISCH DATE:  RESPONDING PROVIDER #:        [id number]          PROVIDER RESPONSE TEXT:    Cerebral Edema    CDI QUERY TEXT:    Clarification    Instruction:    Based on your assessment of the patient and the clinical information, please provide the requested documentation by clicking on the appropriate radio button and enter any additional information if prompted.    Question: Please further clarify if there is a diagnosis related to the clinical information    When answering this query, please exercise your independent professional judgment. The fact that a question is being asked, does not imply that any particular answer is desired or expected.    The patient's clinical indicators include:  Clinical Information:  Patient is a 69 year old male who presented with drainage from his cranial wound and went to the OR for wound washout.    Clinical Indicators:  -From the CT of the head on , \" Intraparenchymal hematoma within the left temporal lobe measuring 4.2 x 1.9 x 1.8 cm (series 204, image 66 and series 205, image 80) with surrounding vasogenic edema causing local sulcal effacement and early downward transtentorial herniation as demonstrated by narrowing of the left crural cistern \"    Treatment:  -Neuro checks every 1 hour  -Admission to NSU for close monitoring  -Keppra 1500mg IVPB x 1 dose on  at 1730  -Keppra 500mg IVPB x every 12 hours starting on  at 1801  -Dexamethasone IVP x 1 dose on  at 0804  -Dexamethasone IVP 2mg every 12 hours x 2 doses  -Dexamethasone 2mg tab PO every 12 hours  -Safety and seizure precautions    Risk Factors:  Disruption of surgical wound requiring cranial would washout, previous brain surgery for GBM, ICH, abnormal CT of head  Options provided:  -- Cerebral Edema  -- Other - I will add " my own diagnosis  -- Refer to Clinical Documentation Reviewer    Query created by: Rachel Hensley on 4/7/2025 2:41 PM      Electronically signed by:  HERMELINDA NI MD 4/8/2025 5:38 AM

## 2025-04-08 NOTE — CARE PLAN
Problem: Fall/Injury  Goal: Not fall by end of shift  Outcome: Progressing  Goal: Be free from injury by end of the shift  Outcome: Progressing  Goal: Verbalize understanding of personal risk factors for fall in the hospital  Outcome: Progressing     Problem: Skin  Goal: Prevent/manage excess moisture  Outcome: Progressing  Goal: Promote/optimize nutrition  Outcome: Progressing  Goal: Promote skin healing  Outcome: Progressing     Problem: Pain - Adult  Goal: Verbalizes/displays adequate comfort level or baseline comfort level  Outcome: Progressing     Problem: Safety - Adult  Goal: Free from fall injury  Outcome: Progressing     Problem: Discharge Planning  Goal: Discharge to home or other facility with appropriate resources  Outcome: Progressing     Problem: Chronic Conditions and Co-morbidities  Goal: Patient's chronic conditions and co-morbidity symptoms are monitored and maintained or improved  Outcome: Progressing     Problem: Safety - Medical Restraint  Goal: Remains free of injury from restraints (Restraint for Interference with Medical Device)  Outcome: Progressing

## 2025-04-08 NOTE — PROGRESS NOTES
Social Work Discharge Planning note:    -Patient discussed during interdisciplinary rounds.   -Team members present: Fellow, PT and OT, and SW  -Plan per medical team: Pt is not medically ready for discharge. Still NSU status due to the precedex.  -Payer: [facility] Medicare  -Status: Inpatient  -Discharge disposition: Pt is currently with PT and OT recommendations for High intensity. CLARK met with Pt's wife, Brittany, to further discuss discharge planning. Brittany was agreeable with acute rehab, giving  [facility] as FOC (referral sent via [facility]). SW also discussed SNF level of care and gave Brittany a printed list of providers in case Pt's insurance denies acute rehab level of care. SW will continue to follow to assist with discharge planning.    -Anticipated Date of Discharge:  4/10/25    This KAYCEE Odell, LSW    Office: [phone]  Secure chat via [employer]

## 2025-04-08 NOTE — PROGRESS NOTES
"Physical Therapy    Physical Therapy Evaluation    Patient Name: Enmanuel Ahumada \"Dash\"  MRN: [id number]  Today's Date: 4/8/2025   Room: 02/02-A  Time Calculation  Start Time: 1231  Stop Time: 1250  Time Calculation (min): 19 min    Assessment/Plan   PT Assessment  PT Assessment Results: Decreased strength, Decreased endurance, Impaired balance, Decreased coordination, Decreased mobility, Decreased cognition, Impaired judgement, Decreased safety awareness  Rehab Prognosis: Good  Barriers to Discharge Home: Physical needs, Caregiver assistance  Caregiver Assistance: Caregiver assistance needed per identified barriers - however, level of patient's required assistance exceeds assistance available at home  Physical Needs: High falls risk due to function or environment  Evaluation/Treatment Tolerance:  (Session limited by non-agitated restlessness)  Medical Staff Made Aware: Yes  Strengths: Support and attitude of living partners  End of Session Communication: Bedside nurse  Assessment Comment: Pt to benefit from ongoing PT services to address the above limitations and to facilitate timely return to prior level of function.  End of Session Patient Position: Bed, 4 rail up, Alarm on (Sitter present)  IP OR SWING BED PT PLAN  Inpatient or Swing Bed: Inpatient  PT Plan  Treatment/Interventions: Bed mobility, Transfer training, Gait training, Stair training, Balance training, Neuromuscular re-education, Strengthening, Endurance training, Therapeutic exercise, Therapeutic activity, Home exercise program, Postural re-education, Positioning  PT Plan: Ongoing PT  PT Frequency: 5 times per week  PT Discharge Recommendations: High intensity level of continued care  Equipment Recommended upon Discharge:  (TBD)  PT Recommended Transfer Status: Total assist  PT - OK to Discharge: Yes (When medically ready)      Subjective   General Visit Information:  Reason for Referral: Pt initially presented with tumor progression (RO GBM), " s/p redo craniotomy for tumor resection in 2/2025. 4/5 p/w 1wk incisional drainage, 4/4 CTH airfluid level in resection cavity, 4/5 MRI brain w/wo incr'd resection cavity enhancement and diffusion restriction  4/6 s/p R cranial wound washout and mesh cranioplasty. 4/7 rCTH incr SAH, min incr ICH, rrCTH stable, EEG neg dc'd.  Past Medical History Relevant to Rehab: HTN, HLD, smalls's esophagus, RO GBM s/p resection (SAH, 12/2023) with resultant L HH s/p chemo/RT  Prior to Session Communication: Bedside nurse  Patient Position Received: Bed, 4 rail up, Alarm off, not on at start of session (Sitter present in room)  Family/Caregiver Present: Yes  Caregiver Feedback: Spouse present and supportive  General Comment: Pt very restless. See Cognition.     Home Living:  Home Living  Type of Home: House  Lives With: Spouse  Home Adaptive Equipment: Cane  Home Layout: Two level, Bed/bath upstairs, 1/2 bath on main level, Stairs to alternate level with rails  Alternate Level Stairs-Number of Steps: 12  Home Access: Stairs to enter with rails  Entrance Stairs-Number of Steps: 2  Bathroom Shower/Tub: Walk-in shower  Bathroom Equipment: Grab bars in shower, Built-in shower seat  Home Living Comments: Has recently been getting home PT/OT    Prior Level of Function:  Prior Function Per Pt/Caregiver Report  Level of [place]: Independent with ADLs and functional transfers  Receives Help From: Family (Spouse)  ADL Assistance: Independent  Homemaking Assistance: Needs assistance (Spouse completes)  Ambulatory Assistance: Independent (Wife Sup if pt goes upstairs. Typically he is able to ambulate through his home without AD.)  Vocational: Retired  Leisure: Volunteering  Prior Function Comments: History of falling prior to this admission. (-) drives    Precautions:  Precautions  Medical Precautions: Fall precautions  Precautions Comment: SBP <160. Subdural drain clamped for mobility per nsgy    Vital Signs:   Date/Time Vitals Session  Patient Position Pulse Resp SpO2 BP MAP (mmHg)    04/08/25 1200 --  --  76  20  90 %  97/81  86     04/08/25 1231 Pre PT  Lying  83  25  95 %  92/78  81     04/08/25 1250 Post PT  Lying  80  22  98 %  90/71  75     04/08/25 1300 --  --  88  20  96 %  --  --             Objective   Lines/Tubes/Drains:  Urethral Catheter Non-latex 14 Fr. (Active)   Number of days: 1       Closed/Suction Drain 1 Right Scalp Bulb 10 Fr. (Active)   Number of days: 2       Closed/Suction Drain Right;Superior Other (Comment) (Active)   Number of days: 2       Continuous Medications/Drips:  dexmedeTOMIDine, 0.1-1.5 mcg/kg/hr, Last Rate: 1.5 mcg/kg/hr (04/08/25 1330)  sodium chloride 0.9%, 75 mL/hr, Last Rate: 75 mL/hr (04/07/25 2107)        Oxygen: room air     Pain:  Pain Assessment  Pain Assessment: 0-10  0-10 (Numeric) Pain Score: 0 - No pain    Cognition:  Cognition  Overall Cognitive Status: Impaired  Arousal/Alertness: Inconsistent responses to stimuli  Orientation Level: Disoriented to situation, Disoriented to time (Options for place. Oriented to self.)  Following Commands: Follows one step commands with repetition  Cognition Comments: 25% accuracy  Attention: Exceptions to WFL  Sustained Attention: Impaired  Insight: No response  Impulsive: Moderately  Processing Speed: Delayed      Extremity/Trunk Assessments:  Strength:                 RLE   RLE : Exceptions to WFL  Strength RLE  RLE Overall Strength: Greater than or equal to 3/5 as evidenced by functional mobility    LLE   LLE : Exceptions to WFL  Strength LLE  LLE Overall Strength: Greater than or equal to 3/5 as evidenced by functional mobility    General Assessments:  Strength  Strength Comments: BUE strength 3-/5      Activity Tolerance  Endurance: Tolerates 10 - 20 min exercise with multiple rests  Early Mobility/Exercise Safety Screen: Proceed with mobilization - No exclusion criteria met  Sensation  Light Touch: No apparent deficits  Coordination  Movements are Fluid and  Coordinated: No  Upper Body Coordination: Formal assessment limted 2/2 restlessness and command following.  Lower Body Coordination: Formal assessment limted 2/2 restlessness and command following.  Static Sitting Balance  Static Sitting-Balance Support:  (Deferred.)       Functional Assessments:  Bed Mobility  Bed Mobility: Yes  Bed Mobility 1  Bed Mobility 1: Scooting (boosting)  Level of Assistance 1: Dependent, +2  Bed Mobility Comments 1: HOB flat, used draw sheet  Bed Mobility 2  Bed Mobility  2: Forward lean  Level of Assistance 2: Contact guard  Bed Mobility Comments 2: Cues for redirecting pt attention to task. Very restless, attempting to scoot down in bed during task.  Bed Mobility 3  Bed Mobility 3: Rolling left, Rolling right  Level of Assistance 3: Moderate assistance  Bed Mobility Comments 3: Cues for proper hand placement and using bed rail    Transfers  Transfer: No (Deferred 2/2 restlessness)    Ambulation/Gait Training  Ambulation/Gait Training Performed: No    Stairs  Stairs: No         Outcome Measures:  [facility] Basic Mobility  Turning from your back to your side while in a flat bed without using bedrails: A lot  Moving from lying on your back to sitting on the side of a flat bed without using bedrails: Total  Moving to and from bed to chair (including a wheelchair): Total  Standing up from a chair using your arms (e.g. wheelchair or bedside chair): Total  To walk in hospital room: Total  Climbing 3-5 steps with railing: Total  Basic Mobility - Total Score: 7                   FSS-ICU  Ambulation: Unable to attempt due to weakness  Rolling: Moderate assistance (performs 50 - 74% of task)  Sitting: Maximal assistance (performs 25% - 49% of task)  Transfer Sit-to-Stand: Total assistance (performs 25% or requires another person)  Transfer Supine-to-Sit: Total assistance (performs 25% or requires another person)  Total Score: 7    ICU Mobility Screen  Early Mobility/Exercise Safety Screen: Proceed  with mobilization - No exclusion criteria met  ICU Mobility Scale: Sitting in bed, exercises in bed                        Encounter Problems       Encounter Problems (Active)       PT Problem       Patient will perform bed mobility IND to reduce risk of developing decubitus ulcers.  (Progressing)       Start:  04/08/25    Expected End:  04/22/25            Patient will ambulate at least 150 ft. with </= close sup and LRD to improve tolerance of household distances.  (Progressing)       Start:  04/08/25    Expected End:  04/22/25            Patient will perform the 5-Time Sit to Stand test in <14 sec to indicate decreased falls risk. (Progressing)       Start:  04/08/25    Expected End:  04/22/25            Patient will perform sit to stand and stand to sit transfers with </= close sup and LRD to increase functional strength.  (Progressing)       Start:  04/08/25    Expected End:  04/22/25            BLE 5/5 (Progressing)       Start:  04/08/25    Expected End:  04/22/25               Pain - Adult              Education Documentation  Body Mechanics, taught by Shira Coates PT at 4/8/2025  1:50 PM.  Learner: Significant Other  Readiness: Acceptance  Method: Explanation  Response: Verbalizes Understanding  Comment: PT expectations, d/c recs    Precautions, taught by Shira Coates PT at 4/8/2025  1:50 PM.  Learner: Significant Other  Readiness: Acceptance  Method: Explanation  Response: Verbalizes Understanding  Comment: PT expectations, d/c recs    Mobility Training, taught by Shira Coates PT at 4/8/2025  1:50 PM.  Learner: Significant Other  Readiness: Acceptance  Method: Explanation  Response: Verbalizes Understanding  Comment: PT expectations, d/c recs    Education Comments  No comments found.            04/08/25 at 1:51 PM   Shira Coates PT   Rehab Office: [phone]

## 2025-04-08 NOTE — PROGRESS NOTES
Dash Ahumada is a 69 y.o. male on day 3 of admission presenting with Wound dehiscence.    Subjective   Interval History: Per family, he was good during the day.  Answering ?? And A&O to person, place, hospital, city, year      As day goes on, he is more confused and agitated, necessitating soft restraints.    EVD with brownish maroon CSF. Left temporal ICH  Review of Systems    Objective   Range of Vitals (last 24 hours)  Heart Rate:  [48-93]   Temp:  [35.7 °C (96.3 °F)-37 °C (98.6 °F)]   Resp:  [14-28]   BP: ()/()   SpO2:  [90 %-99 %]   Daily Weight  04/07/25 : 71.8 kg (158 lb 4.6 oz)    Body mass index is 24.79 kg/m².    Physical Exam  Constitutional: Chronically ill, sedated with soft mitts.  Easily agitated when stimulated  HEAD: Surgical incision intact.  EVD in place.   LUNGS: Breathing unlabored.  Clear in anterolateral lung fields.  CVS: RRR, S1 & S2 normal.    ABD: Soft, No grimace or increased agitation with palpation,   SKIN:  No unusual lesions or rashes.     Antibiotics    meropenem (Merrem) IV 2 g in 100 mL NS  vancomycin - 750 mg/150 mL    Relevant Results  Labs  Results from last 72 hours   Lab Units 04/08/25  1653 04/08/25  0137 04/07/25  0444 04/07/25  0015   WBC AUTO x10*3/uL 3.9* 4.2* 5.5 5.7   HEMOGLOBIN g/dL 9.8* 10.0* 10.1* 10.9*   HEMATOCRIT % 26.3* 26.5* 27.7* 29.7*   PLATELETS AUTO x10*3/uL 130* 90* 103* 97*   NEUTROS PCT AUTO %  --  80.4  --  83.1   LYMPHS PCT AUTO %  --  13.4  --  11.6   MONOS PCT AUTO %  --  4.8  --  4.6   EOS PCT AUTO %  --  0.2  --  0.0     Results from last 72 hours   Lab Units 04/08/25  1347 04/08/25  0137 04/07/25  1405   SODIUM mmol/L 137 138 137   POTASSIUM mmol/L 4.1 3.9 3.8   CHLORIDE mmol/L 102 106 105   CO2 mmol/L 23 26 26   BUN mg/dL 16 17 20   CREATININE mg/dL 0.59 0.46* 0.55   GLUCOSE mg/dL 74 121* 128*   CALCIUM mg/dL 8.6 8.0* 7.8*   ANION GAP mmol/L 16 10 10   EGFR mL/min/1.73m*2 >90 >90 >90   PHOSPHORUS mg/dL 2.4* 2.8 2.7     Results  from last 72 hours   Lab Units 04/08/25  1347 04/08/25  0137 04/07/25  1405 04/07/25  1001   ALK PHOS U/L  --   --  48  --    BILIRUBIN TOTAL mg/dL  --   --  1.5* 1.4*   BILIRUBIN DIRECT mg/dL  --   --  0.3 0.3   PROTEIN TOTAL g/dL  --   --  4.4*  --    ALT U/L  --   --  44  --    AST U/L  --   --  17  --    ALBUMIN g/dL 3.5 3.0* 2.8*  2.8* 2.8*     Estimated Creatinine Clearance: 113.6 mL/min (by C-G formula based on SCr of 0.59 mg/dL).  C-Reactive Protein   Date Value Ref Range Status   04/05/2025 0.17 <1.00 mg/dL Final   03/14/2025 4.38 (H) <1.00 mg/dL Final     Microbiology  Susceptibility data from last 14 days.  Collected Specimen Info Organism   04/06/25 Swab from BRAIN BIOPSY Staphylococcus epidermidis   04/06/25 Swab from BRAIN BIOPSY Staphylococcus epidermidis   04/06/25 Swab from BRAIN BIOPSY Staphylococcus epidermidis       Imaging    CT head wo IV contrast [[id number]] 04/08/2025    Status: Normal  Similar postoperative change of right parieto-occipital resection. As  compared with prior examination there is mildly increased gaseous  distention of the temporal horn of the right lateral ventricle and  the frontal horn of the left lateral ventricle. The ventricles are  otherwise unchanged in caliber.    Similar appearance of left temporal lobe intraparenchymal hematoma.  Multifocal subarachnoid hemorrhage with decreasing conspicuity,  particularly within the right frontal convexity. No new or enlarging  intracranial hemorrhage in the interim. Similar degree of vasogenic  edema and mass effect.        Assessment/Plan   69-year-old gentleman diagnosed with glioblastoma multiforme he and his occipital lobe in December 2023 he is status post resection, chemoradiation (completed March 2024) and remains on adjuvant Temodar complicated by thrombocytopenia.  He is now presenting with surgical wound dehiscence likely prompted by prior radiation therapy to the site.  This is now complicated by intracranial wound  infection.  He is s/p right cranial wound washout on 4/6/25,  with kaleb purulence encountered.  Cultures from the subgaleal, epidural, and subdural spaces are growing Staphylococcus epidermidis, susceptibility pending. He had removal of native bone flap as well as outer Duraguard layer with washout and coverage with titanium mesh.  Post-op found to have left temporal ICH and thrombocytopenia        RECOMMENDATIONS:   Continue Vancomycin with goal -600.  Adjusted  by pharmacy to 750mg IV q 8 hours to keep goal -600.     Continue meropenem 2 g IV every 8 hours pending culture results.  If culture returns (+) for Methicillin-resistant Staphylococcus epidermidis then stop Meropenem  If culture returns (+) for Methicillin-sensitive Staphylococcus epidermidis then stop Meropenem and Vancomycin and start Ceftriaxone 2gm IV q 12 hours.  TENTATIVE STOP DATE for any of the antibiotic courses will be 6/15/25.  After discharge, the following labs will need to be collected weekly:  If goes out on Ceftriaxone: CBC with diff, Chem 7, hepatic panel, and quantitative CRP,   If goes out on Vancomycin: CBC with diff, Chem 7, quantitative CRP, and Vancomycin trough   Fax all results to [phone], attn. Dr. Amy York   7.    An appointment in the Infectious Disease clinic will be made with Dr. Amy York.  Please enter this information into the Discharge Orders.     Thank-you for allowing us to assist in your patient's management.  We are signing off.  Call if any further issues should arise or you should have any questions.       This is a complex infectious disease issue and the following was performed today (for more details please see the above note):   Management decisions reflecting the added complexity (e.g., changes in antimicrobial therapy, infection control strategies).    Amy York MD  (please reach through [employer])  Infectious Diseases, Senior Attending Physician

## 2025-04-08 NOTE — PROCEDURES
[facility]  NEUROSCIENCE INTENSIVE CARE UNIT  PROCEDURE NOTE:   SMALL BORE FEEDING TUBE PLACEMENT       Patient Name: Enmanuel Ahumada   MRN: [id number]   Admit Date: 2025   : 1955 AGE: 69 y.o.     Primary Diagnosis: Wound dehiscence      Patient with dysphagia 2/2 Wound dehiscence, requiring small bore feeding tube placement for medication and nutrition administration. All labs and images examined for appropriateness of tube placement. Procedure explained to the patient and/or family.    Patient positioned and small bore feeding tubing prepped per device protocol. Tubing inserted into the right nare and, using [employer] electromagnetic sensing device and tubing, was advanced per imaging guidance into gastric antrum. Tube is bridled at 75 cm at the nares. KUB ordered to confirm placement.

## 2025-04-08 NOTE — DOCUMENTATION CLARIFICATION NOTE
"    PATIENT:               JULIENNE RIOS  ACCT #:                  [id number]  MRN:                       [id number]  :                       1955  ADMIT DATE:       2025 9:59 AM  DISCH DATE:  RESPONDING PROVIDER #:        [id number]          PROVIDER RESPONSE TEXT:    Brain compression non-traumatic    CDI QUERY TEXT:    Clarification    Instruction:    Based on your assessment of the patient and the clinical information, please provide the requested documentation by clicking on the appropriate radio button and enter any additional information if prompted.    Question: Please further clarify if there is a diagnosis related to the clinical information    When answering this query, please exercise your independent professional judgment. The fact that a question is being asked, does not imply that any particular answer is desired or expected.    The patient's clinical indicators include:  Clinical Information:  Patient is a 69 year old male who presented with drainage from his cranial wound and went to the OR for wound washout.    Clinical Indicators:  -From the CT of the head on , \" Intraparenchymal hematoma within the left temporal lobe measuring 4.2 x 1.9 x 1.8 cm (series 204, image 66 and series 205, image 80) with surrounding vasogenic edema causing local sulcal effacement and early downward transtentorial herniation as demonstrated by narrowing of the left crural cistern \"    Treatment:  -Neuro checks every 1 hour  -Admission to NSU for close monitoring  -Keppra 1500mg IVPB x 1 dose on  at 1730  -Keppra 500mg IVPB x every 12 hours starting on  at 1801  -Dexamethasone IVP x 1 dose on  at 0804  -Dexamethasone IVP 2mg every 12 hours x 2 doses  -Dexamethasone 2mg tab PO every 12 hours  -Safety and seizure precautions    Risk Factors:  Disruption of surgical wound requiring cranial would washout, previous brain surgery for GBM, ICH, abnormal CT of head, aphasic, agitation  Options provided:  -- " Brain compression non-traumatic  -- Midline shift or mass effect without brain compression  -- Other - I will add my own diagnosis  -- Refer to Clinical Documentation Reviewer    Query created by: Rachel Hensley on 4/7/2025 2:43 PM      Electronically signed by:  HERMELINDA NI MD 4/8/2025 5:38 AM

## 2025-04-08 NOTE — PROGRESS NOTES
"Enmanuel Ahumada \"Petey" is a 69 y.o. male on day 3 of admission presenting with Wound dehiscence.    Subjective   Improving        Objective     Physical Exam  Eyes open to light stim   Ox2, 3 with choice  Names 3/3, repetition intact  Fcx4  5/5  Drains in place  Incision c/d/i    Last Recorded Vitals  Blood pressure 104/61, pulse 55, temperature 36.6 °C (97.9 °F), temperature source Temporal, resp. rate 17, height 1.702 m (5' 7\"), weight 71.8 kg (158 lb 4.6 oz), SpO2 99%.  Intake/Output last 3 Shifts:  I/O last 3 completed shifts:  In: 4929.6 (68.7 mL/kg) [I.V.:2559.6 (35.6 mL/kg); IV Piggyback:2370]  Out: 3987 (55.5 mL/kg) [Urine:3840 (1.5 mL/kg/hr); Drains:147]  Weight: 71.8 kg     Relevant Results      Assessment/Plan   Assessment & Plan  Wound dehiscence    69 M h/o HTN, HLD, smalls's esophagus, RO GBM s/p resection ([facility], 12/2023) with resultant L HH s/p chemo/RT presenting with tumor progression, 2/20 s/p redo craniotomy for tumor resection w 5 ALA, CTH POC, 2/21 MRI intended NTR, 3/16 MRI postop changes w fluid collection at dura and craniotomy site, worsening infiltrative disease, enhancement along posteromedial aspect of resection site, 4/5 p/w 1wk incisional drainage, 4/4 CTH airfluid level in resection cavity, 4/5 MRI brain w/wo incr'd resection cavity enhancement and diffusion restriction  4/6 s/p R cranial wound washout and mesh cranioplasty (kaleb purulence), CTH POC, L temporal ICH, plts 81 s/p 2u plts, 4/7 rCTH incr SAH, min incr ICH, rrCTH stable, EEG neg dc'd    Plan:  NSU  Raise SDD to 20  Maintain and monitor SGD  ID recs   OR cultures   Bcx   Needs PICC  Wean precedex  Ok to work with PTOT    Guillermina Camarena MD      "

## 2025-04-08 NOTE — PROGRESS NOTES
"Occupational Therapy    Evaluation    Patient Name: Enmanuel Ahumada \"Dash\"  MRN: [id number]  Today's Date: 4/9/2025  Room: 02/02-A  Time Calculation  Start Time: 1023  Stop Time: 1039  Time Calculation (min): 16 min    Assessment  IP OT Assessment  OT Assessment: Decreased strength, endurance, coordination, balance, cognition all limiting occupational performance. Would benefit from skilled services to maximize functional potential.  Prognosis: Good  Barriers to Discharge Home: Caregiver assistance, Cognition needs, Physical needs  Caregiver Assistance: Caregiver assistance needed per identified barriers - however, level of patient's required assistance exceeds assistance available at home  Cognition Needs: Insight of patient limited regarding functional ability/needs, Cognition-related high falls risk  Physical Needs: 24hr mobility assistance needed, 24hr ADL assistance needed  Evaluation/Treatment Tolerance: Patient tolerated treatment well  Medical Staff Made Aware: Yes  End of Session Communication: Bedside nurse  End of Session Patient Position: Bed, 4 rail up, Alarm on (B soft wrist restraints in place)  Plan:  Inpatient Plan  Treatment Interventions: ADL retraining, Functional transfer training, Endurance training, UE strengthening/ROM, Cognitive reorientation, Neuromuscular reeducation, Fine motor coordination activities, Compensatory technique education  OT Frequency: 4 times per week  OT Discharge Recommendations: High intensity level of continued care  Equipment Recommended upon Discharge:  (TBD)  OT Recommended Transfer Status: Moderate assist  OT - OK to Discharge: Yes  OT Assessment  OT Assessment Results: Decreased ADL status, Decreased upper extremity range of motion, Decreased upper extremity strength, Decreased safe judgment during ADL, Decreased cognition, Decreased endurance, Decreased fine motor control, Decreased functional mobility, Decreased gross motor control  Prognosis: " Good  Evaluation/Treatment Tolerance: Patient tolerated treatment well  Medical Staff Made Aware: Yes    Subjective   Current Problem:  1. Wound dehiscence  Case Request Operating Room: Right cranial wound washout    Case Request Operating Room: Right cranial wound washout    Tissue/Wound Culture/Smear    Tissue/Wound Culture/Smear    Tissue/Wound Culture/Smear    Tissue/Wound Culture/Smear    Tissue/Wound Culture/Smear    Tissue/Wound Culture/Smear    EEG        General:  Reason for Referral: 68 y/o male presented on 4/5/25 with 1 week of incisional drainage. On 4/5/25, MRI brain demonstrated increased cavity enhancement and diffusion restriction. 4/6: S/p right cranial wound washout/mesh cranioplasty (kaleb purulence), post-operatively found to have left temporal ICH, thrombocytopenic s/p 2units platelets. 4/7: CTH with minimally increased ICH, repeat CTH stable, EEG negative/discontinued  Past Medical History Relevant to Rehab: hypertension, hyperlipidemia, smalls's esophagus, and RO GBM s/p resection (12/2023) with resultant left HH s/p chemotherapy/radiation therapy who presented with tumor progression. Of note, patient recently s/p redo craniotomy for resection (2/20/25).  Prior to Session Communication: Bedside nurse  Patient Position Received: Bed, 4 rail up, Alarm on (B soft wrist restraints in place)  Family/Caregiver Present: Yes  Caregiver Feedback: Wife present and assists with collateral info  General Comment: Pt able to follow simple commands and actively participate. Difficulty with focused attention and often wiggling and flailing UE/LE. (Precedex 1.5)   Precautions:  Medical Precautions: Fall precautions (SDD)  Precautions Comment: SDD clamped by RN, SBP<160  Vital Signs:      Pain:  Pain Assessment  Pain Assessment: CPOT (Critical Care Pain Observation Tool)  0-10 (Numeric) Pain Score: 0 - No pain  Lines/Tubes/Drains:  Urethral Catheter Non-latex 14 Fr. (Active)   Number of days: 1        Closed/Suction Drain 1 Right Scalp Bulb 10 Fr. (Active)   Number of days: 2       Closed/Suction Drain Right;Superior Other (Comment) (Active)   Number of days: 2         Objective   Cognition:  Overall Cognitive Status: Impaired  Arousal/Alertness: Inconsistent responses to stimuli  Orientation Level:  (Oriented to self only)  Following Commands:  (Approx 50% simple command following with increased time and repetition)  Safety Judgment: Decreased awareness of need for safety precautions  Cognition Comments: Difficulty with focused attention  Insight: Mild  Impulsive: Mildly  Processing Speed: Delayed        Kate Agitation Sedation Scale  Kate Agitation Sedation Scale (RASS): Restless  Home Living:  Type of Home: House  Lives With: Spouse  Home Adaptive Equipment: Cane  Home Layout: Two level, Bed/bath upstairs, 1/2 bath on main level  Home Living Comments: Has recently been getting home PT/OT   Prior Function:  Level of [place]: Independent with ADLs and functional transfers  Receives Help From: Family (Wife)  ADL Assistance: Independent  Homemaking Assistance: Needs assistance (Wife completes)  Ambulatory Assistance: Independent (Wife Sup if pt goes upstairs. Typically he is able to ambulate through his home without AD.)  Vocational: Retired  Prior Function Comments: Wife always home and assists as needed.  IADL History:     ADL:  Grooming Assistance: Moderate  Bathing Assistance: Maximal  UE Dressing Assistance: Moderate  LE Dressing Assistance: Maximal  Toileting Assistance with Device: Maximal  ADL Comments: Anticipate  Activity Tolerance:  Endurance: Decreased tolerance for upright activites  Early Mobility/Exercise Safety Screen: Proceed with mobilization - No exclusion criteria met  Balance:  Static Sitting Balance  Static Sitting-Level of Assistance: Minimum assistance  Static Standing Balance  Static Standing-Level of Assistance: Moderate assistance  Bed Mobility/Transfers: Bed Mobility  Bed  Mobility: Yes  Bed Mobility 1  Bed Mobility 1: Supine to sitting, Sitting to supine  Level of Assistance 1: Moderate assistance (Verbal/tactile cues)   and Transfers  Transfer: Yes  Transfer 1  Transfer From 1: Sit to  Transfer to 1: Stand  Technique 1: Sit to stand, Stand to sit  Transfer Level of Assistance 1: Minimum assistance, Arm in arm assistance  Trials/Comments 1: Able to maintain stand for approx 15sec and take 2 lateral steps with MOD A x2. Cues for task sequencing.  IADL's:      Vision:     and Vision - Complex Assessment  Vision Comments: Difficult to formally assess d/t limited command following. Observed able to open eyes and visually follow targets.  Sensation:  Light Touch: No apparent deficits  Strength:  Strength Comments: BUE strength 3-/5  Perception:  Inattention/Neglect: Appears intact (Difficulty to fully assess)  Coordination:  Movements are Fluid and Coordinated: No  Coordination Comment: Spontaneous flailing of BUE/BLE throughout session and while supine in bed.   Hand Function:  Hand Function  Gross Grasp: Functional  Coordination: Impaired  Extremities:  ,  ,  , and      Outcome Measures: [facility] Daily Activity  Putting on and taking off regular lower body clothing: A lot  Bathing (including washing, rinsing, drying): A lot  Putting on and taking off regular upper body clothing: A lot  Toileting, which includes using toilet, bedpan or urinal: A lot  Taking care of personal grooming such as brushing teeth: A lot  Eating Meals: A lot  Daily Activity - Total Score: 12    Confusion Assessment Method-ICU (CAM-ICU)  Feature 1: Acute Onset or Fluctuating Course: Positive  Feature 2: Inattention: Positive  Feature 3: Altered Level of Consciousness: Positive  Overall CAM-ICU: Positive   ICU Mobility Screen  Early Mobility/Exercise Safety Screen: Proceed with mobilization - No exclusion criteria met,   Kate Agitation Sedation Scale  Kate Agitation Sedation Scale (RASS): Restless       Education Documentation  Body Mechanics, taught by Kavya Beavers OT at 4/8/2025 12:38 PM.  Learner: Patient  Readiness: Acceptance  Method: Explanation  Response: Verbalizes Understanding    Precautions, taught by Kavya Beavers OT at 4/8/2025 12:38 PM.  Learner: Patient  Readiness: Acceptance  Method: Explanation  Response: Verbalizes Understanding    ADL Training, taught by Kavya Beavers OT at 4/8/2025 12:38 PM.  Learner: Patient  Readiness: Acceptance  Method: Explanation  Response: Verbalizes Understanding    Education Comments  No comments found.        Goals:     Encounter Problems       Encounter Problems (Active)       ADLs       Patient will complete grooming tasks with CGA in order to maximize functional Indep with task completion.        Start:  04/08/25    Expected End:  04/22/25            Patient will complete lower body dressing with  MIN A for donning and doffing all LE clothes in order to increase Indep with task participation.        Start:  04/08/25    Expected End:  04/22/25            Patient will complete toileting, including clothing management and hygiene, with MIN A in order to maximize functional Indep with task completion.        Start:  04/08/25    Expected End:  04/22/25               BALANCE       Pt will increase static/dynamic sit/stand to Good to increase safety and indep with functional task completion.         Start:  04/08/25    Expected End:  04/22/25               COGNITION/SAFETY       Pt will demo good safety awareness during functional task completion with no more than min vc in order to maximize occupational performance.         Start:  04/08/25    Expected End:  04/22/25               EXERCISE/STRENGTHENING       Pt will increase endurance to tolerate 15-20min of activity with no more than 1 rest break in order to increase ability to engage in ADL completion.        Start:  04/08/25    Expected End:  04/22/25            Pt will increase overall BUE strength to  4/5 in order to increase functional task participation.        Start:  04/08/25    Expected End:  04/22/25            Pt will demo increased BUE fine motor/bimanual coordination in order to achieve MOD I with functional task completion.        Start:  04/08/25    Expected End:  04/22/25               TRANSFERS       Patient will complete functional transfers using least restrictive device with CGA   in order to maximize functional potential and increase safety.        Start:  04/08/25    Expected End:  04/22/25 04/09/25 at 9:44 AM   Kavya Beavers, OT   Rehab Office: [phone]

## 2025-04-08 NOTE — POST-PROCEDURE NOTE
Pre-Procedure Checklist:  Emergent Line Insertion: No  Type of Line to be Placed: PICC  Consent Obtained: Yes  Emergency Medication Necessary: No  Patient Identified with 2 Independent Identifiers: Yes  Review of Allergies, Anticoagulation, Relevant Labs, ECG/Telemetry: Yes  Risks/Benefits/Alternatives Discussed with Patient/POA/Legal Representative: Yes  Stop Sign on Door: Yes  Time Out Performed: Yes  Catheter Exchange: No    Positioning Checklist:  All People, Including Patient, in the Room with Cap and Mask: Yes  Fluoroscopy Used to Identify Vessel and Guide Insertion: No   Sterile Cover Used: Yes  Full Barrier Precautions Followed (Mask, Cap, Gown, Gloves): Yes  Hands Washed: Yes  Monitors Attached with Sound Alarms On: No  Full Body Sterile Drape (Head-to-Toe) Used to Cover Patient: Yes  Trendelenburg Position (For IJ and Subclavian): No  CHG Skin Prep Used and Allowed to Air Dry to Skin Procedure: Yes    Procedure Checklist:  Blood Aspirated From All Lumens, All Ports Subsequently Flushed: Yes  Catheter Caps Placed on All Lumens; Lumens Clamped: Yes  Maintain Guidewire Control Throughout, Ensuring Guidewire Removal: Yes  Maintain Sterile Field Throughout Insertion: Yes  Catheter Secured: Yes  Confirmatory Test of Venous Placement: Non-Pulsatile Blood    Post Procedure Checklist:  Date and Time Written on Dressing: Yes  Sharp and Wire Count and Safe Disposal of all Sharps/Wires: Yes  Sterile Dressing Applied Per Protocol: Yes  X-ray Ordered or ECG Image: Yes    PICC Insertion Details:  Size (Fr): 4  Lumen Type:double  Catheter to Vein Ratio Less Than 50%: Yes  Total Length (cm): 36  External Length (cm): 0  Orientation: right  Location: basilic  Site Prep: Chlorohexidine; Usual sterile procedure followed  Local Anesthetic: Injectable/Subcutaneous  Indication:  Insertion Team Members in the Room: Nurse, LPN  Initial Extremity Circumference (cm): 32  Insertion Attempts: 1  Patient Tolerance: Tolerated Well, Age  Appropriate  Comfort Measures: Subcutaneous anesthetic; Verbal  Procedure Location: Bedside  Safety Measures: Patient specific safety measures addressed with RN  Estimated Blood Loss (mL):   Vessel Fully Compressible Proximally and Distally to Insertion Site: Yes  Brisk Blood Return Obtained and Line Draws Easily: Yes  Tip Location:SVC  Line Confirmation: ECG  Lot #:[id number]  : Bard  PICC Line Exp Date:3/31/2026  Securement: Stat Lock  Post Procedure Checklist: Handoff with RN; Obtain all new IV tubing prior to use; Bed at lowest level and wheels locked; Line discharge information at bedside.  Additional Details: Line was inserted using Modified Seldinger's Technique.   Placed by: Jacqui Hardy RN-[facility]

## 2025-04-08 NOTE — PROGRESS NOTES
[facility]  NEUROSCIENCE INTENSIVE CARE UNIT  DAILY PROGRESS NOTE       Patient Name: Enmanuel Ahumada   MRN: [id number]     Admit Date: 2025     : 1955 AGE: 69 y.o. GENDER: male      Subjective    69 year-old male with past medical history of hypertension, hyperlipidemia, smalls's esophagus, and RO GBM s/p resection (2023) with resultant left HH s/p chemotherapy/radiation therapy who presented with tumor progression. Of note, patient recently s/p redo craniotomy for resection (25). Post-operative MRI demonstrated fluid collection at dura and craniotomy site, worsening infiltrative disease, and enhancement along posteromedial aspect of resection site. Patient presented on 25 with 1 week of incisional drainage. On 25, MRI brain demonstrated increased cavity enhancement and diffusion restriction. Patient admitted to NSU post-operative for frequent monitoring and resuscitation as needed.    Significant Events:  - : S/p right cranial wound washout/mesh cranioplasty (kaleb purulence), post-operatively found to have left temporal ICH, thrombocytopenic s/p 2units platelets  - : CTH with minimally increased ICH, repeat CTH stable, EEG negative/discontinued    Interval Events: NAEON.    Objective   VITALS:  Temp:  [35.7 °C (96.3 °F)-37.2 °C (99 °F)] 36.1 °C (97 °F)  Heart Rate:  [48-91] 51  Resp:  [14-28] 16  BP: ()/(49-89) 118/62  INTAKE/OUTPUT:  Intake/Output Summary (Last 24 hours) at 2025 0816  Last data filed at 2025 0700  Gross per 24 hour   Intake 3041.6 ml   Output 4022 ml   Net -980.4 ml         PHYSICAL EXAM:  NEURO:  - Agitated, oriented x3 with choices, follows commands x4  - EOV, pupils 2mm/reactive bilaterally  - SANCHEZ 5/5 strength  CV:  - RRR on telemetry, SB-NSR  RESP:  - Regular, unlabored  - Oxygen: RA  :  - Indwelling catheter in place  GI:  - Abdomen NT/ND, soft  SKIN:  - Surgical site clean/dry/intact  - Left hand ecchymosis  - 2  post-operative drains in place    MEDICATIONS:  Scheduled: PRN: Continuous:   atorvastatin, 40 mg, oral, Daily  cloNIDine, 1 patch, transdermal, Weekly  dexAMETHasone, 2 mg, intravenous, q12h  insulin lispro, 0-5 Units, subcutaneous, q4h  lidocaine, 5 mL, infiltration, Once  meropenem, 2 g, intravenous, q8h  pantoprazole, 40 mg, oral, Daily before breakfast  polyethylene glycol, 17 g, oral, Daily  valproate sodium, 1,000 mg, intravenous, Once  valproate sodium, 250 mg, intravenous, q6h  vancomycin, 750 mg, intravenous, q8h     PRN medications: calcium gluconate, calcium gluconate, dextrose, dextrose, glucagon, glucagon, HYDROmorphone, magnesium sulfate, magnesium sulfate, ondansetron **OR** ondansetron, oxygen, potassium chloride, vancomycin dexmedeTOMIDine, 0.1-1.5 mcg/kg/hr, Last Rate: 1.5 mcg/kg/hr (04/08/25 0652)  sodium chloride 0.9%, 75 mL/hr, Last Rate: 75 mL/hr (04/07/25 2107)         LAB RESULTS:  Results from last 72 hours   Lab Units 04/08/25  0137 04/07/25  1405 04/07/25  0023   GLUCOSE mg/dL 121* 128* 160*   SODIUM mmol/L 138 137 136   POTASSIUM mmol/L 3.9 3.8 4.3   CHLORIDE mmol/L 106 105 104   CO2 mmol/L 26 26 24   ANION GAP mmol/L 10 10 12   BUN mg/dL 17 20 27*   CREATININE mg/dL 0.46* 0.55 0.54   EGFR mL/min/1.73m*2 >90 >90 >90   CALCIUM mg/dL 8.0* 7.8* 8.1*   PHOSPHORUS mg/dL 2.8 2.7 3.1   ALBUMIN g/dL 3.0* 2.8*  2.8* 3.1*   MAGNESIUM mg/dL 2.27  --  1.96   POCT CALCIUM IONIZED (MMOL/L) IN BLOOD mmol/L 1.16  --  1.15      Results from last 72 hours   Lab Units 04/08/25  0137 04/07/25  0444   WBC AUTO x10*3/uL 4.2* 5.5   NRBC AUTO /100 WBCs 0.0 0.0   RBC AUTO x10*6/uL 3.03* 3.11*   HEMOGLOBIN g/dL 10.0* 10.1*   HEMATOCRIT % 26.5* 27.7*   MCV fL 88 89   MCH pg 33.0 32.5   MCHC g/dL 37.7* 36.5*   RDW % 12.9 12.7   PLATELETS AUTO x10*3/uL 90* 103*      Results from last 72 hours   Lab Units 04/08/25  0137 04/07/25  0444 04/07/25  0015   WBC AUTO x10*3/uL 4.2* 5.5 5.7   NRBC AUTO /100 WBCs 0.0 0.0 0.0    RBC AUTO x10*6/uL 3.03* 3.11* 3.37*   HEMOGLOBIN g/dL 10.0* 10.1* 10.9*   HEMATOCRIT % 26.5* 27.7* 29.7*   MCV fL 88 89 88   MCH pg 33.0 32.5 32.3   MCHC g/dL 37.7* 36.5* 36.7*   RDW % 12.9 12.7 12.8   PLATELETS AUTO x10*3/uL 90* 103* 97*   NEUTROS PCT AUTO % 80.4  --  83.1   IG PCT AUTO % 1.0*  --  0.5   LYMPHS PCT AUTO % 13.4  --  11.6   MONOS PCT AUTO % 4.8  --  4.6   EOS PCT AUTO % 0.2  --  0.0   BASOS PCT AUTO % 0.2  --  0.2   NEUTROS ABS x10*3/uL 3.36  --  4.75   IG AUTO x10*3/uL 0.04  --  0.03   LYMPHS ABS AUTO x10*3/uL 0.56*  --  0.66*   MONOS ABS AUTO x10*3/uL 0.20  --  0.26   EOS ABS AUTO x10*3/uL 0.01  --  0.00   BASOS ABS AUTO x10*3/uL 0.01  --  0.01      Results from last 72 hours   Lab Units 04/05/25  1421   PROTIME seconds 12.2   INR  1.1   APTT seconds 24*      Results from last 72 hours   Lab Units 04/08/25  0137 04/07/25  1405 04/07/25  1001   ALK PHOS U/L  --  48  --    BILIRUBIN TOTAL mg/dL  --  1.5* 1.4*   BILIRUBIN DIRECT mg/dL  --  0.3 0.3   PROTEIN TOTAL g/dL  --  4.4*  --    ALT U/L  --  44  --    AST U/L  --  17  --    ALBUMIN g/dL 3.0* 2.8*  2.8*  --       Results from last 7 days   Lab Units 04/06/25  1007 04/06/25  1006 04/06/25  1005 04/05/25  1234   GRAM STAIN  (4+) Abundant Polymorphonuclear leukocytes*  (4+) Abundant Gram positive cocci* (3+) Moderate Polymorphonuclear leukocytes*  (2+) Few Gram positive cocci*   < >  --    TISSUE/WOUND CULTURE/SMEAR  Culture in progress Culture in progress   < >  --    BLOOD CULTURE   --   --   --  No growth at 2 days  No growth at 2 days    < > = values in this interval not displayed.     IMAGING RESULTS:  CT head wo IV contrast   Final Result   Redemonstration of postoperative change status post right posterior   craniotomy for revision/debulking of right parieto-occipital   glioblastoma.        Stable appearance of resection cavity with layering free fluid, trace   superimposed hemorrhage, and indwelling catheter. Cavity again   appears to  communicate with the atrium of the right lateral ventricle.        Left temporal intraparenchymal hemorrhage measuring up to 5.4 cm is   stable in size compared to head CT performed 3 hours prior but   increased in size compared to head CT 04/06/2025. Mild surrounding   vasogenic edema without uncal herniation.        Scattered left-greater-than-right cerebral hemispheric subarachnoid   hemorrhages most notably involving the left temporal and occipital   lobes, also similar to CT 3 hours prior but increased compared to CT   completed 04/06/2025.        Intraventricular pneumocephalus as well as trace layering   intraventricular hemorrhage is similar to previous CT.        2-3 mm leftward midline shift, also unchanged from previous CT.        Signed by: Gen Phelps 4/7/2025 10:54 AM   Dictation workstation:   [id number]      CT head wo IV contrast   Final Result   Redemonstration of postoperative change status post right posterior   craniotomy for revision/debulking of right parieto-occipital   glioblastoma.        Stable appearance of resection cavity with layering free fluid, trace   superimposed hemorrhage, and indwelling catheter. Cavity again   appears to communicate with the atrium of the right lateral ventricle.        Left temporal intraparenchymal hemorrhage measuring up to 5.4 cm is   increased in size compared to head CT 04/06/2025. Mild surrounding   vasogenic edema without uncal herniation.        Scattered left-greater-than-right cerebral hemispheric subarachnoid   hemorrhages most notably involving the left temporal and occipital   lobes, increased compared to CT completed 04/06/2025.        Intraventricular pneumocephalus; trace layering intraventricular   hemorrhage is new compared to previous CT.        Signed by: Gen Phelps 4/7/2025 11:00 AM   Dictation workstation:   [id number]      CT head wo IV contrast   Final Result   There has been intercurrent formation of an intraparenchymal hematoma    within the left temporal lobe with superimposed subarachnoid   hemorrhage involving the left temporal and occipital lobes. There is   early downward transtentorial herniation as demonstrated by narrowing   of the left crural cistern.        Interval washout of right parieto-occipital surgical cavity with   placement of overlying cranial mesh. Washout cavity communicates with   the atrium of the right lateral ventricle with associated air foci   within the frontal horn bilaterally.        I personally reviewed the image(s)/study and interpretation by   Haseeb Vasquez MD (resident).        MACRO:   Haseeb Vasquez discussed the significance and urgency of this critical   finding by telephone with YAHIR FRAGOSO STANLEY on 4/6/2025 at 5:52 pm.   (**-RCF-**) Findings:  See findings.        Signed by: Curt Santiago 4/6/2025 6:06 PM   Dictation workstation:   [id number]      MR brain w and wo IV contrast   Final Result   1.  Postsurgical changes status post right posterior craniotomy for   glioblastoma debulking in the right parieto-occipital lobe.   2.  Interval increase in extra-axial fluid collection overlying the   deep surgical craniotomy bed with focal areas of diffusion   restriction and peripheral enhancement. Additionally there is   interval development of layering contents deep within surgical cavity   and surrounding the cavity which demonstrate diffusion restriction.   Overall the constellation of findings are suggestive of superimposed   infection/abscess particularly given clinical suspicion of such.   3. Additional findings remain similar compared to prior imaging and   as described above.             I personally reviewed the images/study and I agree with the findings   as stated. This study was interpreted at [place], [place].        MACRO:   None        Signed by: Tony Terrazas 4/6/2025 7:16 AM   Dictation workstation:   [id number]      XR chest 1 view   Final  Result   1.  No evidence of acute cardiopulmonary process.        I personally reviewed the images/study and I agree with the findings   as stated by Dayo Romero MD (resident).        MACRO:   None        Signed by: Marbin Cameron 4/5/2025 12:56 PM   Dictation workstation:   [id number]        Assessment/Plan    NEURO:  #GBM s/p resection (12/2023, 2/20/2025)  #Craniotomy site with incisional drainage, s/p cranial wound washout (4/6/2025)  #New left temporal ICH  #Agitation  Assessment:  - Neurologically: see above  - See above history and significant events  - 2 post-operative drains in place  Plan:  - NSU  - Neuro Checks: Q1H  - CTH this AM to monitor temporal hemorrhage  - Maintain SDD open at 20  - SGD off suction  - Sedation: Dexmedetomidine, wean as tolerated  - Clonidine patch 0.2mg/24 hour patch in attempts to wean Precedex  - Drain management per neurosurgery  - Dexamethasone 2mg BID  - Valproate load (1g), followed by maintenance 250mg Q6H  - Pain: hydromorphone PRN  - Nausea: ondansetron PRN  - PT/OT    CARDIOVASCULAR:  #HLD  Assessment:  - Hemodynamically stable  - 12/30/23 ECHO: ejection fraction 60%  Plan:  - Continue to monitor on telemetry  - Atorvastatin 40mg daily  - BP goal: SBP < 160    RESPIRATORY:  #No active issues  Assessment:  - RA  Plan:  - Continuous pulse oximetry   - O2 PRN to maintain SpO2 > 94%, wean as tolerated  - Incentive spirometry while awake    RENAL/:  #No active issues  Assessment:  Results from last 72 hours   Lab Units 04/08/25  0137 04/07/25  1405   BUN mg/dL 17 20   CREATININE mg/dL 0.46* 0.55   Plan:  - Monitor with daily RFP  - Maintain indwelling catheter for: perioperative use for selected surgical procedures    FEN/GI:  #Hypocalcemia  #Hypomagnesemia, resolved  Assessment:  - Last BM Date: PTA  Plan:  - Monitor and replace electrolytes per protocol  - IVF: NS @ 75 mL/hr  - Diet: NPO Diet  - Bowel regimen: Miralax, bisacodyl suppository PRN    ENDOCRINE:  #Mild  hyperglycemia, resolved  Assessment:  Results from last 7 days   Lab Units 25  0724 25  0409 25  0137 25  2309 25  1952 25  1549 25  1405 25  1218 25  0804 25  0023 25  1632 25  1421   POCT GLUCOSE mg/dL 102* 110*  --  119* 115* 128*  --  131*   < >  --   --   --    GLUCOSE mg/dL  --   --  121*  --   --   --  128*  --   --  160* 177* 168*   SODIUM mmol/L  --   --  138  --   --   --  137  --   --  136 134* 133*    < > = values in this interval not displayed.   Plan:  - Accuchecks & ISS Q4H while NPO    HEMATOLOGY:  #Anemia, stable  #Thrombocytopenia  Assessment:  Results from last 7 days   Lab Units 25  0137 25  0444 25  0015   HEMOGLOBIN g/dL 10.0* 10.1* 10.9*   HEMATOCRIT % 26.5* 27.7* 29.7*   PLATELETS AUTO x10*3/uL 90* 103* 97*   - 4/6: Transfused with 2units platelets overnight, post-transfusion CBC above goal  - 4/8: Transfused with 1unit platelets overnight  Plan:  - Continue to monitor with daily CBC and Coag panel  - Follow-up post-transfusion CBC    INFECTIOUS DISEASE:  #Intracranial wound infection  Assessment:  Results from last 7 days   Lab Units 25  0137 25  0444 25  0015   WBC AUTO x10*3/uL 4.2* 5.5 5.7   - Temp (24hrs), Av.3 °C (97.3 °F), Min:35.7 °C (96.3 °F), Max:37.2 °C (99 °F)  - Afebrile  - Blood cultures negative  - OR gram stains with gram positive cocci, cultures in progress  Plan:  - Continue to monitor for s/sx of infection  - Pan culture for temperature > 38.4 C  - ID consulted, appreciate recommendations  - Follow-up infectious work-up including OR cultures  - Continue meropenem 2g Q8H, vancomycin (dosed per pharmacy)    MUSCULOSKELETAL:  - No acute issues    SKIN:  - No acute issues  - Turns and skin care per NSU protocol    ACCESS:  - PIVs  - PICC ordered    PROPHYLAXIS:  - DVT Ppx: SCDs, Hold SQH until POD #2  - GI Ppx: Pantoprazole    RESTRAINTS:  I agree with nursing  assessment of the patient's need for restraints to protect the patient from injury and facilitate healing. The patient is unable to cooperate with the plan of care and at risk for disrupting critical therapy (i.e., removing medical devices, lines, tubes and/or dressings).  Please see order for specifics. Restraints can be removed when the patient is able to cooperate with plan of care and allow healing to occur, or the medical devices at risk are discontinued by the medical team.     José Luis Fontenot, APRN-CNP  Neuroscience Intensive Care    Total critical care time of 60 minutes, with > 50% of time spent in direct contact with patient/family for education, counseling and coordination of care.

## 2025-04-09 ENCOUNTER — APPOINTMENT (OUTPATIENT)
Dept: CARDIOLOGY | Facility: HOSPITAL | Age: 70
End: 2025-04-09
Payer: MEDICARE

## 2025-04-09 ENCOUNTER — APPOINTMENT (OUTPATIENT)
Dept: NEUROLOGY | Facility: HOSPITAL | Age: 70
DRG: 907 | End: 2025-04-09
Payer: MEDICARE

## 2025-04-09 ENCOUNTER — APPOINTMENT (OUTPATIENT)
Dept: RADIOLOGY | Facility: HOSPITAL | Age: 70
DRG: 907 | End: 2025-04-09
Payer: MEDICARE

## 2025-04-09 LAB
ALBUMIN SERPL BCP-MCNC: 3.5 G/DL (ref 3.4–5)
ALBUMIN SERPL BCP-MCNC: 3.5 G/DL (ref 3.4–5)
ANION GAP SERPL CALC-SCNC: 12 MMOL/L (ref 10–20)
ANION GAP SERPL CALC-SCNC: 12 MMOL/L (ref 10–20)
ATRIAL RATE: 74 BPM
ATRIAL RATE: 78 BPM
BACTERIA BLD CULT: NORMAL
BACTERIA BLD CULT: NORMAL
BACTERIA SPEC CULT: ABNORMAL
BASOPHILS # BLD AUTO: 0.02 X10*3/UL (ref 0–0.1)
BASOPHILS NFR BLD AUTO: 0.5 %
BLOOD EXPIRATION DATE: NORMAL
BUN SERPL-MCNC: 14 MG/DL (ref 6–23)
BUN SERPL-MCNC: 16 MG/DL (ref 6–23)
CA-I BLD-SCNC: 1.12 MMOL/L (ref 1.1–1.33)
CALCIUM SERPL-MCNC: 8.2 MG/DL (ref 8.6–10.6)
CALCIUM SERPL-MCNC: 8.3 MG/DL (ref 8.6–10.6)
CHLORIDE SERPL-SCNC: 98 MMOL/L (ref 98–107)
CHLORIDE SERPL-SCNC: 99 MMOL/L (ref 98–107)
CHLORIDE UR-SCNC: 101 MMOL/L
CHLORIDE/CREATININE (MMOL/G) IN URINE: 398 MMOL/G CREAT (ref 23–275)
CO2 SERPL-SCNC: 24 MMOL/L (ref 21–32)
CO2 SERPL-SCNC: 25 MMOL/L (ref 21–32)
CREAT SERPL-MCNC: 0.57 MG/DL (ref 0.5–1.3)
CREAT SERPL-MCNC: 0.69 MG/DL (ref 0.5–1.3)
CREAT UR-MCNC: 25.4 MG/DL (ref 20–370)
DISPENSE STATUS: NORMAL
EGFRCR SERPLBLD CKD-EPI 2021: >90 ML/MIN/1.73M*2
EGFRCR SERPLBLD CKD-EPI 2021: >90 ML/MIN/1.73M*2
EOSINOPHIL # BLD AUTO: 0.01 X10*3/UL (ref 0–0.7)
EOSINOPHIL NFR BLD AUTO: 0.3 %
ERYTHROCYTE [DISTWIDTH] IN BLOOD BY AUTOMATED COUNT: 12.9 % (ref 11.5–14.5)
GLUCOSE BLD MANUAL STRIP-MCNC: 101 MG/DL (ref 74–99)
GLUCOSE BLD MANUAL STRIP-MCNC: 128 MG/DL (ref 74–99)
GLUCOSE BLD MANUAL STRIP-MCNC: 136 MG/DL (ref 74–99)
GLUCOSE BLD MANUAL STRIP-MCNC: 138 MG/DL (ref 74–99)
GLUCOSE BLD MANUAL STRIP-MCNC: 160 MG/DL (ref 74–99)
GLUCOSE BLD MANUAL STRIP-MCNC: 84 MG/DL (ref 74–99)
GLUCOSE SERPL-MCNC: 105 MG/DL (ref 74–99)
GLUCOSE SERPL-MCNC: 124 MG/DL (ref 74–99)
GRAM STN SPEC: ABNORMAL
HCT VFR BLD AUTO: 27.5 % (ref 41–52)
HGB BLD-MCNC: 10.1 G/DL (ref 13.5–17.5)
IMM GRANULOCYTES # BLD AUTO: 0.06 X10*3/UL (ref 0–0.7)
IMM GRANULOCYTES NFR BLD AUTO: 1.5 % (ref 0–0.9)
LYMPHOCYTES # BLD AUTO: 0.7 X10*3/UL (ref 1.2–4.8)
LYMPHOCYTES NFR BLD AUTO: 17.9 %
MAGNESIUM SERPL-MCNC: 2.13 MG/DL (ref 1.6–2.4)
MCH RBC QN AUTO: 31.8 PG (ref 26–34)
MCHC RBC AUTO-ENTMCNC: 36.7 G/DL (ref 32–36)
MCV RBC AUTO: 87 FL (ref 80–100)
MONOCYTES # BLD AUTO: 0.24 X10*3/UL (ref 0.1–1)
MONOCYTES NFR BLD AUTO: 6.1 %
NEUTROPHILS # BLD AUTO: 2.89 X10*3/UL (ref 1.2–7.7)
NEUTROPHILS NFR BLD AUTO: 73.7 %
NRBC BLD-RTO: 0 /100 WBCS (ref 0–0)
OSMOLALITY SERPL: 274 MOSM/KG (ref 280–300)
OSMOLALITY UR: 370 MOSM/KG (ref 200–1200)
P AXIS: 31 DEGREES
P AXIS: 51 DEGREES
P OFFSET: 191 MS
P OFFSET: 209 MS
P ONSET: 145 MS
P ONSET: 156 MS
PHOSPHATE SERPL-MCNC: 2 MG/DL (ref 2.5–4.9)
PHOSPHATE SERPL-MCNC: 2.7 MG/DL (ref 2.5–4.9)
PLATELET # BLD AUTO: 132 X10*3/UL (ref 150–450)
POTASSIUM SERPL-SCNC: 3.7 MMOL/L (ref 3.5–5.3)
POTASSIUM SERPL-SCNC: 3.9 MMOL/L (ref 3.5–5.3)
POTASSIUM UR-SCNC: 15 MMOL/L
POTASSIUM/CREAT UR-RTO: 59 MMOL/G CREAT
PR INTERVAL: 130 MS
PR INTERVAL: 138 MS
PRODUCT BLOOD TYPE: 6200
PRODUCT CODE: NORMAL
Q ONSET: 210 MS
Q ONSET: 225 MS
QRS COUNT: 12 BEATS
QRS COUNT: 13 BEATS
QRS DURATION: 88 MS
QRS DURATION: 92 MS
QT INTERVAL: 384 MS
QT INTERVAL: 406 MS
QTC CALCULATION(BAZETT): 437 MS
QTC CALCULATION(BAZETT): 450 MS
QTC FREDERICIA: 419 MS
QTC FREDERICIA: 435 MS
R AXIS: -7 DEGREES
R AXIS: 2 DEGREES
RBC # BLD AUTO: 3.18 X10*6/UL (ref 4.5–5.9)
SODIUM SERPL-SCNC: 130 MMOL/L (ref 136–145)
SODIUM SERPL-SCNC: 132 MMOL/L (ref 136–145)
SODIUM UR-SCNC: 133 MMOL/L
SODIUM/CREAT UR-RTO: 524 MMOL/G CREAT
SP GR UR STRIP.AUTO: 1.01
STAPHYLOCOCCUS SPEC CULT: NORMAL
T AXIS: 23 DEGREES
T AXIS: 26 DEGREES
T OFFSET: 402 MS
T OFFSET: 428 MS
T4 FREE SERPL-MCNC: 1.73 NG/DL (ref 0.78–1.48)
UNIT ABO: NORMAL
UNIT NUMBER: NORMAL
UNIT RH: NORMAL
UNIT VOLUME: 204
VENTRICULAR RATE: 74 BPM
VENTRICULAR RATE: 78 BPM
WBC # BLD AUTO: 3.9 X10*3/UL (ref 4.4–11.3)

## 2025-04-09 PROCEDURE — 84439 ASSAY OF FREE THYROXINE: CPT

## 2025-04-09 PROCEDURE — 83935 ASSAY OF URINE OSMOLALITY: CPT

## 2025-04-09 PROCEDURE — 85025 COMPLETE CBC W/AUTO DIFF WBC: CPT | Performed by: REGISTERED NURSE

## 2025-04-09 PROCEDURE — 83735 ASSAY OF MAGNESIUM: CPT | Performed by: REGISTERED NURSE

## 2025-04-09 PROCEDURE — 2500000004 HC RX 250 GENERAL PHARMACY W/ HCPCS (ALT 636 FOR OP/ED): Performed by: PSYCHOLOGIST

## 2025-04-09 PROCEDURE — 2500000004 HC RX 250 GENERAL PHARMACY W/ HCPCS (ALT 636 FOR OP/ED)

## 2025-04-09 PROCEDURE — 70450 CT HEAD/BRAIN W/O DYE: CPT | Performed by: STUDENT IN AN ORGANIZED HEALTH CARE EDUCATION/TRAINING PROGRAM

## 2025-04-09 PROCEDURE — 95716 VEEG EA 12-26HR CONT MNTR: CPT

## 2025-04-09 PROCEDURE — 2500000001 HC RX 250 WO HCPCS SELF ADMINISTERED DRUGS (ALT 637 FOR MEDICARE OP)

## 2025-04-09 PROCEDURE — 81003 URINALYSIS AUTO W/O SCOPE: CPT

## 2025-04-09 PROCEDURE — 80069 RENAL FUNCTION PANEL: CPT

## 2025-04-09 PROCEDURE — 82947 ASSAY GLUCOSE BLOOD QUANT: CPT

## 2025-04-09 PROCEDURE — 84300 ASSAY OF URINE SODIUM: CPT

## 2025-04-09 PROCEDURE — 93005 ELECTROCARDIOGRAM TRACING: CPT

## 2025-04-09 PROCEDURE — 99291 CRITICAL CARE FIRST HOUR: CPT

## 2025-04-09 PROCEDURE — 93010 ELECTROCARDIOGRAM REPORT: CPT | Performed by: INTERNAL MEDICINE

## 2025-04-09 PROCEDURE — 36415 COLL VENOUS BLD VENIPUNCTURE: CPT

## 2025-04-09 PROCEDURE — 95720 EEG PHY/QHP EA INCR W/VEEG: CPT | Performed by: STUDENT IN AN ORGANIZED HEALTH CARE EDUCATION/TRAINING PROGRAM

## 2025-04-09 PROCEDURE — 37799 UNLISTED PX VASCULAR SURGERY: CPT

## 2025-04-09 PROCEDURE — 82330 ASSAY OF CALCIUM: CPT | Performed by: REGISTERED NURSE

## 2025-04-09 PROCEDURE — 2020000001 HC ICU ROOM DAILY

## 2025-04-09 PROCEDURE — 95700 EEG CONT REC W/VID EEG TECH: CPT

## 2025-04-09 PROCEDURE — 2500000005 HC RX 250 GENERAL PHARMACY W/O HCPCS

## 2025-04-09 PROCEDURE — 37799 UNLISTED PX VASCULAR SURGERY: CPT | Performed by: REGISTERED NURSE

## 2025-04-09 PROCEDURE — 83930 ASSAY OF BLOOD OSMOLALITY: CPT

## 2025-04-09 PROCEDURE — 97530 THERAPEUTIC ACTIVITIES: CPT | Mod: GO

## 2025-04-09 PROCEDURE — 2500000002 HC RX 250 W HCPCS SELF ADMINISTERED DRUGS (ALT 637 FOR MEDICARE OP, ALT 636 FOR OP/ED)

## 2025-04-09 PROCEDURE — 80165 DIPROPYLACETIC ACID FREE: CPT

## 2025-04-09 PROCEDURE — 70450 CT HEAD/BRAIN W/O DYE: CPT

## 2025-04-09 RX ORDER — SODIUM CHLORIDE 9 MG/ML
50 INJECTION, SOLUTION INTRAVENOUS CONTINUOUS
Status: ACTIVE | OUTPATIENT
Start: 2025-04-09 | End: 2025-04-10

## 2025-04-09 RX ORDER — HEPARIN SODIUM 5000 [USP'U]/ML
5000 INJECTION, SOLUTION INTRAVENOUS; SUBCUTANEOUS EVERY 8 HOURS
Status: DISCONTINUED | OUTPATIENT
Start: 2025-04-09 | End: 2025-04-14

## 2025-04-09 RX ORDER — CLONIDINE HYDROCHLORIDE 0.1 MG/1
0.2 TABLET ORAL 2 TIMES DAILY
Status: DISCONTINUED | OUTPATIENT
Start: 2025-04-09 | End: 2025-04-24

## 2025-04-09 RX ADMIN — VANCOMYCIN HYDROCHLORIDE 750 MG: 750 INJECTION, SOLUTION INTRAVENOUS at 08:23

## 2025-04-09 RX ADMIN — VALPROATE SODIUM 250 MG: 100 INJECTION, SOLUTION INTRAVENOUS at 03:59

## 2025-04-09 RX ADMIN — DEXMEDETOMIDINE HYDROCHLORIDE 0.7 MCG/KG/HR: 400 INJECTION INTRAVENOUS at 09:57

## 2025-04-09 RX ADMIN — QUETIAPINE FUMARATE 12.5 MG: 25 TABLET ORAL at 20:00

## 2025-04-09 RX ADMIN — DEXAMETHASONE SODIUM PHOSPHATE 2 MG: 4 INJECTION, SOLUTION INTRA-ARTICULAR; INTRALESIONAL; INTRAMUSCULAR; INTRAVENOUS; SOFT TISSUE at 19:54

## 2025-04-09 RX ADMIN — CLONIDINE HYDROCHLORIDE 0.2 MG: 0.1 TABLET ORAL at 20:00

## 2025-04-09 RX ADMIN — HEPARIN SODIUM 5000 UNITS: 5000 INJECTION INTRAVENOUS; SUBCUTANEOUS at 15:20

## 2025-04-09 RX ADMIN — MEROPENEM 2 G: 1 INJECTION INTRAVENOUS at 12:16

## 2025-04-09 RX ADMIN — DEXMEDETOMIDINE HYDROCHLORIDE 1 MCG/KG/HR: 400 INJECTION INTRAVENOUS at 02:14

## 2025-04-09 RX ADMIN — HEPARIN SODIUM 5000 UNITS: 5000 INJECTION INTRAVENOUS; SUBCUTANEOUS at 19:55

## 2025-04-09 RX ADMIN — VALPROATE SODIUM 250 MG: 100 INJECTION, SOLUTION INTRAVENOUS at 08:23

## 2025-04-09 RX ADMIN — HEPARIN SODIUM 5000 UNITS: 5000 INJECTION INTRAVENOUS; SUBCUTANEOUS at 05:53

## 2025-04-09 RX ADMIN — PANTOPRAZOLE SODIUM 40 MG: 40 INJECTION, POWDER, FOR SOLUTION INTRAVENOUS at 08:22

## 2025-04-09 RX ADMIN — MEROPENEM 2 G: 1 INJECTION INTRAVENOUS at 05:53

## 2025-04-09 RX ADMIN — VALPROATE SODIUM 250 MG: 100 INJECTION, SOLUTION INTRAVENOUS at 20:01

## 2025-04-09 RX ADMIN — SODIUM CHLORIDE 1000 ML: 0.9 INJECTION, SOLUTION INTRAVENOUS at 03:59

## 2025-04-09 RX ADMIN — VANCOMYCIN HYDROCHLORIDE 750 MG: 5 INJECTION, POWDER, LYOPHILIZED, FOR SOLUTION INTRAVENOUS at 17:20

## 2025-04-09 RX ADMIN — VANCOMYCIN HYDROCHLORIDE 750 MG: 750 INJECTION, SOLUTION INTRAVENOUS at 01:00

## 2025-04-09 RX ADMIN — POLYETHYLENE GLYCOL 3350 17 G: 17 POWDER, FOR SOLUTION ORAL at 08:22

## 2025-04-09 RX ADMIN — CLONIDINE HYDROCHLORIDE 0.2 MG: 0.1 TABLET ORAL at 08:22

## 2025-04-09 RX ADMIN — DEXAMETHASONE SODIUM PHOSPHATE 2 MG: 4 INJECTION, SOLUTION INTRA-ARTICULAR; INTRALESIONAL; INTRAMUSCULAR; INTRAVENOUS; SOFT TISSUE at 08:22

## 2025-04-09 RX ADMIN — SODIUM CHLORIDE 1000 ML: 0.9 INJECTION, SOLUTION INTRAVENOUS at 21:27

## 2025-04-09 RX ADMIN — SODIUM CHLORIDE 50 ML/HR: 0.9 INJECTION, SOLUTION INTRAVENOUS at 17:06

## 2025-04-09 RX ADMIN — POTASSIUM PHOSPHATE, MONOBASIC AND POTASSIUM PHOSPHATE, DIBASIC 15 MMOL: 224; 236 INJECTION, SOLUTION, CONCENTRATE INTRAVENOUS at 08:22

## 2025-04-09 RX ADMIN — SENNOSIDES AND DOCUSATE SODIUM 1 TABLET: 50; 8.6 TABLET ORAL at 20:00

## 2025-04-09 RX ADMIN — MEROPENEM 2 G: 1 INJECTION INTRAVENOUS at 19:54

## 2025-04-09 RX ADMIN — VALPROATE SODIUM 250 MG: 100 INJECTION, SOLUTION INTRAVENOUS at 15:19

## 2025-04-09 RX ADMIN — INSULIN LISPRO 1 UNITS: 100 INJECTION, SOLUTION INTRAVENOUS; SUBCUTANEOUS at 23:13

## 2025-04-09 RX ADMIN — ATORVASTATIN CALCIUM 40 MG: 40 TABLET, FILM COATED ORAL at 20:00

## 2025-04-09 ASSESSMENT — COGNITIVE AND FUNCTIONAL STATUS - GENERAL
PERSONAL GROOMING: TOTAL
DRESSING REGULAR UPPER BODY CLOTHING: TOTAL
MOVING TO AND FROM BED TO CHAIR: TOTAL
HELP NEEDED FOR BATHING: TOTAL
DRESSING REGULAR UPPER BODY CLOTHING: TOTAL
MOVING FROM LYING ON BACK TO SITTING ON SIDE OF FLAT BED WITH BEDRAILS: A LITTLE
STANDING UP FROM CHAIR USING ARMS: TOTAL
DAILY ACTIVITIY SCORE: 6
MOBILITY SCORE: 8
MOBILITY SCORE: 8
DRESSING REGULAR LOWER BODY CLOTHING: TOTAL
CLIMB 3 TO 5 STEPS WITH RAILING: TOTAL
WALKING IN HOSPITAL ROOM: TOTAL
MOVING TO AND FROM BED TO CHAIR: TOTAL
HELP NEEDED FOR BATHING: TOTAL
EATING MEALS: TOTAL
TURNING FROM BACK TO SIDE WHILE IN FLAT BAD: TOTAL
TOILETING: TOTAL
STANDING UP FROM CHAIR USING ARMS: TOTAL
TOILETING: TOTAL
MOVING FROM LYING ON BACK TO SITTING ON SIDE OF FLAT BED WITH BEDRAILS: A LITTLE
TOILETING: TOTAL
WALKING IN HOSPITAL ROOM: TOTAL
EATING MEALS: TOTAL
TURNING FROM BACK TO SIDE WHILE IN FLAT BAD: TOTAL
PERSONAL GROOMING: TOTAL
HELP NEEDED FOR BATHING: TOTAL
CLIMB 3 TO 5 STEPS WITH RAILING: TOTAL
DRESSING REGULAR LOWER BODY CLOTHING: TOTAL
DRESSING REGULAR UPPER BODY CLOTHING: TOTAL
PERSONAL GROOMING: TOTAL
DRESSING REGULAR LOWER BODY CLOTHING: TOTAL
EATING MEALS: TOTAL
DAILY ACTIVITIY SCORE: 6
DAILY ACTIVITIY SCORE: 6

## 2025-04-09 ASSESSMENT — PAIN SCALES - GENERAL
PAINLEVEL_OUTOF10: 0 - NO PAIN

## 2025-04-09 ASSESSMENT — PAIN - FUNCTIONAL ASSESSMENT
PAIN_FUNCTIONAL_ASSESSMENT: 0-10
PAIN_FUNCTIONAL_ASSESSMENT: CPOT (CRITICAL CARE PAIN OBSERVATION TOOL)
PAIN_FUNCTIONAL_ASSESSMENT: 0-10
PAIN_FUNCTIONAL_ASSESSMENT: CPOT (CRITICAL CARE PAIN OBSERVATION TOOL)

## 2025-04-09 NOTE — SIGNIFICANT EVENT
Preliminary/Baseline EEG Report:    - This vEEG is indicative of a right hemisphere structural lesion and a  severe diffuse encephalopathy. No epileptiform activity is recorded in this baseline file.          This EEG was read until 12:03 on 04/09/25 .       The final impression will be available tomorrow under Chart Review in the Media tab.     Lewis Rea DO  Adult Epilepsy Fellow  [facility] [place]

## 2025-04-09 NOTE — PROGRESS NOTES
[facility]  NEUROSCIENCE INTENSIVE CARE UNIT  DAILY PROGRESS NOTE       Patient Name: Enmanuel Ahumada   MRN: [id number]     Admit Date: 2025     : 1955 AGE: 69 y.o. GENDER: male      Subjective    69 year-old male with past medical history of hypertension, hyperlipidemia, smalls's esophagus, and RO GBM s/p resection (2023) with resultant left HH s/p chemotherapy/radiation therapy who presented with tumor progression. Of note, patient recently s/p redo craniotomy for resection (25). Post-operative MRI demonstrated fluid collection at dura and craniotomy site, worsening infiltrative disease, and enhancement along posteromedial aspect of resection site. Patient presented on 25 with 1 week of incisional drainage. On 25, MRI brain demonstrated increased cavity enhancement and diffusion restriction. Patient admitted to NSU post-operative for frequent monitoring and resuscitation as needed.    Significant Events:  - : S/p right cranial wound washout/mesh cranioplasty (kaleb purulence), post-operatively found to have left temporal ICH, thrombocytopenic s/p 2units platelets  - : CTH with minimally increased ICH, repeat CTH stable, EEG negative/discontinued  - : PICC line placed    Interval Events: NAEON.    Objective   VITALS:  Temp:  [36.8 °C (98.2 °F)-37.2 °C (99 °F)] 37.2 °C (99 °F)  Heart Rate:  [] 78  Resp:  [16-26] 18  BP: ()/() 104/74  INTAKE/OUTPUT:  Intake/Output Summary (Last 24 hours) at 2025 1019  Last data filed at 2025 0700  Gross per 24 hour   Intake 3560.61 ml   Output 4265 ml   Net -704.39 ml         PHYSICAL EXAM:  NEURO:  - Intermittently agitated, oriented x0, aphasic, does not follow commands  - EONS, pupils 2mm/reactive bilaterally  - SANCHEZ spontaneously/AG  CV:  - RRR on telemetry, NSR-ST  RESP:  - Regular, unlabored  - Oxygen: RA  :  - Indwelling catheter in place  GI:  - Abdomen NT/ND, soft  SKIN:  - Surgical site  clean/dry/intact  - Left hand ecchymosis  - 1 post-operative drain in place    MEDICATIONS:  Scheduled: PRN: Continuous:   atorvastatin, 40 mg, nasogastric tube, Daily  cloNIDine, 0.2 mg, nasogastric tube, BID  dexAMETHasone, 2 mg, intravenous, q12h  heparin (porcine), 5,000 Units, subcutaneous, q8h  insulin lispro, 0-5 Units, subcutaneous, q4h  meropenem, 2 g, intravenous, q8h  pantoprazole, 40 mg, intravenous, Daily  polyethylene glycol, 17 g, nasogastric tube, Daily  potassium phosphate, 15 mmol, intravenous, Once  QUEtiapine, 12.5 mg, nasogastric tube, Nightly  sennosides-docusate sodium, 1 tablet, nasogastric tube, Nightly  valproate sodium, 250 mg, intravenous, q6h  vancomycin, 750 mg, intravenous, q8h     PRN medications: bisacodyl, calcium gluconate, calcium gluconate, dextrose, dextrose, glucagon, glucagon, HYDROmorphone, hydrOXYzine HCL, magnesium sulfate, magnesium sulfate, ondansetron **OR** ondansetron, oxygen, potassium chloride, vancomycin dexmedeTOMIDine, 0.1-1.5 mcg/kg/hr, Last Rate: 0.7 mcg/kg/hr (04/09/25 0957)  sodium chloride 0.9%, 75 mL/hr, Last Rate: 75 mL/hr (04/08/25 1731)         LAB RESULTS:  Results from last 72 hours   Lab Units 04/09/25  0112 04/08/25  1347 04/08/25  0137   GLUCOSE mg/dL 105* 74 121*   SODIUM mmol/L 132* 137 138   POTASSIUM mmol/L 3.7 4.1 3.9   CHLORIDE mmol/L 99 102 106   CO2 mmol/L 25 23 26   ANION GAP mmol/L 12 16 10   BUN mg/dL 16 16 17   CREATININE mg/dL 0.57 0.59 0.46*   EGFR mL/min/1.73m*2 >90 >90 >90   CALCIUM mg/dL 8.2* 8.6 8.0*   PHOSPHORUS mg/dL 2.0* 2.4* 2.8   ALBUMIN g/dL 3.5 3.5 3.0*   MAGNESIUM mg/dL 2.13  --  2.27   POCT CALCIUM IONIZED (MMOL/L) IN BLOOD mmol/L 1.12  --  1.16      Results from last 72 hours   Lab Units 04/09/25  0112 04/08/25  1653   WBC AUTO x10*3/uL 3.9* 3.9*   NRBC AUTO /100 WBCs 0.0 0.0   RBC AUTO x10*6/uL 3.18* 2.98*   HEMOGLOBIN g/dL 10.1* 9.8*   HEMATOCRIT % 27.5* 26.3*   MCV fL 87 88   MCH pg 31.8 32.9   MCHC g/dL 36.7* 37.3*    RDW % 12.9 13.0   PLATELETS AUTO x10*3/uL 132* 130*      Results from last 72 hours   Lab Units 04/09/25  0112 04/08/25  1653 04/08/25  0137   WBC AUTO x10*3/uL 3.9* 3.9* 4.2*   NRBC AUTO /100 WBCs 0.0 0.0 0.0   RBC AUTO x10*6/uL 3.18* 2.98* 3.03*   HEMOGLOBIN g/dL 10.1* 9.8* 10.0*   HEMATOCRIT % 27.5* 26.3* 26.5*   MCV fL 87 88 88   MCH pg 31.8 32.9 33.0   MCHC g/dL 36.7* 37.3* 37.7*   RDW % 12.9 13.0 12.9   PLATELETS AUTO x10*3/uL 132* 130* 90*   NEUTROS PCT AUTO % 73.7  --  80.4   IG PCT AUTO % 1.5*  --  1.0*   LYMPHS PCT AUTO % 17.9  --  13.4   MONOS PCT AUTO % 6.1  --  4.8   EOS PCT AUTO % 0.3  --  0.2   BASOS PCT AUTO % 0.5  --  0.2   NEUTROS ABS x10*3/uL 2.89  --  3.36   IG AUTO x10*3/uL 0.06  --  0.04   LYMPHS ABS AUTO x10*3/uL 0.70*  --  0.56*   MONOS ABS AUTO x10*3/uL 0.24  --  0.20   EOS ABS AUTO x10*3/uL 0.01  --  0.01   BASOS ABS AUTO x10*3/uL 0.02  --  0.01     Results from last 72 hours   Lab Units 04/09/25  0112 04/08/25  1347 04/08/25  0137 04/07/25  1405 04/07/25  1001   ALK PHOS U/L  --   --   --  48  --    BILIRUBIN TOTAL mg/dL  --   --   --  1.5* 1.4*   BILIRUBIN DIRECT mg/dL  --   --   --  0.3 0.3   PROTEIN TOTAL g/dL  --   --   --  4.4*  --    ALT U/L  --   --   --  44  --    AST U/L  --   --   --  17  --    ALBUMIN g/dL 3.5 3.5   < > 2.8*  2.8* 2.8*    < > = values in this interval not displayed.      Results from last 7 days   Lab Units 04/06/25  1007 04/06/25  1006 04/06/25  1005 04/05/25  1234   GRAM STAIN  (4+) Abundant Polymorphonuclear leukocytes*  (4+) Abundant Gram positive cocci* (3+) Moderate Polymorphonuclear leukocytes*  (2+) Few Gram positive cocci*   < >  --    TISSUE/WOUND CULTURE/SMEAR  (4+) Abundant Staphylococcus epidermidis* (2+) Few Staphylococcus epidermidis*   < >  --    BLOOD CULTURE   --   --   --  No growth at 3 days  No growth at 3 days    < > = values in this interval not displayed.     IMAGING RESULTS:  CT head wo IV contrast   Final Result   1.  Redemonstrated postsurgical changes of right parietal craniectomy   and mesh cranioplasty. Findings are overall similar compared to the   prior exam, with a mild decreased size of the left temporal   intraparenchymal hemorrhage, and decreased conspicuity of   subarachnoid hemorrhage along the right cerebral convexity.   2. No new mass effect or midline shift.             I personally reviewed the image(s)/study and resident interpretation.   I agree with the findings as stated by resident Gaurav Martinez.   Data analyzed and images interpreted at [facility], [place], [place].        MACRO:   None.        Signed by: Jason Payne 4/9/2025 8:19 AM   Dictation workstation:   [id number]      Bedside PICC Imaging   Final Result      XR abdomen 1 view   Final Result   1.  Enteric tube is visualized with tip projecting over expected   location of the 2nd duodenal segment.   2. Nonspecific, nonobstructive bowel gas pattern without evidence of   pneumoperitoneum within limitation of supine exam.        I personally reviewed the image(s)/study and interpretation by   Haseeb Vasquez MD (resident).        MACRO:   None        Signed by: Robert Nazario 4/9/2025 8:17 AM   Dictation workstation:   [id number]      CT head wo IV contrast   Final Result   Similar postoperative change of right parieto-occipital resection. As   compared with prior examination there is mildly increased gaseous   distention of the temporal horn of the right lateral ventricle and   the frontal horn of the left lateral ventricle. The ventricles are   otherwise unchanged in caliber.        Similar appearance of left temporal lobe intraparenchymal hematoma.   Multifocal subarachnoid hemorrhage with decreasing conspicuity,   particularly within the right frontal convexity. No new or enlarging   intracranial hemorrhage in the interim. Similar degree of vasogenic   edema and mass effect.        MACRO:   None        Signed  by: Jacky Contreras 4/8/2025 8:39 AM   Dictation workstation:   [id number]      CT head wo IV contrast   Final Result   Redemonstration of postoperative change status post right posterior   craniotomy for revision/debulking of right parieto-occipital   glioblastoma.        Stable appearance of resection cavity with layering free fluid, trace   superimposed hemorrhage, and indwelling catheter. Cavity again   appears to communicate with the atrium of the right lateral ventricle.        Left temporal intraparenchymal hemorrhage measuring up to 5.4 cm is   stable in size compared to head CT performed 3 hours prior but   increased in size compared to head CT 04/06/2025. Mild surrounding   vasogenic edema without uncal herniation.        Scattered left-greater-than-right cerebral hemispheric subarachnoid   hemorrhages most notably involving the left temporal and occipital   lobes, also similar to CT 3 hours prior but increased compared to CT   completed 04/06/2025.        Intraventricular pneumocephalus as well as trace layering   intraventricular hemorrhage is similar to previous CT.        2-3 mm leftward midline shift, also unchanged from previous CT.        Signed by: Gen Phelps 4/7/2025 10:54 AM   Dictation workstation:   [id number]      CT head wo IV contrast   Final Result   Redemonstration of postoperative change status post right posterior   craniotomy for revision/debulking of right parieto-occipital   glioblastoma.        Stable appearance of resection cavity with layering free fluid, trace   superimposed hemorrhage, and indwelling catheter. Cavity again   appears to communicate with the atrium of the right lateral ventricle.        Left temporal intraparenchymal hemorrhage measuring up to 5.4 cm is   increased in size compared to head CT 04/06/2025. Mild surrounding   vasogenic edema without uncal herniation.        Scattered left-greater-than-right cerebral hemispheric subarachnoid   hemorrhages most  notably involving the left temporal and occipital   lobes, increased compared to CT completed 04/06/2025.        Intraventricular pneumocephalus; trace layering intraventricular   hemorrhage is new compared to previous CT.        Signed by: Gen Phelps 4/7/2025 11:00 AM   Dictation workstation:   [id number]      CT head wo IV contrast   Final Result   There has been intercurrent formation of an intraparenchymal hematoma   within the left temporal lobe with superimposed subarachnoid   hemorrhage involving the left temporal and occipital lobes. There is   early downward transtentorial herniation as demonstrated by narrowing   of the left crural cistern.        Interval washout of right parieto-occipital surgical cavity with   placement of overlying cranial mesh. Washout cavity communicates with   the atrium of the right lateral ventricle with associated air foci   within the frontal horn bilaterally.        I personally reviewed the image(s)/study and interpretation by   Haseeb Vasquez MD (resident).        MACRO:   Haseeb Vasquez discussed the significance and urgency of this critical   finding by telephone with YAHIR ANGEL on 4/6/2025 at 5:52 pm.   (**-RCF-**) Findings:  See findings.        Signed by: Curt Santiago 4/6/2025 6:06 PM   Dictation workstation:   [id number]      MR brain w and wo IV contrast   Final Result   1.  Postsurgical changes status post right posterior craniotomy for   glioblastoma debulking in the right parieto-occipital lobe.   2.  Interval increase in extra-axial fluid collection overlying the   deep surgical craniotomy bed with focal areas of diffusion   restriction and peripheral enhancement. Additionally there is   interval development of layering contents deep within surgical cavity   and surrounding the cavity which demonstrate diffusion restriction.   Overall the constellation of findings are suggestive of superimposed   infection/abscess particularly given clinical suspicion of  such.   3. Additional findings remain similar compared to prior imaging and   as described above.             I personally reviewed the images/study and I agree with the findings   as stated. This study was interpreted at [facility], [place], [place].        MACRO:   None        Signed by: Tony Terrazas 4/6/2025 7:16 AM   Dictation workstation:   [id number]      XR chest 1 view   Final Result   1.  No evidence of acute cardiopulmonary process.        I personally reviewed the images/study and I agree with the findings   as stated by Dayo Romero MD (resident).        MACRO:   None        Signed by: Marbin Cameron 4/5/2025 12:56 PM   Dictation workstation:   [id number]        Assessment/Plan    NEURO:  #GBM s/p resection (12/2023, 2/20/2025)  #Craniotomy site with incisional drainage, s/p cranial wound washout (4/6/2025)  #New left temporal ICH  #Agitation  Assessment:  - Neurologically: see above  - See above history and significant events  - 1 post-operative drain in place  Plan:  - NSU  - Neuro Checks: Q1H (Q4H at night)  - CTH this AM  - EEG  - SDD discontinued  - SGD off suction  - Sedation: Dexmedetomidine, wean as tolerated  - Increase clonidine 0.2mg BID, quetiapine 12.5mg nightly, hydroxyzine HCL 10mg PRN  - Drain management per neurosurgery  - Dexamethasone 2mg BID  - Valproate load (1g), followed by maintenance 250mg Q6H  - Pain: hydromorphone PRN  - Nausea: ondansetron PRN  - PT/OT    CARDIOVASCULAR:  #HLD  Assessment:  - Hemodynamically stable  - 12/30/23 ECHO: ejection fraction 60%  Plan:  - Continue to monitor on telemetry  - Daily EKG to monitor QTc  - Atorvastatin 40mg daily  - BP goal: SBP < 160    RESPIRATORY:  #No active issues  Assessment:  - RA  Plan:  - Continuous pulse oximetry   - O2 PRN to maintain SpO2 > 94%, wean as tolerated  - Incentive spirometry while awake    RENAL/:  #No active issues  Assessment:  Results from last 72 hours   Lab Units  25  0112 25  1347   BUN mg/dL 16 16   CREATININE mg/dL 0.57 0.59   Plan:  - Monitor with daily RFP  - Maintain indwelling catheter for: perioperative use for selected surgical procedures    FEN/GI:  #Hyponatremia  #Hypophosphatemia  #Hypocalcemia  #Hypomagnesemia, resolved  Assessment:  - Last BM Date: PTA  Plan:  - Monitor and replace electrolytes per protocol  - IVF: NS @ 75 mL/hr  - Diet: NPO Diet, start tube feeding today  - Bowel regimen: Miralax, Carissa-Colace, bisacodyl suppository PRN    ENDOCRINE:  #No active issues  Assessment:  Results from last 7 days   Lab Units 25  0727 25  0333 25  0112 25  2331 25  1956 25  1719 25  1347 25  1344 25  0409 25  0137 25  1549 25  1405 25  1218 25  1001 25  0804 25  0023   POCT GLUCOSE mg/dL 84 101*  --  93 103* 90  --  85   < >  --    < >  --    < >  --    < >  --    GLUCOSE mg/dL  --   --  105*  --   --   --  74  --   --  121*  --  128*  --  114*  --  160*   SODIUM mmol/L  --   --  132*  --   --   --  137  --   --  138  --  137  --  138  --  136    < > = values in this interval not displayed.   Plan:  - Accuchecks & ISS Q4H while NPO    HEMATOLOGY:  #Anemia, stable  #Thrombocytopenia, improved  Assessment:  Results from last 7 days   Lab Units 25  0112 25  1653 25  0137   HEMOGLOBIN g/dL 10.1* 9.8* 10.0*   HEMATOCRIT % 27.5* 26.3* 26.5*   PLATELETS AUTO x10*3/uL 132* 130* 90*   - /6: Transfused with 2units platelets overnight, post-transfusion CBC above goal  - : Transfused with 1unit platelets overnight  Plan:  - Continue to monitor with daily CBC and Coag panel    INFECTIOUS DISEASE:  #Intracranial wound infection  Assessment:  Results from last 7 days   Lab Units 25  0112 25  1653 25  0137   WBC AUTO x10*3/uL 3.9* 3.9* 4.2*   - Temp (24hrs), Av.1 °C (98.7 °F), Min:36.8 °C (98.2 °F), Max:37.2 °C (99 °F)  - Afebrile  -  Blood cultures negative  - OR gram stains with gram positive cocci, cultures positive for staphylococcus epidermidis   Plan:  - Continue to monitor for s/sx of infection  - Pan culture for temperature > 38.4 C  - ID consulted, IV antibiotics until 6/15 (final recommendations pending sensitivity)  - Continue to follow infectious work-up  - Continue meropenem 2g Q8H, vancomycin (dosed per pharmacy)    MUSCULOSKELETAL:  - No acute issues    SKIN:  - No acute issues  - Turns and skin care per NSU protocol    ACCESS:  - PIVs  - PICC line    PROPHYLAXIS:  - DVT Ppx: SCDs, SQH  - GI Ppx: Pantoprazole    RESTRAINTS:  I agree with nursing assessment of the patient's need for restraints to protect the patient from injury and facilitate healing. The patient is unable to cooperate with the plan of care and at risk for disrupting critical therapy (i.e., removing medical devices, lines, tubes and/or dressings).  Please see order for specifics. Restraints can be removed when the patient is able to cooperate with plan of care and allow healing to occur, or the medical devices at risk are discontinued by the medical team.     ALEXA Cano-CNP  Neuroscience Intensive Care    Total critical care time of 60 minutes, with > 50% of time spent in direct contact with patient/family for education, counseling and coordination of care.

## 2025-04-09 NOTE — PROGRESS NOTES
"Music Therapy Note    Enmanuel Evans \"Dash\" Zita     Therapy Session  Referral Type: New referral this admission  Visit Type: New visit  Session Start Time: 1114  Session End Time: 1115  Intervention Delivery: In-person  Conflict of Service: Declined treatment  Family Present for Session: Spouse/Significant Other               Treatment/Interventions       Post-assessment  Total Session Time (min): 1 minutes    Narrative  Assessment Detail: Pt's wife, Brittany, at bedside when MT attempted a session at prearranged time. Pt was preoccupied with staff so Brittany declined a session at this time. Brittany is aware of how to reach MT until another music therapy session is attempted again.    Education Documentation  No documentation found.          "

## 2025-04-09 NOTE — CARE PLAN
Problem: Fall/Injury  Goal: Not fall by end of shift  Outcome: Progressing  Goal: Be free from injury by end of the shift  Outcome: Progressing     Problem: Skin  Goal: Decreased wound size/increased tissue granulation at next dressing change  Outcome: Progressing  Goal: Promote skin healing  Outcome: Progressing     Problem: Pain - Adult  Goal: Verbalizes/displays adequate comfort level or baseline comfort level  Outcome: Progressing     Problem: Chronic Conditions and Co-morbidities  Goal: Patient's chronic conditions and co-morbidity symptoms are monitored and maintained or improved  Outcome: Progressing     Problem: Safety - Medical Restraint  Goal: Remains free of injury from restraints (Restraint for Interference with Medical Device)  Outcome: Progressing

## 2025-04-09 NOTE — CARE PLAN
Problem: Fall/Injury  Goal: Not fall by end of shift  Outcome: Progressing  Goal: Be free from injury by end of the shift  Outcome: Progressing     Problem: Skin  Goal: Decreased wound size/increased tissue granulation at next dressing change  Outcome: Progressing  Goal: Promote/optimize nutrition  Outcome: Progressing  Goal: Promote skin healing  Outcome: Progressing     Problem: Pain - Adult  Goal: Verbalizes/displays adequate comfort level or baseline comfort level  Outcome: Progressing     Problem: Chronic Conditions and Co-morbidities  Goal: Patient's chronic conditions and co-morbidity symptoms are monitored and maintained or improved  Outcome: Progressing     Problem: Safety - Medical Restraint  Goal: Remains free of injury from restraints (Restraint for Interference with Medical Device)  Outcome: Progressing  Goal: Free from restraint(s) (Restraint for Interference with Medical Device)  Outcome: Progressing

## 2025-04-09 NOTE — CARE PLAN
Interprofessional Rounds    Summary:  Hx GBM resection and here for wound washout. IV antibiotics, on precedex gtt.  Wife is next of kin.     Participants: Advance Practice Provider, Occupational Therapist, Physical Therapist, RN, and     Care Plan Reviewed with:  Interdisciplinary Team

## 2025-04-09 NOTE — CONSULTS
"Nutrition Initial Assessment:   Nutrition Assessment    Reason for Assessment: Initiate and manage tube feeding    Patient is a 69 y.o. male presenting with incisional drainage x 1 week.  4/5: MRI brain demonstrated increased cavity enhancement and diffusion restriction   4/6: s/p right cranial wound washout/mesh cranioplasty (kaleb purulence), post-operatively found to have left temporal ICH, thrombocytopenic s/p 2units platelets  - 4/7: CTH with minimally increased ICH    Corpak placed yesterday (KUB reads 2nd portion duodenum). Isosource 1.5 currently running @ 10ml/hr.     PMH: HTN, HLD, smalls's esophagus, RO GBM s/p resection ([facility], 12/2023) with resultant L HH s/p chemo/RT presenting with tumor progression, 2/20 s/p redo craniotomy for tumor resection w 5 ALA, CTH POC, 2/21 MRI intended NTR, 3/16 MRI postop changes w fluid collection at dura and craniotomy site, worsening infiltrative disease, enhancement along posteromedial aspect of resection site         Nutrition History:  Food and Nutrient History: Pt'swifereported good intake PTA. Mentioned that he typically will drink a Premier Protein shake and toast or have the shake mixed in a smoothie or coffee for breakfast; for lunch will have half a sandwich and fruit; dinner will be slaad and either chicken or seafood. Mentioned that he likes to snack on cookies, chips, and fruit       Anthropometrics:  Height: 170.2 cm (5' 7\")   Weight: 65.3 kg (143 lb 15.4 oz)   BMI (Calculated): 22.54  IBW/kg (Dietitian Calculated): 67.3 kg  Percent of IBW: 97 %    Weight History:   Wt Readings from Last 20 Encounters:   04/09/25 65.3 kg (143 lb 15.4 oz)   04/04/25 68.1 kg (150 lb 2.1 oz)   03/31/25 69.1 kg (152 lb 5.4 oz)   03/19/25 72.6 kg (160 lb)   03/15/25 70.8 kg (156 lb)   03/07/25 71.1 kg (156 lb 11.2 oz)   02/22/25 73.6 kg (162 lb 4.1 oz)   02/07/25 72.5 kg (159 lb 14.4 oz)   02/03/25 72.6 kg (160 lb)   02/02/25 74.4 kg (164 lb)   01/30/25 74.5 kg (164 lb 3.9 oz) "   01/06/25 78.7 kg (173 lb 9.6 oz)   01/27/25 73.9 kg (163 lb)   01/06/25 77.8 kg (171 lb 7.9 oz)   11/22/24 73 kg (161 lb)   11/01/24 75.9 kg (167 lb 5.3 oz)   09/25/24 70.8 kg (156 lb 1.6 oz)   09/16/24 70.3 kg (154 lb 15.7 oz)   09/09/24 68.9 kg (152 lb)   08/08/24 70.2 kg (154 lb 12.2 oz)     Weight Change %:  Weight History / % Weight Change: Pt's wife denied any weight loss over the past several months. Per documented weights, there are variable weights that seem as though he may have had significant wt loss over the past month; howeer, unclear if current weight is accurate    Nutrition Focused Physical Exam Findings:    Subcutaneous Fat Loss:   Orbital Fat Pads: Defer  Buccal Fat Pads: Mild-Moderate (flat cheeks, minimal bounce)  Triceps: Mild-Moderate (less than ample fat tissue)  Ribs: Defer  Muscle Wasting:  Temporalis: Defer  Pectoralis (Clavicular Region): Severe (protruding prominent clavicle)  Deltoid/Trapezius: Severe (squared shoulders, acromion process prominent)  Interosseous: Mild-Moderate (slightly depressed area between thumb and forefinger)  Trapezius/Infraspinatus/Supraspinatus (Scapular Region): Defer  Quadriceps: Mild-Moderate (mild depression on inner and outer thigh)  Gastrocnemius: Mild-Moderate (not well developed muscle)    Nutrition Significant Labs:  CBC Trend:   Results from last 7 days   Lab Units 04/09/25  0112 04/08/25  1653 04/08/25  0137 04/07/25  0444   WBC AUTO x10*3/uL 3.9* 3.9* 4.2* 5.5   RBC AUTO x10*6/uL 3.18* 2.98* 3.03* 3.11*   HEMOGLOBIN g/dL 10.1* 9.8* 10.0* 10.1*   HEMATOCRIT % 27.5* 26.3* 26.5* 27.7*   MCV fL 87 88 88 89   PLATELETS AUTO x10*3/uL 132* 130* 90* 103*    , BMP Trend:   Results from last 7 days   Lab Units 04/09/25  1333 04/09/25  0112 04/08/25  1347 04/08/25  0137   GLUCOSE mg/dL 124* 105* 74 121*   CALCIUM mg/dL 8.3* 8.2* 8.6 8.0*   SODIUM mmol/L 130* 132* 137 138   POTASSIUM mmol/L 3.9 3.7 4.1 3.9   CO2 mmol/L 24 25 23 26   CHLORIDE mmol/L 98 99 102  106   BUN mg/dL 14 16 16 17   CREATININE mg/dL 0.69 0.57 0.59 0.46*    , A1C:  Lab Results   Component Value Date    HGBA1C 5.3 08/12/2024   , BG POCT trend:   Results from last 7 days   Lab Units 04/09/25  1515 04/09/25  1114 04/09/25  0727 04/09/25  0333 04/08/25  2331   POCT GLUCOSE mg/dL 128* 138* 84 101* 93    , Renal Lab Trend:   Results from last 7 days   Lab Units 04/09/25  1333 04/09/25  0112 04/08/25  1347 04/08/25  0137   POTASSIUM mmol/L 3.9 3.7 4.1 3.9   PHOSPHORUS mg/dL 2.7 2.0* 2.4* 2.8   SODIUM mmol/L 130* 132* 137 138   MAGNESIUM mg/dL  --  2.13  --  2.27   EGFR mL/min/1.73m*2 >90 >90 >90 >90   BUN mg/dL 14 16 16 17   CREATININE mg/dL 0.69 0.57 0.59 0.46*     Scheduled medications  atorvastatin, 40 mg, nasogastric tube, Daily  cloNIDine, 0.2 mg, nasogastric tube, BID  dexAMETHasone, 2 mg, intravenous, q12h  heparin (porcine), 5,000 Units, subcutaneous, q8h  insulin lispro, 0-5 Units, subcutaneous, q4h  meropenem, 2 g, intravenous, q8h  pantoprazole, 40 mg, intravenous, Daily  polyethylene glycol, 17 g, nasogastric tube, Daily  QUEtiapine, 12.5 mg, nasogastric tube, Nightly  sennosides-docusate sodium, 1 tablet, nasogastric tube, Nightly  valproate sodium, 250 mg, intravenous, q6h  vancomycin, 750 mg, intravenous, q8h      Continuous medications  dexmedeTOMIDine, 0.1-1.5 mcg/kg/hr, Last Rate: 0.4 mcg/kg/hr (04/09/25 1542)  sodium chloride 0.9%, 50 mL/hr, Last Rate: 50 mL/hr (04/09/25 1706)        Dietary Orders (From admission, onward)       Start     Ordered    04/09/25 1621  Enteral feeding with NPO Isosource 1.5; ND (nasoduodenal tube); 10; 150; Saline; Every 6 hours  Diet effective now        Question Answer Comment   Tube feeding formula: Isosource 1.5    Feeding route: ND (nasoduodenal tube)    Tube feeding continuous rate (mL/hr): 10    Tube feeding flush (mL): 150    Flush type: Saline    Flush frequency: Every 6 hours        04/09/25 1620    04/05/25 1229  May Participate in Room Service   ( ROOM SERVICE MAY PARTICIPATE)  Once        Question:  .  Answer:  Yes    04/05/25 1228                     Estimated Needs:   Total Energy Estimated Needs in 24 hours (kCal): 1950 kCal  Method for Estimating Needs: 30kcal/kg per act wt  Total Protein Estimated Needs in 24 Hours (g): 95 g  Method for Estimating 24 Hour Protein Needs: 1.5g/kg per act wt  Total Fluid Estimated Needs in 24 Hours (mL):  (per team)        Nutrition Diagnosis   Malnutrition Diagnosis  Patient has Malnutrition Diagnosis: Yes  Diagnosis Status: New  Malnutrition Diagnosis: Moderate malnutrition related to chronic disease or condition  Related to: GBM with progression, chemo, s/p crani  As Evidenced by: moderate muscle wasting and fat loss, intake likely meeting <75% of increased needs for >/=1 month    Nutrition Diagnosis  Patient has Nutrition Diagnosis: Yes  Diagnosis Status (1): New  Nutrition Diagnosis 1: Increased nutrient needs  Related to (1): increased metabolic demand  As Evidenced by (1): s/p right cranial wound washout/mesh cranioplasty       Nutrition Interventions/Recommendations   Nutrition prescription for enteral nutrition    Nutrition Recommendations:  Continue to increase Isosource 1.5 by 10mls every 10hrs to reach goal of 55ml/hr.   Additional water flushes per team   TF provides ~1L free H2O    If requires less free water from TF:  TwoCal HN @ 40ml/hr + 1 packet Pro Stat AWC is goal  -- Start @ 10ml/hr, increase by 10mls every 12hrs     Nutrition Interventions/Goals:   Enteral Intake: Management of delivery rate of enteral nutrition  Goal: TF provides 1980kcals and 90g pro       Nutrition Monitoring and Evaluation   Food/Nutrient Related History Monitoring  Monitoring and Evaluation Plan: Enteral and parenteral nutrition intake determination  Enteral and Parenteral Nutrition Intake Determination: Enteral nutrition intake - Tolerate TF at goal rate    Goal Status: New goal(s) identified    Time Spent (min): 60  minutes

## 2025-04-09 NOTE — PROGRESS NOTES
"Enmanuel Ahumada \"Petey" is a 69 y.o. male on day 4 of admission presenting with Wound dehiscence.    Subjective   Improving        Objective     Physical Exam    EONS  Ox0  Spont x4 AG   Drains in place  Incision c/d/i    Last Recorded Vitals  Blood pressure 122/81, pulse (!) 112, temperature 37.2 °C (99 °F), temperature source Temporal, resp. rate 19, height 1.702 m (5' 7\"), weight 65.3 kg (143 lb 15.4 oz), SpO2 99%.  Intake/Output last 3 Shifts:  I/O last 3 completed shifts:  In: 4793.4 (66.8 mL/kg) [I.V.:3160.9 (44 mL/kg); IV Piggyback:1632.5]  Out: 6972 (97.1 mL/kg) [Urine:6755 (2.6 mL/kg/hr); Drains:217]  Weight: 71.8 kg     Relevant Results  Results from last 72 hours   Lab Units 04/09/25  0112 04/08/25  1653   WBC AUTO x10*3/uL 3.9* 3.9*   NRBC AUTO /100 WBCs 0.0 0.0   RBC AUTO x10*6/uL 3.18* 2.98*   HEMOGLOBIN g/dL 10.1* 9.8*   HEMATOCRIT % 27.5* 26.3*   MCV fL 87 88   MCH pg 31.8 32.9   MCHC g/dL 36.7* 37.3*   RDW % 12.9 13.0   PLATELETS AUTO x10*3/uL 132* 130*      Results from last 72 hours   Lab Units 04/09/25  0112 04/08/25  1347 04/08/25  0137   GLUCOSE mg/dL 105* 74 121*   SODIUM mmol/L 132* 137 138   POTASSIUM mmol/L 3.7 4.1 3.9   CHLORIDE mmol/L 99 102 106   CO2 mmol/L 25 23 26   ANION GAP mmol/L 12 16 10   BUN mg/dL 16 16 17   CREATININE mg/dL 0.57 0.59 0.46*   EGFR mL/min/1.73m*2 >90 >90 >90   CALCIUM mg/dL 8.2* 8.6 8.0*   PHOSPHORUS mg/dL 2.0* 2.4* 2.8   ALBUMIN g/dL 3.5 3.5 3.0*   MAGNESIUM mg/dL 2.13  --  2.27   POCT CALCIUM IONIZED (MMOL/L) IN BLOOD mmol/L 1.12  --  1.16          Assessment/Plan   Assessment & Plan  Wound dehiscence    69 M h/o HTN, HLD, smalls's esophagus, RO GBM s/p resection ([facility], 12/2023) with resultant L HH s/p chemo/RT presenting with tumor progression, 2/20 s/p redo craniotomy for tumor resection w 5 ALA, CTH POC, 2/21 MRI intended NTR, 3/16 MRI postop changes w fluid collection at dura and craniotomy site, worsening infiltrative disease, enhancement along " posteromedial aspect of resection site, 4/5 p/w 1wk incisional drainage, 4/4 CTH airfluid level in resection cavity, 4/5 MRI brain w/wo incr'd resection cavity enhancement and diffusion restriction  4/6 s/p R cranial wound washout and mesh cranioplasty (klaeb purulence), CTH POC, L temporal ICH, plts 81 s/p 2u plts, 4/7 rCTH incr SAH, min incr ICH, rrCTH stable, EEG neg dc'd  4/8 s/p PICC    Plan:  NSU  CTH this AM   SDD to 20  Maintain and monitor SGD  ID recs - IV abx until 6/15 (final recs pending sensitivity)  OR cultures   Bcx   Wean precedex  Ok to work with PTOT- rehab    Arcelia Chester MD

## 2025-04-09 NOTE — PROGRESS NOTES
"Occupational Therapy    Occupational Therapy Treatment    Name: Enmanuel Ahumada \"Dash\"  MRN: [id number]  : 1955  Date: 25  Room:       Time Calculation  Start Time: 1336  Stop Time: 1351  Time Calculation (min): 15 min    Assessment:  OT Assessment: Decreased strength, endurance, coordination, balance, cognition all limiting occupational performance. Would benefit from skilled services to maximize functional potential.  Prognosis: Good  Barriers to Discharge Home: Caregiver assistance, Cognition needs, Physical needs  Caregiver Assistance: Caregiver assistance needed per identified barriers - however, level of patient's required assistance exceeds assistance available at home  Cognition Needs: Insight of patient limited regarding functional ability/needs, Cognition-related high falls risk  Physical Needs: 24hr mobility assistance needed, 24hr ADL assistance needed  Evaluation/Treatment Tolerance: Patient tolerated treatment well  Medical Staff Made Aware: Yes  End of Session Communication: Bedside nurse  End of Session Patient Position: Bed, 4 rail up, Alarm on (B soft wrist restraints  and L mitt in place.)  Plan:  Treatment Interventions: ADL retraining, Functional transfer training, Endurance training, UE strengthening/ROM, Cognitive reorientation, Neuromuscular reeducation, Fine motor coordination activities, Compensatory technique education  OT Frequency: 4 times per week  OT Discharge Recommendations: High intensity level of continued care  Equipment Recommended upon Discharge:  (TBD)  OT Recommended Transfer Status: Moderate assist  OT - OK to Discharge: Yes    Subjective   General:  OT Last Visit  OT Received On: 25  Reason for Referral: Pt initially presented with tumor progression (RO GBM), s/p redo craniotomy for tumor resection in 2025.  p/w 1wk incisional drainage,  CTH airfluid level in resection cavity,  MRI brain w/wo incr'd resection cavity enhancement and " diffusion restriction  4/6 s/p R cranial wound washout and mesh cranioplasty. 4/7 rCTH incr SAH, min incr ICH, rrCTH stable, EEG neg dc'd.  Prior to Session Communication: Bedside nurse  Patient Position Received: Bed, 4 rail up, Alarm off, not on at start of session (B soft wrist restraints and L soft mitt in place)  Family/Caregiver Present: Yes  Caregiver Feedback: Wife present and supportive  General Comment: Pt unable to maintain alertness. Able to open eyes to command, but only able to maintain for short duration. No active participation. vEEG in place. (Dex .5)   Precautions:  Medical Precautions: Fall precautions  Precautions Comment: SBP<160  Vitals:   Date/Time Vitals Session Patient Position Pulse Resp SpO2 BP MAP (mmHg)    04/09/25 1300 --  --  79  17  96 %  102/59  68     04/09/25 1336 Pre OT  Lying  90  28  99 %  102/59  68     04/09/25 1351 Post OT  Lying  90  16  98 %  114/79  86     04/09/25 1400 --  --  100  17  95 %  110/79  87           Lines/Tubes/Drains:  PICC - Adult 04/08/25 Double lumen Right Brachial vein (Active)   Number of days: 0       Urethral Catheter Non-latex 14 Fr. (Active)   Number of days: 2       Closed/Suction Drain 1 Right Scalp Bulb 10 Fr. (Active)   Number of days: 3       NG/OG/Feeding Tube Right nostril (Active)   Number of days: 0       Cognition:  Overall Cognitive Status: Impaired  Arousal/Alertness: Inconsistent responses to stimuli  Orientation Level: Unable to assess  Following Commands:  (Only able to open eyes to command. No other active command following.)    Pain Assessment:  Pain Assessment  Pain Assessment: CPOT (Critical Care Pain Observation Tool)  0-10 (Numeric) Pain Score: 0 - No pain     Objective           Therapy/Activity:      Therapeutic Activity  Therapeutic Activity Performed: Yes  Therapeutic Activity 1: Bed in tolu position for approx 8min. Occasional fidgeting. Opens eyes to commands, but karyna not maintain. Attempted to have pt use R hand to grasp  towel and wipe face. Unable to complete. No further active participation despite max cues.           Outcome Measures:  [facility] Daily Activity  Putting on and taking off regular lower body clothing: Total  Bathing (including washing, rinsing, drying): Total  Putting on and taking off regular upper body clothing: Total  Toileting, which includes using toilet, bedpan or urinal: Total  Taking care of personal grooming such as brushing teeth: Total  Eating Meals: Total  Daily Activity - Total Score: 6     Education Documentation  Body Mechanics, taught by Kavya Beavers OT at 4/9/2025  2:51 PM.  Learner: Patient  Readiness: Acceptance  Method: Explanation  Response: Verbalizes Understanding    Precautions, taught by Kavya Beavers OT at 4/9/2025  2:51 PM.  Learner: Patient  Readiness: Acceptance  Method: Explanation  Response: Verbalizes Understanding    ADL Training, taught by Kavya Beavers OT at 4/9/2025  2:51 PM.  Learner: Patient  Readiness: Acceptance  Method: Explanation  Response: Verbalizes Understanding    Education Comments  No comments found.        Goals:  Encounter Problems       Encounter Problems (Active)       ADLs       Patient will complete grooming tasks with CGA in order to maximize functional Indep with task completion.        Start:  04/08/25    Expected End:  04/22/25            Patient will complete lower body dressing with  MIN A for donning and doffing all LE clothes in order to increase Indep with task participation.        Start:  04/08/25    Expected End:  04/22/25            Patient will complete toileting, including clothing management and hygiene, with MIN A in order to maximize functional Indep with task completion.        Start:  04/08/25    Expected End:  04/22/25               BALANCE       Pt will increase static/dynamic sit/stand to Good to increase safety and indep with functional task completion.         Start:  04/08/25    Expected End:  04/22/25                COGNITION/SAFETY       Pt will demo good safety awareness during functional task completion with no more than min vc in order to maximize occupational performance.         Start:  04/08/25    Expected End:  04/22/25               EXERCISE/STRENGTHENING       Pt will increase endurance to tolerate 15-20min of activity with no more than 1 rest break in order to increase ability to engage in ADL completion.        Start:  04/08/25    Expected End:  04/22/25            Pt will increase overall BUE strength to 4/5 in order to increase functional task participation.        Start:  04/08/25    Expected End:  04/22/25            Pt will demo increased BUE fine motor/bimanual coordination in order to achieve MOD I with functional task completion.        Start:  04/08/25    Expected End:  04/22/25               TRANSFERS       Patient will complete functional transfers using least restrictive device with CGA   in order to maximize functional potential and increase safety.        Start:  04/08/25    Expected End:  04/22/25 04/09/25 at 2:53 PM   Kavya Beavers, OT   [phone]

## 2025-04-10 ENCOUNTER — APPOINTMENT (OUTPATIENT)
Dept: CARDIOLOGY | Facility: HOSPITAL | Age: 70
End: 2025-04-10
Payer: MEDICARE

## 2025-04-10 LAB
ALBUMIN SERPL BCP-MCNC: 3.2 G/DL (ref 3.4–5)
ANION GAP SERPL CALC-SCNC: 12 MMOL/L (ref 10–20)
ATRIAL RATE: 104 BPM
ATRIAL RATE: 110 BPM
BASOPHILS # BLD AUTO: 0.01 X10*3/UL (ref 0–0.1)
BASOPHILS NFR BLD AUTO: 0.3 %
BUN SERPL-MCNC: 16 MG/DL (ref 6–23)
CA-I BLD-SCNC: 1.08 MMOL/L (ref 1.1–1.33)
CALCIUM SERPL-MCNC: 7.7 MG/DL (ref 8.6–10.6)
CHLORIDE SERPL-SCNC: 105 MMOL/L (ref 98–107)
CO2 SERPL-SCNC: 22 MMOL/L (ref 21–32)
CREAT SERPL-MCNC: 0.57 MG/DL (ref 0.5–1.3)
EGFRCR SERPLBLD CKD-EPI 2021: >90 ML/MIN/1.73M*2
EOSINOPHIL # BLD AUTO: 0 X10*3/UL (ref 0–0.7)
EOSINOPHIL NFR BLD AUTO: 0 %
ERYTHROCYTE [DISTWIDTH] IN BLOOD BY AUTOMATED COUNT: 13.2 % (ref 11.5–14.5)
GLUCOSE BLD MANUAL STRIP-MCNC: 124 MG/DL (ref 74–99)
GLUCOSE BLD MANUAL STRIP-MCNC: 132 MG/DL (ref 74–99)
GLUCOSE BLD MANUAL STRIP-MCNC: 133 MG/DL (ref 74–99)
GLUCOSE BLD MANUAL STRIP-MCNC: 139 MG/DL (ref 74–99)
GLUCOSE BLD MANUAL STRIP-MCNC: 142 MG/DL (ref 74–99)
GLUCOSE BLD MANUAL STRIP-MCNC: 150 MG/DL (ref 74–99)
GLUCOSE SERPL-MCNC: 143 MG/DL (ref 74–99)
HCT VFR BLD AUTO: 27.5 % (ref 41–52)
HGB BLD-MCNC: 9.8 G/DL (ref 13.5–17.5)
IMM GRANULOCYTES # BLD AUTO: 0.03 X10*3/UL (ref 0–0.7)
IMM GRANULOCYTES NFR BLD AUTO: 0.9 % (ref 0–0.9)
LYMPHOCYTES # BLD AUTO: 0.46 X10*3/UL (ref 1.2–4.8)
LYMPHOCYTES NFR BLD AUTO: 13.7 %
MAGNESIUM SERPL-MCNC: 2.22 MG/DL (ref 1.6–2.4)
MCH RBC QN AUTO: 33.1 PG (ref 26–34)
MCHC RBC AUTO-ENTMCNC: 35.6 G/DL (ref 32–36)
MCV RBC AUTO: 93 FL (ref 80–100)
MONOCYTES # BLD AUTO: 0.22 X10*3/UL (ref 0.1–1)
MONOCYTES NFR BLD AUTO: 6.5 %
NEUTROPHILS # BLD AUTO: 2.64 X10*3/UL (ref 1.2–7.7)
NEUTROPHILS NFR BLD AUTO: 78.6 %
NRBC BLD-RTO: 0 /100 WBCS (ref 0–0)
P AXIS: 58 DEGREES
P AXIS: 61 DEGREES
P OFFSET: 192 MS
P OFFSET: 193 MS
P ONSET: 137 MS
P ONSET: 143 MS
PHOSPHATE SERPL-MCNC: 1.7 MG/DL (ref 2.5–4.9)
PLATELET # BLD AUTO: 121 X10*3/UL (ref 150–450)
POTASSIUM SERPL-SCNC: 3.7 MMOL/L (ref 3.5–5.3)
PR INTERVAL: 134 MS
PR INTERVAL: 150 MS
Q ONSET: 210 MS
Q ONSET: 212 MS
QRS COUNT: 17 BEATS
QRS COUNT: 18 BEATS
QRS DURATION: 84 MS
QRS DURATION: 84 MS
QT INTERVAL: 332 MS
QT INTERVAL: 334 MS
QTC CALCULATION(BAZETT): 436 MS
QTC CALCULATION(BAZETT): 452 MS
QTC FREDERICIA: 398 MS
QTC FREDERICIA: 409 MS
R AXIS: -10 DEGREES
R AXIS: -27 DEGREES
RBC # BLD AUTO: 2.96 X10*6/UL (ref 4.5–5.9)
SODIUM SERPL-SCNC: 135 MMOL/L (ref 136–145)
T AXIS: 22 DEGREES
T AXIS: 52 DEGREES
T OFFSET: 377 MS
T OFFSET: 378 MS
VENTRICULAR RATE: 104 BPM
VENTRICULAR RATE: 110 BPM
WBC # BLD AUTO: 3.4 X10*3/UL (ref 4.4–11.3)

## 2025-04-10 PROCEDURE — 2500000001 HC RX 250 WO HCPCS SELF ADMINISTERED DRUGS (ALT 637 FOR MEDICARE OP)

## 2025-04-10 PROCEDURE — 2500000004 HC RX 250 GENERAL PHARMACY W/ HCPCS (ALT 636 FOR OP/ED)

## 2025-04-10 PROCEDURE — 80069 RENAL FUNCTION PANEL: CPT

## 2025-04-10 PROCEDURE — 92610 EVALUATE SWALLOWING FUNCTION: CPT | Mod: GN

## 2025-04-10 PROCEDURE — 2500000004 HC RX 250 GENERAL PHARMACY W/ HCPCS (ALT 636 FOR OP/ED): Performed by: STUDENT IN AN ORGANIZED HEALTH CARE EDUCATION/TRAINING PROGRAM

## 2025-04-10 PROCEDURE — 2500000004 HC RX 250 GENERAL PHARMACY W/ HCPCS (ALT 636 FOR OP/ED): Performed by: PSYCHOLOGIST

## 2025-04-10 PROCEDURE — 2060000001 HC INTERMEDIATE ICU ROOM DAILY

## 2025-04-10 PROCEDURE — 2500000004 HC RX 250 GENERAL PHARMACY W/ HCPCS (ALT 636 FOR OP/ED): Performed by: REGISTERED NURSE

## 2025-04-10 PROCEDURE — 37799 UNLISTED PX VASCULAR SURGERY: CPT | Performed by: REGISTERED NURSE

## 2025-04-10 PROCEDURE — 82330 ASSAY OF CALCIUM: CPT | Performed by: REGISTERED NURSE

## 2025-04-10 PROCEDURE — 93005 ELECTROCARDIOGRAM TRACING: CPT

## 2025-04-10 PROCEDURE — 95720 EEG PHY/QHP EA INCR W/VEEG: CPT | Performed by: STUDENT IN AN ORGANIZED HEALTH CARE EDUCATION/TRAINING PROGRAM

## 2025-04-10 PROCEDURE — 2500000001 HC RX 250 WO HCPCS SELF ADMINISTERED DRUGS (ALT 637 FOR MEDICARE OP): Performed by: NURSE PRACTITIONER

## 2025-04-10 PROCEDURE — 82947 ASSAY GLUCOSE BLOOD QUANT: CPT

## 2025-04-10 PROCEDURE — 93010 ELECTROCARDIOGRAM REPORT: CPT | Performed by: INTERNAL MEDICINE

## 2025-04-10 PROCEDURE — 95716 VEEG EA 12-26HR CONT MNTR: CPT

## 2025-04-10 PROCEDURE — 2500000005 HC RX 250 GENERAL PHARMACY W/O HCPCS

## 2025-04-10 PROCEDURE — 85025 COMPLETE CBC W/AUTO DIFF WBC: CPT | Performed by: REGISTERED NURSE

## 2025-04-10 PROCEDURE — 83735 ASSAY OF MAGNESIUM: CPT | Performed by: REGISTERED NURSE

## 2025-04-10 PROCEDURE — 2500000002 HC RX 250 W HCPCS SELF ADMINISTERED DRUGS (ALT 637 FOR MEDICARE OP, ALT 636 FOR OP/ED)

## 2025-04-10 RX ORDER — SODIUM,POTASSIUM PHOSPHATES 280-250MG
1 POWDER IN PACKET (EA) ORAL 4 TIMES DAILY
Status: COMPLETED | OUTPATIENT
Start: 2025-04-10 | End: 2025-04-10

## 2025-04-10 RX ORDER — INSULIN LISPRO 100 [IU]/ML
0-5 INJECTION, SOLUTION INTRAVENOUS; SUBCUTANEOUS EVERY 6 HOURS
Status: DISCONTINUED | OUTPATIENT
Start: 2025-04-10 | End: 2025-04-26

## 2025-04-10 RX ORDER — CALCIUM GLUCONATE 20 MG/ML
2 INJECTION, SOLUTION INTRAVENOUS ONCE
Status: COMPLETED | OUTPATIENT
Start: 2025-04-10 | End: 2025-04-10

## 2025-04-10 RX ORDER — MAGNESIUM SULFATE HEPTAHYDRATE 40 MG/ML
2 INJECTION, SOLUTION INTRAVENOUS ONCE
Status: COMPLETED | OUTPATIENT
Start: 2025-04-10 | End: 2025-04-10

## 2025-04-10 RX ADMIN — VALPROATE SODIUM 250 MG: 100 INJECTION, SOLUTION INTRAVENOUS at 03:28

## 2025-04-10 RX ADMIN — PANTOPRAZOLE SODIUM 40 MG: 40 INJECTION, POWDER, FOR SOLUTION INTRAVENOUS at 10:42

## 2025-04-10 RX ADMIN — SODIUM CHLORIDE 1000 ML: 0.9 INJECTION, SOLUTION INTRAVENOUS at 20:01

## 2025-04-10 RX ADMIN — HYDROXYZINE HYDROCHLORIDE 10 MG: 10 TABLET ORAL at 18:43

## 2025-04-10 RX ADMIN — MEROPENEM 2 G: 1 INJECTION INTRAVENOUS at 14:15

## 2025-04-10 RX ADMIN — VANCOMYCIN HYDROCHLORIDE 750 MG: 5 INJECTION, POWDER, LYOPHILIZED, FOR SOLUTION INTRAVENOUS at 18:00

## 2025-04-10 RX ADMIN — POTASSIUM & SODIUM PHOSPHATES POWDER PACK 280-160-250 MG 1 PACKET: 280-160-250 PACK at 18:00

## 2025-04-10 RX ADMIN — QUETIAPINE FUMARATE 12.5 MG: 25 TABLET ORAL at 20:47

## 2025-04-10 RX ADMIN — CALCIUM GLUCONATE 1 G: 20 INJECTION, SOLUTION INTRAVENOUS at 05:13

## 2025-04-10 RX ADMIN — VANCOMYCIN HYDROCHLORIDE 750 MG: 5 INJECTION, POWDER, LYOPHILIZED, FOR SOLUTION INTRAVENOUS at 10:12

## 2025-04-10 RX ADMIN — CALCIUM GLUCONATE 2 G: 20 INJECTION, SOLUTION INTRAVENOUS at 20:48

## 2025-04-10 RX ADMIN — VALPROATE SODIUM 250 MG: 100 INJECTION, SOLUTION INTRAVENOUS at 20:47

## 2025-04-10 RX ADMIN — CLONIDINE HYDROCHLORIDE 0.2 MG: 0.1 TABLET ORAL at 20:47

## 2025-04-10 RX ADMIN — DEXAMETHASONE SODIUM PHOSPHATE 2 MG: 4 INJECTION, SOLUTION INTRA-ARTICULAR; INTRALESIONAL; INTRAMUSCULAR; INTRAVENOUS; SOFT TISSUE at 10:11

## 2025-04-10 RX ADMIN — DEXAMETHASONE SODIUM PHOSPHATE 2 MG: 4 INJECTION, SOLUTION INTRA-ARTICULAR; INTRALESIONAL; INTRAMUSCULAR; INTRAVENOUS; SOFT TISSUE at 20:47

## 2025-04-10 RX ADMIN — HEPARIN SODIUM 5000 UNITS: 5000 INJECTION INTRAVENOUS; SUBCUTANEOUS at 14:15

## 2025-04-10 RX ADMIN — SENNOSIDES AND DOCUSATE SODIUM 1 TABLET: 50; 8.6 TABLET ORAL at 20:47

## 2025-04-10 RX ADMIN — POLYETHYLENE GLYCOL 3350 17 G: 17 POWDER, FOR SOLUTION ORAL at 10:13

## 2025-04-10 RX ADMIN — POTASSIUM & SODIUM PHOSPHATES POWDER PACK 280-160-250 MG 1 PACKET: 280-160-250 PACK at 14:15

## 2025-04-10 RX ADMIN — MEROPENEM 2 G: 1 INJECTION INTRAVENOUS at 04:25

## 2025-04-10 RX ADMIN — VALPROATE SODIUM 250 MG: 100 INJECTION, SOLUTION INTRAVENOUS at 10:13

## 2025-04-10 RX ADMIN — HEPARIN SODIUM 5000 UNITS: 5000 INJECTION INTRAVENOUS; SUBCUTANEOUS at 05:13

## 2025-04-10 RX ADMIN — HEPARIN SODIUM 5000 UNITS: 5000 INJECTION INTRAVENOUS; SUBCUTANEOUS at 21:28

## 2025-04-10 RX ADMIN — SODIUM CHLORIDE 1000 ML: 0.9 INJECTION, SOLUTION INTRAVENOUS at 21:27

## 2025-04-10 RX ADMIN — VANCOMYCIN HYDROCHLORIDE 750 MG: 5 INJECTION, POWDER, LYOPHILIZED, FOR SOLUTION INTRAVENOUS at 00:16

## 2025-04-10 RX ADMIN — DEXMEDETOMIDINE HYDROCHLORIDE 0.2 MCG/KG/HR: 400 INJECTION INTRAVENOUS at 02:46

## 2025-04-10 RX ADMIN — ATORVASTATIN CALCIUM 40 MG: 40 TABLET, FILM COATED ORAL at 20:47

## 2025-04-10 RX ADMIN — MEROPENEM 2 G: 1 INJECTION INTRAVENOUS at 20:47

## 2025-04-10 RX ADMIN — VALPROATE SODIUM 250 MG: 100 INJECTION, SOLUTION INTRAVENOUS at 18:00

## 2025-04-10 RX ADMIN — MAGNESIUM SULFATE HEPTAHYDRATE 2 G: 40 INJECTION, SOLUTION INTRAVENOUS at 21:02

## 2025-04-10 RX ADMIN — CLONIDINE HYDROCHLORIDE 0.2 MG: 0.1 TABLET ORAL at 10:11

## 2025-04-10 ASSESSMENT — PAIN - FUNCTIONAL ASSESSMENT
PAIN_FUNCTIONAL_ASSESSMENT: 0-10

## 2025-04-10 ASSESSMENT — PAIN SCALES - GENERAL
PAINLEVEL_OUTOF10: 0 - NO PAIN

## 2025-04-10 NOTE — PROGRESS NOTES
Social Work Discharge Planning note:    -Plan per medical team: [facility].  s/p right cranial wound washout.  If EEG negative today will DC it.  Looks like IV antibiotics, has PICC line but needs speech eval for diet, has feeding tube and tube feeds.  PT/OT rec Rehab.  Once passes for diet and Final Antibiotic rec will be ready for discharge.     -[place] [facility] [place]  -Status: Inpatient  -Discharge disposition: Per PT and OT this morning, they may change their recommendations to Mod intensity. CLARK met with Pt's wife, Brittany, again to further discuss. Brittany gave SW [facility] Arias as FOC; for back up choices, Brittany had SW send referrals to all of the 4 and 5 star rated SNF's in her are. Once Brittany knows which facilities can accept she will give order of preference to the back up choices. Referrals sent via [facility]. SW will continue to follow to assist with discharge planning.    -Anticipated Date of Discharge:  4/12/25    This KAYCEE Odell, LSW    Office: [phone]  Secure chat via [employer]

## 2025-04-10 NOTE — PROGRESS NOTES
Spiritual Care Visit  Spiritual Care Request    Reason for Visit:  Routine Visit: Introduction     Request Received From:  Referral From: Other (Comment) (rounding)    Focus of Care:  Visited With: Patient and family together       Refer to :  Referral To:        Spiritual Care Annotation    Annotation:  Spiritual Care visit declined.  will remain available for support.     Signed: Ramez Silva, Clinical Care

## 2025-04-10 NOTE — PROGRESS NOTES
"Enmanuel Ahumada \"Petey" is a 69 y.o. male on day 5 of admission presenting with Wound dehiscence.    Subjective   No acute events overnight    Objective     Physical Exam    Eyes opened to voice  Ox3   Follows commands x4 briskly antigravity    Last Recorded Vitals  Blood pressure 118/80, pulse 93, temperature 36.1 °C (97 °F), temperature source Temporal, resp. rate 15, height 1.702 m (5' 7\"), weight 65.3 kg (143 lb 15.4 oz), SpO2 99%.  Intake/Output last 3 Shifts:  I/O last 3 completed shifts:  In: 6480.7 (99.2 mL/kg) [I.V.:3299.2 (50.5 mL/kg); NG/GT:270; IV Piggyback:2911.5]  Out: 7695 (117.8 mL/kg) [Urine:7565 (3.2 mL/kg/hr); Drains:130]  Weight: 65.3 kg     Relevant Results  Results from last 72 hours   Lab Units 04/10/25  0015 04/09/25  0112   WBC AUTO x10*3/uL 3.4* 3.9*   NRBC AUTO /100 WBCs 0.0 0.0   RBC AUTO x10*6/uL 2.96* 3.18*   HEMOGLOBIN g/dL 9.8* 10.1*   HEMATOCRIT % 27.5* 27.5*   MCV fL 93 87   MCH pg 33.1 31.8   MCHC g/dL 35.6 36.7*   RDW % 13.2 12.9   PLATELETS AUTO x10*3/uL 121* 132*      Results from last 72 hours   Lab Units 04/10/25  0015 04/09/25  1333 04/09/25  0112   GLUCOSE mg/dL 143* 124* 105*   SODIUM mmol/L 135* 130* 132*   POTASSIUM mmol/L 3.7 3.9 3.7   CHLORIDE mmol/L 105 98 99   CO2 mmol/L 22 24 25   ANION GAP mmol/L 12 12 12   BUN mg/dL 16 14 16   CREATININE mg/dL 0.57 0.69 0.57   EGFR mL/min/1.73m*2 >90 >90 >90   CALCIUM mg/dL 7.7* 8.3* 8.2*   PHOSPHORUS mg/dL 1.7* 2.7 2.0*   ALBUMIN g/dL 3.2* 3.5 3.5   MAGNESIUM mg/dL 2.22  --  2.13   POCT CALCIUM IONIZED (MMOL/L) IN BLOOD mmol/L 1.08*  --  1.12          Assessment/Plan   Assessment & Plan  Wound dehiscence    69 M h/o HTN, HLD, smalls's esophagus, RO GBM s/p resection ([facility]H, 12/2023) with resultant L HH s/p chemo/RT presenting with tumor progression, 2/20 s/p redo craniotomy for tumor resection w 5 ALA, CTH POC, 2/21 MRI intended NTR, 3/16 MRI postop changes w fluid collection at dura and craniotomy site, worsening " infiltrative disease, enhancement along posteromedial aspect of resection site, 4/5 p/w 1wk incisional drainage, 4/4 CTH airfluid level in resection cavity, 4/5 MRI brain w/wo incr'd resection cavity enhancement and diffusion restriction  4/6 s/p R cranial wound washout and mesh cranioplasty (kaleb purulence), CTH POC, L temporal ICH, plts 81 s/p 2u plts, 4/7 rCTH incr SAH, min incr ICH, rrCTH stable, EEG neg dc'd  4/8 s/p PICC  4/9 CTH stable, SDD dc'd, EEG neg    Plan:  NSU  Wean precedex  Poss dc SGD  EEG  ID recs - IV abx until 6/15 (final recs pending sensitivity)  OR cultures   Bcx   Ok to work with PTOT- rehab    Guillermina Camarena MD

## 2025-04-10 NOTE — CONSULTS
PM&R Consult Note  Patient: Enmanuel Ahumada   Age/sex: 69 y.o.   Medical Record #: [id number]     Referring physician: Dr. Cortez  Consulting physician: Dr. Whitman     Procedures performed on/related to this admission:  4/11 - ICF Filter placement    Chief complaint:   Impairments and activity limitations in ADLs, mobility, functional communication, and cognition secondary to Glioblastoma s/p resection complicated by meningitis.    HPI:   Enmanuel Ahumada is a 69 y.o. male patient with a past medical history of HTN, HLD, GBM s/p resection (December 2023) followed by chemotherapy and radiation who presented to  for tumor progression. Underwent repeat craniotomy w/ tumor resection on 2/20. Presented on 4/5 with incisional drainage. Ct Head on 4/4 showed air-fluid level present in resection cavity. MRI Brain on 4/5 showed increased resection cavity enhancement and diffusion restriction. Underwent R cranial wound washout and mesh cranioplasty on 4/6, evidence of kaleb purulence. Patient was also found to have DVTs of the LLE and RUE, now s/p IVC filter on 4/11. ID recommended continuing IV Vancomycin until 6/15.     PM&R was consulted for recommendations and for IRF appropriateness.    Present status: Stable, healing incision present, PICC line in place  PO: Dobhoff currently, order in to be dc'd. Regular diet ordered  Pain: Flexeril 10mg TID prn, Dilaudid IV  Bladder: voiding volitionally, external catheter  Bowel: 4/13  DVT prophylaxis with IVC Filter and Heparin gtt   Precautions: Fall Precautions    Past Medical History:   Diagnosis Date    Brain cancer (Multi)     GERD (gastroesophageal reflux disease)     Hyperlipidemia     Hypertension     Seizure disorder (Multi)         Past Surgical History:   Procedure Laterality Date    BRAIN SURGERY      CHOLECYSTECTOMY  January 25, 2020    OTHER SURGICAL HISTORY  07/24/2019    Colonoscopy    OTHER SURGICAL HISTORY  07/26/2021    Endoscopy    VASECTOMY  Abt  1991        No family history on file.     No Known Allergies     atorvastatin, 40 mg, nasogastric tube, Daily  cloNIDine, 0.2 mg, nasogastric tube, BID  dexAMETHasone, 2 mg, intravenous, q12h  heparin (porcine), 5,000 Units, subcutaneous, q8h  insulin lispro, 0-5 Units, subcutaneous, q6h  meropenem, 2 g, intravenous, q8h  pantoprazole, 40 mg, intravenous, Daily  polyethylene glycol, 17 g, nasogastric tube, Daily  potassium, sodium phosphates, 1 packet, nasogastric tube, 4x daily  QUEtiapine, 12.5 mg, nasogastric tube, Nightly  sennosides-docusate sodium, 1 tablet, nasogastric tube, Nightly  valproate sodium, 250 mg, intravenous, q6h  vancomycin, 750 mg, intravenous, q8h         PRN medications: bisacodyl, calcium gluconate, calcium gluconate, dextrose, dextrose, glucagon, glucagon, HYDROmorphone, hydrOXYzine HCL, magnesium sulfate, magnesium sulfate, ondansetron **OR** ondansetron, oxygen, potassium chloride, vancomycin     Social History:  Working History: Retired  Social supports: Yes, 24 hour assistance may be available  Home situation: Lives in a two-level home with spouse, with 2 stairs to enter and second floor setup    Functional History:  Home DME: none   Premorbid ADL's: independent   Premorbid Mobility: independent     CLOF:     PT (4/14): bed mobility min A-CGA, ambulated 10' and 15' w/ FWW at Min A, transfers Min A  Recommending high intensity    PT (4/8): bed mobility Dependent x2, TherEx x20 minutes at bed level  Recommending high intensity    OT (4/11): bed mobility Max A, transfers Mod A x2, sat EOB x7 minutes  Recommending high intensity    OT (4/9): sat up with bed in chair position x8 minutes. Opened eyes to command but did not maintain. Very limited participation.   Recommending high intensity    ROS  10 point review of systems is performed and negative unless mentioned in HPI    Physical Exam  GENERAL: Awake and alert, well-developed, well-nourished, No acute distress   HEENT - right posterior  scalp healing incision, Dobhoff present   CARDIOVASCULAR: extremities  appear warm, well perfused  RESPIRATORY: no conversational dyspnea, comfortable on room air, symmetrical chest rise   ABDOMEN: Soft, non-tender  MSK: No visible deformities or local swelling, PICC present in right upper arm   SKIN: scattered abrasions of the bilateral upper extremities  Psych: Cooperative, affect appropriate, conversational, good-eye contact      NEURO: A&O x 4  RUE strength: 5/5 elbow flexors, 5/5 elbow extensors, 5/5 wrist extensors, 5/5 finger flexors, 5/5 finger abductors    LUE strength: 5/5 elbow flexors, 4/5 elbow extensors, 5/5 wrist extensors, 5/5 finger flexors, 5/5 finger abductors    RLE strength: 5/5 hip flexors, 5/5 knee extensors, 5/5 ankle dorsiflexors, 5/5 EHL, 5/5 ankle plantar flexors   LLE strength: 5/5 hip flexors, 5/5 knee extensors, 5/5 ankle dorsiflexors, 5/5 EHL, 5/5 ankle plantar flexors   Sensation - intact to light touch in bilateral lower extremities.      Imaging:  === 04/05/25 ===    XR ABDOMEN 1 VIEW    - Impression -  1.  Enteric tube is visualized with tip projecting over expected  location of the 2nd duodenal segment.  2. Nonspecific, nonobstructive bowel gas pattern without evidence of  pneumoperitoneum within limitation of supine exam.    === 04/05/25 ===    MR BRAIN W AND WO CONTRAST    - Impression -  1.  Postsurgical changes status post right posterior craniotomy for  glioblastoma debulking in the right parieto-occipital lobe.  2.  Interval increase in extra-axial fluid collection overlying the  deep surgical craniotomy bed with focal areas of diffusion  restriction and peripheral enhancement. Additionally there is  interval development of layering contents deep within surgical cavity  and surrounding the cavity which demonstrate diffusion restriction.  Overall the constellation of findings are suggestive of superimposed  infection/abscess particularly given clinical suspicion of such.  3.  Additional findings remain similar compared to prior imaging and  as described above.    IMPRESSION:  Enmanuel Ahumada is a 69 y.o. male patient with a past medical history of HTN, HLD, GBM s/p resection (December 2023) followed by chemotherapy and radiation who presented to  for tumor progression. Underwent repeat craniotomy w/ tumor resection on 2/20. Presented on 4/5 with incisional drainage. Ct Head on 4/4 showed air-fluid level present in resection cavity. MRI Brain on 4/5 showed increased resection cavity enhancement and diffusion restriction. Underwent R cranial wound washout and mesh cranioplasty on 4/6, evidence of kaleb purulence. Patient was also found to have DVTs of the LLE and RUE, now s/p IVC filter on 4/11. ID recommended continuing IV Vancomycin until 6/15.     PM&R was consulted for recommendations and for IRF appropriateness.    Recommendations:  # GBM  - s/p resection complicated by meningitis requiring long-term IV antibiotics    # Neurogenic Bowel/Bladder  - Voiding volitionally   - Last BM 4/13  - Recommend daily bowel program of Miralax BID and Docusate 100mg BID  - Goal of one BM daily, at risk for constipation while on opioids for pain management    # Immobility   - Prevent secondary complications   - Skin protection: low air loss mattress, turn q 2 hours in bed, maintain bowel and bladder continence/containment  - Prevention of pulmonary complications: incentive spirometry and pulmonary toileting  - Maintain adequate nutrition and hydration  - Q shift PROM of upper and lower extremities to prevent contracture    # Impaired mobility and Impaired independence with ADLs and I/ADLs  - Continue PT, working on bed mobility, transfers, balance, endurance, strength, gait, eval for appropriate assistive device  - Continue OT, working on functional cognition, functional mobility, upper limb strength/coordination, balance, endurance, eval for appropriate adaptive equipment, ADLs, and  I/ADLs.    # Pain Management   - Please discontinue IV and/or PO Dilaudid 24 hours prior to discharge to acute rehabilitation and verify that patient's pain is well-controlled on alternative oral analgesics prior to discharge.  Exceptions would include:  - Patients taking PO Dilaudid for chronic pain with outpatient pain management provider who can resume medication at discharge from Rehab.  - Patients on PO Dilaudid with pain from oncologic/cancer diagnoses and are followed by  Supportive Oncology as an outpatient.  - Patients who are requiring PO Dilaudid for wound VAC dressing changes.    # Dispo  - Patient would benefit from an acute inpatient rehab stay to improve functional outcome of impaired mobility, ADL's, transfers, and self-care. Patient would be an excellent candidate for acute rehabilitation once medically stable and is able and motivated to participate in 3 hours/day of therapy.   - Patient would benefit from medical follow up at least three times a week to address and monitor pain, BP, education regarding home IV antibiotic administration and physician monitoring of new anticoagulation. Patient was independent in mobility and ADLs at baseline prior to this incident. Acute rehab is appropriate to assist in returning to PLOF and living situation.  - Post rehab destination: anticipated home   - Must be off IV pain meds and heparin drip prior to transfer  - For questions, please contact via Haiku    Estimated length of stay is 7-10 days with discharge home at Mod-I level.     We will follow along with you.  Thank you for the consult.    Airam Wooten,   Physical Medicine and Rehabilitation PGY-4  Available via [employer]iku     Supervisory note:  Seen with Dr LUCIO Wooten, resident.  I saw and evaluated the patient. I personally obtained the key and critical portions of the history and physical exam. I reviewed the resident's documentation and discussed the patient with the resident. I agree with the resident's  medical decision making as documented in the resident's note.     Trace weakness ?slow activation left LE, mildly unsteady in PT.  Excellent acute rehab candidate, [facility]      Guanaco Whitman M.D, FAAPMR, R-MSK  Chief, Division of PM&R  Board Certified in PM&R, Sports Medicine and Musculoskeletal Ultrasound

## 2025-04-10 NOTE — PROGRESS NOTES
"Speech-Language Pathology    SLP Adult Outpatient Speech-Language Pathology Clinical Swallow Evaluation    Patient Name: Enmanuel Ahumada \"Dash\"  MRN: [id number]  Today's Date: 4/10/2025   Time Calculation  Start Time: 1050  Stop Time: 1111  Time Calculation (min): 21 min         Assessment:  Oral dysphagia; possible pharyngeal dysphagia   Swallowing function impacted by mental status      Plan for reassessment as mental status improves      Recommendations:  NPO  Continued short term alternative means of nutrition/hydration/medication support.    Frequent, aggressive oral care as tolerated to improve infection control    OK for small amounts of ice chips and sips of water (one at a time, 10x/hour) for oral comfort and to prevent swallow disuse atrophy, but only after aggressive oral care to avoid colonization of bacteria within the oral cavity.     Pt may require instrumental assessment of swallowing prior to initiation of PO diet.  Will continue to monitor to determine readiness.          Plan:  SLP Plan: Skilled SLP  SLP Frequency: 2x per week  Duration: 2-3 weeks  Discussed POC: Pt, family, RN      Goals:  Pt will tolerate least restrictive diet with adequate oral phase and no s/s of aspiration.    Date initiated: 4/10/25   Status: Goal initiated       Pt will participate in ongoing dysphagia assessment including the 3 oz Swallow Protocol with no overt indicators of aspiration on 100% of attempts.    Date initiated: 4/10/25   Status: Goal initiated     Pt/family will indicate understanding of dysphagia education: risk for aspiration, recommendations, and POC with > 80% accuracy via teach back method.   Date initiated: 4/10/25   Status: Goal initiated            Subjective   RN cleared pt for evaluation.  Pt properly identified and evaluated bedside.  Awake and alert, with no c/o pain.  DHT in place.  Undergoing continuous EEG.  Room air.  Family present x2.    Pt is a 69 M h/o HTN, HLD, smalls's esophagus, " "RO GBM s/p resection ([facility], 12/2023) with resultant L HH s/p chemo/RT presenting with tumor progression, 2/20 s/p redo craniotomy for tumor resection w 5 ALA, CTH POC, 2/21 MRI intended NTR, 3/16 MRI postop changes w fluid collection at dura and craniotomy site, worsening infiltrative disease, enhancement along posteromedial aspect of resection site, 4/5 p/w 1wk incisional drainage, 4/4 CTH airfluid level in resection cavity, 4/5 MRI brain w/wo incr'd resection cavity enhancement and diffusion restriction  4/6 s/p R cranial wound washout and mesh cranioplasty (kaleb purulence), CTH POC, L temporal ICH, plts 81 s/p 2u plts, 4/7 rCTH incr SAH, min incr ICH, rrCTH stable, EEG neg dc'd  4/8 s/p PICC  4/9 CTH stable, SDD dc'd, EEG neg     Daughter denies baseline dysphagia.      Objective     Cognition:  Command Following: Impaired   Attention: Impaired   Orientation: Oriented to person and \"hospital\"     Pt presents w/ impaired functional communication.  Difficulty following commands, attending to tasks, and producing fluent speech.  Required mod cues to participate in evaluation.       Oral/Motor Assessment:  Oral Hygiene: White lingual coating; possible thrush   Dentition: Present and adequate   Oral Motor: Mildly reduced lingual lateralization, otherwise WFL   Voice (Perceptually): WFL              Consistencies Trialed:  Ice chips, sips of water.  Not appropriate for additional trials given apparent severity of swallowing deficits.         Clinical Observations:  Patient Positioning: Upright in Bed  Management of Oral Secretions: WFL   Was The 3 oz Swallow Protocol Completed: Attempted        Clinical bedside swallow evaluation completed.  SLP presented all trials; pt unable to self feed.  Pt presented with small, single ice chips x4.  Difficulty accepting bolus; impacted by mental status/visual deficits.  Adequate bolus containment and manipulation.  No s/s of aspiration.  Pt also participated in trials of single " sips of thin liquid via straw x4.  Functional oral phase and no s/s of aspiration.  Pt then attempted the 3 oz Swallow Protocol.  No s/s of aspiration, but only able to consume ~2 oz before ceasing.  Suspect difficulty following commands vs difficulty coordinating breathing/swallowing vs silent aspiration.  PO trials ceased due to concern for pharyngeal dysphagia/aspiration.      The 3 oz Swallow Protocol (sequential drinks of thin liquid water) was utilized as a reliable, evidence based test to rule out silent aspiration and determine need for additional testing, such as an MBS or FEES, if the test is equivocal, incomplete or pt shows s/s of aspiration, prior to recommending a oral diet.     Education:  Education provided to patient and family regarding NPO recommendations, allowance of ice chips/sips of water, importance of oral care, and overall dysphagia plan of care.  Understanding indicated.  Discussed pt status w/ RN.        04/10/25 at 3:25 PM - Anabell Pendleton, SLP

## 2025-04-11 ENCOUNTER — APPOINTMENT (OUTPATIENT)
Dept: RADIOLOGY | Facility: HOSPITAL | Age: 70
DRG: 907 | End: 2025-04-11
Payer: MEDICARE

## 2025-04-11 ENCOUNTER — APPOINTMENT (OUTPATIENT)
Dept: CARDIOLOGY | Facility: HOSPITAL | Age: 70
DRG: 907 | End: 2025-04-11
Payer: MEDICARE

## 2025-04-11 ENCOUNTER — APPOINTMENT (OUTPATIENT)
Dept: VASCULAR MEDICINE | Facility: HOSPITAL | Age: 70
End: 2025-04-11
Payer: MEDICARE

## 2025-04-11 LAB
ALBUMIN SERPL BCP-MCNC: 3.4 G/DL (ref 3.4–5)
ANION GAP SERPL CALC-SCNC: 12 MMOL/L (ref 10–20)
ATRIAL RATE: 116 BPM
BASOPHILS # BLD AUTO: 0 X10*3/UL (ref 0–0.1)
BASOPHILS NFR BLD AUTO: 0 %
BUN SERPL-MCNC: 16 MG/DL (ref 6–23)
CA-I BLD-SCNC: 1.11 MMOL/L (ref 1.1–1.33)
CALCIUM SERPL-MCNC: 8.1 MG/DL (ref 8.6–10.6)
CHLORIDE SERPL-SCNC: 102 MMOL/L (ref 98–107)
CO2 SERPL-SCNC: 23 MMOL/L (ref 21–32)
CREAT SERPL-MCNC: 0.59 MG/DL (ref 0.5–1.3)
EGFRCR SERPLBLD CKD-EPI 2021: >90 ML/MIN/1.73M*2
EOSINOPHIL # BLD AUTO: 0 X10*3/UL (ref 0–0.7)
EOSINOPHIL NFR BLD AUTO: 0 %
ERYTHROCYTE [DISTWIDTH] IN BLOOD BY AUTOMATED COUNT: 13.4 % (ref 11.5–14.5)
GLUCOSE BLD MANUAL STRIP-MCNC: 136 MG/DL (ref 74–99)
GLUCOSE BLD MANUAL STRIP-MCNC: 147 MG/DL (ref 74–99)
GLUCOSE BLD MANUAL STRIP-MCNC: 177 MG/DL (ref 74–99)
GLUCOSE SERPL-MCNC: 187 MG/DL (ref 74–99)
HCT VFR BLD AUTO: 30 % (ref 41–52)
HGB BLD-MCNC: 10.7 G/DL (ref 13.5–17.5)
IMM GRANULOCYTES # BLD AUTO: 0.01 X10*3/UL (ref 0–0.7)
IMM GRANULOCYTES NFR BLD AUTO: 0.2 % (ref 0–0.9)
LYMPHOCYTES # BLD AUTO: 0.52 X10*3/UL (ref 1.2–4.8)
LYMPHOCYTES NFR BLD AUTO: 11.5 %
MAGNESIUM SERPL-MCNC: 2.67 MG/DL (ref 1.6–2.4)
MCH RBC QN AUTO: 32 PG (ref 26–34)
MCHC RBC AUTO-ENTMCNC: 35.7 G/DL (ref 32–36)
MCV RBC AUTO: 90 FL (ref 80–100)
MONOCYTES # BLD AUTO: 0.31 X10*3/UL (ref 0.1–1)
MONOCYTES NFR BLD AUTO: 6.9 %
NEUTROPHILS # BLD AUTO: 3.68 X10*3/UL (ref 1.2–7.7)
NEUTROPHILS NFR BLD AUTO: 81.4 %
NRBC BLD-RTO: 0 /100 WBCS (ref 0–0)
P AXIS: 40 DEGREES
P OFFSET: 195 MS
P ONSET: 145 MS
PHOSPHATE SERPL-MCNC: 1.3 MG/DL (ref 2.5–4.9)
PLATELET # BLD AUTO: 155 X10*3/UL (ref 150–450)
POTASSIUM SERPL-SCNC: 3.9 MMOL/L (ref 3.5–5.3)
PR INTERVAL: 136 MS
Q ONSET: 213 MS
QRS COUNT: 19 BEATS
QRS DURATION: 86 MS
QT INTERVAL: 326 MS
QTC CALCULATION(BAZETT): 453 MS
QTC FREDERICIA: 406 MS
R AXIS: -34 DEGREES
RBC # BLD AUTO: 3.34 X10*6/UL (ref 4.5–5.9)
SCAN RESULT: ABNORMAL
SODIUM SERPL-SCNC: 133 MMOL/L (ref 136–145)
T AXIS: 28 DEGREES
T OFFSET: 376 MS
VALPROATE FREE SERPL-MCNC: 3.2 UG/ML (ref 4–30)
VANCOMYCIN SERPL-MCNC: 11.4 UG/ML (ref 5–20)
VENTRICULAR RATE: 116 BPM
WBC # BLD AUTO: 4.5 X10*3/UL (ref 4.4–11.3)

## 2025-04-11 PROCEDURE — 2550000001 HC RX 255 CONTRASTS: Performed by: NEUROLOGICAL SURGERY

## 2025-04-11 PROCEDURE — 2500000001 HC RX 250 WO HCPCS SELF ADMINISTERED DRUGS (ALT 637 FOR MEDICARE OP): Performed by: NURSE PRACTITIONER

## 2025-04-11 PROCEDURE — 80069 RENAL FUNCTION PANEL: CPT

## 2025-04-11 PROCEDURE — 97530 THERAPEUTIC ACTIVITIES: CPT | Mod: GO

## 2025-04-11 PROCEDURE — 93971 EXTREMITY STUDY: CPT | Performed by: INTERNAL MEDICINE

## 2025-04-11 PROCEDURE — 82330 ASSAY OF CALCIUM: CPT | Performed by: REGISTERED NURSE

## 2025-04-11 PROCEDURE — 06H03DZ INSERTION OF INTRALUMINAL DEVICE INTO INFERIOR VENA CAVA, PERCUTANEOUS APPROACH: ICD-10-PCS | Performed by: RADIOLOGY

## 2025-04-11 PROCEDURE — 2780000003 HC OR 278 NO HCPCS

## 2025-04-11 PROCEDURE — 93970 EXTREMITY STUDY: CPT | Performed by: INTERNAL MEDICINE

## 2025-04-11 PROCEDURE — 93970 EXTREMITY STUDY: CPT

## 2025-04-11 PROCEDURE — 37799 UNLISTED PX VASCULAR SURGERY: CPT

## 2025-04-11 PROCEDURE — 2500000005 HC RX 250 GENERAL PHARMACY W/O HCPCS

## 2025-04-11 PROCEDURE — 2500000004 HC RX 250 GENERAL PHARMACY W/ HCPCS (ALT 636 FOR OP/ED)

## 2025-04-11 PROCEDURE — 80202 ASSAY OF VANCOMYCIN: CPT

## 2025-04-11 PROCEDURE — 7100000009 HC PHASE TWO TIME - INITIAL BASE CHARGE

## 2025-04-11 PROCEDURE — 93005 ELECTROCARDIOGRAM TRACING: CPT

## 2025-04-11 PROCEDURE — 7100000010 HC PHASE TWO TIME - EACH INCREMENTAL 1 MINUTE

## 2025-04-11 PROCEDURE — 2500000004 HC RX 250 GENERAL PHARMACY W/ HCPCS (ALT 636 FOR OP/ED): Performed by: REGISTERED NURSE

## 2025-04-11 PROCEDURE — 99222 1ST HOSP IP/OBS MODERATE 55: CPT

## 2025-04-11 PROCEDURE — 85025 COMPLETE CBC W/AUTO DIFF WBC: CPT | Performed by: REGISTERED NURSE

## 2025-04-11 PROCEDURE — 37191 INS ENDOVAS VENA CAVA FILTR: CPT | Performed by: RADIOLOGY

## 2025-04-11 PROCEDURE — 83735 ASSAY OF MAGNESIUM: CPT | Performed by: REGISTERED NURSE

## 2025-04-11 PROCEDURE — C1769 GUIDE WIRE: HCPCS

## 2025-04-11 PROCEDURE — 82947 ASSAY GLUCOSE BLOOD QUANT: CPT

## 2025-04-11 PROCEDURE — 2500000001 HC RX 250 WO HCPCS SELF ADMINISTERED DRUGS (ALT 637 FOR MEDICARE OP)

## 2025-04-11 PROCEDURE — 2720000007 HC OR 272 NO HCPCS

## 2025-04-11 PROCEDURE — 1100000001 HC PRIVATE ROOM DAILY

## 2025-04-11 PROCEDURE — 2500000004 HC RX 250 GENERAL PHARMACY W/ HCPCS (ALT 636 FOR OP/ED): Performed by: PSYCHOLOGIST

## 2025-04-11 PROCEDURE — 2500000002 HC RX 250 W HCPCS SELF ADMINISTERED DRUGS (ALT 637 FOR MEDICARE OP, ALT 636 FOR OP/ED)

## 2025-04-11 PROCEDURE — C1880 VENA CAVA FILTER: HCPCS

## 2025-04-11 PROCEDURE — 92526 ORAL FUNCTION THERAPY: CPT | Mod: GN

## 2025-04-11 RX ADMIN — CLONIDINE HYDROCHLORIDE 0.2 MG: 0.1 TABLET ORAL at 08:30

## 2025-04-11 RX ADMIN — HEPARIN SODIUM 5000 UNITS: 5000 INJECTION INTRAVENOUS; SUBCUTANEOUS at 05:52

## 2025-04-11 RX ADMIN — VALPROATE SODIUM 250 MG: 100 INJECTION, SOLUTION INTRAVENOUS at 02:17

## 2025-04-11 RX ADMIN — DIBASIC SODIUM PHOSPHATE, MONOBASIC POTASSIUM PHOSPHATE AND MONOBASIC SODIUM PHOSPHATE 250 MG: 852; 155; 130 TABLET ORAL at 17:09

## 2025-04-11 RX ADMIN — VANCOMYCIN HYDROCHLORIDE 750 MG: 5 INJECTION, POWDER, LYOPHILIZED, FOR SOLUTION INTRAVENOUS at 17:08

## 2025-04-11 RX ADMIN — POLYETHYLENE GLYCOL 3350 17 G: 17 POWDER, FOR SOLUTION ORAL at 08:30

## 2025-04-11 RX ADMIN — VANCOMYCIN HYDROCHLORIDE 750 MG: 5 INJECTION, POWDER, LYOPHILIZED, FOR SOLUTION INTRAVENOUS at 08:32

## 2025-04-11 RX ADMIN — PANTOPRAZOLE SODIUM 40 MG: 40 INJECTION, POWDER, FOR SOLUTION INTRAVENOUS at 08:30

## 2025-04-11 RX ADMIN — QUETIAPINE FUMARATE 12.5 MG: 25 TABLET ORAL at 20:25

## 2025-04-11 RX ADMIN — DEXAMETHASONE SODIUM PHOSPHATE 2 MG: 4 INJECTION, SOLUTION INTRA-ARTICULAR; INTRALESIONAL; INTRAMUSCULAR; INTRAVENOUS; SOFT TISSUE at 08:30

## 2025-04-11 RX ADMIN — POTASSIUM CHLORIDE 20 MEQ: 14.9 INJECTION, SOLUTION INTRAVENOUS at 04:26

## 2025-04-11 RX ADMIN — VALPROATE SODIUM 250 MG: 100 INJECTION, SOLUTION INTRAVENOUS at 08:29

## 2025-04-11 RX ADMIN — DIBASIC SODIUM PHOSPHATE, MONOBASIC POTASSIUM PHOSPHATE AND MONOBASIC SODIUM PHOSPHATE 250 MG: 852; 155; 130 TABLET ORAL at 14:58

## 2025-04-11 RX ADMIN — HEPARIN SODIUM 5000 UNITS: 5000 INJECTION INTRAVENOUS; SUBCUTANEOUS at 14:58

## 2025-04-11 RX ADMIN — ATORVASTATIN CALCIUM 40 MG: 40 TABLET, FILM COATED ORAL at 20:25

## 2025-04-11 RX ADMIN — VALPROATE SODIUM 250 MG: 100 INJECTION, SOLUTION INTRAVENOUS at 14:58

## 2025-04-11 RX ADMIN — VALPROATE SODIUM 250 MG: 100 INJECTION, SOLUTION INTRAVENOUS at 20:25

## 2025-04-11 RX ADMIN — HYDROXYZINE HYDROCHLORIDE 10 MG: 10 TABLET ORAL at 04:25

## 2025-04-11 RX ADMIN — DEXAMETHASONE SODIUM PHOSPHATE 2 MG: 4 INJECTION, SOLUTION INTRA-ARTICULAR; INTRALESIONAL; INTRAMUSCULAR; INTRAVENOUS; SOFT TISSUE at 20:25

## 2025-04-11 RX ADMIN — CLONIDINE HYDROCHLORIDE 0.2 MG: 0.1 TABLET ORAL at 20:25

## 2025-04-11 RX ADMIN — VANCOMYCIN HYDROCHLORIDE 750 MG: 5 INJECTION, POWDER, LYOPHILIZED, FOR SOLUTION INTRAVENOUS at 00:32

## 2025-04-11 RX ADMIN — IOHEXOL 50 ML: 350 INJECTION, SOLUTION INTRAVENOUS at 14:05

## 2025-04-11 RX ADMIN — HEPARIN SODIUM 5000 UNITS: 5000 INJECTION INTRAVENOUS; SUBCUTANEOUS at 22:00

## 2025-04-11 SDOH — ECONOMIC STABILITY: HOUSING INSECURITY

## 2025-04-11 SDOH — ECONOMIC STABILITY: TRANSPORTATION INSECURITY

## 2025-04-11 SDOH — ECONOMIC STABILITY: FOOD INSECURITY

## 2025-04-11 SDOH — ECONOMIC STABILITY: GENERAL

## 2025-04-11 ASSESSMENT — ACTIVITIES OF DAILY LIVING (ADL)
ASSISTIVE_DEVICE: NONE;EYEGLASSES
ADL_BEFORE_ADMISSION: BOTH
HOW_WELL_CAN_YOU_COMPLETE_GROOMING_TASKS: INDEPENDENTLY
ADL_BEFORE_ADMISSION: INDEPENDENTLY
HEARING_LEFT_EAR: NO PROBLEMS
ADL_BEFORE_ADMISSION: LEFT
HEARING_RIGHT_EAR: NO PROBLEMS
ASSISTIVE_DEVICES: EYEGLASSES
ADEQUATE_TO_COMPLETE_ADL: YES
HOW_WELL_CAN_YOU_BATHE_YOURSELF: INDEPENDENTLY
ADEQUATE_TO_COMPLETE_ADL: YES
DRESSING: INDEPENDENT
ADL_BEFORE_ADMISSION: BOTH
BATHING: INDEPENDENT
FEEDING: INDEPENDENT
HOW_WELL_CAN_YOU_DRESS_YOURSELF: INDEPENDENTLY
HOW_WELL_CAN_YOU_USE_BATHROOM_BY_YOURSELF: INDEPENDENTLY
WALKS_IN_HOME: INDEPENDENTLY
TOILETING: INDEPENDENT
HOW_WELL_CAN_YOU_FEED_YOURSELF: INDEPENDENTLY

## 2025-04-11 ASSESSMENT — PAIN SCALES - GENERAL
PAINLEVEL_OUTOF10: 0 - NO PAIN
PAINLEVEL_OUTOF10: 2
PAINLEVEL_OUTOF10: 0 - NO PAIN

## 2025-04-11 ASSESSMENT — COGNITIVE AND FUNCTIONAL STATUS - GENERAL
DAILY ACTIVITIY SCORE: 8
TOILETING: TOTAL
DRESSING REGULAR LOWER BODY CLOTHING: TOTAL
PERSONAL GROOMING: A LOT
DRESSING REGULAR UPPER BODY CLOTHING: A LOT
EATING MEALS: TOTAL
HELP NEEDED FOR BATHING: TOTAL

## 2025-04-11 ASSESSMENT — PAIN - FUNCTIONAL ASSESSMENT
PAIN_FUNCTIONAL_ASSESSMENT: 0-10

## 2025-04-11 NOTE — CONSULTS
Reason For Consult  Multiple DVTs    History Of Present Illness  Dash Ahumada is a 69 y.o. male with a past medical history of cholecystectomy, Osborn's esophagus, glioblastoma s/p right craniotomy for tumor resection on 12/29/2023 who presented 4/5 for incisional drainage after a repeat craniotomy and tumor resection on 2/20/2025.  He is s/p cranial wound washout and mesh cranioplasty on 4/6.  The patient was noted to be persistently tachycardic on 4/10 PM therefore a lower extremity ultrasound was ordered which demonstrated multiple lower extremity DVTs.  Discussion with neurosurgeon as to whether he can anticoagulation at this time.  If not, he will need IVC filter placed.    Currently he denies any chest pain, shortness of breath, pain in his lower extremities.  Endorsing some soreness in his shoulders which he thinks is from laying in bed.  He is still having some word finding difficulties but is alert and oriented.  Denies personal or family history of blood clots, has never been on blood thinners in the past.  Other than the drainage from his wound that he noticed, has never had any abnormal bleeding, hematuria, bloody stools.     Past Medical History  He has a past medical history of Brain cancer (Multi), GERD (gastroesophageal reflux disease), Hyperlipidemia, Hypertension, and Seizure disorder (Multi).    Surgical History  He has a past surgical history that includes Other surgical history (07/24/2019); Other surgical history (07/26/2021); Brain surgery; Cholecystectomy (January 25, 2020); and Vasectomy ([facility] 1991).     Social History  He reports that he has never smoked. He has never been exposed to tobacco smoke. He has never used smokeless tobacco. He reports that he does not currently use alcohol. He reports current drug use. Drug: Marijuana.    Family History  No family history on file.     Allergies  Patient has no known allergies.    Review of Systems  Negative unless stated above     Physical  "Exam  Physical Exam  Vitals and nursing note reviewed.   Cardiovascular:      Rate and Rhythm: Regular rhythm. Tachycardia present.   Pulmonary:      Effort: Pulmonary effort is normal.      Breath sounds: Normal breath sounds.   Abdominal:      General: Abdomen is flat. There is no distension.      Palpations: Abdomen is soft.      Tenderness: There is no abdominal tenderness.   Musculoskeletal:      Right lower leg: No edema.      Left lower leg: No edema.   Skin:     General: Skin is warm and dry.   Neurological:      General: No focal deficit present.      Mental Status: He is alert.      Comments: Some confusion and expressive aphasia   Psychiatric:         Mood and Affect: Mood normal.       Last Recorded Vitals  Blood pressure 108/79, pulse (!) 111, temperature 37.2 °C (99 °F), temperature source Temporal, resp. rate 22, height 1.702 m (5' 7\"), weight 65.3 kg (143 lb 15.4 oz), SpO2 97%.    Results from last 7 days   Lab Units 04/11/25  0218 04/10/25  0015 04/09/25  0112   WBC AUTO x10*3/uL 4.5 3.4* 3.9*   HEMOGLOBIN g/dL 10.7* 9.8* 10.1*   HEMATOCRIT % 30.0* 27.5* 27.5*   PLATELETS AUTO x10*3/uL 155 121* 132*       Results from last 7 days   Lab Units 04/11/25  0218 04/10/25  0015 04/09/25  1333   SODIUM mmol/L 133* 135* 130*   POTASSIUM mmol/L 3.9 3.7 3.9   CHLORIDE mmol/L 102 105 98   CO2 mmol/L 23 22 24   BUN mg/dL 16 16 14   CREATININE mg/dL 0.59 0.57 0.69   GLUCOSE mg/dL 187* 143* 124*   CALCIUM mg/dL 8.1* 7.7* 8.3*       Results from last 7 days   Lab Units 04/05/25  1421   APTT seconds 24*   INR  1.1        Assessment/Plan     Dash Ahumada is a 69 y.o. male with a past medical history of cholecystectomy, Osborn's esophagus, glioblastoma s/p right craniotomy for tumor resection on 12/29/2023 who presented 4/5 for incisional drainage after a repeat craniotomy and tumor resection on 2/20/2025. Vascular medicine was consulted for multiple DVTs.    Right brachial vein acute DVT  Acute DVT of the " left lower extremity  -In the setting of known glioblastoma and hospitalization  -Lower extremity US shows acute DVT of the left distal femoral, popliteal, gastrocnemius, peroneal and posterior tibial veins  -Undergoing IVC filter placement today as he is not a candidate for AC given his recent surgery and ICH/SAH  -Vascular medicine able to assist with anticoagulation when deemed appropriate by neurosurgery    Hx of thrombocytopenia  -Presumed secondary to Temodar  -Platelet alisson of 81 on 4/6, s/p 2u platelets at that time in preparation for surgery  -Currently stable with platelets 155  -Continue to monitor    Eduin Rogers MD

## 2025-04-11 NOTE — PRE-PROCEDURE NOTE
Interventional Radiology Preprocedure Note    Indication for procedure: The primary encounter diagnosis was Wound dehiscence. Diagnoses of Abnormal EKG, Edema, unspecified type, and Generalized edema were also pertinent to this visit.    Relevant review of systems: NA    Relevant Labs:   Lab Results   Component Value Date    CREATININE 0.59 04/11/2025    EGFR >90 04/11/2025    INR 1.1 04/05/2025    PROTIME 12.2 04/05/2025       Planned Sedation/Anesthesia: Minimal    Airway assessment: normal    Directed physical examination:    A&Ox2-3, not in acute distress    Mallampati: III (soft and hard palate and base of uvula visible)    ASA Score: ASA 3 - Patient with moderate systemic disease with functional limitations    Benefits, risks and alternatives of procedure and planned sedation have been discussed with the patient and/or their representative. All questions answered and they agree to proceed.

## 2025-04-11 NOTE — POST-PROCEDURE NOTE
Interventional Radiology Brief Postprocedure Note    Attending: Dr. Hagen    Assistant: Dr. Morris (PGY-2)    Diagnosis: LLE DVT and RUE DVT    Description of procedure: Right femoral approach IVC filter placement      Anesthesia:  Local    Complications: None    Estimated Blood Loss: none    Medications  As of 04/11/25 1442      atorvastatin (Lipitor) tablet 40 mg (mg) Total dose:  40 mg*   *Administration not included in total     Date/Time Rate/Dose/Volume Action       04/05/25 2105 40 mg Given     04/06/25  0646 *Not included in total MAR Hold      1313 *Not included in total MAR Unhold      2059 *40 mg Missed     04/07/25 2038 *40 mg Missed               atorvastatin (Lipitor) tablet 40 mg (mg) Total dose:  80 mg*   *Administration not included in total     Date/Time Rate/Dose/Volume Action       04/08/25  2134 *40 mg Missed     04/09/25 2000 40 mg Given     04/10/25  2047 40 mg Given               dexAMETHasone (Decadron) tablet 2 mg (mg) Total dose:  2 mg*   *Administration not included in total     Date/Time Rate/Dose/Volume Action       04/05/25  1123 *2 mg Missed      2105 2 mg Given     04/06/25  0646 *Not included in total MAR Hold      0900 *Not included in total Automatically Held      1313 *Not included in total MAR Unhold               pantoprazole (ProtoNix) EC tablet 40 mg (mg) Total dose:  40 mg*   *Administration not included in total     Date/Time Rate/Dose/Volume Action       04/06/25  0606 40 mg Given      0646 *Not included in total MAR Hold      1313 *Not included in total MAR Unhold     04/07/25  0644 *40 mg Missed     04/08/25  0852 *40 mg Missed               polyethylene glycol (Glycolax, Miralax) packet 17 g (g) Total dose:  17 g* Dosing weight:  68   *Administration not included in total     Date/Time Rate/Dose/Volume Action       04/05/25  1733 17 g Given     04/06/25  0646 *Not included in total MAR Hold      0900 *Not included in total Automatically Held      1313 *Not  included in total MAR Unhold     04/07/25  0845 *17 g Missed     04/08/25  1050 *17 g Missed               polyethylene glycol (Glycolax, Miralax) packet 17 g (g) Total dose:  51 g Dosing weight:  71.8      Date/Time Rate/Dose/Volume Action       04/09/25  0822 17 g Given     04/10/25  1013 17 g Given     04/11/25  0830 17 g Given               ondansetron (Zofran) tablet 4 mg (mg) Total dose:  Cannot be calculated* Dosing weight:  68   *Administration dose not documented     Date/Time Rate/Dose/Volume Action       04/06/25  0646 *Not included in total MAR Hold      1313 *Not included in total MAR Unhold               ondansetron (Zofran) injection 4 mg (mg) Total dose:  Cannot be calculated* Dosing weight:  68   *Administration dose not documented     Date/Time Rate/Dose/Volume Action       04/06/25  0646 *Not included in total MAR Hold      1313 *Not included in total MAR Unhold               potassium chloride CR (Klor-Con M20) ER tablet 20 mEq (mEq) Total dose:  20 mEq Dosing weight:  68      Date/Time Rate/Dose/Volume Action       04/05/25  2105 20 mEq Given               gadoterate meglumine (Dotarem) 0.5 mmol/mL contrast injection 13 mL (mL) Total volume:  13 mL Dosing weight:  68      Date/Time Rate/Dose/Volume Action       04/05/25 2027 13 mL Given               oxygen (O2) therapy (L/min) Total volume:  Not documented* Dosing weight:  68   *Total volume has not been documented. View each administration to see the amount administered.     Date/Time Rate/Dose/Volume Action       04/06/25  1145 3 L/min Start      1230 2 L/min Rate Change Medical Gas               metoprolol tartrate (Lopressor) injection 10 mg (mg) Total dose:  10 mg Dosing weight:  68      Date/Time Rate/Dose/Volume Action       04/06/25  1217 10 mg Given               OLANZapine (ZyPREXA) injection 5 mg (mg) Total dose:  5 mg Dosing weight:  68      Date/Time Rate/Dose/Volume Action       04/06/25  1254 *Not included in total MAR Hold       1313 *Not included in total MAR Unhold      1338 5 mg Given               OLANZapine (ZyPREXA) injection 5 mg (mg) Total dose:  5 mg Dosing weight:  68      Date/Time Rate/Dose/Volume Action       04/06/25  2322 5 mg Given               OLANZapine (ZyPREXA) injection 5 mg (mg) Total dose:  5 mg Dosing weight:  71.8      Date/Time Rate/Dose/Volume Action       04/07/25  1048 5 mg Given               OLANZapine (ZyPREXA) injection 10 mg  - Omnicell Override Pull (mg) Total dose:  5 mg      Date/Time Rate/Dose/Volume Action       04/07/25  1048 5 mg Given               OLANZapine (ZyPREXA) injection 5 mg (mg) Total dose:  5 mg Dosing weight:  71.8      Date/Time Rate/Dose/Volume Action       04/07/25  1755 5 mg Given               OLANZapine (ZyPREXA) injection 5 mg (mg) Total dose:  5 mg Dosing weight:  71.8      Date/Time Rate/Dose/Volume Action       04/08/25  1355 5 mg Given               dexmedeTOMIDine (Precedex) sodium chloride 0.9% infusion 400 mcg in 100 mL (4 mcg/mL)  - [employer]ell Override Pull (mcg/kg/hr) Total dose:  Cannot be calculated*   *Total user-documented volume 1499.17 mL may contain volume from other administrations     Date/Time Rate/Dose/Volume Action       04/06/25  1315 0.2 mcg/kg/hr - 3.4 mL/hr New Bag               dexmedeTOMIDine (Precedex) 400 mcg in 100 mL (4 mcg/mL) sodium chloride 0.9% infusion (mcg/kg/hr) Total dose:  Cannot be calculated* Dosing weight:  68   *Total user-documented volume 1499.17 mL may contain volume from other administrations     Date/Time Rate/Dose/Volume Action       04/06/25  1315 0.2 mcg/kg/hr - 3.4 mL/hr New Bag      1345 0.4 mcg/kg/hr - 6.8 mL/hr Rate Change      1415 0.6 mcg/kg/hr - 10.2 mL/hr Rate Change      1445 0.8 mcg/kg/hr - 13.6 mL/hr Rate Change      1515 1 mcg/kg/hr - 17 mL/hr Rate Change      1725 1.2 mcg/kg/hr - 20.4 mL/hr New Bag      1920 1.3 mcg/kg/hr - 22.1 mL/hr Rate Change      2137 1.5 mcg/kg/hr - 25.5 mL/hr New Bag     04/07/25  0114 1.5  mcg/kg/hr - 25.5 mL/hr New Bag      0227 1.3 mcg/kg/hr - 22.1 mL/hr Rate Change      0312 1 mcg/kg/hr - 17 mL/hr Rate Change      0555 1 mcg/kg/hr - 17 mL/hr New Bag      0800 0.8 mcg/kg/hr - 13.6 mL/hr Rate Change      0825 1 mcg/kg/hr - 17 mL/hr Rate Change      0900 1.25 mcg/kg/hr - 21.3 mL/hr Rate Change      0930 1.5 mcg/kg/hr - 25.5 mL/hr Rate Change      1018 1.5 mcg/kg/hr - 25.5 mL/hr New Bag      1130 1.25 mcg/kg/hr - 21.3 mL/hr Rate Change      1504 1.25 mcg/kg/hr - 21.3 mL/hr New Bag      1633 1 mcg/kg/hr - 17 mL/hr Rate Change      1915 1.3 mcg/kg/hr - 22.1 mL/hr Rate Change      2002 1.3 mcg/kg/hr - 22.1 mL/hr New Bag      2051 1.5 mcg/kg/hr - 25.5 mL/hr Rate Change     04/08/25  0005 1.5 mcg/kg/hr - 25.5 mL/hr New Bag      0430 1.5 mcg/kg/hr - 25.5 mL/hr New Bag      0528 1.3 mcg/kg/hr - 22.1 mL/hr Rate Change      0612 1.5 mcg/kg/hr - 25.5 mL/hr Rate Change      0652 1.5 mcg/kg/hr - 25.5 mL/hr New Bag      0800 1.5 mcg/kg/hr - 25.5 mL/hr Rate Verify      0908 1 mcg/kg/hr - 17 mL/hr Rate Change      1030 0.8 mcg/kg/hr - 13.6 mL/hr Rate Change      1116 0.8 mcg/kg/hr - 13.6 mL/hr New Bag      1300 1 mcg/kg/hr - 17 mL/hr Rate Change      1330 1.5 mcg/kg/hr - 25.5 mL/hr Rate Change      1637 1.5 mcg/kg/hr - 25.5 mL/hr New Bag      1819 1 mcg/kg/hr - 17 mL/hr Rate Change      2013 1.3 mcg/kg/hr - 22.1 mL/hr Rate Change      2040 1.3 mcg/kg/hr - 22.1 mL/hr New Bag      2200 1 mcg/kg/hr - 17 mL/hr Rate Change     04/09/25  0214 1 mcg/kg/hr - 17 mL/hr New Bag      0300  Stopped      0413 1 mcg/kg/hr - 17 mL/hr Restarted      0800 0.9 mcg/kg/hr - 15.3 mL/hr Rate Change      0900 0.8 mcg/kg/hr - 13.6 mL/hr Rate Change      0957 0.7 mcg/kg/hr - 11.9 mL/hr New Bag      1105 0.9 mcg/kg/hr - 15.3 mL/hr Rate Change      1138 0.7 mcg/kg/hr - 11.9 mL/hr Rate Change      1215 0.6 mcg/kg/hr - 10.2 mL/hr Rate Change      1324 0.5 mcg/kg/hr - 8.5 mL/hr Rate Change      1542 0.4 mcg/kg/hr - 6.8 mL/hr Rate Change      1645  0.3 mcg/kg/hr - 5.1 mL/hr Rate Change      1705 0.3 mcg/kg/hr - 5.1 mL/hr Rate Verify      2000 0.3 mcg/kg/hr - 5.1 mL/hr Rate Verify      2200 0.2 mcg/kg/hr - 3.4 mL/hr Rate Change     04/10/25  0246 0.2 mcg/kg/hr - 3.4 mL/hr New Bag      0425 0.1 mcg/kg/hr - 1.7 mL/hr Rate Change      0513  Stopped               cefepime (Maxipime) 2 g in dextrose (iso)  mL (g) Total dose:  2 g* Dosing weight:  68   *From user-documented volume     Date/Time Rate/Dose/Volume Action       04/06/25  1850 2 g (over 30 min) New Bag      1940 100 mL Stopped               vancomycin (Vancocin) 1,000 mg in dextrose 5%  mL (mL/hr) Total dose:  2,000 mg* Dosing weight:  68   *From user-documented volume     Date/Time Rate/Dose/Volume Action       04/06/25  2104 1,000 mg - 200 mL/hr (over 60 min) New Bag      2108 200 mL Stopped     04/07/25  0857 1,000 mg - 200 mL/hr (over 60 min) New Bag      0953 200 mL Stopped               HYDROmorphone (Dilaudid) injection 0.2 mg (mg) Total dose:  0.2 mg Dosing weight:  68      Date/Time Rate/Dose/Volume Action       04/06/25  1426 0.2 mg Given               sodium chloride 0.9 % bolus 500 mL (mL/hr) Total volume:  Not documented* Dosing weight:  68   *Total volume has not been documented. View each administration to see the amount administered.     Date/Time Rate/Dose/Volume Action       04/06/25  1525 500 mL - 500 mL/hr (over 60 min) New Bag      1625  (over 60 min) Stopped               sodium chloride 0.9 % bolus 1,000 mL (mL/hr) Total volume:  999 mL* Dosing weight:  65.3   *From user-documented volume     Date/Time Rate/Dose/Volume Action       04/09/25  0359 1,000 mL - 999 mL/hr (over 60 min) New Bag      0405 999 mL Stopped               sodium chloride 0.9 % bolus 1,000 mL (mL/hr) Total volume:  1,000 mL* Dosing weight:  65.3   *From user-documented volume     Date/Time Rate/Dose/Volume Action       04/09/25 2127 1,000 mL - 500 mL/hr (over 120 min) New Bag      2200 275 mL        2300 500 mL       2357  (over 120 min) Stopped     04/10/25  0000 225 mL                sodium chloride 0.9 % bolus 1,000 mL (mL/hr) Total volume:  1,000 mL* Dosing weight:  65.3   *From user-documented volume     Date/Time Rate/Dose/Volume Action       04/10/25  2001 1,000 mL - 2,000 mL/hr (over 30 min) New Bag      2051 1,000 mL Stopped               sodium chloride 0.9 % bolus 1,000 mL (mL/hr) Total volume:  2,000 mL* Dosing weight:  65.3   *From user-documented volume     Date/Time Rate/Dose/Volume Action       04/10/25  2127 1,000 mL - 500 mL/hr (over 120 min) [dose] - 1,000 mL [vol] New Bag      2357 1,000 mL Stopped               sodium chloride 0.9% infusion (mL/hr) Total volume:  1,766.25 mL* Dosing weight:  68   *From user-documented volume     Date/Time Rate/Dose/Volume Action       04/06/25  1727 75 mL/hr New Bag     04/07/25  0000 491.25 mL       0200 150 mL       0600 300 mL       0700 75 mL       0800 75 mL       0900 75 mL       1000 75 mL       1100 75 mL       1200 75 mL       1300 75 mL       1500 75 mL       1600 75 mL       1700 75 mL       1800 75 mL                sodium chloride 0.9% infusion (mL/hr) Total volume:  1,566.25 mL* Dosing weight:  71.8   *From user-documented volume     Date/Time Rate/Dose/Volume Action       04/07/25  2107 75 mL/hr New Bag      2200 66.25 mL       2300 75 mL      04/08/25  0000 75 mL       0100 75 mL       0500 300 mL       0600 75 mL       0700 75 mL       0800 75 mL       0900 75 mL       1000 75 mL       1100 75 mL       1200 75 mL       1300 75 mL       1400 75 mL       1600 75 mL       1700 75 mL       1731  Stopped      1800 75 mL       1900 75 mL                sodium chloride 0.9% infusion (mL/hr) Total volume:  1,767.5 mL* Dosing weight:  71.8   *From user-documented volume     Date/Time Rate/Dose/Volume Action       04/08/25  1731 75 mL/hr New Bag      2300 411.25 mL      04/09/25  0000 75 mL       0200 150 mL       1000 600 mL       1200 150 mL        1324 105 mL       1400 45 mL       1600 150 mL       1617 75 mL/hr Rate Verify      1700 75 mL       1705  Stopped      1800 6.25 mL                sodium chloride 0.9% infusion (mL/hr) Total volume:  1,345 mL* Dosing weight:  65.3   *From user-documented volume     Date/Time Rate/Dose/Volume Action       04/09/25  1706 50 mL/hr New Bag      1800 45 mL       1900 50 mL       2000 50 mL       2100 50 mL       2200 50 mL       2300 50 mL      04/10/25  0000 50 mL       0100 50 mL       0200 50 mL       0300 50 mL       0400 50 mL       0500 50 mL       0600 50 mL       0700 50 mL       0800 50 mL/hr - 50 mL Rate Verify      0900 50 mL       1000 50 mL       1100 50 mL       1200 50 mL/hr - 50 mL Rate Verify      1300 50 mL       1400 50 mL       1500 50 mL       1600 50 mL/hr - 50 mL Rate Verify      1700 50 mL       1800 50 mL       1900 50 mL       1959 50 mL Stopped               levETIRAcetam (Keppra) 500 mg in sodium chloride (iso)  mL (mL/hr) Total dose:  2,000 mg* Dosing weight:  68   *From user-documented volume     Date/Time Rate/Dose/Volume Action       04/06/25  1801 500 mg - 400 mL/hr (over 15 min) New Bag      1842  (over 15 min) Stopped     04/07/25  0345 500 mg - 400 mL/hr (over 15 min) New Bag      0347 200 mL Stopped      1618 500 mg - 400 mL/hr (over 15 min) New Bag      1633 100 mL Stopped     04/08/25  0523 500 mg - 400 mL/hr (over 15 min) New Bag      0529 100 mL Stopped               phenylephrine (Brian-Synephrine) injection 10 mg/mL  - [employer] Override Pull Total dose:  Cannot be calculated*   *Administration dose not documented     Date/Time Rate/Dose/Volume Action       04/06/25  1844 *Not included in total Missed               phenylephrine (Brian-Synephrine) injection 10 mg/mL  - Om[employer]ell Override Pull Total dose:  Cannot be calculated*   *Administration dose not documented     Date/Time Rate/Dose/Volume Action       04/06/25 1844 *Not included in total Missed                phenylephrine in NS (Brian-Synephrine) syringe 40 mcg/mL  - [employer]ell Override Pull Total dose:  Cannot be calculated*   *Administration dose not documented     Date/Time Rate/Dose/Volume Action       04/06/25  Canceled Entry               atropine syringe 0.1 mg/mL  - Omnicell Override Pull Total dose:  Cannot be calculated*   *Administration dose not documented     Date/Time Rate/Dose/Volume Action       04/06/25  1841 *Not included in total Missed               levETIRAcetam (Keppra) in sodium chloride (iso) IV 1,000 mg/100 mL  - Omnicell Override Pull (mg) Total dose:  1,000 mg      Date/Time Rate/Dose/Volume Action       04/06/25  1726 1,000 mg New Bag               levETIRAcetam (Keppra) 1,500 mg in sodium chloride (iso)  mL (mL/hr) Total volume:  Not documented* Dosing weight:  68   *Total volume has not been documented. View each administration to see the amount administered.     Date/Time Rate/Dose/Volume Action       04/06/25  1730 1,500 mg - 400 mL/hr (over 15 min) New Bag      1757  (over 15 min) Stopped               phenylephrine (Brian-Synephrine) 10 mg in sodium chloride 0.9% 250 mL (0.04 mg/mL) infusion (premix) (mcg/kg/min) Total dose:  6,630 mcg* Dosing weight:  68   *From user-documented volume     Date/Time Rate/Dose/Volume Action       04/06/25  1615 0.5 mcg/kg/min - 51 mL/hr New Bag      1800  Stopped      1900 0.5 mcg/kg/min - 51 mL/hr Restarted      2030  Stopped               meropenem (Merrem) 2 g in sodium chloride 0.9% 100 mL IV (mL/hr) Total dose:  24.29 g* Dosing weight:  68   *From user-documented volume     Date/Time Rate/Dose/Volume Action       04/06/25  2100 2 g - 280 mL/hr (over 30 min) New Bag     04/07/25  0026 140 mL [vol] Stopped      0438 2 g - 280 mL/hr (over 30 min) New Bag      0554 140 mL [vol] Stopped      1211 2 g - 280 mL/hr (over 30 min) - 140 mL [vol] New Bag      1241  (over 30 min) Stopped      2040 2 g - 280 mL/hr (over 30 min) - 140 mL [vol] New Bag      2051   (over 30 min) Stopped     04/08/25  0553 2 g - 280 mL/hr (over 30 min) New Bag      0603 140 mL [vol] Stopped      1140 2 g - 280 mL/hr (over 30 min) New Bag      1336 100 mL [vol] Stopped      2146 2 g - 280 mL/hr (over 30 min) New Bag      2210 140 mL [vol] Stopped     04/09/25  0553 2 g - 280 mL/hr (over 30 min) New Bag      0554 140 mL [vol] Stopped      1216 2 g - 280 mL/hr (over 30 min) - 140 mL [vol] New Bag      1246  (over 30 min) Stopped      1954 2 g - 280 mL/hr (over 30 min) New Bag      2041 100 mL [vol] Stopped     04/10/25  0425 2 g - 280 mL/hr (over 30 min) New Bag      0500 100 mL [vol]       0525  (over 30 min) Stopped      1415 2 g - 280 mL/hr (over 30 min) New Bag      1812 140 mL [vol] Stopped      2047 2 g - 280 mL/hr (over 30 min) - 140 mL [vol] New Bag      2148  (over 30 min) Stopped               dexAMETHasone (Decadron) injection 2 mg (mg) Total dose:  20 mg Dosing weight:  68      Date/Time Rate/Dose/Volume Action       04/06/25  2104 2 mg Given     04/07/25  0833 2 mg Given      2040 2 mg Given     04/08/25  0853 2 mg Given      2145 2 mg Given     04/09/25  0822 2 mg Given      1954 2 mg Given     04/10/25  1011 2 mg Given      2047 2 mg Given     04/11/25  0830 2 mg Given               potassium chloride 20 mEq in sterile water for injection 100 mL (mL/hr) Total dose:  40 mEq* Dosing weight:  68   *From user-documented volume     Date/Time Rate/Dose/Volume Action       04/08/25  0557 20 mEq - 50 mL/hr (over 120 min) - 100 mL New Bag      0812  (over 120 min) Stopped     04/11/25  0426 20 mEq - 50 mL/hr (over 120 min) - 100 mL New Bag      0740 0 mL Stopped               calcium gluconate 1 g in sodium chloride (iso) IV 50 mL (mL/hr) Total dose:  1 g* Dosing weight:  68   *From user-documented volume     Date/Time Rate/Dose/Volume Action       04/10/25  0513 1 g - 100 mL/hr (over 30 min) - 50 mL New Bag      0558  (over 30 min) Stopped               insulin lispro injection 0-5 Units  (Units) Total dose:  1 Units Dosing weight:  71.8      Date/Time Rate/Dose/Volume Action       04/07/25  0846 *Not included in total Missed      1230 *Not included in total Missed      1614 *Not included in total Missed      2033 *Not included in total Missed      2345 *Not included in total Missed     04/08/25  0523 *Not included in total Missed      0833 *Not included in total Missed      1410 *Not included in total Missed      1730 *Not included in total Missed      2014 *Not included in total Missed     04/09/25  0011 *Not included in total Missed      0404 *Not included in total Missed      0805 *Not included in total Missed      1136 *Not included in total Missed      1521 *Not included in total Missed      1952 *Not included in total Missed      2313 1 Units Given     04/10/25  0420 *Not included in total Missed      1012 *Not included in total Missed               insulin lispro injection 0-5 Units (Units) Total dose:  Cannot be calculated* Dosing weight:  65.3   *Administration dose not documented     Date/Time Rate/Dose/Volume Action       04/10/25  1812 *Not included in total Missed      2128 *Not included in total Missed      2357 *Not included in total Missed     04/11/25  0554 *Not included in total Missed      1123 *Not included in total Missed               magnesium sulfate 2 g in sterile water for injection 50 mL (mL/hr) Total volume:  Cannot be calculated* Dosing weight:  71.8   *Total user-documented volume 51.25 mL may contain volume from other administrations     Date/Time Rate/Dose/Volume Action       04/07/25  0834 2 g - 25 mL/hr (over 120 min) New Bag      1037  (over 120 min) Stopped               magnesium sulfate 2 g in sterile water for injection 50 mL (mL/hr) Total volume:  Cannot be calculated* Dosing weight:  65.3   *Total user-documented volume 51.25 mL may contain volume from other administrations     Date/Time Rate/Dose/Volume Action       04/10/25  2102 2 g - 25 mL/hr (over 120  min) New Bag      2358  (over 120 min) Stopped               vancomycin (Vancocin) 750 mg in dextrose 5%  mL (mL/hr) Total dose:  4,500 mg* Dosing weight:  71.8   *From user-documented volume     Date/Time Rate/Dose/Volume Action       04/07/25  1658 750 mg - 200 mL/hr (over 45 min) - 150 mL New Bag      1740  (over 45 min) Stopped     04/08/25  0126 750 mg - 200 mL/hr (over 45 min) New Bag      0138 150 mL Stopped      0853 750 mg - 200 mL/hr (over 45 min) New Bag      1000 150 mL Stopped      1822 750 mg - 200 mL/hr (over 45 min) New Bag      1921 150 mL Stopped     04/09/25  0100 750 mg - 200 mL/hr (over 45 min) New Bag      0113 150 mL Stopped      0823 750 mg - 200 mL/hr (over 45 min) New Bag      0908  (over 45 min) Stopped      1000 150 mL                valproate (Depacon) 1,000 mg in dextrose 5% 100 mL IV (mL/hr) Total dose:  1,000 mg* Dosing weight:  71.8   *From user-documented volume     Date/Time Rate/Dose/Volume Action       04/08/25  1012 1,000 mg - 110 mL/hr (over 60 min) New Bag      1135 110 mL [vol] Stopped               valproate (Depacon) 250 mg in dextrose 5% 50 mL IV (mL/hr) Total dose:  2,988.1 mg* Dosing weight:  71.8   *From user-documented volume     Date/Time Rate/Dose/Volume Action       04/08/25  1035 *250 mg - 52.5 mL/hr (over 60 min) Missed      1704 250 mg - 52.5 mL/hr (over 60 min) New Bag      1819 52.5 mL [vol] Stopped      2146 250 mg - 52.5 mL/hr (over 60 min) New Bag      2211 52.5 mL [vol] Stopped     04/09/25  0359 250 mg - 52.5 mL/hr (over 60 min) New Bag      0405 52.5 mL [vol] Stopped      0823 250 mg - 52.5 mL/hr (over 60 min) New Bag      0923  (over 60 min) Stopped      1000 52.5 mL [vol]       1519 250 mg - 52.5 mL/hr (over 60 min) - 52.5 mL [vol] New Bag      1619  (over 60 min) Stopped      2001 250 mg - 52.5 mL/hr (over 60 min) New Bag      2041 50 mL [vol] Stopped     04/10/25  0328 250 mg - 52.5 mL/hr (over 60 min) New Bag      0427 52.5 mL [vol] Stopped       1013 250 mg - 52.5 mL/hr (over 60 min) New Bag      1334 52.5 mL [vol] Stopped      1800 250 mg - 52.5 mL/hr (over 60 min) New Bag      1953 52.5 mL [vol] Stopped      2047 250 mg - 52.5 mL/hr (over 60 min) - 52.5 mL [vol] New Bag      2148  (over 60 min) Stopped     04/11/25  0217 250 mg - 52.5 mL/hr (over 60 min) - 52.5 mL [vol] New Bag      0337  (over 60 min) Stopped      0829 250 mg - 52.5 mL/hr (over 60 min) - 52.5 mL [vol] New Bag      0934  (over 60 min) Stopped               cloNIDine (Catapres-TTS) 0.2 mg/24 hr patch 1 patch (patch) Total dose:  1 patch Dosing weight:  71.8      Date/Time Rate/Dose/Volume Action       04/08/25  1013 1 patch (over 88662 min) Medication Applied      1543  (over 59197 min) Medication Removed               pantoprazole (Protonix) injection 40 mg (mg) Total dose:  160 mg Dosing weight:  71.8      Date/Time Rate/Dose/Volume Action       04/08/25  0909 40 mg Given     04/09/25  0822 40 mg Given     04/10/25  1042 40 mg Given     04/11/25  0830 40 mg Given               LORazepam (Ativan) injection 1 mg (mg) Total dose:  1 mg Dosing weight:  71.8      Date/Time Rate/Dose/Volume Action       04/08/25  1505 1 mg Given               LORazepam (Ativan) injection 0.5 mg (mg) Total dose:  0.5 mg Dosing weight:  71.8      Date/Time Rate/Dose/Volume Action       04/08/25  1630 0.5 mg Given               LORazepam (Ativan) injection 2 mg/mL  - [employer] Override Pull (mg) Total dose:  0.5 mg      Date/Time Rate/Dose/Volume Action       04/08/25  1630 0.5 mg Given               hydrOXYzine HCL (Atarax) tablet 10 mg (mg) Total dose:  20 mg Dosing weight:  71.8      Date/Time Rate/Dose/Volume Action       04/10/25  1843 10 mg Given     04/11/25  0425 10 mg Given               sennosides-docusate sodium (Carissa-Colace) 8.6-50 mg per tablet 1 tablet (tablet) Total dose:  2 tablet* Dosing weight:  71.8   *Administration not included in total     Date/Time Rate/Dose/Volume Action       04/08/25   2135 *1 tablet Missed     04/09/25 2000 1 tablet Given     04/10/25  2047 1 tablet Given               sod phos di, mono-K phos mono (K Phos Neutral) tablet 250 mg (mg) Total dose:  0 mg* Dosing weight:  71.8   *Administration not included in total     Date/Time Rate/Dose/Volume Action       04/08/25 2135 *250 mg Missed               cloNIDine (Catapres) tablet 0.1 mg (mg) Total dose:  0 mg* Dosing weight:  71.8   *Administration not included in total     Date/Time Rate/Dose/Volume Action       04/08/25 2135 *0.1 mg Missed               cloNIDine (Catapres) tablet 0.2 mg (mg) Total dose:  1 mg Dosing weight:  65.3      Date/Time Rate/Dose/Volume Action       04/09/25  0822 0.2 mg Given      2000 0.2 mg Given     04/10/25  1011 0.2 mg Given      2047 0.2 mg Given     04/11/25  0830 0.2 mg Given               QUEtiapine (SEROquel) tablet 12.5 mg (mg) Total dose:  25 mg* Dosing weight:  71.8   *Administration not included in total     Date/Time Rate/Dose/Volume Action       04/08/25 2135 *12.5 mg Missed     04/09/25 2000 12.5 mg Given     04/10/25  2047 12.5 mg Given               heparin (porcine) injection 5,000 Units (Units) Total dose:  35,000 Units Dosing weight:  71.8      Date/Time Rate/Dose/Volume Action       04/09/25  0553 5,000 Units Given      1520 5,000 Units Given      1955 5,000 Units Given     04/10/25  0513 5,000 Units Given      1415 5,000 Units Given      2128 5,000 Units Given     04/11/25  0552 5,000 Units Given               potassium phosphates 15 mmol in dextrose 5% 250 mL IV (mL/hr) Total dose:  15 mmol* Dosing weight:  65.3   *From user-documented volume     Date/Time Rate/Dose/Volume Action       04/09/25  0822 15 mmol - 63.8 mL/hr (over 240 min) New Bag      1222 255 mL [vol] Stopped               vancomycin (Vancocin) 750 mg in sodium chloride 0.9% 150 mL IV (mL/hr) Total dose:  4,500 mg* Dosing weight:  65.3   *From user-documented volume     Date/Time Rate/Dose/Volume Action        04/09/25  1720 750 mg - 220 mL/hr (over 45 min) - 165 mL [vol] New Bag      1805  (over 45 min) Stopped     04/10/25  0016 750 mg - 220 mL/hr (over 45 min) New Bag      0100 165 mL [vol]       0117 0 mL [vol] Stopped      1012 750 mg - 220 mL/hr (over 45 min) New Bag      1334 165 mL [vol] Stopped      1800 750 mg - 220 mL/hr (over 45 min) New Bag      1953 165 mL [vol] Stopped     04/11/25  0032 750 mg - 220 mL/hr (over 45 min) New Bag      0114 165 mL [vol]       0117  (over 45 min) Stopped      0832 750 mg - 220 mL/hr (over 45 min) - 165 mL [vol] New Bag      0934  (over 45 min) Stopped               potassium, sodium phosphates (Phos-NaK) 280-160-250 mg packet 1 packet (packet) Total dose:  2 packet Dosing weight:  65.3      Date/Time Rate/Dose/Volume Action       04/10/25  1415 1 packet Given      1800 1 packet Given               calcium gluconate 2 g in sodium chloride (iso)  mL (mL/hr) Total dose:  2 g* Dosing weight:  65.3   *From user-documented volume     Date/Time Rate/Dose/Volume Action       04/10/25  2048 2 g - 100 mL/hr (over 60 min) New Bag      2148 100 mL Stopped               iohexol (OMNIPaque) 350 mg iodine/mL solution 50 mL (mL) Total volume:  50 mL Dosing weight:  65.3      Date/Time Rate/Dose/Volume Action       04/11/25  1405 50 mL Given                   No specimens collected      See detailed result report with images in PACS.    The patient tolerated the procedure well without incident or complication and is in stable condition.

## 2025-04-11 NOTE — CARE PLAN
Problem: Fall/Injury  Goal: Not fall by end of shift  Outcome: Met     Problem: Skin  Goal: Prevent/manage excess moisture  4/10/2025 2153 by Kathleen Stinson RN  Outcome: Progressing  Flowsheets (Taken 4/10/2025 2107)  Prevent/manage excess moisture:   Cleanse incontinence/protect with barrier cream   Follow provider orders for dressing changes   Moisturize dry skin   Monitor for/manage infection if present  4/10/2025 2107 by Kathleen Stinson RN  Outcome: Progressing  Flowsheets (Taken 4/10/2025 2107)  Prevent/manage excess moisture:   Cleanse incontinence/protect with barrier cream   Follow provider orders for dressing changes   Moisturize dry skin   Monitor for/manage infection if present  Goal: Promote skin healing  Outcome: Progressing     Problem: Pain - Adult  Goal: Verbalizes/displays adequate comfort level or baseline comfort level  4/10/2025 2153 by Kathleen Stinson RN  Outcome: Progressing  Flowsheets (Taken 4/10/2025 2153)  Verbalizes/displays adequate comfort level or baseline comfort level: Implement non-pharmacological measures as appropriate and evaluate response  4/10/2025 2107 by Kathleen Stinson RN  Outcome: Progressing     Problem: Chronic Conditions and Co-morbidities  Goal: Patient's chronic conditions and co-morbidity symptoms are monitored and maintained or improved  4/10/2025 2153 by Kathleen Stinson RN  Outcome: Progressing  4/10/2025 2107 by Kathleen Stinson RN  Outcome: Progressing     Problem: Safety - Medical Restraint  Goal: Remains free of injury from restraints (Restraint for Interference with Medical Device)  4/10/2025 2153 by Kathleen Stinson RN  Outcome: Progressing  Flowsheets (Taken 4/9/2025 2151 by Elisa Soto RN)  Remains free of injury from restraints (restraint for interference with medical device): Every 2 hours: Monitor safety, psychosocial status, comfort, nutrition and hydration  4/10/2025 2107 by Kathleen Stinson RN  Outcome: Progressing  Flowsheets (Taken 4/9/2025 2151 by Elisa Soto  RN)  Remains free of injury from restraints (restraint for interference with medical device): Every 2 hours: Monitor safety, psychosocial status, comfort, nutrition and hydration  Goal: Free from restraint(s) (Restraint for Interference with Medical Device)  4/10/2025 2153 by Kathleen Stinson RN  Outcome: Progressing  Flowsheets (Taken 4/9/2025 2151 by Elisa Soto RN)  Free from restraint(s) (restraint for interference with medical device): ONCE/SHIFT or MINIMUM Every 12 hours: Assess and document the continuing need for restraints  4/10/2025 2107 by Kathleen Stinson RN  Outcome: Progressing  Flowsheets (Taken 4/9/2025 2151 by Elisa Soto RN)  Free from restraint(s) (restraint for interference with medical device): ONCE/SHIFT or MINIMUM Every 12 hours: Assess and document the continuing need for restraints   The patient's goals for the shift include free from fall    The clinical goals for the shift include safety

## 2025-04-11 NOTE — PROGRESS NOTES
"Occupational Therapy    Occupational Therapy Treatment    Name: Enmanuel Ahumada \"Dash\"  MRN: [id number]  : 1955  Date: 25  Room:       Time Calculation  Start Time: 1229  Stop Time: 1253  Time Calculation (min): 24 min    Assessment:  OT Assessment: Decreased strength, endurance, coordination, balance, cognition all limiting occupational performance. Would benefit from skilled services to maximize functional potential.  Prognosis: Good  Barriers to Discharge Home: Caregiver assistance, Cognition needs, Physical needs  Caregiver Assistance: Caregiver assistance needed per identified barriers - however, level of patient's required assistance exceeds assistance available at home  Cognition Needs: Insight of patient limited regarding functional ability/needs, Cognition-related high falls risk  Physical Needs: 24hr mobility assistance needed, 24hr ADL assistance needed  Evaluation/Treatment Tolerance: Patient tolerated treatment well  Medical Staff Made Aware: Yes  End of Session Communication: Bedside nurse  End of Session Patient Position: Bed, 3 rail up, Alarm on (B soft wrist restraints in place)  Plan:  Treatment Interventions: ADL retraining, Functional transfer training, Endurance training, UE strengthening/ROM, Cognitive reorientation, Neuromuscular reeducation, Fine motor coordination activities, Compensatory technique education  OT Frequency: 4 times per week  OT Discharge Recommendations: High intensity level of continued care  Equipment Recommended upon Discharge:  (TBD)  OT Recommended Transfer Status: Moderate assist  OT - OK to Discharge: Yes    Subjective   General:  OT Last Visit  OT Received On: 25  Reason for Referral: Pt initially presented with tumor progression (RO GBM), s/p redo craniotomy for tumor resection in 2025.  p/w 1wk incisional drainage,  CTH airfluid level in resection cavity,  MRI brain w/wo incr'd resection cavity enhancement and diffusion " restriction  4/6 s/p R cranial wound washout and mesh cranioplasty. 4/7 rCTH incr SAH, min incr ICH, rrCTH stable, EEG neg dc'd.  Prior to Session Communication: Bedside nurse  Patient Position Received: Bed, 3 rail up, Alarm on (B soft wrist restraints in place)  Family/Caregiver Present: Yes  Caregiver Feedback: Wife present and supportive  General Comment: Pt more alert as compared to previous session. Able to follow commands appropriately approx 50% of the time with cues. Somewhat garbled speech.   Precautions:  Medical Precautions: Fall precautions  Precautions Comment: SBP<160  Vitals:   Date/Time Vitals Session Patient Position Pulse Resp SpO2 BP MAP (mmHg)    04/11/25 14:04:50 --  --  105  16  97 %  109/76  --     04/11/25 14:12:53 --  --  106  --  95 %  --  --     04/11/25 14:14:53 --  --  112  16  96 %  106/79  --     04/11/25 1430 --  --  102  16  96 %  115/89  --           Lines/Tubes/Drains:  PICC - Adult 04/08/25 Double lumen Right Brachial vein (Active)   Number of days: 2       Urethral Catheter Non-latex 14 Fr. (Active)   Number of days: 4       NG/OG/Feeding Tube Right nostril (Active)   Number of days: 2       Cognition:  Overall Cognitive Status: Impaired  Arousal/Alertness: Delayed responses to stimuli  Orientation Level:  (Oriented to self and hospital. Garbled speech and combining months when asked.)  Following Commands: Follows one step commands with repetition  Safety Judgment: Decreased awareness of need for safety precautions  Cognition Comments: Difficulty with focused attention and recall    Pain Assessment:  Pain Assessment  Pain Assessment: 0-10  0-10 (Numeric) Pain Score: 0 - No pain     Objective        Bed Mobility/Transfers:   Bed Mobility  Bed Mobility: Yes  Bed Mobility 1  Bed Mobility 1: Supine to sitting, Sitting to supine  Level of Assistance 1: Maximum assistance  Bed Mobility Comments 1: Cues for hand placement and technique.  Transfers  Transfer: Yes  Transfer 1  Transfer  From 1: Sit to  Transfer to 1: Stand  Technique 1: Sit to stand, Stand to sit  Transfer Level of Assistance 1: Moderate assistance, +2, Arm in arm assistance                Therapy/Activity:      Therapeutic Activity  Therapeutic Activity Performed: Yes  Therapeutic Activity 1: Static EOB sit for approx 7min. MOD A for L lean. With cues and assist, able to adjust posture and attain neurtal sit for short duration. Able to complete lateral trunk flex to R elbow and return to sit with MIN A. Cues for R head rotation and lateral flex.  Therapeutic Activity 2: Static stand for approx 20sec with MOD A x2. Attempted to take step. Difficulty with advancing LLE.       Outcome Measures:  [facility] Daily Activity  Putting on and taking off regular lower body clothing: Total  Bathing (including washing, rinsing, drying): Total  Putting on and taking off regular upper body clothing: A lot  Toileting, which includes using toilet, bedpan or urinal: Total  Taking care of personal grooming such as brushing teeth: A lot  Eating Meals: Total  Daily Activity - Total Score: 8     Education Documentation  Body Mechanics, taught by Kavya Beavers OT at 4/11/2025  3:26 PM.  Learner: Patient  Readiness: Acceptance  Method: Explanation  Response: Verbalizes Understanding    Precautions, taught by Kavya Beavers OT at 4/11/2025  3:26 PM.  Learner: Patient  Readiness: Acceptance  Method: Explanation  Response: Verbalizes Understanding    ADL Training, taught by Kavya Beavers OT at 4/11/2025  3:26 PM.  Learner: Patient  Readiness: Acceptance  Method: Explanation  Response: Verbalizes Understanding    Education Comments  No comments found.        Goals:  Encounter Problems       Encounter Problems (Active)       ADLs       Patient will complete grooming tasks with CGA in order to maximize functional Indep with task completion.        Start:  04/08/25    Expected End:  04/22/25            Patient will complete lower body dressing with  MIN A  for donning and doffing all LE clothes in order to increase Indep with task participation.        Start:  04/08/25    Expected End:  04/22/25            Patient will complete toileting, including clothing management and hygiene, with MIN A in order to maximize functional Indep with task completion.        Start:  04/08/25    Expected End:  04/22/25               BALANCE       Pt will increase static/dynamic sit/stand to Good to increase safety and indep with functional task completion.         Start:  04/08/25    Expected End:  04/22/25               COGNITION/SAFETY       Pt will demo good safety awareness during functional task completion with no more than min vc in order to maximize occupational performance.         Start:  04/08/25    Expected End:  04/22/25               EXERCISE/STRENGTHENING       Pt will increase endurance to tolerate 15-20min of activity with no more than 1 rest break in order to increase ability to engage in ADL completion.        Start:  04/08/25    Expected End:  04/22/25            Pt will increase overall BUE strength to 4/5 in order to increase functional task participation.        Start:  04/08/25    Expected End:  04/22/25            Pt will demo increased BUE fine motor/bimanual coordination in order to achieve MOD I with functional task completion.        Start:  04/08/25    Expected End:  04/22/25               TRANSFERS       Patient will complete functional transfers using least restrictive device with CGA   in order to maximize functional potential and increase safety.        Start:  04/08/25    Expected End:  04/22/25 04/11/25 at 3:26 PM   Kavya Beavers, OT   [phone]

## 2025-04-11 NOTE — PROGRESS NOTES
"Speech-Language Pathology    SLP Adult Inpatient  Speech-Language Pathology Dysphagia Treatment/Re-Eval     Patient Name: Enmanuel Ahumada \"Dash\"  MRN: [id number]  Today's Date: 4/11/2025   Time In: 0908  Time Out 0921  Time Calculation: 13 min       Assessment:  Oral dysphagia; possible pharyngeal dysphagia   Swallowing function impacted by mental status      Plan for reassessment as mental status improves      Recommendations:  NPO  Continued short term alternative means of nutrition/hydration/medication support.    Frequent, aggressive oral care as tolerated to improve infection control    OK for small amounts of ice chips and sips of water (one at a time, 10x/hour) for oral comfort and to prevent swallow disuse atrophy, but only after aggressive oral care to avoid colonization of bacteria within the oral cavity.     Pt may require instrumental assessment of swallowing prior to initiation of PO diet.  Will continue to monitor to determine readiness.          Plan:  SLP Plan: Skilled SLP  SLP Frequency: 2x per week  Duration: 2-3 weeks  Discussed POC: Pt, family, RN      Goals:  Pt will tolerate least restrictive diet with adequate oral phase and no s/s of aspiration.    Date initiated: 4/10/25   Status: progressing      Pt will participate in ongoing dysphagia assessment including the 3 oz Swallow Protocol with no overt indicators of aspiration on 100% of attempts.    Date initiated: 4/10/25   Status: progressing    Pt/family will indicate understanding of dysphagia education: risk for aspiration, recommendations, and POC with > 80% accuracy via teach back method.   Date initiated: 4/10/25   Status: progressing           Subjective   RN cleared pt for evaluation.  Pt properly identified and evaluated bedside.  Awake and alert, with no c/o pain.  DHT in place.  Room air.  No family present.  Soft restraints in place.     Pt is a 69 M h/o HTN, HLD, smalls's esophagus, RO GBM s/p resection ([facility], 12/2023) with " resultant L HH s/p chemo/RT presenting with tumor progression, 2/20 s/p redo craniotomy for tumor resection w 5 ALA, CTH POC, 2/21 MRI intended NTR, 3/16 MRI postop changes w fluid collection at dura and craniotomy site, worsening infiltrative disease, enhancement along posteromedial aspect of resection site, 4/5 p/w 1wk incisional drainage, 4/4 CTH airfluid level in resection cavity, 4/5 MRI brain w/wo incr'd resection cavity enhancement and diffusion restriction  4/6 s/p R cranial wound washout and mesh cranioplasty (kaleb purulence), CTH POC, L temporal ICH, plts 81 s/p 2u plts, 4/7 rCTH incr SAH, min incr ICH, rrCTH stable, EEG neg dc'd  4/8 s/p PICC  4/9 CTH stable, SDD dc'd, EEG neg     Daughter denies baseline dysphagia.      Objective     Cognition:  Command Following: Impaired   Attention: Impaired   Orientation: Oriented to person and place -- requires cues for month and year.     Pt presents w/ impaired functional communication.  Difficulty following commands, attending to tasks, and producing fluent speech.  Required mod cues to participate in evaluation.       Oral/Motor Assessment:  Oral Hygiene: Moist oral cavity; white lingual coating ? Possible thrush   Dentition: Present and adequate   Oral Motor: WFL   Voice (Perceptually): WFL         Consistencies Trialed:  Ice chips, sips of water.          Clinical Observations:  Patient Positioning: Upright in Bed  Management of Oral Secretions: WFL   Was The 3 oz Swallow Protocol Completed: Attempted        Clinical bedside swallow evaluation completed.  Pt presented with small, single ice chips x3.  Adequate bolus containment and manipulation.  No s/s of aspiration.  Pt also participated in trials of single sips of thin liquid via straw.  Functional oral phase and no s/s of aspiration.  Pt then attempted the 3 oz Swallow Protocol.  Only able to consume ~1 oz before ceasing w/ a short, dry cough afterward.  Suspect difficulty following commands vs difficulty  coordinating breathing/swallowing vs silent aspiration.  Bolus acceptance impacted 2' L side visual deficit (reported by family) and altered mental status.  Requires 1:1 assistance w/ presenting bolus trials.  PO trials ceased due to concern for pharyngeal dysphagia/aspiration.      The 3 oz Swallow Protocol (sequential drinks of thin liquid water) was utilized as a reliable, evidence based test to rule out silent aspiration and determine need for additional testing, such as an MBS or FEES, if the test is equivocal, incomplete or pt shows s/s of aspiration, prior to recommending a oral diet.     Education:  Education provided to patient regarding NPO recommendations, allowance of ice chips/sips of water, importance of oral care, and overall dysphagia plan of care.  Fair understanding indicated.        Anabell Pendleton, clinical supervisor, present for duration of assessment.

## 2025-04-11 NOTE — PROGRESS NOTES
"Enmanuel Ahumada \"Petey" is a 69 y.o. male on day 6 of admission presenting with Wound dehiscence.    Subjective   No acute events overnight    Objective     Physical Exam    awake  Ox3   Follows commands x4 briskly antigravity    Last Recorded Vitals  Blood pressure 115/80, pulse (!) 113, temperature 36.7 °C (98.1 °F), temperature source Temporal, resp. rate 18, height 1.702 m (5' 7\"), weight 65.3 kg (143 lb 15.4 oz), SpO2 99%.  Intake/Output last 3 Shifts:  I/O last 3 completed shifts:  In: 6616.1 (101.3 mL/kg) [I.V.:2556.1 (39.1 mL/kg); NG/GT:1370; IV Piggyback:2690]  Out: 5400 (82.7 mL/kg) [Urine:5365 (2.3 mL/kg/hr); Drains:35]  Weight: 65.3 kg     Relevant Results  Results from last 72 hours   Lab Units 04/11/25  0218 04/10/25  0015   WBC AUTO x10*3/uL 4.5 3.4*   NRBC AUTO /100 WBCs 0.0 0.0   RBC AUTO x10*6/uL 3.34* 2.96*   HEMOGLOBIN g/dL 10.7* 9.8*   HEMATOCRIT % 30.0* 27.5*   MCV fL 90 93   MCH pg 32.0 33.1   MCHC g/dL 35.7 35.6   RDW % 13.4 13.2   PLATELETS AUTO x10*3/uL 155 121*      Results from last 72 hours   Lab Units 04/11/25 0218 04/10/25  0015   GLUCOSE mg/dL 187* 143*   SODIUM mmol/L 133* 135*   POTASSIUM mmol/L 3.9 3.7   CHLORIDE mmol/L 102 105   CO2 mmol/L 23 22   ANION GAP mmol/L 12 12   BUN mg/dL 16 16   CREATININE mg/dL 0.59 0.57   EGFR mL/min/1.73m*2 >90 >90   CALCIUM mg/dL 8.1* 7.7*   PHOSPHORUS mg/dL 1.3* 1.7*   ALBUMIN g/dL 3.4 3.2*   MAGNESIUM mg/dL 2.67* 2.22   POCT CALCIUM IONIZED (MMOL/L) IN BLOOD mmol/L 1.11 1.08*          Assessment/Plan   Assessment & Plan  Wound dehiscence    69 M h/o HTN, HLD, smalls's esophagus, RO GBM s/p resection ([facility], 12/2023) with resultant L HH s/p chemo/RT presenting with tumor progression, 2/20 s/p redo craniotomy for tumor resection w 5 ALA, CTH POC, 2/21 MRI intended NTR, 3/16 MRI postop changes w fluid collection at dura and craniotomy site, worsening infiltrative disease, enhancement along posteromedial aspect of resection site, 4/5 p/w 1wk " incisional drainage, 4/4 CTH airfluid level in resection cavity, 4/5 MRI brain w/wo incr'd resection cavity enhancement and diffusion restriction  4/6 s/p R cranial wound washout and mesh cranioplasty (kaleb purulence), CTH POC, L temporal ICH, plts 81 s/p 2u plts, 4/7 rCTH incr SAH, min incr ICH, rrCTH stable, EEG neg dc'd  4/8 s/p PICC  4/9 CTH stable, SDD dc'd, EEG neg  4/10 SGD dc'd    Plan:  KRYSTAL, likely downgrade this AM  ID recs - vanc until 6/15  PTOT- rehab    Patient medically ready for discharge    Bernie Jiang MD

## 2025-04-11 NOTE — PROGRESS NOTES
Vancomycin Dosing by Pharmacy- FOLLOW UP    Enmanuel Ahumada is a 69 y.o. year old male who Pharmacy has been consulted for vancomycin dosing for CNS/meningoencephalitis. Based on the patient's indication and renal status this patient is being dosed based on a goal AUC of 400-600.     Renal function is currently stable.    Current vancomycin dose: 750 mg given every 8 hours    Estimated vancomycin AUC on current dose: 484 mg/L.hr     Visit Vitals  /82   Pulse 110   Temp 36.6 °C (97.9 °F) (Temporal)   Resp 15        Lab Results   Component Value Date    CREATININE 0.59 2025    CREATININE 0.57 04/10/2025    CREATININE 0.69 2025    CREATININE 0.57 2025        Patient weight is as follows:   Vitals:    25 0000   Weight: 65.3 kg (143 lb 15.4 oz)       Cultures:  Susceptibility data for the encounter in last 14 days.  Collected Specimen Info Organism Clindamycin Erythromycin Oxacillin Tetracycline Trimethoprim/Sulfamethoxazole Vancomycin   25 Swab from BRAIN BIOPSY Staphylococcus epidermidis  S  S  R  S  R  S   25 Swab from BRAIN BIOPSY Staphylococcus epidermidis         25 Swab from BRAIN BIOPSY Staphylococcus epidermidis              I/O last 3 completed shifts:  In: 9136.1 (139.9 mL/kg) [I.V.:1343.6 (20.6 mL/kg); NG/GT:2090; IV Piggyback:5702.5]  Out: 4255 (65.2 mL/kg) [Urine:4235 (1.8 mL/kg/hr); Drains:20]  Weight: 65.3 kg   I/O during current shift:  I/O this shift:  In: 627.5 [NG/GT:410; IV Piggyback:217.5]  Out: 625 [Urine:625]    Temp (24hrs), Av.8 °C (98.2 °F), Min:36.5 °C (97.7 °F), Max:37.2 °C (99 °F)      Assessment/Plan    Within goal AUC range. Continue current vancomycin regimen.    This dosing regimen is predicted by [employer] to result in the following pharmacokinetic parameters:  Loading dose: N/A  Regimen: 750 mg IV every 8 hours.  Start time: 16:42 on 2025  Exposure target: AUC24 (range)400-600 mg/L.hr   [id number]: 484 mg/L.hr  AUC24,ss:  484 mg/L.hr  Probability of AUC24 > 400: 98 %  Ctrough,ss: 12.3 mg/L  Probability of Ctrough,ss > 20: 0 %      The next level will be obtained on 4/14 at 0500. May be obtained sooner if clinically indicated.   Will continue to monitor renal function daily while on vancomycin and order serum creatinine at least every 48 hours if not already ordered.  Follow for continued vancomycin needs, clinical response, and signs/symptoms of toxicity.       Cynthia Herman, PharmD

## 2025-04-11 NOTE — PROGRESS NOTES
"  Communication Note    Patient Name: Enamnuel Ahumada \"Dash\"  MRN: [id number]  Today's Date: 4/11/2025   Room: 02/02-A    Discipline: Physical Therapy      PT Missed Visit: Yes  Missed Visit Reason:  (US BLE and BUE found LLE and RUE DVT. Order for IR placement of IVC filter placed. Hold PT.)      04/11/25 at 12:02 PM   Shira Coates, PT   Rehab Office: [phone]        "

## 2025-04-11 NOTE — HOSPITAL COURSE
70 yo male with Hx of HTN, HLD, smalls's esophagus, RO GBM s/p resection (SAH, 12/2023) with resultant L HH s/p chemo/RT with tumor progression, 2/20 s/p redo craniotomy for tumor resection w 5 ALA, CTH POC, 2/21 MRI intended NTR, 3/16 MRI postop changes w fluid collection at dura and craniotomy site, worsening infiltrative disease, enhancement along posteromedial aspect of resection site  4/5 p/w 1wk incisional drainage,   4/5 CTH airfluid level in resection cavity, 4/5 MRI brain w/wo incr'd resection cavity enhancement and diffusion restriction  4/6 s/p R cranial wound washout and mesh cranioplasty (kaleb purulence), CTH POC, L temporal ICH, plts 81 s/p 2u plts.  ID consulted   4/7 rCTH incr SAH, min incr ICH, rrCTH stable, EEG neg dc'd  4/8 s/p PICC, will continue Vancomycin (pharmacy dosing)  4/9 CTH stable, SDD dc'd, EEG neg  4/10 SGD dc'd  4/11 DVT US x4, LLE distal femoral, popliteal, gastroc, peroneal, post tibial vv DVTs, RUE R brachial DVT, s/p IVC filter    4/13 CTH stable  4/14 PICC auto dc'd, agitation, CTH stable   4/15 s/p PICC  4/16 CTH stable  Noted to have thrombocytopenia--> continue to monitor labs   PF4 negative, no longer concerns for HIIT    PT/OT evaluation and recommend acute rehab at time of discharge.    On the day of discharge, the patient was seen and evaluated by the neurosurgery team and deemed suitable for discharge. The patient was given detailed discharge instructions and were scheduled to follow up as an outpatient.      during next chemo cycle)  - ID follow up with Dr. Amy York 06/10/25 (continue Vancomycin until 06/15/25)  [  ] after discharge weekly CBC with diff, Chem 7, quantitative CRP, and Vancomycin trough - before DC, please order when initiation date is known (or at least make sure acute rehab is aware to order)

## 2025-04-11 NOTE — PROGRESS NOTES
Social Work Discharge Planning note:    -Plan per medical team: Pt is not medically ready for discharge. Final ID recs for [facility] with stop date 6/15, has PICC.  Needs to pass for diet.    -Payer: [facility]  -Status: Inpatient  -Discharge disposition: [facility] is interested and wants update once Pt has been sitter free for 24 hours (sitter has been discontinued). Critical access hospital can accept. CLARK informed Pt's wife, Brittany, of the above.   -Anticipated Date of Discharge:  4/14/25    This KAYCEE Odell, LSW    Office: [phone]  Secure chat via [employer]

## 2025-04-11 NOTE — CARE PLAN
Problem: Fall/Injury  Goal: Be free from injury by end of the shift  Outcome: Progressing  Goal: Verbalize understanding of personal risk factors for fall in the hospital  Outcome: Progressing  Goal: Verbalize understanding of risk factor reduction measures to prevent injury from fall in the home  Outcome: Progressing  Goal: Use assistive devices by end of the shift  Outcome: Progressing  Goal: Pace activities to prevent fatigue by end of the shift  Outcome: Progressing     Problem: Skin  Goal: Decreased wound size/increased tissue granulation at next dressing change  Outcome: Progressing  Goal: Participates in plan/prevention/treatment measures  Outcome: Progressing  Goal: Prevent/manage excess moisture  4/11/2025 1732 by Cindy Baker RN  Outcome: Progressing  4/11/2025 1732 by Cindy Baker RN  Flowsheets (Taken 4/11/2025 1732)  Prevent/manage excess moisture: Cleanse incontinence/protect with barrier cream  Goal: Promote/optimize nutrition  Outcome: Progressing  Goal: Promote skin healing  Outcome: Progressing     Problem: Pain - Adult  Goal: Verbalizes/displays adequate comfort level or baseline comfort level  Outcome: Progressing     Problem: Safety - Adult  Goal: Free from fall injury  Outcome: Progressing     Problem: Discharge Planning  Goal: Discharge to home or other facility with appropriate resources  Outcome: Progressing     Problem: Chronic Conditions and Co-morbidities  Goal: Patient's chronic conditions and co-morbidity symptoms are monitored and maintained or improved  Outcome: Progressing     Problem: Safety - Medical Restraint  Goal: Remains free of injury from restraints (Restraint for Interference with Medical Device)  4/11/2025 1732 by Cindy Baker RN  Outcome: Progressing  4/11/2025 1732 by Cindy Baker RN  Flowsheets (Taken 4/11/2025 1732)  Remains free of injury from restraints (restraint for interference with medical device): Determine that other, less restrictive measures have been tried  or would not be effective before applying the restraint  Goal: Free from restraint(s) (Restraint for Interference with Medical Device)  Outcome: Progressing   The patient's goals for the shift include free from fall    The clinical goals for the shift include pt to remain hemodynamically stable throughout shift.

## 2025-04-11 NOTE — CARE PLAN
Problem: Fall/Injury  Goal: Not fall by end of shift  Outcome: Met     Problem: Skin  Goal: Prevent/manage excess moisture  Outcome: Progressing  Flowsheets (Taken 4/10/2025 2107)  Prevent/manage excess moisture:   Cleanse incontinence/protect with barrier cream   Follow provider orders for dressing changes   Moisturize dry skin   Monitor for/manage infection if present     Problem: Pain - Adult  Goal: Verbalizes/displays adequate comfort level or baseline comfort level  Outcome: Progressing     Problem: Chronic Conditions and Co-morbidities  Goal: Patient's chronic conditions and co-morbidity symptoms are monitored and maintained or improved  Outcome: Progressing     Problem: Safety - Medical Restraint  Goal: Remains free of injury from restraints (Restraint for Interference with Medical Device)  Outcome: Progressing  Flowsheets (Taken 4/9/2025 2151 by Elisa Soto RN)  Remains free of injury from restraints (restraint for interference with medical device): Every 2 hours: Monitor safety, psychosocial status, comfort, nutrition and hydration  Goal: Free from restraint(s) (Restraint for Interference with Medical Device)  Outcome: Progressing  Flowsheets (Taken 4/9/2025 2151 by Elisa Soto RN)  Free from restraint(s) (restraint for interference with medical device): ONCE/SHIFT or MINIMUM Every 12 hours: Assess and document the continuing need for restraints   The patient's goals for the shift include free from fall    The clinical goals for the shift include safety

## 2025-04-12 ENCOUNTER — APPOINTMENT (OUTPATIENT)
Dept: CARDIOLOGY | Facility: HOSPITAL | Age: 70
End: 2025-04-12
Payer: MEDICARE

## 2025-04-12 LAB
ALBUMIN SERPL BCP-MCNC: 3.4 G/DL (ref 3.4–5)
ANION GAP SERPL CALC-SCNC: 14 MMOL/L (ref 10–20)
BASOPHILS # BLD AUTO: 0.01 X10*3/UL (ref 0–0.1)
BASOPHILS NFR BLD AUTO: 0.2 %
BUN SERPL-MCNC: 21 MG/DL (ref 6–23)
CA-I BLD-SCNC: 1.14 MMOL/L (ref 1.1–1.33)
CALCIUM SERPL-MCNC: 8.5 MG/DL (ref 8.6–10.6)
CHLORIDE SERPL-SCNC: 102 MMOL/L (ref 98–107)
CO2 SERPL-SCNC: 24 MMOL/L (ref 21–32)
CREAT SERPL-MCNC: 0.6 MG/DL (ref 0.5–1.3)
EGFRCR SERPLBLD CKD-EPI 2021: >90 ML/MIN/1.73M*2
EOSINOPHIL # BLD AUTO: 0 X10*3/UL (ref 0–0.7)
EOSINOPHIL NFR BLD AUTO: 0 %
ERYTHROCYTE [DISTWIDTH] IN BLOOD BY AUTOMATED COUNT: 13.4 % (ref 11.5–14.5)
GLUCOSE BLD MANUAL STRIP-MCNC: 153 MG/DL (ref 74–99)
GLUCOSE BLD MANUAL STRIP-MCNC: 158 MG/DL (ref 74–99)
GLUCOSE BLD MANUAL STRIP-MCNC: 160 MG/DL (ref 74–99)
GLUCOSE SERPL-MCNC: 205 MG/DL (ref 74–99)
HCT VFR BLD AUTO: 31.3 % (ref 41–52)
HGB BLD-MCNC: 10.8 G/DL (ref 13.5–17.5)
IMM GRANULOCYTES # BLD AUTO: 0.02 X10*3/UL (ref 0–0.7)
IMM GRANULOCYTES NFR BLD AUTO: 0.4 % (ref 0–0.9)
LYMPHOCYTES # BLD AUTO: 0.43 X10*3/UL (ref 1.2–4.8)
LYMPHOCYTES NFR BLD AUTO: 8.3 %
MAGNESIUM SERPL-MCNC: 2.29 MG/DL (ref 1.6–2.4)
MCH RBC QN AUTO: 32.2 PG (ref 26–34)
MCHC RBC AUTO-ENTMCNC: 34.5 G/DL (ref 32–36)
MCV RBC AUTO: 93 FL (ref 80–100)
MONOCYTES # BLD AUTO: 0.53 X10*3/UL (ref 0.1–1)
MONOCYTES NFR BLD AUTO: 10.2 %
NEUTROPHILS # BLD AUTO: 4.2 X10*3/UL (ref 1.2–7.7)
NEUTROPHILS NFR BLD AUTO: 80.9 %
NRBC BLD-RTO: 0 /100 WBCS (ref 0–0)
PHOSPHATE SERPL-MCNC: 1.4 MG/DL (ref 2.5–4.9)
PLATELET # BLD AUTO: 150 X10*3/UL (ref 150–450)
POTASSIUM SERPL-SCNC: 4 MMOL/L (ref 3.5–5.3)
RBC # BLD AUTO: 3.35 X10*6/UL (ref 4.5–5.9)
SODIUM SERPL-SCNC: 136 MMOL/L (ref 136–145)
WBC # BLD AUTO: 5.2 X10*3/UL (ref 4.4–11.3)

## 2025-04-12 PROCEDURE — 93010 ELECTROCARDIOGRAM REPORT: CPT | Performed by: INTERNAL MEDICINE

## 2025-04-12 PROCEDURE — 2500000001 HC RX 250 WO HCPCS SELF ADMINISTERED DRUGS (ALT 637 FOR MEDICARE OP)

## 2025-04-12 PROCEDURE — 2500000004 HC RX 250 GENERAL PHARMACY W/ HCPCS (ALT 636 FOR OP/ED)

## 2025-04-12 PROCEDURE — 93005 ELECTROCARDIOGRAM TRACING: CPT

## 2025-04-12 PROCEDURE — 2500000002 HC RX 250 W HCPCS SELF ADMINISTERED DRUGS (ALT 637 FOR MEDICARE OP, ALT 636 FOR OP/ED)

## 2025-04-12 PROCEDURE — 37799 UNLISTED PX VASCULAR SURGERY: CPT | Performed by: NURSE PRACTITIONER

## 2025-04-12 PROCEDURE — 82947 ASSAY GLUCOSE BLOOD QUANT: CPT

## 2025-04-12 PROCEDURE — 2500000005 HC RX 250 GENERAL PHARMACY W/O HCPCS

## 2025-04-12 PROCEDURE — 2500000002 HC RX 250 W HCPCS SELF ADMINISTERED DRUGS (ALT 637 FOR MEDICARE OP, ALT 636 FOR OP/ED): Performed by: NURSE PRACTITIONER

## 2025-04-12 PROCEDURE — 85025 COMPLETE CBC W/AUTO DIFF WBC: CPT | Performed by: NURSE PRACTITIONER

## 2025-04-12 PROCEDURE — 2500000004 HC RX 250 GENERAL PHARMACY W/ HCPCS (ALT 636 FOR OP/ED): Performed by: PSYCHOLOGIST

## 2025-04-12 PROCEDURE — 80069 RENAL FUNCTION PANEL: CPT

## 2025-04-12 PROCEDURE — 83735 ASSAY OF MAGNESIUM: CPT | Performed by: NURSE PRACTITIONER

## 2025-04-12 PROCEDURE — 82330 ASSAY OF CALCIUM: CPT | Performed by: NURSE PRACTITIONER

## 2025-04-12 PROCEDURE — 1100000001 HC PRIVATE ROOM DAILY

## 2025-04-12 RX ORDER — CYCLOBENZAPRINE HCL 10 MG
10 TABLET ORAL 3 TIMES DAILY PRN
Status: DISCONTINUED | OUTPATIENT
Start: 2025-04-12 | End: 2025-05-03

## 2025-04-12 RX ORDER — LIDOCAINE 560 MG/1
1 PATCH PERCUTANEOUS; TOPICAL; TRANSDERMAL DAILY
Status: DISCONTINUED | OUTPATIENT
Start: 2025-04-12 | End: 2025-05-06 | Stop reason: HOSPADM

## 2025-04-12 RX ADMIN — PANTOPRAZOLE SODIUM 40 MG: 40 INJECTION, POWDER, FOR SOLUTION INTRAVENOUS at 09:41

## 2025-04-12 RX ADMIN — CLONIDINE HYDROCHLORIDE 0.2 MG: 0.1 TABLET ORAL at 21:37

## 2025-04-12 RX ADMIN — CYCLOBENZAPRINE 10 MG: 10 TABLET, FILM COATED ORAL at 12:09

## 2025-04-12 RX ADMIN — ATORVASTATIN CALCIUM 40 MG: 40 TABLET, FILM COATED ORAL at 21:37

## 2025-04-12 RX ADMIN — VANCOMYCIN HYDROCHLORIDE 750 MG: 5 INJECTION, POWDER, LYOPHILIZED, FOR SOLUTION INTRAVENOUS at 09:45

## 2025-04-12 RX ADMIN — HEPARIN SODIUM 5000 UNITS: 5000 INJECTION INTRAVENOUS; SUBCUTANEOUS at 14:40

## 2025-04-12 RX ADMIN — DEXAMETHASONE SODIUM PHOSPHATE 2 MG: 4 INJECTION, SOLUTION INTRA-ARTICULAR; INTRALESIONAL; INTRAMUSCULAR; INTRAVENOUS; SOFT TISSUE at 21:37

## 2025-04-12 RX ADMIN — VALPROATE SODIUM 250 MG: 100 INJECTION, SOLUTION INTRAVENOUS at 03:45

## 2025-04-12 RX ADMIN — INSULIN LISPRO 1 UNITS: 100 INJECTION, SOLUTION INTRAVENOUS; SUBCUTANEOUS at 17:02

## 2025-04-12 RX ADMIN — VALPROATE SODIUM 250 MG: 100 INJECTION, SOLUTION INTRAVENOUS at 09:44

## 2025-04-12 RX ADMIN — DEXAMETHASONE SODIUM PHOSPHATE 2 MG: 4 INJECTION, SOLUTION INTRA-ARTICULAR; INTRALESIONAL; INTRAMUSCULAR; INTRAVENOUS; SOFT TISSUE at 09:41

## 2025-04-12 RX ADMIN — HEPARIN SODIUM 5000 UNITS: 5000 INJECTION INTRAVENOUS; SUBCUTANEOUS at 06:58

## 2025-04-12 RX ADMIN — SENNOSIDES AND DOCUSATE SODIUM 1 TABLET: 50; 8.6 TABLET ORAL at 21:37

## 2025-04-12 RX ADMIN — HYDROXYZINE HYDROCHLORIDE 10 MG: 10 TABLET ORAL at 21:37

## 2025-04-12 RX ADMIN — INSULIN LISPRO 1 UNITS: 100 INJECTION, SOLUTION INTRAVENOUS; SUBCUTANEOUS at 00:01

## 2025-04-12 RX ADMIN — VALPROATE SODIUM 250 MG: 100 INJECTION, SOLUTION INTRAVENOUS at 21:36

## 2025-04-12 RX ADMIN — INSULIN LISPRO 1 UNITS: 100 INJECTION, SOLUTION INTRAVENOUS; SUBCUTANEOUS at 06:59

## 2025-04-12 RX ADMIN — VALPROATE SODIUM 250 MG: 100 INJECTION, SOLUTION INTRAVENOUS at 14:53

## 2025-04-12 RX ADMIN — LIDOCAINE 4% 1 PATCH: 40 PATCH TOPICAL at 12:08

## 2025-04-12 RX ADMIN — QUETIAPINE FUMARATE 12.5 MG: 25 TABLET ORAL at 21:37

## 2025-04-12 RX ADMIN — CLONIDINE HYDROCHLORIDE 0.2 MG: 0.1 TABLET ORAL at 09:41

## 2025-04-12 RX ADMIN — VANCOMYCIN HYDROCHLORIDE 750 MG: 5 INJECTION, POWDER, LYOPHILIZED, FOR SOLUTION INTRAVENOUS at 01:50

## 2025-04-12 RX ADMIN — HEPARIN SODIUM 5000 UNITS: 5000 INJECTION INTRAVENOUS; SUBCUTANEOUS at 21:36

## 2025-04-12 RX ADMIN — POLYETHYLENE GLYCOL 3350 17 G: 17 POWDER, FOR SOLUTION ORAL at 09:46

## 2025-04-12 RX ADMIN — VANCOMYCIN HYDROCHLORIDE 750 MG: 5 INJECTION, POWDER, LYOPHILIZED, FOR SOLUTION INTRAVENOUS at 17:01

## 2025-04-12 ASSESSMENT — PAIN - FUNCTIONAL ASSESSMENT
PAIN_FUNCTIONAL_ASSESSMENT: 0-10

## 2025-04-12 ASSESSMENT — PAIN SCALES - GENERAL
PAINLEVEL_OUTOF10: 0 - NO PAIN

## 2025-04-12 NOTE — CARE PLAN
Problem: Fall/Injury  Goal: Be free from injury by end of the shift  Outcome: Progressing  Goal: Verbalize understanding of personal risk factors for fall in the hospital  Outcome: Progressing     Problem: Skin  Goal: Decreased wound size/increased tissue granulation at next dressing change  Outcome: Progressing  Goal: Prevent/manage excess moisture  Outcome: Progressing  Goal: Promote/optimize nutrition  Outcome: Progressing  Goal: Promote skin healing  Outcome: Progressing     Problem: Safety - Adult  Goal: Free from fall injury  Outcome: Progressing     Problem: Discharge Planning  Goal: Discharge to home or other facility with appropriate resources  Outcome: Progressing     Problem: Chronic Conditions and Co-morbidities  Goal: Patient's chronic conditions and co-morbidity symptoms are monitored and maintained or improved  Outcome: Progressing     Problem: Safety - Medical Restraint  Goal: Remains free of injury from restraints (Restraint for Interference with Medical Device)  Outcome: Progressing  Goal: Free from restraint(s) (Restraint for Interference with Medical Device)  Outcome: Progressing   The patient's goals for the shift include Restful night    The clinical goals for the shift include The patient will remain free from injury/falls

## 2025-04-12 NOTE — CARE PLAN
The clinical goals for the shift include safety, comfort    Patient transferred to [place]. Vitals WNL at baseline, heartrate remains elevated ST 100s. Patient is oriented x2 disoriented to time and situation, occassionally exhibits some expressive aphasia and forgetfulness. Bilateral wrist restraints in place for protection of medical devices. Family at bedside, plan of care discussed.       Problem: Fall/Injury  Goal: Be free from injury by end of the shift  Outcome: Progressing  Goal: Verbalize understanding of personal risk factors for fall in the hospital  Outcome: Progressing  Goal: Verbalize understanding of risk factor reduction measures to prevent injury from fall in the home  Outcome: Progressing  Goal: Use assistive devices by end of the shift  Outcome: Progressing     Problem: Skin  Goal: Decreased wound size/increased tissue granulation at next dressing change  Outcome: Progressing  Goal: Participates in plan/prevention/treatment measures  Outcome: Progressing  Flowsheets (Taken 4/12/2025 1459)  Participates in plan/prevention/treatment measures: Elevate heels  Goal: Prevent/manage excess moisture  Outcome: Progressing  Flowsheets (Taken 4/12/2025 1455)  Prevent/manage excess moisture:   Cleanse incontinence/protect with barrier cream   Follow provider orders for dressing changes   Monitor for/manage infection if present   Moisturize dry skin  Goal: Promote/optimize nutrition  Outcome: Progressing  Flowsheets (Taken 4/12/2025 1450)  Promote/optimize nutrition: Monitor/record intake including meals  Goal: Promote skin healing  Outcome: Progressing  Flowsheets (Taken 4/12/2025 1459)  Promote skin healing:   Rotate device position/do not position patient on device   Turn/reposition every 2 hours/use positioning/transfer devices   Assess skin/pad under line(s)/device(s)     Problem: Discharge Planning  Goal: Discharge to home or other facility with appropriate resources  Outcome: Progressing     Problem:  Chronic Conditions and Co-morbidities  Goal: Patient's chronic conditions and co-morbidity symptoms are monitored and maintained or improved  Outcome: Progressing     Problem: Safety - Medical Restraint  Goal: Remains free of injury from restraints (Restraint for Interference with Medical Device)  Outcome: Progressing

## 2025-04-12 NOTE — PROGRESS NOTES
"Enmanuel Ahuamda \"Petey" is a 69 y.o. male on day 7 of admission presenting with Wound dehiscence.    Subjective   No acute events overnight    Objective     Physical Exam    awake  Ox3  Ou3R  FS  TM   BUE 5  BLE 5    Last Recorded Vitals  Blood pressure 118/90, pulse (!) 152, temperature 36.8 °C (98.2 °F), temperature source Temporal, resp. rate 14, height 1.702 m (5' 7\"), weight 65.7 kg (144 lb 13.5 oz), SpO2 96%.  Intake/Output last 3 Shifts:  I/O last 3 completed shifts:  In: 7477.5 (114.5 mL/kg) [I.V.:700 (10.7 mL/kg); NG/GT:2210; IV Piggyback:4567.5]  Out: 4015 (61.5 mL/kg) [Urine:4015 (1.7 mL/kg/hr)]  Weight: 65.3 kg     Relevant Results  Results from last 72 hours   Lab Units 04/12/25  0352 04/11/25  0218   WBC AUTO x10*3/uL 5.2 4.5   NRBC AUTO /100 WBCs 0.0 0.0   RBC AUTO x10*6/uL 3.35* 3.34*   HEMOGLOBIN g/dL 10.8* 10.7*   HEMATOCRIT % 31.3* 30.0*   MCV fL 93 90   MCH pg 32.2 32.0   MCHC g/dL 34.5 35.7   RDW % 13.4 13.4   PLATELETS AUTO x10*3/uL 150 155      Results from last 72 hours   Lab Units 04/11/25  0218 04/10/25  0015   GLUCOSE mg/dL 187* 143*   SODIUM mmol/L 133* 135*   POTASSIUM mmol/L 3.9 3.7   CHLORIDE mmol/L 102 105   CO2 mmol/L 23 22   ANION GAP mmol/L 12 12   BUN mg/dL 16 16   CREATININE mg/dL 0.59 0.57   EGFR mL/min/1.73m*2 >90 >90   CALCIUM mg/dL 8.1* 7.7*   PHOSPHORUS mg/dL 1.3* 1.7*   ALBUMIN g/dL 3.4 3.2*   MAGNESIUM mg/dL 2.67* 2.22   POCT CALCIUM IONIZED (MMOL/L) IN BLOOD mmol/L 1.11 1.08*          Assessment/Plan   Assessment & Plan  Wound dehiscence    69 M h/o HTN, HLD, smalls's esophagus, RO GBM s/p resection (SAH, 12/2023) with resultant L HH s/p chemo/RT presenting with tumor progression, 2/20 s/p redo craniotomy for tumor resection w 5 ALA, CTH POC, 2/21 MRI intended NTR, 3/16 MRI postop changes w fluid collection at dura and craniotomy site, worsening infiltrative disease, enhancement along posteromedial aspect of resection site, 4/5 p/w 1wk incisional drainage, 4/4 CTH " airfluid level in resection cavity, 4/5 MRI brain w/wo incr'd resection cavity enhancement and diffusion restriction  4/6 s/p R cranial wound washout and mesh cranioplasty (kaleb purulence), CTH POC, L temporal ICH, plts 81 s/p 2u plts, 4/7 rCTH incr SAH, min incr ICH, rrCTH stable, EEG neg dc'd  4/8 s/p PICC  4/9 CTH stable, SDD dc'd, EEG neg  4/10 SGD dc'd  4/11 DVT US x4, LLE distal femoral, popliteal, gastroc, peroneal, post tibial vv DVTs, RUE R brachial DVT, s/p IVC filter      Plan:  tele  ID recs [facility]  [facility]  PTOT- rehab    Patient medically ready for discharge    Arcelia Chester MD

## 2025-04-13 ENCOUNTER — APPOINTMENT (OUTPATIENT)
Dept: RADIOLOGY | Facility: HOSPITAL | Age: 70
DRG: 907 | End: 2025-04-13
Payer: MEDICARE

## 2025-04-13 ENCOUNTER — APPOINTMENT (OUTPATIENT)
Dept: CARDIOLOGY | Facility: HOSPITAL | Age: 70
End: 2025-04-13
Payer: MEDICARE

## 2025-04-13 ENCOUNTER — APPOINTMENT (OUTPATIENT)
Dept: CARDIOLOGY | Facility: HOSPITAL | Age: 70
DRG: 907 | End: 2025-04-13
Payer: MEDICARE

## 2025-04-13 LAB
ALBUMIN SERPL BCP-MCNC: 3.1 G/DL (ref 3.4–5)
ANION GAP SERPL CALC-SCNC: 12 MMOL/L (ref 10–20)
BASOPHILS # BLD AUTO: 0 X10*3/UL (ref 0–0.1)
BASOPHILS NFR BLD AUTO: 0 %
BUN SERPL-MCNC: 24 MG/DL (ref 6–23)
CA-I BLD-SCNC: 1.18 MMOL/L (ref 1.1–1.33)
CALCIUM SERPL-MCNC: 8.2 MG/DL (ref 8.6–10.6)
CHLORIDE SERPL-SCNC: 102 MMOL/L (ref 98–107)
CO2 SERPL-SCNC: 26 MMOL/L (ref 21–32)
CREAT SERPL-MCNC: 0.53 MG/DL (ref 0.5–1.3)
EGFRCR SERPLBLD CKD-EPI 2021: >90 ML/MIN/1.73M*2
EOSINOPHIL # BLD AUTO: 0 X10*3/UL (ref 0–0.7)
EOSINOPHIL NFR BLD AUTO: 0 %
ERYTHROCYTE [DISTWIDTH] IN BLOOD BY AUTOMATED COUNT: 13.8 % (ref 11.5–14.5)
GLUCOSE BLD MANUAL STRIP-MCNC: 145 MG/DL (ref 74–99)
GLUCOSE BLD MANUAL STRIP-MCNC: 153 MG/DL (ref 74–99)
GLUCOSE BLD MANUAL STRIP-MCNC: 172 MG/DL (ref 74–99)
GLUCOSE BLD MANUAL STRIP-MCNC: 227 MG/DL (ref 74–99)
GLUCOSE BLD MANUAL STRIP-MCNC: 94 MG/DL (ref 74–99)
GLUCOSE SERPL-MCNC: 168 MG/DL (ref 74–99)
HCT VFR BLD AUTO: 27.8 % (ref 41–52)
HGB BLD-MCNC: 9.6 G/DL (ref 13.5–17.5)
IMM GRANULOCYTES # BLD AUTO: 0.06 X10*3/UL (ref 0–0.7)
IMM GRANULOCYTES NFR BLD AUTO: 1.4 % (ref 0–0.9)
LYMPHOCYTES # BLD AUTO: 0.59 X10*3/UL (ref 1.2–4.8)
LYMPHOCYTES NFR BLD AUTO: 13.8 %
MAGNESIUM SERPL-MCNC: 2.25 MG/DL (ref 1.6–2.4)
MCH RBC QN AUTO: 32 PG (ref 26–34)
MCHC RBC AUTO-ENTMCNC: 34.5 G/DL (ref 32–36)
MCV RBC AUTO: 93 FL (ref 80–100)
MONOCYTES # BLD AUTO: 0.45 X10*3/UL (ref 0.1–1)
MONOCYTES NFR BLD AUTO: 10.5 %
NEUTROPHILS # BLD AUTO: 3.17 X10*3/UL (ref 1.2–7.7)
NEUTROPHILS NFR BLD AUTO: 74.3 %
NRBC BLD-RTO: 0 /100 WBCS (ref 0–0)
PHOSPHATE SERPL-MCNC: 2 MG/DL (ref 2.5–4.9)
PLATELET # BLD AUTO: 121 X10*3/UL (ref 150–450)
POTASSIUM SERPL-SCNC: 3.9 MMOL/L (ref 3.5–5.3)
RBC # BLD AUTO: 3 X10*6/UL (ref 4.5–5.9)
SODIUM SERPL-SCNC: 136 MMOL/L (ref 136–145)
WBC # BLD AUTO: 4.3 X10*3/UL (ref 4.4–11.3)

## 2025-04-13 PROCEDURE — 2500000005 HC RX 250 GENERAL PHARMACY W/O HCPCS

## 2025-04-13 PROCEDURE — 2500000004 HC RX 250 GENERAL PHARMACY W/ HCPCS (ALT 636 FOR OP/ED): Performed by: PSYCHOLOGIST

## 2025-04-13 PROCEDURE — 85025 COMPLETE CBC W/AUTO DIFF WBC: CPT | Performed by: NURSE PRACTITIONER

## 2025-04-13 PROCEDURE — 2500000004 HC RX 250 GENERAL PHARMACY W/ HCPCS (ALT 636 FOR OP/ED)

## 2025-04-13 PROCEDURE — 82330 ASSAY OF CALCIUM: CPT | Performed by: NURSE PRACTITIONER

## 2025-04-13 PROCEDURE — 83735 ASSAY OF MAGNESIUM: CPT | Performed by: NURSE PRACTITIONER

## 2025-04-13 PROCEDURE — 2500000002 HC RX 250 W HCPCS SELF ADMINISTERED DRUGS (ALT 637 FOR MEDICARE OP, ALT 636 FOR OP/ED)

## 2025-04-13 PROCEDURE — 82947 ASSAY GLUCOSE BLOOD QUANT: CPT

## 2025-04-13 PROCEDURE — 1100000001 HC PRIVATE ROOM DAILY

## 2025-04-13 PROCEDURE — 2500000001 HC RX 250 WO HCPCS SELF ADMINISTERED DRUGS (ALT 637 FOR MEDICARE OP)

## 2025-04-13 PROCEDURE — 70450 CT HEAD/BRAIN W/O DYE: CPT

## 2025-04-13 PROCEDURE — 93005 ELECTROCARDIOGRAM TRACING: CPT

## 2025-04-13 PROCEDURE — 2500000002 HC RX 250 W HCPCS SELF ADMINISTERED DRUGS (ALT 637 FOR MEDICARE OP, ALT 636 FOR OP/ED): Performed by: NURSE PRACTITIONER

## 2025-04-13 PROCEDURE — 80069 RENAL FUNCTION PANEL: CPT

## 2025-04-13 PROCEDURE — 93010 ELECTROCARDIOGRAM REPORT: CPT | Performed by: INTERNAL MEDICINE

## 2025-04-13 PROCEDURE — 92526 ORAL FUNCTION THERAPY: CPT | Mod: GN

## 2025-04-13 PROCEDURE — 70450 CT HEAD/BRAIN W/O DYE: CPT | Performed by: RADIOLOGY

## 2025-04-13 RX ORDER — QUETIAPINE FUMARATE 25 MG/1
25 TABLET, FILM COATED ORAL NIGHTLY
Status: DISCONTINUED | OUTPATIENT
Start: 2025-04-13 | End: 2025-04-24

## 2025-04-13 RX ADMIN — ATORVASTATIN CALCIUM 40 MG: 40 TABLET, FILM COATED ORAL at 20:46

## 2025-04-13 RX ADMIN — DEXAMETHASONE SODIUM PHOSPHATE 2 MG: 4 INJECTION, SOLUTION INTRA-ARTICULAR; INTRALESIONAL; INTRAMUSCULAR; INTRAVENOUS; SOFT TISSUE at 08:34

## 2025-04-13 RX ADMIN — HEPARIN SODIUM 5000 UNITS: 5000 INJECTION INTRAVENOUS; SUBCUTANEOUS at 14:18

## 2025-04-13 RX ADMIN — HEPARIN SODIUM 5000 UNITS: 5000 INJECTION INTRAVENOUS; SUBCUTANEOUS at 05:14

## 2025-04-13 RX ADMIN — LIDOCAINE 4% 1 PATCH: 40 PATCH TOPICAL at 08:33

## 2025-04-13 RX ADMIN — QUETIAPINE FUMARATE 25 MG: 25 TABLET ORAL at 20:46

## 2025-04-13 RX ADMIN — HYDROXYZINE HYDROCHLORIDE 10 MG: 10 TABLET ORAL at 08:34

## 2025-04-13 RX ADMIN — VALPROATE SODIUM 250 MG: 100 INJECTION, SOLUTION INTRAVENOUS at 02:26

## 2025-04-13 RX ADMIN — PANTOPRAZOLE SODIUM 40 MG: 40 INJECTION, POWDER, FOR SOLUTION INTRAVENOUS at 08:34

## 2025-04-13 RX ADMIN — HEPARIN SODIUM 5000 UNITS: 5000 INJECTION INTRAVENOUS; SUBCUTANEOUS at 21:06

## 2025-04-13 RX ADMIN — DEXAMETHASONE SODIUM PHOSPHATE 2 MG: 4 INJECTION, SOLUTION INTRA-ARTICULAR; INTRALESIONAL; INTRAMUSCULAR; INTRAVENOUS; SOFT TISSUE at 20:47

## 2025-04-13 RX ADMIN — CLONIDINE HYDROCHLORIDE 0.2 MG: 0.1 TABLET ORAL at 20:46

## 2025-04-13 RX ADMIN — VALPROATE SODIUM 250 MG: 100 INJECTION, SOLUTION INTRAVENOUS at 14:19

## 2025-04-13 RX ADMIN — INSULIN LISPRO 2 UNITS: 100 INJECTION, SOLUTION INTRAVENOUS; SUBCUTANEOUS at 12:40

## 2025-04-13 RX ADMIN — POLYETHYLENE GLYCOL 3350 17 G: 17 POWDER, FOR SOLUTION ORAL at 08:34

## 2025-04-13 RX ADMIN — CLONIDINE HYDROCHLORIDE 0.2 MG: 0.1 TABLET ORAL at 08:34

## 2025-04-13 RX ADMIN — VANCOMYCIN HYDROCHLORIDE 750 MG: 5 INJECTION, POWDER, LYOPHILIZED, FOR SOLUTION INTRAVENOUS at 00:30

## 2025-04-13 RX ADMIN — VALPROATE SODIUM 250 MG: 100 INJECTION, SOLUTION INTRAVENOUS at 08:34

## 2025-04-13 RX ADMIN — INSULIN LISPRO 1 UNITS: 100 INJECTION, SOLUTION INTRAVENOUS; SUBCUTANEOUS at 00:07

## 2025-04-13 RX ADMIN — INSULIN LISPRO 1 UNITS: 100 INJECTION, SOLUTION INTRAVENOUS; SUBCUTANEOUS at 17:24

## 2025-04-13 RX ADMIN — VANCOMYCIN HYDROCHLORIDE 750 MG: 5 INJECTION, POWDER, LYOPHILIZED, FOR SOLUTION INTRAVENOUS at 17:24

## 2025-04-13 RX ADMIN — VANCOMYCIN HYDROCHLORIDE 750 MG: 5 INJECTION, POWDER, LYOPHILIZED, FOR SOLUTION INTRAVENOUS at 10:05

## 2025-04-13 RX ADMIN — VALPROATE SODIUM 250 MG: 100 INJECTION, SOLUTION INTRAVENOUS at 21:06

## 2025-04-13 ASSESSMENT — COGNITIVE AND FUNCTIONAL STATUS - GENERAL
STANDING UP FROM CHAIR USING ARMS: TOTAL
TURNING FROM BACK TO SIDE WHILE IN FLAT BAD: A LITTLE
CLIMB 3 TO 5 STEPS WITH RAILING: TOTAL
TOILETING: A LOT
MOVING TO AND FROM BED TO CHAIR: TOTAL
TOILETING: A LOT
DRESSING REGULAR LOWER BODY CLOTHING: A LOT
PERSONAL GROOMING: A LOT
DRESSING REGULAR LOWER BODY CLOTHING: A LOT
PERSONAL GROOMING: A LOT
MOVING FROM LYING ON BACK TO SITTING ON SIDE OF FLAT BED WITH BEDRAILS: A LITTLE
MOVING FROM LYING ON BACK TO SITTING ON SIDE OF FLAT BED WITH BEDRAILS: A LITTLE
DRESSING REGULAR UPPER BODY CLOTHING: A LOT
MOBILITY SCORE: 10
DAILY ACTIVITIY SCORE: 11
TURNING FROM BACK TO SIDE WHILE IN FLAT BAD: A LITTLE
EATING MEALS: TOTAL
WALKING IN HOSPITAL ROOM: TOTAL
WALKING IN HOSPITAL ROOM: TOTAL
HELP NEEDED FOR BATHING: A LOT
DRESSING REGULAR UPPER BODY CLOTHING: A LOT
CLIMB 3 TO 5 STEPS WITH RAILING: TOTAL
MOVING TO AND FROM BED TO CHAIR: TOTAL
STANDING UP FROM CHAIR USING ARMS: TOTAL
HELP NEEDED FOR BATHING: A LOT
EATING MEALS: TOTAL
MOBILITY SCORE: 10
DAILY ACTIVITIY SCORE: 11

## 2025-04-13 ASSESSMENT — PAIN SCALES - GENERAL
PAINLEVEL_OUTOF10: 0 - NO PAIN
PAINLEVEL_OUTOF10: 0 - NO PAIN

## 2025-04-13 ASSESSMENT — PAIN - FUNCTIONAL ASSESSMENT: PAIN_FUNCTIONAL_ASSESSMENT: 0-10

## 2025-04-13 NOTE — PROGRESS NOTES
"Enmanuel Ahumada \"Dash\" is a 69 y.o. male on day 8 of admission presenting with Wound dehiscence.    Subjective   No acute events overnight    Objective     Physical Exam    awake  Ox3  Ou3R  FS  TM   BUE 5  BLE 5    Last Recorded Vitals  Blood pressure 120/78, pulse 95, temperature 36.3 °C (97.3 °F), temperature source Temporal, resp. rate 18, height 1.702 m (5' 7\"), weight 65.7 kg (144 lb 13.5 oz), SpO2 96%.  Intake/Output last 3 Shifts:  I/O last 3 completed shifts:  In: 2612.5 (39.8 mL/kg) [NG/GT:1700; IV Piggyback:912.5]  Out: 3190 (48.6 mL/kg) [Urine:3190 (1.3 mL/kg/hr)]  Weight: 65.7 kg     Relevant Results  Results from last 72 hours   Lab Units 04/12/25  0352 04/11/25  0218   WBC AUTO x10*3/uL 5.2 4.5   NRBC AUTO /100 WBCs 0.0 0.0   RBC AUTO x10*6/uL 3.35* 3.34*   HEMOGLOBIN g/dL 10.8* 10.7*   HEMATOCRIT % 31.3* 30.0*   MCV fL 93 90   MCH pg 32.2 32.0   MCHC g/dL 34.5 35.7   RDW % 13.4 13.4   PLATELETS AUTO x10*3/uL 150 155      Results from last 72 hours   Lab Units 04/12/25  0352 04/11/25  0218   GLUCOSE mg/dL 205* 187*   SODIUM mmol/L 136 133*   POTASSIUM mmol/L 4.0 3.9   CHLORIDE mmol/L 102 102   CO2 mmol/L 24 23   ANION GAP mmol/L 14 12   BUN mg/dL 21 16   CREATININE mg/dL 0.60 0.59   EGFR mL/min/1.73m*2 >90 >90   CALCIUM mg/dL 8.5* 8.1*   PHOSPHORUS mg/dL 1.4* 1.3*   ALBUMIN g/dL 3.4 3.4   MAGNESIUM mg/dL 2.29 2.67*   POCT CALCIUM IONIZED (MMOL/L) IN BLOOD mmol/L 1.14 1.11          Assessment/Plan   Assessment & Plan  Wound dehiscence    69 M h/o HTN, HLD, smalls's esophagus, RO GBM s/p resection (SAH, 12/2023) with resultant L HH s/p chemo/RT presenting with tumor progression, 2/20 s/p redo craniotomy for tumor resection w 5 ALA, CTH POC, 2/21 MRI intended NTR, 3/16 MRI postop changes w fluid collection at dura and craniotomy site, worsening infiltrative disease, enhancement along posteromedial aspect of resection site, 4/5 p/w 1wk incisional drainage, 4/4 CTH airfluid level in resection " cavity, 4/5 MRI brain w/wo incr'd resection cavity enhancement and diffusion restriction  4/6 s/p R cranial wound washout and mesh cranioplasty (kaleb purulence), CTH POC, L temporal ICH, plts 81 s/p 2u plts, 4/7 rCTH incr SAH, min incr ICH, rrCTH stable, EEG neg dc'd  4/8 s/p PICC  4/9 CTH stable, SDD dc'd, EEG neg  4/10 SGD dc'd  4/11 DVT US x4, LLE distal femoral, popliteal, gastroc, peroneal, post tibial vv DVTs, RUE R brachial DVT, s/p IVC filter      Plan:  tele  ID recs - vanc until 6/15  [facility] 4/[facility]  [facility] med recs  PTOT- rehab    Patient medically ready for discharge    Corby Darden MD

## 2025-04-13 NOTE — PROGRESS NOTES
"Speech-Language Pathology    SLP Adult Inpatient Speech-Language Pathology Clinical Swallow Re-Evaluation    Patient Name: Enmanuel Ahumada \"Dash\"  MRN: [id number]  Today's Date: 4/13/2025   Time Calculation  Start Time: 0913  Stop Time: 0931  Time Calculation (min): 18 min       Assessment:  #dysphagia- improving as mental status improves    Pt demonstrating safe, functional oropharyngeal swallow with Regular Solids (IDDSI Level 7) and Thin Liquids.  No apparent oral dysphagia.  No clinical s/s of aspiration.        Recommendations:  Solid Diet Recommendations : Regular (IDDSI Level 7)  Liquid Diet Recommendations: Thin      Safe Swallow Strategies/Guidelines:  Only present PO intake when awake and alert  Upright positioning   Slow rate/small boluses   Total A w/ PO intake- difficulty self feeding 2' to visual/depth perception deficits            Plan:  Skilled SLP  SLP Frequency: 2x per week  Duration: 1 week   Discussed POC: Pt, family, medical team         Goals:  Pt will tolerate least restrictive diet with adequate oral phase and no s/s of aspiration.    Date initiated: 4/13/25   Expected Time Frame to Meet Goal: 1-2 weeks   Status: Goal initiated     Pt/family will adhere to safe swallow strategies during PO intake with > 90% accuracy independently.      Date initiated: 4/13/25   Expected Time Frame to Meet Goal: 1-2 weeks    Status: Goal initiated         Subjective   RN cleared pt for re-evaluation.  Pt properly identified and evaluated bedside.  Awake and alert, with no c/o pain.  DHT in place.  Room air.  No family present.  Soft restraints in place.      Pt is a 69 M h/o HTN, HLD, smalls's esophagus, RO GBM s/p resection (SAH, 12/2023) with resultant L HH s/p chemo/RT presenting with tumor progression, 2/20 s/p redo craniotomy for tumor resection w 5 ALA, CTH POC, 2/21 MRI intended NTR, 3/16 MRI postop changes w fluid collection at dura and craniotomy site, worsening infiltrative disease, " enhancement along posteromedial aspect of resection site, 4/5 p/w 1wk incisional drainage, 4/4 CTH airfluid level in resection cavity, 4/5 MRI brain w/wo incr'd resection cavity enhancement and diffusion restriction  4/6 s/p R cranial wound washout and mesh cranioplasty (kaleb purulence), CTH POC, L temporal ICH, plts 81 s/p 2u plts, 4/7 rCTH incr SAH, min incr ICH, rrCTH stable, EEG neg dc'd  4/8 s/p PICC  4/9 CTH stable, SDD dc'd, EEG neg     Family denied baseline dysphagia.         Objective     Cognition:  Orientation: Oriented to person and place   Attention: Adequate for assessment   Follows Commands: Intact for simple commands     Oral/Motor Assessment:  Oral Hygiene: WFL; min white lingual coating persists   Dentition: Present and adequate   Oral Motor: WFL   Vocal Quality (Perceptually): WFL   Volitional Swallow: WFL         Consistencies Trialed:  Ice Chips, Thin- Straw, Puree (IDDSI Level 4), Regular (IDDSI Level 7)        Clinical Observations:  Patient Positioning: Upright in Bed  Management of Oral Secretions: Adequate  Was The 3 oz Swallow Protocol Completed: Yes  Signs/Symptoms of Aspiration: None      Clinical bedside swallow re-evaluation completed.  Pt consumed ice chips, multiple cup/straw sips, and 3 oz of water via consecutive sips with no s/s of aspiration.  Pt then presented with additional trials of puree and regular solids.  Adequate mastication and no significant oral residue with either consistency.  No s/s of pharyngeal dysphagia.  Unbuckled L wrist restraint to allow for self feeding, but pt unable to complete given visual/depth perception deficits.  Buckled L wrist restraint.       The 3 oz Swallow Protocol (sequential drinks of thin liquid water) was utilized as a reliable, evidence based test to rule out silent aspiration and determine need for additional testing, such as an MBS or FEES, if the test is equivocal, incomplete or pt shows s/s of aspiration, prior to recommending a oral  diet.     Education:  Education provided to pt regarding diet recommendations, s/s of aspiration, safe swallow strategies, and plan of care.  Fair understanding indicated.         04/13/25 at 12:17 PM - Anabell Pendleton, SLP

## 2025-04-13 NOTE — CARE PLAN
The patient's goals for the shift include free from fall    The clinical goals for the shift include pt will remain safe with no falls or injury    Problem: Fall/Injury  Goal: Be free from injury by end of the shift  Outcome: Progressing  Goal: Verbalize understanding of personal risk factors for fall in the hospital  Outcome: Progressing     Problem: Skin  Goal: Prevent/manage excess moisture  Outcome: Progressing  Flowsheets (Taken 4/13/2025 0537)  Prevent/manage excess moisture: Cleanse incontinence/protect with barrier cream     Problem: Safety - Medical Restraint  Goal: Remains free of injury from restraints (Restraint for Interference with Medical Device)  4/13/2025 0539 by Tamara Dyer RN  Outcome: Progressing  Flowsheets (Taken 4/12/2025 1502 by Monica Sumner RN)  Remains free of injury from restraints (restraint for interference with medical device):   Determine that other, less restrictive measures have been tried or would not be effective before applying the restraint   Evaluate the patient's condition at the time of restraint application   Inform patient/family regarding the reason for restraint   Every 2 hours: Monitor safety, psychosocial status, comfort, nutrition and hydration  4/13/2025 0538 by Tamara Dyer, RN  Outcome: Progressing  Goal: Free from restraint(s) (Restraint for Interference with Medical Device)  Outcome: Progressing

## 2025-04-13 NOTE — CARE PLAN
The patient's goals for the shift include free from fall    The clinical goals for the shift include pt will remain safe with no falls or injury    Over the shift, the patient did not have any falls or injuries.  Problem: Fall/Injury  Goal: Be free from injury by end of the shift  Outcome: Progressing  Goal: Verbalize understanding of personal risk factors for fall in the hospital  Outcome: Progressing     Problem: Skin  Goal: Prevent/manage excess moisture  Outcome: Progressing  Flowsheets (Taken 4/13/2025 4234)  Prevent/manage excess moisture: Cleanse incontinence/protect with barrier cream     Problem: Safety - Medical Restraint  Goal: Remains free of injury from restraints (Restraint for Interference with Medical Device)  Outcome: Progressing  Goal: Free from restraint(s) (Restraint for Interference with Medical Device)  Outcome: Progressing

## 2025-04-14 ENCOUNTER — APPOINTMENT (OUTPATIENT)
Dept: RADIOLOGY | Facility: HOSPITAL | Age: 70
DRG: 907 | End: 2025-04-14
Payer: MEDICARE

## 2025-04-14 ENCOUNTER — APPOINTMENT (OUTPATIENT)
Dept: CARDIOLOGY | Facility: HOSPITAL | Age: 70
DRG: 907 | End: 2025-04-14
Payer: MEDICARE

## 2025-04-14 ENCOUNTER — APPOINTMENT (OUTPATIENT)
Dept: RADIOLOGY | Facility: HOSPITAL | Age: 70
End: 2025-04-14
Payer: MEDICARE

## 2025-04-14 ENCOUNTER — HOSPITAL ENCOUNTER (OUTPATIENT)
Dept: RADIATION ONCOLOGY | Facility: CLINIC | Age: 70
Setting detail: RADIATION/ONCOLOGY SERIES
Discharge: HOME | End: 2025-04-14
Payer: MEDICARE

## 2025-04-14 ENCOUNTER — APPOINTMENT (OUTPATIENT)
Dept: RADIATION ONCOLOGY | Facility: HOSPITAL | Age: 70
End: 2025-04-14
Payer: MEDICARE

## 2025-04-14 LAB
ALBUMIN SERPL BCP-MCNC: 3.4 G/DL (ref 3.4–5)
ANION GAP BLDA CALCULATED.4IONS-SCNC: 11 MMO/L (ref 10–25)
ANION GAP SERPL CALC-SCNC: 15 MMOL/L (ref 10–20)
APTT PPP: 29 SECONDS (ref 26–36)
ATRIAL RATE: 116 BPM
ATRIAL RATE: 121 BPM
BASE EXCESS BLDA CALC-SCNC: 1.2 MMOL/L (ref -2–3)
BASOPHILS # BLD AUTO: 0.02 X10*3/UL (ref 0–0.1)
BASOPHILS NFR BLD AUTO: 0.4 %
BODY TEMPERATURE: 37 DEGREES CELSIUS
BUN SERPL-MCNC: 21 MG/DL (ref 6–23)
CA-I BLD-SCNC: 1.14 MMOL/L (ref 1.1–1.33)
CA-I BLDA-SCNC: 1.22 MMOL/L (ref 1.1–1.33)
CALCIUM SERPL-MCNC: 9.1 MG/DL (ref 8.6–10.6)
CHLORIDE BLDA-SCNC: 103 MMOL/L (ref 98–107)
CHLORIDE SERPL-SCNC: 102 MMOL/L (ref 98–107)
CO2 SERPL-SCNC: 23 MMOL/L (ref 21–32)
CREAT SERPL-MCNC: 0.56 MG/DL (ref 0.5–1.3)
EGFRCR SERPLBLD CKD-EPI 2021: >90 ML/MIN/1.73M*2
EOSINOPHIL # BLD AUTO: 0 X10*3/UL (ref 0–0.7)
EOSINOPHIL NFR BLD AUTO: 0 %
ERYTHROCYTE [DISTWIDTH] IN BLOOD BY AUTOMATED COUNT: 13.8 % (ref 11.5–14.5)
GLUCOSE BLD MANUAL STRIP-MCNC: 107 MG/DL (ref 74–99)
GLUCOSE BLD MANUAL STRIP-MCNC: 127 MG/DL (ref 74–99)
GLUCOSE BLD MANUAL STRIP-MCNC: 145 MG/DL (ref 74–99)
GLUCOSE BLD MANUAL STRIP-MCNC: 170 MG/DL (ref 74–99)
GLUCOSE BLD MANUAL STRIP-MCNC: 222 MG/DL (ref 74–99)
GLUCOSE BLD MANUAL STRIP-MCNC: 79 MG/DL (ref 74–99)
GLUCOSE BLDA-MCNC: 139 MG/DL (ref 74–99)
GLUCOSE SERPL-MCNC: 140 MG/DL (ref 74–99)
HCO3 BLDA-SCNC: 22.7 MMOL/L (ref 22–26)
HCT VFR BLD AUTO: 29.9 % (ref 41–52)
HCT VFR BLD EST: 34 % (ref 41–52)
HGB BLD-MCNC: 10.7 G/DL (ref 13.5–17.5)
HGB BLDA-MCNC: 11.3 G/DL (ref 13.5–17.5)
IMM GRANULOCYTES # BLD AUTO: 0.14 X10*3/UL (ref 0–0.7)
IMM GRANULOCYTES NFR BLD AUTO: 2.9 % (ref 0–0.9)
INHALED O2 CONCENTRATION: 21 %
INR PPP: 1.1 (ref 0.9–1.1)
LACTATE BLDA-SCNC: 2.7 MMOL/L (ref 0.4–2)
LYMPHOCYTES # BLD AUTO: 0.69 X10*3/UL (ref 1.2–4.8)
LYMPHOCYTES NFR BLD AUTO: 14.2 %
MAGNESIUM SERPL-MCNC: 2.13 MG/DL (ref 1.6–2.4)
MCH RBC QN AUTO: 32.2 PG (ref 26–34)
MCHC RBC AUTO-ENTMCNC: 35.8 G/DL (ref 32–36)
MCV RBC AUTO: 90 FL (ref 80–100)
MONOCYTES # BLD AUTO: 0.53 X10*3/UL (ref 0.1–1)
MONOCYTES NFR BLD AUTO: 10.9 %
NEUTROPHILS # BLD AUTO: 3.47 X10*3/UL (ref 1.2–7.7)
NEUTROPHILS NFR BLD AUTO: 71.6 %
NRBC BLD-RTO: 0.6 /100 WBCS (ref 0–0)
OXYHGB MFR BLDA: 97 % (ref 94–98)
P AXIS: 40 DEGREES
P AXIS: 57 DEGREES
P OFFSET: 195 MS
P OFFSET: 196 MS
P ONSET: 145 MS
P ONSET: 149 MS
PCO2 BLDA: 26 MM HG (ref 38–42)
PH BLDA: 7.55 PH (ref 7.38–7.42)
PHOSPHATE SERPL-MCNC: 2.4 MG/DL (ref 2.5–4.9)
PLATELET # BLD AUTO: 114 X10*3/UL (ref 150–450)
PO2 BLDA: 106 MM HG (ref 85–95)
POTASSIUM BLDA-SCNC: 4 MMOL/L (ref 3.5–5.3)
POTASSIUM SERPL-SCNC: 3.7 MMOL/L (ref 3.5–5.3)
PR INTERVAL: 128 MS
PR INTERVAL: 136 MS
PROTHROMBIN TIME: 12.3 SECONDS (ref 9.8–12.4)
Q ONSET: 213 MS
Q ONSET: 213 MS
QRS COUNT: 19 BEATS
QRS COUNT: 20 BEATS
QRS DURATION: 84 MS
QRS DURATION: 86 MS
QT INTERVAL: 304 MS
QT INTERVAL: 326 MS
QTC CALCULATION(BAZETT): 431 MS
QTC CALCULATION(BAZETT): 453 MS
QTC FREDERICIA: 384 MS
QTC FREDERICIA: 406 MS
R AXIS: -34 DEGREES
R AXIS: 3 DEGREES
RBC # BLD AUTO: 3.32 X10*6/UL (ref 4.5–5.9)
SAO2 % BLDA: 99 % (ref 94–100)
SODIUM BLDA-SCNC: 133 MMOL/L (ref 136–145)
SODIUM SERPL-SCNC: 136 MMOL/L (ref 136–145)
T AXIS: 28 DEGREES
T AXIS: 69 DEGREES
T OFFSET: 365 MS
T OFFSET: 376 MS
TSH SERPL-ACNC: 1.01 MIU/L (ref 0.44–3.98)
UFH PPP CHRO-ACNC: 0.8 IU/ML
VANCOMYCIN SERPL-MCNC: 24.1 UG/ML (ref 5–20)
VENTRICULAR RATE: 116 BPM
VENTRICULAR RATE: 121 BPM
WBC # BLD AUTO: 4.9 X10*3/UL (ref 4.4–11.3)

## 2025-04-14 PROCEDURE — 2500000001 HC RX 250 WO HCPCS SELF ADMINISTERED DRUGS (ALT 637 FOR MEDICARE OP)

## 2025-04-14 PROCEDURE — 70450 CT HEAD/BRAIN W/O DYE: CPT

## 2025-04-14 PROCEDURE — 97112 NEUROMUSCULAR REEDUCATION: CPT | Mod: GP

## 2025-04-14 PROCEDURE — 1100000001 HC PRIVATE ROOM DAILY

## 2025-04-14 PROCEDURE — 97530 THERAPEUTIC ACTIVITIES: CPT | Mod: GP

## 2025-04-14 PROCEDURE — 82810 BLOOD GASES O2 SAT ONLY: CPT

## 2025-04-14 PROCEDURE — 84132 ASSAY OF SERUM POTASSIUM: CPT

## 2025-04-14 PROCEDURE — 93010 ELECTROCARDIOGRAM REPORT: CPT | Performed by: INTERNAL MEDICINE

## 2025-04-14 PROCEDURE — 99232 SBSQ HOSP IP/OBS MODERATE 35: CPT | Performed by: NURSE PRACTITIONER

## 2025-04-14 PROCEDURE — 83735 ASSAY OF MAGNESIUM: CPT

## 2025-04-14 PROCEDURE — 2500000004 HC RX 250 GENERAL PHARMACY W/ HCPCS (ALT 636 FOR OP/ED)

## 2025-04-14 PROCEDURE — 85730 THROMBOPLASTIN TIME PARTIAL: CPT | Performed by: NURSE PRACTITIONER

## 2025-04-14 PROCEDURE — 82330 ASSAY OF CALCIUM: CPT

## 2025-04-14 PROCEDURE — 99222 1ST HOSP IP/OBS MODERATE 55: CPT | Performed by: PHYSICAL MEDICINE & REHABILITATION

## 2025-04-14 PROCEDURE — 70450 CT HEAD/BRAIN W/O DYE: CPT | Performed by: RADIOLOGY

## 2025-04-14 PROCEDURE — 85610 PROTHROMBIN TIME: CPT | Performed by: NURSE PRACTITIONER

## 2025-04-14 PROCEDURE — 97116 GAIT TRAINING THERAPY: CPT | Mod: GP

## 2025-04-14 PROCEDURE — 36600 WITHDRAWAL OF ARTERIAL BLOOD: CPT

## 2025-04-14 PROCEDURE — 80202 ASSAY OF VANCOMYCIN: CPT

## 2025-04-14 PROCEDURE — 71045 X-RAY EXAM CHEST 1 VIEW: CPT

## 2025-04-14 PROCEDURE — 84443 ASSAY THYROID STIM HORMONE: CPT

## 2025-04-14 PROCEDURE — 2500000004 HC RX 250 GENERAL PHARMACY W/ HCPCS (ALT 636 FOR OP/ED): Performed by: NURSE PRACTITIONER

## 2025-04-14 PROCEDURE — 85520 HEPARIN ASSAY: CPT | Performed by: NURSE PRACTITIONER

## 2025-04-14 PROCEDURE — 2500000002 HC RX 250 W HCPCS SELF ADMINISTERED DRUGS (ALT 637 FOR MEDICARE OP, ALT 636 FOR OP/ED)

## 2025-04-14 PROCEDURE — 2500000004 HC RX 250 GENERAL PHARMACY W/ HCPCS (ALT 636 FOR OP/ED): Performed by: PSYCHOLOGIST

## 2025-04-14 PROCEDURE — 82947 ASSAY GLUCOSE BLOOD QUANT: CPT

## 2025-04-14 PROCEDURE — 2500000002 HC RX 250 W HCPCS SELF ADMINISTERED DRUGS (ALT 637 FOR MEDICARE OP, ALT 636 FOR OP/ED): Performed by: NURSE PRACTITIONER

## 2025-04-14 PROCEDURE — 36415 COLL VENOUS BLD VENIPUNCTURE: CPT | Performed by: NURSE PRACTITIONER

## 2025-04-14 PROCEDURE — 2500000004 HC RX 250 GENERAL PHARMACY W/ HCPCS (ALT 636 FOR OP/ED): Mod: JZ | Performed by: NURSE PRACTITIONER

## 2025-04-14 PROCEDURE — 85025 COMPLETE CBC W/AUTO DIFF WBC: CPT | Performed by: NURSE PRACTITIONER

## 2025-04-14 PROCEDURE — 71045 X-RAY EXAM CHEST 1 VIEW: CPT | Performed by: RADIOLOGY

## 2025-04-14 PROCEDURE — 93005 ELECTROCARDIOGRAM TRACING: CPT

## 2025-04-14 PROCEDURE — 2500000005 HC RX 250 GENERAL PHARMACY W/O HCPCS

## 2025-04-14 RX ORDER — HEPARIN SODIUM 10000 [USP'U]/100ML
0-4000 INJECTION, SOLUTION INTRAVENOUS CONTINUOUS
Status: DISPENSED | OUTPATIENT
Start: 2025-04-14 | End: 2025-04-15

## 2025-04-14 RX ORDER — VANCOMYCIN HYDROCHLORIDE
1250 EVERY 12 HOURS
Status: DISCONTINUED | OUTPATIENT
Start: 2025-04-14 | End: 2025-04-14

## 2025-04-14 RX ADMIN — VANCOMYCIN HYDROCHLORIDE 750 MG: 5 INJECTION, POWDER, LYOPHILIZED, FOR SOLUTION INTRAVENOUS at 00:45

## 2025-04-14 RX ADMIN — CLONIDINE HYDROCHLORIDE 0.2 MG: 0.1 TABLET ORAL at 21:05

## 2025-04-14 RX ADMIN — ALTEPLASE 2 MG: 2.2 INJECTION, POWDER, LYOPHILIZED, FOR SOLUTION INTRAVENOUS at 12:50

## 2025-04-14 RX ADMIN — ATORVASTATIN CALCIUM 40 MG: 40 TABLET, FILM COATED ORAL at 21:05

## 2025-04-14 RX ADMIN — PANTOPRAZOLE SODIUM 40 MG: 40 INJECTION, POWDER, FOR SOLUTION INTRAVENOUS at 08:12

## 2025-04-14 RX ADMIN — VANCOMYCIN HYDROCHLORIDE 1250 MG: 1.25 INJECTION, POWDER, LYOPHILIZED, FOR SOLUTION INTRAVENOUS at 21:11

## 2025-04-14 RX ADMIN — HEPARIN SODIUM 5000 UNITS: 5000 INJECTION INTRAVENOUS; SUBCUTANEOUS at 06:45

## 2025-04-14 RX ADMIN — CLONIDINE HYDROCHLORIDE 0.2 MG: 0.1 TABLET ORAL at 08:12

## 2025-04-14 RX ADMIN — INSULIN LISPRO 1 UNITS: 100 INJECTION, SOLUTION INTRAVENOUS; SUBCUTANEOUS at 01:35

## 2025-04-14 RX ADMIN — VALPROATE SODIUM 250 MG: 100 INJECTION, SOLUTION INTRAVENOUS at 22:32

## 2025-04-14 RX ADMIN — DEXAMETHASONE SODIUM PHOSPHATE 2 MG: 4 INJECTION, SOLUTION INTRA-ARTICULAR; INTRALESIONAL; INTRAMUSCULAR; INTRAVENOUS; SOFT TISSUE at 21:05

## 2025-04-14 RX ADMIN — INSULIN LISPRO 2 UNITS: 100 INJECTION, SOLUTION INTRAVENOUS; SUBCUTANEOUS at 13:03

## 2025-04-14 RX ADMIN — VALPROATE SODIUM 250 MG: 100 INJECTION, SOLUTION INTRAVENOUS at 03:34

## 2025-04-14 RX ADMIN — DEXAMETHASONE SODIUM PHOSPHATE 2 MG: 4 INJECTION, SOLUTION INTRA-ARTICULAR; INTRALESIONAL; INTRAMUSCULAR; INTRAVENOUS; SOFT TISSUE at 08:19

## 2025-04-14 RX ADMIN — VALPROATE SODIUM 250 MG: 100 INJECTION, SOLUTION INTRAVENOUS at 14:05

## 2025-04-14 RX ADMIN — VALPROATE SODIUM 250 MG: 100 INJECTION, SOLUTION INTRAVENOUS at 09:16

## 2025-04-14 RX ADMIN — QUETIAPINE FUMARATE 25 MG: 25 TABLET ORAL at 21:05

## 2025-04-14 RX ADMIN — HEPARIN SODIUM 800 UNITS/HR: 10000 INJECTION, SOLUTION INTRAVENOUS at 14:04

## 2025-04-14 RX ADMIN — ALTEPLASE 2 MG: 2.2 INJECTION, POWDER, LYOPHILIZED, FOR SOLUTION INTRAVENOUS at 12:51

## 2025-04-14 RX ADMIN — VANCOMYCIN HYDROCHLORIDE 750 MG: 5 INJECTION, POWDER, LYOPHILIZED, FOR SOLUTION INTRAVENOUS at 08:12

## 2025-04-14 ASSESSMENT — PAIN - FUNCTIONAL ASSESSMENT
PAIN_FUNCTIONAL_ASSESSMENT: 0-10

## 2025-04-14 ASSESSMENT — COGNITIVE AND FUNCTIONAL STATUS - GENERAL
WALKING IN HOSPITAL ROOM: TOTAL
MOBILITY SCORE: 14
HELP NEEDED FOR BATHING: A LOT
MOVING FROM LYING ON BACK TO SITTING ON SIDE OF FLAT BED WITH BEDRAILS: A LITTLE
MOVING TO AND FROM BED TO CHAIR: A LOT
PERSONAL GROOMING: A LOT
STANDING UP FROM CHAIR USING ARMS: A LOT
CLIMB 3 TO 5 STEPS WITH RAILING: TOTAL
DAILY ACTIVITIY SCORE: 11
CLIMB 3 TO 5 STEPS WITH RAILING: A LOT
MOVING FROM LYING ON BACK TO SITTING ON SIDE OF FLAT BED WITH BEDRAILS: A LITTLE
STANDING UP FROM CHAIR USING ARMS: TOTAL
EATING MEALS: TOTAL
DRESSING REGULAR LOWER BODY CLOTHING: A LOT
TOILETING: A LOT
TURNING FROM BACK TO SIDE WHILE IN FLAT BAD: A LITTLE
WALKING IN HOSPITAL ROOM: A LITTLE
TURNING FROM BACK TO SIDE WHILE IN FLAT BAD: A LITTLE
DRESSING REGULAR UPPER BODY CLOTHING: A LOT
MOBILITY SCORE: 11
MOVING TO AND FROM BED TO CHAIR: TOTAL

## 2025-04-14 ASSESSMENT — PAIN SCALES - GENERAL
PAINLEVEL_OUTOF10: 0 - NO PAIN

## 2025-04-14 NOTE — NURSING NOTE
Per Nurse Practitioner eugene Funk to remove patient's NG tube due to diet change. Patient is now a regular diet taking in adequate oral intake.

## 2025-04-14 NOTE — PROGRESS NOTES
"Physical Therapy    Physical Therapy Treatment    Patient Name: Enmanuel Ahumada \"Dash\"  MRN: [id number]  Department: [place] [address]  [facility]  Today's Date: 4/14/2025  Time Calculation  Start Time: 1022  Stop Time: 1123  Time Calculation (min): 61 min      Assessment/Plan   PT Assessment  PT Assessment Results: Decreased strength, Decreased endurance, Impaired balance, Decreased coordination, Decreased mobility, Decreased cognition, Impaired judgement, Decreased safety awareness  Rehab Prognosis: Good  Barriers to Discharge Home: Physical needs, Caregiver assistance  Caregiver Assistance: Caregiver assistance needed per identified barriers - however, level of patient's required assistance exceeds assistance available at home  Physical Needs: High falls risk due to function or environment  Medical Staff Made Aware: Yes  Strengths: Ability to acquire knowledge, Attitude of self, Premorbid level of function, Rehab experience, Support and attitude of living partners  End of Session Communication: Bedside nurse  Assessment Comment: Pt progressing to ambulate 1x10ft, 2x15ft with FWW and Min A and 1x10ft without FWW variable min/mod. Pt demonstrates excellent effort throughout session. Remain appropriate for high intensity level of continued care upon d/c. Pt would benefit from continued PT while in hospital to address identified deficits and facilitate return to PLOF.  PT Plan  Inpatient/Swing Bed or Outpatient: Inpatient  PT Plan  Treatment/Interventions: Bed mobility, Gait training, Transfer training, Stair training, Balance training, Strengthening, Endurance training, Range of motion, Therapeutic exercise, Therapeutic activity  PT Plan: Ongoing PT  PT Frequency: 5 times per week  PT Discharge Recommendations: High intensity level of continued care  Equipment Recommended upon Discharge:  (TBD at next level of care)  PT Recommended Transfer Status: Assist x1, Assistive device  PT - OK to Discharge: Yes (Meaning pt " has been evaluated and d/c reccs have been made)    General Visit Information:   PT  Visit  PT Received On: 04/14/25  Response to Previous Treatment: Patient with no complaints from previous session.  General  Reason for Referral: Pt initially presented with tumor progression (RO GBM), s/p redo craniotomy for tumor resection in 2/2025. 4/5 p/w 1wk incisional drainage, 4/4 CTH airfluid level in resection cavity, 4/5 MRI brain w/wo incr'd resection cavity enhancement and diffusion restriction  4/6 s/p R cranial wound washout and mesh cranioplasty. 4/7 rCTH incr SAH, min incr ICH, rrCTH stable, EEG neg dc'd.  Past Medical History Relevant to Rehab: HTN, HLD, smalls's esophagus, RO GBM s/p resection (SAH, 12/2023) with resultant L HH s/p chemo/RT  PT Missed Visit:  (-)  Missed Visit Reason:  (-)  Family/Caregiver Present: Yes  Caregiver Feedback: wife present and supportive throughout session  Prior to Session Communication: Bedside nurse  Patient Position Received: Bed, 3 rail up, Alarm on  General Comment: Pt supine. Pleasant agreeable and cooperative to PT. Pt eager to mobilize. Pt progressing to ambulate Min A with FWW.    Subjective   Precautions:  Precautions  Hearing/Visual Limitations: per pt and wife report, pt with limited L visual field.  Medical Precautions: Fall precautions  Precautions Comment: SBP<160        Vital Signs Comment: Pt seated /65 at beginning of session. Pt reporting feeling lightheaded when ambulating, pt sat down, BP 87/56, pt instructed to do AP, knee ext, hip flex and given water. Pt reporting symptoms improving, /75 seated, pt able to ambulate again. BP seated in chair at end of session 121/64 with SCDs on.     Objective   Pain:  Pain Assessment  Pain Assessment: 0-10  0-10 (Numeric) Pain Score: 0 - No pain  Cognition:  Cognition  Arousal/Alertness: Delayed responses to stimuli  Orientation Level: Disoriented to time (pt able to identify year 2025 when given  choices.)  Following Commands: Follows one step commands with repetition  Coordination:  Movements are Fluid and Coordinated: No  Finger to Nose: Other: (comment) (L UE dysmetric.)  Rapid Alternating Movements: Intact  Alternating Toe Taps: Intact  Heel to Shin: Intact  Postural Control:  Postural Control  Postural Control: Within Functional Limits  Head Control: WFL  Posture Comment: WFL as demonstrated during functional activity  Static Sitting Balance  Static Sitting-Balance Support: Feet supported, No upper extremity supported  Static Sitting-Level of Assistance: Distant supervision  Dynamic Sitting Balance  Dynamic Sitting-Balance Support: Feet supported  Dynamic Sitting-Level of Assistance: Contact guard, Close supervision  Dynamic Sitting-Balance: Lateral lean, Forward lean  Dynamic Sitting-Comments: ~15 min. Pt able to progress from CGA to Close supervision  Static Standing Balance  Static Standing-Balance Support: Bilateral upper extremity supported  Static Standing-Level of Assistance: Contact guard  Dynamic Standing Balance  Dynamic Standing-Balance Support: Bilateral upper extremity supported  Dynamic Standing-Level of Assistance: Minimum assistance  Extremity/Trunk Assessments:    RUE   RUE : Within Functional Limits (grossly 5/5)  LUE   LUE: Exceptions to WFL (demonstrates some dysmetria with LUE. strength grossly 4/5)  RLE   RLE : Within Functional Limits (grossly 4+/5)  LLE   LLE :  (grossly 4/5)  Activity Tolerance:  Activity Tolerance  Endurance: Decreased tolerance for upright activites  Treatments:    Therapeutic Activity  Therapeutic Activity Performed: Yes  Therapeutic Activity 1: Sitting EOB; AP, Knee ext, hip flex x10 b/l    Balance/Neuromuscular Re-Education  Balance/Neuromuscular Re-Education Activity Performed: Yes  Balance/Neuromuscular Re-Education Activity 1: standing ~10 min. Pt able to progress to standing without walker, min A    Bed Mobility  Bed Mobility: Yes  Bed Mobility 1  Bed  Mobility 1: Supine to sitting  Level of Assistance 1: Minimum assistance  Bed Mobility Comments 1: HOB elevated, cues for sequencing, assist for LE. x2  Bed Mobility 2  Bed Mobility  2: Sitting to supine  Level of Assistance 2: Contact guard  Bed Mobility Comments 2: x1.    Ambulation/Gait Training  Ambulation/Gait Training Performed: Yes  Ambulation/Gait Training 1  Surface 1: Level tile  Device 1: Rolling walker  Assistance 1: Minimum assistance, Minimal verbal cues, Minimal tactile cues  Quality of Gait 1: Narrow base of support, Inconsistent stride length, Decreased step length, Diminished heel strike  Comments/Distance (ft) 1: 10ft. (Pt reporting some lightheadedness after ambulation, symptoms resolving quickly when seated and given water and completing AP, knee ext.)  Ambulation/Gait Training 2  Surface 2: Level tile  Device 2: No device  Assistance 2: Moderate assistance, Minimal verbal cues, Minimal tactile cues  Quality of Gait 2: Narrow base of support, Diminished heel strike, Inconsistent stride length, Decreased step length  Comments/Distance (ft) 2: 1x 10ft forward Pt variable min/mod A, 1 x 2ft lateral EOB, min A. (Pt reporting some lightheadedness after ambulation, symptoms resolving quickly when seated and given water and completing AP, knee ext.)  Ambulation/Gait Training 3  Surface 3: Level tile, Carpet  Device 3: Rolling walker  Assistance 3: Minimum assistance, Minimal verbal cues, Minimal tactile cues  Quality of Gait 3: Narrow base of support, Diminished heel strike, Inconsistent stride length, Decreased step length  Comments/Distance (ft) 3: 2x15ft (Pt reporting some lightheadedness after ambulation, symptoms resolving quickly when seated and given water and completing AP, knee ext.)  Transfers  Transfer: Yes  Transfer 1  Transfer From 1: Sit to  Transfer to 1: Stand  Technique 1: Sit to stand, Stand to sit  Transfer Device 1: Walker  Transfer Level of Assistance 1: Moderate assistance,  Minimal verbal cues, Minimal tactile cues  Trials/Comments 1: x5 trials. x2 trials without walker, x3 trials with walker  Transfers 2  Transfer From 2: Stand to  Transfer to 2: Sit  Transfer Level of Assistance 2: Minimum assistance  Trials/Comments 2: x5 trials. x2 trials without walker, x3 trials with walker (pt demonstrates decreased eccentric control.)    Stairs  Stairs: No    Outcome Measures:    [facility] Basic Mobility  Turning from your back to your side while in a flat bed without using bedrails: A little  Moving from lying on your back to sitting on the side of a flat bed without using bedrails: A little  Moving to and from bed to chair (including a wheelchair): A lot  Standing up from a chair using your arms (e.g. wheelchair or bedside chair): A lot  To walk in hospital room: A little  Climbing 3-5 steps with railing: Total  Basic Mobility - Total Score: 14    [place]  Sitting Balance: Steady, safe  Arises: Unable without help  Attempts to Arise: Able, requires more than one attempt  Immediate Standing Balance (First 5 Seconds): Steady but uses walker or other support  Standing Balance: Steady but wide stance, uses cane or other support  Nudged: Begins to fall  Eyes Closed: Unsteady  Turned 360 Degrees: Steadiness: Unsteady (Grabs, staggers)  Turned 360 Degrees: Continuity of Steps: Discontinuous steps  Sitting Down: Uses arms or not a smooth motion  Balance Score: 5  Initiation of Gait: No hesitancy  Step Height: R Swing Foot: Right foot complete clears floor  Step Length: R Swing Foot: Passes left stance foot  Step Height: L Swing Foot: Left foot complete clears floor  Step Length: L Swing Foot: Passes right stance foot  Step Symmetry: Right and left step length not equal (Estimate)  Step Continuity: Stopping or discontinuity between steps  Path: Mild/moderate deviation or uses walking aid  Trunk: Marked sway or uses walking aid  Walking Time: Heels almost touching while walking  Gait Score: 7  Total Score:  12    Education Documentation  Body Mechanics, taught by DAVID Verde at 4/14/2025  1:02 PM.  Learner: Patient  Readiness: Eager  Method: Explanation  Response: Verbalizes Understanding  Comment: role of PT, POC, mobility progression    Precautions, taught by DAVID Verde at 4/14/2025  1:02 PM.  Learner: Patient  Readiness: Eager  Method: Explanation  Response: Verbalizes Understanding  Comment: role of PT, POC, mobility progression    Mobility Training, taught by DAVID Verde at 4/14/2025  1:02 PM.  Learner: Patient  Readiness: Eager  Method: Explanation  Response: Verbalizes Understanding  Comment: role of PT, POC, mobility progression    Education Comments  No comments found.        OP EDUCATION:       Encounter Problems       Encounter Problems (Active)       PT Problem       Patient will perform bed mobility IND to reduce risk of developing decubitus ulcers.  (Progressing)       Start:  04/08/25    Expected End:  04/22/25            Patient will ambulate at least 150 ft. with </= close sup and LRD to improve tolerance of household distances.  (Progressing)       Start:  04/08/25    Expected End:  04/22/25            Patient will perform the 5-Time Sit to Stand test in <14 sec to indicate decreased falls risk. (Progressing)       Start:  04/08/25    Expected End:  04/22/25            Patient will perform sit to stand and stand to sit transfers with </= close sup and LRD to increase functional strength.  (Progressing)       Start:  04/08/25    Expected End:  04/22/25            BLE 5/5 (Progressing)       Start:  04/08/25    Expected End:  04/22/25               Pain - Adult

## 2025-04-14 NOTE — CODE DOCUMENTATION
Transitional Care Coordinator Note: Patient discussed with medical team, per medical team patient is not medically ready. [facility]charge dispo[facility].  Akosua Tariq RN  Transitional Care Coordinator      Update: 4/15 Wife has decide to Convert to  [facility].    Update:  4/15 0930  Patient is still in restaints and will need Piccline Placement.    Update: 4/17   [facility] Has accepted Patient pending Precert. OT note is need to submit Precert.. Patient  was Medically Ready but he is no longer Medically Ready now due to Platelets  dropping Medical Team is getting lab work.    4/17 230 pm  Precert pending [id number]   4/18   Patient is Not Medically Ready Pending Vascular Medicine Recc. Patient has AUTH Admission is approved from 4/17/2025 through 4/23/2025.

## 2025-04-14 NOTE — CARE PLAN
The patient's goals for the shift include free from fall    The clinical goals for the shift include pt will remain safe with no falls or injury    Problem: Fall/Injury  Goal: Be free from injury by end of the shift  Outcome: Progressing  Goal: Verbalize understanding of personal risk factors for fall in the hospital  Outcome: Progressing  Goal: Verbalize understanding of risk factor reduction measures to prevent injury from fall in the home  Outcome: Progressing  Goal: Use assistive devices by end of the shift  Outcome: Progressing  Goal: Pace activities to prevent fatigue by end of the shift  Outcome: Progressing     Problem: Skin  Goal: Decreased wound size/increased tissue granulation at next dressing change  Outcome: Progressing  Goal: Participates in plan/prevention/treatment measures  Outcome: Progressing  Goal: Prevent/manage excess moisture  Outcome: Progressing  Goal: Promote/optimize nutrition  Outcome: Progressing  Goal: Promote skin healing  Outcome: Progressing     Problem: Pain - Adult  Goal: Verbalizes/displays adequate comfort level or baseline comfort level  Outcome: Progressing     Problem: Safety - Adult  Goal: Free from fall injury  Outcome: Progressing     Problem: Discharge Planning  Goal: Discharge to home or other facility with appropriate resources  Outcome: Progressing     Problem: Chronic Conditions and Co-morbidities  Goal: Patient's chronic conditions and co-morbidity symptoms are monitored and maintained or improved  Outcome: Progressing     Problem: Safety - Medical Restraint  Goal: Remains free of injury from restraints (Restraint for Interference with Medical Device)  Outcome: Progressing  Goal: Free from restraint(s) (Restraint for Interference with Medical Device)  Outcome: Progressing

## 2025-04-14 NOTE — PROGRESS NOTES
"Enmanuel Ahumada \"Petey" is a 69 y.o. male on day 9 of admission.   Medical history significant for GERD, glioblastoma s/p right craniotomy for tumor resection 12/29/23, HLD, HTN and seizure disorder.   Patient admitted 4/5/25 with incisional drainage after undergoing repeat craniotomy and tumor resection 2/20/25, with findings of wound infection. Now s/p cranial washout and mesh cranioplasty 4/6/25.    Patient found to have left distal femoral, popliteal and calf DVT's, as well as right brachial thrombus around PICC line.    S/p IVC filter placement 4/11/25  as he was found to be unable to use anticoagulation therapy after undergoing recent brain surgery    Subjective   Patient reports he was able to stand and walk a few steps with the Physical Therapist  Denies CP, SOB or bleeding.      Objective     Physical Exam  Sitting up in bed in NAD  Respirations full and regular with breath sounds CTA all anterior lung fields; on RA  Normal heart sounds with RRR; telemetry monitor shows sinus tachycardia without witnessed ectopy; vital signs are stable with moderate tachycardia to the 110's; PICC line in place right upper arm.  Abdomen soft and nontender to palpation.  Extremities are warm with palpable bilateral radial and DP pulses.  Patient is awake and alert, participates a little in conversation.     Last Recorded Vitals  Blood pressure 119/86, pulse (!) 112, temperature 36.3 °C (97.3 °F), temperature source Temporal, resp. rate 18, height 1.702 m (5' 7\"), weight 64.3 kg (141 lb 12.1 oz), SpO2 94%.  Intake/Output last 3 Shifts:  I/O last 3 completed shifts:  In: 500 (7.8 mL/kg) [NG/GT:500]  Out: 2050 (31.9 mL/kg) [Urine:2050 (0.9 mL/kg/hr)]  Weight: 64.3 kg     Relevant Results  Scheduled medications  atorvastatin, 40 mg, nasogastric tube, Daily  cloNIDine, 0.2 mg, nasogastric tube, BID  dexAMETHasone, 2 mg, intravenous, q12h  insulin lispro, 0-5 Units, subcutaneous, q6h  lidocaine, 1 patch, transdermal, " Daily  pantoprazole, 40 mg, intravenous, Daily  polyethylene glycol, 17 g, nasogastric tube, Daily  QUEtiapine, 25 mg, nasogastric tube, Nightly  sennosides-docusate sodium, 1 tablet, nasogastric tube, Nightly  valproate sodium, 250 mg, intravenous, q6h  vancomycin, 1,250 mg, intravenous, q12h      Continuous medications  heparin, 0-4,000 Units/hr, Last Rate: 800 Units/hr (04/14/25 1404)      Results from last 7 days   Lab Units 04/14/25  0331 04/13/25  0347 04/12/25  0352   WBC AUTO x10*3/uL 4.9 4.3* 5.2   HEMOGLOBIN g/dL 10.7* 9.6* 10.8*   HEMATOCRIT % 29.9* 27.8* 31.3*   PLATELETS AUTO x10*3/uL 114* 121* 150       Results from last 7 days   Lab Units 04/14/25  0331 04/13/25  0347 04/12/25  0352   SODIUM mmol/L 136 136 136   POTASSIUM mmol/L 3.7 3.9 4.0   CHLORIDE mmol/L 102 102 102   CO2 mmol/L 23 26 24   BUN mg/dL 21 24* 21   CREATININE mg/dL 0.56 0.53 0.60   GLUCOSE mg/dL 140* 168* 205*   CALCIUM mg/dL 9.1 8.2* 8.5*   Estimated Creatinine Clearance: 116.4 mL/min (by C-G formula based on SCr of 0.56 mg/dL).      Results from last 7 days   Lab Units 04/14/25  1146   APTT seconds 29   INR  1.1     Platelets  Recent Labs     04/14/25  0331 04/13/25  0347 04/12/25  0352 04/11/25  0218 04/10/25  0015 04/09/25  0112 04/08/25  1653 04/08/25  0137 04/07/25  0444 04/07/25  0015 04/06/25  1632 04/05/25  1421 03/17/25  0755 03/16/25  0609   * 121* 150 155 121* 132* 130* 90* 103* 97* 81* 96* 112* 87*              Malnutrition Diagnosis Status: New  Malnutrition Diagnosis: Moderate malnutrition related to chronic disease or condition  Related to: GBM with progression, chemo, s/p crani  As Evidenced by: moderate muscle wasting and fat loss, intake likely meeting <75% of increased needs for >/=1 month  I agree with the dietitian's malnutrition diagnosis.      Assessment/Plan   Assessment & Plan  Wound dehiscence  69 year old male with medical history as noted above.   Patient hospitalized 4/5/25 with incisional drainage  after undergoing repeat craniotomy and tumor resection 2/20/25, with findings of wound infection. Now s/p cranial washout and mesh cranioplasty 4/6/25.    Patient found to have left distal femoral, popliteal and calf DVT's, as well as right brachial thrombus around PICC line.    S/p IVC filter placement 4/11/25  as he was found to be unable to use anticoagulation therapy after undergoing recent brain surgery.  Review of labs shows stable hemoglobin 10.7 grams, stable platelets 114 K, and stable serum creatinine 0.56.    Discussion with NSGY NP Team: can start low intensity Heparin infusion today, with NO bolus.  PM&R consulted today, with recommendations for acute inpatient rehab stay.   Notes that patient will continue Vancomycin until 6/15/25.      Recommendations:  ~STOP Heparin DVT prophylaxis now.  ~START low intensity Heparin infusion, no bolus; follow nomogram to achieve therapeutic assay 0.3-0.6  ~MONITOR for bleeding or neurologic changes  ~Daily labs: CBC, Heparin assay, RFP  ~Repeat CTH in 24 hours after Heparin infusion is therapeutic.  ~Continue PICC line as long as it is working; if it is not working, please discontinue; please discontinue PICC line when it is no longer needed.    ~Home going anticoagulation therapy will be determined in the next few days;   ~!I will coordinate outpatient follow up with the Vascular Medicine Service; will discuss removal of IVC filter at the time of follow up appointment.     Plan of care discussed with Dr. Fiorella Pisano  Plan of care discussed in person with Ms. Cindy BECERRIL from the NSGY Team      JERRICA Valles  Vascular Medicine Service   Pager [phone]

## 2025-04-14 NOTE — PROGRESS NOTES
Vancomycin Dosing by Pharmacy- FOLLOW UP    Enmanuel Ahumada is a 69 y.o. year old male who Pharmacy has been consulted for vancomycin dosing for CNS/meningoencephalitis. Based on the patient's indication and renal status this patient is being dosed based on a goal AUC of 500-600.     Renal function is currently stable.    Current vancomycin dose: 750 mg given every 8 hours    Estimated vancomycin AUC on current dose: 480 mg/L.hr     Visit Vitals  /78   Pulse 94   Temp 36 °C (96.8 °F)   Resp 18        Lab Results   Component Value Date    CREATININE 0.56 2025    CREATININE 0.53 2025    CREATININE 0.60 2025    CREATININE 0.59 2025        Patient weight is as follows:   Vitals:    25 0530   Weight: 64.3 kg (141 lb 12.1 oz)       Cultures:  Susceptibility data for the encounter in last 14 days.  Collected Specimen Info Organism Clindamycin Erythromycin Oxacillin Tetracycline Trimethoprim/Sulfamethoxazole Vancomycin   25 Swab from BRAIN BIOPSY Staphylococcus epidermidis  S  S  R  S  R  S   25 Swab from BRAIN BIOPSY Staphylococcus epidermidis         25 Swab from BRAIN BIOPSY Staphylococcus epidermidis              I/O last 3 completed shifts:  In: 500 (7.8 mL/kg) [NG/GT:500]  Out: 0 (31.9 mL/kg) [Urine:0 (0.9 mL/kg/hr)]  Weight: 64.3 kg   I/O during current shift:  I/O this shift:  In: 150 [IV Piggyback:150]  Out: -     Temp (24hrs), Av.4 °C (97.5 °F), Min:36 °C (96.8 °F), Max:36.7 °C (98.1 °F)      Assessment/Plan    Below goal AUC. Orders placed for new vancomcyin regimen of 1250 every 12 hours to begin at 1800.     This dosing regimen is predicted by [employer] to result in the following pharmacokinetic parameters:    Loading dose: N/A  Regimen: 1250 mg IV every 12 hours.  Start time: 20:12 on 2025  Exposure target: AUC24 (range)400-600 mg/L.hr   [id number]: 506 mg/L.hr  AUC24,ss: 505 mg/L.hr  Probability of AUC24 > 400: 99 %  Ctrough,ss: 11.4  mg/L  Probability of Ctrough,ss > 20: 0 %    The next level will be obtained on 4/15 at 1800. May be obtained sooner if clinically indicated.   Will continue to monitor renal function daily while on vancomycin and order serum creatinine at least every 48 hours if not already ordered.  Follow for continued vancomycin needs, clinical response, and signs/symptoms of toxicity.       Carlyn Brunson, PharmD

## 2025-04-14 NOTE — ASSESSMENT & PLAN NOTE
69 year old male with medical history as noted above.   Patient hospitalized 4/5/25 with incisional drainage after undergoing repeat craniotomy and tumor resection 2/20/25, with findings of wound infection. Now s/p cranial washout and mesh cranioplasty 4/6/25.    Patient found to have left distal femoral, popliteal and calf DVT's, as well as right brachial thrombus around PICC line.    S/p IVC filter placement 4/11/25  as he was found to be unable to use anticoagulation therapy after undergoing recent brain surgery.  Review of labs shows stable hemoglobin 10.7 grams, stable platelets 114 K, and stable serum creatinine 0.56.    Discussion with NSGY NP Team: can start low intensity Heparin infusion today, with NO bolus.  PM&R consulted today, with recommendations for acute inpatient rehab stay.   Notes that patient will continue Vancomycin until 6/15/25.      Recommendations:  ~STOP Heparin DVT prophylaxis now.  ~START low intensity Heparin infusion, no bolus; follow nomogram to achieve therapeutic assay 0.3-0.6  ~MONITOR for bleeding or neurologic changes  ~Daily labs: CBC, Heparin assay, RFP  ~Repeat CTH in 24 hours after Heparin infusion is therapeutic.  ~Continue PICC line as long as it is working; if it is not working, please discontinue; please discontinue PICC line when it is no longer needed.    ~Home going anticoagulation therapy will be determined in the next few days;   ~!I will coordinate outpatient follow up with the Vascular Medicine Service; will discuss removal of IVC filter at the time of follow up appointment.

## 2025-04-14 NOTE — PROGRESS NOTES
"Enmanuel Ahumada \"Petey" is a 69 y.o. male on day 9 of admission presenting with Wound dehiscence.    Subjective   Patient was tachycardic to 127 overnight.  On room air.  ABG without elevated A-a gradient.    Objective     Physical Exam    awake  Ox3  Ou3R  FS  TM  BUE 5  BLE 5    Last Recorded Vitals  Blood pressure 117/78, pulse 94, temperature 36 °C (96.8 °F), resp. rate 18, height 1.702 m (5' 7\"), weight 64.3 kg (141 lb 12.1 oz), SpO2 96%.  Intake/Output last 3 Shifts:  I/O last 3 completed shifts:  In: 500 (7.8 mL/kg) [NG/GT:500]  Out: 2050 (31.9 mL/kg) [Urine:2050 (0.9 mL/kg/hr)]  Weight: 64.3 kg     Relevant Results  Results from last 72 hours   Lab Units 04/14/25  0331 04/13/25  0347   WBC AUTO x10*3/uL 4.9 4.3*   NRBC AUTO /100 WBCs 0.6* 0.0   RBC AUTO x10*6/uL 3.32* 3.00*   HEMOGLOBIN g/dL 10.7* 9.6*   HEMATOCRIT % 29.9* 27.8*   MCV fL 90 93   MCH pg 32.2 32.0   MCHC g/dL 35.8 34.5   RDW % 13.8 13.8   PLATELETS AUTO x10*3/uL 114* 121*      Results from last 72 hours   Lab Units 04/14/25  0352 04/14/25  0331 04/13/25  0347   GLUCOSE mg/dL  --  140* 168*   SODIUM mmol/L  --  136 136   POTASSIUM mmol/L  --  3.7 3.9   CHLORIDE mmol/L  --  102 102   CO2 mmol/L  --  23 26   ANION GAP mmol/L  --  15 12   BUN mg/dL  --  21 24*   CREATININE mg/dL  --  0.56 0.53   EGFR mL/min/1.73m*2  --  >90 >90   CALCIUM mg/dL  --  9.1 8.2*   PHOSPHORUS mg/dL  --  2.4* 2.0*   ALBUMIN g/dL  --  3.4 3.1*   MAGNESIUM mg/dL  --  2.13 2.25   POCT CALCIUM IONIZED (MMOL/L) IN BLOOD mmol/L 1.14  --  1.18          Assessment/Plan   Assessment & Plan  Wound dehiscence    69 M h/o HTN, HLD, smalls's esophagus, RO GBM s/p resection ([facility], 12/2023) with resultant L HH s/p chemo/RT presenting with tumor progression, 2/20 s/p redo craniotomy for tumor resection w 5 ALA, CTH POC, 2/21 MRI intended NTR, 3/16 MRI postop changes w fluid collection at dura and craniotomy site, worsening infiltrative disease, enhancement along posteromedial " aspect of resection site, 4/5 p/w 1wk incisional drainage, 4/4 CTH airfluid level in resection cavity, 4/5 MRI brain w/wo incr'd resection cavity enhancement and diffusion restriction  4/6 s/p R cranial wound washout and mesh cranioplasty (kaleb purulence), CTH POC, L temporal ICH, plts 81 s/p 2u plts, 4/7 rCTH incr SAH, min incr ICH, rrCTH stable, EEG neg dc'd  4/8 s/p PICC  4/9 CTH stable, SDD dc'd, EEG neg  4/10 SGD dc'd  4/11 DVT US x4, LLE distal femoral, popliteal, gastroc, peroneal, post tibial vv DVTs, RUE R brachial DVT, s/p IVC filter    4/13 CTH stable    Plan:  tele  ID recs - vanc until 6/15  Ok to start AC today  Vasc med recs  SLP recs  PTOT- rehab    Dispo pending anticoagulation    Arcelia Chester MD

## 2025-04-15 ENCOUNTER — APPOINTMENT (OUTPATIENT)
Dept: CARDIOLOGY | Facility: HOSPITAL | Age: 70
DRG: 907 | End: 2025-04-15
Payer: MEDICARE

## 2025-04-15 ENCOUNTER — APPOINTMENT (OUTPATIENT)
Dept: RADIOLOGY | Facility: HOSPITAL | Age: 70
DRG: 907 | End: 2025-04-15
Payer: MEDICARE

## 2025-04-15 ENCOUNTER — APPOINTMENT (OUTPATIENT)
Dept: RADIATION ONCOLOGY | Facility: HOSPITAL | Age: 70
End: 2025-04-15
Payer: MEDICARE

## 2025-04-15 LAB
ALBUMIN SERPL BCP-MCNC: 3.4 G/DL (ref 3.4–5)
ANION GAP SERPL CALC-SCNC: 13 MMOL/L (ref 10–20)
ATRIAL RATE: 97 BPM
BASOPHILS # BLD AUTO: 0.02 X10*3/UL (ref 0–0.1)
BASOPHILS NFR BLD AUTO: 0.5 %
BUN SERPL-MCNC: 20 MG/DL (ref 6–23)
CA-I BLD-SCNC: 1.25 MMOL/L (ref 1.1–1.33)
CALCIUM SERPL-MCNC: 8.8 MG/DL (ref 8.6–10.6)
CHLORIDE SERPL-SCNC: 101 MMOL/L (ref 98–107)
CO2 SERPL-SCNC: 27 MMOL/L (ref 21–32)
CREAT SERPL-MCNC: 0.66 MG/DL (ref 0.5–1.3)
EGFRCR SERPLBLD CKD-EPI 2021: >90 ML/MIN/1.73M*2
EOSINOPHIL # BLD AUTO: 0 X10*3/UL (ref 0–0.7)
EOSINOPHIL NFR BLD AUTO: 0 %
ERYTHROCYTE [DISTWIDTH] IN BLOOD BY AUTOMATED COUNT: 14.4 % (ref 11.5–14.5)
GLUCOSE BLD MANUAL STRIP-MCNC: 127 MG/DL (ref 74–99)
GLUCOSE BLD MANUAL STRIP-MCNC: 128 MG/DL (ref 74–99)
GLUCOSE BLD MANUAL STRIP-MCNC: 136 MG/DL (ref 74–99)
GLUCOSE BLD MANUAL STRIP-MCNC: 146 MG/DL (ref 74–99)
GLUCOSE BLD MANUAL STRIP-MCNC: 147 MG/DL (ref 74–99)
GLUCOSE SERPL-MCNC: 101 MG/DL (ref 74–99)
HCT VFR BLD AUTO: 31.8 % (ref 41–52)
HGB BLD-MCNC: 10.7 G/DL (ref 13.5–17.5)
IMM GRANULOCYTES # BLD AUTO: 0.2 X10*3/UL (ref 0–0.7)
IMM GRANULOCYTES NFR BLD AUTO: 4.8 % (ref 0–0.9)
LYMPHOCYTES # BLD AUTO: 0.79 X10*3/UL (ref 1.2–4.8)
LYMPHOCYTES NFR BLD AUTO: 18.9 %
MAGNESIUM SERPL-MCNC: 2.2 MG/DL (ref 1.6–2.4)
MCH RBC QN AUTO: 31.8 PG (ref 26–34)
MCHC RBC AUTO-ENTMCNC: 33.6 G/DL (ref 32–36)
MCV RBC AUTO: 95 FL (ref 80–100)
MONOCYTES # BLD AUTO: 0.55 X10*3/UL (ref 0.1–1)
MONOCYTES NFR BLD AUTO: 13.1 %
NEUTROPHILS # BLD AUTO: 2.63 X10*3/UL (ref 1.2–7.7)
NEUTROPHILS NFR BLD AUTO: 62.7 %
NRBC BLD-RTO: 2.4 /100 WBCS (ref 0–0)
P AXIS: 52 DEGREES
P OFFSET: 189 MS
P ONSET: 141 MS
PHOSPHATE SERPL-MCNC: 3.7 MG/DL (ref 2.5–4.9)
PLATELET # BLD AUTO: 107 X10*3/UL (ref 150–450)
POTASSIUM SERPL-SCNC: 3.7 MMOL/L (ref 3.5–5.3)
PR INTERVAL: 140 MS
Q ONSET: 211 MS
QRS COUNT: 16 BEATS
QRS DURATION: 82 MS
QT INTERVAL: 370 MS
QTC CALCULATION(BAZETT): 469 MS
QTC FREDERICIA: 434 MS
R AXIS: -16 DEGREES
RBC # BLD AUTO: 3.36 X10*6/UL (ref 4.5–5.9)
SODIUM SERPL-SCNC: 137 MMOL/L (ref 136–145)
T AXIS: 32 DEGREES
T OFFSET: 396 MS
UFH PPP CHRO-ACNC: 0.5 IU/ML
UFH PPP CHRO-ACNC: 0.6 IU/ML
VANCOMYCIN SERPL-MCNC: 12.8 UG/ML (ref 5–20)
VENTRICULAR RATE: 97 BPM
WBC # BLD AUTO: 4.2 X10*3/UL (ref 4.4–11.3)

## 2025-04-15 PROCEDURE — 82947 ASSAY GLUCOSE BLOOD QUANT: CPT

## 2025-04-15 PROCEDURE — 02HV33Z INSERTION OF INFUSION DEVICE INTO SUPERIOR VENA CAVA, PERCUTANEOUS APPROACH: ICD-10-PCS | Performed by: RADIOLOGY

## 2025-04-15 PROCEDURE — 2500000002 HC RX 250 W HCPCS SELF ADMINISTERED DRUGS (ALT 637 FOR MEDICARE OP, ALT 636 FOR OP/ED)

## 2025-04-15 PROCEDURE — 2500000001 HC RX 250 WO HCPCS SELF ADMINISTERED DRUGS (ALT 637 FOR MEDICARE OP)

## 2025-04-15 PROCEDURE — 97116 GAIT TRAINING THERAPY: CPT | Mod: GP

## 2025-04-15 PROCEDURE — 7100000010 HC PHASE TWO TIME - EACH INCREMENTAL 1 MINUTE

## 2025-04-15 PROCEDURE — 80202 ASSAY OF VANCOMYCIN: CPT

## 2025-04-15 PROCEDURE — 1100000001 HC PRIVATE ROOM DAILY

## 2025-04-15 PROCEDURE — 80069 RENAL FUNCTION PANEL: CPT

## 2025-04-15 PROCEDURE — 2500000004 HC RX 250 GENERAL PHARMACY W/ HCPCS (ALT 636 FOR OP/ED): Performed by: NURSE PRACTITIONER

## 2025-04-15 PROCEDURE — 76937 US GUIDE VASCULAR ACCESS: CPT | Performed by: RADIOLOGY

## 2025-04-15 PROCEDURE — 85025 COMPLETE CBC W/AUTO DIFF WBC: CPT | Performed by: NURSE PRACTITIONER

## 2025-04-15 PROCEDURE — 77001 FLUOROGUIDE FOR VEIN DEVICE: CPT | Performed by: RADIOLOGY

## 2025-04-15 PROCEDURE — 99232 SBSQ HOSP IP/OBS MODERATE 35: CPT | Performed by: NURSE PRACTITIONER

## 2025-04-15 PROCEDURE — C1751 CATH, INF, PER/CENT/MIDLINE: HCPCS

## 2025-04-15 PROCEDURE — 97530 THERAPEUTIC ACTIVITIES: CPT | Mod: GP

## 2025-04-15 PROCEDURE — 2500000004 HC RX 250 GENERAL PHARMACY W/ HCPCS (ALT 636 FOR OP/ED)

## 2025-04-15 PROCEDURE — 93010 ELECTROCARDIOGRAM REPORT: CPT | Performed by: INTERNAL MEDICINE

## 2025-04-15 PROCEDURE — 2500000005 HC RX 250 GENERAL PHARMACY W/O HCPCS

## 2025-04-15 PROCEDURE — 85520 HEPARIN ASSAY: CPT | Performed by: NURSE PRACTITIONER

## 2025-04-15 PROCEDURE — 93005 ELECTROCARDIOGRAM TRACING: CPT

## 2025-04-15 PROCEDURE — 2780000003 HC OR 278 NO HCPCS

## 2025-04-15 PROCEDURE — 36556 INSERT NON-TUNNEL CV CATH: CPT | Performed by: RADIOLOGY

## 2025-04-15 PROCEDURE — 82330 ASSAY OF CALCIUM: CPT | Performed by: NURSE PRACTITIONER

## 2025-04-15 PROCEDURE — 36415 COLL VENOUS BLD VENIPUNCTURE: CPT | Performed by: NURSE PRACTITIONER

## 2025-04-15 PROCEDURE — 83735 ASSAY OF MAGNESIUM: CPT | Performed by: NURSE PRACTITIONER

## 2025-04-15 PROCEDURE — 7100000009 HC PHASE TWO TIME - INITIAL BASE CHARGE

## 2025-04-15 RX ORDER — ENOXAPARIN SODIUM 100 MG/ML
1 INJECTION SUBCUTANEOUS EVERY 12 HOURS
Status: DISCONTINUED | OUTPATIENT
Start: 2025-04-15 | End: 2025-04-16

## 2025-04-15 RX ORDER — LIDOCAINE HYDROCHLORIDE 10 MG/ML
5 INJECTION, SOLUTION INFILTRATION; PERINEURAL ONCE
Status: DISCONTINUED | OUTPATIENT
Start: 2025-04-15 | End: 2025-04-22

## 2025-04-15 RX ADMIN — PANTOPRAZOLE SODIUM 40 MG: 40 INJECTION, POWDER, FOR SOLUTION INTRAVENOUS at 08:36

## 2025-04-15 RX ADMIN — VANCOMYCIN HYDROCHLORIDE 1250 MG: 1.25 INJECTION, POWDER, LYOPHILIZED, FOR SOLUTION INTRAVENOUS at 20:25

## 2025-04-15 RX ADMIN — VANCOMYCIN HYDROCHLORIDE 1250 MG: 1.25 INJECTION, POWDER, LYOPHILIZED, FOR SOLUTION INTRAVENOUS at 08:36

## 2025-04-15 RX ADMIN — VALPROATE SODIUM 250 MG: 100 INJECTION, SOLUTION INTRAVENOUS at 03:46

## 2025-04-15 RX ADMIN — ENOXAPARIN SODIUM 60 MG: 100 INJECTION SUBCUTANEOUS at 20:25

## 2025-04-15 RX ADMIN — VALPROATE SODIUM 250 MG: 100 INJECTION, SOLUTION INTRAVENOUS at 12:23

## 2025-04-15 RX ADMIN — DEXAMETHASONE SODIUM PHOSPHATE 2 MG: 4 INJECTION, SOLUTION INTRA-ARTICULAR; INTRALESIONAL; INTRAMUSCULAR; INTRAVENOUS; SOFT TISSUE at 08:36

## 2025-04-15 RX ADMIN — QUETIAPINE FUMARATE 25 MG: 25 TABLET ORAL at 20:25

## 2025-04-15 RX ADMIN — CLONIDINE HYDROCHLORIDE 0.2 MG: 0.1 TABLET ORAL at 08:36

## 2025-04-15 RX ADMIN — CLONIDINE HYDROCHLORIDE 0.2 MG: 0.1 TABLET ORAL at 20:25

## 2025-04-15 RX ADMIN — DEXAMETHASONE SODIUM PHOSPHATE 2 MG: 4 INJECTION, SOLUTION INTRA-ARTICULAR; INTRALESIONAL; INTRAMUSCULAR; INTRAVENOUS; SOFT TISSUE at 20:35

## 2025-04-15 RX ADMIN — VALPROATE SODIUM 250 MG: 100 INJECTION, SOLUTION INTRAVENOUS at 23:35

## 2025-04-15 RX ADMIN — VALPROATE SODIUM 250 MG: 100 INJECTION, SOLUTION INTRAVENOUS at 18:37

## 2025-04-15 RX ADMIN — ATORVASTATIN CALCIUM 40 MG: 40 TABLET, FILM COATED ORAL at 20:25

## 2025-04-15 ASSESSMENT — COGNITIVE AND FUNCTIONAL STATUS - GENERAL
MOBILITY SCORE: 14
TURNING FROM BACK TO SIDE WHILE IN FLAT BAD: A LITTLE
STANDING UP FROM CHAIR USING ARMS: A LOT
MOVING FROM LYING ON BACK TO SITTING ON SIDE OF FLAT BED WITH BEDRAILS: A LITTLE
STANDING UP FROM CHAIR USING ARMS: A LOT
MOVING FROM LYING ON BACK TO SITTING ON SIDE OF FLAT BED WITH BEDRAILS: A LITTLE
MOBILITY SCORE: 14
TOILETING: A LOT
TOILETING: A LOT
STANDING UP FROM CHAIR USING ARMS: A LITTLE
PERSONAL GROOMING: A LOT
TURNING FROM BACK TO SIDE WHILE IN FLAT BAD: A LITTLE
PERSONAL GROOMING: A LOT
HELP NEEDED FOR BATHING: A LOT
DRESSING REGULAR UPPER BODY CLOTHING: A LOT
DAILY ACTIVITIY SCORE: 13
MOVING TO AND FROM BED TO CHAIR: A LITTLE
WALKING IN HOSPITAL ROOM: A LOT
DAILY ACTIVITIY SCORE: 13
WALKING IN HOSPITAL ROOM: A LOT
DRESSING REGULAR LOWER BODY CLOTHING: A LOT
TURNING FROM BACK TO SIDE WHILE IN FLAT BAD: A LITTLE
CLIMB 3 TO 5 STEPS WITH RAILING: A LOT
WALKING IN HOSPITAL ROOM: A LITTLE
MOVING TO AND FROM BED TO CHAIR: A LOT
MOVING TO AND FROM BED TO CHAIR: A LOT
CLIMB 3 TO 5 STEPS WITH RAILING: A LOT
EATING MEALS: A LITTLE
MOVING FROM LYING ON BACK TO SITTING ON SIDE OF FLAT BED WITH BEDRAILS: A LITTLE
DRESSING REGULAR LOWER BODY CLOTHING: A LOT
MOBILITY SCORE: 16
DRESSING REGULAR UPPER BODY CLOTHING: A LOT
EATING MEALS: A LITTLE
HELP NEEDED FOR BATHING: A LOT
CLIMB 3 TO 5 STEPS WITH RAILING: TOTAL

## 2025-04-15 ASSESSMENT — PAIN - FUNCTIONAL ASSESSMENT
PAIN_FUNCTIONAL_ASSESSMENT: 0-10

## 2025-04-15 ASSESSMENT — PAIN SCALES - GENERAL
PAINLEVEL_OUTOF10: 0 - NO PAIN

## 2025-04-15 NOTE — ASSESSMENT & PLAN NOTE
69 year old male with medical history as noted above.   Patient hospitalized 4/5/25 due to finding of incisional drainage and wound infection, after undergoing repeat craniotomy and tumor resection 2/20/25. Now s/p cranial washout and mesh cranioplasty 4/6/25.    Patient found to have left distal femoral, popliteal and calf DVT's, as well as right brachial thrombus around PICC line.    S/p IVC filter placement 4/11/25  as he was found to be unable to use anticoagulation therapy after undergoing recent brain surgery.  Review of labs shows stable hemoglobin 10.7 grams, stable platelets 107 K, and stable serum creatinine 0.66.    Started low intensity Heparin infusion 4/14/25.  Patient lost IV access when the PICC line was accidentally removed.   Underwent placement of left upper arm PICC line in Interventional Radiology today 4/15/25.  Low intensity Heparin restarted without bolus after PICC line was placed.   Plan to continue Vancomycin until 6/15/25.      Recommendations:  ~CONTINUE low intensity Heparin infusion for now; follow nomogram to achieve therapeutic assay 0.3-0.6  ~May consider transition to Lovenox 60mg q12 hours starting this evening at 8pm; stop Heparin infusion 1 hour after initial dose of Lovenox; continue Lovenox 60mg q12 hours for duration of therapy.   ~MONITOR for bleeding or neurologic changes  ~Daily labs: CBC, Heparin assay (if continues with Heparin infusion), RFP  ~Repeat CTH in 24 hours after Heparin infusion is therapeutic.  ~Home going anticoagulation therapy will be determined in the next few days;   ~I will coordinate outpatient follow up with the Vascular Medicine Service; will discuss removal of IVC filter at the time of follow up appointment.

## 2025-04-15 NOTE — NURSING NOTE
PICC ordered, chart review shows DVT in right arm and cannot rule out DVT in left internal jugular. Multiple risk factors for bedside PICC placement. Referred to IR for MD to assess.

## 2025-04-15 NOTE — CARE PLAN
The patient's goals for the shift include free from fall    The clinical goals for the shift include pt will remain safe during shift    Problem: Fall/Injury  Goal: Be free from injury by end of the shift  Outcome: Progressing  Goal: Verbalize understanding of personal risk factors for fall in the hospital  Outcome: Progressing  Goal: Verbalize understanding of risk factor reduction measures to prevent injury from fall in the home  Outcome: Progressing  Goal: Use assistive devices by end of the shift  Outcome: Progressing  Goal: Pace activities to prevent fatigue by end of the shift  Outcome: Progressing     Problem: Skin  Goal: Decreased wound size/increased tissue granulation at next dressing change  Outcome: Progressing  Goal: Participates in plan/prevention/treatment measures  Outcome: Progressing  Goal: Prevent/manage excess moisture  Outcome: Progressing  Goal: Promote/optimize nutrition  Outcome: Progressing  Goal: Promote skin healing  Outcome: Progressing     Problem: Pain - Adult  Goal: Verbalizes/displays adequate comfort level or baseline comfort level  Outcome: Progressing     Problem: Safety - Adult  Goal: Free from fall injury  Outcome: Progressing     Problem: Discharge Planning  Goal: Discharge to home or other facility with appropriate resources  Outcome: Progressing     Problem: Chronic Conditions and Co-morbidities  Goal: Patient's chronic conditions and co-morbidity symptoms are monitored and maintained or improved  Outcome: Progressing     Problem: Safety - Medical Restraint  Goal: Remains free of injury from restraints (Restraint for Interference with Medical Device)  Outcome: Progressing  Goal: Free from restraint(s) (Restraint for Interference with Medical Device)  Outcome: Progressing

## 2025-04-15 NOTE — PROGRESS NOTES
"Enmanuel Ahumada \"Petey" is a 69 y.o. male on day 10 of admission presenting with Wound dehiscence.    Subjective   No acute events overnight, Patient HR in the 90s stable. On room air. No concerns this AM.       Objective     Physical Exam  awake  Ox3  Ou3R  FS  TM  BUE 5/5  BLE 5/5    Last Recorded Vitals  Blood pressure 118/81, pulse 93, temperature 35.8 °C (96.4 °F), resp. rate 18, height 1.702 m (5' 7\"), weight 64.3 kg (141 lb 12.1 oz), SpO2 98%.  Intake/Output last 3 Shifts:  I/O last 3 completed shifts:  In: 850 (13.2 mL/kg) [P.O.:600; IV Piggyback:250]  Out: 700 (10.9 mL/kg) [Urine:700 (0.3 mL/kg/hr)]  Weight: 64.3 kg     Relevant Results  Results from last 72 hours   Lab Units 04/14/25  0331 04/13/25  0347   WBC AUTO x10*3/uL 4.9 4.3*   NRBC AUTO /100 WBCs 0.6* 0.0   RBC AUTO x10*6/uL 3.32* 3.00*   HEMOGLOBIN g/dL 10.7* 9.6*   HEMATOCRIT % 29.9* 27.8*   MCV fL 90 93   MCH pg 32.2 32.0   MCHC g/dL 35.8 34.5   RDW % 13.8 13.8   PLATELETS AUTO x10*3/uL 114* 121*      Results from last 72 hours   Lab Units 04/14/25  0352 04/14/25  0331 04/13/25  0347   GLUCOSE mg/dL  --  140* 168*   SODIUM mmol/L  --  136 136   POTASSIUM mmol/L  --  3.7 3.9   CHLORIDE mmol/L  --  102 102   CO2 mmol/L  --  23 26   ANION GAP mmol/L  --  15 12   BUN mg/dL  --  21 24*   CREATININE mg/dL  --  0.56 0.53   EGFR mL/min/1.73m*2  --  >90 >90   CALCIUM mg/dL  --  9.1 8.2*   PHOSPHORUS mg/dL  --  2.4* 2.0*   ALBUMIN g/dL  --  3.4 3.1*   MAGNESIUM mg/dL  --  2.13 2.25   POCT CALCIUM IONIZED (MMOL/L) IN BLOOD mmol/L 1.14  --  1.18          Assessment/Plan   Assessment & Plan  Wound dehiscence    69 M h/o HTN, HLD, smalls's esophagus, RO GBM s/p resection ([facility], 12/2023) with resultant L HH s/p chemo/RT presenting with tumor progression, 2/20 s/p redo craniotomy for tumor resection w 5 ALA, CTH POC, 2/21 MRI intended NTR, 3/16 MRI postop changes w fluid collection at dura and craniotomy site, worsening infiltrative disease, enhancement " along posteromedial aspect of resection site, 4/5 p/w 1wk incisional drainage, 4/4 CTH airfluid level in resection cavity, 4/5 MRI brain w/wo incr'd resection cavity enhancement and diffusion restriction  4/6 s/p R cranial wound washout and mesh cranioplasty (kaleb purulence), CTH POC, L temporal ICH, plts 81 s/p 2u plts, 4/7 rCTH incr SAH, min incr ICH, rrCTH stable, EEG neg dc'd  4/8 s/p PICC  4/9 CTH stable, SDD dc'd, EEG neg  4/10 SGD dc'd  4/11 DVT US x4, LLE distal femoral, popliteal, gastroc, peroneal, post tibial vv DVTs, RUE R brachial DVT, s/p IVC filter    4/13 CTH stable  4/14 PICC auto dc'd, agitation, CTH stable     Plan:  tele  ID recs - vanc until 6/15  Repeat Heparin Assay (last 0.8)  Continue hep gtt   Vasc med recs  SLP recs-  regular diet   Needs PICC replaced (ordered)  Wean restraints as able   PTOT- rehab    Dispo pending anticoagulation    Jj Hernandez MD

## 2025-04-15 NOTE — PROGRESS NOTES
"Enmanuel Ahumada \"Petey" is a 69 y.o. male on day 10 of admission.   Medical history significant for GERD, glioblastoma s/p right craniotomy for tumor resection 12/29/23, HLD, HTN and seizure disorder.   Patient admitted 4/5/25 with incisional drainage after undergoing repeat craniotomy and tumor resection 2/20/25, with findings of wound infection. Now s/p cranial washout and mesh cranioplasty 4/6/25.    Patient found to have left distal femoral, popliteal and calf DVT's, as well as right brachial thrombus around PICC line.    S/p IVC filter placement 4/11/25  as he was found to be unable to use anticoagulation therapy after undergoing recent brain surgery.  Started low intensity Heparin infusion 4/14/25, and lost IV access overnight.   Underwent placement of PICC line in IR 4/15/25, and Heparin infusion was restarted.   Plan for prolonged Vancomycin administration, with end date of 6/15/25.     Subjective   Patient reports that placement of the Midline catheter was uneventful.  Reports he feels well today.  Denies CP, SOB or bleeding.      Objective     Physical Exam  Sleeping in bed in NAD; spouse at bedside  Respirations full and regular with breath sounds CTA all anterior lung fields; on RA  Normal heart sounds with RRR; telemetry monitor shows sinus tachycardia without witnessed ectopy; vital signs are stable.   Abdomen soft and nontender to palpation.  External cathter in place with clear yellow urine.   Extremities are warm with palpable bilateral radial and DP pulses; PICC line in place left upper arm.  Patient opens eyes to name called and participates a little in conversation.     Last Recorded Vitals  Blood pressure 107/84, pulse 80, temperature 36.3 °C (97.3 °F), temperature source Temporal, resp. rate 16, height 1.702 m (5' 7\"), weight 64.3 kg (141 lb 12.1 oz), SpO2 100%.  Intake/Output last 3 Shifts:  I/O last 3 completed shifts:  In: 850 (13.2 mL/kg) [P.O.:600; IV Piggyback:250]  Out: 700 (10.9 " mL/kg) [Urine:700 (0.3 mL/kg/hr)]  Weight: 64.3 kg     Relevant Results  Scheduled medications  atorvastatin, 40 mg, nasogastric tube, Daily  cloNIDine, 0.2 mg, nasogastric tube, BID  dexAMETHasone, 2 mg, intravenous, q12h  insulin lispro, 0-5 Units, subcutaneous, q6h  lidocaine, 5 mL, infiltration, Once  lidocaine, 1 patch, transdermal, Daily  pantoprazole, 40 mg, intravenous, Daily  polyethylene glycol, 17 g, nasogastric tube, Daily  QUEtiapine, 25 mg, nasogastric tube, Nightly  sennosides-docusate sodium, 1 tablet, nasogastric tube, Nightly  valproate sodium, 250 mg, intravenous, q6h  vancomycin, 1,250 mg, intravenous, q12h      Continuous medications  heparin, 0-4,000 Units/hr, Last Rate: 800 Units/hr (04/15/25 1115)      Results from last 7 days   Lab Units 04/15/25  0708 04/14/25  0331 04/13/25  0347   WBC AUTO x10*3/uL 4.2* 4.9 4.3*   HEMOGLOBIN g/dL 10.7* 10.7* 9.6*   HEMATOCRIT % 31.8* 29.9* 27.8*   PLATELETS AUTO x10*3/uL 107* 114* 121*       Results from last 7 days   Lab Units 04/15/25  0708 04/14/25  0331 04/13/25  0347   SODIUM mmol/L 137 136 136   POTASSIUM mmol/L 3.7 3.7 3.9   CHLORIDE mmol/L 101 102 102   CO2 mmol/L 27 23 26   BUN mg/dL 20 21 24*   CREATININE mg/dL 0.66 0.56 0.53   GLUCOSE mg/dL 101* 140* 168*   CALCIUM mg/dL 8.8 9.1 8.2*   Estimated Creatinine Clearance: 98.8 mL/min (by C-G formula based on SCr of 0.66 mg/dL).    Platelets  Recent Labs     04/15/25  0708 04/14/25  0331 04/13/25  0347 04/12/25  0352 04/11/25  0218 04/10/25  0015 04/09/25  0112 04/08/25  1653 04/08/25  0137 04/07/25  0444 04/07/25  0015 04/06/25  1632 04/05/25  1421 03/17/25  0755   * 114* 121* 150 155 121* 132* 130* 90* 103* 97* 81* 96* 112*              Assessment/Plan   Assessment & Plan  Wound dehiscence  69 year old male with medical history as noted above.   Patient hospitalized 4/5/25 due to finding of incisional drainage and wound infection, after undergoing repeat craniotomy and tumor resection  2/20/25. Now s/p cranial washout and mesh cranioplasty 4/6/25.    Patient found to have left distal femoral, popliteal and calf DVT's, as well as right brachial thrombus around PICC line.    S/p IVC filter placement 4/11/25  as he was found to be unable to use anticoagulation therapy after undergoing recent brain surgery.  Review of labs shows stable hemoglobin 10.7 grams, stable platelets 107 K, and stable serum creatinine 0.66.    Started low intensity Heparin infusion 4/14/25.  Patient lost IV access when the PICC line was accidentally removed.   Underwent placement of left upper arm PICC line in Interventional Radiology today 4/15/25.  Low intensity Heparin restarted without bolus after PICC line was placed.   Plan to continue Vancomycin until 6/15/25.      Recommendations:  ~CONTINUE low intensity Heparin infusion for now; follow nomogram to achieve therapeutic assay 0.3-0.6  ~May consider transition to Lovenox 60mg q12 hours starting this evening at 8pm; stop Heparin infusion 1 hour after initial dose of Lovenox; continue Lovenox 60mg q12 hours for duration of therapy.   ~MONITOR for bleeding or neurologic changes  ~Daily labs: CBC, Heparin assay (if continues with Heparin infusion), RFP  ~Repeat CTH in 24 hours after Heparin infusion is therapeutic.  ~Home going anticoagulation therapy will be determined in the next few days;   ~I will coordinate outpatient follow up with the Vascular Medicine Service; will discuss removal of IVC filter at the time of follow up appointment.       Plan of care discussed with Dr. Fiorella Pisano  Plan of care discussed in person with Ms. Yun BECERRIL with the NSGY Team.        JERRICA Valles  Vascular Medicine Service   Pager [phone]

## 2025-04-15 NOTE — POST-PROCEDURE NOTE
Interventional Radiology Post-Procedure Note    Left basilic vein PICC line placement    Procedure Details:  Technically successful and uncomplicated Left basilic vein PICC line placement.  Please see PACS for full procedural details.    Patient Tolerance: good  Complications: None    Indication for procedure: The primary encounter diagnosis was Wound dehiscence. Diagnoses of Abnormal EKG, Edema, unspecified type, and Generalized edema were also pertinent to this visit.    Pre-Procedure Verification and Time Out:  · Procedure Location procedure area   · HUDDLE - Pre-procedure Verification completed   · TIME OUT - Final Verification completed immediately prior to procedure start   · DEBRIEF completed     General Information:  Date/Time of Procedure: 04/15/25 at 10:27 AM  Indication(s): long term abx  Findings: See PACS  Procedure performed by: Dario Martinez MD   Assistant(s): Dr. Tab Sanford MD  Estimated Blood Loss (mL): minimal  Specimen: No  Informed Consent: written consent obtained    Prep:  Ultrasound Guided Insertion: Yes  Large Drape, Hand Hygiene, Surgical Cap, Surgical Mask, Sterile Gown, Sterile Gloves, Glasses, and Scrubs  Patient Position: Supine  Site Prep: chlorhexidine, draped, usual sterile procedure followed    Anesthesia/Medications:  Procedural Sedation:  None    Dario Martinez MD, PGY-6  Interventional Radiology  IR pager: [phone]    NON-Urgent on call weekends and after hours weekdays (5pm - 5am) IR pager: [phone]  Urgent & emergent on call weekends and after hours weekdays (5pm-7am) IR pager: [phone]

## 2025-04-15 NOTE — PROGRESS NOTES
"Physical Therapy    Physical Therapy Treatment    Patient Name: Enmanuel Ahumada \"Dash\"  MRN: [id number]  Department: [facility] 2  Room: [address] [place], [place] [address]  Today's Date: 4/15/2025  Time Calculation  Start Time: 1421  Stop Time: 1500  Time Calculation (min): 39 min      Assessment/Plan   PT Assessment  PT Assessment Results: Decreased strength, Decreased endurance, Impaired balance, Decreased coordination, Decreased mobility, Decreased cognition, Impaired judgement, Decreased safety awareness  Rehab Prognosis: Good  Barriers to Discharge Home: Physical needs, Caregiver assistance  Caregiver Assistance: Caregiver assistance needed per identified barriers - however, level of patient's required assistance exceeds assistance available at home  Physical Needs: High falls risk due to function or environment  Evaluation/Treatment Tolerance: Patient tolerated treatment well  Medical Staff Made Aware: Yes  Strengths: Ability to acquire knowledge, Attitude of self, Premorbid level of function, Support and attitude of living partners  End of Session Communication: Bedside nurse  Assessment Comment: Pt improving to ambulate 3x15 ft with FWW, Min A progressing to CGA. Pt remains appropriate for high level intensity of continued care when medically ready for d/c. Pt would benefit from continued PT while in hosp to address identified deficits and facilitate return to PLOF.  End of Session Patient Position: Bed, 3 rail up, Alarm on  PT Plan  Inpatient/Swing Bed or Outpatient: Inpatient  PT Plan  Treatment/Interventions: Bed mobility, Transfer training, Gait training, Stair training, Balance training, Neuromuscular re-education, Strengthening, Endurance training, Range of motion, Therapeutic exercise, Therapeutic activity  PT Plan: Ongoing PT  PT Frequency: 5 times per week  PT Discharge Recommendations: High intensity level of continued care  Equipment Recommended upon Discharge:  (TBD at next level of care)  PT Recommended Transfer " Status: Assist x1, Assistive device  PT - OK to Discharge: Yes (Meaning pt has been evaluated and d/c reccs have been made)      General Visit Information:   PT  Visit  PT Received On: 04/15/25  Response to Previous Treatment: Patient with no complaints from previous session.  General  Reason for Referral: Pt initially presented with tumor progression (RO GBM), s/p redo craniotomy for tumor resection in 2/2025. 4/5 p/w 1wk incisional drainage, 4/4 CTH airfluid level in resection cavity, 4/5 MRI brain w/wo incr'd resection cavity enhancement and diffusion restriction 4/6 s/p R cranial wound washout and mesh cranioplasty. 4/7 rCTH incr SAH, min incr ICH, rrCTH stable, EEG neg dc'd.  Past Medical History Relevant to Rehab: HTN, HLD, smalls's esophagus, RO GBM s/p resection ([facility], 12/2023) with resultant L HH s/p chemo/RT  PT Missed Visit:  (-)  Missed Visit Reason:  (-)  Family/Caregiver Present: Yes  Caregiver Feedback: wife present and supportive throughout session  Prior to Session Communication: Bedside nurse  Patient Position Received: Bed, 3 rail up, Alarm on  General Comment: Supine. Pt pleasant, cooperative and agreeable to PT. Pt eager to mobilize. Pt able to ambulate 3x 15ft, min A progressing to CGA. Pt demonstrating excellent effort throughout session.    Subjective   Precautions:  Precautions  Hearing/Visual Limitations: per pt and wife report, pt with limited L visual field.  Medical Precautions: Fall precautions  Precautions Comment: SBP<160     Date/Time Vitals Session Patient Position Pulse Resp SpO2 BP MAP (mmHg)    04/15/25 1545 --  --  111  18  94 %  100/63  75           Vital Signs Comment: Pt reporting some dizziness/lightheadedness intially when standing up but resolves quickly. BP maintained stable throughout session, (102/70, 106/77, 101/76, after each bout of ambulation). Pt tachy during ambulation, but asymptomic (denying chest pain, racing heart) but resolving quickly with rest. RN notified  and aware.     Objective   Pain:  Pain Assessment  Pain Assessment: 0-10  0-10 (Numeric) Pain Score: 0 - No pain  Cognition:  Cognition  Arousal/Alertness: Delayed responses to stimuli  Orientation Level: Disoriented to time  Following Commands: Follows one step commands with repetition  Processing Speed: Delayed  Coordination:  Movements are Fluid and Coordinated: No  Finger to Nose: Other: (comment) (LUE dymsetric. RUE WFL)  Coordination Comment:  (LUE dymsetric. RUE WFL)  Postural Control:  Postural Control  Postural Control: Within Functional Limits  Posture Comment: WFL as demonstrated during functional activity  Extremity/Trunk Assessments:    RUE   RUE : Within Functional Limits  LUE   LUE:  (demonstrates some dysmetria with LUE. strength grossly 4/5)  RLE   RLE : Within Functional Limits  LLE   LLE : Within Functional Limits  Activity Tolerance:  Activity Tolerance  Endurance: Endurance does not limit participation in activity  Treatments:    Therapeutic Activity  Therapeutic Activity Performed: Yes  Therapeutic Activity 1: Sitting EOB, sitting in chair; AP, Knee ext, hip flex x10 b/l  Therapeutic Activity 2: standing marches x5 b/l    Bed Mobility  Bed Mobility: Yes  Bed Mobility 1  Bed Mobility 1: Supine to sitting  Level of Assistance 1: Minimum assistance  Bed Mobility Comments 1: HOB elevated. x1  Bed Mobility 2  Bed Mobility  2: Sitting to supine  Level of Assistance 2: Contact guard  Bed Mobility Comments 2: x1  Bed Mobility 3  Bed Mobility 3: Scooting (scooting EOB)  Level of Assistance 3: Minimum assistance    Ambulation/Gait Training  Ambulation/Gait Training Performed: Yes  Ambulation/Gait Training 1  Surface 1: Level tile  Device 1: Rolling walker  Assistance 1: Minimum assistance, Minimal verbal cues, Minimal tactile cues  Quality of Gait 1: Narrow base of support, Inconsistent stride length, Decreased step length, Diminished heel strike  Comments/Distance (ft) 1: 3 x 15ft (pt reporting some  lightheadedness after ambulation, resolved quickly when seated and doing knee ext x5 b/l. Pt tachy during amb, denying chest pain or heart racing, resolved quickly when seated. RN notified and aware.)  Transfers  Transfer: Yes  Transfer 1  Transfer From 1: Sit to, Stand to  Transfer to 1: Sit  Transfer Device 1: Walker  Transfer Level of Assistance 1: Minimum assistance, Minimal verbal cues, Minimal tactile cues (pt able to progress to CGA for 2 trials)  Trials/Comments 1: x5 trials. cues for sequencing    Stairs  Stairs: No    Outcome Measures:    [facility] Basic Mobility  Turning from your back to your side while in a flat bed without using bedrails: A little  Moving from lying on your back to sitting on the side of a flat bed without using bedrails: A little  Moving to and from bed to chair (including a wheelchair): A little  Standing up from a chair using your arms (e.g. wheelchair or bedside chair): A little  To walk in hospital room: A little  Climbing 3-5 steps with railing: Total  Basic Mobility - Total Score: 16    [place]etti  Sitting Balance: Steady, safe  Arises: Unable without help  Attempts to Arise: Able, requires more than one attempt  Immediate Standing Balance (First 5 Seconds): Steady but uses walker or other support  Standing Balance: Steady but wide stance, uses cane or other support  Nudged: Begins to fall  Eyes Closed: Unsteady  Turned 360 Degrees: Steadiness: Unsteady (Grabs, staggers)  Turned 360 Degrees: Continuity of Steps: Discontinuous steps  Sitting Down: Uses arms or not a smooth motion  Balance Score: 5  Initiation of Gait: No hesitancy  Step Height: R Swing Foot: Right foot complete clears floor  Step Length: R Swing Foot: Passes left stance foot  Step Height: L Swing Foot: Left foot complete clears floor  Step Length: L Swing Foot: Passes right stance foot  Step Symmetry: Right and left step length not equal (Estimate)  Step Continuity: Stopping or discontinuity between steps  Path:  Mild/moderate deviation or uses walking aid  Trunk: Marked sway or uses walking aid  Walking Time: Heels almost touching while walking  Gait Score: 7  Total Score: 12    Education Documentation  Body Mechanics, taught by DAVID Verde at 4/15/2025  5:01 PM.  Learner: Patient  Readiness: Eager  Method: Explanation  Response: Verbalizes Understanding  Comment: Role of PT, POC, mobility progression    Mobility Training, taught by DAVID Verde at 4/15/2025  5:01 PM.  Learner: Patient  Readiness: Eager  Method: Explanation  Response: Verbalizes Understanding  Comment: Role of PT, POC, mobility progression    Education Comments  No comments found.        OP EDUCATION:       Encounter Problems       Encounter Problems (Active)       PT Problem       Patient will perform bed mobility IND to reduce risk of developing decubitus ulcers.  (Progressing)       Start:  04/08/25    Expected End:  04/22/25            Patient will ambulate at least 150 ft. with </= close sup and LRD to improve tolerance of household distances.  (Progressing)       Start:  04/08/25    Expected End:  04/22/25            Patient will perform the 5-Time Sit to Stand test in <14 sec to indicate decreased falls risk. (Progressing)       Start:  04/08/25    Expected End:  04/22/25            Patient will perform sit to stand and stand to sit transfers with </= close sup and LRD to increase functional strength.  (Progressing)       Start:  04/08/25    Expected End:  04/22/25            BLE 5/5 (Progressing)       Start:  04/08/25    Expected End:  04/22/25               Pain - Adult

## 2025-04-15 NOTE — PRE-PROCEDURE NOTE
"INTERVENTIONAL RADIOLOGY PRE-PROCEDURE NOTE    Enmanuel Ahumada \"Dash\" is a 69 y.o. male with PMHx of cranial incisional wound infection who presents to the interventional radiology department for PICC line placement.    Procedure: PICC line placement    Indication for procedure: The primary encounter diagnosis was Wound dehiscence. Diagnoses of Abnormal EKG, Edema, unspecified type, and Generalized edema were also pertinent to this visit.    Past Medical History:   Diagnosis Date    Brain cancer (Multi)     GERD (gastroesophageal reflux disease)     Hyperlipidemia     Hypertension     Seizure disorder (Multi)       Past Surgical History:   Procedure Laterality Date    BRAIN SURGERY      CHOLECYSTECTOMY  January 25, 2020    OTHER SURGICAL HISTORY  07/24/2019    Colonoscopy    OTHER SURGICAL HISTORY  07/26/2021    Endoscopy    VASECTOMY  Abt 1991       Relevant Labs:   Lab Results   Component Value Date    CREATININE 0.66 04/15/2025    EGFR >90 04/15/2025    INR 1.1 04/14/2025    PROTIME 12.3 04/14/2025       Planned Sedation/Anesthesia: Moderate    Directed physical examination:    General: No acute distress  Heart: Heart regular rate and rhythm  Lungs: No increased work of breathing  Abdomen: soft and nontender  Psych: disoriented      Current Facility-Administered Medications:     alteplase (Cathflo Activase) injection 2 mg, 2 mg, intra-catheter, PRN, Guillermina Camarena MD    atorvastatin (Lipitor) tablet 40 mg, 40 mg, nasogastric tube, Daily, ALEXA Cano-CNP, 40 mg at 04/14/25 2105    bisacodyl (Dulcolax) suppository 10 mg, 10 mg, rectal, Daily PRN, JERRICA Cano    calcium gluconate 1 g in sodium chloride (iso) IV 50 mL, 1 g, intravenous, q6h PRN, ALEXA Reynoso-CNP, Stopped at 04/10/25 0558    calcium gluconate 2 g in sodium chloride (iso)  mL, 2 g, intravenous, q6h PRN, ALEXA Reynoso-CNP    cloNIDine (Catapres) tablet 0.2 mg, 0.2 mg, nasogastric tube, BID, " JERRICA Cano, 0.2 mg at 04/15/25 0836    cyclobenzaprine (Flexeril) tablet 10 mg, 10 mg, oral, TID PRN, Guillermina Camarena MD, 10 mg at 04/12/25 1209    dexAMETHasone (Decadron) injection 2 mg, 2 mg, intravenous, q12h, Arnoldo Acuna MD, 2 mg at 04/15/25 0836    dextrose 50 % injection 12.5 g, 12.5 g, intravenous, q15 min PRN, JERRICA Cano    dextrose 50 % injection 25 g, 25 g, intravenous, q15 min PRN, JERRICA Cano    glucagon (Glucagen) injection 1 mg, 1 mg, intramuscular, q15 min PRN, JERRICA Cano    glucagon (Glucagen) injection 1 mg, 1 mg, intramuscular, q15 min PRN, JERRICA Cano    heparin 25,000 Units in dextrose 5% 250 mL (100 Units/mL) infusion (premix), 0-4,000 Units/hr, intravenous, Continuous, JERRICA Rosas, Stopped at 04/14/25 1934    heparin bolus from bag 1,500-3,000 Units, 1,500-3,000 Units, intravenous, q4h PRN, JERRICA Rosas    HYDROmorphone (Dilaudid) injection 0.2 mg, 0.2 mg, intravenous, q3h PRN, Arnoldo Acuna MD, 0.2 mg at 04/06/25 1426    hydrOXYzine HCL (Atarax) tablet 10 mg, 10 mg, nasogastric tube, q6h PRN, JERRICA Cano, 10 mg at 04/13/25 0834    insulin lispro injection 0-5 Units, 0-5 Units, subcutaneous, q6h, JERRICA Rosas, 2 Units at 04/14/25 1303    lidocaine (Xylocaine) 10 mg/mL (1 %) injection 5 mL, 5 mL, infiltration, Once, Guillermina Camarena MD    lidocaine 4 % patch 1 patch, 1 patch, transdermal, Daily, Guillermina Camarena MD, 1 patch at 04/13/25 0833    magnesium sulfate 2 g in sterile water for injection 50 mL, 2 g, intravenous, q6h PRN, JERRICA Reynoso    magnesium sulfate 4 g in sterile water for injection 100 mL, 4 g, intravenous, q6h PRN, ALEXA Reynoso-CNP    ondansetron (Zofran) tablet 4 mg, 4 mg, nasogastric tube, q8h PRN **OR** ondansetron (Zofran) injection 4 mg, 4 mg, intravenous, q8h PRN, JERRICA Cano    oxygen (O2) therapy,  , inhalation, Continuous PRN - O2/gases, Arnoldo Acuna MD    pantoprazole (Protonix) injection 40 mg, 40 mg, intravenous, Daily, JERRICA Cano, 40 mg at 04/15/25 0836    polyethylene glycol (Glycolax, Miralax) packet 17 g, 17 g, nasogastric tube, Daily, JERRICA Cano, 17 g at 04/13/25 0834    potassium chloride 20 mEq in sterile water for injection 100 mL, 20 mEq, intravenous, q6h PRN, JERRICA Reynoso, Stopped at 04/11/25 0740    QUEtiapine (SEROquel) tablet 25 mg, 25 mg, nasogastric tube, Nightly, Arcelia Chester MD, 25 mg at 04/14/25 2105    sennosides-docusate sodium (Carissa-Colace) 8.6-50 mg per tablet 1 tablet, 1 tablet, nasogastric tube, Nightly, JERRICA Cano, 1 tablet at 04/12/25 2137    valproate (Depacon) 250 mg in dextrose 5% 50 mL IV, 250 mg, intravenous, q6h, JERRICA Cnao, Stopped at 04/15/25 0452    vancomycin (Vancocin) 1,250 mg in sodium chloride 0.9%  mL, 1,250 mg, intravenous, q12h, Arnoldo Acuna MD, Last Rate: 200 mL/hr at 04/15/25 0836, 1,250 mg at 04/15/25 0836    vancomycin (Vancocin) pharmacy to dose - pharmacy monitoring, , miscellaneous, Daily PRN, Arnoldo Acuna MD     Mallampati: II (hard and soft palate, upper portion of tonsils anduvula visible)    ASA Score: ASA 3 - Patient with moderate systemic disease with functional limitations    Benefits, risks and alternatives of procedure and planned sedation have been discussed with the patient and/or their representative. All questions answered and they agree to proceed.     Dario Martinez MD, PGY-6  Vascular & Interventional Radiology  IR pager: [phone]    NON-Urgent on call weekends and after hours weekdays (5pm - 5am) IR pager: [phone]  Urgent & emergent on call weekends and after hours weekdays (5pm-7am) IR pager: [phone]

## 2025-04-16 ENCOUNTER — APPOINTMENT (OUTPATIENT)
Dept: RADIOLOGY | Facility: HOSPITAL | Age: 70
DRG: 907 | End: 2025-04-16
Payer: MEDICARE

## 2025-04-16 ENCOUNTER — APPOINTMENT (OUTPATIENT)
Dept: CARDIOLOGY | Facility: HOSPITAL | Age: 70
End: 2025-04-16
Payer: MEDICARE

## 2025-04-16 LAB
ALBUMIN SERPL BCP-MCNC: 3.5 G/DL (ref 3.4–5)
ANION GAP SERPL CALC-SCNC: 16 MMOL/L (ref 10–20)
ATRIAL RATE: 126 BPM
BASOPHILS # BLD MANUAL: 0 X10*3/UL (ref 0–0.1)
BASOPHILS NFR BLD MANUAL: 0 %
BUN SERPL-MCNC: 27 MG/DL (ref 6–23)
BURR CELLS BLD QL SMEAR: ABNORMAL
CALCIUM SERPL-MCNC: 8.7 MG/DL (ref 8.6–10.6)
CHLORIDE SERPL-SCNC: 104 MMOL/L (ref 98–107)
CO2 SERPL-SCNC: 24 MMOL/L (ref 21–32)
CREAT SERPL-MCNC: 0.7 MG/DL (ref 0.5–1.3)
EGFRCR SERPLBLD CKD-EPI 2021: >90 ML/MIN/1.73M*2
EOSINOPHIL # BLD MANUAL: 0 X10*3/UL (ref 0–0.7)
EOSINOPHIL NFR BLD MANUAL: 0 %
ERYTHROCYTE [DISTWIDTH] IN BLOOD BY AUTOMATED COUNT: 14.7 % (ref 11.5–14.5)
GLUCOSE BLD MANUAL STRIP-MCNC: 116 MG/DL (ref 74–99)
GLUCOSE BLD MANUAL STRIP-MCNC: 127 MG/DL (ref 74–99)
GLUCOSE BLD MANUAL STRIP-MCNC: 136 MG/DL (ref 74–99)
GLUCOSE BLD MANUAL STRIP-MCNC: 144 MG/DL (ref 74–99)
GLUCOSE BLD MANUAL STRIP-MCNC: 145 MG/DL (ref 74–99)
GLUCOSE BLD MANUAL STRIP-MCNC: 201 MG/DL (ref 74–99)
GLUCOSE SERPL-MCNC: 114 MG/DL (ref 74–99)
HCT VFR BLD AUTO: 33.2 % (ref 41–52)
HGB BLD-MCNC: 11 G/DL (ref 13.5–17.5)
IMM GRANULOCYTES # BLD AUTO: 0.2 X10*3/UL (ref 0–0.7)
IMM GRANULOCYTES NFR BLD AUTO: 5.3 % (ref 0–0.9)
LYMPHOCYTES # BLD MANUAL: 0.66 X10*3/UL (ref 1.2–4.8)
LYMPHOCYTES NFR BLD MANUAL: 17.4 %
MAGNESIUM SERPL-MCNC: 2.22 MG/DL (ref 1.6–2.4)
MCH RBC QN AUTO: 32.5 PG (ref 26–34)
MCHC RBC AUTO-ENTMCNC: 33.1 G/DL (ref 32–36)
MCV RBC AUTO: 98 FL (ref 80–100)
MONOCYTES # BLD MANUAL: 0.3 X10*3/UL (ref 0.1–1)
MONOCYTES NFR BLD MANUAL: 7.8 %
MYELOCYTES # BLD MANUAL: 0.1 X10*3/UL
MYELOCYTES NFR BLD MANUAL: 2.6 %
NEUTS SEG # BLD MANUAL: 2.74 X10*3/UL (ref 1.2–7)
NEUTS SEG NFR BLD MANUAL: 72.2 %
NRBC BLD-RTO: 1.9 /100 WBCS (ref 0–0)
OVALOCYTES BLD QL SMEAR: ABNORMAL
P AXIS: 23 DEGREES
P OFFSET: 209 MS
P ONSET: 161 MS
PHOSPHATE SERPL-MCNC: 3.1 MG/DL (ref 2.5–4.9)
PLATELET # BLD AUTO: 89 X10*3/UL (ref 150–450)
POTASSIUM SERPL-SCNC: 4.1 MMOL/L (ref 3.5–5.3)
PR INTERVAL: 132 MS
Q ONSET: 227 MS
QRS COUNT: 21 BEATS
QRS DURATION: 82 MS
QT INTERVAL: 310 MS
QTC CALCULATION(BAZETT): 448 MS
QTC FREDERICIA: 397 MS
R AXIS: -28 DEGREES
RBC # BLD AUTO: 3.38 X10*6/UL (ref 4.5–5.9)
RBC MORPH BLD: ABNORMAL
SODIUM SERPL-SCNC: 140 MMOL/L (ref 136–145)
T AXIS: 31 DEGREES
T OFFSET: 382 MS
TOTAL CELLS COUNTED BLD: 115
VENTRICULAR RATE: 126 BPM
WBC # BLD AUTO: 3.8 X10*3/UL (ref 4.4–11.3)

## 2025-04-16 PROCEDURE — 2500000001 HC RX 250 WO HCPCS SELF ADMINISTERED DRUGS (ALT 637 FOR MEDICARE OP)

## 2025-04-16 PROCEDURE — 99232 SBSQ HOSP IP/OBS MODERATE 35: CPT | Performed by: NURSE PRACTITIONER

## 2025-04-16 PROCEDURE — 93010 ELECTROCARDIOGRAM REPORT: CPT | Performed by: INTERNAL MEDICINE

## 2025-04-16 PROCEDURE — 2500000004 HC RX 250 GENERAL PHARMACY W/ HCPCS (ALT 636 FOR OP/ED)

## 2025-04-16 PROCEDURE — 2500000004 HC RX 250 GENERAL PHARMACY W/ HCPCS (ALT 636 FOR OP/ED): Mod: JZ

## 2025-04-16 PROCEDURE — 85027 COMPLETE CBC AUTOMATED: CPT | Performed by: NURSE PRACTITIONER

## 2025-04-16 PROCEDURE — 1200000002 HC GENERAL ROOM WITH TELEMETRY DAILY

## 2025-04-16 PROCEDURE — 2500000004 HC RX 250 GENERAL PHARMACY W/ HCPCS (ALT 636 FOR OP/ED): Mod: JZ | Performed by: NURSE PRACTITIONER

## 2025-04-16 PROCEDURE — 97530 THERAPEUTIC ACTIVITIES: CPT | Mod: GP

## 2025-04-16 PROCEDURE — 70450 CT HEAD/BRAIN W/O DYE: CPT

## 2025-04-16 PROCEDURE — 85007 BL SMEAR W/DIFF WBC COUNT: CPT | Performed by: NURSE PRACTITIONER

## 2025-04-16 PROCEDURE — 83735 ASSAY OF MAGNESIUM: CPT | Performed by: NURSE PRACTITIONER

## 2025-04-16 PROCEDURE — 70450 CT HEAD/BRAIN W/O DYE: CPT | Performed by: RADIOLOGY

## 2025-04-16 PROCEDURE — 93005 ELECTROCARDIOGRAM TRACING: CPT

## 2025-04-16 PROCEDURE — 2500000005 HC RX 250 GENERAL PHARMACY W/O HCPCS

## 2025-04-16 PROCEDURE — 82947 ASSAY GLUCOSE BLOOD QUANT: CPT

## 2025-04-16 PROCEDURE — 97116 GAIT TRAINING THERAPY: CPT | Mod: GP

## 2025-04-16 PROCEDURE — 80069 RENAL FUNCTION PANEL: CPT

## 2025-04-16 PROCEDURE — 2500000002 HC RX 250 W HCPCS SELF ADMINISTERED DRUGS (ALT 637 FOR MEDICARE OP, ALT 636 FOR OP/ED)

## 2025-04-16 RX ADMIN — DEXAMETHASONE SODIUM PHOSPHATE 2 MG: 4 INJECTION, SOLUTION INTRA-ARTICULAR; INTRALESIONAL; INTRAMUSCULAR; INTRAVENOUS; SOFT TISSUE at 08:33

## 2025-04-16 RX ADMIN — CLONIDINE HYDROCHLORIDE 0.2 MG: 0.1 TABLET ORAL at 08:33

## 2025-04-16 RX ADMIN — VALPROATE SODIUM 250 MG: 100 INJECTION, SOLUTION INTRAVENOUS at 06:06

## 2025-04-16 RX ADMIN — DEXAMETHASONE SODIUM PHOSPHATE 2 MG: 4 INJECTION, SOLUTION INTRA-ARTICULAR; INTRALESIONAL; INTRAMUSCULAR; INTRAVENOUS; SOFT TISSUE at 20:59

## 2025-04-16 RX ADMIN — VANCOMYCIN HYDROCHLORIDE 1250 MG: 1.25 INJECTION, POWDER, LYOPHILIZED, FOR SOLUTION INTRAVENOUS at 20:59

## 2025-04-16 RX ADMIN — VANCOMYCIN HYDROCHLORIDE 1250 MG: 1.25 INJECTION, POWDER, LYOPHILIZED, FOR SOLUTION INTRAVENOUS at 09:43

## 2025-04-16 RX ADMIN — CLONIDINE HYDROCHLORIDE 0.2 MG: 0.1 TABLET ORAL at 20:59

## 2025-04-16 RX ADMIN — PANTOPRAZOLE SODIUM 40 MG: 40 INJECTION, POWDER, FOR SOLUTION INTRAVENOUS at 08:33

## 2025-04-16 RX ADMIN — POLYETHYLENE GLYCOL 3350 17 G: 17 POWDER, FOR SOLUTION ORAL at 08:33

## 2025-04-16 RX ADMIN — ENOXAPARIN SODIUM 60 MG: 100 INJECTION SUBCUTANEOUS at 08:34

## 2025-04-16 RX ADMIN — QUETIAPINE FUMARATE 25 MG: 25 TABLET ORAL at 20:59

## 2025-04-16 RX ADMIN — ATORVASTATIN CALCIUM 40 MG: 40 TABLET, FILM COATED ORAL at 20:59

## 2025-04-16 RX ADMIN — VALPROATE SODIUM 250 MG: 100 INJECTION, SOLUTION INTRAVENOUS at 17:53

## 2025-04-16 RX ADMIN — APIXABAN 5 MG: 5 TABLET, FILM COATED ORAL at 20:59

## 2025-04-16 RX ADMIN — VALPROATE SODIUM 250 MG: 100 INJECTION, SOLUTION INTRAVENOUS at 12:12

## 2025-04-16 ASSESSMENT — PAIN SCALES - GENERAL
PAINLEVEL_OUTOF10: 0 - NO PAIN
PAINLEVEL_OUTOF10: 0 - NO PAIN

## 2025-04-16 ASSESSMENT — COGNITIVE AND FUNCTIONAL STATUS - GENERAL
MOVING TO AND FROM BED TO CHAIR: A LITTLE
MOVING FROM LYING ON BACK TO SITTING ON SIDE OF FLAT BED WITH BEDRAILS: A LITTLE
WALKING IN HOSPITAL ROOM: A LITTLE
MOBILITY SCORE: 16
STANDING UP FROM CHAIR USING ARMS: A LITTLE
CLIMB 3 TO 5 STEPS WITH RAILING: TOTAL
TURNING FROM BACK TO SIDE WHILE IN FLAT BAD: A LITTLE

## 2025-04-16 ASSESSMENT — PAIN - FUNCTIONAL ASSESSMENT: PAIN_FUNCTIONAL_ASSESSMENT: 0-10

## 2025-04-16 NOTE — PROGRESS NOTES
"Enmanuel Ahumada \"Petey" is a 69 y.o. male on day 11 of admission presenting with Wound dehiscence.    Subjective   No acute events overnight      Objective     Physical Exam  awake  Ox3  Ou3R  FS  TM  BUE 5/5  BLE 5/5    Last Recorded Vitals  Blood pressure 122/80, pulse 102, temperature 36.2 °C (97.2 °F), temperature source Temporal, resp. rate 18, height 1.702 m (5' 7\"), weight 64.3 kg (141 lb 12.1 oz), SpO2 99%.  Intake/Output last 3 Shifts:  I/O last 3 completed shifts:  In: - (0 mL/kg)   Out: 900 (14 mL/kg) [Urine:900 (0.4 mL/kg/hr)]  Weight: 64.3 kg     Relevant Results  Results from last 72 hours   Lab Units 04/15/25  0708 04/14/25  0331   WBC AUTO x10*3/uL 4.2* 4.9   NRBC AUTO /100 WBCs 2.4* 0.6*   RBC AUTO x10*6/uL 3.36* 3.32*   HEMOGLOBIN g/dL 10.7* 10.7*   HEMATOCRIT % 31.8* 29.9*   MCV fL 95 90   MCH pg 31.8 32.2   MCHC g/dL 33.6 35.8   RDW % 14.4 13.8   PLATELETS AUTO x10*3/uL 107* 114*      Results from last 72 hours   Lab Units 04/15/25  0708 04/14/25  0352 04/14/25  0331   GLUCOSE mg/dL 101*  --  140*   SODIUM mmol/L 137  --  136   POTASSIUM mmol/L 3.7  --  3.7   CHLORIDE mmol/L 101  --  102   CO2 mmol/L 27  --  23   ANION GAP mmol/L 13  --  15   BUN mg/dL 20  --  21   CREATININE mg/dL 0.66  --  0.56   EGFR mL/min/1.73m*2 >90  --  >90   CALCIUM mg/dL 8.8  --  9.1   PHOSPHORUS mg/dL 3.7  --  2.4*   ALBUMIN g/dL 3.4  --  3.4   MAGNESIUM mg/dL 2.20  --  2.13   POCT CALCIUM IONIZED (MMOL/L) IN BLOOD mmol/L 1.25 1.14  --           Assessment/Plan   Assessment & Plan  Wound dehiscence    69 M h/o HTN, HLD, smalls's esophagus, RO GBM s/p resection ([facility], 12/2023) with resultant L HH s/p chemo/RT presenting with tumor progression, 2/20 s/p redo craniotomy for tumor resection w 5 ALA, CTH POC, 2/21 MRI intended NTR, 3/16 MRI postop changes w fluid collection at dura and craniotomy site, worsening infiltrative disease, enhancement along posteromedial aspect of resection site, 4/5 p/w 1wk incisional " drainage, 4/4 CTH airfluid level in resection cavity, 4/5 MRI brain w/wo incr'd resection cavity enhancement and diffusion restriction  4/6 s/p R cranial wound washout and mesh cranioplasty (kaleb purulence), CTH POC, L temporal ICH, plts 81 s/p 2u plts, 4/7 rCTH incr SAH, min incr ICH, rrCTH stable, EEG neg dc'd  4/8 s/p PICC  4/9 CTH stable, SDD dc'd, EEG neg  4/10 SGD dc'd  4/11 DVT US x4, LLE distal femoral, popliteal, gastroc, peroneal, post tibial vv DVTs, RUE R brachial DVT, s/p IVC filter    4/13 CTH stable  4/14 PICC auto dc'd, agitation, CTH stable   4/15 s/p PICC    Plan:  Tele (q8 neuro checks overnight)  CT Head at 10AM  ID recs - vanc until 6/15  Continue LVX  VPA 250q6h  Vasc med recs  Dex 2q12  SLP recs-  regular diet   Wean restraints as able   Continue home meds   PTOT- rehab    Dispo pending weaning of restraints and agitation    Jj Hernandez MD

## 2025-04-16 NOTE — PROGRESS NOTES
Vancomycin Dosing by Pharmacy  FOLLOW UP    Enmanuel Ahumada is a 69 y.o. male who Pharmacy is consulted to dose vancomycin for CNS/meningoencephalitis.     Based on the patient's indication and renal status, this patient is being dosed based on a goal vancomycin AUC of 500-600.     Current vancomycin dose: 1250 mg every 12 hours    Estimated vancomycin AUC on current dose: 505 mg/L.hr    Renal function is currently stable.    Estimated Creatinine Clearance: 98.8 mL/min (by C-G formula based on SCr of 0.66 mg/dL).    Results from last 7 days   Lab Units 04/15/25  0708 04/14/25  0331 04/13/25  0347 04/12/25  0352   CREATININE mg/dL 0.66 0.56 0.53 0.60   BUN mg/dL 20 21 24* 21   WBC AUTO x10*3/uL 4.2* 4.9 4.3* 5.2        Visit Vitals  /70   Pulse (!) 116   Temp 36.4 °C (97.5 °F) (Temporal)   Resp 18       Staph/MRSA Screen Culture   Date/Time Value Ref Range Status   04/08/2025 08:26 AM No Staphylococcus aureus isolated  Final     Gram Stain   Date/Time Value Ref Range Status   04/06/2025 10:07 AM (4+) Abundant Polymorphonuclear leukocytes (A)  Final   04/06/2025 10:07 AM (4+) Abundant Gram positive cocci (A)  Final     Blood Culture   Date/Time Value Ref Range Status   04/05/2025 12:34 PM No growth at 4 days -  FINAL REPORT  Final   04/05/2025 12:34 PM No growth at 4 days -  FINAL REPORT  Final     Tissue/Wound Culture/Smear   Date/Time Value Ref Range Status   04/06/2025 10:07 AM (4+) Abundant Staphylococcus epidermidis (A)  Final        Susceptibility data from last 90 days.  Collected Specimen Info Organism Clindamycin Erythromycin Oxacillin Tetracycline Trimethoprim/Sulfamethoxazole Vancomycin   04/06/25 Swab from BRAIN BIOPSY Staphylococcus epidermidis  S  S  R  S  R  S   04/06/25 Swab from BRAIN BIOPSY Staphylococcus epidermidis         04/06/25 Swab from BRAIN BIOPSY Staphylococcus epidermidis             Assessment/Plan    AUC is within goal range. Continue current vancomycin regimen. This dosing  regimen is predicted by [employer] to result in the following pharmacokinetic parameters:     Regimen: 1250 mg IV every 12 hours.  Start time: 08:25 on 04/16/2025  Exposure target: AUC24 (range)400-600 mg/L.hr   DOD96-90: 545 mg/L.hr  AUC24,ss: 547 mg/L.hr  Probability of AUC24 > 400: 100 %  Ctrough,ss: 13.2 mg/L  Probability of Ctrough,ss > 20: 0 %    The next vancomycin level will be ordered for 4/18 AM labs, unless clinically indicated sooner.  Will continue to monitor renal function daily while on vancomycin and order serum creatinine at least every 48 hours if not already ordered.  Will follow for continued vancomycin needs, clinical response, and signs/symptoms of toxicity.     Karen Carlos, [facility]

## 2025-04-16 NOTE — ASSESSMENT & PLAN NOTE
69 year old male with medical history as noted above.   Hospital course as described above.   Patient with left distal femoral, popliteal and calf DVT's, as well as right brachial thrombus around PICC line.    Review of labs shows stable hemoglobin 11.1 grams, decreased platelets from 107 K to 89 K without overt signs of bleeding, and stable serum creatinine 0.70.    Platelets continue to fluctuate, with down-trend from 150-89 K over the past 4 days. Would like to transition to Eliquis 5mg q12 hours if NSGY Team is in agreement (Keppra has been discontinued).  If platelets continue to decrease, will send PF4.    Continues with weight based Lovenox 60mg q12 hours.   Underwent CTH today for follow up after therapeutic anticoagulation initiated, with results showing stable post surgical changes.   Patient given Lovenox Med Alert band.      Recommendations:  ~CONTINUE Lovenox 60mg q12 hours as you are doing; anticipate need for 3-6 months of anticoagulation for provoked LLE DVT.    ~If NSGY Team is in agreement, would recommend transition to Eliquis 5mg q12 hours this evening when next dose of Lovenox is due (Stop Lovenox dosing).   ~MONITOR for bleeding, neurologic changes or further platelet decrease   ~Daily labs: CBC, Heparin assay (if continues with Heparin infusion), RFP  ~If there is further decrease in platelets, will need to send PF4 to rule out HIT.   ~When patient transferred to SNF, will need twice weekly CBC to ensure that platelets are stable while he continues with Vancomycin therapy.   ~Outpatient follow up with Dr. Zee Hardy from the Vascular Medicine Service has been scheduled as a virtual visit for 5/6/25 at 11:30 am; will discuss removal of IVC filter at the time of follow up appointment.

## 2025-04-16 NOTE — PROGRESS NOTES
"Enmanuel Ahumada \"Petey" is a 69 y.o. male on day 11 of admission.   Medical history significant for GERD, glioblastoma s/p right craniotomy for tumor resection 12/29/23, HLD, HTN and seizure disorder.   Patient admitted 4/5/25 with incisional drainage after undergoing repeat craniotomy and tumor resection 2/20/25, with findings of wound infection. Now s/p cranial washout and mesh cranioplasty 4/6/25.    Patient found to have left distal femoral, popliteal and calf DVT's, as well as right brachial thrombus around PICC line.    S/p IVC filter placement 4/11/25  as he was found to be unable to use anticoagulation therapy after undergoing recent brain surgery.  Started low intensity Heparin infusion 4/14/25, and lost IV access overnight.   Underwent placement of PICC line in IR 4/15/25; plan for prolonged Vancomycin administration, with end date of 6/15/25.   Transitioned from Heparin infusion to weight based Lovenox 60mg q12 hours 4/15/25.     Subjective   Patient asleep.  Opens eyes to name called but does not wish to engage in conversation.      Objective     Physical Exam  Sleeping in bed in NAD; spouse at bedside  Head incision well approximated with sutures intact, and without evidence of drainage.   Respirations full and regular with breath sounds CTA all anterior lung fields; on RA  Normal heart sounds with RRR; vital signs are stable.   Abdomen soft and nontender to palpation.  Extremities are warm with palpable bilateral radial and DP pulses; PICC line in place left upper arm; soft restraints on bilateral wrists, but not attached to the bed as restraints.   Patient opens eyes to name called and falls back to sleep quickly. Does not participate in conversation.     Last Recorded Vitals  Blood pressure 110/75, pulse 97, temperature 36.2 °C (97.2 °F), temperature source Temporal, resp. rate 18, height 1.702 m (5' 7\"), weight 64.3 kg (141 lb 12.1 oz), SpO2 97%.  Intake/Output last 3 Shifts:  I/O last 3 " completed shifts:  In: - (0 mL/kg)   Out: 900 (14 mL/kg) [Urine:900 (0.4 mL/kg/hr)]  Weight: 64.3 kg     Relevant Results  Results from last 7 days   Lab Units 04/16/25  0720 04/15/25  0708 04/14/25  0331   WBC AUTO x10*3/uL 3.8* 4.2* 4.9   HEMOGLOBIN g/dL 11.0* 10.7* 10.7*   HEMATOCRIT % 33.2* 31.8* 29.9*   PLATELETS AUTO x10*3/uL 89* 107* 114*       Results from last 7 days   Lab Units 04/16/25  0720 04/15/25  0708 04/14/25  0331   SODIUM mmol/L 140 137 136   POTASSIUM mmol/L 4.1 3.7 3.7   CHLORIDE mmol/L 104 101 102   CO2 mmol/L 24 27 23   BUN mg/dL 27* 20 21   CREATININE mg/dL 0.70 0.66 0.56   GLUCOSE mg/dL 114* 101* 140*   CALCIUM mg/dL 8.7 8.8 9.1   Estimated Creatinine Clearance: 93.1 mL/min (by C-G formula based on SCr of 0.7 mg/dL).    Thrombocytopenia  Recent Labs     04/16/25  0720 04/15/25  0708 04/14/25  0331 04/13/25  0347 04/12/25  0352 04/11/25  0218 04/10/25  0015 04/09/25  0112 04/08/25  1653 04/08/25  0137 04/07/25  0444 04/07/25  0015 04/06/25  1632 04/05/25  1421   PLT 89* 107* 114* 121* 150 155 121* 132* 130* 90* 103* 97* 81* 96*        CT HEAD WO IV CONTRAST;  4/16/2025 10:21 am  INDICATION:  Signs/Symptoms:F/U after therapeutic anticoagulation.  IMPRESSION:  1. Stable to slightly increased intraparenchymal hematoma involving the left temporal lobe without significant change in adjacent mass effect. No new intraparenchymal or extra-axial hemorrhage identified.  2. Stable postsurgical changes of right parieto-occipital craniectomy with mesh closure.       Assessment & Plan  Wound dehiscence  69 year old male with medical history as noted above.   Hospital course as described above.   Patient with left distal femoral, popliteal and calf DVT's, as well as right brachial thrombus around PICC line.    Review of labs shows stable hemoglobin 11.1 grams, decreased platelets from 107 K to 89 K without overt signs of bleeding, and stable serum creatinine 0.70.    Platelets continue to fluctuate, with  down-trend from 150-89 K over the past 4 days. Would like to transition to Eliquis 5mg q12 hours if NSGY Team is in agreement (Keppra has been discontinued).  If platelets continue to decrease, will send PF4.    Continues with weight based Lovenox 60mg q12 hours.   Underwent CTH today for follow up after therapeutic anticoagulation initiated, with results showing stable post surgical changes.   Patient given Lovenox Med Alert band.      Recommendations:  ~CONTINUE Lovenox 60mg q12 hours as you are doing; anticipate need for 3-6 months of anticoagulation for provoked LLE DVT.    ~If NSGY Team is in agreement, would recommend transition to Eliquis 5mg q12 hours this evening when next dose of Lovenox is due (Stop Lovenox dosing).   ~MONITOR for bleeding, neurologic changes or further platelet decrease   ~Daily labs: CBC, Heparin assay (if continues with Heparin infusion), RFP  ~If there is further decrease in platelets, will need to send PF4 to rule out HIT.   ~When patient transferred to SNF, will need twice weekly CBC to ensure that platelets are stable while he continues with Vancomycin therapy.   ~Outpatient follow up with Dr. Zee Hardy from the Vascular Medicine Service has been scheduled as a virtual visit for 5/6/25 at 11:30 am; will discuss removal of IVC filter at the time of follow up appointment.     Plan of care discussed with Dr. Fiorella Pisano  Plan of care discussed in person and via EPIC chat with Ms. Yun BECERRIL from the NSGY Team    JERRICA Valles  Vascular Medicine Service   Pager [phone]

## 2025-04-16 NOTE — CARE PLAN
The patient's goals for the shift include free from fall    The clinical goals for the shift include pt will be free of injury through 4/15    Problem: Fall/Injury  Goal: Be free from injury by end of the shift  Outcome: Progressing  Goal: Verbalize understanding of personal risk factors for fall in the hospital  Outcome: Progressing  Goal: Verbalize understanding of risk factor reduction measures to prevent injury from fall in the home  Outcome: Progressing  Goal: Use assistive devices by end of the shift  Outcome: Progressing  Goal: Pace activities to prevent fatigue by end of the shift  Outcome: Progressing     Problem: Skin  Goal: Decreased wound size/increased tissue granulation at next dressing change  Outcome: Progressing  Goal: Participates in plan/prevention/treatment measures  Outcome: Progressing  Goal: Prevent/manage excess moisture  Outcome: Progressing  Goal: Promote/optimize nutrition  Outcome: Progressing  Goal: Promote skin healing  Outcome: Progressing     Problem: Discharge Planning  Goal: Discharge to home or other facility with appropriate resources  Outcome: Progressing     Problem: Chronic Conditions and Co-morbidities  Goal: Patient's chronic conditions and co-morbidity symptoms are monitored and maintained or improved  Outcome: Progressing     Problem: Safety - Medical Restraint  Goal: Remains free of injury from restraints (Restraint for Interference with Medical Device)  Outcome: Progressing  Goal: Free from restraint(s) (Restraint for Interference with Medical Device)  Outcome: Progressing

## 2025-04-16 NOTE — PROGRESS NOTES
"Physical Therapy    Physical Therapy Treatment    Patient Name: Enmanuel Ahumada \"Dash\"  MRN: [id number]  Department: [place] [address]  [facility]  Today's Date: 4/16/2025  Time Calculation  Start Time: 1520  Stop Time: 1556  Time Calculation (min): 36 min    Assessment/Plan   PT Assessment  PT Assessment Results: Decreased strength, Decreased endurance, Impaired balance, Decreased coordination, Decreased mobility, Decreased cognition, Impaired judgement, Decreased safety awareness  Rehab Prognosis: Good  Barriers to Discharge Home: Physical needs, Caregiver assistance  Caregiver Assistance: Caregiver assistance needed per identified barriers - however, level of patient's required assistance exceeds assistance available at home  Physical Needs: High falls risk due to function or environment  Evaluation/Treatment Tolerance: Patient tolerated treatment well  Medical Staff Made Aware: Yes  Strengths: Ability to acquire knowledge, Attitude of self, Support and attitude of living partners  End of Session Communication: Bedside nurse (pt became tachy post amb. RN notified; Pt denying chest pain throughout session. RN and SPT assisted pt back into bed. Pt remained asymtpomatic throughout the session. RN attending to pt after session.)  Assessment Comment: Pt able to ambulate 30ft with FWW, Min A progressing to CGA.. Session limited 2/2 pt becoming tachy after ambulation, pt denying chest pain or feeling like heart is pounding throughout session. RN alerted and attending to pt after session. Pt demonstrating excellent effort throughout session. Remains appropriate for high level intensity level of continued care when medically ready for d/c.  End of Session Patient Position: Bed, 3 rail up, Alarm on  PT Plan  Inpatient/Swing Bed or Outpatient: Inpatient  PT Plan  Treatment/Interventions: Bed mobility, Transfer training, Gait training, Stair training, Balance training, Strengthening, Endurance training, Range of motion, " Therapeutic exercise, Therapeutic activity  PT Plan: Ongoing PT  PT Frequency: 5 times per week  PT Discharge Recommendations: High intensity level of continued care  Equipment Recommended upon Discharge:  (TBD at next level of care)  PT Recommended Transfer Status: Assist x1, Assistive device  PT - OK to Discharge: Yes (Meaning pt has been evaluated and d/c reccs have been made)    General Visit Information:   PT  Visit  PT Received On: 04/16/25  Response to Previous Treatment: Patient with no complaints from previous session.  General  Reason for Referral: Pt initially presented with tumor progression (RO GBM), s/p redo craniotomy for tumor resection in 2/2025. 4/5 p/w 1wk incisional drainage, 4/4 CTH airfluid level in resection cavity, 4/5 MRI brain w/wo incr'd resection cavity enhancement and diffusion restriction 4/6 s/p R cranial wound washout and mesh cranioplasty. 4/7 rCTH incr SAH, min incr ICH, rrCTH stable, EEG neg dc'd.  Past Medical History Relevant to Rehab: HTN, HLD, smalls's esophagus, RO GBM s/p resection (SAH, 12/2023) with resultant L HH s/p chemo/RT  PT Missed Visit:  (-)  Missed Visit Reason:  (-)  Family/Caregiver Present: Yes  Caregiver Feedback: wife present and supportive throughout session  Co-Treatment:  (-)  Prior to Session Communication: Bedside nurse  Patient Position Received: Bed, 3 rail up, Alarm on  General Comment: Seated in chair. Pt pleasant, agreeable and cooperative to PT. Pt eager to mobilize. Pt able to ambulate 30ft Min A, able to progress to CGA. Session limited 2/2 pt becoming tachy after ambulation, pt denying chest pain or feeling like heart is pounding throughout session. RN alerted and attending to pt after session.    Subjective   Precautions:        Date/Time Vitals Session Patient Position Pulse Resp SpO2 BP MAP (mmHg)    04/16/25 1520 During PT  --  115  --  --  113/76  --     04/16/25 1550 --  --  134  --  --  108/80  --     04/16/25 1552 --  --  153  --  --   106/79  --     04/16/25 1556 --  --  128  20  98 %  137/95  --     04/16/25 1625 --  --  113  18  100 %  108/68  81           Vital Signs Comment: HR seated in chair, /76, . Pt ambulated 30ft, pt reported feeling some lightheadedness while ambulating. Seated rest break. BP seated in chair 108/80, . RN alerted and attending to pt with SPT; HR maintained in 140s while sitting in chair, increased to 150s in standing. Pt denying chest pain throughout session. RN and SPT assisted pt back into bed. Pt remained asymtpomatic throughout the session. RN attending to pt after session.     Objective   Pain:     Cognition:  Cognition  Arousal/Alertness: Delayed responses to stimuli  Orientation Level: Disoriented to situation  Following Commands: Follows one step commands with repetition  Coordination:  Movements are Fluid and Coordinated: No  Finger to Nose: Other: (comment) (LUE dysmetric, demonstrates overshoot. RUE intact.)  Postural Control:  Postural Control  Postural Control: Within Functional Limits  Posture Comment: WFL as demonstrated during functional activity  Static Sitting Balance  Static Sitting-Balance Support: Feet supported, No upper extremity supported  Static Sitting-Level of Assistance: Distant supervision  Dynamic Sitting Balance  Dynamic Sitting-Balance Support: Feet supported  Dynamic Sitting-Level of Assistance: Close supervision  Dynamic Sitting-Balance: Lateral lean, Forward lean  Static Standing Balance  Static Standing-Comment/Number of Minutes: pt standing with RN and SPT, taking vitals after pt becoming tachy post ambulation. HR maintained in 140s while sitting in chair, increased to 150s in standing. Pt denying chest pain throughout session. RN and SPT assisted pt back into bed. Pt remained asymtpomatic throughout the session. RN attending to pt after session.  Extremity/Trunk Assessments:    RUE   RUE : Within Functional Limits  LUE   LUE:  (demonstrates some dysmetria with LUE.  strength grossly 4/5)  RLE   RLE : Within Functional Limits  LLE   LLE : Within Functional Limits  Activity Tolerance:  Activity Tolerance  Endurance: Other (Comment) (pt limited 2/2 becoming tachy post ambulation, RN notified and attending to pt after session.)  Treatments:    Therapeutic Activity  Therapeutic Activity Performed: Yes  Therapeutic Activity 1: sitting in chair; Knee ext, hip flex x10 b/l    Bed Mobility  Bed Mobility: Yes  Bed Mobility 1  Bed Mobility 1: Sitting to supine  Level of Assistance 1: Contact guard  Bed Mobility Comments 1: HOB slightly elevated. assist for equipment and VCs for sequencing    Ambulation/Gait Training  Ambulation/Gait Training Performed: Yes  Ambulation/Gait Training 1  Surface 1: Level tile  Device 1: Rolling walker  Assistance 1: Minimum assistance, Contact guard  Comments/Distance (ft) 1: 30ft. Min A, able to progress to CGA. Ambulation limited 2/2 pt becoming tachy after ambulation, pt denying chest pain or feeling like heart is pounding throughout session. RN alerted and attending to pt after session.  Transfers  Transfer: Yes  Transfer 1  Transfer From 1: Sit to, Stand to  Transfer to 1: Sit  Technique 1: Sit to stand, Stand to sit  Transfer Device 1: Walker  Transfer Level of Assistance 1: Minimum assistance, Contact guard  Trials/Comments 1: x2 trials. 1 trial Min A, 1 trial CGA.    Stairs  Stairs: No  Outcome Measures:    [facility] Basic Mobility  Turning from your back to your side while in a flat bed without using bedrails: A little  Moving from lying on your back to sitting on the side of a flat bed without using bedrails: A little  Moving to and from bed to chair (including a wheelchair): A little  Standing up from a chair using your arms (e.g. wheelchair or bedside chair): A little  To walk in hospital room: A little  Climbing 3-5 steps with railing: Total  Basic Mobility - Total Score: 16    [place]  Sitting Balance: Steady, safe  Arises: Able, uses arms to  help  Attempts to Arise: Able, requires more than one attempt  Immediate Standing Balance (First 5 Seconds): Steady but uses walker or other support  Standing Balance: Steady but wide stance, uses cane or other support  Nudged: Begins to fall  Eyes Closed: Unsteady  Turned 360 Degrees: Steadiness: Unsteady (Grabs, staggers)  Turned 360 Degrees: Continuity of Steps: Discontinuous steps  Sitting Down: Uses arms or not a smooth motion  Balance Score: 6  Initiation of Gait: No hesitancy  Step Height: R Swing Foot: Right foot complete clears floor  Step Length: R Swing Foot: Passes left stance foot  Step Height: L Swing Foot: Left foot complete clears floor  Step Length: L Swing Foot: Passes right stance foot  Step Symmetry: Right and left step length not equal (Estimate)  Step Continuity: Steps appear continuous  Path: Mild/moderate deviation or uses walking aid  Trunk: Marked sway or uses walking aid  Walking Time: Heels almost touching while walking  Gait Score: 8  Total Score: 14    Education Documentation  Body Mechanics, taught by DAVID Verde at 4/16/2025  4:33 PM.  Learner: Patient  Readiness: Eager  Method: Explanation  Response: Verbalizes Understanding  Comment: Role of PT, POC    Precautions, taught by DAVID Verde at 4/16/2025  4:33 PM.  Learner: Patient  Readiness: Eager  Method: Explanation  Response: Verbalizes Understanding  Comment: Role of PT, POC    Mobility Training, taught by DAVID Verde at 4/16/2025  4:33 PM.  Learner: Patient  Readiness: Eager  Method: Explanation  Response: Verbalizes Understanding  Comment: Role of PT, POC    Education Comments  No comments found.        OP EDUCATION:       Encounter Problems       Encounter Problems (Active)       PT Problem       Patient will perform bed mobility IND to reduce risk of developing decubitus ulcers.  (Progressing)       Start:  04/08/25    Expected End:  04/22/25            Patient will ambulate at least 150 ft. with </= close  sup and LRD to improve tolerance of household distances.  (Progressing)       Start:  04/08/25    Expected End:  04/22/25            Patient will perform the 5-Time Sit to Stand test in <14 sec to indicate decreased falls risk. (Progressing)       Start:  04/08/25    Expected End:  04/22/25            Patient will perform sit to stand and stand to sit transfers with </= close sup and LRD to increase functional strength.  (Progressing)       Start:  04/08/25    Expected End:  04/22/25            BLE 5/5 (Progressing)       Start:  04/08/25    Expected End:  04/22/25               Pain - Adult

## 2025-04-17 ENCOUNTER — APPOINTMENT (OUTPATIENT)
Dept: CARDIOLOGY | Facility: HOSPITAL | Age: 70
End: 2025-04-17
Payer: MEDICARE

## 2025-04-17 LAB
ALBUMIN SERPL BCP-MCNC: 3.3 G/DL (ref 3.4–5)
ANION GAP SERPL CALC-SCNC: 12 MMOL/L (ref 10–20)
ATRIAL RATE: 111 BPM
ATRIAL RATE: 86 BPM
ATRIAL RATE: 98 BPM
BASOPHILS # BLD AUTO: 0.02 X10*3/UL (ref 0–0.1)
BASOPHILS NFR BLD AUTO: 0.5 %
BUN SERPL-MCNC: 21 MG/DL (ref 6–23)
CALCIUM SERPL-MCNC: 8.6 MG/DL (ref 8.6–10.6)
CHLORIDE SERPL-SCNC: 103 MMOL/L (ref 98–107)
CO2 SERPL-SCNC: 26 MMOL/L (ref 21–32)
CREAT SERPL-MCNC: 0.55 MG/DL (ref 0.5–1.3)
EGFRCR SERPLBLD CKD-EPI 2021: >90 ML/MIN/1.73M*2
EOSINOPHIL # BLD AUTO: 0 X10*3/UL (ref 0–0.7)
EOSINOPHIL NFR BLD AUTO: 0 %
ERYTHROCYTE [DISTWIDTH] IN BLOOD BY AUTOMATED COUNT: 15 % (ref 11.5–14.5)
GLUCOSE BLD MANUAL STRIP-MCNC: 107 MG/DL (ref 74–99)
GLUCOSE BLD MANUAL STRIP-MCNC: 120 MG/DL (ref 74–99)
GLUCOSE BLD MANUAL STRIP-MCNC: 152 MG/DL (ref 74–99)
GLUCOSE BLD MANUAL STRIP-MCNC: 224 MG/DL (ref 74–99)
GLUCOSE SERPL-MCNC: 113 MG/DL (ref 74–99)
HCT VFR BLD AUTO: 29 % (ref 41–52)
HGB BLD-MCNC: 9.9 G/DL (ref 13.5–17.5)
IMM GRANULOCYTES # BLD AUTO: 0.15 X10*3/UL (ref 0–0.7)
IMM GRANULOCYTES NFR BLD AUTO: 4.1 % (ref 0–0.9)
LYMPHOCYTES # BLD AUTO: 0.51 X10*3/UL (ref 1.2–4.8)
LYMPHOCYTES NFR BLD AUTO: 13.9 %
MAGNESIUM SERPL-MCNC: 1.91 MG/DL (ref 1.6–2.4)
MCH RBC QN AUTO: 32.6 PG (ref 26–34)
MCHC RBC AUTO-ENTMCNC: 34.1 G/DL (ref 32–36)
MCV RBC AUTO: 95 FL (ref 80–100)
MONOCYTES # BLD AUTO: 0.44 X10*3/UL (ref 0.1–1)
MONOCYTES NFR BLD AUTO: 12 %
NEUTROPHILS # BLD AUTO: 2.56 X10*3/UL (ref 1.2–7.7)
NEUTROPHILS NFR BLD AUTO: 69.5 %
NRBC BLD-RTO: 0.8 /100 WBCS (ref 0–0)
P AXIS: 31 DEGREES
P AXIS: 55 DEGREES
P AXIS: 57 DEGREES
P OFFSET: 194 MS
P OFFSET: 198 MS
P OFFSET: 198 MS
P ONSET: 146 MS
P ONSET: 148 MS
P ONSET: 148 MS
PHOSPHATE SERPL-MCNC: 3.6 MG/DL (ref 2.5–4.9)
PLATELET # BLD AUTO: 75 X10*3/UL (ref 150–450)
POTASSIUM SERPL-SCNC: 4.1 MMOL/L (ref 3.5–5.3)
PR INTERVAL: 130 MS
PR INTERVAL: 134 MS
PR INTERVAL: 138 MS
Q ONSET: 213 MS
Q ONSET: 215 MS
Q ONSET: 215 MS
QRS COUNT: 14 BEATS
QRS COUNT: 16 BEATS
QRS COUNT: 18 BEATS
QRS DURATION: 80 MS
QRS DURATION: 84 MS
QRS DURATION: 84 MS
QT INTERVAL: 328 MS
QT INTERVAL: 352 MS
QT INTERVAL: 378 MS
QTC CALCULATION(BAZETT): 446 MS
QTC CALCULATION(BAZETT): 449 MS
QTC CALCULATION(BAZETT): 452 MS
QTC FREDERICIA: 402 MS
QTC FREDERICIA: 414 MS
QTC FREDERICIA: 426 MS
R AXIS: -13 DEGREES
R AXIS: -15 DEGREES
R AXIS: 0 DEGREES
RBC # BLD AUTO: 3.04 X10*6/UL (ref 4.5–5.9)
SODIUM SERPL-SCNC: 137 MMOL/L (ref 136–145)
T AXIS: 32 DEGREES
T AXIS: 32 DEGREES
T AXIS: 40 DEGREES
T OFFSET: 379 MS
T OFFSET: 389 MS
T OFFSET: 404 MS
VENTRICULAR RATE: 111 BPM
VENTRICULAR RATE: 86 BPM
VENTRICULAR RATE: 98 BPM
WBC # BLD AUTO: 3.7 X10*3/UL (ref 4.4–11.3)

## 2025-04-17 PROCEDURE — 93010 ELECTROCARDIOGRAM REPORT: CPT | Performed by: INTERNAL MEDICINE

## 2025-04-17 PROCEDURE — 2500000001 HC RX 250 WO HCPCS SELF ADMINISTERED DRUGS (ALT 637 FOR MEDICARE OP)

## 2025-04-17 PROCEDURE — 2500000004 HC RX 250 GENERAL PHARMACY W/ HCPCS (ALT 636 FOR OP/ED): Mod: JW

## 2025-04-17 PROCEDURE — 92507 TX SP LANG VOICE COMM INDIV: CPT | Mod: GN

## 2025-04-17 PROCEDURE — 97530 THERAPEUTIC ACTIVITIES: CPT | Mod: GO

## 2025-04-17 PROCEDURE — 97530 THERAPEUTIC ACTIVITIES: CPT | Mod: GP

## 2025-04-17 PROCEDURE — 2500000002 HC RX 250 W HCPCS SELF ADMINISTERED DRUGS (ALT 637 FOR MEDICARE OP, ALT 636 FOR OP/ED): Performed by: NURSE PRACTITIONER

## 2025-04-17 PROCEDURE — 86022 PLATELET ANTIBODIES: CPT

## 2025-04-17 PROCEDURE — 97116 GAIT TRAINING THERAPY: CPT | Mod: GP

## 2025-04-17 PROCEDURE — 2500000004 HC RX 250 GENERAL PHARMACY W/ HCPCS (ALT 636 FOR OP/ED): Mod: JZ

## 2025-04-17 PROCEDURE — 2500000005 HC RX 250 GENERAL PHARMACY W/O HCPCS

## 2025-04-17 PROCEDURE — 83735 ASSAY OF MAGNESIUM: CPT | Performed by: NURSE PRACTITIONER

## 2025-04-17 PROCEDURE — 85025 COMPLETE CBC W/AUTO DIFF WBC: CPT | Performed by: NURSE PRACTITIONER

## 2025-04-17 PROCEDURE — 97535 SELF CARE MNGMENT TRAINING: CPT | Mod: GO

## 2025-04-17 PROCEDURE — 93005 ELECTROCARDIOGRAM TRACING: CPT

## 2025-04-17 PROCEDURE — 82947 ASSAY GLUCOSE BLOOD QUANT: CPT

## 2025-04-17 PROCEDURE — 80069 RENAL FUNCTION PANEL: CPT

## 2025-04-17 PROCEDURE — 92526 ORAL FUNCTION THERAPY: CPT | Mod: GN

## 2025-04-17 PROCEDURE — 99232 SBSQ HOSP IP/OBS MODERATE 35: CPT | Performed by: NURSE PRACTITIONER

## 2025-04-17 PROCEDURE — 2500000002 HC RX 250 W HCPCS SELF ADMINISTERED DRUGS (ALT 637 FOR MEDICARE OP, ALT 636 FOR OP/ED)

## 2025-04-17 PROCEDURE — 92523 SPEECH SOUND LANG COMPREHEN: CPT | Mod: GN

## 2025-04-17 PROCEDURE — 1200000002 HC GENERAL ROOM WITH TELEMETRY DAILY

## 2025-04-17 RX ADMIN — ATORVASTATIN CALCIUM 40 MG: 40 TABLET, FILM COATED ORAL at 20:21

## 2025-04-17 RX ADMIN — QUETIAPINE FUMARATE 25 MG: 25 TABLET ORAL at 20:21

## 2025-04-17 RX ADMIN — VALPROATE SODIUM 250 MG: 100 INJECTION, SOLUTION INTRAVENOUS at 18:36

## 2025-04-17 RX ADMIN — APIXABAN 5 MG: 5 TABLET, FILM COATED ORAL at 08:30

## 2025-04-17 RX ADMIN — DEXAMETHASONE SODIUM PHOSPHATE 2 MG: 4 INJECTION, SOLUTION INTRA-ARTICULAR; INTRALESIONAL; INTRAMUSCULAR; INTRAVENOUS; SOFT TISSUE at 20:21

## 2025-04-17 RX ADMIN — VALPROATE SODIUM 250 MG: 100 INJECTION, SOLUTION INTRAVENOUS at 12:19

## 2025-04-17 RX ADMIN — VANCOMYCIN HYDROCHLORIDE 1250 MG: 1.25 INJECTION, POWDER, LYOPHILIZED, FOR SOLUTION INTRAVENOUS at 12:21

## 2025-04-17 RX ADMIN — CLONIDINE HYDROCHLORIDE 0.2 MG: 0.1 TABLET ORAL at 20:20

## 2025-04-17 RX ADMIN — INSULIN LISPRO 1 UNITS: 100 INJECTION, SOLUTION INTRAVENOUS; SUBCUTANEOUS at 18:36

## 2025-04-17 RX ADMIN — INSULIN LISPRO 2 UNITS: 100 INJECTION, SOLUTION INTRAVENOUS; SUBCUTANEOUS at 12:38

## 2025-04-17 RX ADMIN — CLONIDINE HYDROCHLORIDE 0.2 MG: 0.1 TABLET ORAL at 08:33

## 2025-04-17 RX ADMIN — DEXAMETHASONE SODIUM PHOSPHATE 2 MG: 4 INJECTION, SOLUTION INTRA-ARTICULAR; INTRALESIONAL; INTRAMUSCULAR; INTRAVENOUS; SOFT TISSUE at 08:30

## 2025-04-17 RX ADMIN — APIXABAN 5 MG: 5 TABLET, FILM COATED ORAL at 20:21

## 2025-04-17 RX ADMIN — VALPROATE SODIUM 250 MG: 100 INJECTION, SOLUTION INTRAVENOUS at 01:05

## 2025-04-17 RX ADMIN — PANTOPRAZOLE SODIUM 40 MG: 40 INJECTION, POWDER, FOR SOLUTION INTRAVENOUS at 08:30

## 2025-04-17 RX ADMIN — VALPROATE SODIUM 250 MG: 100 INJECTION, SOLUTION INTRAVENOUS at 06:11

## 2025-04-17 ASSESSMENT — COGNITIVE AND FUNCTIONAL STATUS - GENERAL
STANDING UP FROM CHAIR USING ARMS: A LITTLE
MOVING TO AND FROM BED TO CHAIR: A LITTLE
WALKING IN HOSPITAL ROOM: A LITTLE
DRESSING REGULAR LOWER BODY CLOTHING: A LITTLE
DRESSING REGULAR UPPER BODY CLOTHING: A LITTLE
HELP NEEDED FOR BATHING: A LOT
CLIMB 3 TO 5 STEPS WITH RAILING: TOTAL
MOVING FROM LYING ON BACK TO SITTING ON SIDE OF FLAT BED WITH BEDRAILS: A LITTLE
PERSONAL GROOMING: A LITTLE
TOILETING: A LOT
MOBILITY SCORE: 16
TURNING FROM BACK TO SIDE WHILE IN FLAT BAD: A LITTLE
DAILY ACTIVITIY SCORE: 16
EATING MEALS: A LITTLE

## 2025-04-17 ASSESSMENT — ACTIVITIES OF DAILY LIVING (ADL): HOME_MANAGEMENT_TIME_ENTRY: 15

## 2025-04-17 ASSESSMENT — PAIN SCALES - GENERAL
PAINLEVEL_OUTOF10: 0 - NO PAIN

## 2025-04-17 ASSESSMENT — PAIN - FUNCTIONAL ASSESSMENT
PAIN_FUNCTIONAL_ASSESSMENT: 0-10

## 2025-04-17 NOTE — PROGRESS NOTES
"Occupational Therapy    Occupational Therapy Treatment    Name: Enmanuel Ahumada \"Dash\"  MRN: [id number]  : 1955  Date: 25  [facility]      Time Calculation  Start Time: 1046  Stop Time: 1120  Time Calculation (min): 34 min    Assessment:  OT Assessment: Pt tolerated session well this day demonstrating good effort during ADL tasks and balance training. Pt limited by impaired cognition requiring cues for task guidance, safety awareness, task initiation, and sequencing. Pt did fatigue after ambulation trials and standing trials but was able to recover with breathing strategies and seated rest. Pt remains appropriate for HIGH intensity OT services.  Prognosis: Good  Barriers to Discharge Home: Caregiver assistance, Cognition needs, Physical needs  Caregiver Assistance: Caregiver assistance needed per identified barriers - however, level of patient's required assistance exceeds assistance available at home  Cognition Needs: Insight of patient limited regarding functional ability/needs, Cognition-related high falls risk  Physical Needs: Intermittent ADL assistance needed, 24hr mobility assistance needed  Evaluation/Treatment Tolerance: Patient tolerated treatment well  Medical Staff Made Aware: Yes  End of Session Communication: Bedside nurse  End of Session Patient Position: Bed, 3 rail up, Alarm on  Plan:  Treatment Interventions: ADL retraining, Functional transfer training, UE strengthening/ROM, Endurance training, Cognitive reorientation, Equipment evaluation/education, Compensatory technique education, Neuromuscular reeducation  OT Frequency: 4 times per week  OT Discharge Recommendations: High intensity level of continued care  Equipment Recommended upon Discharge:  (TBD at next level of care)  OT Recommended Transfer Status: Assist of 1  OT - OK to Discharge: Yes    Subjective   General:  OT Last Visit  OT Received On: 25  Reason for Referral: Pt initially presented with tumor " progression (RO GBM), s/p redo craniotomy for tumor resection in 2/2025. 4/5 p/w 1wk incisional drainage, 4/4 CTH airfluid level in resection cavity, 4/5 MRI brain w/wo incr'd resection cavity enhancement and diffusion restriction 4/6 s/p R cranial wound washout and mesh cranioplasty. 4/7 rCTH incr SAH, min incr ICH, rrCTH stable, EEG neg dc'd.  Past Medical History Relevant to Rehab: HTN, HLD, smalls's esophagus, RO GBM s/p resection ([facility], 12/2023) with resultant L HH s/p chemo/RT  Prior to Session Communication: Bedside nurse  Patient Position Received: Bed, 3 rail up, Alarm off, caregiver present  Family/Caregiver Present: Yes  Caregiver Feedback: wife present and supportive throughout session  General Comment: Pt supine in bed with RN and wife present. Pleasant and agreeable to OT tx. Pt reporting grooming tasks made him feel better overall.   Precautions:  Hearing/Visual Limitations: per pt and wife report, pt with limited L visual field.  Medical Precautions: Fall precautions  Precautions Comment: SBP<160    Lines/Tubes/Drains:  PICC - Adult 04/15/25 Left Basilic vein (Active)   Number of days: 2       External Urinary Catheter Male (Active)   Number of days: 1       Cognition:  Overall Cognitive Status: Impaired  Arousal/Alertness: Delayed responses to stimuli  Orientation Level: Disoriented to time (unsure of month (reported Dec. and Feb.) but oriented to year)  Following Commands: Follows one step commands with repetition  Safety Judgment: Decreased awareness of need for safety precautions  Problem Solving: Assistance required to identify errors made  Cognition Comments: Pt with delayed processing and reduced problem solving skills and did require PRN cues throughout session for guidance and to correct errors; decreased safety awareness when using FWW but likely 2/2 reported L visual field deficits; confused at times  Attention: Exceptions to WFL  Sustained Attention: Impaired (cues to redirect PRN during  ADLs)  Insight: Mild  Impulsive: Within functional limits  Processing Speed: Delayed    Pain Assessment:  Pain Assessment  Pain Assessment: 0-10  0-10 (Numeric) Pain Score: 0 - No pain  Response to Interventions: No change in pain     Objective   Activities of Daily Living:      Grooming  Grooming Comments: Pt completed prolonged grooming tasks of oral care, face washing, hand washing while standing at sink with CGA and intermittent UE support on sink or FWW; required increased time to problem solve and process tasks and did require PRN VCs throughout for guidance such as container management, sequencing, safety; completed grooming tasks over x2 standing trials due to requiring rest for fatigue management- able to stand for 3-5 minutes at a time         LE Dressing  LE Dressing: Yes  LE Dressing Comments: Pt able to doff and don socks while EOB using figure four technique with bed support and SBA for safety and strategy       Functional Standing Tolerance:  Functional Mobility  Functional Mobility Performed: Yes  Functional Mobility 1  Comments 1: Pt ambulated MIN household distance with CGA, FWW, and VCs for safety; required cueing to maintain safety when managing FWW  Bed Mobility/Transfers:   Bed Mobility  Bed Mobility: Yes  Bed Mobility 1  Bed Mobility 1: Supine to sitting, Sitting to supine  Level of Assistance 1: Contact guard, Minimal verbal cues  Bed Mobility Comments 1: increased time allowed to problem solve difficulty with UB management but required Min VCs for strategy- use of rails, improved positioning  Bed Mobility 2  Bed Mobility  2: Scooting (boost to HOB)  Level of Assistance 2: Minimal verbal cues, Close supervision, Minimal tactile cues  Bed Mobility Comments 2: VCs for strategy  Transfers  Transfer: Yes  Transfer 1  Transfer From 1: Bed to, Stand to  Transfer to 1: Stand, Bed  Technique 1: Sit to stand, Stand to sit  Transfer Device 1: Walker  Transfer Level of Assistance 1: Contact guard,  Moderate verbal cues  Trials/Comments 1: VCs for hand placement and FWW safety as well as improved positioning  Transfers 2  Transfer From 2: Stand to, Chair with arms to  Transfer to 2: Chair with arms, Stand  Technique 2: Stand to sit, Sit to stand  Transfer Device 2: Walker  Transfer Level of Assistance 2: Contact guard, Minimum assistance, Minimal verbal cues  Trials/Comments 2: x2 trials; #1 with CGA and VCs for safety, #2 with Dana for initial boost; VCs for FWW safety, rest between trials    Balance:  Dynamic Sitting Balance  Dynamic Sitting-Balance Support: Feet supported  Dynamic Sitting-Level of Assistance: Close supervision, Contact guard  Dynamic Sitting-Balance: Reaching for objects, Forward lean, Lateral lean (figure four technique, LB dressing)  Dynamic Sitting-Comments: Pt demo'd good balance overall during EOB sitting during LB dressing, and when leaning and reaching in chair with arms between grooming tasks  Dynamic Standing Balance  Dynamic Standing-Balance Support: Bilateral upper extremity supported, No upper extremity supported (0-2 UE support)  Dynamic Standing-Level of Assistance: Contact guard  Dynamic Standing-Balance: Forward lean, Reaching for objects, Turning, Lateral lean  Dynamic Standing-Comments: CGA and FWW to maintain safety; stood at sink during task management x2 trials for ~3-5 minutes at a time with rest between trials for fatigue management; able to complete dynamic activities during grooming like reaching for objects, leaning forward to drink water from faucet, wash face while unsupported as well as turning with FWW but did require cues and assist to manage FWW safely  Static Sitting Balance  Static Sitting-Balance Support: Feet supported  Static Sitting-Level of Assistance: Close supervision, Contact guard  Static Standing Balance  Static Standing-Balance Support: Bilateral upper extremity supported, No upper extremity supported (0-2 UE support)  Static Standing-Level of  Assistance: Contact guard  Static Standing-Comment/Number of Minutes: CGA and FWW to maintain safety; able to stand at sink with and without UE support during grooming tasks and resting    Therapy/Activity:      Therapeutic Activity  Therapeutic Activity Performed: Yes  Therapeutic Activity 1: Functional mobility and transfer training as noted requiring skilled assistance and cueing for safety and training  Therapeutic Activity 2: Balance training as noted requiring skilled assistance/supervision for safety and training  Therapeutic Activity 3: Educated on energy conservation strategies, fatigue scale, and SOB recovery breathing techniques       Outcome Measures:  [facility] Daily Activity  Putting on and taking off regular lower body clothing: A little  Bathing (including washing, rinsing, drying): A lot  Putting on and taking off regular upper body clothing: A little  Toileting, which includes using toilet, bedpan or urinal: A lot  Taking care of personal grooming such as brushing teeth: A little  Eating Meals: A little  Daily Activity - Total Score: 16     Education Documentation  Body Mechanics, taught by Iftikhar Jacobs OT at 4/17/2025 12:51 PM.  Learner: Patient  Readiness: Acceptance  Method: Explanation, Demonstration  Response: Verbalizes Understanding, Demonstrated Understanding  Comment: mobility training, ADL training, fatigue scale, energy conservations strategies, breathing technique for SOB recovery, OT POC, rehab expectations    Precautions, taught by Iftikhar Jacobs OT at 4/17/2025 12:51 PM.  Learner: Patient  Readiness: Acceptance  Method: Explanation, Demonstration  Response: Verbalizes Understanding, Demonstrated Understanding  Comment: mobility training, ADL training, fatigue scale, energy conservations strategies, breathing technique for SOB recovery, OT POC, rehab expectations    ADL Training, taught by Iftikhar Jacobs OT at 4/17/2025 12:51 PM.  Learner: Patient  Readiness:  Acceptance  Method: Explanation, Demonstration  Response: Verbalizes Understanding, Demonstrated Understanding  Comment: mobility training, ADL training, fatigue scale, energy conservations strategies, breathing technique for SOB recovery, OT POC, rehab expectations    Education Comments  No comments found.        Goals:  Encounter Problems       Encounter Problems (Active)       ADLs       Patient will complete grooming tasks with CGA in order to maximize functional Indep with task completion.  (Progressing)       Start:  04/08/25    Expected End:  04/22/25            Patient will complete lower body dressing with  MIN A for donning and doffing all LE clothes in order to increase Indep with task participation.  (Progressing)       Start:  04/08/25    Expected End:  04/22/25            Patient will complete toileting, including clothing management and hygiene, with MIN A in order to maximize functional Indep with task completion.  (Progressing)       Start:  04/08/25    Expected End:  04/22/25               BALANCE       Pt will increase static/dynamic sit/stand to Good to increase safety and indep with functional task completion.   (Progressing)       Start:  04/08/25    Expected End:  04/22/25               COGNITION/SAFETY       Pt will demo good safety awareness during functional task completion with no more than min vc in order to maximize occupational performance.   (Progressing)       Start:  04/08/25    Expected End:  04/22/25               EXERCISE/STRENGTHENING       Pt will increase endurance to tolerate 15-20min of activity with no more than 1 rest break in order to increase ability to engage in ADL completion.  (Progressing)       Start:  04/08/25    Expected End:  04/22/25            Pt will increase overall BUE strength to 4/5 in order to increase functional task participation.  (Progressing)       Start:  04/08/25    Expected End:  04/22/25            Pt will demo increased BUE fine motor/bimanual  coordination in order to achieve MOD I with functional task completion.  (Progressing)       Start:  04/08/25    Expected End:  04/22/25               TRANSFERS       Patient will complete functional transfers using least restrictive device with CGA   in order to maximize functional potential and increase safety.  (Progressing)       Start:  04/08/25    Expected End:  04/22/25 04/17/25 at 12:53 PM   Iftikhar Jacobs, OT   [phone]

## 2025-04-17 NOTE — CARE PLAN
The patient's goals for the shift include free from fall    The clinical goals for the shift include Pt's neuro exam will remain the same or improve by end of shift    Problem: Fall/Injury  Goal: Be free from injury by end of the shift  Outcome: Progressing     Problem: Pain - Adult  Goal: Verbalizes/displays adequate comfort level or baseline comfort level  Outcome: Progressing     Problem: Safety - Adult  Goal: Free from fall injury  Outcome: Progressing

## 2025-04-17 NOTE — CARE PLAN
The patient's goals for the shift include getting rest.    The clinical goals for the shift include Patient will remain neurologically stable throughout shift.      Problem: Fall/Injury  Goal: Be free from injury by end of the shift  Outcome: Progressing  Goal: Verbalize understanding of personal risk factors for fall in the hospital  Outcome: Progressing  Goal: Verbalize understanding of risk factor reduction measures to prevent injury from fall in the home  Outcome: Progressing  Goal: Use assistive devices by end of the shift  Outcome: Progressing  Goal: Pace activities to prevent fatigue by end of the shift  Outcome: Progressing     Problem: Skin  Goal: Decreased wound size/increased tissue granulation at next dressing change  Outcome: Progressing  Goal: Participates in plan/prevention/treatment measures  Outcome: Progressing  Goal: Prevent/manage excess moisture  Outcome: Progressing  Goal: Promote/optimize nutrition  Outcome: Progressing  Goal: Promote skin healing  Outcome: Progressing     Problem: Pain - Adult  Goal: Verbalizes/displays adequate comfort level or baseline comfort level  Outcome: Progressing     Problem: Safety - Adult  Goal: Free from fall injury  Outcome: Progressing     Problem: Discharge Planning  Goal: Discharge to home or other facility with appropriate resources  Outcome: Progressing     Problem: Chronic Conditions and Co-morbidities  Goal: Patient's chronic conditions and co-morbidity symptoms are monitored and maintained or improved  Outcome: Progressing     Problem: Safety - Medical Restraint  Goal: Remains free of injury from restraints (Restraint for Interference with Medical Device)  Outcome: Met  Goal: Free from restraint(s) (Restraint for Interference with Medical Device)  Outcome: Met

## 2025-04-17 NOTE — PROGRESS NOTES
"Speech-Language Pathology    SLP Adult Inpatient Speech-Language Cognition Evaluation and Therapy     Patient Name: Enmanuel Ahumada \"Dash\"  MRN: [id number]  Today's Date: 4/17/2025   Time Calculation  Start Time: 1007  Stop Time: 1035  Time Calculation (min): 28 min       SLP Assessment:  -Cognitive Deficits  *Pt scored a 10/30 on the Vikram Cognitive Assessment indicating a moderate cognitive impairment     -Expressive language deficits most apparent in conversational speech characterized by anomia and instances of paraphasias (reduced insight into expressive language deficits)      SLP Plan:  Skilled SLP  SLP Frequency: 2x per week  Duration: 2 weeks  Discussed POC: Patient  Discussed Risks/Benefits: Yes  Patient/Caregiver Agreeable: Yes  SLP - OK to Discharge: Yes    Goals:  Pt will orient to person, place, time, and situation with > 90% accuracy given min cues.     Date initiated: 4/17/25   Expected Time Frame to Meet Goal: 2-3 weeks                 Pt will complete functional problem solving tasks with > 90% accuracy given min cues.     Date initiated: 4/17/25   Expected Time Frame to Meet Goal: 2-3 weeks                 Pt will complete functional reasoning tasks with > 90% accuracy given min cues.     Date initiated: 4/17/25   Expected Time Frame to Meet Goal: 2-3 weeks     Pt will participate in functional short term memory tasks with > 90% accuracy given min cues.    Date initiated: 4/17/25   Expected Time Frame to Meet Goal: 2-3 weeks     Pt will self correct expressive language errors in conversational speech with mod cues.   Date initiated: 4/17/25   Expected Time Frame to Meet Goal: 2-3 weeks       Subjective   Pt properly identified and treated in bedside chair.  Awake and alert, with no c/o pain.  Room air.  Wife present.      Pt is a 69 M h/o HTN, HLD, smalls's esophagus, RO GBM s/p resection ([facility], 12/2023) with resultant L HH s/p chemo/RT presenting with tumor progression, 2/20 s/p redo " craniotomy for tumor resection w 5 ALA, CTH POC, 2/21 MRI intended NTR, 3/16 MRI postop changes w fluid collection at dura and craniotomy site, worsening infiltrative disease, enhancement along posteromedial aspect of resection site, 4/5 p/w 1wk incisional drainage, 4/4 CTH airfluid level in resection cavity, 4/5 MRI brain w/wo incr'd resection cavity enhancement and diffusion restriction  4/6 s/p R cranial wound washout and mesh cranioplasty (kaleb purulence), CTH POC, L temporal ICH, plts 81 s/p 2u plts, 4/7 rCTH incr SAH, min incr ICH, rrCTH stable, EEG neg dc'd  4/8 s/p PICC  4/9 CTH stable, SDD dc'd, EEG neg    Objective     Cognition:  Overall Cognitive Status: Impaired  Arousal/Alertness: Delayed responses to stimuli  Orientation Level:  (Oriented to person and city independently; required choice of 2 to orient otherwise)  Attention: Exceptions to [place]  Selective Attention: Impaired  Sustained Attention: Impaired  Problem Solving: Exceptions to [facility]  Complex Functional Tasks: Impaired  Verbal Reasoning Skills: Impaired    MOCA completed; scored as following:  Visuospatial/executive: 2/5  Naming: 3/3  Attention: 1/6  Language: 1/3  Abstraction: 2/2  Delayed recall: 0/5  Orientation: 1/6  Total score: 10/30      Motor Speech Production:  Intelligibility: [facility]      Auditory Comprehension:   Yes/No Questions: Within Functional Limits (concrete questions)  Commands: Impaired  One Step Basic Commands: Within Functional Limits  Two Step Basic Commands: Impaired  Complex/Abstract Commands: Impaired      Verbal:  Confrontation Naming: Within Functional Limits  Repetition: Within Functional Limits  Conversation: Impaired    Therapy:  Pt eager to improve cognitive and language skills.  SLP provided several ideas of functional activities to complete for independent practice.  Understanding indicated.  No needs voiced at end of session.  Call bell within reach.      04/17/25 at 4:35 PM - Anabell Pendleton, SLP

## 2025-04-17 NOTE — PROGRESS NOTES
"Enmanuel Ahumada \"Petey" is a 69 y.o. male on day 12 of admission.   Medical history significant for GERD, glioblastoma s/p right craniotomy for tumor resection 12/29/23, HLD, HTN and seizure disorder.   Patient admitted 4/5/25 with incisional drainage after undergoing repeat craniotomy and tumor resection 2/20/25, with findings of wound infection. Now s/p cranial washout and mesh cranioplasty 4/6/25.    Patient found to have left distal femoral, popliteal and calf DVT's, as well as right brachial thrombus around PICC line.    S/p IVC filter placement 4/11/25  as he was found to be unable to use anticoagulation therapy after undergoing recent brain surgery.  Started low intensity Heparin infusion 4/14/25, and lost IV access overnight.   Underwent placement of PICC line in IR 4/15/25; plan for prolonged Vancomycin administration, with end date of 6/15/25.   Transitioned from Heparin infusion to weight based Lovenox 60mg q12 hours 4/15/25.   Transitioned from Lovenox to Eliquis 5mg q12 hours 4/16/25.     Subjective   Patient reports he completed \"workout\" with Physical Therapy this morning, and was able to walk to the nursing station (~30 feet from hospital room).  Reports mild SOB after completing PT, which is quickly resolving.   Denies CP or bleed  Reports that he is eating well, and spouse confirms this.       Objective     Physical Exam  Sitting up in chair in NAD; spouse at bedside  Head incision well approximated with sutures intact, and without evidence of drainage.   Respirations are shallow but regular (patient reports mild SOB as he just finished with physical therapy), with breath sounds CTA all anterior lung fields; on RA; able to speak in full sentences without noted dyspnea  Normal heart sounds with RRR; vital signs are stable.   Abdomen soft and nontender to palpation.  Extremities are warm with palpable bilateral radial and DP pulses; PICC line in place left upper arm   Patient is awake and " "participates a little in conversation.     Last Recorded Vitals  Blood pressure 134/82, pulse 85, temperature 36.2 °C (97.2 °F), resp. rate 18, height 1.702 m (5' 7\"), weight 64.3 kg (141 lb 12.1 oz), SpO2 95%.  Intake/Output last 3 Shifts:  I/O last 3 completed shifts:  In: - (0 mL/kg)   Out: 1700 (26.4 mL/kg) [Urine:1700 (0.7 mL/kg/hr)]  Weight: 64.3 kg     Relevant Results  Results from last 7 days   Lab Units 04/17/25  0611 04/16/25  0720 04/15/25  0708   WBC AUTO x10*3/uL 3.7* 3.8* 4.2*   HEMOGLOBIN g/dL 9.9* 11.0* 10.7*   HEMATOCRIT % 29.0* 33.2* 31.8*   PLATELETS AUTO x10*3/uL 75* 89* 107*       Results from last 7 days   Lab Units 04/17/25  0611 04/16/25  0720 04/15/25  0708   SODIUM mmol/L 137 140 137   POTASSIUM mmol/L 4.1 4.1 3.7   CHLORIDE mmol/L 103 104 101   CO2 mmol/L 26 24 27   BUN mg/dL 21 27* 20   CREATININE mg/dL 0.55 0.70 0.66   GLUCOSE mg/dL 113* 114* 101*   CALCIUM mg/dL 8.6 8.7 8.8   Estimated Creatinine Clearance: 118.5 mL/min (by C-G formula based on SCr of 0.55 mg/dL).    Thrombocytopenia  Recent Labs     04/17/25  0611 04/16/25  0720 04/15/25  0708 04/14/25  0331 04/13/25  0347 04/12/25  0352 04/11/25  0218 04/10/25  0015 04/09/25  0112 04/08/25  1653 04/08/25  0137 04/07/25  0444 04/07/25  0015 04/06/25  1632   PLT 75* 89* 107* 114* 121* 150 155 121* 132* 130* 90* 103* 97* 81*        CT HEAD WO IV CONTRAST;  4/16/2025 10:21 am  INDICATION:  Signs/Symptoms:F/U after therapeutic anticoagulation.  IMPRESSION:  1. Stable to slightly increased intraparenchymal hematoma involving the left temporal lobe without significant change in adjacent mass effect. No new intraparenchymal or extra-axial hemorrhage identified.  2. Stable postsurgical changes of right parieto-occipital craniectomy with mesh closure.            Assessment & Plan  Wound dehiscence  69 year old male with medical history as noted above.   Hospital course as described above.   Patient with left distal femoral, popliteal and calf " DVT's, as well as right brachial thrombus around PICC fabiola (right arm PICC line has been removed).    Review of labs shows decreased hemoglobin from 11.1 grams to 9.9 grams, further decrease of platelets from 107 K to 89 K to 75 K today, without overt signs of bleeding, and stable serum creatinine 0.55.    Platelets continue to fluctuate, with down-trend from 150-75 K over the past 4 days. Patient has had exposure to Heparin with Heparin infusion and Lovenox injections in the past 5 days, with new concern for possible HIT.   Transitioned from Lovenox to Eliquis 5mg q12 hours last evening.    Discussion with patient/spouse: continued decrease in platelets which is somewhat concerning; will have PF4 test drawn to start evaluation for HIT.  Discussed role of platelets in the clotting process.   Patient given Eliquis Med Alert band.    4-T Score  Thrombocytopenia: platelet fall >50% and platelet alisson >20                2 points  Timing: clear onset of platelet fall 5-10 days after heparin exposure      2 points  Thrombosis: no evidence of new thrombus                                             0 points  Other causes: possible thrombocytopenia due to use of Vancomycin    1 point  Total of 5 points which is intermediate probability of HIT (~14%)      Recommendations:  ~CONTINUE Eliquis 5mg q12 hours; anticipate need for 3-6 months of anticoagulation for provoked LLE DVT.    ~MONITOR for bleeding, neurologic changes or further platelet decrease   ~Please order/obtain PF4 now  ~Daily labs: CBC (follow platelets), RFP  ~Outpatient follow up with Dr. Zee Hardy from the Vascular Medicine Service has been scheduled as a virtual visit for 5/6/25 at 11:30 am; will discuss removal of IVC filter at the time of follow up appointment.     Plan of care discussed with Dr. Fiorella Pisano  Plan of care discussed via telephone with the NSGY Team    ALEXA Valles-CNP  Vascular Medicine Service   Pager [phone]

## 2025-04-17 NOTE — PROGRESS NOTES
"Physical Therapy    Physical Therapy Treatment    Patient Name: Enmanuel Ahumada \"Dash\"  MRN: [id number]  Department: [place] [address]  [facility]  Today's Date: 4/17/2025  Time Calculation  Start Time: 0914  Stop Time: 0941  Time Calculation (min): 27 min    Assessment/Plan   PT Assessment  PT Assessment Results: Decreased strength, Decreased endurance, Impaired balance, Decreased coordination, Decreased mobility, Decreased cognition, Impaired judgement, Decreased safety awareness  Rehab Prognosis: Good  Barriers to Discharge Home: Physical needs, Caregiver assistance  Caregiver Assistance: Caregiver assistance needed per identified barriers - however, level of patient's required assistance exceeds assistance available at home  Physical Needs: High falls risk due to function or environment  Evaluation/Treatment Tolerance: Patient tolerated treatment well  Medical Staff Made Aware: Yes  Strengths: Ability to acquire knowledge, Attitude of self, Support and attitude of living partners, Support of extended family/friends  End of Session Communication: Bedside nurse (RN updated and aware of pts vitals during session.)  Assessment Comment: Pt improving to ambulate 2x40ft, min A progressing to mostly CGA. Pts HR increasing up to 154 with ambulation, trending downward once seated. Pt denying CP throughout session. RN updated and aware of pts vitals. Pt demonstrating excellent effort throughout session. Remains appropriate for high intensity level of continued care when medically ready for d/c.  End of Session Patient Position: Up in chair, Alarm on  PT Plan  Inpatient/Swing Bed or Outpatient: Inpatient  PT Plan  Treatment/Interventions: Bed mobility, Transfer training, Gait training, Stair training, Balance training, Neuromuscular re-education, Strengthening, Range of motion, Endurance training, Therapeutic exercise, Therapeutic activity  PT Plan: Ongoing PT  PT Frequency: 5 times per week  PT Discharge Recommendations: " High intensity level of continued care  Equipment Recommended upon Discharge:  (TBD at next level of care)  PT Recommended Transfer Status: Assist x1, Assistive device  PT - OK to Discharge: Yes (Meaning pt has been evaluated and d/c reccs have been made.)    General Visit Information:   PT  Visit  PT Received On: 04/17/25  Response to Previous Treatment: Patient with no complaints from previous session.  General  Reason for Referral: Pt initially presented with tumor progression (RO GBM), s/p redo craniotomy for tumor resection in 2/2025. 4/5 p/w 1wk incisional drainage, 4/4 CTH airfluid level in resection cavity, 4/5 MRI brain w/wo incr'd resection cavity enhancement and diffusion restriction 4/6 s/p R cranial wound washout and mesh cranioplasty. 4/7 rCTH incr SAH, min incr ICH, rrCTH stable, EEG neg dc'd.  Past Medical History Relevant to Rehab: HTN, HLD, smalls's esophagus, RO GBM s/p resection ([facility], 12/2023) with resultant L HH s/p chemo/RT  PT Missed Visit:  (-)  Missed Visit Reason:  (-)  Family/Caregiver Present: Yes  Caregiver Feedback: wife present and supportive throughout session  Co-Treatment:  (-)  Prior to Session Communication: Bedside nurse  Patient Position Received: Bed, 3 rail up, Alarm on  General Comment: Supine. Pt pleasant, agreeable and cooperative to PT. Pt eager to mobilize. Pt able to ambulate 2x40ft Min A progressing to mostly CGA with FWW. Pts HR increasing up to 154 with ambulation, trending downward once seated. Pt denying CP throughout session. RN updated and aware of pts vitals. Pt tolerated treatment well. Vascular medicine attending to pt at the end of session.    Subjective   Precautions:  Precautions  Hearing/Visual Limitations: per pt and wife report, pt with limited L visual field.  Medical Precautions: Fall precautions  Precautions Comment: SBP<160     Date/Time Vitals Session Patient Position Pulse Resp SpO2 BP MAP (mmHg)    04/17/25 0953 During PT  --  --  --  --  110/65   --           Vital Signs Comment: seated EOB; /81, . seated in chair; /65, . HR up to 154 during ambulation, pt denying any chest pain. Pt noted feeling mild lightheadedness last 20ft of amb. Post ambulation, seated in chair 107/73, . Vacular medicine attending to pt after session. RN updated and aware.     Objective   Pain:  Pain Assessment  Pain Assessment: 0-10  0-10 (Numeric) Pain Score: 0 - No pain  Cognition:  Cognition  Overall Cognitive Status: Impaired  Arousal/Alertness: Delayed responses to stimuli  Orientation Level: Disoriented to time  Following Commands: Follows one step commands with repetition  Cognition Comments: pt demonstrates some confusion  Coordination:  Movements are Fluid and Coordinated: No  Finger to Nose: Other: (comment) (LUE dysmetric, demonstrates overshoot. RUE intact.)  Coordination Comment: LUE dysmetria observed. RUE WFL.  Postural Control:  Postural Control  Postural Control: Within Functional Limits  Posture Comment: WFL as demonstrated during functional activity  Static Sitting Balance  Static Sitting-Balance Support: Feet supported, No upper extremity supported  Static Sitting-Level of Assistance: Distant supervision  Dynamic Sitting Balance  Dynamic Sitting-Balance Support: Feet supported  Dynamic Sitting-Level of Assistance: Close supervision  Dynamic Sitting-Balance: Lateral lean, Forward lean  Static Standing Balance  Static Standing-Comment/Number of Minutes: pt standing with RN and SPT, taking vitals after pt becoming tachy post ambulation. HR maintained in 140s while sitting in chair, increased to 150s in standing. Pt denying chest pain throughout session. RN and SPT assisted pt back into bed. Pt remained asymtpomatic throughout the session. RN attending to pt after session.  Extremity/Trunk Assessments:    RUE   RUE : Within Functional Limits  LUE   LUE:  (demonstrates dysmetria. strength grossly >/= 4/5)  RLE   RLE : Within Functional  Limits  LLE   LLE : Within Functional Limits  Activity Tolerance:  Activity Tolerance  Endurance: Endurance does not limit participation in activity  Treatments:    Therapeutic Activity  Therapeutic Activity Performed: Yes  Therapeutic Activity 1: seated in chair: knee ext B/L x 10, hip flexion B/L x 10    Bed Mobility  Bed Mobility: Yes  Bed Mobility 1  Bed Mobility 1: Supine to sitting  Level of Assistance 1: Contact guard  Bed Mobility Comments 1: HOB slightly elevated. VCs for sequencing  Bed Mobility 2  Bed Mobility  2: Scooting (scooting EOB)  Level of Assistance 2: Contact guard  Bed Mobility Comments 2: VCs for sequencing    Ambulation/Gait Training  Ambulation/Gait Training Performed: Yes  Ambulation/Gait Training 1  Surface 1: Level tile, Carpet  Device 1: Rolling walker  Assistance 1: Contact guard, Minimum assistance, Minimal verbal cues, Minimal tactile cues  Quality of Gait 1: Narrow base of support, Inconsistent stride length, Decreased step length, Diminished heel strike  Comments/Distance (ft) 1: 2x 40ft. Min A progressing to mostly CGA. Pts HR going up to 154, but pt denying CP. RN aware.  Transfers  Transfer: Yes  Transfer 1  Transfer From 1: Sit to  Transfer to 1: Stand  Technique 1: Sit to stand  Transfer Device 1: Walker  Transfer Level of Assistance 1: Contact guard, Minimum assistance  Trials/Comments 1: x2 trials. 1 trial Min, 1 trial CGA  Transfers 2  Transfer From 2: Stand to  Transfer to 2: Sit  Transfer Device 2: Walker  Transfer Level of Assistance 2: Minimum assistance  Trials/Comments 2: x2 trials. pt very slowly descends, demonstrates significant forward flexion of trunk to control descent.    Stairs  Stairs: No    Outcome Measures:    [facility] Basic Mobility  Turning from your back to your side while in a flat bed without using bedrails: A little  Moving from lying on your back to sitting on the side of a flat bed without using bedrails: A little  Moving to and from bed to chair  (including a wheelchair): A little  Standing up from a chair using your arms (e.g. wheelchair or bedside chair): A little  To walk in hospital room: A little  Climbing 3-5 steps with railing: Total  Basic Mobility - Total Score: 16    [place]  Sitting Balance: Steady, safe  Arises: Able, uses arms to help  Attempts to Arise: Able, requires more than one attempt  Immediate Standing Balance (First 5 Seconds): Steady but uses walker or other support  Standing Balance: Steady but wide stance, uses cane or other support  Nudged: Begins to fall  Eyes Closed: Unsteady  Turned 360 Degrees: Steadiness: Unsteady (Grabs, staggers)  Turned 360 Degrees: Continuity of Steps: Discontinuous steps  Sitting Down: Uses arms or not a smooth motion  Balance Score: 6  Initiation of Gait: No hesitancy  Step Height: R Swing Foot: Right foot complete clears floor  Step Length: R Swing Foot: Passes left stance foot  Step Height: L Swing Foot: Left foot complete clears floor  Step Length: L Swing Foot: Passes right stance foot  Step Symmetry: Right and left step appear equal  Step Continuity: Steps appear continuous  Path: Mild/moderate deviation or uses walking aid  Trunk: Marked sway or uses walking aid  Walking Time: Heels almost touching while walking  Gait Score: 9  Total Score: 15      Education Documentation  Body Mechanics, taught by DAVID Verde at 4/17/2025 11:13 AM.  Learner: Patient  Readiness: Eager  Method: Explanation  Response: Verbalizes Understanding  Comment: role of PT, POC    Precautions, taught by DAVID Verde at 4/17/2025 11:13 AM.  Learner: Patient  Readiness: Eager  Method: Explanation  Response: Verbalizes Understanding  Comment: role of PT, POC    Mobility Training, taught by DAVID Verde at 4/17/2025 11:13 AM.  Learner: Patient  Readiness: Eager  Method: Explanation  Response: Verbalizes Understanding  Comment: role of PT, POC    Education Comments  No comments found.        OP EDUCATION:        Encounter Problems       Encounter Problems (Active)       PT Problem       Patient will perform bed mobility IND to reduce risk of developing decubitus ulcers.  (Progressing)       Start:  04/08/25    Expected End:  04/22/25            Patient will ambulate at least 150 ft. with </= close sup and LRD to improve tolerance of household distances.  (Progressing)       Start:  04/08/25    Expected End:  04/22/25            Patient will perform the 5-Time Sit to Stand test in <14 sec to indicate decreased falls risk. (Progressing)       Start:  04/08/25    Expected End:  04/22/25            Patient will perform sit to stand and stand to sit transfers with </= close sup and LRD to increase functional strength.  (Progressing)       Start:  04/08/25    Expected End:  04/22/25            BLE 5/5 (Progressing)       Start:  04/08/25    Expected End:  04/22/25               Pain - Adult

## 2025-04-17 NOTE — ASSESSMENT & PLAN NOTE
69 year old male with medical history as noted above.   Hospital course as described above.   Patient with left distal femoral, popliteal and calf DVT's, as well as right brachial thrombus around PICC fabiola (right arm PICC line has been removed).    Review of labs shows decreased hemoglobin from 11.1 grams to 9.9 grams, further decrease of platelets from 107 K to 89 K to 75 K today, without overt signs of bleeding, and stable serum creatinine 0.55.    Platelets continue to fluctuate, with down-trend from 150-75 K over the past 4 days. Patient has had exposure to Heparin with Heparin infusion and Lovenox injections in the past 5 days, with new concern for possible HIT.   Transitioned from Lovenox to Eliquis 5mg q12 hours last evening.    Discussion with patient/spouse: continued decrease in platelets which is somewhat concerning; will have PF4 test drawn to start evaluation for HIT.  Discussed role of platelets in the clotting process.   Patient given Eliquis Med Alert band.    4-T Score  Thrombocytopenia: platelet fall >50% and platelet alisson >20                2 points  Timing: clear onset of platelet fall 5-10 days after heparin exposure      2 points  Thrombosis: no evidence of new thrombus                                             0 points  Other causes: possible thrombocytopenia due to use of Vancomycin    1 point  Total of 5 points which is intermediate probability of HIT (~14%)      Recommendations:  ~CONTINUE Eliquis 5mg q12 hours; anticipate need for 3-6 months of anticoagulation for provoked LLE DVT.    ~MONITOR for bleeding, neurologic changes or further platelet decrease   ~Please order/obtain PF4 now  ~Daily labs: CBC (follow platelets), RFP  ~Outpatient follow up with Dr. Zee Hardy from the Vascular Medicine Service has been scheduled as a virtual visit for 5/6/25 at 11:30 am; will discuss removal of IVC filter at the time of follow up appointment.

## 2025-04-17 NOTE — PROGRESS NOTES
"Speech-Language Pathology    SLP Adult Inpatient  Speech-Language Pathology Treatment     Patient Name: Enmanuel Ahumada \"Dash\"  MRN: [id number]  Today's Date: 4/17/2025  Time Calculation  Start Time: 0957  Stop Time: 1007  Time Calculation (min): 10 min         Assessment:  Pt demonstrating safe, functional oropharyngeal swallow with Regular Solids (IDDSI Level 7) and Thin Liquids.  No apparent oral dysphagia.  No clinical s/s of aspiration.        Recommendations:  Solid Diet Recommendations : Regular (IDDSI Level 7)  Liquid Diet Recommendations: Thin      Safe Swallow Strategies/Guidelines:  Only present PO intake when awake and alert  Upright positioning   Slow rate/small boluses           Plan:  Skilled SLP  SLP Frequency: 2x per week  Duration: 1 week   Discussed POC: Pt, family, medical team         Goals:  Pt will tolerate least restrictive diet with adequate oral phase and no s/s of aspiration.               Date initiated: 4/13/25              Expected Time Frame to Meet Goal: 1-2 weeks              Status: Progressing      Pt/family will adhere to safe swallow strategies during PO intake with > 90% accuracy independently.                 Date initiated: 4/13/25              Expected Time Frame to Meet Goal: 1-2 weeks               Status: Progressing         Subjective   Pt properly identified and treated in bedside chair.  Awake and alert, with no c/o pain.  Room air.  Wife present.      Pt is a 69 M h/o HTN, HLD, smalls's esophagus, RO GBM s/p resection ([facility], 12/2023) with resultant L HH s/p chemo/RT presenting with tumor progression, 2/20 s/p redo craniotomy for tumor resection w 5 ALA, CTH POC, 2/21 MRI intended NTR, 3/16 MRI postop changes w fluid collection at dura and craniotomy site, worsening infiltrative disease, enhancement along posteromedial aspect of resection site, 4/5 p/w 1wk incisional drainage, 4/4 CTH airfluid level in resection cavity, 4/5 MRI brain w/wo incr'd resection cavity " enhancement and diffusion restriction  4/6 s/p R cranial wound washout and mesh cranioplasty (kaleb purulence), CTH POC, L temporal ICH, plts 81 s/p 2u plts, 4/7 rCTH incr SAH, min incr ICH, rrCTH stable, EEG neg dc'd  4/8 s/p PICC  4/9 CTH stable, SDD dc'd, EEG neg     No baseline dysphagia.         Objective   Pt reports good tolerance of current diet- regular solids/thin liquid.  Denies dysphagia/s/s of aspiration.  Pt participated in a therapeutic trial feed to assess for diet tolerance and use of safe swallow strategies.  No overt s/s of aspiration with thin liquids via straw.  Able to hold cup and independently take appropriately sized sips.  Did not wish to consume solids at this time.  Recommend continuing regular solids/thin liquids.         04/17/25 at 4:22 PM - Anabell Pendleton, SLP

## 2025-04-17 NOTE — PROGRESS NOTES
"Enmanuel Ahumada \"Dash\" is a 69 y.o. male on day 12 of admission presenting with Wound dehiscence.    Subjective   No acute events overnight      Objective     Physical Exam  awake  Ox3  Ou3R  FS  TM  BUE 5/5  BLE 5/5    Last Recorded Vitals  Blood pressure 115/79, pulse 90, temperature 36.3 °C (97.3 °F), resp. rate 18, height 1.702 m (5' 7\"), weight 64.3 kg (141 lb 12.1 oz), SpO2 95%.  Intake/Output last 3 Shifts:  I/O last 3 completed shifts:  In: - (0 mL/kg)   Out: 1400 (21.8 mL/kg) [Urine:1400 (0.6 mL/kg/hr)]  Weight: 64.3 kg     Relevant Results  Results from last 72 hours   Lab Units 04/16/25  0720 04/15/25  0708   WBC AUTO x10*3/uL 3.8* 4.2*   NRBC AUTO /100 WBCs 1.9* 2.4*   RBC AUTO x10*6/uL 3.38* 3.36*   HEMOGLOBIN g/dL 11.0* 10.7*   HEMATOCRIT % 33.2* 31.8*   MCV fL 98 95   MCH pg 32.5 31.8   MCHC g/dL 33.1 33.6   RDW % 14.7* 14.4   PLATELETS AUTO x10*3/uL 89* 107*      Results from last 72 hours   Lab Units 04/16/25  0720 04/15/25  0708   GLUCOSE mg/dL 114* 101*   SODIUM mmol/L 140 137   POTASSIUM mmol/L 4.1 3.7   CHLORIDE mmol/L 104 101   CO2 mmol/L 24 27   ANION GAP mmol/L 16 13   BUN mg/dL 27* 20   CREATININE mg/dL 0.70 0.66   EGFR mL/min/1.73m*2 >90 >90   CALCIUM mg/dL 8.7 8.8   PHOSPHORUS mg/dL 3.1 3.7   ALBUMIN g/dL 3.5 3.4   MAGNESIUM mg/dL 2.22 2.20   POCT CALCIUM IONIZED (MMOL/L) IN BLOOD mmol/L  --  1.25          Assessment/Plan   Assessment & Plan  Wound dehiscence    69 M h/o HTN, HLD, smalls's esophagus, RO GBM s/p resection ([facility], 12/2023) with resultant L HH s/p chemo/RT presenting with tumor progression, 2/20 s/p redo craniotomy for tumor resection w 5 ALA, CTH POC, 2/21 MRI intended NTR, 3/16 MRI postop changes w fluid collection at dura and craniotomy site, worsening infiltrative disease, enhancement along posteromedial aspect of resection site, 4/5 p/w 1wk incisional drainage, 4/4 CTH airfluid level in resection cavity, 4/5 MRI brain w/wo incr'd resection cavity enhancement and " diffusion restriction  4/6 s/p R cranial wound washout and mesh cranioplasty (kaleb purulence), CTH POC, L temporal ICH, plts 81 s/p 2u plts, 4/7 rCTH incr SAH, min incr ICH, rrCTH stable, EEG neg dc'd  4/8 s/p PICC  4/9 CTH stable, SDD dc'd, EEG neg  4/10 SGD dc'd  4/11 DVT US x4, LLE distal femoral, popliteal, gastroc, peroneal, post tibial vv DVTs, RUE R brachial DVT, s/p IVC filter    4/13 CTH stable  4/14 PICC auto dc'd, agitation, CTH stable   4/15 s/p PICC  4/16 CTH stable, restraints removed, no agitation noted     Plan:  Tele (q8 neuro checks overnight)  ID recs - vanc until 6/15  Continue LVX  VPA 250q6h  Vasc med recs  Dex 2q12  SLP recs-  regular diet   Continue home meds   PTOT- rehab    Medically ready for dispo    Jj Hernandez MD

## 2025-04-18 ENCOUNTER — APPOINTMENT (OUTPATIENT)
Dept: CARDIOLOGY | Facility: HOSPITAL | Age: 70
End: 2025-04-18
Payer: MEDICARE

## 2025-04-18 LAB
ALBUMIN SERPL BCP-MCNC: 2.9 G/DL (ref 3.4–5)
ANION GAP SERPL CALC-SCNC: 13 MMOL/L (ref 10–20)
BUN SERPL-MCNC: 19 MG/DL (ref 6–23)
CALCIUM SERPL-MCNC: 8.3 MG/DL (ref 8.6–10.6)
CHLORIDE SERPL-SCNC: 104 MMOL/L (ref 98–107)
CO2 SERPL-SCNC: 26 MMOL/L (ref 21–32)
CREAT SERPL-MCNC: 0.58 MG/DL (ref 0.5–1.3)
EGFRCR SERPLBLD CKD-EPI 2021: >90 ML/MIN/1.73M*2
ERYTHROCYTE [DISTWIDTH] IN BLOOD BY AUTOMATED COUNT: 15.6 % (ref 11.5–14.5)
ERYTHROCYTE [DISTWIDTH] IN BLOOD BY AUTOMATED COUNT: 15.7 % (ref 11.5–14.5)
GLUCOSE BLD MANUAL STRIP-MCNC: 102 MG/DL (ref 74–99)
GLUCOSE BLD MANUAL STRIP-MCNC: 109 MG/DL (ref 74–99)
GLUCOSE BLD MANUAL STRIP-MCNC: 135 MG/DL (ref 74–99)
GLUCOSE BLD MANUAL STRIP-MCNC: 165 MG/DL (ref 74–99)
GLUCOSE BLD MANUAL STRIP-MCNC: 202 MG/DL (ref 74–99)
GLUCOSE SERPL-MCNC: 115 MG/DL (ref 74–99)
HCT VFR BLD AUTO: 24.7 % (ref 41–52)
HCT VFR BLD AUTO: 30.1 % (ref 41–52)
HGB BLD-MCNC: 10.1 G/DL (ref 13.5–17.5)
HGB BLD-MCNC: 8.8 G/DL (ref 13.5–17.5)
INTERPRETATION FOR ANTI-PLATELET FACTOR 4 ANTIBODY: NEGATIVE
MCH RBC QN AUTO: 32.4 PG (ref 26–34)
MCH RBC QN AUTO: 32.6 PG (ref 26–34)
MCHC RBC AUTO-ENTMCNC: 33.6 G/DL (ref 32–36)
MCHC RBC AUTO-ENTMCNC: 35.6 G/DL (ref 32–36)
MCV RBC AUTO: 91 FL (ref 80–100)
MCV RBC AUTO: 97 FL (ref 80–100)
NRBC BLD-RTO: 0 /100 WBCS (ref 0–0)
NRBC BLD-RTO: 0.9 /100 WBCS (ref 0–0)
PHOSPHATE SERPL-MCNC: 3.3 MG/DL (ref 2.5–4.9)
PLATELET # BLD AUTO: 55 X10*3/UL (ref 150–450)
PLATELET # BLD AUTO: 71 X10*3/UL (ref 150–450)
PMV BLD AUTO: 8.8 FL (ref 7.5–11.5)
POTASSIUM SERPL-SCNC: 3.8 MMOL/L (ref 3.5–5.3)
RBC # BLD AUTO: 2.72 X10*6/UL (ref 4.5–5.9)
RBC # BLD AUTO: 3.1 X10*6/UL (ref 4.5–5.9)
SERUM AND PLATELET FACTOR 4: 0.33 OD UNITS
SODIUM SERPL-SCNC: 139 MMOL/L (ref 136–145)
VANCOMYCIN SERPL-MCNC: 24.4 UG/ML (ref 5–20)
WBC # BLD AUTO: 3.4 X10*3/UL (ref 4.4–11.3)
WBC # BLD AUTO: 4 X10*3/UL (ref 4.4–11.3)

## 2025-04-18 PROCEDURE — 93010 ELECTROCARDIOGRAM REPORT: CPT | Performed by: INTERNAL MEDICINE

## 2025-04-18 PROCEDURE — 2500000001 HC RX 250 WO HCPCS SELF ADMINISTERED DRUGS (ALT 637 FOR MEDICARE OP)

## 2025-04-18 PROCEDURE — 82947 ASSAY GLUCOSE BLOOD QUANT: CPT

## 2025-04-18 PROCEDURE — 80202 ASSAY OF VANCOMYCIN: CPT

## 2025-04-18 PROCEDURE — 1200000002 HC GENERAL ROOM WITH TELEMETRY DAILY

## 2025-04-18 PROCEDURE — 99232 SBSQ HOSP IP/OBS MODERATE 35: CPT | Performed by: NURSE PRACTITIONER

## 2025-04-18 PROCEDURE — 2500000005 HC RX 250 GENERAL PHARMACY W/O HCPCS

## 2025-04-18 PROCEDURE — 85027 COMPLETE CBC AUTOMATED: CPT

## 2025-04-18 PROCEDURE — 2500000002 HC RX 250 W HCPCS SELF ADMINISTERED DRUGS (ALT 637 FOR MEDICARE OP, ALT 636 FOR OP/ED): Performed by: NURSE PRACTITIONER

## 2025-04-18 PROCEDURE — 2500000002 HC RX 250 W HCPCS SELF ADMINISTERED DRUGS (ALT 637 FOR MEDICARE OP, ALT 636 FOR OP/ED)

## 2025-04-18 PROCEDURE — 2500000004 HC RX 250 GENERAL PHARMACY W/ HCPCS (ALT 636 FOR OP/ED): Mod: JW

## 2025-04-18 PROCEDURE — 97116 GAIT TRAINING THERAPY: CPT | Mod: GP

## 2025-04-18 PROCEDURE — 2500000004 HC RX 250 GENERAL PHARMACY W/ HCPCS (ALT 636 FOR OP/ED): Mod: JZ

## 2025-04-18 PROCEDURE — 80069 RENAL FUNCTION PANEL: CPT

## 2025-04-18 PROCEDURE — 93005 ELECTROCARDIOGRAM TRACING: CPT

## 2025-04-18 PROCEDURE — 97530 THERAPEUTIC ACTIVITIES: CPT | Mod: GP

## 2025-04-18 RX ADMIN — APIXABAN 5 MG: 5 TABLET, FILM COATED ORAL at 21:42

## 2025-04-18 RX ADMIN — VANCOMYCIN HYDROCHLORIDE 1250 MG: 1.25 INJECTION, POWDER, LYOPHILIZED, FOR SOLUTION INTRAVENOUS at 11:51

## 2025-04-18 RX ADMIN — VALPROATE SODIUM 250 MG: 100 INJECTION, SOLUTION INTRAVENOUS at 14:56

## 2025-04-18 RX ADMIN — CLONIDINE HYDROCHLORIDE 0.2 MG: 0.1 TABLET ORAL at 21:42

## 2025-04-18 RX ADMIN — CLONIDINE HYDROCHLORIDE 0.2 MG: 0.1 TABLET ORAL at 09:50

## 2025-04-18 RX ADMIN — VALPROATE SODIUM 250 MG: 100 INJECTION, SOLUTION INTRAVENOUS at 21:00

## 2025-04-18 RX ADMIN — VANCOMYCIN HYDROCHLORIDE 1250 MG: 1.25 INJECTION, POWDER, LYOPHILIZED, FOR SOLUTION INTRAVENOUS at 00:41

## 2025-04-18 RX ADMIN — DEXAMETHASONE SODIUM PHOSPHATE 2 MG: 4 INJECTION, SOLUTION INTRA-ARTICULAR; INTRALESIONAL; INTRAMUSCULAR; INTRAVENOUS; SOFT TISSUE at 09:50

## 2025-04-18 RX ADMIN — INSULIN LISPRO 1 UNITS: 100 INJECTION, SOLUTION INTRAVENOUS; SUBCUTANEOUS at 00:40

## 2025-04-18 RX ADMIN — ATORVASTATIN CALCIUM 40 MG: 40 TABLET, FILM COATED ORAL at 21:42

## 2025-04-18 RX ADMIN — VALPROATE SODIUM 250 MG: 100 INJECTION, SOLUTION INTRAVENOUS at 06:25

## 2025-04-18 RX ADMIN — APIXABAN 5 MG: 5 TABLET, FILM COATED ORAL at 09:50

## 2025-04-18 RX ADMIN — VALPROATE SODIUM 250 MG: 100 INJECTION, SOLUTION INTRAVENOUS at 00:41

## 2025-04-18 RX ADMIN — INSULIN LISPRO 2 UNITS: 100 INJECTION, SOLUTION INTRAVENOUS; SUBCUTANEOUS at 17:35

## 2025-04-18 RX ADMIN — SENNOSIDES AND DOCUSATE SODIUM 1 TABLET: 50; 8.6 TABLET ORAL at 21:42

## 2025-04-18 RX ADMIN — VANCOMYCIN HYDROCHLORIDE 1250 MG: 1.25 INJECTION, POWDER, LYOPHILIZED, FOR SOLUTION INTRAVENOUS at 23:37

## 2025-04-18 RX ADMIN — QUETIAPINE FUMARATE 25 MG: 25 TABLET ORAL at 21:42

## 2025-04-18 RX ADMIN — POLYETHYLENE GLYCOL 3350 17 G: 17 POWDER, FOR SOLUTION ORAL at 09:51

## 2025-04-18 RX ADMIN — PANTOPRAZOLE SODIUM 40 MG: 40 INJECTION, POWDER, FOR SOLUTION INTRAVENOUS at 09:51

## 2025-04-18 ASSESSMENT — PAIN SCALES - GENERAL
PAINLEVEL_OUTOF10: 0 - NO PAIN

## 2025-04-18 ASSESSMENT — COGNITIVE AND FUNCTIONAL STATUS - GENERAL
TOILETING: A LITTLE
WALKING IN HOSPITAL ROOM: A LITTLE
MOVING FROM LYING ON BACK TO SITTING ON SIDE OF FLAT BED WITH BEDRAILS: A LITTLE
MOVING TO AND FROM BED TO CHAIR: A LITTLE
MOVING TO AND FROM BED TO CHAIR: A LITTLE
WALKING IN HOSPITAL ROOM: A LOT
HELP NEEDED FOR BATHING: A LITTLE
CLIMB 3 TO 5 STEPS WITH RAILING: A LOT
STANDING UP FROM CHAIR USING ARMS: A LITTLE
TURNING FROM BACK TO SIDE WHILE IN FLAT BAD: A LITTLE
MOBILITY SCORE: 20
HELP NEEDED FOR BATHING: A LITTLE
DAILY ACTIVITIY SCORE: 18
STANDING UP FROM CHAIR USING ARMS: A LOT
EATING MEALS: A LITTLE
DRESSING REGULAR LOWER BODY CLOTHING: A LITTLE
TOILETING: A LITTLE
DRESSING REGULAR UPPER BODY CLOTHING: A LITTLE
CLIMB 3 TO 5 STEPS WITH RAILING: TOTAL
EATING MEALS: A LITTLE
STANDING UP FROM CHAIR USING ARMS: A LITTLE
MOBILITY SCORE: 16
DAILY ACTIVITIY SCORE: 17
PERSONAL GROOMING: A LITTLE
CLIMB 3 TO 5 STEPS WITH RAILING: A LITTLE
DRESSING REGULAR LOWER BODY CLOTHING: A LITTLE
MOVING TO AND FROM BED TO CHAIR: A LOT
WALKING IN HOSPITAL ROOM: A LITTLE
TURNING FROM BACK TO SIDE WHILE IN FLAT BAD: A LITTLE
MOVING FROM LYING ON BACK TO SITTING ON SIDE OF FLAT BED WITH BEDRAILS: A LITTLE
DRESSING REGULAR UPPER BODY CLOTHING: A LITTLE
MOBILITY SCORE: 14
PERSONAL GROOMING: A LOT

## 2025-04-18 ASSESSMENT — PAIN - FUNCTIONAL ASSESSMENT
PAIN_FUNCTIONAL_ASSESSMENT: 0-10

## 2025-04-18 NOTE — PROGRESS NOTES
"Enmanuel Ahumada \"Petey" is a 69 y.o. male on day 13 of admission.   Medical history significant for GERD, glioblastoma s/p right craniotomy for tumor resection 12/29/23, HLD, HTN and seizure disorder.   Patient admitted 4/5/25 with incisional drainage after undergoing repeat craniotomy and tumor resection 2/20/25, with findings of wound infection. Now s/p cranial washout and mesh cranioplasty 4/6/25.    Patient found to have left distal femoral, popliteal and calf DVT's, as well as right brachial thrombus around PICC line.    S/p IVC filter placement 4/11/25  as he was found to be unable to use anticoagulation therapy after undergoing recent brain surgery.  Started low intensity Heparin infusion 4/14/25, and lost IV access overnight.   Underwent placement of PICC line in IR 4/15/25; plan for prolonged Vancomycin administration, with end date of 6/15/25.   Transitioned from Heparin infusion to weight based Lovenox 60mg q12 hours 4/15/25, and then transitioned to Eliquis 5mg BID on 4/16/24.    Continues with Eliquis 5mg q12 hours.   Patient with decreased platelet levels, and PF4 has been sent to evaluate for HIT.      Subjective   Patient reports feeling well today.  Denies CP, SOB or bleeding.      Objective     Physical Exam  Sitting up in bed eating breakfast, in NAD; spouse at bedside  Head incision well approximated with sutures intact, and without evidence of drainage.   Respirations are full and regular, with breath sounds CTA all anterior lung fields; on RA  Normal heart sounds with RRR; vital signs are stable.   Abdomen soft and nontender to palpation.  Extremities are warm with palpable bilateral radial and DP pulses; PICC line in place left upper arm; no evidence of limb swelling.   Patient is awake and participates a little in conversation.     Last Recorded Vitals  Blood pressure 115/79, pulse 83, temperature 36.3 °C (97.3 °F), temperature source Temporal, resp. rate 18, height 1.702 m (5' 7\"), " weight 64.4 kg (141 lb 14.4 oz), SpO2 96%.  Intake/Output last 3 Shifts:  I/O last 3 completed shifts:  In: 240 (3.7 mL/kg) [P.O.:240]  Out: 2001 (31.1 mL/kg) [Urine:2000 (0.9 mL/kg/hr); Stool:1]  Weight: 64.4 kg     Relevant Results    Results from last 7 days   Lab Units 04/18/25  1325 04/17/25  0611 04/16/25  0720   WBC AUTO x10*3/uL 3.4* 3.7* 3.8*   HEMOGLOBIN g/dL 8.8* 9.9* 11.0*   HEMATOCRIT % 24.7* 29.0* 33.2*   PLATELETS AUTO x10*3/uL 55* 75* 89*       Results from last 7 days   Lab Units 04/17/25  0611 04/16/25  0720 04/15/25  0708   SODIUM mmol/L 137 140 137   POTASSIUM mmol/L 4.1 4.1 3.7   CHLORIDE mmol/L 103 104 101   CO2 mmol/L 26 24 27   BUN mg/dL 21 27* 20   CREATININE mg/dL 0.55 0.70 0.66   GLUCOSE mg/dL 113* 114* 101*   CALCIUM mg/dL 8.6 8.7 8.8       Results from last 7 days   Lab Units 04/14/25  1146   APTT seconds 29   INR  1.1       Results from last 7 days   Lab Units 04/15/25  1912 04/15/25  1524 04/14/25  1836   ANTI XA UNFRACTIONATED IU/mL 0.6 0.5 0.8          COAGULATION                              4/17/25    Serum and Platelet Factor 4  0.332     Interpretation for Anti-Platelet Factor 4 Antibody  Negativ           Assessment & Plan  Wound dehiscence  69 year old male with medical history as noted above.   Hospital course as described above.   Patient with left distal femoral, popliteal and calf DVT's, as well as right brachial thrombus around PICC line (right arm PICC line has been removed).    Review of labs shows decreased hemoglobin from 9.9 grams to 8.8 grams, further decrease of platelets from 107 K to 89 -> 75-> 55 K today, without overt signs of bleeding.    Platelets continue to fluctuate, with down-trend from 150-75 K over the past 4 days. Patient has had exposure to Heparin with Heparin infusion and Lovenox injections in the past 5 days, with new concern for possible HIT. PF4 was returned as negative, so workup for HIT will be stopped.   Continues with Eliquis 5mg q12 hours.      Discussion with patient/spouse: awaiting return of labs including PF4.    Patient wearing Eliquis Med Alert band.      Recommendations:  ~Patient with continued platelet fluctuations; do not recommend discharge to SNF until platelets stabilize.  ~Consider changing antibiotics from Vancomycin that has known side effect of thrombocytopenia.   ~REPEAT CBC with a citrated tube; Phlebotomy has citrated tubes; not sure these tubes available on HH2.  The citrated tube allows better counting of platelets, especially if they are clumping in a regular phlebotomy tube.   ~CONTINUE Eliquis 5mg q12 hours; anticipate need for 3-6 months of anticoagulation for provoked LLE DVT.    ~Continue Eliquis as long as platelets remain >50K and there is no evidence of bleeding.   ~MONITOR for bleeding, neurologic changes or further platelet decrease.  ~Daily labs while in hospital: CBC in a citrated tube(follow platelets), RFP  ~When at SNF: monitor CBC 2x a week to ensure that hg and plts are stable  ~Outpatient follow up with Dr. Zee Hardy from the Vascular Medicine Service has been scheduled as a virtual visit for 5/6/25 at 11:30 am; will discuss removal of IVC filter at the time of follow up appointment.     Dr. Guerra is on call this weekend for the Vascular Medicine Service and can be reached on pager [phone]      Plan of care discussed in detail with Dr. Fiorella Pisano  Plan of care discussed with Ms. Tamara BECERRIL from NS Team    JERRICA Valles  Vascular Medicine Service   Pager [phone]

## 2025-04-18 NOTE — DISCHARGE INSTRUCTIONS
Obtain twice weekly CBC (follow Platelets) and RFP  Platelet goal >50    Follow up with Infectious Disease

## 2025-04-18 NOTE — ASSESSMENT & PLAN NOTE
69 year old male with medical history as noted above.   Hospital course as described above.   Patient with left distal femoral, popliteal and calf DVT's, as well as right brachial thrombus around PICC line (right arm PICC line has been removed).    Review of labs shows decreased hemoglobin from 9.9 grams to 8.8 grams, further decrease of platelets from 107 K to 89 -> 75-> 55 K today, without overt signs of bleeding.    Platelets continue to fluctuate, with down-trend from 150-75 K over the past 4 days. Patient has had exposure to Heparin with Heparin infusion and Lovenox injections in the past 5 days, with new concern for possible HIT. PF4 was returned as negative, so workup for HIT will be stopped.   Continues with Eliquis 5mg q12 hours.     Discussion with patient/spouse: awaiting return of labs including PF4.    Patient wearing Eliquis Med Alert band.      Recommendations:  ~Patient with continued platelet fluctuations; do not recommend discharge to SNF until platelets stabilize.  ~Consider changing antibiotics from Vancomycin that has known side effect of thrombocytopenia.   ~REPEAT CBC with a citrated tube; Phlebotomy has citrated tubes; not sure these tubes available on 2.  The citrated tube allows better counting of platelets, especially if they are clumping in a regular phlebotomy tube.   ~CONTINUE Eliquis 5mg q12 hours; anticipate need for 3-6 months of anticoagulation for provoked LLE DVT.    ~Continue Eliquis as long as platelets remain >50K and there is no evidence of bleeding.   ~MONITOR for bleeding, neurologic changes or further platelet decrease.  ~Daily labs while in hospital: CBC in a citrated tube(follow platelets), RFP  ~When at SNF: monitor CBC 2x a week to ensure that hg and plts are stable  ~Outpatient follow up with Dr. Zee Hardy from the Vascular Medicine Service has been scheduled as a virtual visit for 5/6/25 at 11:30 am; will discuss removal of IVC filter at the time of follow up  appointment.

## 2025-04-18 NOTE — PROGRESS NOTES
"Enmanuel Ahumada \"Dash\" is a 69 y.o. male on day 13 of admission presenting with Wound dehiscence.    Subjective   No acute events overnight      Objective     Physical Exam  awake  Ox3  Ou3R  FS  TM  BUE 5/5  BLE 5/5    Last Recorded Vitals  Blood pressure 101/69, pulse 88, temperature 36.3 °C (97.3 °F), temperature source Temporal, resp. rate 18, height 1.702 m (5' 7\"), weight 64.4 kg (141 lb 14.4 oz), SpO2 98%.  Intake/Output last 3 Shifts:  I/O last 3 completed shifts:  In: 240 (3.7 mL/kg) [P.O.:240]  Out: 2001 (31.1 mL/kg) [Urine:2000 (0.9 mL/kg/hr); Stool:1]  Weight: 64.4 kg     Relevant Results  Results from last 72 hours   Lab Units 04/17/25  0611 04/16/25  0720   WBC AUTO x10*3/uL 3.7* 3.8*   NRBC AUTO /100 WBCs 0.8* 1.9*   RBC AUTO x10*6/uL 3.04* 3.38*   HEMOGLOBIN g/dL 9.9* 11.0*   HEMATOCRIT % 29.0* 33.2*   MCV fL 95 98   MCH pg 32.6 32.5   MCHC g/dL 34.1 33.1   RDW % 15.0* 14.7*   PLATELETS AUTO x10*3/uL 75* 89*      Results from last 72 hours   Lab Units 04/17/25  0611 04/16/25  0720   GLUCOSE mg/dL 113* 114*   SODIUM mmol/L 137 140   POTASSIUM mmol/L 4.1 4.1   CHLORIDE mmol/L 103 104   CO2 mmol/L 26 24   ANION GAP mmol/L 12 16   BUN mg/dL 21 27*   CREATININE mg/dL 0.55 0.70   EGFR mL/min/1.73m*2 >90 >90   CALCIUM mg/dL 8.6 8.7   PHOSPHORUS mg/dL 3.6 3.1   ALBUMIN g/dL 3.3* 3.5   MAGNESIUM mg/dL 1.91 2.22          Assessment/Plan   Assessment & Plan  Wound dehiscence    69 M h/o HTN, HLD, smalls's esophagus, RO GBM s/p resection ([facility], 12/2023) with resultant L HH s/p chemo/RT presenting with tumor progression, 2/20 s/p redo craniotomy for tumor resection w 5 ALA, CTH POC, 2/21 MRI intended NTR, 3/16 MRI postop changes w fluid collection at dura and craniotomy site, worsening infiltrative disease, enhancement along posteromedial aspect of resection site, 4/5 p/w 1wk incisional drainage, 4/4 CTH airfluid level in resection cavity, 4/5 MRI brain w/wo incr'd resection cavity enhancement and " diffusion restriction  4/6 s/p R cranial wound washout and mesh cranioplasty (kaleb purulence), CTH POC, L temporal ICH, plts 81 s/p 2u plts, 4/7 rCTH incr SAH, min incr ICH, rrCTH stable, EEG neg dc'd  4/8 s/p PICC  4/9 CTH stable, SDD dc'd, EEG neg  4/10 SGD dc'd  4/11 DVT US x4, LLE distal femoral, popliteal, gastroc, peroneal, post tibial vv DVTs, RUE R brachial DVT, s/p IVC filter    4/13 CTH stable  4/14 PICC auto dc'd, agitation, CTH stable   4/15 s/p PICC  4/16 CTH stable, restraints removed, no agitation noted   4/17 plts 75, set PF4/BRANDO    Plan:  Tele (q8 neuro checks overnight)  Follow up AM labs and PF4/BRANDO  ID recs - vanc until 6/15  Continue LVX  VPA 250q6h  Vasc med recs  Dex 2q12  SLP recs-  regular diet   Continue home meds   PTOT- rehab      Jj Hernandez MD

## 2025-04-18 NOTE — PROGRESS NOTES
"Physical Therapy    Physical Therapy Treatment    Patient Name: Enmanuel Ahumada \"Dash\"  MRN: [id number]  Department: [facility] [id number]  Room: [address] [place], [place] [address]  Today's Date: 4/18/2025  Time Calculation  Start Time: 1459  Stop Time: 1538  Time Calculation (min): 39 min    Assessment/Plan   PT Assessment  PT Assessment Results: Decreased strength, Decreased endurance, Impaired balance, Decreased coordination, Decreased mobility, Decreased cognition, Impaired judgement, Decreased safety awareness  Rehab Prognosis: Good  Barriers to Discharge Home: Physical needs, Caregiver assistance  Caregiver Assistance: Caregiver assistance needed per identified barriers - however, level of patient's required assistance exceeds assistance available at home  Physical Needs: High falls risk due to function or environment  Evaluation/Treatment Tolerance: Patient tolerated treatment well  Medical Staff Made Aware: Yes  Strengths: Ability to acquire knowledge, Attitude of self, Premorbid level of function, Support and attitude of living partners  End of Session Communication: Bedside nurse  Assessment Comment: Pt progressing to ambulate 2x60 ft with FWW, variable min A/CGA, but able to progress to SBA. Pt exhibiting excellent effort throughout sesion. Pt remains appropriate for high intensity level of continued care when medically ready for d/c.  End of Session Patient Position: Up in chair, Alarm on  PT Plan  Inpatient/Swing Bed or Outpatient: Inpatient  PT Plan  Treatment/Interventions: Bed mobility, Transfer training, Gait training, Stair training, Balance training, Neuromuscular re-education, Strengthening, Endurance training, Therapeutic exercise, Range of motion, Therapeutic activity  PT Plan: Ongoing PT  PT Frequency: 5 times per week  PT Discharge Recommendations: High intensity level of continued care  Equipment Recommended upon Discharge:  (TBD at next level of care)  PT Recommended Transfer Status: Assist x1, Assistive device  PT - OK to " Discharge: Yes (meaning pt has been evaluated and d/c reccs have been made)    General Visit Information:   PT  Visit  PT Received On: 04/18/25  Response to Previous Treatment: Patient with no complaints from previous session.  General  Reason for Referral: Pt initially presented with tumor progression (RO GBM), s/p redo craniotomy for tumor resection in 2/2025. 4/5 p/w 1wk incisional drainage, 4/4 CTH airfluid level in resection cavity, 4/5 MRI brain w/wo incr'd resection cavity enhancement and diffusion restriction 4/6 s/p R cranial wound washout and mesh cranioplasty. 4/7 rCTH incr SAH, min incr ICH, rrCTH stable, EEG neg dc'd.  Past Medical History Relevant to Rehab: HTN, HLD, smalls's esophagus, RO GBM s/p resection ([facility], 12/2023) with resultant L HH s/p chemo/RT  Family/Caregiver Present: Yes  Caregiver Feedback: wife present and supportive throughout session  Prior to Session Communication: Bedside nurse  Patient Position Received: Bed, 3 rail up, Alarm on  General Comment: Pt supine. Pleasant, agreeable and cooperative to PT. Eager to mobilize, demonstrating excellent effort throughout session. Pt progressing to ambulate 2x60 ft with FWW, variable min A/CGA, but able to progress to SBA. Pt tolerated treatment well.    Subjective   Precautions:  Precautions  Hearing/Visual Limitations: per pt and wife report, pt with limited L visual field.  Medical Precautions: Fall precautions  Precautions Comment: SBP<160    Vital Signs Comment: seated EOB /76 prior to ambulation. BP seated in chair 104/74,  post ambulation. Seated in chair post ambulation 104/75, . HR increasing to 140s during ambulation, pt denying chest pain, dizziness or lightheadedness during ambulation.     Objective   Pain:  Pain Assessment  Pain Assessment: 0-10  0-10 (Numeric) Pain Score: 0 - No pain  Cognition:  Cognition  Overall Cognitive Status: Impaired (pt did not recall walking during PT session  yesterday)  Arousal/Alertness: Delayed responses to stimuli  Orientation Level: Disoriented to time (pt oriented to person, place and situation and year. pt able to recall month when given choices.)  Following Commands: Follows one step commands with repetition  Processing Speed: Delayed  Coordination:  Movements are Fluid and Coordinated: No  Coordination Comment: LUE dysmetria observed. RUE WFL.  Postural Control:  Postural Control  Postural Control: Within Functional Limits  Posture Comment: WFL as demonstrated during functional activity  Static Sitting Balance  Static Sitting-Balance Support: Feet supported, No upper extremity supported  Static Sitting-Level of Assistance: Distant supervision  Dynamic Sitting Balance  Dynamic Sitting-Balance Support: Feet supported  Dynamic Sitting-Level of Assistance: Distant supervision  Dynamic Sitting-Balance: Lateral lean, Forward lean  Static Standing Balance  Static Standing-Balance Support: Bilateral upper extremity supported  Static Standing-Level of Assistance: Contact guard  Extremity/Trunk Assessments:    RUE   RUE : Within Functional Limits (as demonstrated by functional activity)  LUE   LUE: Within Functional Limits (as demonstrated by functional activity)  RLE   RLE : Within Functional Limits (as demonstrated by functional activity)  Strength RLE  RLE Overall Strength: Greater than or equal to 3/5 as evidenced by functional mobility  LLE   LLE : Within Functional Limits (as demonstrated by functional activity)  Strength LLE  LLE Overall Strength: Greater than or equal to 3/5 as evidenced by functional mobility  Activity Tolerance:  Activity Tolerance  Endurance: Endurance does not limit participation in activity  Treatments:    Bed Mobility  Bed Mobility: Yes  Bed Mobility 1  Bed Mobility 1: Supine to sitting, Sitting to supine  Level of Assistance 1: Contact guard, Minimal verbal cues, Minimal tactile cues  Bed Mobility Comments 1: increased time, VC for  sequencing, use of rails with UE  Bed Mobility 2  Bed Mobility  2: Scooting  Level of Assistance 2: Contact guard  Bed Mobility Comments 2: scooting EOB, VCs for strategy    Ambulation/Gait Training  Ambulation/Gait Training Performed: Yes  Ambulation/Gait Training 1  Surface 1: Level tile, Carpet  Device 1: Rolling walker  Assistance 1: Contact guard, Minimum assistance  Quality of Gait 1: Narrow base of support, Inconsistent stride length, Decreased step length, Diminished heel strike  Comments/Distance (ft) 1: 2x60ft. variable min A/CGA, but able to progress to SBA.  Ambulation/Gait Training 2  Surface 2: Level tile  Device 2: Rolling walker  Assistance 2: Minimum assistance, Contact guard  Quality of Gait 2: Narrow base of support, Diminished heel strike, Inconsistent stride length, Decreased step length  Comments/Distance (ft) 2: 2x25ft. variable min A, mostly CGA  Transfers  Transfer: Yes  Transfer 1  Transfer From 1: Sit to, Stand to  Transfer to 1: Sit  Technique 1: Sit to stand, Stand to sit  Transfer Device 1: Walker  Transfer Level of Assistance 1: Contact guard, Minimal verbal cues, Minimal tactile cues  Trials/Comments 1: x3 trials. VCs for hand placement.    Stairs  Stairs: No    Outcome Measures:    [facility] Basic Mobility  Turning from your back to your side while in a flat bed without using bedrails: A little  Moving from lying on your back to sitting on the side of a flat bed without using bedrails: A little  Moving to and from bed to chair (including a wheelchair): A little  Standing up from a chair using your arms (e.g. wheelchair or bedside chair): A little  To walk in hospital room: A little  Climbing 3-5 steps with railing: Total  Basic Mobility - Total Score: 16    [place]  Sitting Balance: Steady, safe  Arises: Able, uses arms to help  Attempts to Arise: Able, requires more than one attempt  Immediate Standing Balance (First 5 Seconds): Steady but uses walker or other support  Standing Balance:  Steady but wide stance, uses cane or other support  Nudged: Begins to fall  Eyes Closed: Unsteady  Turned 360 Degrees: Steadiness: Unsteady (Grabs, staggers)  Turned 360 Degrees: Continuity of Steps: Discontinuous steps  Sitting Down: Uses arms or not a smooth motion  Balance Score: 6  Initiation of Gait: No hesitancy  Step Height: R Swing Foot: Right foot complete clears floor  Step Length: R Swing Foot: Passes left stance foot  Step Height: L Swing Foot: Left foot complete clears floor  Step Length: L Swing Foot: Passes right stance foot  Step Symmetry: Right and left step appear equal  Step Continuity: Steps appear continuous  Path: Mild/moderate deviation or uses walking aid  Trunk: Marked sway or uses walking aid  Walking Time: Heels almost touching while walking  Gait Score: 9  Total Score: 15    Education Documentation  Body Mechanics, taught by DAVID Verde at 4/18/2025  6:28 PM.  Learner: Patient  Readiness: Eager  Method: Explanation  Response: Verbalizes Understanding  Comment: role of PT, POC    Precautions, taught by DAVID Verde at 4/18/2025  6:28 PM.  Learner: Patient  Readiness: Eager  Method: Explanation  Response: Verbalizes Understanding  Comment: role of PT, POC    Mobility Training, taught by DAVID Verde at 4/18/2025  6:28 PM.  Learner: Patient  Readiness: Eager  Method: Explanation  Response: Verbalizes Understanding  Comment: role of PT, POC    Education Comments  No comments found.        OP EDUCATION:       Encounter Problems       Encounter Problems (Active)       PT Problem       Patient will perform bed mobility IND to reduce risk of developing decubitus ulcers.  (Progressing)       Start:  04/08/25    Expected End:  04/22/25            Patient will ambulate at least 150 ft. with </= close sup and LRD to improve tolerance of household distances.  (Progressing)       Start:  04/08/25    Expected End:  04/22/25            Patient will perform the 5-Time Sit to Stand test  in <14 sec to indicate decreased falls risk. (Progressing)       Start:  04/08/25    Expected End:  04/22/25            Patient will perform sit to stand and stand to sit transfers with </= close sup and LRD to increase functional strength.  (Progressing)       Start:  04/08/25    Expected End:  04/22/25            BLE 5/5 (Progressing)       Start:  04/08/25    Expected End:  04/22/25               Pain - Adult

## 2025-04-18 NOTE — PROGRESS NOTES
Vancomycin Dosing by Pharmacy  FOLLOW UP    Enmanuel Ahumada is a 69 y.o. male who Pharmacy is consulted to dose vancomycin for CNS/meningoencephalitis.     Based on the patient's indication and renal status, this patient is being dosed based on a goal vancomycin AUC of 500-600.     Current vancomycin dose: 1250 mg every 12 hours    Estimated vancomycin AUC on current dose: 533 mg/L.hr    Renal function is currently stable.    Estimated Creatinine Clearance: 112.6 mL/min (by C-G formula based on SCr of 0.58 mg/dL).    Results from last 7 days   Lab Units 04/18/25  1548 04/18/25  1325 04/17/25  0611 04/16/25  0720 04/15/25  0708   CREATININE mg/dL  --  0.58 0.55 0.70 0.66   BUN mg/dL  --  19 21 27* 20   WBC AUTO x10*3/uL 4.0* 3.4* 3.7* 3.8* 4.2*        Visit Vitals  /70   Pulse 93   Temp 36.3 °C (97.3 °F) (Temporal)   Resp 18       Staph/MRSA Screen Culture   Date/Time Value Ref Range Status   04/08/2025 08:26 AM No Staphylococcus aureus isolated  Final     Gram Stain   Date/Time Value Ref Range Status   04/06/2025 10:07 AM (4+) Abundant Polymorphonuclear leukocytes (A)  Final   04/06/2025 10:07 AM (4+) Abundant Gram positive cocci (A)  Final     Blood Culture   Date/Time Value Ref Range Status   04/05/2025 12:34 PM No growth at 4 days -  FINAL REPORT  Final   04/05/2025 12:34 PM No growth at 4 days -  FINAL REPORT  Final     Tissue/Wound Culture/Smear   Date/Time Value Ref Range Status   04/06/2025 10:07 AM (4+) Abundant Staphylococcus epidermidis (A)  Final        Susceptibility data from last 90 days.  Collected Specimen Info Organism Clindamycin Erythromycin Oxacillin Tetracycline Trimethoprim/Sulfamethoxazole Vancomycin   04/06/25 Swab from BRAIN BIOPSY Staphylococcus epidermidis  S  S  R  S  R  S   04/06/25 Swab from BRAIN BIOPSY Staphylococcus epidermidis         04/06/25 Swab from BRAIN BIOPSY Staphylococcus epidermidis             Assessment/Plan    AUC is within goal range. Continue current  vancomycin regimen. This dosing regimen is predicted by [employer] to result in the following pharmacokinetic parameters:     Regimen: 1250 mg IV every 12 hours.  Start time: 23:51 on 04/18/2025  Exposure target: AUC24 (range)400-600 mg/L.hr   [id number]: 533 mg/L.hr  AUC24,ss: 533 mg/L.hr  Probability of AUC24 > 400: 100 %  Ctrough,ss: 13.8 mg/L  Probability of Ctrough,ss > 20: 0 %    The next vancomycin level will be ordered for 4/21 at AM labs, unless clinically indicated sooner.  Will continue to monitor renal function daily while on vancomycin and order serum creatinine at least every 48 hours if not already ordered.  Will follow for continued vancomycin needs, clinical response, and signs/symptoms of toxicity.     Karen Carlos, [facility]

## 2025-04-19 ENCOUNTER — APPOINTMENT (OUTPATIENT)
Dept: CARDIOLOGY | Facility: HOSPITAL | Age: 70
DRG: 907 | End: 2025-04-19
Payer: MEDICARE

## 2025-04-19 LAB
ABO GROUP (TYPE) IN BLOOD: NORMAL
ALBUMIN SERPL BCP-MCNC: 3 G/DL (ref 3.4–5)
ANION GAP SERPL CALC-SCNC: 10 MMOL/L (ref 10–20)
ANTIBODY SCREEN: NORMAL
BASOPHILS # BLD AUTO: 0 X10*3/UL (ref 0–0.1)
BASOPHILS NFR BLD AUTO: 0 %
BUN SERPL-MCNC: 16 MG/DL (ref 6–23)
CALCIUM SERPL-MCNC: 8.6 MG/DL (ref 8.6–10.6)
CHLORIDE SERPL-SCNC: 107 MMOL/L (ref 98–107)
CO2 SERPL-SCNC: 30 MMOL/L (ref 21–32)
CREAT SERPL-MCNC: 0.58 MG/DL (ref 0.5–1.3)
EGFRCR SERPLBLD CKD-EPI 2021: >90 ML/MIN/1.73M*2
EOSINOPHIL # BLD AUTO: 0 X10*3/UL (ref 0–0.7)
EOSINOPHIL NFR BLD AUTO: 0 %
ERYTHROCYTE [DISTWIDTH] IN BLOOD BY AUTOMATED COUNT: 15.6 % (ref 11.5–14.5)
ERYTHROCYTE [DISTWIDTH] IN BLOOD BY AUTOMATED COUNT: 15.9 % (ref 11.5–14.5)
GLUCOSE BLD MANUAL STRIP-MCNC: 109 MG/DL (ref 74–99)
GLUCOSE BLD MANUAL STRIP-MCNC: 122 MG/DL (ref 74–99)
GLUCOSE BLD MANUAL STRIP-MCNC: 92 MG/DL (ref 74–99)
GLUCOSE SERPL-MCNC: 90 MG/DL (ref 74–99)
HCT VFR BLD AUTO: 24.9 % (ref 41–52)
HCT VFR BLD AUTO: 25.5 % (ref 41–52)
HGB BLD-MCNC: 8.8 G/DL (ref 13.5–17.5)
HGB BLD-MCNC: 9.1 G/DL (ref 13.5–17.5)
IMM GRANULOCYTES # BLD AUTO: 0.13 X10*3/UL (ref 0–0.7)
IMM GRANULOCYTES NFR BLD AUTO: 4.2 % (ref 0–0.9)
LYMPHOCYTES # BLD AUTO: 0.66 X10*3/UL (ref 1.2–4.8)
LYMPHOCYTES NFR BLD AUTO: 21.4 %
MCH RBC QN AUTO: 33 PG (ref 26–34)
MCH RBC QN AUTO: 33.2 PG (ref 26–34)
MCHC RBC AUTO-ENTMCNC: 35.3 G/DL (ref 32–36)
MCHC RBC AUTO-ENTMCNC: 35.7 G/DL (ref 32–36)
MCV RBC AUTO: 93 FL (ref 80–100)
MCV RBC AUTO: 93 FL (ref 80–100)
MONOCYTES # BLD AUTO: 0.33 X10*3/UL (ref 0.1–1)
MONOCYTES NFR BLD AUTO: 10.7 %
NEUTROPHILS # BLD AUTO: 1.96 X10*3/UL (ref 1.2–7.7)
NEUTROPHILS NFR BLD AUTO: 63.7 %
NRBC BLD-RTO: 0.6 /100 WBCS (ref 0–0)
NRBC BLD-RTO: 0.6 /100 WBCS (ref 0–0)
PHOSPHATE SERPL-MCNC: 3.5 MG/DL (ref 2.5–4.9)
PLATELET # BLD AUTO: 129 X10*3/UL (ref 150–450)
PLATELET # BLD AUTO: 49 X10*3/UL (ref 150–450)
POTASSIUM SERPL-SCNC: 3.5 MMOL/L (ref 3.5–5.3)
RBC # BLD AUTO: 2.67 X10*6/UL (ref 4.5–5.9)
RBC # BLD AUTO: 2.74 X10*6/UL (ref 4.5–5.9)
RH FACTOR (ANTIGEN D): NORMAL
SODIUM SERPL-SCNC: 143 MMOL/L (ref 136–145)
WBC # BLD AUTO: 3.1 X10*3/UL (ref 4.4–11.3)
WBC # BLD AUTO: 3.3 X10*3/UL (ref 4.4–11.3)

## 2025-04-19 PROCEDURE — 82947 ASSAY GLUCOSE BLOOD QUANT: CPT

## 2025-04-19 PROCEDURE — 99233 SBSQ HOSP IP/OBS HIGH 50: CPT | Performed by: INTERNAL MEDICINE

## 2025-04-19 PROCEDURE — 2500000004 HC RX 250 GENERAL PHARMACY W/ HCPCS (ALT 636 FOR OP/ED): Mod: JZ

## 2025-04-19 PROCEDURE — 82746 ASSAY OF FOLIC ACID SERUM: CPT | Performed by: INTERNAL MEDICINE

## 2025-04-19 PROCEDURE — 87340 HEPATITIS B SURFACE AG IA: CPT | Performed by: INTERNAL MEDICINE

## 2025-04-19 PROCEDURE — 2500000004 HC RX 250 GENERAL PHARMACY W/ HCPCS (ALT 636 FOR OP/ED)

## 2025-04-19 PROCEDURE — 2500000005 HC RX 250 GENERAL PHARMACY W/O HCPCS

## 2025-04-19 PROCEDURE — P9037 PLATE PHERES LEUKOREDU IRRAD: HCPCS

## 2025-04-19 PROCEDURE — 36430 TRANSFUSION BLD/BLD COMPNT: CPT

## 2025-04-19 PROCEDURE — P9073 PLATELETS PHERESIS PATH REDU: HCPCS

## 2025-04-19 PROCEDURE — 84100 ASSAY OF PHOSPHORUS: CPT

## 2025-04-19 PROCEDURE — 86038 ANTINUCLEAR ANTIBODIES: CPT | Performed by: INTERNAL MEDICINE

## 2025-04-19 PROCEDURE — 93010 ELECTROCARDIOGRAM REPORT: CPT | Performed by: INTERNAL MEDICINE

## 2025-04-19 PROCEDURE — 85027 COMPLETE CBC AUTOMATED: CPT | Performed by: STUDENT IN AN ORGANIZED HEALTH CARE EDUCATION/TRAINING PROGRAM

## 2025-04-19 PROCEDURE — 85025 COMPLETE CBC W/AUTO DIFF WBC: CPT

## 2025-04-19 PROCEDURE — 93005 ELECTROCARDIOGRAM TRACING: CPT

## 2025-04-19 PROCEDURE — 1100000001 HC PRIVATE ROOM DAILY

## 2025-04-19 PROCEDURE — 2500000001 HC RX 250 WO HCPCS SELF ADMINISTERED DRUGS (ALT 637 FOR MEDICARE OP)

## 2025-04-19 PROCEDURE — 86850 RBC ANTIBODY SCREEN: CPT

## 2025-04-19 PROCEDURE — 99223 1ST HOSP IP/OBS HIGH 75: CPT | Performed by: STUDENT IN AN ORGANIZED HEALTH CARE EDUCATION/TRAINING PROGRAM

## 2025-04-19 PROCEDURE — 2500000002 HC RX 250 W HCPCS SELF ADMINISTERED DRUGS (ALT 637 FOR MEDICARE OP, ALT 636 FOR OP/ED)

## 2025-04-19 RX ADMIN — SENNOSIDES AND DOCUSATE SODIUM 1 TABLET: 50; 8.6 TABLET ORAL at 20:22

## 2025-04-19 RX ADMIN — VALPROATE SODIUM 250 MG: 100 INJECTION, SOLUTION INTRAVENOUS at 13:43

## 2025-04-19 RX ADMIN — APIXABAN 5 MG: 5 TABLET, FILM COATED ORAL at 09:18

## 2025-04-19 RX ADMIN — ATORVASTATIN CALCIUM 40 MG: 40 TABLET, FILM COATED ORAL at 20:22

## 2025-04-19 RX ADMIN — POLYETHYLENE GLYCOL 3350 17 G: 17 POWDER, FOR SOLUTION ORAL at 09:18

## 2025-04-19 RX ADMIN — CLONIDINE HYDROCHLORIDE 0.2 MG: 0.1 TABLET ORAL at 20:22

## 2025-04-19 RX ADMIN — VALPROATE SODIUM 250 MG: 100 INJECTION, SOLUTION INTRAVENOUS at 08:00

## 2025-04-19 RX ADMIN — CLONIDINE HYDROCHLORIDE 0.2 MG: 0.1 TABLET ORAL at 09:18

## 2025-04-19 RX ADMIN — VALPROATE SODIUM 250 MG: 100 INJECTION, SOLUTION INTRAVENOUS at 20:22

## 2025-04-19 RX ADMIN — PANTOPRAZOLE SODIUM 40 MG: 40 INJECTION, POWDER, FOR SOLUTION INTRAVENOUS at 09:18

## 2025-04-19 RX ADMIN — VALPROATE SODIUM 250 MG: 100 INJECTION, SOLUTION INTRAVENOUS at 02:20

## 2025-04-19 RX ADMIN — VANCOMYCIN HYDROCHLORIDE 1250 MG: 1.25 INJECTION, POWDER, LYOPHILIZED, FOR SOLUTION INTRAVENOUS at 12:14

## 2025-04-19 RX ADMIN — APIXABAN 5 MG: 5 TABLET, FILM COATED ORAL at 20:22

## 2025-04-19 RX ADMIN — QUETIAPINE FUMARATE 25 MG: 25 TABLET ORAL at 20:22

## 2025-04-19 ASSESSMENT — COGNITIVE AND FUNCTIONAL STATUS - GENERAL
DAILY ACTIVITIY SCORE: 18
TOILETING: A LITTLE
STANDING UP FROM CHAIR USING ARMS: A LITTLE
DRESSING REGULAR UPPER BODY CLOTHING: A LITTLE
STANDING UP FROM CHAIR USING ARMS: A LITTLE
MOBILITY SCORE: 20
PERSONAL GROOMING: A LITTLE
WALKING IN HOSPITAL ROOM: A LITTLE
EATING MEALS: A LITTLE
DRESSING REGULAR UPPER BODY CLOTHING: A LITTLE
WALKING IN HOSPITAL ROOM: A LITTLE
DRESSING REGULAR LOWER BODY CLOTHING: A LITTLE
MOBILITY SCORE: 20
EATING MEALS: A LITTLE
HELP NEEDED FOR BATHING: A LITTLE
TOILETING: A LITTLE
HELP NEEDED FOR BATHING: A LITTLE
MOVING TO AND FROM BED TO CHAIR: A LITTLE
PERSONAL GROOMING: A LITTLE
DRESSING REGULAR LOWER BODY CLOTHING: A LITTLE
MOVING TO AND FROM BED TO CHAIR: A LITTLE
DAILY ACTIVITIY SCORE: 18
CLIMB 3 TO 5 STEPS WITH RAILING: A LITTLE
CLIMB 3 TO 5 STEPS WITH RAILING: A LITTLE

## 2025-04-19 ASSESSMENT — PAIN SCALES - GENERAL
PAINLEVEL_OUTOF10: 0 - NO PAIN

## 2025-04-19 ASSESSMENT — PAIN - FUNCTIONAL ASSESSMENT
PAIN_FUNCTIONAL_ASSESSMENT: 0-10
PAIN_FUNCTIONAL_ASSESSMENT: 0-10

## 2025-04-19 NOTE — SIGNIFICANT EVENT
GBM s/p resection, completed chemo/xrt 3/2024 followed by adjuvant temodar c/b thrombocytopenia (last 1/10/25) with redo-craniotomy 2/20/25 planned to start lomustine/bevacizumab. Admitted for surgical wound dehiscence with MRSE intracranial wound infection s/p washout 4/6 with coverage with titanium mesh.    Looking at plt trend, thrombocytopenia present prior to vancomycin and fluctuant as well as not common culprit. Many factors contributing including temodar (although off since 1/2025 still may be recovering) as well as multiple DVTs on AC.    Doubt vancomycin contributing to thrombocytopenia and do not have alternative to treat MRSE intracranial infection.    Please page or epic chat with questions. Thanks!    Ruby Rehman MD

## 2025-04-19 NOTE — PROGRESS NOTES
"Subjective:  No overt bleeding, reports some confusion    Objective:     Physical Exam  /70 Comment: re-checked  Pulse 90   Temp 36.3 °C (97.3 °F) (Temporal)   Resp 18   Ht 1.702 m (5' 7\")   Wt 64.4 kg (141 lb 14.4 oz)   BMI 22.22 kg/m²      OE:  Laying in bed  NAD  CV reg, not tachy  Lungs CTA  EXT no edema  No SCDs applied    Medications   apixaban, 5 mg, oral, BID  atorvastatin, 40 mg, nasogastric tube, Daily  cloNIDine, 0.2 mg, nasogastric tube, BID  insulin lispro, 0-5 Units, subcutaneous, q6h  lidocaine, 5 mL, infiltration, Once  lidocaine, 1 patch, transdermal, Daily  pantoprazole, 40 mg, intravenous, Daily  polyethylene glycol, 17 g, nasogastric tube, Daily  QUEtiapine, 25 mg, nasogastric tube, Nightly  sennosides-docusate sodium, 1 tablet, nasogastric tube, Nightly  valproate sodium, 250 mg, intravenous, q6h  vancomycin, 1,250 mg, intravenous, q12h     Lab Review     PTT - 4/14/2025: 11:46 AM  1.1   12.3 29     Results from last 7 days   Lab Units 04/19/25  0457 04/18/25  1548 04/18/25  1325   WBC AUTO x10*3/uL 3.1* 4.0* 3.4*   HEMOGLOBIN g/dL 8.8* 10.1* 8.8*   HEMATOCRIT % 24.9* 30.1* 24.7*   PLATELETS AUTO x10*3/uL 49* 71* 55*       Results from last 7 days   Lab Units 04/19/25  0457 04/18/25  1325 04/17/25  0611   SODIUM mmol/L 143 139 137   POTASSIUM mmol/L 3.5 3.8 4.1   CHLORIDE mmol/L 107 104 103   CO2 mmol/L 30 26 26   BUN mg/dL 16 19 21   CREATININE mg/dL 0.58 0.58 0.55   GLUCOSE mg/dL 90 115* 113*   CALCIUM mg/dL 8.6 8.3* 8.6       Results from last 7 days   Lab Units 04/14/25  1146   APTT seconds 29   INR  1.1       Results from last 7 days   Lab Units 04/15/25  1912 04/15/25  1524 04/14/25  1836   ANTI XA UNFRACTIONATED IU/mL 0.6 0.5 0.8       A/P:  68 yo man admitted with wound drainage from craniotomy site. H/O GBM s/p resection 2023 and 2025. Now S/P washout and mesh cranioplasty. 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    VM following for:  left distal femoral, popliteal ->calf DVT s/p IVCF " 4/11/25   right brachial DVT PICC related        Rx UFH gtt -> LMWH -> eliquis 5 mg q 12 hours.    Has ongoing issues with thrombocytopenia - c/f vanco contributing  Per ID note no plans to change vanco given the MRSE    PF4 is normal   No other clear etiology for the thrombocytopenia  Trend has been increasing since admission to a peak of 150K and now decreasing with alisson today of 49K  Last coags 4/14 were normal and PLT >100K at that time    This is at the threshhold for where AC is deemed safe to be continued      REC:  OK to continue the eliquis today but if plt trend lower I would be inclined to hold AC    In addition to concerns regarding the vanco  Valproate can be associated with thrombocytopenia - may consider switching this to an alternative agent    Consider hematology eval for other etiologies  Send peripheral smear for review and PLT clumping  Unclear if the PLT 71K was a citrated sample based on lab review - or if this has been done    Send LDH, D-dimer, hepatic panel, coags    VM following  Please page [phone] for any concerns    Adilia Guerra MD

## 2025-04-19 NOTE — PROGRESS NOTES
"Enmanuel Ahumada \"Dash\" is a 69 y.o. male on day 14 of admission presenting with Wound dehiscence.    Subjective   No acute events overnight      Objective     Physical Exam  awake  Ox3  Ou3R  FS  TM  BUE 5/5  BLE 5/5    Last Recorded Vitals  Blood pressure 118/50, pulse 107, temperature 37.3 °C (99.1 °F), temperature source Temporal, resp. rate 18, height 1.702 m (5' 7\"), weight 64.4 kg (141 lb 14.4 oz), SpO2 95%.  Intake/Output last 3 Shifts:  I/O last 3 completed shifts:  In: 950 (14.8 mL/kg) [P.O.:600; IV Piggyback:350]  Out: 1870 (29.1 mL/kg) [Urine:1870 (0.8 mL/kg/hr)]  Weight: 64.4 kg     Relevant Results  Results from last 72 hours   Lab Units 04/19/25  0457 04/18/25  1548   WBC AUTO x10*3/uL 3.1* 4.0*   NRBC AUTO /100 WBCs 0.6* 0.0   RBC AUTO x10*6/uL 2.67* 3.10*   HEMOGLOBIN g/dL 8.8* 10.1*   HEMATOCRIT % 24.9* 30.1*   MCV fL 93 97   MCH pg 33.0 32.6   MCHC g/dL 35.3 33.6   RDW % 15.6* 15.7*   PLATELETS AUTO x10*3/uL 49* 71*   MPV fL  --  8.8      Results from last 72 hours   Lab Units 04/19/25  0457 04/18/25  1325 04/17/25  0611   GLUCOSE mg/dL 90 115* 113*   SODIUM mmol/L 143 139 137   POTASSIUM mmol/L 3.5 3.8 4.1   CHLORIDE mmol/L 107 104 103   CO2 mmol/L 30 26 26   ANION GAP mmol/L 10 13 12   BUN mg/dL 16 19 21   CREATININE mg/dL 0.58 0.58 0.55   EGFR mL/min/1.73m*2 >90 >90 >90   CALCIUM mg/dL 8.6 8.3* 8.6   PHOSPHORUS mg/dL 3.5 3.3 3.6   ALBUMIN g/dL 3.0* 2.9* 3.3*   MAGNESIUM mg/dL  --   --  1.91          Assessment/Plan   Assessment & Plan  Wound dehiscence    69 M h/o HTN, HLD, smalls's esophagus, RO GBM s/p resection ([facility], 12/2023) with resultant L HH s/p chemo/RT presenting with tumor progression, 2/20 s/p redo craniotomy for tumor resection w 5 ALA, CTH POC, 2/21 MRI intended NTR, 3/16 MRI postop changes w fluid collection at dura and craniotomy site, worsening infiltrative disease, enhancement along posteromedial aspect of resection site, 4/5 p/w 1wk incisional drainage, 4/4 CTH airfluid " level in resection cavity, 4/5 MRI brain w/wo incr'd resection cavity enhancement and diffusion restriction  4/6 s/p R cranial wound washout and mesh cranioplasty (kaleb purulence), CTH POC, L temporal ICH, plts 81 s/p 2u plts, 4/7 rCTH incr SAH, min incr ICH, rrCTH stable, EEG neg dc'd  4/8 s/p PICC  4/9 CTH stable, SDD dc'd, EEG neg  4/10 SGD dc'd  4/11 DVT US x4, LLE distal femoral, popliteal, gastroc, peroneal, post tibial vv DVTs, RUE R brachial DVT, s/p IVC filter    4/13 CTH stable  4/14 PICC auto dc'd, agitation, CTH stable   4/15 s/p PICC  4/16 CTH stable, restraints removed, no agitation noted   4/17 plts 75, set PF4/BRANDO  4/19 plts 49, plan to give 2 units platelets    Plan:  Tele (q8 neuro checks overnight)  Follow up labs   Appreciate ID recs re switching Vanc in AM due to thrombocytopenia   Appreciate vasc med recs   ID recs - vanc until 6/15  Continue LVX  VPA 250q6h  Vasc med recs  Dex 2q12  SLP recs-  regular diet   Continue home meds   PTOT- rehab      Jj Hernandez MD

## 2025-04-19 NOTE — CARE PLAN
The patient's goals for the shift include free from fall    The clinical goals for the shift include Patient will remain safe during shift.

## 2025-04-20 ENCOUNTER — APPOINTMENT (OUTPATIENT)
Dept: CARDIOLOGY | Facility: HOSPITAL | Age: 70
End: 2025-04-20
Payer: MEDICARE

## 2025-04-20 VITALS
RESPIRATION RATE: 18 BRPM | DIASTOLIC BLOOD PRESSURE: 90 MMHG | BODY MASS INDEX: 22.27 KG/M2 | HEIGHT: 67 IN | SYSTOLIC BLOOD PRESSURE: 144 MMHG | OXYGEN SATURATION: 97 % | WEIGHT: 141.9 LBS | TEMPERATURE: 97.9 F | HEART RATE: 121 BPM

## 2025-04-20 LAB
ALBUMIN SERPL BCP-MCNC: 3.2 G/DL (ref 3.4–5)
ALP SERPL-CCNC: 71 U/L (ref 33–136)
ALT SERPL W P-5'-P-CCNC: 38 U/L (ref 10–52)
ANION GAP SERPL CALC-SCNC: 10 MMOL/L (ref 10–20)
APTT PPP: 30 SECONDS (ref 26–36)
AST SERPL W P-5'-P-CCNC: 17 U/L (ref 9–39)
BASOPHILS # BLD AUTO: 0.01 X10*3/UL (ref 0–0.1)
BASOPHILS NFR BLD AUTO: 0.4 %
BILIRUB DIRECT SERPL-MCNC: 0.3 MG/DL (ref 0–0.3)
BILIRUB SERPL-MCNC: 1.2 MG/DL (ref 0–1.2)
BLOOD EXPIRATION DATE: NORMAL
BLOOD EXPIRATION DATE: NORMAL
BUN SERPL-MCNC: 18 MG/DL (ref 6–23)
CALCIUM SERPL-MCNC: 8.7 MG/DL (ref 8.6–10.6)
CHLORIDE SERPL-SCNC: 102 MMOL/L (ref 98–107)
CO2 SERPL-SCNC: 30 MMOL/L (ref 21–32)
CREAT SERPL-MCNC: 0.56 MG/DL (ref 0.5–1.3)
D DIMER PPP FEU-MCNC: 2354 NG/ML FEU
DISPENSE STATUS: NORMAL
DISPENSE STATUS: NORMAL
EGFRCR SERPLBLD CKD-EPI 2021: >90 ML/MIN/1.73M*2
EOSINOPHIL # BLD AUTO: 0 X10*3/UL (ref 0–0.7)
EOSINOPHIL NFR BLD AUTO: 0 %
ERYTHROCYTE [DISTWIDTH] IN BLOOD BY AUTOMATED COUNT: 16 % (ref 11.5–14.5)
GLUCOSE BLD MANUAL STRIP-MCNC: 103 MG/DL (ref 74–99)
GLUCOSE BLD MANUAL STRIP-MCNC: 129 MG/DL (ref 74–99)
GLUCOSE BLD MANUAL STRIP-MCNC: 147 MG/DL (ref 74–99)
GLUCOSE BLD MANUAL STRIP-MCNC: 86 MG/DL (ref 74–99)
GLUCOSE SERPL-MCNC: 98 MG/DL (ref 74–99)
HCT VFR BLD AUTO: 25.5 % (ref 41–52)
HGB BLD-MCNC: 8.8 G/DL (ref 13.5–17.5)
HOLD SPECIMEN: NORMAL
IMM GRANULOCYTES # BLD AUTO: 0.06 X10*3/UL (ref 0–0.7)
IMM GRANULOCYTES NFR BLD AUTO: 2.1 % (ref 0–0.9)
INR PPP: 1.4 (ref 0.9–1.1)
LDH SERPL L TO P-CCNC: 230 U/L (ref 84–246)
LYMPHOCYTES # BLD AUTO: 0.47 X10*3/UL (ref 1.2–4.8)
LYMPHOCYTES NFR BLD AUTO: 16.7 %
MCH RBC QN AUTO: 32.7 PG (ref 26–34)
MCHC RBC AUTO-ENTMCNC: 34.5 G/DL (ref 32–36)
MCV RBC AUTO: 95 FL (ref 80–100)
MONOCYTES # BLD AUTO: 0.24 X10*3/UL (ref 0.1–1)
MONOCYTES NFR BLD AUTO: 8.5 %
NEUTROPHILS # BLD AUTO: 2.03 X10*3/UL (ref 1.2–7.7)
NEUTROPHILS NFR BLD AUTO: 72.3 %
NRBC BLD-RTO: 0.7 /100 WBCS (ref 0–0)
PHOSPHATE SERPL-MCNC: 3.7 MG/DL (ref 2.5–4.9)
PLATELET # BLD AUTO: 93 X10*3/UL (ref 150–450)
POTASSIUM SERPL-SCNC: 3.5 MMOL/L (ref 3.5–5.3)
PRODUCT BLOOD TYPE: 9500
PRODUCT BLOOD TYPE: 9500
PRODUCT CODE: NORMAL
PRODUCT CODE: NORMAL
PROT SERPL-MCNC: 4.9 G/DL (ref 6.4–8.2)
PROTHROMBIN TIME: 15.9 SECONDS (ref 9.8–12.4)
RBC # BLD AUTO: 2.69 X10*6/UL (ref 4.5–5.9)
SODIUM SERPL-SCNC: 138 MMOL/L (ref 136–145)
UNIT ABO: NORMAL
UNIT ABO: NORMAL
UNIT NUMBER: NORMAL
UNIT NUMBER: NORMAL
UNIT RH: NORMAL
UNIT RH: NORMAL
UNIT VOLUME: 223
UNIT VOLUME: 324
WBC # BLD AUTO: 2.8 X10*3/UL (ref 4.4–11.3)

## 2025-04-20 PROCEDURE — 2500000004 HC RX 250 GENERAL PHARMACY W/ HCPCS (ALT 636 FOR OP/ED): Mod: JZ

## 2025-04-20 PROCEDURE — 2500000004 HC RX 250 GENERAL PHARMACY W/ HCPCS (ALT 636 FOR OP/ED)

## 2025-04-20 PROCEDURE — 83615 LACTATE (LD) (LDH) ENZYME: CPT

## 2025-04-20 PROCEDURE — 84100 ASSAY OF PHOSPHORUS: CPT

## 2025-04-20 PROCEDURE — 2500000005 HC RX 250 GENERAL PHARMACY W/O HCPCS

## 2025-04-20 PROCEDURE — 85025 COMPLETE CBC W/AUTO DIFF WBC: CPT

## 2025-04-20 PROCEDURE — 82947 ASSAY GLUCOSE BLOOD QUANT: CPT

## 2025-04-20 PROCEDURE — 85060 BLOOD SMEAR INTERPRETATION: CPT

## 2025-04-20 PROCEDURE — 2500000001 HC RX 250 WO HCPCS SELF ADMINISTERED DRUGS (ALT 637 FOR MEDICARE OP)

## 2025-04-20 PROCEDURE — 82248 BILIRUBIN DIRECT: CPT

## 2025-04-20 PROCEDURE — 85379 FIBRIN DEGRADATION QUANT: CPT

## 2025-04-20 PROCEDURE — 80053 COMPREHEN METABOLIC PANEL: CPT

## 2025-04-20 PROCEDURE — 2500000002 HC RX 250 W HCPCS SELF ADMINISTERED DRUGS (ALT 637 FOR MEDICARE OP, ALT 636 FOR OP/ED)

## 2025-04-20 PROCEDURE — 1200000002 HC GENERAL ROOM WITH TELEMETRY DAILY

## 2025-04-20 PROCEDURE — 85730 THROMBOPLASTIN TIME PARTIAL: CPT

## 2025-04-20 PROCEDURE — 93005 ELECTROCARDIOGRAM TRACING: CPT

## 2025-04-20 RX ORDER — LACOSAMIDE 100 MG/1
100 TABLET ORAL EVERY 12 HOURS
Status: DISCONTINUED | OUTPATIENT
Start: 2025-04-20 | End: 2025-04-24

## 2025-04-20 RX ORDER — LEVETIRACETAM 500 MG/1
500 TABLET ORAL 2 TIMES DAILY
Status: DISCONTINUED | OUTPATIENT
Start: 2025-04-20 | End: 2025-04-20

## 2025-04-20 RX ADMIN — ATORVASTATIN CALCIUM 40 MG: 40 TABLET, FILM COATED ORAL at 20:41

## 2025-04-20 RX ADMIN — VANCOMYCIN HYDROCHLORIDE 1250 MG: 1.25 INJECTION, POWDER, LYOPHILIZED, FOR SOLUTION INTRAVENOUS at 12:31

## 2025-04-20 RX ADMIN — LACOSAMIDE 100 MG: 100 TABLET, FILM COATED ORAL at 23:54

## 2025-04-20 RX ADMIN — LACOSAMIDE 100 MG: 100 TABLET, FILM COATED ORAL at 12:35

## 2025-04-20 RX ADMIN — APIXABAN 5 MG: 5 TABLET, FILM COATED ORAL at 08:58

## 2025-04-20 RX ADMIN — QUETIAPINE FUMARATE 25 MG: 25 TABLET ORAL at 20:41

## 2025-04-20 RX ADMIN — APIXABAN 5 MG: 5 TABLET, FILM COATED ORAL at 20:41

## 2025-04-20 RX ADMIN — VALPROATE SODIUM 200 MG: 100 INJECTION, SOLUTION INTRAVENOUS at 14:22

## 2025-04-20 RX ADMIN — SENNOSIDES AND DOCUSATE SODIUM 1 TABLET: 50; 8.6 TABLET ORAL at 20:41

## 2025-04-20 RX ADMIN — POLYETHYLENE GLYCOL 3350 17 G: 17 POWDER, FOR SOLUTION ORAL at 08:59

## 2025-04-20 RX ADMIN — CLONIDINE HYDROCHLORIDE 0.2 MG: 0.1 TABLET ORAL at 20:41

## 2025-04-20 RX ADMIN — VALPROATE SODIUM 250 MG: 100 INJECTION, SOLUTION INTRAVENOUS at 02:09

## 2025-04-20 RX ADMIN — CLONIDINE HYDROCHLORIDE 0.2 MG: 0.1 TABLET ORAL at 08:58

## 2025-04-20 RX ADMIN — VANCOMYCIN HYDROCHLORIDE 1250 MG: 1.25 INJECTION, POWDER, LYOPHILIZED, FOR SOLUTION INTRAVENOUS at 00:37

## 2025-04-20 RX ADMIN — PANTOPRAZOLE SODIUM 40 MG: 40 INJECTION, POWDER, FOR SOLUTION INTRAVENOUS at 08:58

## 2025-04-20 RX ADMIN — VANCOMYCIN HYDROCHLORIDE 1250 MG: 1.25 INJECTION, POWDER, LYOPHILIZED, FOR SOLUTION INTRAVENOUS at 23:54

## 2025-04-20 ASSESSMENT — COGNITIVE AND FUNCTIONAL STATUS - GENERAL
WALKING IN HOSPITAL ROOM: A LITTLE
TOILETING: A LITTLE
DRESSING REGULAR LOWER BODY CLOTHING: A LITTLE
DAILY ACTIVITIY SCORE: 18
DRESSING REGULAR UPPER BODY CLOTHING: A LITTLE
PERSONAL GROOMING: A LITTLE
DRESSING REGULAR LOWER BODY CLOTHING: A LITTLE
TOILETING: A LITTLE
MOBILITY SCORE: 20
DRESSING REGULAR UPPER BODY CLOTHING: A LITTLE
WALKING IN HOSPITAL ROOM: A LITTLE
MOVING TO AND FROM BED TO CHAIR: A LITTLE
CLIMB 3 TO 5 STEPS WITH RAILING: A LITTLE
CLIMB 3 TO 5 STEPS WITH RAILING: A LITTLE
EATING MEALS: A LITTLE
MOBILITY SCORE: 20
HELP NEEDED FOR BATHING: A LITTLE
DAILY ACTIVITIY SCORE: 18
EATING MEALS: A LITTLE
STANDING UP FROM CHAIR USING ARMS: A LITTLE
STANDING UP FROM CHAIR USING ARMS: A LITTLE
HELP NEEDED FOR BATHING: A LITTLE
MOVING TO AND FROM BED TO CHAIR: A LITTLE
PERSONAL GROOMING: A LITTLE

## 2025-04-20 ASSESSMENT — PAIN SCALES - GENERAL
PAINLEVEL_OUTOF10: 0 - NO PAIN
PAINLEVEL_OUTOF10: 0 - NO PAIN

## 2025-04-20 ASSESSMENT — PAIN - FUNCTIONAL ASSESSMENT
PAIN_FUNCTIONAL_ASSESSMENT: 0-10
PAIN_FUNCTIONAL_ASSESSMENT: 0-10

## 2025-04-20 NOTE — CONSULTS
"Consults  History Of Present Illness  Enmanuel Ahumada \"Dash\" is a 69 y.o. 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    Epilepsy Risk Factors: RO GBM s/p resection (12/2023, 2/2025)  Previous EEG results (4/11/2025): R hemispheric structural lesion, mild diffuse encephalopathy; no epileptiform activity    Patient was seen in NSGY clinic by Dr. Cortez on 4/4, who noted drainage from his cranial incision. CTH demonstrated air in resection cavity, for which patient was admitted for wound washout in OR on 4/6. Post-operative course c/b thrombocytopenia (81), requiring 2U platelets. Platelets improved after transfusion. In the interim, patient was switched from home Keppra to VPA 250mg q6h for agitation on 4/8. Platelets began to downtrend again on 4/13, and patient received another 2U platelets on 4/19 for platelet <50k. HIT work-up negative; D-dimer pending.    Patient was placed on Keppra 1g BID after his initial GBM resection for seizure prophylaxis; Keppra decreased to 500mg BID at 4-week follow-up with NSGY. Patient was also on dexamethasone at home. Per patient and documentation from Dr. Rolon, patient has never had a seizure. At most recent visit in 3/2025, Dr. Rolon recommended weaning off Keppra. Overall, patient reported tolerating Keppra well.    ONC HISTORY  Initially presented to ED with several weeks of headaches in 12/2023. Imaging showed RO mass, and patient underwent resection with 5-ALA. Pathology revealed IDH wild-type, MGMT-methylated GBM. Patient subsequently completed temozolomide-XRT therapy in 3/2024, and continued on adjuvant temozolomide thereafter. Adjuvant therapy c/b significant thrombocytopenia (<100k); received most recent cycle in 1/2025.   Repeat MRI in " "1/2025 showed progression and patient underwent further resection in 2/2025. Patient was originally scheduled to start bevacizumab/lomustine in 4/2025.    Past Medical History  Medical History[1]  Surgical History  Surgical History[2]  Social History  Social History[3]  Allergies  Patient has no known allergies.  Prescriptions Prior to Admission[4]    Last Recorded Vitals  Blood pressure 117/89, pulse (!) 118, temperature 36.8 °C (98.2 °F), temperature source Temporal, resp. rate 18, height 1.702 m (5' 7\"), weight 64.4 kg (141 lb 14.4 oz), SpO2 97%.    Neurological Exam:  MENTAL STATUS:  General appearance: alert, NAD  Orientation: place, month (incorrect year)  Language: Expression, repetition, naming, comprehension intact  Follows complex commands across midline    CRANIAL NERVES:  - II: visual fields deferred as patient was tired  - III, IV, VI: HO, eyes midline  - VII: Face muscles symmetric with smile and eye closure  - VIII: Intact to voice  - XI: 5/5 strength on shoulder shrugging bilaterally    MOTOR: Tone and bulk normal in all extremities; spontaneously moves all extremities AG    REFLEXES:  R  L  Biceps   2+ 2+  Patellar   2+ 2+    COORDINATION: Intact on finger to nose bl    SENSORY: Intact to light touch in bl UE and LE    GAIT: deferred    Relevant Results  Results for orders placed or performed during the hospital encounter of 04/05/25 (from the past 24 hours)   Renal Function Panel   Result Value Ref Range    Glucose 90 74 - 99 mg/dL    Sodium 143 136 - 145 mmol/L    Potassium 3.5 3.5 - 5.3 mmol/L    Chloride 107 98 - 107 mmol/L    Bicarbonate 30 21 - 32 mmol/L    Anion Gap 10 10 - 20 mmol/L    Urea Nitrogen 16 6 - 23 mg/dL    Creatinine 0.58 0.50 - 1.30 mg/dL    eGFR >90 >60 mL/min/1.73m*2    Calcium 8.6 8.6 - 10.6 mg/dL    Phosphorus 3.5 2.5 - 4.9 mg/dL    Albumin 3.0 (L) 3.4 - 5.0 g/dL   CBC and Auto Differential   Result Value Ref Range    WBC 3.1 (L) 4.4 - 11.3 x10*3/uL    nRBC 0.6 (H) 0.0 - " 0.0 /100 WBCs    RBC 2.67 (L) 4.50 - 5.90 x10*6/uL    Hemoglobin 8.8 (L) 13.5 - 17.5 g/dL    Hematocrit 24.9 (L) 41.0 - 52.0 %    MCV 93 80 - 100 fL    MCH 33.0 26.0 - 34.0 pg    MCHC 35.3 32.0 - 36.0 g/dL    RDW 15.6 (H) 11.5 - 14.5 %    Platelets 49 (L) 150 - 450 x10*3/uL    Neutrophils % 63.7 40.0 - 80.0 %    Immature Granulocytes %, Automated 4.2 (H) 0.0 - 0.9 %    Lymphocytes % 21.4 13.0 - 44.0 %    Monocytes % 10.7 2.0 - 10.0 %    Eosinophils % 0.0 0.0 - 6.0 %    Basophils % 0.0 0.0 - 2.0 %    Neutrophils Absolute 1.96 1.20 - 7.70 x10*3/uL    Immature Granulocytes Absolute, Automated 0.13 0.00 - 0.70 x10*3/uL    Lymphocytes Absolute 0.66 (L) 1.20 - 4.80 x10*3/uL    Monocytes Absolute 0.33 0.10 - 1.00 x10*3/uL    Eosinophils Absolute 0.00 0.00 - 0.70 x10*3/uL    Basophils Absolute 0.00 0.00 - 0.10 x10*3/uL   POCT GLUCOSE   Result Value Ref Range    POCT Glucose 92 74 - 99 mg/dL   Prepare Platelets: 2 Units   Result Value Ref Range    PRODUCT CODE T2445M08     Unit Number [id number]     Unit ABO O     Unit RH NEG     Dispense Status TR     Blood Expiration Date 4/20/2025 11:59:00 PM EDT     PRODUCT BLOOD TYPE 9500     UNIT VOLUME 223     PRODUCT CODE N9472C96     Unit Number [id number]     Unit ABO O     Unit RH NEG     Dispense Status IS     Blood Expiration Date 4/20/2025 11:59:00 PM EDT     PRODUCT BLOOD TYPE 9500     UNIT VOLUME 324    Type and screen   Result Value Ref Range    ABO TYPE O     Rh TYPE POS     ANTIBODY SCREEN NEG    POCT GLUCOSE   Result Value Ref Range    POCT Glucose 109 (H) 74 - 99 mg/dL   POCT GLUCOSE   Result Value Ref Range    POCT Glucose 122 (H) 74 - 99 mg/dL   CBC   Result Value Ref Range    WBC 3.3 (L) 4.4 - 11.3 x10*3/uL    nRBC 0.6 (H) 0.0 - 0.0 /100 WBCs    RBC 2.74 (L) 4.50 - 5.90 x10*6/uL    Hemoglobin 9.1 (L) 13.5 - 17.5 g/dL    Hematocrit 25.5 (L) 41.0 - 52.0 %    MCV 93 80 - 100 fL    MCH 33.2 26.0 - 34.0 pg    MCHC 35.7 32.0 - 36.0 g/dL    RDW 15.9 (H) 11.5 - 14.5  %    Platelets 129 (L) 150 - 450 x10*3/uL   POCT GLUCOSE   Result Value Ref Range    POCT Glucose 103 (H) 74 - 99 mg/dL     *Note: Due to a large number of results and/or encounters for the requested time period, some results have not been displayed. A complete set of results can be found in Results Review.      I have personally reviewed the following imaging results:   Imaging  CT head wo IV contrast  Result Date: 4/16/2025  1. Stable to slightly increased intraparenchymal hematoma involving the left temporal lobe without significant change in adjacent mass effect. No new intraparenchymal or extra-axial hemorrhage identified. 2. Stable postsurgical changes of right parieto-occipital craniectomy with mesh closure.   I personally reviewed the images/study and I agree with the findings as stated by resident physician Salazar Martini MD. This study was interpreted at [facility], [place], [place].   MACRO: None   Signed by: Diamond Gregory 4/16/2025 11:30 AM Dictation workstation:   [id number]    IR CVC PICC  Result Date: 4/15/2025  1. Uncomplicated and technically successful placement of a single-lumen 5-[place]ic PICC left basilic vein line-optimal catheter length of 39 cm with catheter tip position at the cavoatrial junction. Uncomplicated procedure and the catheter is ready for use.   I was present for and/or performed the critical portions of the procedure and immediately available throughout the entire procedure.   I personally reviewed the image(s) / study and resident interpretation. I agree with the findings as stated.   Performed and dictated at [facility].   MACRO: None     Signed by: Tab Sanford 4/15/2025 12:48 PM Dictation workstation:   [id number]    CT head wo IV contrast  Result Date: 4/15/2025  No significant interval change when compared to prior CT 04/13/2025 with persistent intracranial hemorrhage and postoperative findings as detailed  "above.   I personally reviewed the images/study and I agree with the findings as stated.   MACRO: None   Signed by: Tamara Garcias 4/15/2025 7:14 AM Dictation workstation:   [id number]    XR chest 1 view  Result Date: 4/14/2025  1. Mild perihilar edema. Otherwise, no evidence of acute cardiopulmonary process. 2. Medical devices as above.   I personally reviewed the images/study and I agree with the findings as stated. This study was interpreted at [place], [place].   Signed by: Robert Nazario 4/14/2025 8:56 AM Dictation workstation:   [id number]    CT head wo IV contrast  Result Date: 4/13/2025  Essentially stable examination compared with 04/09/2025 with persistent intracranial hemorrhage and postoperative findings as above   Signed by: Tony Terrazas 4/13/2025 3:54 PM Dictation workstation:   [id number]    Assessment/Plan   Enmanuel Ahumada \"Dash\" is a 69 y.o. 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 Although VPA-associated thrombocytopenia usually occurs at higher doses, we cannot definitively rule out its contribution. Given that patient has been seizure-free, reasonable to consider discontinuation of anti-seizure medication. If anti-seizure medication is necessary from NSGY standpoint, consider switching back to home Keppra as patient had tolerated it well in the past.    RECOMMENDATIONS  - consider discontinuing VPA or switching back to home Keppra    Patient to be seen and discussed with attending physician, Dr. García.    Stacey Moe  PGY-4 Neurology     POST STAFFING UPDATE:  Examined and discussed with attending - at this time patient's thrombocytopenia may have an relation to valproate (if not causing, then at least not " helping)- and was planned to be weaned off ASMs. However given his current acute illness and risk of precipitating seizure (even though ahs never had prior) with an abrupt VPA withdrawal, would start lacosamide 100mg BID and taper VPA by 25% daily for next 2 days.    Recommendations  - start lacosmiade 100mg BID, can consider weaning off outpatient with Dr. Rolon as previously discussed  - VPA 200mg q6h   - then VPA 150mg q6h , then off    Epilepsy will sign off at this time, thank you for the interesting consult. Please do not hesitate to reach out with questions or concerns.  Curtis Krishnan MD  PGY 4 neurology         [1]   Past Medical History:  Diagnosis Date    Brain cancer (Multi)     GERD (gastroesophageal reflux disease)     Hyperlipidemia     Hypertension     Seizure disorder (Multi)    [2]   Past Surgical History:  Procedure Laterality Date    BRAIN SURGERY      CHOLECYSTECTOMY  2020    IR CVC PICC  4/15/2025    IR CVC PICC 4/15/2025 Dario Martinez MD [facility]    OTHER SURGICAL HISTORY  2019    Colonoscopy    OTHER SURGICAL HISTORY  2021    Endoscopy    VASECTOMY  Abt    [3]   Social History  Tobacco Use    Smoking status: Never     Passive exposure: Never    Smokeless tobacco: Never   Vaping Use    Vaping status: Never Used   Substance Use Topics    Alcohol use: Not Currently    Drug use: Yes     Types: Marijuana     Comment: thc sleep gummies   [4]   Medications Prior to Admission   Medication Sig Dispense Refill Last Dose/Taking    atorvastatin (Lipitor) 40 mg tablet TAKE 1 TABLET BY MOUTH EVERY DAY 90 tablet 3 2025 Bedtime    CALCIUM CARBONATE-VITAMIN D3 ORAL Take 1 capsule by mouth once daily.   2025 Morning    [] cephalexin (Keflex) 500 mg capsule Take 1 capsule (500 mg) by mouth 3 times a day for 14 days. 42 capsule 0 2025 Morning    dexAMETHasone (Decadron) 2 mg tablet Take 2 tabs (4mg) 2 times daily x3 days (3/18-3/20) then take 1 tab  (2mg) 2 times daily x3 days (3/21-3/23) then stop 18 tablet 0 2025 Morning    ondansetron (Zofran) 8 mg tablet Take 1 tablet (8 mg) by mouth every 8 hours if needed for nausea or vomiting. 60 tablet 5 2025 Morning    ubidecarenone (COENZYME Q10, BULK, MISC) Take 1 Capful by mouth once daily.   2025 Morning    acetaminophen (Tylenol) 325 mg tablet Take 2 tablets (650 mg) by mouth every 6 hours if needed for mild pain (1 - 3).       krill-omega-3-dha-epa-lipids [id number] mg capsule Take 1 capsule by mouth once daily.       [] lomustine (CeeNU) 10 mg capsule Take 1 capsule (10 mg) by mouth 1 time for 1 dose.  Take on Day 1 of each treatment cycle. Taking on an empty stomach may reduce nausea and vomiting.  Call your doctor if you vomit right after taking a lomustine capsule. 1 capsule 0     [] lomustine (CeeNU) 100 mg capsule Take 2 capsules (200 mg) by mouth 1 time for 1 dose.  Take on Day 1 of each treatment cycle. Taking on an empty stomach may reduce nausea and vomiting.  Call your doctor if you vomit right after taking a lomustine capsule. 2 capsule 0     [] lomustine (CeeNU) 40 mg capsule Take 1 capsule (40 mg) by mouth 1 time for 1 dose.  Take on Day 1 of each treatment cycle. Taking on an empty stomach may reduce nausea and vomiting.  Call your doctor if you vomit right after taking a lomustine capsule. 1 capsule 0     ondansetron (Zofran) 8 mg tablet Take 1 tablet (8 mg) by mouth every 8 hours if needed for nausea or vomiting. Take one hour prior to each chemotherapy dose in addition to every 8 hours as needed for nausea/vomiting 60 tablet 5     pantoprazole (ProtoNix) 40 mg EC tablet TAKE 1 TABLET BY MOUTH IN THE MORNING 30 MINUTES BEFORE BREAKFAST. 90 tablet 3     prochlorperazine (Compazine) 10 mg tablet Take 1 tablet (10 mg) by mouth every 6 hours if needed for nausea or vomiting. 60 tablet 5     sennosides (SENNA LAX ORAL) Take 1 tablet by mouth once daily.

## 2025-04-20 NOTE — PROGRESS NOTES
"Enmanuel Ahumada \"Dash\" is a 69 y.o. male on day 15 of admission presenting with Wound dehiscence.    Subjective   No acute events overnight      Objective     Physical Exam  awake  Ox3  Ou3R  FS  TM  BUE 5/5  BLE 5/5    Last Recorded Vitals  Blood pressure 119/70, pulse 104, temperature 36.4 °C (97.5 °F), temperature source Temporal, resp. rate 18, height 1.702 m (5' 7\"), weight 64.4 kg (141 lb 14.4 oz), SpO2 96%.  Intake/Output last 3 Shifts:  I/O last 3 completed shifts:  In: 1175 (18.3 mL/kg) [P.O.:600; Blood:225; IV Piggyback:350]  Out: 1520 (23.6 mL/kg) [Urine:1520 (0.7 mL/kg/hr)]  Weight: 64.4 kg     Relevant Results  Results from last 72 hours   Lab Units 04/19/25  2024 04/19/25  0457 04/18/25  1548   WBC AUTO x10*3/uL 3.3* 3.1* 4.0*   NRBC AUTO /100 WBCs 0.6* 0.6* 0.0   RBC AUTO x10*6/uL 2.74* 2.67* 3.10*   HEMOGLOBIN g/dL 9.1* 8.8* 10.1*   HEMATOCRIT % 25.5* 24.9* 30.1*   MCV fL 93 93 97   MCH pg 33.2 33.0 32.6   MCHC g/dL 35.7 35.3 33.6   RDW % 15.9* 15.6* 15.7*   PLATELETS AUTO x10*3/uL 129* 49* 71*   MPV fL  --   --  8.8      Results from last 72 hours   Lab Units 04/20/25  0028 04/19/25  0457   GLUCOSE mg/dL 98 90   SODIUM mmol/L 138 143   POTASSIUM mmol/L 3.5 3.5   CHLORIDE mmol/L 102 107   CO2 mmol/L 30 30   ANION GAP mmol/L 10 10   BUN mg/dL 18 16   CREATININE mg/dL 0.56 0.58   EGFR mL/min/1.73m*2 >90 >90   CALCIUM mg/dL 8.7 8.6   PHOSPHORUS mg/dL 3.7 3.5   ALBUMIN g/dL 3.2* 3.0*          Assessment/Plan   Assessment & Plan  Wound dehiscence    69 M h/o HTN, HLD, smalls's esophagus, RO GBM s/p resection ([facility], 12/2023) with resultant L HH s/p chemo/RT presenting with tumor progression, 2/20 s/p redo craniotomy for tumor resection w 5 ALA, CTH POC, 2/21 MRI intended NTR, 3/16 MRI postop changes w fluid collection at dura and craniotomy site, worsening infiltrative disease, enhancement along posteromedial aspect of resection site, 4/5 p/w 1wk incisional drainage, 4/4 CTH airfluid level in " resection cavity, 4/5 MRI brain w/wo incr'd resection cavity enhancement and diffusion restriction  4/6 s/p R cranial wound washout and mesh cranioplasty (kaleb purulence), CTH POC, L temporal ICH, plts 81 s/p 2u plts, 4/7 rCTH incr SAH, min incr ICH, rrCTH stable, EEG neg dc'd  4/8 s/p PICC  4/9 CTH stable, SDD dc'd, EEG neg  4/10 SGD dc'd  4/11 DVT US x4, LLE distal femoral, popliteal, gastroc, peroneal, post tibial vv DVTs, RUE R brachial DVT, s/p IVC filter    4/13 CTH stable  4/14 PICC auto dc'd, agitation, CTH stable   4/15 s/p PICC  4/16 CTH stable, restraints removed, no agitation noted   4/17 plts 75, set PF4/BRANDO  4/19 plts 49, plan to give 2 units platelets    Plan:  Tele (q8 neuro checks overnight)  Follow up labs   Appreciate ID recs - continue Vanc, not cause of thrombocytopenia, vanc until 6/15   Appreciate vasc med recs - consider switching VPA, may be cause of thrombocytopenia, continue eliquis, follow up labs  Neurology consulted, appreciate recommendations re AEDs and switching VPA  Will discuss switching VPA to keppra today  Dex 2q12  SLP recs-  regular diet   Continue home meds   PTOT- rehab      Jj Hernandez MD

## 2025-04-20 NOTE — CARE PLAN
The patient's goals for the shift include free from fall    The clinical goals for the shift include Pt will remain HDS for the shift    Over the shift, the patient did  make progress toward the following goals. No Barriers to progression

## 2025-04-21 ENCOUNTER — APPOINTMENT (OUTPATIENT)
Dept: NEUROLOGY | Facility: HOSPITAL | Age: 70
DRG: 907 | End: 2025-04-21
Payer: MEDICARE

## 2025-04-21 ENCOUNTER — APPOINTMENT (OUTPATIENT)
Dept: RADIOLOGY | Facility: HOSPITAL | Age: 70
DRG: 907 | End: 2025-04-21
Payer: MEDICARE

## 2025-04-21 ENCOUNTER — APPOINTMENT (OUTPATIENT)
Dept: CARDIOLOGY | Facility: HOSPITAL | Age: 70
End: 2025-04-21
Payer: MEDICARE

## 2025-04-21 LAB
ALBUMIN SERPL BCP-MCNC: 3 G/DL (ref 3.4–5)
ANION GAP SERPL CALC-SCNC: 11 MMOL/L (ref 10–20)
ATRIAL RATE: 111 BPM
ATRIAL RATE: 98 BPM
BASOPHILS # BLD AUTO: 0.01 X10*3/UL (ref 0–0.1)
BASOPHILS NFR BLD AUTO: 0.4 %
BUN SERPL-MCNC: 16 MG/DL (ref 6–23)
CALCIUM SERPL-MCNC: 8.2 MG/DL (ref 8.6–10.6)
CHLORIDE SERPL-SCNC: 101 MMOL/L (ref 98–107)
CO2 SERPL-SCNC: 28 MMOL/L (ref 21–32)
CREAT SERPL-MCNC: 0.65 MG/DL (ref 0.5–1.3)
EGFRCR SERPLBLD CKD-EPI 2021: >90 ML/MIN/1.73M*2
EOSINOPHIL # BLD AUTO: 0 X10*3/UL (ref 0–0.7)
EOSINOPHIL NFR BLD AUTO: 0 %
ERYTHROCYTE [DISTWIDTH] IN BLOOD BY AUTOMATED COUNT: 16.1 % (ref 11.5–14.5)
FOLATE SERPL-MCNC: 13 NG/ML
GLUCOSE BLD MANUAL STRIP-MCNC: 108 MG/DL (ref 74–99)
GLUCOSE BLD MANUAL STRIP-MCNC: 127 MG/DL (ref 74–99)
GLUCOSE BLD MANUAL STRIP-MCNC: 128 MG/DL (ref 74–99)
GLUCOSE BLD MANUAL STRIP-MCNC: 98 MG/DL (ref 74–99)
GLUCOSE SERPL-MCNC: 115 MG/DL (ref 74–99)
HBV SURFACE AG SERPL QL IA: NONREACTIVE
HCT VFR BLD AUTO: 24.1 % (ref 41–52)
HCV AB SER QL: NONREACTIVE
HGB BLD-MCNC: 8.5 G/DL (ref 13.5–17.5)
HIV 1+2 AB+HIV1 P24 AG SERPL QL IA: NONREACTIVE
HOLD SPECIMEN: NORMAL
IMM GRANULOCYTES # BLD AUTO: 0.03 X10*3/UL (ref 0–0.7)
IMM GRANULOCYTES NFR BLD AUTO: 1.2 % (ref 0–0.9)
LYMPHOCYTES # BLD AUTO: 0.43 X10*3/UL (ref 1.2–4.8)
LYMPHOCYTES NFR BLD AUTO: 17.6 %
MCH RBC QN AUTO: 32.7 PG (ref 26–34)
MCHC RBC AUTO-ENTMCNC: 35.3 G/DL (ref 32–36)
MCV RBC AUTO: 93 FL (ref 80–100)
MONOCYTES # BLD AUTO: 0.2 X10*3/UL (ref 0.1–1)
MONOCYTES NFR BLD AUTO: 8.2 %
NEUTROPHILS # BLD AUTO: 1.78 X10*3/UL (ref 1.2–7.7)
NEUTROPHILS NFR BLD AUTO: 72.6 %
NRBC BLD-RTO: 0 /100 WBCS (ref 0–0)
P AXIS: 31 DEGREES
P AXIS: 55 DEGREES
P OFFSET: 198 MS
P OFFSET: 198 MS
P ONSET: 148 MS
P ONSET: 148 MS
PATH REVIEW-CBC DIFFERENTIAL: NORMAL
PHOSPHATE SERPL-MCNC: 2.3 MG/DL (ref 2.5–4.9)
PLATELET # BLD AUTO: 69 X10*3/UL (ref 150–450)
POTASSIUM SERPL-SCNC: 3.6 MMOL/L (ref 3.5–5.3)
PR INTERVAL: 130 MS
PR INTERVAL: 134 MS
Q ONSET: 213 MS
Q ONSET: 215 MS
QRS COUNT: 16 BEATS
QRS COUNT: 18 BEATS
QRS DURATION: 80 MS
QRS DURATION: 84 MS
QT INTERVAL: 328 MS
QT INTERVAL: 352 MS
QTC CALCULATION(BAZETT): 446 MS
QTC CALCULATION(BAZETT): 449 MS
QTC FREDERICIA: 402 MS
QTC FREDERICIA: 414 MS
R AXIS: -13 DEGREES
R AXIS: 0 DEGREES
RBC # BLD AUTO: 2.6 X10*6/UL (ref 4.5–5.9)
SODIUM SERPL-SCNC: 136 MMOL/L (ref 136–145)
T AXIS: 32 DEGREES
T AXIS: 40 DEGREES
T OFFSET: 379 MS
T OFFSET: 389 MS
VANCOMYCIN SERPL-MCNC: 23.6 UG/ML (ref 5–20)
VENTRICULAR RATE: 111 BPM
VENTRICULAR RATE: 98 BPM
WBC # BLD AUTO: 2.5 X10*3/UL (ref 4.4–11.3)

## 2025-04-21 PROCEDURE — 2500000005 HC RX 250 GENERAL PHARMACY W/O HCPCS

## 2025-04-21 PROCEDURE — 86803 HEPATITIS C AB TEST: CPT | Performed by: INTERNAL MEDICINE

## 2025-04-21 PROCEDURE — 87389 HIV-1 AG W/HIV-1&-2 AB AG IA: CPT | Performed by: INTERNAL MEDICINE

## 2025-04-21 PROCEDURE — 93010 ELECTROCARDIOGRAM REPORT: CPT | Performed by: INTERNAL MEDICINE

## 2025-04-21 PROCEDURE — 97530 THERAPEUTIC ACTIVITIES: CPT | Mod: GO

## 2025-04-21 PROCEDURE — 2500000001 HC RX 250 WO HCPCS SELF ADMINISTERED DRUGS (ALT 637 FOR MEDICARE OP)

## 2025-04-21 PROCEDURE — 99233 SBSQ HOSP IP/OBS HIGH 50: CPT

## 2025-04-21 PROCEDURE — 2500000004 HC RX 250 GENERAL PHARMACY W/ HCPCS (ALT 636 FOR OP/ED)

## 2025-04-21 PROCEDURE — 80069 RENAL FUNCTION PANEL: CPT

## 2025-04-21 PROCEDURE — 97535 SELF CARE MNGMENT TRAINING: CPT | Mod: GO

## 2025-04-21 PROCEDURE — 1200000002 HC GENERAL ROOM WITH TELEMETRY DAILY

## 2025-04-21 PROCEDURE — 70450 CT HEAD/BRAIN W/O DYE: CPT

## 2025-04-21 PROCEDURE — 85025 COMPLETE CBC W/AUTO DIFF WBC: CPT

## 2025-04-21 PROCEDURE — 2500000004 HC RX 250 GENERAL PHARMACY W/ HCPCS (ALT 636 FOR OP/ED): Mod: JZ

## 2025-04-21 PROCEDURE — 2500000002 HC RX 250 W HCPCS SELF ADMINISTERED DRUGS (ALT 637 FOR MEDICARE OP, ALT 636 FOR OP/ED)

## 2025-04-21 PROCEDURE — 82947 ASSAY GLUCOSE BLOOD QUANT: CPT

## 2025-04-21 PROCEDURE — 99232 SBSQ HOSP IP/OBS MODERATE 35: CPT | Performed by: NURSE PRACTITIONER

## 2025-04-21 PROCEDURE — 93005 ELECTROCARDIOGRAM TRACING: CPT

## 2025-04-21 PROCEDURE — 97530 THERAPEUTIC ACTIVITIES: CPT | Mod: GP

## 2025-04-21 PROCEDURE — 70450 CT HEAD/BRAIN W/O DYE: CPT | Performed by: RADIOLOGY

## 2025-04-21 PROCEDURE — 80202 ASSAY OF VANCOMYCIN: CPT

## 2025-04-21 RX ADMIN — APIXABAN 5 MG: 5 TABLET, FILM COATED ORAL at 21:03

## 2025-04-21 RX ADMIN — QUETIAPINE FUMARATE 25 MG: 25 TABLET ORAL at 21:02

## 2025-04-21 RX ADMIN — VALPROATE SODIUM 150 MG: 100 INJECTION, SOLUTION INTRAVENOUS at 13:44

## 2025-04-21 RX ADMIN — PANTOPRAZOLE SODIUM 40 MG: 40 INJECTION, POWDER, FOR SOLUTION INTRAVENOUS at 08:56

## 2025-04-21 RX ADMIN — CLONIDINE HYDROCHLORIDE 0.2 MG: 0.1 TABLET ORAL at 08:56

## 2025-04-21 RX ADMIN — SENNOSIDES AND DOCUSATE SODIUM 1 TABLET: 50; 8.6 TABLET ORAL at 21:02

## 2025-04-21 RX ADMIN — VANCOMYCIN HYDROCHLORIDE 1250 MG: 1.25 INJECTION, POWDER, LYOPHILIZED, FOR SOLUTION INTRAVENOUS at 11:55

## 2025-04-21 RX ADMIN — LACOSAMIDE 100 MG: 100 TABLET, FILM COATED ORAL at 11:55

## 2025-04-21 RX ADMIN — ATORVASTATIN CALCIUM 40 MG: 40 TABLET, FILM COATED ORAL at 21:02

## 2025-04-21 RX ADMIN — LIDOCAINE 4% 1 PATCH: 40 PATCH TOPICAL at 08:56

## 2025-04-21 RX ADMIN — APIXABAN 5 MG: 5 TABLET, FILM COATED ORAL at 08:55

## 2025-04-21 RX ADMIN — CLONIDINE HYDROCHLORIDE 0.2 MG: 0.1 TABLET ORAL at 21:02

## 2025-04-21 ASSESSMENT — PAIN - FUNCTIONAL ASSESSMENT
PAIN_FUNCTIONAL_ASSESSMENT: 0-10
PAIN_FUNCTIONAL_ASSESSMENT: 0-10

## 2025-04-21 ASSESSMENT — COGNITIVE AND FUNCTIONAL STATUS - GENERAL
STANDING UP FROM CHAIR USING ARMS: A LOT
DRESSING REGULAR LOWER BODY CLOTHING: A LOT
DRESSING REGULAR UPPER BODY CLOTHING: A LOT
MOBILITY SCORE: 11
TURNING FROM BACK TO SIDE WHILE IN FLAT BAD: A LOT
DAILY ACTIVITIY SCORE: 14
WALKING IN HOSPITAL ROOM: A LOT
PERSONAL GROOMING: A LITTLE
EATING MEALS: A LITTLE
MOVING FROM LYING ON BACK TO SITTING ON SIDE OF FLAT BED WITH BEDRAILS: A LOT
MOVING TO AND FROM BED TO CHAIR: A LOT
TOILETING: A LOT
CLIMB 3 TO 5 STEPS WITH RAILING: TOTAL
HELP NEEDED FOR BATHING: A LOT

## 2025-04-21 ASSESSMENT — PAIN SCALES - GENERAL
PAINLEVEL_OUTOF10: 0 - NO PAIN

## 2025-04-21 ASSESSMENT — ACTIVITIES OF DAILY LIVING (ADL): HOME_MANAGEMENT_TIME_ENTRY: 23

## 2025-04-21 NOTE — PROGRESS NOTES
"Occupational Therapy    Occupational Therapy Treatment    Name: Enmanuel Ahumada \"Dash\"  MRN: [id number]  : 1955  Date: 25  [facility]      Time Calculation  Start Time: 1431  Stop Time: 1509  Time Calculation (min): 38 min    Assessment:  Barriers to Discharge Home: Caregiver assistance, Cognition needs, Physical needs  Caregiver Assistance: Caregiver assistance needed per identified barriers - however, level of patient's required assistance exceeds assistance available at home  Cognition Needs: Insight of patient limited regarding functional ability/needs, Cognition-related high falls risk  Physical Needs: Intermittent ADL assistance needed, 24hr mobility assistance needed  Evaluation/Treatment Tolerance: Patient limited by fatigue  Medical Staff Made Aware: Yes  End of Session Communication: Bedside nurse  End of Session Patient Position: Bed, 3 rail up, Alarm on (Wife present)  Plan:  Treatment Interventions: ADL retraining, Functional transfer training, UE strengthening/ROM, Endurance training, Cognitive reorientation, Equipment evaluation/education, Compensatory technique education, Neuromuscular reeducation  OT Frequency: 4 times per week  OT Discharge Recommendations: High intensity level of continued care  Equipment Recommended upon Discharge:  (TBD at next level of care)  OT Recommended Transfer Status: Moderate assist, Assist of 2  OT - OK to Discharge: Yes    Subjective   General:  OT Last Visit  OT Received On: 25  Prior to Session Communication: Bedside nurse  Patient Position Received: Bed, 3 rail up, Alarm on  Family/Caregiver Present: Yes  Caregiver Feedback: Wife present and engaged in session  General Comment: Pt pleasant and agreeable to OT session - reports \"something is wrong with me\" at end of session after having a difficult time completing tasks   Precautions:  Medical Precautions: Fall precautions  Precautions Comment: SBP<160  Lines/Tubes/Drains:  PICC - Adult " 04/15/25 Left Basilic vein (Active)   Number of days: 6       External Urinary Catheter Male (Active)   Number of days: 5   Cognition:  Overall Cognitive Status: Impaired  Arousal/Alertness: Delayed responses to stimuli  Orientation Level: Disoriented to time, Disoriented to place, Disoriented to situation (Guesses December, unable to name type of building)  Following Commands: Follows one step commands with repetition (~50%)  Safety Judgment: Decreased awareness of need for assistance  Cognition Comments: Requires frequent repetition of commands, poor motor planning and sequencing simple tasks, delayed processing  Memory: Exceptions to WFL  Short-Term Memory: Impaired  Working Memory: Impaired  Safety/Judgement: Exceptions to WFL  Complex Functional Tasks: Moderate  Insight: Mild  Impulsive: Mildly  Processing Speed: Delayed    Pain Assessment:  Pain Assessment  Pain Assessment: 0-10  0-10 (Numeric) Pain Score: 0 - No pain     Objective   Activities of Daily Living:   UE Dressing  UE Dressing Level of Assistance: Minimum assistance  UE Dressing Where Assessed: Edge of bed  UE Dressing Comments: Min A at EOB to manage gown around shoulders and fasten in back, cues for positioning     Toileting  Toileting Level of Assistance: Maximum assistance  Where Assessed:  (Chair)  Toileting Comments: Attempted in bathroom, unsafe at this time. Attempted use of urinal in chair. Assist required to manage clothing while standing at walker and for positioning urinal. Education on techqiue for managing clothing, however pt very unsteady and assist required.  Bed Mobility/Transfers:   Bed Mobility  Bed Mobility: Yes  Bed Mobility 1  Bed Mobility 1: Supine to sitting, Sitting to supine  Level of Assistance 1: Moderate assistance  Bed Mobility Comments 1: Increased time to complete, cues for hand placement with poor follow through, Mod A to achieve upright sitting  Transfers  Transfer: Yes  Transfer 1  Transfer From 1: Sit to, Stand  to  Transfer to 1: Stand, Sit  Technique 1: Sit to stand, Stand to sit  Transfer Device 1: Walker  Transfer Level of Assistance 1: Minimum assistance, Moderate assistance  Trials/Comments 1: Min A - Mod A throughout session - cues for hand plcement and safety  Transfers 2  Transfer From 2: Bed to, Chair with arms to  Transfer to 2: Chair with arms, Bed  Technique 2: Stand pivot  Transfer Device 2: Walker  Transfer Level of Assistance 2: Moderate assistance  Trials/Comments 2: Poor motor planning and sequencing stepping to chair/bed, assist moving walker as pt unable to reposition, Mod A for standing balance, pt with freezing at times and requires cues and increased time to make next move.  Toilet Transfers  Toilet Transfer From: Bed  Toilet Transfer Type: To and from  Toilet Transfer to: Standard toilet  Toilet Transfer Technique: Ambulating  Toilet Transfers Comments: Attempted functional mobility to bathroom using FWW - pt able to abulate to doorway but becomes very fatigue, he requires Mod - Max A to maintain standing at walker with chair brought up behind him for controlled sit - increased time to complete with poor safety awareness and command following   Therapy/Activity:      Therapeutic Activity  Therapeutic Activity Performed: Yes  Therapeutic Activity 1: 5-6 sit to stands throughout session to FWW - cues for improved positioning and safety with poor follow through. Pt with difficulty following commands even to place hand on hand rest of walker.  Therapeutic Activity 2: Functional transfer bed to chair and chair to bed using FWW, Mod A for completion of transfer with mod cues for body and walker positioning  Outcome Measures:  [facility] Daily Activity  Putting on and taking off regular lower body clothing: A lot  Bathing (including washing, rinsing, drying): A lot  Putting on and taking off regular upper body clothing: A lot  Toileting, which includes using toilet, bedpan or urinal: A lot  Taking care of  personal grooming such as brushing teeth: A little  Eating Meals: A little  Daily Activity - Total Score: 14     Education Documentation  Body Mechanics, taught by Luis Miguel Castro OT at 4/21/2025  3:37 PM.  Learner: Patient  Readiness: Acceptance  Method: Explanation, Demonstration  Response: Verbalizes Understanding, Needs Reinforcement    Precautions, taught by Luis Miguel Castro OT at 4/21/2025  3:37 PM.  Learner: Patient  Readiness: Acceptance  Method: Explanation, Demonstration  Response: Verbalizes Understanding, Needs Reinforcement    ADL Training, taught by Luis Miguel Castro OT at 4/21/2025  3:37 PM.  Learner: Patient  Readiness: Acceptance  Method: Explanation, Demonstration  Response: Verbalizes Understanding, Needs Reinforcement    Education Comments  No comments found.    Goals:  Encounter Problems       Encounter Problems (Active)       ADLs       Patient will complete grooming tasks with CGA in order to maximize functional Indep with task completion.  (Progressing)       Start:  04/08/25    Expected End:  04/22/25            Patient will complete lower body dressing with  MIN A for donning and doffing all LE clothes in order to increase Indep with task participation.  (Progressing)       Start:  04/08/25    Expected End:  04/22/25            Patient will complete toileting, including clothing management and hygiene, with MIN A in order to maximize functional Indep with task completion.  (Progressing)       Start:  04/08/25    Expected End:  04/22/25               BALANCE       Pt will increase static/dynamic sit/stand to Good to increase safety and indep with functional task completion.   (Progressing)       Start:  04/08/25    Expected End:  04/22/25               COGNITION/SAFETY       Pt will demo good safety awareness during functional task completion with no more than min vc in order to maximize occupational performance.   (Progressing)       Start:  04/08/25    Expected End:  04/22/25                EXERCISE/STRENGTHENING       Pt will increase endurance to tolerate 15-20min of activity with no more than 1 rest break in order to increase ability to engage in ADL completion.  (Progressing)       Start:  04/08/25    Expected End:  04/22/25            Pt will increase overall BUE strength to 4/5 in order to increase functional task participation.  (Progressing)       Start:  04/08/25    Expected End:  04/22/25            Pt will demo increased BUE fine motor/bimanual coordination in order to achieve MOD I with functional task completion.  (Progressing)       Start:  04/08/25    Expected End:  04/22/25               TRANSFERS       Patient will complete functional transfers using least restrictive device with CGA   in order to maximize functional potential and increase safety.  (Progressing)       Start:  04/08/25    Expected End:  04/22/25 04/21/25 at 3:38 PM   Luis Miguel Castro, OT   [phone]

## 2025-04-21 NOTE — PROGRESS NOTES
Vancomycin Dosing by Pharmacy- FOLLOW UP    Enmanuel Ahumada is a 69 y.o. year old male who Pharmacy has been consulted for vancomycin dosing for CNS/meningoencephalitis. Based on the patient's indication and renal status this patient is being dosed based on a goal -600.     Renal function is currently stable.    Current vancomycin dose: 1250 mg given every 12 hours    Estimated vancomycin AUC on current dose: 556 mg/L.hr     Visit Vitals  /81 (BP Location: Right arm, Patient Position: Lying)   Pulse 99   Temp 36.6 °C (97.9 °F) (Temporal)   Resp 15        Lab Results   Component Value Date    CREATININE 0.65 2025    CREATININE 0.56 2025    CREATININE 0.58 2025    CREATININE 0.58 2025        Patient weight is as follows:   Vitals:    25 0600   Weight: 64.4 kg (141 lb 14.4 oz)       Cultures:  No results found for the encounter in last 14 days.       I/O last 3 completed shifts:  In: 700 (10.9 mL/kg) [P.O.:425; Blood:225; IV Piggyback:50]  Out: 1450 (22.5 mL/kg) [Urine:1450 (0.6 mL/kg/hr)]  Weight: 64.4 kg   I/O during current shift:  I/O this shift:  In: 150 [P.O.:150]  Out: 200 [Urine:200]    Temp (24hrs), Av.6 °C (97.8 °F), Min:36.2 °C (97.2 °F), Max:36.6 °C (97.9 °F)      Assessment/Plan    Within goal AUC range. Continue current vancomycin regimen.    This dosing regimen is predicted by [employer] to result in the following pharmacokinetic parameters:  Loading dose: N/A  Regimen: 1250 mg IV every 12 hours.  Start time: 11:54 on 2025  Exposure target: AUC24 (range)400-600 mg/L.hr   [id number]: 552 mg/L.hr  AUC24,ss: 556 mg/L.hr  Probability of AUC24 > 400: 100 %  Ctrough,ss: 16.4 mg/L  Probability of Ctrough,ss > 20: 12 %  The next level will be obtained on  at am labs. May be obtained sooner if clinically indicated.   Will continue to monitor renal function daily while on vancomycin and order serum creatinine at least every 48 hours if not already  ordered.  Follow for continued vancomycin needs, clinical response, and signs/symptoms of toxicity.       CRISTINO AGUILAR

## 2025-04-21 NOTE — PROGRESS NOTES
"Re-engage by NSGY for episodes concerning for seizure.     HPI:     Enmanuel Ahumada \"Dash\" is a 69 y.o. 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 Currently on VPA 150mg q6h with LAC 100mg BID. The patient has never has seizure and is on AEDs for prophylaxis. Neurology re-engaged due to an episode of altered mentation, concerned by the primary team to be seizure.     Per discussion with the primary team and RN at bedside:  The patient was assessed at 8AM and 12PM and was noted to be alert, oriented x3, able to follows command.    At 4PM evaluation, the patient was alert but \"stuck\" on \"2025\" when asked about orientation. He answered with \"2025\" to every subsequence questions. RN unable to tell if the patient tracks. When asked to raise his leg with gentle tapping on his left leg, he raise his right leg and did not raise the left with prompt. No However, he moves all limbs spontaneously afterwards.    RN notified the primary team, which evaluate the patient 45min-1hour later, noted that the patient is improving but not at the baseline. This correlates with an episode of tachycardia.  CTH decreased hemorrhage and redistribution of pneumocephalus. And EEG is ordered.    This fluctuation of mentation is in the setting of transitioning of AEDs from VPA to lacosamide. The primary team is concerned if this episode is seizure, which prompt re-engagement.    Physical examination  On physical examination at 7PM:    Vitals:    04/21/25 1635   BP: 118/83   Pulse: (!) 116   Resp: 16   Temp: 36.7 °C (98.1 °F)   SpO2:      Neurological Exam:  MENTAL STATUS:  General appearance: alert, NAD  Orientation: self (able to tell his name), place (able " "to tell that he is in a hospital in [place]) but not year (he pause and start saying [place])  Language: Repetition intact (says \"It is a funny day in [place]\" when asked to say \"It is a ahmet day in [place]\").  Able to name \"gloves\" on stroke card then perseverate on \"gloves\" when asked about other pictures    Able to follow 1 step commands (able to point to the ceiling), unable to cross midline (unable to touch his left ear with his right thumb - he touches his left ear with left hand then right ear with right hand)     CRANIAL NERVES:  - II: visual fields deferred as patient was tired  - III, IV, VI: HO, end-gaze nystagmus  - VII: Face muscles symmetric with smile and eye closure  - VIII: Intact to voice  - XI: 5/5 strength on shoulder shrugging bilaterally  - XII: Normal tongue, no tongue biting     MOTOR: Tone and bulk normal in all extremities; spontaneously moves all extremities AG      COORDINATION: Intact on finger to nose bl     SENSORY: Intact to light touch in bl UE and LE    Assessment and plan  Enmanuel Ahumada \"Petey" is a 69 y.o. 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 Epilepsy re-engaged due to an episode concerning for seizure, described as perseverance and unable to correctly following commands. No overt motor symptoms. The patient is currently in the transition from VPA to lacosamide. CTH normal, EEG is being placed.    Overall, the patient symptoms are more concerning of encephalopathy rather than seizure, given the symptoms of mental status changes that last for more than an hour. He is still able to response and follow commands (albeit wrongly) but perseverate, unlikely to be in dialepsis given responsiveness. The duration of over an hour would be unusual for seizure, aside from NCSE. For now, will keep AEDs for " now, will follow EEG.    Impression: Episode of altered mentation c/f encephalopathy    Recommendation:  - Neurology will follow EEG  - Please continue LAC 100mg BID with VPA 150mg q6h    Clovis Weiner MD PhD  Neurology resident, PGY-2

## 2025-04-21 NOTE — PROGRESS NOTES
"Enmanuel Evans Zita \"Dash\" is a 69 y.o. male on day 15 of admission presenting with Wound dehiscence.    Transitional Care Coordinator Note: Per provider update ADOD 4/21 facility updated. TCC team to follow. Spouse will sign IMM at bedside 4/21.  Mere Vega RN TCC via Epic.    "

## 2025-04-21 NOTE — ASSESSMENT & PLAN NOTE
69 year old male with medical history as noted above.   Hospital course as described above.   Patient with left distal femoral, popliteal and calf DVT's, as well as right brachial thrombus around PICC line (right arm PICC line has been removed).    Review of labs shows WBC 2.5, pancytopenia with stable hemoglobin 8.5 grams, continued thrombocytopenia to 69 K with recent platelet transfusion 4/20/25 (2 units)., stable serum creatinine 0.65.     Platelets continue to fluctuate.   HIT has been ruled out as etiology of thrombocytopenia.   Continues with Eliquis 5mg BID for anticoagulation.   Neurology Team following anti-seizure medications, with plan to decrease Valproate, and continue with Vimpat.  Vimpat associated with thrombocytopenia as is Vancomycin.   Discussion with patient/family: Need to have decision made regarding anti-seizure medications.    Patient wearing Eliquis Med Alert band.      Recommendations:  ~Patient with continued platelet fluctuations; Vasc Med does not recommend discharge to SNF until platelets stabilize.  ~Continue with Eliquis 5mg q12 hours for anticoagulation therapy.   ~Continue with Eliquis as long as platelets are >50 K, and there is no evidence of bleeding.    ~If Keppra is used for anti-seizure medication, then Eliquis will need to be changed to Warfarin.   ~MONITOR for bleeding, neurologic changes or further platelet decrease.  ~Consult Hematology for further assistance with pancytopenia.   ~Outpatient follow up with Dr. Zee Hardy from the Vascular Medicine Service has been scheduled as a virtual visit for 5/6/25 at 11:30 am; will discuss removal of IVC filter at the time of follow up appointment.

## 2025-04-21 NOTE — PROGRESS NOTES
"Physical Therapy    Physical Therapy Treatment    Patient Name: Enmanuel Ahumada \"Dash\"  MRN: [id number]  Department: [facility] [id number]  Room: [address] [place], [place] [address]  Today's Date: 4/21/2025  Time Calculation  Start Time: 1148  Stop Time: 1219  Time Calculation (min): 31 min    Assessment/Plan   PT Assessment  Barriers to Discharge Home: Physical needs, Cognition needs  Caregiver Assistance: Caregiver assistance needed per identified barriers - however, level of patient's required assistance exceeds assistance available at home  Physical Needs: High falls risk due to function or environment  Evaluation/Treatment Tolerance: Patient tolerated treatment well  Medical Staff Made Aware: Yes  End of Session Communication: Bedside nurse  Assessment Comment: Pt demonstrating progress towards functional goals. Remains a high fall risk and appropriate for HIGH intensity PT after DC.  End of Session Patient Position: Up in chair, Alarm on  PT Plan  Inpatient/Swing Bed or Outpatient: Inpatient  PT Plan  Treatment/Interventions: Bed mobility, Transfer training, Gait training, Stair training, Balance training, Neuromuscular re-education, Strengthening, Endurance training, Therapeutic exercise, Range of motion, Therapeutic activity  PT Plan: Ongoing PT  PT Frequency: 5 times per week  PT Discharge Recommendations: High intensity level of continued care  Equipment Recommended upon Discharge:  (TBD at next level of care)  PT Recommended Transfer Status: Assist x1, Assistive device  PT - OK to Discharge: Yes (meaning pt has been evaluated and d/c reccs have been made)    General Visit Information:   PT  Visit  PT Received On: 04/21/25  Response to Previous Treatment: Patient with no complaints from previous session.  General  Family/Caregiver Present: Yes  Caregiver Feedback: pt's wife, son and grand daughter arriving in room mid session  Prior to Session Communication: Bedside nurse  Patient Position Received: Bed, 3 rail up, Alarm on  General " Comment: Pt supine in bed upon entry to room. Pt pleasant, cooperative and willing to work with PT. Noted tele, PIV.    Subjective   Precautions:  Precautions  Hearing/Visual Limitations: pt wearing glasses  Medical Precautions: Fall precautions  Precautions Comment: SBP<160    Vital signs:      Supine: 128/83 128 HR  Sitting/standing   Sitting chair end of session: 114/77 131 HR    Objective   Pain:  Pain Assessment  Pain Assessment: 0-10  0-10 (Numeric) Pain Score: 0 - No pain  Cognition:  Cognition  Arousal/Alertness: Delayed responses to stimuli  Orientation Level: Oriented X4    Treatments:  Therapeutic Activity  Therapeutic Activity Performed: Yes  Therapeutic Activity 1: pt completing supine to sit with mod assist and HOB elevated. Pt completing static sitting balance for ~5 minutes for BUE and B feet supported with L lateral lean preference noted.    Bed Mobility  Bed Mobility: Yes  Bed Mobility 1  Bed Mobility 1: Supine to sitting  Level of Assistance 1: Moderate assistance, Moderate verbal cues, Moderate tactile cues  Bed Mobility Comments 1: HOB partially elevated    Ambulation/Gait Training  Ambulation/Gait Training Performed: Yes  Ambulation/Gait Training 1  Surface 1: Level tile  Device 1: Rolling walker  Assistance 1: Moderate assistance, Moderate verbal cues, Moderate tactile cues  Quality of Gait 1:  (short shuffling gait, cuing to increase step length)  Comments/Distance (ft) 1: 2ft to chair  Transfers  Transfer: Yes  Transfer 1  Technique 1: Sit to stand, Stand to sit  Transfer Device 1: Walker  Transfer Level of Assistance 1: Moderate assistance, Moderate verbal cues, Moderate tactile cues  Trials/Comments 1: verbal cuing for hand placement    Outcome Measures:  [facility] Basic Mobility  Turning from your back to your side while in a flat bed without using bedrails: A lot  Moving from lying on your back to sitting on the side of a flat bed without using bedrails: A lot  Moving to and from bed  to chair (including a wheelchair): A lot  Standing up from a chair using your arms (e.g. wheelchair or bedside chair): A lot  To walk in hospital room: A lot  Climbing 3-5 steps with railing: Total  Basic Mobility - Total Score: 11    Education Documentation  Mobility Training, taught by Xuan Villanueva, PT at 4/21/2025  1:23 PM.  Learner: Patient  Readiness: Acceptance  Method: Explanation  Response: Verbalizes Understanding  Comment: importance of functional mobility    Education Comments  No comments found.      Encounter Problems       Encounter Problems (Active)       PT Problem       Patient will perform bed mobility IND to reduce risk of developing decubitus ulcers.  (Progressing)       Start:  04/08/25    Expected End:  04/22/25            Patient will ambulate at least 150 ft. with </= close sup and LRD to improve tolerance of household distances.  (Progressing)       Start:  04/08/25    Expected End:  04/22/25            Patient will perform the 5-Time Sit to Stand test in <14 sec to indicate decreased falls risk. (Progressing)       Start:  04/08/25    Expected End:  04/22/25            Patient will perform sit to stand and stand to sit transfers with </= close sup and LRD to increase functional strength.  (Progressing)       Start:  04/08/25    Expected End:  04/22/25            BLE 5/5 (Progressing)       Start:  04/08/25    Expected End:  04/22/25               Pain - Adult          Xuan Villanueva, PT

## 2025-04-21 NOTE — CONSULTS
Reason For Consult  Thrombocytopenia    History Of Present Illness   69-year-old male with history of right occipital glioblastoma (GBM) s/p resections (12/2023 and 2/2025), chemoradiation (3/2024), adjuvant temozolomide (until 1/2025, complicated by thrombocytopenia), now planned for lomustine/bevacizumab, presented with cranial wound drainage and was admitted for surgical washout. Course complicated by thrombocytopenia (alisson Plt 49), IVC filter for bilateral DVTs, and concerns for multiple contributing factors including VPA, lomustine, antibiotics, and underlying marrow suppression. Hematology consulted for persistent thrombocytopenia with unclear etiology. Seen and examined, reported no new symptoms, with no fever, chills, SOB, haematuria. Denied active bleeding. Denied hx of bleeding. Denied family hx of bleeding.       Timeline:  12/2023: Initial GBM resection (MGMT+, [facility] WT), started chemo-RT 2/2024  3/2024 - 1/2025: Completed 8 cycles temozolomide, complicated by thrombocytopenia  2/20/2025: Redo craniotomy for tumor progression  4/5/2025: Presented with cranial wound drainage, admitted for washout on 4/6 4/11/2025: IVC filter placed for bilateral DVTs  4/14 - 4/20: Platelets downtrended from ~129 to 49; received 2U plt  4/21/2025: Hematology consulted for thrombocytopenia    Workup Summary:  CBC trend: Plt 129 (4/19) ? 49 (4/20)  Smear: Clumping in blue tube, decreased plt in lavender tube, rare giant plts  LDH: 230 (normal)  Coags: INR 1.4, PTT 30s (mildly elevated INR)  HFP: Tbili 1.2, Alb 3.2, AST/ALT/ALP normal  D-dimer: 2354  Vit B12, Folate, TSH: WNL  PF4/BRANDO: Negative    Past Medical History  He has a past medical history of Brain cancer (Multi), GERD (gastroesophageal reflux disease), Hyperlipidemia, Hypertension, and Seizure disorder (Multi).    Surgical History  He has a past surgical history that includes Other surgical history (07/24/2019); Other surgical history (07/26/2021); Brain surgery;  "Cholecystectomy (January 25, 2020); Vasectomy (Abt 1991); and IR CVC PICC (4/15/2025).     Social History  He reports that he has never smoked. He has never been exposed to tobacco smoke. He has never used smokeless tobacco. He reports that he does not currently use alcohol. He reports current drug use. Drug: Marijuana.    Family History  Family History[1]     Allergies  Patient has no known allergies.    Review of Systems  As above     Physical Exam    HEAD: Surgical incision intact.  EVD in place.   LUNGS: Breathing unlabored.  Clear in anterolateral lung fields.  CVS: RRR, S1 & S2 normal.    ABD: Soft  SKIN:  No unusual lesions or rashes.      Last Recorded Vitals  Blood pressure 97/65, pulse 100, temperature 36.2 °C (97.2 °F), temperature source Temporal, resp. rate 16, height 1.702 m (5' 7\"), weight 64.4 kg (141 lb 14.4 oz), SpO2 95%.    Relevant Results  Results for orders placed or performed during the hospital encounter of 04/05/25 (from the past 24 hours)   Light Blue Top   Result Value Ref Range    Extra Tube Hold for add-ons.    POCT GLUCOSE   Result Value Ref Range    POCT Glucose 147 (H) 74 - 99 mg/dL   POCT GLUCOSE   Result Value Ref Range    POCT Glucose 129 (H) 74 - 99 mg/dL   POCT GLUCOSE   Result Value Ref Range    POCT Glucose 128 (H) 74 - 99 mg/dL   Vancomycin   Result Value Ref Range    Vancomycin 23.6 (H) 5.0 - 20.0 ug/mL   Renal Function Panel   Result Value Ref Range    Glucose 115 (H) 74 - 99 mg/dL    Sodium 136 136 - 145 mmol/L    Potassium 3.6 3.5 - 5.3 mmol/L    Chloride 101 98 - 107 mmol/L    Bicarbonate 28 21 - 32 mmol/L    Anion Gap 11 10 - 20 mmol/L    Urea Nitrogen 16 6 - 23 mg/dL    Creatinine 0.65 0.50 - 1.30 mg/dL    eGFR >90 >60 mL/min/1.73m*2    Calcium 8.2 (L) 8.6 - 10.6 mg/dL    Phosphorus 2.3 (L) 2.5 - 4.9 mg/dL    Albumin 3.0 (L) 3.4 - 5.0 g/dL   CBC and Auto Differential   Result Value Ref Range    WBC 2.5 (L) 4.4 - 11.3 x10*3/uL    nRBC 0.0 0.0 - 0.0 /100 WBCs    RBC 2.60 " (L) 4.50 - 5.90 x10*6/uL    Hemoglobin 8.5 (L) 13.5 - 17.5 g/dL    Hematocrit 24.1 (L) 41.0 - 52.0 %    MCV 93 80 - 100 fL    MCH 32.7 26.0 - 34.0 pg    MCHC 35.3 32.0 - 36.0 g/dL    RDW 16.1 (H) 11.5 - 14.5 %    Platelets 69 (L) 150 - 450 x10*3/uL    Neutrophils % 72.6 40.0 - 80.0 %    Immature Granulocytes %, Automated 1.2 (H) 0.0 - 0.9 %    Lymphocytes % 17.6 13.0 - 44.0 %    Monocytes % 8.2 2.0 - 10.0 %    Eosinophils % 0.0 0.0 - 6.0 %    Basophils % 0.4 0.0 - 2.0 %    Neutrophils Absolute 1.78 1.20 - 7.70 x10*3/uL    Immature Granulocytes Absolute, Automated 0.03 0.00 - 0.70 x10*3/uL    Lymphocytes Absolute 0.43 (L) 1.20 - 4.80 x10*3/uL    Monocytes Absolute 0.20 0.10 - 1.00 x10*3/uL    Eosinophils Absolute 0.00 0.00 - 0.70 x10*3/uL    Basophils Absolute 0.01 0.00 - 0.10 x10*3/uL   Light Blue Top   Result Value Ref Range    Extra Tube Hold for add-ons.    POCT GLUCOSE   Result Value Ref Range    POCT Glucose 108 (H) 74 - 99 mg/dL   ECG 12 Lead   Result Value Ref Range    Ventricular Rate 115 BPM    Atrial Rate 115 BPM    ID Interval 150 ms    QRS Duration 82 ms    QT Interval 328 ms    QTC Calculation(Bazett) 453 ms    P Axis 33 degrees    R Axis -11 degrees    T Axis 9 degrees    QRS Count 19 beats    Q Onset 212 ms    P Onset 137 ms    P Offset 187 ms    T Offset 376 ms    QTC Fredericia 407 ms   POCT GLUCOSE   Result Value Ref Range    POCT Glucose 98 74 - 99 mg/dL     *Note: Due to a large number of results and/or encounters for the requested time period, some results have not been displayed. A complete set of results can be found in Results Review.          Platelets  150 - 450 x10*3/uL 93 Low  129 Low  CM 49 Low  71 Low  55 Low  75 Low  89 Low           Peripheral Blood Smear - 04/21/2025  RBCs??Normocytic and normochromic with mild anisopoikilocytosis and a few target?cells; no schistocytes or other fragmentation identified.  WBCs??Normal maturation and distribution; no blasts, dysplastic changes, or  toxic granulation seen.  Platelets??Severely decreased in number; individual platelets appear morphologically unremarkable, and no platelet clumping is present.      Assessment/Plan     Impression: Thrombocytopenia in a patient with GBM s/p multiple craniotomies, recent lomustine dose (4/4), VPA (started 4/8), recent cefalexin and vancomycin, and history of chemotherapy-related marrow suppression. Platelet trend is fluctuating but concerning for multifactorial process: delayed marrow suppression from lomustine, VPA-induced suppression, infection/inflammation, and platelet consumption in setting of DVTs and intracranial wound healing. No evidence of DIC or hemolysis. Smear shows clumping in citrate tube, suggesting artifactual drop cannot be excluded.    Lomustine: Alkylating agent, known for delayed cytopenias. Thrombocytopenia occurs in ~30-40% of patients, typically peaking 3-5 weeks post-dose. Half-life ~16-48 hours. Recovery may take several weeks post-alisson.  Valproic Acid (VPA): Less likely to be the cause; antiepileptic, can cause dose-dependent thrombocytopenia in up to 12-18% of patients, especially with serum levels >100 µg/mL.   Vancomycin, cephalexin: Rarely cause thrombocytopenia; vancomycin-associated immune thrombocytopenia is extremely rare.      # Thrombocytopenia (Multifactorial):  -Likely contributors: Lomustine (delayed suppression), wound infection/inflammation, DVTs with possible consumption.  -Recent Lomustine (4/4): Alkylating agent with delayed alisson (3-6 weeks), can cause prolonged cytopenias  -VPA is less likely   -Infection: Ongoing cranial wound infection with MRSE ? continue vancomycin per ID  -No major signs of sepsis or DIC    Recommendations:   Monitor CBC with citrate tube daily x 3 days  Send fractionated plt count  Consider holding lomustine or adjusting dosing if prolonged cytopenia, to be decided by oupt oncologist  We don't believe VPA is contributing to thrombocytopenia., plan  to continue or taper per primary team.   If Plt <30K and bleeding or procedure, transfuse; otherwise observe  AC Management (B/L DVTs + R brachial DVT): IVC filter placed. Plt now <50K: plan as per primary team and vascular.       Thank you for this consult, we will sign off. Patient seen and discussed with attending physician, Dr. Machado who agrees with the above.      Darrian Davis MD  Hematology-Oncology Fellow, PGY4  Hematology Consult Pager: [phone]             [1] No family history on file.

## 2025-04-21 NOTE — PROGRESS NOTES
"Enmanuel Ahumada \"Petey" is a 69 y.o. male on day 16 of admission presenting with Wound dehiscence.    Subjective   No acute events overnight    Objective     Physical Exam  Resting, Eyes open to voice  Ox3  RUE 5/5  RLE 5/5  LUE 4+/5  LLE 4+/5  Sensation intact to light touch  Incision c/d/I, sutures in place, no leaking     Last Recorded Vitals  Blood pressure 122/81, pulse 99, temperature 36.6 °C (97.9 °F), temperature source Temporal, resp. rate 15, height 1.702 m (5' 7\"), weight 64.4 kg (141 lb 14.4 oz), SpO2 97%.  Intake/Output last 3 Shifts:  I/O last 3 completed shifts:  In: 700 (10.9 mL/kg) [P.O.:425; Blood:225; IV Piggyback:50]  Out: 1450 (22.5 mL/kg) [Urine:1450 (0.6 mL/kg/hr)]  Weight: 64.4 kg     Relevant Results  Results from last 72 hours   Lab Units 04/20/25  0415 04/19/25 2024 04/19/25  0457 04/18/25  1548   WBC AUTO x10*3/uL 2.8* 3.3*   < > 4.0*   NRBC AUTO /100 WBCs 0.7* 0.6*   < > 0.0   RBC AUTO x10*6/uL 2.69* 2.74*   < > 3.10*   HEMOGLOBIN g/dL 8.8* 9.1*   < > 10.1*   HEMATOCRIT % 25.5* 25.5*   < > 30.1*   MCV fL 95 93   < > 97   MCH pg 32.7 33.2   < > 32.6   MCHC g/dL 34.5 35.7   < > 33.6   RDW % 16.0* 15.9*   < > 15.7*   PLATELETS AUTO x10*3/uL 93* 129*   < > 71*   MPV fL  --   --   --  8.8    < > = values in this interval not displayed.      Results from last 72 hours   Lab Units 04/20/25  0028 04/19/25  0457   GLUCOSE mg/dL 98 90   SODIUM mmol/L 138 143   POTASSIUM mmol/L 3.5 3.5   CHLORIDE mmol/L 102 107   CO2 mmol/L 30 30   ANION GAP mmol/L 10 10   BUN mg/dL 18 16   CREATININE mg/dL 0.56 0.58   EGFR mL/min/1.73m*2 >90 >90   CALCIUM mg/dL 8.7 8.6   PHOSPHORUS mg/dL 3.7 3.5   ALBUMIN g/dL 3.2* 3.0*          Assessment/Plan   Assessment & Plan  Wound dehiscence    69 M h/o HTN, HLD, smalls's esophagus, RO GBM s/p resection ([facility], 12/2023) with resultant L HH s/p chemo/RT presenting with tumor progression, 2/20 s/p redo craniotomy for tumor resection w 5 ALA, CTH POC, 2/21 MRI intended " NTR, 3/16 MRI postop changes w fluid collection at dura and craniotomy site, worsening infiltrative disease, enhancement along posteromedial aspect of resection site, 4/5 p/w 1wk incisional drainage, 4/4 CTH airfluid level in resection cavity, 4/5 MRI brain w/wo incr'd resection cavity enhancement and diffusion restriction  4/6 s/p R cranial wound washout and mesh cranioplasty (kaleb purulence), CTH POC, L temporal ICH, plts 81 s/p 2u plts, 4/7 rCTH incr [facility], min incr ICH, rrCTH stable, EEG neg dc'd  4/8 s/p PICC  4/9 CTH stable, SDD dc'd, EEG neg  4/10 SGD dc'd  4/11 DVT US x4, LLE distal femoral, popliteal, gastroc, peroneal, post tibial vv DVTs, RUE R brachial DVT, s/p IVC filter    4/13 CTH stable  4/14 PICC auto dc'd, agitation, CTH stable   4/15 s/p PICC  4/16 CTH stable, restraints removed, no agitation noted   4/17 plts 75, set PF4/BRANDO  4/19 plts 49, plan to give 1 unit platelets    Plan:  Tele (ok for q8 NC overnight)  Will obtain CT head prior to discharge   AM RFP, citrated CBC, peripheral smear  Appreciate ID recs - continue Vanc until 6/15, likely not cause of thrombocytopenia   Appreciate vasc med recs - consider hematology consult  Neurology recs - lacosamide 100BID, VPA 200q6 then VPA to 150 4/21 then off  SLP recs-  regular diet   Continue home meds   PTOT- rehab      Arnoldo Acuna MD

## 2025-04-21 NOTE — PROGRESS NOTES
"Enmanuel Ahumada \"Petey" is a 69 y.o. male on day 16 of admission.   Medical history significant for GERD, glioblastoma s/p right craniotomy for tumor resection 12/29/23, HLD, HTN and seizure disorder.   Patient admitted 4/5/25 with drainage from surgical incision, after undergoing repeat craniotomy and tumor resection 2/20/25; now with findings of MRSA treated with Vancomycin.  Underwent cranial washout and mesh cranioplasty 4/6/25.    Patient found to have left distal femoral, popliteal and calf DVT's, as well as right brachial thrombus around PICC line.    S/p IVC filter placement 4/11/25 as patient was unable to use anticoagulation therapy after undergoing recent brain surgery.  Started low intensity Heparin infusion 4/14/25, and lost IV access overnight.   Underwent placement of PICC line in IR 4/15/25; now with plan for prolonged Vancomycin administration, with end date of 6/15/25.   Transitioned from Heparin infusion to weight based Lovenox 60mg q12 hours 4/15/25, and then transitioned to Eliquis 5mg BID on 4/16/24.    Continues with Eliquis 5mg q12 hours.   Patient worked up for HIT due to decreased platelet levels after exposure to Heparin; PF4 returned as poistive, and BRANDO returned as negative 4/18/25.   Workup for HIT has been stopped, although platelets continue to fluctuate.          Subjective   Patient sitting up in chair, denies report of CP, SOB or bleeding.  Family at bedside.      Objective     Physical Exam  Sitting up in chair, eating lunch; family at bedside  Head incision intact with sutures; without drainage from incision  Respirations full and regular with breath sounds CTA all anterior lung fields; on RA  Normal heart sounds with mild tachycardia to the lower 100's; vital signs are stable  Abdomen soft and nontender to palpation  Extremities warm to touch with palpable bilateral radial and DP pulses; mild edema noted of BLE from tops of feet to ankles; SCDs on legs; left arm PICC " "line  Patient is awake and alert; participates a little in conversation     Last Recorded Vitals  Blood pressure 111/77, pulse 107, temperature 36.4 °C (97.5 °F), temperature source Temporal, resp. rate 16, height 1.702 m (5' 7\"), weight 64.4 kg (141 lb 14.4 oz), SpO2 94%.  Intake/Output last 3 Shifts:  I/O last 3 completed shifts:  In: 625 (9.7 mL/kg) [P.O.:575; IV Piggyback:50]  Out: 1250 (19.4 mL/kg) [Urine:1250 (0.5 mL/kg/hr)]  Weight: 64.4 kg     Relevant Results  Results from last 7 days   Lab Units 04/21/25 0344 04/20/25 0415 04/19/25 2024   WBC AUTO x10*3/uL 2.5* 2.8* 3.3*   HEMOGLOBIN g/dL 8.5* 8.8* 9.1*   HEMATOCRIT % 24.1* 25.5* 25.5*   PLATELETS AUTO x10*3/uL 69* 93* 129*       Results from last 7 days   Lab Units 04/21/25  0344 04/20/25  0028 04/19/25  0457   SODIUM mmol/L 136 138 143   POTASSIUM mmol/L 3.6 3.5 3.5   CHLORIDE mmol/L 101 102 107   CO2 mmol/L 28 30 30   BUN mg/dL 16 18 16   CREATININE mg/dL 0.65 0.56 0.58   GLUCOSE mg/dL 115* 98 90   CALCIUM mg/dL 8.2* 8.7 8.6   Estimated Creatinine Clearance: 100.5 mL/min (by C-G formula based on SCr of 0.65 mg/dL).      Results from last 7 days   Lab Units 04/20/25  0028   APTT seconds 30   INR  1.4*       Thrombocytopenia  Recent Labs     04/21/25  0344 04/20/25 0415 04/19/25 2024 04/19/25  0457 04/18/25  1548 04/18/25  1325 04/17/25  0611 04/16/25  0720 04/15/25  0708 04/14/25  0331 04/13/25  0347 04/12/25  0352 04/11/25  0218 04/10/25  0015   PLT 69* 93* 129* 49* 71* 55* 75* 89* 107* 114* 121* 150 155 121*                      Assessment & Plan  Wound dehiscence  69 year old male with medical history as noted above.   Hospital course as described above.   Patient with left distal femoral, popliteal and calf DVT's, as well as right brachial thrombus around PICC line (right arm PICC line has been removed).    Review of labs shows WBC 2.5, pancytopenia with stable hemoglobin 8.5 grams, continued thrombocytopenia to 69 K with recent platelet " transfusion 4/20/25 (2 units)., stable serum creatinine 0.65.     Platelets continue to fluctuate.   HIT has been ruled out as etiology of thrombocytopenia.   Continues with Eliquis 5mg BID for anticoagulation.   Neurology Team following anti-seizure medications, with plan to decrease Valproate, and continue with Vimpat.  Vimpat associated with thrombocytopenia as is Vancomycin.   Discussion with patient/family: Need to have decision made regarding anti-seizure medications.    Patient wearing Eliquis Med Alert band.      Recommendations:  ~Patient with continued platelet fluctuations; Vasc Med does not recommend discharge to SNF until platelets stabilize.  ~Continue with Eliquis 5mg q12 hours for anticoagulation therapy.   ~Continue with Eliquis as long as platelets are >50 K, and there is no evidence of bleeding.    ~If Keppra is used for anti-seizure medication, then Eliquis will need to be changed to Warfarin.   ~MONITOR for bleeding, neurologic changes or further platelet decrease.  ~Consult Hematology for further assistance with pancytopenia.   ~Outpatient follow up with Dr. Zee Hardy from the Vascular Medicine Service has been scheduled as a virtual visit for 5/6/25 at 11:30 am; will discuss removal of IVC filter at the time of follow up appointment.     Patient seen and examined with Dr. Guerra  Plan of care discussed with Dr. Guerra  Plan of care discussed with Ms. Tamara BECERRIL from the NSGY Team. .      JERRICA Valles  Vascular Medicine Service   Pager [phone]

## 2025-04-22 ENCOUNTER — APPOINTMENT (OUTPATIENT)
Dept: CARDIOLOGY | Facility: HOSPITAL | Age: 70
End: 2025-04-22
Payer: MEDICARE

## 2025-04-22 LAB
ALBUMIN SERPL BCP-MCNC: 3.1 G/DL (ref 3.4–5)
ANA SER QL HEP2 SUBST: NEGATIVE
ANION GAP SERPL CALC-SCNC: 11 MMOL/L (ref 10–20)
ATRIAL RATE: 115 BPM
BASOPHILS # BLD AUTO: 0.01 X10*3/UL (ref 0–0.1)
BASOPHILS NFR BLD AUTO: 0.3 %
BUN SERPL-MCNC: 14 MG/DL (ref 6–23)
CALCIUM SERPL-MCNC: 8.1 MG/DL (ref 8.6–10.6)
CHLORIDE SERPL-SCNC: 101 MMOL/L (ref 98–107)
CO2 SERPL-SCNC: 25 MMOL/L (ref 21–32)
CREAT SERPL-MCNC: 0.48 MG/DL (ref 0.5–1.3)
EGFRCR SERPLBLD CKD-EPI 2021: >90 ML/MIN/1.73M*2
EOSINOPHIL # BLD AUTO: 0 X10*3/UL (ref 0–0.7)
EOSINOPHIL NFR BLD AUTO: 0 %
ERYTHROCYTE [DISTWIDTH] IN BLOOD BY AUTOMATED COUNT: 16.3 % (ref 11.5–14.5)
GLUCOSE BLD MANUAL STRIP-MCNC: 119 MG/DL (ref 74–99)
GLUCOSE BLD MANUAL STRIP-MCNC: 129 MG/DL (ref 74–99)
GLUCOSE BLD MANUAL STRIP-MCNC: 143 MG/DL (ref 74–99)
GLUCOSE BLD MANUAL STRIP-MCNC: 80 MG/DL (ref 74–99)
GLUCOSE SERPL-MCNC: 104 MG/DL (ref 74–99)
HCT VFR BLD AUTO: 25.3 % (ref 41–52)
HGB BLD-MCNC: 8.8 G/DL (ref 13.5–17.5)
IMM GRANULOCYTES # BLD AUTO: 0.04 X10*3/UL (ref 0–0.7)
IMM GRANULOCYTES NFR BLD AUTO: 1.3 % (ref 0–0.9)
LYMPHOCYTES # BLD AUTO: 0.51 X10*3/UL (ref 1.2–4.8)
LYMPHOCYTES NFR BLD AUTO: 16.5 %
MCH RBC QN AUTO: 32.5 PG (ref 26–34)
MCHC RBC AUTO-ENTMCNC: 34.8 G/DL (ref 32–36)
MCV RBC AUTO: 93 FL (ref 80–100)
MONOCYTES # BLD AUTO: 0.26 X10*3/UL (ref 0.1–1)
MONOCYTES NFR BLD AUTO: 8.4 %
NEUTROPHILS # BLD AUTO: 2.28 X10*3/UL (ref 1.2–7.7)
NEUTROPHILS NFR BLD AUTO: 73.5 %
NRBC BLD-RTO: 0 /100 WBCS (ref 0–0)
P AXIS: 33 DEGREES
P OFFSET: 187 MS
P ONSET: 137 MS
PHOSPHATE SERPL-MCNC: 2.6 MG/DL (ref 2.5–4.9)
PLATELET # BLD AUTO: 49 X10*3/UL (ref 150–450)
POTASSIUM SERPL-SCNC: 3.5 MMOL/L (ref 3.5–5.3)
PR INTERVAL: 150 MS
Q ONSET: 212 MS
QRS COUNT: 19 BEATS
QRS DURATION: 82 MS
QT INTERVAL: 328 MS
QTC CALCULATION(BAZETT): 453 MS
QTC FREDERICIA: 407 MS
R AXIS: -11 DEGREES
RBC # BLD AUTO: 2.71 X10*6/UL (ref 4.5–5.9)
SODIUM SERPL-SCNC: 133 MMOL/L (ref 136–145)
T AXIS: 9 DEGREES
T OFFSET: 376 MS
VENTRICULAR RATE: 115 BPM
WBC # BLD AUTO: 3.1 X10*3/UL (ref 4.4–11.3)

## 2025-04-22 PROCEDURE — 82947 ASSAY GLUCOSE BLOOD QUANT: CPT

## 2025-04-22 PROCEDURE — 97116 GAIT TRAINING THERAPY: CPT | Mod: GP

## 2025-04-22 PROCEDURE — 2500000002 HC RX 250 W HCPCS SELF ADMINISTERED DRUGS (ALT 637 FOR MEDICARE OP, ALT 636 FOR OP/ED)

## 2025-04-22 PROCEDURE — 2500000005 HC RX 250 GENERAL PHARMACY W/O HCPCS

## 2025-04-22 PROCEDURE — 80069 RENAL FUNCTION PANEL: CPT

## 2025-04-22 PROCEDURE — 93005 ELECTROCARDIOGRAM TRACING: CPT

## 2025-04-22 PROCEDURE — 99232 SBSQ HOSP IP/OBS MODERATE 35: CPT | Performed by: NURSE PRACTITIONER

## 2025-04-22 PROCEDURE — 2500000001 HC RX 250 WO HCPCS SELF ADMINISTERED DRUGS (ALT 637 FOR MEDICARE OP)

## 2025-04-22 PROCEDURE — 95720 EEG PHY/QHP EA INCR W/VEEG: CPT | Performed by: STUDENT IN AN ORGANIZED HEALTH CARE EDUCATION/TRAINING PROGRAM

## 2025-04-22 PROCEDURE — 1200000002 HC GENERAL ROOM WITH TELEMETRY DAILY

## 2025-04-22 PROCEDURE — 85025 COMPLETE CBC W/AUTO DIFF WBC: CPT

## 2025-04-22 PROCEDURE — 95715 VEEG EA 12-26HR INTMT MNTR: CPT

## 2025-04-22 PROCEDURE — 2500000004 HC RX 250 GENERAL PHARMACY W/ HCPCS (ALT 636 FOR OP/ED): Mod: JZ

## 2025-04-22 PROCEDURE — 2500000004 HC RX 250 GENERAL PHARMACY W/ HCPCS (ALT 636 FOR OP/ED)

## 2025-04-22 PROCEDURE — 93010 ELECTROCARDIOGRAM REPORT: CPT | Performed by: INTERNAL MEDICINE

## 2025-04-22 PROCEDURE — 85390 FIBRINOLYSINS SCREEN I&R: CPT

## 2025-04-22 PROCEDURE — 95700 EEG CONT REC W/VID EEG TECH: CPT

## 2025-04-22 PROCEDURE — 97530 THERAPEUTIC ACTIVITIES: CPT | Mod: GP

## 2025-04-22 RX ADMIN — VANCOMYCIN HYDROCHLORIDE 1250 MG: 1.25 INJECTION, POWDER, LYOPHILIZED, FOR SOLUTION INTRAVENOUS at 11:40

## 2025-04-22 RX ADMIN — POLYETHYLENE GLYCOL 3350 17 G: 17 POWDER, FOR SOLUTION ORAL at 09:23

## 2025-04-22 RX ADMIN — LACOSAMIDE 100 MG: 100 TABLET, FILM COATED ORAL at 00:56

## 2025-04-22 RX ADMIN — VANCOMYCIN HYDROCHLORIDE 1250 MG: 1.25 INJECTION, POWDER, LYOPHILIZED, FOR SOLUTION INTRAVENOUS at 01:14

## 2025-04-22 RX ADMIN — SENNOSIDES AND DOCUSATE SODIUM 1 TABLET: 50; 8.6 TABLET ORAL at 20:33

## 2025-04-22 RX ADMIN — CLONIDINE HYDROCHLORIDE 0.2 MG: 0.1 TABLET ORAL at 09:23

## 2025-04-22 RX ADMIN — PANTOPRAZOLE SODIUM 40 MG: 40 INJECTION, POWDER, FOR SOLUTION INTRAVENOUS at 09:23

## 2025-04-22 RX ADMIN — LACOSAMIDE 100 MG: 100 TABLET, FILM COATED ORAL at 11:38

## 2025-04-22 RX ADMIN — APIXABAN 5 MG: 5 TABLET, FILM COATED ORAL at 20:33

## 2025-04-22 RX ADMIN — VANCOMYCIN HYDROCHLORIDE 1250 MG: 1.25 INJECTION, POWDER, LYOPHILIZED, FOR SOLUTION INTRAVENOUS at 23:23

## 2025-04-22 RX ADMIN — QUETIAPINE FUMARATE 25 MG: 25 TABLET ORAL at 20:33

## 2025-04-22 RX ADMIN — VALPROATE SODIUM 150 MG: 100 INJECTION, SOLUTION INTRAVENOUS at 08:09

## 2025-04-22 RX ADMIN — SODIUM CHLORIDE 500 ML: 0.9 INJECTION, SOLUTION INTRAVENOUS at 09:24

## 2025-04-22 RX ADMIN — LACOSAMIDE 100 MG: 100 TABLET, FILM COATED ORAL at 23:23

## 2025-04-22 RX ADMIN — CLONIDINE HYDROCHLORIDE 0.2 MG: 0.1 TABLET ORAL at 20:33

## 2025-04-22 RX ADMIN — APIXABAN 5 MG: 5 TABLET, FILM COATED ORAL at 09:23

## 2025-04-22 RX ADMIN — VALPROATE SODIUM 150 MG: 100 INJECTION, SOLUTION INTRAVENOUS at 14:24

## 2025-04-22 RX ADMIN — ATORVASTATIN CALCIUM 40 MG: 40 TABLET, FILM COATED ORAL at 20:33

## 2025-04-22 RX ADMIN — VALPROATE SODIUM 150 MG: 100 INJECTION, SOLUTION INTRAVENOUS at 02:48

## 2025-04-22 ASSESSMENT — COGNITIVE AND FUNCTIONAL STATUS - GENERAL
MOVING FROM LYING ON BACK TO SITTING ON SIDE OF FLAT BED WITH BEDRAILS: A LITTLE
STANDING UP FROM CHAIR USING ARMS: A LOT
DAILY ACTIVITIY SCORE: 18
CLIMB 3 TO 5 STEPS WITH RAILING: TOTAL
WALKING IN HOSPITAL ROOM: A LOT
MOBILITY SCORE: 14
DRESSING REGULAR LOWER BODY CLOTHING: A LITTLE
DRESSING REGULAR UPPER BODY CLOTHING: A LITTLE
TURNING FROM BACK TO SIDE WHILE IN FLAT BAD: A LITTLE
TOILETING: A LITTLE
TURNING FROM BACK TO SIDE WHILE IN FLAT BAD: A LITTLE
MOBILITY SCORE: 13
MOVING TO AND FROM BED TO CHAIR: A LOT
PERSONAL GROOMING: A LITTLE
MOVING TO AND FROM BED TO CHAIR: A LOT
EATING MEALS: A LITTLE
WALKING IN HOSPITAL ROOM: A LOT
HELP NEEDED FOR BATHING: A LITTLE
CLIMB 3 TO 5 STEPS WITH RAILING: A LOT
MOVING FROM LYING ON BACK TO SITTING ON SIDE OF FLAT BED WITH BEDRAILS: A LITTLE
STANDING UP FROM CHAIR USING ARMS: A LOT

## 2025-04-22 ASSESSMENT — PAIN SCALES - GENERAL
PAINLEVEL_OUTOF10: 0 - NO PAIN

## 2025-04-22 ASSESSMENT — PAIN - FUNCTIONAL ASSESSMENT
PAIN_FUNCTIONAL_ASSESSMENT: 0-10
PAIN_FUNCTIONAL_ASSESSMENT: 0-10

## 2025-04-22 NOTE — PROGRESS NOTES
"Physical Therapy    Physical Therapy Treatment    Patient Name: Enmanuel Ahumada \"Dash\"  MRN: [id number]  Department: [facility] [id number]  Room: 220/220-A  Today's Date: 4/22/2025  Time Calculation  Start Time: 1038  Stop Time: 1112  Time Calculation (min): 34 min    Assessment/Plan   PT Assessment  PT Assessment Results: Decreased strength, Decreased endurance, Impaired balance, Decreased coordination, Decreased mobility, Decreased cognition, Impaired judgement, Decreased safety awareness  Rehab Prognosis: Good  Barriers to Discharge Home: Physical needs, Caregiver assistance  Caregiver Assistance: Caregiver assistance needed per identified barriers - however, level of patient's required assistance exceeds assistance available at home  Physical Needs: High falls risk due to function or environment  Evaluation/Treatment Tolerance: Patient tolerated treatment well  Medical Staff Made Aware: Yes  Strengths: Attitude of self, Ability to acquire knowledge, Support and attitude of living partners  End of Session Communication: Bedside nurse  Assessment Comment: Pt demonstrating improvement in functional mobility as demonstrated by ambulating 2x40ft with min/mod A and FWW. Pt remains appropriate for high intensity level of continued care when medically ready for discharge.  End of Session Patient Position: Up in chair, Alarm on  PT Plan  Inpatient/Swing Bed or Outpatient: Inpatient  PT Plan  Treatment/Interventions: Bed mobility, Transfer training, Gait training, Stair training, Balance training, Neuromuscular re-education, Strengthening, Endurance training, Range of motion, Therapeutic exercise, Therapeutic activity  PT Plan: Ongoing PT  PT Frequency: 5 times per week  PT Discharge Recommendations: High intensity level of continued care  Equipment Recommended upon Discharge:  (tbd at next level of care)  PT Recommended Transfer Status: Assist x1, Assistive device  PT - OK to Discharge: Yes (meaning pt has been " evaluated and d/c reccs have been made)      General Visit Information:   PT  Visit  PT Received On: 04/22/25  Response to Previous Treatment: Patient with no complaints from previous session.  General  Reason for Referral: Pt initially presented with tumor progression (RO GBM), s/p redo craniotomy for tumor resection in 2/2025. 4/5 p/w 1wk incisional drainage, 4/4 CTH airfluid level in resection cavity, 4/5 MRI brain w/wo incr'd resection cavity enhancement and diffusion restriction 4/6 s/p R cranial wound washout and mesh cranioplasty. 4/7 rCTH incr SAH, min incr ICH, rrCTH stable, EEG neg dc'd.  Past Medical History Relevant to Rehab: HTN, HLD, smalls's esophagus, RO GBM s/p resection ([facility], 12/2023) with resultant L HH s/p chemo/RT  PT Missed Visit:  (-)  Missed Visit Reason:  (-)  Family/Caregiver Present: Yes  Caregiver Feedback: wife and granddaughter present and supportive during session  Co-Treatment:  (-)  Prior to Session Communication: Bedside nurse  Patient Position Received: Bed, 3 rail up, Alarm on  General Comment: Supine. Pleasant, agreeable and cooperative to PT. Pt reports feeling different and weaker today. Eager to mobilize, tolerated treatment well.    Subjective   Precautions:  Precautions  Hearing/Visual Limitations: pt wearing glasses  Medical Precautions: Fall precautions  Precautions Comment: SBP<160    Objective   Pain:  Pain Assessment  Pain Assessment: 0-10  0-10 (Numeric) Pain Score: 0 - No pain (pt does not report any pain)  Cognition:  Cognition  Overall Cognitive Status: Impaired  Arousal/Alertness: Delayed responses to stimuli  Orientation Level: Disoriented to time, Disoriented to situation  Following Commands: Follows one step commands with repetition  Safety Judgment: Decreased awareness of need for assistance  Impulsive: Mildly  Processing Speed: Delayed  Coordination:  Movements are Fluid and Coordinated: No  Upper Body Coordination: LUE difficulty with opposition, RUE intact.  LUE dysmetria observed during functional activities. Verbal and tactile cues for sequencing and hand placement, especially with LUE  Finger to Nose: Other: (comment) (LUE very slow pace but able to complete. RUE intact.)  Rapid Alternating Movements: Other: (Comment) (Difficulty completing with LUE)  Alternating Toe Taps: Intact  Heel to Miguel: Intact  Coordination Comment: Pt with some difficulty understanding directions. LUE dysmetria observed. RUE WFL.  Postural Control:  Postural Control  Postural Control: Within Functional Limits  Posture Comment: WFL as demonstrated during functional activity  Extremity/Trunk Assessments:    RUE   RUE : Within Functional Limits  LUE   LUE:  (dysmetria and difficulty using LUE during functional activity observed)  RLE   RLE : Within Functional Limits  Strength RLE  RLE Overall Strength: Greater than or equal to 3/5 as evidenced by functional mobility  LLE   LLE :  (pt observed having some difficulty progressing LLE during ambulation, requiring some verbal cues)  Activity Tolerance:  Activity Tolerance  Endurance: Endurance does not limit participation in activity  Treatments:    Therapeutic Activity  Therapeutic Activity Performed: Yes  Therapeutic Activity 1: Seated in chair, UE coordination exercises; finger to nose and opposition x5 b/l    Balance/Neuromuscular Re-Education  Balance/Neuromuscular Re-Education Activity Performed: Yes  Balance/Neuromuscular Re-Education Activity 1: standing 30 seconds, able to progress to CGA with FWW    Bed Mobility  Bed Mobility: Yes  Bed Mobility 1  Bed Mobility 1: Supine to sitting, Sitting to supine  Level of Assistance 1: Minimum assistance, Moderate verbal cues, Moderate tactile cues  Bed Mobility Comments 1: increased time to complete, VCs for sequencing, HOB elevated  Bed Mobility 2  Bed Mobility  2: Scooting  Level of Assistance 2: Moderate assistance, Minimal tactile cues, Minimal verbal cues  Bed Mobility Comments 2: scooting EOB,  VCs for strategy    Ambulation/Gait Training  Ambulation/Gait Training Performed: Yes  Ambulation/Gait Training 1  Surface 1: Level tile, Carpet  Device 1: Rolling walker  Assistance 1: Minimum assistance, Moderate assistance  Quality of Gait 1: Inconsistent stride length, Decreased step length, Diminished heel strike, Shuffling gait  Comments/Distance (ft) 1: 2x 40ft. Pt min A for 40ft, Mod A for turns and assist for walker and variable min/Mod A for the next 40ft.  Ambulation/Gait Training 2  Surface 2: Level tile  Device 2: Rolling walker  Assistance 2: Minimum assistance  Quality of Gait 2: Diminished heel strike, Inconsistent stride length, Decreased step length, Shuffling gait  Comments/Distance (ft) 2: 2x15ft. seated break inbetween  Transfers  Transfer: Yes  Transfer 1  Transfer From 1: Sit to, Stand to  Transfer to 1: Sit  Technique 1: Sit to stand, Stand to sit  Transfer Device 1: Walker  Transfer Level of Assistance 1: Minimum assistance, Moderate assistance  Trials/Comments 1: x3 trials. 1 trial min, 2 trials mod    Stairs  Stairs: No    Outcome Measures:    [facility] Basic Mobility  Turning from your back to your side while in a flat bed without using bedrails: A little  Moving from lying on your back to sitting on the side of a flat bed without using bedrails: A little  Moving to and from bed to chair (including a wheelchair): A lot  Standing up from a chair using your arms (e.g. wheelchair or bedside chair): A lot  To walk in hospital room: A lot  Climbing 3-5 steps with railing: Total  Basic Mobility - Total Score: 13      Education Documentation  Body Mechanics, taught by DAVID Verde at 4/22/2025 11:55 AM.  Learner: Patient  Readiness: Eager  Method: Explanation  Response: Verbalizes Understanding  Comment: role of PT, POC    Precautions, taught by DAVID Verde at 4/22/2025 11:55 AM.  Learner: Patient  Readiness: Eager  Method: Explanation  Response: Verbalizes Understanding  Comment: role  of PT, POC    Mobility Training, taught by DAVID Verde at 4/22/2025 11:55 AM.  Learner: Patient  Readiness: Eager  Method: Explanation  Response: Verbalizes Understanding  Comment: role of PT, POC    Education Comments  No comments found.        OP EDUCATION:       Encounter Problems       Encounter Problems (Active)       PT Problem       Patient will perform bed mobility IND to reduce risk of developing decubitus ulcers.  (Progressing)       Start:  04/08/25    Expected End:  04/22/25            Patient will ambulate at least 150 ft. with </= close sup and LRD to improve tolerance of household distances.  (Progressing)       Start:  04/08/25    Expected End:  04/22/25            Patient will perform the 5-Time Sit to Stand test in <14 sec to indicate decreased falls risk. (Progressing)       Start:  04/08/25    Expected End:  04/22/25            Patient will perform sit to stand and stand to sit transfers with </= close sup and LRD to increase functional strength.  (Progressing)       Start:  04/08/25    Expected End:  04/22/25            BLE 5/5 (Progressing)       Start:  04/08/25    Expected End:  04/22/25               Pain - Adult

## 2025-04-22 NOTE — CARE PLAN
The clinical goals for the shift include pt will remain safe and free from fall during shift. The patient met this goal.

## 2025-04-22 NOTE — CONSULTS
"Nutrition Follow Up Assessment:   Nutrition Assessment         Nutrition History:  Energy Intake: Poor < 50 %, Fair 50-75 %  Food and Nutrient History: Diet advanced to regular diet on 4/13. Met with RN - pt consuming minimal PO. Mentioned that when wife is present he does better as she encourages him to eat; however, when she is not here he seems to have less (example: there were 4 pieces of [place] toast for breakfast and he ate 1/2 of one this AM).       Anthropometrics:  Height: 170.2 cm (5' 7\")   Weight: 64.4 kg (141 lb 14.4 oz)   BMI (Calculated): 22.22  IBW/kg (Dietitian Calculated): 67.3 kg    Weight History:   04/18/25 0600 64.4 kg   04/13/25 0530 64.3 kg   04/12/25 0000 65.7 kg   04/09/25 0000 65.3 kg   04/07/25 0400 71.8 kg   04/06/25 0646 68 kg   04/05/25 1028 68 kg   04/04/25 0944 68.1 kg     Nutrition Significant Labs:  CBC Trend:   Results from last 7 days   Lab Units 04/22/25  0449 04/21/25  0344 04/20/25  0415 04/19/25  2024   WBC AUTO x10*3/uL 3.1* 2.5* 2.8* 3.3*   RBC AUTO x10*6/uL 2.71* 2.60* 2.69* 2.74*   HEMOGLOBIN g/dL 8.8* 8.5* 8.8* 9.1*   HEMATOCRIT % 25.3* 24.1* 25.5* 25.5*   MCV fL 93 93 95 93   PLATELETS AUTO x10*3/uL 49* 69* 93* 129*    , BMP Trend:   Results from last 7 days   Lab Units 04/22/25  0525 04/21/25  0344 04/20/25  0028 04/19/25  0457   GLUCOSE mg/dL 104* 115* 98 90   CALCIUM mg/dL 8.1* 8.2* 8.7 8.6   SODIUM mmol/L 133* 136 138 143   POTASSIUM mmol/L 3.5 3.6 3.5 3.5   CO2 mmol/L 25 28 30 30   CHLORIDE mmol/L 101 101 102 107   BUN mg/dL 14 16 18 16   CREATININE mg/dL 0.48* 0.65 0.56 0.58    , BG POCT trend:   Results from last 7 days   Lab Units 04/22/25  1434 04/22/25  0015 04/21/25  1751 04/21/25  1158 04/21/25  0800   POCT GLUCOSE mg/dL 143* 119* 80 127* 98    , Renal Lab Trend:   Results from last 7 days   Lab Units 04/22/25  0525 04/21/25  0344 04/20/25  0028 04/19/25  0457 04/18/25  1325 04/17/25  0611   POTASSIUM mmol/L 3.5 3.6 3.5 3.5   < > 4.1   PHOSPHORUS mg/dL 2.6 " 2.3* 3.7 3.5   < > 3.6   SODIUM mmol/L 133* 136 138 143   < > 137   MAGNESIUM mg/dL  --   --   --   --   --  1.91   EGFR mL/min/1.73m*2 >90 >90 >90 >90   < > >90   BUN mg/dL 14 16 18 16   < > 21   CREATININE mg/dL 0.48* 0.65 0.56 0.58   < > 0.55    < > = values in this interval not displayed.      Scheduled medications  Scheduled Medications[1]      I/O:   Last BM Date: 04/19/25; Stool Appearance: Formed (04/18/25 2000)    Dietary Orders (From admission, onward)       Start     Ordered    04/15/25 1124  Adult diet Regular  Diet effective now        Question:  Diet type  Answer:  Regular    04/15/25 1123    04/05/25 1229  May Participate in Room Service  ( ROOM SERVICE MAY PARTICIPATE)  Once        Question:  .  Answer:  Yes    04/05/25 1228                     Estimated Needs:   Total Energy Estimated Needs in 24 hours (kCal): 1950 kCal  Method for Estimating Needs: 30kcal/kg per act wt  Total Protein Estimated Needs in 24 Hours (g): 95 g  Method for Estimating 24 Hour Protein Needs: 1.5g/kg per act wt  Total Fluid Estimated Needs in 24 Hours (mL):  (per team)        Nutrition Diagnosis   Malnutrition Diagnosis  Patient has Malnutrition Diagnosis: Yes  Diagnosis Status: Active  Malnutrition Diagnosis: Moderate malnutrition related to chronic disease or condition  Related to: GBM with progression, chemo, s/p crani  As Evidenced by: moderate muscle wasting and fat loss, intake likely meeting <75% of increased needs for >/=1 month (with ongoing inadequate intake this admission - variable days with <50% intake)    Nutrition Diagnosis  Patient has Nutrition Diagnosis: Yes  Diagnosis Status (1): Active  Nutrition Diagnosis 1: Increased nutrient needs  Related to (1): increased metabolic demand  As Evidenced by (1): s/p right cranial wound washout/mesh cranioplasty       Nutrition Interventions/Recommendations   Nutrition prescription for oral nutrition    Nutrition Recommendations:  Individualized Nutrition Prescription  Provided for : Trial Ensure Plus BID for AM and PM snack    Nutrition Interventions/Goals:   Medical Food Supplement: Commercial beverage medical food supplement therapy  Goal: ONS to provide an additiona 350kcals and 13g pro each       Nutrition Monitoring and Evaluation   Food/Nutrient Related History Monitoring  Monitoring and Evaluation Plan: Intake / amount of food  Intake / Amount of food: Consumes at least 50% or more of meals/snacks/supplements    Anthropometric Measurements  Monitoring and Evaluation Plan: Body weight  Body Weight: Body weight - Maintain stable weight    Goal Status: New goal(s) identified    Time Spent (min): 40 minutes            [1] apixaban, 5 mg, oral, BID  atorvastatin, 40 mg, nasogastric tube, Daily  cloNIDine, 0.2 mg, nasogastric tube, BID  insulin lispro, 0-5 Units, subcutaneous, q6h  lacosamide, 100 mg, oral, q12h  lidocaine, 5 mL, infiltration, Once  lidocaine, 1 patch, transdermal, Daily  pantoprazole, 40 mg, intravenous, Daily  polyethylene glycol, 17 g, nasogastric tube, Daily  QUEtiapine, 25 mg, nasogastric tube, Nightly  sennosides-docusate sodium, 1 tablet, nasogastric tube, Nightly  vancomycin, 1,250 mg, intravenous, q12h

## 2025-04-22 NOTE — ASSESSMENT & PLAN NOTE
69 year old male with medical history and hospital course as noted above.   Patient with episode of altered mentation 4/21/25 in the afternoon, during transition of prophy AED's from VPA to Lacosamide.  NSGY Team concerned about possible seizure activity, with video EEG monitoring ordered.  CTH completed and showed decrease of left temporal hemorrhage and redistribution of pneumocephalus.    Vascular Medicine Service following for anticoagulation recommendations for DVT (left distal femoral, popliteal and calf DVT's, as well as right brachial thrombus around former PICC line which has been removed).    Review of labs shows WBC 3.1, stable hemoglobin 8.8 grams, continued thrombocytopenia with decreased platelets to 49 K without evidence of bleeding, stable serum creatinine 0.48.     Platelets continue to fluctuate, and HIT has been ruled out as etiology of thrombocytopenia.   Hematology Team consulted for thrombocytopenia, with recommendations for CBC with citrated tube, hold/adjust [place].    Continues with Eliquis 5mg BID for anticoagulation.   Patient wearing Eliquis Med Alert band.      Recommendations:  ~Patient with continued platelet fluctuations; Vasc Med does not recommend discharge to SNF until platelets stabilize.  ~Continue with Eliquis 5mg q12 hours for anticoagulation therapy.   ~Continue with Eliquis as long as platelets are >50 K, and there is no evidence of bleeding.    ~If Keppra is used for anti-seizure medication, then Eliquis will need to be changed to Warfarin.   ~MONITOR for bleeding, neurologic changes or further platelet decrease.  ~Outpatient follow up with Dr. Zee Hardy from the Vascular Medicine Service has been scheduled as a virtual visit for 5/6/25 at 11:30 am; will discuss removal of IVC filter at the time of follow up appointment.

## 2025-04-22 NOTE — CARE PLAN
Transitional Care Coordinator Note: Patient discussed with medical team, per medical team patient is not medically ready. Discharge dispo:  [facility]. ADOD 1-2 Days. Patient has Precert its good until 4/23  Akosua Tariq RN  Transitional Care Coordinator      Update: 4/24  Patient is Not Medically ready, ADOD 3-4 Days   [facility] has accepted. Patient will need another Precert.

## 2025-04-22 NOTE — PROGRESS NOTES
"Enmanuel Ahumada \"Petey" is a 69 y.o. male on day 17 of admission presenting with Wound dehiscence.    Subjective   No acute events overnight    Objective     Physical Exam  Resting, Eyes open to voice  Ox3  RUE 5/5  RLE 5/5  LUE 4+/5  LLE 4+/5  Sensation intact to light touch  Incision c/d/I, sutures in place, no leaking     Last Recorded Vitals  Blood pressure 122/85, pulse 101, temperature 36.2 °C (97.2 °F), temperature source Temporal, resp. rate 18, height 1.702 m (5' 7\"), weight 64.4 kg (141 lb 14.4 oz), SpO2 95%.  Intake/Output last 3 Shifts:  I/O last 3 completed shifts:  In: 440 (6.8 mL/kg) [P.O.:390; IV Piggyback:50]  Out: 200 (3.1 mL/kg) [Urine:200 (0.1 mL/kg/hr)]  Weight: 64.4 kg     Relevant Results  Results from last 72 hours   Lab Units 04/22/25  0449 04/21/25  0344   WBC AUTO x10*3/uL 3.1* 2.5*   NRBC AUTO /100 WBCs 0.0 0.0   RBC AUTO x10*6/uL 2.71* 2.60*   HEMOGLOBIN g/dL 8.8* 8.5*   HEMATOCRIT % 25.3* 24.1*   MCV fL 93 93   MCH pg 32.5 32.7   MCHC g/dL 34.8 35.3   RDW % 16.3* 16.1*   PLATELETS AUTO x10*3/uL 49* 69*      Results from last 72 hours   Lab Units 04/22/25  0525 04/21/25  0344   GLUCOSE mg/dL 104* 115*   SODIUM mmol/L 133* 136   POTASSIUM mmol/L 3.5 3.6   CHLORIDE mmol/L 101 101   CO2 mmol/L 25 28   ANION GAP mmol/L 11 11   BUN mg/dL 14 16   CREATININE mg/dL 0.48* 0.65   EGFR mL/min/1.73m*2 >90 >90   CALCIUM mg/dL 8.1* 8.2*   PHOSPHORUS mg/dL 2.6 2.3*   ALBUMIN g/dL 3.1* 3.0*          Assessment/Plan   Assessment & Plan  Wound dehiscence    69 M h/o HTN, HLD, smalls's esophagus, RO GBM s/p resection ([facility], 12/2023) with resultant L HH s/p chemo/RT presenting with tumor progression, 2/20 s/p redo craniotomy for tumor resection w 5 ALA, CTH POC, 2/21 MRI intended NTR, 3/16 MRI postop changes w fluid collection at dura and craniotomy site, worsening infiltrative disease, enhancement along posteromedial aspect of resection site, 4/5 p/w 1wk incisional drainage, 4/4 CTH airfluid level in " resection cavity, 4/5 MRI brain w/wo incr'd resection cavity enhancement and diffusion restriction  4/6 s/p R cranial wound washout and mesh cranioplasty (kaleb purulence), CTH POC, L temporal ICH, plts 81 s/p 2u plts, 4/7 rCTH incr SAH, min incr ICH, rrCTH stable, EEG neg dc'd  4/8 s/p PICC  4/9 CTH stable, SDD dc'd, EEG neg  4/10 SGD dc'd  4/11 DVT US x4, LLE distal femoral, popliteal, gastroc, peroneal, post tibial vv DVTs, RUE R brachial DVT, s/p IVC filter    4/13 CTH stable  4/14 PICC auto dc'd, agitation, CTH stable   4/15 s/p PICC  4/16 CTH stable, restraints removed, no agitation noted   4/17 plts 75, set PF4/BRANDO  4/19 plts 49, plan to give 1 unit platelets  4/20 acute change in exam, AMS and not following commands as briskly, CTH stable     Plan:  Tele (ok for q8 NC overnight)  C/w vEEG  AM RFP, citrated CBC, peripheral smear  Appreciate ID recs - continue Vanc until 6/15, likely not cause of thrombocytopenia   Appreciate vasc med recs - Continue with Eliquis as long as platelets are >50 K, and there is no evidence of bleeding.   Neurology recs - continue LAC 100mg BID with VPA 150mg q6h   Heme recs - follow up fractionated plt count, If Plt <30K and bleeding or procedure, transfuse; otherwise observe   SLP recs-  regular diet   Continue home meds   PTOT- rehab      Jj Hernandez MD

## 2025-04-22 NOTE — SIGNIFICANT EVENT
Preliminary/Baseline EEG Report:    - This vEEG is indicative of a moderate-severe diffuse encephalopathy. No epileptiform activity is recorded in this baseline file.     This EEG was read until 20:38 on 04/21/25 .       The final impression will be available tomorrow under Chart Review in the Media tab.     Lewis Rea DO  Adult Epilepsy Fellow  [facility] [place]

## 2025-04-22 NOTE — SIGNIFICANT EVENT
"Epilepsy Updated Assessment and Plan:  Enmanuel Ahumada \"Petey" is a 69 y.o. 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 Epilepsy re-engaged due to an episode concerning for seizure, described as perseverance and unable to correctly following commands. No overt motor symptoms. The patient is currently in the transition from VPA to lacosamide. CTH normal, EEG is being placed.     Overall, the patient symptoms are more concerning of encephalopathy and aphasia given location of L basotemporal ICH rather than seizure. Plan to wean off VPA (c/f thrombocytopenia) and continue with lacosamide. Will monitor EEG.      Prelim EEG read 4/21: Moderate-severe encephalopathy, no seizures.     Recommendation:  - Neurology will follow EEG  - Please continue LAC 100mg BID   - Wean off VPA today.       Beckie Brandon MD  Neurology PGY-3  Epilepsy Pager: [phone]    "

## 2025-04-22 NOTE — PROGRESS NOTES
"Enmanuel Ahumada \"Petey" is a 69 y.o. male on day 17 of admission.   Medical history significant for GERD, glioblastoma s/p right craniotomy for tumor resection 12/29/23, HLD, HTN and seizure disorder.   Patient admitted 4/5/25 with drainage from surgical incision, after undergoing repeat craniotomy and tumor resection 2/20/25; now with findings of MRSA treated with Vancomycin.  Underwent cranial washout and mesh cranioplasty 4/6/25.    Patient found to have left distal femoral, popliteal and calf DVT's, as well as right brachial thrombus around PICC line.    S/p IVC filter placement 4/11/25 as patient was unable to use anticoagulation therapy after undergoing recent brain surgery.  Underwent placement of PICC line in IR 4/15/25; now with plan for prolonged Vancomycin administration, with end date of 6/15/25.   Heparin infusion started 4/14/25, and then transitioned to weight based Lovenox 60mg q12 hours 4/15/25, and then transitioned to Eliquis 5mg BID on 4/16/24.    Continues with Eliquis 5mg q12 hours.   Patient worked up for HIT due to decreased platelet levels after exposure to Heparin; PF4 returned as poistive, and BRANDO returned as negative 4/18/25.   Platelets continue to fluctuate for unknown reason at this time.     Review of chart shows last chemotherapy was completed 3/2024.      Subjective   Sleeping during my visit in the morning, then awake and conversant in the afternoon.  Denies CP, SOB or bleeding.     Objective     Physical Exam  Sleeping in bed in NAD in morning; sitting up in bed in NAD in afternoon, with spouse present at bedside.  Opens eyes to name called and falls back to sleep quickly during morning visit.  Continues with video EEG; head incision intact with sutures, open to air without drainage.  Resp full and regular with breath sounds CTA on RA  Normal heart sounds with RRR; telemetry monitor shows NSR; vital signs are stable  Abdomen soft and nondistended  Extremities warm to touch " "with palpable bilateral radial and DP pulses; SCDs on legs; left arm PICC line  Awake and conversant in afternoon.      Last Recorded Vitals  Blood pressure 103/72, pulse 103, temperature 36.2 °C (97.2 °F), resp. rate 18, height 1.702 m (5' 7\"), weight 64.4 kg (141 lb 14.4 oz), SpO2 96%.  Intake/Output last 3 Shifts:  I/O last 3 completed shifts:  In: 440 (6.8 mL/kg) [P.O.:390; IV Piggyback:50]  Out: 200 (3.1 mL/kg) [Urine:200 (0.1 mL/kg/hr)]  Weight: 64.4 kg     Relevant Results  Results from last 7 days   Lab Units 04/22/25  0449 04/21/25 0344 04/20/25  0415   WBC AUTO x10*3/uL 3.1* 2.5* 2.8*   HEMOGLOBIN g/dL 8.8* 8.5* 8.8*   HEMATOCRIT % 25.3* 24.1* 25.5*   PLATELETS AUTO x10*3/uL 49* 69* 93*       Results from last 7 days   Lab Units 04/22/25  0525 04/21/25  0344 04/20/25  0028   SODIUM mmol/L 133* 136 138   POTASSIUM mmol/L 3.5 3.6 3.5   CHLORIDE mmol/L 101 101 102   CO2 mmol/L 25 28 30   BUN mg/dL 14 16 18   CREATININE mg/dL 0.48* 0.65 0.56   GLUCOSE mg/dL 104* 115* 98   CALCIUM mg/dL 8.1* 8.2* 8.7       Results from last 7 days   Lab Units 04/20/25  0028   APTT seconds 30   INR  1.4*       Thrombocytopenia  Recent Labs     04/22/25  0449 04/21/25  0344 04/20/25  0415 04/19/25  2024 04/19/25  0457 04/18/25  1548 04/18/25  1325 04/17/25  0611 04/16/25  0720 04/15/25  0708 04/14/25  0331 04/13/25  0347 04/12/25  0352 04/11/25  0218   PLT 49* 69* 93* 129* 49* 71* 55* 75* 89* 107* 114* 121* 150 155         CT HEAD WO IV CONTRAST;  4/21/2025 5:48 pm  INDICATION:   Signs/Symptoms:neuro exam change.  IMPRESSION:  1. There is slight redistribution of pneumocephalus from the resection bed to the lateral ventricles and slight increase in size of the lateral ventricles.  2. There is slightly decreased prominence of the left temporal hemorrhage.  3. Otherwise no change.              Assessment & Plan  Wound dehiscence  69 year old male with medical history and hospital course as noted above.   Patient with episode of " altered mentation 4/21/25 in the afternoon, during transition of prophy AED's from VPA to Lacosamide.  NSGY Team concerned about possible seizure activity, with video EEG monitoring ordered.  CTH completed and showed decrease of left temporal hemorrhage and redistribution of pneumocephalus.    Vascular Medicine Service following for anticoagulation recommendations for DVT (left distal femoral, popliteal and calf DVT's, as well as right brachial thrombus around former PICC line which has been removed).    Review of labs shows WBC 3.1, stable hemoglobin 8.8 grams, continued thrombocytopenia with decreased platelets to 49 K without evidence of bleeding, stable serum creatinine 0.48.     Platelets continue to fluctuate, and HIT has been ruled out as etiology of thrombocytopenia.   Hematology Team consulted for thrombocytopenia, with recommendations for CBC with citrated tube, hold/adjust Lomustine.    Continues with Eliquis 5mg BID for anticoagulation.   Patient wearing Eliquis Med Alert band.      Recommendations:  ~Patient with continued platelet fluctuations; Vasc Med does not recommend discharge to SNF until platelets stabilize.  ~Continue with Eliquis 5mg q12 hours for anticoagulation therapy.   ~Continue with Eliquis as long as platelets are >50 K, and there is no evidence of bleeding.    ~If Keppra is used for anti-seizure medication, then Eliquis will need to be changed to Warfarin.   ~MONITOR for bleeding, neurologic changes or further platelet decrease.  ~Outpatient follow up with Dr. Zee Hardy from the Vascular Medicine Service has been scheduled as a virtual visit for 5/6/25 at 11:30 am; will discuss removal of IVC filter at the time of follow up appointment.     Patient seen and examined with Dr. Guerra  Plan of care discussed with Dr. Guerra  Plan of care discussed with Ms. Tamara BECERRIL from the NSGY Team      JERRICA Valles  Vascular Medicine Service  Pager [phone]    FROM  "HEMATOLOGY NOTES:  THROMBOCYTOPENIA POTENTIALLY RELATED TO THE LOMUSTINE (DOSE 4/4 BY REPORT)  VALPROATE FELT LESS LIKELY    ID DOES NOT FEEL RELATED TO VANCO    EPILEPSY IS CURRENTLY TRANSITIONING PT FROM VPA TO VIMPAT - NO DOAC KNOWN INTERACTIONS WITH THIS AGENT.    PLT AT 49 K ARE ON THE EDGE OF ACCEPTABLE FOR ONGOING AC.  DISCUSSED WITH HIS WIFE WE WILL CONTINUE TO FOLLOW CLOSELY.    MY ADDITIONS ARE NOTED IN \"CAPS\"..  Adilia Guerra MD    "

## 2025-04-23 ENCOUNTER — APPOINTMENT (OUTPATIENT)
Dept: CARDIOLOGY | Facility: HOSPITAL | Age: 70
End: 2025-04-23
Payer: MEDICARE

## 2025-04-23 LAB
ABO GROUP (TYPE) IN BLOOD: NORMAL
ALBUMIN SERPL BCP-MCNC: 3 G/DL (ref 3.4–5)
ANION GAP SERPL CALC-SCNC: 11 MMOL/L (ref 10–20)
ANTIBODY SCREEN: NORMAL
ATRIAL RATE: 115 BPM
ATRIAL RATE: 133 BPM
BASOPHILS # BLD AUTO: 0 X10*3/UL (ref 0–0.1)
BASOPHILS NFR BLD AUTO: 0 %
BUN SERPL-MCNC: 14 MG/DL (ref 6–23)
CALCIUM SERPL-MCNC: 7.7 MG/DL (ref 8.6–10.6)
CHLORIDE SERPL-SCNC: 102 MMOL/L (ref 98–107)
CO2 SERPL-SCNC: 26 MMOL/L (ref 21–32)
CREAT SERPL-MCNC: 0.62 MG/DL (ref 0.5–1.3)
EGFRCR SERPLBLD CKD-EPI 2021: >90 ML/MIN/1.73M*2
EOSINOPHIL # BLD AUTO: 0 X10*3/UL (ref 0–0.7)
EOSINOPHIL NFR BLD AUTO: 0 %
ERYTHROCYTE [DISTWIDTH] IN BLOOD BY AUTOMATED COUNT: 16.7 % (ref 11.5–14.5)
GLUCOSE BLD MANUAL STRIP-MCNC: 111 MG/DL (ref 74–99)
GLUCOSE BLD MANUAL STRIP-MCNC: 126 MG/DL (ref 74–99)
GLUCOSE BLD MANUAL STRIP-MCNC: 96 MG/DL (ref 74–99)
GLUCOSE SERPL-MCNC: 104 MG/DL (ref 74–99)
HCT VFR BLD AUTO: 23.3 % (ref 41–52)
HGB BLD-MCNC: 8 G/DL (ref 13.5–17.5)
IMM GRANULOCYTES # BLD AUTO: 0.02 X10*3/UL (ref 0–0.7)
IMM GRANULOCYTES NFR BLD AUTO: 0.8 % (ref 0–0.9)
LYMPHOCYTES # BLD AUTO: 0.44 X10*3/UL (ref 1.2–4.8)
LYMPHOCYTES NFR BLD AUTO: 16.8 %
MCH RBC QN AUTO: 32 PG (ref 26–34)
MCHC RBC AUTO-ENTMCNC: 34.3 G/DL (ref 32–36)
MCV RBC AUTO: 93 FL (ref 80–100)
MONOCYTES # BLD AUTO: 0.23 X10*3/UL (ref 0.1–1)
MONOCYTES NFR BLD AUTO: 8.8 %
NEUTROPHILS # BLD AUTO: 1.93 X10*3/UL (ref 1.2–7.7)
NEUTROPHILS NFR BLD AUTO: 73.6 %
NRBC BLD-RTO: 0 /100 WBCS (ref 0–0)
P AXIS: 39 DEGREES
P AXIS: 52 DEGREES
P OFFSET: 192 MS
P OFFSET: 196 MS
P ONSET: 135 MS
P ONSET: 140 MS
PHOSPHATE SERPL-MCNC: 2.8 MG/DL (ref 2.5–4.9)
PLATELET # BLD AUTO: 40 X10*3/UL (ref 150–450)
POTASSIUM SERPL-SCNC: 3.4 MMOL/L (ref 3.5–5.3)
PR INTERVAL: 148 MS
PR INTERVAL: 156 MS
Q ONSET: 213 MS
Q ONSET: 214 MS
QRS COUNT: 19 BEATS
QRS COUNT: 22 BEATS
QRS DURATION: 86 MS
QRS DURATION: 88 MS
QT INTERVAL: 286 MS
QT INTERVAL: 308 MS
QTC CALCULATION(BAZETT): 425 MS
QTC CALCULATION(BAZETT): 426 MS
QTC FREDERICIA: 372 MS
QTC FREDERICIA: 382 MS
R AXIS: -19 DEGREES
R AXIS: -6 DEGREES
RBC # BLD AUTO: 2.5 X10*6/UL (ref 4.5–5.9)
RH FACTOR (ANTIGEN D): NORMAL
SODIUM SERPL-SCNC: 136 MMOL/L (ref 136–145)
T AXIS: 35 DEGREES
T AXIS: 45 DEGREES
T OFFSET: 356 MS
T OFFSET: 368 MS
VENTRICULAR RATE: 115 BPM
VENTRICULAR RATE: 133 BPM
WBC # BLD AUTO: 2.6 X10*3/UL (ref 4.4–11.3)

## 2025-04-23 PROCEDURE — 95715 VEEG EA 12-26HR INTMT MNTR: CPT

## 2025-04-23 PROCEDURE — 85025 COMPLETE CBC W/AUTO DIFF WBC: CPT

## 2025-04-23 PROCEDURE — 2500000001 HC RX 250 WO HCPCS SELF ADMINISTERED DRUGS (ALT 637 FOR MEDICARE OP)

## 2025-04-23 PROCEDURE — 97530 THERAPEUTIC ACTIVITIES: CPT | Mod: GP

## 2025-04-23 PROCEDURE — 2500000004 HC RX 250 GENERAL PHARMACY W/ HCPCS (ALT 636 FOR OP/ED): Mod: JZ

## 2025-04-23 PROCEDURE — 36430 TRANSFUSION BLD/BLD COMPNT: CPT

## 2025-04-23 PROCEDURE — 93010 ELECTROCARDIOGRAM REPORT: CPT | Performed by: INTERNAL MEDICINE

## 2025-04-23 PROCEDURE — 80069 RENAL FUNCTION PANEL: CPT

## 2025-04-23 PROCEDURE — 86923 COMPATIBILITY TEST ELECTRIC: CPT

## 2025-04-23 PROCEDURE — 97112 NEUROMUSCULAR REEDUCATION: CPT | Mod: GP

## 2025-04-23 PROCEDURE — 93005 ELECTROCARDIOGRAM TRACING: CPT

## 2025-04-23 PROCEDURE — 2500000004 HC RX 250 GENERAL PHARMACY W/ HCPCS (ALT 636 FOR OP/ED)

## 2025-04-23 PROCEDURE — 82947 ASSAY GLUCOSE BLOOD QUANT: CPT

## 2025-04-23 PROCEDURE — P9037 PLATE PHERES LEUKOREDU IRRAD: HCPCS

## 2025-04-23 PROCEDURE — 95720 EEG PHY/QHP EA INCR W/VEEG: CPT | Performed by: STUDENT IN AN ORGANIZED HEALTH CARE EDUCATION/TRAINING PROGRAM

## 2025-04-23 PROCEDURE — 2500000005 HC RX 250 GENERAL PHARMACY W/O HCPCS

## 2025-04-23 PROCEDURE — 86900 BLOOD TYPING SEROLOGIC ABO: CPT

## 2025-04-23 PROCEDURE — 99232 SBSQ HOSP IP/OBS MODERATE 35: CPT | Performed by: NURSE PRACTITIONER

## 2025-04-23 PROCEDURE — 2500000002 HC RX 250 W HCPCS SELF ADMINISTERED DRUGS (ALT 637 FOR MEDICARE OP, ALT 636 FOR OP/ED)

## 2025-04-23 PROCEDURE — 1200000002 HC GENERAL ROOM WITH TELEMETRY DAILY

## 2025-04-23 RX ORDER — POTASSIUM CHLORIDE 1.5 G/1.58G
40 POWDER, FOR SOLUTION ORAL ONCE
Status: COMPLETED | OUTPATIENT
Start: 2025-04-23 | End: 2025-04-23

## 2025-04-23 RX ORDER — ENOXAPARIN SODIUM 100 MG/ML
40 INJECTION SUBCUTANEOUS DAILY
Status: DISCONTINUED | OUTPATIENT
Start: 2025-04-24 | End: 2025-04-26

## 2025-04-23 RX ADMIN — QUETIAPINE FUMARATE 25 MG: 25 TABLET ORAL at 21:02

## 2025-04-23 RX ADMIN — CLONIDINE HYDROCHLORIDE 0.2 MG: 0.1 TABLET ORAL at 21:02

## 2025-04-23 RX ADMIN — APIXABAN 5 MG: 5 TABLET, FILM COATED ORAL at 09:02

## 2025-04-23 RX ADMIN — LACOSAMIDE 100 MG: 100 TABLET, FILM COATED ORAL at 15:03

## 2025-04-23 RX ADMIN — POTASSIUM CHLORIDE 40 MEQ: 1.5 POWDER, FOR SOLUTION ORAL at 09:09

## 2025-04-23 RX ADMIN — LIDOCAINE 4% 1 PATCH: 40 PATCH TOPICAL at 09:02

## 2025-04-23 RX ADMIN — POLYETHYLENE GLYCOL 3350 17 G: 17 POWDER, FOR SOLUTION ORAL at 09:02

## 2025-04-23 RX ADMIN — VANCOMYCIN HYDROCHLORIDE 1250 MG: 1.25 INJECTION, POWDER, LYOPHILIZED, FOR SOLUTION INTRAVENOUS at 15:05

## 2025-04-23 RX ADMIN — SENNOSIDES AND DOCUSATE SODIUM 1 TABLET: 50; 8.6 TABLET ORAL at 21:02

## 2025-04-23 RX ADMIN — PANTOPRAZOLE SODIUM 40 MG: 40 INJECTION, POWDER, FOR SOLUTION INTRAVENOUS at 09:05

## 2025-04-23 RX ADMIN — ATORVASTATIN CALCIUM 40 MG: 40 TABLET, FILM COATED ORAL at 21:02

## 2025-04-23 ASSESSMENT — PAIN SCALES - WONG BAKER
WONGBAKER_NUMERICALRESPONSE: NO HURT
WONGBAKER_NUMERICALRESPONSE: NO HURT

## 2025-04-23 ASSESSMENT — COGNITIVE AND FUNCTIONAL STATUS - GENERAL
MOBILITY SCORE: 13
MOVING TO AND FROM BED TO CHAIR: A LOT
WALKING IN HOSPITAL ROOM: A LOT
MOVING FROM LYING ON BACK TO SITTING ON SIDE OF FLAT BED WITH BEDRAILS: A LITTLE
STANDING UP FROM CHAIR USING ARMS: A LOT
TURNING FROM BACK TO SIDE WHILE IN FLAT BAD: A LITTLE
CLIMB 3 TO 5 STEPS WITH RAILING: TOTAL

## 2025-04-23 ASSESSMENT — PAIN - FUNCTIONAL ASSESSMENT
PAIN_FUNCTIONAL_ASSESSMENT: 0-10
PAIN_FUNCTIONAL_ASSESSMENT: 0-10

## 2025-04-23 ASSESSMENT — PAIN SCALES - GENERAL
PAINLEVEL_OUTOF10: 2
PAINLEVEL_OUTOF10: 0 - NO PAIN
PAINLEVEL_OUTOF10: 0 - NO PAIN

## 2025-04-23 NOTE — NURSING NOTE
Notified AMBER Mcdonald that lab had called because they needed to cancel lab due to low platelets. Patient will be transfused today. Repeat labs after transfusing order.

## 2025-04-23 NOTE — CARE PLAN
The patient's goals for the shift include free from fall    The clinical goals for the shift include patient will be free from seizures

## 2025-04-23 NOTE — PROGRESS NOTES
"Enmanuel Ahumada \"Petey" is a 69 y.o. male on day 18 of admission presenting with Wound dehiscence.    Subjective   No acute events overnight    Objective     Physical Exam  Resting, Eyes open to voice  Ox3  RUE 5/5  RLE 5/5  LUE 4+/5  LLE 4+/5  Sensation intact to light touch  Incision c/d/I, sutures in place, no leaking     Last Recorded Vitals  Blood pressure 103/68, pulse 100, temperature 37 °C (98.6 °F), temperature source Temporal, resp. rate 16, height 1.702 m (5' 7\"), weight 65.3 kg (143 lb 15.4 oz), SpO2 95%.  Intake/Output last 3 Shifts:  I/O last 3 completed shifts:  In: 790 (12.3 mL/kg) [P.O.:240; IV Piggyback:550]  Out: 200 (3.1 mL/kg) [Urine:200 (0.1 mL/kg/hr)]  Weight: 64.4 kg     Relevant Results  Results from last 72 hours   Lab Units 04/22/25  0449 04/21/25  0344   WBC AUTO x10*3/uL 3.1* 2.5*   NRBC AUTO /100 WBCs 0.0 0.0   RBC AUTO x10*6/uL 2.71* 2.60*   HEMOGLOBIN g/dL 8.8* 8.5*   HEMATOCRIT % 25.3* 24.1*   MCV fL 93 93   MCH pg 32.5 32.7   MCHC g/dL 34.8 35.3   RDW % 16.3* 16.1*   PLATELETS AUTO x10*3/uL 49* 69*      Results from last 72 hours   Lab Units 04/22/25  0525 04/21/25  0344   GLUCOSE mg/dL 104* 115*   SODIUM mmol/L 133* 136   POTASSIUM mmol/L 3.5 3.6   CHLORIDE mmol/L 101 101   CO2 mmol/L 25 28   ANION GAP mmol/L 11 11   BUN mg/dL 14 16   CREATININE mg/dL 0.48* 0.65   EGFR mL/min/1.73m*2 >90 >90   CALCIUM mg/dL 8.1* 8.2*   PHOSPHORUS mg/dL 2.6 2.3*   ALBUMIN g/dL 3.1* 3.0*          Assessment/Plan   Assessment & Plan  Wound dehiscence    69 M h/o HTN, HLD, smalls's esophagus, RO GBM s/p resection ([facility], 12/2023) with resultant L HH s/p chemo/RT presenting with tumor progression, 2/20 s/p redo craniotomy for tumor resection w 5 ALA, CTH POC, 2/21 MRI intended NTR, 3/16 MRI postop changes w fluid collection at dura and craniotomy site, worsening infiltrative disease, enhancement along posteromedial aspect of resection site, 4/5 p/w 1wk incisional drainage, 4/4 CTH airfluid level in " resection cavity, 4/5 MRI brain w/wo incr'd resection cavity enhancement and diffusion restriction  4/6 s/p R cranial wound washout and mesh cranioplasty (kaleb purulence), CTH POC, L temporal ICH, plts 81 s/p 2u plts, 4/7 rCTH incr SAH, min incr ICH, rrCTH stable, EEG neg dc'd  4/8 s/p PICC  4/9 CTH stable, SDD dc'd, EEG neg  4/10 SGD dc'd  4/11 DVT US x4, LLE distal femoral, popliteal, gastroc, peroneal, post tibial vv DVTs, RUE R brachial DVT, s/p IVC filter    4/13 CTH stable  4/14 PICC auto dc'd, agitation, CTH stable   4/15 s/p PICC  4/16 CTH stable, restraints removed, no agitation noted   4/17 plts 75, set PF4/BRANDO  4/19 plts 49, plan to give 1 unit platelets  4/21 acute change in exam, AMS and not following commands as briskly, CTH stable     Plan:  Tele (ok for q8 NC overnight)  C/w vEEG  AM RFP, citrated CBC, peripheral smear  Follow up PLT function tests   Appreciate ID recs - continue Vanc until 6/15, likely not cause of thrombocytopenia   Appreciate vasc med recs - Continue with Eliquis as long as platelets are >50 K, and there is no evidence of bleeding.   Neurology recs - continue LAC 100mg BID with VPA 150mg q6h   Heme recs - follow up fractionated plt count, If Plt <30K and bleeding or procedure, transfuse; otherwise observe   SLP recs-  regular diet   Continue home meds   PTOT- rehab (precert until 4/23)      Jj Hernandez MD

## 2025-04-23 NOTE — PROGRESS NOTES
"Enmanuel Ahumada \"Petey" is a 69 y.o. male on day 18 of admission.   Medical history significant for GERD, glioblastoma s/p right craniotomy for tumor resection 12/29/23, HLD, HTN and seizure disorder.   Patient admitted 4/5/25 with drainage from surgical incision, after undergoing repeat craniotomy and tumor resection 2/20/25; now with findings of MRSA treated with Vancomycin.  Underwent cranial washout and mesh cranioplasty 4/6/25.    Patient found to have left distal femoral, popliteal and calf DVT's, as well as right brachial thrombus around PICC line.    S/p IVC filter placement 4/11/25 as patient was unable to use anticoagulation therapy after undergoing recent brain surgery.  Underwent placement of PICC line in IR 4/15/25; now with plan for prolonged Vancomycin administration, with end date of 6/15/25.   Heparin infusion started 4/14/25, and then transitioned to weight based Lovenox 60mg q12 hours 4/15/25, and then transitioned to Eliquis 5mg BID on 4/16/24.    Continues with Eliquis 5mg q12 hours.   Patient worked up for HIT due to decreased platelet levels after exposure to Heparin; PF4 returned as poistive, and BRANDO returned as negative 4/18/25.   Platelets continue to fluctuate for unknown reason at this time.     Review of chart shows last chemotherapy was completed 3/2024, but further discussion with Mrs. Ahumada today indicates that he had Lomustine chemotherapy delivered to his home on/around 4/4/25 that was taken by the patient.     Subjective   Working with Physical Therapist this afternoon.   Denies CP, SOB or bleeding.     Objective     Physical Exam  Sitting at edge of the bed while working with the Physical therapist; in NAD; spouse present at bedside.  Patient is awake and alert, participates in conversation.   Head incision intact with sutures, open to air without drainage.  Resp full and regular; on RA  Vital signs are stable  Left arm PICC line      Last Recorded Vitals  Blood pressure " "127/55, pulse 101, temperature 36 °C (96.8 °F), temperature source Temporal, resp. rate 18, height 1.702 m (5' 7\"), weight 65.3 kg (143 lb 15.4 oz), SpO2 97%.  Intake/Output last 3 Shifts:  I/O last 3 completed shifts:  In: 500 (7.7 mL/kg) [IV Piggyback:500]  Out: 1400 (21.4 mL/kg) [Urine:1400 (0.6 mL/kg/hr)]  Weight: 65.3 kg     Relevant Results    Results from last 7 days   Lab Units 04/23/25  0524 04/22/25  0449 04/21/25  0344   WBC AUTO x10*3/uL 2.6* 3.1* 2.5*   HEMOGLOBIN g/dL 8.0* 8.8* 8.5*   HEMATOCRIT % 23.3* 25.3* 24.1*   PLATELETS AUTO x10*3/uL 40* 49* 69*       Results from last 7 days   Lab Units 04/23/25  0524 04/22/25  0525 04/21/25  0344   SODIUM mmol/L 136 133* 136   POTASSIUM mmol/L 3.4* 3.5 3.6   CHLORIDE mmol/L 102 101 101   CO2 mmol/L 26 25 28   BUN mg/dL 14 14 16   CREATININE mg/dL 0.62 0.48* 0.65   GLUCOSE mg/dL 104* 104* 115*   CALCIUM mg/dL 7.7* 8.1* 8.2*   Estimated Creatinine Clearance: 106.8 mL/min (by C-G formula based on SCr of 0.62 mg/dL).    CT HEAD WO IV CONTRAST;  4/21/2025 5:48 pm  INDICATION:   Signs/Symptoms:neuro exam change.  IMPRESSION:  1. There is slight redistribution of pneumocephalus from the resection bed to the lateral ventricles and slight increase in size of the lateral ventricles.  2. There is slightly decreased prominence of the left temporal hemorrhage.  3. Otherwise no change.       Assessment & Plan  Wound dehiscence  69 year old male with medical history and hospital course as noted above.   Patient with episode of altered mentation 4/21/25 in the afternoon, during transition of prophy AED's from VPA to Lacosamide.  NSGY Team concerned about possible seizure activity, with video EEG monitoring ordered.  CTH completed and showed decrease of left temporal hemorrhage and redistribution of pneumocephalus.    Vascular Medicine Service following for anticoagulation recommendations for DVT (left distal femoral, popliteal and calf DVT's, as well as right brachial " thrombus around former PICC line which has been removed).    Review of labs for today shows shows WBC 2.6 K, decreased hemoglobin 8.0 grams, continued thrombocytopenia with decreased platelets to 40 K without evidence of bleeding, stable serum creatinine 0.62.     Platelets continue to fluctuate, and HIT has been ruled out as etiology of thrombocytopenia.   Hematology Team consulted for thrombocytopenia, with recommendations for CBC with citrated tube, hold/adjust Lomustine if prolonged cytopenia, and transfuse for platelet count less than 30K.    NSGY Team has begun platelet transfusion today.    Eliquis dosing now held due to thrombocytopenia with platelets of 40K today.    Discussion with NSGY Team:        *OK for use of DVT chemoprophylaxis from Vas Med POV; start Lovenox 40mg q24 hours tomorrow at 9am.       *Hematology recommended platelet transfusion when platelets <30 K; NSGY transfused platelets today as count was <50K.        *Last dose of chemotherapy with Lomustine was 4/4/25.       *Need to determine outpatient chemotherapy plan       *Continue to transition to Vimpat (there are no known interactions with Vimpat and Eliquis)      Recommendations:  ~Patient with continued platelet fluctuations; Vasc Med does not recommend discharge to SNF until platelets stabilize.  ~HOLD Eliquis dosing due to platelet count of 40K today.    ~START Lovenox 40mg q24 hours tomorrow (4/24/25) at 9am  ~Daily labs: CBC  ~MONITOR for bleeding, neurologic changes or further platelet decrease.  ~If Keppra is used for anti-seizure medication, then Eliquis will need to be changed to Warfarin.   ~Outpatient follow up with Dr. Zee Hardy from the Vascular Medicine Service has been scheduled as a virtual visit for 5/6/25 at 11:30 am; will discuss removal of IVC filter at the time of follow up appointment.     Patient seen and examined with Dr. Guerra  Plan of care discussed with Dr. Guerra  Plan of care discussed in person with  Ms. Tamara BECERRIL from NSGY Team    JERRICA Valles  Vascular Medicine Service  Pager [phone]

## 2025-04-23 NOTE — CARE PLAN
The clinical goals for the shift include Patient will remain safe throughout shift. The patient met this goal.

## 2025-04-23 NOTE — SIGNIFICANT EVENT
"Epilepsy Final Assessment and Plan:  Enmanuel Ahumada \"Petey" is a 69 y.o. 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 Epilepsy re-engaged due to an episode concerning for seizure, described as perseverance and unable to correctly following commands. No overt motor symptoms. The patient is currently in the transition from VPA to lacosamide. CTH normal  Overall, the patient symptoms are more concerning of encephalopathy and aphasia given location of L basotemporal ICH rather than seizure. Currently off VPA (c/f thrombocytopenia) and continuing with lacosamide. EEG monitored> 48hours and non concerning for seizure activity.      EEG read 4/21- 23: This vEEG is indicative of a moderate diffuse encephalopathy and a right hemispheric structural lesion with an overlying skull defect. No epileptiform discharges are recorded. Overall, the degree of encephalopathy remains same compared to prior recording      Recommendation:  - Please continue LAC 100mg BID - can transition to oral when able  - Please discontinue EEG  - Epilepsy team will sign off       Beckie Brandon MD  Neurology PGY-3  Epilepsy Pager: [phone]  "

## 2025-04-23 NOTE — PROGRESS NOTES
"Music Therapy Note    Enmanuel Evans \"Dash\" Zita    Therapy Session  Referral Type: New referral this admission  Visit Type: Follow-up visit  Session Start Time: 1130  Session End Time: 1147  Intervention Delivery: In-person  Conflict of Service: None  Number of family members present: 2  Family Present for Session: Spouse/Significant Other, Child  Family Participation: Supportive  Number of staff members present: 1     Pre-assessment  Mood/Affect: Appropriate         Treatment/Interventions  Areas of Focus: Relaxation, Family/caregiver support  Music Therapy Interventions: Live music listening    Post-assessment  Mood/Affect: Calm, Tired/lethargic  Total Session Time (min): 17 minutes    Narrative  Assessment Detail: Pt's wife and dtr at bedside when all agreed to a music therapy session  Plan: MT offered live pt-preferred music to promote relaxation  Intervention: MT sang 2 songs in a more relaxed, slow and steady style - one [employer] song for pt's preferences and one newer country song for pt's wife's preferences. These songs were longer than the original by adding in instrumental sections and repeating sections. Pt and his wife closed their eyes as they listened and followed MT's prompts to 'take away something from the music that helps you'. Pt clapped following first song and fell asleep by the end of the second song. Pt's wife reported feeling 'calm' by the end of the session.  Evaluation: Pt and his wife relaxed into a calm state during their brief session. Pt's dtr left midway through the session but expressed gratitude for the service.  Follow-up: MT will f/u per request. Pt's wife aware of how to reach out for MT if desired.    Education Documentation  No documentation found.          "

## 2025-04-23 NOTE — SIGNIFICANT EVENT
Provider was called to bedside related to concerns the patients wife had. Wife stated she has not been updated by anyone and does not know the plan. Provider informed her that they discussed the plan in extent yesterday and minimal changes have been made to the plan since. She was updated on the plan of care in entirety including lab results, imaging, IV antibiotic, Neuro recommendations for Vimpat, Hematology recs for potential cause of thrombocytopenia, as well as Vascular Medicine recs for anticoagulation plan along with Plt goal and when to transfuse. She was informed that his platelets were at a very low level and he needed to be transfused and will continue to be monitored. Provider acknowledged wife's concerns and encouraged her to continue to reach out with any questions.   Wife was also updated by Dr. Kelley.    Tamara Baltazar, [facility]  Neurosurgery  [place]  Team Pager #[phone]

## 2025-04-23 NOTE — ASSESSMENT & PLAN NOTE
69 year old male with medical history and hospital course as noted above.   Patient with episode of altered mentation 4/21/25 in the afternoon, during transition of prophy AED's from VPA to Lacosamide.  NSGY Team concerned about possible seizure activity, with video EEG monitoring ordered.  CTH completed and showed decrease of left temporal hemorrhage and redistribution of pneumocephalus.    Vascular Medicine Service following for anticoagulation recommendations for DVT (left distal femoral, popliteal and calf DVT's, as well as right brachial thrombus around former PICC line which has been removed).    Review of labs for today shows shows WBC 2.6 K, decreased hemoglobin 8.0 grams, continued thrombocytopenia with decreased platelets to 40 K without evidence of bleeding, stable serum creatinine 0.62.     Platelets continue to fluctuate, and HIT has been ruled out as etiology of thrombocytopenia.   Hematology Team consulted for thrombocytopenia, with recommendations for CBC with citrated tube, hold/adjust Lomustine if prolonged cytopenia, and transfuse for platelet count less than 30K.    NSGY Team has begun platelet transfusion today.    Eliquis dosing now held due to thrombocytopenia with platelets of 40K today.    Discussion with NSGY Team:        *OK for use of DVT chemoprophylaxis from Vasc Med POV; start Lovenox 40mg q24 hours tomorrow at 9am.       *Hematology recommended platelet transfusion when platelets <30 K; NSGY transfused platelets today as count was <50K.        *Last dose of chemotherapy with Lomustine was 4/4/25.       *Need to determine outpatient chemotherapy plan       *Continue to transition to Vimpat (there are no known interactions with Vimpat and Eliquis)      Recommendations:  ~Patient with continued platelet fluctuations; Vasc Med does not recommend discharge to SNF until platelets stabilize.  ~HOLD Eliquis dosing due to platelet count of 40K today.    ~START Lovenox 40mg q24 hours tomorrow  (4/24/25) at 9am  ~Daily labs: CBC  ~MONITOR for bleeding, neurologic changes or further platelet decrease.  ~If Keppra is used for anti-seizure medication, then Eliquis will need to be changed to Warfarin.   ~Outpatient follow up with Dr. Zee Hardy from the Vascular Medicine Service has been scheduled as a virtual visit for 5/6/25 at 11:30 am; will discuss removal of IVC filter at the time of follow up appointment.

## 2025-04-23 NOTE — PROGRESS NOTES
"Physical Therapy    Physical Therapy Treatment    Patient Name: Enmanuel Ahumada \"Dash\"  MRN: [id number]  Department: [facility] [id number]  Room: [address] [place] [address]  Today's Date: 4/23/2025  Time Calculation  Start Time: 1513  Stop Time: 1553  Time Calculation (min): 40 min      Assessment/Plan   PT Assessment  PT Assessment Results: Decreased strength, Decreased endurance, Impaired balance, Decreased coordination, Decreased mobility, Decreased cognition, Impaired judgement, Decreased safety awareness  Rehab Prognosis: Good  Barriers to Discharge Home: Physical needs, Caregiver assistance  Caregiver Assistance: Caregiver assistance needed per identified barriers - however, level of patient's required assistance exceeds assistance available at home  Physical Needs: High falls risk due to function or environment  Evaluation/Treatment Tolerance: Patient tolerated treatment well  Medical Staff Made Aware: Yes  Strengths: Ability to acquire knowledge, Attitude of self, Premorbid level of function, Support and attitude of living partners  End of Session Communication: Bedside nurse  Assessment Comment: Pt able to stand variable min/mod A and weight shift in standing. Pt remains appropriate for high intensity level of continued care. Would benefit from continued PT while in hosp to address identified deficits and facilitate improved functional mobility.  End of Session Patient Position: Bed, 3 rail up, Alarm on  PT Plan  Inpatient/Swing Bed or Outpatient: Inpatient  PT Plan  Treatment/Interventions: Bed mobility, Transfer training, Gait training, Stair training, Balance training, Neuromuscular re-education, Strengthening, Endurance training, Range of motion, Therapeutic exercise, Therapeutic activity  PT Plan: Ongoing PT  PT Frequency: 5 times per week  PT Discharge Recommendations: High intensity level of continued care  Equipment Recommended upon Discharge:  (tbd at next level of care)  PT Recommended Transfer Status: Assist x1, " Assistive device  PT - OK to Discharge: Yes (meaning pt evaluated and d/c reccs havee been made)    General Visit Information:   PT  Visit  PT Received On: 04/23/25  Response to Previous Treatment: Patient with no complaints from previous session.  General  Reason for Referral: Pt initially presented with tumor progression (RO GBM), s/p redo craniotomy for tumor resection in 2/2025. 4/5 p/w 1wk incisional drainage, 4/4 CTH airfluid level in resection cavity, 4/5 MRI brain w/wo incr'd resection cavity enhancement and diffusion restriction 4/6 s/p R cranial wound washout and mesh cranioplasty. 4/7 rCTH incr SAH, min incr ICH, rrCTH stable, EEG neg dc'd.  Past Medical History Relevant to Rehab: HTN, HLD, smalls's esophagus, RO GBM s/p resection ([facility], 12/2023) with resultant L HH s/p chemo/RT  PT Missed Visit:  (-)  Missed Visit Reason:  (-)  Family/Caregiver Present: Yes  Caregiver Feedback: wife present and supportive during session  Co-Treatment:  (-)  Prior to Session Communication: Bedside nurse  Patient Position Received: Bed, 3 rail up, Alarm on  General Comment: Supine. Pleasant, agreeable and cooperative to PT. Pt reports feeling a little different today. Eager to mobilize, tolerated treatment well.    Subjective   Precautions:  Precautions  Hearing/Visual Limitations: pt wearing glasses  Medical Precautions: Fall precautions  Precautions Comment: SBP<160     Date/Time Vitals Session Patient Position Pulse Resp SpO2 BP MAP (mmHg)    04/23/25 1500 --  --  109  16  98 %  111/70  --     04/23/25 1600 --  --  101  18  97 %  127/55  --           Objective   Pain:  Pain Assessment  Pain Assessment: 0-10  0-10 (Numeric) Pain Score: 2 (pt reporting 2/10 pain in L leg seated and standing.)  Pain Interventions: Repositioned, Ambulation/increased activity, Relaxation technique  Cognition:  Cognition  Overall Cognitive Status: Impaired  Arousal/Alertness: Delayed responses to stimuli  Orientation Level: Disoriented to  time  Following Commands: Follows one step commands with repetition  Safety Judgment: Decreased awareness of need for assistance  Processing Speed: Delayed  Coordination:  Movements are Fluid and Coordinated: No  Upper Body Coordination: LUE difficulty with opposition, RUE intact. LUE dysmetria observed during functional activities. Verbal and tactile cues for sequencing and hand placement, especially with LUE  Coordination Comment: Pt with some difficulty understanding directions. LUE dysmetria observed. RUE WFL.  Postural Control:  Postural Control  Posture Comment: pt demonstrates increased forward flexion in seated and standing.  Extremity/Trunk Assessments:    RUE   RUE : Within Functional Limits  LUE   LUE:  (dysmetria and difficulty using LUE during functional activity observed)  Strength RLE  RLE Overall Strength: Greater than or equal to 3/5 as evidenced by functional mobility  Strength LLE  LLE Overall Strength: Greater than or equal to 3/5 as evidenced by functional mobility  Activity Tolerance:  Activity Tolerance  Endurance: Endurance does not limit participation in activity  Treatments:    Therapeutic Activity  Therapeutic Activity Performed: Yes  Therapeutic Activity 1: Seated EOB, UE coordination exercises; finger to nose and opposition x5 b/l. knee ext b/l x5 reps. (decreased ROM,pt reporting he is feeling stiff)  Therapeutic Activity 2: standing weight shifts 2x30 seconds, CGA/Min A. Pt needing verbal cues for sequencing    Balance/Neuromuscular Re-Education  Balance/Neuromuscular Re-Education Activity Performed: Yes  Balance/Neuromuscular Re-Education Activity 1: sitting balance ~10 min EOB, variable CGA/SBA    Bed Mobility  Bed Mobility: Yes  Bed Mobility 1  Bed Mobility 1: Supine to sitting  Level of Assistance 1: Minimum assistance, Moderate verbal cues, Moderate tactile cues  Bed Mobility Comments 1: increased time to complete, VCs for sequencing, HOB elevated  Bed Mobility 2  Bed Mobility  2:  Sitting to supine  Level of Assistance 2: Moderate assistance, Minimal tactile cues, Minimal verbal cues  Bed Mobility Comments 2: increased time to complete, VCs for sequencing, HOB elevated  Bed Mobility 3  Bed Mobility 3: Scooting  Level of Assistance 3: Moderate assistance  Bed Mobility Comments 3: cues for hand placement and sequencing    Ambulation/Gait Training  Ambulation/Gait Training Performed: No  Transfers  Transfer: Yes  Transfer 1  Transfer From 1: Sit to, Stand to  Transfer to 1: Sit  Technique 1: Sit to stand, Stand to sit  Transfer Device 1: Walker  Transfer Level of Assistance 1: Minimum assistance, Moderate assistance  Trials/Comments 1: x3 trials. 1 trial min A, 1 trial mod A    Stairs  Stairs: No    Outcome Measures:    [facility] Basic Mobility  Turning from your back to your side while in a flat bed without using bedrails: A little  Moving from lying on your back to sitting on the side of a flat bed without using bedrails: A little  Moving to and from bed to chair (including a wheelchair): A lot  Standing up from a chair using your arms (e.g. wheelchair or bedside chair): A lot  To walk in hospital room: A lot  Climbing 3-5 steps with railing: Total  Basic Mobility - Total Score: 13    Education Documentation  Body Mechanics, taught by DAVID Verde at 4/23/2025  4:26 PM.  Learner: Patient  Readiness: Eager  Method: Explanation  Response: Verbalizes Understanding  Comment: role of PT, POC    Precautions, taught by DAVID Verde at 4/23/2025  4:26 PM.  Learner: Patient  Readiness: Eager  Method: Explanation  Response: Verbalizes Understanding  Comment: role of PT, POC    Mobility Training, taught by DAVID Verde at 4/23/2025  4:26 PM.  Learner: Patient  Readiness: Eager  Method: Explanation  Response: Verbalizes Understanding  Comment: role of PT, POC    Education Comments  No comments found.        OP EDUCATION:       Encounter Problems       Encounter Problems (Active)       PT  Problem       Patient will perform bed mobility IND to reduce risk of developing decubitus ulcers.  (Progressing)       Start:  04/08/25    Expected End:  04/22/25            Patient will ambulate at least 150 ft. with </= close sup and LRD to improve tolerance of household distances.  (Not Progressing)       Start:  04/08/25    Expected End:  04/22/25            Patient will perform the 5-Time Sit to Stand test in <14 sec to indicate decreased falls risk. (Progressing)       Start:  04/08/25    Expected End:  04/22/25            Patient will perform sit to stand and stand to sit transfers with </= close sup and LRD to increase functional strength.  (Progressing)       Start:  04/08/25    Expected End:  04/22/25            BLE 5/5 (Progressing)       Start:  04/08/25    Expected End:  04/22/25               Pain - Adult

## 2025-04-24 ENCOUNTER — APPOINTMENT (OUTPATIENT)
Dept: CARDIOLOGY | Facility: HOSPITAL | Age: 70
DRG: 907 | End: 2025-04-24
Payer: MEDICARE

## 2025-04-24 ENCOUNTER — APPOINTMENT (OUTPATIENT)
Dept: RADIOLOGY | Facility: HOSPITAL | Age: 70
End: 2025-04-24
Payer: MEDICARE

## 2025-04-24 ENCOUNTER — APPOINTMENT (OUTPATIENT)
Dept: RADIOLOGY | Facility: HOSPITAL | Age: 70
DRG: 907 | End: 2025-04-24
Payer: MEDICARE

## 2025-04-24 LAB
ALBUMIN SERPL BCP-MCNC: 3.2 G/DL (ref 3.4–5)
ANION GAP SERPL CALC-SCNC: 12 MMOL/L (ref 10–20)
BASOPHILS # BLD AUTO: 0.01 X10*3/UL (ref 0–0.1)
BASOPHILS NFR BLD AUTO: 0.4 %
BLOOD EXPIRATION DATE: NORMAL
BLOOD EXPIRATION DATE: NORMAL
BUN SERPL-MCNC: 13 MG/DL (ref 6–23)
CALCIUM SERPL-MCNC: 8 MG/DL (ref 8.6–10.6)
CHLORIDE SERPL-SCNC: 101 MMOL/L (ref 98–107)
CO2 SERPL-SCNC: 26 MMOL/L (ref 21–32)
CREAT SERPL-MCNC: 0.66 MG/DL (ref 0.5–1.3)
DISPENSE STATUS: NORMAL
DISPENSE STATUS: NORMAL
EGFRCR SERPLBLD CKD-EPI 2021: >90 ML/MIN/1.73M*2
EOSINOPHIL # BLD AUTO: 0 X10*3/UL (ref 0–0.7)
EOSINOPHIL NFR BLD AUTO: 0 %
ERYTHROCYTE [DISTWIDTH] IN BLOOD BY AUTOMATED COUNT: 16.8 % (ref 11.5–14.5)
GLUCOSE BLD MANUAL STRIP-MCNC: 101 MG/DL (ref 74–99)
GLUCOSE BLD MANUAL STRIP-MCNC: 104 MG/DL (ref 74–99)
GLUCOSE BLD MANUAL STRIP-MCNC: 105 MG/DL (ref 74–99)
GLUCOSE BLD MANUAL STRIP-MCNC: 106 MG/DL (ref 74–99)
GLUCOSE BLD MANUAL STRIP-MCNC: 108 MG/DL (ref 74–99)
GLUCOSE BLD MANUAL STRIP-MCNC: 129 MG/DL (ref 74–99)
GLUCOSE BLD MANUAL STRIP-MCNC: 98 MG/DL (ref 74–99)
GLUCOSE SERPL-MCNC: 95 MG/DL (ref 74–99)
HCT VFR BLD AUTO: 21.4 % (ref 41–52)
HGB BLD-MCNC: 7.5 G/DL (ref 13.5–17.5)
IMM GRANULOCYTES # BLD AUTO: 0.01 X10*3/UL (ref 0–0.7)
IMM GRANULOCYTES NFR BLD AUTO: 0.4 % (ref 0–0.9)
LYMPHOCYTES # BLD AUTO: 0.52 X10*3/UL (ref 1.2–4.8)
LYMPHOCYTES NFR BLD AUTO: 20.9 %
MCH RBC QN AUTO: 33.2 PG (ref 26–34)
MCHC RBC AUTO-ENTMCNC: 35 G/DL (ref 32–36)
MCV RBC AUTO: 95 FL (ref 80–100)
MONOCYTES # BLD AUTO: 0.27 X10*3/UL (ref 0.1–1)
MONOCYTES NFR BLD AUTO: 10.8 %
NEUTROPHILS # BLD AUTO: 1.68 X10*3/UL (ref 1.2–7.7)
NEUTROPHILS NFR BLD AUTO: 67.5 %
NRBC BLD-RTO: 0 /100 WBCS (ref 0–0)
PHOSPHATE SERPL-MCNC: 2.6 MG/DL (ref 2.5–4.9)
PLATELET # BLD AUTO: 82 X10*3/UL (ref 150–450)
POTASSIUM SERPL-SCNC: 3.4 MMOL/L (ref 3.5–5.3)
PRODUCT BLOOD TYPE: 6200
PRODUCT BLOOD TYPE: 7300
PRODUCT CODE: NORMAL
PRODUCT CODE: NORMAL
RBC # BLD AUTO: 2.26 X10*6/UL (ref 4.5–5.9)
SODIUM SERPL-SCNC: 136 MMOL/L (ref 136–145)
UNIT ABO: NORMAL
UNIT ABO: NORMAL
UNIT NUMBER: NORMAL
UNIT NUMBER: NORMAL
UNIT RH: NORMAL
UNIT RH: NORMAL
UNIT VOLUME: 218
UNIT VOLUME: 281
VANCOMYCIN SERPL-MCNC: 23.7 UG/ML (ref 5–20)
WBC # BLD AUTO: 2.5 X10*3/UL (ref 4.4–11.3)

## 2025-04-24 PROCEDURE — 70450 CT HEAD/BRAIN W/O DYE: CPT

## 2025-04-24 PROCEDURE — 2500000005 HC RX 250 GENERAL PHARMACY W/O HCPCS

## 2025-04-24 PROCEDURE — 82947 ASSAY GLUCOSE BLOOD QUANT: CPT

## 2025-04-24 PROCEDURE — 74230 X-RAY XM SWLNG FUNCJ C+: CPT | Performed by: RADIOLOGY

## 2025-04-24 PROCEDURE — 80202 ASSAY OF VANCOMYCIN: CPT

## 2025-04-24 PROCEDURE — 97112 NEUROMUSCULAR REEDUCATION: CPT | Mod: GP

## 2025-04-24 PROCEDURE — 92611 MOTION FLUOROSCOPY/SWALLOW: CPT | Mod: GN

## 2025-04-24 PROCEDURE — 2500000004 HC RX 250 GENERAL PHARMACY W/ HCPCS (ALT 636 FOR OP/ED): Mod: JZ | Performed by: PHYSICIAN ASSISTANT

## 2025-04-24 PROCEDURE — 2500000004 HC RX 250 GENERAL PHARMACY W/ HCPCS (ALT 636 FOR OP/ED)

## 2025-04-24 PROCEDURE — 80069 RENAL FUNCTION PANEL: CPT

## 2025-04-24 PROCEDURE — 74230 X-RAY XM SWLNG FUNCJ C+: CPT

## 2025-04-24 PROCEDURE — 2500000001 HC RX 250 WO HCPCS SELF ADMINISTERED DRUGS (ALT 637 FOR MEDICARE OP)

## 2025-04-24 PROCEDURE — 86022 PLATELET ANTIBODIES: CPT | Performed by: PHYSICIAN ASSISTANT

## 2025-04-24 PROCEDURE — 97530 THERAPEUTIC ACTIVITIES: CPT | Mod: GP

## 2025-04-24 PROCEDURE — 92526 ORAL FUNCTION THERAPY: CPT | Mod: GN

## 2025-04-24 PROCEDURE — 93005 ELECTROCARDIOGRAM TRACING: CPT

## 2025-04-24 PROCEDURE — 2500000004 HC RX 250 GENERAL PHARMACY W/ HCPCS (ALT 636 FOR OP/ED): Mod: JZ

## 2025-04-24 PROCEDURE — C9254 INJECTION, LACOSAMIDE: HCPCS

## 2025-04-24 PROCEDURE — 92507 TX SP LANG VOICE COMM INDIV: CPT | Mod: GN

## 2025-04-24 PROCEDURE — 97116 GAIT TRAINING THERAPY: CPT | Mod: GP

## 2025-04-24 PROCEDURE — 1200000002 HC GENERAL ROOM WITH TELEMETRY DAILY

## 2025-04-24 PROCEDURE — 2500000001 HC RX 250 WO HCPCS SELF ADMINISTERED DRUGS (ALT 637 FOR MEDICARE OP): Performed by: PHYSICIAN ASSISTANT

## 2025-04-24 PROCEDURE — 2500000005 HC RX 250 GENERAL PHARMACY W/O HCPCS: Performed by: NEUROLOGICAL SURGERY

## 2025-04-24 PROCEDURE — 85025 COMPLETE CBC W/AUTO DIFF WBC: CPT

## 2025-04-24 PROCEDURE — 70450 CT HEAD/BRAIN W/O DYE: CPT | Performed by: RADIOLOGY

## 2025-04-24 RX ORDER — CLONIDINE HYDROCHLORIDE 0.2 MG/1
0.2 TABLET ORAL 2 TIMES DAILY
Status: DISCONTINUED | OUTPATIENT
Start: 2025-04-24 | End: 2025-04-29

## 2025-04-24 RX ORDER — CYCLOBENZAPRINE HCL 10 MG
10 TABLET ORAL 3 TIMES DAILY PRN
Status: CANCELLED
Start: 2025-04-24 | End: 2025-05-01

## 2025-04-24 RX ORDER — HYDROXYZINE HYDROCHLORIDE 10 MG/1
10 TABLET, FILM COATED ORAL EVERY 6 HOURS PRN
Status: DISCONTINUED | OUTPATIENT
Start: 2025-04-24 | End: 2025-05-06 | Stop reason: HOSPADM

## 2025-04-24 RX ORDER — LACOSAMIDE 100 MG/1
100 TABLET ORAL EVERY 12 HOURS
Status: CANCELLED
Start: 2025-04-24

## 2025-04-24 RX ORDER — HYDROXYZINE HYDROCHLORIDE 10 MG/1
10 TABLET, FILM COATED ORAL EVERY 6 HOURS PRN
Status: CANCELLED
Start: 2025-04-24

## 2025-04-24 RX ORDER — QUETIAPINE FUMARATE 25 MG/1
25 TABLET, FILM COATED ORAL NIGHTLY
Status: DISCONTINUED | OUTPATIENT
Start: 2025-04-24 | End: 2025-05-06 | Stop reason: HOSPADM

## 2025-04-24 RX ORDER — SODIUM CHLORIDE, SODIUM LACTATE, POTASSIUM CHLORIDE, CALCIUM CHLORIDE 600; 310; 30; 20 MG/100ML; MG/100ML; MG/100ML; MG/100ML
50 INJECTION, SOLUTION INTRAVENOUS CONTINUOUS
Status: ACTIVE | OUTPATIENT
Start: 2025-04-24 | End: 2025-04-25

## 2025-04-24 RX ORDER — AMOXICILLIN 250 MG
1 CAPSULE ORAL NIGHTLY
Status: CANCELLED
Start: 2025-04-24

## 2025-04-24 RX ORDER — POTASSIUM CHLORIDE 1.5 G/1.58G
40 POWDER, FOR SOLUTION ORAL ONCE
Status: COMPLETED | OUTPATIENT
Start: 2025-04-24 | End: 2025-04-24

## 2025-04-24 RX ORDER — AMOXICILLIN 250 MG
1 CAPSULE ORAL NIGHTLY
Status: DISCONTINUED | OUTPATIENT
Start: 2025-04-24 | End: 2025-05-06 | Stop reason: HOSPADM

## 2025-04-24 RX ORDER — POLYETHYLENE GLYCOL 3350 17 G/17G
17 POWDER, FOR SOLUTION ORAL DAILY
Status: CANCELLED
Start: 2025-04-25

## 2025-04-24 RX ORDER — CLONIDINE HYDROCHLORIDE 0.2 MG/1
0.2 TABLET ORAL 2 TIMES DAILY
Status: CANCELLED
Start: 2025-04-24

## 2025-04-24 RX ORDER — QUETIAPINE FUMARATE 25 MG/1
25 TABLET, FILM COATED ORAL NIGHTLY
Status: CANCELLED
Start: 2025-04-24

## 2025-04-24 RX ORDER — POLYETHYLENE GLYCOL 3350 17 G/17G
17 POWDER, FOR SOLUTION ORAL DAILY
Status: DISCONTINUED | OUTPATIENT
Start: 2025-04-25 | End: 2025-05-06 | Stop reason: HOSPADM

## 2025-04-24 RX ORDER — ATORVASTATIN CALCIUM 40 MG/1
40 TABLET, FILM COATED ORAL DAILY
Status: DISCONTINUED | OUTPATIENT
Start: 2025-04-24 | End: 2025-05-06 | Stop reason: HOSPADM

## 2025-04-24 RX ORDER — PANTOPRAZOLE SODIUM 40 MG/1
40 TABLET, DELAYED RELEASE ORAL
Status: DISCONTINUED | OUTPATIENT
Start: 2025-04-25 | End: 2025-04-27

## 2025-04-24 RX ADMIN — LACOSAMIDE 100 MG: 100 TABLET, FILM COATED ORAL at 00:37

## 2025-04-24 RX ADMIN — BARIUM SULFATE 20 ML: 0.81 POWDER, FOR SUSPENSION ORAL at 14:55

## 2025-04-24 RX ADMIN — SODIUM CHLORIDE, POTASSIUM CHLORIDE, SODIUM LACTATE AND CALCIUM CHLORIDE 50 ML/HR: 600; 310; 30; 20 INJECTION, SOLUTION INTRAVENOUS at 16:21

## 2025-04-24 RX ADMIN — VANCOMYCIN HYDROCHLORIDE 1250 MG: 1.25 INJECTION, POWDER, LYOPHILIZED, FOR SOLUTION INTRAVENOUS at 12:33

## 2025-04-24 RX ADMIN — BARIUM SULFATE 5 ML: 400 PASTE ORAL at 14:55

## 2025-04-24 RX ADMIN — POLYETHYLENE GLYCOL 3350 17 G: 17 POWDER, FOR SOLUTION ORAL at 10:13

## 2025-04-24 RX ADMIN — LIDOCAINE 4% 1 PATCH: 40 PATCH TOPICAL at 10:13

## 2025-04-24 RX ADMIN — LACOSAMIDE 100 MG: 10 INJECTION INTRAVENOUS at 22:42

## 2025-04-24 RX ADMIN — VANCOMYCIN HYDROCHLORIDE 1250 MG: 1.25 INJECTION, POWDER, LYOPHILIZED, FOR SOLUTION INTRAVENOUS at 23:50

## 2025-04-24 RX ADMIN — PANTOPRAZOLE SODIUM 40 MG: 40 INJECTION, POWDER, FOR SOLUTION INTRAVENOUS at 10:12

## 2025-04-24 RX ADMIN — CLONIDINE HYDROCHLORIDE 0.2 MG: 0.1 TABLET ORAL at 10:12

## 2025-04-24 RX ADMIN — BARIUM SULFATE 10 ML: 400 SUSPENSION ORAL at 14:55

## 2025-04-24 RX ADMIN — VANCOMYCIN HYDROCHLORIDE 1250 MG: 1.25 INJECTION, POWDER, LYOPHILIZED, FOR SOLUTION INTRAVENOUS at 00:36

## 2025-04-24 RX ADMIN — LACOSAMIDE 100 MG: 100 TABLET, FILM COATED ORAL at 12:33

## 2025-04-24 RX ADMIN — POTASSIUM CHLORIDE 40 MEQ: 1.5 POWDER, FOR SOLUTION ORAL at 12:33

## 2025-04-24 RX ADMIN — ENOXAPARIN SODIUM 40 MG: 100 INJECTION SUBCUTANEOUS at 10:11

## 2025-04-24 ASSESSMENT — COGNITIVE AND FUNCTIONAL STATUS - GENERAL
MOVING FROM LYING ON BACK TO SITTING ON SIDE OF FLAT BED WITH BEDRAILS: A LITTLE
WALKING IN HOSPITAL ROOM: A LOT
TURNING FROM BACK TO SIDE WHILE IN FLAT BAD: A LITTLE
TURNING FROM BACK TO SIDE WHILE IN FLAT BAD: A LITTLE
CLIMB 3 TO 5 STEPS WITH RAILING: TOTAL
DAILY ACTIVITIY SCORE: 17
DRESSING REGULAR LOWER BODY CLOTHING: A LITTLE
WALKING IN HOSPITAL ROOM: A LOT
DRESSING REGULAR UPPER BODY CLOTHING: A LOT
HELP NEEDED FOR BATHING: A LITTLE
MOVING TO AND FROM BED TO CHAIR: A LOT
CLIMB 3 TO 5 STEPS WITH RAILING: A LOT
EATING MEALS: A LITTLE
MOVING TO AND FROM BED TO CHAIR: A LITTLE
MOVING FROM LYING ON BACK TO SITTING ON SIDE OF FLAT BED WITH BEDRAILS: A LITTLE
STANDING UP FROM CHAIR USING ARMS: A LOT
TOILETING: A LITTLE
MOBILITY SCORE: 13
PERSONAL GROOMING: A LITTLE

## 2025-04-24 ASSESSMENT — PAIN SCALES - WONG BAKER: WONGBAKER_NUMERICALRESPONSE: NO HURT

## 2025-04-24 ASSESSMENT — PAIN SCALES - GENERAL
PAINLEVEL_OUTOF10: 0 - NO PAIN

## 2025-04-24 ASSESSMENT — PAIN - FUNCTIONAL ASSESSMENT
PAIN_FUNCTIONAL_ASSESSMENT: 0-10
PAIN_FUNCTIONAL_ASSESSMENT: 0-10

## 2025-04-24 NOTE — PROGRESS NOTES
"Enmanuel Ahumada \"Petey" is a 69 y.o. male on day 19 of admission presenting with Wound dehiscence.    Subjective   No acute events overnight    Objective     Physical Exam  Resting, Eyes open to voice  Ox3  RUE 5/5  RLE 5/5  LUE 4+/5  LLE 4+/5  Sensation intact to light touch  Incision c/d/I, sutures in place, no leaking     Last Recorded Vitals  Blood pressure 111/76, pulse 102, temperature 36.4 °C (97.5 °F), temperature source Temporal, resp. rate 16, height 1.702 m (5' 7\"), weight 65.3 kg (143 lb 15.4 oz), SpO2 93%.  Intake/Output last 3 Shifts:  I/O last 3 completed shifts:  In: - (0 mL/kg)   Out: 2000 (30.6 mL/kg) [Urine:2000 (0.9 mL/kg/hr)]  Weight: 65.3 kg     Relevant Results  Results from last 72 hours   Lab Units 04/23/25  0524 04/22/25  0449   WBC AUTO x10*3/uL 2.6* 3.1*   NRBC AUTO /100 WBCs 0.0 0.0   RBC AUTO x10*6/uL 2.50* 2.71*   HEMOGLOBIN g/dL 8.0* 8.8*   HEMATOCRIT % 23.3* 25.3*   MCV fL 93 93   MCH pg 32.0 32.5   MCHC g/dL 34.3 34.8   RDW % 16.7* 16.3*   PLATELETS AUTO x10*3/uL 40* 49*      Results from last 72 hours   Lab Units 04/23/25  0524 04/22/25  0525   GLUCOSE mg/dL 104* 104*   SODIUM mmol/L 136 133*   POTASSIUM mmol/L 3.4* 3.5   CHLORIDE mmol/L 102 101   CO2 mmol/L 26 25   ANION GAP mmol/L 11 11   BUN mg/dL 14 14   CREATININE mg/dL 0.62 0.48*   EGFR mL/min/1.73m*2 >90 >90   CALCIUM mg/dL 7.7* 8.1*   PHOSPHORUS mg/dL 2.8 2.6   ALBUMIN g/dL 3.0* 3.1*          Assessment/Plan   Assessment & Plan  Wound dehiscence    69 M h/o HTN, HLD, smalls's esophagus, RO GBM s/p resection ([facility], 12/2023) with resultant L HH s/p chemo/RT presenting with tumor progression, 2/20 s/p redo craniotomy for tumor resection w 5 ALA, CTH POC, 2/21 MRI intended NTR, 3/16 MRI postop changes w fluid collection at dura and craniotomy site, worsening infiltrative disease, enhancement along posteromedial aspect of resection site, 4/5 p/w 1wk incisional drainage, 4/4 CTH airfluid level in resection cavity, 4/5 MRI " brain w/wo incr'd resection cavity enhancement and diffusion restriction  4/6 s/p R cranial wound washout and mesh cranioplasty (kaleb purulence), CTH POC, L temporal ICH, plts 81 s/p 2u plts, 4/7 rCTH incr SAH, min incr ICH, rrCTH stable, EEG neg dc'd  4/8 s/p PICC  4/9 CTH stable, SDD dc'd, EEG neg  4/10 SGD dc'd  4/11 DVT US x4, LLE distal femoral, popliteal, gastroc, peroneal, post tibial vv DVTs, RUE R brachial DVT, s/p IVC filter    4/13 CTH stable  4/14 PICC auto dc'd, agitation, CTH stable   4/15 s/p PICC  4/16 CTH stable, restraints removed, no agitation noted   4/17 plts 75, set PF4/BRANDO  4/19 plts 49, plan to give 1 unit platelets  4/21 acute change in exam, AMS and not following commands as briskly, CTH stable   4/23 EEG neg, dc'd     Plan:  Tele (ok for q8 NC overnight)  AM RFP, citrated CBC, peripheral smear  Follow up PLT function tests   Appreciate ID recs - continue Vanc until 6/15, likely not cause of thrombocytopenia   Appreciate vasc med recs -    - HOLD Eliquis dosing due to platelet count of 40K today    - START Lovenox 40mg q24 hours   Neurology recs - continue LAC 100mg BID with VPA 150mg q6h   Heme recs - follow up fractionated plt count, If Plt <30K and bleeding or procedure, transfuse; otherwise observe   SLP recs-  regular diet   Continue home meds   PTOT- rehab       Jj Hernandez MD

## 2025-04-24 NOTE — PROCEDURES
"Speech-Language Pathology  Inpatient Modified Barium Swallow Study    Patient Name: Enmanuel Ahumada \"Dash\"  MRN: [id number]  : 1955  Today's Date: 25   Time In: 1420   Time Out: 1440  Time Calculation: 20 min    Unable to complete entire study per protocol d/t lethargy, confusion, and reduced alertness.  Informed verbal consent obtained prior to completion of exam. The study was completed with various liquid barium consistencies and pudding. A 1.9 cm or .75 inch (outer diameter) ring was placed on the chin in the lateral view in order to complete objective measurements during swallowing. The anatomic structures and function of the oropharynx, larynx, hypopharynx and cervical esophagus were evaluated.    SLP: ARABELLA GARCIA-SLP   Contact info: [employer]; phone: [phone]      Reason for Referral: Further assessment of oropharyngeal swallow and to guide diet recommendations   Patient Hx: Pt is a 69 M h/o HTN, HLD, smalls's esophagus, RO GBM s/p resection ([facility], 2023) with resultant L HH s/p chemo/RT presenting with tumor progression,  s/p redo craniotomy for tumor resection w 5 ALA, CTH POC,  MRI intended NTR, 3/16 MRI postop changes w fluid collection at dura and craniotomy site, worsening infiltrative disease, enhancement along posteromedial aspect of resection site,  p/w 1wk incisional drainage,  CTH airfluid level in resection cavity,  MRI brain w/wo incr'd resection cavity enhancement and diffusion restriction   s/p R cranial wound washout and mesh cranioplasty (kaleb purulence), CTH POC, L temporal ICH, plts 81 s/p 2u plts,  rCTH incr SAH, min incr ICH, rrCTH stable, EEG neg dc'd   s/p PICC   CTH stable, SDD dc'd, EEG neg   acute change in exam, AMS and not following commands as briskly, CTH stable   Respiratory Status: Room air  Current diet: Regular/thins    Pain:  Pain Scale: 0-10  Rating: no c/o pain      RECOMMENDATIONS:    -NPO   -OK for " small amounts of ice chips (one at a time, 10x/hour) for oral comfort and to prevent swallow disuse atrophy, but only after aggressive oral care to avoid colonization of bacteria within the oral cavity.    Aspiration PNA mitigation strategies: diligent oral care 2x/day to improve infection control and mobility as tolerated.     -Repeat modified barium swallow study/completion of 3 oz Swallow Protocol as mental status improves     SLP PLAN:  Skilled SLP Services: Skilled SLP intervention for dysphagia is warranted.  SLP Frequency: 2x per week  Duration: 2 weeks  Treatment/Interventions:   - Bolus trials  - Diet tolerance/advancement  - Patient/caregiver education    Discussed POC: Patient  Discussed Risks/Benefits: Yes  Patient/Caregiver Agreeable: Yes    Short term goals established 04/24/25:   - Patient will tolerate current diet without noted pulmonary compromise or evidence of pulmonary sequela as noted in patient chart and/or reported by patient/family.    - Patient/family will indicate understanding of dysphagia education: risk for aspiration, recommendations, and POC with > 80% accuracy via teach back method.    - Pt will participate in ongoing dysphagia assessment as mental status improves    Long term goals 04/24/25:   Patient will tolerate the least restrictive diet without overt difficulty or further pulmonary compromise by time of discharge.    Mechanics of the Swallow Summary:  Oral Motor Assessment:  Oral Hygiene: WFL  Dentition: WFL  Oral Motor: Reduced laterization of lingua bilaterally; involuntary movement of lingua (shaking).   Vocal Quality (Perceptually): Adequate  Volitional Cough (Perceptually): Weak     ORAL PHASE:  Lip Closure - No labial escape/anterior loss of bolus   Tongue Control During Bolus Hold - Cohesive bolus between tongue to palatal seal   Bolus prep/mastication - Mastication not assessed   Bolus transport/lingual motion - Delayed initiation of tongue motion for A-P movement of  the bolus   Oral residue - Residue collection on oral structure     PHARYNGEAL PHASE:  Initiation of pharyngeal swallow - Bolus head at vallecular pit   Soft palate elevation - No bolus between soft palate/pharyngeal wall   Laryngeal elevation - Partial superior movement of thyroid cartilage and/or partial approximation of arytenoids to epiglottic petiole   Anterior hyoid excursion - Partial anterior movement   Epiglottic movement - No inversion   Laryngeal vestibule closure - Incomplete - narrow column of air/contrast in laryngeal vestibule   Pharyngeal stripping wave - Present, however, diminished   Pharyngeal contraction (A/P view) - Not tested       Pharyngoesophageal segment opening - Partial distension/partial duration with partial obstruction of flow of bolus   Tongue base retraction - Narrow column of contrast or air between tongue base and pharyngeal wall   Pharyngeal residue - Collection of residue within or on the pharyngeal structures     ESOPHAGEAL PHASE:  Esophageal clearance - Not evaluated       SLP Impressions with Severity Rating:   Pt seen earlier this day (4/24/25) for SLP tx. During 1st session, pt demonstrating ability to remain awake and alert w/ appropriate command following and responses to verbal stimuli. Upon presentation to fluoro suite, pt appeared to be more lethargic, but endorsed that he was feeling okay. Pt w/ continued lethargy and confusion throughout modified barium swallow study. Pt required verbal cues to remain alert and attentive multiple times throughout session. MBSS protocol was not completed it its entirety d/t pt's mental status. Will re-assess w/ MBSS upon improvement in pt's mentation.  Updated medical team.     Pt presents with moderate-severe oropharyngeal dysphagia upon completion of modified barium swallow study this date. Swallowing physiology is detailed above. Impairments most impacting swallowing safety and efficiency include reduced base of tongue retraction,  incomplete/absence of epiglottic inversion, and reduced laryngeal vestibule closure. Also noted fullness of arytenoids possibly contributing to swallowing deficits. Observed aspiration across all presentations of thin and mildly thick liquids. Trace aspiration during tsp thin liquids did not trigger cough response, but larger presentations did. Unable to clear bolus from airway w/ cough. Aspiration occurred during and after swallow. Swallow efficiency impaired w/ puree solid trial.  Moderate/severe residue observed at the valleculae.  Able to partially clear w/ repeat swallows.     *Of note: The A-P bolus follow-through is not intended to be utilized as a diagnostic assessment of the esophagus, rather a tool to observe the biomechanical aspects of the swallow continuum, and to inform the need for further evaluation by medical specialists, as applicable.     OUTCOME MEASURES:  Rosenbek's Penetration Aspiration Scale  Thin Liquids: 8. SILENT ASPIRATION - contrast passes glottis, visible residue, NO pt response]  during trace aspiration; greater quantities of aspiration triggered cough response   Nectar Thick Liquids: 7. OVERT ASPIRATION, material is not cleared - contrast passes glottis, visible residue, W/pt response  Puree: 1. NO ASPIRATION & NO PENETRATION - no aspiration, contrast does not enter airway    Anabell Pendleton, clinical supervisor, present for duration of assessment.

## 2025-04-24 NOTE — CARE PLAN
The patient's goals for the shift include free from fall    The clinical goals for the shift include patient will be free from seizures    Problem: Fall/Injury  Goal: Be free from injury by end of the shift  Outcome: Progressing     Problem: Safety - Adult  Goal: Free from fall injury  Outcome: Progressing

## 2025-04-24 NOTE — PROGRESS NOTES
Vancomycin Dosing by Pharmacy- FOLLOW UP    Enmanuel Ahumada is a 69 y.o. year old male who Pharmacy has been consulted for vancomycin dosing for CNS/meningoencephalitis. Based on the patient's indication and renal status this patient is being dosed based on a goal AUC of 500-600.     Renal function is currently stable.    Current vancomycin dose: 1250 mg given every 12 hours    Estimated vancomycin AUC on current dose: 548 mg/L.hr     Visit Vitals  /76 (BP Location: Right arm, Patient Position: Lying)   Pulse 102   Temp 36.4 °C (97.5 °F) (Temporal)   Resp 16        Lab Results   Component Value Date    CREATININE 0.66 2025    CREATININE 0.62 2025    CREATININE 0.48 (L) 2025    CREATININE 0.65 2025        Patient weight is as follows:   Vitals:    25 0530   Weight: 65.3 kg (143 lb 15.4 oz)       Cultures:  No results found for the encounter in last 14 days.       I/O last 3 completed shifts:  In: - (0 mL/kg)   Out: 2000 (30.6 mL/kg) [Urine:2000 (0.9 mL/kg/hr)]  Weight: 65.3 kg   I/O during current shift:  No intake/output data recorded.    Temp (24hrs), Av.3 °C (97.3 °F), Min:36 °C (96.8 °F), Max:36.9 °C (98.4 °F)      Assessment/Plan    Within goal AUC range. Continue current vancomycin regimen.    This dosing regimen is predicted by [employer] to result in the following pharmacokinetic parameters:  [id number]: 559 mg/L.hr  AUC24,ss: 548 mg/L.hr  Probability of AUC24 > 400: 100 %  Ctrough,ss: 16.5 mg/L  Probability of Ctrough,ss > 20: 18 %    The next level will be obtained on  at 0500. May be obtained sooner if clinically indicated.   Will continue to monitor renal function daily while on vancomycin and order serum creatinine at least every 48 hours if not already ordered.  Follow for continued vancomycin needs, clinical response, and signs/symptoms of toxicity.       NORI VARGAS, PharmD

## 2025-04-24 NOTE — PROGRESS NOTES
"Speech-Language Pathology    SLP Adult Inpatient  Speech-Language Pathology Treatment     Patient Name: Enmanuel Ahumada \"Dash\"  MRN: [id number]  Today's Date: 4/24/2025   Time In: 1010  Time Out: 1049  Time Calculation: 39 min       SLP Assessment:  Speech/Language/Cognition:  -Cognitive Deficits -- mildly increased confusion this date   *Pt previously scored a 10/30 on the [place] Cognitive Assessment on 4/17 indicating a moderate cognitive impairment     -Expressive language deficits most apparent in conversational speech characterized by anomia and instances of paraphasias (reduced insight into expressive language deficits) -- stable     Dysphagia:  - Pt demonstrating overt s/sx of aspiration e/b coughing on thin liquids via straw. Pt unable to complete 3oz Swallow Protocol w/o s/sx of aspiration. Pt previously demonstrating safe, functional oropharyngeal swallow with regular solids and thin liquids.     Recommendations:  - Recommend completion of instrumental swallow assessment (Modified Barium Swallow Study) to assess safety and efficiency of oropharyngeal swallow     Diet recommendations at discretion of medical team until MBSS is completed.      Safe Swallow Strategies/Guidelines:  Only present PO intake when awake and alert  Upright positioning   Slow rate/small boluses         SLP Plan:  Skilled SLP  SLP Frequency: 2x per week  Duration: 2 weeks  Discussed POC: Patient  Discussed Risks/Benefits: Yes  Patient/Caregiver Agreeable: Yes  SLP - OK to Discharge: Yes    Goals:  Speech/Lang/Cog  Pt will orient to person, place, time, and situation with > 90% accuracy given min cues.     Date initiated: 4/17/25   Expected Time Frame to Meet Goal: 2-3 weeks    Progressing                 Pt will complete functional problem solving tasks with > 90% accuracy given min cues.     Date initiated: 4/17/25   Expected Time Frame to Meet Goal: 2-3 weeks               Progressing     Pt will complete functional reasoning " tasks with > 90% accuracy given min cues.     Date initiated: 4/17/25   Expected Time Frame to Meet Goal: 2-3 weeks    Progressing     Pt will participate in functional short term memory tasks with > 90% accuracy given min cues.    Date initiated: 4/17/25   Expected Time Frame to Meet Goal: 2-3 weeks    Progressing     Pt will self correct expressive language errors in conversational speech with mod cues.   Date initiated: 4/17/25   Expected Time Frame to Meet Goal: 2-3 weeks    Progressing     Dysphagia  Pt will tolerate least restrictive diet with adequate oral phase and no s/s of aspiration.               Date initiated: 4/13/25              Expected Time Frame to Meet Goal: 1-2 weeks              Status: Progressing      Pt/family will adhere to safe swallow strategies during PO intake with > 90% accuracy independently.                 Date initiated: 4/13/25              Expected Time Frame to Meet Goal: 1-2 weeks               Status: Progressing     Subjective   Pt properly identified and treated bedside.  Awake and alert, with no c/o pain.  Room air.  Wife present.      Pt is a 69 M h/o HTN, HLD, smalls's esophagus, RO GBM s/p resection (SAH, 12/2023) with resultant L HH s/p chemo/RT presenting with tumor progression, 2/20 s/p redo craniotomy for tumor resection w 5 ALA, CTH POC, 2/21 MRI intended NTR, 3/16 MRI postop changes w fluid collection at dura and craniotomy site, worsening infiltrative disease, enhancement along posteromedial aspect of resection site, 4/5 p/w 1wk incisional drainage, 4/4 CTH airfluid level in resection cavity, 4/5 MRI brain w/wo incr'd resection cavity enhancement and diffusion restriction  4/6 s/p R cranial wound washout and mesh cranioplasty (kaleb purulence), CTH POC, L temporal ICH, plts 81 s/p 2u plts, 4/7 rCTH incr SAH, min incr ICH, rrCTH stable, EEG neg dc'd  4/8 s/p PICC  4/9 CTH stable, SDD dc'd, EEG neg  4/21 acute change in exam, AMS and not following commands as  "briskly, [facility] stable     Objective   Pt seen for cognitive-communication & dysphagia tx. Pt continues to present with moderate cognitive impairment. Pt oriented to self and place, but required choices to properly identify month and year. SLP created orientation guide for pt's bedside table. Overall increased confusion noted this date.  For example, pt attempting to drink from incentive spirometer believing it to be a cup.  Pt continues to demonstrate expressive language difficulties e/b word-finding deficits. For example, pt attempted to say that he had sausage for breakfast, but was unable to produce the word sausage. SLP instructed pt in thinking of ways to describe sausage. Pt independently able to identify 1 characteristic. Pt able to appropriately respond to problem-solving tasks (e.g. \"how do you call your nurse?\" \"What reasons would you call a nurse?\").     Pt participated in therapeutic PO trials of thin liquids and regular solids. Pt demonstrated overt s/sx of aspiration in >50% of trials of thin liquids via straw. Attempted to complete the 3oz Swallow Protocol, but pt demonstrated overt s/sx of aspiration e/b coughing. Pt did not demonstrate overt s/sx of aspiration during presentation of regular solids. Pt's spouse reported that pt started coughing yesterday, but stated that he coughs outside of PO intake as well. Recommend completion of MBSS in order to assess for safety and efficiency of oropharyngeal swallow. Pt has been tolerating current diet w/ no change in respiratory or pulmonary status. For this reason, diet continuation is at medical team's discretion until MBSS can be completed.     Anabell Pendleton, clinical supervisor, present for duration of assessment.   "

## 2025-04-24 NOTE — PROGRESS NOTES
"Physical Therapy    Physical Therapy Treatment    Patient Name: Enmanuel Ahumada \"Dash\"  MRN: [id number]  Department: [facility]  Room: 220/220-A  Today's Date: 4/24/2025  Time Calculation  Start Time: 1226  Stop Time: 1305  Time Calculation (min): 39 min      Assessment/Plan   PT Assessment  PT Assessment Results: Decreased strength, Decreased endurance, Impaired balance, Decreased coordination, Decreased mobility, Decreased cognition, Impaired judgement, Decreased safety awareness  Rehab Prognosis: Good  Barriers to Discharge Home: Physical needs, Caregiver assistance  Caregiver Assistance: Caregiver assistance needed per identified barriers - however, level of patient's required assistance exceeds assistance available at home  Physical Needs: High falls risk due to function or environment  Evaluation/Treatment Tolerance: Patient tolerated treatment well  Medical Staff Made Aware: Yes  Strengths: Ability to acquire knowledge, Attitude of self, Support and attitude of living partners  End of Session Communication: Bedside nurse  Assessment Comment: Pt able to ambulate 2x10ft Mod A with FWW. Pt demonstrating excellent effort throughout session. Pt remains appropriate for high intensity level continued care when medically ready for discharge.  End of Session Patient Position: Up in chair, Alarm on  PT Plan  Inpatient/Swing Bed or Outpatient: Inpatient  PT Plan  Treatment/Interventions: Bed mobility, Transfer training, Gait training, Stair training, Balance training, Neuromuscular re-education, Strengthening, Endurance training, Range of motion, Therapeutic exercise, Therapeutic activity  PT Plan: Ongoing PT  PT Frequency: 5 times per week  PT Discharge Recommendations: High intensity level of continued care  Equipment Recommended upon Discharge:  (tbd at next level of care)  PT Recommended Transfer Status: Assist x1, Assistive device  PT - OK to Discharge: Yes (Meaning pt has been evaluated and d/c reccs have " been made.)    General Visit Information:   PT  Visit  PT Received On: 04/24/25  General  Reason for Referral: Pt initially presented with tumor progression (RO GBM), s/p redo craniotomy for tumor resection in 2/2025. 4/5 p/w 1wk incisional drainage, 4/4 CTH airfluid level in resection cavity, 4/5 MRI brain w/wo incr'd resection cavity enhancement and diffusion restriction 4/6 s/p R cranial wound washout and mesh cranioplasty. 4/7 rCTH incr SAH, min incr ICH, rrCTH stable, EEG neg dc'd.  Past Medical History Relevant to Rehab: HTN, HLD, smalls's esophagus, RO GBM s/p resection (SAH, 12/2023) with resultant L HH s/p chemo/RT  PT Missed Visit:  (-)  Missed Visit Reason:  (-)  Family/Caregiver Present: Yes  Caregiver Feedback: wife present and supportive during session  Prior to Session Communication: Bedside nurse  Patient Position Received: Bed, 3 rail up, Alarm on  General Comment: Supine. Pt pleasant, agreeable and cooperative to PT. Able to ambulate 2x10 ft, Mod A. Tolerated treatment well.    Subjective   Precautions:  Precautions  Hearing/Visual Limitations: pt wearing glasses  Medical Precautions: Fall precautions  Precautions Comment: SBP<160    Objective   Pain:  Pain Assessment  Pain Assessment: 0-10  0-10 (Numeric) Pain Score: 0 - No pain  Cognition:  Cognition  Overall Cognitive Status: Impaired  Arousal/Alertness: Delayed responses to stimuli  Orientation Level: Disoriented to time  Following Commands: Follows one step commands with repetition  Processing Speed: Delayed  Coordination:  Movements are Fluid and Coordinated: No  Upper Body Coordination: LUE dysmetria observed during functional activities. Verbal and tactile cues for sequencing and hand placement, especially with LUE  Postural Control:  Postural Control  Posture Comment: pt demonstrates increased forward flexion in seated and standing. VCs to correct.  Static Sitting Balance  Static Sitting-Balance Support: Feet supported, No upper extremity  supported  Static Sitting-Level of Assistance: Close supervision, Contact guard  Static Sitting-Comment/Number of Minutes: pt demonstrates increased forward flexion in seated and standing. VCs to correct.  Dynamic Sitting Balance  Dynamic Sitting-Balance Support: Feet supported  Dynamic Sitting-Level of Assistance: Contact guard  Static Standing Balance  Static Standing-Balance Support: Bilateral upper extremity supported  Static Standing-Level of Assistance: Minimum assistance, Contact guard  Dynamic Standing Balance  Dynamic Standing-Balance Support: Bilateral upper extremity supported  Dynamic Standing-Level of Assistance: Moderate assistance  Extremity/Trunk Assessments:    RUE   RUE : Within Functional Limits  LUE   LUE:  (dysmetria and difficulty using LUE during functional activity observed)  RLE   RLE : Within Functional Limits  LLE   LLE :  (pt has difficulty progressing L leg during ambulation. VCs and tactile cues for sequencing weight shifts and ambulation)  Activity Tolerance:  Activity Tolerance  Endurance: Endurance does not limit participation in activity  Treatments:    Balance/Neuromuscular Re-Education  Balance/Neuromuscular Re-Education Activity Performed: Yes  Balance/Neuromuscular Re-Education Activity 1: Weight shifting in standing ~3 min. Mod A with verbal cues for sequencing.    Bed Mobility  Bed Mobility: Yes  Bed Mobility 1  Bed Mobility 1: Supine to sitting  Level of Assistance 1: Minimum assistance, Moderate verbal cues, Moderate tactile cues  Bed Mobility Comments 1: increased time. HOB elevated. VCs for sequencing.  Bed Mobility 3  Bed Mobility 3: Scooting  Level of Assistance 3: Moderate assistance  Bed Mobility Comments 3: cues for hand placement and sequencing    Ambulation/Gait Training  Ambulation/Gait Training Performed: Yes  Ambulation/Gait Training 1  Surface 1: Level tile  Device 1: Rolling walker  Gait Support Devices: Gait belt  Assistance 1: Moderate verbal cues, Moderate  tactile cues, Moderate assistance  Quality of Gait 1: Inconsistent stride length, Decreased step length, Diminished heel strike, Shuffling gait  Comments/Distance (ft) 1: 2x10 ft. VCs for sequencing and shifting weight to progress LLE. assist for walker progression. (chair follow for 2nd trial)  Transfers  Transfer: Yes  Transfer 1  Transfer From 1: Sit to, Stand to  Transfer to 1: Sit  Technique 1: Sit to stand, Stand to sit  Transfer Device 1: Walker  Transfer Level of Assistance 1: Minimum assistance, Moderate assistance  Trials/Comments 1: x3 trials. 1 min A, 1 mod A    Stairs  Stairs: No    Outcome Measures:    [facility] Basic Mobility  Turning from your back to your side while in a flat bed without using bedrails: A little  Moving from lying on your back to sitting on the side of a flat bed without using bedrails: A little  Moving to and from bed to chair (including a wheelchair): A lot  Standing up from a chair using your arms (e.g. wheelchair or bedside chair): A lot  To walk in hospital room: A lot  Climbing 3-5 steps with railing: Total  Basic Mobility - Total Score: 13      Education Documentation  Body Mechanics, taught by DAVID Verde at 4/24/2025  3:38 PM.  Learner: Patient  Readiness: Eager  Method: Explanation  Response: Verbalizes Understanding  Comment: role of PT, POC    Precautions, taught by DAVID Verde at 4/24/2025  3:38 PM.  Learner: Patient  Readiness: Eager  Method: Explanation  Response: Verbalizes Understanding  Comment: role of PT, POC    Mobility Training, taught by DAVID Verde at 4/24/2025  3:38 PM.  Learner: Patient  Readiness: Eager  Method: Explanation  Response: Verbalizes Understanding  Comment: role of PT, POC    Education Comments  No comments found.        OP EDUCATION:       Encounter Problems       Encounter Problems (Active)       PT Problem       Patient will perform bed mobility IND to reduce risk of developing decubitus ulcers.  (Progressing)       Start:   04/08/25    Expected End:  04/22/25            Patient will ambulate at least 150 ft. with </= close sup and LRD to improve tolerance of household distances.  (Progressing)       Start:  04/08/25    Expected End:  04/22/25            Patient will perform the 5-Time Sit to Stand test in <14 sec to indicate decreased falls risk. (Progressing)       Start:  04/08/25    Expected End:  04/22/25            Patient will perform sit to stand and stand to sit transfers with </= close sup and LRD to increase functional strength.  (Progressing)       Start:  04/08/25    Expected End:  04/22/25            BLE 5/5 (Progressing)       Start:  04/08/25    Expected End:  04/22/25               Pain - Adult

## 2025-04-24 NOTE — CARE PLAN
The patient's goals for the shift include free from fall    The clinical goals for the shift include Patient will remain free from injury or pain.

## 2025-04-25 ENCOUNTER — APPOINTMENT (OUTPATIENT)
Dept: CARDIOLOGY | Facility: HOSPITAL | Age: 70
End: 2025-04-25
Payer: MEDICARE

## 2025-04-25 LAB
ALBUMIN SERPL BCP-MCNC: 2.8 G/DL (ref 3.4–5)
ANION GAP SERPL CALC-SCNC: 13 MMOL/L (ref 10–20)
ATRIAL RATE: 197 BPM
ATRIAL RATE: 91 BPM
ATRIAL RATE: 92 BPM
ATRIAL RATE: 99 BPM
BASOPHILS # BLD AUTO: 0.01 X10*3/UL (ref 0–0.1)
BASOPHILS NFR BLD AUTO: 0.5 %
BUN SERPL-MCNC: 23 MG/DL (ref 6–23)
CALCIUM SERPL-MCNC: 7.8 MG/DL (ref 8.6–10.6)
CHLORIDE SERPL-SCNC: 104 MMOL/L (ref 98–107)
CO2 SERPL-SCNC: 25 MMOL/L (ref 21–32)
CREAT SERPL-MCNC: 2.37 MG/DL (ref 0.5–1.3)
EGFRCR SERPLBLD CKD-EPI 2021: 29 ML/MIN/1.73M*2
EOSINOPHIL # BLD AUTO: 0 X10*3/UL (ref 0–0.7)
EOSINOPHIL NFR BLD AUTO: 0 %
ERYTHROCYTE [DISTWIDTH] IN BLOOD BY AUTOMATED COUNT: 16.7 % (ref 11.5–14.5)
ERYTHROCYTE [DISTWIDTH] IN BLOOD BY AUTOMATED COUNT: 17.2 % (ref 11.5–14.5)
GLUCOSE BLD MANUAL STRIP-MCNC: 102 MG/DL (ref 74–99)
GLUCOSE BLD MANUAL STRIP-MCNC: 103 MG/DL (ref 74–99)
GLUCOSE BLD MANUAL STRIP-MCNC: 112 MG/DL (ref 74–99)
GLUCOSE BLD MANUAL STRIP-MCNC: 133 MG/DL (ref 74–99)
GLUCOSE SERPL-MCNC: 97 MG/DL (ref 74–99)
HCT VFR BLD AUTO: 20.8 % (ref 41–52)
HCT VFR BLD AUTO: 23.1 % (ref 41–52)
HGB BLD-MCNC: 6.9 G/DL (ref 13.5–17.5)
HGB BLD-MCNC: 7.8 G/DL (ref 13.5–17.5)
IMM GRANULOCYTES # BLD AUTO: 0.01 X10*3/UL (ref 0–0.7)
IMM GRANULOCYTES NFR BLD AUTO: 0.5 % (ref 0–0.9)
INTERPRETATION FOR ANTI-PLATELET FACTOR 4 ANTIBODY: NEGATIVE
LYMPHOCYTES # BLD AUTO: 0.35 X10*3/UL (ref 1.2–4.8)
LYMPHOCYTES NFR BLD AUTO: 16 %
MCH RBC QN AUTO: 32.2 PG (ref 26–34)
MCH RBC QN AUTO: 32.5 PG (ref 26–34)
MCHC RBC AUTO-ENTMCNC: 33.2 G/DL (ref 32–36)
MCHC RBC AUTO-ENTMCNC: 33.8 G/DL (ref 32–36)
MCV RBC AUTO: 96 FL (ref 80–100)
MCV RBC AUTO: 98 FL (ref 80–100)
MONOCYTES # BLD AUTO: 0.29 X10*3/UL (ref 0.1–1)
MONOCYTES NFR BLD AUTO: 13.2 %
NEUTROPHILS # BLD AUTO: 1.53 X10*3/UL (ref 1.2–7.7)
NEUTROPHILS NFR BLD AUTO: 69.8 %
NRBC BLD-RTO: 0 /100 WBCS (ref 0–0)
NRBC BLD-RTO: 0 /100 WBCS (ref 0–0)
P AXIS: 27 DEGREES
P AXIS: 42 DEGREES
P AXIS: 43 DEGREES
P AXIS: 50 DEGREES
P OFFSET: 185 MS
P OFFSET: 190 MS
P OFFSET: 191 MS
P OFFSET: 197 MS
P ONSET: 136 MS
P ONSET: 141 MS
P ONSET: 142 MS
P ONSET: 148 MS
PHOSPHATE SERPL-MCNC: 3.1 MG/DL (ref 2.5–4.9)
PLATELET # BLD AUTO: 57 X10*3/UL (ref 150–450)
PLATELET # BLD AUTO: 64 X10*3/UL (ref 150–450)
POTASSIUM SERPL-SCNC: 3.9 MMOL/L (ref 3.5–5.3)
PR INTERVAL: 140 MS
PR INTERVAL: 142 MS
PR INTERVAL: 152 MS
PR INTERVAL: 152 MS
Q ONSET: 211 MS
Q ONSET: 212 MS
Q ONSET: 213 MS
Q ONSET: 224 MS
QRS COUNT: 15 BEATS
QRS COUNT: 15 BEATS
QRS COUNT: 16 BEATS
QRS COUNT: 30 BEATS
QRS DURATION: 84 MS
QRS DURATION: 84 MS
QRS DURATION: 86 MS
QRS DURATION: 88 MS
QT INTERVAL: 306 MS
QT INTERVAL: 332 MS
QT INTERVAL: 364 MS
QT INTERVAL: 372 MS
QTC CALCULATION(BAZETT): 426 MS
QTC CALCULATION(BAZETT): 450 MS
QTC CALCULATION(BAZETT): 457 MS
QTC CALCULATION(BAZETT): 540 MS
QTC FREDERICIA: 392 MS
QTC FREDERICIA: 419 MS
QTC FREDERICIA: 427 MS
QTC FREDERICIA: 447 MS
R AXIS: -10 DEGREES
R AXIS: -16 DEGREES
R AXIS: -19 DEGREES
R AXIS: -19 DEGREES
RBC # BLD AUTO: 2.12 X10*6/UL (ref 4.5–5.9)
RBC # BLD AUTO: 2.42 X10*6/UL (ref 4.5–5.9)
SERUM AND PLATELET FACTOR 4: 0.16 OD UNITS
SODIUM SERPL-SCNC: 138 MMOL/L (ref 136–145)
T AXIS: 0 DEGREES
T AXIS: 29 DEGREES
T AXIS: 37 DEGREES
T AXIS: 47 DEGREES
T OFFSET: 377 MS
T OFFSET: 378 MS
T OFFSET: 393 MS
T OFFSET: 399 MS
VANCOMYCIN SERPL-MCNC: 38.6 UG/ML (ref 5–20)
VENTRICULAR RATE: 187 BPM
VENTRICULAR RATE: 91 BPM
VENTRICULAR RATE: 92 BPM
VENTRICULAR RATE: 99 BPM
WBC # BLD AUTO: 2.2 X10*3/UL (ref 4.4–11.3)
WBC # BLD AUTO: 2.5 X10*3/UL (ref 4.4–11.3)

## 2025-04-25 PROCEDURE — 97112 NEUROMUSCULAR REEDUCATION: CPT | Mod: GP

## 2025-04-25 PROCEDURE — 97116 GAIT TRAINING THERAPY: CPT | Mod: GP

## 2025-04-25 PROCEDURE — 99233 SBSQ HOSP IP/OBS HIGH 50: CPT | Performed by: INTERNAL MEDICINE

## 2025-04-25 PROCEDURE — C9254 INJECTION, LACOSAMIDE: HCPCS | Mod: JZ

## 2025-04-25 PROCEDURE — 97110 THERAPEUTIC EXERCISES: CPT | Mod: GP

## 2025-04-25 PROCEDURE — 93005 ELECTROCARDIOGRAM TRACING: CPT

## 2025-04-25 PROCEDURE — 2500000001 HC RX 250 WO HCPCS SELF ADMINISTERED DRUGS (ALT 637 FOR MEDICARE OP): Performed by: PHYSICIAN ASSISTANT

## 2025-04-25 PROCEDURE — 82947 ASSAY GLUCOSE BLOOD QUANT: CPT

## 2025-04-25 PROCEDURE — 92526 ORAL FUNCTION THERAPY: CPT | Mod: GN

## 2025-04-25 PROCEDURE — 2500000002 HC RX 250 W HCPCS SELF ADMINISTERED DRUGS (ALT 637 FOR MEDICARE OP, ALT 636 FOR OP/ED): Performed by: PHYSICIAN ASSISTANT

## 2025-04-25 PROCEDURE — 2500000004 HC RX 250 GENERAL PHARMACY W/ HCPCS (ALT 636 FOR OP/ED): Mod: JZ | Performed by: STUDENT IN AN ORGANIZED HEALTH CARE EDUCATION/TRAINING PROGRAM

## 2025-04-25 PROCEDURE — 2500000004 HC RX 250 GENERAL PHARMACY W/ HCPCS (ALT 636 FOR OP/ED): Mod: JZ

## 2025-04-25 PROCEDURE — 85025 COMPLETE CBC W/AUTO DIFF WBC: CPT | Performed by: PHYSICIAN ASSISTANT

## 2025-04-25 PROCEDURE — 36430 TRANSFUSION BLD/BLD COMPNT: CPT

## 2025-04-25 PROCEDURE — 1200000002 HC GENERAL ROOM WITH TELEMETRY DAILY

## 2025-04-25 PROCEDURE — 99232 SBSQ HOSP IP/OBS MODERATE 35: CPT | Performed by: STUDENT IN AN ORGANIZED HEALTH CARE EDUCATION/TRAINING PROGRAM

## 2025-04-25 PROCEDURE — 92507 TX SP LANG VOICE COMM INDIV: CPT | Mod: GN

## 2025-04-25 PROCEDURE — 80069 RENAL FUNCTION PANEL: CPT

## 2025-04-25 PROCEDURE — 80202 ASSAY OF VANCOMYCIN: CPT

## 2025-04-25 PROCEDURE — 85027 COMPLETE CBC AUTOMATED: CPT | Performed by: NURSE PRACTITIONER

## 2025-04-25 PROCEDURE — P9040 RBC LEUKOREDUCED IRRADIATED: HCPCS

## 2025-04-25 RX ORDER — SODIUM CHLORIDE, SODIUM LACTATE, POTASSIUM CHLORIDE, CALCIUM CHLORIDE 600; 310; 30; 20 MG/100ML; MG/100ML; MG/100ML; MG/100ML
100 INJECTION, SOLUTION INTRAVENOUS CONTINUOUS
Status: DISCONTINUED | OUTPATIENT
Start: 2025-04-25 | End: 2025-04-26

## 2025-04-25 RX ADMIN — ENOXAPARIN SODIUM 40 MG: 100 INJECTION SUBCUTANEOUS at 08:34

## 2025-04-25 RX ADMIN — SODIUM CHLORIDE, SODIUM LACTATE, POTASSIUM CHLORIDE, AND CALCIUM CHLORIDE 1000 ML: .6; .31; .03; .02 INJECTION, SOLUTION INTRAVENOUS at 17:03

## 2025-04-25 RX ADMIN — QUETIAPINE FUMARATE 25 MG: 25 TABLET ORAL at 20:18

## 2025-04-25 RX ADMIN — ATORVASTATIN CALCIUM 40 MG: 40 TABLET, FILM COATED ORAL at 20:18

## 2025-04-25 RX ADMIN — LACOSAMIDE 100 MG: 10 INJECTION INTRAVENOUS at 21:07

## 2025-04-25 RX ADMIN — CLONIDINE HYDROCHLORIDE 0.2 MG: 0.2 TABLET ORAL at 20:18

## 2025-04-25 RX ADMIN — LACOSAMIDE 100 MG: 10 INJECTION INTRAVENOUS at 08:58

## 2025-04-25 RX ADMIN — SENNOSIDES AND DOCUSATE SODIUM 1 TABLET: 50; 8.6 TABLET ORAL at 20:18

## 2025-04-25 ASSESSMENT — COGNITIVE AND FUNCTIONAL STATUS - GENERAL
MOVING TO AND FROM BED TO CHAIR: A LOT
MOVING FROM LYING ON BACK TO SITTING ON SIDE OF FLAT BED WITH BEDRAILS: A LITTLE
WALKING IN HOSPITAL ROOM: A LOT
STANDING UP FROM CHAIR USING ARMS: A LOT
MOBILITY SCORE: 13
CLIMB 3 TO 5 STEPS WITH RAILING: TOTAL
TURNING FROM BACK TO SIDE WHILE IN FLAT BAD: A LITTLE

## 2025-04-25 ASSESSMENT — PAIN SCALES - GENERAL
PAINLEVEL_OUTOF10: 0 - NO PAIN

## 2025-04-25 ASSESSMENT — PAIN - FUNCTIONAL ASSESSMENT
PAIN_FUNCTIONAL_ASSESSMENT: 0-10
PAIN_FUNCTIONAL_ASSESSMENT: 0-10

## 2025-04-25 NOTE — PROGRESS NOTES
"Physical Therapy    Physical Therapy Treatment    Patient Name: Enmanuel Ahumada \"Dash\"  MRN: [id number]  Department: [facility]  Room: [address] [place], [place] [address]  Today's Date: 4/25/2025  Time Calculation  Start Time: 1025  Stop Time: 1108  Time Calculation (min): 43 min     Assessment/Plan   PT Assessment  Barriers to Discharge Home: Physical needs, Caregiver assistance  Physical Needs: High falls risk due to function or environment, 24hr mobility assistance needed, Stair navigation into home limited by function/safety, Ambulating household distances limited by function/safety  Evaluation/Treatment Tolerance: Patient tolerated treatment well  End of Session Communication: Bedside nurse  Assessment Comment: Pt motivated with excellent effort.  Remains appropriate for high intensity PT at time of d/c.  End of Session Patient Position: Bed, 3 rail up, Alarm on  PT Plan  Inpatient/Swing Bed or Outpatient: Inpatient  PT Plan  Treatment/Interventions: Bed mobility, Transfer training, Gait training, Stair training, Balance training, Neuromuscular re-education, Strengthening, Endurance training, Range of motion, Therapeutic exercise, Therapeutic activity  PT Plan: Ongoing PT  PT Frequency: 5 times per week  PT Discharge Recommendations: High intensity level of continued care  Equipment Recommended upon Discharge:  (tbd at next level of care)  PT Recommended Transfer Status: Assist x1, Assistive device  PT - OK to Discharge: Yes (Meaning pt has been evaluated and d/c reccs have been made.)    General Visit Information:   PT  Visit  PT Received On: 04/25/25  General  Family/Caregiver Present: Yes  Caregiver Feedback: wife present and supportive  Prior to Session Communication: Bedside nurse  Patient Position Received: Bed, 3 rail up, Alarm on  General Comment: Supine. Pt pleasant, cooperative and agreeable to PT.  Pt with low Hb will get transfused, but not till later per RN.  Stayed close to bed for safety.  Pt denied symptoms during " visit.    Subjective   Precautions:  Precautions  Medical Precautions: Fall precautions    Objective   Pain:  Pain Assessment  Pain Assessment: 0-10  0-10 (Numeric) Pain Score: 0 - No pain  Cognition:  Cognition  Overall Cognitive Status: Impaired  Arousal/Alertness: Delayed responses to stimuli  Orientation Level: Disoriented to time  Following Commands: Follows one step commands with repetition    Activity Tolerance:  Activity Tolerance  Endurance: Endurance does not limit participation in activity  Treatments:  Therapeutic Exercise  Therapeutic Exercise Performed: Yes  Therapeutic Exercise Activity 1: Sitting EOB, hip flx, LAQ, AP 3x10    Balance/Neuromuscular Re-Education  Balance/Neuromuscular Re-Education Activity 1: Sitting EOB 15 minutes.  Pt initially Min A for static sitting but progressing to variable SBA/CGA.  Pt able to engage in weight shifts, reaching and LE therex with variable CGA/Min A.  Standing with Min A with FWW, able to maintain static with CGA for periods >1 minute.  Min A for weight shifts 3x10 with FWW    Bed Mobility 1  Bed Mobility 1: Supine to sitting  Level of Assistance 1: Minimum assistance, Moderate verbal cues, Moderate tactile cues  Bed Mobility Comments 1: increased time. HOB elevated. VCs for sequencing.  Bed Mobility 2  Bed Mobility  2: Sitting to supine  Level of Assistance 2: Moderate assistance, Minimal tactile cues, Minimal verbal cues    Ambulation/Gait Training 1  Surface 1: Level tile  Device 1: Rolling walker  Assistance 1: Moderate assistance, Moderate verbal cues, Moderate tactile cues  Quality of Gait 1: Inconsistent stride length, Decreased step length, Diminished heel strike, Shuffling gait (Difficulty initiating steps with very short steps to start.  Improved with cues to weight shift and with continued ambulation.  Tending to position FWW too far forward, requiring ongoing cues and physical assist for walker postioning)  Comments/Distance (ft) 1: 3 feet sidesteps  to L, Forward steps, 3 feet, 5 feet and 15 feet with seated breaks  Transfer 1  Transfer From 1: Sit to, Stand to  Transfer to 1: Sit  Transfer Device 1: Walker  Transfer Level of Assistance 1: Minimum assistance, Moderate verbal cues, Moderate tactile cues  Trials/Comments 1: 5 trials    Outcome Measures:    [facility] Basic Mobility  Turning from your back to your side while in a flat bed without using bedrails: A little  Moving from lying on your back to sitting on the side of a flat bed without using bedrails: A little  Moving to and from bed to chair (including a wheelchair): A lot  Standing up from a chair using your arms (e.g. wheelchair or bedside chair): A lot  To walk in hospital room: A lot  Climbing 3-5 steps with railing: Total  Basic Mobility - Total Score: 13    Education Documentation  Body Mechanics, taught by Guanaco West PT at 4/25/2025 11:52 AM.  Learner: Patient  Readiness: Eager  Method: Explanation  Response: Verbalizes Understanding    Mobility Training, taught by Guanaco West PT at 4/25/2025 11:52 AM.  Learner: Patient  Readiness: Eager  Method: Explanation  Response: Verbalizes Understanding    Education Comments  No comments found.        OP EDUCATION:       Encounter Problems       Encounter Problems (Active)       PT Problem       Patient will perform bed mobility IND to reduce risk of developing decubitus ulcers.  (Progressing)       Start:  04/08/25    Expected End:  05/09/25            Patient will ambulate at least 150 ft. with </= close sup and LRD to improve tolerance of household distances.  (Progressing)       Start:  04/08/25    Expected End:  05/09/25            Patient will perform the 5-Time Sit to Stand test in <14 sec to indicate decreased falls risk. (Progressing)       Start:  04/08/25    Expected End:  05/09/25            Patient will perform sit to stand and stand to sit transfers with </= close sup and LRD to increase functional strength.  (Progressing)        Start:  04/08/25    Expected End:  05/09/25            BLE 5/5 (Progressing)       Start:  04/08/25    Expected End:  05/09/25               Pain - Adult

## 2025-04-25 NOTE — CARE PLAN
The patient's goals for the shift include free from fall    The clinical goals for the shift include Pt will remain free from falls during shift.    Over the shift, the patient did not fall during the shift. Pt received 1 unit of prbc for a hemoglobin of 6.9. Pt 7.8 after repletion.

## 2025-04-25 NOTE — PROGRESS NOTES
Vancomycin Dosing by Pharmacy- FOLLOW UP    Enmanuel Ahumada is a 69 y.o. year old male who Pharmacy has been consulted for vancomycin dosing for CNS/meningoencephalitis. Based on the patient's indication and renal status this patient is being dosed based on a goal trough/random level of 15-20.     Renal function is currently declining.    Current vancomycin dose: 1250 mg given every 12 hours    Most recent random level: 38.6 mcg/mL    Visit Vitals  /85   Pulse 96   Temp 36.7 °C (98.1 °F) (Temporal)   Resp 16        Lab Results   Component Value Date    CREATININE 2.37 (H) 2025    CREATININE 0.66 2025    CREATININE 0.62 2025    CREATININE 0.48 (L) 2025        Patient weight is as follows:   Vitals:    25 0531   Weight: 64.9 kg (143 lb 1.3 oz)       Cultures:  No results found for the encounter in last 14 days.       I/O last 3 completed shifts:  In: 80 (1.2 mL/kg) [P.O.:80]  Out: 800 (12.3 mL/kg) [Urine:800 (0.3 mL/kg/hr)]  Weight: 64.9 kg   I/O during current shift:  I/O this shift:  In: 110 [IV Piggyback:110]  Out: -     Temp (24hrs), Av.9 °C (98.5 °F), Min:36.6 °C (97.9 °F), Max:37.4 °C (99.3 °F)      Assessment/Plan    Above goal random/trough level. Due to significant increase in serum creatinine level, we will hold vancomycin until the level comes back within range and/or renal function improves back to baseline.  The next level will be obtained on  at 0500. May be obtained sooner if clinically indicated.   Will continue to monitor renal function daily while on vancomycin and order serum creatinine at least every 48 hours if not already ordered.  Follow for continued vancomycin needs, clinical response, and signs/symptoms of toxicity.       NORI VARGAS, PharmD

## 2025-04-25 NOTE — CARE PLAN
The patient's goals for the shift include free from fall    The clinical goals for the shift include pt will remain safe and free from falls/injury    Over the shift, the patient did not have any falls or injury. Pt turing self in bed.  Problem: Fall/Injury  Goal: Be free from injury by end of the shift  Outcome: Progressing  Goal: Verbalize understanding of personal risk factors for fall in the hospital  Outcome: Progressing     Problem: Pain - Adult  Goal: Verbalizes/displays adequate comfort level or baseline comfort level  Outcome: Progressing     Problem: Safety - Adult  Goal: Free from fall injury  Outcome: Progressing     Problem: Discharge Planning  Goal: Discharge to home or other facility with appropriate resources  Outcome: Progressing     Problem: Chronic Conditions and Co-morbidities  Goal: Patient's chronic conditions and co-morbidity symptoms are monitored and maintained or improved  Outcome: Progressing

## 2025-04-25 NOTE — PROGRESS NOTES
"Enmanuel Ahumada \"Petey" is a 69 y.o. male on day 20 of admission presenting with Wound dehiscence.    Subjective   No acute events overnight    Objective     Physical Exam  Resting, Eyes open to voice  Ox3  RUE 5/5  RLE 5/5  LUE 4+/5  LLE 4+/5  Sensation intact to light touch  Incision c/d/I, sutures in place, no leaking     Last Recorded Vitals  Blood pressure 118/78, pulse 93, temperature 37 °C (98.6 °F), temperature source Temporal, resp. rate 16, height 1.702 m (5' 7\"), weight 64.9 kg (143 lb 1.3 oz), SpO2 97%.  Intake/Output last 3 Shifts:  I/O last 3 completed shifts:  In: 80 (1.2 mL/kg) [P.O.:80]  Out: 800 (12.3 mL/kg) [Urine:800 (0.3 mL/kg/hr)]  Weight: 64.9 kg     Relevant Results  Results from last 72 hours   Lab Units 04/25/25  0550 04/24/25  0524   WBC AUTO x10*3/uL 2.2* 2.5*   NRBC AUTO /100 WBCs 0.0 0.0   RBC AUTO x10*6/uL 2.12* 2.26*   HEMOGLOBIN g/dL 6.9* 7.5*   HEMATOCRIT % 20.8* 21.4*   MCV fL 98 95   MCH pg 32.5 33.2   MCHC g/dL 33.2 35.0   RDW % 17.2* 16.8*   PLATELETS AUTO x10*3/uL 64* 82*      Results from last 72 hours   Lab Units 04/25/25  0550 04/24/25  0524   GLUCOSE mg/dL 97 95   SODIUM mmol/L 138 136   POTASSIUM mmol/L 3.9 3.4*   CHLORIDE mmol/L 104 101   CO2 mmol/L 25 26   ANION GAP mmol/L 13 12   BUN mg/dL 23 13   CREATININE mg/dL 2.37* 0.66   EGFR mL/min/1.73m*2 29* >90   CALCIUM mg/dL 7.8* 8.0*   PHOSPHORUS mg/dL 3.1 2.6   ALBUMIN g/dL 2.8* 3.2*          Assessment/Plan   Assessment & Plan  Wound dehiscence    69 M h/o HTN, HLD, smalls's esophagus, RO GBM s/p resection ([facility], 12/2023) with resultant L HH s/p chemo/RT presenting with tumor progression, 2/20 s/p redo craniotomy for tumor resection w 5 ALA, CTH POC, 2/21 MRI intended NTR, 3/16 MRI postop changes w fluid collection at dura and craniotomy site, worsening infiltrative disease, enhancement along posteromedial aspect of resection site, 4/5 p/w 1wk incisional drainage, 4/4 CTH airfluid level in resection cavity, 4/5 MRI " brain w/wo incr'd resection cavity enhancement and diffusion restriction  4/6 s/p R cranial wound washout and mesh cranioplasty (kaleb purulence), CTH POC, L temporal ICH, plts 81 s/p 2u plts, 4/7 rCTH incr SAH, min incr ICH, rrCTH stable, EEG neg dc'd  4/8 s/p PICC  4/9 CTH stable, SDD dc'd, EEG neg  4/10 SGD dc'd  4/11 DVT US x4, LLE distal femoral, popliteal, gastroc, peroneal, post tibial vv DVTs, RUE R brachial DVT, s/p IVC filter    4/13 CTH stable  4/14 PICC auto dc'd, agitation, CTH stable   4/15 s/p PICC  4/16 CTH stable, restraints removed, no agitation noted   4/17 plts 75, set PF4/BRANDO  4/19 plts 49, plan to give 1 unit platelets  4/21 acute change in exam, AMS and not following commands as briskly, CTH stable   4/23 EEG neg, dc'd, plt 82, failed MBS, rCTH stable     Plan:  Tele (ok for q8 NC overnight)  AM RFP, citrated CBC, peripheral smear  Follow up PLT function tests and trend platelets   Appreciate ID recs - continue Vanc until 6/15, likely not cause of thrombocytopenia   Appreciate vasc med recs -    - HOLD Eliquis dosing due to platelet count of 40K    - Lovenox 40mg q24 hours   Neurology recs - continue LAC 100mg BID with VPA 150mg q6h   Heme recs - follow up fractionated plt count, If Plt <30K and bleeding or procedure, transfuse; otherwise observe   SLP recs-  regular diet   Continue home meds   PTOT- rehab       Jj Hernandez MD

## 2025-04-25 NOTE — PROGRESS NOTES
"Subjective:  No complaints from him  Wife reports poor appetite and more sleepy  Remains on lovenox 40 mg   No bleeding noted    Objective:     Physical Exam  /82   Pulse 103   Temp 36.7 °C (98.1 °F) (Temporal)   Resp 18   Ht 1.702 m (5' 7\")   Wt 64.9 kg (143 lb 1.3 oz)   BMI 22.41 kg/m²    OE:  laying in bed  Awakens easily  CV reg, lungs CTA  ABD soft  Urine clear  EXT no sig edema NT    Medications   [Held by provider] apixaban, 5 mg, oral, BID  atorvastatin, 40 mg, oral, Daily  cloNIDine, 0.2 mg, oral, BID  enoxaparin, 40 mg, subcutaneous, Daily  insulin lispro, 0-5 Units, subcutaneous, q6h  lacosamide, 100 mg, intravenous, q12h  lidocaine, 1 patch, transdermal, Daily  pantoprazole, 40 mg, oral, Daily before breakfast  polyethylene glycol, 17 g, oral, Daily  QUEtiapine, 25 mg, oral, Nightly  sennosides-docusate sodium, 1 tablet, oral, Nightly  lactated Ringer's, 50 mL/hr, Last Rate: 50 mL/hr (04/24/25 1621)  lactated Ringer's, 50 mL/hr    Lab Review     PTT - 4/20/2025: 12:28 AM  1.4   15.9 30     Results from last 7 days   Lab Units 04/25/25  0550 04/24/25  0524 04/23/25  0524   WBC AUTO x10*3/uL 2.2* 2.5* 2.6*   HEMOGLOBIN g/dL 6.9* 7.5* 8.0*   HEMATOCRIT % 20.8* 21.4* 23.3*   PLATELETS AUTO x10*3/uL 64* 82* 40*       Results from last 7 days   Lab Units 04/25/25  0550 04/24/25  0524 04/23/25  0524   SODIUM mmol/L 138 136 136   POTASSIUM mmol/L 3.9 3.4* 3.4*   CHLORIDE mmol/L 104 101 102   CO2 mmol/L 25 26 26   BUN mg/dL 23 13 14   CREATININE mg/dL 2.37* 0.66 0.62   GLUCOSE mg/dL 97 95 104*   CALCIUM mg/dL 7.8* 8.0* 7.7*       Results from last 7 days   Lab Units 04/20/25  0028   APTT seconds 30   INR  1.4*             A/P:  69 y.o. man h/o glioblastoma s/p right craniotomy 12/29/23, c/b seizure  on VPA.   Admitted 4/5/25 with surgical drainage after repeat craniotomy and tumor resection 2/20/25; S/p cranial washout and mesh cranioplasty 4/6/25 now with MRSA on Vancomycin.      VM following for: " left distal femoral ->calf DVT  right brachial thrombus around PICC line.    S/p IVC filter placement 4/11/25     HEP GTT 4/14/25 -> weight based Lovenox 60mg q12 hours 4/15/25 -> Eliquis 5mg BID on 4/16/24  DURING ADMISSION DEVELOPED THROMBOCYTOPENIA [PANCYTOPENIA]    ?related to chemo, vs vanco, vs VPA  PLT tx x 2 - currently H/H drifting but PLT fairly stable  Has been returned to lovenox 40 mg daily while awaiting PLT stabilization    Discussed with the wife - potentially using an approach of therapeutic AC while PLT are > 50 K but if there is thrombocytopenia related to the chemo etc - holding AC and using PPX during the times of low PLT.    This will require coordination with the neuro-onc team.    Today PLTR continue to drift  Continue the lovenox 40 mg  Trend H/H and PLT    VM following  Please page [phone] for any concerns    Adilia Guerra MD

## 2025-04-25 NOTE — CONSULTS
PM&R Consult Note  Patient: Enmanuel Ahumada   Age/sex: 69 y.o.   Medical Record #: [id number]     Referring physician: Dr. Cortez  Consulting physician: Dr. Whitman     Procedures performed on/related to this admission:  4/11 - ICF Filter placement    Chief complaint:   Impairments and activity limitations in ADLs, mobility, functional communication, and cognition secondary to Glioblastoma s/p resection complicated by meningitis.    HPI:   Enmanuel Ahumada is a 69 y.o. male patient with a past medical history of HTN, HLD, GBM s/p resection (December 2023) followed by chemotherapy and radiation who presented to  for tumor progression. Underwent repeat craniotomy w/ tumor resection on 2/20. Presented on 4/5 with incisional drainage. Ct Head on 4/4 showed air-fluid level present in resection cavity. MRI Brain on 4/5 showed increased resection cavity enhancement and diffusion restriction. Underwent R cranial wound washout and mesh cranioplasty on 4/6, evidence of kaleb purulence. Patient was also found to have DVTs of the LLE and RUE, now s/p IVC filter on 4/11. ID recommended continuing IV Vancomycin until 6/15.     PM&R was consulted for recommendations and for IRF appropriateness.    Interval Update (4/25):   Patient with ongoing thrombocytopenia, transitioned to Lovenox 40mg Daily. Thought likely late-effect of chemotherapy. s/p platelet transfusion x2. Vascular medicine following.     Present status: Ongoing thrombocytopenia, participating well with therapy  PO: regular diet with thins  Pain: lidocaine patches, Flexeril 10mg TID prn  Bladder: voiding volitionally, external catheter  Bowel: Last BM 4/22  DVT prophylaxis with IVC Filter and Lovenox 40mg Daily  Precautions: Fall Precautions    Past Medical History:   Diagnosis Date    Brain cancer (Multi)     GERD (gastroesophageal reflux disease)     Hyperlipidemia     Hypertension     Seizure disorder (Multi)         Past Surgical History:   Procedure  Laterality Date    BRAIN SURGERY      CHOLECYSTECTOMY  January 25, 2020    IR CVC PICC  4/15/2025    IR CVC PICC 4/15/2025 Dario Martinez MD [facility]    OTHER SURGICAL HISTORY  07/24/2019    Colonoscopy    OTHER SURGICAL HISTORY  07/26/2021    Endoscopy    VASECTOMY  Abt 1991        No family history on file.     No Known Allergies     [Held by provider] apixaban, 5 mg, oral, BID  atorvastatin, 40 mg, oral, Daily  cloNIDine, 0.2 mg, oral, BID  enoxaparin, 40 mg, subcutaneous, Daily  insulin lispro, 0-5 Units, subcutaneous, q6h  lacosamide, 100 mg, intravenous, q12h  lidocaine, 1 patch, transdermal, Daily  pantoprazole, 40 mg, oral, Daily before breakfast  polyethylene glycol, 17 g, oral, Daily  QUEtiapine, 25 mg, oral, Nightly  sennosides-docusate sodium, 1 tablet, oral, Nightly         PRN medications: alteplase, bisacodyl, cyclobenzaprine, dextrose, dextrose, glucagon, glucagon, hydrOXYzine HCL, ondansetron **OR** ondansetron, oxygen, vancomycin     Social History:  Working History: Retired  Social supports: Yes, 24 hour assistance may be available  Home situation: Lives in a two-level home with spouse, with 2 stairs to enter and second floor setup    Functional History:  Home DME: none   Premorbid ADL's: independent   Premorbid Mobility: independent     CLOF:     PT (4/25): bed mobility Mod A, ambulated 15' w/ FWW at Mod A, transfers Min A  Recommending high intensity    OT (4/21): UB dressing Min A, toileting Max A, bed mobility Mod A, transfers Min A  Recommending high intensity    ROS  10 point review of systems is performed and negative unless mentioned in HPI    Physical Exam  GENERAL: Awake and alert, well-developed, well-nourished, No acute distress, wife at bedside   HEENT - right posterior scalp healing incision  CARDIOVASCULAR: extremities  appear warm, well perfused  RESPIRATORY: no conversational dyspnea, comfortable on room air, symmetrical chest rise   ABDOMEN: Soft, non-tender  MSK: No visible  deformities or local swelling, PICC present in right upper arm   SKIN: scattered healing contusions  Psych: Cooperative, affect appropriate, conversational, good-eye contact      NEURO: A&O x 3-4, stated it was October  RUE strength: 5/5 elbow flexors, 5/5 elbow extensors, 5/5 wrist extensors, 5/5 finger flexors, 5/5 finger abductors    LUE strength: 5/5 elbow flexors, 4/5 elbow extensors, 5/5 wrist extensors, 5/5 finger flexors, 5/5 finger abductors    RLE strength: 5/5 hip flexors, 5/5 knee extensors, 5/5 ankle dorsiflexors, 5/5 EHL, 5/5 ankle plantar flexors   LLE strength: 5/5 hip flexors, 5/5 knee extensors, 5/5 ankle dorsiflexors, 5/5 EHL, 5/5 ankle plantar flexors   Sensation - intact to light touch in bilateral lower extremities.      Imaging:  === 04/05/25 ===    XR ABDOMEN 1 VIEW    - Impression -  1.  Enteric tube is visualized with tip projecting over expected  location of the 2nd duodenal segment.  2. Nonspecific, nonobstructive bowel gas pattern without evidence of  pneumoperitoneum within limitation of supine exam.    === 04/05/25 ===    MR BRAIN W AND WO CONTRAST    - Impression -  1.  Postsurgical changes status post right posterior craniotomy for  glioblastoma debulking in the right parieto-occipital lobe.  2.  Interval increase in extra-axial fluid collection overlying the  deep surgical craniotomy bed with focal areas of diffusion  restriction and peripheral enhancement. Additionally there is  interval development of layering contents deep within surgical cavity  and surrounding the cavity which demonstrate diffusion restriction.  Overall the constellation of findings are suggestive of superimposed  infection/abscess particularly given clinical suspicion of such.  3. Additional findings remain similar compared to prior imaging and  as described above.    IMPRESSION:  Enmanuel Ahumada is a 69 y.o. male patient with a past medical history of HTN, HLD, GBM s/p resection (December 2023)  followed by chemotherapy and radiation who presented to  for tumor progression. Underwent repeat craniotomy w/ tumor resection on 2/20. Presented on 4/5 with incisional drainage. Ct Head on 4/4 showed air-fluid level present in resection cavity. MRI Brain on 4/5 showed increased resection cavity enhancement and diffusion restriction. Underwent R cranial wound washout and mesh cranioplasty on 4/6, evidence of kaleb purulence. Patient was also found to have DVTs of the LLE and RUE, now s/p IVC filter on 4/11. ID recommended continuing IV Vancomycin until 6/15.     PM&R was consulted for recommendations and for IRF appropriateness.    Recommendations:  # GBM  - s/p resection complicated by meningitis requiring long-term IV antibiotics    # Thrombocytopenia  - Thought possibly a late-effect of Chemotherapy vs. Vancomycin vs. VPA. Vascular medicine following  - Now on Lovenox 40mg Daily  - Most recent platelet count: 57, however downtrending from 64 in AM (4/25/2025)    # Neurogenic Bowel/Bladder  - Voiding volitionally   - Last BM 4/22  - Recommend daily bowel program of Miralax BID and Docusate 100mg BID  - Goal of one BM daily, at risk for constipation while on opioids for pain management    # Immobility   - Prevent secondary complications   - Skin protection: low air loss mattress, turn q 2 hours in bed, maintain bowel and bladder continence/containment  - Prevention of pulmonary complications: incentive spirometry and pulmonary toileting  - Maintain adequate nutrition and hydration  - Q shift PROM of upper and lower extremities to prevent contracture    # Impaired mobility and Impaired independence with ADLs and I/ADLs  - Continue PT, working on bed mobility, transfers, balance, endurance, strength, gait, eval for appropriate assistive device  - Continue OT, working on functional cognition, functional mobility, upper limb strength/coordination, balance, endurance, eval for appropriate adaptive equipment, ADLs, and  I/ADLs.    # Current Barriers  We would appreciate resolution of the following issues before admission to acute rehabilitation service:   - Unresolved thrombocytopenia, barrier to being medically ready for discharge  - Please update OT progress notes     # Dispo  - Once medially stable for discharge and written plan in place to address ongoing thrombocytopenia, patient remains appropriate for acute rehab. As long as updated OT note continues to recommend high intensity, patient would benefit from an acute inpatient rehab stay to improve functional outcome of impaired mobility, ADL's, transfers, and self-care. Patient would be an excellent candidate for acute rehabilitation once medically stable and is able and motivated to participate in 3 hours/day of therapy.   - Patient would benefit from medical follow up at least three times a week to address and monitor pain, BP, education regarding home IV antibiotic administration and physician monitoring of new anticoagulation. Patient was independent in mobility and ADLs at baseline prior to this incident. Acute rehab is appropriate to assist in returning to PLOF and living situation.  - Post rehab destination: anticipated home   - For questions, please contact via [employer]    Estimated length of stay is 7-10 days with discharge home at Mod-I level.     We will follow along with you.  Thank you for the consult.    Airam Wooten, DO  Physical Medicine and Rehabilitation PGY-4  Available via [employer]    Patient seen and examined with Dr. Larson who agrees with assessment and plan.

## 2025-04-25 NOTE — PROGRESS NOTES
"Speech-Language Pathology    SLP Adult Inpatient  Speech-Language Pathology & Dysphagia Treatment     Patient Name: Enmanuel Ahumada \"Petey"  MRN: [id number]  Today's Date: 4/25/2025   Time In: 0904  Time Out: 0949   Time Calculation: 45 min    SLP Assessment:  Speech/Language/Cognition:  -Cognitive Deficits -- improved mental status this date e/b maintained alertness and reduced confusion  *Pt previously scored a 10/30 on the Vikram Cognitive Assessment on 4/17 indicating a moderate cognitive impairment     -Expressive language deficits most apparent in conversational speech characterized by anomia and instances of paraphasias (mild insight into expressive language deficits; pt acknowledges feeling like words are \"on the tip of his tongue\") -- stable     Dysphagia:  -Improved today; suspect that deficits observed during bedside assessment/MBSS yesterday have resolved as mental status has improved   *Pt demonstrated safe, functional oropharyngeal swallow with Regular solids (IDDSI 7) and thin liquids. Adequate mastication and oral clearance. No overt s/sx of aspiration. Pt able to self feed but efficiency is impacted by what appears to be LUE weakness/dis-coordination.     Recommendations:  Solid Diet Recommendation: Regular solids (IDDSI 7)  Liquid Diet Recommendation: Thin liquids      Safe Swallow Strategies/Guidelines:  Only present PO intake when awake and alert  Upright positioning   Slow rate/small boluses     SLP Plan:  Skilled SLP  SLP Frequency: 2x per week  Duration: 2 weeks  Discussed POC: Patient  Discussed Risks/Benefits: Yes  Patient/Caregiver Agreeable: Yes  SLP - OK to Discharge: Yes    Goals:  Speech/Lang/Cog  Pt will orient to person, place, time, and situation with > 90% accuracy given min cues.     Date initiated: 4/17/25   Expected Time Frame to Meet Goal: 2-3 weeks    Progressing                 Pt will complete functional problem solving tasks with > 90% accuracy given min cues.     Date " initiated: 4/17/25   Expected Time Frame to Meet Goal: 2-3 weeks               Progressing     Pt will complete functional reasoning tasks with > 90% accuracy given min cues.     Date initiated: 4/17/25   Expected Time Frame to Meet Goal: 2-3 weeks    Progressing     Pt will participate in functional short term memory tasks with > 90% accuracy given min cues.    Date initiated: 4/17/25   Expected Time Frame to Meet Goal: 2-3 weeks    Progressing     Pt will self correct expressive language errors in conversational speech with mod cues.   Date initiated: 4/17/25   Expected Time Frame to Meet Goal: 2-3 weeks    Progressing     Dysphagia  Pt will tolerate least restrictive diet with adequate oral phase and no s/s of aspiration.               Date initiated: 4/13/25              Expected Time Frame to Meet Goal: 1-2 weeks              Status: Progressing      Pt/family will adhere to safe swallow strategies during PO intake with > 90% accuracy independently.                 Date initiated: 4/13/25              Expected Time Frame to Meet Goal: 1-2 weeks               Status: Progressing     Subjective   Pt properly identified and treated bedside.  Awake and alert, with no c/o pain.  Room air.  No guests present.       Pt is a 69 M h/o HTN, HLD, smalls's esophagus, RO GBM s/p resection ([facility], 12/2023) with resultant L HH s/p chemo/RT presenting with tumor progression, 2/20 s/p redo craniotomy for tumor resection w 5 ALA, CTH POC, 2/21 MRI intended NTR, 3/16 MRI postop changes w fluid collection at dura and craniotomy site, worsening infiltrative disease, enhancement along posteromedial aspect of resection site, 4/5 p/w 1wk incisional drainage, 4/4 CTH airfluid level in resection cavity, 4/5 MRI brain w/wo incr'd resection cavity enhancement and diffusion restriction  4/6 s/p R cranial wound washout and mesh cranioplasty (kaleb purulence), CTH POC, L temporal ICH, plts 81 s/p 2u plts, 4/7 rCTH incr SAH, min incr ICH,  rrCTH stable, EEG neg dc'd  4/8 s/p PICC  4/9 CTH stable, SDD dc'd, EEG neg  4/21 acute change in exam, AMS and not following commands as briskly, CTH stable   4/25 attempted MBSS but pt presented w/ lethargy and confusion. Ceased MBSS early and recommended NPO    MBSS findings: Observed aspiration across all presentations of thin and mildly thick liquids. Trace aspiration during tsp thin liquids did not trigger cough response, but larger presentations did. Unable to clear bolus from airway w/ cough. Aspiration occurred during and after swallow. Results impacted by lethargy and confusion.     Objective   Pt seen for dysphagia tx. Completed swallow re-evaluation given findings of aspiration during thin and mildly thick liquids on MBSS on 4/24/25 although pt presented to study with increased confusion and lethargy. On this date, pt w/ increased alertness and reduced confusion. Pt participated in cup and straw sips of thin liquids with no overt s/sx of aspiration. Pt completed 3oz Swallow Protocol with no overt s/sx of aspiration. Pt tolerated pureed and regular solid presentations; demonstrated adequate oral prep/mastication and oral clearance. During presentation of regular solids, pt independently used finger to sweep L buccal cavity to clear min residue. When awake and alert, pt is appropriate for regular solids and thin liquids. Pt demonstrated a cough response to aspiration during MBSS and successfully completed 3oz Swallow Protocol this date -- suspicion for silent aspiration is low. Pt should not be presented w/ solids/liquids when lethargic, confused, and not alert given findings on MBSS that demonstrate moderate-severe dysphagia w/ aspiration when not awake and alert. Discussed at length w/ medical team.  Written reminders placed in room.     Pt seen for cognitive-communication tx. Pt demonstrating improved alertness and decreased confusion -- pt able to attend to entirety of session. Pt oriented to self and  "place -- required choices to provide year and could not correctly identify month given choices. SLP reminded pt of orientation guide on bedside table and showed pt month/date/year on whiteboard in room. Pt required choices to recall information that he shared w/ SLP during previous week re: past volunteering he participated in. Pt demonstrating memory deficits from previous sessions during task e/b denial of where he has volunteered. When reminded of location, pt endorsed that he did volunteer there. Pt demonstrating word-finding difficulties at conversational level. Pt able to begin sentence w/ 3-5 words, but then stops. He endorses that he feels the words are \"on the tip of his tongue\" and occasionally smiles/shrugs when he can't produce the target word(s). Completed semantic feature analysis task. Pt unable to describe color/function/category of target word when provided verbal stimuli. Pt w/ greater success describing target word when provided w/ picture of target word. Pt able to generate 3 descriptions per image w/ moderate verbal/semantic cues from SLP (e.g. apple -- \"what color is it?\" \"What do you do with it?\"). Pt was asked where he could find orientation (month/date/year) information in his room at the end of session, and pt looked toward white board. Required SLP to point to exact location. Pt w/ no needs at end of session. SLP will continue to follow for dysphagia and cog-comm tx.     Anabell Pendleton, clinical supervisor, present for duration of assessment.     "

## 2025-04-26 ENCOUNTER — APPOINTMENT (OUTPATIENT)
Dept: CARDIOLOGY | Facility: HOSPITAL | Age: 70
DRG: 907 | End: 2025-04-26
Payer: MEDICARE

## 2025-04-26 LAB
ABO GROUP (TYPE) IN BLOOD: NORMAL
ALBUMIN SERPL BCP-MCNC: 2.6 G/DL (ref 3.4–5)
ANION GAP SERPL CALC-SCNC: 10 MMOL/L (ref 10–20)
ANTIBODY SCREEN: NORMAL
APTT PPP: 33 SECONDS (ref 26–36)
BASOPHILS # BLD MANUAL: 0.02 X10*3/UL (ref 0–0.1)
BASOPHILS NFR BLD MANUAL: 0.8 %
BLOOD EXPIRATION DATE: NORMAL
BUN SERPL-MCNC: 28 MG/DL (ref 6–23)
CALCIUM SERPL-MCNC: 7.6 MG/DL (ref 8.6–10.6)
CHLORIDE SERPL-SCNC: 105 MMOL/L (ref 98–107)
CO2 SERPL-SCNC: 26 MMOL/L (ref 21–32)
CREAT SERPL-MCNC: 3.71 MG/DL (ref 0.5–1.3)
DISPENSE STATUS: NORMAL
EGFRCR SERPLBLD CKD-EPI 2021: 17 ML/MIN/1.73M*2
EOSINOPHIL # BLD MANUAL: 0 X10*3/UL (ref 0–0.7)
EOSINOPHIL NFR BLD MANUAL: 0 %
ERYTHROCYTE [DISTWIDTH] IN BLOOD BY AUTOMATED COUNT: 17 % (ref 11.5–14.5)
FIBRINOGEN PPP-MCNC: 455 MG/DL (ref 200–400)
GLUCOSE BLD MANUAL STRIP-MCNC: 103 MG/DL (ref 74–99)
GLUCOSE BLD MANUAL STRIP-MCNC: 120 MG/DL (ref 74–99)
GLUCOSE BLD MANUAL STRIP-MCNC: 120 MG/DL (ref 74–99)
GLUCOSE SERPL-MCNC: 95 MG/DL (ref 74–99)
HAPTOGLOB SERPL NEPH-MCNC: 218 MG/DL (ref 30–200)
HCT VFR BLD AUTO: 19.6 % (ref 41–52)
HGB BLD-MCNC: 7 G/DL (ref 13.5–17.5)
HGB RETIC QN: 36 PG (ref 28–38)
IMM GRANULOCYTES # BLD AUTO: 0.01 X10*3/UL (ref 0–0.7)
IMM GRANULOCYTES NFR BLD AUTO: 0.5 % (ref 0–0.9)
IMMATURE RETIC FRACTION: 19.8 %
INR PPP: 1.3 (ref 0.9–1.1)
LDH SERPL L TO P-CCNC: 277 U/L (ref 84–246)
LYMPHOCYTES # BLD MANUAL: 0.24 X10*3/UL (ref 1.2–4.8)
LYMPHOCYTES NFR BLD MANUAL: 12.4 %
MCH RBC QN AUTO: 33.2 PG (ref 26–34)
MCHC RBC AUTO-ENTMCNC: 35.7 G/DL (ref 32–36)
MCV RBC AUTO: 93 FL (ref 80–100)
MONOCYTES # BLD MANUAL: 0.19 X10*3/UL (ref 0.1–1)
MONOCYTES NFR BLD MANUAL: 9.9 %
NEUTS SEG # BLD MANUAL: 1.43 X10*3/UL (ref 1.2–7)
NEUTS SEG NFR BLD MANUAL: 75.2 %
NRBC BLD-RTO: 0 /100 WBCS (ref 0–0)
PHOSPHATE SERPL-MCNC: 3 MG/DL (ref 2.5–4.9)
PLATELET # BLD AUTO: 44 X10*3/UL (ref 150–450)
PLATELETS.RETICULATED NFR BLD AUTO: 1.1 % (ref 1–6)
POTASSIUM SERPL-SCNC: 3.9 MMOL/L (ref 3.5–5.3)
PRODUCT BLOOD TYPE: 5100
PRODUCT CODE: NORMAL
PROTHROMBIN TIME: 14.1 SECONDS (ref 9.8–12.4)
RBC # BLD AUTO: 2.11 X10*6/UL (ref 4.5–5.9)
RBC MORPH BLD: ABNORMAL
RETICS #: 0.07 X10*6/UL (ref 0.02–0.11)
RETICS/RBC NFR AUTO: 3 % (ref 0.5–2)
RH FACTOR (ANTIGEN D): NORMAL
SODIUM SERPL-SCNC: 137 MMOL/L (ref 136–145)
TOTAL CELLS COUNTED BLD: 121
TSH SERPL-ACNC: 1.14 MIU/L (ref 0.44–3.98)
UNIT ABO: NORMAL
UNIT NUMBER: NORMAL
UNIT RH: NORMAL
UNIT VOLUME: 350
VANCOMYCIN SERPL-MCNC: 26.2 UG/ML (ref 5–20)
VARIANT LYMPHS # BLD MANUAL: 0.03 X10*3/UL (ref 0–0.5)
VARIANT LYMPHS NFR BLD: 1.7 %
WBC # BLD AUTO: 1.9 X10*3/UL (ref 4.4–11.3)
XM INTEP: NORMAL

## 2025-04-26 PROCEDURE — 86850 RBC ANTIBODY SCREEN: CPT | Performed by: STUDENT IN AN ORGANIZED HEALTH CARE EDUCATION/TRAINING PROGRAM

## 2025-04-26 PROCEDURE — 85730 THROMBOPLASTIN TIME PARTIAL: CPT

## 2025-04-26 PROCEDURE — 2500000004 HC RX 250 GENERAL PHARMACY W/ HCPCS (ALT 636 FOR OP/ED): Mod: JZ

## 2025-04-26 PROCEDURE — C9254 INJECTION, LACOSAMIDE: HCPCS | Mod: JW

## 2025-04-26 PROCEDURE — 84443 ASSAY THYROID STIM HORMONE: CPT

## 2025-04-26 PROCEDURE — P9037 PLATE PHERES LEUKOREDU IRRAD: HCPCS

## 2025-04-26 PROCEDURE — 1200000003 HC ONCOLOGY  ROOM WITH TELEMETRY DAILY

## 2025-04-26 PROCEDURE — 99233 SBSQ HOSP IP/OBS HIGH 50: CPT | Performed by: INTERNAL MEDICINE

## 2025-04-26 PROCEDURE — 2500000001 HC RX 250 WO HCPCS SELF ADMINISTERED DRUGS (ALT 637 FOR MEDICARE OP): Performed by: PHYSICIAN ASSISTANT

## 2025-04-26 PROCEDURE — 2500000001 HC RX 250 WO HCPCS SELF ADMINISTERED DRUGS (ALT 637 FOR MEDICARE OP)

## 2025-04-26 PROCEDURE — 80069 RENAL FUNCTION PANEL: CPT | Performed by: NURSE PRACTITIONER

## 2025-04-26 PROCEDURE — 36430 TRANSFUSION BLD/BLD COMPNT: CPT

## 2025-04-26 PROCEDURE — 2500000004 HC RX 250 GENERAL PHARMACY W/ HCPCS (ALT 636 FOR OP/ED): Mod: JW

## 2025-04-26 PROCEDURE — 83010 ASSAY OF HAPTOGLOBIN QUANT: CPT

## 2025-04-26 PROCEDURE — 82947 ASSAY GLUCOSE BLOOD QUANT: CPT

## 2025-04-26 PROCEDURE — 80202 ASSAY OF VANCOMYCIN: CPT

## 2025-04-26 PROCEDURE — 2500000004 HC RX 250 GENERAL PHARMACY W/ HCPCS (ALT 636 FOR OP/ED): Performed by: PHYSICIAN ASSISTANT

## 2025-04-26 PROCEDURE — 93010 ELECTROCARDIOGRAM REPORT: CPT | Performed by: INTERNAL MEDICINE

## 2025-04-26 PROCEDURE — 85027 COMPLETE CBC AUTOMATED: CPT | Performed by: NURSE PRACTITIONER

## 2025-04-26 PROCEDURE — 93005 ELECTROCARDIOGRAM TRACING: CPT

## 2025-04-26 PROCEDURE — 85055 RETICULATED PLATELET ASSAY: CPT

## 2025-04-26 PROCEDURE — 85007 BL SMEAR W/DIFF WBC COUNT: CPT | Performed by: NURSE PRACTITIONER

## 2025-04-26 PROCEDURE — 83615 LACTATE (LD) (LDH) ENZYME: CPT

## 2025-04-26 PROCEDURE — 85384 FIBRINOGEN ACTIVITY: CPT

## 2025-04-26 PROCEDURE — 85045 AUTOMATED RETICULOCYTE COUNT: CPT

## 2025-04-26 RX ORDER — HEPARIN SODIUM 5000 [USP'U]/ML
5000 INJECTION, SOLUTION INTRAVENOUS; SUBCUTANEOUS 3 TIMES DAILY
Status: DISCONTINUED | OUTPATIENT
Start: 2025-04-27 | End: 2025-05-01

## 2025-04-26 RX ADMIN — POLYETHYLENE GLYCOL 3350 17 G: 17 POWDER, FOR SOLUTION ORAL at 08:39

## 2025-04-26 RX ADMIN — CLONIDINE HYDROCHLORIDE 0.2 MG: 0.2 TABLET ORAL at 08:39

## 2025-04-26 RX ADMIN — SODIUM CHLORIDE, SODIUM LACTATE, POTASSIUM CHLORIDE, AND CALCIUM CHLORIDE 1000 ML: .6; .31; .03; .02 INJECTION, SOLUTION INTRAVENOUS at 17:01

## 2025-04-26 RX ADMIN — LACOSAMIDE 100 MG: 10 INJECTION INTRAVENOUS at 09:40

## 2025-04-26 RX ADMIN — ENOXAPARIN SODIUM 40 MG: 100 INJECTION SUBCUTANEOUS at 08:39

## 2025-04-26 RX ADMIN — SENNOSIDES AND DOCUSATE SODIUM 1 TABLET: 50; 8.6 TABLET ORAL at 20:40

## 2025-04-26 RX ADMIN — LACOSAMIDE 100 MG: 10 INJECTION INTRAVENOUS at 21:18

## 2025-04-26 RX ADMIN — CLONIDINE HYDROCHLORIDE 0.2 MG: 0.2 TABLET ORAL at 20:40

## 2025-04-26 RX ADMIN — ATORVASTATIN CALCIUM 40 MG: 40 TABLET, FILM COATED ORAL at 20:40

## 2025-04-26 RX ADMIN — PANTOPRAZOLE SODIUM 40 MG: 40 TABLET, DELAYED RELEASE ORAL at 05:41

## 2025-04-26 ASSESSMENT — PAIN SCALES - GENERAL
PAINLEVEL_OUTOF10: 0 - NO PAIN
PAINLEVEL_OUTOF10: 0 - NO PAIN

## 2025-04-26 ASSESSMENT — COGNITIVE AND FUNCTIONAL STATUS - GENERAL
STANDING UP FROM CHAIR USING ARMS: A LOT
MOVING TO AND FROM BED TO CHAIR: A LOT
HELP NEEDED FOR BATHING: A LOT
MOVING FROM LYING ON BACK TO SITTING ON SIDE OF FLAT BED WITH BEDRAILS: A LOT
WALKING IN HOSPITAL ROOM: A LOT
CLIMB 3 TO 5 STEPS WITH RAILING: A LOT
PERSONAL GROOMING: A LITTLE
TOILETING: A LOT
EATING MEALS: A LITTLE
MOBILITY SCORE: 12
DAILY ACTIVITIY SCORE: 14
DRESSING REGULAR UPPER BODY CLOTHING: A LOT
TURNING FROM BACK TO SIDE WHILE IN FLAT BAD: A LOT
DRESSING REGULAR LOWER BODY CLOTHING: A LOT

## 2025-04-26 ASSESSMENT — PAIN SCALES - WONG BAKER: WONGBAKER_NUMERICALRESPONSE: NO HURT

## 2025-04-26 ASSESSMENT — PAIN - FUNCTIONAL ASSESSMENT: PAIN_FUNCTIONAL_ASSESSMENT: 0-10

## 2025-04-26 NOTE — PROGRESS NOTES
"Enmanuel Ahumada \"Petey" is a 69 y.o. male on day 21 of admission presenting with Wound dehiscence.    Subjective   No acute events overnight    Objective     Physical Exam  Resting, Eyes open to voice  Ox3  RUE 5/5  RLE 5/5  LUE 4+/5  LLE 4+/5  Sensation intact to light touch  Incision c/d/I, sutures in place, no leaking     Last Recorded Vitals  Blood pressure 145/89, pulse 78, temperature 36.2 °C (97.2 °F), temperature source Temporal, resp. rate 16, height 1.702 m (5' 7\"), weight 67 kg (147 lb 11.3 oz), SpO2 98%.  Intake/Output last 3 Shifts:  I/O last 3 completed shifts:  In: 1100 (16.4 mL/kg) [P.O.:240; I.V.:400 (6 mL/kg); Blood:350; IV Piggyback:110]  Out: 650 (9.7 mL/kg) [Urine:650 (0.3 mL/kg/hr)]  Weight: 67 kg     Relevant Results  Results from last 72 hours   Lab Units 04/26/25  0605 04/25/25  1403   WBC AUTO x10*3/uL 1.9* 2.5*   NRBC AUTO /100 WBCs 0.0 0.0   RBC AUTO x10*6/uL 2.11* 2.42*   HEMOGLOBIN g/dL 7.0* 7.8*   HEMATOCRIT % 19.6* 23.1*   MCV fL 93 96   MCH pg 33.2 32.2   MCHC g/dL 35.7 33.8   RDW % 17.0* 16.7*   PLATELETS AUTO x10*3/uL 44* 57*      Results from last 72 hours   Lab Units 04/26/25  0605 04/25/25  0550   GLUCOSE mg/dL 95 97   SODIUM mmol/L 137 138   POTASSIUM mmol/L 3.9 3.9   CHLORIDE mmol/L 105 104   CO2 mmol/L 26 25   ANION GAP mmol/L 10 13   BUN mg/dL 28* 23   CREATININE mg/dL 3.71* 2.37*   EGFR mL/min/1.73m*2 17* 29*   CALCIUM mg/dL 7.6* 7.8*   PHOSPHORUS mg/dL 3.0 3.1   ALBUMIN g/dL 2.6* 2.8*          Assessment/Plan   Assessment & Plan  Wound dehiscence    69 M h/o HTN, HLD, smalls's esophagus, RO GBM s/p resection ([facility], 12/2023) with resultant L HH s/p chemo/RT presenting with tumor progression, 2/20 s/p redo craniotomy for tumor resection w 5 ALA, CTH POC, 2/21 MRI intended NTR, 3/16 MRI postop changes w fluid collection at dura and craniotomy site, worsening infiltrative disease, enhancement along posteromedial aspect of resection site, 4/5 p/w 1wk incisional drainage, " 4/4 CTH airfluid level in resection cavity, 4/5 MRI brain w/wo incr'd resection cavity enhancement and diffusion restriction  4/6 s/p R cranial wound washout and mesh cranioplasty (kaleb purulence), CTH POC, L temporal ICH, plts 81 s/p 2u plts, 4/7 rCTH incr SA[facility], min incr ICH, rrCTH stable, EEG neg dc'd  4/8 s/p PICC  4/9 CTH stable, SDD dc'd, EEG neg  4/10 SGD dc'd  4/11 DVT US x4, LLE distal femoral, popliteal, gastroc, peroneal, post tibial vv DVTs, RUE R brachial DVT, s/p IVC filter    4/13 CTH stable  4/14 PICC auto dc'd, agitation, CTH stable   4/15 s/p PICC  4/16 CTH stable, restraints removed, no agitation noted   4/17 plts 75, set PF4/BRANDO  4/19 plts 49, plan to give 1 unit platelets  4/21 acute change in exam, AMS and not following commands as briskly, CTH stable   4/23 EEG neg, dc'd, plt 82, failed MBS, rCTH stable     Plan:  Tele (ok for q8 NC overnight), Gigi primary when in [place]  AM RFP, citrated CBC, peripheral smear  Follow up PLT function tests and trend platelets   Appreciate ID recs - continue Vanc until 6/15, likely not cause of thrombocytopenia   Appreciate vasc med recs -    - HOLD Eliquis dosing due to platelet count of 40K    - Lovenox 40mg q24 hours   Neurology recs - continue LAC 100mg BID with VPA 150mg q6h   Heme recs - follow up fractionated plt count, If Plt <30K and bleeding or procedure, transfuse; otherwise observe   SLP recs-  regular diet   Continue home meds   PTOT- rehab       Valeriano Castro MD

## 2025-04-26 NOTE — CARE PLAN
The patient's goals for the shift include free from fall    The clinical goals for the shift include pt will remain safe and free from falls/injury    Over the shift, the patient did not have any falls or injury  Problem: Fall/Injury  Goal: Be free from injury by end of the shift  Outcome: Progressing  Goal: Verbalize understanding of personal risk factors for fall in the hospital  Outcome: Progressing     Problem: Pain - Adult  Goal: Verbalizes/displays adequate comfort level or baseline comfort level  Outcome: Progressing     Problem: Safety - Adult  Goal: Free from fall injury  Outcome: Progressing     Problem: Discharge Planning  Goal: Discharge to home or other facility with appropriate resources  Outcome: Progressing     Problem: Chronic Conditions and Co-morbidities  Goal: Patient's chronic conditions and co-morbidity symptoms are monitored and maintained or improved  Outcome: Progressing

## 2025-04-26 NOTE — PROGRESS NOTES
"Subjective     Enmanuel Ahumada \"Dash\" is a 69 y.o. male on day 21 of admission presenting with Wound dehiscence.    Summ of Hospital Course to this time per Neurosurgery, primary:  \"Enmanuel Ahumada is a 69 year old male with a past medical history of HTN, HLD, smalls's esophagus, RO GBM s/p resection (SAH, 12/2023) with resultant L HH s/p chemo/RT with tumor progression followed by redo craniotomy for tumor resection w 5 ALA on 2/20. Patient presented 4/5 with 1 week incisional drainage and was admitted to the neurosurgery service for further workup/management.      4/5 CTH airfluid level in resection cavity. MRI brain w/wo increased resection cavity enhancement and diffusion restriction.  4/6 s/p R cranial wound washout and mesh cranioplasty (kaleb purulence), CTH POC, L temporal ICH, plts 81 s/p 2u plts. ID consulted.   4/7 rCTH increased SAH, minimal increase in ICH, rrCTH stable. EEG negative and removed.   4/8 Plan to continue Vancomycin (pharmacy dosing). S/p PICC placement.   4/9 CTH stable, SDD removed. EEG negative.   4/10 SGD removed.   4/11 DVT US x4, LLE distal femoral, popliteal, gastroc, peroneal, post tibial DVTs, RUE R brachial DVT. s/p IVC filter.   4/13 CTH stable.  4/14 PICC auto dc'd. Onset of agitation, CTH stable.   4/15 s/p PICC.  4/16 CTH stable. Noted to have thrombocytopenia, PF4 negative, low concern for HIT.   4/19 plt 49, s/p 1 unit plt. Neurology consulted for thrombocytopenia with concerns its being caused by AED. Valproate titrated down and started on Vimpat. Per ID, no concern decrease in plt related to vancomycin.   4/21 Acute change in exam, AMS and not following as briskly, CTH stable. EEG replaced.  4/23 Plt 40, s/p 2 units plt, Eliquis held, EEG negative and removed. \"    Interval events:  Upon evaluation this afternoon, patient is resting comfortably in bed. His wife, Brittany, is at his bedside. She states that over the past few days her  has been more fatigued, " "sleeping, has less appetite, and is more confused - all of which is unlike himself. She expresses concern that he is deteriorating.     Upon awakening Dash, he is alert and oriented x1 (knows name and , but not place or time). Recognizes his wife and is able to tell use her name. They have been  38 years he tells me (confirmed by wife as true). He states that he feels like his appetite is \"okay\", but cannot recall breakfast or lunch today. Currently has no complaints. Denies chest pain, shortness of breath, abdominal pain, cough, fevers, chills.     We spoke with his sister-in-law on the phone per request of his wife. She is a pharmacist herself and has some questions in regards to his kidney function (recent decline), why we thought his platelets were so low, and how the vancomycin is dosed/monitored. We explained that we feel like he could be dehydrated due to poor oral intake over the past couple days but because his vaoc level his high its not totally out of the question. We explained that the platelet count is thought to be low 2/2 to his recent chemo in combination with lab error due to the collection tube possibly cause falsely low numbers, (per hematology).    Oncology history:  - Follows with Dr. Rolon, last seen in office 25  - S/p R crani for tumor resection with 5ALA on 23   - pathology is a GBM; IDH wildtype, MGMT methylated  - completed the course of chemo-RT for initial treatment  - Since then he has continued the adjuvant phase of treatment with Temodar -- now s/p cycle #8.   - Represented in early  with MRI evidence of progression  - case was presented at the Neuro-Onc TB, with the recommendation to consider another surgical resection after evidence of disease progression on MRI  - Repeat craniotomy for tumor resection on 2025 and tolerated it well  - Sign up for the GCAR trial for recurrent GBM, and signed the consent form today. However, he was disqualified for the " "study while an inpatient recently, due to the increased steroid dose.   - reviewed at the Neuro-Oncology Tumor Board, with the recommendation to proceed with SoC CCNU & Avastin   - was planning for \"CCNU cycle #1 will be started in the near future, while Avastin cycle #1, day #1 will be started next week\" per 03/31 office visit note (however was admitted 04/05 for washout)       Objective   Current Vitals  /79   Pulse 100   Temp 37.3 °C (99.1 °F) (Temporal)   Resp 16   Ht 1.702 m (5' 7\")   Wt 67 kg (147 lb 11.3 oz)   SpO2 97%   BMI 23.13 kg/m²      I/O last 3 completed shifts:  In: 1100 (16.4 mL/kg) [P.O.:240; I.V.:400 (6 mL/kg); Blood:350; IV Piggyback:110]  Out: 650 (9.7 mL/kg) [Urine:650 (0.3 mL/kg/hr)]  Weight: 67 kg     Physical Exam  Vitals reviewed.   Constitutional:       General: He is not in acute distress.     Appearance: He is ill-appearing. He is not toxic-appearing.      Comments: Intermittent alertness, oriented x1 but recognizes wife able to tell me they have been  38 years   HENT:      Mouth/Throat:      Mouth: Mucous membranes are dry.   Eyes:      Extraocular Movements: Extraocular movements intact.   Cardiovascular:      Rate and Rhythm: Normal rate and regular rhythm.      Heart sounds: Normal heart sounds.   Pulmonary:      Effort: Pulmonary effort is normal. No respiratory distress.      Breath sounds: Normal breath sounds.   Abdominal:      General: There is no distension.      Palpations: Abdomen is soft.      Tenderness: There is no abdominal tenderness. There is no guarding or rebound.   Musculoskeletal:      Cervical back: Neck supple.      Right lower leg: No edema.      Left lower leg: No edema.   Skin:     General: Skin is warm and dry.      Coloration: Skin is pale.      Findings: No rash.   Neurological:      Mental Status: He is disoriented.      Comments: Course tremor of the bilateral upper extremities, intermittently present on exam   Psychiatric:         " Behavior: Behavior normal.       Relevant Results  Labs:  CBC: WBC 1.9 , HGB 7.0, PLT 44  BMP: , K 3.9, Cl 105, HCO3 26, BUN 28, CR 3.71, Glu 95  LFTS: AST 17 , ALT 38, ALKPHOS 71 , TBILI 1.2 , DBILI 0.3  TROP: 3  BNP: No results found for requested labs within last 365 days.  COAGS: PT 15.9 , PTT 30  , INR 1.4  UA:     ABG:    CALCIUM 7.6 MAG No results found for requested labs within last 365 days. ALB 2.6 LACTATE 1.3 PHOS No results found for requested labs within last 365 days. COVIDNo results found for requested labs within last 365 days.    Micro/culture data:  Susceptibility data from last 120 days.  Collected Organism Clindamycin Erythromycin Oxacillin Tetracycline Trimethoprim/Sulfamethoxazole Vancomycin   04/06/25 1007 Staphylococcus epidermidis  S  S  R  S  R  S   04/06/25 1006 Staphylococcus epidermidis         04/06/25 1005 Staphylococcus epidermidis             Imaging:  ECG 12 Lead  Normal sinus rhythm  Minimal voltage criteria for LVH, may be normal variant  Inferior infarct (cited on or before 12-APR-2025)  Possible Anterior infarct , age undetermined  Abnormal ECG  When compared with ECG of 18-APR-2025 06:56,  Serial changes of Inferior infarct Present  Confirmed by Sea Morfin ([id number]) on 4/25/2025 5:40:00 PM  ECG 12 Lead  Normal sinus rhythm  Low voltage QRS  Inferior infarct (cited on or before 12-APR-2025)  Abnormal ECG  When compared with ECG of 17-APR-2025 06:22,  No significant change was found  Confirmed by Sea Morfin ([id number]) on 4/25/2025 5:24:06 PM  FL modified barium swallow study  Narrative: Interpreted By:  Celso Tabares and Riley Laura   STUDY:  FL MODIFIED BARIUM SWALLOW STUDY;; 4/24/2025 3:32 pm      INDICATION:  Signs/Symptoms:swallow eval.          COMPARISON:  None.      ACCESSION NUMBER(S):  [id number]      ORDERING CLINICIAN:  TEJ TRAN      TECHNIQUE:  Unable to complete entire study per protocol d/t lethargy, confusion,  and reduced alertness.   Informed verbal consent obtained prior to  completion of exam. The study was completed with various liquid  barium consistencies and pudding. A 1.9 cm or .75 inch (outer  diameter) ring was placed on the chin in the lateral view in order to  complete objective measurements during swallowing. The anatomic  structures and function of the oropharynx, larynx, hypopharynx and  cervical esophagus were evaluated.      Fluoroscopic time: 1.7 minutes  Total images: 2763  Contrast administered: 35 mL of barium          SLP: RICKEY DOLAN S-SLP, Anabell Pendleton MS CCC/SLP  Contact info: [employer] secure chat; phone: [phone]      SPEECH FINDINGS:      Reason for Referral: Further assessment of oropharyngeal swallow and  to guide diet recommendations Patient Hx: Pt is a 69 M h/o HTN, HLD,  smalls's esophagus, RO GBM s/p resection (SAH, 12/2023) with  resultant L HH s/p chemo/RT presenting with tumor progression, 2/20  s/p redo craniotomy for tumor resection w 5 ALA, CTH POC, 2/21 MRI  intended NTR, 3/16 MRI postop changes w fluid collection at dura and  craniotomy site, worsening infiltrative disease, enhancement along  posteromedial aspect of resection site, 4/5 p/w 1wk incisional  drainage, 4/4 CTH airfluid level in resection cavity, 4/5 MRI brain  w/wo incr'd resection cavity enhancement and diffusion restriction  4/6 s/p R cranial wound washout and mesh cranioplasty (kaleb  purulence), CTH POC, L temporal ICH, plts 81 s/p 2u plts, 4/7 rCTH  incr SAH, min incr ICH, rrCTH stable, EEG neg dc'd  4/8 s/p PICC  4/9 CTH stable, SDD dc'd, EEG neg  4/21 acute change in exam, AMS and not following commands as briskly,  CTH stable Respiratory Status: Room air  Current diet: Regular/thins      Pain:  Pain Scale: 0-10  Rating: no c/o pain          RECOMMENDATIONS:  -NPO  -OK for small amounts of ice chips (one at a time, 10x/hour) for oral  comfort and to prevent swallow disuse atrophy, but only after  aggressive oral care to avoid  colonization of bacteria within the  oral cavity.      Aspiration PNA mitigation strategies: diligent oral care 2x/day to  improve infection control and mobility as tolerated.      -Repeat modified barium swallow study/completion of 3 oz Swallow  Protocol as mental status improves      SLP PLAN:  Skilled SLP Services: Skilled SLP intervention for dysphagia is  warranted. SLP Frequency: 2x per week  Duration: 2 weeks  Treatment/Interventions:  - Bolus trials  - Diet tolerance/advancement  - Patient/caregiver education      Discussed POC: Patient  Discussed Risks/Benefits: Yes  Patient/Caregiver Agreeable: Yes      Short term goals established 04/24/25:  - Patient will tolerate current diet without noted pulmonary  compromise or evidence of pulmonary sequela as noted in patient chart  and/or reported by patient/family.      - Patient/family will indicate understanding of dysphagia education:  risk for aspiration, recommendations, and POC with > 80% accuracy via  teach back method.      - Pt will participate in ongoing dysphagia assessment as mental  status improves      Long term goals 04/24/25:  Patient will tolerate the least restrictive diet without overt  difficulty or further pulmonary compromise by time of discharge.      Mechanics of the Swallow Summary:  Oral Motor Assessment:  Oral Hygiene: WFL  Dentition: WFL  Oral Motor: Reduced laterization of lingua bilaterally; involuntary  movement of lingua (shaking). Vocal Quality (Perceptually): Adequate  Volitional Cough (Perceptually): Weak      ORAL PHASE:  Lip Closure - No labial escape/anterior loss of bolus  Tongue Control During Bolus Hold - Cohesive bolus between tongue to  palatal seal Bolus prep/mastication - Mastication not assessed  Bolus transport/lingual motion - Delayed initiation of tongue motion  for A-P movement of the bolus Oral residue - Residue collection on  oral structure      PHARYNGEAL PHASE:  Initiation of pharyngeal swallow - Bolus head at  vallecular pit  Soft palate elevation - No bolus between soft palate/pharyngeal wall  Laryngeal elevation - Partial superior movement of thyroid cartilage  and/or partial approximation of arytenoids to epiglottic petiole  Anterior hyoid excursion - Partial anterior movement Epiglottic  movement - No inversion Laryngeal vestibule closure - Incomplete -  narrow column of air/contrast in laryngeal vestibule Pharyngeal  stripping wave - Present, however, diminished Pharyngeal contraction  (A/P view) - Not tested Pharyngoesophageal segment opening - Partial  distension/partial duration with partial obstruction of flow of bolus  Tongue base retraction - Narrow column of contrast or air between  tongue base and pharyngeal wall Pharyngeal residue - Collection of  residue within or on the pharyngeal structures      ESOPHAGEAL PHASE:  Esophageal clearance - Not evaluated          SLP Impressions with Severity Rating:  Pt seen earlier this day (4/24/25) for SLP tx. During 1st session, pt  demonstrating ability to remain awake and alert w/ appropriate  command following and responses to verbal stimuli. Upon presentation  to fluoro suite, pt appeared to be more lethargic, but endorsed that  he was feeling okay. Pt w/ continued lethargy and confusion  throughout modified barium swallow study. Pt required verbal cues to  remain alert and attentive multiple times throughout session. MBSS  protocol was not completed it its entirety d/t pt's mental status.  Will re-assess w/ MBSS upon improvement in pt's mentation.  Updated  medical team.      Pt presents with moderate-severe oropharyngeal dysphagia upon  completion of modified barium swallow study this date. Swallowing  physiology is detailed above. Impairments most impacting swallowing  safety and efficiency include reduced base of tongue retraction,  incomplete/absence of epiglottic inversion, and reduced laryngeal  vestibule closure. Also noted fullness of arytenoids  possibly  contributing to swallowing deficits. Observed aspiration across all  presentations of thin and mildly thick liquids. Trace aspiration  during tsp thin liquids did not trigger cough response, but larger  presentations did. Unable to clear bolus from airway w/ cough.  Aspiration occurred during and after swallow. Swallow efficiency  impaired w/ puree solid trial.  Moderate/severe residue observed at  the valleculae.  Able to partially clear w/ repeat swallows.      *Of note: The A-P bolus follow-through is not intended to be utilized  as a diagnostic assessment of the esophagus, rather a tool to observe  the biomechanical aspects of the swallow continuum, and to inform the  need for further evaluation by medical specialists, as applicable.      OUTCOME MEASURES:  Rosenbek's Penetration Aspiration Scale  Thin Liquids: 8. SILENT ASPIRATION - contrast passes glottis, visible  residue, NO pt response  during trace aspiration; greater quantities  of aspiration triggered cough response Nectar Thick Liquids: 7. OVERT  ASPIRATION, material is not cleared - contrast passes glottis,  visible residue, W/pt response Puree: 1. NO ASPIRATION & NO  PENETRATION - no aspiration, contrast does not enter airway      Speech Therapy section of this report signed by Anabell Pendleton MS  CCC/SLP on 4/25/2025 at 10:53 am.      RADIOLOGY FINDINGS:  Degenerative discogenic disease is noted within the visualized  cervical spine.      Radiology section of this report signed by Hollie Foley MD (PGY-2) and  Celso Tabares MD . This study was interpreted at [facility], [place], [place].      Impression: Speech pathology impression as above.      MACRO:  None      Signed by: Celso Tabares 4/25/2025 5:20 PM  Dictation workstation:   [id number]  ECG 12 Lead  Sinus tachycardia with frequent Premature ventricular complexes and Fusion complexes  Inferior infarct (cited on or before  12-APR-2025)  Possible Anterolateral infarct , age undetermined  Abnormal ECG  When compared with ECG of 23-APR-2025 02:23,  Fusion complexes are now Present  Premature ventricular complexes are now Present  Vent. rate has increased BY  88 BPM  Borderline criteria for Anterior infarct are now Present  Borderline criteria for Anterolateral infarct are now Present  Confirmed by Brandon Thomas ([id number]) on 4/25/2025 11:30:54 AM  ECG 12 Lead  Normal sinus rhythm  Inferior infarct (cited on or before 12-APR-2025)  Abnormal ECG  When compared with ECG of 22-APR-2025 06:26,  Borderline criteria for Anterior infarct are no longer Present  Confirmed by Brandon Thomas ([id number]) on 4/25/2025 11:29:26 AM  CT head wo IV contrast  Narrative: Interpreted By:  Tony Terrazas and Sullivan Shannon   STUDY:  CT HEAD WO IV CONTRAST;  4/24/2025 10:34 pm      INDICATION:  Signs/Symptoms:exam change.          COMPARISON:  Multiple prior noncontrast head CTs, most recently 04/21/2025      ACCESSION NUMBER(S):  [id number]      ORDERING CLINICIAN:  NED CHAIREZ      TECHNIQUE:  Noncontrast axial CT scan of head was performed. Angled reformats in  brain and bone windows were generated. The images were reviewed in  bone, brain, blood and soft tissue windows.      FINDINGS:  Again seen are postsurgical changes from parieto-occipital  craniectomy with mesh cranioplasty with similar size and appearance  of an overlying low-density intracranial fluid collection, measuring  up to 0.4 cm in axial plane.      Deep to the craniotomy and involving parts of the right parietal,  temporal, and occipital lobes, there is overall similar appearance of  the right posterior cerebral surgical resection cavity. The resection  cavity contains predominantly low-density material with scattered  areas of pneumocephalus. The curvilinear hyperdensity along the  posterior margins of the surgical resection cavity adjacent to the  mesh is similar to prior. The resection cavity  communicates with the  occipital horn of the right lateral ventricle. Similar-appearing  confluent white matter hypodensity in the superior right frontal  lobe, namely the centrum semiovale and corona radiata, extending  inferiorly into the right basal ganglia and internal capsule.      The areas of pneumocephalus have redistributed within the lateral  ventricles, with increased pneumocephalus within the right greater  than left bilateral temporal horns and slightly decreased  pneumocephalus in the bilateral frontal horns. There is slightly  decreased size of the lateral ventricles, with the frontal horns  measuring 1.0 cm on the right and 1.2 cm on the left (previously 1.3  and 1.8 cm respectively).      There is slightly decreased size of the hyperdense left lateral  temporal intraparenchymal hematoma, which measures approximately 2.6  x 1.3 cm (previously 3.0 x 1.5 cm). There is also similar to slightly  decreased amount of surrounding hypodensity, likely edema. There is  associated partial left temporal and parietal sulcal effacement.      Similar-appearing scattered hyperdense subarachnoid hemorrhage in the  anterior right frontal sulci as well as the left occipital sulci.      No evidence to suggest intraventricular hemorrhage.      No evidence of midline shift measured at the foramen of Monro. The  basilar cisterns are patent.      No evidence of recent cortical infarct or new areas of intracranial  hemorrhage.      Scattered mild mucosal thickening in the paranasal sinuses. The  bilateral mastoid air cells are clear.      Impression: 1. No evidence of new intracranial hemorrhage. No evidence of midline  shift.  2. Slightly decreased size or evolution of blood products within  previously noted left temporal parenchymal hematoma.  3. Postsurgical changes from prior right parieto-occipital  craniectomy and mesh cranioplasty with overall similar-appearing  surgical resection cavity as detailed above.             "  I personally reviewed the images/study and I agree with the findings  as stated by Radiology resident.      MACRO:  None      Signed by: Tony Terrazas 4/25/2025 7:33 AM  Dictation workstation:   [id number]           MEDS:  Scheduled medications  Scheduled Medications[1]  Continuous medications  Continuous Medications[2]  PRN medications  PRN Medications[3]       Assessment/Plan   Assessment/Plan:   Mr. Singer \"Dash\" Zita is a 69 male with a PMHx of HTN, HLD, smalls's esophagus, RO GBM s/p resection (SAH, 12/2023) with resultant L HH s/p chemo/RT who represented for tumor progression in February. He underwent redo craniotomy for tumor resection 02/20 who had repeat MRI in March showing postop changes with fluid collection at dura and craniotomy site, worsening infiltrative disease, enhancement along posteromedial aspect of resection site. He represented on 04/05/25 with concerns for increased wound drainage and was admitted under the care of Neurosurgery. MRI brain onf 04/05 showed increased resection cavity enhancement and diffusion restriction concerning fo superimposed infection/abscess. He therefore underwent cranial wound washout on 04/06 with Neurosurgery. Subsequent cultures from OR revealed methicillin-resistant staph epidermidis, currently on vancomycin IV therapy until 06/15/25 per ID. His subsequent hospital course have been complicated by acute DVTs in the LLE and RUE s/p IVC filter placement due to pancytopenia (namely worsening thrombocytopenia) limiting anticoagulation, agitation/confusion, and new JAMAR.    Consultants involved this far: Hematology (following), Vascular medicine (signed-off, but requesting call back when ready for full AC), Infectious disease (signed off, ABX recs until June + follow-up), Neurology, PM&R, Nutrition, SLP, PT and OT. Currently, care transitioned from neurosurgery team over to medical oncology for further inpatient management.    # Pancytopenia   # Thrombocytopenia  - " Hematology consulted 04/21 felt thrombocytopenia to be multifactorial due to recent lomustine initiation (4/4), introduction of VPA (4/8), MRSE intracranial wound infection treated with vanc and cefepime, and possible consumption in setting of DVTs   - CBC trend: Plt 129 (4/19) ? 49 (4/20)   - LDH: 230 (normal)  -Smear: Clumping in blue tube, decreased plt in lavender tube, rare giant plts  - Coags: INR 1.4, PTT 30s (mildly elevated INR)  - HFP: Tbili 1.2, Alb 3.2, AST/ALT/ALP normal  - D-dimer: 2354  - B12, Folate, TSH: WNL  - PF4/BRANDO: Negative  - Has received multiple platelet transfusions (x8, most recent 04/26) and RBC transfusions (x1 04/25) without overt s or s of bleeding  - Hematology re-enaged 04/26 prior to transfer  - Note states platelet count marcela to 82 following 2 units of platelet transfusion and has since downtrended to 44 over 3 day period. Also notable is worsening anemia throughout hospital stay   PLAN:  - Hematology following, appreciate input  - sent immature platelet fraction, reticulocyte count, haptoglobin, LDH, PT, PTT and fibrinogen 04/26  [  ] will follow-up results  - transfuse for Hgb < 7 and platelets < 10 or < 50 in the setting of bleeding   - CTM CBC daily  - Will transfuse prn per Hematology recs  - Active T&S    # JAMAR  - Upon transfer to our service, pt noted to have Cr trend of 0.66 -> 3.71 over 04/25 -> 04/26  - Previously normal kidney function without history of kidney disease or even previous AKIs  - Etiology thought largely to be pre-renal at this time VS Vanco as level has been elevated over past several days. Last contrast exposure on 04/11. Per chart review, inconsistent urine output data available but seems to be a downward trend in output. Pt appears dry, c/f hypovolemia.  - PVR on 04/26 showing ~96 mL, less likely to be obstructive cause.  - No large changes in blood pressures recently  PLAN:  - JAMAR work-up:   - PVR, 96 mL    - do not feel need to renal u/s at this  time   - UA with reflex, although denies UTI type syptoms   - Urine lytes   - CK  - Ordered 1 L of LR to be given this evening  - Daily RFP  - Strict Is/Os  - Avoid nephrotoxic agents and sharp fluctuations in blood pressure  - Consider renal consult if worsening JAMAR following IVFs today    # LLE DVT  # RUE DVT  # IVC filter in place  - UE DVT US 4/11/25 revealed: RUE, acute non-occlusive deep vein thrombosis visualized in the brachial vein.  - LE DVT US 4/11/25 revealed: LLE, acute occlusive deep vein thrombosis visualized in the popliteal vein, age indeterminate deep vein thrombosis visualized in the distal femoral, gastrocnemius veins in the calf, posterior tibial and peroneal veins. The remainder of the left leg is negative for deep vein thrombosis.  - Due to thrombocytopenia underwent IVC filter placement 04/11/25  - Initially on heparin gtt, was started on Lovenox but switched to Eliquis after adjusting anti-seizures meds, however Eliquis now Dc'd due to pancytopenia  - Was on Lovenox ppx dose since due to thrombocytopenia   PLAN:  - Vascular medicine signed off 04/26, appreciate recs  - Lovenox changed to heparin ppx 04/26 due to acute worsening of kidney function  - Can continue heparin prophylaxis as long as platelets are stable >30 K and no concerns about bleeding   - If platelets stabilized above 50K, full dose anticoagulation to be considered   - SCDs, in place  - While off full dose blood thinner monitor very closely for signs or symptoms suggesting new or worsening VTE or IVC filter thrombosis   - Agree with if pancytopenia secondary to chemotherapy, with plans to continue receiving chemotherapy on discharge, he will require very close monitoring if he is to leave on full dose anticoagulation   [  ] requesting us to call back when ready for full dose anticoagulation   - Changed heparin ppx dosing 04/26    # GBM s/p resection x2  # MRSE of crani site s/p washout  - Initially dx with GBM in 2023 s/p  resection & chemo/RT    - Follows with Dr. Rolon  - Tumor progression with redo craniotomy 02/20/25 with NSGY  - Represented 04/05 for increased drainage from wound  - 04/06 s/p R cranial wound washout and mesh cranioplasty (kaleb purulence )  - Cultures from the subgaleal, epidural, and subdural spaces grew methicillin resistant Staph. Epi  - ID was previously consulted, recommending:    - Continue Vancomycin with goal -600    - antibiotic course tentative stop date will be 6/15/25   - PICC line placed 04/08 and replaced 04/15  PLAN:  - Continue vancomycin, dosed per pharmacy  - CTM daily levels  - ID follow up with Dr. Amy York   [  ] after discharge weekly CBC with diff, Chem 7, quantitative CRP, and Vancomycin trough   - Outpatient follow-up with Dr. Rolon    # Agitation  # C/f hypoactive delirium   # C/f encephalopathy   # C/f dysphagia  - Neurology was following for concerning changes in mental status  - EEG 04/21-04/23 showing moderate diffuse encephalopathy and a right hemispheric structural lesion with an overlying skull defect. No epileptiform discharges are recorded. Overall, the degree of encephalopathy remains same compared to prior recording  PLAN:  - Per Neuro recs:   -  mg BID    [  ] currently IV, transition to oral when more consistently taking pills PO  - SLP eval 04/26 c/f aspiration but pt had previously had safe swallowing   - recommended PO intake only when awake and alert w/ upright positioning  - On regular diet  - HOLDING nightly Seroquel upon transfer due to increased somnolence over past few days   - HOLDING prn flexeril (hasn't used) upon transfer due to increased somnolence over past few days    # Intermittent tremors of BL upper extremities   - CTM    # Impaired mobility  - PT/OT - recommending high intensity   - PM&R last eval on 04/25 stating excellent candidate for acute rehabilitation once medically stable and is able and motivated to participate in 3  hours/day of therapy.      Fluids: Replete PRN  Electrolytes: Keep mg >2, phos >3  and K >4  Nutrition:  Adult diet Regular   Antimicrobials: vanco  DVT PPX:DVT: Unfractionated Heparin  GI ppx: PPI  Bowel care:Miralax and Senna  Catheter:None  Lines:PIV  Oxygen:Room Air    Code Status: Full Code (confirmed on admission)   NOK:  Primary Emergency Contact: CHRISTINE RIOS, Home Phone: [phone]     Ellyn Bradford MD   Internal Medicine, PGY-1        [1] [Held by provider] apixaban, 5 mg, oral, BID  atorvastatin, 40 mg, oral, Daily  cloNIDine, 0.2 mg, oral, BID  [START ON 4/27/2025] heparin, 5,000 Units, subcutaneous, TID  lacosamide, 100 mg, intravenous, q12h  lactated Ringer's, 1,000 mL, intravenous, Once  lidocaine, 1 patch, transdermal, Daily  pantoprazole, 40 mg, oral, Daily before breakfast  polyethylene glycol, 17 g, oral, Daily  [Held by provider] QUEtiapine, 25 mg, oral, Nightly  sennosides-docusate sodium, 1 tablet, oral, Nightly  [2]    [3] PRN medications: alteplase, bisacodyl, [Held by provider] cyclobenzaprine, dextrose, dextrose, glucagon, glucagon, hydrOXYzine HCL, ondansetron **OR** ondansetron, oxygen, vancomycin

## 2025-04-26 NOTE — PROGRESS NOTES
"Subjective   Enmanuel Ahumada \"Dash\" is a 69 y.o. male on day 21 of admission   Patient is AAO x 1-2, per bedside nurse, no concerns about bleeding or new complaints    Objective   Physical Exam  Vitals:    04/26/25 1514 04/26/25 1526 04/26/25 1541 04/26/25 1626   BP: 116/69 116/69 127/81 138/79   BP Location:       Patient Position:       Pulse: 90 92 95 100   Resp: 18 16 16 16   Temp: 37.1 °C (98.8 °F) 37.2 °C (99 °F) 37.1 °C (98.8 °F) 37.3 °C (99.1 °F)   TempSrc: Temporal Temporal Temporal Temporal   SpO2: 97% 96%  97%   Weight:       Height:          General: Sleepy  Neuro: alert and oriented x 1-2, intermittent tremors, expressive aphasia  CV: Regular tachycardia  Lungs: CTA bilaterally [limited anteriorly]  Abd:  Soft  Upper extremities: No edema  Lower extremities: No edema.  +2 DP    Medications   Scheduled medications  [Held by provider] apixaban, 5 mg, oral, BID  atorvastatin, 40 mg, oral, Daily  cloNIDine, 0.2 mg, oral, BID  [START ON 4/27/2025] heparin, 5,000 Units, subcutaneous, TID  lacosamide, 100 mg, intravenous, q12h  lactated Ringer's, 1,000 mL, intravenous, Once  lidocaine, 1 patch, transdermal, Daily  pantoprazole, 40 mg, oral, Daily before breakfast  polyethylene glycol, 17 g, oral, Daily  [Held by provider] QUEtiapine, 25 mg, oral, Nightly  sennosides-docusate sodium, 1 tablet, oral, Nightly      Continuous medications     PRN medications  PRN medications: alteplase, bisacodyl, [Held by provider] cyclobenzaprine, dextrose, dextrose, glucagon, glucagon, hydrOXYzine HCL, ondansetron **OR** ondansetron, oxygen, vancomycin     Lab Review   Recent Labs     04/26/25  0605 04/25/25  0550 04/24/25  0524 04/23/25  0524 04/22/25  0525 04/21/25  0344 04/20/25  0028 04/19/25  0457 04/18/25  1325 04/17/25  0611 04/16/25  0720 04/15/25  0708 04/14/25  0331 04/13/25  0347 04/12/25  0352 04/11/25  0218 04/10/25  0015    138 136 136 133* 136 138 143   < > 137 140 137 136 136 136 133* 135*   K 3.9 " 3.9 3.4* 3.4* 3.5 3.6 3.5 3.5   < > 4.1 4.1 3.7 3.7 3.9 4.0 3.9 3.7    104 101 102 101 101 102 107   < > 103 104 101 102 102 102 102 105   CO2 26 25 26 26 25 28 30 30   < > 26 24 27 23 26 24 23 22   ANIONGAP 10 13 12 11 11 11 10 10   < > 12 16 13 15 12 14 12 12   BUN 28* 23 13 14 14 16 18 16   < > 21 27* 20 21 24* 21 16 16   CREATININE 3.71* 2.37* 0.66 0.62 0.48* 0.65 0.56 0.58   < > 0.55 0.70 0.66 0.56 0.53 0.60 0.59 0.57   EGFR 17* 29* >90 >90 >90 >90 >90 >90   < > >90 >90 >90 >90 >90 >90 >90 >90   MG  --   --   --   --   --   --   --   --   --  1.91 2.22 2.20 2.13 2.25 2.29 2.67* 2.22    < > = values in this interval not displayed.     Recent Labs     04/26/25  0605 04/25/25  0550 04/24/25  0524 04/23/25  0524 04/22/25  0525 04/21/25  0344 04/20/25  0028 04/08/25  0137 04/07/25  1405 04/07/25  1001 04/05/25  1421 03/17/25  0755 03/16/25  0609 03/15/25  0523 03/07/25  1138 01/25/20  0548 01/24/20  1843   ALBUMIN 2.6* 2.8* 3.2* 3.0* 3.1*   < > 3.2*   < > 2.8*  2.8* 2.8*   < > 3.9 4.1   < > 4.5   < > 4.4   ALKPHOS  --   --   --   --   --   --  71  --  48  --   --  63 63  --  98   < > 58   ALT  --   --   --   --   --   --  38  --  44  --   --  18 16  --  30   < > 30   AST  --   --   --   --   --   --  17  --  17  --   --  7* 15  --  12   < > 17   BILITOT  --   --   --   --   --   --  1.2  --  1.5* 1.4*  --  1.6* 2.3*  --  1.7*   < > 1.6*   LIPASE  --   --   --   --   --   --   --   --   --   --   --  28  --   --  45  --  22    < > = values in this interval not displayed.     Recent Labs     04/26/25  0605 04/25/25  1403 04/25/25  0550 04/24/25  0524 04/23/25  0524 04/22/25  0449 04/21/25  0344 04/20/25  0415   WBC 1.9* 2.5* 2.2* 2.5* 2.6* 3.1* 2.5* 2.8*   HGB 7.0* 7.8* 6.9* 7.5* 8.0* 8.8* 8.5* 8.8*   HCT 19.6* 23.1* 20.8* 21.4* 23.3* 25.3* 24.1* 25.5*   PLT 44* 57* 64* 82* 40* 49* 69* 93*   MCV 93 96 98 95 93 93 93 95     Recent Labs     04/26/25  0605 04/20/25  0028 04/15/25  1912 04/15/25  1524  "04/14/25  1836 04/14/25  1146 04/05/25  1421 03/07/25  1138 02/07/25  1207 12/28/23  0942 12/25/23  0550   INR  --  1.4*  --   --   --  1.1 1.1 1.0 1.1 1.1 1.2*   HAUF  --   --  0.6 0.5 0.8  --   --   --   --   --   --    HAPTOGLOBIN 218*  --   --   --   --   --   --   --   --   --   --      PTT - 4/20/2025: 12:28 AM  1.4   15.9 30     Estimated Creatinine Clearance: 18.1 mL/min (A) (by C-G formula based on SCr of 3.71 mg/dL (H)).    Recent Labs     08/12/24  0921 08/07/23  1017 08/03/22  0856 07/26/21  1035   CHOL 84 102 87 92   LDLF  --  37 25 34   HDL 42.1 37.3* 43.0 37.0*   TRIG 101 141 95 106     Lab Results   Component Value Date    HGBA1C 5.3 08/12/2024     Lab Results   Component Value Date    TSH 1.01 04/14/2025       Imaging  UE DVT US 4/11/25  Right Upper Venous: There is acute non-occlusive deep vein thrombosis visualized in the brachial vein. Remainder visualized vessels appear patent with no evidence of thrombus. Cannot rule out thrombus of non-visualized mid cephalic, distal cephalic and proximal cephalic veins.  Left Upper Venous: Cannot rule out thrombus in non-visualized internal jugular vein due to patient positioning and pain intolerance. Remainder visualized vessels appear patent with no evidence of thrombus.    LE DVT US 4/11/25  Right Lower Venous: No evidence of acute deep vein thrombus visualized in the right lower extremity.  Left Lower Venous: There is acute occlusive deep vein thrombosis visualized in the popliteal vein. There is age indeterminate deep vein thrombosis visualized in the distal femoral, gastrocnemius veins in the calf, posterior tibial and peroneal veins. The remainder of the left leg is negative for deep vein thrombosis.    Imaging reports and labs listed above reviewed     Assessment/Plan   Enmanuel Ahumada \"Dash\" is a 69 y.o. male with PMH of glioblastoma s/p right craniotomy 12/29/23, c/b seizure  on VPA. Admitted 4/5/25 with surgical drainage after repeat " craniotomy and tumor resection 2/20/25; S/p cranial washout and mesh cranioplasty 4/6/25. Vascular medicine is following for VTE    4/2025   _ Left distal femoral- calf DVT s/p IVC filter placement 4/11/25  Initially on heparin GTT, started on Lovenox twice daily later switched to Eliquis after adjusting his anti-seizures, Eliquis DC'd because of pancytopenia, patient started on Lovenox ppx   _ Right brachial thrombus- line related    _ Pancytopenia with being worked up by heme- onc, PF4 Neg, platelets fluctuating    Plan   --- With significant change in kidney function noted today, creatinine clearance of 18, please hold Lovenox prophylaxis and start heparin prophylaxis instead  --- Can continue heparin prophylaxis as long as platelets are stable >30 K and no concerns about bleeding  --- If platelets stabilized above 50K, full dose anticoagulation to be considered  --- While off full dose blood thinner please monitor very closely for signs or symptoms suggesting new or worsening VTE or IVC filter thrombosis  --- SCDs all the time   --- Agree with if pancytopenia secondary to chemotherapy, with plans to continue receiving chemotherapy on discharge, he will require very close monitoring if he is to leave on full dose anticoagulation     Thank you for allowing us to participate in the patient's care.  Vascular medicine will temporarily sign off.  Please call us back when ready for full dose anticoagulation or with any questions/concerns    Leighann Blanc MD

## 2025-04-26 NOTE — PROGRESS NOTES
"Name: Enmanuel Ahumada  MRN: [id number]  Encounter Date: 4/26/2025  PCP: Ricardo Youngblood DO    Reason for consult: thrombocytopenia      Hematology/ Oncology Consult Daily Progress Note    Overnight Events   No acute events since last evaluation. Hematology team contacted yesterday evening to re-evaluate thrombocytopenia. Patient was evaluated at bedside. He is sleeping though wife is present. She states that he has been increasingly fatigued. Denies any new bleeding or bruising episodes.       Review of Systems   12 Point ROS negative except HPI     Allergies   Allergies[1]    Medications     [Held by provider] apixaban, 5 mg, BID  atorvastatin, 40 mg, Daily  cloNIDine, 0.2 mg, BID  enoxaparin, 40 mg, Daily  insulin lispro, 0-5 Units, q6h  lacosamide, 100 mg, q12h  lidocaine, 1 patch, Daily  pantoprazole, 40 mg, Daily before breakfast  polyethylene glycol, 17 g, Daily  QUEtiapine, 25 mg, Nightly  sennosides-docusate sodium, 1 tablet, Nightly         alteplase, 2 mg, PRN  bisacodyl, 10 mg, Daily PRN  cyclobenzaprine, 10 mg, TID PRN  dextrose, 12.5 g, q15 min PRN  dextrose, 25 g, q15 min PRN  glucagon, 1 mg, q15 min PRN  glucagon, 1 mg, q15 min PRN  hydrOXYzine HCL, 10 mg, q6h PRN  ondansetron, 4 mg, q8h PRN   Or  ondansetron, 4 mg, q8h PRN  oxygen, , Continuous PRN - O2/gases  vancomycin, , Daily PRN        Physical Exam   Blood pressure 131/79, pulse 87, temperature 36.8 °C (98.2 °F), temperature source Temporal, resp. rate 16, height 1.702 m (5' 7\"), weight 67 kg (147 lb 11.3 oz), SpO2 97%.    Gen: no acute distress, sleeping on eval  HEENT: AT/NC, PEERL, EOMI, no LAD  CV: RRR  Pulm: normal work of breathing  Abd: soft, NT/ND  Skin: warm and dry    Labs     Lab Results   Component Value Date    GLUCOSE 95 04/26/2025    CALCIUM 7.6 (L) 04/26/2025     04/26/2025    K 3.9 04/26/2025    CO2 26 04/26/2025     04/26/2025    BUN 28 (H) 04/26/2025    CREATININE 3.71 (H) 04/26/2025       Lab Results "   Component Value Date    WBC 1.9 (L) 04/26/2025    HGB 7.0 (L) 04/26/2025    HCT 19.6 (L) 04/26/2025    MCV 93 04/26/2025    PLT 44 (L) 04/26/2025       Lab Results   Component Value Date    ALT 38 04/20/2025    AST 17 04/20/2025    ALKPHOS 71 04/20/2025    BILITOT 1.2 04/20/2025         Imaging   Reviewed    Assessment/Plan     Enmanuel Ahumada is a 69 y.o. male w/ a PMH of GBM s/p multiple craniotomies who we are consulted for thrombocytopenia. At time of initial consult (4/21) thrombocytopenia was felt to be multifactorial due to recent lomustine initiation (4/4), introduction of VPA (4/8), MRSE intracranial wound infection treated with vanc and cefepime, and possible consumption in setting of DVTs     Workup included  CBC trend: Plt 129 (4/19) ? 49 (4/20)  Smear: Clumping in blue tube, decreased plt in lavender tube, rare giant plts  LDH: 230 (normal)  Coags: INR 1.4, PTT 30s (mildly elevated INR)  HFP: Tbili 1.2, Alb 3.2, AST/ALT/ALP normal  D-dimer: 2354  Vit B12, Folate, TSH: WNL  PF4/BRANDO: Negative      Since then platelet count marcela to 82 following 2 units of platelet transfusion and has since downtrended to 44 over 3 day period. Also notable is worsening anemia throughout hospital stay. Review of peripheral smear today does not show platelet clumping but there are rare giant platelets without overt RBC abnormalities.     Recommendations  -please send immature platelet fraction, reticulocyte count, haptoglobin, LDH, PT, PTT and fibrinogen  -continue to monitor daily CBC  -transfuse for Hgb < 7 and platelets < 10 or < 50 in the setting of bleeding   -DVT management per vascular medicine      Patient seen and discussed with attending physician, Dr. Joseph, who agrees with the above.       Tonya Lundy MD  Hematology-Oncology Fellow, PGY5  Hematology Consult Pager: [phone]  Oncology Consult Pager: [phone]                [1] No Known Allergies

## 2025-04-27 VITALS
OXYGEN SATURATION: 98 % | SYSTOLIC BLOOD PRESSURE: 143 MMHG | WEIGHT: 147.71 LBS | HEART RATE: 89 BPM | BODY MASS INDEX: 23.18 KG/M2 | DIASTOLIC BLOOD PRESSURE: 90 MMHG | TEMPERATURE: 98.6 F | RESPIRATION RATE: 18 BRPM | HEIGHT: 67 IN

## 2025-04-27 LAB
ALBUMIN SERPL BCP-MCNC: 2.6 G/DL (ref 3.4–5)
ANION GAP SERPL CALC-SCNC: 13 MMOL/L (ref 10–20)
APPEARANCE UR: CLEAR
BASOPHILS # BLD AUTO: 0 X10*3/UL (ref 0–0.1)
BASOPHILS NFR BLD AUTO: 0 %
BILIRUB UR STRIP.AUTO-MCNC: NEGATIVE MG/DL
BLOOD EXPIRATION DATE: NORMAL
BUN SERPL-MCNC: 33 MG/DL (ref 6–23)
CALCIUM SERPL-MCNC: 7.6 MG/DL (ref 8.6–10.6)
CHLORIDE SERPL-SCNC: 105 MMOL/L (ref 98–107)
CK SERPL-CCNC: 49 U/L (ref 0–325)
CO2 SERPL-SCNC: 24 MMOL/L (ref 21–32)
COLOR UR: ABNORMAL
CREAT SERPL-MCNC: 4.14 MG/DL (ref 0.5–1.3)
DISPENSE STATUS: NORMAL
EGFRCR SERPLBLD CKD-EPI 2021: 15 ML/MIN/1.73M*2
EOSINOPHIL # BLD AUTO: 0.01 X10*3/UL (ref 0–0.7)
EOSINOPHIL NFR BLD AUTO: 0.6 %
ERYTHROCYTE [DISTWIDTH] IN BLOOD BY AUTOMATED COUNT: 16.6 % (ref 11.5–14.5)
GLUCOSE SERPL-MCNC: 99 MG/DL (ref 74–99)
GLUCOSE UR STRIP.AUTO-MCNC: NORMAL MG/DL
HCT VFR BLD AUTO: 21.5 % (ref 41–52)
HGB BLD-MCNC: 7.1 G/DL (ref 13.5–17.5)
IMM GRANULOCYTES # BLD AUTO: 0.01 X10*3/UL (ref 0–0.7)
IMM GRANULOCYTES NFR BLD AUTO: 0.6 % (ref 0–0.9)
KETONES UR STRIP.AUTO-MCNC: NEGATIVE MG/DL
LEUKOCYTE ESTERASE UR QL STRIP.AUTO: NEGATIVE
LYMPHOCYTES # BLD AUTO: 0.44 X10*3/UL (ref 1.2–4.8)
LYMPHOCYTES NFR BLD AUTO: 24.3 %
MAGNESIUM SERPL-MCNC: 1.72 MG/DL (ref 1.6–2.4)
MCH RBC QN AUTO: 32 PG (ref 26–34)
MCHC RBC AUTO-ENTMCNC: 33 G/DL (ref 32–36)
MCV RBC AUTO: 97 FL (ref 80–100)
MONOCYTES # BLD AUTO: 0.15 X10*3/UL (ref 0.1–1)
MONOCYTES NFR BLD AUTO: 8.3 %
NEUTROPHILS # BLD AUTO: 1.2 X10*3/UL (ref 1.2–7.7)
NEUTROPHILS NFR BLD AUTO: 66.2 %
NITRITE UR QL STRIP.AUTO: NEGATIVE
NRBC BLD-RTO: 0 /100 WBCS (ref 0–0)
PH UR STRIP.AUTO: 6.5 [PH]
PHOSPHATE SERPL-MCNC: 3.6 MG/DL (ref 2.5–4.9)
PLATELET # BLD AUTO: 62 X10*3/UL (ref 150–450)
POLYCHROMASIA BLD QL SMEAR: NORMAL
POTASSIUM SERPL-SCNC: 3.9 MMOL/L (ref 3.5–5.3)
PRODUCT BLOOD TYPE: 6200
PRODUCT CODE: NORMAL
PROT UR STRIP.AUTO-MCNC: ABNORMAL MG/DL
RBC # BLD AUTO: 2.22 X10*6/UL (ref 4.5–5.9)
RBC # UR STRIP.AUTO: ABNORMAL MG/DL
RBC #/AREA URNS AUTO: NORMAL /HPF
RBC MORPH BLD: NORMAL
SODIUM SERPL-SCNC: 138 MMOL/L (ref 136–145)
SP GR UR STRIP.AUTO: 1.01
UNIT ABO: NORMAL
UNIT NUMBER: NORMAL
UNIT RH: NORMAL
UNIT VOLUME: 245
UROBILINOGEN UR STRIP.AUTO-MCNC: NORMAL MG/DL
VANCOMYCIN SERPL-MCNC: 22.1 UG/ML (ref 5–20)
WBC # BLD AUTO: 1.8 X10*3/UL (ref 4.4–11.3)
WBC #/AREA URNS AUTO: NORMAL /HPF

## 2025-04-27 PROCEDURE — 2500000004 HC RX 250 GENERAL PHARMACY W/ HCPCS (ALT 636 FOR OP/ED): Mod: JZ

## 2025-04-27 PROCEDURE — 83735 ASSAY OF MAGNESIUM: CPT

## 2025-04-27 PROCEDURE — 85025 COMPLETE CBC W/AUTO DIFF WBC: CPT

## 2025-04-27 PROCEDURE — 81001 URINALYSIS AUTO W/SCOPE: CPT

## 2025-04-27 PROCEDURE — 2500000004 HC RX 250 GENERAL PHARMACY W/ HCPCS (ALT 636 FOR OP/ED)

## 2025-04-27 PROCEDURE — 82550 ASSAY OF CK (CPK): CPT

## 2025-04-27 PROCEDURE — 99223 1ST HOSP IP/OBS HIGH 75: CPT

## 2025-04-27 PROCEDURE — 2500000001 HC RX 250 WO HCPCS SELF ADMINISTERED DRUGS (ALT 637 FOR MEDICARE OP)

## 2025-04-27 PROCEDURE — 82570 ASSAY OF URINE CREATININE: CPT

## 2025-04-27 PROCEDURE — C9254 INJECTION, LACOSAMIDE: HCPCS

## 2025-04-27 PROCEDURE — 84156 ASSAY OF PROTEIN URINE: CPT | Performed by: INTERNAL MEDICINE

## 2025-04-27 PROCEDURE — 80069 RENAL FUNCTION PANEL: CPT

## 2025-04-27 PROCEDURE — 80202 ASSAY OF VANCOMYCIN: CPT

## 2025-04-27 PROCEDURE — 1170000001 HC PRIVATE ONCOLOGY ROOM DAILY

## 2025-04-27 RX ORDER — ACETAMINOPHEN 325 MG/1
975 TABLET ORAL 3 TIMES DAILY
Status: DISCONTINUED | OUTPATIENT
Start: 2025-04-27 | End: 2025-05-01

## 2025-04-27 RX ORDER — FAMOTIDINE 20 MG/1
10 TABLET, FILM COATED ORAL DAILY
Status: DISCONTINUED | OUTPATIENT
Start: 2025-04-27 | End: 2025-05-06 | Stop reason: HOSPADM

## 2025-04-27 RX ADMIN — HEPARIN SODIUM 5000 UNITS: 5000 INJECTION, SOLUTION INTRAVENOUS; SUBCUTANEOUS at 09:37

## 2025-04-27 RX ADMIN — LACOSAMIDE 100 MG: 10 INJECTION INTRAVENOUS at 21:20

## 2025-04-27 RX ADMIN — SODIUM CHLORIDE, SODIUM LACTATE, POTASSIUM CHLORIDE, AND CALCIUM CHLORIDE 1000 ML: .6; .31; .03; .02 INJECTION, SOLUTION INTRAVENOUS at 13:53

## 2025-04-27 RX ADMIN — PANTOPRAZOLE SODIUM 40 MG: 40 TABLET, DELAYED RELEASE ORAL at 06:21

## 2025-04-27 RX ADMIN — CLONIDINE HYDROCHLORIDE 0.2 MG: 0.2 TABLET ORAL at 21:15

## 2025-04-27 RX ADMIN — HEPARIN SODIUM 5000 UNITS: 5000 INJECTION, SOLUTION INTRAVENOUS; SUBCUTANEOUS at 21:16

## 2025-04-27 RX ADMIN — CLONIDINE HYDROCHLORIDE 0.2 MG: 0.2 TABLET ORAL at 09:35

## 2025-04-27 RX ADMIN — ACETAMINOPHEN 975 MG: 325 TABLET ORAL at 21:15

## 2025-04-27 RX ADMIN — HEPARIN SODIUM 5000 UNITS: 5000 INJECTION, SOLUTION INTRAVENOUS; SUBCUTANEOUS at 15:20

## 2025-04-27 RX ADMIN — LACOSAMIDE 100 MG: 10 INJECTION INTRAVENOUS at 09:36

## 2025-04-27 RX ADMIN — ATORVASTATIN CALCIUM 40 MG: 40 TABLET, FILM COATED ORAL at 21:15

## 2025-04-27 RX ADMIN — FAMOTIDINE 10 MG: 20 TABLET, FILM COATED ORAL at 13:52

## 2025-04-27 RX ADMIN — SENNOSIDES AND DOCUSATE SODIUM 1 TABLET: 50; 8.6 TABLET ORAL at 21:16

## 2025-04-27 ASSESSMENT — COGNITIVE AND FUNCTIONAL STATUS - GENERAL
MOVING TO AND FROM BED TO CHAIR: A LOT
MOVING FROM LYING ON BACK TO SITTING ON SIDE OF FLAT BED WITH BEDRAILS: A LOT
PERSONAL GROOMING: A LOT
DRESSING REGULAR LOWER BODY CLOTHING: A LOT
CLIMB 3 TO 5 STEPS WITH RAILING: TOTAL
DAILY ACTIVITIY SCORE: 12
TOILETING: A LOT
TURNING FROM BACK TO SIDE WHILE IN FLAT BAD: A LOT
HELP NEEDED FOR BATHING: A LOT
MOBILITY SCORE: 10
EATING MEALS: A LOT
DRESSING REGULAR UPPER BODY CLOTHING: A LOT
STANDING UP FROM CHAIR USING ARMS: A LOT
WALKING IN HOSPITAL ROOM: TOTAL

## 2025-04-27 ASSESSMENT — PAIN SCALES - GENERAL
PAINLEVEL_OUTOF10: 0 - NO PAIN
PAINLEVEL_OUTOF10: 9

## 2025-04-27 ASSESSMENT — PAIN - FUNCTIONAL ASSESSMENT: PAIN_FUNCTIONAL_ASSESSMENT: 0-10

## 2025-04-27 NOTE — CONSULTS
"Reason For Consult  JAMAR    History Of Present Illness  Mr. Singer \"Dash\" Zita is a 69 male with a PMHx of HTN, HLD, smalls's esophagus, RO GBM s/p resection ([facility], 12/2023) with resultant L HH s/p chemo/RT who represented for tumor progression in February. He underwent redo craniotomy for tumor resection 02/20 who had repeat MRI in March showing postop changes with fluid collection at dura and craniotomy site, worsening infiltrative disease, enhancement along posteromedial aspect of resection site. He represented on 04/05/25 with concerns for increased wound drainage and was admitted under the care of Neurosurgery. MRI brain onf 04/05 showed increased resection cavity enhancement and diffusion restriction concerning fo superimposed infection/abscess. He therefore underwent cranial wound washout on 04/06 with Neurosurgery. Subsequent cultures from OR revealed methicillin-resistant staph epidermidis, currently on vancomycin IV therapy until 06/15/25 per ID. His subsequent hospital course have been complicated by acute DVTs in the LLE and RUE s/p IVC filter placement due to pancytopenia (namely worsening thrombocytopenia) limiting anticoagulation, agitation/confusion.    Baseline creatinine 0.8-1. Presented at same. Incresed to 2.37 on 4/25. Was receiving PPI, Vancomycin.Blood pressures have ranged from normotensive to mildly hypotensive. UA 4/5 was clear. Repeat UA is pending.     Past Medical History  He has a past medical history of Brain cancer (Multi), GERD (gastroesophageal reflux disease), Hyperlipidemia, Hypertension, and Seizure disorder (Multi).    Surgical History  He has a past surgical history that includes Other surgical history (07/24/2019); Other surgical history (07/26/2021); Brain surgery; Cholecystectomy (January 25, 2020); Vasectomy (Abt 1991); and IR CVC PICC (4/15/2025).     Social History  He reports that he has never smoked. He has never been exposed to tobacco smoke. He has never used " "smokeless tobacco. He reports that he does not currently use alcohol. He reports current drug use. Drug: Marijuana.    Family History  Family History[1]     Allergies  Patient has no known allergies.      Physical Exam  NAD  RRR  Abd soft, Nt, Nd  No edema BL         I&O 24HR    Intake/Output Summary (Last 24 hours) at 4/27/2025 1434  Last data filed at 4/27/2025 1133  Gross per 24 hour   Intake 1485 ml   Output 846 ml   Net 639 ml       Vitals 24HR  Heart Rate:  []   Temp:  [36.3 °C (97.3 °F)-37.6 °C (99.7 °F)]   Resp:  [16-18]   BP: (116-149)/(69-91)   SpO2:  [95 %-97 %]       Relevant Results  Lab Results   Component Value Date    WBC 1.8 (L) 04/27/2025    HGB 7.1 (L) 04/27/2025    HCT 21.5 (L) 04/27/2025    MCV 97 04/27/2025    PLT 62 (L) 04/27/2025     Lab Results   Component Value Date    GLUCOSE 99 04/27/2025    CALCIUM 7.6 (L) 04/27/2025     04/27/2025    K 3.9 04/27/2025    CO2 24 04/27/2025     04/27/2025    BUN 33 (H) 04/27/2025    CREATININE 4.14 (H) 04/27/2025     Mr. Singer \"Dash\" Zita is a 69 male with a PMHx of HTN, HLD, smalls's esophagus, RO GBM s/p resection ([facility], 12/2023) with resultant L HH s/p chemo/RT who represented for tumor progression in February. He underwent redo craniotomy for tumor resection 02/20 who had repeat MRI in March showing postop changes with fluid collection at dura and craniotomy site, worsening infiltrative disease, enhancement along posteromedial aspect of resection site. He represented on 04/05/25 with concerns for increased wound drainage and was admitted under the care of Neurosurgery that was transitioned to medical oncology.   Hospital course c/b abscess s/p washout 4/6. MRSE Staph epidermidis growing from cultures and placed on IV vancomycin therapy (6/15 end date). Also with LLE RUE DVTs s/p IVC filter placement due to pancyopenia. Nephrology is consulted for JAMAR.    #Nonoliguric JAMAR  -Baseline <1.  -Increased from 0.6-> 2.4 from 4/24 - 4/25 " and has been increasing  -Etiology unclear: No significant hemodynamic disturbance. Blood counts low without significant change. Medication induced / AIN possible. ATN from Elevated vancomycin levels possible though had similar levels earlier in the month as well. No recent contrast.   UA: pending. Previous from 4/5 unremarkable  FENa 16%, CK WNL.   -UOP 750cc/24  -Bicarb 24  K: stable 3.9      Plan:  -Recheck UA/Lytes, Check renal ultrasound  -Check POCUS IVC and can bolus IVF if collapsing >50%   -Daily RFP  -Avoid nephrotoxins where possible, NSAIDs, IV Iodinated contrast    Tu Devi MD         [1] No family history on file.

## 2025-04-27 NOTE — PROGRESS NOTES
Vancomycin Dosing by Pharmacy- FOLLOW UP    Enmanuel Ahumada is a 69 y.o. year old male who Pharmacy has been consulted for vancomycin dosing for CNS/meningoencephalitis. Based on the patient's indication and renal status this patient is being dosed based on a goal trough/random level of 15-20.     Renal function is currently declining.    Currently holding all vancomycin doses (last dose administered  @ 23:30  Most recent random level: 22.1 mcg/mL     Visit Vitals  /77 (BP Location: Right arm, Patient Position: Lying)   Pulse 84   Temp 36.8 °C (98.2 °F) (Temporal)   Resp 18        Lab Results   Component Value Date    CREATININE 4.14 (H) 2025    CREATININE 3.71 (H) 2025    CREATININE 2.37 (H) 2025    CREATININE 0.66 2025        Patient weight is as follows:   Vitals:    25 0539   Weight: 67 kg (147 lb 11.3 oz)       Cultures:  No results found for the encounter in last 14 days.       I/O last 3 completed shifts:  In: 2345 (35 mL/kg) [P.O.:240; I.V.:750 (11.2 mL/kg); Blood:245; IV Piggyback:1110]  Out: 996 (14.9 mL/kg) [Urine:996 (0.4 mL/kg/hr)]  Weight: 67 kg   I/O during current shift:  No intake/output data recorded.    Temp (24hrs), Av.9 °C (98.5 °F), Min:36.4 °C (97.5 °F), Max:37.6 °C (99.7 °F)      Assessment/Plan  Patient continues to be above goal trough/random level. Will continue to hold all vancomycin doses.   The next level will be obtained on  at AM labs. May be obtained sooner if clinically indicated.   Will continue to monitor renal function daily while on vancomycin and order serum creatinine at least every 48 hours if not already ordered.  Follow for continued vancomycin needs, clinical response, and signs/symptoms of toxicity.       Jacki Palma, PharmD

## 2025-04-27 NOTE — CARE PLAN
Problem: Skin  Goal: Decreased wound size/increased tissue granulation at next dressing change  Outcome: Progressing  Flowsheets (Taken 4/27/2025 1212)  Decreased wound size/increased tissue granulation at next dressing change:   Promote sleep for wound healing   Protective dressings over bony prominences  Goal: Participates in plan/prevention/treatment measures  Outcome: Progressing  Flowsheets (Taken 4/27/2025 1212)  Participates in plan/prevention/treatment measures:   Discuss with provider PT/OT consult   Elevate heels  Goal: Prevent/manage excess moisture  Outcome: Progressing  Flowsheets (Taken 4/27/2025 1212)  Prevent/manage excess moisture:   Cleanse incontinence/protect with barrier cream   Follow provider orders for dressing changes   Moisturize dry skin  Goal: Promote/optimize nutrition  Outcome: Progressing  Flowsheets (Taken 4/27/2025 1212)  Promote/optimize nutrition:   Assist with feeding   Consume > 50% meals/supplements   Monitor/record intake including meals  Goal: Promote skin healing  Outcome: Progressing  Flowsheets (Taken 4/27/2025 1212)  Promote skin healing:   Turn/reposition every 2 hours/use positioning/transfer devices   Protective dressings over bony prominences   The patient's goals for the shift include free from fall    The clinical goals for the shift include Pt to remain HDS thru shift. Free of falls .    Over the shift, the patient did not make progress toward the following goals. Pt remains incontinent and immobile. Continue regular safety and continence checks

## 2025-04-27 NOTE — PROGRESS NOTES
"Subjective     Enmanuel Ahumada \"Dash\" is a 69 y.o. male on day 22 of admission presenting with Wound dehiscence.    Overnight: No acute events    This morning the patient is doing well. Denies any symptoms. Denies pain, HA, NVD, change in vision.     Later during rounds, neurosurgery removed his sutures from his head and shortly after he complained of headache so tylenol was ordered.    Oncology history:  - Follows with Dr. Rolon, last seen in office 03/31/25  - S/p R crani for tumor resection with 5ALA on 12/29/23   - pathology is a GBM; IDH wildtype, MGMT methylated  - completed the course of chemo-RT for initial treatment  - Since then he has continued the adjuvant phase of treatment with Temodar -- now s/p cycle #8.   - Represented in early 2025 with MRI evidence of progression  - case was presented at the Neuro-Onc TB, with the recommendation to consider another surgical resection after evidence of disease progression on MRI  - Repeat craniotomy for tumor resection on 02/20/2025 and tolerated it well  - Sign up for the GCAR trial for recurrent GBM, and signed the consent form today. However, he was disqualified for the study while an inpatient recently, due to the increased steroid dose.   - reviewed at the Neuro-Oncology Tumor Board, with the recommendation to proceed with SoC CCNU & Avastin   - was planning for \"CCNU cycle #1 will be started in the near future, while Avastin cycle #1, day #1 will be started next week\" per 03/31 office visit note (however was admitted 04/05 for washout)       Objective   Current Vitals  /80 (BP Location: Right arm, Patient Position: Lying)   Pulse 67   Temp 36.3 °C (97.3 °F) (Temporal)   Resp 18   Ht 1.702 m (5' 7\")   Wt 67 kg (147 lb 11.3 oz)   SpO2 97%   BMI 23.13 kg/m²      I/O last 3 completed shifts:  In: 2345 (35 mL/kg) [P.O.:240; I.V.:750 (11.2 mL/kg); Blood:245; IV Piggyback:1110]  Out: 996 (14.9 mL/kg) [Urine:996 (0.4 mL/kg/hr)]  Weight: 67 kg "     Physical Exam  Vitals reviewed.   Constitutional:       General: He is not in acute distress.     Appearance: He is ill-appearing. He is not toxic-appearing.      Comments: Intermittent alertness, oriented x1 but recognizes wife able to tell me they have been  38 years   HENT:      Mouth/Throat:      Mouth: Mucous membranes are dry.   Eyes:      Extraocular Movements: Extraocular movements intact.   Cardiovascular:      Rate and Rhythm: Normal rate and regular rhythm.      Heart sounds: Normal heart sounds.   Pulmonary:      Effort: Pulmonary effort is normal. No respiratory distress.      Breath sounds: Normal breath sounds.   Abdominal:      General: There is no distension.      Palpations: Abdomen is soft.      Tenderness: There is no abdominal tenderness. There is no guarding or rebound.   Musculoskeletal:      Cervical back: Neck supple.      Right lower leg: No edema.      Left lower leg: No edema.   Skin:     General: Skin is warm and dry.      Coloration: Skin is pale.      Findings: No rash.   Neurological:      Mental Status: He is disoriented.      Comments: Course tremor of the bilateral upper extremities, intermittently present on exam   Psychiatric:         Behavior: Behavior normal.       Relevant Results  Labs:  CBC: WBC 1.8 , HGB 7.1, PLT 62  BMP: , K 3.9, Cl 105, HCO3 24, BUN 33, CR 4.14, Glu 99  LFTS: AST 17 , ALT 38, ALKPHOS 71 , TBILI 1.2 , DBILI 0.3  TROP: 3  BNP: No results found for requested labs within last 365 days.  COAGS: PT 14.1 , PTT 33  , INR 1.3  UA:     ABG:    CALCIUM 7.6 MAG No results found for requested labs within last 365 days. ALB 2.6 LACTATE 1.3 PHOS No results found for requested labs within last 365 days. COVIDNo results found for requested labs within last 365 days.    Micro/culture data:  Susceptibility data from last 120 days.  Collected Organism Clindamycin Erythromycin Oxacillin Tetracycline Trimethoprim/Sulfamethoxazole Vancomycin   04/06/25 1007  Staphylococcus epidermidis  S  S  R  S  R  S   04/06/25 1006 Staphylococcus epidermidis         04/06/25 1005 Staphylococcus epidermidis             Imaging:  ECG 12 Lead  Normal sinus rhythm  Minimal voltage criteria for LVH, may be normal variant  Inferior infarct (cited on or before 12-APR-2025)  Possible Anterior infarct , age undetermined  Abnormal ECG  When compared with ECG of 18-APR-2025 06:56,  Serial changes of Inferior infarct Present  Confirmed by Sea Morfin ([id number]) on 4/25/2025 5:40:00 PM  ECG 12 Lead  Normal sinus rhythm  Low voltage QRS  Inferior infarct (cited on or before 12-APR-2025)  Abnormal ECG  When compared with ECG of 17-APR-2025 06:22,  No significant change was found  Confirmed by Sea Morfin ([id number]) on 4/25/2025 5:24:06 PM  FL modified barium swallow study  Narrative: Interpreted By:  Celso Tabares,  and Helder Hung   STUDY:  FL MODIFIED BARIUM SWALLOW STUDY;; 4/24/2025 3:32 pm      INDICATION:  Signs/Symptoms:swallow eval.          COMPARISON:  None.      ACCESSION NUMBER(S):  [id number]      ORDERING CLINICIAN:  TEJ TRAN      TECHNIQUE:  Unable to complete entire study per protocol d/t lethargy, confusion,  and reduced alertness.  Informed verbal consent obtained prior to  completion of exam. The study was completed with various liquid  barium consistencies and pudding. A 1.9 cm or .75 inch (outer  diameter) ring was placed on the chin in the lateral view in order to  complete objective measurements during swallowing. The anatomic  structures and function of the oropharynx, larynx, hypopharynx and  cervical esophagus were evaluated.      Fluoroscopic time: 1.7 minutes  Total images: 2763  Contrast administered: 35 mL of barium          SLP: RICKEY DOLAN S-SLP, Anabell Pendleton MS CCC/SLP  Contact info: [employer] secure chat; phone: [phone]      SPEECH FINDINGS:      Reason for Referral: Further assessment of oropharyngeal swallow and  to guide diet recommendations  Patient Hx: Pt is a 69 M h/o HTN, HLD,  smalls's esophagus, RO GBM s/p resection (SAH, 12/2023) with  resultant L HH s/p chemo/RT presenting with tumor progression, 2/20  s/p redo craniotomy for tumor resection w 5 ALA, CTH POC, 2/21 MRI  intended NTR, 3/16 MRI postop changes w fluid collection at dura and  craniotomy site, worsening infiltrative disease, enhancement along  posteromedial aspect of resection site, 4/5 p/w 1wk incisional  drainage, 4/4 CTH airfluid level in resection cavity, 4/5 MRI brain  w/wo incr'd resection cavity enhancement and diffusion restriction  4/6 s/p R cranial wound washout and mesh cranioplasty (kaleb  purulence), CTH POC, L temporal ICH, plts 81 s/p 2u plts, 4/7 rCTH  incr SAH, min incr ICH, rrCTH stable, EEG neg dc'd  4/8 s/p PICC  4/9 CTH stable, SDD dc'd, EEG neg  4/21 acute change in exam, AMS and not following commands as briskly,  CTH stable Respiratory Status: Room air  Current diet: Regular/thins      Pain:  Pain Scale: 0-10  Rating: no c/o pain          RECOMMENDATIONS:  -NPO  -OK for small amounts of ice chips (one at a time, 10x/hour) for oral  comfort and to prevent swallow disuse atrophy, but only after  aggressive oral care to avoid colonization of bacteria within the  oral cavity.      Aspiration PNA mitigation strategies: diligent oral care 2x/day to  improve infection control and mobility as tolerated.      -Repeat modified barium swallow study/completion of 3 oz Swallow  Protocol as mental status improves      SLP PLAN:  Skilled SLP Services: Skilled SLP intervention for dysphagia is  warranted. SLP Frequency: 2x per week  Duration: 2 weeks  Treatment/Interventions:  - Bolus trials  - Diet tolerance/advancement  - Patient/caregiver education      Discussed POC: Patient  Discussed Risks/Benefits: Yes  Patient/Caregiver Agreeable: Yes      Short term goals established 04/24/25:  - Patient will tolerate current diet without noted pulmonary  compromise or evidence of  pulmonary sequela as noted in patient chart  and/or reported by patient/family.      - Patient/family will indicate understanding of dysphagia education:  risk for aspiration, recommendations, and POC with > 80% accuracy via  teach back method.      - Pt will participate in ongoing dysphagia assessment as mental  status improves      Long term goals 04/24/25:  Patient will tolerate the least restrictive diet without overt  difficulty or further pulmonary compromise by time of discharge.      Mechanics of the Swallow Summary:  Oral Motor Assessment:  Oral Hygiene: WFL  Dentition: WFL  Oral Motor: Reduced laterization of lingua bilaterally; involuntary  movement of lingua (shaking). Vocal Quality (Perceptually): Adequate  Volitional Cough (Perceptually): Weak      ORAL PHASE:  Lip Closure - No labial escape/anterior loss of bolus  Tongue Control During Bolus Hold - Cohesive bolus between tongue to  palatal seal Bolus prep/mastication - Mastication not assessed  Bolus transport/lingual motion - Delayed initiation of tongue motion  for A-P movement of the bolus Oral residue - Residue collection on  oral structure      PHARYNGEAL PHASE:  Initiation of pharyngeal swallow - Bolus head at vallecular pit  Soft palate elevation - No bolus between soft palate/pharyngeal wall  Laryngeal elevation - Partial superior movement of thyroid cartilage  and/or partial approximation of arytenoids to epiglottic petiole  Anterior hyoid excursion - Partial anterior movement Epiglottic  movement - No inversion Laryngeal vestibule closure - Incomplete -  narrow column of air/contrast in laryngeal vestibule Pharyngeal  stripping wave - Present, however, diminished Pharyngeal contraction  (A/P view) - Not tested Pharyngoesophageal segment opening - Partial  distension/partial duration with partial obstruction of flow of bolus  Tongue base retraction - Narrow column of contrast or air between  tongue base and pharyngeal wall Pharyngeal residue  - Collection of  residue within or on the pharyngeal structures      ESOPHAGEAL PHASE:  Esophageal clearance - Not evaluated          SLP Impressions with Severity Rating:  Pt seen earlier this day (4/24/25) for SLP tx. During 1st session, pt  demonstrating ability to remain awake and alert w/ appropriate  command following and responses to verbal stimuli. Upon presentation  to fluoro suite, pt appeared to be more lethargic, but endorsed that  he was feeling okay. Pt w/ continued lethargy and confusion  throughout modified barium swallow study. Pt required verbal cues to  remain alert and attentive multiple times throughout session. MBSS  protocol was not completed it its entirety d/t pt's mental status.  Will re-assess w/ MBSS upon improvement in pt's mentation.  Updated  medical team.      Pt presents with moderate-severe oropharyngeal dysphagia upon  completion of modified barium swallow study this date. Swallowing  physiology is detailed above. Impairments most impacting swallowing  safety and efficiency include reduced base of tongue retraction,  incomplete/absence of epiglottic inversion, and reduced laryngeal  vestibule closure. Also noted fullness of arytenoids possibly  contributing to swallowing deficits. Observed aspiration across all  presentations of thin and mildly thick liquids. Trace aspiration  during tsp thin liquids did not trigger cough response, but larger  presentations did. Unable to clear bolus from airway w/ cough.  Aspiration occurred during and after swallow. Swallow efficiency  impaired w/ puree solid trial.  Moderate/severe residue observed at  the valleculae.  Able to partially clear w/ repeat swallows.      *Of note: The A-P bolus follow-through is not intended to be utilized  as a diagnostic assessment of the esophagus, rather a tool to observe  the biomechanical aspects of the swallow continuum, and to inform the  need for further evaluation by medical specialists, as applicable.       OUTCOME MEASURES:  Rosenbek's Penetration Aspiration Scale  Thin Liquids: 8. SILENT ASPIRATION - contrast passes glottis, visible  residue, NO pt response  during trace aspiration; greater quantities  of aspiration triggered cough response Nectar Thick Liquids: 7. OVERT  ASPIRATION, material is not cleared - contrast passes glottis,  visible residue, W/pt response Puree: 1. NO ASPIRATION & NO  PENETRATION - no aspiration, contrast does not enter airway      Speech Therapy section of this report signed by Anabell Pendleton MS  CCC/SLP on 4/25/2025 at 10:53 am.      RADIOLOGY FINDINGS:  Degenerative discogenic disease is noted within the visualized  cervical spine.      Radiology section of this report signed by Hollie Foley MD ([id number]-[id number]) and  Celso Tabares MD . This study was interpreted at [place], [place].      Impression: Speech pathology impression as above.      MACRO:  None      Signed by: Celso Tabares 4/25/2025 5:20 PM  Dictation workstation:   [id number]  ECG 12 Lead  Sinus tachycardia with frequent Premature ventricular complexes and Fusion complexes  Inferior infarct (cited on or before 12-APR-2025)  Possible Anterolateral infarct , age undetermined  Abnormal ECG  When compared with ECG of 23-APR-2025 02:23,  Fusion complexes are now Present  Premature ventricular complexes are now Present  Vent. rate has increased BY  88 BPM  Borderline criteria for Anterior infarct are now Present  Borderline criteria for Anterolateral infarct are now Present  Confirmed by Brandon Thomas ([id number]) on 4/25/2025 11:30:54 AM  ECG 12 Lead  Normal sinus rhythm  Inferior infarct (cited on or before 12-APR-2025)  Abnormal ECG  When compared with ECG of 22-APR-2025 06:26,  Borderline criteria for Anterior infarct are no longer Present  Confirmed by Brandon Thomas ([id number]) on 4/25/2025 11:29:26 AM  CT head wo IV contrast  Narrative: Interpreted By:  Tony Terrazas,  herbert Fletcher  Dawna   STUDY:  CT HEAD WO IV CONTRAST;  4/24/2025 10:34 pm      INDICATION:  Signs/Symptoms:exam change.          COMPARISON:  Multiple prior noncontrast head CTs, most recently 04/21/2025      ACCESSION NUMBER(S):  [id number]      ORDERING CLINICIAN:  NDE CHAIREZ      TECHNIQUE:  Noncontrast axial CT scan of head was performed. Angled reformats in  brain and bone windows were generated. The images were reviewed in  bone, brain, blood and soft tissue windows.      FINDINGS:  Again seen are postsurgical changes from parieto-occipital  craniectomy with mesh cranioplasty with similar size and appearance  of an overlying low-density intracranial fluid collection, measuring  up to 0.4 cm in axial plane.      Deep to the craniotomy and involving parts of the right parietal,  temporal, and occipital lobes, there is overall similar appearance of  the right posterior cerebral surgical resection cavity. The resection  cavity contains predominantly low-density material with scattered  areas of pneumocephalus. The curvilinear hyperdensity along the  posterior margins of the surgical resection cavity adjacent to the  mesh is similar to prior. The resection cavity communicates with the  occipital horn of the right lateral ventricle. Similar-appearing  confluent white matter hypodensity in the superior right frontal  lobe, namely the centrum semiovale and corona radiata, extending  inferiorly into the right basal ganglia and internal capsule.      The areas of pneumocephalus have redistributed within the lateral  ventricles, with increased pneumocephalus within the right greater  than left bilateral temporal horns and slightly decreased  pneumocephalus in the bilateral frontal horns. There is slightly  decreased size of the lateral ventricles, with the frontal horns  measuring 1.0 cm on the right and 1.2 cm on the left (previously 1.3  and 1.8 cm respectively).      There is slightly decreased size of the hyperdense left  "lateral  temporal intraparenchymal hematoma, which measures approximately 2.6  x 1.3 cm (previously 3.0 x 1.5 cm). There is also similar to slightly  decreased amount of surrounding hypodensity, likely edema. There is  associated partial left temporal and parietal sulcal effacement.      Similar-appearing scattered hyperdense subarachnoid hemorrhage in the  anterior right frontal sulci as well as the left occipital sulci.      No evidence to suggest intraventricular hemorrhage.      No evidence of midline shift measured at the foramen of Monro. The  basilar cisterns are patent.      No evidence of recent cortical infarct or new areas of intracranial  hemorrhage.      Scattered mild mucosal thickening in the paranasal sinuses. The  bilateral mastoid air cells are clear.      Impression: 1. No evidence of new intracranial hemorrhage. No evidence of midline  shift.  2. Slightly decreased size or evolution of blood products within  previously noted left temporal parenchymal hematoma.  3. Postsurgical changes from prior right parieto-occipital  craniectomy and mesh cranioplasty with overall similar-appearing  surgical resection cavity as detailed above.              I personally reviewed the images/study and I agree with the findings  as stated by Radiology resident.      MACRO:  None      Signed by: Tony Terrazas 4/25/2025 7:33 AM  Dictation workstation:   [id number]           MEDS:  Scheduled medications  Scheduled Medications[1]  Continuous medications  Continuous Medications[2]  PRN medications  PRN Medications[3]       Assessment/Plan   Assessment/Plan:   Mr. Singer \"Dash\" Zita is a 69 male with a PMHx of HTN, HLD, smalls's esophagus, RO GBM s/p resection (SAH, 12/2023) with resultant L HH s/p chemo/RT who represented for tumor progression in February. He underwent redo craniotomy for tumor resection 02/20 who had repeat MRI in March showing postop changes with fluid collection at dura and craniotomy site, " worsening infiltrative disease, enhancement along posteromedial aspect of resection site. He represented on 04/05/25 with concerns for increased wound drainage and was admitted under the care of Neurosurgery. MRI brain onf 04/05 showed increased resection cavity enhancement and diffusion restriction concerning fo superimposed infection/abscess. He therefore underwent cranial wound washout on 04/06 with Neurosurgery. Subsequent cultures from OR revealed methicillin-resistant staph epidermidis, currently on vancomycin IV therapy until 06/15/25 per ID. His subsequent hospital course have been complicated by acute DVTs in the LLE and RUE s/p IVC filter placement due to pancytopenia (namely worsening thrombocytopenia) limiting anticoagulation, agitation/confusion, and new JAMAR.    Consultants involved this far: Hematology (following), Vascular medicine (signed-off, but requesting call back when ready for full AC), Infectious disease (signed off, ABX recs until June + follow-up), Neurology, PM&R, Nutrition, SLP, PT and OT. Currently, care transitioned from neurosurgery team over to medical oncology for further inpatient management.    Updates 4/27:  -Cr continues to worsen; 4.14 from 3.71 s/p 1 L LR  -Will give additional 1 L LR  -Consulted nephrology  -Switched PPI to famotidine  -Plt this morning 62; Will continue to monitor    # Pancytopenia   # Thrombocytopenia  - Hematology consulted 04/21 felt thrombocytopenia to be multifactorial due to recent lomustine initiation (4/4), introduction of VPA (4/8), MRSE intracranial wound infection treated with vanc and cefepime, and possible consumption in setting of DVTs   - CBC trend: Plt 129 (4/19) ? 49 (4/20)   - LDH: 230 (normal)  -Smear: Clumping in blue tube, decreased plt in lavender tube, rare giant plts  - Coags: INR 1.4, PTT 30s (mildly elevated INR)  - HFP: Tbili 1.2, Alb 3.2, AST/ALT/ALP normal  - D-dimer: 2354  - B12, Folate, TSH: WNL  - PF4/BRANDO: Negative  - Has  received multiple platelet transfusions (x8, most recent 04/26) and RBC transfusions (x1 04/25) without overt s or s of bleeding  - Hematology re-enaged 04/26 prior to transfer  - Note states platelet count marcela to 82 following 2 units of platelet transfusion and has since downtrended to 44 over 3 day period. Also notable is worsening anemia throughout hospital stay   PLAN:  - Hematology following, appreciate input  - sent immature platelet fraction, reticulocyte count, haptoglobin, LDH, PT, PTT and fibrinogen 04/26  [  ] will follow-up results  - transfuse for Hgb < 7 and platelets < 10 or < 50 in the setting of bleeding   - CTM CBC daily  - Will transfuse prn per Hematology recs  - Active T&S    # JAMAR  - Upon transfer to our service, pt noted to have Cr trend of 0.66 -> 3.71 over 04/25 -> 04/26  - Previously normal kidney function without history of kidney disease or even previous AKIs  - Etiology thought largely to be pre-renal at this time VS Vanco as level has been elevated over past several days. Last contrast exposure on 04/11. Per chart review, inconsistent urine output data available but seems to be a downward trend in output. Pt appears dry, c/f hypovolemia.  - PVR on 04/26 showing ~96 mL, less likely to be obstructive cause.  - No large changes in blood pressures recently  PLAN:  - JAMAR work-up:   - PVR, 96 mL    - do not feel need to renal u/s at this time   - UA with reflex, although denies UTI type syptoms   - Urine lytes   - CK  - Consulted nephrology; pending recs  - Will give additional 1 L LR  - Changed PPI to famotidine  - Daily RFP  - Strict Is/Os  - Avoid nephrotoxic agents and sharp fluctuations in blood pressure  - Consider renal consult if worsening JAMAR following IVFs today    # LLE DVT  # RUE DVT  # IVC filter in place  - UE DVT US 4/11/25 revealed: RUE, acute non-occlusive deep vein thrombosis visualized in the brachial vein.  - LE DVT US 4/11/25 revealed: LLE, acute occlusive deep vein  thrombosis visualized in the popliteal vein, age indeterminate deep vein thrombosis visualized in the distal femoral, gastrocnemius veins in the calf, posterior tibial and peroneal veins. The remainder of the left leg is negative for deep vein thrombosis.  - Due to thrombocytopenia underwent IVC filter placement 04/11/25  - Initially on heparin gtt, was started on Lovenox but switched to Eliquis after adjusting anti-seizures meds, however Eliquis now Dc'd due to pancytopenia  - Was on Lovenox ppx dose since due to thrombocytopenia   PLAN:  - Vascular medicine signed off 04/26, appreciate recs  - Lovenox changed to heparin ppx 04/26 due to acute worsening of kidney function  - Can continue heparin prophylaxis as long as platelets are stable >30 K and no concerns about bleeding   - If platelets stabilized above 50K, full dose anticoagulation to be considered   - SCDs, in place  - While off full dose blood thinner monitor very closely for signs or symptoms suggesting new or worsening VTE or IVC filter thrombosis   - Agree with if pancytopenia secondary to chemotherapy, with plans to continue receiving chemotherapy on discharge, he will require very close monitoring if he is to leave on full dose anticoagulation   [  ] requesting us to call back when ready for full dose anticoagulation   - Changed heparin ppx dosing 04/26    # GBM s/p resection x2  # MRSE of crani site s/p washout  - Initially dx with GBM in 2023 s/p resection & chemo/RT    - Follows with Dr. Rolon  - Tumor progression with redo craniotomy 02/20/25 with NSGY  - Represented 04/05 for increased drainage from wound  - 04/06 s/p R cranial wound washout and mesh cranioplasty (kaleb purulence )  - Cultures from the subgaleal, epidural, and subdural spaces grew methicillin resistant Staph. Epi  - ID was previously consulted, recommending:    - Continue Vancomycin with goal -600    - antibiotic course tentative stop date will be 6/15/25   - PICC line  placed 04/08 and replaced 04/15  PLAN:  - Continue vancomycin, dosed per pharmacy  - CTM daily levels  - ID follow up with Dr. Amy York   [  ] after discharge weekly CBC with diff, Chem 7, quantitative CRP, and Vancomycin trough   - Outpatient follow-up with Dr. Rolon    # Agitation  # C/f hypoactive delirium   # C/f encephalopathy   # C/f dysphagia  - Neurology was following for concerning changes in mental status  - EEG 04/21-04/23 showing moderate diffuse encephalopathy and a right hemispheric structural lesion with an overlying skull defect. No epileptiform discharges are recorded. Overall, the degree of encephalopathy remains same compared to prior recording  PLAN:  - Per Neuro recs:   -  mg BID    [  ] currently IV, transition to oral when more consistently taking pills PO  - SLP eval 04/26 c/f aspiration but pt had previously had safe swallowing   - recommended PO intake only when awake and alert w/ upright positioning  - On regular diet  - HOLDING nightly Seroquel upon transfer due to increased somnolence over past few days   - HOLDING prn flexeril (hasn't used) upon transfer due to increased somnolence over past few days    # Intermittent tremors of BL upper extremities   - CTM    # Impaired mobility  - PT/OT - recommending high intensity   - PM&R last eval on 04/25 stating excellent candidate for acute rehabilitation once medically stable and is able and motivated to participate in 3 hours/day of therapy.      Fluids: Replete PRN  Electrolytes: Keep mg >2, phos >3  and K >4  Nutrition:  Adult diet Regular   Antimicrobials: vanco  DVT PPX:DVT: Unfractionated Heparin  GI ppx: PPI  Bowel care:Miralax and Senna  Catheter:None  Lines:PIV  Oxygen:Room Air    Code Status: Full Code (confirmed on admission)   NOK:  Primary Emergency Contact: CHRISTINE RIOS, Home Phone: [phone]     Ellyn Bradford MD   Internal Medicine, PGY-1        [1] [Held by provider] apixaban, 5 mg, oral,  BID  atorvastatin, 40 mg, oral, Daily  cloNIDine, 0.2 mg, oral, BID  famotidine, 20 mg, oral, Daily  heparin, 5,000 Units, subcutaneous, TID  lacosamide, 100 mg, intravenous, q12h  lidocaine, 1 patch, transdermal, Daily  polyethylene glycol, 17 g, oral, Daily  [Held by provider] QUEtiapine, 25 mg, oral, Nightly  sennosides-docusate sodium, 1 tablet, oral, Nightly     [2]    [3] PRN medications: alteplase, bisacodyl, [Held by provider] cyclobenzaprine, dextrose, dextrose, glucagon, glucagon, hydrOXYzine HCL, ondansetron **OR** ondansetron, oxygen, vancomycin

## 2025-04-27 NOTE — PROGRESS NOTES
"Enmanuel Ahumada \"Petey" is a 69 y.o. male on day 22 of admission presenting with Wound dehiscence.    Subjective   No acute events overnight    Objective     Physical Exam  Resting, Eyes open to voice  Ox3  RUE 5/5  RLE 5/5  LUE 4+/5  LLE 4+/5  Sensation intact to light touch  Incision c/d/I, sutures in place, no leaking     Last Recorded Vitals  Blood pressure 128/77, pulse 84, temperature 36.8 °C (98.2 °F), temperature source Temporal, resp. rate 18, height 1.702 m (5' 7\"), weight 67 kg (147 lb 11.3 oz), SpO2 95%.  Intake/Output last 3 Shifts:  I/O last 3 completed shifts:  In: 2345 (35 mL/kg) [P.O.:240; I.V.:750 (11.2 mL/kg); Blood:245; IV Piggyback:1110]  Out: 996 (14.9 mL/kg) [Urine:996 (0.4 mL/kg/hr)]  Weight: 67 kg     Relevant Results  Results from last 72 hours   Lab Units 04/27/25  0430 04/26/25  0605   WBC AUTO x10*3/uL 1.8* 1.9*   NRBC AUTO /100 WBCs 0.0 0.0   RBC AUTO x10*6/uL 2.22* 2.11*   HEMOGLOBIN g/dL 7.1* 7.0*   HEMATOCRIT % 21.5* 19.6*   MCV fL 97 93   MCH pg 32.0 33.2   MCHC g/dL 33.0 35.7   RDW % 16.6* 17.0*   PLATELETS AUTO x10*3/uL 62* 44*      Results from last 72 hours   Lab Units 04/27/25  0430 04/26/25  0605   GLUCOSE mg/dL 99 95   SODIUM mmol/L 138 137   POTASSIUM mmol/L 3.9 3.9   CHLORIDE mmol/L 105 105   CO2 mmol/L 24 26   ANION GAP mmol/L 13 10   BUN mg/dL 33* 28*   CREATININE mg/dL 4.14* 3.71*   EGFR mL/min/1.73m*2 15* 17*   CALCIUM mg/dL 7.6* 7.6*   PHOSPHORUS mg/dL 3.6 3.0   ALBUMIN g/dL 2.6* 2.6*   MAGNESIUM mg/dL 1.72  --           Assessment/Plan   Assessment & Plan  Wound dehiscence    69 M h/o HTN, HLD, smalls's esophagus, RO GBM s/p resection ([facility], 12/2023) with resultant L HH s/p chemo/RT presenting with tumor progression, 2/20 s/p redo craniotomy for tumor resection w 5 ALA, CTH POC, 2/21 MRI intended NTR, 3/16 MRI postop changes w fluid collection at dura and craniotomy site, worsening infiltrative disease, enhancement along posteromedial aspect of resection site, " 4/5 p/w 1wk incisional drainage, 4/4 CTH airfluid level in resection cavity, 4/5 MRI brain w/wo incr'd resection cavity enhancement and diffusion restriction  4/6 s/p R cranial wound washout and mesh cranioplasty (kaleb purulence), CTH POC, L temporal ICH, plts 81 s/p 2u plts, 4/7 rCTH incr SAH, min incr ICH, rrCTH stable, EEG neg dc'd  4/8 s/p PICC  4/9 CTH stable, SDD dc'd, EEG neg  4/10 SGD dc'd  4/11 DVT US x4, LLE distal femoral, popliteal, gastroc, peroneal, post tibial vv DVTs, RUE R brachial DVT, s/p IVC filter    4/13 CTH stable  4/14 PICC auto dc'd, agitation, CTH stable   4/15 s/p PICC  4/16 CTH stable, restraints removed, no agitation noted   4/17 plts 75, set PF4/BRANDO  4/19 plts 49, plan to give 1 unit platelets  4/21 acute change in exam, AMS and not following commands as briskly, CTH stable   4/23 EEG neg, dc'd, plt 82, failed MBS, rCTH stable     Plan:  [place] primary  Plan to remove sutures today  Heme recs - follow up labs   Follow up PLT function tests and trend platelets   Appreciate ID recs - continue Vanc until 6/15, likely not cause of thrombocytopenia   Appreciate vasc med recs -    - HOLD Eliquis dosing due to platelet count of 40K    - Lovenox 40mg q24 hours   Neurology recs - continue LAC 100mg BID with VPA 150mg q6h   Heme recs - follow up fractionated plt count, If Plt <30K and bleeding or procedure, transfuse; otherwise observe   SLP recs-  regular diet   Continue home meds   PTOT- rehab       NSGY will continue to follow peripherally       Jj Hernandez MD

## 2025-04-27 NOTE — CARE PLAN
Problem: Fall/Injury  Goal: Be free from injury by end of the shift  Outcome: Progressing  Goal: Verbalize understanding of personal risk factors for fall in the hospital  Outcome: Progressing  Goal: Verbalize understanding of risk factor reduction measures to prevent injury from fall in the home  Outcome: Progressing  Goal: Use assistive devices by end of the shift  Outcome: Progressing  Goal: Pace activities to prevent fatigue by end of the shift  Outcome: Progressing     Problem: Skin  Goal: Decreased wound size/increased tissue granulation at next dressing change  4/26/2025 2134 by Yolanda Snider RN  Outcome: Progressing  Flowsheets (Taken 4/26/2025 2134)  Decreased wound size/increased tissue granulation at next dressing change:   Promote sleep for wound healing   Protective dressings over bony prominences  4/26/2025 2124 by Yolanda Snider RN  Outcome: Progressing  Flowsheets (Taken 4/26/2025 2124)  Decreased wound size/increased tissue granulation at next dressing change:   Promote sleep for wound healing   Protective dressings over bony prominences  Goal: Participates in plan/prevention/treatment measures  4/26/2025 2134 by Yolanda Snider RN  Outcome: Progressing  Flowsheets (Taken 4/26/2025 2134)  Participates in plan/prevention/treatment measures:   Discuss with provider PT/OT consult   Elevate heels   Increase activity/out of bed for meals  4/26/2025 2124 by Yolanda Snider RN  Outcome: Progressing  Flowsheets (Taken 4/26/2025 2124)  Participates in plan/prevention/treatment measures:   Elevate heels   Increase activity/out of bed for meals  Goal: Prevent/manage excess moisture  4/26/2025 2134 by Yolanda Snider RN  Outcome: Progressing  Flowsheets (Taken 4/26/2025 2134)  Prevent/manage excess moisture: Monitor for/manage infection if present  4/26/2025 2124 by Yolanda Snider RN  Outcome: Progressing  Flowsheets (Taken 4/26/2025 2124)  Prevent/manage excess moisture:   Follow provider orders  for dressing changes   Cleanse incontinence/protect with barrier cream  Goal: Promote/optimize nutrition  4/26/2025 2134 by Yolanda Snider RN  Outcome: Progressing  4/26/2025 2124 by Yolanda Snider RN  Outcome: Progressing  Flowsheets (Taken 4/26/2025 2124)  Promote/optimize nutrition: Assist with feeding  Goal: Promote skin healing  Outcome: Progressing     Problem: Pain - Adult  Goal: Verbalizes/displays adequate comfort level or baseline comfort level  Outcome: Progressing     Problem: Safety - Adult  Goal: Free from fall injury  Outcome: Progressing       The clinical goals for the shift include Pt to remain HDS thru shift. Free of falls .

## 2025-04-28 ENCOUNTER — APPOINTMENT (OUTPATIENT)
Dept: RADIOLOGY | Facility: HOSPITAL | Age: 70
End: 2025-04-28
Payer: MEDICARE

## 2025-04-28 ENCOUNTER — APPOINTMENT (OUTPATIENT)
Dept: HEMATOLOGY/ONCOLOGY | Facility: HOSPITAL | Age: 70
End: 2025-04-28
Payer: MEDICARE

## 2025-04-28 LAB
ALBUMIN SERPL BCP-MCNC: 2.6 G/DL (ref 3.4–5)
ANION GAP SERPL CALC-SCNC: 13 MMOL/L (ref 10–20)
APPEARANCE UR: CLEAR
BASOPHILS # BLD AUTO: 0.01 X10*3/UL (ref 0–0.1)
BASOPHILS NFR BLD AUTO: 0.7 %
BILIRUB UR STRIP.AUTO-MCNC: NEGATIVE MG/DL
BUN SERPL-MCNC: 35 MG/DL (ref 6–23)
CALCIUM SERPL-MCNC: 7.8 MG/DL (ref 8.6–10.6)
CHLORIDE SERPL-SCNC: 105 MMOL/L (ref 98–107)
CHLORIDE UR-SCNC: 104 MMOL/L
CHLORIDE UR-SCNC: 116 MMOL/L
CHLORIDE/CREATININE (MMOL/G) IN URINE: 128 MMOL/G CREAT (ref 23–275)
CHLORIDE/CREATININE (MMOL/G) IN URINE: 134 MMOL/G CREAT (ref 23–275)
CO2 SERPL-SCNC: 25 MMOL/L (ref 21–32)
COLOR UR: ABNORMAL
CREAT SERPL-MCNC: 4.04 MG/DL (ref 0.5–1.3)
CREAT UR-MCNC: 77.8 MG/DL (ref 20–370)
CREAT UR-MCNC: 77.8 MG/DL (ref 20–370)
CREAT UR-MCNC: 90.7 MG/DL (ref 20–370)
EGFRCR SERPLBLD CKD-EPI 2021: 15 ML/MIN/1.73M*2
EOSINOPHIL # BLD AUTO: 0.04 X10*3/UL (ref 0–0.7)
EOSINOPHIL NFR BLD AUTO: 2.7 %
ERYTHROCYTE [DISTWIDTH] IN BLOOD BY AUTOMATED COUNT: 16 % (ref 11.5–14.5)
GLUCOSE BLD MANUAL STRIP-MCNC: 123 MG/DL (ref 74–99)
GLUCOSE SERPL-MCNC: 86 MG/DL (ref 74–99)
GLUCOSE UR STRIP.AUTO-MCNC: NORMAL MG/DL
HCT VFR BLD AUTO: 20.9 % (ref 41–52)
HGB BLD-MCNC: 7.1 G/DL (ref 13.5–17.5)
HOLD SPECIMEN: NORMAL
IMM GRANULOCYTES # BLD AUTO: 0 X10*3/UL (ref 0–0.7)
IMM GRANULOCYTES NFR BLD AUTO: 0 % (ref 0–0.9)
KETONES UR STRIP.AUTO-MCNC: NEGATIVE MG/DL
LEUKOCYTE ESTERASE UR QL STRIP.AUTO: NEGATIVE
LYMPHOCYTES # BLD AUTO: 0.3 X10*3/UL (ref 1.2–4.8)
LYMPHOCYTES NFR BLD AUTO: 20.3 %
MAGNESIUM SERPL-MCNC: 1.75 MG/DL (ref 1.6–2.4)
MCH RBC QN AUTO: 32.3 PG (ref 26–34)
MCHC RBC AUTO-ENTMCNC: 34 G/DL (ref 32–36)
MCV RBC AUTO: 95 FL (ref 80–100)
MONOCYTES # BLD AUTO: 0.13 X10*3/UL (ref 0.1–1)
MONOCYTES NFR BLD AUTO: 8.8 %
NEUTROPHILS # BLD AUTO: 1 X10*3/UL (ref 1.2–7.7)
NEUTROPHILS NFR BLD AUTO: 67.5 %
NITRITE UR QL STRIP.AUTO: NEGATIVE
NRBC BLD-RTO: 0 /100 WBCS (ref 0–0)
OVALOCYTES BLD QL SMEAR: NORMAL
PATH REVIEW-CBC DIFFERENTIAL: NORMAL
PH UR STRIP.AUTO: 6 [PH]
PHOSPHATE SERPL-MCNC: 4.1 MG/DL (ref 2.5–4.9)
PLATELET # BLD AUTO: 52 X10*3/UL (ref 150–450)
POTASSIUM SERPL-SCNC: 3.9 MMOL/L (ref 3.5–5.3)
POTASSIUM UR-SCNC: 22 MMOL/L
POTASSIUM UR-SCNC: 23 MMOL/L
POTASSIUM/CREAT UR-RTO: 25 MMOL/G CREAT
POTASSIUM/CREAT UR-RTO: 28 MMOL/G CREAT
PROT UR STRIP.AUTO-MCNC: NEGATIVE MG/DL
PROT UR-ACNC: 26 MG/DL (ref 5–25)
PROT/CREAT UR: 0.33 MG/MG CREAT (ref 0–0.17)
RBC # BLD AUTO: 2.2 X10*6/UL (ref 4.5–5.9)
RBC # UR STRIP.AUTO: ABNORMAL MG/DL
RBC #/AREA URNS AUTO: NORMAL /HPF
RBC MORPH BLD: NORMAL
SCHISTOCYTES BLD QL SMEAR: NORMAL
SODIUM SERPL-SCNC: 139 MMOL/L (ref 136–145)
SODIUM UR-SCNC: 108 MMOL/L
SODIUM UR-SCNC: 94 MMOL/L
SODIUM/CREAT UR-RTO: 119 MMOL/G CREAT
SODIUM/CREAT UR-RTO: 121 MMOL/G CREAT
SP GR UR STRIP.AUTO: 1.01
UROBILINOGEN UR STRIP.AUTO-MCNC: NORMAL MG/DL
WBC # BLD AUTO: 1.5 X10*3/UL (ref 4.4–11.3)
WBC #/AREA URNS AUTO: NORMAL /HPF

## 2025-04-28 PROCEDURE — 99222 1ST HOSP IP/OBS MODERATE 55: CPT | Performed by: INTERNAL MEDICINE

## 2025-04-28 PROCEDURE — 85025 COMPLETE CBC W/AUTO DIFF WBC: CPT

## 2025-04-28 PROCEDURE — 81001 URINALYSIS AUTO W/SCOPE: CPT

## 2025-04-28 PROCEDURE — 76770 US EXAM ABDO BACK WALL COMP: CPT | Performed by: RADIOLOGY

## 2025-04-28 PROCEDURE — 2500000001 HC RX 250 WO HCPCS SELF ADMINISTERED DRUGS (ALT 637 FOR MEDICARE OP)

## 2025-04-28 PROCEDURE — 76770 US EXAM ABDO BACK WALL COMP: CPT

## 2025-04-28 PROCEDURE — 83735 ASSAY OF MAGNESIUM: CPT

## 2025-04-28 PROCEDURE — 80069 RENAL FUNCTION PANEL: CPT

## 2025-04-28 PROCEDURE — 82436 ASSAY OF URINE CHLORIDE: CPT

## 2025-04-28 PROCEDURE — C9254 INJECTION, LACOSAMIDE: HCPCS | Mod: JW

## 2025-04-28 PROCEDURE — 82947 ASSAY GLUCOSE BLOOD QUANT: CPT

## 2025-04-28 PROCEDURE — 97530 THERAPEUTIC ACTIVITIES: CPT | Mod: GP,CQ

## 2025-04-28 PROCEDURE — 1200000003 HC ONCOLOGY  ROOM WITH TELEMETRY DAILY

## 2025-04-28 PROCEDURE — 2500000004 HC RX 250 GENERAL PHARMACY W/ HCPCS (ALT 636 FOR OP/ED): Mod: JZ

## 2025-04-28 PROCEDURE — 2500000004 HC RX 250 GENERAL PHARMACY W/ HCPCS (ALT 636 FOR OP/ED)

## 2025-04-28 RX ORDER — LACOSAMIDE 100 MG/1
100 TABLET ORAL 2 TIMES DAILY
Status: DISCONTINUED | OUTPATIENT
Start: 2025-04-28 | End: 2025-05-02

## 2025-04-28 RX ADMIN — CLONIDINE HYDROCHLORIDE 0.2 MG: 0.2 TABLET ORAL at 21:14

## 2025-04-28 RX ADMIN — CLONIDINE HYDROCHLORIDE 0.2 MG: 0.2 TABLET ORAL at 08:41

## 2025-04-28 RX ADMIN — HEPARIN SODIUM 5000 UNITS: 5000 INJECTION, SOLUTION INTRAVENOUS; SUBCUTANEOUS at 21:14

## 2025-04-28 RX ADMIN — ACETAMINOPHEN 975 MG: 325 TABLET ORAL at 15:13

## 2025-04-28 RX ADMIN — LACOSAMIDE 100 MG: 10 INJECTION INTRAVENOUS at 09:17

## 2025-04-28 RX ADMIN — ATORVASTATIN CALCIUM 40 MG: 40 TABLET, FILM COATED ORAL at 21:14

## 2025-04-28 RX ADMIN — FAMOTIDINE 10 MG: 20 TABLET, FILM COATED ORAL at 08:41

## 2025-04-28 RX ADMIN — SENNOSIDES AND DOCUSATE SODIUM 1 TABLET: 50; 8.6 TABLET ORAL at 21:14

## 2025-04-28 RX ADMIN — SODIUM CHLORIDE, SODIUM LACTATE, POTASSIUM CHLORIDE, AND CALCIUM CHLORIDE 500 ML: .6; .31; .03; .02 INJECTION, SOLUTION INTRAVENOUS at 17:40

## 2025-04-28 RX ADMIN — POLYETHYLENE GLYCOL 3350 17 G: 17 POWDER, FOR SOLUTION ORAL at 08:41

## 2025-04-28 RX ADMIN — ACETAMINOPHEN 975 MG: 325 TABLET ORAL at 08:41

## 2025-04-28 RX ADMIN — HEPARIN SODIUM 5000 UNITS: 5000 INJECTION, SOLUTION INTRAVENOUS; SUBCUTANEOUS at 15:13

## 2025-04-28 RX ADMIN — LACOSAMIDE 100 MG: 100 TABLET, FILM COATED ORAL at 21:14

## 2025-04-28 RX ADMIN — HEPARIN SODIUM 5000 UNITS: 5000 INJECTION, SOLUTION INTRAVENOUS; SUBCUTANEOUS at 08:41

## 2025-04-28 RX ADMIN — ACETAMINOPHEN 975 MG: 325 TABLET ORAL at 21:14

## 2025-04-28 ASSESSMENT — PAIN SCALES - GENERAL
PAINLEVEL_OUTOF10: 0 - NO PAIN
PAINLEVEL_OUTOF10: 0 - NO PAIN

## 2025-04-28 ASSESSMENT — COGNITIVE AND FUNCTIONAL STATUS - GENERAL
STANDING UP FROM CHAIR USING ARMS: A LOT
DRESSING REGULAR LOWER BODY CLOTHING: A LOT
TURNING FROM BACK TO SIDE WHILE IN FLAT BAD: A LOT
CLIMB 3 TO 5 STEPS WITH RAILING: TOTAL
MOBILITY SCORE: 9
WALKING IN HOSPITAL ROOM: TOTAL
WALKING IN HOSPITAL ROOM: TOTAL
HELP NEEDED FOR BATHING: A LOT
MOVING FROM LYING ON BACK TO SITTING ON SIDE OF FLAT BED WITH BEDRAILS: A LOT
DAILY ACTIVITIY SCORE: 12
MOVING TO AND FROM BED TO CHAIR: A LOT
MOBILITY SCORE: 11
TOILETING: A LOT
MOVING FROM LYING ON BACK TO SITTING ON SIDE OF FLAT BED WITH BEDRAILS: A LITTLE
PERSONAL GROOMING: A LOT
TURNING FROM BACK TO SIDE WHILE IN FLAT BAD: A LOT
MOVING TO AND FROM BED TO CHAIR: TOTAL
DRESSING REGULAR UPPER BODY CLOTHING: A LOT
STANDING UP FROM CHAIR USING ARMS: A LOT
EATING MEALS: A LOT
CLIMB 3 TO 5 STEPS WITH RAILING: TOTAL

## 2025-04-28 ASSESSMENT — PAIN - FUNCTIONAL ASSESSMENT: PAIN_FUNCTIONAL_ASSESSMENT: 0-10

## 2025-04-28 NOTE — PROGRESS NOTES
04/28/25 1200   Discharge Planning   Living Arrangements Spouse/significant other   Support Systems Spouse/significant other   Type of Residence Private residence   Number of Stairs to Enter Residence 3   Number of Stairs Within Residence 10   Home or Post Acute Services Post acute facilities (Rehab/SNF/etc)   Type of Post Acute Facility Services Rehab   Expected Discharge Disposition Rehab   Does the patient need discharge transport arranged? Yes   RoundTrip coordination needed? Yes   Has discharge transport been arranged? No     Care Transitions Note  04/28/25  Plan per Medical/Surgical Team: GBM, extended hospital course. On ID vanc until June. Pending vasc and neph recs  Status: Inpatient  Payor Source: [employer]  Discharge disposition: Acute Rehab  Expected date of discharge: TBD, Wednesday?  Barriers: medical stability    Met with pt and spouse at bedside this AM. Pt unable to participate in assessment, deferred to spouse. Demographics and insurance verified. SW confirmed plan to go to  Acute Rehab in [place] at discharge. Per spouse, pt has no upcoming tx scheduled at this time. SW to follow for clinical stability. Pre-cert is needed prior to dc. Will continue to follow for discharge planning.   Bonita Fulton, MSW, LSW

## 2025-04-28 NOTE — PROGRESS NOTES
"  Physical Therapy Treatment    Patient Name: Enmanuel Ahumada \"Dash\"  MRN: [id number]  Today's Date: 4/28/2025  Room: [address] [place] [address]-A  Time Calculation  Start Time: 1050  Stop Time: 1120  Time Calculation (min): 30 min       Assessment/Plan   PT Assessment  PT Assessment Results: Decreased strength, Decreased endurance, Impaired balance, Decreased coordination, Decreased mobility, Decreased cognition, Impaired judgement, Decreased safety awareness  Rehab Prognosis: Good  Barriers to Discharge Home: Caregiver assistance, Cognition needs, Physical needs  Caregiver Assistance: Caregiver assistance needed per identified barriers - however, level of patient's required assistance exceeds assistance available at home  Cognition Needs: 24hr supervision for safety awareness needed, Insight of patient limited regarding functional ability/needs, Cognition-related high falls risk  Physical Needs: High falls risk due to function or environment, 24hr mobility assistance needed, Stair navigation into home limited by function/safety, Ambulating household distances limited by function/safety  Evaluation/Treatment Tolerance: Patient limited by fatigue  Strengths: Attitude of self  Barriers to Participation: Comorbidities  End of Session Patient Position: Bed, 3 rail up, Alarm on  PT Plan  Inpatient/Swing Bed or Outpatient: Inpatient  PT Plan  Treatment/Interventions: Bed mobility, Transfer training, Gait training, Stair training, Balance training, Neuromuscular re-education, Strengthening, Endurance training, Range of motion, Therapeutic exercise, Therapeutic activity  PT Plan: Ongoing PT  PT Frequency: 5 times per week  PT Discharge Recommendations: High intensity level of continued care  Equipment Recommended upon Discharge:  (tbd at next level of care)  PT Recommended Transfer Status: Assist x1, Assistive device  PT - OK to Discharge: Yes (Meaning pt has been evaluated and d/c reccs have been made.)  Assessment: Patient is " progressing Fairly with therapy this date. Confused and demos significant tremors/weakness inhibiting pt from taking steps at this time. Would continue to benefit from continued skilled PT to address all mobility deficits; remains appropriate for HIGH intensity therapy when medically ready for discharge from acute stay.  Will continue to follow.     General Visit Information:   PT  Visit  PT Received On: 04/28/25  Prior to Session Communication: Bedside nurse  Patient Position Received: Bed, 3 rail up, Alarm on  Family/Caregiver Present: Yes  Caregiver Feedback: wife present and supportive     Subjective   Subjective: pt asleep taking increased time to get pt alert  Precautions:  Precautions  Hearing/Visual Limitations: per pt and wife report, pt with limited L visual field.  Medical Precautions: Fall precautions  Precautions Comment: SBP<160      Objective   Pain:  Pain Assessment  Pain Assessment: 0-10  0-10 (Numeric) Pain Score: 0 - No pain  Cognition:  Cognition  Overall Cognitive Status: Impaired  Arousal/Alertness: Delayed responses to stimuli  Orientation Level: Disoriented to situation, Disoriented to time, Disoriented to place  Following Commands: Follows one step commands with repetition (50% of the time)  Safety Judgment: Decreased awareness of need for assistance  Problem Solving: Assistance required to identify errors made  Cognition Comments: easily distratced, initially very lethargic. appears delayed, confused  Insight: Moderate  Impulsive: Mildly  Processing Speed: Delayed     Static Sitting balance:  Static Sitting Balance  Static Sitting-Balance Support: Feet supported, Bilateral upper extremity supported  Static Sitting-Level of Assistance: Close supervision, Contact guard  Static Standing Balance:  Static Standing Balance  Static Standing-Balance Support: Bilateral upper extremity supported  Static Standing-Level of Assistance: Minimum assistance, Moderate assistance  Static  Standing-Comment/Number of Minutes: unsteady, up to 30s, flexed posture  Dynamic Sitting Balance:  Dynamic Sitting Balance  Dynamic Sitting-Balance Support: Feet supported  Dynamic Sitting-Level of Assistance: Contact guard  Dynamic Sitting-Balance: Lateral lean, Forward lean  Dynamic Sitting-Comments: pt able to tap target with L hand. demos some inattention to L side, needs cues to turn head      Lines/Tubes/Drains:  PICC - Adult 04/15/25 Left Basilic vein (Active)   Number of days: 13       External Urinary Catheter Male (Active)   Number of days: 12     Continuous Medications/Drips:  Continuous Medications[1]    PT Treatments:  Therapeutic Exercise  Therapeutic Exercise Activity 1: Sitting EOB, hip flx, LAQ, 5x increased difficulty with L side unable to achieve full ROM w/o assist  Therapeutic Activity  Therapeutic Activity 1: Attempted to take steps, pt with sigificant tremors when attempting to step with LOB needing to sit with maxA to control sitting back down.     Bed Mobility 1  Bed Mobility 1: Supine to sitting  Level of Assistance 1: Moderate assistance  Bed Mobility Comments 1: for trunk, multiple methods used to get pt to follow thru with directions  Bed Mobility 2  Bed Mobility  2: Sitting to supine  Level of Assistance 2: Minimum assistance  Bed Mobility Comments 2: for BLE  Ambulation/Gait Training  Ambulation/Gait Training Performed: No  Transfer 1  Transfer From 1: Sit to  Transfer to 1: Stand  Technique 1: Sit to stand, Stand to sit  Transfer Device 1: Walker  Transfer Level of Assistance 1: Moderate assistance  Trials/Comments 1: max vc for handplacement with tactile cues. Limited eccentric control, needs several attempts to stand             Activity tolerance:  Activity Tolerance  Endurance: Decreased tolerance for upright activites    Outcome Measures:  [facility] Basic Mobility  Turning from your back to your side while in a flat bed without using bedrails: A little  Moving from lying on your back  to sitting on the side of a flat bed without using bedrails: A lot  Moving to and from bed to chair (including a wheelchair): A lot  Standing up from a chair using your arms (e.g. wheelchair or bedside chair): A lot  To walk in hospital room: Total  Climbing 3-5 steps with railing: Total  Basic Mobility - Total Score: 11       Education Documentation  Body Mechanics, taught by Gayatri Zayas PTA at 4/28/2025 12:16 PM.  Learner: Patient  Readiness: Acceptance  Method: Explanation  Response: No Evidence of Learning    Precautions, taught by Gayatri Zayas PTA at 4/28/2025 12:16 PM.  Learner: Patient  Readiness: Acceptance  Method: Explanation  Response: No Evidence of Learning    Mobility Training, taught by Gayatri Zayas PTA at 4/28/2025 12:16 PM.  Learner: Patient  Readiness: Acceptance  Method: Explanation  Response: No Evidence of Learning    Body Mechanics, taught by Gayatri Zayas PTA at 4/28/2025 12:16 PM.  Learner: Patient  Readiness: Acceptance  Method: Explanation  Response: No Evidence of Learning    Precautions, taught by Gayatri Zayas PTA at 4/28/2025 12:16 PM.  Learner: Patient  Readiness: Acceptance  Method: Explanation  Response: No Evidence of Learning    ADL Training, taught by Gayatri Zayas PTA at 4/28/2025 12:16 PM.  Learner: Patient  Readiness: Acceptance  Method: Explanation  Response: No Evidence of Learning    Education Comments  No comments found.          OP EDUCATION:       Encounter Problems       Encounter Problems (Active)       PT Problem       Patient will perform bed mobility IND to reduce risk of developing decubitus ulcers.  (Progressing)       Start:  04/08/25    Expected End:  05/09/25            Patient will ambulate at least 150 ft. with </= close sup and LRD to improve tolerance of household distances.  (Progressing)       Start:  04/08/25    Expected End:  05/09/25            Patient will perform the 5-Time Sit to Stand test in <14 sec to indicate decreased falls risk. (Progressing)        Start:  04/08/25    Expected End:  05/09/25            Patient will perform sit to stand and stand to sit transfers with </= close sup and LRD to increase functional strength.  (Progressing)       Start:  04/08/25    Expected End:  05/09/25            BLE 5/5 (Progressing)       Start:  04/08/25    Expected End:  05/09/25               Pain - Adult                                 [1]

## 2025-04-28 NOTE — PROGRESS NOTES
"Eve Ahumada \"Petey" is a 69 y.o. male on day 23 of admission presenting with Wound dehiscence.    Overnight: No acute events    This morning the patient is doing well. He was up to the side of the bed with PT upon first eval. Upon re-eval he is resting in bed, sitting up right. He states he is doing well. Wife, Brittany, is at bedside. She expresses concern about his oral intake and nutrition.        Objective   Current Vitals  /83 (BP Location: Right arm, Patient Position: Lying)   Pulse 76   Temp 37.1 °C (98.8 °F) (Temporal)   Resp 18   Ht 1.702 m (5' 7\")   Wt 67 kg (147 lb 11.3 oz)   SpO2 96%   BMI 23.13 kg/m²      I/O last 3 completed shifts:  In: 1240 (18.5 mL/kg) [P.O.:240; IV Piggyback:1000]  Out: 650 (9.7 mL/kg) [Urine:650 (0.3 mL/kg/hr)]  Weight: 67 kg     Physical Exam  Vitals reviewed.   Constitutional:       General: He is not in acute distress.     Appearance: He is ill-appearing. He is not toxic-appearing.      Comments: Intermittent alertness, oriented x1 but recognizes wife able to tell me they have been  38 years   HENT:      Mouth/Throat:      Mouth: Mucous membranes are moist.      Pharynx: Oropharynx is clear.   Eyes:      Extraocular Movements: Extraocular movements intact.   Cardiovascular:      Rate and Rhythm: Normal rate and regular rhythm.      Heart sounds: Normal heart sounds.   Pulmonary:      Effort: Pulmonary effort is normal. No respiratory distress.      Breath sounds: Normal breath sounds.   Abdominal:      General: There is no distension.      Palpations: Abdomen is soft.      Tenderness: There is no abdominal tenderness. There is no guarding or rebound.   Musculoskeletal:      Cervical back: Neck supple.      Right lower leg: No edema.      Left lower leg: No edema.   Skin:     General: Skin is warm and dry.      Coloration: Skin is pale.      Findings: No rash.   Neurological:      Mental Status: He is disoriented.   Psychiatric:         " Behavior: Behavior normal.     Relevant Results  Labs:  CBC: WBC 1.5 , HGB 7.1, PLT 52  BMP: , K 3.9, Cl 105, HCO3 25, BUN 35, CR 4.04, Glu 86  LFTS: AST 17 , ALT 38, ALKPHOS 71 , TBILI 1.2 , DBILI 0.3  TROP: 3  BNP: No results found for requested labs within last 365 days.  COAGS: PT 14.1 , PTT 33  , INR 1.3  UA:   Results from last 7 days   Lab Units 04/28/25  0702   COLOR U  Light-Yellow   PH U  6.0   SPEC GRAV UR  1.010   PROTEIN U mg/dL NEGATIVE   BLOOD UR mg/dL 0.03 (TRACE)*   NITRITE U  NEGATIVE   WBC UR /HPF NONE     ABG:    CALCIUM 7.8 MAG No results found for requested labs within last 365 days. ALB 2.6 LACTATE 1.3 PHOS No results found for requested labs within last 365 days. COVIDNo results found for requested labs within last 365 days.    Micro/culture data:  Susceptibility data from last 120 days.  Collected Organism Clindamycin Erythromycin Oxacillin Tetracycline Trimethoprim/Sulfamethoxazole Vancomycin   04/06/25 1007 Staphylococcus epidermidis  S  S  R  S  R  S   04/06/25 1006 Staphylococcus epidermidis         04/06/25 1005 Staphylococcus epidermidis             Imaging:  ECG 12 Lead  Normal sinus rhythm  Normal ECG  When compared with ECG of 24-APR-2025 09:20,  Fusion complexes are no longer Present  Premature ventricular complexes are no longer Present  Vent. rate has decreased BY  87 BPM  Borderline criteria for Anterior infarct are no longer Present  Borderline criteria for Anterolateral infarct are no longer Present  US renal complete  Narrative: Interpreted By:  Celso Tabares,  Vickie Giang   STUDY:  US RENAL COMPLETE;  4/28/2025 5:24 am      INDICATION:  Signs/Symptoms:Worsening JAMAR. Creatinine up trending, previously 0.64  days ago, currently 4.04. GFR greater than 90 --> 15.      COMPARISON:  None.      ACCESSION NUMBER(S):  [id number]      ORDERING CLINICIAN:  MELODY TALAMANTES      TECHNIQUE:  Multiple images of the kidneys were obtained  .      FINDINGS:  RIGHT  "KIDNEY:  The right kidney measures 11.8 cm in length. Mildly increased renal  cortical echogenicity. The cortical thickness is within normal  limits. No hydronephrosis is present; no evidence of nephrolithiasis.      LEFT KIDNEY:  The left kidney measures 11.5 cm in length. Mildly increased renal  cortical echogenicity. The cortical thickness is and thickness is  within normal limits. Mild hydronephrosis. Hyper echogenic foci with  twinkle artifact compatible with nonobstructive intrarenal calculi of  the left kidney measuring up to 0.2 cm maximal diameter.      BLADDER:  The urinary bladder is unremarkable in appearance.      Impression: 1. Mild left-sided hydronephrosis with few  intrarenal calculi.  2. Increased cortical echogenicity of the bilateral kidneys to be  correlated with any clinical concern for medical renal disease.      I personally reviewed the images/study and I agree with the findings  as stated by resident Rahat Hodges. This study was interpreted  at [place], [place].      MACRO:  None      Signed by: Celso Tabares 4/28/2025 11:49 AM  Dictation workstation:   [id number]           MEDS:  Scheduled medications  Scheduled Medications[1]  Continuous medications  Continuous Medications[2]  PRN medications  PRN Medications[3]       Assessment/Plan   Assessment/Plan:   Mr. Singer \"Dash\" Zita is a 69 male with a PMHx of HTN, HLD, smalls's esophagus, RO GBM s/p resection ([facility], 12/2023) with resultant L HH s/p chemo/RT who represented for tumor progression in February. He underwent redo craniotomy for tumor resection 02/20 who had repeat MRI in March showing postop changes with fluid collection at dura and craniotomy site, worsening infiltrative disease, enhancement along posteromedial aspect of resection site. He represented on 04/05/25 with concerns for increased wound drainage and was admitted under the care of Neurosurgery. MRI brain onf " 04/05 showed increased resection cavity enhancement and diffusion restriction concerning fo superimposed infection/abscess. He therefore underwent cranial wound washout on 04/06 with Neurosurgery. Subsequent cultures from OR revealed methicillin-resistant staph epidermidis, currently on vancomycin IV therapy until 06/15/25 per ID. His subsequent hospital course have been complicated by acute DVTs in the LLE and RUE s/p IVC filter placement due to pancytopenia (namely worsening thrombocytopenia) limiting anticoagulation, agitation/confusion, and new JAMAR.    Updates 04/28/25:  - Cr slightly improving from 4.14 to 4.04   - s/p 1 L yesterday   - Renal U/S: Mild left-sided hydronephrosis with few intrarenal calculi   - Nephrology consulted, appreciate recs   - Recommending volume resuscitation   - No oral or IV intake documented in chart for yesterday   - 500 ml urine documented  - Will give additional 500 L LF over 4 hours today  - Plt this morning 52; Will continue to monitor   - no S or S of bleeding    # Pancytopenia   # Thrombocytopenia  - Hematology consulted 04/21 felt thrombocytopenia to be multifactorial due to recent lomustine initiation (4/4), introduction of VPA (4/8), MRSE intracranial wound infection treated with vanc and cefepime, and possible consumption in setting of DVTs   - CBC trend: Plt 129 (4/19) ? 49 (4/20)   - LDH: 230 (normal)  -Smear: Clumping in blue tube, decreased plt in lavender tube, rare giant plts  - Coags: INR 1.4, PTT 30s (mildly elevated INR)  - HFP: Tbili 1.2, Alb 3.2, AST/ALT/ALP normal  - D-dimer: 2354  - B12, Folate, TSH: WNL  - PF4/BRANDO: Negative  - Has received multiple platelet transfusions (x8, most recent 04/26) and RBC transfusions (x1 04/25) without overt s or s of bleeding  - Hematology re-enaged 04/26 prior to transfer  - Note states platelet count marcela to 82 following 2 units of platelet transfusion and has since downtrended to 44 over 3 day period. Also notable is  worsening anemia throughout hospital stay   PLAN:  - Hematology following, appreciate input  - Sent: immature platelet fraction (1.1), reticulocyte count (3.0), haptoglobin (218) , LDH (277), PT (14.1), PTT (33) and fibrinogen (455)  - Will transfuse for Hgb < 7 & platelets < 10 or < 50 in the setting of bleeding   - CTM CBC daily  - Will transfuse prn per Hematology recs  - Active T&S    # JAMAR  - Upon transfer to our service, pt noted to have Cr trend of 0.66 -> 3.71 over 04/24 -> 04/26  - Previously normal kidney function without history of kidney disease or even previous AKIs  - Etiology thought largely to be pre-renal at this time VS Vanco as level has been elevated over past several days. Last contrast exposure on 04/11. Per chart review, inconsistent urine output data available but seems to be a downward trend in output. Pt appears dry, c/f hypovolemia.  - PVR on 04/26 showing ~96 mL, less likely to be obstructive cause.  - No large changes in blood pressures recently  PLAN:  - JAMAR work-up:   - PVR, 96 mL   - Renal U/S: Mild left-sided hydronephrosis with few intrarenal calculi    - UA 04/27 negative, UA 04/28 pending   - Urine lytes 04/27: 3.6%, intrinsic. Serum sodium 138, serum cr 4.14, urine sodium 94, urine cr 77.8  - Urine lytes 04/28: 3.5%, intrinsic. Serum sodium 139, serum cr 4.04, urine sodium 108, urine cr 90.7   - CK 49  - Consulted nephrology, appreciate recs   - consider volume resuscitation  - Will give additional 500 L LF over 4 hours today  - Changed PPI to famotidine  - Daily RFP  - Strict Is/Os  - Avoid nephrotoxic agents and sharp fluctuations in blood pressure    # LLE DVT  # RUE DVT  # IVC filter in place  - UE DVT US 4/11/25 revealed: RUE, acute non-occlusive deep vein thrombosis visualized in the brachial vein.  - LE DVT US 4/11/25 revealed: LLE, acute occlusive deep vein thrombosis visualized in the popliteal vein, age indeterminate deep vein thrombosis visualized in the distal  femoral, gastrocnemius veins in the calf, posterior tibial and peroneal veins. The remainder of the left leg is negative for deep vein thrombosis.  - Due to thrombocytopenia underwent IVC filter placement 04/11/25  - Initially on heparin gtt, was started on Lovenox but switched to Eliquis after adjusting anti-seizures meds, however Eliquis now Dc'd due to pancytopenia  - Was on Lovenox ppx dose since due to thrombocytopenia   PLAN:  - Vascular medicine signed off 04/26, appreciate recs  - Lovenox changed to heparin ppx 04/26 due to acute worsening of kidney function  - Can continue heparin prophylaxis as long as platelets are stable >30 K and no concerns about bleeding   - If platelets stabilized above 50K, full dose anticoagulation to be considered   - SCDs, in place  - While off full dose blood thinner monitor very closely for signs or symptoms suggesting new or worsening VTE or IVC filter thrombosis   - Agree with if pancytopenia secondary to chemotherapy, with plans to continue receiving chemotherapy on discharge, he will require very close monitoring if he is to leave on full dose anticoagulation   [  ] requesting us to call back when ready for full dose anticoagulation   - Changed heparin ppx dosing 04/26    # GBM s/p resection x2  # MRSE of crani site s/p washout  - Initially dx with GBM in 2023 s/p resection & chemo/RT    - Follows with Dr. Rolon  - Tumor progression with redo craniotomy 02/20/25 with NSGY  - Represented 04/05 for increased drainage from wound  - 04/06 s/p R cranial wound washout and mesh cranioplasty (kaleb purulence )  - Cultures from the subgaleal, epidural, and subdural spaces grew methicillin resistant Staph. Epi  - ID was previously consulted, recommending:    - Continue Vancomycin with goal -600    - antibiotic course tentative stop date will be 6/15/25   - PICC line placed 04/08 and replaced 04/15  PLAN:  - Continue vancomycin, dosed per pharmacy  - CTM daily levels  - ID  follow up with Dr. Amy York   [  ] after discharge weekly CBC with diff, Chem 7, quantitative CRP, and Vancomycin trough   - Outpatient follow-up with Dr. Rolon  - Outpatient follow-up with Dr. 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 No epileptiform discharges are recorded. Overall, the degree of encephalopathy remains same compared to prior recording  PLAN:  - Per Neuro recs:   -  mg BID    - Transitioned to oral 04/28  - SLP eval 04/26 c/f aspiration but pt had previously had safe swallowing   - recommended PO intake only when awake and alert w/ upright positioning  - On regular diet  - HOLDING nightly Seroquel upon transfer due to increased somnolence over past few days   - HOLDING prn flexeril (hasn't used) upon transfer due to increased somnolence over past few days    # Intermittent tremors of BL upper extremities   - CTM    # Impaired mobility  - PT/OT - recommending high intensity   - PM&R last eval on 04/25 stating excellent candidate for acute rehabilitation once medically stable and is able and motivated to participate in 3 hours/day of therapy.      Fluids: Replete PRN  Electrolytes: Keep mg >2, phos >3  and K >4  Nutrition:  Adult diet Regular   Antimicrobials: vanco  DVT PPX:DVT: Unfractionated Heparin  GI ppx: PPI  Bowel care:Miralax and Senna  Catheter:None  Lines:PIV  Oxygen:Room Air    Code Status: Full Code (confirmed on admission)   NOK:  Primary Emergency Contact: CHRISTINE RIOS, Home Phone: [phone]     Ellyn Bradford MD   Internal Medicine, PGY-1        [1] acetaminophen, 975 mg, oral, TID  [Held by provider] apixaban, 5 mg, oral, BID  atorvastatin, 40 mg, oral, Daily  cloNIDine, 0.2 mg,  oral, BID  famotidine, 10 mg, oral, Daily  heparin, 5,000 Units, subcutaneous, TID  lacosamide, 100 mg, oral, BID  lidocaine, 1 patch, transdermal, Daily  polyethylene glycol, 17 g, oral, Daily  [Held by provider] QUEtiapine, 25 mg, oral, Nightly  sennosides-docusate sodium, 1 tablet, oral, Nightly     [2]    [3] PRN medications: alteplase, bisacodyl, [Held by provider] cyclobenzaprine, dextrose, dextrose, glucagon, glucagon, hydrOXYzine HCL, ondansetron **OR** ondansetron, oxygen, vancomycin

## 2025-04-28 NOTE — PROGRESS NOTES
Enmanuel Ahumada   69 star.o.    @@  N/Room: [id number]/4001/4001-A    Subjective:   Stable KFTs    Objective:     Meds:   acetaminophen, 975 mg, TID  [Held by provider] apixaban, 5 mg, BID  atorvastatin, 40 mg, Daily  cloNIDine, 0.2 mg, BID  famotidine, 10 mg, Daily  heparin, 5,000 Units, TID  lacosamide, 100 mg, BID  lidocaine, 1 patch, Daily  polyethylene glycol, 17 g, Daily  [Held by provider] QUEtiapine, 25 mg, Nightly  sennosides-docusate sodium, 1 tablet, Nightly         alteplase, 2 mg, PRN  bisacodyl, 10 mg, Daily PRN  [Held by provider] cyclobenzaprine, 10 mg, TID PRN  dextrose, 12.5 g, q15 min PRN  dextrose, 25 g, q15 min PRN  glucagon, 1 mg, q15 min PRN  glucagon, 1 mg, q15 min PRN  hydrOXYzine HCL, 10 mg, q6h PRN  ondansetron, 4 mg, q8h PRN   Or  ondansetron, 4 mg, q8h PRN  oxygen, , Continuous PRN - O2/gases  vancomycin, , Daily PRN        Vitals:    04/28/25 1211   BP: 117/79   Pulse: 72   Resp: 18   Temp: 36.4 °C (97.5 °F)   SpO2: 96%          Intake/Output Summary (Last 24 hours) at 4/28/2025 1517  Last data filed at 4/28/2025 0749  Gross per 24 hour   Intake --   Output 250 ml   Net -250 ml       Sitting bed  Comfortable  On RA  No Edema    Blood Labs:  Results for orders placed or performed during the hospital encounter of 04/05/25 (from the past 24 hours)   CBC and Auto Differential   Result Value Ref Range    WBC 1.5 (L) 4.4 - 11.3 x10*3/uL    nRBC 0.0 0.0 - 0.0 /100 WBCs    RBC 2.20 (L) 4.50 - 5.90 x10*6/uL    Hemoglobin 7.1 (L) 13.5 - 17.5 g/dL    Hematocrit 20.9 (L) 41.0 - 52.0 %    MCV 95 80 - 100 fL    MCH 32.3 26.0 - 34.0 pg    MCHC 34.0 32.0 - 36.0 g/dL    RDW 16.0 (H) 11.5 - 14.5 %    Platelets 52 (L) 150 - 450 x10*3/uL    Neutrophils % 67.5 40.0 - 80.0 %    Immature Granulocytes %, Automated 0.0 0.0 - 0.9 %    Lymphocytes % 20.3 13.0 - 44.0 %    Monocytes % 8.8 2.0 - 10.0 %    Eosinophils % 2.7 0.0 - 6.0 %    Basophils % 0.7 0.0 - 2.0 %    Neutrophils Absolute 1.00 (L) 1.20 - 7.70  x10*3/uL    Immature Granulocytes Absolute, Automated 0.00 0.00 - 0.70 x10*3/uL    Lymphocytes Absolute 0.30 (L) 1.20 - 4.80 x10*3/uL    Monocytes Absolute 0.13 0.10 - 1.00 x10*3/uL    Eosinophils Absolute 0.04 0.00 - 0.70 x10*3/uL    Basophils Absolute 0.01 0.00 - 0.10 x10*3/uL   Renal Function Panel   Result Value Ref Range    Glucose 86 74 - 99 mg/dL    Sodium 139 136 - 145 mmol/L    Potassium 3.9 3.5 - 5.3 mmol/L    Chloride 105 98 - 107 mmol/L    Bicarbonate 25 21 - 32 mmol/L    Anion Gap 13 10 - 20 mmol/L    Urea Nitrogen 35 (H) 6 - 23 mg/dL    Creatinine 4.04 (H) 0.50 - 1.30 mg/dL    eGFR 15 (L) >60 mL/min/1.73m*2    Calcium 7.8 (L) 8.6 - 10.6 mg/dL    Phosphorus 4.1 2.5 - 4.9 mg/dL    Albumin 2.6 (L) 3.4 - 5.0 g/dL   Magnesium   Result Value Ref Range    Magnesium 1.75 1.60 - 2.40 mg/dL   Morphology   Result Value Ref Range    RBC Morphology See Below     RBC Fragments Few     Ovalocytes Few    Urinalysis with Reflex Microscopic   Result Value Ref Range    Color, Urine Light-Yellow Light-Yellow, Yellow, Dark-Yellow    Appearance, Urine Clear Clear    Specific Gravity, Urine 1.010 1.005 - 1.035    pH, Urine 6.0 5.0, 5.5, 6.0, 6.5, 7.0, 7.5, 8.0    Protein, Urine NEGATIVE NEGATIVE, 10 (TRACE), 20 (TRACE) mg/dL    Glucose, Urine Normal Normal mg/dL    Blood, Urine 0.03 (TRACE) (A) NEGATIVE mg/dL    Ketones, Urine NEGATIVE NEGATIVE mg/dL    Bilirubin, Urine NEGATIVE NEGATIVE mg/dL    Urobilinogen, Urine Normal Normal mg/dL    Nitrite, Urine NEGATIVE NEGATIVE    Leukocyte Esterase, Urine NEGATIVE NEGATIVE   Urine electrolytes   Result Value Ref Range    Sodium, Urine Random 108 mmol/L    Sodium/Creatinine Ratio 119 Not established. mmol/g Creat    Potassium, Urine Random 23 mmol/L    Potassium/Creatinine Ratio 25 Not established mmol/g Creat    Chloride, Urine Random 116 mmol/L    Chloride/Creatinine Ratio 128 23 - 275 mmol/g creat    Creatinine, Urine Random 90.7 20.0 - 370.0 mg/dL   Microscopic Only, Urine  "  Result Value Ref Range    WBC, Urine NONE 1-5, NONE /HPF    RBC, Urine NONE NONE, 1-2, 3-5 /HPF   POCT GLUCOSE   Result Value Ref Range    POCT Glucose 123 (H) 74 - 99 mg/dL     *Note: Due to a large number of results and/or encounters for the requested time period, some results have not been displayed. A complete set of results can be found in Results Review.      ** ASSESSMENT:  Mr. Singer \"Dash\" Zita is a 69 male with a PMHx of HTN, HLD, smalls's esophagus, RO GBM s/p resection ([facility], 12/2023) with resultant L HH s/p chemo/RT who represented for tumor progression in February. He underwent redo craniotomy for tumor resection 02/20 who had repeat MRI in March showing postop changes with fluid collection at dura and craniotomy site, worsening infiltrative disease, enhancement along posteromedial aspect of resection site. He represented on 04/05/25 with concerns for increased wound drainage and was admitted under the care of Neurosurgery that was transitioned to medical oncology.   Hospital course c/b abscess s/p washout 4/6. MRSE Staph epidermidis growing from cultures and placed on IV vancomycin therapy (6/15 end date). Also with LLE RUE DVTs s/p IVC filter placement due to pancyopenia. Nephrology is consulted for JAMAR.     #Nonoliguric JAMAR  - Baseline Cr <1.  - Onset 4/25  - Likely Cause is Vancomycin, JAMAR coincides with Peak Vanc trough of 38. Other causes are not excluded  - UA: Trace blood, otherwise bland  - FENa 16%, CK WNL.   - Renal U/S: Mild Left hydronephrosis, Bilateral kidneys ~11.5cm, Echogenic Kidneys.      ** RECOMMENDATIONS:  - Consider N/S volume Resuscitation for Possible Hypovolemia  - Strict I/O chart & Daily Weight  - Adjust medication doses for EGFR  - Avoid Nephrotoxins, Hypotension, Contrast  - Daily RFP    Yakov Ackerman MD  Nephrology Fellow   Daytime / Weekend Renal Pager [phone]  After 7 pm Emergencies [phone] Pager [phone]  "

## 2025-04-28 NOTE — PROGRESS NOTES
"Enmanuel Ahumada \"Petey" is a 69 y.o. male on day 23 of admission presenting with Wound dehiscence.    Subjective   No acute events overnight, sutures removed    Objective     Physical Exam  Awake  Ox3  RUE 5/5  RLE 5/5  LUE 4+/5  LLE 4+/5  Sensation intact to light touch  Incision c/d/I, sutures in place, no leaking     Last Recorded Vitals  Blood pressure 146/85, pulse 91, temperature 36.6 °C (97.9 °F), temperature source Temporal, resp. rate 18, height 1.702 m (5' 7\"), weight 67 kg (147 lb 11.3 oz), SpO2 98%.  Intake/Output last 3 Shifts:  I/O last 3 completed shifts:  In: 2345 (35 mL/kg) [P.O.:240; I.V.:750 (11.2 mL/kg); Blood:245; IV Piggyback:1110]  Out: 1246 (18.6 mL/kg) [Urine:1246 (0.5 mL/kg/hr)]  Weight: 67 kg     Relevant Results  Results from last 72 hours   Lab Units 04/27/25  0430 04/26/25  0605   WBC AUTO x10*3/uL 1.8* 1.9*   NRBC AUTO /100 WBCs 0.0 0.0   RBC AUTO x10*6/uL 2.22* 2.11*   HEMOGLOBIN g/dL 7.1* 7.0*   HEMATOCRIT % 21.5* 19.6*   MCV fL 97 93   MCH pg 32.0 33.2   MCHC g/dL 33.0 35.7   RDW % 16.6* 17.0*   PLATELETS AUTO x10*3/uL 62* 44*      Results from last 72 hours   Lab Units 04/27/25  0430 04/26/25  0605   GLUCOSE mg/dL 99 95   SODIUM mmol/L 138 137   POTASSIUM mmol/L 3.9 3.9   CHLORIDE mmol/L 105 105   CO2 mmol/L 24 26   ANION GAP mmol/L 13 10   BUN mg/dL 33* 28*   CREATININE mg/dL 4.14* 3.71*   EGFR mL/min/1.73m*2 15* 17*   CALCIUM mg/dL 7.6* 7.6*   PHOSPHORUS mg/dL 3.6 3.0   ALBUMIN g/dL 2.6* 2.6*   MAGNESIUM mg/dL 1.72  --           Assessment/Plan   Assessment & Plan  Wound dehiscence    69 M h/o HTN, HLD, smalls's esophagus, RO GBM s/p resection ([facility], 12/2023) with resultant L HH s/p chemo/RT presenting with tumor progression, 2/20 s/p redo craniotomy for tumor resection w 5 ALA, CTH POC, 2/21 MRI intended NTR, 3/16 MRI postop changes w fluid collection at dura and craniotomy site, worsening infiltrative disease, enhancement along posteromedial aspect of resection site, 4/5 " p/w 1wk incisional drainage, 4/4 CTH airfluid level in resection cavity, 4/5 MRI brain w/wo incr'd resection cavity enhancement and diffusion restriction  4/6 s/p R cranial wound washout and mesh cranioplasty (kaleb purulence), CTH POC, L temporal ICH, plts 81 s/p 2u plts, 4/7 rCTH incr SAH, min incr ICH, rrCTH stable, EEG neg dc'd  4/8 s/p PICC  4/9 CTH stable, SDD dc'd, EEG neg  4/10 SGD dc'd  4/11 DVT US x4, LLE distal femoral, popliteal, gastroc, peroneal, post tibial vv DVTs, RUE R brachial DVT, s/p IVC filter    4/13 CTH stable  4/14 PICC auto dc'd, agitation, CTH stable   4/15 s/p PICC  4/16 CTH stable, restraints removed, no agitation noted   4/17 plts 75, set PF4/BRANDO  4/19 plts 49, plan to give 1 unit platelets  4/21 acute change in exam, AMS and not following commands as briskly, CTH stable   4/23 EEG neg, dc'd, plt 82, failed MBS, rCTH stable     Plan:  [facility] med recs  Fu with Dr. Cortez arranged  SLP recs-diet  PLT goal >50  PTOT- rehab       Guanaco Duenas MD

## 2025-04-29 LAB
ABO GROUP (TYPE) IN BLOOD: NORMAL
ALBUMIN SERPL BCP-MCNC: 2.8 G/DL (ref 3.4–5)
ANION GAP SERPL CALC-SCNC: 14 MMOL/L (ref 10–20)
ANTIBODY SCREEN: NORMAL
BASOPHILS # BLD AUTO: 0 X10*3/UL (ref 0–0.1)
BASOPHILS NFR BLD AUTO: 0 %
BUN SERPL-MCNC: 33 MG/DL (ref 6–23)
BURR CELLS BLD QL SMEAR: NORMAL
CALCIUM SERPL-MCNC: 8 MG/DL (ref 8.6–10.6)
CHLORIDE SERPL-SCNC: 105 MMOL/L (ref 98–107)
CO2 SERPL-SCNC: 25 MMOL/L (ref 21–32)
CREAT SERPL-MCNC: 3.51 MG/DL (ref 0.5–1.3)
EGFRCR SERPLBLD CKD-EPI 2021: 18 ML/MIN/1.73M*2
EOSINOPHIL # BLD AUTO: 0.03 X10*3/UL (ref 0–0.7)
EOSINOPHIL NFR BLD AUTO: 2.7 %
ERYTHROCYTE [DISTWIDTH] IN BLOOD BY AUTOMATED COUNT: 16.2 % (ref 11.5–14.5)
GLUCOSE SERPL-MCNC: 112 MG/DL (ref 74–99)
HCT VFR BLD AUTO: 21.5 % (ref 41–52)
HGB BLD-MCNC: 7.3 G/DL (ref 13.5–17.5)
IMM GRANULOCYTES # BLD AUTO: 0 X10*3/UL (ref 0–0.7)
IMM GRANULOCYTES NFR BLD AUTO: 0 % (ref 0–0.9)
LYMPHOCYTES # BLD AUTO: 0.27 X10*3/UL (ref 1.2–4.8)
LYMPHOCYTES NFR BLD AUTO: 23.9 %
MAGNESIUM SERPL-MCNC: 1.64 MG/DL (ref 1.6–2.4)
MCH RBC QN AUTO: 32.6 PG (ref 26–34)
MCHC RBC AUTO-ENTMCNC: 34 G/DL (ref 32–36)
MCV RBC AUTO: 96 FL (ref 80–100)
MONOCYTES # BLD AUTO: 0.11 X10*3/UL (ref 0.1–1)
MONOCYTES NFR BLD AUTO: 9.7 %
NEUTROPHILS # BLD AUTO: 0.72 X10*3/UL (ref 1.2–7.7)
NEUTROPHILS NFR BLD AUTO: 63.7 %
NRBC BLD-RTO: 0 /100 WBCS (ref 0–0)
PHOSPHATE SERPL-MCNC: 3.8 MG/DL (ref 2.5–4.9)
PLATELET # BLD AUTO: 56 X10*3/UL (ref 150–450)
POTASSIUM SERPL-SCNC: 3.8 MMOL/L (ref 3.5–5.3)
RBC # BLD AUTO: 2.24 X10*6/UL (ref 4.5–5.9)
RBC MORPH BLD: NORMAL
RH FACTOR (ANTIGEN D): NORMAL
SODIUM SERPL-SCNC: 140 MMOL/L (ref 136–145)
VANCOMYCIN SERPL-MCNC: 15 UG/ML (ref 5–20)
WBC # BLD AUTO: 1.1 X10*3/UL (ref 4.4–11.3)

## 2025-04-29 PROCEDURE — 86901 BLOOD TYPING SEROLOGIC RH(D): CPT

## 2025-04-29 PROCEDURE — 80202 ASSAY OF VANCOMYCIN: CPT

## 2025-04-29 PROCEDURE — 2500000001 HC RX 250 WO HCPCS SELF ADMINISTERED DRUGS (ALT 637 FOR MEDICARE OP)

## 2025-04-29 PROCEDURE — 36415 COLL VENOUS BLD VENIPUNCTURE: CPT

## 2025-04-29 PROCEDURE — 85025 COMPLETE CBC W/AUTO DIFF WBC: CPT

## 2025-04-29 PROCEDURE — 84100 ASSAY OF PHOSPHORUS: CPT

## 2025-04-29 PROCEDURE — 2500000004 HC RX 250 GENERAL PHARMACY W/ HCPCS (ALT 636 FOR OP/ED)

## 2025-04-29 PROCEDURE — 99233 SBSQ HOSP IP/OBS HIGH 50: CPT

## 2025-04-29 PROCEDURE — 1200000003 HC ONCOLOGY  ROOM WITH TELEMETRY DAILY

## 2025-04-29 PROCEDURE — 97530 THERAPEUTIC ACTIVITIES: CPT | Mod: GP,CQ

## 2025-04-29 PROCEDURE — 83735 ASSAY OF MAGNESIUM: CPT

## 2025-04-29 PROCEDURE — 80069 RENAL FUNCTION PANEL: CPT

## 2025-04-29 PROCEDURE — 02HV33Z INSERTION OF INFUSION DEVICE INTO SUPERIOR VENA CAVA, PERCUTANEOUS APPROACH: ICD-10-PCS | Performed by: RADIOLOGY

## 2025-04-29 PROCEDURE — 99232 SBSQ HOSP IP/OBS MODERATE 35: CPT | Performed by: INTERNAL MEDICINE

## 2025-04-29 RX ORDER — VANCOMYCIN HYDROCHLORIDE 750 MG/150ML
750 INJECTION, SOLUTION INTRAVENOUS ONCE
Status: DISCONTINUED | OUTPATIENT
Start: 2025-04-29 | End: 2025-04-30

## 2025-04-29 RX ORDER — CLONIDINE HYDROCHLORIDE 0.1 MG/1
0.1 TABLET ORAL 2 TIMES DAILY
Status: COMPLETED | OUTPATIENT
Start: 2025-04-29 | End: 2025-05-02

## 2025-04-29 RX ORDER — LIDOCAINE HYDROCHLORIDE 10 MG/ML
5 INJECTION, SOLUTION INFILTRATION; PERINEURAL ONCE
Status: DISCONTINUED | OUTPATIENT
Start: 2025-04-29 | End: 2025-05-06 | Stop reason: HOSPADM

## 2025-04-29 RX ORDER — CLONIDINE HYDROCHLORIDE 0.1 MG/1
0.1 TABLET ORAL DAILY
Status: COMPLETED | OUTPATIENT
Start: 2025-05-03 | End: 2025-05-05

## 2025-04-29 RX ADMIN — CLONIDINE HYDROCHLORIDE 0.2 MG: 0.2 TABLET ORAL at 10:15

## 2025-04-29 RX ADMIN — LACOSAMIDE 100 MG: 100 TABLET, FILM COATED ORAL at 21:58

## 2025-04-29 RX ADMIN — HYDROXYZINE HYDROCHLORIDE 10 MG: 10 TABLET ORAL at 05:38

## 2025-04-29 RX ADMIN — LACOSAMIDE 100 MG: 100 TABLET, FILM COATED ORAL at 10:15

## 2025-04-29 RX ADMIN — FAMOTIDINE 10 MG: 20 TABLET, FILM COATED ORAL at 10:15

## 2025-04-29 RX ADMIN — HEPARIN SODIUM 5000 UNITS: 5000 INJECTION, SOLUTION INTRAVENOUS; SUBCUTANEOUS at 21:58

## 2025-04-29 RX ADMIN — CLONIDINE HYDROCHLORIDE 0.1 MG: 0.2 TABLET ORAL at 21:57

## 2025-04-29 RX ADMIN — HEPARIN SODIUM 5000 UNITS: 5000 INJECTION, SOLUTION INTRAVENOUS; SUBCUTANEOUS at 10:15

## 2025-04-29 RX ADMIN — ACETAMINOPHEN 975 MG: 325 TABLET ORAL at 10:15

## 2025-04-29 RX ADMIN — ATORVASTATIN CALCIUM 40 MG: 40 TABLET, FILM COATED ORAL at 21:58

## 2025-04-29 RX ADMIN — ACETAMINOPHEN 975 MG: 325 TABLET ORAL at 21:58

## 2025-04-29 RX ADMIN — ACETAMINOPHEN 975 MG: 325 TABLET ORAL at 15:52

## 2025-04-29 RX ADMIN — HEPARIN SODIUM 5000 UNITS: 5000 INJECTION, SOLUTION INTRAVENOUS; SUBCUTANEOUS at 15:53

## 2025-04-29 ASSESSMENT — PAIN SCALES - GENERAL
PAINLEVEL_OUTOF10: 0 - NO PAIN

## 2025-04-29 ASSESSMENT — COGNITIVE AND FUNCTIONAL STATUS - GENERAL
EATING MEALS: TOTAL
DRESSING REGULAR UPPER BODY CLOTHING: TOTAL
MOBILITY SCORE: 6
MOBILITY SCORE: 11
TOILETING: TOTAL
MOVING FROM LYING ON BACK TO SITTING ON SIDE OF FLAT BED WITH BEDRAILS: TOTAL
WALKING IN HOSPITAL ROOM: TOTAL
CLIMB 3 TO 5 STEPS WITH RAILING: TOTAL
MOVING TO AND FROM BED TO CHAIR: A LOT
CLIMB 3 TO 5 STEPS WITH RAILING: TOTAL
STANDING UP FROM CHAIR USING ARMS: A LOT
DAILY ACTIVITIY SCORE: 6
STANDING UP FROM CHAIR USING ARMS: TOTAL
PERSONAL GROOMING: TOTAL
MOVING TO AND FROM BED TO CHAIR: TOTAL
TURNING FROM BACK TO SIDE WHILE IN FLAT BAD: A LOT
HELP NEEDED FOR BATHING: TOTAL
DRESSING REGULAR LOWER BODY CLOTHING: TOTAL
MOVING FROM LYING ON BACK TO SITTING ON SIDE OF FLAT BED WITH BEDRAILS: A LITTLE
TURNING FROM BACK TO SIDE WHILE IN FLAT BAD: TOTAL
WALKING IN HOSPITAL ROOM: TOTAL

## 2025-04-29 ASSESSMENT — PAIN - FUNCTIONAL ASSESSMENT: PAIN_FUNCTIONAL_ASSESSMENT: 0-10

## 2025-04-29 NOTE — NURSING NOTE
Patient's telemetry batteries changed.  Telemetry monitor turned back on and functioning appropriately.    Oscar Spicer RN

## 2025-04-29 NOTE — PROGRESS NOTES
Enmanuel Ahumada   69 star.o.    @@  N/Room: 00400504/4001/4001-A    Subjective:   Improved    Objective:     Meds:   acetaminophen, 975 mg, TID  [Held by provider] apixaban, 5 mg, BID  atorvastatin, 40 mg, Daily  cloNIDine, 0.1 mg, BID   Followed by  [START ON 5/3/2025] cloNIDine, 0.1 mg, Daily  famotidine, 10 mg, Daily  heparin, 5,000 Units, TID  lacosamide, 100 mg, BID  lidocaine, 5 mL, Once  lidocaine, 1 patch, Daily  polyethylene glycol, 17 g, Daily  [Held by provider] QUEtiapine, 25 mg, Nightly  sennosides-docusate sodium, 1 tablet, Nightly  vancomycin, 750 mg, Once         alteplase, 2 mg, PRN  bisacodyl, 10 mg, Daily PRN  [Held by provider] cyclobenzaprine, 10 mg, TID PRN  dextrose, 12.5 g, q15 min PRN  dextrose, 25 g, q15 min PRN  glucagon, 1 mg, q15 min PRN  glucagon, 1 mg, q15 min PRN  hydrOXYzine HCL, 10 mg, q6h PRN  ondansetron, 4 mg, q8h PRN   Or  ondansetron, 4 mg, q8h PRN  oxygen, , Continuous PRN - O2/gases  vancomycin, , Daily PRN        Vitals:    04/29/25 1127   BP: 128/82   Pulse: 77   Resp: 18   Temp: 37.5 °C (99.5 °F)   SpO2: 95%          Intake/Output Summary (Last 24 hours) at 4/29/2025 1445  Last data filed at 4/29/2025 0915  Gross per 24 hour   Intake --   Output 1200 ml   Net -1200 ml       Having PT  On RA  Comfortable    Blood Labs:  Results for orders placed or performed during the hospital encounter of 04/05/25 (from the past 24 hours)   CBC and Auto Differential   Result Value Ref Range    WBC 1.1 (L) 4.4 - 11.3 x10*3/uL    nRBC 0.0 0.0 - 0.0 /100 WBCs    RBC 2.24 (L) 4.50 - 5.90 x10*6/uL    Hemoglobin 7.3 (L) 13.5 - 17.5 g/dL    Hematocrit 21.5 (L) 41.0 - 52.0 %    MCV 96 80 - 100 fL    MCH 32.6 26.0 - 34.0 pg    MCHC 34.0 32.0 - 36.0 g/dL    RDW 16.2 (H) 11.5 - 14.5 %    Platelets 56 (L) 150 - 450 x10*3/uL    Neutrophils % 63.7 40.0 - 80.0 %    Immature Granulocytes %, Automated 0.0 0.0 - 0.9 %    Lymphocytes % 23.9 13.0 - 44.0 %    Monocytes % 9.7 2.0 - 10.0 %     "Eosinophils % 2.7 0.0 - 6.0 %    Basophils % 0.0 0.0 - 2.0 %    Neutrophils Absolute 0.72 (L) 1.20 - 7.70 x10*3/uL    Immature Granulocytes Absolute, Automated 0.00 0.00 - 0.70 x10*3/uL    Lymphocytes Absolute 0.27 (L) 1.20 - 4.80 x10*3/uL    Monocytes Absolute 0.11 0.10 - 1.00 x10*3/uL    Eosinophils Absolute 0.03 0.00 - 0.70 x10*3/uL    Basophils Absolute 0.00 0.00 - 0.10 x10*3/uL   Renal Function Panel   Result Value Ref Range    Glucose 112 (H) 74 - 99 mg/dL    Sodium 140 136 - 145 mmol/L    Potassium 3.8 3.5 - 5.3 mmol/L    Chloride 105 98 - 107 mmol/L    Bicarbonate 25 21 - 32 mmol/L    Anion Gap 14 10 - 20 mmol/L    Urea Nitrogen 33 (H) 6 - 23 mg/dL    Creatinine 3.51 (H) 0.50 - 1.30 mg/dL    eGFR 18 (L) >60 mL/min/1.73m*2    Calcium 8.0 (L) 8.6 - 10.6 mg/dL    Phosphorus 3.8 2.5 - 4.9 mg/dL    Albumin 2.8 (L) 3.4 - 5.0 g/dL   Magnesium   Result Value Ref Range    Magnesium 1.64 1.60 - 2.40 mg/dL   Vancomycin   Result Value Ref Range    Vancomycin 15.0 5.0 - 20.0 ug/mL   Morphology   Result Value Ref Range    RBC Morphology See Below     Gordon Cells Few      *Note: Due to a large number of results and/or encounters for the requested time period, some results have not been displayed. A complete set of results can be found in Results Review.      ** ASSESSMENT:  Mr. Singer \"Dash\" Zita is a 69 male with a PMHx of HTN, HLD, smalls's esophagus, RO GBM s/p resection ([facility], 12/2023) with resultant L HH s/p chemo/RT who represented for tumor progression in February. He underwent redo craniotomy for tumor resection 02/20 who had repeat MRI in March showing postop changes with fluid collection at dura and craniotomy site, worsening infiltrative disease, enhancement along posteromedial aspect of resection site. He represented on 04/05/25 with concerns for increased wound drainage and was admitted under the care of Neurosurgery that was transitioned to medical oncology.   Hospital course c/b abscess s/p washout 4/6. " MRSE Staph epidermidis growing from cultures and placed on IV vancomycin therapy (6/15 end date). Also with LLE RUE DVTs s/p IVC filter placement due to pancyopenia. Nephrology is consulted for JAMAR.     #Nonoliguric JAMAR  - Baseline Cr <1.  - Onset 4/25  - Likely Cause is Vancomycin, JAMAR coincides with Peak Vanc trough of 38. Other causes are not excluded  - UA: Trace blood, otherwise bland  - FENa 16%, CK WNL.   - Renal U/S: Mild Left hydronephrosis, Bilateral kidneys ~11.5cm, Echogenic Kidneys.  - Improved with higher urine output    ** RECOMMENDATIONS:  - No indication for RRT on assessment  - Strict I/O chart & Daily Weight  - Adjust medication doses for EGFR  - Avoid Nephrotoxins, Hypotension, Contrast  - Daily RFP    Yakov Ackerman MD  Nephrology Fellow   Daytime / Weekend Renal Pager [phone]  After 7 pm Emergencies [phone] Pager [phone]

## 2025-04-29 NOTE — CARE PLAN
The patient's goals for the shift include free from fall    The clinical goals for the shift include patient will remain safe and be HDS with VSS

## 2025-04-29 NOTE — PROGRESS NOTES
"Art Therapy Note    Enmanuel Evans \"Dash\" Zita     Therapy Session  Referral Type: New referral this admission  Visit Type: New visit  Session Start Time: 1410  Session End Time: 1436  Intervention Delivery: In-person  Conflict of Service: None  Number of family members present: 1  Family Present for Session: Spouse/Significant Other  Family Participation: Interactive    Pre-assessment  Mood/Affect: Appropriate, Calm         Treatment/Interventions  Areas of Focus: Coping, Locus of control, Emotional support, Self-expression  Art Therapy Interventions: Active art engagement, Empathic listening/validating emotions  Interruption: No  Patient Fell Asleep at End of Session: No    Post-assessment  Total Session Time (min): 26 minutes    Narrative  Assessment Detail: ATR found pt. resting in bed with wife at bedside upon arrival. ATR introduced self and art therapy services to pt. Pt. was agreeable to session.  Plan: To actively engage pt. in art making as an appropriate coping skill.  Intervention: Pt. requested to draw with markers and paper. ATR provided materials while pt. wife discussed pt.'s interest in drawing and particular skill with pictionary. Pt. worked on drawing a \"funny mey\" that he usually draws at home.  Evaluation: Pt. worked on drawing but became increasingly frustrated due to cognitive impairment. Pt. stated that he couldn't draw what he wanted to. ATR validated pt. emotions and attempted to direct pt. to another material but pt. wanted to continue drawing. Pt. discussed thoughts and feelings about diagnosis with wife and ATR as well as love of music, specifically Austin Jones.  Follow-up: Art therapy will continue to follow.    Education Documentation  No documentation found.      "

## 2025-04-29 NOTE — PROGRESS NOTES
"  Physical Therapy Treatment    Patient Name: Enmanuel Ahumada \"Dash\"  MRN: [id number]  Today's Date: 4/29/2025  Room: [address] [place] [address]-A  Time Calculation  Start Time: 0920  Stop Time: 1000  Time Calculation (min): 40 min       Assessment/Plan   PT Assessment  PT Assessment Results: Decreased strength, Decreased endurance, Impaired balance, Decreased coordination, Decreased mobility, Decreased cognition, Impaired judgement, Decreased safety awareness  Rehab Prognosis: Good  Barriers to Discharge Home: Caregiver assistance, Cognition needs, Physical needs  Caregiver Assistance: Caregiver assistance needed per identified barriers - however, level of patient's required assistance exceeds assistance available at home  Cognition Needs: 24hr supervision for safety awareness needed, Insight of patient limited regarding functional ability/needs, Cognition-related high falls risk  Physical Needs: High falls risk due to function or environment, 24hr mobility assistance needed, Stair navigation into home limited by function/safety, Ambulating household distances limited by function/safety  Evaluation/Treatment Tolerance: Patient limited by fatigue  Strengths: Attitude of self  Barriers to Participation: Comorbidities, Ability to acquire knowledge, Insight into problems (decreased cognition)  End of Session Patient Position: Bed, 3 rail up, Alarm on  PT Plan  Inpatient/Swing Bed or Outpatient: Inpatient  PT Plan  Treatment/Interventions: Bed mobility, Transfer training, Gait training, Stair training, Balance training, Neuromuscular re-education, Strengthening, Endurance training, Range of motion, Therapeutic exercise, Therapeutic activity  PT Plan: Ongoing PT  PT Frequency: 5 times per week  PT Discharge Recommendations: High intensity level of continued care  Equipment Recommended upon Discharge:  (tbd at next level of care)  PT Recommended Transfer Status: Assist x1, Assistive device  PT - OK to Discharge: Yes (Meaning pt has " been evaluated and d/c reccs have been made.)  Assessment: Patient is progressing Fairly with therapy this date. Increased difficulty sitting EOB d/t restlessness and tremors req. modA at times.     General Visit Information:   PT  Visit  PT Received On: 04/29/25  Prior to Session Communication: Bedside nurse  Patient Position Received: Bed, 3 rail up, Alarm on  Family/Caregiver Present: Yes  Caregiver Feedback: wife present and supportive     Subjective   Subjective: pt lethargic/sleepy upon approach  Precautions:  Precautions  Medical Precautions: Fall precautions      Objective   Pain:  Pain Assessment  Pain Assessment: 0-10  0-10 (Numeric) Pain Score: 0 - No pain  Cognition:  Cognition  Overall Cognitive Status: Impaired  Arousal/Alertness: Delayed responses to stimuli  Orientation Level: Disoriented to situation  Following Commands: Follows one step commands with repetition (needs max cues)  Cognition Comments: limited attention, drowsy frequent tactile cues. confused  Insight: Moderate  Impulsive: Mildly  Processing Speed: Delayed     Static Sitting balance:  Static Sitting Balance  Static Sitting-Balance Support: Feet supported, Bilateral upper extremity supported  Static Sitting-Level of Assistance: Contact guard, Moderate assistance  Static Sitting-Comment/Number of Minutes: Increased time to sit EOB d/t poor follow thru of directions. Demos retro/R trunk lean. Max verbal/tactile cues to put feet on ground varies from CGA to modA  Static Standing Balance:  Static Standing Balance  Static Standing-Balance Support: Left upper extremity supported  Static Standing-Level of Assistance: Moderate assistance  Static Standing-Comment/Number of Minutes: attempted stand w/o walker since pt did not use on prior and had difficulty not pulling up on walker previously, able to stand for 15s, noted LOB and needing to sit.    Lines/Tubes/Drains:  External Urinary Catheter Male (Active)   Number of days: 13     Continuous  Medications/Drips:  Continuous Medications[1]    PT Treatments:     Therapeutic Activity  Therapeutic Activity 1: increased time for pericare during tx and for follow thru of directions. educated spouse to encourage using L side or bringing attention to even with visual deficit     Bed Mobility 1  Bed Mobility 1: Supine to sitting  Level of Assistance 1: Moderate assistance  Bed Mobility Comments 1: max cues for sequencing, increased time for follow thru  Bed Mobility 2  Bed Mobility  2: Rolling right, Rolling left  Level of Assistance 2: Minimum assistance  Bed Mobility 3  Bed Mobility 3: Scooting  Level of Assistance 3: Minimum assistance  Bed Mobility Comments 3: for balance able to scoot EOB  Ambulation/Gait Training  Ambulation/Gait Training Performed: No  Transfer 1  Transfer From 1: Sit to  Transfer to 1: Stand  Technique 1: Stand to sit, Sit to stand  Transfer Level of Assistance 1: Hand held assistance, Moderate assistance  Trials/Comments 1: locks knees out unable to achieve balance and attempts to put feet up on bed soon after sitting             Activity tolerance:  Activity Tolerance  Endurance: Decreased tolerance for upright activites    Outcome Measures:  [facility] Basic Mobility  Turning from your back to your side while in a flat bed without using bedrails: A little  Moving from lying on your back to sitting on the side of a flat bed without using bedrails: A lot  Moving to and from bed to chair (including a wheelchair): A lot  Standing up from a chair using your arms (e.g. wheelchair or bedside chair): A lot  To walk in hospital room: Total  Climbing 3-5 steps with railing: Total  Basic Mobility - Total Score: 11     Education Documentation  Body Mechanics, taught by Gayatri Zayas PTA at 4/29/2025 11:19 AM.  Learner: Patient  Readiness: Acceptance  Method: Explanation  Response: No Evidence of Learning    Precautions, taught by Gayatri Zayas PTA at 4/29/2025 11:19 AM.  Learner: Patient  Readiness:  Acceptance  Method: Explanation  Response: No Evidence of Learning    Mobility Training, taught by Gayatri Zayas PTA at 4/29/2025 11:19 AM.  Learner: Patient  Readiness: Acceptance  Method: Explanation  Response: No Evidence of Learning    Body Mechanics, taught by Gayatri Zayas PTA at 4/29/2025 11:19 AM.  Learner: Patient  Readiness: Acceptance  Method: Explanation  Response: No Evidence of Learning    Precautions, taught by Gayatri Zayas PTA at 4/29/2025 11:19 AM.  Learner: Patient  Readiness: Acceptance  Method: Explanation  Response: No Evidence of Learning    ADL Training, taught by Gayatri Zayas PTA at 4/29/2025 11:19 AM.  Learner: Patient  Readiness: Acceptance  Method: Explanation  Response: No Evidence of Learning    Education Comments  No comments found.          OP EDUCATION:       Encounter Problems       Encounter Problems (Active)       PT Problem       Patient will perform bed mobility IND to reduce risk of developing decubitus ulcers.  (Progressing)       Start:  04/08/25    Expected End:  05/09/25            Patient will ambulate at least 150 ft. with </= close sup and LRD to improve tolerance of household distances.  (Progressing)       Start:  04/08/25    Expected End:  05/09/25            Patient will perform the 5-Time Sit to Stand test in <14 sec to indicate decreased falls risk. (Progressing)       Start:  04/08/25    Expected End:  05/09/25            Patient will perform sit to stand and stand to sit transfers with </= close sup and LRD to increase functional strength.  (Progressing)       Start:  04/08/25    Expected End:  05/09/25            BLE 5/5 (Progressing)       Start:  04/08/25    Expected End:  05/09/25               Pain - Adult                                 [1]

## 2025-04-29 NOTE — NURSING NOTE
Arnaldo consulted for PIV placement by bedside RN. Arnaldo RN to bedside to assess.  #20 ga diffusics in left Fa/AC area. Scanned bilateral upper extremities, limited vessels noted for IV cannulation.  Bedside RN made aware, alternative access recommended based on viscosity of ordered IV medications and mental/physical care needs.

## 2025-04-29 NOTE — PROGRESS NOTES
"Subjective     Enmanuel Ahumada \"Petey" is a 69 y.o. male on day 24 of admission presenting with Wound dehiscence.    Overnight: Pt pulled picc overnight.    Today pt is doing well. Enjoyed music therapy today, got him talking about the [place] concerns he has been to in the past. No complaints. Ate a good bit for lunch.        Objective   Current Vitals  /75 (BP Location: Right arm, Patient Position: Lying)   Pulse 79   Temp 36.8 °C (98.3 °F) (Temporal)   Resp 18   Ht 1.702 m (5' 7\")   Wt 67 kg (147 lb 11.3 oz)   SpO2 96%   BMI 23.13 kg/m²      I/O last 3 completed shifts:  In: - (0 mL/kg)   Out: 450 (6.7 mL/kg) [Urine:450 (0.2 mL/kg/hr)]  Weight: 67 kg     Physical Exam  Vitals reviewed.   Constitutional:       General: He is not in acute distress.     Appearance: He is ill-appearing. He is not toxic-appearing.      Comments: Intermittent alertness, oriented x1 but recognizes wife able to tell me they have been  38 years   HENT:      Mouth/Throat:      Mouth: Mucous membranes are moist.      Pharynx: Oropharynx is clear.   Eyes:      Extraocular Movements: Extraocular movements intact.   Cardiovascular:      Rate and Rhythm: Normal rate and regular rhythm.      Heart sounds: Normal heart sounds.   Pulmonary:      Effort: Pulmonary effort is normal. No respiratory distress.   Abdominal:      General: There is no distension.      Palpations: Abdomen is soft.      Tenderness: There is no abdominal tenderness. There is no guarding.   Musculoskeletal:      Cervical back: Neck supple.      Right lower leg: No edema.      Left lower leg: No edema.   Skin:     General: Skin is warm and dry.      Coloration: Skin is pale.      Findings: No rash.   Neurological:      General: No focal deficit present.      Mental Status: He is alert. He is disoriented.      Comments: Fine tremor of the upper extremities noted   Psychiatric:         Behavior: Behavior normal.     Relevant Results  Labs:  CBC: WBC " "1.1 , HGB 7.3, PLT 56  BMP: , K 3.8, Cl 105, HCO3 25, BUN 33, CR 3.51, Glu 112  LFTS: AST 17 , ALT 38, ALKPHOS 71 , TBILI 1.2 , DBILI 0.3  TROP: 3  BNP: No results found for requested labs within last 365 days.  COAGS: PT 14.1 , PTT 33  , INR 1.3  UA:   Results from last 7 days   Lab Units 04/28/25  0702   COLOR U  Light-Yellow   PH U  6.0   SPEC GRAV UR  1.010   PROTEIN U mg/dL NEGATIVE   BLOOD UR mg/dL 0.03 (TRACE)*   NITRITE U  NEGATIVE   WBC UR /HPF NONE     ABG:    CALCIUM 8.0 MAG No results found for requested labs within last 365 days. ALB 2.8 LACTATE 1.3 PHOS No results found for requested labs within last 365 days. COVIDNo results found for requested labs within last 365 days.    Micro/culture data:  Susceptibility data from last 120 days.  Collected Organism Clindamycin Erythromycin Oxacillin Tetracycline Trimethoprim/Sulfamethoxazole Vancomycin   04/06/25 1007 Staphylococcus epidermidis  S  S  R  S  R  S   04/06/25 1006 Staphylococcus epidermidis         04/06/25 1005 Staphylococcus epidermidis             Imaging:  ECG 12 Lead  Normal sinus rhythm  Inferior infarct , age undetermined  Cannot rule out Anterior infarct , age undetermined  Abnormal ECG  When compared with ECG of 25-APR-2025 04:02,  Minimal criteria for Anterior infarct are now Present  Nonspecific T wave abnormality now evident in Anterior leads           MEDS:  Scheduled medications  Scheduled Medications[1]  Continuous medications  Continuous Medications[2]  PRN medications  PRN Medications[3]       Assessment/Plan   Assessment/Plan:   Mr. Singer \"Dash\" Zita is a 69 male with a PMHx of HTN, HLD, smalls's esophagus, RO GBM s/p resection ([facility], 12/2023) with resultant L HH s/p chemo/RT who represented for tumor progression in February. He underwent redo craniotomy for tumor resection 02/20 who had repeat MRI in March showing postop changes with fluid collection at dura and craniotomy site, worsening infiltrative disease, " enhancement along posteromedial aspect of resection site. He represented on 04/05/25 with concerns for increased wound drainage and was admitted under the care of Neurosurgery. MRI brain onf 04/05 showed increased resection cavity enhancement and diffusion restriction concerning fo superimposed infection/abscess. He therefore underwent cranial wound washout on 04/06 with Neurosurgery. Subsequent cultures from OR revealed methicillin-resistant staph epidermidis, currently on vancomycin IV therapy until 06/15/25 per ID. His subsequent hospital course have been complicated by acute DVTs in the LLE and RUE s/p IVC filter placement due to pancytopenia (namely worsening thrombocytopenia) limiting anticoagulation, agitation/confusion, and new JAMAR.    Updates 04/29/25:  - Cr improving 3.51  - Fena 3.5 %, intrinsic  - Nephrology consulted, appreciate recs  - Needs PICC re-placed, consulted IR  - Plt this morning 56; Will continue to monitor   - no S or S of bleeding   - will re-engage vasc med if stable over next couple days    # Pancytopenia   # Thrombocytopenia  - Hematology consulted 04/21 felt thrombocytopenia to be multifactorial due to recent lomustine initiation (4/4), introduction of VPA (4/8), MRSE intracranial wound infection treated with vanc and cefepime, and possible consumption in setting of DVTs   - CBC trend: Plt 129 (4/19) ? 49 (4/20)   - LDH: 230 (normal)  -Smear: Clumping in blue tube, decreased plt in lavender tube, rare giant plts  - Coags: INR 1.4, PTT 30s (mildly elevated INR)  - HFP: Tbili 1.2, Alb 3.2, AST/ALT/ALP normal  - D-dimer: 2354  - B12, Folate, TSH: WNL  - PF4/BRANDO: Negative  - Has received multiple platelet transfusions (x8, most recent 04/26) and RBC transfusions (x1 04/25) without overt s or s of bleeding  - Hematology re-enaged 04/26 prior to transfer  - Note states platelet count marcela to 82 following 2 units of platelet transfusion and has since downtrended to 44 over 3 day period. Also  notable is worsening anemia throughout hospital stay   PLAN:  - Hematology following, appreciate input  - Sent: immature platelet fraction (1.1), reticulocyte count (3.0), haptoglobin (218) , LDH (277), PT (14.1), PTT (33) and fibrinogen (455)  - Will transfuse for Hgb < 7 & platelets < 10 or < 50 in the setting of bleeding   - CTM CBC daily  - Will transfuse prn per Hematology recs  - Active T&S    # JAMAR  - Upon transfer to our service, pt noted to have Cr trend of 0.66 -> 3.71 over 04/24 -> 04/26  - Previously normal kidney function without history of kidney disease or even previous AKIs  - Likely Cause is Vancomycin, JAMAR coincides with Peak Vanc trough of 38.   - PVR on 04/26 showing ~96 mL, less likely to be obstructive cause.  - No large changes in blood pressures recently  - JAMAR work-up:   - PVR, 96 mL   - Renal U/S: Mild left-sided hydronephrosis with few intrarenal calculi    - UA 04/27 negative, UA 04/28 pending   - Urine lytes 04/27: 3.6%, intrinsic. Serum sodium 138, serum cr 4.14, urine sodium 94, urine cr 77.8  - Urine lytes 04/28: 3.5%, intrinsic. Serum sodium 139, serum cr 4.04, urine sodium 108, urine cr 90.7   - CK 49  PLAN:  - Consulted nephrology, appreciate recs  - Changed PPI to famotidine  - Daily RFP  - Strict Is/Os  - Avoid nephrotoxic agents and sharp fluctuations in blood pressure    # LLE DVT  # RUE DVT  # IVC filter in place  - UE DVT US 4/11/25 revealed: RUE, acute non-occlusive deep vein thrombosis visualized in the brachial vein.  - LE DVT US 4/11/25 revealed: LLE, acute occlusive deep vein thrombosis visualized in the popliteal vein, age indeterminate deep vein thrombosis visualized in the distal femoral, gastrocnemius veins in the calf, posterior tibial and peroneal veins. The remainder of the left leg is negative for deep vein thrombosis.  - Due to thrombocytopenia underwent IVC filter placement 04/11/25  - Initially on heparin gtt, was started on Lovenox but switched to Eliquis  after adjusting anti-seizures meds, however Eliquis now Dc'd due to pancytopenia  - Was on Lovenox ppx dose since due to thrombocytopenia   PLAN:  - Vascular medicine signed off 04/26, appreciate recs  - Lovenox changed to heparin ppx 04/26 due to acute worsening of kidney function  - Can continue heparin prophylaxis as long as platelets are stable >30 K and no concerns about bleeding   - If platelets stabilized above 50K, full dose anticoagulation to be considered   - SCDs, in place  - While off full dose blood thinner monitor very closely for signs or symptoms suggesting new or worsening VTE or IVC filter thrombosis   - Agree with if pancytopenia secondary to chemotherapy, with plans to continue receiving chemotherapy on discharge, he will require very close monitoring if he is to leave on full dose anticoagulation   [  ] requesting us to call back when ready for full dose anticoagulation   - Changed heparin ppx dosing 04/26    # GBM s/p resection x2  # MRSE of crani site s/p washout  - Initially dx with GBM in 2023 s/p resection & chemo/RT    - Follows with Dr. Rolon  - Tumor progression with redo craniotomy 02/20/25 with NSGY  - Represented 04/05 for increased drainage from wound  - 04/06 s/p R cranial wound washout and mesh cranioplasty (kaleb purulence )  - Cultures from the subgaleal, epidural, and subdural spaces grew methicillin resistant Staph. Epi  - ID was previously consulted, recommending:    - Continue Vancomycin with goal -600    - antibiotic course tentative stop date will be 6/15/25   - PICC line placed 04/08 and replaced 04/15  PLAN:  - Continue vancomycin, dosed per pharmacy  - CTM daily levels  - ID follow up with Dr. Amy York   [  ] after discharge weekly CBC with diff, Chem 7, quantitative CRP, and Vancomycin trough   - Outpatient follow-up with Dr. Rolon  - Outpatient follow-up with Dr. 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 No epileptiform discharges are recorded. Overall, the degree of encephalopathy remains same compared to prior recording  PLAN:  - Per Neuro recs:   -  mg BID    - Transitioned to oral 04/28  - SLP eval 04/26 c/f aspiration but pt had previously had safe swallowing   - recommended PO intake only when awake and alert w/ upright positioning  - On regular diet  - HOLDING nightly Seroquel upon transfer due to increased somnolence over past few days   - HOLDING prn flexeril (hasn't used) upon transfer due to increased somnolence over past few days    # Intermittent tremors of BL upper extremities   - CTM    # Impaired mobility  - PT/OT - recommending high intensity   - PM&R last eval on 04/25 stating excellent candidate for acute rehabilitation once medically stable and is able and motivated to participate in 3 hours/day of therapy.      Fluids: Replete PRN  Electrolytes: Keep mg >2, phos >3  and K >4  Nutrition:  Adult diet Regular   Antimicrobials: vanco  DVT PPX:DVT: Unfractionated Heparin  GI ppx: PPI  Bowel care:Miralax and Senna  Catheter:None  Lines:PIV  Oxygen:Room Air    Code Status: Full Code (confirmed on admission)   NOK:  Primary Emergency Contact: CHRISTINE RIOS, Home Phone: [phone]     Ellyn Bradford MD   Internal Medicine, PGY-1          [1] acetaminophen, 975 mg, oral, TID  [Held by provider] apixaban, 5 mg, oral, BID  atorvastatin, 40 mg, oral, Daily  cloNIDine, 0.1 mg, oral, BID   Followed by  [START ON 5/3/2025] cloNIDine, 0.1 mg, oral, Daily  famotidine, 10 mg, oral, Daily  heparin, 5,000 Units, subcutaneous, TID  lacosamide, 100 mg, oral, BID  lidocaine, 5 mL, infiltration, Once  lidocaine, 1 patch, transdermal, Daily  polyethylene glycol, 17 g,  oral, Daily  [Held by provider] QUEtiapine, 25 mg, oral, Nightly  sennosides-docusate sodium, 1 tablet, oral, Nightly  vancomycin, 750 mg, intravenous, Once     [2]    [3] PRN medications: bisacodyl, [Held by provider] cyclobenzaprine, dextrose, dextrose, glucagon, glucagon, hydrOXYzine HCL, ondansetron **OR** ondansetron, oxygen, vancomycin

## 2025-04-29 NOTE — NURSING NOTE
Arnaldo consulted by bedside RN for PIV placement. Arnaldo RN to bedside to assess. Patient pulled out his PICC line. Scanned patient's left upper extremity via ultrasound, no vessels noted for IV cannulation. Scanned patient's right upper extremity via ultrasound, noted vessels don't easily compress. This nurse will consult with PICC team in the morning for reassessment of patient, bedside RN aware.

## 2025-04-29 NOTE — PROGRESS NOTES
Vancomycin Dosing by Pharmacy- FOLLOW UP    Enmanuel Ahumada is a 69 y.o. year old male who Pharmacy has been consulted for vancomycin dosing for CNS/meningoencephalitis. Based on the patient's indication and renal status this patient is being dosed based on a goal trough/random level of 15-20.     Renal function is currently improving.     Current vancomycin dose: 1250 mg given every 12 hours. Previously held since 25 for JAMAR and supratherapeutic level.      Most recent random level: 15  mcg/mL    Visit Vitals  /82 (BP Location: Right arm, Patient Position: Lying)   Pulse 77   Temp 37.5 °C (99.5 °F) (Temporal)   Resp 18        Lab Results   Component Value Date    CREATININE 3.51 (H) 2025    CREATININE 4.04 (H) 2025    CREATININE 4.14 (H) 2025    CREATININE 3.71 (H) 2025        Patient weight is as follows:   Vitals:    25 0539   Weight: 67 kg (147 lb 11.3 oz)       Cultures:  No results found for the encounter in last 14 days.       I/O last 3 completed shifts:  In: - (0 mL/kg)   Out: 450 (6.7 mL/kg) [Urine:450 (0.2 mL/kg/hr)]  Weight: 67 kg   I/O during current shift:  I/O this shift:  In: -   Out: 1000 [Urine:1000]    Temp (24hrs), Av.3 °C (99.2 °F), Min:37 °C (98.6 °F), Max:37.5 °C (99.5 °F)      Assessment/Plan    Within goal random/trough level. Will give vancomycin 750 mg IV once 25 at 1530.   The next level will be obtained on  at 1200. May be obtained sooner if clinically indicated.   Will continue to monitor renal function daily while on vancomycin and order serum creatinine at least every 48 hours if not already ordered.  Follow for continued vancomycin needs, clinical response, and signs/symptoms of toxicity.       Brandon Frey, PharmD

## 2025-04-30 ENCOUNTER — APPOINTMENT (OUTPATIENT)
Dept: RADIOLOGY | Facility: HOSPITAL | Age: 70
DRG: 907 | End: 2025-04-30
Payer: MEDICARE

## 2025-04-30 LAB
ALBUMIN SERPL BCP-MCNC: 3 G/DL (ref 3.4–5)
ANION GAP SERPL CALC-SCNC: 13 MMOL/L (ref 10–20)
ATRIAL RATE: 100 BPM
ATRIAL RATE: 92 BPM
BASOPHILS # BLD AUTO: 0.01 X10*3/UL (ref 0–0.1)
BASOPHILS NFR BLD AUTO: 0.5 %
BUN SERPL-MCNC: 29 MG/DL (ref 6–23)
BURR CELLS BLD QL SMEAR: NORMAL
CALCIUM SERPL-MCNC: 8.3 MG/DL (ref 8.6–10.6)
CHLORIDE SERPL-SCNC: 102 MMOL/L (ref 98–107)
CO2 SERPL-SCNC: 28 MMOL/L (ref 21–32)
CREAT SERPL-MCNC: 2.81 MG/DL (ref 0.5–1.3)
EGFRCR SERPLBLD CKD-EPI 2021: 24 ML/MIN/1.73M*2
EOSINOPHIL # BLD AUTO: 0.06 X10*3/UL (ref 0–0.7)
EOSINOPHIL NFR BLD AUTO: 3.3 %
ERYTHROCYTE [DISTWIDTH] IN BLOOD BY AUTOMATED COUNT: 16.4 % (ref 11.5–14.5)
GLUCOSE BLD MANUAL STRIP-MCNC: 100 MG/DL (ref 74–99)
GLUCOSE BLD MANUAL STRIP-MCNC: 145 MG/DL (ref 74–99)
GLUCOSE BLD MANUAL STRIP-MCNC: 94 MG/DL (ref 74–99)
GLUCOSE SERPL-MCNC: 144 MG/DL (ref 74–99)
HCT VFR BLD AUTO: 24.6 % (ref 41–52)
HGB BLD-MCNC: 8.2 G/DL (ref 13.5–17.5)
IMM GRANULOCYTES # BLD AUTO: 0 X10*3/UL (ref 0–0.7)
IMM GRANULOCYTES NFR BLD AUTO: 0 % (ref 0–0.9)
LYMPHOCYTES # BLD AUTO: 0.69 X10*3/UL (ref 1.2–4.8)
LYMPHOCYTES NFR BLD AUTO: 37.9 %
MAGNESIUM SERPL-MCNC: 1.54 MG/DL (ref 1.6–2.4)
MCH RBC QN AUTO: 32.2 PG (ref 26–34)
MCHC RBC AUTO-ENTMCNC: 33.3 G/DL (ref 32–36)
MCV RBC AUTO: 97 FL (ref 80–100)
MONOCYTES # BLD AUTO: 0.13 X10*3/UL (ref 0.1–1)
MONOCYTES NFR BLD AUTO: 7.1 %
NEUTROPHILS # BLD AUTO: 0.93 X10*3/UL (ref 1.2–7.7)
NEUTROPHILS NFR BLD AUTO: 51.2 %
NRBC BLD-RTO: 0 /100 WBCS (ref 0–0)
OVALOCYTES BLD QL SMEAR: NORMAL
P AXIS: 27 DEGREES
P AXIS: 49 DEGREES
P OFFSET: 183 MS
P OFFSET: 199 MS
P ONSET: 136 MS
P ONSET: 147 MS
PHOSPHATE SERPL-MCNC: 3.6 MG/DL (ref 2.5–4.9)
PLATELET # BLD AUTO: 62 X10*3/UL (ref 150–450)
POTASSIUM SERPL-SCNC: 3.5 MMOL/L (ref 3.5–5.3)
PR INTERVAL: 150 MS
PR INTERVAL: 158 MS
Q ONSET: 211 MS
Q ONSET: 226 MS
QRS COUNT: 15 BEATS
QRS COUNT: 16 BEATS
QRS DURATION: 100 MS
QRS DURATION: 94 MS
QT INTERVAL: 346 MS
QT INTERVAL: 358 MS
QTC CALCULATION(BAZETT): 442 MS
QTC CALCULATION(BAZETT): 446 MS
QTC FREDERICIA: 410 MS
QTC FREDERICIA: 412 MS
R AXIS: -10 DEGREES
R AXIS: -19 DEGREES
RBC # BLD AUTO: 2.55 X10*6/UL (ref 4.5–5.9)
RBC MORPH BLD: NORMAL
SODIUM SERPL-SCNC: 139 MMOL/L (ref 136–145)
T AXIS: 24 DEGREES
T AXIS: 44 DEGREES
T OFFSET: 390 MS
T OFFSET: 399 MS
VANCOMYCIN SERPL-MCNC: 11.7 UG/ML (ref 5–20)
VENTRICULAR RATE: 100 BPM
VENTRICULAR RATE: 92 BPM
WBC # BLD AUTO: 1.8 X10*3/UL (ref 4.4–11.3)

## 2025-04-30 PROCEDURE — 1200000003 HC ONCOLOGY  ROOM WITH TELEMETRY DAILY

## 2025-04-30 PROCEDURE — 2500000001 HC RX 250 WO HCPCS SELF ADMINISTERED DRUGS (ALT 637 FOR MEDICARE OP)

## 2025-04-30 PROCEDURE — 80069 RENAL FUNCTION PANEL: CPT

## 2025-04-30 PROCEDURE — 80202 ASSAY OF VANCOMYCIN: CPT | Performed by: STUDENT IN AN ORGANIZED HEALTH CARE EDUCATION/TRAINING PROGRAM

## 2025-04-30 PROCEDURE — 36556 INSERT NON-TUNNEL CV CATH: CPT | Performed by: RADIOLOGY

## 2025-04-30 PROCEDURE — 2500000004 HC RX 250 GENERAL PHARMACY W/ HCPCS (ALT 636 FOR OP/ED)

## 2025-04-30 PROCEDURE — 85025 COMPLETE CBC W/AUTO DIFF WBC: CPT

## 2025-04-30 PROCEDURE — C1751 CATH, INF, PER/CENT/MIDLINE: HCPCS

## 2025-04-30 PROCEDURE — 2500000004 HC RX 250 GENERAL PHARMACY W/ HCPCS (ALT 636 FOR OP/ED): Performed by: STUDENT IN AN ORGANIZED HEALTH CARE EDUCATION/TRAINING PROGRAM

## 2025-04-30 PROCEDURE — 7100000009 HC PHASE TWO TIME - INITIAL BASE CHARGE

## 2025-04-30 PROCEDURE — 97530 THERAPEUTIC ACTIVITIES: CPT | Mod: GP,CQ

## 2025-04-30 PROCEDURE — 99233 SBSQ HOSP IP/OBS HIGH 50: CPT

## 2025-04-30 PROCEDURE — 97530 THERAPEUTIC ACTIVITIES: CPT | Mod: GO

## 2025-04-30 PROCEDURE — 82947 ASSAY GLUCOSE BLOOD QUANT: CPT

## 2025-04-30 PROCEDURE — 2780000003 HC OR 278 NO HCPCS

## 2025-04-30 PROCEDURE — 83735 ASSAY OF MAGNESIUM: CPT

## 2025-04-30 PROCEDURE — 7100000010 HC PHASE TWO TIME - EACH INCREMENTAL 1 MINUTE

## 2025-04-30 PROCEDURE — 77001 FLUOROGUIDE FOR VEIN DEVICE: CPT | Performed by: RADIOLOGY

## 2025-04-30 RX ADMIN — ATORVASTATIN CALCIUM 40 MG: 40 TABLET, FILM COATED ORAL at 20:36

## 2025-04-30 RX ADMIN — VANCOMYCIN HYDROCHLORIDE 1 G: 1 INJECTION, POWDER, LYOPHILIZED, FOR SOLUTION INTRAVENOUS at 20:46

## 2025-04-30 RX ADMIN — ACETAMINOPHEN 975 MG: 325 TABLET ORAL at 20:36

## 2025-04-30 RX ADMIN — LACOSAMIDE 100 MG: 100 TABLET, FILM COATED ORAL at 20:36

## 2025-04-30 RX ADMIN — HEPARIN SODIUM 5000 UNITS: 5000 INJECTION, SOLUTION INTRAVENOUS; SUBCUTANEOUS at 15:02

## 2025-04-30 RX ADMIN — SODIUM CHLORIDE, SODIUM LACTATE, POTASSIUM CHLORIDE, AND CALCIUM CHLORIDE 1000 ML: .6; .31; .03; .02 INJECTION, SOLUTION INTRAVENOUS at 18:04

## 2025-04-30 RX ADMIN — CLONIDINE HYDROCHLORIDE 0.1 MG: 0.2 TABLET ORAL at 11:31

## 2025-04-30 RX ADMIN — CLONIDINE HYDROCHLORIDE 0.1 MG: 0.2 TABLET ORAL at 20:36

## 2025-04-30 RX ADMIN — LACOSAMIDE 100 MG: 100 TABLET, FILM COATED ORAL at 11:31

## 2025-04-30 RX ADMIN — FAMOTIDINE 10 MG: 20 TABLET, FILM COATED ORAL at 11:31

## 2025-04-30 RX ADMIN — ACETAMINOPHEN 975 MG: 325 TABLET ORAL at 11:31

## 2025-04-30 RX ADMIN — HEPARIN SODIUM 5000 UNITS: 5000 INJECTION, SOLUTION INTRAVENOUS; SUBCUTANEOUS at 11:30

## 2025-04-30 RX ADMIN — ACETAMINOPHEN 975 MG: 325 TABLET ORAL at 15:02

## 2025-04-30 RX ADMIN — HEPARIN SODIUM 5000 UNITS: 5000 INJECTION, SOLUTION INTRAVENOUS; SUBCUTANEOUS at 20:35

## 2025-04-30 ASSESSMENT — PAIN SCALES - GENERAL
PAINLEVEL_OUTOF10: 0 - NO PAIN

## 2025-04-30 ASSESSMENT — COGNITIVE AND FUNCTIONAL STATUS - GENERAL
WALKING IN HOSPITAL ROOM: TOTAL
DRESSING REGULAR UPPER BODY CLOTHING: A LOT
STANDING UP FROM CHAIR USING ARMS: TOTAL
MOBILITY SCORE: 12
MOVING FROM LYING ON BACK TO SITTING ON SIDE OF FLAT BED WITH BEDRAILS: A LOT
MOVING FROM LYING ON BACK TO SITTING ON SIDE OF FLAT BED WITH BEDRAILS: A LITTLE
DRESSING REGULAR LOWER BODY CLOTHING: TOTAL
MOVING TO AND FROM BED TO CHAIR: TOTAL
EATING MEALS: A LOT
TURNING FROM BACK TO SIDE WHILE IN FLAT BAD: A LITTLE
STANDING UP FROM CHAIR USING ARMS: A LOT
HELP NEEDED FOR BATHING: TOTAL
DAILY ACTIVITIY SCORE: 6
TURNING FROM BACK TO SIDE WHILE IN FLAT BAD: TOTAL
TOILETING: TOTAL
TOILETING: TOTAL
WALKING IN HOSPITAL ROOM: TOTAL
EATING MEALS: TOTAL
DRESSING REGULAR UPPER BODY CLOTHING: TOTAL
DRESSING REGULAR LOWER BODY CLOTHING: TOTAL
PERSONAL GROOMING: A LOT
MOVING TO AND FROM BED TO CHAIR: A LOT
HELP NEEDED FOR BATHING: TOTAL
PERSONAL GROOMING: TOTAL
CLIMB 3 TO 5 STEPS WITH RAILING: TOTAL
MOBILITY SCORE: 7
CLIMB 3 TO 5 STEPS WITH RAILING: TOTAL
DAILY ACTIVITIY SCORE: 9

## 2025-04-30 ASSESSMENT — PAIN - FUNCTIONAL ASSESSMENT
PAIN_FUNCTIONAL_ASSESSMENT: 0-10

## 2025-04-30 NOTE — PROGRESS NOTES
"Eve Ahumada \"Petey" is a 69 y.o. male on day 25 of admission presenting with Wound dehiscence.    Overnight: No acute events overnight.    Patient went down for PICC line placement with IR today. Still a little drowsy following the procedure. He has no complaints this morning. His wife is at bedside.        Objective   Current Vitals  BP (!) 143/98 (BP Location: Right arm, Patient Position: Lying)   Pulse 100   Temp 36.5 °C (97.7 °F) (Temporal)   Resp 18   Ht 1.702 m (5' 7\")   Wt 67 kg (147 lb 11.3 oz)   SpO2 94%   BMI 23.13 kg/m²      I/O last 3 completed shifts:  In: - (0 mL/kg)   Out: 1000 (14.9 mL/kg) [Urine:1000 (0.4 mL/kg/hr)]  Weight: 67 kg     Physical Exam  Vitals reviewed.   Constitutional:       General: He is not in acute distress.     Appearance: He is ill-appearing. He is not toxic-appearing.      Comments: Intermittent alertness, oriented x1 (self, , but not to time or place)   HENT:      Head:      Comments: Right-crani site appears to be healing well, dry, no erythema.     Mouth/Throat:      Mouth: Mucous membranes are moist.      Pharynx: Oropharynx is clear.   Eyes:      Extraocular Movements: Extraocular movements intact.   Cardiovascular:      Rate and Rhythm: Normal rate and regular rhythm.      Heart sounds: Normal heart sounds.   Pulmonary:      Effort: Pulmonary effort is normal. No respiratory distress.   Abdominal:      General: There is no distension.      Palpations: Abdomen is soft.      Tenderness: There is no abdominal tenderness. There is no guarding.   Musculoskeletal:      Cervical back: Neck supple.      Right lower leg: No edema.      Left lower leg: No edema.   Skin:     General: Skin is warm and dry.      Coloration: Skin is pale.      Findings: No rash.   Neurological:      General: No focal deficit present.      Mental Status: He is alert. He is disoriented.      Comments: Fine tremor of the upper extremities noted, intermittent tremor of " the lower extremities more prominent after physical activity   Psychiatric:         Behavior: Behavior normal.     Relevant Results  Labs:  CBC: WBC 1.1 , HGB 7.3, PLT 56  BMP: , K 3.8, Cl 105, HCO3 25, BUN 33, CR 3.51, Glu 112  LFTS: AST 17 , ALT 38, ALKPHOS 71 , TBILI 1.2 , DBILI 0.3  TROP: 3  BNP: No results found for requested labs within last 365 days.  COAGS: PT 14.1 , PTT 33  , INR 1.3  UA:   Results from last 7 days   Lab Units 04/28/25  0702   COLOR U  Light-Yellow   PH U  6.0   SPEC GRAV UR  1.010   PROTEIN U mg/dL NEGATIVE   BLOOD UR mg/dL 0.03 (TRACE)*   NITRITE U  NEGATIVE   WBC UR /HPF NONE     ABG:    CALCIUM 8.0 MAG No results found for requested labs within last 365 days. ALB 2.8 LACTATE 1.3 PHOS No results found for requested labs within last 365 days. COVIDNo results found for requested labs within last 365 days.    Micro/culture data:  Susceptibility data from last 120 days.  Collected Organism Clindamycin Erythromycin Oxacillin Tetracycline Trimethoprim/Sulfamethoxazole Vancomycin   04/06/25 1007 Staphylococcus epidermidis  S  S  R  S  R  S   04/06/25 1006 Staphylococcus epidermidis         04/06/25 1005 Staphylococcus epidermidis             Imaging:  IR CVC nontunneled  Narrative: Interpreted By:  Dewey Hagen,   STUDY:  IR CVC NONTUNNELED;  4/30/2025 10:59 am      INDICATION:  Signs/Symptoms:pt requires re-insertion of PICC line for antibiotics,  does not have any PIVs currently as IV team unable to place.          COMPARISON:  None.      ACCESSION NUMBER(S):  [id number]      ORDERING CLINICIAN:  DEREK WALLACE      TECHNIQUE:  INTERVENTIONALIST(S):  Dewey Hagen MD      CONSENT:  The patient/patient's POA/next of kin was informed of the nature of  the proposed procedure. The purposes, alternatives, risks, and  benefits were explained and discussed. All questions were answered  and consent was obtained.      RADIATION EXPOSURE:  Fluoroscopy time: 0.3 min.      SEDATION:  None.       MEDICATION/CONTRAST:  No additional      TIME OUT:  A time out was performed immediately prior to procedure start with  the interventional team, correctly identifying the patient name, date  of birth, MRN, procedure, anatomy (including marking of site and  side), patient position, procedure consent form, relevant laboratory  and imaging test results, antibiotic administration, safety  precautions, and procedure-specific equipment needs.      COMPLICATIONS:  No immediate adverse events identified.      FINDINGS:  Maximum sterile barrier technique was implemented. In the recumbent  position, the patient was positioned on the angiography table. The  cutaneous tissues in the  left superomedial arm were prepared and  draped in usual sterile manner. Screening gray-scale sonography of  the brachial, cephalic and basilic veins was performed demonstrating  a patent  left brachial vein which was subsequently selected for  access.      Lidocaine 1% was instilled into the subcutaneous soft tissues for  local anesthesia. Utilizing direct ultrasound guidance and  micropuncture/Seldinger technique, the  right brachial vein was  accessed. Ultrasound imaging was performed to confirm location and a  digital spot image was obtained and stored on the  PACS.      A 018 Neptune-Mandril guidewire was inserted through the access needle  to secure location. The access needle was exchanged over the wire for  a 5-Libyan coaxial dilator peel-away sheath system. The guidewire was  advanced to the cavoatrial junction utilizing intermittent  fluoroscopic guidance. An optimal catheter length of   cm was  obtained.      The 5-Libyan  single-lumen PICC line catheter was trimmed and loaded  on the 018 Neptune-Mandril guidewire. The inner dilator was removed and  the PICC catheter was introduced through the peel-away sheath.  Utilizing intermittent fluoroscopic guidance, the catheter was  advanced to the cavoatrial junction. The peel-away sheath was  removed  during catheter advancement.      A fluoroscopic spot image of the chest in the AP projection was  obtained to confirm optimal location.      The catheter was aspirated without resistance and flushed with normal  saline. The catheter was again flushed with heparinized saline. The  external portion of the catheter was secured in place with a STATLOCK.      The patient tolerated the procedure without complication.      Impression: 1. Uncomplicated and technically successful placement of a  single-lumen 5-Mongolian PICC line - optimal catheter length of  46 cm  with catheter tip position at the cavoatrial junction. Uncomplicated  procedure and the catheter is ready for use.      I was present for and/or performed the critical portions of the  procedure and immediately available throughout the entire procedure.      I personally reviewed the image(s) / study and resident  interpretation. I agree with the findings as stated.      Performed and dictated at [facility].      MACRO:  None      Signed by: Dewey Hagen 4/30/2025 1:06 PM  Dictation workstation:   [id number]  ECG 12 Lead  Normal sinus rhythm  Inferior infarct , age undetermined  Cannot rule out Anterior infarct , age undetermined  Abnormal ECG  When compared with ECG of 25-APR-2025 04:02,  Minimal criteria for Anterior infarct are now Present  Nonspecific T wave abnormality now evident in Anterior leads  Confirmed by Brandon Thomas ([id number]) on 4/30/2025 8:36:14 AM  ECG 12 Lead  Normal sinus rhythm  Normal ECG  When compared with ECG of 24-APR-2025 09:20,  Fusion complexes are no longer Present  Premature ventricular complexes are no longer Present  Vent. rate has decreased BY  87 BPM  Borderline criteria for Anterior infarct are no longer Present  Borderline criteria for Anterolateral infarct are no longer Present  Confirmed by Brandon Thomas ([id number]) on 4/30/2025 8:35:30 AM           MEDS:  Scheduled  "medications  Scheduled Medications[1]  Continuous medications  Continuous Medications[2]  PRN medications  PRN Medications[3]       Assessment/Plan   Assessment/Plan:   Mr. Singer \"Dash\" Zita is a 69 male with a PMHx of HTN, HLD, smalls's esophagus, RO GBM s/p resection ([facility], 12/2023) with resultant L HH s/p chemo/RT who represented for tumor progression in February. He underwent redo craniotomy for tumor resection 02/20 who had repeat MRI in March showing postop changes with fluid collection at dura and craniotomy site, worsening infiltrative disease, enhancement along posteromedial aspect of resection site. He represented on 04/05/25 with concerns for increased wound drainage and was admitted under the care of Neurosurgery. MRI brain onf 04/05 showed increased resection cavity enhancement and diffusion restriction concerning fo superimposed infection/abscess. He therefore underwent cranial wound washout on 04/06 with Neurosurgery. Subsequent cultures from OR revealed methicillin-resistant staph epidermidis, currently on vancomycin IV therapy until 06/15/25 per ID. His subsequent hospital course have been complicated by acute DVTs in the LLE and RUE s/p IVC filter placement due to pancytopenia (namely worsening thrombocytopenia) limiting anticoagulation, agitation/confusion, and new JAMAR.    Updates 04/30/25:  - IR for PICC replacement today  - Nephrology signed off  - Pending AM labs after back from PICC placement to re-eval plts and Cr  - Will re-engage vasc med if plts stable today  - Precert for acute rehab started   - PT/OT following    # Pancytopenia   # Thrombocytopenia  - Hematology consulted 04/21 felt thrombocytopenia to be multifactorial due to recent lomustine initiation (4/4), introduction of VPA (4/8), MRSE intracranial wound infection treated with vanc and cefepime, and possible consumption in setting of DVTs   - CBC trend: Plt 129 (4/19) ? 49 (4/20)   - LDH: 230 (normal)  -Smear: Clumping in blue " tube, decreased plt in lavender tube, rare giant plts  - Coags: INR 1.4, PTT 30s (mildly elevated INR)  - HFP: Tbili 1.2, Alb 3.2, AST/ALT/ALP normal  - D-dimer: 2354  - B12, Folate, TSH: WNL  - PF4/BRANDO: Negative  - Has received multiple platelet transfusions (x8, most recent 04/26) and RBC transfusions (x1 04/25) without overt s or s of bleeding  - Hematology re-enaged 04/26 prior to transfer  - Note states platelet count marcela to 82 following 2 units of platelet transfusion and has since downtrended to 44 over 3 day period. Also notable is worsening anemia throughout hospital stay   PLAN:  - Hematology following, appreciate input  - Sent: immature platelet fraction (1.1), reticulocyte count (3.0), haptoglobin (218) , LDH (277), PT (14.1), PTT (33) and fibrinogen (455)  - Will transfuse for Hgb < 7 & platelets < 10 or < 50 in the setting of bleeding   - CTM CBC daily  - Will transfuse prn per Hematology recs  - Active T&S    # JAMAR, improving  - Upon transfer to our service, pt noted to have Cr trend of 0.66 -> 3.71 over 04/24 -> 04/26  - Previously normal kidney function without history of kidney disease or even previous AKIs  - Likely Cause is Vancomycin, JAMAR coincides with Peak Vanc trough of 38.   - PVR on 04/26 showing ~96 mL, less likely to be obstructive cause.  - No large changes in blood pressures recently  - JAMAR work-up:   - PVR, 96 mL   - Renal U/S: Mild left-sided hydronephrosis with few intrarenal calculi    - UA 04/27 negative, UA 04/28 pending   - Urine lytes 04/27: 3.6%, intrinsic. Serum sodium 138, serum cr 4.14, urine sodium 94, urine cr 77.8  - Urine lytes 04/28: 3.5%, intrinsic. Serum sodium 139, serum cr 4.04, urine sodium 108, urine cr 90.7   - CK 49  PLAN:  - Nephrology signed off 04/30  - Changed PPI to famotidine  - Daily RFP  - Strict Is/Os  - Avoid nephrotoxic agents and sharp fluctuations in blood pressure    # LLE DVT  # RUE DVT  # IVC filter in place  - UE DVT US 4/11/25 revealed: RUE,  acute non-occlusive deep vein thrombosis visualized in the brachial vein.  - LE DVT US 4/11/25 revealed: LLE, acute occlusive deep vein thrombosis visualized in the popliteal vein, age indeterminate deep vein thrombosis visualized in the distal femoral, gastrocnemius veins in the calf, posterior tibial and peroneal veins. The remainder of the left leg is negative for deep vein thrombosis.  - Due to thrombocytopenia underwent IVC filter placement 04/11/25  - Initially on heparin gtt, was started on Lovenox but switched to Eliquis after adjusting anti-seizures meds, however Eliquis now Dc'd due to pancytopenia  - Was on Lovenox ppx dose since due to thrombocytopenia   PLAN:  - Vascular medicine signed off 04/26, appreciate recs  - Lovenox changed to heparin ppx 04/26 due to acute worsening of kidney function  - Can continue heparin prophylaxis as long as platelets are stable >30 K and no concerns about bleeding   - If platelets stabilized above 50K, full dose anticoagulation to be considered   - SCDs, in place  - While off full dose blood thinner monitor very closely for signs or symptoms suggesting new or worsening VTE or IVC filter thrombosis   - Agree with if pancytopenia secondary to chemotherapy, with plans to continue receiving chemotherapy on discharge, he will require very close monitoring if he is to leave on full dose anticoagulation   [  ] requesting us to call back when ready for full dose anticoagulation   - Changed heparin ppx dosing 04/26    # GBM s/p resection x2  # MRSE of crani site s/p washout  - Initially dx with GBM in 2023 s/p resection & chemo/RT    - Follows with Dr. Rolon  - Tumor progression with redo craniotomy 02/20/25 with NSGY  - Represented 04/05 for increased drainage from wound  - 04/06 s/p R cranial wound washout and mesh cranioplasty (kaleb purulence )  - Cultures from the subgaleal, epidural, and subdural spaces grew methicillin resistant Staph. Epi  - ID was previously consulted,  recommending:    - Continue Vancomycin with goal -600    - antibiotic course tentative stop date will be 6/15/25   - PICC line placed 04/08 and replaced 04/15  PLAN:  - Continue vancomycin, dosed per pharmacy  - CTM daily levels  - ID follow up with Dr. Amy York   [  ] after discharge weekly CBC with diff, Chem 7, quantitative CRP, and Vancomycin trough   - Outpatient follow-up with Dr. Rolon  - Outpatient follow-up with Dr. 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 No epileptiform discharges are recorded. Overall, the degree of encephalopathy remains same compared to prior recording  PLAN:  - Per Neuro recs:   -  mg BID    - Transitioned to oral 04/28  - SLP eval 04/26 c/f aspiration but pt had previously had safe swallowing   - recommended PO intake only when awake and alert w/ upright positioning  - On regular diet  - HOLDING nightly Seroquel upon transfer due to increased somnolence over past few days   - HOLDING prn flexeril (hasn't used) upon transfer due to increased somnolence over past few days    # Intermittent tremors of BL upper extremities   - CTM    # Impaired mobility  - PT/OT - recommending high intensity   - PM&R last eval on 04/25 stating excellent candidate for acute rehabilitation once medically stable and is able and motivated to participate in 3 hours/day of therapy.      Fluids: Replete PRN  Electrolytes: Keep mg >2, phos >3  and K >4  Nutrition:  Adult diet Regular   Antimicrobials: vanco  DVT PPX:DVT: Unfractionated Heparin  GI ppx: PPI  Bowel care:Miralax and Senna  Catheter:None  Lines:PIV  Oxygen:Room Air    Code Status: Full Code (confirmed on admission)   NOK:  Primary Emergency Contact:  GABRIELCHRISTINE, Home Phone: [phone]     Ellyn Bradford MD   Internal Medicine, PGY-1          [1] acetaminophen, 975 mg, oral, TID  [Held by provider] apixaban, 5 mg, oral, BID  atorvastatin, 40 mg, oral, Daily  cloNIDine, 0.1 mg, oral, BID   Followed by  [START ON 5/3/2025] cloNIDine, 0.1 mg, oral, Daily  famotidine, 10 mg, oral, Daily  heparin, 5,000 Units, subcutaneous, TID  lacosamide, 100 mg, oral, BID  lidocaine, 5 mL, infiltration, Once  lidocaine, 1 patch, transdermal, Daily  polyethylene glycol, 17 g, oral, Daily  [Held by provider] QUEtiapine, 25 mg, oral, Nightly  sennosides-docusate sodium, 1 tablet, oral, Nightly  vancomycin, 750 mg, intravenous, Once     [2]    [3] PRN medications: bisacodyl, [Held by provider] cyclobenzaprine, dextrose, dextrose, glucagon, glucagon, hydrOXYzine HCL, ondansetron **OR** ondansetron, oxygen, vancomycin

## 2025-04-30 NOTE — PROGRESS NOTES
"             Therapy Communication Note    Patient Name: Enmanuel Ahumada \"Dash\"  MRN: [id number]  Department: [facility]  Room: [facility]-A  Today's Date: 4/30/2025     Discipline: Physical Therapy    Missed Visit Reason: Missed Visit Reason: Patient in a medical procedure    Missed Time: Attempt    Comment:    "

## 2025-04-30 NOTE — PROGRESS NOTES
"Occupational Therapy    Treatment    Name: Enmanuel Ahumada \"Dash\"  MRN: [id number]  Department: [facility]  Room: [address] [place] [address]  Date: 04/30/25  Time Calculation  Start Time: 1212  Stop Time: 1230  Time Calculation (min): 18 min    Assessment:  OT Assessment: Pt observed to have decreased strength, congition, balance, and endurance limiting his ability to participate in ADLs & occupational performance. He would continue to benefit from skilled OT services to increase his ADL function and independence.  Prognosis: Good  Barriers to Discharge Home: Cognition needs, Physical needs, Caregiver assistance  Caregiver Assistance: Caregiver assistance needed per identified barriers - however, level of patient's required assistance exceeds assistance available at home  Cognition Needs: Insight of patient limited regarding functional ability/needs, Cognition-related high falls risk  Physical Needs: 24hr mobility assistance needed, High falls risk due to function or environment, Intermittent ADL assistance needed  Evaluation/Treatment Tolerance: Patient tolerated treatment well, Patient limited by fatigue  End of Session Patient Position: Bed, 4 rail up, Alarm on (Pt supine in bed with call light in reach)  Plan:  Treatment Interventions: ADL retraining, Functional transfer training, Endurance training, UE strengthening/ROM, Cognitive reorientation, Neuromuscular reeducation, Fine motor coordination activities, Compensatory technique education  OT Frequency: 4 times per week  OT Discharge Recommendations: High intensity level of continued care  Equipment Recommended upon Discharge:  (TBD at next level of care)  OT Recommended Transfer Status: Maximum assist, Assist of 2  OT - OK to Discharge: Yes    Subjective   Previous Visit Info:  OT Last Visit  OT Received On: 04/30/25  General:  General  Reason for Referral: Pt initially presented with tumor progression (RO GBM), s/p redo craniotomy for tumor resection in 2/2025. 4/5 p/w 1wk " incisional drainage, 4/4 CTH airfluid level in resection cavity, 4/5 MRI brain w/wo incr'd resection cavity enhancement and diffusion restriction 4/6 s/p R cranial wound washout and mesh cranioplasty. 4/7 rCTH incr SAH, min incr ICH, rrCTH stable, EEG neg dc'd.  Past Medical History Relevant to Rehab: HTN, HLD, smalls's esophagus, RO GBM s/p resection (SAH, 12/2023) with resultant L HH s/p chemo/RT  Family/Caregiver Present: Yes  Caregiver Feedback:  (wife)  Prior to Session Communication: Bedside nurse  Patient Position Received: Bed, 4 rail up, Alarm on (Pt supine with HOB up)  General Comment: He recently returned from IR, appeared tired throughout session, Pt willing and agreeable to participate in therapy.  Precautions:  Medical Precautions: Fall precautions     Date/Time Vitals Session Patient Position Pulse Resp SpO2 BP MAP (mmHg)    04/30/25 1212 During OT  --  100  --  --  --  --     04/30/25 1215 --  --  --  18  94 %  143/98  --           Vital Signs Comment:  (HR = 100 supine in bed, HR increased to 115 when sitting EOB)    Pain Assessment:  Pain Assessment  Pain Assessment: 0-10  0-10 (Numeric) Pain Score: 0 - No pain  Critical-Care Pain Observation Tool   Facial Expression: Grimacing (Pt grimaced during both sup>sit & sit>sup transfers)    Objective   Cognition:  Overall Cognitive Status: Impaired  Arousal/Alertness: Delayed responses to stimuli  Orientation Level: Disoriented to place, Disoriented to situation (Pt repeated his name when asked location & siutation. Pt was responsive to re-orientation to place.)  Following Commands: Follows one step commands with increased time (Pt required max verbal cues to complete commands)  Safety Judgment: Decreased awareness of need for assistance  Problem Solving: Assistance required to generate solutions  Cognition Comments: Pt continously closed eyes throughout session with asphasia present. Pt was re-oriented to time & place.  Attention: Exceptions to  WFL  Sustained Attention: Impaired  Routine Tasks: Minimal (Pt washed face with warm washcloth with bilat UE seated EOB. Pt able to utilize unilateral & bilateral hands to complete grooming after verbal cues & setup were provided.)  Impulsive: Within functional limits  Task Initiation: Delayed initiation  Processing Speed: Delayed  Activities of Daily Living: Grooming  Grooming Level of Assistance: Moderate assistance  Grooming Where Assessed: Edge of bed  Grooming Comments: Pt completed washing face with wash cloth seated EOB, requiring assist to sit upright. Pt also required verbal & tactile cues for initiation ot task. He inititially utilized his R hand to bring washclothto face, then utilized bilat. hands to wash face, then finihsed wiping his face with his L hand.     Bed Mobility/Transfers: Bed Mobility  Bed Mobility: Yes  Bed Mobility 1  Bed Mobility 1: Supine to sitting  Level of Assistance 1: Maximum assistance, +2  Bed Mobility 2  Bed Mobility  2: Scooting  Level of Assistance 2: Maximum assistance  Bed Mobility 3  Bed Mobility 3: Sitting to supine  Level of Assistance 3: Maximum assistance, +2  Bed Mobility 4  Bed Mobility 4: Log roll  Level of Assistance 4: Maximum assistance     Sitting Balance:  Static Sitting Balance  Static Sitting-Balance Support: Feet supported (Pt educated on utilizing bilat UE to aid in stabilization, but was unable to remain in supported seated position)  Static Sitting-Level of Assistance: Moderate assistance  Static Sitting-Comment/Number of Minutes: Pt sat EOB for ~10 min. Pt required max verbal tactile & visual cues to sit upright without L lateral lean.  Dynamic Sitting Balance  Dynamic Sitting-Balance Support: Feet supported  Dynamic Sitting-Level of Assistance: Moderate assistance  Dynamic Sitting-Balance: Reaching for objects  Dynamic Sitting-Comments: Pt reached for therapist hand to aid in correctly L lateral lean. When asked to reach with LUE, pt's LUE had little to  no movement. With increased time and steadying, pt able to reach with RUE.     Therapy/Activity: Manual Therapy  Manual Therapy Performed: Yes  Manual Therapy Activity 1: Pt completed PROM shoulder flexion 1x on each UE.     Vision:  Vision  Vision Comments:  (Pt observed to have a R head turn and shift of midline.)  Strength:  Strength  Strength Comments: Pt grossly deconditioned  Other Activity:  RUE   RUE : Exceptions to WFL  RUE Strength  RUE Overall Strength: Due to cognitive deficits, LUE   LUE: Exceptions to WFL  LUE Strength  LUE Overall Strength: Due to cognitive deficits,      Outcome Measures:  [facility] Daily Activity  Putting on and taking off regular lower body clothing: Total  Bathing (including washing, rinsing, drying): Total  Putting on and taking off regular upper body clothing: A lot  Toileting, which includes using toilet, bedpan or urinal: Total  Taking care of personal grooming such as brushing teeth: A lot  Eating Meals: A lot  Daily Activity - Total Score: 9      Education Documentation  Body Mechanics, taught by ELIZABETH Santacruz at 4/30/2025  1:27 PM.  Learner: Significant Other, Patient  Readiness: Acceptance  Method: Demonstration, Explanation  Response: Needs Reinforcement    Precautions, taught by ELIZABETH Santacruz at 4/30/2025  1:27 PM.  Learner: Significant Other, Patient  Readiness: Acceptance  Method: Demonstration, Explanation  Response: Needs Reinforcement    ADL Training, taught by ELIZABETH Santacruz at 4/30/2025  1:27 PM.  Learner: Significant Other, Patient  Readiness: Acceptance  Method: Demonstration, Explanation  Response: Needs Reinforcement    Education Comments  No comments found.      Goals:  Encounter Problems       Encounter Problems (Active)       ADLs       Patient will complete grooming tasks with CGA in order to maximize functional Indep with task completion.  (Progressing)       Start:  04/08/25    Expected End:  05/14/25            Patient will complete  lower body dressing with  MIN A for donning and doffing all LE clothes in order to increase Indep with task participation.  (Progressing)       Start:  04/08/25    Expected End:  05/14/25            Patient will complete toileting, including clothing management and hygiene, with MIN A in order to maximize functional Indep with task completion.  (Progressing)       Start:  04/08/25    Expected End:  05/14/25               BALANCE       Pt will increase static/dynamic sit/stand to Good to increase safety and indep with functional task completion.   (Progressing)       Start:  04/08/25    Expected End:  05/14/25               COGNITION/SAFETY       Pt will demo good safety awareness during functional task completion with no more than min vc in order to maximize occupational performance.   (Progressing)       Start:  04/08/25    Expected End:  05/14/25               EXERCISE/STRENGTHENING       Pt will increase endurance to tolerate 15-20min of activity with no more than 1 rest break in order to increase ability to engage in ADL completion.  (Progressing)       Start:  04/08/25    Expected End:  05/14/25            Pt will increase overall BUE strength to 4/5 in order to increase functional task participation.  (Progressing)       Start:  04/08/25    Expected End:  05/14/25            Pt will demo increased BUE fine motor/bimanual coordination in order to achieve MOD I with functional task completion.  (Progressing)       Start:  04/08/25    Expected End:  05/14/25               TRANSFERS       Patient will complete functional transfers using least restrictive device with CGA   in order to maximize functional potential and increase safety.  (Progressing)       Start:  04/08/25    Expected End:  05/14/25

## 2025-04-30 NOTE — PROGRESS NOTES
"Mr. Singer \"Dash\" Zita is a 69 male with a PMHx of HTN, HLD, smalls's esophagus, RO GBM s/p resection (SAH, 12/2023) with resultant L HH s/p chemo/RT who represented for tumor progression in February. He underwent redo craniotomy for tumor resection 02/20 who had repeat MRI in March showing postop changes with fluid collection at dura and craniotomy site, worsening infiltrative disease, enhancement along posteromedial aspect of resection site. He represented on 04/05/25 with concerns for increased wound drainage and was admitted under the care of Neurosurgery that was transitioned to medical oncology.   Hospital course c/b abscess s/p washout 4/6. MRSE Staph epidermidis growing from cultures and placed on IV vancomycin therapy (6/15 end date). Also with LLE RUE DVTs s/p IVC filter placement due to pancyopenia. Nephrology is consulted for JAMAR.     #Nonoliguric JAMAR  - Baseline Cr <1.  - Onset 4/25  - Likely Cause is Vancomycin, JAMAR coincides with Peak Vanc trough of 38.  - UA: Trace blood, otherwise bland  - FENa 16%, CK WNL.   - Renal U/S: Mild Left hydronephrosis, Bilateral kidneys ~11.5cm, Echogenic Kidneys.  - Improved with higher urine output     ** RECOMMENDATIONS:  - No labs today but JAMAR seems to be improving with good urine output  - Nephrology will sign off  "

## 2025-04-30 NOTE — SIGNIFICANT EVENT
Patient's telemetry batteries changed.  Telemetry monitor turned back on and functioning appropriately.    Patient's telemetry strip completed w/ off-going RN.

## 2025-04-30 NOTE — PROGRESS NOTES
04/28/25 1200   Discharge Planning   Living Arrangements Spouse/significant other   Support Systems Spouse/significant other   Type of Residence Private residence   Number of Stairs to Enter Residence 3   Number of Stairs Within Residence 10   Home or Post Acute Services Post acute facilities (Rehab/SNF/etc)   Type of Post Acute Facility Services Rehab   Expected Discharge Disposition Rehab   Does the patient need discharge transport arranged? Yes   RoundTrip coordination needed? Yes   Has discharge transport been arranged? No     Care Transitions Note  04/28/25  Plan per Medical/Surgical Team: GBM, extended hospital course. On ID vanc until June. Pending vasc and neph recs  Status: Inpatient  Payor Source: Medical [facility] Medicare  Discharge disposition: Acute Rehab  Expected date of discharge: TBD, Wednesday?  Barriers: medical stability    Met with pt and spouse at bedside this AM. Pt unable to participate in assessment, deferred to spouse. Demographics and insurance verified. SW confirmed plan to go to  Acute Rehab in [place] at discharge. Per spouse, pt has no upcoming tx scheduled at this time. SW to follow for clinical stability. Pre-cert is needed prior to dc. Will continue to follow for discharge planning.   KAYCEE Segura, LSW     04/30/25  SW asked  pre-cert team to initiate pre-cert for [facility]. SW also alerted facility to begin their portion of pre-cert. Anticipating DC Friday pending clinical stability.   Pending Auth #[id number]   KAYCEE Segura, LSW

## 2025-04-30 NOTE — PROGRESS NOTES
"Occupational Therapy    Communication Note    Patient Name: Enmanuel Ahumada \"Dash\"  MRN: [id number]  Today's Date: 4/30/2025   Room: [address] [place] [address]A    Discipline: Occupational Therapy      Missed Visit Reason:  (Pt is off the floor at angio)      04/30/25 at 10:26 AM   Carmen Porras, OT   Rehab Office: [phone]     "

## 2025-04-30 NOTE — PRE-PROCEDURE NOTE
Interventional Radiology Preprocedure Note    Procedure: PICC line placement    Indication for procedure: antibiotics    Relevant review of systems: NA      Planned Sedation/Anesthesia: Minimal    Directed physical examination:    Physical Exam  Cardiovascular:      Rate and Rhythm: Normal rate.   Pulmonary:      Effort: Pulmonary effort is normal.   Neurological:      Mental Status: He is alert.         Mallampati: N/A    ASA Score: ASA 3 - Patient with moderate systemic disease with functional limitations    Benefits, risks and alternatives of procedure and planned sedation have been discussed with the patient and/or their representative. All questions answered and they agree to proceed.

## 2025-04-30 NOTE — PROGRESS NOTES
Vancomycin Dosing by Pharmacy- FOLLOW UP    Enmanuel Ahumada is a 69 y.o. year old male who Pharmacy has been consulted for vancomycin dosing for CNS/meningoencephalitis. Based on the patient's indication and renal status this patient is being dosed based on a goal trough/random level of 15-20.     Renal function is currently declining.    Last dose:  - 1250mg @2350    Most recent trough level: 11.7 mcg/mL    Visit Vitals  /78 (BP Location: Right arm, Patient Position: Lying)   Pulse 110   Temp 37.5 °C (99.5 °F) (Temporal)   Resp 16        Lab Results   Component Value Date    CREATININE 2.81 (H) 2025    CREATININE 3.51 (H) 2025    CREATININE 4.04 (H) 2025    CREATININE 4.14 (H) 2025        Patient weight is as follows:   Vitals:    25 0539   Weight: 67 kg (147 lb 11.3 oz)       Cultures:  No results found for the encounter in last 14 days.       I/O last 3 completed shifts:  In: 60 (0.9 mL/kg) [P.O.:60]  Out: 1300 (19.4 mL/kg) [Urine:1300 (0.5 mL/kg/hr)]  Weight: 67 kg   I/O during current shift:  No intake/output data recorded.    Temp (24hrs), Av.7 °C (98 °F), Min:36.3 °C (97.3 °F), Max:37.5 °C (99.5 °F)      Assessment/Plan    Below goal random/trough level. Orders placed for new vancomycin regimen of 1000mg once.   The next level will be obtained on  24 hours after dose given.  May be obtained sooner if clinically indicated.   Will continue to monitor renal function daily while on vancomycin and order serum creatinine at least every 48 hours if not already ordered.  Follow for continued vancomycin needs, clinical response, and signs/symptoms of toxicity.       Yari Silva, PharmD

## 2025-04-30 NOTE — PROGRESS NOTES
"  Physical Therapy Treatment    Patient Name: Enmanuel Ahumada \"Dash\"  MRN: [id number]  Today's Date: 4/30/2025  Room: [address] [place] [address]-A  Time Calculation  Start Time: 1501  Stop Time: 1548  Time Calculation (min): 47 min       Assessment/Plan   PT Assessment  PT Assessment Results: Decreased strength, Decreased endurance, Impaired balance, Decreased coordination, Decreased mobility, Decreased cognition, Impaired judgement, Decreased safety awareness  Rehab Prognosis: Good  Barriers to Discharge Home: Caregiver assistance, Cognition needs, Physical needs  Caregiver Assistance: Caregiver assistance needed per identified barriers - however, level of patient's required assistance exceeds assistance available at home  Cognition Needs: 24hr supervision for safety awareness needed, Insight of patient limited regarding functional ability/needs, Cognition-related high falls risk  Physical Needs: High falls risk due to function or environment, 24hr mobility assistance needed, Stair navigation into home limited by function/safety, Ambulating household distances limited by function/safety  Evaluation/Treatment Tolerance: Patient tolerated treatment well  Strengths: Attitude of self  Barriers to Participation: Comorbidities, Insight into problems  End of Session Communication: Physician, Bedside nurse  End of Session Patient Position: Bed, 4 rail up, Alarm on  PT Plan  Inpatient/Swing Bed or Outpatient: Inpatient  PT Plan  Treatment/Interventions: Bed mobility, Transfer training, Gait training, Stair training, Balance training, Neuromuscular re-education, Strengthening, Endurance training, Range of motion, Therapeutic exercise, Therapeutic activity  PT Plan: Ongoing PT  PT Frequency: 5 times per week  PT Discharge Recommendations: High intensity level of continued care  Equipment Recommended upon Discharge:  (tbd at next level of care)  PT Recommended Transfer Status: Assist x1, Assistive device  PT - OK to Discharge: Yes " (Meaning pt has been evaluated and d/c reccs have been made.)  Assessment: Patient is progressing Well with therapy this date. Still demos elevated HR when standing. Improved alertness today. Needs max verbal/tactile cues for follow thru. Would continue to benefit from continued skilled PT to address all mobility deficits; remains appropriate for HIGH intensity therapy when medically ready for discharge from acute stay.  Will continue to follow.     General Visit Information:   PT  Visit  PT Received On: 04/30/25  Prior to Session Communication: Bedside nurse  Patient Position Received: Bed, 4 rail up, Alarm on     Subjective   Subjective: Pt pleasant and agreeable to therapy upon approach    Precautions:  Precautions  Hearing/Visual Limitations: per pt and wife report, pt with limited L visual field.  Medical Precautions: Fall precautions  Vital Signs:   Date/Time Vitals Session Patient Position Pulse Resp SpO2 BP MAP (mmHg)    04/30/25 1501 During PT  --  156  --  --  --  --             Objective   Pain:  Pain Assessment  Pain Assessment: 0-10  0-10 (Numeric) Pain Score: 0 - No pain  Cognition:  Cognition  Overall Cognitive Status: Impaired  Arousal/Alertness: Delayed responses to stimuli  Orientation Level: Disoriented to place, Disoriented to situation, Disoriented to time  Following Commands: Follows one step commands with repetition (max cues with 50% follow thru)  Safety Judgment: Decreased awareness of need for assistance  Problem Solving: Assistance required to identify errors made  Cognition Comments: appears more alert this afternoon compared to previous mornings. needs max cues for follow thru of all tasks and to keep eyes open and give attention to L side    Static Standing Balance:  Static Standing Balance  Static Standing-Balance Support: Left upper extremity supported, Right upper extremity supported  Static Standing-Level of Assistance: Moderate assistance  Static Standing-Comment/Number of Minutes:  HHA for up to 30s x2. Limited by fatigue, tremors and increased HR into 150s  Dynamic Sitting Balance:  Dynamic Sitting Balance  Dynamic Sitting-Balance Support: Feet supported  Dynamic Sitting-Level of Assistance: Contact guard, Close supervision  Dynamic Sitting-Balance: Reaching for objects, Forward lean  Dynamic Sitting-Comments: pt sat EOB for 15min. Needs cues for attention to task reaching outside BETITO      Lines/Tubes/Drains:  PICC - Adult 04/30/25 Single lumen Left Brachial vein (Active)   Number of days: 0       External Urinary Catheter Male (Active)   Number of days: 14     Continuous Medications/Drips:  Continuous Medications[1]    PT Treatments:     Therapeutic Activity  Therapeutic Activity 1: discussed therapy progress with physician, asking what the team would be able to do to reduce tremors as they are a significant barrier to mobility. Both during and after standing, pt's LLE had significant oscillating tremors and is unable to take steps d/t LOB and coordination  Therapeutic Activity 2: Pt able to take 1 side step with RLE but unable to move LLE d/t tremors     Bed Mobility 1  Bed Mobility 1: Supine to sitting  Level of Assistance 1: Minimum assistance, Maximum verbal cues, Maximum tactile cues  Bed Mobility Comments 1: hand held to help with follow thru of task  Bed Mobility 2  Bed Mobility  2: Scooting  Level of Assistance 2: Close supervision  Bed Mobility Comments 2: able to scoot EOB with extended time to complete  Bed Mobility 3  Bed Mobility 3: Sitting to supine  Level of Assistance 3: Moderate assistance  Bed Mobility Comments 3: for BLE     Transfer 1  Transfer From 1: Sit to  Transfer to 1: Stand  Technique 1: Sit to stand, Stand to sit  Transfer Level of Assistance 1: Hand held assistance  Trials/Comments 1: x2 up to  30s each, vc for upright posture             Activity tolerance:  Activity Tolerance  Endurance: Tolerates 10 - 20 min exercise with multiple rests    Outcome  Measures:  [facility] Basic Mobility  Turning from your back to your side while in a flat bed without using bedrails: A little  Moving from lying on your back to sitting on the side of a flat bed without using bedrails: A little  Moving to and from bed to chair (including a wheelchair): A lot  Standing up from a chair using your arms (e.g. wheelchair or bedside chair): A lot  To walk in hospital room: Total  Climbing 3-5 steps with railing: Total  Basic Mobility - Total Score: 12     Education Documentation  Body Mechanics, taught by Gayatri Zayas PTA at 4/30/2025  4:12 PM.  Learner: Patient  Readiness: Acceptance  Method: Explanation  Response: Needs Reinforcement, No Evidence of Learning    Precautions, taught by Gayatri Zayas PTA at 4/30/2025  4:12 PM.  Learner: Patient  Readiness: Acceptance  Method: Explanation  Response: Needs Reinforcement, No Evidence of Learning    Mobility Training, taught by Gayatri Zayas PTA at 4/30/2025  4:12 PM.  Learner: Patient  Readiness: Acceptance  Method: Explanation  Response: Needs Reinforcement, No Evidence of Learning    Body Mechanics, taught by Gayatri Zayas PTA at 4/30/2025  4:12 PM.  Learner: Patient  Readiness: Acceptance  Method: Explanation  Response: Needs Reinforcement, No Evidence of Learning    Precautions, taught by Gayatri Zayas PTA at 4/30/2025  4:12 PM.  Learner: Patient  Readiness: Acceptance  Method: Explanation  Response: Needs Reinforcement, No Evidence of Learning    ADL Training, taught by Gayatri Zayas PTA at 4/30/2025  4:12 PM.  Learner: Patient  Readiness: Acceptance  Method: Explanation  Response: Needs Reinforcement, No Evidence of Learning    Education Comments  No comments found.          OP EDUCATION:       Encounter Problems       Encounter Problems (Active)       PT Problem       Patient will perform bed mobility IND to reduce risk of developing decubitus ulcers.  (Progressing)       Start:  04/08/25    Expected End:  05/09/25            Patient will  ambulate at least 150 ft. with </= close sup and LRD to improve tolerance of household distances.  (Progressing)       Start:  04/08/25    Expected End:  05/09/25            Patient will perform the 5-Time Sit to Stand test in <14 sec to indicate decreased falls risk. (Progressing)       Start:  04/08/25    Expected End:  05/09/25            Patient will perform sit to stand and stand to sit transfers with </= close sup and LRD to increase functional strength.  (Progressing)       Start:  04/08/25    Expected End:  05/09/25            BLE 5/5 (Progressing)       Start:  04/08/25    Expected End:  05/09/25               Pain - Adult                                 [1]

## 2025-04-30 NOTE — PROGRESS NOTES
"Music Therapy Note    Enmanuel Evans \"Dash\" Zita     Therapy Session  Referral Type: New referral this admission  Visit Type: New visit  Session Start Time: 1310  Session End Time: 1345  Intervention Delivery: In-person  Conflict of Service: None  Number of family members present: 1  Family Present for Session: Spouse/Significant Other  Family Participation: Interactive     Pre-assessment  Unable to Assess Reason: Cognitive limitation         Treatment/Interventions  Areas of Focus: Normalization, Coping, Family bonding  Music Therapy Interventions: Assessment, Live music listening, Music sharing/discussion, Recorded music listening  Interruption: No  Patient Fell Asleep at End of Session: No    Post-assessment  Unable to Assess Reason: Outcomes not evaluated  Verbalized Emotional State: Enjoyment  Continue Visiting: Yes  Total Session Time (min): 35 minutes    Narrative  Assessment Detail: Upon arrival, pt laying in bed, awake, pt with flat affect, with wife at bedside. Pt displayed some confusion, evidenced by a delay to comprehend verbal ques from wife to look in MTI direction. Pt able to recall musicians and concerts he and his wife have been to. Displayed difficulty in remembering time and places. Pt enjoys Austin Jones, Alfredo Nigel, the [employer], and wife enjoys country music. Wife shared that they are grandparents and enjoy spending time with grankids. Pt and wife agreed to music therapy at this time.  Plan: MTI will assess patients musical and nonmusical needs. MTI will engage pt in live and recorded music listening with a focus on coping and normalization of the environment. MTI will engage pt and family in song sharing and music discussion to promote family bonding.  Intervention: MTI engaged pt in live music listening via piano using pt preferred music, using the song [employer] [address] by Austin Jones, and Piano Man by Alfredo Manning to provide normalization and promote coping. Pt engaged by quietly " "listening, sitting up in bed, smiling. Wife engaged by holding pt hand at bedside. Following live music, MTI engaged pt in a series of prompts encouraging more music discussion and song sharing. pt and wife engaged by reminicing memories they have shared together and requested the final song of the session to be \"Im a believer\" by the Joseph.  Evaluation: Pt displayed an increase in alertness and engagement during live music, evidenced by sitting up higher in bed, formulating more complete sentences, and reminiscing. Pt and family displayed an increase in bonding, evidenced by wife holding pt hand. Pt displayed a range of emotions, intermittenly tearful, smiling and laughing during song sharing and live music listening.  Follow-up: MTI will continue to follow up with pt if applicable    Education Documentation  No documentation found.          "

## 2025-05-01 LAB
ALBUMIN SERPL BCP-MCNC: 2.8 G/DL (ref 3.4–5)
ANION GAP SERPL CALC-SCNC: 11 MMOL/L (ref 10–20)
BASOPHILS # BLD AUTO: 0 X10*3/UL (ref 0–0.1)
BASOPHILS NFR BLD AUTO: 0 %
BUN SERPL-MCNC: 26 MG/DL (ref 6–23)
CALCIUM SERPL-MCNC: 7.9 MG/DL (ref 8.6–10.6)
CHLORIDE SERPL-SCNC: 103 MMOL/L (ref 98–107)
CO2 SERPL-SCNC: 31 MMOL/L (ref 21–32)
CREAT SERPL-MCNC: 2.41 MG/DL (ref 0.5–1.3)
EGFRCR SERPLBLD CKD-EPI 2021: 29 ML/MIN/1.73M*2
EOSINOPHIL # BLD AUTO: 0.06 X10*3/UL (ref 0–0.7)
EOSINOPHIL NFR BLD AUTO: 3.9 %
ERYTHROCYTE [DISTWIDTH] IN BLOOD BY AUTOMATED COUNT: 16.2 % (ref 11.5–14.5)
GLUCOSE BLD MANUAL STRIP-MCNC: 106 MG/DL (ref 74–99)
GLUCOSE BLD MANUAL STRIP-MCNC: 129 MG/DL (ref 74–99)
GLUCOSE SERPL-MCNC: 97 MG/DL (ref 74–99)
HCT VFR BLD AUTO: 22.6 % (ref 41–52)
HGB BLD-MCNC: 7.5 G/DL (ref 13.5–17.5)
IMM GRANULOCYTES # BLD AUTO: 0.01 X10*3/UL (ref 0–0.7)
IMM GRANULOCYTES NFR BLD AUTO: 0.7 % (ref 0–0.9)
LYMPHOCYTES # BLD AUTO: 0.4 X10*3/UL (ref 1.2–4.8)
LYMPHOCYTES NFR BLD AUTO: 26.3 %
MAGNESIUM SERPL-MCNC: 1.41 MG/DL (ref 1.6–2.4)
MCH RBC QN AUTO: 31.6 PG (ref 26–34)
MCHC RBC AUTO-ENTMCNC: 33.2 G/DL (ref 32–36)
MCV RBC AUTO: 95 FL (ref 80–100)
MONOCYTES # BLD AUTO: 0.18 X10*3/UL (ref 0.1–1)
MONOCYTES NFR BLD AUTO: 11.8 %
NEUTROPHILS # BLD AUTO: 0.87 X10*3/UL (ref 1.2–7.7)
NEUTROPHILS NFR BLD AUTO: 57.3 %
NRBC BLD-RTO: 0 /100 WBCS (ref 0–0)
PHOSPHATE SERPL-MCNC: 3.2 MG/DL (ref 2.5–4.9)
PLATELET # BLD AUTO: 54 X10*3/UL (ref 150–450)
POTASSIUM SERPL-SCNC: 3.7 MMOL/L (ref 3.5–5.3)
RBC # BLD AUTO: 2.37 X10*6/UL (ref 4.5–5.9)
SODIUM SERPL-SCNC: 141 MMOL/L (ref 136–145)
VANCOMYCIN SERPL-MCNC: 15.3 UG/ML (ref 5–20)
WBC # BLD AUTO: 1.5 X10*3/UL (ref 4.4–11.3)

## 2025-05-01 PROCEDURE — 2500000001 HC RX 250 WO HCPCS SELF ADMINISTERED DRUGS (ALT 637 FOR MEDICARE OP)

## 2025-05-01 PROCEDURE — 99233 SBSQ HOSP IP/OBS HIGH 50: CPT | Performed by: INTERNAL MEDICINE

## 2025-05-01 PROCEDURE — 80202 ASSAY OF VANCOMYCIN: CPT | Performed by: STUDENT IN AN ORGANIZED HEALTH CARE EDUCATION/TRAINING PROGRAM

## 2025-05-01 PROCEDURE — 80069 RENAL FUNCTION PANEL: CPT

## 2025-05-01 PROCEDURE — 83735 ASSAY OF MAGNESIUM: CPT

## 2025-05-01 PROCEDURE — 85025 COMPLETE CBC W/AUTO DIFF WBC: CPT

## 2025-05-01 PROCEDURE — 99233 SBSQ HOSP IP/OBS HIGH 50: CPT

## 2025-05-01 PROCEDURE — 2500000004 HC RX 250 GENERAL PHARMACY W/ HCPCS (ALT 636 FOR OP/ED): Mod: JZ

## 2025-05-01 PROCEDURE — 1200000003 HC ONCOLOGY  ROOM WITH TELEMETRY DAILY

## 2025-05-01 PROCEDURE — 97535 SELF CARE MNGMENT TRAINING: CPT | Mod: GO

## 2025-05-01 PROCEDURE — 2500000004 HC RX 250 GENERAL PHARMACY W/ HCPCS (ALT 636 FOR OP/ED): Performed by: STUDENT IN AN ORGANIZED HEALTH CARE EDUCATION/TRAINING PROGRAM

## 2025-05-01 PROCEDURE — 92526 ORAL FUNCTION THERAPY: CPT | Mod: GN

## 2025-05-01 PROCEDURE — 97530 THERAPEUTIC ACTIVITIES: CPT | Mod: GP | Performed by: PHYSICAL THERAPIST

## 2025-05-01 PROCEDURE — 92507 TX SP LANG VOICE COMM INDIV: CPT | Mod: GN

## 2025-05-01 PROCEDURE — 82947 ASSAY GLUCOSE BLOOD QUANT: CPT

## 2025-05-01 RX ORDER — ACETAMINOPHEN 325 MG/1
975 TABLET ORAL 3 TIMES DAILY PRN
Status: DISCONTINUED | OUTPATIENT
Start: 2025-05-01 | End: 2025-05-06 | Stop reason: HOSPADM

## 2025-05-01 RX ORDER — VANCOMYCIN HYDROCHLORIDE 750 MG/150ML
750 INJECTION, SOLUTION INTRAVENOUS EVERY 24 HOURS
Status: DISCONTINUED | OUTPATIENT
Start: 2025-05-01 | End: 2025-05-01

## 2025-05-01 RX ORDER — HEPARIN SODIUM 10000 [USP'U]/100ML
0-4000 INJECTION, SOLUTION INTRAVENOUS CONTINUOUS
Status: DISPENSED | OUTPATIENT
Start: 2025-05-01 | End: 2025-05-04

## 2025-05-01 RX ORDER — MAGNESIUM SULFATE 1 G/100ML
1 INJECTION INTRAVENOUS ONCE
Status: COMPLETED | OUTPATIENT
Start: 2025-05-01 | End: 2025-05-01

## 2025-05-01 RX ADMIN — FAMOTIDINE 10 MG: 20 TABLET, FILM COATED ORAL at 09:20

## 2025-05-01 RX ADMIN — ATORVASTATIN CALCIUM 40 MG: 40 TABLET, FILM COATED ORAL at 20:01

## 2025-05-01 RX ADMIN — LACOSAMIDE 100 MG: 100 TABLET, FILM COATED ORAL at 20:01

## 2025-05-01 RX ADMIN — VANCOMYCIN HYDROCHLORIDE 750 MG: 1 INJECTION, POWDER, LYOPHILIZED, FOR SOLUTION INTRAVENOUS at 22:44

## 2025-05-01 RX ADMIN — HEPARIN SODIUM 5000 UNITS: 5000 INJECTION, SOLUTION INTRAVENOUS; SUBCUTANEOUS at 14:56

## 2025-05-01 RX ADMIN — LACOSAMIDE 100 MG: 100 TABLET, FILM COATED ORAL at 09:20

## 2025-05-01 RX ADMIN — ACETAMINOPHEN 975 MG: 325 TABLET ORAL at 09:20

## 2025-05-01 RX ADMIN — MAGNESIUM SULFATE HEPTAHYDRATE 1 G: 1 INJECTION, SOLUTION INTRAVENOUS at 09:20

## 2025-05-01 RX ADMIN — CLONIDINE HYDROCHLORIDE 0.1 MG: 0.2 TABLET ORAL at 09:21

## 2025-05-01 RX ADMIN — HEPARIN SODIUM 5000 UNITS: 5000 INJECTION, SOLUTION INTRAVENOUS; SUBCUTANEOUS at 09:20

## 2025-05-01 RX ADMIN — CLONIDINE HYDROCHLORIDE 0.1 MG: 0.2 TABLET ORAL at 20:01

## 2025-05-01 RX ADMIN — ACETAMINOPHEN 975 MG: 325 TABLET ORAL at 14:56

## 2025-05-01 RX ADMIN — HEPARIN SODIUM 800 UNITS/HR: 10000 INJECTION, SOLUTION INTRAVENOUS at 20:11

## 2025-05-01 ASSESSMENT — COGNITIVE AND FUNCTIONAL STATUS - GENERAL
TOILETING: TOTAL
DRESSING REGULAR LOWER BODY CLOTHING: TOTAL
DAILY ACTIVITIY SCORE: 6
EATING MEALS: A LOT
MOVING FROM LYING ON BACK TO SITTING ON SIDE OF FLAT BED WITH BEDRAILS: A LOT
DRESSING REGULAR UPPER BODY CLOTHING: A LOT
MOVING TO AND FROM BED TO CHAIR: TOTAL
DAILY ACTIVITIY SCORE: 10
WALKING IN HOSPITAL ROOM: TOTAL
PERSONAL GROOMING: A LOT
STANDING UP FROM CHAIR USING ARMS: TOTAL
STANDING UP FROM CHAIR USING ARMS: TOTAL
CLIMB 3 TO 5 STEPS WITH RAILING: TOTAL
HELP NEEDED FOR BATHING: A LOT
MOBILITY SCORE: 7
WALKING IN HOSPITAL ROOM: TOTAL
TOILETING: TOTAL
HELP NEEDED FOR BATHING: TOTAL
DRESSING REGULAR LOWER BODY CLOTHING: TOTAL
MOBILITY SCORE: 8
MOVING FROM LYING ON BACK TO SITTING ON SIDE OF FLAT BED WITH BEDRAILS: A LITTLE
EATING MEALS: TOTAL
CLIMB 3 TO 5 STEPS WITH RAILING: TOTAL
TURNING FROM BACK TO SIDE WHILE IN FLAT BAD: TOTAL
DRESSING REGULAR UPPER BODY CLOTHING: TOTAL
MOVING TO AND FROM BED TO CHAIR: TOTAL
TURNING FROM BACK TO SIDE WHILE IN FLAT BAD: TOTAL
PERSONAL GROOMING: TOTAL

## 2025-05-01 ASSESSMENT — PAIN SCALES - GENERAL
PAINLEVEL_OUTOF10: 0 - NO PAIN

## 2025-05-01 ASSESSMENT — ACTIVITIES OF DAILY LIVING (ADL): HOME_MANAGEMENT_TIME_ENTRY: 24

## 2025-05-01 ASSESSMENT — PAIN - FUNCTIONAL ASSESSMENT
PAIN_FUNCTIONAL_ASSESSMENT: 0-10

## 2025-05-01 ASSESSMENT — PAIN SCALES - WONG BAKER: WONGBAKER_NUMERICALRESPONSE: NO HURT

## 2025-05-01 NOTE — PROGRESS NOTES
"Enmanuel Ahumada \"Petey" is a 69 y.o. male on day 26 of admission presenting with Wound dehiscence.    Subjective   Neurology re-engaged for concern for tremors and spontaneous nystagmus       Objective    24 Hour Vitals  Temp:  [36.7 °C (98.1 °F)-37.5 °C (99.5 °F)] 36.9 °C (98.4 °F)  Heart Rate:  [100-114] 108  Resp:  [18-20] 20  BP: (125-147)/(62-95) 129/62    Physical Exam     EXAM  GENERAL APPEARANCE:  No distress, alert, interactive and cooperative.     MENTAL STATE:   Oriented to self and city, unable to report that he was in a hospital. Incorrect orientation to time (stated that he was 23 years old)   Able to name watch, but had persistent perseverating speech. Unable to name ring, glove, badge, phone.   Able to say \"Today is a rainy day\" but perseverated and then could not repeat \"no if's and's or but's about it\"   Able to follow simple commands, but not complex commands across midline    CRANIAL NERVES:   CN 2, 3, 4, 6 R gaze prerence EOMI. 3 beats of end gaze nystagmus on R gaze. VOR intact. No skew deviation appreciated.  CN 5  Likely intact in V1-V3 distributions  CN 7   Normal and symmetric facial strength. Nasolabial folds symmetric.   CN 8   Hearing intact to conversation.  CN 9/10  Palate elevates symmetrically.   CN 11   Normal strength of shoulder shrug and neck turning.   CN 12   Tongue midline with no bite marks. Tongue fasciculations/tremor noted as rest. No clear palatal tremor.    MOTOR:   Muscle bulk was reducd  Increased muscle tone (spasticity) present in bilateral upper and lower extremities   Resting and postural high frequency, low amplitude truncal and  bilateral upper and lower extremity tremor   Minimal intention component      Cranial Nerves:   CN: Visual fields full to confrontation.   CN 3, 4, 6: Pupils round, 4 mm in diameter, equally reactive to light. Lids symmetric; no ptosis. EOMs normal alignment, full range with normal saccades, pursuit and convergence.   No nystagmus. " "  CN 5: Facial sensation intact bilaterally.   CN 7: Normal and symmetric facial strength. Nasolabial folds symmetric.   CN 8: Hearing intact to voice  CN 9: Palate elevates symmetrically.   CN 11: Normal strength of shoulder shrug and neck turning.   CN 12: Tongue midline, with normal bulk and strength; no fasciculations.     Motor: Muscle bulk and tone were normal in both upper and lower extremities. No fasciculations, tremor or other abnormal movements were present.      Strength      R L  Shoulder abduction (C4-C5)  4 5  Elbow flexion (C5-C6)   5 5  Elbow extension (C7-C8)  4 4  Wrist extension (C6-C7)  5 5  Finger extension (C7-C8)  5 5    Hip flexion (L1-L3)      5 5  Knee extension (L2-L4)       5 5  Knee flexion (L5-S1)       5 5  DorsiFlex (L5-S1. Peroneal)             4           4  PlantarFlex (S1-S2, Tibial)          5          5       Reflexes: Right/ Left:  Biceps 2/2, brachioradialis 2/2, triceps 2/2, patellar 4?/4? (Possible clonus present, difficult to ascertain given comorbid trremor), 4 beat clonus present at the bilateral ankles    COORDINATION:    In both upper extremities, finger-nose-finger was intact. Heel to shin intact       Labs  Results from last 72 hours   Lab Units 05/01/25  0646 04/30/25  1549 04/29/25  1238   WBC AUTO x10*3/uL 1.5* 1.8* 1.1*   HEMOGLOBIN g/dL 7.5* 8.2* 7.3*   HEMATOCRIT % 22.6* 24.6* 21.5*   PLATELETS AUTO x10*3/uL 54* 62* 56*        Results from last 72 hours   Lab Units 05/01/25  0646 04/30/25  1804 04/29/25  1238   SODIUM mmol/L 141 139 140   POTASSIUM mmol/L 3.7 3.5 3.8   CHLORIDE mmol/L 103 102 105   CO2 mmol/L 31 28 25   BUN mg/dL 26* 29* 33*   CREATININE mg/dL 2.41* 2.81* 3.51*   MAGNESIUM mg/dL 1.41* 1.54* 1.64   PHOSPHORUS mg/dL 3.2 3.6 3.8        Medications   Scheduled Medications  Scheduled Medications[1]  Continuous Medications  Continuous Medications[2]    PRN Medications  PRN Medications[3]         Assessment/Plan      Enmanuel Ahumada \"Dash\" is a " 69 y.o. male with PMHx RO GBM s/p resections (12/2023, 2/2025) and chemoradiation (3/2024; followed by adjuvant temozolomide), HTN, HLD, Osborn's esophagus admitted for wound dehiscence s/p cranial wound washout 4/6 with cultures + methicillin resistant staph epidermidis on IV Vancmycin. Neurology initially engaged for c/f seizures now re-engaged for spontaneous eye movements and tremors.     Epilepsy previously following for an episode concerning for seizure, described as perseverance and unable to correctly following commands. No overt motor symptoms. The patient transitioned from Valproic Acid to Lacosamide. EEG monitored> 48hours and non concerning for seizure activity.     As far as the tremors go he has generalized truncal and appendicular high frequency low amplitude resting and postural tremor. Per family tremors are new and chart review dates first report of tremor starting mid April. His last MRI was 04/06 prior? To the onset of the tremors. These could be related to a striatal injury though mechanism would be unclear because would be unusual for a wound washout to cause deep brain injury.     As far the as the eye movements go he had  abnormal eye movements at rest lasting 5 minutes. Described by nurse and intern at bedside as rhythmic horizontal shaking. Randomly started and patient did not have any associated symptoms. Awake and alert throughout episode complaining of blurry vision as a result of the eye movements. No generalized shaking seen other than persistent tremor in b/l UE and LE which has been ongoing. By the time examiner arrived, the eye movements had resolved. Nursing report his mentation is the best that it has been.     - Resume cvEEG  - Continue Lacosamide 100mg BID  - If eye movements reappear, please record video and page neurology  - Will discuss utility of repeat head imaging in the AM          Note coauthored by excellent co-resident Herminio Girard MD. Patient to be discussed with  attending physician in AM.    Payton Mcghee MD  Department of Neurology, PGY-3  General [phone]       Post Staffing Updates:     68 yo M with GBM s/p multiple resections admitted for wound dehiscence cultures growing staph epidermidis. Neurology engaged for abnormal eye movements and tremors. On exam he has a right gaze preference but no spontaneous nystagmus. Tremors are generalized present at rest, with action and posturally accompanied by spasticity which suggests upper motor neuron signs. This is likely secondary to structural changes from his underlying malignancy.     - Please stop Lacosamide   - Start Briviact 50 mg Q12H to continue seizure prophylaxis, can discuss at outpatient visits if wean is required   - c/w cvEEG, will discuss removing in AM    Payton Mcghee MD  Department of Neurology, PGY-3  General [id number]          [1] atorvastatin, 40 mg, oral, Daily  cloNIDine, 0.1 mg, oral, BID   Followed by  [START ON 5/3/2025] cloNIDine, 0.1 mg, oral, Daily  famotidine, 10 mg, oral, Daily  lacosamide, 100 mg, oral, BID  lidocaine, 5 mL, infiltration, Once  lidocaine, 1 patch, transdermal, Daily  polyethylene glycol, 17 g, oral, Daily  [Held by provider] QUEtiapine, 25 mg, oral, Nightly  sennosides-docusate sodium, 1 tablet, oral, Nightly  vitamin B complex-vitamin C-folic acid, 1 capsule, oral, Daily  [2] heparin, 0-4,000 Units/hr  [3] PRN medications: acetaminophen, bisacodyl, [Held by provider] cyclobenzaprine, dextrose, dextrose, glucagon, glucagon, heparin, hydrOXYzine HCL, ondansetron **OR** ondansetron, oxygen, vancomycin

## 2025-05-01 NOTE — CONSULTS
"Nutrition Follow Up Assessment:   Nutrition Assessment    The patient is a 69 y.o. male who is hospital day #26.  Initially admitted for concerns of increased wound drainage. Now s/p  cranial wound washout on 04/06 with Neurosurgery. Cultures showed infection - ID following, on IV abx. Hospital course complicated by DVT in LLE and RUE - s/p IVC filter placement. Developed JAMAR - nephro following, now improving. Plan for discharge to [facility].     PMHx: HTN, HLD, smalls's esophagus, RO GBM s/p resection (SAH, 12/2023) with resultant L HH s/p chemo/RT who represented for tumor progression in February. He underwent redo craniotomy for tumor resection 02/20 who had repeat MRI in March showing postop changes with fluid collection at dura and craniotomy site, worsening infiltrative disease, enhancement along posteromedial aspect of resection site.    Reason for Assessment: Provider consult order    Nutrition History:  Energy Intake: Poor < 50 %  Food and Nutrient History: Pt sleeping. Pt's wife provided information. Reports pt is still not eating much. Only a couple of bites per meals. Liquids a little better but still not drinking 100% of a portion. Pt's wife ordering meals for pt. Pt eats better when it is soft foods - ex. scrambled eggs or mashed potatoes. ONS intake - may drink 50% of a carton per day.  Was drinking premier protein at home - pt's wife will bring some tomorrow. Also agreeable to changing to Boost VHC for added nutrients. Pt continues to be confused. No GI symptoms noted.       Anthropometrics:  Start of admission anthropometrics:  Height: 170.2 cm (5' 7\")  Weight: 68 kg (150 lb)  BMI (Calculated): 23.49  IBW/kg (Dietitian Calculated): 67.3 kg  Percent of IBW: 97 %    Weight History:   04/26/25 0535 67 kg    04/25/25 0531 64.9 kg    04/23/25 0530 65.3 kg    04/18/25 0600 64.4 kg   04/13/25 0530 64.3 kg   04/12/25 0000 65.7 kg   04/09/25 0000 65.3 kg   04/07/25 0400 71.8 kg   04/06/25 0646 68 kg   04/05/25 " 1028 68 kg   04/04/25 0944 68.1 kg       Weight History / % Weight Change: Possible 5% wt loss x1 month.    Nutrition Focused Physical Exam Findings:  NFPE done 04/09    Edema  Edema: none  Physical Findings   Skin:  (R crani site)      Last BM Date: 05/01/25    Nutrition Significant Labs:    Results from last 7 days   Lab Units 05/01/25  0646 04/30/25  1804 04/30/25  1549 04/29/25  1238   HEMOGLOBIN g/dL 7.5*  --  8.2* 7.3*   MCV fL 95  --  97 96   GLUCOSE mg/dL 97   < >  --  112*   POTASSIUM mmol/L 3.7   < >  --  3.8   SODIUM mmol/L 141   < >  --  140   PHOSPHORUS mg/dL 3.2   < >  --  3.8   MAGNESIUM mg/dL 1.41*   < >  --  1.64   CREATININE mg/dL 2.41*   < >  --  3.51*   BUN mg/dL 26*   < >  --  33*    < > = values in this interval not displayed.       Nutrition Specific Medications:  atorvastatin, 40 mg, oral, Daily  polyethylene glycol, 17 g, oral, Daily  sennosides-docusate sodium, 1 tablet, oral, Nightly     I/O:   I/O last 2 completed shifts:  In: 1310 (19.6 mL/kg) [P.O.:60; IV Piggyback:1250]  Out: 1200 (17.9 mL/kg) [Urine:1200 (0.7 mL/kg/hr)]  Weight: 67 kg     Dietary Orders (From admission, onward)       Start     Ordered    04/30/25 1627  Adult diet Regular; 1:1 Feeding  Diet effective now        Comments: only when awake and alert w/ upright positioning   Question Answer Comment   Diet type Regular    Select tray type: 1:1 Feeding        04/30/25 1627    04/22/25 1603  Oral nutritional supplements  Until discontinued        Comments: Provide for AM and PM snack   Question Answer Comment   Deliver with Breakfast    Deliver with Dinner    Select supplement: Ensure Plus        04/22/25 1602    04/05/25 1229  May Participate in Room Service  ( ROOM SERVICE MAY PARTICIPATE)  Once        Question:  .  Answer:  Yes    04/05/25 1228                     Estimated Needs:   Total Energy Estimated Needs in 24 hours (kCal): 1950 kCal  Method for Estimating Needs: 30kcal/kg per act wt    Total Protein Estimated  Needs in 24 Hours (g): 95 g  Method for Estimating 24 Hour Protein Needs: 1.5g/kg per act wt    Total Fluid Estimated Needs in 24 Hours (mL):  (per team)          Nutrition Diagnosis   Malnutrition Diagnosis  Patient has Malnutrition Diagnosis: Yes  Diagnosis Status: Active  Malnutrition Diagnosis: Moderate malnutrition related to chronic disease or condition  Related to: GBM with progression, chemo, s/p crani  As Evidenced by: moderate muscle wasting and fat loss, intake likely meeting <75% of increased needs for >/=1 month (with ongoing inadequate intake this admission - variable days with <50% intake)    Nutrition Diagnosis  Patient has Nutrition Diagnosis: Yes  Diagnosis Status (1): Active  Nutrition Diagnosis 1: Increased nutrient needs  Related to (1): increased metabolic demand  As Evidenced by (1): s/p right cranial wound washout/mesh cranioplasty       Nutrition Interventions/Recommendations   Nutrition prescription for oral nutrition    Nutrition Recommendations:  ONS order changed to Boost VHC (530 kcal, 22g pro) BID.   Consider starting daily MVI.    Nutrition Goals:   Medical Food Supplement: Commercial beverage medical food supplement therapy  Goal: Drink at least 1 ONS /d    Education: Discussed nutrition strategies to increase kcal/pro intake.        Nutrition Monitoring and Evaluation   Monitoring and Evaluation Plan: Intake / amount of food  Intake / Amount of food: Consumes at least 50% or more of meals/snacks/supplements      Monitoring and Evaluation Plan: Body weight  Body Weight: Body weight - Maintain stable weight                     Time Spent (min): 60 minutes

## 2025-05-01 NOTE — PROGRESS NOTES
"Physical Therapy    Physical Therapy Treatment    Patient Name: Enmanuel Ahumada \"Dash\"  MRN: [id number]  Department: [facility]  Room: [address] [place], [place] [address]  Today's Date: 5/1/2025  Time Calculation  Start Time: 1111  Stop Time: 1135  Time Calculation (min): 24 min         Assessment/Plan   PT Assessment  PT Assessment Results: Decreased strength, Decreased endurance, Impaired balance, Decreased coordination, Decreased mobility, Decreased cognition, Impaired judgement, Decreased safety awareness  Rehab Prognosis: Good  Barriers to Discharge Home: Caregiver assistance, Cognition needs, Physical needs  Caregiver Assistance: Caregiver assistance needed per identified barriers - however, level of patient's required assistance exceeds assistance available at home  Cognition Needs: 24hr supervision for safety awareness needed, Insight of patient limited regarding functional ability/needs, Cognition-related high falls risk  Physical Needs: High falls risk due to function or environment, 24hr mobility assistance needed, Stair navigation into home limited by function/safety, Ambulating household distances limited by function/safety  End of Session Communication: Bedside nurse  End of Session Patient Position: Bed, 4 rail up, Alarm on  PT Plan  Inpatient/Swing Bed or Outpatient: Inpatient  PT Plan  Treatment/Interventions: Bed mobility, Transfer training, Gait training, Balance training, Strengthening, Endurance training, Therapeutic exercise, Therapeutic activity  PT Plan: Ongoing PT  PT Frequency: 5 times per week  PT Discharge Recommendations: High intensity level of continued care  Equipment Recommended upon Discharge:  (tbd at next level of care)  PT Recommended Transfer Status: Assist x2  PT - OK to Discharge: Yes      General Visit Information:   PT  Visit  PT Received On: 05/01/25  General  Reason for Referral: Pt initially presented with tumor progression (RO GBM), s/p redo craniotomy for tumor resection in 2/2025. 4/5 p/w 1wk " incisional drainage, 4/4 CTH airfluid level in resection cavity, 4/5 MRI brain w/wo incr'd resection cavity enhancement and diffusion restriction 4/6 s/p R cranial wound washout and mesh cranioplasty. 4/7 rCTH incr SAH, min incr ICH, rrCTH stable, EEG neg dc'd.  Past Medical History Relevant to Rehab: HTN, HLD, smalls's esophagus, RO GBM s/p resection (SAH, 12/2023) with resultant L HH s/p chemo/RT  Family/Caregiver Present: Yes  Caregiver Feedback: wife present and supportive  Co-Treatment: OT  Co-Treatment Reason: Cotx with OT for pt's saefty with mobility, low AMPAC score  Prior to Session Communication: Bedside nurse  Patient Position Received: Bed, 4 rail up, Alarm on  Preferred Learning Style: auditory, verbal  General Comment: Pt drowsy upon arrival, improved after sitting upon EOB. Pt willing to participate    Subjective   Precautions:  Precautions  Medical Precautions: Fall precautions    Objective   Pain:  Pain Assessment  Pain Assessment: 0-10  0-10 (Numeric) Pain Score: 0 - No pain  Cognition:  Cognition  Overall Cognitive Status: Impaired  Orientation Level: Disoriented to time, Disoriented to situation, Disoriented to place  Following Commands: Follows one step commands with repetition (and cues ~50% of the time)  Safety Judgment: Decreased awareness of need for safety precautions  Processing Speed: Delayed    Postural Control:  Static Sitting Balance  Static Sitting-Level of Assistance: Minimum assistance  Dynamic Sitting Balance  Dynamic Sitting-Level of Assistance: Maximum assistance (left lateral and retrolean)  Static Standing Balance  Static Standing-Level of Assistance:  (MaxAx2 B arm in arm assist)       Treatments:      Bed Mobility  Bed Mobility: Yes  Bed Mobility 1  Bed Mobility 1: Supine to sitting, Sitting to supine  Level of Assistance 1: Maximum assistance, Maximum verbal cues, +2  Bed Mobility 2  Bed Mobility  2: Scooting (to HOB)  Level of Assistance 2: Dependent, +2  Bed Mobility  Comments 2: use of draw sheet       Transfers  Transfer: Yes  Transfer 1  Transfer From 1: Sit to, Stand to  Transfer to 1: Stand, Sit  Technique 1: Sit to stand, Stand to sit  Transfer Device 1:  (B arm in arm assist)  Transfer Level of Assistance 1: Maximum assistance, +2, Maximum verbal cues, Moderate tactile cues      Outcome Measures:  [facility] Basic Mobility  Turning from your back to your side while in a flat bed without using bedrails: A little  Moving from lying on your back to sitting on the side of a flat bed without using bedrails: Total  Moving to and from bed to chair (including a wheelchair): Total  Standing up from a chair using your arms (e.g. wheelchair or bedside chair): Total  To walk in hospital room: Total  Climbing 3-5 steps with railing: Total  Basic Mobility - Total Score: 8    Education Documentation  Body Mechanics, taught by Darlyn Evans PT at 5/1/2025  2:05 PM.  Learner: Patient  Readiness: Acceptance  Method: Explanation  Response: Needs Reinforcement    Precautions, taught by Darlyn Evans PT at 5/1/2025  2:05 PM.  Learner: Patient  Readiness: Acceptance  Method: Explanation  Response: Needs Reinforcement    Mobility Training, taught by Darlyn Evans PT at 5/1/2025  2:05 PM.  Learner: Patient  Readiness: Acceptance  Method: Explanation  Response: Needs Reinforcement    Education Comments  No comments found.        OP EDUCATION:       Encounter Problems       Encounter Problems (Active)       PT Problem       Patient will perform bed mobility IND to reduce risk of developing decubitus ulcers.  (Progressing)       Start:  04/08/25    Expected End:  05/09/25            Patient will ambulate at least 150 ft. with </= close sup and LRD to improve tolerance of household distances.  (Not Progressing)       Start:  04/08/25    Expected End:  05/09/25            Patient will perform the 5-Time Sit to Stand test in <14 sec to indicate decreased falls risk. (Not Progressing)       Start:  04/08/25     Expected End:  05/09/25            Patient will perform sit to stand and stand to sit transfers with </= close sup and LRD to increase functional strength.  (Progressing)       Start:  04/08/25    Expected End:  05/09/25            BLE 5/5 (Progressing)       Start:  04/08/25    Expected End:  05/09/25               Pain - Adult

## 2025-05-01 NOTE — PROGRESS NOTES
"Speech-Language Pathology    SLP Adult Inpatient  Speech-Language Pathology Treatment     Patient Name: Enmanuel Ahumada \"Dash\"  MRN: [id number]  Today's Date: 5/1/2025  Time Calculation  Start Time: 1024  Stop Time: 1054  Time Calculation (min): 30 min         SLP Assessment:  -Dysphagia:  Stable, impacted by level of alertness.  Per previous assessments, when pt is awake and alert, swallowing function is intact.  However, when lethargic, aspiration present (seen on MBSS 4/24/25).  Wife reports continued poor PO intake and frequent lethargy.      Recommend further consideration by medical team; pt w/ limited intake and risk for aspiration when not fully awake/alert.  May need to consider an alternative means of nutrition/hydration.    -Speech/Language/Cognition:  -Cognitive and language deficits; stable     *Pt previously scored a 10/30 on the Vikram Cognitive Assessment on 4/17 indicating a moderate cognitive impairment      Recommendations:  Solid Diet Recommendation: Regular solids (IDDSI 7); choose softer solids   Liquid Diet Recommendation: Thin liquids      Safe Swallow Strategies/Guidelines:  Only present PO intake when awake and alert  Upright positioning   Slow rate/small boluses      SLP Plan:  Skilled SLP  SLP Frequency: 2x per week  Duration: 2 weeks  Discussed POC: Patient  Discussed Risks/Benefits: Yes  Patient/Caregiver Agreeable: Yes  SLP - OK to Discharge: Yes     Goals:  Speech/Lang/Cog  Pt will orient to person, place, time, and situation with > 90% accuracy given min cues.                Date initiated: 4/17/25              Expected Time Frame to Meet Goal: 2-3 weeks               Progressing                 Pt will complete functional problem solving tasks with > 90% accuracy given min cues.                Date initiated: 4/17/25              Expected Time Frame to Meet Goal: 2-3 weeks               Progressing      Pt will complete functional reasoning tasks with > 90% accuracy given min " cues.                Date initiated: 4/17/25              Expected Time Frame to Meet Goal: 2-3 weeks               Progressing      Pt will participate in functional short term memory tasks with > 90% accuracy given min cues.               Date initiated: 4/17/25              Expected Time Frame to Meet Goal: 2-3 weeks               Progressing      Pt will self correct expressive language errors in conversational speech with mod cues.              Date initiated: 4/17/25              Expected Time Frame to Meet Goal: 2-3 weeks               Progressing      Dysphagia  Pt will tolerate least restrictive diet with adequate oral phase and no s/s of aspiration.               Date initiated: 4/13/25              Expected Time Frame to Meet Goal: 1-2 weeks              Status: Progressing      Pt/family will adhere to safe swallow strategies during PO intake with > 90% accuracy independently.                 Date initiated: 4/13/25              Expected Time Frame to Meet Goal: 1-2 weeks               Status: Progressing      Subjective  Pt properly identified and treated bedside.  No c/o pain.  Room air.  Wife present.  Required mod verbal and tactile cues to alert and maintain alertness.      Pt is a 69 M h/o HTN, HLD, smalls's esophagus, RO GBM s/p resection (SAH, 12/2023) with resultant L HH s/p chemo/RT presenting with tumor progression, 2/20 s/p redo craniotomy for tumor resection w 5 ALA, CTH POC, 2/21 MRI intended NTR, 3/16 MRI postop changes w fluid collection at dura and craniotomy site, worsening infiltrative disease, enhancement along posteromedial aspect of resection site, 4/5 p/w 1wk incisional drainage, 4/4 CTH airfluid level in resection cavity, 4/5 MRI brain w/wo incr'd resection cavity enhancement and diffusion restriction  4/6 s/p R cranial wound washout and mesh cranioplasty (kaleb purulence), CTH POC, L temporal ICH, plts 81 s/p 2u plts, 4/7 rCTH incr SAH, min incr ICH, rrCTH stable, EEG neg  dc'd  4/8 s/p PICC  4/9 CTH stable, SDD dc'd, EEG neg  4/21 acute change in exam, AMS and not following commands as briskly, CTH stable   4/24 attempted MBSS but pt presented w/ lethargy and confusion. Ceased MBSS early and recommended NPO.     MBSS findings: Observed aspiration across all presentations of thin and mildly thick liquids. Trace aspiration during tsp thin liquids did not trigger cough response, but larger presentations did. Unable to clear bolus from airway w/ cough. Aspiration occurred during and after swallow. Results impacted by lethargy and confusion.      Objective  Reviewed results of MBSS and recommendations w/ wife at bedside.  She reports pt has been increasingly lethargic and has had limited PO intake/poor appetite.  Medical team aware.  Wife aware of recommendation to present PO only when pt is awake and alert.  SLP present for breakfast meal.  Required assist w/ PO intake.  No overt s/s of aspiration with ~ 4 oz of thin liquid (OJ and Ensure) via straw.  Pt also consumed ~5 bites of scrambled eggs with fair oral phase.  Min oral residue noted.  Cleared via liquid wash.  Cough x1; possible aspiration.  Pt refused further solid intake.     When awake and alert, pt is appropriate for regular solids and thin liquids. Pt should not be presented w/ solids/liquids when lethargic, confused, and not alert given findings on MBSS that demonstrate moderate-severe dysphagia w/ aspiration when not awake and alert. Discussed at length w/ medical team and wife.      Pt seen for cognitive-communication tx.  Pt oriented to person independently.  Required choice of 2 to orient otherwise.  Pt w/ instances of expressive language deficits; anomia and neologisms.  Limited awareness/self correction.       05/01/25 at 3:13 PM - Anabell Pendleton, SLP

## 2025-05-01 NOTE — PROGRESS NOTES
"Eve Ahumada \"Petey" is a 69 y.o. male on day 26 of admission presenting with Wound dehiscence.    Overnight: No acute events overnight.    This morning patient is awake, but seems less interactive and less alert today compared to yesterday. He has no complaints this morning. His wife is at bedside.        Objective   Current Vitals  /62 (BP Location: Right arm, Patient Position: Lying)   Pulse 108   Temp 36.9 °C (98.4 °F) (Temporal)   Resp 20   Ht 1.702 m (5' 7\")   Wt 67 kg (147 lb 11.3 oz)   SpO2 95%   BMI 23.13 kg/m²      I/O last 3 completed shifts:  In: 1310 (19.6 mL/kg) [P.O.:60; IV Piggyback:1250]  Out: 1200 (17.9 mL/kg) [Urine:1200 (0.5 mL/kg/hr)]  Weight: 67 kg     Physical Exam  Vitals reviewed.   Constitutional:       General: He is not in acute distress.     Appearance: He is ill-appearing. He is not toxic-appearing.      Comments: Intermittent alertness, oriented x1 (self, , but not to time or place)   HENT:      Head:      Comments: Right-crani site appears to be healing well, dry, no erythema.     Mouth/Throat:      Mouth: Mucous membranes are moist.      Pharynx: Oropharynx is clear.   Eyes:      Extraocular Movements: Extraocular movements intact.   Cardiovascular:      Rate and Rhythm: Normal rate and regular rhythm.      Heart sounds: Normal heart sounds.   Pulmonary:      Effort: Pulmonary effort is normal. No respiratory distress.   Abdominal:      General: There is no distension.      Palpations: Abdomen is soft.      Tenderness: There is no abdominal tenderness. There is no guarding.   Musculoskeletal:      Cervical back: Neck supple.      Right lower leg: No edema.      Left lower leg: No edema.   Skin:     General: Skin is warm and dry.      Coloration: Skin is pale.      Findings: No rash.   Neurological:      General: No focal deficit present.      Mental Status: He is alert. He is disoriented.      Comments: Fine tremor of the upper extremities " noted, intermittent tremor of the lower extremities more prominent after physical activity   Psychiatric:         Behavior: Behavior normal.     Relevant Results  Labs:  CBC: WBC 1.5 , HGB 7.5, PLT 54  BMP: , K 3.7, Cl 103, HCO3 31, BUN 26, CR 2.41, Glu 97  LFTS: AST 17 , ALT 38, ALKPHOS 71 , TBILI 1.2 , DBILI 0.3  TROP: 3  BNP: No results found for requested labs within last 365 days.  COAGS: PT 14.1 , PTT 33  , INR 1.3  UA:   Results from last 7 days   Lab Units 04/28/25  0702   COLOR U  Light-Yellow   PH U  6.0   SPEC GRAV UR  1.010   PROTEIN U mg/dL NEGATIVE   BLOOD UR mg/dL 0.03 (TRACE)*   NITRITE U  NEGATIVE   WBC UR /HPF NONE     ABG:    CALCIUM 7.9 MAG No results found for requested labs within last 365 days. ALB 2.8 LACTATE 1.3 PHOS No results found for requested labs within last 365 days. COVIDNo results found for requested labs within last 365 days.    Micro/culture data:  Susceptibility data from last 120 days.  Collected Organism Clindamycin Erythromycin Oxacillin Tetracycline Trimethoprim/Sulfamethoxazole Vancomycin   04/06/25 1007 Staphylococcus epidermidis  S  S  R  S  R  S   04/06/25 1006 Staphylococcus epidermidis         04/06/25 1005 Staphylococcus epidermidis             Imaging:  IR CVC nontunneled  Narrative: Interpreted By:  Dewey Hagen,   STUDY:  IR CVC NONTUNNELED;  4/30/2025 10:59 am      INDICATION:  Signs/Symptoms:pt requires re-insertion of PICC line for antibiotics,  does not have any PIVs currently as IV team unable to place.          COMPARISON:  None.      ACCESSION NUMBER(S):  [id number]      ORDERING CLINICIAN:  DEREK WALLACE      TECHNIQUE:  INTERVENTIONALIST(S):  Dewey Hagen MD      CONSENT:  The patient/patient's POA/next of kin was informed of the nature of  the proposed procedure. The purposes, alternatives, risks, and  benefits were explained and discussed. All questions were answered  and consent was obtained.      RADIATION EXPOSURE:  Fluoroscopy time: 0.3  min.      SEDATION:  None.      MEDICATION/CONTRAST:  No additional      TIME OUT:  A time out was performed immediately prior to procedure start with  the interventional team, correctly identifying the patient name, date  of birth, MRN, procedure, anatomy (including marking of site and  side), patient position, procedure consent form, relevant laboratory  and imaging test results, antibiotic administration, safety  precautions, and procedure-specific equipment needs.      COMPLICATIONS:  No immediate adverse events identified.      FINDINGS:  Maximum sterile barrier technique was implemented. In the recumbent  position, the patient was positioned on the angiography table. The  cutaneous tissues in the  left superomedial arm were prepared and  draped in usual sterile manner. Screening gray-scale sonography of  the brachial, cephalic and basilic veins was performed demonstrating  a patent  left brachial vein which was subsequently selected for  access.      Lidocaine 1% was instilled into the subcutaneous soft tissues for  local anesthesia. Utilizing direct ultrasound guidance and  micropuncture/Seldinger technique, the  right brachial vein was  accessed. Ultrasound imaging was performed to confirm location and a  digital spot image was obtained and stored on the  PACS.      A 018 [place]-Mandril guidewire was inserted through the access needle  to secure location. The access needle was exchanged over the wire for  a 5-[place] coaxial dilator peel-away sheath system. The guidewire was  advanced to the cavoatrial junction utilizing intermittent  fluoroscopic guidance. An optimal catheter length of   cm was  obtained.      The 5-[place]  single-lumen PICC line catheter was trimmed and loaded  on the 018 [place]-Mandril guidewire. The inner dilator was removed and  the PICC catheter was introduced through the peel-away sheath.  Utilizing intermittent fluoroscopic guidance, the catheter was  advanced to the cavoatrial  junction. The peel-away sheath was removed  during catheter advancement.      A fluoroscopic spot image of the chest in the AP projection was  obtained to confirm optimal location.      The catheter was aspirated without resistance and flushed with normal  saline. The catheter was again flushed with heparinized saline. The  external portion of the catheter was secured in place with a STATLOCK.      The patient tolerated the procedure without complication.      Impression: 1. Uncomplicated and technically successful placement of a  single-lumen 5-Pry[place]inig PICC line - optimal catheter length of  46 cm  with catheter tip position at the cavoatrial junction. Uncomplicated  procedure and the catheter is ready for use.      I was present for and/or performed the critical portions of the  procedure and immediately available throughout the entire procedure.      I personally reviewed the image(s) / study and resident  interpretation. I agree with the findings as stated.      Performed and dictated at [facility].      MACRO:  None      Signed by: Dewey Hagen 4/30/2025 1:06 PM  Dictation workstation:   [id number]  ECG 12 Lead  Normal sinus rhythm  Inferior infarct , age undetermined  Cannot rule out Anterior infarct , age undetermined  Abnormal ECG  When compared with ECG of 25-APR-2025 04:02,  Minimal criteria for Anterior infarct are now Present  Nonspecific T wave abnormality now evident in Anterior leads  Confirmed by Brandon Thomas ([id number]) on 4/30/2025 8:36:14 AM  ECG 12 Lead  Normal sinus rhythm  Normal ECG  When compared with ECG of 24-APR-2025 09:20,  Fusion complexes are no longer Present  Premature ventricular complexes are no longer Present  Vent. rate has decreased BY  87 BPM  Borderline criteria for Anterior infarct are no longer Present  Borderline criteria for Anterolateral infarct are no longer Present  Confirmed by Brandon Thomas ([id number]) on 4/30/2025 8:35:30 AM        "    MEDS:  Scheduled medications  Scheduled Medications[1]  Continuous medications  Continuous Medications[2]  PRN medications  PRN Medications[3]       Assessment/Plan   Assessment/Plan:   Mr. Singer \"Dash\" Zita is a 69 male with a PMHx of HTN, HLD, smalls's esophagus, RO GBM s/p resection ([facility], 12/2023) with resultant L HH s/p chemo/RT who represented for tumor progression in February. He underwent redo craniotomy for tumor resection 02/20 who had repeat MRI in March showing postop changes with fluid collection at dura and craniotomy site, worsening infiltrative disease, enhancement along posteromedial aspect of resection site. He represented on 04/05/25 with concerns for increased wound drainage and was admitted under the care of Neurosurgery. MRI brain onf 04/05 showed increased resection cavity enhancement and diffusion restriction concerning fo superimposed infection/abscess. He therefore underwent cranial wound washout on 04/06 with Neurosurgery. Subsequent cultures from OR revealed methicillin-resistant staph epidermidis, currently on vancomycin IV therapy until 06/15/25 per ID. His subsequent hospital course have been complicated by acute DVTs in the LLE and RUE s/p IVC filter placement due to pancytopenia (namely worsening thrombocytopenia) limiting anticoagulation, agitation/confusion, and new JAMAR.    Updates 05/01/25:  - Cr continues to improve today   - Urine output is encouraging as well  - Vascular medicine re-engaged today due to plt stability   - recommending start heparin gtt, low intensity no bolus as long as plts are stable about 50K    - pending NSGY approval prior to initiation   - recommending heparin to Coumadin bridge, unless his kidney function continues to improve and stabilize   - Neurology re-engaged regarding c/f tremors, new since admission  - Reached out to Heme in regard to c/f ANC of 0.87 toady   - since 04/27 has down trended  - Recent Lomustine (4/4): Alkylating agent with " "delayed alisson (3-6 weeks), can cause prolonged cytopenias\".  - Leukocyte alisson is typically 28-35 days post treatment   - Today would be day 27  - he remains afebrile, but has been getting scheduled tylenol TID for pain - will order as PRN as to not mask febrile neutropenia   - will consider Filgrastim if patient become febrile and collect repeat blood cultures   - pt already on vanc for wound infection    # Pancytopenia   # Thrombocytopenia  # Neutropenia  - Hematology consulted 04/21 felt thrombocytopenia to be multifactorial due to recent lomustine initiation (4/4), introduction of VPA (4/8), MRSE intracranial wound infection treated with vanc and cefepime, and possible consumption in setting of DVTs   - CBC trend: Plt 129 (4/19) ? 49 (4/20)   - LDH: 230 (normal)  -Smear: Clumping in blue tube, decreased plt in lavender tube, rare giant plts  - Coags: INR 1.4, PTT 30s (mildly elevated INR)  - HFP: Tbili 1.2, Alb 3.2, AST/ALT/ALP normal  - D-dimer: 2354  - B12, Folate, TSH: WNL  - PF4/BRANDO: Negative  - Has received multiple platelet transfusions (x8, most recent 04/26) and RBC transfusions (x1 04/25) without overt s or s of bleeding  - Hematology re-enaged 04/26 prior to transfer  - Note states platelet count marcela to 82 following 2 units of platelet transfusion and has since downtrended to 44 over 3 day period. Also notable is worsening anemia throughout hospital stay   PLAN:  - Reached out to [facility] in regard to c/f ANC of 0.87 toady   - since 04/27 has down trended  - Recent Lomustine (4/4): Alkylating agent with delayed alisson (3-6 weeks), can cause prolonged cytopenias\".  - Leukocyte alisson is typically 28-35 days post treatment   - Today would be day 27  - he remains afebrile, but has been getting scheduled tylenol TID for pain - will order as PRN as to not mask febrile neutropenia   - will consider Filgrastim if patient become febrile and collect repeat blood cultures   - pt already on vanc for wound infection  - " Will transfuse for Hgb < 7 & platelets < 10 or < 50 in the setting of bleeding   - CTM CBC w/ dif daily  - Will transfuse prn per Hematology recs  - Active T&S    # JAMAR, improving  - Upon transfer to our service, pt noted to have Cr trend of 0.66 -> 3.71 over 04/24 -> 04/26  - Previously normal kidney function without history of kidney disease or even previous AKIs  - Likely Cause is Vancomycin, JAMAR coincides with Peak Vanc trough of 38.   - PVR on 04/26 showing ~96 mL, less likely to be obstructive cause.  - No large changes in blood pressures recently  - JAMAR work-up:   - PVR, 96 mL   - Renal U/S: Mild left-sided hydronephrosis with few intrarenal calculi    - UA 04/27 negative, UA 04/28 pending   - Urine lytes 04/27: 3.6%, intrinsic. Serum sodium 138, serum cr 4.14, urine sodium 94, urine cr 77.8  - Urine lytes 04/28: 3.5%, intrinsic. Serum sodium 139, serum cr 4.04, urine sodium 108, urine cr 90.7   - CK 49  - Nephrology signed off 04/30  PLAN:  - Cr continues to improve today   - Urine output is encouraging as well  - Changed PPI to famotidine  - Daily RFP  - Strict Is/Os  - Avoid nephrotoxic agents and sharp fluctuations in blood pressure    # LLE DVT  # RUE DVT  # IVC filter in place  - UE DVT US 4/11/25 revealed: RUE, acute non-occlusive deep vein thrombosis visualized in the brachial vein.  - LE DVT US 4/11/25 revealed: LLE, acute occlusive deep vein thrombosis visualized in the popliteal vein, age indeterminate deep vein thrombosis visualized in the distal femoral, gastrocnemius veins in the calf, posterior tibial and peroneal veins. The remainder of the left leg is negative for deep vein thrombosis.  - Due to thrombocytopenia underwent IVC filter placement 04/11/25  - Initially on heparin gtt, was started on Lovenox but switched to Eliquis after adjusting anti-seizures meds, however Eliquis now Dc'd due to pancytopenia  - Was on Lovenox ppx dose since due to thrombocytopenia   PLAN:  - Lovenox changed to  heparin ppx 04/26 due to acute worsening of kidney function  - Vascular medicine re-engaged today due to plt stability   - recommending start heparin gtt, low intensity no bolus as long as plts are stable about 50K    - pending NSGY approval prior to initiation   - recommending heparin to Coumadin bridge, unless his kidney function continues to improve and stabilize   - SCDs, in place    # GBM s/p resection x2  # MRSE of crani site s/p washout  - Initially dx with GBM in 2023 s/p resection & chemo/RT    - Follows with Dr. Rolon  - Tumor progression with redo craniotomy 02/20/25 with NSGY  - Represented 04/05 for increased drainage from wound  - 04/06 s/p R cranial wound washout and mesh cranioplasty (kaleb purulence )  - Cultures from the subgaleal, epidural, and subdural spaces grew methicillin resistant Staph. Epi  - ID was previously consulted, recommending:    - Continue Vancomycin with goal -600    - antibiotic course tentative stop date will be 6/15/25   - PICC line placed 04/08 and replaced 04/15  PLAN:  - Continue vancomycin, dosed per pharmacy  - CTM daily levels  - ID follow up with Dr. Amy York   [  ] after discharge weekly CBC with diff, Chem 7, quantitative CRP, and Vancomycin trough   - Outpatient follow-up with Dr. Rolon  - Outpatient follow-up with Dr. 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 No epileptiform discharges are recorded. Overall, the degree of encephalopathy remains same compared to prior recording  PLAN:  - Per Neuro recs:   -  mg BID    - Transitioned to oral 04/28  - SLP eval 04/26 c/f aspiration but pt had previously had safe swallowing   - recommended PO intake only when awake and alert w/  upright positioning  - On regular diet  - HOLDING nightly Seroquel upon transfer due to increased somnolence over past few days   - HOLDING prn flexeril (hasn't used) upon transfer due to increased somnolence over past few days    # Intermittent tremors of BL upper and lower extremities   - CTM  - Neurology re-engaged regarding c/f tremors, new since admission according to his wife    # Impaired mobility  - PT/OT - recommending high intensity   - PM&R last eval on 04/25 stating excellent candidate for acute rehabilitation once medically stable and is able and motivated to participate in 3 hours/day of therapy.      Fluids: Replete PRN  Electrolytes: Keep mg >2, phos >3  and K >4  Nutrition:  Adult diet Regular; 1:1 Feeding   Antimicrobials: vanco  DVT PPX:DVT: Unfractionated Heparin  GI ppx: PPI  Bowel care:Miralax and Senna  Catheter:None  Lines:PIV  Oxygen:Room Air    Code Status: Full Code (confirmed on admission)   NOK:  Primary Emergency Contact: CHRISTINE RIOS, Home Phone: [phone]     Ellyn Bradford MD   Internal Medicine, PGY-1          [1] acetaminophen, 975 mg, oral, TID  [Held by provider] apixaban, 5 mg, oral, BID  atorvastatin, 40 mg, oral, Daily  cloNIDine, 0.1 mg, oral, BID   Followed by  [START ON 5/3/2025] cloNIDine, 0.1 mg, oral, Daily  famotidine, 10 mg, oral, Daily  heparin, 5,000 Units, subcutaneous, TID  lacosamide, 100 mg, oral, BID  lidocaine, 5 mL, infiltration, Once  lidocaine, 1 patch, transdermal, Daily  polyethylene glycol, 17 g, oral, Daily  [Held by provider] QUEtiapine, 25 mg, oral, Nightly  sennosides-docusate sodium, 1 tablet, oral, Nightly  vitamin B complex-vitamin C-folic acid, 1 capsule, oral, Daily     [2]    [3] PRN medications: bisacodyl, [Held by provider] cyclobenzaprine, dextrose, dextrose, glucagon, glucagon, hydrOXYzine HCL, ondansetron **OR** ondansetron, oxygen, vancomycin

## 2025-05-01 NOTE — PROGRESS NOTES
"Subjective   Enmanuel Ahumada \"Dash\" is a 69 y.o. male on day 26 of admission   No AEON. Wife at bedside reports no new complaints    Platelets has improved and stabilized  JAMAR slowly improving, followed by nephrology    Objective   Physical Exam  Vitals:    05/01/25 0035 05/01/25 0400 05/01/25 0708 05/01/25 1110   BP: 147/85 125/85 146/83 131/81   BP Location: Right arm Right arm Right arm Right arm   Patient Position: Lying Lying Lying Lying   Pulse: (!) 114 103 101 100   Resp: 18 18 18 20   Temp: 37.4 °C (99.3 °F) 37.4 °C (99.3 °F) 37.5 °C (99.5 °F) 36.7 °C (98.1 °F)   TempSrc: Temporal Temporal Temporal Temporal   SpO2: 92% 90% 95% 95%   Weight:       Height:          Unchanged   General: Sleepy  Neuro: alert and oriented x 1-2 at baseline, intermittent tremors, expressive aphasia  CV: Regular tachycardia  Lungs: CTA bilaterally [limited anteriorly]  Abd:  Soft  Upper extremities: No edema  Lower extremities: No edema.  +2 DP    Medications   Scheduled medications  acetaminophen, 975 mg, oral, TID  [Held by provider] apixaban, 5 mg, oral, BID  atorvastatin, 40 mg, oral, Daily  cloNIDine, 0.1 mg, oral, BID   Followed by  [START ON 5/3/2025] cloNIDine, 0.1 mg, oral, Daily  famotidine, 10 mg, oral, Daily  heparin, 5,000 Units, subcutaneous, TID  lacosamide, 100 mg, oral, BID  lidocaine, 5 mL, infiltration, Once  lidocaine, 1 patch, transdermal, Daily  polyethylene glycol, 17 g, oral, Daily  [Held by provider] QUEtiapine, 25 mg, oral, Nightly  sennosides-docusate sodium, 1 tablet, oral, Nightly      Continuous medications     PRN medications  PRN medications: bisacodyl, [Held by provider] cyclobenzaprine, dextrose, dextrose, glucagon, glucagon, hydrOXYzine HCL, ondansetron **OR** ondansetron, oxygen, vancomycin     Lab Review   Recent Labs     05/01/25  0646 04/30/25  1804 04/29/25  1238 04/28/25  0449 04/27/25  0430 04/26/25  0605 04/25/25  0550 04/24/25  0524 04/18/25  1325 04/17/25  0611 04/16/25  0720 " 04/15/25  0708    139 140 139 138 137 138 136   < > 137 140 137   K 3.7 3.5 3.8 3.9 3.9 3.9 3.9 3.4*   < > 4.1 4.1 3.7    102 105 105 105 105 104 101   < > 103 104 101   CO2 31 28 25 25 24 26 25 26   < > 26 24 27   ANIONGAP 11 13 14 13 13 10 13 12   < > 12 16 13   BUN 26* 29* 33* 35* 33* 28* 23 13   < > 21 27* 20   CREATININE 2.41* 2.81* 3.51* 4.04* 4.14* 3.71* 2.37* 0.66   < > 0.55 0.70 0.66   EGFR 29* 24* 18* 15* 15* 17* 29* >90   < > >90 >90 >90   MG 1.41* 1.54* 1.64 1.75 1.72  --   --   --   --  1.91 2.22 2.20    < > = values in this interval not displayed.     Recent Labs     05/01/25  0646 04/30/25  1804 04/29/25  1238 04/28/25  0449 04/27/25  0430 04/21/25  0344 04/20/25  0028 04/08/25  0137 04/07/25  1405 04/07/25  1001 04/05/25  1421 03/17/25  0755 03/16/25  0609 03/15/25  0523 03/07/25  1138 01/25/20  0548 01/24/20  1843   ALBUMIN 2.8* 3.0* 2.8* 2.6* 2.6*   < > 3.2*   < > 2.8*  2.8* 2.8*   < > 3.9 4.1   < > 4.5   < > 4.4   ALKPHOS  --   --   --   --   --   --  71  --  48  --   --  63 63  --  98   < > 58   ALT  --   --   --   --   --   --  38  --  44  --   --  18 16  --  30   < > 30   AST  --   --   --   --   --   --  17  --  17  --   --  7* 15  --  12   < > 17   BILITOT  --   --   --   --   --   --  1.2  --  1.5* 1.4*  --  1.6* 2.3*  --  1.7*   < > 1.6*   LIPASE  --   --   --   --   --   --   --   --   --   --   --  28  --   --  45  --  22    < > = values in this interval not displayed.     Recent Labs     05/01/25  0646 04/30/25  1549 04/29/25  1238 04/28/25  0449 04/27/25  0430 04/26/25  0605 04/25/25  1403 04/25/25  0550   WBC 1.5* 1.8* 1.1* 1.5* 1.8* 1.9* 2.5* 2.2*   HGB 7.5* 8.2* 7.3* 7.1* 7.1* 7.0* 7.8* 6.9*   HCT 22.6* 24.6* 21.5* 20.9* 21.5* 19.6* 23.1* 20.8*   PLT 54* 62* 56* 52* 62* 44* 57* 64*   MCV 95 97 96 95 97 93 96 98     Recent Labs     04/26/25  1715 04/26/25  0605 04/20/25  0028 04/15/25  1912 04/15/25  1524 04/14/25  1836 04/14/25  1146 04/05/25  1421 03/07/25  1138  "02/07/25  1207 12/28/23  0942 12/25/23  0550   INR 1.3*  --  1.4*  --   --   --  1.1 1.1 1.0 1.1 1.1 1.2*   HAUF  --   --   --  0.6 0.5 0.8  --   --   --   --   --   --    HAPTOGLOBIN  --  218*  --   --   --   --   --   --   --   --   --   --    FIBRINOGEN 455*  --   --   --   --   --   --   --   --   --   --   --      PTT - 4/26/2025:  5:15 PM  1.3   14.1 33     Estimated Creatinine Clearance: 27.8 mL/min (A) (by C-G formula based on SCr of 2.41 mg/dL (H)).    Recent Labs     08/12/24  0921 08/07/23  1017 08/03/22  0856 07/26/21  1035   CHOL 84 102 87 92   LDLF  --  37 25 34   HDL 42.1 37.3* 43.0 37.0*   TRIG 101 141 95 106     Lab Results   Component Value Date    HGBA1C 5.3 08/12/2024     Lab Results   Component Value Date    TSH 1.14 04/26/2025       Imaging  UE DVT US 4/11/25  Right Upper Venous: There is acute non-occlusive deep vein thrombosis visualized in the brachial vein. Remainder visualized vessels appear patent with no evidence of thrombus. Cannot rule out thrombus of non-visualized mid cephalic, distal cephalic and proximal cephalic veins.  Left Upper Venous: Cannot rule out thrombus in non-visualized internal jugular vein due to patient positioning and pain intolerance. Remainder visualized vessels appear patent with no evidence of thrombus.    LE DVT US 4/11/25  Right Lower Venous: No evidence of acute deep vein thrombus visualized in the right lower extremity.  Left Lower Venous: There is acute occlusive deep vein thrombosis visualized in the popliteal vein. There is age indeterminate deep vein thrombosis visualized in the distal femoral, gastrocnemius veins in the calf, posterior tibial and peroneal veins. The remainder of the left leg is negative for deep vein thrombosis.    Imaging reports and labs listed above reviewed     Assessment/Plan   Enmanuel Ahumada \"Dash\" is a 69 y.o. male with PMH of glioblastoma s/p right craniotomy 12/29/23, c/b seizure  on VPA. Admitted 4/5/25 with surgical " drainage after repeat craniotomy and tumor resection 2/20/25; S/p cranial washout and mesh cranioplasty 4/6/25. Vascular medicine is following for VTE    4/2025   _ Left distal femoral- calf DVT s/p IVC filter placement 4/11/25  Initially on heparin GTT, started on Lovenox twice daily later switched to Eliquis after adjusting his anti-seizures, Eliquis DC'd because of pancytopenia, patient started on Lovenox ppx, switched to heparin ppx for profound JAMAR    _ Right brachial thrombus- line related    _ Pancytopenia being worked up by heme- onc, PF4 Neg  _ JAMAR Estimated Creatinine Clearance: 27.8 mL/min (A) (by C-G formula based on SCr of 2.41 mg/dL (H)).    Plan   --- Can start heparin GTT, low intensity, no bolus if okay by neurosurgery, and as long as his platelets are stable above 50K with no concerns about bleeding  --- His JAMAR continues to be a limitation for anticoagulation going home choices, currently his only option is full heparin to Coumadin bridge, unless his kidney function continues to improve and stabilize  --- SCDs all the time   --- If pancytopenia is secondary to chemotherapy, with plans to continue receiving chemotherapy on discharge, he will require very close monitoring after discharge while on AC     Plan discussed in details with the primary team as well as the wife at bedside    Leighann Blanc MD

## 2025-05-01 NOTE — SIGNIFICANT EVENT
Called to beside around 6 PM for rapid eye movements noticed by bedside nursing.    Pt was found to be awake and alert, with rapid horizontal eye movement of the bilateral eyes lasting for at least 5 minutes. Neuro exam otherwise noted to be baseline, some RUE and RLE weakness when compared to left. There was noted to be right arm dysmetria upon nose-to-finger exercise during eye movement episode. He remained awake, alert, oriented to person, place (hospital in [place]) - which is better than he usually can provide. Said year was 2021.    Neurology was paged to beside. Eye movements had resolved by that time. Please see their note for their evaluation.    PLAN:  - vEEG resumed  - if eye movement reappear, try to capture video and page neurology for further eval  - continue Lacosamide 100mg BID

## 2025-05-01 NOTE — SIGNIFICANT EVENT
Patient's telemetry batteries changed.  Telemetry monitor turned back on and functioning appropriately.

## 2025-05-01 NOTE — PROGRESS NOTES
"Occupational Therapy      Occupational Therapy Treatment    Name: Enmanuel Ahumada \"Dash\"  MRN: [id number]  : 1955  Date: 25  Room: [address] [place] [address]-A      Time Calculation  Start Time: 1110  Stop Time: 1134  Time Calculation (min): 24 min    Assessment:  OT Assessment: Pt continues to demo impaired strength, endurance, coordination, balance and cognition required for safe participation in ADLs and functional mobility. Pt will benefit from cont skilled OT at D/C.  Plan:  Treatment Interventions: ADL retraining, Functional transfer training, Endurance training, UE strengthening/ROM, Cognitive reorientation, Neuromuscular reeducation, Fine motor coordination activities, Compensatory technique education  OT Frequency: 4 times per week  OT Discharge Recommendations: High intensity level of continued care  Equipment Recommended upon Discharge:  (tbd at next level of care)  OT Recommended Transfer Status: Maximum assist, Assist of 2  OT - OK to Discharge: Yes    Subjective   General:  OT Last Visit  OT Received On: 25  Co-Treatment: PT  Co-Treatment Reason: low AMPAC score  Prior to Session Communication: Bedside nurse  Patient Position Received: Bed, 4 rail up, Alarm on  Family/Caregiver Present: Yes  Caregiver Feedback: wife present and supportive  General Comment: pt drowsy on approach, willing to participate in therapy   Precautions:  Fall Precautions       Lines/Tubes/Drains:  PICC - Adult 25 Single lumen Left Brachial vein (Active)   Number of days: 1       External Urinary Catheter Male (Active)   Number of days: 15       Cognition:  Overall Cognitive Status: Impaired  Orientation Level: Disoriented to time, Disoriented to situation, Disoriented to place (perserverates on name)  Following Commands: Follows one step commands with repetition (increased time and cues, ~ 50 %)  Safety Judgment: Decreased awareness of need for safety precautions  Problem Solving: Assistance required to implement " solutions  Cognition Comments: needed cues to maintain eyes open  Task Initiation: Delayed initiation    Pain Assessment:  Pain Assessment  Pain Assessment: 0-10  0-10 (Numeric) Pain Score: 0 - No pain     Objective   Activities of Daily Living:      Grooming  Grooming Level of Assistance: Minimum assistance, Moderate verbal cues  Grooming Where Assessed: Edge of bed  Grooming Comments: pt sat EOB to wash face, required assistance placing washcloth in R hand and increased time to initiate task           LE Dressing  Sock Level of Assistance: Maximum assistance  Toileting  Toileting Level of Assistance: Dependent  Where Assessed: Bed level  Toileting Comments: incontinent of BM    Functional Standing Tolerance:  Functional Standing Tolerance  Time: ~ 10 sec with Max A x2, forward flexed posture  Bed Mobility/Transfers:   Bed Mobility  Bed Mobility: Yes  Bed Mobility 1  Bed Mobility 1: Supine to sitting  Level of Assistance 1: Maximum assistance, Maximum verbal cues, +2  Transfers  Transfer: Yes  Transfer 1  Transfer From 1: Sit to, Stand to  Transfer to 1: Stand, Sit  Technique 1: Sit to stand, Stand to sit  Transfer Device 1:  (B arm in arm assist)  Transfer Level of Assistance 1: Maximum assistance, +2                Balance:  Dynamic Sitting Balance  Dynamic Sitting-Balance Support: Feet supported  Dynamic Sitting-Level of Assistance: Moderate assistance, Maximum assistance  Dynamic Sitting-Comments: leans heavily to L side  Static Sitting Balance  Static Sitting-Balance Support: Feet supported, Bilateral upper extremity supported  Static Sitting-Level of Assistance: Minimum assistance    Outcome Measures:  [facility] Daily Activity  Putting on and taking off regular lower body clothing: Total  Bathing (including washing, rinsing, drying): A lot  Putting on and taking off regular upper body clothing: A lot  Toileting, which includes using toilet, bedpan or urinal: Total  Taking care of personal grooming such as  brushing teeth: A lot  Eating Meals: A lot  Daily Activity - Total Score: 10     Education Documentation  Body Mechanics, taught by Carmen Porras OT at 5/1/2025  1:27 PM.  Learner: Family, Patient  Readiness: Acceptance  Method: Demonstration, Explanation  Response: Needs Reinforcement    Precautions, taught by Carmen Porras OT at 5/1/2025  1:27 PM.  Learner: Family, Patient  Readiness: Acceptance  Method: Demonstration, Explanation  Response: Needs Reinforcement    ADL Training, taught by Carmen Porras OT at 5/1/2025  1:27 PM.  Learner: Family, Patient  Readiness: Acceptance  Method: Demonstration, Explanation  Response: Needs Reinforcement    Education Comments  No comments found.        Goals:  Encounter Problems       Encounter Problems (Active)       ADLs       Patient will complete grooming tasks with CGA in order to maximize functional Indep with task completion.  (Progressing)       Start:  04/08/25    Expected End:  05/14/25            Patient will complete lower body dressing with  MIN A for donning and doffing all LE clothes in order to increase Indep with task participation.  (Progressing)       Start:  04/08/25    Expected End:  05/14/25            Patient will complete toileting, including clothing management and hygiene, with MIN A in order to maximize functional Indep with task completion.  (Progressing)       Start:  04/08/25    Expected End:  05/14/25               BALANCE       Pt will increase static/dynamic sit/stand to Good to increase safety and indep with functional task completion.   (Progressing)       Start:  04/08/25    Expected End:  05/14/25               COGNITION/SAFETY       Pt will demo good safety awareness during functional task completion with no more than min vc in order to maximize occupational performance.   (Progressing)       Start:  04/08/25    Expected End:  05/14/25               EXERCISE/STRENGTHENING       Pt will increase endurance to tolerate 15-20min of  activity with no more than 1 rest break in order to increase ability to engage in ADL completion.  (Progressing)       Start:  04/08/25    Expected End:  05/14/25            Pt will increase overall BUE strength to 4/5 in order to increase functional task participation.  (Progressing)       Start:  04/08/25    Expected End:  05/14/25            Pt will demo increased BUE fine motor/bimanual coordination in order to achieve MOD I with functional task completion.  (Progressing)       Start:  04/08/25    Expected End:  05/14/25               TRANSFERS       Patient will complete functional transfers using least restrictive device with CGA   in order to maximize functional potential and increase safety.  (Not Progressing)       Start:  04/08/25    Expected End:  05/14/25 05/01/25 at 1:29 PM   Carmen Porras, OT   [phone]

## 2025-05-02 ENCOUNTER — APPOINTMENT (OUTPATIENT)
Dept: NEUROLOGY | Facility: HOSPITAL | Age: 70
DRG: 907 | End: 2025-05-02
Payer: MEDICARE

## 2025-05-02 ENCOUNTER — APPOINTMENT (OUTPATIENT)
Dept: RADIOLOGY | Facility: HOSPITAL | Age: 70
DRG: 907 | End: 2025-05-02
Payer: MEDICARE

## 2025-05-02 LAB
ABO GROUP (TYPE) IN BLOOD: NORMAL
ALBUMIN SERPL BCP-MCNC: 3.1 G/DL (ref 3.4–5)
ANION GAP SERPL CALC-SCNC: 17 MMOL/L (ref 10–20)
ANTIBODY SCREEN: NORMAL
BASOPHILS # BLD MANUAL: 0 X10*3/UL (ref 0–0.1)
BASOPHILS NFR BLD MANUAL: 0 %
BUN SERPL-MCNC: 22 MG/DL (ref 6–23)
CALCIUM SERPL-MCNC: 8 MG/DL (ref 8.6–10.6)
CHLORIDE SERPL-SCNC: 101 MMOL/L (ref 98–107)
CO2 SERPL-SCNC: 25 MMOL/L (ref 21–32)
CREAT SERPL-MCNC: 2.13 MG/DL (ref 0.5–1.3)
EGFRCR SERPLBLD CKD-EPI 2021: 33 ML/MIN/1.73M*2
EOSINOPHIL # BLD MANUAL: 0.07 X10*3/UL (ref 0–0.7)
EOSINOPHIL NFR BLD MANUAL: 3.6 %
ERYTHROCYTE [DISTWIDTH] IN BLOOD BY AUTOMATED COUNT: 16.1 % (ref 11.5–14.5)
GLUCOSE BLD MANUAL STRIP-MCNC: 111 MG/DL (ref 74–99)
GLUCOSE SERPL-MCNC: 108 MG/DL (ref 74–99)
HCT VFR BLD AUTO: 24.4 % (ref 41–52)
HGB BLD-MCNC: 8.4 G/DL (ref 13.5–17.5)
IMM GRANULOCYTES # BLD AUTO: 0.01 X10*3/UL (ref 0–0.7)
IMM GRANULOCYTES NFR BLD AUTO: 0.5 % (ref 0–0.9)
LYMPHOCYTES # BLD MANUAL: 0.32 X10*3/UL (ref 1.2–4.8)
LYMPHOCYTES NFR BLD MANUAL: 17.1 %
MAGNESIUM SERPL-MCNC: 1.55 MG/DL (ref 1.6–2.4)
MCH RBC QN AUTO: 32.2 PG (ref 26–34)
MCHC RBC AUTO-ENTMCNC: 34.4 G/DL (ref 32–36)
MCV RBC AUTO: 94 FL (ref 80–100)
MONOCYTES # BLD MANUAL: 0.1 X10*3/UL (ref 0.1–1)
MONOCYTES NFR BLD MANUAL: 5.4 %
NEUTS SEG # BLD MANUAL: 1.35 X10*3/UL (ref 1.2–7)
NEUTS SEG NFR BLD MANUAL: 71.2 %
NRBC BLD-RTO: 0 /100 WBCS (ref 0–0)
OVALOCYTES BLD QL SMEAR: ABNORMAL
PHOSPHATE SERPL-MCNC: 2.7 MG/DL (ref 2.5–4.9)
PLATELET # BLD AUTO: 61 X10*3/UL (ref 150–450)
POTASSIUM SERPL-SCNC: 3.7 MMOL/L (ref 3.5–5.3)
RBC # BLD AUTO: 2.61 X10*6/UL (ref 4.5–5.9)
RBC MORPH BLD: ABNORMAL
RH FACTOR (ANTIGEN D): NORMAL
SODIUM SERPL-SCNC: 139 MMOL/L (ref 136–145)
TOTAL CELLS COUNTED BLD: 111
UFH PPP CHRO-ACNC: 0.4 IU/ML (ref ?–1.1)
UFH PPP CHRO-ACNC: 0.4 IU/ML (ref ?–1.1)
VARIANT LYMPHS # BLD MANUAL: 0.05 X10*3/UL (ref 0–0.5)
VARIANT LYMPHS NFR BLD: 2.7 %
WBC # BLD AUTO: 1.9 X10*3/UL (ref 4.4–11.3)

## 2025-05-02 PROCEDURE — 2500000001 HC RX 250 WO HCPCS SELF ADMINISTERED DRUGS (ALT 637 FOR MEDICARE OP)

## 2025-05-02 PROCEDURE — 70450 CT HEAD/BRAIN W/O DYE: CPT | Performed by: RADIOLOGY

## 2025-05-02 PROCEDURE — 85520 HEPARIN ASSAY: CPT | Performed by: STUDENT IN AN ORGANIZED HEALTH CARE EDUCATION/TRAINING PROGRAM

## 2025-05-02 PROCEDURE — 86900 BLOOD TYPING SEROLOGIC ABO: CPT

## 2025-05-02 PROCEDURE — 82947 ASSAY GLUCOSE BLOOD QUANT: CPT

## 2025-05-02 PROCEDURE — 95720 EEG PHY/QHP EA INCR W/VEEG: CPT | Performed by: STUDENT IN AN ORGANIZED HEALTH CARE EDUCATION/TRAINING PROGRAM

## 2025-05-02 PROCEDURE — 99232 SBSQ HOSP IP/OBS MODERATE 35: CPT | Performed by: INTERNAL MEDICINE

## 2025-05-02 PROCEDURE — 99233 SBSQ HOSP IP/OBS HIGH 50: CPT

## 2025-05-02 PROCEDURE — 1200000003 HC ONCOLOGY  ROOM WITH TELEMETRY DAILY

## 2025-05-02 PROCEDURE — 2500000004 HC RX 250 GENERAL PHARMACY W/ HCPCS (ALT 636 FOR OP/ED): Performed by: STUDENT IN AN ORGANIZED HEALTH CARE EDUCATION/TRAINING PROGRAM

## 2025-05-02 PROCEDURE — 95700 EEG CONT REC W/VID EEG TECH: CPT

## 2025-05-02 PROCEDURE — 80069 RENAL FUNCTION PANEL: CPT

## 2025-05-02 PROCEDURE — 85520 HEPARIN ASSAY: CPT

## 2025-05-02 PROCEDURE — 86023 IMMUNOGLOBULIN ASSAY: CPT

## 2025-05-02 PROCEDURE — 85027 COMPLETE CBC AUTOMATED: CPT

## 2025-05-02 PROCEDURE — 70450 CT HEAD/BRAIN W/O DYE: CPT

## 2025-05-02 PROCEDURE — 97112 NEUROMUSCULAR REEDUCATION: CPT | Mod: GO

## 2025-05-02 PROCEDURE — 83735 ASSAY OF MAGNESIUM: CPT

## 2025-05-02 PROCEDURE — 97535 SELF CARE MNGMENT TRAINING: CPT | Mod: GO

## 2025-05-02 PROCEDURE — 2500000004 HC RX 250 GENERAL PHARMACY W/ HCPCS (ALT 636 FOR OP/ED): Mod: TB

## 2025-05-02 PROCEDURE — 97530 THERAPEUTIC ACTIVITIES: CPT | Mod: GP,CQ

## 2025-05-02 PROCEDURE — 95715 VEEG EA 12-26HR INTMT MNTR: CPT

## 2025-05-02 PROCEDURE — 85007 BL SMEAR W/DIFF WBC COUNT: CPT

## 2025-05-02 RX ORDER — SODIUM,POTASSIUM PHOSPHATES 280-250MG
1 POWDER IN PACKET (EA) ORAL 4 TIMES DAILY
Status: COMPLETED | OUTPATIENT
Start: 2025-05-02 | End: 2025-05-02

## 2025-05-02 RX ORDER — MAGNESIUM SULFATE 1 G/100ML
1 INJECTION INTRAVENOUS ONCE
Status: COMPLETED | OUTPATIENT
Start: 2025-05-02 | End: 2025-05-02

## 2025-05-02 RX ADMIN — SENNOSIDES AND DOCUSATE SODIUM 1 TABLET: 50; 8.6 TABLET ORAL at 21:03

## 2025-05-02 RX ADMIN — VANCOMYCIN HYDROCHLORIDE 750 MG: 1 INJECTION, POWDER, LYOPHILIZED, FOR SOLUTION INTRAVENOUS at 21:39

## 2025-05-02 RX ADMIN — ASCORBIC ACID, THIAMINE MONONITRATE,RIBOFLAVIN, NIACINAMIDE, PYRIDOXINE HYDROCHLORIDE, FOLIC ACID, CYANOCOBALAMIN, BIOTIN, CALCIUM PANTOTHENATE, 1 CAPSULE: 100; 1.5; 1.7; 20; 10; 1; 6000; 150000; 5 CAPSULE, LIQUID FILLED ORAL at 09:16

## 2025-05-02 RX ADMIN — MAGNESIUM SULFATE HEPTAHYDRATE 1 G: 1 INJECTION, SOLUTION INTRAVENOUS at 09:15

## 2025-05-02 RX ADMIN — CLONIDINE HYDROCHLORIDE 0.1 MG: 0.2 TABLET ORAL at 09:15

## 2025-05-02 RX ADMIN — ATORVASTATIN CALCIUM 40 MG: 40 TABLET, FILM COATED ORAL at 21:03

## 2025-05-02 RX ADMIN — BRIVARACETAM 50 MG: 50 TABLET, FILM COATED ORAL at 21:03

## 2025-05-02 RX ADMIN — POTASSIUM & SODIUM PHOSPHATES POWDER PACK 280-160-250 MG 1 PACKET: 280-160-250 PACK at 13:40

## 2025-05-02 RX ADMIN — LACOSAMIDE 100 MG: 100 TABLET, FILM COATED ORAL at 09:16

## 2025-05-02 RX ADMIN — POTASSIUM & SODIUM PHOSPHATES POWDER PACK 280-160-250 MG 1 PACKET: 280-160-250 PACK at 09:16

## 2025-05-02 RX ADMIN — ALTEPLASE 1 MG: 2.2 INJECTION, POWDER, LYOPHILIZED, FOR SOLUTION INTRAVENOUS at 16:18

## 2025-05-02 RX ADMIN — FAMOTIDINE 10 MG: 20 TABLET, FILM COATED ORAL at 09:16

## 2025-05-02 RX ADMIN — BRIVARACETAM 50 MG: 50 TABLET, FILM COATED ORAL at 13:40

## 2025-05-02 ASSESSMENT — COGNITIVE AND FUNCTIONAL STATUS - GENERAL
TURNING FROM BACK TO SIDE WHILE IN FLAT BAD: A LOT
PERSONAL GROOMING: A LOT
DAILY ACTIVITIY SCORE: 10
TURNING FROM BACK TO SIDE WHILE IN FLAT BAD: TOTAL
CLIMB 3 TO 5 STEPS WITH RAILING: TOTAL
DRESSING REGULAR UPPER BODY CLOTHING: A LOT
PERSONAL GROOMING: TOTAL
WALKING IN HOSPITAL ROOM: TOTAL
MOBILITY SCORE: 9
CLIMB 3 TO 5 STEPS WITH RAILING: TOTAL
TOILETING: TOTAL
DRESSING REGULAR UPPER BODY CLOTHING: A LOT
DAILY ACTIVITIY SCORE: 7
MOVING FROM LYING ON BACK TO SITTING ON SIDE OF FLAT BED WITH BEDRAILS: A LOT
MOVING TO AND FROM BED TO CHAIR: TOTAL
EATING MEALS: TOTAL
DRESSING REGULAR LOWER BODY CLOTHING: TOTAL
DRESSING REGULAR LOWER BODY CLOTHING: TOTAL
TOILETING: TOTAL
MOVING TO AND FROM BED TO CHAIR: TOTAL
MOVING FROM LYING ON BACK TO SITTING ON SIDE OF FLAT BED WITH BEDRAILS: A LOT
WALKING IN HOSPITAL ROOM: TOTAL
STANDING UP FROM CHAIR USING ARMS: TOTAL
STANDING UP FROM CHAIR USING ARMS: A LOT
EATING MEALS: A LOT
HELP NEEDED FOR BATHING: A LOT
HELP NEEDED FOR BATHING: TOTAL
MOBILITY SCORE: 7

## 2025-05-02 ASSESSMENT — PAIN SCALES - GENERAL
PAINLEVEL_OUTOF10: 0 - NO PAIN

## 2025-05-02 ASSESSMENT — PAIN - FUNCTIONAL ASSESSMENT
PAIN_FUNCTIONAL_ASSESSMENT: 0-10

## 2025-05-02 ASSESSMENT — ACTIVITIES OF DAILY LIVING (ADL): HOME_MANAGEMENT_TIME_ENTRY: 13

## 2025-05-02 NOTE — NURSING NOTE
Pt HR sustaining 120-130s on monitor, pt resting in bed sleeping. Team notified and advised no interventions @ this time. ([id number])

## 2025-05-02 NOTE — PROGRESS NOTES
"Eve Ahumada \"Petey" is a 69 y.o. male on day 27 of admission presenting with Wound dehiscence.    Overnight: No acute events overnight. Was started on LIH gtt, head CT once therapeutic is stable - no acute or increased bleeding. vEEG started overnight - moderate diffuse encephalopathy, no epileptiform activity.     This morning: patient is resting in bed. He is awake and alert, able to tell me his full name. He was able to tell me his correct birthday after asking multiple times (initially would not answer or answered , which is wrong). He was able to tell me that we are in in a hospital and in [place] (which he is not always able to do as consistently as his name and ). Overall he reports that he is fine. He does not have any complaints initially, but when asked about his facial flushing - he said maybe he is feeling warm. Denies any abnormalities in his vision this morning.       Objective   Current Vitals  BP (!) 136/93 (BP Location: Right arm, Patient Position: Lying)   Pulse (!) 117   Temp 37.5 °C (99.5 °F) (Temporal)   Resp 20   Ht 1.702 m (5' 7\")   Wt 67 kg (147 lb 11.3 oz)   SpO2 92%   BMI 23.13 kg/m²      I/O last 3 completed shifts:  In: 1550 (23.1 mL/kg) [P.O.:300; IV Piggyback:1250]  Out: 2350 (35.1 mL/kg) [Urine:2350 (1 mL/kg/hr)]  Weight: 67 kg       Physical Exam  Vitals reviewed.   Constitutional:       General: He is not in acute distress.     Appearance: He is ill-appearing. He is not toxic-appearing.      Comments: Intermittent alertness, oriented x1 (self, , but not to time or place)   HENT:      Head:      Comments: Right-crani site appears to be healing well, dry, no erythema. Drain sites (2) well healing.      Mouth/Throat:      Mouth: Mucous membranes are moist.      Pharynx: Oropharynx is clear.   Eyes:      Extraocular Movements: Extraocular movements intact.   Cardiovascular:      Rate and Rhythm: Normal rate and regular rhythm.      " Heart sounds: Normal heart sounds.   Pulmonary:      Effort: Pulmonary effort is normal. No respiratory distress.   Abdominal:      General: There is no distension.      Palpations: Abdomen is soft.      Tenderness: There is no abdominal tenderness. There is no guarding.   Musculoskeletal:      Cervical back: Neck supple.      Right lower leg: No edema.      Left lower leg: No edema.   Skin:     General: Skin is warm and dry.      Coloration: Skin is pale.      Findings: No rash.   Neurological:      General: No focal deficit present.      Mental Status: He is alert. He is disoriented.      Motor: Weakness (4/5 LUE and LLE strength with 5/5 RUE and RLE) present.      Comments: Fine tremor of the upper extremities noted, intermittent tremor of the lower extremities more prominent after physical activity   Psychiatric:         Behavior: Behavior normal.     Relevant Results  Labs:  CBC: WBC 1.9 , HGB 8.4, PLT 61  BMP: , K 3.7, Cl 101, HCO3 25, BUN 22, CR 2.13, Glu 108  LFTS: AST 17 , ALT 38, ALKPHOS 71 , TBILI 1.2 , DBILI 0.3  TROP: 3  BNP: No results found for requested labs within last 365 days.  COAGS: PT 14.1 , PTT 33  , INR 1.3  UA:   Results from last 7 days   Lab Units 04/28/25  0702   COLOR U  Light-Yellow   PH U  6.0   SPEC GRAV UR  1.010   PROTEIN U mg/dL NEGATIVE   BLOOD UR mg/dL 0.03 (TRACE)*   NITRITE U  NEGATIVE   WBC UR /HPF NONE     ABG:    CALCIUM 8.0 MAG No results found for requested labs within last 365 days. ALB 3.1 LACTATE 1.3 PHOS No results found for requested labs within last 365 days. COVIDNo results found for requested labs within last 365 days.    Micro/culture data:  Susceptibility data from last 120 days.  Collected Organism Clindamycin Erythromycin Oxacillin Tetracycline Trimethoprim/Sulfamethoxazole Vancomycin   04/06/25 1007 Staphylococcus epidermidis  S  S  R  S  R  S   04/06/25 1006 Staphylococcus epidermidis         04/06/25 1005 Staphylococcus epidermidis              Imaging:  CT head wo IV contrast  Narrative: STUDY:  CT HEAD WO IV CONTRAST;  5/2/2025 3:47 am      INDICATION:  Signs/Symptoms:concern for bleeding.          COMPARISON:  CT head 04/24/2025      ACCESSION NUMBER(S):  [id number]      ORDERING CLINICIAN:  DEREK WALLACE      TECHNIQUE:  Noncontrast axial CT scan of head was performed. Angled reformats in  brain and bone windows were generated. The images were reviewed in  bone, brain, blood and soft tissue windows.      FINDINGS:  Postsurgical changes of right parieto-occipital craniectomy with mesh  cranioplasty. Overall similar size and appearance of overlying  low-density intracranial fluid as well as predominantly low-density  material within the right cerebral surgical resection cavity. There  is resolving pneumocephalus within the aforementioned resection  cavity with small foci of intraventricular gas within the left  frontal horn and left temporal horn which are improved from prior.  Again there is apparent communication of the resection cavity with  the occipital horn of the right lateral ventricle. Persistent  confluence white matter hypodensities in the superior right frontal  lobe extending inferiorly into the right basal ganglia.      Redemonstration of confluence white matter hypoattenuation within the  left cerebral hemisphere extending from the inferior left parietal  lobe through the left temporal lobe with decreased conspicuity of  left temporal intraparenchymal hematoma.      There is also decreased conspicuity of scattered hyperdense  subarachnoid hemorrhage within the anterior bifrontal sulci and left  occipital sulci.      No new intraparenchymal hemorrhage or CT apparent acute large  territorial ischemic infarct. No new hyperdense extra-axial  collection.      No intraventricular hemorrhage. No midline shift. Basal cisterns are  patent.      Mild generalized mucosal thickening of the ethmoid air cells.  Near-complete opacification of  "the left frontal sinus. Hyper  pneumatization of the mastoids.      Impression: 1. No evidence of new intracranial hemorrhage, CT apparent acute  large territorial ischemic infarct, or new hyperdense extra-axial  collection.  2. Decreased conspicuity of blood products within the left temporal  parenchymal hematoma and scattered subarachnoid hemorrhage.  3. Postsurgical changes with overall similar appearing surgical  resection cavity as detailed above.      I personally reviewed the images/study and I agree with the findings  as stated. This study was interpreted at [facility], [place], [place].      MACRO:  None          Dictation workstation:   [id number]           MEDS:  Scheduled medications  Scheduled Medications[1]  Continuous medications  Continuous Medications[2]  PRN medications  PRN Medications[3]       Assessment/Plan   Assessment/Plan:   Mr. Singer \"Dash\" Zita is a 69 male with a PMHx of HTN, HLD, smalls's esophagus, RO GBM s/p resection ([facility], 12/2023) with resultant L HH s/p chemo/RT who represented for tumor progression in February. He underwent redo craniotomy for tumor resection 02/20 who had repeat MRI in March showing postop changes with fluid collection at dura and craniotomy site, worsening infiltrative disease, enhancement along posteromedial aspect of resection site. He represented on 04/05/25 with concerns for increased wound drainage and was admitted under the care of Neurosurgery. MRI brain onf 04/05 showed increased resection cavity enhancement and diffusion restriction concerning fo superimposed infection/abscess. He therefore underwent cranial wound washout on 04/06 with Neurosurgery. Subsequent cultures from OR revealed methicillin-resistant staph epidermidis, currently on vancomycin IV therapy until 06/15/25 per ID. His subsequent hospital course have been complicated by acute DVTs in the LLE and RUE s/p IVC filter placement due to pancytopenia " (namely worsening thrombocytopenia) limiting anticoagulation, agitation/confusion, and new JAMAR 2/2 vancomycin.     Currently working towards discharging to acute rehab pending - Transitioning to full AC now that plts have stabilized. JAMAR continue to improve with improved urine output. Neutropenia 2/2 anticipated alisson from recent Lomustine (04/04) now improved, remains afebrile.     Updates 05/02/25:  - Cr continues to improve today, 2.13   - good urine output continues  - Vascular medicine:   - started on LIH gtt overnight    - CTH once therapeutic -stable   - initially recommending  heparin to Coumadin bridge, but JAMAR is improving and could potential restart home Apixaban or consider Lovenox instead   - Neurology re-engaged regarding c/f tremors and new eye mvmts, new since admission   - please see sig event note overnight for eye mvmt concern  - per Neuro, tremors and eye mvmt could be 2/2 from small basal ganglia issues, but nothing to treat. Also could be related to his muscle spasms    - vEEG started overnight - moderate diffuse encephalopathy, no epileptiform activity    - continue EEG for now   - recommending start Briviact 50 mg Q12H now & discontinue Lacosmide  - Neutropenia rebounded -> seg manual ANC today 1.35   - leukocyte alisson is typically 28-35 days post treatment    - ANC 1.0 - > 0.87 04/28 (day 24) through 05/01 (day 27)   - today is day 28   - remains afebrile since d/c of scheduled Tylenol yesterday - CTM  - Dispo planning:  - Pending kidney function, pt may be able to start Apixaban rather than coumadin bridge. Will give his kidney function a couple more days to demonstrate further improvement before deciding to commit him to coumadin.  - Pending vEEG completion - expecting to be d/c'd 05/03 pending Neuro  - Plts and neutropenia stable/improved    # Pancytopenia, stable  # Thrombocytopenia, stable  # Neutropenia, resolved  - Hematology consulted 04/21 felt thrombocytopenia to be multifactorial  "due to recent lomustine initiation (4/4), introduction of VPA (4/8), MRSE intracranial wound infection treated with vanc and cefepime, and possible consumption in setting of DVTs   - CBC trend: Plt 129 (4/19) ? 49 (4/20)   - LDH: 230 (normal)  -Smear: Clumping in blue tube, decreased plt in lavender tube, rare giant plts  - Coags: INR 1.4, PTT 30s (mildly elevated INR)  - HFP: Tbili 1.2, Alb 3.2, AST/ALT/ALP normal  - D-dimer: 2354  - B12, Folate, TSH: WNL  - PF4/BRANDO: Negative  - Has received multiple platelet transfusions (x8, most recent 04/26) and RBC transfusions (x1 04/25) without overt s or s of bleeding  - Hematology re-enaged 04/26 prior to transfer  - Note states platelet count marcela to 82 following 2 units of platelet transfusion and has since downtrended to 44 over 3 day period. Also notable is worsening anemia throughout hospital stay   PLAN:  - Neutropenia rebounded -> seg manual ANC today 1.35  - Recent Lomustine (4/4): Alkylating agent with delayed alisson (3-6 weeks), can cause prolonged cytopenias\".   - leukocyte alisson is typically 28-35 days post treatment    - ANC 1.0 - > 0.87 04/28 (day 24) through 05/01 (day 27)   - today is day 28   - remains afebrile since d/c of scheduled Tylenol yesterday - CTM  - CTM CBC w/ diff daily  - Will transfuse prn per Hematology recs  - Active T&S    # JAMAR, improving  - Upon transfer to our service, pt noted to have Cr trend of 0.66 -> 3.71 over 04/24 -> 04/26  - Previously normal kidney function without history of kidney disease or even previous AKIs  - Likely Cause is Vancomycin, JAMAR coincides with Peak Vanc trough of 38.   - PVR on 04/26 showing ~96 mL, less likely to be obstructive cause.  - No large changes in blood pressures recently  - JAMAR work-up:   - PVR, 96 mL   - Renal U/S: Mild left-sided hydronephrosis with few intrarenal calculi    - UA 04/27 negative, UA 04/28 pending   - Urine lytes 04/27: 3.6%, intrinsic. Serum sodium 138, serum cr 4.14, urine sodium " 94, urine cr 77.8  - Urine lytes 04/28: 3.5%, intrinsic. Serum sodium 139, serum cr 4.04, urine sodium 108, urine cr 90.7   - CK 49  - Nephrology signed off 04/30  PLAN:  - Cr continues to improve today   - Urine output remains encouraging as well  - Changed PPI to famotidine  - Daily RFP  - Strict Is/Os  - Avoid nephrotoxic agents and sharp fluctuations in blood pressure    # LLE DVT  # RUE DVT  # IVC filter in place  - UE DVT US 4/11/25 revealed: RUE, acute non-occlusive deep vein thrombosis visualized in the brachial vein.  - LE DVT US 4/11/25 revealed: LLE, acute occlusive deep vein thrombosis visualized in the popliteal vein, age indeterminate deep vein thrombosis visualized in the distal femoral, gastrocnemius veins in the calf, posterior tibial and peroneal veins. The remainder of the left leg is negative for deep vein thrombosis.  - Due to thrombocytopenia underwent IVC filter placement 04/11/25  - Initially on heparin gtt, was started on Lovenox but switched to Eliquis after adjusting anti-seizures meds, however Eliquis now Dc'd due to pancytopenia  - Was on Lovenox ppx dose since due to thrombocytopenia   PLAN:  - Lovenox changed to heparin ppx 04/26 due to acute worsening of kidney function  - Vascular medicine:   - started on LIH gtt overnight (05/01)    - CTH once therapeutic -stable per NGSY - no further imaging required  - initially recommending  heparin to Coumadin bridge, but JAMAR is improving and could potential restart home Eliquis or consider Lovenox instead   - SCDs, ordered    # GBM s/p resection x2  # MRSE of crani site s/p washout  - Initially dx with GBM in 2023 s/p resection & chemo/RT    - Follows with Dr. Rolon  - Tumor progression with redo craniotomy 02/20/25 with NSGY  - Represented 04/05 for increased drainage from wound  - 04/06 s/p R cranial wound washout and mesh cranioplasty (kaleb purulence )  - Cultures from the subgaleal, epidural, and subdural spaces grew methicillin resistant  Gladys Epi  - ID was previously consulted, recommending:    - Continue Vancomycin with goal -600    - antibiotic course tentative stop date will be 6/15/25   - PICC line placed 04/08 and replaced 04/15  PLAN:  - Continue vancomycin, dosed per pharmacy  - CTM daily levels  - ID follow up with Dr. Amy York   [  ] after discharge weekly CBC with diff, Chem 7, quantitative CRP, and Vancomycin trough   - Outpatient follow-up with Dr. Rolon  - Outpatient follow-up with Dr. Cortez, ordered by NSGY team    # C/f hypoactive delirium   # C/f encephalopathy   # C/f dysphagia  # Intermittent tremors of BL upper and lower extremities   - Neurology was following for concerning changes in mental status  - EEG 04/21-04/23 showing moderate diffuse encephalopathy and a right hemispheric structural lesion with an overlying skull defect. No epileptiform discharges are recorded. Overall, the degree of encephalopathy remains same compared to prior recording  PLAN:  - Neurology re-engaged regarding c/f tremors and new eye mvmts, new since admission   - please see sig event note overnight for eye mvmt concern  - per Neuro, tremors and eye mvmt could be 2/2 from small basal ganglia issues, but nothing to treat. Also could be related to his muscle spasms    - vEEG started overnight - moderate diffuse encephalopathy, no epileptiform activity    - continue EEG for now   - recommending start Briviact 50 mg Q12H now & discontinue Lacosmide  - SLP eval 04/26 c/f aspiration but pt had previously had safe swallowing   - recommended PO intake only when awake and alert w/ upright positioning  - On regular diet  - HOLDING nightly Seroquel upon transfer due to increased somnolence over past few days   - HOLDING prn flexeril (hasn't used) upon transfer due to increased somnolence over past few days    # Impaired mobility  - PT/OT - recommending high intensity   - PM&R last eval on 04/25 stating excellent candidate for acute  rehabilitation once medically stable and is able and motivated to participate in 3 hours/day of therapy.      Fluids: Replete PRN  Electrolytes: Keep mg >2, phos >3  and K >4  Nutrition:  Adult diet Regular; 1:1 Feeding   Antimicrobials: vanco  DVT PPX:DVT: Unfractionated Heparin  GI ppx: PPI  Bowel care:Miralax and Senna  Catheter:None  Lines:PIV  Oxygen:Room Air    Code Status: Full Code (confirmed on admission)   NOK:  Primary Emergency Contact: CHRISTINE RIOS GALA, Home Phone: [phone]     Ellyn Bradford MD   Internal Medicine, PGY-1          [1] atorvastatin, 40 mg, oral, Daily  cloNIDine, 0.1 mg, oral, BID   Followed by  [START ON 5/3/2025] cloNIDine, 0.1 mg, oral, Daily  famotidine, 10 mg, oral, Daily  lacosamide, 100 mg, oral, BID  lidocaine, 5 mL, infiltration, Once  lidocaine, 1 patch, transdermal, Daily  magnesium sulfate, 1 g, intravenous, Once  polyethylene glycol, 17 g, oral, Daily  potassium, sodium phosphates, 1 packet, oral, 4x daily  [Held by provider] QUEtiapine, 25 mg, oral, Nightly  sennosides-docusate sodium, 1 tablet, oral, Nightly  vancomycin, 750 mg, intravenous, q24h  vitamin B complex-vitamin C-folic acid, 1 capsule, oral, Daily     [2] heparin, 0-4,000 Units/hr, Last Rate: 800 Units/hr (05/01/25 2011)     [3] PRN medications: acetaminophen, bisacodyl, [Held by provider] cyclobenzaprine, dextrose, dextrose, glucagon, glucagon, heparin, hydrOXYzine HCL, ondansetron **OR** ondansetron, oxygen, vancomycin

## 2025-05-02 NOTE — SIGNIFICANT EVENT
"Preliminary EEG Report     This vEEG is indicative of a moderate diffuse encephalopathy, posterior right hemispheric structural lesion and right posterior skull defect. No epileptiform activity seen.     This EEG was read from 01:22 to 01:47 on 05/02/25 . The final impression will be available tomorrow under Chart Review in the Media tab. To discontinue video EEG, place \"Discontinue Continuous VEEG\" order.      Dario Finley DO  Adult Epilepsy Fellow   [facility]    "

## 2025-05-02 NOTE — SIGNIFICANT EVENT
Patient with history of GBP, status post resection (12/2023, 2/20/25), complicated by wound infection, status post right cranial wound washout with mesh cranioplasty (4/6/2025). Patient has since had his sutures removed. Hospital course has been complicated by DVTs, and patient was placed on heparin drip, with CTH done when therapeutic being negative for any hemorrhage. No additional imaging needed at this time. Patient has follow up with neurosurgery. Neurosurgery will now sign off. Please page or chat with any questions or concerns.    Guanaco Duenas MD  Neurosurgery

## 2025-05-02 NOTE — CARE PLAN
Problem: Fall/Injury  Goal: Be free from injury by end of the shift  Outcome: Progressing  Goal: Verbalize understanding of personal risk factors for fall in the hospital  Outcome: Progressing  Goal: Verbalize understanding of risk factor reduction measures to prevent injury from fall in the home  Outcome: Progressing  Goal: Use assistive devices by end of the shift  Outcome: Progressing  Goal: Pace activities to prevent fatigue by end of the shift  Outcome: Progressing     Problem: Skin  Goal: Decreased wound size/increased tissue granulation at next dressing change  Outcome: Progressing  Flowsheets (Taken 5/1/2025 2029)  Decreased wound size/increased tissue granulation at next dressing change:   Protective dressings over bony prominences   Promote sleep for wound healing  Goal: Participates in plan/prevention/treatment measures  Outcome: Progressing  Flowsheets (Taken 5/1/2025 2029)  Participates in plan/prevention/treatment measures:   Elevate heels   Increase activity/out of bed for meals  Goal: Prevent/manage excess moisture  Outcome: Progressing  Goal: Promote/optimize nutrition  Outcome: Progressing  Goal: Promote skin healing  Outcome: Progressing     Problem: Pain - Adult  Goal: Verbalizes/displays adequate comfort level or baseline comfort level  Outcome: Progressing     Problem: Safety - Adult  Goal: Free from fall injury  Outcome: Progressing      The clinical goals for the shift include Pt to remain HDS thru shift.

## 2025-05-02 NOTE — PROGRESS NOTES
"Art Therapy Note    Enmanuel Evans \"Dash\" Zita     Therapy Session  Referral Type: New referral this admission  Visit Type: New visit  Session Start Time: 1659  Intervention Delivery: In-person  Conflict of Service: Asleep  Number of family members present: 0              Treatment/Interventions  Art Therapy Interventions: Assessment, Education/instruction         Narrative  Assessment Detail: AT time of visit Pt found alone in room sleeping soundly and appeared comfortable.  ATR will follow up another time to introduce and offer services.    Education Documentation  No documentation found.      "

## 2025-05-02 NOTE — CONSULTS
Vancomycin Dosing by Pharmacy- FOLLOW UP    Enmanuel Ahumada is a 69 y.o. year old male who Pharmacy has been consulted for vancomycin dosing for CNS/meningoencephalitis. Based on the patient's indication and renal status this patient is being dosed based on a goal AUC of 400-600.     Renal function is currently improving.    Current vancomycin dose: dosing off levels    Most recent random level: 15.3 mcg/mL    Visit Vitals  BP (!) 161/106 (BP Location: Right arm, Patient Position: Lying) Comment: RN Notified   Pulse 107   Temp 36.4 °C (97.5 °F) (Temporal)   Resp 20        Lab Results   Component Value Date    CREATININE 2.41 (H) 2025    CREATININE 2.81 (H) 2025    CREATININE 3.51 (H) 2025    CREATININE 4.04 (H) 2025        Patient weight is as follows:   Vitals:    25 0539   Weight: 67 kg (147 lb 11.3 oz)       Cultures:  No results found for the encounter in last 14 days.       I/O last 3 completed shifts:  In: 1550 (23.1 mL/kg) [P.O.:300; IV Piggyback:1250]  Out: 2350 (35.1 mL/kg) [Urine:2350 (1 mL/kg/hr)]  Weight: 67 kg   I/O during current shift:  I/O this shift:  In: 240 [P.O.:240]  Out: -     Temp (24hrs), Av.1 °C (98.7 °F), Min:36.4 °C (97.5 °F), Max:37.5 °C (99.5 °F)      Assessment/Plan    Renal Fx is stabilizing, model fit is good will start 750mg q24    This dosing regimen is predicted by [employer] to result in the following pharmacokinetic parameters:  Loading dose: N/A  Regimen: 750 mg IV every 24 hours.  Start time: 20:46 on 2025  Exposure target: AUC24 (range)400-600 mg/L.hr   [id number]: 357 mg/L.hr  AUC24,ss: 506 mg/L.hr  Probability of AUC24 > 400: 98 %  Ctrough,ss: 17.7 mg/L  Probability of Ctrough,ss > 20: 8 %      The next level will be obtained on  at AM labs. May be obtained sooner if clinically indicated.   Will continue to monitor renal function daily while on vancomycin and order serum creatinine at least every 48 hours if not already  ordered.  Follow for continued vancomycin needs, clinical response, and signs/symptoms of toxicity.       Marylou Cancino, ChristopherD

## 2025-05-02 NOTE — PROGRESS NOTES
"Subjective   Enmanuel Ahumada \"Dash\" is a 69 y.o. male on day 27 of admission   Had tremors and spontaneous nystagmus prior to starting AC, neurology re-engaged, currently on vEEG and CTH ordered  No other new complaints -wife and son at bedside     Platelets stable  JAMAR improving, followed by nephrology    Objective   Physical Exam  Vitals:    05/01/25 1930 05/02/25 0018 05/02/25 0416 05/02/25 0813   BP: (!) 161/106  Comment: RN Notified (!) 151/94 (!) 136/93 (!) 138/96   BP Location: Right arm Right arm Right arm Right arm   Patient Position: Lying Lying Lying Lying   Pulse: 107 110 (!) 117 110   Resp: 20 20 20 20   Temp: 36.4 °C (97.5 °F) 37 °C (98.6 °F) 37.5 °C (99.5 °F) 36.7 °C (98.1 °F)   TempSrc: Temporal Temporal Temporal Temporal   SpO2: 93% 93% 92% 94%   Weight:       Height:          General: much more awake and communicative today   Head surgical incisions   Neuro: alert and oriented x 1-2 at baseline, intermittent tremors, expressive aphasia  CV: tachycardia  Lungs: CTA bilaterally   Abd:  Soft, non tender  Upper extremities: No edema  Lower extremities: No edema    Medications   Scheduled medications  atorvastatin, 40 mg, oral, Daily  cloNIDine, 0.1 mg, oral, BID   Followed by  [START ON 5/3/2025] cloNIDine, 0.1 mg, oral, Daily  famotidine, 10 mg, oral, Daily  lacosamide, 100 mg, oral, BID  lidocaine, 5 mL, infiltration, Once  lidocaine, 1 patch, transdermal, Daily  magnesium sulfate, 1 g, intravenous, Once  polyethylene glycol, 17 g, oral, Daily  potassium, sodium phosphates, 1 packet, oral, 4x daily  [Held by provider] QUEtiapine, 25 mg, oral, Nightly  sennosides-docusate sodium, 1 tablet, oral, Nightly  vancomycin, 750 mg, intravenous, q24h  vitamin B complex-vitamin C-folic acid, 1 capsule, oral, Daily      Continuous medications  heparin, 0-4,000 Units/hr, Last Rate: 800 Units/hr (05/01/25 2011)      PRN medications  PRN medications: acetaminophen, bisacodyl, [Held by provider] " cyclobenzaprine, dextrose, dextrose, glucagon, glucagon, heparin, hydrOXYzine HCL, ondansetron **OR** ondansetron, oxygen, vancomycin     Lab Review   Recent Labs     05/02/25  0414 05/01/25  0646 04/30/25  1804 04/29/25  1238 04/28/25  0449 04/27/25  0430 04/26/25  0605 04/25/25  0550 04/18/25  1325 04/17/25  0611 04/16/25  0720    141 139 140 139 138 137 138   < > 137 140   K 3.7 3.7 3.5 3.8 3.9 3.9 3.9 3.9   < > 4.1 4.1    103 102 105 105 105 105 104   < > 103 104   CO2 25 31 28 25 25 24 26 25   < > 26 24   ANIONGAP 17 11 13 14 13 13 10 13   < > 12 16   BUN 22 26* 29* 33* 35* 33* 28* 23   < > 21 27*   CREATININE 2.13* 2.41* 2.81* 3.51* 4.04* 4.14* 3.71* 2.37*   < > 0.55 0.70   EGFR 33* 29* 24* 18* 15* 15* 17* 29*   < > >90 >90   MG 1.55* 1.41* 1.54* 1.64 1.75 1.72  --   --   --  1.91 2.22    < > = values in this interval not displayed.     Recent Labs     05/02/25  0414 05/01/25  0646 04/30/25  1804 04/29/25  1238 04/28/25  0449 04/21/25  0344 04/20/25  0028 04/08/25  0137 04/07/25  1405 04/07/25  1001 04/05/25  1421 03/17/25  0755 03/16/25  0609 03/15/25  0523 03/07/25  1138 01/25/20  0548 01/24/20  1843   ALBUMIN 3.1* 2.8* 3.0* 2.8* 2.6*   < > 3.2*   < > 2.8*  2.8* 2.8*   < > 3.9 4.1   < > 4.5   < > 4.4   ALKPHOS  --   --   --   --   --   --  71  --  48  --   --  63 63  --  98   < > 58   ALT  --   --   --   --   --   --  38  --  44  --   --  18 16  --  30   < > 30   AST  --   --   --   --   --   --  17  --  17  --   --  7* 15  --  12   < > 17   BILITOT  --   --   --   --   --   --  1.2  --  1.5* 1.4*  --  1.6* 2.3*  --  1.7*   < > 1.6*   LIPASE  --   --   --   --   --   --   --   --   --   --   --  28  --   --  45  --  22    < > = values in this interval not displayed.     Recent Labs     05/02/25  0414 05/01/25  0646 04/30/25  1549 04/29/25  1238 04/28/25  0449 04/27/25  0430 04/26/25  0605 04/25/25  1403   WBC 1.9* 1.5* 1.8* 1.1* 1.5* 1.8* 1.9* 2.5*   HGB 8.4* 7.5* 8.2* 7.3* 7.1* 7.1* 7.0* 7.8*    HCT 24.4* 22.6* 24.6* 21.5* 20.9* 21.5* 19.6* 23.1*   PLT 61* 54* 62* 56* 52* 62* 44* 57*   MCV 94 95 97 96 95 97 93 96     Recent Labs     05/02/25  0414 05/02/25  0001 04/26/25  1715 04/26/25  0605 04/20/25  0028 04/15/25  1912 04/15/25  1524 04/14/25  1836 04/14/25  1146 04/05/25  1421 03/07/25  1138 02/07/25  1207 12/28/23  0942 12/25/23  0550   INR  --   --  1.3*  --  1.4*  --   --   --  1.1 1.1 1.0 1.1 1.1 1.2*   HAUF 0.4 0.4  --   --   --  0.6 0.5 0.8  --   --   --   --   --   --    HAPTOGLOBIN  --   --   --  218*  --   --   --   --   --   --   --   --   --   --    FIBRINOGEN  --   --  455*  --   --   --   --   --   --   --   --   --   --   --      PTT - 4/26/2025:  5:15 PM  1.3   14.1 33     Estimated Creatinine Clearance: 31.5 mL/min (A) (by C-G formula based on SCr of 2.13 mg/dL (H)).    Recent Labs     08/12/24  0921 08/07/23  1017 08/03/22  0856 07/26/21  1035   CHOL 84 102 87 92   LDLF  --  37 25 34   HDL 42.1 37.3* 43.0 37.0*   TRIG 101 141 95 106     Lab Results   Component Value Date    HGBA1C 5.3 08/12/2024     Lab Results   Component Value Date    TSH 1.14 04/26/2025       Imaging  UE DVT US 4/11/25  Right Upper Venous: There is acute non-occlusive deep vein thrombosis visualized in the brachial vein. Remainder visualized vessels appear patent with no evidence of thrombus. Cannot rule out thrombus of non-visualized mid cephalic, distal cephalic and proximal cephalic veins.  Left Upper Venous: Cannot rule out thrombus in non-visualized internal jugular vein due to patient positioning and pain intolerance. Remainder visualized vessels appear patent with no evidence of thrombus.    LE DVT US 4/11/25  Right Lower Venous: No evidence of acute deep vein thrombus visualized in the right lower extremity.  Left Lower Venous: There is acute occlusive deep vein thrombosis visualized in the popliteal vein. There is age indeterminate deep vein thrombosis visualized in the distal femoral, gastrocnemius veins  "in the calf, posterior tibial and peroneal veins. The remainder of the left leg is negative for deep vein thrombosis.    Imaging reports and labs listed above reviewed     Assessment/Plan   Enmanuel Ahumada \"Petey" is a 69 y.o. male with PMH of glioblastoma s/p right craniotomy 12/29/23, c/b seizure  on VPA. Admitted 4/5/25 with surgical drainage after repeat craniotomy and tumor resection 2/20/25; S/p cranial washout and mesh cranioplasty 4/6/25. Vascular medicine is following for VTE    4/2025   _ Left distal femoral- calf DVT s/p IVC filter placement 4/11/25  Initially on heparin GTT, started on Lovenox twice daily later switched to Eliquis after adjusting his anti-seizures, Eliquis DC'd because of pancytopenia, patient started on Lovenox ppx, switched to heparin ppx for profound JAMAR    _ Right brachial thrombus- line related    _ Pancytopenia being worked up by heme- onc, PF4 Neg  _ JAMAR Estimated Creatinine Clearance: 31.5 mL/min (A) (by C-G formula based on SCr of 2.13 mg/dL (H)).    Plan   --- Continue heparin GTT, low intensity, no bolus if okay by neurosurgery, and as long as his platelets are stable above 50K with no concerns about bleeding  --- JAMAR improved today to a level where we can consider Lovenox or Eliquis (if compatible with his antiseizure his medications, noted concern about possible new seizure), please continue heparin gtt for now, hoping for kidney function stability and finalization of the antiseizure plan  --- SCDs all the time   --- If pancytopenia is secondary to chemotherapy, with plans to continue receiving chemotherapy on discharge, he will require very close monitoring after discharge while on AC     Leighann Blanc MD    "

## 2025-05-02 NOTE — PROGRESS NOTES
"  Physical Therapy Treatment    Patient Name: Enmanuel Ahumada \"Dash\"  MRN: [id number]  Today's Date: 5/2/2025  Room: [address] [place] [address]A  Time Calculation  Start Time: 1501  Stop Time: 1529  Time Calculation (min): 28 min       Assessment/Plan   PT Assessment  PT Assessment Results: Decreased strength, Decreased endurance, Impaired balance, Decreased coordination, Decreased mobility, Decreased cognition, Impaired judgement, Decreased safety awareness  Rehab Prognosis: Good  Barriers to Discharge Home: Caregiver assistance, Cognition needs, Physical needs  Caregiver Assistance: Caregiver assistance needed per identified barriers - however, level of patient's required assistance exceeds assistance available at home  Cognition Needs: 24hr supervision for safety awareness needed, Insight of patient limited regarding functional ability/needs, Cognition-related high falls risk  Physical Needs: High falls risk due to function or environment, 24hr mobility assistance needed, Stair navigation into home limited by function/safety, Ambulating household distances limited by function/safety, 24hr ADL assistance needed  Evaluation/Treatment Tolerance: Patient limited by fatigue, Patient tolerated treatment well  Strengths: Support of Caregivers, Attitude of self  Barriers to Participation: Comorbidities  End of Session Patient Position: Bed, 4 rail up, Alarm on  PT Plan  Inpatient/Swing Bed or Outpatient: Inpatient  PT Plan  Treatment/Interventions: Bed mobility, Transfer training, Gait training, Balance training, Strengthening, Endurance training, Therapeutic exercise, Therapeutic activity  PT Plan: Ongoing PT  PT Frequency: 5 times per week  PT Discharge Recommendations: High intensity level of continued care  Equipment Recommended upon Discharge:  (tbd at next level of care)  PT Recommended Transfer Status: Assist x2  PT - OK to Discharge: Yes  Assessment: Patient is progressing Fairly with therapy this date. More alert today " still has significant tachycardia and tremors as main limiting factors. Would continue to benefit from continued skilled PT to address all mobility deficits; remains appropriate for HIGH intensity therapy when medically ready for discharge from acute stay.  Will continue to follow.     General Visit Information:   PT  Visit  PT Received On: 05/02/25  Co-Treatment: OT  Co-Treatment Reason: co-tx with PT to maximize pts safety, AMPAC < 10  Prior to Session Communication: Bedside nurse  Patient Position Received: Bed, 4 rail up, Alarm on  Family/Caregiver Present: Yes  Caregiver Feedback: wife present and supportive  General Comment: Pt in supine, connected to video EEG, willing to participate in therapy     Subjective   Subjective: pt asleep on EEG, awoken and agreeable to PT  Precautions:  Precautions  Hearing/Visual Limitations: per pt and wife report, pt with limited L visual field.  Medical Precautions: Fall precautions  Vital Signs:   Date/Time Vitals Session Patient Position Pulse Resp SpO2 BP MAP (mmHg)    05/02/25 1500 --  --  123  18  94 %  119/76  90     05/02/25 1501 --  --  117  --  --  --  --     05/02/25 1502 During PT  Standing  158  --  --  --  --     05/02/25 1503 --  Lying  117  --  --  --  --     05/02/25 1504 --  Sitting  140  --  --  --  --             Objective   Pain:  Pain Assessment  Pain Assessment: 0-10  0-10 (Numeric) Pain Score: 0 - No pain  Cognition:  Cognition  Overall Cognitive Status: Impaired  Arousal/Alertness: Delayed responses to stimuli  Orientation Level: Disoriented to time, Disoriented to situation  Following Commands: Follows one step commands with increased time (and repetition)  Cognition Comments: more alert but needs constant cues for redirection and attention to task. Needs cues to keep eyes open throughout tx     Static Sitting balance:  Static Sitting Balance  Static Sitting-Balance Support: Feet supported, Bilateral upper extremity supported  Static Sitting-Level of  Assistance: Minimum assistance  Static Sitting-Comment/Number of Minutes: L trunk lean needs cues to correct    Dynamic Sitting Balance:  Dynamic Sitting Balance  Dynamic Sitting-Balance Support: Feet supported  Dynamic Sitting-Level of Assistance: Moderate assistance  Dynamic Sitting-Balance: Reaching for objects, Reaching across midline  Dynamic Sitting-Comments: pt reaching outside BETITO towards targets with max cues for follow thru    Lines/Tubes/Drains:  PICC - Adult 04/30/25 Single lumen Left Brachial vein (Active)   Number of days: 2       External Urinary Catheter Male (Active)   Number of days: 16     Continuous Medications/Drips:  Continuous Medications[1]    PT Treatments:           Bed Mobility 1  Bed Mobility 1: Supine to sitting, Sitting to supine  Level of Assistance 1: Maximum assistance, Maximum verbal cues, +2  Bed Mobility Comments 1: HOB elevated, using rails  Bed Mobility 2  Bed Mobility  2: Rolling right, Rolling left  Level of Assistance 2: Maximum assistance  Bed Mobility Comments 2: tactile cues to have pt use bed rails     Transfer 1  Transfer From 1: Sit to  Transfer to 1: Stand  Technique 1: Sit to stand, Stand to sit  Transfer Level of Assistance 1: Moderate assistance, Maximum verbal cues, +2, Arm in arm assistance  Trials/Comments 1: ~15s heavy L/retro trunk lean, increased LE tremors     Activity tolerance:  Activity Tolerance  Endurance: Tolerates 10 - 20 min exercise with multiple rests    Outcome Measures:  [facility] Basic Mobility  Turning from your back to your side while in a flat bed without using bedrails: A lot  Moving from lying on your back to sitting on the side of a flat bed without using bedrails: A lot  Moving to and from bed to chair (including a wheelchair): Total  Standing up from a chair using your arms (e.g. wheelchair or bedside chair): A lot  To walk in hospital room: Total  Climbing 3-5 steps with railing: Total  Basic Mobility - Total Score: 9       Education  Documentation  Body Mechanics, taught by Gayatri Zayas PTA at 5/2/2025  4:03 PM.  Learner: Patient  Readiness: Acceptance  Method: Explanation  Response: No Evidence of Learning    Precautions, taught by Gayatri Zayas PTA at 5/2/2025  4:03 PM.  Learner: Patient  Readiness: Acceptance  Method: Explanation  Response: No Evidence of Learning    Mobility Training, taught by Gayatri Zayas PTA at 5/2/2025  4:03 PM.  Learner: Patient  Readiness: Acceptance  Method: Explanation  Response: No Evidence of Learning    Body Mechanics, taught by Gayatri Zayas PTA at 5/2/2025  4:03 PM.  Learner: Patient  Readiness: Acceptance  Method: Explanation  Response: No Evidence of Learning    Precautions, taught by Gayatri Zayas PTA at 5/2/2025  4:03 PM.  Learner: Patient  Readiness: Acceptance  Method: Explanation  Response: No Evidence of Learning    ADL Training, taught by Gayatri Zayas PTA at 5/2/2025  4:03 PM.  Learner: Patient  Readiness: Acceptance  Method: Explanation  Response: No Evidence of Learning    Education Comments  No comments found.          OP EDUCATION:       Encounter Problems       Encounter Problems (Active)       PT Problem       Patient will perform bed mobility IND to reduce risk of developing decubitus ulcers.  (Progressing)       Start:  04/08/25    Expected End:  05/09/25            Patient will ambulate at least 150 ft. with </= close sup and LRD to improve tolerance of household distances.  (Progressing)       Start:  04/08/25    Expected End:  05/09/25            Patient will perform the 5-Time Sit to Stand test in <14 sec to indicate decreased falls risk. (Progressing)       Start:  04/08/25    Expected End:  05/09/25            Patient will perform sit to stand and stand to sit transfers with </= close sup and LRD to increase functional strength.  (Progressing)       Start:  04/08/25    Expected End:  05/09/25            BLE 5/5 (Progressing)       Start:  04/08/25    Expected End:  05/09/25               Pain  - Adult                                 [1] heparin, 0-4,000 Units/hr, Last Rate: 800 Units/hr (05/01/25 2011)

## 2025-05-02 NOTE — PROGRESS NOTES
"Occupational Therapy    Occupational Therapy Treatment    Name: Enmanuel hAumada \"Dash\"  MRN: [id number]  : 1955  Date: 25  Room: [address] [place] [address]A      Time Calculation  Start Time: 1501  Stop Time: 1529  Time Calculation (min): 28 min    Assessment:  OT Assessment: Pt with increased alertness and slight increase in command following from previous session, tolerated sitting EOB and completed one stand. Pt continues to have skilled OT needs to address cognition, safety, balance, strength, coordination required for safe participation in ADLs and functional transfers.  Prognosis: Good  Barriers to Discharge Home: Cognition needs, Physical needs, Caregiver assistance  Caregiver Assistance: Caregiver assistance needed per identified barriers - however, level of patient's required assistance exceeds assistance available at home  Cognition Needs: Insight of patient limited regarding functional ability/needs, Cognition-related high falls risk  Physical Needs: 24hr mobility assistance needed, High falls risk due to function or environment, Intermittent ADL assistance needed  Evaluation/Treatment Tolerance: Patient tolerated treatment well, Patient limited by fatigue  Plan:  Treatment Interventions: ADL retraining, Functional transfer training, Endurance training, UE strengthening/ROM, Cognitive reorientation, Neuromuscular reeducation, Fine motor coordination activities, Compensatory technique education  OT Frequency: 4 times per week  OT Discharge Recommendations: High intensity level of continued care  Equipment Recommended upon Discharge:  (tbd at next level of care)  OT Recommended Transfer Status: Maximum assist, Assist of 2  OT - OK to Discharge: Yes    Subjective   General:  OT Last Visit  OT Received On: 25  Co-Treatment: PT  Co-Treatment Reason: co-tx with PT to maximize pts safety, AMPAC < 10  Prior to Session Communication: Bedside nurse  Patient Position Received: Bed, 4 rail up, Alarm " on  Family/Caregiver Present: Yes  Caregiver Feedback: wife present and supportive  General Comment: Pt in supine, connected to video EEG, willing to participate in therapy   Precautions:  Medical Precautions: Fall precautions  Vitals:   Date/Time Vitals Session Patient Position Pulse Resp SpO2 BP MAP (mmHg)    05/02/25 1500 --  --  123  18  94 %  119/76  90     05/02/25 1501 --  --  117  --  --  --  --           Lines/Tubes/Drains:  PICC - Adult 04/30/25 Single lumen Left Brachial vein (Active)   Number of days: 2       External Urinary Catheter Male (Active)   Number of days: 16       Cognition:  Orientation Level: Disoriented to time, Disoriented to situation (perserverates on name and hospital)  Following Commands: Follows one step commands with repetition (and increased cues, ~ 50 % of the time)  Safety Judgment: Decreased awareness of need for assistance  Problem Solving: Assistance required to identify errors made    Pain Assessment:  Pain Assessment  Pain Assessment: 0-10  0-10 (Numeric) Pain Score: 0 - No pain     Objective   Activities of Daily Living:               UE Dressing  UE Dressing Level of Assistance: Maximum assistance  UE Dressing Where Assessed: Bed level  UE Dressing Comments: donned/doffed gown, erquired assistance with B sleeves and neck ties     Toileting  Toileting Level of Assistance: Dependent  Where Assessed: Bed level  Toileting Comments: pt incontinent of BM after standing         Bed Mobility/Transfers:   Bed Mobility  Bed Mobility: Yes  Bed Mobility 1  Bed Mobility 1: Supine to sitting, Sitting to supine  Level of Assistance 1: Maximum assistance, +2, Maximum verbal cues  Bed Mobility Comments 1: use of HOB up, bed rails and draw sheet  Bed Mobility 2  Bed Mobility  2: Scooting  Level of Assistance 2: Dependent, +2  Transfers  Transfer: Yes  Transfer 1  Transfer From 1: Sit to, Stand to  Transfer to 1: Stand, Sit  Technique 1: Sit to stand, Stand to sit  Transfer Device 1:  (B arm  in arm assist)  Transfer Level of Assistance 1: Moderate assistance, +2, Maximum verbal cues  Trials/Comments 1: Pt stood for ~ 15 sec with Mod A x2 using B arm in arm assist, leans to L side and posteriorly                Balance:  Dynamic Sitting Balance  Dynamic Sitting-Level of Assistance: Minimum assistance, Moderate assistance  Dynamic Sitting-Balance: Reaching for objects (required Max cues to reach with L hand past midline to R side)  Static Sitting Balance  Static Sitting-Level of Assistance: Minimum assistance  Static Sitting-Comment/Number of Minutes: Pt tolerated sitting EOB for > 10 min, provided cues on weight shifting and upright posture. Tends to lean to L and posteriorly      Outcome Measures:  [facility] Daily Activity  Putting on and taking off regular lower body clothing: Total  Bathing (including washing, rinsing, drying): A lot  Putting on and taking off regular upper body clothing: A lot  Toileting, which includes using toilet, bedpan or urinal: Total  Taking care of personal grooming such as brushing teeth: A lot  Eating Meals: A lot  Daily Activity - Total Score: 10     Education Documentation  Body Mechanics, taught by Carmen Porras OT at 5/2/2025  3:57 PM.  Learner: Patient  Readiness: Acceptance  Method: Explanation, Demonstration  Response: Needs Reinforcement    Precautions, taught by Carmen Porras OT at 5/2/2025  3:57 PM.  Learner: Patient  Readiness: Acceptance  Method: Explanation, Demonstration  Response: Needs Reinforcement    ADL Training, taught by Carmen Porras OT at 5/2/2025  3:57 PM.  Learner: Patient  Readiness: Acceptance  Method: Explanation, Demonstration  Response: Needs Reinforcement    Education Comments  No comments found.        Goals:  Encounter Problems       Encounter Problems (Active)       ADLs       Patient will complete grooming tasks with CGA in order to maximize functional Indep with task completion.  (Progressing)       Start:  04/08/25    Expected  End:  05/14/25            Patient will complete lower body dressing with  MIN A for donning and doffing all LE clothes in order to increase Indep with task participation.  (Progressing)       Start:  04/08/25    Expected End:  05/14/25            Patient will complete toileting, including clothing management and hygiene, with MIN A in order to maximize functional Indep with task completion.  (Progressing)       Start:  04/08/25    Expected End:  05/14/25               BALANCE       Pt will increase static/dynamic sit/stand to Good to increase safety and indep with functional task completion.   (Progressing)       Start:  04/08/25    Expected End:  05/14/25               COGNITION/SAFETY       Pt will demo good safety awareness during functional task completion with no more than min vc in order to maximize occupational performance.   (Progressing)       Start:  04/08/25    Expected End:  05/14/25               EXERCISE/STRENGTHENING       Pt will increase endurance to tolerate 15-20min of activity with no more than 1 rest break in order to increase ability to engage in ADL completion.  (Progressing)       Start:  04/08/25    Expected End:  05/14/25            Pt will increase overall BUE strength to 4/5 in order to increase functional task participation.  (Progressing)       Start:  04/08/25    Expected End:  05/14/25            Pt will demo increased BUE fine motor/bimanual coordination in order to achieve MOD I with functional task completion.  (Progressing)       Start:  04/08/25    Expected End:  05/14/25               TRANSFERS       Patient will complete functional transfers using least restrictive device with CGA   in order to maximize functional potential and increase safety.  (Progressing)       Start:  04/08/25    Expected End:  05/14/25 05/02/25 at 3:58 PM   Carmen Porras, OT   [phone]

## 2025-05-02 NOTE — PROGRESS NOTES
04/28/25 1200   Discharge Planning   Living Arrangements Spouse/significant other   Support Systems Spouse/significant other   Type of Residence Private residence   Number of Stairs to Enter Residence 3   Number of Stairs Within Residence 10   Home or Post Acute Services Post acute facilities (Rehab/SNF/etc)   Type of Post Acute Facility Services Rehab   Expected Discharge Disposition Rehab   Does the patient need discharge transport arranged? Yes   RoundTrip coordination needed? Yes   Has discharge transport been arranged? No     Care Transitions Note  04/28/25  Plan per Medical/Surgical Team: GBM, extended hospital course. On ID vanc until June. Pending vasc and neph recs  Status: Inpatient  Payor Source: Medical [facility] Medicare  Discharge disposition: Acute Rehab  Expected date of discharge: TBD, Wednesday?  Barriers: medical stability    Met with pt and spouse at bedside this AM. Pt unable to participate in assessment, deferred to spouse. Demographics and insurance verified. SW confirmed plan to go to  Acute Rehab in [place] at discharge. Per spouse, pt has no upcoming tx scheduled at this time. SW to follow for clinical stability. Pre-cert is needed prior to dc. Will continue to follow for discharge planning.   KAYCEE Segura, HEBER     04/30/25  SW asked  pre-cert team to initiate pre-cert for  [facility] [place]. SW also alerted facility to begin their portion of pre-cert. Anticipating DC Friday pending clinical stability.   Pending Auth #[id number]   KAYCEE Segura, HEBER     05/02/25  Pre-cert still pending for [facility]. Pt not yet med ready, will need to be transitioned to warfarin prior to discharge. CLARK made facility aware of change.   KAYCEE Segura, HEBER

## 2025-05-03 LAB
ALBUMIN SERPL BCP-MCNC: 3 G/DL (ref 3.4–5)
ANION GAP SERPL CALC-SCNC: 14 MMOL/L (ref 10–20)
BASOPHILS # BLD MANUAL: 0.02 X10*3/UL (ref 0–0.1)
BASOPHILS # BLD MANUAL: 0.05 X10*3/UL (ref 0–0.1)
BASOPHILS NFR BLD MANUAL: 0.8 %
BASOPHILS NFR BLD MANUAL: 2.6 %
BLASTS # BLD MANUAL: 0 X10*3/UL
BLASTS # BLD MANUAL: 0 X10*3/UL
BLASTS NFR BLD MANUAL: 0 %
BLASTS NFR BLD MANUAL: 0 %
BUN SERPL-MCNC: 22 MG/DL (ref 6–23)
CALCIUM SERPL-MCNC: 8.2 MG/DL (ref 8.6–10.6)
CHLORIDE SERPL-SCNC: 101 MMOL/L (ref 98–107)
CO2 SERPL-SCNC: 27 MMOL/L (ref 21–32)
CREAT SERPL-MCNC: 1.97 MG/DL (ref 0.5–1.3)
EGFRCR SERPLBLD CKD-EPI 2021: 37 ML/MIN/1.73M*2
EOSINOPHIL # BLD MANUAL: 0.08 X10*3/UL (ref 0–0.7)
EOSINOPHIL # BLD MANUAL: 0.1 X10*3/UL (ref 0–0.7)
EOSINOPHIL NFR BLD MANUAL: 4.2 %
EOSINOPHIL NFR BLD MANUAL: 4.8 %
ERYTHROCYTE [DISTWIDTH] IN BLOOD BY AUTOMATED COUNT: 16.4 % (ref 11.5–14.5)
ERYTHROCYTE [DISTWIDTH] IN BLOOD BY AUTOMATED COUNT: 16.5 % (ref 11.5–14.5)
ERYTHROCYTE [DISTWIDTH] IN BLOOD BY AUTOMATED COUNT: 16.5 % (ref 11.5–14.5)
GLUCOSE BLD MANUAL STRIP-MCNC: 107 MG/DL (ref 74–99)
GLUCOSE BLD MANUAL STRIP-MCNC: 119 MG/DL (ref 74–99)
GLUCOSE BLD MANUAL STRIP-MCNC: 98 MG/DL (ref 74–99)
GLUCOSE SERPL-MCNC: 105 MG/DL (ref 74–99)
HCT VFR BLD AUTO: 23.6 % (ref 41–52)
HCT VFR BLD AUTO: 24.9 % (ref 41–52)
HCT VFR BLD AUTO: 24.9 % (ref 41–52)
HGB BLD-MCNC: 7.9 G/DL (ref 13.5–17.5)
HGB BLD-MCNC: 8.2 G/DL (ref 13.5–17.5)
HGB BLD-MCNC: 8.2 G/DL (ref 13.5–17.5)
IMM GRANULOCYTES # BLD AUTO: 0.02 X10*3/UL (ref 0–0.7)
IMM GRANULOCYTES # BLD AUTO: 0.02 X10*3/UL (ref 0–0.7)
IMM GRANULOCYTES NFR BLD AUTO: 1 % (ref 0–0.9)
IMM GRANULOCYTES NFR BLD AUTO: 1 % (ref 0–0.9)
LYMPHOCYTES # BLD MANUAL: 0.51 X10*3/UL (ref 1.2–4.8)
LYMPHOCYTES # BLD MANUAL: 0.74 X10*3/UL (ref 1.2–4.8)
LYMPHOCYTES NFR BLD MANUAL: 25.4 %
LYMPHOCYTES NFR BLD MANUAL: 37.1 %
MAGNESIUM SERPL-MCNC: 1.63 MG/DL (ref 1.6–2.4)
MCH RBC QN AUTO: 31.5 PG (ref 26–34)
MCH RBC QN AUTO: 31.5 PG (ref 26–34)
MCH RBC QN AUTO: 32 PG (ref 26–34)
MCHC RBC AUTO-ENTMCNC: 32.9 G/DL (ref 32–36)
MCHC RBC AUTO-ENTMCNC: 32.9 G/DL (ref 32–36)
MCHC RBC AUTO-ENTMCNC: 33.5 G/DL (ref 32–36)
MCV RBC AUTO: 96 FL (ref 80–100)
METAMYELOCYTES # BLD MANUAL: 0 X10*3/UL
METAMYELOCYTES # BLD MANUAL: 0 X10*3/UL
METAMYELOCYTES NFR BLD MANUAL: 0 %
METAMYELOCYTES NFR BLD MANUAL: 0 %
MONOCYTES # BLD MANUAL: 0.12 X10*3/UL (ref 0.1–1)
MONOCYTES # BLD MANUAL: 0.15 X10*3/UL (ref 0.1–1)
MONOCYTES NFR BLD MANUAL: 5.9 %
MONOCYTES NFR BLD MANUAL: 7.3 %
MYELOCYTES # BLD MANUAL: 0 X10*3/UL
MYELOCYTES # BLD MANUAL: 0.02 X10*3/UL
MYELOCYTES NFR BLD MANUAL: 0 %
MYELOCYTES NFR BLD MANUAL: 0.8 %
NEUTROPHILS # BLD MANUAL: 0.98 X10*3/UL (ref 1.2–7.7)
NEUTROPHILS # BLD MANUAL: 1.22 X10*3/UL (ref 1.2–7.7)
NEUTS BAND # BLD MANUAL: 0 X10*3/UL (ref 0–0.7)
NEUTS BAND # BLD MANUAL: 0 X10*3/UL (ref 0–0.7)
NEUTS BAND NFR BLD MANUAL: 0 %
NEUTS BAND NFR BLD MANUAL: 0 %
NEUTS SEG # BLD MANUAL: 0.98 X10*3/UL (ref 1.2–7)
NEUTS SEG # BLD MANUAL: 1.22 X10*3/UL (ref 1.2–7)
NEUTS SEG NFR BLD MANUAL: 49.2 %
NEUTS SEG NFR BLD MANUAL: 61 %
NRBC BLD MANUAL-RTO: 0 % (ref 0–0)
NRBC BLD MANUAL-RTO: 0 % (ref 0–0)
NRBC BLD-RTO: 0 /100 WBCS (ref 0–0)
PHOSPHATE SERPL-MCNC: 3 MG/DL (ref 2.5–4.9)
PLASMA CELLS # BLD MANUAL: 0 X10*3/UL
PLASMA CELLS # BLD MANUAL: 0 X10*3/UL
PLASMA CELLS NFR BLD MANUAL: 0 %
PLASMA CELLS NFR BLD MANUAL: 0 %
PLATELET # BLD AUTO: 64 X10*3/UL (ref 150–450)
PLATELET # BLD AUTO: 64 X10*3/UL (ref 150–450)
PLATELET # BLD AUTO: 66 X10*3/UL (ref 150–450)
POTASSIUM SERPL-SCNC: 3.5 MMOL/L (ref 3.5–5.3)
PROMYELOCYTES # BLD MANUAL: 0 X10*3/UL
PROMYELOCYTES # BLD MANUAL: 0 X10*3/UL
PROMYELOCYTES NFR BLD MANUAL: 0 %
PROMYELOCYTES NFR BLD MANUAL: 0 %
RBC # BLD AUTO: 2.47 X10*6/UL (ref 4.5–5.9)
RBC # BLD AUTO: 2.6 X10*6/UL (ref 4.5–5.9)
RBC # BLD AUTO: 2.6 X10*6/UL (ref 4.5–5.9)
RBC MORPH BLD: ABNORMAL
RBC MORPH BLD: ABNORMAL
SODIUM SERPL-SCNC: 138 MMOL/L (ref 136–145)
TOTAL CELLS COUNTED BLD: 118
TOTAL CELLS COUNTED BLD: 124
UFH PPP CHRO-ACNC: 0.2 IU/ML (ref ?–1.1)
VARIANT LYMPHS # BLD MANUAL: 0 X10*3/UL (ref 0–0.5)
VARIANT LYMPHS # BLD MANUAL: 0.02 X10*3/UL (ref 0–0.5)
VARIANT LYMPHS NFR BLD: 0 %
VARIANT LYMPHS NFR BLD: 0.9 %
WBC # BLD AUTO: 2 X10*3/UL (ref 4.4–11.3)

## 2025-05-03 PROCEDURE — 95715 VEEG EA 12-26HR INTMT MNTR: CPT

## 2025-05-03 PROCEDURE — 82947 ASSAY GLUCOSE BLOOD QUANT: CPT

## 2025-05-03 PROCEDURE — 85520 HEPARIN ASSAY: CPT

## 2025-05-03 PROCEDURE — 2500000004 HC RX 250 GENERAL PHARMACY W/ HCPCS (ALT 636 FOR OP/ED): Mod: JZ | Performed by: STUDENT IN AN ORGANIZED HEALTH CARE EDUCATION/TRAINING PROGRAM

## 2025-05-03 PROCEDURE — 2500000001 HC RX 250 WO HCPCS SELF ADMINISTERED DRUGS (ALT 637 FOR MEDICARE OP)

## 2025-05-03 PROCEDURE — 85027 COMPLETE CBC AUTOMATED: CPT

## 2025-05-03 PROCEDURE — 85007 BL SMEAR W/DIFF WBC COUNT: CPT

## 2025-05-03 PROCEDURE — 83735 ASSAY OF MAGNESIUM: CPT

## 2025-05-03 PROCEDURE — 95720 EEG PHY/QHP EA INCR W/VEEG: CPT | Performed by: STUDENT IN AN ORGANIZED HEALTH CARE EDUCATION/TRAINING PROGRAM

## 2025-05-03 PROCEDURE — 80069 RENAL FUNCTION PANEL: CPT

## 2025-05-03 PROCEDURE — 99232 SBSQ HOSP IP/OBS MODERATE 35: CPT

## 2025-05-03 PROCEDURE — 1200000003 HC ONCOLOGY  ROOM WITH TELEMETRY DAILY

## 2025-05-03 PROCEDURE — 99233 SBSQ HOSP IP/OBS HIGH 50: CPT

## 2025-05-03 PROCEDURE — 2500000004 HC RX 250 GENERAL PHARMACY W/ HCPCS (ALT 636 FOR OP/ED)

## 2025-05-03 RX ORDER — MAGNESIUM SULFATE HEPTAHYDRATE 40 MG/ML
2 INJECTION, SOLUTION INTRAVENOUS ONCE
Status: COMPLETED | OUTPATIENT
Start: 2025-05-03 | End: 2025-05-03

## 2025-05-03 RX ADMIN — HEPARIN SODIUM 800 UNITS/HR: 10000 INJECTION, SOLUTION INTRAVENOUS at 02:09

## 2025-05-03 RX ADMIN — ATORVASTATIN CALCIUM 40 MG: 40 TABLET, FILM COATED ORAL at 20:37

## 2025-05-03 RX ADMIN — CLONIDINE HYDROCHLORIDE 0.1 MG: 0.1 TABLET ORAL at 08:59

## 2025-05-03 RX ADMIN — BRIVARACETAM 50 MG: 50 TABLET, FILM COATED ORAL at 08:58

## 2025-05-03 RX ADMIN — FAMOTIDINE 10 MG: 20 TABLET, FILM COATED ORAL at 08:58

## 2025-05-03 RX ADMIN — MAGNESIUM SULFATE HEPTAHYDRATE 2 G: 40 INJECTION, SOLUTION INTRAVENOUS at 09:00

## 2025-05-03 RX ADMIN — ASCORBIC ACID, THIAMINE MONONITRATE,RIBOFLAVIN, NIACINAMIDE, PYRIDOXINE HYDROCHLORIDE, FOLIC ACID, CYANOCOBALAMIN, BIOTIN, CALCIUM PANTOTHENATE, 1 CAPSULE: 100; 1.5; 1.7; 20; 10; 1; 6000; 150000; 5 CAPSULE, LIQUID FILLED ORAL at 08:58

## 2025-05-03 RX ADMIN — VANCOMYCIN HYDROCHLORIDE 750 MG: 1 INJECTION, POWDER, LYOPHILIZED, FOR SOLUTION INTRAVENOUS at 20:37

## 2025-05-03 RX ADMIN — BRIVARACETAM 50 MG: 50 TABLET, FILM COATED ORAL at 20:37

## 2025-05-03 ASSESSMENT — PAIN SCALES - GENERAL
PAINLEVEL_OUTOF10: 0 - NO PAIN

## 2025-05-03 ASSESSMENT — COGNITIVE AND FUNCTIONAL STATUS - GENERAL
EATING MEALS: TOTAL
MOVING FROM LYING ON BACK TO SITTING ON SIDE OF FLAT BED WITH BEDRAILS: A LITTLE
WALKING IN HOSPITAL ROOM: TOTAL
STANDING UP FROM CHAIR USING ARMS: TOTAL
TOILETING: TOTAL
HELP NEEDED FOR BATHING: A LOT
EATING MEALS: TOTAL
PERSONAL GROOMING: TOTAL
TOILETING: TOTAL
CLIMB 3 TO 5 STEPS WITH RAILING: TOTAL
TURNING FROM BACK TO SIDE WHILE IN FLAT BAD: A LITTLE
MOBILITY SCORE: 7
MOVING TO AND FROM BED TO CHAIR: A LOT
TURNING FROM BACK TO SIDE WHILE IN FLAT BAD: TOTAL
DAILY ACTIVITIY SCORE: 7
DAILY ACTIVITIY SCORE: 8
MOVING TO AND FROM BED TO CHAIR: TOTAL
STANDING UP FROM CHAIR USING ARMS: TOTAL
DRESSING REGULAR LOWER BODY CLOTHING: TOTAL
DRESSING REGULAR UPPER BODY CLOTHING: A LOT
CLIMB 3 TO 5 STEPS WITH RAILING: TOTAL
MOVING FROM LYING ON BACK TO SITTING ON SIDE OF FLAT BED WITH BEDRAILS: A LOT
MOBILITY SCORE: 11
DRESSING REGULAR LOWER BODY CLOTHING: A LOT
HELP NEEDED FOR BATHING: TOTAL
PERSONAL GROOMING: TOTAL
WALKING IN HOSPITAL ROOM: TOTAL
DRESSING REGULAR UPPER BODY CLOTHING: TOTAL

## 2025-05-03 ASSESSMENT — PAIN - FUNCTIONAL ASSESSMENT
PAIN_FUNCTIONAL_ASSESSMENT: 0-10
PAIN_FUNCTIONAL_ASSESSMENT: WONG-BAKER FACES

## 2025-05-03 NOTE — PROGRESS NOTES
"Eve Ahumada \"Petey" is a 69 y.o. male on day 28 of admission presenting with Wound dehiscence.    Overnight: No acute events overnight.     This morning: Patient is resting in bed comfortably. He is able to tell me his name, , that we are in a hospital that is located in [place], year is 20.... but unable to finish the year. No complaints this morning, denies changes in vision, headaches, N/V, abd pain.        Objective   Current Vitals  /67 (BP Location: Right arm, Patient Position: Lying)   Pulse 102   Temp 36.7 °C (98.1 °F) (Temporal)   Resp 18   Ht 1.702 m (5' 7\")   Wt 67 kg (147 lb 11.3 oz)   SpO2 95%   BMI 23.13 kg/m²      I/O last 3 completed shifts:  In: 590 (8.8 mL/kg) [P.O.:440; IV Piggyback:150]  Out: 1975 (29.5 mL/kg) [Urine:1975 (0.8 mL/kg/hr)]  Weight: 67 kg       Physical Exam  Vitals reviewed.   Constitutional:       General: He is not in acute distress.     Appearance: He is ill-appearing. He is not toxic-appearing.      Comments: Intermittent alertness, oriented x2 (self, , hospital, [place], not year)   HENT:      Head:      Comments: Right-crani site appears to be healing well, dry, no erythema. Drain sites (2) well healing.      Mouth/Throat:      Mouth: Mucous membranes are moist.      Pharynx: Oropharynx is clear.   Eyes:      Extraocular Movements: Extraocular movements intact.   Cardiovascular:      Rate and Rhythm: Normal rate and regular rhythm.      Heart sounds: Normal heart sounds.   Pulmonary:      Effort: Pulmonary effort is normal. No respiratory distress.   Abdominal:      General: There is no distension.      Palpations: Abdomen is soft.      Tenderness: There is no abdominal tenderness. There is no guarding.   Musculoskeletal:      Cervical back: Neck supple.      Right lower leg: No edema.      Left lower leg: No edema.   Skin:     General: Skin is warm and dry.      Coloration: Skin is pale.      Findings: No rash.   Neurological: "      General: No focal deficit present.      Mental Status: He is alert. He is disoriented.      Motor: Weakness (4/5 LUE and LLE strength with 5/5 RUE and RLE) present.      Comments: No tremors or spasms noted on exam this morning.    Psychiatric:         Behavior: Behavior normal.       Relevant Results  Labs:  CBC: WBC 2.0 , HGB 7.9, PLT 66  BMP: , K 3.5, Cl 101, HCO3 27, BUN 22, CR 1.97, Glu 105  LFTS: AST 17 , ALT 38, ALKPHOS 71 , TBILI 1.2 , DBILI 0.3  TROP: 3  BNP: No results found for requested labs within last 365 days.  COAGS: PT 14.1 , PTT 33  , INR 1.3  UA:   Results from last 7 days   Lab Units 04/28/25  0702   COLOR U  Light-Yellow   PH U  6.0   SPEC GRAV UR  1.010   PROTEIN U mg/dL NEGATIVE   BLOOD UR mg/dL 0.03 (TRACE)*   NITRITE U  NEGATIVE   WBC UR /HPF NONE     ABG:    CALCIUM 8.2 MAG No results found for requested labs within last 365 days. ALB 3.0 LACTATE 1.3 PHOS No results found for requested labs within last 365 days. COVIDNo results found for requested labs within last 365 days.    Micro/culture data:  Susceptibility data from last 120 days.  Collected Organism Clindamycin Erythromycin Oxacillin Tetracycline Trimethoprim/Sulfamethoxazole Vancomycin   04/06/25 1007 Staphylococcus epidermidis  S  S  R  S  R  S   04/06/25 1006 Staphylococcus epidermidis         04/06/25 1005 Staphylococcus epidermidis             Imaging:  EEG  IMPRESSION    Impression    This vEEG is indicative of a moderate diffuse encephalopathy and a right hemispheric structural lesion with an overlying skull defect. One non-epileptic left hand tremor event is seen on 5/2/25 @ 16:33 with no EEG correlate.    A full report will be scanned into the patient's chart at a later time.    This report has been interpreted and electronically signed by           MEDS:  Scheduled medications  Scheduled Medications[1]  Continuous medications  Continuous Medications[2]  PRN medications  PRN Medications[3]       Assessment/Plan  "  Assessment/Plan:   Mr. Singer \"Dash\" Zita is a 69 male with a PMHx of HTN, HLD, smalls's esophagus, RO GBM s/p resection ([facility], 12/2023) with resultant L HH s/p chemo/RT who represented for tumor progression in February. He underwent redo craniotomy for tumor resection 02/20 who had repeat MRI in March showing postop changes with fluid collection at dura and craniotomy site, worsening infiltrative disease, enhancement along posteromedial aspect of resection site. He represented on 04/05/25 with concerns for increased wound drainage and was admitted under the care of Neurosurgery. MRI brain onf 04/05 showed increased resection cavity enhancement and diffusion restriction concerning fo superimposed infection/abscess. He therefore underwent cranial wound washout on 04/06 with Neurosurgery. Subsequent cultures from OR revealed methicillin-resistant staph epidermidis, currently on vancomycin IV therapy until 06/15/25 per ID. His subsequent hospital course have been complicated by acute DVTs in the LLE and RUE s/p IVC filter placement due to pancytopenia (namely worsening thrombocytopenia) limiting anticoagulation, agitation/confusion, and new JAMAR 2/2 vancomycin.     Currently working towards discharging to acute rehab pending - Transitioning to full AC now that plts have stabilized. JAMAR continue to improve with improved urine output. Neutropenia 2/2 anticipated alisson from recent Lomustine (04/04) now improved, remains afebrile.     Updates 05/03/25:  - Cr continues to improve today, 1.97  - Vascular medicine:   - Continue LIH gtt for now  - Initially recommending heparin to Coumadin bridge, but JAMAR is improving and could potential restart home Apixaban or consider Lovenox instead   - Neurology re-engaged:   - vEEG- moderate diffuse encephalopathy, no epileptiform activity   - vEEG is indicative of a moderate diffuse encephalopathy and a right hemispheric structural lesion with an overlying skull defect. One " "non-epileptic left hand tremor event is seen on 5/2/25 @ 16:33 with no EEG correlate   - changed to Briviact 50 mg Q12H 05/02 - discontinue Lacosmide  - Neutropenia rebounded -> Seg ANC manual today 1.22   - leukocyte alisson is typically 28-35 days post treatment    - ANC 1.0 - > 0.87 04/28 (day 24) through 05/01 (day 27)   - today is day 29   - remains afebrile  - Dispo planning:  - Pending kidney function, pt may be able to start Apixaban rather than coumadin bridge. Will give his kidney function a couple more days to demonstrate further improvement before deciding to commit him to coumadin.  - Plts and neutropenia stable/improving    # Pancytopenia, stable  # Thrombocytopenia, stable  # Neutropenia, resolved  - Hematology consulted 04/21 felt thrombocytopenia to be multifactorial due to recent lomustine initiation (4/4), introduction of VPA (4/8), MRSE intracranial wound infection treated with vanc and cefepime, and possible consumption in setting of DVTs   - CBC trend: Plt 129 (4/19) ? 49 (4/20)   - LDH: 230 (normal)  -Smear: Clumping in blue tube, decreased plt in lavender tube, rare giant plts  - Coags: INR 1.4, PTT 30s (mildly elevated INR)  - HFP: Tbili 1.2, Alb 3.2, AST/ALT/ALP normal  - D-dimer: 2354  - B12, Folate, TSH: WNL  - PF4/BRANDO: Negative  - Has received multiple platelet transfusions (x8, most recent 04/26) and RBC transfusions (x1 04/25) without overt s or s of bleeding  - Hematology re-enaged 04/26 prior to transfer  - Note states platelet count marcela to 82 following 2 units of platelet transfusion and has since downtrended to 44 over 3 day period. Also notable is worsening anemia throughout hospital stay   PLAN:  - Neutropenia rebounded -> seg manual ANC today 1.22  - Recent Lomustine (4/4): Alkylating agent with delayed alisson (3-6 weeks), can cause prolonged cytopenias\".   - leukocyte alisson is typically 28-35 days post treatment    - ANC 1.0 - > 0.87 04/28 (day 24) through 05/01 (day 27)   - " today is day 29   - remains afebrile - CTM  - Plts remain stable on heparin gtt  - CTM CBC w/ diff daily  - Will transfuse prn per Hematology recs  - Active T&S    # JAMAR, improving  - Upon transfer to our service, pt noted to have Cr trend of 0.66 -> 3.71 over 04/24 -> 04/26  - Previously normal kidney function without history of kidney disease or even previous AKIs  - Likely Cause is Vancomycin, JAMAR coincides with Peak Vanc trough of 38.   - PVR on 04/26 showing ~96 mL, less likely to be obstructive cause.  - No large changes in blood pressures recently  - JAMAR work-up:   - PVR, 96 mL   - Renal U/S: Mild left-sided hydronephrosis with few intrarenal calculi    - UA 04/27 negative, UA 04/28 pending   - Urine lytes 04/27: 3.6%, intrinsic. Serum sodium 138, serum cr 4.14, urine sodium 94, urine cr 77.8  - Urine lytes 04/28: 3.5%, intrinsic. Serum sodium 139, serum cr 4.04, urine sodium 108, urine cr 90.7   - CK 49  - Nephrology signed off 04/30  PLAN:  - Cr continues to improve today   - Urine output remains encouraging as well  - Changed PPI to famotidine  - Daily RFP  - Strict Is/Os  - Avoid nephrotoxic agents and sharp fluctuations in blood pressure    # LLE DVT  # RUE DVT  # IVC filter in place  - UE DVT US 4/11/25 revealed: RUE, acute non-occlusive deep vein thrombosis visualized in the brachial vein.  - LE DVT US 4/11/25 revealed: LLE, acute occlusive deep vein thrombosis visualized in the popliteal vein, age indeterminate deep vein thrombosis visualized in the distal femoral, gastrocnemius veins in the calf, posterior tibial and peroneal veins. The remainder of the left leg is negative for deep vein thrombosis.  - Due to thrombocytopenia underwent IVC filter placement 04/11/25  - Initially on heparin gtt, was started on Lovenox but switched to Eliquis after adjusting anti-seizures meds, however Eliquis now Dc'd due to pancytopenia  - Was on Lovenox ppx dose since due to thrombocytopenia   PLAN:  - Lovenox  changed to heparin ppx 04/26 due to acute worsening of kidney function  - Vascular medicine:   - started on LIH gtt overnight (05/01)    - CTH once therapeutic -stable per NGSY - no further imaging required  - initially recommending  heparin to Coumadin bridge, but JAMAR is improving and could potential restart home Eliquis or consider Lovenox instead   - SCDs, ordered    # GBM s/p resection x2  # MRSE of crani site s/p washout  - Initially dx with GBM in 2023 s/p resection & chemo/RT    - Follows with Dr. oRlon  - Tumor progression with redo craniotomy 02/20/25 with NSGY  - Represented 04/05 for increased drainage from wound  - 04/06 s/p R cranial wound washout and mesh cranioplasty (kaleb purulence )  - Cultures from the subgaleal, epidural, and subdural spaces grew methicillin resistant Staph. Epi  - ID was previously consulted, recommending:    - Continue Vancomycin with goal -600    - antibiotic course tentative stop date will be 6/15/25   - PICC line placed 04/08 and replaced 04/15  PLAN:  - Continue vancomycin, dosed per pharmacy  - CTM daily levels  - ID follow up with Dr. Amy York   [  ] after discharge weekly CBC with diff, Chem 7, quantitative CRP, and Vancomycin trough   - Outpatient follow-up with Dr. Rolon  - Outpatient follow-up with Dr. Cortez, ordered by NSGY team    # C/f hypoactive delirium   # C/f encephalopathy   # C/f dysphagia  # Intermittent tremors of BL upper and lower extremities   - Neurology was following for concerning changes in mental status  - EEG 04/21-04/23 showing moderate diffuse encephalopathy and a right hemispheric structural lesion with an overlying skull defect. No epileptiform discharges are recorded. Overall, the degree of encephalopathy remains same compared to prior recording  PLAN:  - Neurology re-engaged:  - per Neuro, tremors and eye mvmt could be 2/2 from small basal ganglia issues, but nothing to treat. Also could be related to his muscle spasms     - vEEG- moderate diffuse encephalopathy, no epileptiform activity   - vEEG is indicative of a moderate diffuse encephalopathy and a right hemispheric structural lesion with an overlying skull defect. One non-epileptic left hand tremor event is seen on 5/2/25 @ 16:33 with no EEG correlate   - changed to Briviact 50 mg Q12H 05/02 - discontinue Lacosmide  - SLP eval 04/26 c/f aspiration but pt had previously had safe swallowing   - recommended PO intake only when awake and alert w/ upright positioning  - On regular diet  - HOLDING nightly Seroquel upon transfer due to increased somnolence over past few days   - HOLDING prn flexeril (hasn't used) upon transfer due to increased somnolence over past few days    # Impaired mobility  - PT/OT - recommending high intensity   - PM&R last eval on 04/25 stating excellent candidate for acute rehabilitation once medically stable and is able and motivated to participate in 3 hours/day of therapy.      Fluids: Replete PRN  Electrolytes: Keep mg >2, phos >3  and K >4  Nutrition:  Adult diet Regular; 1:1 Feeding   Antimicrobials: vanco  DVT PPX:DVT: Unfractionated Heparin  GI ppx: PPI  Bowel care:Miralax and Senna  Catheter:None  Lines:PIV  Oxygen:Room Air    Code Status: Full Code (confirmed on admission)   NOK:  Primary Emergency Contact: CHRISTINE RIOS, Home Phone: [phone]     Ellyn Bradford MD   Internal Medicine, PGY-1          [1] atorvastatin, 40 mg, oral, Daily  brivaracetam, 50 mg, oral, q12h LA NENA  cloNIDine, 0.1 mg, oral, Daily  famotidine, 10 mg, oral, Daily  lidocaine, 5 mL, infiltration, Once  lidocaine, 1 patch, transdermal, Daily  polyethylene glycol, 17 g, oral, Daily  [Held by provider] QUEtiapine, 25 mg, oral, Nightly  sennosides-docusate sodium, 1 tablet, oral, Nightly  vancomycin, 750 mg, intravenous, q24h  vitamin B complex-vitamin C-folic acid, 1 capsule, oral, Daily     [2] heparin, 0-4,000 Units/hr, Last Rate: 800 Units/hr (05/03/25 0209)      [3] PRN medications: acetaminophen, alteplase, bisacodyl, dextrose, dextrose, glucagon, glucagon, heparin, hydrOXYzine HCL, ondansetron **OR** ondansetron, oxygen, vancomycin

## 2025-05-03 NOTE — PROGRESS NOTES
"  NEUROLOGY CONSULT PROGRESS NOTE & SIGNOFF  Enmanuel Ahumada \"Dash\" is a 69 y.o. male admitted hospital day 28 for GBM resection wound dehiscence.    Subjective   Interval Events & Workup:  NAEO. Converted from LCM to BRV without incident. I personally read, reviewed, and interpreted all labs, imaging, and diagnostic procedures relevant to this current presentation. The patient's cvEEG is ABN3 for 1) BS and 2) CS, R Occipital. This is consistent with a moderate diffuse encephalopathy and a right occipital structural lesion.    =========  Inpatient Medications  Scheduled medications  Scheduled Medications[1]  Continuous medications  Continuous Medications[2]  PRN medications  PRN Medications[3]    =========  Objective   Vital Signs  /80   Pulse 109   Temp 36.7 °C (98.1 °F)   Resp 18   Ht 1.702 m (5' 7\")   Wt 67 kg (147 lb 11.3 oz)   SpO2 93%   BMI 23.13 kg/m²     Physical Exam  GENERAL: laying in bed comfortably; moderately ill-appearing; in NAD. Appears stated age.  NEUROLOGICAL:     Cognitive Status: A&Ox2. Language expression, comprehension, and repetition intact with basic testing though perseverative. Attention waxes-wanes throughout examination. Remains focused during interview.     Cranial Nerves: LHH to menace. R gaze preference, but EOM full-range with smooth pursuits and no gaze-evoked nystagmus. face sensation & strength symmetric.     Motor: Diffuse spastic tone throughout all extremities. There are no adventitious movements noted at rest.     Strength: BUE extension and BLE DF 4/5. Remainder of modalities are 5/5.     Reflexes: deferred     Sensory: deferred     Coordination: moderate high-frequency low-amplitude symmetric simple kinetic tremor is noted on FtN. No dysmetria/overshoot.     Rapid Movements: deferred     Gait: unable to test     Add'l Maneuvers: deferred    Recent/Relevant Labs:  Results for orders placed or performed during the hospital encounter of 04/05/25 (from the " past 24 hours)   POCT GLUCOSE   Result Value Ref Range    POCT Glucose 111 (H) 74 - 99 mg/dL   CBC   Result Value Ref Range    WBC 2.0 (L) 4.4 - 11.3 x10*3/uL    nRBC 0.0 0.0 - 0.0 /100 WBCs    RBC 2.60 (L) 4.50 - 5.90 x10*6/uL    Hemoglobin 8.2 (L) 13.5 - 17.5 g/dL    Hematocrit 24.9 (L) 41.0 - 52.0 %    MCV 96 80 - 100 fL    MCH 31.5 26.0 - 34.0 pg    MCHC 32.9 32.0 - 36.0 g/dL    RDW 16.5 (H) 11.5 - 14.5 %    Platelets 64 (L) 150 - 450 x10*3/uL     *Note: Due to a large number of results and/or encounters for the requested time period, some results have not been displayed. A complete set of results can be found in Results Review.             BNP   Date/Time Value Ref Range Status   12/27/2023 01:47 PM 6 0 - 99 pg/mL Final       Recent/Relevant Imaging:  No MRI head results found for the past 14 days  CT head wo IV contrast  Result Date: 5/2/2025  Interpreted By:  Tony Terrazas  and [place] STUDY: CT HEAD WO IV CONTRAST;  5/2/2025 3:47 am   INDICATION: Signs/Symptoms:concern for bleeding.     COMPARISON: CT head 04/24/2025   ACCESSION NUMBER(S): [id number]   ORDERING CLINICIAN: DEREK WALLACE   TECHNIQUE: Noncontrast axial CT scan of head was performed. Angled reformats in brain and bone windows were generated. The images were reviewed in bone, brain, blood and soft tissue windows.   FINDINGS: Postsurgical changes of right parieto-occipital craniectomy with mesh cranioplasty. Overall similar size and appearance of overlying low-density intracranial fluid as well as predominantly low-density material within the right cerebral surgical resection cavity. There is resolving pneumocephalus within the aforementioned resection cavity with small foci of intraventricular gas within the left frontal horn and left temporal horn which are improved from prior. Again there is apparent communication of the resection cavity with the occipital horn of the right lateral ventricle. Persistent confluence white  matter hypodensities in the superior right frontal lobe extending inferiorly into the right basal ganglia.   Redemonstration of confluence white matter hypoattenuation within the left cerebral hemisphere extending from the inferior left parietal lobe through the left temporal lobe with decreased conspicuity of left temporal intraparenchymal hematoma.   There is also decreased conspicuity of scattered hyperdense subarachnoid hemorrhage within the anterior bifrontal sulci and left occipital sulci.   No new intraparenchymal hemorrhage or CT apparent acute large territorial ischemic infarct. No new hyperdense extra-axial collection.   No intraventricular hemorrhage. No midline shift. Basal cisterns are patent.   Mild generalized mucosal thickening of the ethmoid air cells. Near-complete opacification of the left frontal sinus. Hyper pneumatization of the mastoids.       1. No evidence of new intracranial hemorrhage, CT apparent acute large territorial ischemic infarct, or new hyperdense extra-axial collection. 2. Decreased conspicuity of blood products within the left temporal parenchymal hematoma and scattered subarachnoid hemorrhage. 3. Postsurgical changes with overall similar appearing surgical resection cavity as detailed above.   I personally reviewed the images/study and I agree with the findings as stated. This study was interpreted at [place], [place].   MACRO: None   Signed by: Tony Terrazas 5/2/2025 7:19 AM Dictation workstation:   [id number]    CT head wo IV contrast  Result Date: 4/25/2025  Interpreted By:  Tony Terrazas and [place] STUDY: CT HEAD WO IV CONTRAST;  4/24/2025 10:34 pm   INDICATION: Signs/Symptoms:exam change.     COMPARISON: Multiple prior noncontrast head CTs, most recently 04/21/2025   ACCESSION NUMBER(S): [id number]   ORDERING CLINICIAN: NED CHAIREZ   TECHNIQUE: Noncontrast axial CT scan of head was performed. Angled reformats in  brain and bone windows were generated. The images were reviewed in bone, brain, blood and soft tissue windows.   FINDINGS: Again seen are postsurgical changes from parieto-occipital craniectomy with mesh cranioplasty with similar size and appearance of an overlying low-density intracranial fluid collection, measuring up to 0.4 cm in axial plane.   Deep to the craniotomy and involving parts of the right parietal, temporal, and occipital lobes, there is overall similar appearance of the right posterior cerebral surgical resection cavity. The resection cavity contains predominantly low-density material with scattered areas of pneumocephalus. The curvilinear hyperdensity along the posterior margins of the surgical resection cavity adjacent to the mesh is similar to prior. The resection cavity communicates with the occipital horn of the right lateral ventricle. Similar-appearing confluent white matter hypodensity in the superior right frontal lobe, namely the centrum semiovale and corona radiata, extending inferiorly into the right basal ganglia and internal capsule.   The areas of pneumocephalus have redistributed within the lateral ventricles, with increased pneumocephalus within the right greater than left bilateral temporal horns and slightly decreased pneumocephalus in the bilateral frontal horns. There is slightly decreased size of the lateral ventricles, with the frontal horns measuring 1.0 cm on the right and 1.2 cm on the left (previously 1.3 and 1.8 cm respectively).   There is slightly decreased size of the hyperdense left lateral temporal intraparenchymal hematoma, which measures approximately 2.6 x 1.3 cm (previously 3.0 x 1.5 cm). There is also similar to slightly decreased amount of surrounding hypodensity, likely edema. There is associated partial left temporal and parietal sulcal effacement.   Similar-appearing scattered hyperdense subarachnoid hemorrhage in the anterior right frontal sulci as well as  the left occipital sulci.   No evidence to suggest intraventricular hemorrhage.   No evidence of midline shift measured at the foramen of Monro. The basilar cisterns are patent.   No evidence of recent cortical infarct or new areas of intracranial hemorrhage.   Scattered mild mucosal thickening in the paranasal sinuses. The bilateral mastoid air cells are clear.       1. No evidence of new intracranial hemorrhage. No evidence of midline shift. 2. Slightly decreased size or evolution of blood products within previously noted left temporal parenchymal hematoma. 3. Postsurgical changes from prior right parieto-occipital craniectomy and mesh cranioplasty with overall similar-appearing surgical resection cavity as detailed above.       I personally reviewed the images/study and I agree with the findings as stated by Radiology resident.   MACRO: None   Signed by: Tony Terrazas 4/25/2025 7:33 AM Dictation workstation:   [id number]    CT head wo IV contrast  Result Date: 4/21/2025  Interpreted By:  Robert Sanches, STUDY: CT HEAD WO IV CONTRAST;  4/21/2025 5:48 pm   INDICATION: Signs/Symptoms:neuro exam change.     COMPARISON: CT 04/16/2025.   ACCESSION NUMBER(S): [id number]   ORDERING CLINICIAN: NED CHAIREZ   TECHNIQUE: Noncontrast axial CT scan of head was performed. Angled reformats in brain and bone windows were generated. The images were reviewed in bone, brain, blood and soft tissue windows.   FINDINGS: Again demonstrated are changes of right posterior hemispheric cranioplasty. There is decreased gas in the cystic resection region. Stable peripheral hyperdensity along the resection cavity.   Some decrease in the left temporal lobe hemorrhage hyperdense component, currently 31 x 19 mm greatest axial dimensions, previously 41 x 21 mm. Perihemorrhagic edema appears roughly stable.   There is a increasing ventricular pneumocephalus and slightly increased size of the lateral ventricles.   Golden hypodensity of the  "posterior right temporal region also appears stable.   Small focus of hyperdensity presumed subarachnoid hemorrhage left anterolateral frontal convexity is again demonstrated without significant change. Strandy densities presumed subarachnoid hemorrhage in the posterior left occipital region are stable.   There is no evidence of new massive evolving infarction or solid intracranial mass.       1. There is slight redistribution of pneumocephalus from the resection bed to the lateral ventricles and slight increase in size of the lateral ventricles. 2. There is slightly decreased prominence of the left temporal hemorrhage. 3. Otherwise no change.     MACRO: None   Signed by: Robert Sanches 4/21/2025 6:08 PM Dictation workstation:   [id number]      Other Recent/Relevant Studies:  No echocardiogram results found for the past 14 days      =========  Assessment/Plan   Assessment:  Enmanuel Ahumada \"Dash\" is a 69 y.o. male with a history notable for right occipital GBM s/p multiple resections (12/2023, 2/2025) and chemoradiation (3/2024, followed by adjuvant TMZ), HTN, DLD, and Osborn's Esophagus who is admitted to [facility] for resection-site wound dehiscence. Neurology is following for tremor and abnormal eye movements. The former is most-consistent with enhanced physiologic or essential tremor and has no electrographic correlate. The latter is not witnessed on video or EEG but may be concerning for sodium-channel blockade effect (although slow-sodium channel inhibition is very unlikely to do this); this was converted back to another SVG2a inhibitor without incident and without re-occurrence of the eye movements. Reasonable to continue on this seizure \"prophylaxis\" per NSGY.    Impression:   #Action Tremor  #Undifferentiated Eye Movements, Resolved    Recommendations:  - discontinue EEG.  - continue Briviact 50mg BID with plan to wean by neuro-oncology as an outpatient.  - follow with neuro-oncology and oncologic " neurosurgery.      Thank you for this consult. Neurology will sign off. Please do not hesitate to reach out to our team by page or secure chat with any questions you may have. Patient was seen, examined, and discussed with the attending neurologist Dr. Iftikhar Lerma, who agrees w/ the assessment and plan.    Kwan Alvares MD  Neurology Resident PGY-4   [facility] Inpatient Neurology Team  General Neurology Pager [phone]       [1] atorvastatin, 40 mg, oral, Daily  brivaracetam, 50 mg, oral, q12h LA NENA  cloNIDine, 0.1 mg, oral, Daily  famotidine, 10 mg, oral, Daily  lidocaine, 5 mL, infiltration, Once  lidocaine, 1 patch, transdermal, Daily  polyethylene glycol, 17 g, oral, Daily  [Held by provider] QUEtiapine, 25 mg, oral, Nightly  sennosides-docusate sodium, 1 tablet, oral, Nightly  vancomycin, 750 mg, intravenous, q24h  vitamin B complex-vitamin C-folic acid, 1 capsule, oral, Daily  [2] heparin, 0-4,000 Units/hr, Last Rate: 800 Units/hr (05/03/25 0209)  [3] PRN medications: acetaminophen, alteplase, bisacodyl, [Held by provider] cyclobenzaprine, dextrose, dextrose, glucagon, glucagon, heparin, hydrOXYzine HCL, ondansetron **OR** ondansetron, oxygen, vancomycin

## 2025-05-03 NOTE — SIGNIFICANT EVENT
Patient's telemetry batteries changed.  Telemetry monitor turned back on and functioning appropriately.    Patient's telemetry strip completed w/ oncoming/off-going RN.   Strip reflected in the flowsheets and hard copy placed in patient's chart.     Taya Bell RN

## 2025-05-04 VITALS
HEIGHT: 67 IN | SYSTOLIC BLOOD PRESSURE: 126 MMHG | HEART RATE: 110 BPM | RESPIRATION RATE: 18 BRPM | DIASTOLIC BLOOD PRESSURE: 82 MMHG | WEIGHT: 147.71 LBS | OXYGEN SATURATION: 98 % | TEMPERATURE: 98.8 F | BODY MASS INDEX: 23.18 KG/M2

## 2025-05-04 LAB
ALBUMIN SERPL BCP-MCNC: 3 G/DL (ref 3.4–5)
ANION GAP SERPL CALC-SCNC: 12 MMOL/L (ref 10–20)
BASOPHILS # BLD MANUAL: 0.03 X10*3/UL (ref 0–0.1)
BASOPHILS NFR BLD MANUAL: 1.8 %
BUN SERPL-MCNC: 21 MG/DL (ref 6–23)
CALCIUM SERPL-MCNC: 8.3 MG/DL (ref 8.6–10.6)
CHLORIDE SERPL-SCNC: 100 MMOL/L (ref 98–107)
CO2 SERPL-SCNC: 28 MMOL/L (ref 21–32)
CREAT SERPL-MCNC: 1.68 MG/DL (ref 0.5–1.3)
EGFRCR SERPLBLD CKD-EPI 2021: 44 ML/MIN/1.73M*2
EOSINOPHIL # BLD MANUAL: 0.12 X10*3/UL (ref 0–0.7)
EOSINOPHIL NFR BLD MANUAL: 6.1 %
ERYTHROCYTE [DISTWIDTH] IN BLOOD BY AUTOMATED COUNT: 16.6 % (ref 11.5–14.5)
GLUCOSE BLD MANUAL STRIP-MCNC: 105 MG/DL (ref 74–99)
GLUCOSE BLD MANUAL STRIP-MCNC: 105 MG/DL (ref 74–99)
GLUCOSE SERPL-MCNC: 106 MG/DL (ref 74–99)
HCT VFR BLD AUTO: 24.3 % (ref 41–52)
HGB BLD-MCNC: 7.9 G/DL (ref 13.5–17.5)
IMM GRANULOCYTES # BLD AUTO: 0.02 X10*3/UL (ref 0–0.7)
IMM GRANULOCYTES NFR BLD AUTO: 1.1 % (ref 0–0.9)
LYMPHOCYTES # BLD MANUAL: 0.28 X10*3/UL (ref 1.2–4.8)
LYMPHOCYTES NFR BLD MANUAL: 14.9 %
MAGNESIUM SERPL-MCNC: 1.88 MG/DL (ref 1.6–2.4)
MCH RBC QN AUTO: 31.7 PG (ref 26–34)
MCHC RBC AUTO-ENTMCNC: 32.5 G/DL (ref 32–36)
MCV RBC AUTO: 98 FL (ref 80–100)
MONOCYTES # BLD MANUAL: 0.23 X10*3/UL (ref 0.1–1)
MONOCYTES NFR BLD MANUAL: 12.3 %
NEUTS SEG # BLD MANUAL: 1.23 X10*3/UL (ref 1.2–7)
NEUTS SEG NFR BLD MANUAL: 64.9 %
NRBC BLD-RTO: 0 /100 WBCS (ref 0–0)
PHOSPHATE SERPL-MCNC: 3.5 MG/DL (ref 2.5–4.9)
PLATELET # BLD AUTO: 69 X10*3/UL (ref 150–450)
POTASSIUM SERPL-SCNC: 3.6 MMOL/L (ref 3.5–5.3)
RBC # BLD AUTO: 2.49 X10*6/UL (ref 4.5–5.9)
RBC MORPH BLD: ABNORMAL
SODIUM SERPL-SCNC: 136 MMOL/L (ref 136–145)
TOTAL CELLS COUNTED BLD: 114
UFH PPP CHRO-ACNC: 0.3 IU/ML (ref ?–1.1)
UFH PPP CHRO-ACNC: 0.4 IU/ML (ref ?–1.1)
VANCOMYCIN SERPL-MCNC: 16 UG/ML (ref 5–20)
WBC # BLD AUTO: 1.9 X10*3/UL (ref 4.4–11.3)

## 2025-05-04 PROCEDURE — 80202 ASSAY OF VANCOMYCIN: CPT | Performed by: STUDENT IN AN ORGANIZED HEALTH CARE EDUCATION/TRAINING PROGRAM

## 2025-05-04 PROCEDURE — 2500000001 HC RX 250 WO HCPCS SELF ADMINISTERED DRUGS (ALT 637 FOR MEDICARE OP)

## 2025-05-04 PROCEDURE — 1200000003 HC ONCOLOGY  ROOM WITH TELEMETRY DAILY

## 2025-05-04 PROCEDURE — 83735 ASSAY OF MAGNESIUM: CPT

## 2025-05-04 PROCEDURE — 99233 SBSQ HOSP IP/OBS HIGH 50: CPT

## 2025-05-04 PROCEDURE — 99232 SBSQ HOSP IP/OBS MODERATE 35: CPT | Performed by: INTERNAL MEDICINE

## 2025-05-04 PROCEDURE — 82947 ASSAY GLUCOSE BLOOD QUANT: CPT

## 2025-05-04 PROCEDURE — 85007 BL SMEAR W/DIFF WBC COUNT: CPT

## 2025-05-04 PROCEDURE — 2500000004 HC RX 250 GENERAL PHARMACY W/ HCPCS (ALT 636 FOR OP/ED): Mod: JZ

## 2025-05-04 PROCEDURE — 85027 COMPLETE CBC AUTOMATED: CPT

## 2025-05-04 PROCEDURE — 85520 HEPARIN ASSAY: CPT

## 2025-05-04 PROCEDURE — 2500000004 HC RX 250 GENERAL PHARMACY W/ HCPCS (ALT 636 FOR OP/ED)

## 2025-05-04 PROCEDURE — 84100 ASSAY OF PHOSPHORUS: CPT

## 2025-05-04 RX ORDER — MAGNESIUM SULFATE HEPTAHYDRATE 40 MG/ML
2 INJECTION, SOLUTION INTRAVENOUS ONCE
Status: COMPLETED | OUTPATIENT
Start: 2025-05-04 | End: 2025-05-04

## 2025-05-04 RX ORDER — VANCOMYCIN HYDROCHLORIDE 1 G/200ML
1000 INJECTION, SOLUTION INTRAVENOUS EVERY 24 HOURS
Status: DISCONTINUED | OUTPATIENT
Start: 2025-05-04 | End: 2025-05-04 | Stop reason: DRUGHIGH

## 2025-05-04 RX ADMIN — FAMOTIDINE 10 MG: 20 TABLET, FILM COATED ORAL at 08:40

## 2025-05-04 RX ADMIN — BRIVARACETAM 50 MG: 50 TABLET, FILM COATED ORAL at 21:15

## 2025-05-04 RX ADMIN — HEPARIN SODIUM 900 UNITS/HR: 10000 INJECTION, SOLUTION INTRAVENOUS at 06:14

## 2025-05-04 RX ADMIN — CLONIDINE HYDROCHLORIDE 0.1 MG: 0.1 TABLET ORAL at 08:40

## 2025-05-04 RX ADMIN — MAGNESIUM SULFATE HEPTAHYDRATE 2 G: 40 INJECTION, SOLUTION INTRAVENOUS at 11:14

## 2025-05-04 RX ADMIN — BRIVARACETAM 50 MG: 50 TABLET, FILM COATED ORAL at 08:40

## 2025-05-04 RX ADMIN — ATORVASTATIN CALCIUM 40 MG: 40 TABLET, FILM COATED ORAL at 21:15

## 2025-05-04 RX ADMIN — ASCORBIC ACID, THIAMINE MONONITRATE,RIBOFLAVIN, NIACINAMIDE, PYRIDOXINE HYDROCHLORIDE, FOLIC ACID, CYANOCOBALAMIN, BIOTIN, CALCIUM PANTOTHENATE, 1 CAPSULE: 100; 1.5; 1.7; 20; 10; 1; 6000; 150000; 5 CAPSULE, LIQUID FILLED ORAL at 08:40

## 2025-05-04 RX ADMIN — SODIUM CHLORIDE 1000 MG: 9 INJECTION, SOLUTION INTRAVENOUS at 21:15

## 2025-05-04 RX ADMIN — APIXABAN 5 MG: 5 TABLET, FILM COATED ORAL at 21:15

## 2025-05-04 RX ADMIN — SENNOSIDES AND DOCUSATE SODIUM 1 TABLET: 50; 8.6 TABLET ORAL at 21:15

## 2025-05-04 ASSESSMENT — COGNITIVE AND FUNCTIONAL STATUS - GENERAL
MOVING FROM LYING ON BACK TO SITTING ON SIDE OF FLAT BED WITH BEDRAILS: A LITTLE
EATING MEALS: A LOT
DRESSING REGULAR UPPER BODY CLOTHING: A LITTLE
MOBILITY SCORE: 14
MOVING TO AND FROM BED TO CHAIR: A LOT
STANDING UP FROM CHAIR USING ARMS: A LOT
DRESSING REGULAR LOWER BODY CLOTHING: A LOT
PERSONAL GROOMING: TOTAL
WALKING IN HOSPITAL ROOM: A LOT
MOVING FROM LYING ON BACK TO SITTING ON SIDE OF FLAT BED WITH BEDRAILS: A LITTLE
PERSONAL GROOMING: TOTAL
HELP NEEDED FOR BATHING: A LOT
TURNING FROM BACK TO SIDE WHILE IN FLAT BAD: A LITTLE
STANDING UP FROM CHAIR USING ARMS: A LOT
MOBILITY SCORE: 12
CLIMB 3 TO 5 STEPS WITH RAILING: TOTAL
TOILETING: TOTAL
MOVING TO AND FROM BED TO CHAIR: A LOT
WALKING IN HOSPITAL ROOM: TOTAL
DRESSING REGULAR LOWER BODY CLOTHING: A LOT
DAILY ACTIVITIY SCORE: 11
EATING MEALS: A LOT
CLIMB 3 TO 5 STEPS WITH RAILING: A LOT
DRESSING REGULAR UPPER BODY CLOTHING: A LITTLE
HELP NEEDED FOR BATHING: A LOT
DAILY ACTIVITIY SCORE: 11
TURNING FROM BACK TO SIDE WHILE IN FLAT BAD: A LITTLE
TOILETING: TOTAL

## 2025-05-04 ASSESSMENT — PAIN - FUNCTIONAL ASSESSMENT
PAIN_FUNCTIONAL_ASSESSMENT: 0-10
PAIN_FUNCTIONAL_ASSESSMENT: 0-10

## 2025-05-04 ASSESSMENT — PAIN SCALES - GENERAL
PAINLEVEL_OUTOF10: 0 - NO PAIN
PAINLEVEL_OUTOF10: 0 - NO PAIN

## 2025-05-04 NOTE — CARE PLAN
Problem: Fall/Injury  Goal: Be free from injury by end of the shift  Outcome: Progressing  Goal: Verbalize understanding of personal risk factors for fall in the hospital  Outcome: Progressing  Goal: Verbalize understanding of risk factor reduction measures to prevent injury from fall in the home  Outcome: Progressing  Goal: Use assistive devices by end of the shift  Outcome: Progressing  Goal: Pace activities to prevent fatigue by end of the shift  Outcome: Progressing     Problem: Skin  Goal: Decreased wound size/increased tissue granulation at next dressing change  Outcome: Progressing  Flowsheets (Taken 5/1/2025 2029 by Yolanda Snider RN)  Decreased wound size/increased tissue granulation at next dressing change:   Protective dressings over bony prominences   Promote sleep for wound healing  Goal: Participates in plan/prevention/treatment measures  Outcome: Progressing  Flowsheets (Taken 5/1/2025 2029 by Yolanda Snider RN)  Participates in plan/prevention/treatment measures:   Elevate heels   Increase activity/out of bed for meals  Goal: Prevent/manage excess moisture  Outcome: Progressing  Flowsheets (Taken 4/27/2025 1212 by Yolanda Edward RN)  Prevent/manage excess moisture:   Cleanse incontinence/protect with barrier cream   Follow provider orders for dressing changes   Moisturize dry skin  Goal: Promote/optimize nutrition  Outcome: Progressing  Flowsheets (Taken 4/27/2025 1212 by Yolanda Edward RN)  Promote/optimize nutrition:   Assist with feeding   Consume > 50% meals/supplements   Monitor/record intake including meals  Goal: Promote skin healing  Outcome: Progressing  Flowsheets (Taken 4/27/2025 1212 by Yolanda Edward RN)  Promote skin healing:   Turn/reposition every 2 hours/use positioning/transfer devices   Protective dressings over bony prominences     Problem: Pain - Adult  Goal: Verbalizes/displays adequate comfort level or baseline comfort level  Outcome: Progressing     Problem: Safety -  Adult  Goal: Free from fall injury  Outcome: Progressing     Problem: Discharge Planning  Goal: Discharge to home or other facility with appropriate resources  Outcome: Progressing     Problem: Chronic Conditions and Co-morbidities  Goal: Patient's chronic conditions and co-morbidity symptoms are monitored and maintained or improved  Outcome: Progressing   The patient's goals for the shift include free from fall    The clinical goals for the shift include Pt will remain HDS and VSS while remaining free from injury on 5/3 until 1900    Patient has remained stable and has received his medication. His heparin has remained running and has been changed to 9000 units. He has remained semi confused but pleasant. His EEG was stopped today.

## 2025-05-04 NOTE — PROGRESS NOTES
"Subjective Data  Enmanuel Ahumada is a/an 69 y.o. male with GBM admitted a month ago with cranial wound infection/dehiscence. Vascular medicine is following for for left distal femoral and calf deep vein thrombosis. He has an IVC filter that was placed 4/11/2025. He is currently on unfractionated heparin but planning to transition to apixaban.    He denies bleeding. He was up for about two hours today so feeling sleepy now.    Current Medications[1]      Physical Examination  Blood pressure 105/72, pulse 99, temperature 37.1 °C (98.8 °F), temperature source Temporal, resp. rate 16, height 1.702 m (5' 7\"), weight 67 kg (147 lb 11.3 oz), SpO2 95%.  No distress  No JVD or carotid bruits  Lungs clear bilaterally  Heart regular and without murmurs  Abdomen soft and non-tender  No leg swelling  Pulses intact        Pertinent Labs  Component      Latest Ref Rng 5/4/2025   WBC      4.4 - 11.3 x10*3/uL 1.9 (L)    nRBC      0.0 - 0.0 /100 WBCs 0.0    RBC      4.50 - 5.90 x10*6/uL 2.49 (L)    HEMOGLOBIN      13.5 - 17.5 g/dL 7.9 (L)    HEMATOCRIT      41.0 - 52.0 % 24.3 (L)    MCV      80 - 100 fL 98    MCH      26.0 - 34.0 pg 31.7    MCHC      32.0 - 36.0 g/dL 32.5    RED CELL DISTRIBUTION WIDTH      11.5 - 14.5 % 16.6 (H)    Platelets      150 - 450 x10*3/uL 69 (L)    Immature Granulocytes %, Automated      0.0 - 0.9 % 1.1 (H)    Immature Granulocytes Absolute, Automated      0.00 - 0.70 x10*3/uL 0.02    GLUCOSE      74 - 99 mg/dL 106 (H)    SODIUM      136 - 145 mmol/L 136    POTASSIUM      3.5 - 5.3 mmol/L 3.6    CHLORIDE      98 - 107 mmol/L 100    Bicarbonate      21 - 32 mmol/L 28    Anion Gap      10 - 20 mmol/L 12    Blood Urea Nitrogen      6 - 23 mg/dL 21    Creatinine      0.50 - 1.30 mg/dL 1.68 (H)    EGFR      >60 mL/min/1.73m*2 44 (L)    Calcium      8.6 - 10.6 mg/dL 8.3 (L)    PHOSPHORUS      2.5 - 4.9 mg/dL 3.5    Albumin      3.4 - 5.0 g/dL 3.0 (L)    Neutrophils %, Manual      40.0 - 80.0 % 64.9  "   Lymphocytes %, Manual      13.0 - 44.0 % 14.9    Monocytes %, Manual      2.0 - 10.0 % 12.3    Eosinophils %, Manual      0.0 - 6.0 % 6.1    Basophils %, Manual      0.0 - 2.0 % 1.8    Seg Neutrophils Absolute, Manual      1.20 - 7.00 x10*3/uL 1.23    Lymphocytes Absolute, Manual      1.20 - 4.80 x10*3/uL 0.28 (L)    Monocytes Absolute, Manual      0.10 - 1.00 x10*3/uL 0.23    Eosinophils Absolute, Manual      0.00 - 0.70 x10*3/uL 0.12    Basophils Absolute, Manual      0.00 - 0.10 x10*3/uL 0.03    Total Cells Counted 114    RBC Morphology No significant RBC morphology present    MAGNESIUM      1.60 - 2.40 mg/dL 1.88    Vancomycin      5.0 - 20.0 ug/mL 16.0    Heparin Unfractionated      See Comment Below for Therapeutic Ranges IU/mL 0.3    Heparin Unfractionated       0.4    POCT Glucose      74 - 99 mg/dL 105 (H)       Legend:  (L) Low  (H) High    Estimated Creatinine Clearance: 39.9 mL/min (A) (by C-G formula based on SCr of 1.68 mg/dL (H)).      Pertinent Imaging  CT head 5/2/2025  IMPRESSION:  1. No evidence of new intracranial hemorrhage, CT apparent acute  large territorial ischemic infarct, or new hyperdense extra-axial  collection.  2. Decreased conspicuity of blood products within the left temporal  parenchymal hematoma and scattered subarachnoid hemorrhage.  3. Postsurgical changes with overall similar appearing surgical  resection cavity as detailed above.    Assessment/Plan  70 y/o man with cancer-associated venous thromboembolism. He is here with prolonged hospitalization for wound infection. He has been on unfractionated heparin. Planning transition back to apixaban. Platelets stably low. Since deep vein thrombosis is a month old, OK to transition to apixaban 5 mg bid (as opposed to 10 mg bid loading dose). Duration of anticoagulation is indefinite.    He is on my schedule for follow up on this Tuesday (previously scheduled) but we will postpone that follow up. My office will arrange.    Zee BELL  MD Antony, MS               [1]   Current Facility-Administered Medications   Medication Dose Route Frequency Provider Last Rate Last Admin    acetaminophen (Tylenol) tablet 975 mg  975 mg oral TID PRN Ellyn Bradford MD        alteplase (Cathflo Activase) injection 1 mg  1 mg intra-catheter PRN Ellyn Bradford MD   1 mg at 05/02/25 1618    apixaban (Eliquis) tablet 5 mg  5 mg oral BID Ellyn Bradford MD        atorvastatin (Lipitor) tablet 40 mg  40 mg oral Daily Ellyn Bradford MD   40 mg at 05/03/25 2037    bisacodyl (Dulcolax) suppository 10 mg  10 mg rectal Daily PRN Ellyn Bradford MD        brivaracetam (Briviact) tablet 50 mg  50 mg oral q12h LA NENA Ellyn Bradford MD   50 mg at 05/04/25 0840    cloNIDine (Catapres) tablet 0.1 mg  0.1 mg oral Daily Ellyn Bradford MD   0.1 mg at 05/04/25 0840    dextrose 50 % injection 12.5 g  12.5 g intravenous q15 min PRN Ellyn Bradford MD        dextrose 50 % injection 25 g  25 g intravenous q15 min PRN Ellyn Bradford MD        famotidine (Pepcid) tablet 10 mg  10 mg oral Daily Gage Oneil MD   10 mg at 05/04/25 0840    glucagon (Glucagen) injection 1 mg  1 mg intramuscular q15 min PRN Ellyn Bradford MD        glucagon (Glucagen) injection 1 mg  1 mg intramuscular q15 min PRN Ellyn Bradford MD        heparin 25,000 Units in dextrose 5% 250 mL (100 Units/mL) infusion (premix)  0-4,000 Units/hr intravenous Continuous Ellyn Bradford MD 9 mL/hr at 05/04/25 0614 900 Units/hr at 05/04/25 0614    heparin bolus from bag 1,500-3,000 Units  1,500-3,000 Units intravenous q4h PRN Ellyn Bradford MD   1,500 Units at 05/03/25 2016    hydrOXYzine HCL (Atarax) tablet 10 mg  10 mg oral q6h PRN Ellyn Bradford MD   10 mg at 04/29/25 0538    lidocaine (Xylocaine) 10 mg/mL (1 %) injection 5 mL  5 mL infiltration Once Ellyn Brdaford MD        lidocaine 4 % patch 1 patch  1 patch transdermal Daily Ellyn Bradford MD   1 patch at 04/24/25  1013    ondansetron (Zofran) tablet 4 mg  4 mg nasogastric tube q8h PRN Ellyn Bradford MD        Or    ondansetron (Zofran) injection 4 mg  4 mg intravenous q8h PRN Ellyn Bradford MD        oxygen (O2) therapy   inhalation Continuous PRN - O2/gases Ellyn Bradford MD        polyethylene glycol (Glycolax, Miralax) packet 17 g  17 g oral Daily Ellyn Bradford MD   17 g at 04/28/25 0841    [Held by provider] QUEtiapine (SEROquel) tablet 25 mg  25 mg oral Nightly Ellyn Bradford MD   25 mg at 04/25/25 2018    sennosides-docusate sodium (Carissa-Colace) 8.6-50 mg per tablet 1 tablet  1 tablet oral Nightly Ellyn Bradford MD   1 tablet at 05/02/25 2103    vancomycin (Vancocin) 1,000 mg in sodium chloride 0.9% 250 mL IV  1,000 mg intravenous q24h Arnoldo Acuna MD        vancomycin (Vancocin) pharmacy to dose - pharmacy monitoring   miscellaneous Daily PRN Ellyn Bradford MD        vitamin B complex-vitamin C-folic acid (Nephrocaps) capsule 1 capsule  1 capsule oral Daily Ellyn Bradford MD   1 capsule at 05/04/25 0840

## 2025-05-04 NOTE — PROGRESS NOTES
"Subjective     Enmanuel Ahumada \"Dash\" is a 69 y.o. male on day 29 of admission presenting with Wound dehiscence.    Overnight: No acute events overnight.     This morning: Patient is resting in bed comfortably. He woke up upon me entering the room and said \"good morning\". He was able to state his full name, , that we are in a hospital in the [facility]. When asked the year, he took a long pause and said \"what was the question again?\", whereas he usually will just forgot there was a question being asked and require re-prompting. He did eventually say \"\" but he thought that we might be in the fall season (not spring). Overall a much better he seems much more alert this morning, able to have a full length conversation with me without falling back asleep or getting side tracked. He has no complaints this morning.        Objective   Current Vitals  /71 (BP Location: Right arm, Patient Position: Lying)   Pulse 108   Temp 36.9 °C (98.4 °F) (Temporal)   Resp 18   Ht 1.702 m (5' 7\")   Wt 67 kg (147 lb 11.3 oz)   SpO2 96%   BMI 23.13 kg/m²      I/O last 3 completed shifts:  In: 200 (3 mL/kg) [P.O.:200]  Out: 2175 (32.5 mL/kg) [Urine:2175 (0.9 mL/kg/hr)]  Weight: 67 kg       Physical Exam  Vitals reviewed.   Constitutional:       General: He is not in acute distress.     Appearance: He is ill-appearing. He is not toxic-appearing.      Comments: Intermittent alertness/orientation throughout the day, this morning orientated x3 (self, , [facility], )   HENT:      Head:      Comments: Right-crani site appears to be healing well, dry, no erythema. Drain sites (2) well healing.      Mouth/Throat:      Mouth: Mucous membranes are moist.      Pharynx: Oropharynx is clear.   Eyes:      Extraocular Movements: Extraocular movements intact.   Cardiovascular:      Rate and Rhythm: Normal rate and regular rhythm.      Heart sounds: Normal heart sounds.   Pulmonary:      Effort: Pulmonary " effort is normal. No respiratory distress.   Abdominal:      General: There is no distension.      Palpations: Abdomen is soft.      Tenderness: There is no abdominal tenderness. There is no guarding.   Musculoskeletal:      Cervical back: Neck supple.      Right lower leg: No edema.      Left lower leg: No edema.   Skin:     General: Skin is warm and dry.      Coloration: Skin is pale.      Findings: No rash.   Neurological:      General: No focal deficit present.      Mental Status: He is alert and oriented to person, place, and time.      Motor: Weakness (4/5 LUE and LLE strength with 5/5 RUE and RLE) present.      Comments: No tremors or spasms noted on exam this morning.    Psychiatric:         Behavior: Behavior normal.     Relevant Results  Labs:  CBC: WBC 2.0 , HGB 7.9, PLT 66  BMP: , K 3.5, Cl 101, HCO3 27, BUN 22, CR 1.97, Glu 105  LFTS: AST 17 , ALT 38, ALKPHOS 71 , TBILI 1.2 , DBILI 0.3  TROP: 3  BNP: No results found for requested labs within last 365 days.  COAGS: PT 14.1 , PTT 33  , INR 1.3  UA:   Results from last 7 days   Lab Units 04/28/25  0702   COLOR U  Light-Yellow   PH U  6.0   SPEC GRAV UR  1.010   PROTEIN U mg/dL NEGATIVE   BLOOD UR mg/dL 0.03 (TRACE)*   NITRITE U  NEGATIVE   WBC UR /HPF NONE     ABG:    CALCIUM 8.2 MAG No results found for requested labs within last 365 days. ALB 3.0 LACTATE 1.3 PHOS No results found for requested labs within last 365 days. COVIDNo results found for requested labs within last 365 days.    Micro/culture data:  Susceptibility data from last 120 days.  Collected Organism Clindamycin Erythromycin Oxacillin Tetracycline Trimethoprim/Sulfamethoxazole Vancomycin   04/06/25 1007 Staphylococcus epidermidis  S  S  R  S  R  S   04/06/25 1006 Staphylococcus epidermidis         04/06/25 1005 Staphylococcus epidermidis             Imaging:  EEG  IMPRESSION    Impression    This vEEG is indicative of a moderate diffuse encephalopathy and a right hemispheric  "structural lesion with an overlying skull defect. One non-epileptic left hand tremor event is seen on 5/2/25 @ 16:33 with no EEG correlate.    A full report will be scanned into the patient's chart at a later time.    This report has been interpreted and electronically signed by           MEDS:  Scheduled medications  Scheduled Medications[1]  Continuous medications  Continuous Medications[2]  PRN medications  PRN Medications[3]       Assessment/Plan   Assessment/Plan:   Mr. Singer \"Dash\" Zita is a 69 male with a PMHx of HTN, HLD, smalls's esophagus, RO GBM s/p resection ([facility], 12/2023) with resultant L HH s/p chemo/RT who represented for tumor progression in February. He underwent redo craniotomy for tumor resection 02/20 who had repeat MRI in March showing postop changes with fluid collection at dura and craniotomy site, worsening infiltrative disease, enhancement along posteromedial aspect of resection site. He represented on 04/05/25 with concerns for increased wound drainage and was admitted under the care of Neurosurgery. MRI brain onf 04/05 showed increased resection cavity enhancement and diffusion restriction concerning fo superimposed infection/abscess. He therefore underwent cranial wound washout on 04/06 with Neurosurgery. Subsequent cultures from OR revealed methicillin-resistant staph epidermidis, currently on vancomycin IV therapy until 06/15/25 per ID. His subsequent hospital course have been complicated by acute DVTs in the LLE and RUE s/p IVC filter placement due to pancytopenia (namely worsening thrombocytopenia) limiting anticoagulation, agitation/confusion, and new JAMAR 2/2 vancomycin.     Currently working towards discharging to acute rehab pending - JAMAR continue to improve with improved urine output. Neutropenia 2/2 anticipated alisson from recent Lomustine (04/04) now improved, remains afebrile. Transitioning to full AC now that plts have stabilized, with improved JAMAR he is now eligible for " "Apixaban therefore restarted 05/04.     Updates 05/04/25:  - Cr continues to improve today, 1.68  - Vascular medicine:   - OK to started apixaban 5 mg, first dose 05/04 PM   - Will follow-up with Dr. Hardy in clinic outpatient in the near future  - Neurology signed off   - continue Briviact 50mg BID with plan to wean by neuro-oncology as an outpatient   - follow with neuro-oncology (Dr. Rolon) and oncologic neurosurgery (Dr. Cortez)  - CBC with stable plts and ANC today  - Dispo planning:  - Now that JAMAR is improved & Apixaban started - pt is ready to be transitioned to acute inpt rehab in the coming days  - Will ask TCC/SW team to re-start pre-cert on Monday 05/05, as all consultant teams have signed off, JAMAR improving, plts are stable, ANC is stable, follow-up with vascular surgery, neurosurgery, vascular medicine, and infectious disease is established     # Pancytopenia, stable  # Thrombocytopenia, improving  # Neutropenia, resolved  - Hematology consulted 04/21 felt thrombocytopenia to be multifactorial due to recent lomustine initiation (4/4), introduction of VPA (4/8), RUIZ intracranial wound infection treated with vanc and cefepime, and possible consumption in setting of DVTs  - Had received multiple platelet transfusions (x8, most recent 04/26) and RBC transfusions (x1 04/25) without overt s or s of bleeding  - Hematology re-enaged 04/26 prior to transfer  - Note states platelet count marcela to 82 following 2 units of platelet transfusion and has since downtrended to 44 over 3 day period. Also notable is worsening anemia throughout hospital stay   - Reached out to Heme in regard to c/f ANC of 0.87 05/01  - Recent Lomustine (4/4): Alkylating agent with delayed alisson (3-6 weeks), can cause prolonged cytopenias\".  - Leukocyte alisson is typically 28-35 days post treatment   - ANC 1.0 - > 0.87 04/28 (day 24) through 05/01 (day 27)  PLAN:  - Neutropenia rebounded -> seg manual ANC today 1.23   - remains afebrile - " CTM  - Plts remain stable, improving today  - CTM CBC w/ diff daily  - Will transfuse prn per Hematology recs  - Active T&S    # JAMAR, improving  - Upon transfer to our service, pt noted to have Cr trend of 0.66 -> 3.71 over 04/24 -> 04/26  - Previously normal kidney function without history of kidney disease or even previous AKIs  - Likely Cause is Vancomycin, JAMAR coincides with Peak Vanc trough of 38.    - Renal U/S: Mild left-sided hydronephrosis with few intrarenal calculi    - UA 04/27 negative, UA 04/28 pending   - Urine lytes 04/28: 3.5%, intrinsic. Serum sodium 139, serum cr 4.04, urine sodium 108, urine cr 90.7  - Nephrology signed off 04/30  PLAN:  - Cr continues to improve today   - Urine output remains encouraging as well  - Changed PPI to famotidine  - Daily RFP  - Strict Is/Os  - Avoid nephrotoxic agents and sharp fluctuations in blood pressure    # LLE DVT  # RUE DVT  # IVC filter in place  - UE DVT US 4/11/25 revealed: RUE, acute non-occlusive deep vein thrombosis visualized in the brachial vein.  - LE DVT US 4/11/25 revealed: LLE, acute occlusive deep vein thrombosis visualized in the popliteal vein, age indeterminate deep vein thrombosis visualized in the distal femoral, gastrocnemius veins in the calf, posterior tibial and peroneal veins. The remainder of the left leg is negative for deep vein thrombosis.  - Due to thrombocytopenia underwent IVC filter placement 04/11/25  - Initially on heparin gtt, was started on Lovenox but switched to Eliquis after adjusting anti-seizures meds, however Eliquis now Dc'd due to pancytopenia  - Was on Lovenox ppx dose since due to thrombocytopenia   PLAN:  - Lovenox changed to heparin ppx 04/26 due to acute worsening of kidney function  - Vascular medicine:   - OK to started apixaban 5 mg, first dose 05/04 PM   - Will follow-up with Dr. Hardy in clinic outpatient in the near future  - SCDs, ordered    # GBM s/p resection x2  # MRSE of crani site s/p washout  -  Initially dx with GBM in 2023 s/p resection & chemo/RT    - Follows with Dr. Rolon  - Tumor progression with redo craniotomy 02/20/25 with NSGY  - Represented 04/05 for increased drainage from wound  - 04/06 s/p R cranial wound washout and mesh cranioplasty (kaleb purulence )  - Cultures from the subgaleal, epidural, and subdural spaces grew methicillin resistant Staph. Epi  - ID was previously consulted, recommending:    - Continue Vancomycin with goal -600    - antibiotic course tentative stop date will be 6/15/25   - PICC line placed 04/08 and replaced 04/15  PLAN:  - Continue vancomycin, dosed per pharmacy  - CTM daily levels  - ID follow up with Dr. Amy York   [  ] after discharge weekly CBC with diff, Chem 7, quantitative CRP, and Vancomycin trough   - Outpatient follow-up with Dr. Rolon, neuro-onc  - Outpatient follow-up with Dr. Cortez, NSGY    # C/f hypoactive delirium   # C/f encephalopathy   # C/f dysphagia  # Intermittent tremors of BL upper and lower extremities   - Neurology was following for concerning changes in mental status  - EEG 04/21-04/23 showing moderate diffuse encephalopathy and a right hemispheric structural lesion with an overlying skull defect. No epileptiform discharges are recorded. Overall, the degree of encephalopathy remains same compared to prior recording  - Neurology re-engaged - now signed off  - per Neuro, tremors and eye mvmt could be 2/2 from small basal ganglia issues, but nothing to treat. Also could be related to his muscle spasms    - vEEG- moderate diffuse encephalopathy, no epileptiform activity   - vEEG is indicative of a moderate diffuse encephalopathy and a right hemispheric structural lesion with an overlying skull defect. One non-epileptic left hand tremor event is seen on 5/2/25 @ 16:33 with no EEG correlate  PLAN:  - Continue to Briviact 50 mg Q12H   - SLP eval 04/26 c/f aspiration but pt had previously had safe swallowing   - recommended PO  intake only when awake and alert w/ upright positioning  - On regular diet  - held nightly Seroquel upon transfer due to increased somnolence during day time  - held prn flexeril (hasn't used) upon transfer due to increased somnolence during day time    # Impaired mobility  - PT/OT - recommending high intensity   - PM&R last eval on 04/25 stating excellent candidate for acute rehabilitation once medically stable and is able and motivated to participate in 3 hours/day of therapy.      Fluids: Replete PRN  Electrolytes: Keep mg >2, phos >3  and K >4  Nutrition:  Adult diet Regular; 1:1 Feeding   Antimicrobials: vanco  DVT PPX:DVT: Apixaban  GI ppx: PPI  Bowel care:Miralax and Senna  Catheter:None  Lines:PIV and PICC   Oxygen:Room Air    Code Status: Full Code (confirmed on admission)   NOK:  Primary Emergency Contact: CHRISTINE RIOS, Home Phone: [phone]     Ellyn Bradford MD   Internal Medicine, PGY-1          [1] atorvastatin, 40 mg, oral, Daily  brivaracetam, 50 mg, oral, q12h LA NENA  cloNIDine, 0.1 mg, oral, Daily  famotidine, 10 mg, oral, Daily  lidocaine, 5 mL, infiltration, Once  lidocaine, 1 patch, transdermal, Daily  polyethylene glycol, 17 g, oral, Daily  [Held by provider] QUEtiapine, 25 mg, oral, Nightly  sennosides-docusate sodium, 1 tablet, oral, Nightly  vancomycin, 750 mg, intravenous, q24h  vitamin B complex-vitamin C-folic acid, 1 capsule, oral, Daily     [2] heparin, 0-4,000 Units/hr, Last Rate: 900 Units/hr (05/04/25 0614)     [3] PRN medications: acetaminophen, alteplase, bisacodyl, dextrose, dextrose, glucagon, glucagon, heparin, hydrOXYzine HCL, ondansetron **OR** ondansetron, oxygen, vancomycin

## 2025-05-05 ENCOUNTER — PHARMACY VISIT (OUTPATIENT)
Dept: PHARMACY | Facility: CLINIC | Age: 70
End: 2025-05-05
Payer: COMMERCIAL

## 2025-05-05 VITALS
BODY MASS INDEX: 23.18 KG/M2 | DIASTOLIC BLOOD PRESSURE: 90 MMHG | OXYGEN SATURATION: 97 % | RESPIRATION RATE: 18 BRPM | TEMPERATURE: 98.1 F | WEIGHT: 147.71 LBS | SYSTOLIC BLOOD PRESSURE: 127 MMHG | HEIGHT: 67 IN | HEART RATE: 102 BPM

## 2025-05-05 LAB
ABO GROUP (TYPE) IN BLOOD: NORMAL
ALBUMIN SERPL BCP-MCNC: 3.1 G/DL (ref 3.4–5)
ANION GAP SERPL CALC-SCNC: 11 MMOL/L (ref 10–20)
ANTIBODY SCREEN: NORMAL
BASOPHILS # BLD MANUAL: 0.04 X10*3/UL (ref 0–0.1)
BASOPHILS NFR BLD MANUAL: 1.7 %
BLASTS # BLD MANUAL: 0 X10*3/UL
BLASTS NFR BLD MANUAL: 0 %
BUN SERPL-MCNC: 19 MG/DL (ref 6–23)
CALCIUM SERPL-MCNC: 8.2 MG/DL (ref 8.6–10.6)
CHLORIDE SERPL-SCNC: 99 MMOL/L (ref 98–107)
CO2 SERPL-SCNC: 27 MMOL/L (ref 21–32)
CREAT SERPL-MCNC: 1.61 MG/DL (ref 0.5–1.3)
EGFRCR SERPLBLD CKD-EPI 2021: 47 ML/MIN/1.73M*2
EOSINOPHIL # BLD MANUAL: 0.08 X10*3/UL (ref 0–0.7)
EOSINOPHIL NFR BLD MANUAL: 3.5 %
ERYTHROCYTE [DISTWIDTH] IN BLOOD BY AUTOMATED COUNT: 16.1 % (ref 11.5–14.5)
GLUCOSE BLD MANUAL STRIP-MCNC: 100 MG/DL (ref 74–99)
GLUCOSE SERPL-MCNC: 98 MG/DL (ref 74–99)
HCT VFR BLD AUTO: 22.9 % (ref 41–52)
HGB BLD-MCNC: 7.5 G/DL (ref 13.5–17.5)
IMM GRANULOCYTES # BLD AUTO: 0.03 X10*3/UL (ref 0–0.7)
IMM GRANULOCYTES NFR BLD AUTO: 1.3 % (ref 0–0.9)
LYMPHOCYTES # BLD MANUAL: 0.34 X10*3/UL (ref 1.2–4.8)
LYMPHOCYTES NFR BLD MANUAL: 15.5 %
MAGNESIUM SERPL-MCNC: 1.76 MG/DL (ref 1.6–2.4)
MCH RBC QN AUTO: 31.6 PG (ref 26–34)
MCHC RBC AUTO-ENTMCNC: 32.8 G/DL (ref 32–36)
MCV RBC AUTO: 97 FL (ref 80–100)
METAMYELOCYTES # BLD MANUAL: 0 X10*3/UL
METAMYELOCYTES NFR BLD MANUAL: 0 %
MONOCYTES # BLD MANUAL: 0.28 X10*3/UL (ref 0.1–1)
MONOCYTES NFR BLD MANUAL: 12.9 %
MYELOCYTES # BLD MANUAL: 0.02 X10*3/UL
MYELOCYTES NFR BLD MANUAL: 0.9 %
NEUTROPHILS # BLD MANUAL: 1.44 X10*3/UL (ref 1.2–7.7)
NEUTS BAND # BLD MANUAL: 0 X10*3/UL (ref 0–0.7)
NEUTS BAND NFR BLD MANUAL: 0 %
NEUTS SEG # BLD MANUAL: 1.44 X10*3/UL (ref 1.2–7)
NEUTS SEG NFR BLD MANUAL: 65.5 %
NRBC BLD MANUAL-RTO: 0 % (ref 0–0)
NRBC BLD-RTO: 0 /100 WBCS (ref 0–0)
OVALOCYTES BLD QL SMEAR: ABNORMAL
PHOSPHATE SERPL-MCNC: 3.2 MG/DL (ref 2.5–4.9)
PLASMA CELLS # BLD MANUAL: 0 X10*3/UL
PLASMA CELLS NFR BLD MANUAL: 0 %
PLATELET # BLD AUTO: 79 X10*3/UL (ref 150–450)
POTASSIUM SERPL-SCNC: 3.8 MMOL/L (ref 3.5–5.3)
PROMYELOCYTES # BLD MANUAL: 0 X10*3/UL
PROMYELOCYTES NFR BLD MANUAL: 0 %
RBC # BLD AUTO: 2.37 X10*6/UL (ref 4.5–5.9)
RBC MORPH BLD: ABNORMAL
RH FACTOR (ANTIGEN D): NORMAL
SODIUM SERPL-SCNC: 133 MMOL/L (ref 136–145)
TOTAL CELLS COUNTED BLD: 116
VANCOMYCIN SERPL-MCNC: 23.5 UG/ML (ref 5–20)
VARIANT LYMPHS # BLD MANUAL: 0 X10*3/UL (ref 0–0.5)
VARIANT LYMPHS NFR BLD: 0 %
WBC # BLD AUTO: 2.2 X10*3/UL (ref 4.4–11.3)

## 2025-05-05 PROCEDURE — 85007 BL SMEAR W/DIFF WBC COUNT: CPT

## 2025-05-05 PROCEDURE — 2500000001 HC RX 250 WO HCPCS SELF ADMINISTERED DRUGS (ALT 637 FOR MEDICARE OP)

## 2025-05-05 PROCEDURE — RXMED WILLOW AMBULATORY MEDICATION CHARGE

## 2025-05-05 PROCEDURE — 99239 HOSP IP/OBS DSCHRG MGMT >30: CPT

## 2025-05-05 PROCEDURE — 82947 ASSAY GLUCOSE BLOOD QUANT: CPT

## 2025-05-05 PROCEDURE — 83735 ASSAY OF MAGNESIUM: CPT

## 2025-05-05 PROCEDURE — 85027 COMPLETE CBC AUTOMATED: CPT

## 2025-05-05 PROCEDURE — 2500000004 HC RX 250 GENERAL PHARMACY W/ HCPCS (ALT 636 FOR OP/ED): Mod: JZ

## 2025-05-05 PROCEDURE — 86900 BLOOD TYPING SEROLOGIC ABO: CPT

## 2025-05-05 PROCEDURE — 80069 RENAL FUNCTION PANEL: CPT

## 2025-05-05 PROCEDURE — 99231 SBSQ HOSP IP/OBS SF/LOW 25: CPT | Performed by: NURSE PRACTITIONER

## 2025-05-05 PROCEDURE — 80202 ASSAY OF VANCOMYCIN: CPT

## 2025-05-05 RX ORDER — POLYETHYLENE GLYCOL 3350 17 G/17G
17 POWDER, FOR SOLUTION ORAL DAILY
Start: 2025-05-06

## 2025-05-05 RX ORDER — FAMOTIDINE 10 MG/1
10 TABLET ORAL DAILY
Start: 2025-05-06

## 2025-05-05 RX ORDER — VALPROIC ACID 250 MG/5ML
250 SOLUTION ORAL 4 TIMES DAILY
Status: CANCELLED
Start: 2025-05-05

## 2025-05-05 RX ORDER — BISACODYL 10 MG/1
10 SUPPOSITORY RECTAL DAILY PRN
Start: 2025-05-05

## 2025-05-05 RX ORDER — HYDROXYZINE HYDROCHLORIDE 10 MG/1
10 TABLET, FILM COATED ORAL EVERY 6 HOURS PRN
Start: 2025-05-05

## 2025-05-05 RX ADMIN — CLONIDINE HYDROCHLORIDE 0.1 MG: 0.1 TABLET ORAL at 08:24

## 2025-05-05 RX ADMIN — BRIVARACETAM 50 MG: 50 TABLET, FILM COATED ORAL at 20:35

## 2025-05-05 RX ADMIN — APIXABAN 5 MG: 5 TABLET, FILM COATED ORAL at 08:24

## 2025-05-05 RX ADMIN — ASCORBIC ACID, THIAMINE MONONITRATE,RIBOFLAVIN, NIACINAMIDE, PYRIDOXINE HYDROCHLORIDE, FOLIC ACID, CYANOCOBALAMIN, BIOTIN, CALCIUM PANTOTHENATE, 1 CAPSULE: 100; 1.5; 1.7; 20; 10; 1; 6000; 150000; 5 CAPSULE, LIQUID FILLED ORAL at 08:24

## 2025-05-05 RX ADMIN — BRIVARACETAM 50 MG: 50 TABLET, FILM COATED ORAL at 08:24

## 2025-05-05 RX ADMIN — FAMOTIDINE 10 MG: 20 TABLET, FILM COATED ORAL at 08:24

## 2025-05-05 RX ADMIN — APIXABAN 5 MG: 5 TABLET, FILM COATED ORAL at 20:36

## 2025-05-05 RX ADMIN — SODIUM CHLORIDE 1000 MG: 9 INJECTION, SOLUTION INTRAVENOUS at 20:36

## 2025-05-05 RX ADMIN — ATORVASTATIN CALCIUM 40 MG: 40 TABLET, FILM COATED ORAL at 20:35

## 2025-05-05 ASSESSMENT — PAIN SCALES - GENERAL
PAINLEVEL_OUTOF10: 0 - NO PAIN

## 2025-05-05 ASSESSMENT — COGNITIVE AND FUNCTIONAL STATUS - GENERAL
CLIMB 3 TO 5 STEPS WITH RAILING: TOTAL
DAILY ACTIVITIY SCORE: 13
WALKING IN HOSPITAL ROOM: A LOT
TOILETING: A LOT
MOBILITY SCORE: 12
TURNING FROM BACK TO SIDE WHILE IN FLAT BAD: A LITTLE
DRESSING REGULAR LOWER BODY CLOTHING: A LOT
HELP NEEDED FOR BATHING: A LOT
MOVING FROM LYING ON BACK TO SITTING ON SIDE OF FLAT BED WITH BEDRAILS: A LITTLE
MOVING FROM LYING ON BACK TO SITTING ON SIDE OF FLAT BED WITH BEDRAILS: A LITTLE
STANDING UP FROM CHAIR USING ARMS: A LOT
TOILETING: TOTAL
STANDING UP FROM CHAIR USING ARMS: A LOT
EATING MEALS: A LOT
EATING MEALS: A LOT
HELP NEEDED FOR BATHING: A LOT
DRESSING REGULAR LOWER BODY CLOTHING: A LOT
PERSONAL GROOMING: TOTAL
MOVING TO AND FROM BED TO CHAIR: A LOT
CLIMB 3 TO 5 STEPS WITH RAILING: A LOT
WALKING IN HOSPITAL ROOM: TOTAL
DAILY ACTIVITIY SCORE: 11
PERSONAL GROOMING: A LOT
TURNING FROM BACK TO SIDE WHILE IN FLAT BAD: A LITTLE
MOBILITY SCORE: 14
MOVING TO AND FROM BED TO CHAIR: A LOT
DRESSING REGULAR UPPER BODY CLOTHING: A LITTLE
DRESSING REGULAR UPPER BODY CLOTHING: A LITTLE

## 2025-05-05 ASSESSMENT — PAIN - FUNCTIONAL ASSESSMENT
PAIN_FUNCTIONAL_ASSESSMENT: 0-10
PAIN_FUNCTIONAL_ASSESSMENT: 0-10

## 2025-05-05 NOTE — PROGRESS NOTES
"Enmanuel Ahumada \"Petey" is a 69 y.o. male on day 30 of admission.   Medical history significant for GERD, glioblastoma s/p right craniotomy for tumor resection 12/29/23, HLD, HTN and seizure disorder.   Patient admitted 4/5/25 for infection and dehiscence of cranial wound after undergoing GBM tumor resection 12/29/25.   Vascular medicine is following for for left distal femoral and calf deep vein thrombosis. He has an IVC filter that was placed 4/11/2025.   Started initial dosing of Eliquis 5mg BID 5/4/25.  Plan for discharge to SNF/rehab today.     Subjective   Denies CP, SOB or bleeding.      Objective     Physical Exam  Sitting up in bed in NAD; spouse at bedside  Respirations full and regular with breath sounds CTA; on RA  Normal heart sounds with RRR; vital signs are stable  Abdomen soft and nontender to palpation  Extremities are warm to touch with palpable bilateral radial and DP pulses; no evidence of BLE swelling  Patient is awake and alert, participates in discussion.     Last Recorded Vitals  Blood pressure 103/73, pulse 105, temperature 36.8 °C (98.2 °F), temperature source Temporal, resp. rate 18, height 1.702 m (5' 7\"), weight 67 kg (147 lb 11.3 oz), SpO2 93%.  Intake/Output last 3 Shifts:  I/O last 3 completed shifts:  In: 733.8 (11 mL/kg) [P.O.:75; I.V.:238.8 (3.6 mL/kg); IV Piggyback:420]  Out: 700 (10.4 mL/kg) [Urine:700 (0.3 mL/kg/hr)]  Weight: 67 kg     Relevant Results  Scheduled medications  Scheduled Medications[1]  Continuous medications  Continuous Medications[2]  PRN medications  PRN Medications[3]     Results from last 7 days   Lab Units 05/05/25  0332 05/04/25  0618 05/03/25  0913   WBC AUTO x10*3/uL 2.2* 1.9* 2.0*   HEMOGLOBIN g/dL 7.5* 7.9* 7.9*   HEMATOCRIT % 22.9* 24.3* 23.6*   PLATELETS AUTO x10*3/uL 79* 69* 66*       Results from last 7 days   Lab Units 05/05/25  0332 05/04/25  0618 05/03/25  0617   SODIUM mmol/L 133* 136 138   POTASSIUM mmol/L 3.8 3.6 3.5   CHLORIDE mmol/L 99 " 100 101   CO2 mmol/L 27 28 27   BUN mg/dL 19 21 22   CREATININE mg/dL 1.61* 1.68* 1.97*   GLUCOSE mg/dL 98 106* 105*   CALCIUM mg/dL 8.2* 8.3* 8.2*   Estimated Creatinine Clearance: 41.6 mL/min (A) (by C-G formula based on SCr of 1.61 mg/dL (H)).            Assessment & Plan  Wound dehiscence  69 year old male with medical history and hospital course as noted above.   Patient with prolonged hospital course after admission for wound infection with dehiscence.   Vascular Medicine Service following for cancer associated DVT (left distal femoral, popliteal and calf DVT's, as well as right brachial thrombus around PICC line).  Eliquis 5mg BID started 5/4/25.  Review of labs shows stable hemoglobin 7.5 grams, stable platelets 79 K, and stable serum creatinine 1.61 with creatinine clearance of 41 ml/minute.   Plan for DC to SNF today for rehab care.   Patient given Eliquis Med Alert band.     Recommendations:  ~Continue Eliquis 5mg BID; indefinite duration of anticoagulation therapy.   ~Outpatient follow up with Dr. Zee Hardy has been rescheduled for 5/22/25 @ 2:00 pm as a virtual visit; will discuss removal of IVC filter at the time of follow up appointment.     Plan of care discussed with Dr. Zee Hardy  Plan of care discussed with Dr. Guanaco Grullon from the [facility] Oncology Team.      ALEXA Valles-CNP  Vascular Medicine Service   Pager [phone]         [1] apixaban, 5 mg, oral, BID  atorvastatin, 40 mg, oral, Daily  brivaracetam, 50 mg, oral, q12h LA NENA  famotidine, 10 mg, oral, Daily  lidocaine, 5 mL, infiltration, Once  lidocaine, 1 patch, transdermal, Daily  polyethylene glycol, 17 g, oral, Daily  [Held by provider] QUEtiapine, 25 mg, oral, Nightly  sennosides-docusate sodium, 1 tablet, oral, Nightly  vancomycin, 1,000 mg, intravenous, q24h  vitamin B complex-vitamin C-folic acid, 1 capsule, oral, Daily  [2]    [3] PRN medications: acetaminophen, alteplase, bisacodyl, dextrose, dextrose, glucagon,  glucagon, heparin, hydrOXYzine HCL, ondansetron **OR** ondansetron, oxygen, vancomycin

## 2025-05-05 NOTE — CARE PLAN
Pt will remain HDS and free from injury throughout this shift 5/5/25 @ 0700      Problem: Fall/Injury  Goal: Be free from injury by end of the shift  Outcome: Progressing  Goal: Verbalize understanding of personal risk factors for fall in the hospital  Outcome: Progressing  Goal: Verbalize understanding of risk factor reduction measures to prevent injury from fall in the home  Outcome: Progressing  Goal: Use assistive devices by end of the shift  Outcome: Progressing  Goal: Pace activities to prevent fatigue by end of the shift  Outcome: Progressing     Problem: Skin  Goal: Decreased wound size/increased tissue granulation at next dressing change  Outcome: Progressing  Goal: Participates in plan/prevention/treatment measures  Outcome: Progressing  Goal: Prevent/manage excess moisture  Outcome: Progressing  Goal: Promote/optimize nutrition  Outcome: Progressing  Goal: Promote skin healing  Outcome: Progressing     Problem: Pain - Adult  Goal: Verbalizes/displays adequate comfort level or baseline comfort level  Outcome: Progressing     Problem: Safety - Adult  Goal: Free from fall injury  Outcome: Progressing     Problem: Discharge Planning  Goal: Discharge to home or other facility with appropriate resources  Outcome: Progressing     Problem: Chronic Conditions and Co-morbidities  Goal: Patient's chronic conditions and co-morbidity symptoms are monitored and maintained or improved  Outcome: Progressing

## 2025-05-05 NOTE — PROGRESS NOTES
"Art Therapy Note    Enmanuel Evans \"Dash\" Zita     Therapy Session  Referral Type: New referral this admission  Visit Type: Follow-up visit  Session Start Time: 1506  Intervention Delivery: In-person  Conflict of Service: Asleep  Number of family members present: 0              Treatment/Interventions            Narrative  Assessment Detail: At time of visit Pt was found sound asleep and appeared to be resting comfortably.  ATR willfollow up another time to offe services.    Education Documentation  No documentation found.      "

## 2025-05-05 NOTE — PROGRESS NOTES
05/05/25 1000   Discharge Planning   Home or Post Acute Services Post acute facilities (Rehab/SNF/etc)   Type of Post Acute Facility Services Rehab   Expected Discharge Disposition Rehab   Does the patient need discharge transport arranged? Yes   RoundTrip coordination needed? Yes   Has discharge transport been arranged? Yes   What day is the transport expected? 05/05/25   What time is the transport expected? 1600     Care Transitions - Discharge Transfer to Facility Note  5/5/2025   Patient will discharge today to: Acute Rehab  Facility name:  [facility]  Bedside nurse: Lashonda Franz RN  Report # given to RN: [phone]   Transport company name: [employer]   time: 4:00 pm  Auth obtained by  [facility] valid thru 5/7. IMM needed and signed by patient's spouse at bedside 5/5. Patient, spouse, and team aware of and in agreement with plan.  Bonita Fulton, MSW, LSW

## 2025-05-05 NOTE — CARE PLAN
pt will remain HDS and VSS by 5/5/25 at 1900      Problem: Fall/Injury  Goal: Be free from injury by end of the shift  Outcome: Progressing  Goal: Verbalize understanding of personal risk factors for fall in the hospital  Outcome: Progressing  Goal: Verbalize understanding of risk factor reduction measures to prevent injury from fall in the home  Outcome: Progressing  Goal: Use assistive devices by end of the shift  Outcome: Progressing  Goal: Pace activities to prevent fatigue by end of the shift  Outcome: Progressing     Problem: Pain - Adult  Goal: Verbalizes/displays adequate comfort level or baseline comfort level  Outcome: Progressing     Problem: Safety - Adult  Goal: Free from fall injury  Outcome: Progressing     Problem: Discharge Planning  Goal: Discharge to home or other facility with appropriate resources  Outcome: Progressing     Problem: Chronic Conditions and Co-morbidities  Goal: Patient's chronic conditions and co-morbidity symptoms are monitored and maintained or improved  Outcome: Progressing

## 2025-05-05 NOTE — PROGRESS NOTES
Vancomycin Dosing by Pharmacy- FOLLOW UP    Enmanuel Ahumada is a 69 y.o. year old male who Pharmacy has been consulted for vancomycin dosing for CNS/meningoencephalitis. Based on the patient's indication and renal status this patient is being dosed based on a goal AUC of 500-600.     Renal function is currently stable.    Current vancomycin dose: 1000 mg given every 24 hours    Most recent random level: 23.5 mcg/mL    Visit Vitals  /73 (BP Location: Right arm, Patient Position: Lying)   Pulse 105   Temp 36.8 °C (98.2 °F) (Temporal)   Resp 18        Lab Results   Component Value Date    CREATININE 1.61 (H) 2025    CREATININE 1.68 (H) 2025    CREATININE 1.97 (H) 2025    CREATININE 2.13 (H) 2025        Patient weight is as follows:   Vitals:    25 0539   Weight: 67 kg (147 lb 11.3 oz)       Cultures:  No results found for the encounter in last 14 days.       I/O last 3 completed shifts:  In: 733.8 (11 mL/kg) [P.O.:75; I.V.:238.8 (3.6 mL/kg); IV Piggyback:420]  Out: 700 (10.4 mL/kg) [Urine:700 (0.3 mL/kg/hr)]  Weight: 67 kg   I/O during current shift:  I/O this shift:  In: -   Out: 100 [Urine:100]    Temp (24hrs), Av °C (98.6 °F), Min:36.8 °C (98.2 °F), Max:37.1 °C (98.8 °F)      Assessment/Plan    Within goal AUC range. Continue current vancomycin regimen.    This dosing regimen is predicted by [employer] to result in the following pharmacokinetic parameters:    Loading dose: N/A  Regimen: 1000 mg IV every 24 hours.  Start time: 21:15 on 2025  Exposure target: AUC24 (range)500-600 mg/L.hr   BTC56-58: 492 mg/L.hr  AUC24,ss: 512 mg/L.hr  Probability of AUC24 > 400: 100 %  Ctrough,ss: 16.6 mg/L  Probability of Ctrough,ss > 20: 1 %      The next level will be obtained on  at AM labs. May be obtained sooner if clinically indicated.   Will continue to monitor renal function daily while on vancomycin and order serum creatinine at least every 48 hours if not already  ordered.  Follow for continued vancomycin needs, clinical response, and signs/symptoms of toxicity.       YONG JADE

## 2025-05-05 NOTE — ASSESSMENT & PLAN NOTE
69 year old male with medical history and hospital course as noted above.   Patient with prolonged hospital course after admission for wound infection with dehiscence.   Vascular Medicine Service following for cancer associated DVT (left distal femoral, popliteal and calf DVT's, as well as right brachial thrombus around PICC line).  Eliquis 5mg BID started 5/4/25.  Review of labs shows stable hemoglobin 7.5 grams, stable platelets 79 K, and stable serum creatinine 1.61 with creatinine clearance of 41 ml/minute.   Plan for DC to SNF today for rehab care.   Patient given Eliquis Med Alert band.     Recommendations:  ~Continue Eliquis 5mg BID; indefinite duration of anticoagulation therapy.   ~Outpatient follow up with Dr. Zee Hardy has been rescheduled for 5/22/25 @ 2:00 pm as a virtual visit; will discuss removal of IVC filter at the time of follow up appointment.

## 2025-05-05 NOTE — NURSING NOTE
Report called to rehab facility at 1044. Cathryn took report on the patient and received anticipated discharge time. Peripheral IV removed at 1531. Per Alec, left single lumen PICC to remain in place for antibiotics. Patient's discharge papers and briviact placed in patient's bag.

## 2025-05-05 NOTE — PROGRESS NOTES
"Eve Ahumada \"Petey" is a 69 y.o. male on day 30 of admission presenting with Wound dehiscence.    Overnight: No acute events overnight.     This morning: Patient is resting in bed comfortably.  Plan for discharge to acute rehab tonight.       Objective   Current Vitals  /84 (BP Location: Right arm, Patient Position: Lying)   Pulse 102   Temp 37 °C (98.6 °F) (Temporal)   Resp 18   Ht 1.702 m (5' 7\")   Wt 67 kg (147 lb 11.3 oz)   SpO2 92%   BMI 23.13 kg/m²      I/O last 3 completed shifts:  In: 733.8 (11 mL/kg) [P.O.:75; I.V.:238.8 (3.6 mL/kg); IV Piggyback:420]  Out: 700 (10.4 mL/kg) [Urine:700 (0.3 mL/kg/hr)]  Weight: 67 kg       Physical Exam  Vitals reviewed.   Constitutional:       General: He is not in acute distress.     Appearance: He is ill-appearing. He is not toxic-appearing.      Comments: Intermittent alertness/orientation throughout the day, this morning orientated x3 (self, , [facility], )   HENT:      Head:      Comments: Right-crani site appears to be healing well, dry, no erythema. Drain sites (2) well healing.      Mouth/Throat:      Mouth: Mucous membranes are moist.      Pharynx: Oropharynx is clear.   Eyes:      Extraocular Movements: Extraocular movements intact.   Cardiovascular:      Rate and Rhythm: Normal rate and regular rhythm.      Heart sounds: Normal heart sounds.   Pulmonary:      Effort: Pulmonary effort is normal. No respiratory distress.   Abdominal:      General: There is no distension.      Palpations: Abdomen is soft.      Tenderness: There is no abdominal tenderness. There is no guarding.   Musculoskeletal:      Cervical back: Neck supple.      Right lower leg: No edema.      Left lower leg: No edema.   Skin:     General: Skin is warm and dry.      Coloration: Skin is pale.      Findings: No rash.   Neurological:      General: No focal deficit present.      Mental Status: He is alert and oriented to person, place, and time. " "     Motor: Weakness (4/5 LUE and LLE strength with 5/5 RUE and RLE) present.      Comments: No tremors or spasms noted on exam this morning.    Psychiatric:         Behavior: Behavior normal.       Relevant Results  Labs:  CBC: WBC 2.2 , HGB 7.5, PLT 79  BMP: , K 3.8, Cl 99, HCO3 27, BUN 19, CR 1.61, Glu 98  LFTS: AST 17 , ALT 38, ALKPHOS 71 , TBILI 1.2 , DBILI 0.3  TROP: 3  BNP: No results found for requested labs within last 365 days.  COAGS: PT 14.1 , PTT 33  , INR 1.3  UA:   Results from last 7 days   Lab Units 04/28/25  0702   COLOR U  Light-Yellow   PH U  6.0   SPEC GRAV UR  1.010   PROTEIN U mg/dL NEGATIVE   BLOOD UR mg/dL 0.03 (TRACE)*   NITRITE U  NEGATIVE   WBC UR /HPF NONE     ABG:    CALCIUM 8.2 MAG No results found for requested labs within last 365 days. ALB 3.1 LACTATE 1.3 PHOS No results found for requested labs within last 365 days. COVIDNo results found for requested labs within last 365 days.    Micro/culture data:  Susceptibility data from last 120 days.  Collected Organism Clindamycin Erythromycin Oxacillin Tetracycline Trimethoprim/Sulfamethoxazole Vancomycin   04/06/25 1007 Staphylococcus epidermidis  S  S  R  S  R  S   04/06/25 1006 Staphylococcus epidermidis         04/06/25 1005 Staphylococcus epidermidis             Imaging:  EEG  IMPRESSION    Impression    This vEEG is indicative of a moderate diffuse encephalopathy and a right hemispheric structural lesion with an overlying skull defect. One non-epileptic left hand tremor event is seen on 5/2/25 @ 16:33 with no EEG correlate.    A full report will be scanned into the patient's chart at a later time.    This report has been interpreted and electronically signed by       MEDS:  Scheduled medications  Scheduled Medications[1]  Continuous medications  Continuous Medications[2]  PRN medications  PRN Medications[3]     Assessment/Plan   Assessment/Plan:   Mr. Singer \"Dash\" Zita is a 69 male with a PMHx of HTN, HLD, smalls's " esophagus, RO GBM s/p resection ([facility], 12/2023) with resultant L HH s/p chemo/RT who represented for tumor progression in February. He underwent redo craniotomy for tumor resection 02/20 who had repeat MRI in March showing postop changes with fluid collection at dura and craniotomy site, worsening infiltrative disease, enhancement along posteromedial aspect of resection site. He represented on 04/05/25 with concerns for increased wound drainage and was admitted under the care of Neurosurgery. MRI brain onf 04/05 showed increased resection cavity enhancement and diffusion restriction concerning fo superimposed infection/abscess. He therefore underwent cranial wound washout on 04/06 with Neurosurgery. Subsequent cultures from OR revealed methicillin-resistant staph epidermidis, currently on vancomycin IV therapy until 06/15/25 per ID. His subsequent hospital course have been complicated by acute DVTs in the LLE and RUE s/p IVC filter placement due to pancytopenia (namely worsening thrombocytopenia) limiting anticoagulation, agitation/confusion, and new JAMAR 2/2 vancomycin.     Currently working towards discharging to acute rehab pending - JAMAR continue to improve with improved urine output. Neutropenia 2/2 anticipated alisson from recent Lomustine (04/04) now improved, remains afebrile. Transitioning to full AC now that plts have stabilized, with improved JAMAR he is now eligible for Apixaban therefore restarted 05/04.     Updates 05/05/25:  - Plan for DC to ARF today  - Cr continues to improve  - Per vascular medicine continue apixaban 5 Mg twice daily, will follow-up outpatient  -Platelets stable increasing  - Neurology signed off   - continue Briviact 50mg BID with plan to wean by neuro-oncology as an outpatient   - follow with neuro-oncology (Dr. Rolon) and oncologic neurosurgery (Dr. Cortez)  - follow-up with vascular surgery, neurosurgery, vascular medicine, and infectious disease is established     # Pancytopenia,  "stable  # Thrombocytopenia, improving  # Neutropenia, resolved  - Hematology consulted 04/21 felt thrombocytopenia to be multifactorial due to recent lomustine initiation (4/4), introduction of VPA (4/8), MRSE intracranial wound infection treated with vanc and cefepime, and possible consumption in setting of DVTs  - Had received multiple platelet transfusions (x8, most recent 04/26) and RBC transfusions (x1 04/25) without overt s or s of bleeding  - Hematology re-enaged 04/26 prior to transfer  - Note states platelet count marcela to 82 following 2 units of platelet transfusion and has since downtrended to 44 over 3 day period. Also notable is worsening anemia throughout hospital stay   - Reached out to [facility] in regard to c/f ANC of 0.87 05/01  - Recent Lomustine (4/4): Alkylating agent with delayed alisson (3-6 weeks), can cause prolonged cytopenias\".  - Leukocyte alisson is typically 28-35 days post treatment   - ANC 1.0 - > 0.87 04/28 (day 24) through 05/01 (day 27)  PLAN:  - Neutropenia rebounded -> seg manual ANC today 1.23   - remains afebrile - CTM  - Plts remain stable, improving today  - CTM CBC w/ diff daily  - Will transfuse prn per Hematology recs  - Active T&S    # JAMAR, improving  - Upon transfer to our service, pt noted to have Cr trend of 0.66 -> 3.71 over 04/24 -> 04/26  - Previously normal kidney function without history of kidney disease or even previous AKIs  - Likely Cause is Vancomycin, JAMAR coincides with Peak Vanc trough of 38.    - Renal U/S: Mild left-sided hydronephrosis with few intrarenal calculi    - UA 04/27 negative, UA 04/28 pending   - Urine lytes 04/28: 3.5%, intrinsic. Serum sodium 139, serum cr 4.04, urine sodium 108, urine cr 90.7  - Nephrology signed off 04/30  PLAN:  - Cr continues to improve today   - Urine output remains encouraging as well  - Changed PPI to famotidine  - Daily RFP  - Strict Is/Os  - Avoid nephrotoxic agents and sharp fluctuations in blood pressure    # LLE DVT  # RUE " DVT  # IVC filter in place  - UE DVT US 4/11/25 revealed: RUE, acute non-occlusive deep vein thrombosis visualized in the brachial vein.  - LE DVT US 4/11/25 revealed: LLE, acute occlusive deep vein thrombosis visualized in the popliteal vein, age indeterminate deep vein thrombosis visualized in the distal femoral, gastrocnemius veins in the calf, posterior tibial and peroneal veins. The remainder of the left leg is negative for deep vein thrombosis.  - Due to thrombocytopenia underwent IVC filter placement 04/11/25  - Initially on heparin gtt, was started on Lovenox but switched to Eliquis after adjusting anti-seizures meds, however Eliquis now Dc'd due to pancytopenia  - Was on Lovenox ppx dose since due to thrombocytopenia   PLAN:  - Lovenox changed to heparin ppx 04/26 due to acute worsening of kidney function  - Vascular medicine:   - OK to started apixaban 5 mg, first dose 05/04 PM   - Will follow-up with Dr. Hardy in clinic outpatient in the near future  - SCDs, ordered    # GBM s/p resection x2  # MRSE of crani site s/p washout  - Initially dx with GBM in 2023 s/p resection & chemo/RT    - Follows with Dr. Rolon  - Tumor progression with redo craniotomy 02/20/25 with NSGY  - Represented 04/05 for increased drainage from wound  - 04/06 s/p R cranial wound washout and mesh cranioplasty (kaleb purulence )  - Cultures from the subgaleal, epidural, and subdural spaces grew methicillin resistant Staph. Epi  - ID was previously consulted, recommending:    - Continue Vancomycin with goal -600    - antibiotic course tentative stop date will be 6/15/25   - PICC line placed 04/08 and replaced 04/15  PLAN:  - Continue vancomycin, dosed per pharmacy  - CTM daily levels  - ID follow up with Dr. Amy York   [  ] after discharge weekly CBC with diff, Chem 7, quantitative CRP, and Vancomycin trough   - Outpatient follow-up with Dr. Rolon, neuro-onc  - Outpatient follow-up with Dr. Cortez, NSGY    # C/f  hypoactive delirium   # C/f encephalopathy   # C/f dysphagia  # Intermittent tremors of BL upper and lower extremities   - Neurology was following for concerning changes in mental status  - EEG 04/21-04/23 showing moderate diffuse encephalopathy and a right hemispheric structural lesion with an overlying skull defect. No epileptiform discharges are recorded. Overall, the degree of encephalopathy remains same compared to prior recording  - Neurology re-engaged - now signed off  - per Neuro, tremors and eye mvmt could be 2/2 from small basal ganglia issues, but nothing to treat. Also could be related to his muscle spasms    - vEEG- moderate diffuse encephalopathy, no epileptiform activity   - vEEG is indicative of a moderate diffuse encephalopathy and a right hemispheric structural lesion with an overlying skull defect. One non-epileptic left hand tremor event is seen on 5/2/25 @ 16:33 with no EEG correlate  PLAN:  - Continue to Briviact 50 mg Q12H   - SLP eval 04/26 c/f aspiration but pt had previously had safe swallowing   - recommended PO intake only when awake and alert w/ upright positioning  - On regular diet  - held nightly Seroquel upon transfer due to increased somnolence during day time  - held prn flexeril (hasn't used) upon transfer due to increased somnolence during day time    # Impaired mobility  - PT/OT - recommending high intensity   - PM&R last eval on 04/25 stating excellent candidate for acute rehabilitation once medically stable and is able and motivated to participate in 3 hours/day of therapy.      Fluids: Replete PRN  Electrolytes: Keep mg >2, phos >3  and K >4  Nutrition:  Adult diet Regular; 1:1 Feeding   Antimicrobials: vanco  DVT PPX:DVT: Apixaban  GI ppx: PPI  Bowel care:Miralax and Senna  Catheter:None  Lines:PIV and PICC   Oxygen:Room Air    Code Status: Full Code (confirmed on admission)   NOK:  Primary Emergency Contact: GABRIELCHRISTINE, Home Phone: [phone]     Guanaco (Carlos)  MD Mitchel  IM PGY-1          [1] apixaban, 5 mg, oral, BID  atorvastatin, 40 mg, oral, Daily  brivaracetam, 50 mg, oral, q12h LA NENA  cloNIDine, 0.1 mg, oral, Daily  famotidine, 10 mg, oral, Daily  lidocaine, 5 mL, infiltration, Once  lidocaine, 1 patch, transdermal, Daily  polyethylene glycol, 17 g, oral, Daily  [Held by provider] QUEtiapine, 25 mg, oral, Nightly  sennosides-docusate sodium, 1 tablet, oral, Nightly  vancomycin, 1,000 mg, intravenous, q24h  vitamin B complex-vitamin C-folic acid, 1 capsule, oral, Daily     [2]    [3] PRN medications: acetaminophen, alteplase, bisacodyl, dextrose, dextrose, glucagon, glucagon, heparin, hydrOXYzine HCL, ondansetron **OR** ondansetron, oxygen, vancomycin

## 2025-05-06 ENCOUNTER — APPOINTMENT (OUTPATIENT)
Dept: CARDIOLOGY | Facility: CLINIC | Age: 70
End: 2025-05-06
Payer: MEDICARE

## 2025-05-06 NOTE — DISCHARGE SUMMARY
Discharge Diagnosis  Wound dehiscence       Issues Requiring Follow-Up  - Continue eliquis 5mg BID for DVTs with vasc med follow-up   - Neuro-onc fu with Dr. Rolon   - NSGY fu with Dr. Cortez for postcranial wound washout and mesh cranioplasty    - Continue Vancomycin for MRSE till 6/15  - weekly CBC with diff, Chem 7, quantitative CRP, and Vancomycin trough sent to Dr. York     Discharge Meds     Medication List      START taking these medications     apixaban 5 mg tablet; Commonly known as: Eliquis; Take 1 tablet (5 mg)   by mouth 2 times a day.   bisacodyl 10 mg suppository; Commonly known as: Dulcolax; Insert 1   suppository (10 mg) into the rectum once daily as needed for constipation.   Briviact 50 mg tablet tablet; Generic drug: brivaracetam; Take 1 tablet   (50 mg) by mouth every 12 hours.   famotidine 10 mg tablet; Commonly known as: Pepcid; Take 1 tablet (10   mg) by mouth once daily.   hydrOXYzine HCL 10 mg tablet; Commonly known as: Atarax; Take 1 tablet   (10 mg) by mouth every 6 hours if needed for anxiety (Agitation).   polyethylene glycol 17 gram packet; Commonly known as: Glycolax,   Miralax; Take 17 g by mouth once daily.   sodium chloride 0.9% parenteral solution 250 mL with vancomycin 1,000 mg   recon soln 1,000 mg; Infuse 1,000 mg at 270 mL/hr over 60 minutes into a   venous catheter once every 24 hours.   vitamin B complex-vitamin C-folic acid 1 mg capsule; Commonly known as:   Nephrocaps; Take 1 capsule by mouth once daily.     CONTINUE taking these medications     acetaminophen 325 mg tablet; Commonly known as: Tylenol   atorvastatin 40 mg tablet; Commonly known as: Lipitor; TAKE 1 TABLET BY   MOUTH EVERY DAY   CALCIUM CARBONATE-VITAMIN D3 ORAL   COENZYME Q10 (BULK) MISC   krill-omega-3-dha-epa-lipids 721-42-51-50 mg capsule   * ondansetron 8 mg tablet; Commonly known as: Zofran; Take 1 tablet (8   mg) by mouth every 8 hours if needed for nausea or vomiting. Take one hour   prior to each  chemotherapy dose in addition to every 8 hours as needed for   nausea/vomiting   * ondansetron 8 mg tablet; Commonly known as: Zofran; Take 1 tablet (8   mg) by mouth every 8 hours if needed for nausea or vomiting.   pantoprazole 40 mg EC tablet; Commonly known as: ProtoNix; TAKE 1 TABLET   BY MOUTH IN THE MORNING 30 MINUTES BEFORE BREAKFAST.   prochlorperazine 10 mg tablet; Commonly known as: Compazine; Take 1   tablet (10 mg) by mouth every 6 hours if needed for nausea or vomiting.   SENNA LAX ORAL  * This list has 2 medication(s) that are the same as other medications   prescribed for you. Read the directions carefully, and ask your doctor or   other care provider to review them with you.     STOP taking these medications     cephalexin 500 mg capsule; Commonly known as: Keflex   dexAMETHasone 2 mg tablet; Commonly known as: Decadron   Gleostine 10 mg capsule; Generic drug: lomustine   Gleostine 40 mg capsule; Generic drug: lomustine   Gleostine 100 mg capsule; Generic drug: lomustine     ASK your doctor about these medications     * lomustine 40 mg capsule; Commonly known as: CeeNU; Take 1 capsule (40   mg) by mouth 1 time for 1 dose.  Take on Day 1 of each treatment cycle.   Taking on an empty stomach may reduce nausea and vomiting.  Call your   doctor if you vomit right after taking a lomustine capsule.; Ask about:   Should I take this medication?   * lomustine 100 mg capsule; Commonly known as: CeeNU; Take 2 capsules   (200 mg) by mouth 1 time for 1 dose.  Take on Day 1 of each treatment   cycle. Taking on an empty stomach may reduce nausea and vomiting.  Call   your doctor if you vomit right after taking a lomustine capsule.; Ask   about: Should I take this medication?   * lomustine 10 mg capsule; Commonly known as: CeeNU; Take 1 capsule (10   mg) by mouth 1 time for 1 dose.  Take on Day 1 of each treatment cycle.   Taking on an empty stomach may reduce nausea and vomiting.  Call your   doctor if you vomit  "right after taking a lomustine capsule.; Ask about:   Should I take this medication?  * This list has 3 medication(s) that are the same as other medications   prescribed for you. Read the directions carefully, and ask your doctor or   other care provider to review them with you.       Test Results Pending At Discharge  Pending Labs       Order Current Status    Platelet antibodies, direct In process            Hospital Course  Mr. Singer \"Dash\" Zita is a 69 male with a PMHx of HTN, HLD, smalls's esophagus, RO GBM s/p resection ([facility], 12/2023) with resultant L HH s/p chemo/RT who represented for tumor progression in February. He underwent redo craniotomy for tumor resection 02/20 who had repeat MRI in March showing postop changes with fluid collection at dura and craniotomy site, worsening infiltrative disease, enhancement along posteromedial aspect of resection site. He represented on 04/05/25 with concerns for increased wound drainage and was admitted under the care of Neurosurgery. MRI brain onf 04/05 showed increased resection cavity enhancement and diffusion restriction concerning fo superimposed infection/abscess. He therefore underwent cranial wound washout on 04/06 with Neurosurgery. Subsequent cultures from OR revealed methicillin-resistant staph epidermidis, currently on vancomycin IV therapy until 06/15/25 per ID. His subsequent hospital course have been complicated by acute DVTs in the LLE and RUE s/p IVC filter placement due to pancytopenia (namely worsening thrombocytopenia) limiting anticoagulation, agitation/confusion which has greatly improved, and new JAMAR 2/2 vancomycin.     Neutropenia 2/2 anticipated alisson from recent Lomustine (04/04) - ANC 1.0 - > 0.87 04/28, but neutrophils did recover by day 28 to 1.35. restarted apixaban 05/04 and discontinued heparin gtt. Discharged to acute rehab.    Pertinent Physical Exam At Time of Discharge  General: Awake, alert, conversant, NAD  HEENT: PERRL, EOMI, " no scleral icterus, Right-crani site appears to be healing well, dry, no erythema.  CV: RRR, no M/R/G   RESP: Lungs clear to auscultation bilaterally  GI: Soft, NTND, no masses, guarding, or rebound tenderness   EXT: No peripheral edema, no asymmetry noted, 4/5 LUE and LLE strength with 5/5 RUE and RLE   Skin: No lesions or rashes noted on visible skin  Neuro:  Intermittent alertness/orientation throughout the day, this morning oriented x3 , moving all limbs spontaneously, follows commands    Outpatient Follow-Up  Future Appointments   Date Time Provider Department Center   5/12/2025 10:15 AM Nataliya Cortez MD [id number] [facility]   5/16/2025 10:00 AM Jacki Simpson PA-C [id number] [facility]   5/22/2025  2:00 PM Zee Hardy MD, MS [id number] [facility]   6/10/2025 10:00 AM Amy DALE MD [id number] [facility]   8/8/2025  9:40 AM Ricardo Youngblood DO [id number] [place]         Guanaco Grullon MD

## 2025-05-06 NOTE — CARE PLAN
The clinical goals for the shift include pt will remain safe and free from injury throughout shift 5/6/2025 0700      Problem: Fall/Injury  Goal: Be free from injury by end of the shift  Outcome: Progressing     Problem: Fall/Injury  Goal: Verbalize understanding of personal risk factors for fall in the hospital  Outcome: Progressing     Problem: Skin  Goal: Promote skin healing  Outcome: Progressing  Flowsheets (Taken 5/5/2025 2215)  Promote skin healing:   Assess skin/pad under line(s)/device(s)   Rotate device position/do not position patient on device     Problem: Pain - Adult  Goal: Verbalizes/displays adequate comfort level or baseline comfort level  Outcome: Progressing

## 2025-05-06 NOTE — NURSING NOTE
5/5/2025 @2240    Pt discharged from [facility] 4 to rehab, VSS upon discharge. Pt discharge instructions, medications, and follow-up appointments reviewed with pt at bedside. Pt verbalizes understanding and denies any questions at this time. Pt belongings packed and sent with pt. IV removed with catheter intact prior to discharge, RICARDO PICC to stay in place. Discharge complete.    ALMA Blandon RN

## 2025-05-07 LAB
PLATELET ANTIBODY TARGET 1 IGG RESULT: NEGATIVE
PLATELET ANTIBODY TARGET 1 IGM RESULT: NEGATIVE
PLATELET ANTIBODY TARGET 2 IGG RESULT: NEGATIVE
PLATELET ANTIBODY TARGET 2 IGM RESULT: NEGATIVE
SCAN RESULT: NORMAL

## 2025-05-08 ENCOUNTER — LAB REQUISITION (OUTPATIENT)
Dept: LAB | Facility: HOSPITAL | Age: 70
End: 2025-05-08
Payer: MEDICARE

## 2025-05-09 ENCOUNTER — LAB REQUISITION (OUTPATIENT)
Dept: LAB | Facility: HOSPITAL | Age: 70
End: 2025-05-09

## 2025-05-09 LAB
ABO GROUP (TYPE) IN BLOOD: NORMAL
ANTIBODY SCREEN: NORMAL
RH FACTOR (ANTIGEN D): NORMAL

## 2025-05-09 PROCEDURE — 86901 BLOOD TYPING SEROLOGIC RH(D): CPT | Mod: OUT

## 2025-05-09 NOTE — PROGRESS NOTES
"Art Therapy Note    Enmanuel Evans \"Dash\" Zita     Therapy Session  Referral Type: New referral this admission  Visit Type: Follow-up visit  Session Start Time: 1451  Intervention Delivery: In-person  Conflict of Service: Asleep  Family Present for Session: Spouse/Significant Other  Number of staff members present: 0              Treatment/Interventions  Interruption: No  Patient Fell Asleep at End of Session: No         Narrative  Assessment Detail: ATR made a follow up visit to introduce self and provide an AT session.  Wife present in the room sitting on couch at bedise as Pt was found sleeping soundly.  Wife shared to try coming by another tie to allow Pt to sleep.  ATR will follow up with Pt another day to offer services.    Education Documentation  No documentation found.      "

## 2025-05-12 ENCOUNTER — OFFICE VISIT (OUTPATIENT)
Dept: NEUROSURGERY | Facility: HOSPITAL | Age: 70
End: 2025-05-12
Payer: MEDICARE

## 2025-05-12 VITALS
TEMPERATURE: 96.1 F | HEART RATE: 103 BPM | SYSTOLIC BLOOD PRESSURE: 115 MMHG | BODY MASS INDEX: 21.46 KG/M2 | DIASTOLIC BLOOD PRESSURE: 79 MMHG | WEIGHT: 137 LBS | RESPIRATION RATE: 16 BRPM | OXYGEN SATURATION: 98 %

## 2025-05-12 DIAGNOSIS — C71.9 GBM (GLIOBLASTOMA MULTIFORME) (MULTI): Primary | ICD-10-CM

## 2025-05-12 DIAGNOSIS — T81.30XA WOUND DEHISCENCE: ICD-10-CM

## 2025-05-12 PROCEDURE — 99211 OFF/OP EST MAY X REQ PHY/QHP: CPT | Performed by: NEUROLOGICAL SURGERY

## 2025-05-12 PROCEDURE — 1126F AMNT PAIN NOTED NONE PRSNT: CPT | Performed by: NEUROLOGICAL SURGERY

## 2025-05-12 PROCEDURE — 1159F MED LIST DOCD IN RCRD: CPT | Performed by: NEUROLOGICAL SURGERY

## 2025-05-12 PROCEDURE — 1111F DSCHRG MED/CURRENT MED MERGE: CPT | Performed by: NEUROLOGICAL SURGERY

## 2025-05-12 ASSESSMENT — PAIN SCALES - GENERAL: PAINLEVEL_OUTOF10: 0-NO PAIN

## 2025-05-12 NOTE — PROGRESS NOTES
"[facility]  Neurosurgery    Subjective     Enmanuel Ahumada is a {handedness:497063}  69 y.o. year old male presenting for {Reason for Visit:00631::\"neurologic evaluation\"}.     PMH HLD, Osborn's esophagus, s/p cholecystectomy, who presented with gait instability and headaches back in December 2023. Workup was significant for CTH with RO hypodensity. MRI PPF and fMRI with language dominant lesion. He is now s/p right craniotomy for tumor resection with 5ALA with Dr. Carrasco on 12/29/23. Postoperative MRI for GTR. Final surgical pathology for GBM IDH wildytpe, MGMT methylated. Caris testing was completed for EGFR amplification, TERT mutations, and loss of CDKN2A/B. Treatment course significant for chemoRT on adjuvant temodar c/b thrombocytopenia. Optune electrical array device was placed. Remains on adjuvant temodar now s/p cycle 8. MRI with progression now s/p redo craniotomy for tumor resection with 5 ALA on 02/20/25. CTH with postoperative changes and pneumocephalus. MRI with small enhancement that is consistent with postoperative changes vs residual but likely to be GTR. Pathology for recurrent glioblastoma with 70% viable tumor and necrosis. MRI 03/16/25: Postoperative changes with fluid collection at dura and craniotomy site, increase in infiltrative WMD, Enhancement along posteromedial aspect of resection site that is nonspecific. He presents to clinic for incision evaluation due to concerns for opening with drainage x 1 week. Previously had incisional swelling with drainage back in March at which time CRP and Sed rate were normal. He was seen in clinic on 04/04 at which time was treated with Oral Keflex and CTH. CTH concerning for air in the resection cavity concerning for infection.     MRI brain with increased resection cavity enhancement and diffusion concerning for infection. Patient is now s/p craniotomy for wound washout on 04/6/25. Cultures were sent and " consistent with methicillin-resistant staph epidermidis. ID was consulted and patient was recommended for vancomycin treatment until 06/15/25. Hospital course was significant for acute LLE and RUE DVT requiring IVC filter placement. Patient was deemed not a candidate for anticoagulation at that time due to pancytopenia. Eliquis was able to be restarted on 05/04. Patient did develop agitation and confusion which resolved. He was discharged to rehab and presents to clinic for POV.     Review of Systems    Treatment Synopsis:   Brain Tumor: {Brain Tumor:02766}  Molecular: {Molecular:70702}  Location: {Location:57707}  Age at diagnosis: ***  Prior history of Radiation: {Prior history of Radiation:37817}    Objective     Performance and Vitals:  KARNOFSKY SCALE WITH ECOG EQUIVALENT:{DESC; KARNOFSKY SCALE WITH ECOG EQUIVALENT:[id number]}    There were no vitals filed for this visit.      Neurological Exam  Physical Exam    Imaging:   {Imaging Results:17835}  {Recent labs:74340}      Procedure:  Procedures    Assessment & Plan     Assessment/Plan   {Assess/PlanSmartLinks:46838}                    Medical History     Medical History[1]  Surgical History[2]  Social History[3]  Family History[4]  Allergies[5]  Current Outpatient Medications   Medication Instructions    acetaminophen (TYLENOL) 650 mg, Every 6 hours PRN    apixaban (ELIQUIS) 5 mg, oral, 2 times daily    atorvastatin (LIPITOR) 40 mg, oral, Daily    bisacodyl (DULCOLAX) 10 mg, rectal, Daily PRN    Briviact 50 mg, oral, Every 12 hours scheduled    CALCIUM CARBONATE-VITAMIN D3 ORAL 1 capsule, Daily    famotidine (PEPCID) 10 mg, oral, Daily    hydrOXYzine HCL (ATARAX) 10 mg, oral, Every 6 hours PRN    krill-omega-3-dha-epa-lipids 007-22-49-50 mg capsule 1 capsule, Daily    ondansetron (ZOFRAN) 8 mg, oral, Every 8 hours PRN, Take one hour prior to each chemotherapy dose in addition to every 8 hours as needed for nausea/vomiting    ondansetron (ZOFRAN) 8 mg, oral, Every  8 hours PRN    pantoprazole (ProtoNix) 40 mg EC tablet TAKE 1 TABLET BY MOUTH IN THE MORNING 30 MINUTES BEFORE BREAKFAST.    polyethylene glycol (GLYCOLAX, MIRALAX) 17 g, oral, Daily    prochlorperazine (COMPAZINE) 10 mg, oral, Every 6 hours PRN    sennosides (SENNA LAX ORAL) 1 tablet, Daily    sodium chloride 0.9% parenteral solution 250 mL with vancomycin 1,000 mg recon soln 1,000 mg 1,000 mg, intravenous, Every 24 hours    ubidecarenone (COENZYME Q10, BULK, MISC) 1 Capful, Daily    vitamin B complex-vitamin C-folic acid (Nephrocaps) 1 mg capsule 1 capsule, oral, Daily            [1]   Past Medical History:  Diagnosis Date    Brain cancer (Multi)     GERD (gastroesophageal reflux disease)     Hyperlipidemia     Hypertension     Seizure disorder (Multi)    [2]   Past Surgical History:  Procedure Laterality Date    BRAIN SURGERY      CHOLECYSTECTOMY  January 25, 2020    IR CVC PICC  4/15/2025    IR CVC PICC 4/15/2025 Dario Martinez MD [facility]    OTHER SURGICAL HISTORY  07/24/2019    Colonoscopy    OTHER SURGICAL HISTORY  07/26/2021    Endoscopy    VASECTOMY  Abt 1991   [3]   Social History  Tobacco Use    Smoking status: Never     Passive exposure: Never    Smokeless tobacco: Never   Vaping Use    Vaping status: Never Used   Substance Use Topics    Alcohol use: Not Currently    Drug use: Yes     Types: Marijuana     Comment: thc sleep gummies   [4] No family history on file.  [5] No Known Allergies     compared with the prior study dated  05/25/2025.    There is again evidence of mixed signal predominantly hypodensity  within the left temporal lobe corresponding to a mixed signal area of  hemorrhage in edema on the prior MRI dated 05/23/2025 which is  without significant interval change when compared with the prior CT  study dated 05/25/2025.    Additional nonspecific white matter changes are again noted within  cerebral hemispheres bilaterally.    There has been mild interval decrease in size of the lateral  ventricles and 3rd ventricle when compared with 05/25/2025.    There is mild bowing of the midline structures from right to left.    There is mild mucosal thickening noted within the inferior maxillary  sinuses. There is partial opacification of the left frontal sinus.  Minimal mucosal thickening is noted within scattered ethmoid air  cells.    The mastoid air cells are clear.    Impression  Postoperative changes are again identified compatible with a previous  right posterior craniectomy with surgical mesh overlying the  craniotomy site.    Immediately deep to the surgical mesh overlying the craniectomy site,  there is again evidence of a mixed attenuation extra-axial likely  epidural hemorrhagic fluid and air collection measuring a proximally  9 mm in thickness on both the current and prior study.    There is residual but mildly improved pneumocephalus when compared  with the prior study.    There is again evidence of a surgical resection cavity deep to the  craniotomy site within the parieto-occipital region. A small amount  of hyperdense hemorrhage is again noted along the margins of the  surgical resection cavity similar when compared with the prior study.  There is again evidence of hypodensity compatible with nonspecific  edema surrounding the surgical resection cavity within the right  cerebral hemisphere similar when compared with the prior study dated  05/25/2025.    There is again evidence of mixed  signal predominantly hypodensity  within the left temporal lobe corresponding to a mixed signal area of  hemorrhage in edema on the prior MRI dated 05/23/2025 which is  without significant interval change when compared with the prior CT  study dated 05/25/2025.    Additional nonspecific white matter changes are again noted within  cerebral hemispheres bilaterally.    There has been mild interval decrease in size of the lateral  ventricles and 3rd ventricle when compared with 05/25/2025.    MACRO:  None.    Signed by: Guanaco Sheldon 5/27/2025 7:08 AM  Dictation workstation:   [id number]      CT head wo IV contrast 05/25/2025    Narrative  Interpreted By:  Neil Boyd,  STUDY:  CT HEAD WO IV CONTRAST; ;  5/25/2025 3:52 am    INDICATION:  Signs/Symptoms:Stability.      COMPARISON:  CT head from 05/23/2025. MRI brain from 05/23/2025.    ACCESSION NUMBER(S):  [id number]    ORDERING CLINICIAN:  CHIDI RONDON    TECHNIQUE:  Routine unenhanced CT of the head was performed.    FINDINGS:  There are postoperative changes from prior right posterior craniotomy  with placement of mesh cranioplasty. There is a surgical cavity  within the underlying brain parenchyma. Air within the surgical  cavity has significantly decreased since prior and there is  corresponding increase in fluid within the surgical cavity. Small  foci of hyperattenuation along the posterior aspect of the resection  cavity, possibly reflecting blood products/dural thickening are  unchanged. There is hypoattenuation within the surrounding brain  parenchyma, better evaluated on the recent MRI brain, and unchanged,  accounting for differences in imaging technique. Associated mass  effect is also unchanged.    Air within the supratentorial ventricles has overall decreased,  although there is increase in air within the bilateral frontal horns.  Some of this could be due to interval redistribution of air.  Additional areas of air within the extra-axial spaces  within the  intracranial compartment have overall decreased.    Thin subdural fluid remains along the right interhemispheric falx  measuring up to 6 mm in maximum thickness, unchanged in thickness  compared to the recent MRI.    Stable hypoattenuation with internal area of hyperattenuation located  within the left lateral temporal lobe, most consistent with an  evolving subacute hematoma. Mild surrounding mass effect.    Mild mucosal thickening within some of the paranasal sinuses.  Near-complete opacification of the left frontal sinus with mucosal  thickening, unchanged. Mastoid air cells are essentially clear.    Impression  1. Overall decrease in pneumocephalus, noting that there is mild  increase in air located within the temporal horns, may be due to  redistribution of air.    2. Due to decrease in air within the surgical cavity, there is  increase in fluid within the surgical cavity. Surrounding mass effect  is unchanged.    3. Otherwise, unchanged CT head with findings detailed above.    This study was interpreted at [facility].    MACRO:  None    Signed by: Neil Boyd 5/25/2025 10:43 AM  Dictation workstation:   [id number]    MR brain w and wo IV contrast 05/23/2025    Narrative  Interpreted By:  Neil Boyd,  and Alex Bishop  STUDY:  MR BRAIN W AND WO IV CONTRAST;  5/23/2025 10:50 pm    INDICATION:  Signs/Symptoms:post op surgical drainage.  Right occipital GBM post  resection December 2023 post chemoradiation with tumor regression  post redo tumor resection 2/20, incision and drainage with 4/6 right  cranial wound washout and mesh cranioplasty with kaleb purulence. No  with new lethargy, weakness and headache      COMPARISON:  Same day CT head and 04/05/2025 MRI brain    ACCESSION NUMBER(S):  [id number]    ORDERING CLINICIAN:  KALEB CAMARILLO    TECHNIQUE:  Axial T2, FLAIR, DWI, gradient echo T2 and  T1 weighted images of  brain were acquired. Post contrast T1  weighted images were acquired  after administration of 12 ML Dotarem gadolinium based intravenous  contrast.    FINDINGS:  The exam is significantly limited by susceptibility artifact from  right-greater-than-left pneumocephalus as well as motion artifact.    Postsurgical changes from right parieto-occipital craniectomy with  mesh cranioplasty mass resection are seen. Extra-axial fluid  subjacent to the craniotomy site measures 0.8 cm in greatest  thickness, previously 1.7 cm on the MRI brain from 04/05/2025.    Again, there is large amount of air located within the surgical  cavity, likely unchanged since the prior CT and accounting for  differences in imaging technique. Additional areas of pneumocephalus  located within the ventricles, sulci, and basilar cisterns are likely  unchanged compared to the recent CT head study.    Enhancement surrounding the resection cavity in the right  parieto-occipital lobes has significantly decreased with residual  small amount of enhancement seen primarily along the inferior and  medial margin in the right occipital lobe (series 15, image 49 of 93).    There is T2 hyperintense signal within the surrounding brain  parenchyma in the right frontal, parietal, and occipital lobes,  extending into the right temporal lobe and deep white matter tracts  and into the splenium of the corpus callosum crossing across the  midline, overall similar to the prior MRI brain study.    There is stable narrowing of the posterior portion of the atrium of  the right lateral ventricle due to mass effect from adjacent  postoperative changes and air within the surgical cavity, unchanged  compared to the recent CT.    Compared to the prior MRI brain, there is a new 6 mm in maximum  thickness subdural fluid collection along the right parieto-occipital  lobes with essentially the same intensity as CSF. There is mild  surrounding mass effect. Findings are probably unchanged since the  recent CT head study. Of  note, the inferior portion of this fluid  collection demonstrates questionable restricted diffusion (series 9,  image 63 of 86), however this may be artifactual and due to air  within the adjacent surgical cavity, recommend attention on follow-up  imaging.    There is a 4.5 x 2 cm lesion located within the left lateral temporal  lobe which demonstrates internal regions of T1 hyperintensity and T2  hyperintensity predominantly and there is associated susceptibility  artifact. Findings are most consistent with a subacute hematoma. Due  to presence of T1 hyperintense signal, it is somewhat difficult to  evaluate for enhancement. There is mild surrounding mass effect  including sulcal effacement, unchanged.    Overall stable mild-to-moderate supratentorial ventriculomegaly  compared to the recent CT head study but increased since the prior  MRI brain study.    Nonspecific white matter and pontine T2 FLAIR hyperintensities likely  reflect small vessel ischemic changes given patient age. There is no  significant midline shift.    There is again complete opacification of the left frontal sinus with  mucosal thickening and T1 hyperintense signal which may reflect  inspissated mucus. There is also mucosal thickening within some of  the other paranasal sinuses including within the ethmoid air cells  and left maxillary sinus. Mastoid air cells are clear.    Impression  Severely limited exam secondary to susceptibility artifact from  extensive pneumocephalus and motion artifact.    1. Evolving postoperative changes from right posterior craniotomy for  resection of an underlying mass.    2. Large amount of intracranial pneumocephalus, including within the  surgical cavity, is probably unchanged since the recent CT head study.    3. Enhancement within the brain parenchyma surrounding the resection  cavity has decreased since the prior MRI brain study.    4. Subacute hematoma located within the left lateral temporal lobe is  new  since the prior MRI but unchanged in extent compared to the  recent CT head study.    5. New, compared to the prior MRI brain, subdural fluid collection  along the right posterior interhemispheric falx and adjacent to the  surgical cavity which may reflect evolving postoperative changes. The  inferior portion of this fluid collection demonstrates questionable  restricted diffusion which may be artifactual and due to air within  the adjacent surgical cavity, recommend attention on follow-up  imaging to exclude the presence of infectious process.    6. Supratentorial ventriculomegaly is unchanged since the recent CT  head but is new since the prior MRI brain.    If clinically warranted, consider repeat MRI of the brain with  intravenous contrast, after resolution of intracranial pneumocephalus  to reassess findings.      I personally reviewed the images/study and I agree with the resident  Dario Johnson's findings as stated. This study was interpreted  at [place], [place].    MACRO:  None    Signed by: Neil Boyd 5/24/2025 8:01 AM  Dictation workstation:   [id number]      MR brain w and wo IV contrast 04/05/2025    Narrative  Interpreted By:  Tony Terrazas,  Maggi Horton  STUDY:  MR BRAIN W AND WO IV CONTRAST;  4/5/2025 8:48 pm    INDICATION:  Signs/Symptoms:c/f infection.      COMPARISON:  MRI brain: 03/16/2025.    ACCESSION NUMBER(S):  [id number]    ORDERING CLINICIAN:  NED CHAIREZ    TECHNIQUE:  Multi planar multisequence MR imaging of the brain was performed with  and without contrast. A total of 13 ML Dotarem gadolinium based  intravenous contrast was administered as part of the examination.    FINDINGS:  There is re-demonstration of postsurgical changes status post  posterior craniotomy for glioblastoma debulking in the right  parieto-occipital lobe. The scalp soft tissues overlying the surgical  bed demonstrate trace interspersed focal areas of  edema with no  significant enhancement or soft tissue collections. In the  intracranial compartment/in the surgical bed there is  re-demonstration of a large surgical cavity which measures 7.3 x 5.8  x 5.2 cm similar compared to prior imaging.    In the extra-axial space between the dura and the craniotomy  overlying the surgical cavity there is re-demonstration of a  heterogenous predominantly T2 hyperintense fluid collection which  measures 1.2 cm in thickness which is worsened compared to prior  imaging (previously measuring 0.5 cm). This collection imposes mild  mass effect upon the adjacent parenchyma. On DWI the aforementioned  collection demonstrates interspersed focal areas of diffusion  restriction in the periphery with mild enhancement.    Deep in the surgical cavity there is re-demonstration of heterogenous  fluid contents with the amount of fluid remaining similar compared to  prior imaging. There is re-demonstration of diffusion restriction  along the margins of the resection and within the dependent portions  of the collection which is increased in conspicuity when compared to  prior MRI imaging. Postcontrast imaging there is peripheral  enhancement and thickening throughout the cavity which is slightly  worsened compared to prior MRI imaging.    There is re-demonstration of abnormal T2/FLAIR hyperintense signal  around the resection cavity and throughout the right cerebral  hemisphere with involvement of the splenium of the corpus callosum  and involvement of the subependymal surface of the trigone and  occipital horn of the left lateral ventricle which appears similar  compared to prior imaging.    Nonspecific nonenhancing non restriction subcentimeter foci of T2  FLAIR hyperintensity are again noted in the bilateral subcortical  white matter areas which are likely sequela of chronic microvascular  ischemic changes.    There is re-demonstration of 5 mm leftward midline deviation which  remain similar  compared to prior imaging. CSF spaces remain similar  compared to prior imaging.      Similar complete opacification of the left frontal sinus. The  remaining paranasal sinuses are clear. Mastoid air cells are clear.    Impression  1.  Postsurgical changes status post right posterior craniotomy for  glioblastoma debulking in the right parieto-occipital lobe.  2.  Interval increase in extra-axial fluid collection overlying the  deep surgical craniotomy bed with focal areas of diffusion  restriction and peripheral enhancement. Additionally there is  interval development of layering contents deep within surgical cavity  and surrounding the cavity which demonstrate diffusion restriction.  Overall the constellation of findings are suggestive of superimposed  infection/abscess particularly given clinical suspicion of such.  3. Additional findings remain similar compared to prior imaging and  as described above.      I personally reviewed the images/study and I agree with the findings  as stated. This study was interpreted at [place], [place].    MACRO:  None    Signed by: Tony Terrazas 4/6/2025 7:16 AM  Dictation workstation:   [id number]    MR brain presurgical perfusion and fingerprinting 01/27/2025    Narrative  Interpreted By:  Tamara Garcias and Hooper Grayson  STUDY:  MR BRAIN PRESURGICAL PERFUSION AND FINGERPRINTING;  1/27/2025 5:25 pm    INDICATION:  Signs/Symptoms:h/o glioblastoma, on temozolomide, treatment response.  Stroke protocol.    ,C71.9 Malignant neoplasm of brain, unspecified    IDH wild-type MGMT promoter methylated.  Status post concurrent chemoradiotherapy completed March 28, 2024.    COMPARISON:  MR perfusion of the brain conducted November 22, 2024    ACCESSION NUMBER(S):  [id number]    ORDERING CLINICIAN:  MONCHO ACEVEDO    TECHNIQUE:  MRI of the brain was obtained on a 3 Chastity magnet before and after  the fractionated IV administration of 21 mL  Dotarem gadolinium  contrast agent. Axial diffusion-weighted imaging with ADC map,  precontrast T1, precontrast axial FLAIR, axial T2 star, axial T2 with  fat saturation, and multiplanar volumetric postcontrast T1 sequences.  Furthermore, perfusion imaging was obtained after the placement of  regions of interest along the circumferential enhancing right  frontoparietal mass with contralateral white matter selected as the  region of interest control. Perfusion and permeability maps were  generated using [employer] software.    FINDINGS:  Status post right parieto-occipital craniotomy for subjacent neoplasm  resection. There is again a mixed signal extra-axial collection deep  to the craniotomy and overlying the right parietal and occipital  lobes measuring approximately 2 cm in thickness. There are again  components demonstrating abnormal signal on diffusion weighted  imaging which could be due to artifact from blood products noting the  sterility of the collection can not be determined on the basis of  imaging. There is susceptibility artifact on gradient echo T2  weighted imaging deep to the craniotomy and within and along the  periphery of the collection as well as extending into the underlying  parenchyma which could be due to blood products or mineralization.  There is again nonspecific dural thickening and enhancement deep to  the craniotomy and overlying the right cerebral hemisphere thought to  be postoperative in nature, at least in part.    There is again an irregularly-shaped surgical resection cavity  centered deep to the craniotomy. There is frondlike and irregular  enhancement observed in the operative bed of the right parietal and  occipital lobes, measuring in aggregate 5.4 x 5.8 x 7.5 cm.  Additional overlying dural enhancement is noted. Comparable  measurements on comparison examination dated November 22, 2024 reveal  these measurements to be approximately 5.4 x 5.8 x 7.4 cm. However,  some of the more  nodular and masslike areas of enhancement along the  anterior and deep margins of the surgical resection cavity have  increased in size. These areas are difficult to accurately measure  due to irregular shape. However, a dominant component along the  anterior margin of the surgical resection cavity as seen on image 133  of 240, series 21 currently measures approximately 2.7 cm in AP  dimension by 2.2 cm in transverse dimension compared with 1.9 cm in  AP dimension by 2.3 cm in transverse dimension.    There has been interval increase in nonenhancing signal abnormality  with involvement of the right frontal, parietal, occipital, and  temporal lobes. There is again extension across the corpus callosum,  most significantly the splenium with similar to mildly increased  signal abnormality in the white matter adjacent to the posterior body  of the left lateral ventricle.    There has been significant increase in right to left midline shift,  now measuring 0.6 cm (axial volumetric T1 postcontrast series 21,  image 145) versus 0.1 cm on the comparison exam. Effacement of the  right ventricular system has worsened in the interval. Partial  effacement of the basilar cisterns is observed, most notably the  right ambient cistern. There is increased in asymmetric sulcal  effacement as well.    Diffusion-weighted imaging demonstrates very minimal signal  abnormality surrounding the surgical resection cavity. There is  otherwise no evidence of diffusion restriction suggestive of acute  ischemic injury.      There is mucosal thickening in the left frontal sinus and to a lesser  extent scattered through the remaining paranasal sinuses. There is  opacification of a few mastoid air cells.    MR PERFUSION:    Multiple regions of interest were placed circumferentially along the  enhancing right parieto-occipital mass with a single contralateral  control region of interest placed over normal white matter.    Relative blood volume on  initial region of interest placement is  observed at 2.58. There is marked asymmetric high-amplitude profusion  wash-in and returned to baseline. Permeability imaging demonstrates  rapid upslope and subsequent rise.    Subsequent relative cerebral blood volumes ratios were calculated to  be 2.26, 2.55, 1.47, 0.66, 1.41, 1.61, 1.38, 1.17, 1.13, and 1.37  compared with contralateral normal white matter.    Increased relative cerebral blood flow volumes at 2.26 and 2.55  correspond to increasing enhancement and anteromedial progression of  enhancement as compared to the examination dated November 22, 2024.  These lesions demonstrate high amplitude rapid wash-in and return to  baseline as well as significant permeability upstrokes and climbing  plateaus.    Impression  Redemonstration of postoperative changes for right parieto-occipital  mass resection. There has been interval progression in irregular,  thick enhancement along the periphery of the surgical resection  cavity particularly along the anterior and deep margins. There is  abnormal elevation of corrected relative cerebral blood volume ratios  compared with contralateral normal white matter worrisome for disease  progression. Additionally there has been progression of nonenhancing  signal abnormality on FLAIR and T2 weighted imaging which could  reflect therapy changes or infiltrative neoplasm. There is increased  mass effect including leftward midline shift and partial effacement  of the basilar cisterns.    I personally reviewed the images/study and I agree with the findings  as stated.    MACRO:  Murtaza Ovalle discussed the significance and urgency of this  critical finding by epic secure chat with  MONCHO ACEVEDO on 1/28/2025  at 9:44 am.  (**-RCF-**) Findings:  See findings.    Signed by: Tamara Garcias 1/28/2025 10:31 AM  Dictation workstation:   [id number]  not applicable          Assessment & Plan     Assessment/Plan   Diagnoses and all orders for this  visit:  GBM (glioblastoma multiforme) (Multi)  Wound dehiscence    Patient is doing clinically much better at this visit. Incision healing well.     He is on antibiotics. Recommend ID followup.    Recommend continued NeuroOnc followup for brain tumor management and treatment     Neurosurgery followup in 3 months, sooner if issues arise.                  Medical History     Medical History[1]  Surgical History[2]  Social History[3]  Family History[4]  Allergies[5]  Current Outpatient Medications   Medication Instructions    acetaminophen (TYLENOL) 650 mg, Every 6 hours PRN    apixaban (ELIQUIS) 5 mg, oral, 2 times daily    atorvastatin (LIPITOR) 40 mg, oral, Daily    bisacodyl (DULCOLAX) 10 mg, rectal, Daily PRN    Briviact 50 mg, oral, Every 12 hours scheduled    CALCIUM CARBONATE-VITAMIN D3 ORAL 1 capsule, Daily    dexAMETHasone (DECADRON) 4 mg, oral, Every morning    famotidine (PEPCID) 10 mg, oral, Daily    heparin sodium,porcine/PF (HEPARIN, PORCINE, LOCK FLUSH IV) 5 mL, intravenous, Daily PRN, Use SASH method. Use 5mL after 20 mL NS after labs    hydrOXYzine HCL (ATARAX) 10 mg, oral, Every 6 hours PRN    krill-omega-3-dha-epa-lipids 543-55-51-50 mg capsule 1 capsule, Daily    ondansetron (ZOFRAN) 8 mg, oral, Every 8 hours PRN, Take one hour prior to each chemotherapy dose in addition to every 8 hours as needed for nausea/vomiting    ondansetron (ZOFRAN) 8 mg, oral, Every 8 hours PRN    pantoprazole (ProtoNix) 40 mg EC tablet TAKE 1 TABLET BY MOUTH IN THE MORNING 30 MINUTES BEFORE BREAKFAST.    polyethylene glycol (GLYCOLAX, MIRALAX) 17 g, oral, Daily    prochlorperazine (COMPAZINE) 10 mg, oral, Every 6 hours PRN    sennosides (SENNA LAX ORAL) 1 tablet, Daily    SODIUM CHLORIDE 0.9 %, FLUSH, INJ 10 mL, intravenous, Daily PRN, Use 10 mL to flush using SASH method, use 20 mL NS after lab draws    sodium chloride 0.9% parenteral solution 250 mL with vancomycin 1,000 mg recon soln 1,000 mg 1,000 mg, intravenous,  Every 24 hours    ubidecarenone (COENZYME Q10, BULK, MISC) 1 Capful, Daily    vitamin B complex-vitamin C-folic acid (Nephrocaps) 1 mg capsule 1 capsule, oral, Daily            [1]   Past Medical History:  Diagnosis Date    Brain cancer (Multi)     GERD (gastroesophageal reflux disease)     Hyperlipidemia     Hypertension     Seizure disorder (Multi)    [2]   Past Surgical History:  Procedure Laterality Date    BRAIN SURGERY      CHOLECYSTECTOMY  January 25, 2020    IR CVC PICC  4/15/2025    IR CVC PICC 4/15/2025 Dario Martinez MD [facility]    OTHER SURGICAL HISTORY  07/24/2019    Colonoscopy    OTHER SURGICAL HISTORY  07/26/2021    Endoscopy    VASECTOMY  Abt 1991   [3]   Social History  Tobacco Use    Smoking status: Never     Passive exposure: Never    Smokeless tobacco: Never   Vaping Use    Vaping status: Never Used   Substance Use Topics    Alcohol use: Not Currently    Drug use: Yes     Types: Marijuana     Comment: thc sleep gummies   [4] No family history on file.  [5] No Known Allergies

## 2025-05-16 ENCOUNTER — LAB (OUTPATIENT)
Dept: LAB | Facility: HOSPITAL | Age: 70
End: 2025-05-16
Payer: MEDICARE

## 2025-05-16 ENCOUNTER — DOCUMENTATION (OUTPATIENT)
Dept: HOME HEALTH SERVICES | Facility: HOME HEALTH | Age: 70
End: 2025-05-16
Payer: MEDICARE

## 2025-05-16 ENCOUNTER — OFFICE VISIT (OUTPATIENT)
Dept: HEMATOLOGY/ONCOLOGY | Facility: HOSPITAL | Age: 70
End: 2025-05-16
Payer: MEDICARE

## 2025-05-16 VITALS
DIASTOLIC BLOOD PRESSURE: 84 MMHG | WEIGHT: 139.9 LBS | TEMPERATURE: 97.7 F | SYSTOLIC BLOOD PRESSURE: 137 MMHG | HEART RATE: 85 BPM | OXYGEN SATURATION: 100 % | RESPIRATION RATE: 14 BRPM | BODY MASS INDEX: 21.91 KG/M2

## 2025-05-16 DIAGNOSIS — C71.9 GBM (GLIOBLASTOMA MULTIFORME) (MULTI): ICD-10-CM

## 2025-05-16 DIAGNOSIS — Z51.11 ENCOUNTER FOR CHEMOTHERAPY MANAGEMENT: ICD-10-CM

## 2025-05-16 DIAGNOSIS — C71.9 GBM (GLIOBLASTOMA MULTIFORME) (MULTI): Primary | ICD-10-CM

## 2025-05-16 LAB
ALBUMIN SERPL BCP-MCNC: 3.9 G/DL (ref 3.4–5)
ALP SERPL-CCNC: 97 U/L (ref 33–136)
ALT SERPL W P-5'-P-CCNC: 20 U/L (ref 10–52)
ANION GAP SERPL CALC-SCNC: 17 MMOL/L (ref 10–20)
AST SERPL W P-5'-P-CCNC: 20 U/L (ref 9–39)
BASOPHILS # BLD AUTO: 0.02 X10*3/UL (ref 0–0.1)
BASOPHILS NFR BLD AUTO: 0.6 %
BILIRUB SERPL-MCNC: 0.8 MG/DL (ref 0–1.2)
BUN SERPL-MCNC: 11 MG/DL (ref 6–23)
CALCIUM SERPL-MCNC: 8.8 MG/DL (ref 8.6–10.3)
CHLORIDE SERPL-SCNC: 100 MMOL/L (ref 98–107)
CO2 SERPL-SCNC: 23 MMOL/L (ref 21–32)
CREAT SERPL-MCNC: 1.05 MG/DL (ref 0.5–1.3)
EGFRCR SERPLBLD CKD-EPI 2021: 78 ML/MIN/1.73M*2
EOSINOPHIL # BLD AUTO: 0.06 X10*3/UL (ref 0–0.7)
EOSINOPHIL NFR BLD AUTO: 1.9 %
ERYTHROCYTE [DISTWIDTH] IN BLOOD BY AUTOMATED COUNT: 16.3 % (ref 11.5–14.5)
FERRITIN SERPL-MCNC: 1154 NG/ML (ref 20–300)
GLUCOSE SERPL-MCNC: 101 MG/DL (ref 74–99)
HBV CORE AB SER QL: NONREACTIVE
HBV SURFACE AB SER-ACNC: 7.5 MIU/ML
HBV SURFACE AG SERPL QL IA: NONREACTIVE
HCT VFR BLD AUTO: 29.5 % (ref 41–52)
HGB BLD-MCNC: 9.4 G/DL (ref 13.5–17.5)
IMM GRANULOCYTES # BLD AUTO: 0.03 X10*3/UL (ref 0–0.7)
IMM GRANULOCYTES NFR BLD AUTO: 0.9 % (ref 0–0.9)
IRON SATN MFR SERPL: 26 % (ref 25–45)
IRON SERPL-MCNC: 64 UG/DL (ref 35–150)
LYMPHOCYTES # BLD AUTO: 0.7 X10*3/UL (ref 1.2–4.8)
LYMPHOCYTES NFR BLD AUTO: 21.9 %
MCH RBC QN AUTO: 31.9 PG (ref 26–34)
MCHC RBC AUTO-ENTMCNC: 31.9 G/DL (ref 32–36)
MCV RBC AUTO: 100 FL (ref 80–100)
MONOCYTES # BLD AUTO: 0.68 X10*3/UL (ref 0.1–1)
MONOCYTES NFR BLD AUTO: 21.3 %
NEUTROPHILS # BLD AUTO: 1.71 X10*3/UL (ref 1.2–7.7)
NEUTROPHILS NFR BLD AUTO: 53.4 %
NRBC BLD-RTO: 0 /100 WBCS (ref 0–0)
PLATELET # BLD AUTO: 227 X10*3/UL (ref 150–450)
POTASSIUM SERPL-SCNC: 3.6 MMOL/L (ref 3.5–5.3)
PROT SERPL-MCNC: 6.6 G/DL (ref 6.4–8.2)
RBC # BLD AUTO: 2.95 X10*6/UL (ref 4.5–5.9)
SODIUM SERPL-SCNC: 136 MMOL/L (ref 136–145)
TIBC SERPL-MCNC: 246 UG/DL (ref 240–445)
UIBC SERPL-MCNC: 182 UG/DL (ref 110–370)
WBC # BLD AUTO: 3.2 X10*3/UL (ref 4.4–11.3)

## 2025-05-16 PROCEDURE — 87340 HEPATITIS B SURFACE AG IA: CPT

## 2025-05-16 PROCEDURE — 36415 COLL VENOUS BLD VENIPUNCTURE: CPT

## 2025-05-16 PROCEDURE — 99215 OFFICE O/P EST HI 40 MIN: CPT

## 2025-05-16 PROCEDURE — 82728 ASSAY OF FERRITIN: CPT

## 2025-05-16 PROCEDURE — 84075 ASSAY ALKALINE PHOSPHATASE: CPT

## 2025-05-16 PROCEDURE — 1126F AMNT PAIN NOTED NONE PRSNT: CPT

## 2025-05-16 PROCEDURE — 86704 HEP B CORE ANTIBODY TOTAL: CPT

## 2025-05-16 PROCEDURE — 83540 ASSAY OF IRON: CPT

## 2025-05-16 PROCEDURE — 86706 HEP B SURFACE ANTIBODY: CPT

## 2025-05-16 PROCEDURE — 1111F DSCHRG MED/CURRENT MED MERGE: CPT

## 2025-05-16 PROCEDURE — 85025 COMPLETE CBC W/AUTO DIFF WBC: CPT

## 2025-05-16 RX ORDER — DEXAMETHASONE 2 MG/1
4 TABLET ORAL EVERY MORNING
Qty: 60 TABLET | Refills: 0 | Status: SHIPPED | OUTPATIENT
Start: 2025-05-16 | End: 2025-05-30 | Stop reason: HOSPADM

## 2025-05-16 ASSESSMENT — ENCOUNTER SYMPTOMS
FATIGUE: 1
EXTREMITY WEAKNESS: 0
ABDOMINAL PAIN: 0
NAUSEA: 0
SEIZURES: 0
FEVER: 0
HEADACHES: 0
CONFUSION: 0
SPEECH DIFFICULTY: 0
CHILLS: 0
DIARRHEA: 0
VOMITING: 0
CONSTIPATION: 0

## 2025-05-16 ASSESSMENT — PAIN SCALES - GENERAL: PAINLEVEL_OUTOF10: 0-NO PAIN

## 2025-05-16 NOTE — HH CARE COORDINATION
On site visit with patients spouse today at [facility] for bedside education related to home IV therapy.  Patient and spouse Brittany were present along with his JADA who is a pharmacist.  Patients antibiotic (Vanco) will be delivered in Eclipse balls per spouse request, Ok'd by [facility] pharmacist. All necessary supplies were brought to the visit and set up using FunnelFire method of display.  Spouse was able to connect a demo saline syringe onto a demo line without issue.  We discussed prepping of supplies, removing air bubbles from saline syringes, SASH method, opening and closing of clamps and how/when she will be contacted by pharmacy and nursing for delivery and home care visits.  Spouse Brittany felt comfortable after the demonstration, understands she will be administering the medication and all questions answered before I left.  Spouse was provided with my contact number should she have any questions/problems going forward as well as the main number for [facility]

## 2025-05-16 NOTE — PROGRESS NOTES
[facility]  Neuro-Oncology    Patient: Enmanuel Ahumada  MRN: [id number]  Date of visit: 05/16/25  Visit type: In-person clinic visit  Clinician: Jacki Simpson PA-C    Oncological & Treatment History:  Oncology History Overview Note   68 year old left-handed male with h/o HLD, cholecystectomy, Osborn's esophagus, on ASA 81 for cardioprotection presented to [facility] ED on 12/24/23 with 6 weeks of headaches, 3 weeks of increased confusion and running into things. Left-sided hemianopia on exam. CT head revealed a 1 mm midline leftward shift with a 7.3 cm x 7.1 cm x 5 cm right parietal mass. Transferred to [facility] for neurosurgical management.     GBM (glioblastoma multiforme) (Multi)   12/29/2023 Surgery    Right craniotomy for tumor resection (Ritu Carrasco MD)     12/29/2023 Pathology    Final diagnosis reported 1/18/24:  A, B.  Right brain mass, biopsy and removal:  - Malignant glioma, morphologically consistent with glioblastoma; positive for MGMT methylation     12/29/2023 Initial Diagnosis    GBM (glioblastoma multiforme) (CMS/HCC)     1/3/2024 Tumor Board    [facility] Tumor Board tumor board recommendations: Referral to radiation oncology and neuro-oncology.       2/15/2024 - 3/21/2024 Chemotherapy    Temozolomide with Concurrent Radiation, 42 Day Cycles     2/15/2024 - 3/28/2024 Radiation Therapy    Radiation Treatments       Active   No active radiation treatments to show.     Completed   Brain Boost_R (Started on 3/20/2024)   Most recent fraction: 182 cGy given on 3/28/2024   Total given: 1,274 cGy / 1,273 cGy  (7 of 7 fractions)   Elapsed Days: 8   Technique: 3D        Partial brain R (Started on 2/15/2024)   Most recent fraction: 182 cGy given on 3/19/2024   Total given: 4,186 cGy / 4,182 cGy  (23 of 23 fractions)   Elapsed Days: 33   Technique: 3D                        5/1/2024 - 1/27/2025 Chemotherapy    Temozolomide (5/28), 28 Day Cycles     3/18/2025 -  3/18/2025 Research Study Participant    ([facility]) [id number] RD GBM - SJ-PEG 20 / Lomustine, 42 Day Cycles  Plan Provider: Kyle Rolon MD  Treatment goal: [No plan goal]  Line of treatment: [No plan line of treatment]  Associated studies: GBM AGILE GLOBAL ADAPTIVE TRIAL MASTER PROTOCOL     3/20/2025 - 3/20/2025 Research Study Participant    ([facility]) [id number] RD GBM - SJ-PEG 20 / Lomustine, 42 Day Cycles  Plan Provider: Kyle Rolon MD  Treatment goal: [No plan goal]  Line of treatment: [No plan line of treatment]  Associated studies: GBM AGILE GLOBAL ADAPTIVE TRIAL MASTER PROTOCOL     4/4/2025 -  Chemotherapy    Lomustine, 42 Day Cycles           Interval History (NEWEST at BOTTOM of section):  1/15/2024: Since getting discharged to home, he has been slowly recovering from the surgery. The final pathology is still pending, but it is likely GBM. He notes some persistent incisional pain around the staples but no regular headaches now (resolved). He has some minor confusion, mild loss of balance, difficulty using his computer and phone, and a loss of vision to the left. He remains on Keppra 1000 mg bid (although he claims he has never had a verified seizure), and is tapering off of Decadron. He is seeing Dr. Palomino from Rad Onc soon to begin RT planning. He is seeing Neurosurgery today to remove the staples.    02/19/24: Enmanuel Ahumada presents to neuro-oncology clinic today for follow-up during early chemoradiation (2/15/24 to 3/27/24). He is accompanied by his wife, Brittany, and granddaughter, Rosendo. He is tolerating chemoRT well thus far without nausea, vomiting or significant fatigue. He does have constipation, but it is manageable with sennosides. States his symptoms at initial presentation have improved - much less confusion, still has the left visual field deficit but is learning to adapt and not run into things as much. Endorses occasional mild headaches (1 or 2/10) that resolve spontaneously or  with Tylenol. Not on any dexamethasone at this time. Remains on Keppra 500 mg BID. No history of seizure. No focal weakness or gait difficulty. Notes walking up stairs requires more concentration than in the past and he is unable to walk 2-3 miles like he used to. Prior to diagnosis, he went to the gym 3x/week. Still able to walk 1 mile outside if the weather is nice.    3/25/2024: Since the last visit, he has continued the course of chemo-RT and is now s/p RT Fraction #27 of 30 -- finalize this week. He has fatigue from the treatment, but no GI upset, emesis, diarrhea, loss of appetite, or change in taste. He is off of the steroids now. The Caris results are in and demonstrate EGFR amplification, TERT mutations, and loss of CDKN2A/B. He is active at home with chores and daily walking. He remains on Keppra 500 mg bid.    04/29/24: Dash presents to clinic accompanied by wife and daughter for follow-up prior to starting temozolomide cycle 1. He is well overall. Sleeping well, has a strong appetite, is walking 1-2 miles/day. States there's definite improvement in his brain fog and trouble reading that have been present daily since surgery. He has had only a couple 1 out of 10 headaches that spontaneously resolved. Denies seizures, speech changes, memory changes, focal weakness, and gait difficulty.    05/23/24: Dash presents to clinic today accompanied by his wife for follow-up after temozolomide cycle 1. Tolerated the cycle very well without any n/v/d. He had constipation towards the end of the chemo week, but this relieved with Senna. He states he's more tired now and naps daily. Sleeps well at night with >8 uninterrupted hours/night and has no trouble falling asleep. Endorses a slightly decreased appetite. Drinks a lot of iced tea every day. Notes he's had brief infrequent headaches that spontaneously and quickly resolve. Has been wearing Optune for 2 weeks with >90% compliance (wearing >18 hours/day). Wife notes he  has slowed down a bit, physically and cognitively. Now takes them 30 min to do a mile walk each day rather than 20 min. Also had an instance of possible confusion where he repeatedly asked her which door they were going in to enter their house after a walk. Otherwise, no focal weakness, gait difficulty, or seizures.    6/28/2024: Since the last visit, he has completed the course of chemo-RT in late March, and then started adjuvant Temodar -- now s/p cycle #2. He has tolerated the chemo quite well so far, with persistent fatigue, but no GI upset, nausea, emesis, or diarrhea. However, he has developed significant thrombocytopenia -- 51k last week and 37k this week. He is also now on the Optune electrical array device, and is tolerating it well, without any scalp issues; > 18 hours per day. He is doing well neurologically, with a few minor headaches, but no seizures, speech issues, visual changes, new focal weakness, gait difficulty, or sensory loss. He is not on any Decadron. He remains active at home with chores and daily walks. He remains on Keppra 500 mg bid.    08/08/24: Dash presents to clinic for follow-up on temozolomide C3 D25. He tolerated the chemo days well without any nausea or vomiting. Endorses persistent fatigue, but feels it may be slightly less than normal. His wife reports increased confusion, decreased attention, and a shorter fuse since last week. Provides examples of Dash holding a sock in his hand, but then asking where his sock is, and then also being abnormally irritated by his grandchildren. Dash denies recent headaches except for one day when he felt the Optune cords were irritating his head; that HA was relieved by Tylenol. He is not currently on any steroids. Appetite is fair. He denies seizures, speech changes, focal weakness, and gait difficulty. Continues wearing Optune >18 hours/day.    9/16/2024: Since the last visit, he has completed the course of chemo-RT in late March, and then  continued adjuvant Temodar -- now s/p cycle #4. He has tolerated the chemo quite well so far, with persistent fatigue, but no GI upset, nausea, emesis, or diarrhea. However, he has developed significant thrombocytopenia -- 78k last week. He is also now on the Optune electrical array device, and is tolerating it well, without any scalp issues; > 18 hours per day (compliance 93%). He is doing well neurologically, with a few minor headaches, but no seizures, speech issues, visual changes, new focal weakness, gait difficulty, or sensory loss. He is on Decadron 2 mg/day. He remains active at home with chores and daily walks. He remains on Keppra 500 mg bid. KPS = 80.    11/01/24: Dash presents to clinic for follow-up on TMZ C6 D1. He tolerated the previous cycle well without n/v/d or headaches. He notes sometimes getting a mild headache from the Optune cords being unintentionally tugged. He also reports having constipation during chemo days, but they manage with stool softeners and laxatives. Dash states he has persistent brain fog or tiredness throughout his cycles. This does not interfere with his ADLs. He sleeps well at night. Mood is good; still maintains his gratitude journal. Currently tapering off dexamethasone -- at 1 mg/day right now.    12/05/2024: Since the last visit, he has completed the course of chemo-RT in late March, and then continued adjuvant Temodar -- now s/p cycle #6. He has tolerated the chemo quite well so far, with persistent fatigue, but no GI upset, nausea, emesis, or diarrhea. However, he has had some significant thrombocytopenia -- alisson 102k several weeks ago. He is also now on the Optune electrical array device, and is tolerating it well, without any scalp issues; > 18 hours per day (compliance > 90%). He is doing well neurologically, with a few minor headaches, but no seizures, speech issues, visual changes, new focal weakness, gait difficulty, or sensory loss. He is on Decadron 1 mg/day.  "He remains active at home with chores and daily walks. He remains on Keppra 500 mg bid. KPS = 80.    01/06/25: Dash presents today accompanied by his wife and granddaughter on TMZ C8D1. He tolerated the cycle well and states he feels mostly well overall with the exception of some \"annoying\" symptoms. He notes fatigue that is worse after chemo days and improves as his cycle progresses; fatigue does not limit his ADLs. He notes his STM is a little worse when he's tired. He continues to use his row machine and go on walks with his wife when the weather allows. He reports a few episodes of leaning forward on his walks that makes them cut the walk short; no falls; has not had this issue with other instances of walking. His dyscalculia persists; he occasionally reads prices incorrectly when online or grocery shopping. He remains on dexamethasone 1 mg every other day and doesn't notice a difference in how he feels on off days. He otherwise denies headaches, seizures, numbness/tingling, speech changes, memory changes, vision changes, focal weakness, or gait difficulty. Remains on Keppra 500 mg BID without issue. They are going on a cruise 2/6/25-2/16/25.    1/30/2025): Since the last visit, he has completed the course of chemo-RT in late March, and then continued adjuvant Temodar -- now s/p cycle #8. He has tolerated the chemo quite well so far, with persistent fatigue, but no GI upset, nausea, emesis, or diarrhea. However, he has had some significant thrombocytopenia -- alisson 102k several weeks ago. Unfortunately, the new brain MRI shows progression on T2/FLAIR, enhancement, and Perfusion. He was discussed at the Neuro-Oncology Tumor Board, with the recommendation to consider a new surgery and a clinical trial. He is also now on the [employer] electrical array device, and is tolerating it well, without any scalp issues; > 18 hours per day (compliance > 90%). He is doing well neurologically, with a few minor headaches, but no " seizures, speech issues, visual changes, new focal weakness, gait difficulty, or sensory loss. He is now off of Decadron, and had a flare-up of leg and general weakness for a few weeks; now doing better. He remains active at home with chores and daily walks. He remains on Keppra 500 mg bid. He is supposed to go on a RC cruise to [place] next week. KPS = 80.     3/03/2025): Since the last visit, Dash has had recent progression of the brain tumor on MRI. He has had a new surgical resection with Dr. Cortez on 2/20 and tolerated it well. He is considering going on the GCAR study for recurrent GBM, but is worried about the [id number] IV arm that would require multiple trips to [facility] each week -- he is too busy with family commitments to do it (grandchild). He would be fine with the options on the other 3 arms. He is doing well since surgery and denies any severe headaches, seizures, new focal weakness, gait difficulty, sensory loss, or falls. He is on Decadron 2 mg/day.     3/07/2025): Since the last visit, Dash has had recent progression of the brain tumor on MRI. He has had a new surgical resection with Dr. Cortez on 2/20 and tolerated it well. He is here today to sign up for the GCAR study for recurrent GBM. He is still worried about the [id number] IV arm, but would be fine with the options on the other 3 arms. He is doing well since surgery and denies any severe headaches, seizures, new focal weakness, gait difficulty, sensory loss, or falls. He is on Decadron 2 mg bid and also remains on Keppra 500 mg bid. A course of PT is pending.     3/31/2025): Since the last visit, Dash has had recent progression of the brain tumor on MRI. He has had a new surgical resection with Dr. Cortez on 2/20 and tolerated it well. He was then admitted for a few days with weakness and mental status changes. While in the hospital, the Decadron had to be increased to 4 mg bid, which disqualified him for the GCAR study. He responded well to the  steroids, and was able to go home after a few days. He feels weak all over and very tired, along with a few mild headaches, no seizures, no new focal weakness, falls, or sensory changes. He is on Decadron 1 mg bid and also remains on Keppra 500 mg bid.    05/16/25: Since the last visit, Dash had a long admission from 4/5/25 to 5/5/25 due to wound dehiscence from after his re-resection 2/20/25, now s/p wound washout on 4/6/25 and on IV vancomycin until 6/15/25 per ID; hospital course complicated by DVTs in BLE s/p IVC filter (d/t thrombocytopenia limiting anticoagulation). He had started lomustine on 4/4/25 and bevacizumab*** Now on Eliquis with plan for Vascular Med NPV on 5/22/25.     Lomustine treatment parameters:  Plts >100,000/mm3  Leukocytes >4000/mm3    Dose modification indications:  Leukocytes [id number] AND/OR Plts 25,000-74,999 -- admin 70% of prior dose  Leukocytes <2000 AND/OR Plts <25k -- admin 50% of prior dose      Review of Systems:  Review of Systems   Constitutional:  Positive for fatigue (malaise/brain fog). Negative for chills and fever.   Gastrointestinal:  Negative for abdominal pain, constipation, diarrhea, nausea and vomiting.   Skin:         Denies scalp irritation from Optune   Neurological:  Negative for extremity weakness, headaches, seizures and speech difficulty.   Psychiatric/Behavioral:  Negative for confusion.    All other systems reviewed and are negative.      Social History:  Social History     Tobacco Use   • Smoking status: Never     Passive exposure: Never   • Smokeless tobacco: Never   Vaping Use   • Vaping status: Never Used   Substance Use Topics   • Alcohol use: Not Currently   • Drug use: Yes     Types: Marijuana     Comment: thc sleep gummies      (wife, Brittany)    Past Medical History:  Past Medical History:  No date: Brain cancer (Multi)  No date: GERD (gastroesophageal reflux disease)  No date: Hyperlipidemia  No date: Hypertension  No date: Seizure disorder  (Multi)    Past Surgical History:  Past Surgical History:  No date: BRAIN SURGERY  January 25, 2020: CHOLECYSTECTOMY  4/15/2025: IR CVC PICC      Comment:  IR CVC PICC 4/15/2025 Dario Martinez MD [facility]IO  07/24/2019: OTHER SURGICAL HISTORY      Comment:  Colonoscopy  07/26/2021: OTHER SURGICAL HISTORY      Comment:  Endoscopy  Abt 1991: VASECTOMY    Family History:  family history is not on file.    Performance Score:  Karnofsky Score: 90 - Able to carry on normal activity; minor signs or symptoms of disease     Vital Signs:  Wt 63.5 kg (139 lb 14.4 oz)   BMI 21.91 kg/m²     Physical Exam:  Physical Exam  Vitals reviewed.   Constitutional:       General: He is not in acute distress.     Appearance: He is not toxic-appearing.   HENT:      Head: Normocephalic and atraumatic.      Comments: Scalp without any erythema or wounds  Eyes:      Extraocular Movements: Extraocular movements intact.      Conjunctiva/sclera: Conjunctivae normal.      Pupils: Pupils are equal, round, and reactive to light.   Pulmonary:      Effort: Pulmonary effort is normal. No respiratory distress.   Abdominal:      General: Abdomen is flat.   Musculoskeletal:         General: Normal range of motion.      Cervical back: Normal range of motion and neck supple.   Skin:     General: Skin is warm and dry.   Neurological:      Mental Status: He is alert and oriented to person, place, and time. Mental status is at baseline.      Cranial Nerves: No cranial nerve deficit.      Gait: Gait normal.      Comments: Impaired graphesthesia   Psychiatric:         Mood and Affect: Mood and affect normal.         Behavior: Behavior normal.       Results:  Lab Results   Component Value Date    WBC 2.9 (L) 05/15/2025    HGB 8.2 (L) 05/15/2025    HCT 23.9 (L) 05/15/2025    MCV 93 05/15/2025     05/15/2025       Lab Results   Component Value Date    GLUCOSE 97 05/15/2025    CALCIUM 8.5 (L) 05/15/2025     (L) 05/15/2025    K 3.5 05/15/2025    CO2 25  05/15/2025     05/15/2025    BUN 11 05/15/2025    CREATININE 1.16 05/15/2025       Lab Results   Component Value Date    ALT 30 05/07/2025    AST 18 05/07/2025    ALKPHOS 91 05/07/2025    BILITOT 1.1 05/07/2025     === 09/09/24 ===    MR BRAIN W AND WO CONTRAST    - Impression -  1. Evolving postoperative changes from resection of a right parietal  lobe mass.    2. Enhancement surrounding the surgical cavity is slightly more  conspicuous and enhancing lesion located more laterally within the  right parietal lobe, but contiguous with the enhancement surrounding  the resection cavity, is slightly increased in size and demonstrates  increased thickness of rim enhancement. This interval change is  favored to reflect sequela of treatment related changes.    3. Signal abnormality in the brain parenchyma surrounding the  resection cavity has overall slightly increased in extent, most  pronounced within the right frontal lobe and within the splenium of  the corpus callosum and is favored to primarily treatment related  changes. Associated narrowing of the posterior portion of the right  lateral ventricle has decreased.    This study was interpreted at [facility].    MACRO:  None    Signed by: Neil Boyd 9/9/2024 3:37 PM  Dictation workstation:   [id number]      Assessment & Plan:  Enmanuel Ahumada is a 69 y.o. male with PMH of HLD, Osborn's esophagus, ASA for cardioprotection, and a malignant glioma s/p surgical resection (12/2023). Final path morphologically consistent with glioblastoma, +MGMT promoter methylation. S/p chemoradiation (2/15-3/28/24). Currently on adjuvant temozolomide cycles and utilizing Optune/tumor-treating vu.    Dash continues to tolerate treatment fairly well with some mild symptoms and no recent neurological changes. His labs are stable without any significant abnormalities. He may proceed with TMZ C8 tonight.    **THIS NOTE IS NOT  FINALIZED***  Please confirm information with clinician as needed.      1. Glioblastoma, MGMT promoter methylated  Treatment 1: Temozolomide on days 1-5 of each 28-day cycle  Dose: 270 mg/day (150 mg/m2/day x BSA d/t h/o low plts, low ANC)  TMZ C8 start tonight, 1/6/25  C8 D22 labs due 1/27/25 +/- 48 hours  C8 D29 labs due 2/3/25 +/- 48 hours  Treatment 2: Optune/tumor-treating fields -- continue  Steroids: Has been doing dexamethasone 1 mg every other day -- can discontinue as of today; advised to take 1-2 mg if neuro sx develop and then call us  Seizure prophylaxis: Continue Keppra 500 mg BID  Caris advanced molecular phenotyping results yielded EGFR amplification  MRI brain tumor perfusion protocol prior to next visit  Follow up in about 4 weeks for reassessment, monitoring, and MRI review      No orders of the defined types were placed in this encounter.

## 2025-05-19 ENCOUNTER — LAB (OUTPATIENT)
Dept: LAB | Facility: CLINIC | Age: 70
End: 2025-05-19
Payer: MEDICARE

## 2025-05-19 ENCOUNTER — INFUSION (OUTPATIENT)
Dept: HEMATOLOGY/ONCOLOGY | Facility: CLINIC | Age: 70
End: 2025-05-19
Payer: MEDICARE

## 2025-05-19 VITALS
BODY MASS INDEX: 20.92 KG/M2 | DIASTOLIC BLOOD PRESSURE: 68 MMHG | WEIGHT: 133.6 LBS | OXYGEN SATURATION: 98 % | TEMPERATURE: 97.5 F | HEART RATE: 107 BPM | SYSTOLIC BLOOD PRESSURE: 111 MMHG | RESPIRATION RATE: 17 BRPM

## 2025-05-19 DIAGNOSIS — C71.9 GBM (GLIOBLASTOMA MULTIFORME) (MULTI): ICD-10-CM

## 2025-05-19 DIAGNOSIS — G93.89 BRAIN MASS: ICD-10-CM

## 2025-05-19 DIAGNOSIS — T81.30XA WOUND DEHISCENCE: ICD-10-CM

## 2025-05-19 LAB
ALBUMIN SERPL BCP-MCNC: 3.7 G/DL (ref 3.4–5)
ANION GAP SERPL CALC-SCNC: 11 MMOL/L (ref 10–20)
ANION GAP SERPL CALC-SCNC: 12 MMOL/L (ref 10–20)
APPEARANCE UR: CLEAR
BASOPHILS # BLD AUTO: 0 X10*3/UL (ref 0–0.1)
BASOPHILS NFR BLD AUTO: 0 %
BILIRUB UR STRIP.AUTO-MCNC: NEGATIVE MG/DL
BUN SERPL-MCNC: 20 MG/DL (ref 6–23)
BUN SERPL-MCNC: 20 MG/DL (ref 6–23)
CALCIUM SERPL-MCNC: 9 MG/DL (ref 8.6–10.3)
CALCIUM SERPL-MCNC: 9.1 MG/DL (ref 8.6–10.3)
CAOX CRY #/AREA UR COMP ASSIST: ABNORMAL /HPF
CHLORIDE SERPL-SCNC: 101 MMOL/L (ref 98–107)
CHLORIDE SERPL-SCNC: 103 MMOL/L (ref 98–107)
CO2 SERPL-SCNC: 28 MMOL/L (ref 21–32)
CO2 SERPL-SCNC: 29 MMOL/L (ref 21–32)
COLOR UR: YELLOW
CREAT SERPL-MCNC: 1.06 MG/DL (ref 0.5–1.3)
CREAT SERPL-MCNC: 1.11 MG/DL (ref 0.5–1.3)
CRP SERPL-MCNC: 1.76 MG/DL
EGFRCR SERPLBLD CKD-EPI 2021: 73 ML/MIN/1.73M*2
EGFRCR SERPLBLD CKD-EPI 2021: 77 ML/MIN/1.73M*2
EOSINOPHIL # BLD AUTO: 0.01 X10*3/UL (ref 0–0.7)
EOSINOPHIL NFR BLD AUTO: 0.2 %
ERYTHROCYTE [DISTWIDTH] IN BLOOD BY AUTOMATED COUNT: 16.7 % (ref 11.5–14.5)
GLUCOSE SERPL-MCNC: 139 MG/DL (ref 74–99)
GLUCOSE SERPL-MCNC: 147 MG/DL (ref 74–99)
GLUCOSE UR STRIP.AUTO-MCNC: NORMAL MG/DL
HCT VFR BLD AUTO: 27.2 % (ref 41–52)
HGB BLD-MCNC: 8.9 G/DL (ref 13.5–17.5)
HYALINE CASTS #/AREA URNS AUTO: ABNORMAL /LPF
IMM GRANULOCYTES # BLD AUTO: 0.04 X10*3/UL (ref 0–0.7)
IMM GRANULOCYTES NFR BLD AUTO: 0.6 % (ref 0–0.9)
KETONES UR STRIP.AUTO-MCNC: NEGATIVE MG/DL
LEUKOCYTE ESTERASE UR QL STRIP.AUTO: NEGATIVE
LYMPHOCYTES # BLD AUTO: 0.44 X10*3/UL (ref 1.2–4.8)
LYMPHOCYTES NFR BLD AUTO: 6.6 %
MCH RBC QN AUTO: 32.2 PG (ref 26–34)
MCHC RBC AUTO-ENTMCNC: 32.7 G/DL (ref 32–36)
MCV RBC AUTO: 99 FL (ref 80–100)
MONOCYTES # BLD AUTO: 0.78 X10*3/UL (ref 0.1–1)
MONOCYTES NFR BLD AUTO: 11.8 %
MUCOUS THREADS #/AREA URNS AUTO: ABNORMAL /LPF
NEUTROPHILS # BLD AUTO: 5.36 X10*3/UL (ref 1.2–7.7)
NEUTROPHILS NFR BLD AUTO: 80.8 %
NITRITE UR QL STRIP.AUTO: NEGATIVE
PH UR STRIP.AUTO: 5.5 [PH]
PHOSPHATE SERPL-MCNC: 2.6 MG/DL (ref 2.5–4.9)
PLATELET # BLD AUTO: 183 X10*3/UL (ref 150–450)
POTASSIUM SERPL-SCNC: 3.5 MMOL/L (ref 3.5–5.3)
POTASSIUM SERPL-SCNC: 3.5 MMOL/L (ref 3.5–5.3)
PROT UR STRIP.AUTO-MCNC: NEGATIVE MG/DL
RBC # BLD AUTO: 2.76 X10*6/UL (ref 4.5–5.9)
RBC # UR STRIP.AUTO: ABNORMAL MG/DL
RBC #/AREA URNS AUTO: >20 /HPF
SODIUM SERPL-SCNC: 138 MMOL/L (ref 136–145)
SODIUM SERPL-SCNC: 138 MMOL/L (ref 136–145)
SP GR UR STRIP.AUTO: 1.02
UROBILINOGEN UR STRIP.AUTO-MCNC: NORMAL MG/DL
VANCOMYCIN TROUGH SERPL-MCNC: 18.8 UG/ML (ref 5–20)
WBC # BLD AUTO: 6.6 X10*3/UL (ref 4.4–11.3)
WBC #/AREA URNS AUTO: ABNORMAL /HPF

## 2025-05-19 PROCEDURE — 86140 C-REACTIVE PROTEIN: CPT

## 2025-05-19 PROCEDURE — 80202 ASSAY OF VANCOMYCIN: CPT

## 2025-05-19 PROCEDURE — 82565 ASSAY OF CREATININE: CPT

## 2025-05-19 PROCEDURE — 96413 CHEMO IV INFUSION 1 HR: CPT

## 2025-05-19 PROCEDURE — 2500000004 HC RX 250 GENERAL PHARMACY W/ HCPCS (ALT 636 FOR OP/ED): Mod: JZ,TB

## 2025-05-19 PROCEDURE — 85025 COMPLETE CBC W/AUTO DIFF WBC: CPT

## 2025-05-19 PROCEDURE — 2500000004 HC RX 250 GENERAL PHARMACY W/ HCPCS (ALT 636 FOR OP/ED): Mod: JZ

## 2025-05-19 PROCEDURE — 80048 BASIC METABOLIC PNL TOTAL CA: CPT | Mod: CCI

## 2025-05-19 PROCEDURE — 81001 URINALYSIS AUTO W/SCOPE: CPT

## 2025-05-19 RX ORDER — EPINEPHRINE 0.3 MG/.3ML
0.3 INJECTION SUBCUTANEOUS EVERY 5 MIN PRN
Status: DISCONTINUED | OUTPATIENT
Start: 2025-05-19 | End: 2025-05-19 | Stop reason: HOSPADM

## 2025-05-19 RX ORDER — HEPARIN 100 UNIT/ML
500 SYRINGE INTRAVENOUS AS NEEDED
OUTPATIENT
Start: 2025-05-19

## 2025-05-19 RX ORDER — HEPARIN SODIUM,PORCINE/PF 10 UNIT/ML
50 SYRINGE (ML) INTRAVENOUS AS NEEDED
OUTPATIENT
Start: 2025-05-19

## 2025-05-19 RX ORDER — HEPARIN SODIUM,PORCINE/PF 10 UNIT/ML
50 SYRINGE (ML) INTRAVENOUS AS NEEDED
Status: CANCELLED | OUTPATIENT
Start: 2025-05-19

## 2025-05-19 RX ORDER — HEPARIN 100 UNIT/ML
500 SYRINGE INTRAVENOUS AS NEEDED
Status: DISCONTINUED | OUTPATIENT
Start: 2025-05-19 | End: 2025-05-19 | Stop reason: HOSPADM

## 2025-05-19 RX ORDER — FAMOTIDINE 10 MG/ML
20 INJECTION, SOLUTION INTRAVENOUS ONCE AS NEEDED
OUTPATIENT
Start: 2025-05-30

## 2025-05-19 RX ORDER — FAMOTIDINE 10 MG/ML
20 INJECTION, SOLUTION INTRAVENOUS ONCE AS NEEDED
Status: DISCONTINUED | OUTPATIENT
Start: 2025-05-19 | End: 2025-05-19 | Stop reason: HOSPADM

## 2025-05-19 RX ORDER — HEPARIN SODIUM,PORCINE/PF 10 UNIT/ML
50 SYRINGE (ML) INTRAVENOUS AS NEEDED
Status: DISCONTINUED | OUTPATIENT
Start: 2025-05-19 | End: 2025-05-19 | Stop reason: HOSPADM

## 2025-05-19 RX ORDER — ALBUTEROL SULFATE 0.83 MG/ML
3 SOLUTION RESPIRATORY (INHALATION) AS NEEDED
Status: DISCONTINUED | OUTPATIENT
Start: 2025-05-19 | End: 2025-05-19 | Stop reason: HOSPADM

## 2025-05-19 RX ORDER — DIPHENHYDRAMINE HYDROCHLORIDE 50 MG/ML
50 INJECTION, SOLUTION INTRAMUSCULAR; INTRAVENOUS AS NEEDED
OUTPATIENT
Start: 2025-05-30

## 2025-05-19 RX ORDER — ALBUTEROL SULFATE 0.83 MG/ML
3 SOLUTION RESPIRATORY (INHALATION) AS NEEDED
OUTPATIENT
Start: 2025-05-30

## 2025-05-19 RX ORDER — HEPARIN 100 UNIT/ML
500 SYRINGE INTRAVENOUS AS NEEDED
Status: CANCELLED | OUTPATIENT
Start: 2025-05-19

## 2025-05-19 RX ORDER — EPINEPHRINE 0.3 MG/.3ML
0.3 INJECTION SUBCUTANEOUS EVERY 5 MIN PRN
OUTPATIENT
Start: 2025-05-30

## 2025-05-19 RX ORDER — DIPHENHYDRAMINE HYDROCHLORIDE 50 MG/ML
50 INJECTION, SOLUTION INTRAMUSCULAR; INTRAVENOUS AS NEEDED
Status: DISCONTINUED | OUTPATIENT
Start: 2025-05-19 | End: 2025-05-19 | Stop reason: HOSPADM

## 2025-05-19 RX ADMIN — BEVACIZUMAB-AWWB 600 MG: 400 INJECTION, SOLUTION INTRAVENOUS at 14:51

## 2025-05-19 RX ADMIN — HEPARIN, PORCINE (PF) 10 UNIT/ML INTRAVENOUS SYRINGE 50 UNITS: at 15:20

## 2025-05-19 ASSESSMENT — PAIN SCALES - GENERAL: PAINLEVEL_OUTOF10: 0-NO PAIN

## 2025-05-19 NOTE — PATIENT INSTRUCTIONS
Pt here for first dose avastin infusion. Will RTC in 2 weeks for next infusion. Pts wife given urine cup to drop off sample for U/A prior to next infusion.

## 2025-05-19 NOTE — PROGRESS NOTES
U/A sent today prior to avastin infusion. OK to treat while results pending per VIVEK Grossman. Pts wife given urine cup and urinal to take home and will collect sample prior to infusion in 2 weeks and drop off in lab the Friday before Mondays treatment appt. Pts wife is aware that if labs are ordered, we can draw off pts PICC line on day of treatment.

## 2025-05-20 ENCOUNTER — HOME HEALTH ADMISSION (OUTPATIENT)
Dept: HOME HEALTH SERVICES | Facility: HOME HEALTH | Age: 70
End: 2025-05-20
Payer: MEDICARE

## 2025-05-21 ENCOUNTER — TELEPHONE (OUTPATIENT)
Dept: HEMATOLOGY/ONCOLOGY | Facility: HOSPITAL | Age: 70
End: 2025-05-21
Payer: MEDICARE

## 2025-05-21 NOTE — TELEPHONE ENCOUNTER
Calling to touch base and reiterate ok to take 1tab 2mg dexamethasone daily. She states his appetite, fatigue, STM and walking have been much improved since the increase in dex. Per Dr. Larson he was concerned about long-term use causing immunosuppression due to his history of infection. Dr. Rolon approved the dose lowering. Brittany was a bit concerned but I told her to try it over the holiday weekend and if he gets worse, go back to the 4mg total and give us a call.    Kathleen MARQUEZ RN

## 2025-05-22 ENCOUNTER — DOCUMENTATION (OUTPATIENT)
Dept: HOME HEALTH SERVICES | Facility: HOME HEALTH | Age: 70
End: 2025-05-22
Payer: MEDICARE

## 2025-05-22 ENCOUNTER — DOCUMENTATION (OUTPATIENT)
Dept: INFUSION THERAPY | Age: 70
End: 2025-05-22

## 2025-05-22 ENCOUNTER — SOCIAL WORK (OUTPATIENT)
Dept: CASE MANAGEMENT | Facility: HOSPITAL | Age: 70
End: 2025-05-22

## 2025-05-22 ENCOUNTER — TELEPHONE (OUTPATIENT)
Dept: ADMISSION | Facility: HOSPITAL | Age: 70
End: 2025-05-22

## 2025-05-22 ENCOUNTER — TELEMEDICINE (OUTPATIENT)
Dept: CARDIOLOGY | Facility: CLINIC | Age: 70
End: 2025-05-22
Payer: MEDICARE

## 2025-05-22 ENCOUNTER — HOME INFUSION (OUTPATIENT)
Dept: INFUSION THERAPY | Age: 70
End: 2025-05-22
Payer: MEDICARE

## 2025-05-22 DIAGNOSIS — Z95.828 PRESENCE OF IVC FILTER: ICD-10-CM

## 2025-05-22 DIAGNOSIS — I82.412 ACUTE DEEP VEIN THROMBOSIS (DVT) OF LEFT FEMORAL VEIN (MULTI): Primary | ICD-10-CM

## 2025-05-22 NOTE — PROGRESS NOTES
History of Present Illness:  Enmanuel Ahumada is a/an 69 y.o. man with GBM admitted in early April 2025 with cranial wound infection/dehiscence. Prior history of craniotomy with resection on 12/29/2023; redo craniotomy with resection 2/20/2025. Vascular medicine had been consulted for anticoagulation recommendations for cancer-associated left distal femoral and calf deep vein thrombosis identified on 4/11/2025. He had an IVC filter that was placed 4/11/2025. Later started on unfractionated heparin then transitioned to apixaban.      Discharged to SNF and now home today. His wife is concerned; feels it's too soon. Had difficulty getting him into the house. Reports that he is very weak.    Getting IV antibiotics through mid June.    He denies bleeding including epistaxis, gingival bleeding, hemoptysis, hematemesis, hematochezia, melena, and hematuria.        Medical History[1]  Surgical History[2]  Social History[3]  Family History[4]  Current Medications[5]    Physical Examination:  N/A    Pertinent Labs:    Pertinent Imaging:  Venous duplex ultrasound upper extremity 4/11/2025   **CRITICAL RESULT**  Critical Result: Acute non-occlusive thrombus noted in the right brachial vein.  Notification called to Arnoldo Acuna MD and Nataliya Cortez MD on 4/11/2025 at 10:38:46 AM. Acknowledged critical results notification communicated via secure chat by Asia Martinez RVT.     CONCLUSIONS:  Right Upper Venous: There is acute non-occlusive deep vein thrombosis visualized in the brachial vein. Remainder visualized vessels appear patent with no evidence of thrombus. Cannot rule out thrombus of non-visualized mid cephalic, distal cephalic and proximal cephalic veins.  Left Upper Venous: Cannot rule out thrombus in non-visualized internal jugular vein due to patient positioning and pain intolerance. Remainder visualized vessels appear patent with no evidence of thrombus.    Venous duplex ultrasound lower extremity 4/11/2025    **CRITICAL RESULT**  Critical Result: DVT noted in the left distal femoral, popliteal, gastrocnemious, peroneal and posterior tibial veins.  Notification called to Rachel Acuna MD and Nataliya Cortez MD on 4/11/2025 at 10:29:54 AM. Acknowledged critical results notification communicated via secure chat by Asia Martinez RVT.     CONCLUSIONS:  Right Lower Venous: No evidence of acute deep vein thrombus visualized in the right lower extremity.  Left Lower Venous: There is acute occlusive deep vein thrombosis visualized in the popliteal vein. There is age indeterminate deep vein thrombosis visualized in the distal femoral, gastrocnemius veins in the calf, posterior tibial and peroneal veins. The remainder of the left leg is negative for deep vein thrombosis.       Diagnoses and all orders for this visit:  Acute deep vein thrombosis (DVT) of left femoral vein (Multi) (Primary)  Presence of IVC filter  Continue indefinite anticoagulation  Will plan for IVC filter retrieval in the next few months    Follow up in 3 months.    Zee Hardy MD, MS           [1]   Past Medical History:  Diagnosis Date    Brain cancer (Multi)     GERD (gastroesophageal reflux disease)     Hyperlipidemia     Hypertension     Seizure disorder (Multi)    [2]   Past Surgical History:  Procedure Laterality Date    BRAIN SURGERY      CHOLECYSTECTOMY  January 25, 2020    IR CVC PICC  4/15/2025    IR CVC PICC 4[place]15/20[place] [facility] [place]    OTHER SURGICAL HISTORY  07/24/2019    Colonoscopy    OTHER SURGICAL HISTORY  07/26/2021    Endoscopy    VASECTOMY  Abt 1991   [3]   Social History  Tobacco Use    Smoking status: Never     Passive exposure: Never    Smokeless tobacco: Never   Vaping Use    Vaping status: Never Used   Substance Use Topics    Alcohol use: Not Currently    Drug use: Yes     Types: Marijuana     Comment: thc sleep gummies   [4] No family history on file.  [5]   Current Outpatient Medications   Medication Sig Dispense  Refill    acetaminophen (Tylenol) 325 mg tablet Take 2 tablets (650 mg) by mouth every 6 hours if needed for mild pain (1 - 3).      apixaban (Eliquis) 5 mg tablet Take 1 tablet (5 mg) by mouth 2 times a day.      atorvastatin (Lipitor) 40 mg tablet TAKE 1 TABLET BY MOUTH EVERY DAY 90 tablet 3    bisacodyl (Dulcolax) 10 mg suppository Insert 1 suppository (10 mg) into the rectum once daily as needed for constipation.      brivaracetam (Briviact) 50 mg tablet tablet Take 1 tablet (50 mg) by mouth every 12 hours. 60 tablet 2    CALCIUM CARBONATE-VITAMIN D3 ORAL Take 1 capsule by mouth once daily.      dexAMETHasone (Decadron) 2 mg tablet Take 2 tablets (4 mg) by mouth once daily in the morning. 60 tablet 0    famotidine (Pepcid) 10 mg tablet Take 1 tablet (10 mg) by mouth once daily.      hydrOXYzine HCL (Atarax) 10 mg tablet Take 1 tablet (10 mg) by mouth every 6 hours if needed for anxiety (Agitation).      krill-omega-3-dha-epa-lipids 015-80-50-50 mg capsule Take 1 capsule by mouth once daily.      ondansetron (Zofran) 8 mg tablet Take 1 tablet (8 mg) by mouth every 8 hours if needed for nausea or vomiting. Take one hour prior to each chemotherapy dose in addition to every 8 hours as needed for nausea/vomiting 60 tablet 5    ondansetron (Zofran) 8 mg tablet Take 1 tablet (8 mg) by mouth every 8 hours if needed for nausea or vomiting. 60 tablet 5    pantoprazole (ProtoNix) 40 mg EC tablet TAKE 1 TABLET BY MOUTH IN THE MORNING 30 MINUTES BEFORE BREAKFAST. 90 tablet 3    polyethylene glycol (Glycolax, Miralax) 17 gram packet Take 17 g by mouth once daily.      prochlorperazine (Compazine) 10 mg tablet Take 1 tablet (10 mg) by mouth every 6 hours if needed for nausea or vomiting. 60 tablet 5    sennosides (SENNA LAX ORAL) Take 1 tablet by mouth once daily.      sodium chloride 0.9% parenteral solution 250 mL with vancomycin 1,000 mg recon soln 1,000 mg Infuse 1,000 mg at 270 mL/hr over 60 minutes into a venous catheter  once every 24 hours.      ubidecarenone (COENZYME Q10, BULK, MISC) Take 1 Capful by mouth once daily.      vitamin B complex-vitamin C-folic acid (Nephrocaps) 1 mg capsule Take 1 capsule by mouth once daily.       No current facility-administered medications for this visit.

## 2025-05-22 NOTE — PROGRESS NOTES
Ticket made from pharmacist note:    Sending tonight by 9 pm  with the  to call first. Sending to confirmed address. Initial delivery, signature required.    Sending standard supplies for 8 doses of VANCOMYCIN delivered through SL PICC line via Home Pump + 24x NS  and 12x 10 unit heparin flushes & initial extras of both supplies and flushes.     Patient's wife previously spoke with a pharmacist.

## 2025-05-22 NOTE — PROGRESS NOTES
CAMRONIEWED PT INFO AS CORRECT.   DX: Post Craniotomy wound infection/abscess  REVIEWED ALLERGIES: NKDA  NO MEDICATION INTERACTIONS.  REVIEWED RELEVANT BASELINE VALUES  LABS: Weekly to Dr York, vanco trough, Jeremías CRP, CBC/Diff, BMP  PT HAS SL PICC  FLUSH PER [facility] PROTOCOL.  CONTINUE MED THRU TENTATIVE STOP DATE: 6/15    Vanco 1g IV q24 via Homepump    CARE PLAN DONE TODAY    Patient is a 69 year old male admitted to homecare from SNF to complete course of Vanco 1g IV q24 for post op wound infection - cultures positive for MRSE    Follow up with Dr York on 6/10  Creatinine 0.78 on 5/21  Vanco trough 11.4    Telephone call with patient's wife - address in [place] confirmed as correct  Delivery this pm by 9p    Dispense x8 doses of Vanco 1g for homepump infusion - cmpd and delivery on 5/22  Infusions 5/23 - 5/30    NF 5/29 check labs and progress

## 2025-05-22 NOTE — TELEPHONE ENCOUNTER
"Wife calls to report patient was discharged home today from rehab facility after being hospitalized. Rehab released him as able to walk 100 feet, however, she states \"it is extremely difficult for him to walk, and fell getting out of car into house. She reports he did not sustain any injury, he has a walker and heavy assistance x1. She has concerns about how he will manage in the home  getting up and using bathroom, looking for resources. She requested to speak with .    "

## 2025-05-22 NOTE — PATIENT INSTRUCTIONS
Thank you for talking with me today.    I would like you to continue the Eliquis as you are doing. Please reach out if you need refills.    I would like to coordinate retrieval of your IVC filter (the screen in the large vein in the abdomen that was placed to prevent leg blood clots from being able to get to the lungs), but I would like to wait for a couple of months till you are stable at home and feeling stronger.    I reached out Jacki Simpson PA-C regarding concerns about Dash being home too soon. She is going to ask the  to get in touch.    Should you have questions, please do not hesitate to call the office at [phone], or you can reach me on Saisei if you have signed up for it. If you have urgent concerns, call the office, do not use Saisei.

## 2025-05-22 NOTE — HH CARE COORDINATION
Home Care received a Referral for Infusion, Nursing, Physical Therapy, Occupational Therapy, Home Health Aide, Speech Language Pathology, and Medical Social Work. We have processed the referral for a Start of Care on 05/23/2025.     If you have any questions or concerns, please feel free to contact us at [phone]. Follow the prompts, enter your five digit zip code, and you will be directed to your care team on WEST 2.

## 2025-05-22 NOTE — PROGRESS NOTES
Neuro-onc LUCIAN rec'd message from pt's vascular provider who relayed concern about level of stress of pt's wife, due to pt's return home today following 5 weeks inpatient and 2 weeks at [facility]. While he did pretty well with walking at rehab, today he fell walking a short distance from the car to the house. Pt's wife called cayden to come assist. Wife reports pt's cognition has declined and she feels she needs to be with him 24/7. She said a wheelchair was not ordered at discharge b/c pt could walk 150'. She has ordered one that will arrive next week, and she learned where she can rent one until then. Additionally, wife called pt's health ins co and learned he can get up to 8 hours / day/ 35 hours per week of home health assistance. The challenge may be in finding an in-network provider that has staff availability. At home, there is now a bed on the first floor with a half bath approx 20' away.   Pt has a nurse coming out to give and teach pt's wife how to administer a daily med injection, a social work visit, and PT, OT and SLP coming out. We discussed communication and expectations and being assertive re: their needs.   This SW stressed the importance of allowing and asking for help from their adult children. Enc pt's wife to take a day at a time. Wife is open to this SW calling to check in tomorrow afternoon. She declined a call from LUCIAN to talk about the cognitive status at this time.  SW to continue to follow.  Update 05/23/25: This SW called pt's wife as agreed, and was aware pt is in ED at [place].. Wife said she has spoken to pt's bro and pt/wife's cayden and they are in agreement that they don't want pt to go through any further surgery. Thinking about hospice. This SW explained how hospice works and what to expect, although it may remain to be seem of they would need to be in a facility or if there would be adequate assistance arranged in the home. Pt's wife is interested in an informational meeting and  this SW recommended she ask nurse in ED for hospice consult if possible. Pt's wife appreciated information.  SW to continue to follow.

## 2025-05-23 ENCOUNTER — HOME CARE VISIT (OUTPATIENT)
Dept: HOME HEALTH SERVICES | Facility: HOME HEALTH | Age: 70
End: 2025-05-23
Payer: MEDICARE

## 2025-05-23 ENCOUNTER — APPOINTMENT (OUTPATIENT)
Dept: RADIOLOGY | Facility: HOSPITAL | Age: 70
DRG: 919 | End: 2025-05-23
Payer: MEDICARE

## 2025-05-23 ENCOUNTER — CLINICAL SUPPORT (OUTPATIENT)
Dept: EMERGENCY MEDICINE | Facility: HOSPITAL | Age: 70
DRG: 919 | End: 2025-05-23
Payer: MEDICARE

## 2025-05-23 ENCOUNTER — HOSPITAL ENCOUNTER (EMERGENCY)
Facility: HOSPITAL | Age: 70
Discharge: OTHER INSTITUTION | End: 2025-05-23
Attending: EMERGENCY MEDICINE
Payer: MEDICARE

## 2025-05-23 ENCOUNTER — APPOINTMENT (OUTPATIENT)
Dept: RADIOLOGY | Facility: HOSPITAL | Age: 70
End: 2025-05-23
Payer: MEDICARE

## 2025-05-23 ENCOUNTER — HOSPITAL ENCOUNTER (INPATIENT)
Facility: HOSPITAL | Age: 70
DRG: 919 | End: 2025-05-23
Attending: EMERGENCY MEDICINE | Admitting: NEUROLOGICAL SURGERY
Payer: MEDICARE

## 2025-05-23 VITALS
BODY MASS INDEX: 21.19 KG/M2 | OXYGEN SATURATION: 97 % | SYSTOLIC BLOOD PRESSURE: 130 MMHG | HEIGHT: 67 IN | RESPIRATION RATE: 14 BRPM | TEMPERATURE: 97.5 F | DIASTOLIC BLOOD PRESSURE: 83 MMHG | WEIGHT: 135 LBS | HEART RATE: 92 BPM

## 2025-05-23 DIAGNOSIS — G93.89 BRAIN MASS: ICD-10-CM

## 2025-05-23 DIAGNOSIS — G93.89 PNEUMOCEPHALUS: ICD-10-CM

## 2025-05-23 DIAGNOSIS — C71.9 GBM (GLIOBLASTOMA MULTIFORME) (MULTI): ICD-10-CM

## 2025-05-23 DIAGNOSIS — T81.31XA DEHISCENCE OF INCISION, INITIAL ENCOUNTER: Primary | ICD-10-CM

## 2025-05-23 DIAGNOSIS — K59.00 CONSTIPATION, UNSPECIFIED CONSTIPATION TYPE: ICD-10-CM

## 2025-05-23 DIAGNOSIS — G97.82 POSTOPERATIVE SURGICAL COMPLICATION INVOLVING NERVOUS SYSTEM ASSOCIATED WITH NERVOUS SYSTEM PROCEDURE, UNSPECIFIED COMPLICATION: ICD-10-CM

## 2025-05-23 DIAGNOSIS — R53.1 GENERALIZED WEAKNESS: Primary | ICD-10-CM

## 2025-05-23 DIAGNOSIS — E78.2 MIXED HYPERLIPIDEMIA: ICD-10-CM

## 2025-05-23 DIAGNOSIS — Z29.89 SEIZURE PROPHYLAXIS: ICD-10-CM

## 2025-05-23 LAB
ABO GROUP (TYPE) IN BLOOD: NORMAL
ALBUMIN SERPL BCP-MCNC: 3.8 G/DL (ref 3.4–5)
ALBUMIN SERPL BCP-MCNC: 4.2 G/DL (ref 3.4–5)
ALP SERPL-CCNC: 76 U/L (ref 33–136)
ALP SERPL-CCNC: 94 U/L (ref 33–136)
ALT SERPL W P-5'-P-CCNC: 30 U/L (ref 10–52)
ALT SERPL W P-5'-P-CCNC: 36 U/L (ref 10–52)
ANION GAP SERPL CALC-SCNC: 13 MMOL/L (ref 10–20)
ANION GAP SERPL CALC-SCNC: 15 MMOL/L (ref 10–20)
ANTIBODY SCREEN: NORMAL
AST SERPL W P-5'-P-CCNC: 17 U/L (ref 9–39)
AST SERPL W P-5'-P-CCNC: 17 U/L (ref 9–39)
ATRIAL RATE: 83 BPM
BASOPHILS # BLD AUTO: 0.01 X10*3/UL (ref 0–0.1)
BASOPHILS # BLD AUTO: 0.02 X10*3/UL (ref 0–0.1)
BASOPHILS NFR BLD AUTO: 0.2 %
BASOPHILS NFR BLD AUTO: 0.4 %
BILIRUB SERPL-MCNC: 0.8 MG/DL (ref 0–1.2)
BILIRUB SERPL-MCNC: 0.8 MG/DL (ref 0–1.2)
BUN SERPL-MCNC: 14 MG/DL (ref 6–23)
BUN SERPL-MCNC: 16 MG/DL (ref 6–23)
CALCIUM SERPL-MCNC: 9.1 MG/DL (ref 8.6–10.3)
CALCIUM SERPL-MCNC: 9.7 MG/DL (ref 8.6–10.6)
CHLORIDE SERPL-SCNC: 101 MMOL/L (ref 98–107)
CHLORIDE SERPL-SCNC: 104 MMOL/L (ref 98–107)
CO2 SERPL-SCNC: 26 MMOL/L (ref 21–32)
CO2 SERPL-SCNC: 27 MMOL/L (ref 21–32)
CREAT SERPL-MCNC: 0.8 MG/DL (ref 0.5–1.3)
CREAT SERPL-MCNC: 0.92 MG/DL (ref 0.5–1.3)
CRP SERPL-MCNC: 0.45 MG/DL
CRP SERPL-MCNC: 0.46 MG/DL
EGFRCR SERPLBLD CKD-EPI 2021: >90 ML/MIN/1.73M*2
EGFRCR SERPLBLD CKD-EPI 2021: >90 ML/MIN/1.73M*2
EOSINOPHIL # BLD AUTO: 0.02 X10*3/UL (ref 0–0.7)
EOSINOPHIL # BLD AUTO: 0.03 X10*3/UL (ref 0–0.7)
EOSINOPHIL NFR BLD AUTO: 0.4 %
EOSINOPHIL NFR BLD AUTO: 0.6 %
ERYTHROCYTE [DISTWIDTH] IN BLOOD BY AUTOMATED COUNT: 16 % (ref 11.5–14.5)
ERYTHROCYTE [DISTWIDTH] IN BLOOD BY AUTOMATED COUNT: 16.1 % (ref 11.5–14.5)
ERYTHROCYTE [SEDIMENTATION RATE] IN BLOOD BY WESTERGREN METHOD: 18 MM/H (ref 0–20)
GLUCOSE SERPL-MCNC: 95 MG/DL (ref 74–99)
GLUCOSE SERPL-MCNC: 98 MG/DL (ref 74–99)
HCT VFR BLD AUTO: 28.7 % (ref 41–52)
HCT VFR BLD AUTO: 31.5 % (ref 41–52)
HGB BLD-MCNC: 10 G/DL (ref 13.5–17.5)
HGB BLD-MCNC: 11.3 G/DL (ref 13.5–17.5)
IMM GRANULOCYTES # BLD AUTO: 0.06 X10*3/UL (ref 0–0.7)
IMM GRANULOCYTES # BLD AUTO: 0.07 X10*3/UL (ref 0–0.7)
IMM GRANULOCYTES NFR BLD AUTO: 1.2 % (ref 0–0.9)
IMM GRANULOCYTES NFR BLD AUTO: 1.4 % (ref 0–0.9)
INR PPP: 1.3 (ref 0.9–1.1)
LYMPHOCYTES # BLD AUTO: 0.73 X10*3/UL (ref 1.2–4.8)
LYMPHOCYTES # BLD AUTO: 0.77 X10*3/UL (ref 1.2–4.8)
LYMPHOCYTES NFR BLD AUTO: 14.4 %
LYMPHOCYTES NFR BLD AUTO: 15.2 %
MAGNESIUM SERPL-MCNC: 1.64 MG/DL (ref 1.6–2.4)
MCH RBC QN AUTO: 31.7 PG (ref 26–34)
MCH RBC QN AUTO: 32.8 PG (ref 26–34)
MCHC RBC AUTO-ENTMCNC: 34.8 G/DL (ref 32–36)
MCHC RBC AUTO-ENTMCNC: 35.9 G/DL (ref 32–36)
MCV RBC AUTO: 89 FL (ref 80–100)
MCV RBC AUTO: 94 FL (ref 80–100)
MONOCYTES # BLD AUTO: 0.81 X10*3/UL (ref 0.1–1)
MONOCYTES # BLD AUTO: 0.89 X10*3/UL (ref 0.1–1)
MONOCYTES NFR BLD AUTO: 16 %
MONOCYTES NFR BLD AUTO: 17.6 %
NEUTROPHILS # BLD AUTO: 3.29 X10*3/UL (ref 1.2–7.7)
NEUTROPHILS # BLD AUTO: 3.43 X10*3/UL (ref 1.2–7.7)
NEUTROPHILS NFR BLD AUTO: 65 %
NEUTROPHILS NFR BLD AUTO: 67.6 %
NRBC BLD-RTO: 0 /100 WBCS (ref 0–0)
NRBC BLD-RTO: 0 /100 WBCS (ref 0–0)
P AXIS: 64 DEGREES
P OFFSET: 207 MS
P ONSET: 157 MS
PLATELET # BLD AUTO: 180 X10*3/UL (ref 150–450)
PLATELET # BLD AUTO: 202 X10*3/UL (ref 150–450)
POTASSIUM SERPL-SCNC: 3.5 MMOL/L (ref 3.5–5.3)
POTASSIUM SERPL-SCNC: 3.7 MMOL/L (ref 3.5–5.3)
PR INTERVAL: 140 MS
PROT SERPL-MCNC: 6.1 G/DL (ref 6.4–8.2)
PROT SERPL-MCNC: 6.8 G/DL (ref 6.4–8.2)
PROTHROMBIN TIME: 14.1 SECONDS (ref 9.8–12.4)
Q ONSET: 227 MS
QRS COUNT: 14 BEATS
QRS DURATION: 84 MS
QT INTERVAL: 396 MS
QTC CALCULATION(BAZETT): 465 MS
QTC FREDERICIA: 441 MS
R AXIS: -35 DEGREES
RBC # BLD AUTO: 3.05 X10*6/UL (ref 4.5–5.9)
RBC # BLD AUTO: 3.56 X10*6/UL (ref 4.5–5.9)
RH FACTOR (ANTIGEN D): NORMAL
SODIUM SERPL-SCNC: 139 MMOL/L (ref 136–145)
SODIUM SERPL-SCNC: 139 MMOL/L (ref 136–145)
T AXIS: 12 DEGREES
T OFFSET: 425 MS
VANCOMYCIN SERPL-MCNC: 18.1 UG/ML (ref 5–20)
VENTRICULAR RATE: 83 BPM
WBC # BLD AUTO: 5.1 X10*3/UL (ref 4.4–11.3)
WBC # BLD AUTO: 5.1 X10*3/UL (ref 4.4–11.3)

## 2025-05-23 PROCEDURE — 96374 THER/PROPH/DIAG INJ IV PUSH: CPT

## 2025-05-23 PROCEDURE — 85652 RBC SED RATE AUTOMATED: CPT

## 2025-05-23 PROCEDURE — 83735 ASSAY OF MAGNESIUM: CPT

## 2025-05-23 PROCEDURE — 2500000001 HC RX 250 WO HCPCS SELF ADMINISTERED DRUGS (ALT 637 FOR MEDICARE OP)

## 2025-05-23 PROCEDURE — 87040 BLOOD CULTURE FOR BACTERIA: CPT

## 2025-05-23 PROCEDURE — A9575 INJ GADOTERATE MEGLUMI 0.1ML: HCPCS | Performed by: EMERGENCY MEDICINE

## 2025-05-23 PROCEDURE — 81003 URINALYSIS AUTO W/O SCOPE: CPT

## 2025-05-23 PROCEDURE — 70496 CT ANGIOGRAPHY HEAD: CPT | Performed by: STUDENT IN AN ORGANIZED HEALTH CARE EDUCATION/TRAINING PROGRAM

## 2025-05-23 PROCEDURE — 70553 MRI BRAIN STEM W/O & W/DYE: CPT | Performed by: RADIOLOGY

## 2025-05-23 PROCEDURE — G0299 HHS/HOSPICE OF RN EA 15 MIN: HCPCS | Mod: HHH

## 2025-05-23 PROCEDURE — 80053 COMPREHEN METABOLIC PANEL: CPT

## 2025-05-23 PROCEDURE — 99285 EMERGENCY DEPT VISIT HI MDM: CPT | Mod: 25 | Performed by: EMERGENCY MEDICINE

## 2025-05-23 PROCEDURE — 96365 THER/PROPH/DIAG IV INF INIT: CPT

## 2025-05-23 PROCEDURE — 86140 C-REACTIVE PROTEIN: CPT

## 2025-05-23 PROCEDURE — 71045 X-RAY EXAM CHEST 1 VIEW: CPT | Performed by: STUDENT IN AN ORGANIZED HEALTH CARE EDUCATION/TRAINING PROGRAM

## 2025-05-23 PROCEDURE — 93005 ELECTROCARDIOGRAM TRACING: CPT

## 2025-05-23 PROCEDURE — 2550000001 HC RX 255 CONTRASTS: Performed by: EMERGENCY MEDICINE

## 2025-05-23 PROCEDURE — 80202 ASSAY OF VANCOMYCIN: CPT | Performed by: PHARMACIST

## 2025-05-23 PROCEDURE — 99285 EMERGENCY DEPT VISIT HI MDM: CPT | Performed by: EMERGENCY MEDICINE

## 2025-05-23 PROCEDURE — 70496 CT ANGIOGRAPHY HEAD: CPT

## 2025-05-23 PROCEDURE — 87075 CULTR BACTERIA EXCEPT BLOOD: CPT

## 2025-05-23 PROCEDURE — 70450 CT HEAD/BRAIN W/O DYE: CPT | Performed by: STUDENT IN AN ORGANIZED HEALTH CARE EDUCATION/TRAINING PROGRAM

## 2025-05-23 PROCEDURE — 93010 ELECTROCARDIOGRAM REPORT: CPT | Performed by: EMERGENCY MEDICINE

## 2025-05-23 PROCEDURE — 2500000004 HC RX 250 GENERAL PHARMACY W/ HCPCS (ALT 636 FOR OP/ED)

## 2025-05-23 PROCEDURE — 2500000004 HC RX 250 GENERAL PHARMACY W/ HCPCS (ALT 636 FOR OP/ED): Mod: JZ

## 2025-05-23 PROCEDURE — 71045 X-RAY EXAM CHEST 1 VIEW: CPT

## 2025-05-23 PROCEDURE — 70553 MRI BRAIN STEM W/O & W/DYE: CPT

## 2025-05-23 PROCEDURE — 86901 BLOOD TYPING SEROLOGIC RH(D): CPT

## 2025-05-23 PROCEDURE — 85025 COMPLETE CBC W/AUTO DIFF WBC: CPT

## 2025-05-23 PROCEDURE — 169592 NO-PAY CLAIM PROCEDURE

## 2025-05-23 PROCEDURE — 85610 PROTHROMBIN TIME: CPT

## 2025-05-23 PROCEDURE — 70450 CT HEAD/BRAIN W/O DYE: CPT

## 2025-05-23 PROCEDURE — 36415 COLL VENOUS BLD VENIPUNCTURE: CPT

## 2025-05-23 PROCEDURE — 70450 CT HEAD/BRAIN W/O DYE: CPT | Mod: RSC

## 2025-05-23 RX ORDER — CEFTRIAXONE 2 G/50ML
2 INJECTION, SOLUTION INTRAVENOUS ONCE
Status: DISCONTINUED | OUTPATIENT
Start: 2025-05-23 | End: 2025-05-23

## 2025-05-23 RX ORDER — LORAZEPAM 2 MG/ML
1 INJECTION INTRAMUSCULAR ONCE
Status: COMPLETED | OUTPATIENT
Start: 2025-05-23 | End: 2025-05-23

## 2025-05-23 RX ORDER — ACETAMINOPHEN 325 MG/1
975 TABLET ORAL ONCE
Status: COMPLETED | OUTPATIENT
Start: 2025-05-23 | End: 2025-05-23

## 2025-05-23 RX ORDER — VANCOMYCIN HYDROCHLORIDE 1 G/20ML
INJECTION, POWDER, LYOPHILIZED, FOR SOLUTION INTRAVENOUS DAILY PRN
Status: DISCONTINUED | OUTPATIENT
Start: 2025-05-23 | End: 2025-05-30 | Stop reason: HOSPADM

## 2025-05-23 RX ORDER — VANCOMYCIN HYDROCHLORIDE 1 G/200ML
1000 INJECTION, SOLUTION INTRAVENOUS EVERY 24 HOURS
Status: DISCONTINUED | OUTPATIENT
Start: 2025-05-24 | End: 2025-05-29

## 2025-05-23 RX ORDER — GADOTERATE MEGLUMINE 376.9 MG/ML
15 INJECTION INTRAVENOUS
Status: COMPLETED | OUTPATIENT
Start: 2025-05-23 | End: 2025-05-23

## 2025-05-23 RX ORDER — CEFTRIAXONE 2 G/50ML
2 INJECTION, SOLUTION INTRAVENOUS ONCE
Status: COMPLETED | OUTPATIENT
Start: 2025-05-23 | End: 2025-05-23

## 2025-05-23 RX ADMIN — LORAZEPAM 1 MG: 2 INJECTION INTRAMUSCULAR; INTRAVENOUS at 22:03

## 2025-05-23 RX ADMIN — IOHEXOL 75 ML: 350 INJECTION, SOLUTION INTRAVENOUS at 21:22

## 2025-05-23 RX ADMIN — GADOTERATE MEGLUMINE 12 ML: 376.9 INJECTION INTRAVENOUS at 22:34

## 2025-05-23 RX ADMIN — ACETAMINOPHEN 975 MG: 325 TABLET ORAL at 16:59

## 2025-05-23 RX ADMIN — CEFTRIAXONE 2 G: 2 INJECTION, SOLUTION INTRAVENOUS at 16:44

## 2025-05-23 ASSESSMENT — LIFESTYLE VARIABLES
TOTAL SCORE: 0
EVER FELT BAD OR GUILTY ABOUT YOUR DRINKING: NO
HAVE YOU EVER FELT YOU SHOULD CUT DOWN ON YOUR DRINKING: NO
EVER HAD A DRINK FIRST THING IN THE MORNING TO STEADY YOUR NERVES TO GET RID OF A HANGOVER: NO
HAVE YOU EVER FELT YOU SHOULD CUT DOWN ON YOUR DRINKING: NO
TOTAL SCORE: 0
EVER HAD A DRINK FIRST THING IN THE MORNING TO STEADY YOUR NERVES TO GET RID OF A HANGOVER: NO
EVER FELT BAD OR GUILTY ABOUT YOUR DRINKING: NO
HAVE PEOPLE ANNOYED YOU BY CRITICIZING YOUR DRINKING: NO
HAVE PEOPLE ANNOYED YOU BY CRITICIZING YOUR DRINKING: NO

## 2025-05-23 ASSESSMENT — PAIN SCALES - PAIN ASSESSMENT IN ADVANCED DEMENTIA (PAINAD)
BREATHING: 0
NEGVOCALIZATION: 0 - NONE.
FACIALEXPRESSION: 1
FACIALEXPRESSION: 1 - SAD. FRIGHTENED. FROWN.
CONSOLABILITY: 0
BODYLANGUAGE: 1 - TENSE. DISTRESSED PACING. FIDGETING.
TOTALSCORE: 2
CONSOLABILITY: 0 - NO NEED TO CONSOLE.
NEGVOCALIZATION: 0
BODYLANGUAGE: 1

## 2025-05-23 ASSESSMENT — ENCOUNTER SYMPTOMS
CHANGE IN APPETITE: DECREASED
FATIGUES EASILY: 1
APPETITE LEVEL: POOR
BOWEL INCONTINENCE: 1
PAIN: 1
MUSCLE WEAKNESS: 1
LAST BOWEL MOVEMENT: 67346
AGITATION: 1
HEADACHES: 1
PERSON REPORTING PAIN: PATIENT
PAIN LOCATION: HEAD
DESCRIPTION OF MEMORY LOSS: IMMEDIATE
LIMITED RANGE OF MOTION: 1
DESCRIPTION OF MEMORY LOSS: SHORT TERM
PAIN LOCATION - PAIN SEVERITY: 3/10
HYPERTENSION: 1

## 2025-05-23 ASSESSMENT — PAIN - FUNCTIONAL ASSESSMENT
PAIN_FUNCTIONAL_ASSESSMENT: 0-10
PAIN_FUNCTIONAL_ASSESSMENT: 0-10

## 2025-05-23 ASSESSMENT — ACTIVITIES OF DAILY LIVING (ADL)
OASIS_M1830: 06
AMBULATION ASSISTANCE: NON-AMBULATORY
ENTERING_EXITING_HOME: MAXIMUM ASSIST
CURRENT_FUNCTION: TWO PERSON

## 2025-05-23 ASSESSMENT — COLUMBIA-SUICIDE SEVERITY RATING SCALE - C-SSRS
6. HAVE YOU EVER DONE ANYTHING, STARTED TO DO ANYTHING, OR PREPARED TO DO ANYTHING TO END YOUR LIFE?: NO
1. IN THE PAST MONTH, HAVE YOU WISHED YOU WERE DEAD OR WISHED YOU COULD GO TO SLEEP AND NOT WAKE UP?: NO
2. HAVE YOU ACTUALLY HAD ANY THOUGHTS OF KILLING YOURSELF?: NO

## 2025-05-23 ASSESSMENT — PAIN SCALES - GENERAL: PAINLEVEL_OUTOF10: 0 - NO PAIN

## 2025-05-23 ASSESSMENT — PAIN DESCRIPTION - PROGRESSION: CLINICAL_PROGRESSION: NOT CHANGED

## 2025-05-23 NOTE — ED PROVIDER NOTES
Emergency Department Provider Note        History of Present Illness     History provided by: Patient and Significant Other  Limitations to History: None  External Records Reviewed with Brief Summary: None    HPI:  Enmanuel Ahumada is a 69 y.o. male with hypertension, hyperlipidemia, smalls's esophagus, glioblastoma s/p resection ([facility], 12/2023) with resultant L HH s/p chemo/RT presenting for generalized weakness, surgical site leakage.  Was hospitalized from 4/5 through 5/5 at [facility] for infection of his craniotomy site.  His cultures grew methicillin resistant staph epidermidis and has been on vancomycin via a PICC line.  He was discharged to a rehab facility.  He was doing well at the rehab, able to ambulate and improving.  He was discharged home yesterday.  However, after discharge, he has been having progressive generalized weakness.  He nearly fell yesterday and had to be lowered to the ground by family.  Today, they noticed clear drainage from his craniotomy site.  They called their neurosurgeon, Dr. Cortez, who recommended they go to [facility] ED for evaluation.  However, due to his weakness, they were unable to get him into the car and get [place] [facility] [place].  Patient was brought here to [place].  He has not had any fevers.  Denies any chest pain, nausea or vomiting, abdominal pain.  Patient received a dose of vancomycin earlier today.    Physical Exam   Triage vitals:  T 37.4 °C (99.3 °F)  HR 96  BP (!) 146/99  RR 15  O2 97 % None (Room air)    Physical Exam  Vitals and nursing note reviewed.   Constitutional:       General: He is not in acute distress.     Appearance: He is well-developed.   HENT:      Head:      Comments: Surgical incision sites are clean and dry without overlying erythema or bogginess.  However, there is clear drainage from the wound.     Right Ear: External ear normal.      Left Ear: External ear normal.      Nose: Nose normal.   Eyes:      General: No scleral  icterus.     Conjunctiva/sclera: Conjunctivae normal.      Pupils: Pupils are equal, round, and reactive to light.   Cardiovascular:      Rate and Rhythm: Normal rate and regular rhythm.      Heart sounds: No murmur heard.  Pulmonary:      Effort: Pulmonary effort is normal. No respiratory distress.      Breath sounds: Normal breath sounds.   Abdominal:      Palpations: Abdomen is soft.      Tenderness: There is no abdominal tenderness.   Musculoskeletal:         General: No swelling.      Cervical back: Neck supple. No rigidity.   Skin:     General: Skin is warm and dry.      Capillary Refill: Capillary refill takes less than 2 seconds.   Neurological:      General: No focal deficit present.      Mental Status: He is alert.      Cranial Nerves: No cranial nerve deficit.      Sensory: No sensory deficit.      Motor: No weakness.      Coordination: Coordination normal.   Psychiatric:         Mood and Affect: Mood normal.          Medical Decision Making & ED Course   Medical Decision Makin y.o. male presenting for generalized weakness, drainage from his craniotomy site.  He is afebrile and hemodynamically stable on arrival.  No obvious outward signs of infection on examination of his craniotomy site but there is clear drainage from this area.  On chart review, patient is still on vancomycin.  They have been trending his CBC, CMP, and CRP levels.  We will obtain these levels today.  Will also obtain CT imaging of his head without IV contrast.    We reached out to neurosurgery at [facility].  The on-call neurosurgeon, Dr. Bonilla, agreed with transfer to [facility] [place] for evaluation if the patient's primary neurosurgeon had recommended this as well.  Discussed with Dr. Rodriguez at [facility] and patient has been accepted for transfer.    CBC shows a white cell count of 5.1.  Chemistry panel is unremarkable.  CRP is within normal limits.    CT head wo IV contrast   Final Result        Compared to 2025,   1. New extensive  bihemispheric pneumocephalus.   2. New air-fluid levels in the right parieto-occipital resection   cavity and occipital horn of left lateral ventricle.   3. New moderately increased size of the lateral and 3rd ventricles   and basal cisterns.   4. Continued evolution of left temporal parenchymal hematoma.   5. No new gas or fluid collection in the scalp overlying the right   parieto-occipital craniectomy.   6. No acute intraparenchymal hemorrhage.        MACRO   Franklyn Pickard discussed the significance and urgency of this   critical finding by Epic secure chat with  CASSY GERMAN on   5/23/2025 at 4:13 pm.  (**-RCF-**) Findings:  See findings.             Signed by: Franklyn Pickard 5/23/2025 4:16 PM   Dictation workstation:   [id number]         Given these findings on the CT head, patient started on ceftriaxone as well for concern over skin lv contamination into his surgical site.  Patient has already received vancomycin today.         Social Determinants of Health which Significantly Impact Care: None identified     EKG Independent Interpretation: EKG not obtained    Independent Result Review and Interpretation: Relevant laboratory and radiographic results were reviewed and independently interpreted by myself.  As necessary, they are commented on in the ED Course.    Chronic conditions affecting the patient's care: As documented above in [facility]    The patient was discussed with the following consultants/services: Neurosurgery, [facility]    Care Considerations: As documented above in [facility]    ED Course:  Diagnoses as of 05/23/25 1839   Generalized weakness   Postoperative surgical complication involving nervous system associated with nervous system procedure, unspecified complication     Disposition   As a result of their workup, the patient will require transfer to another facility.  The patient and/or his guardian/representative is agreeable to transfer at this time.   We will continue to monitor and manage the  patient in the Emergency Department until transport for transfer can be arranged.    Procedures   Procedures    Patient seen and discussed with ED attending physician.    Jeff Granados DO  Emergency Medicine     Jeff Granados DO  Resident  05/23/25 [id number]

## 2025-05-24 PROBLEM — T81.31XA DEHISCENCE OF INCISION, INITIAL ENCOUNTER: Status: ACTIVE | Noted: 2025-05-24

## 2025-05-24 LAB
ALBUMIN SERPL BCP-MCNC: 3.9 G/DL (ref 3.4–5)
ANION GAP SERPL CALC-SCNC: 15 MMOL/L (ref 10–20)
APPEARANCE UR: CLEAR
APTT PPP: 30 SECONDS (ref 26–36)
APTT PPP: 31 SECONDS (ref 26–36)
BILIRUB UR STRIP.AUTO-MCNC: NEGATIVE MG/DL
BUN SERPL-MCNC: 15 MG/DL (ref 6–23)
CALCIUM SERPL-MCNC: 9.4 MG/DL (ref 8.6–10.6)
CHLORIDE SERPL-SCNC: 102 MMOL/L (ref 98–107)
CO2 SERPL-SCNC: 27 MMOL/L (ref 21–32)
COLOR UR: ABNORMAL
CREAT SERPL-MCNC: 0.85 MG/DL (ref 0.5–1.3)
EGFRCR SERPLBLD CKD-EPI 2021: >90 ML/MIN/1.73M*2
ERYTHROCYTE [DISTWIDTH] IN BLOOD BY AUTOMATED COUNT: 15.9 % (ref 11.5–14.5)
ERYTHROCYTE [DISTWIDTH] IN BLOOD BY AUTOMATED COUNT: 16 % (ref 11.5–14.5)
GLUCOSE BLD MANUAL STRIP-MCNC: 79 MG/DL (ref 74–99)
GLUCOSE BLD MANUAL STRIP-MCNC: 81 MG/DL (ref 74–99)
GLUCOSE SERPL-MCNC: 86 MG/DL (ref 74–99)
GLUCOSE UR STRIP.AUTO-MCNC: NORMAL MG/DL
HCT VFR BLD AUTO: 27.3 % (ref 41–52)
HCT VFR BLD AUTO: 29.6 % (ref 41–52)
HGB BLD-MCNC: 10.4 G/DL (ref 13.5–17.5)
HGB BLD-MCNC: 9.1 G/DL (ref 13.5–17.5)
HOLD SPECIMEN: NORMAL
INR PPP: 1.2 (ref 0.9–1.1)
INR PPP: 1.3 (ref 0.9–1.1)
KETONES UR STRIP.AUTO-MCNC: NEGATIVE MG/DL
LEUKOCYTE ESTERASE UR QL STRIP.AUTO: NEGATIVE
MCH RBC QN AUTO: 31.4 PG (ref 26–34)
MCH RBC QN AUTO: 32.2 PG (ref 26–34)
MCHC RBC AUTO-ENTMCNC: 33.3 G/DL (ref 32–36)
MCHC RBC AUTO-ENTMCNC: 35.1 G/DL (ref 32–36)
MCV RBC AUTO: 89 FL (ref 80–100)
MCV RBC AUTO: 97 FL (ref 80–100)
NITRITE UR QL STRIP.AUTO: NEGATIVE
NRBC BLD-RTO: 0 /100 WBCS (ref 0–0)
NRBC BLD-RTO: 0 /100 WBCS (ref 0–0)
PH UR STRIP.AUTO: 6.5 [PH]
PHOSPHATE SERPL-MCNC: 3.9 MG/DL (ref 2.5–4.9)
PLATELET # BLD AUTO: 185 X10*3/UL (ref 150–450)
PLATELET # BLD AUTO: 215 X10*3/UL (ref 150–450)
POTASSIUM SERPL-SCNC: 3.7 MMOL/L (ref 3.5–5.3)
PROT UR STRIP.AUTO-MCNC: NEGATIVE MG/DL
PROTHROMBIN TIME: 13.4 SECONDS (ref 9.8–12.4)
PROTHROMBIN TIME: 13.9 SECONDS (ref 9.8–12.4)
RBC # BLD AUTO: 2.83 X10*6/UL (ref 4.5–5.9)
RBC # BLD AUTO: 3.31 X10*6/UL (ref 4.5–5.9)
RBC # UR STRIP.AUTO: NEGATIVE MG/DL
SODIUM SERPL-SCNC: 140 MMOL/L (ref 136–145)
SP GR UR STRIP.AUTO: 1.05
UFH PPP CHRO-ACNC: 0.5 IU/ML (ref ?–1.1)
UFH PPP CHRO-ACNC: 0.6 IU/ML (ref ?–1.1)
UROBILINOGEN UR STRIP.AUTO-MCNC: NORMAL MG/DL
WBC # BLD AUTO: 5.9 X10*3/UL (ref 4.4–11.3)
WBC # BLD AUTO: 6 X10*3/UL (ref 4.4–11.3)

## 2025-05-24 PROCEDURE — 99222 1ST HOSP IP/OBS MODERATE 55: CPT | Performed by: NEUROLOGICAL SURGERY

## 2025-05-24 PROCEDURE — 2500000004 HC RX 250 GENERAL PHARMACY W/ HCPCS (ALT 636 FOR OP/ED)

## 2025-05-24 PROCEDURE — 99291 CRITICAL CARE FIRST HOUR: CPT

## 2025-05-24 PROCEDURE — 2500000004 HC RX 250 GENERAL PHARMACY W/ HCPCS (ALT 636 FOR OP/ED): Mod: JZ

## 2025-05-24 PROCEDURE — 85610 PROTHROMBIN TIME: CPT

## 2025-05-24 PROCEDURE — 85027 COMPLETE CBC AUTOMATED: CPT

## 2025-05-24 PROCEDURE — 37799 UNLISTED PX VASCULAR SURGERY: CPT

## 2025-05-24 PROCEDURE — 85520 HEPARIN ASSAY: CPT

## 2025-05-24 PROCEDURE — 85730 THROMBOPLASTIN TIME PARTIAL: CPT

## 2025-05-24 PROCEDURE — 2020000001 HC ICU ROOM DAILY

## 2025-05-24 PROCEDURE — 80069 RENAL FUNCTION PANEL: CPT

## 2025-05-24 PROCEDURE — 82947 ASSAY GLUCOSE BLOOD QUANT: CPT

## 2025-05-24 RX ORDER — FAMOTIDINE 20 MG/1
10 TABLET, FILM COATED ORAL DAILY
Status: DISCONTINUED | OUTPATIENT
Start: 2025-05-24 | End: 2025-05-27

## 2025-05-24 RX ORDER — LIDOCAINE HYDROCHLORIDE 10 MG/ML
10 INJECTION, SOLUTION INFILTRATION; PERINEURAL ONCE
Status: COMPLETED | OUTPATIENT
Start: 2025-05-24 | End: 2025-05-24

## 2025-05-24 RX ORDER — BISACODYL 10 MG/1
10 SUPPOSITORY RECTAL DAILY PRN
Status: DISCONTINUED | OUTPATIENT
Start: 2025-05-24 | End: 2025-05-30 | Stop reason: HOSPADM

## 2025-05-24 RX ORDER — AMOXICILLIN 250 MG
2 CAPSULE ORAL 2 TIMES DAILY
Status: DISCONTINUED | OUTPATIENT
Start: 2025-05-24 | End: 2025-05-27

## 2025-05-24 RX ORDER — ATORVASTATIN CALCIUM 40 MG/1
40 TABLET, FILM COATED ORAL DAILY
Status: DISCONTINUED | OUTPATIENT
Start: 2025-05-24 | End: 2025-05-27

## 2025-05-24 RX ORDER — ONDANSETRON HYDROCHLORIDE 2 MG/ML
4 INJECTION, SOLUTION INTRAVENOUS EVERY 8 HOURS PRN
Status: DISCONTINUED | OUTPATIENT
Start: 2025-05-24 | End: 2025-05-24 | Stop reason: DRUGHIGH

## 2025-05-24 RX ORDER — POLYETHYLENE GLYCOL 3350 17 G/17G
17 POWDER, FOR SOLUTION ORAL DAILY
Status: DISCONTINUED | OUTPATIENT
Start: 2025-05-24 | End: 2025-05-27

## 2025-05-24 RX ORDER — PANTOPRAZOLE SODIUM 40 MG/1
40 TABLET, DELAYED RELEASE ORAL
Status: DISCONTINUED | OUTPATIENT
Start: 2025-05-24 | End: 2025-05-24

## 2025-05-24 RX ORDER — LIDOCAINE HYDROCHLORIDE 10 MG/ML
INJECTION, SOLUTION INFILTRATION; PERINEURAL
Status: COMPLETED
Start: 2025-05-24 | End: 2025-05-24

## 2025-05-24 RX ORDER — ONDANSETRON 4 MG/1
8 TABLET, FILM COATED ORAL EVERY 8 HOURS PRN
Status: DISCONTINUED | OUTPATIENT
Start: 2025-05-24 | End: 2025-05-27

## 2025-05-24 RX ORDER — ONDANSETRON 4 MG/1
4 TABLET, FILM COATED ORAL EVERY 8 HOURS PRN
Status: DISCONTINUED | OUTPATIENT
Start: 2025-05-24 | End: 2025-05-24 | Stop reason: DRUGHIGH

## 2025-05-24 RX ORDER — DEXAMETHASONE 2 MG/1
2 TABLET ORAL EVERY MORNING
Status: DISCONTINUED | OUTPATIENT
Start: 2025-05-24 | End: 2025-05-24

## 2025-05-24 RX ORDER — SODIUM CHLORIDE 9 MG/ML
75 INJECTION, SOLUTION INTRAVENOUS CONTINUOUS
Status: ACTIVE | OUTPATIENT
Start: 2025-05-24 | End: 2025-05-25

## 2025-05-24 RX ORDER — LIDOCAINE HYDROCHLORIDE 10 MG/ML
10 INJECTION, SOLUTION EPIDURAL; INFILTRATION; INTRACAUDAL; PERINEURAL ONCE
Status: DISCONTINUED | OUTPATIENT
Start: 2025-05-24 | End: 2025-05-24

## 2025-05-24 RX ORDER — PANTOPRAZOLE SODIUM 40 MG/10ML
40 INJECTION, POWDER, LYOPHILIZED, FOR SOLUTION INTRAVENOUS DAILY
Status: DISCONTINUED | OUTPATIENT
Start: 2025-05-24 | End: 2025-05-30 | Stop reason: HOSPADM

## 2025-05-24 RX ORDER — ACETAMINOPHEN 325 MG/1
650 TABLET ORAL EVERY 4 HOURS PRN
Status: DISCONTINUED | OUTPATIENT
Start: 2025-05-24 | End: 2025-05-27

## 2025-05-24 RX ORDER — PSYLLIUM HUSK 0.4 G
1 CAPSULE ORAL DAILY
Status: DISCONTINUED | OUTPATIENT
Start: 2025-05-24 | End: 2025-05-27

## 2025-05-24 RX ORDER — HEPARIN SODIUM 10000 [USP'U]/100ML
0-4000 INJECTION, SOLUTION INTRAVENOUS CONTINUOUS
Status: DISCONTINUED | OUTPATIENT
Start: 2025-05-24 | End: 2025-05-30 | Stop reason: HOSPADM

## 2025-05-24 RX ORDER — HYDROXYZINE HYDROCHLORIDE 10 MG/1
10 TABLET, FILM COATED ORAL EVERY 6 HOURS PRN
Status: DISCONTINUED | OUTPATIENT
Start: 2025-05-24 | End: 2025-05-27

## 2025-05-24 RX ORDER — POTASSIUM CHLORIDE 14.9 MG/ML
20 INJECTION INTRAVENOUS ONCE
Status: COMPLETED | OUTPATIENT
Start: 2025-05-24 | End: 2025-05-24

## 2025-05-24 RX ADMIN — POTASSIUM CHLORIDE 20 MEQ: 14.9 INJECTION, SOLUTION INTRAVENOUS at 11:03

## 2025-05-24 RX ADMIN — LIDOCAINE HYDROCHLORIDE 10 ML: 10 INJECTION, SOLUTION INFILTRATION; PERINEURAL at 08:55

## 2025-05-24 RX ADMIN — DEXAMETHASONE SODIUM PHOSPHATE 2 MG: 4 INJECTION, SOLUTION INTRA-ARTICULAR; INTRALESIONAL; INTRAMUSCULAR; INTRAVENOUS; SOFT TISSUE at 10:55

## 2025-05-24 RX ADMIN — SODIUM CHLORIDE 1000 ML: 0.9 INJECTION, SOLUTION INTRAVENOUS at 04:02

## 2025-05-24 RX ADMIN — SODIUM CHLORIDE 75 ML/HR: 0.9 INJECTION, SOLUTION INTRAVENOUS at 04:02

## 2025-05-24 RX ADMIN — PANTOPRAZOLE SODIUM 40 MG: 40 INJECTION, POWDER, FOR SOLUTION INTRAVENOUS at 10:55

## 2025-05-24 RX ADMIN — BRIVARACETAM 50 MG: 50 INJECTION, SUSPENSION INTRAVENOUS at 21:07

## 2025-05-24 RX ADMIN — ONDANSETRON 4 MG: 2 INJECTION INTRAMUSCULAR; INTRAVENOUS at 02:51

## 2025-05-24 RX ADMIN — BRIVARACETAM 50 MG: 50 INJECTION, SUSPENSION INTRAVENOUS at 10:55

## 2025-05-24 RX ADMIN — SODIUM CHLORIDE 75 ML/HR: 0.9 INJECTION, SOLUTION INTRAVENOUS at 21:07

## 2025-05-24 RX ADMIN — VANCOMYCIN HYDROCHLORIDE 1000 MG: 1 INJECTION, SOLUTION INTRAVENOUS at 09:59

## 2025-05-24 RX ADMIN — HEPARIN SODIUM 700 UNITS/HR: 10000 INJECTION, SOLUTION INTRAVENOUS at 08:06

## 2025-05-24 SDOH — SOCIAL STABILITY: SOCIAL NETWORK: IN A TYPICAL WEEK, HOW MANY TIMES DO YOU TALK ON THE PHONE WITH FAMILY, FRIENDS, OR NEIGHBORS?: PATIENT UNABLE TO ANSWER

## 2025-05-24 SDOH — ECONOMIC STABILITY: TRANSPORTATION INSECURITY
IN THE PAST 12 MONTHS, HAS LACK OF TRANSPORTATION KEPT YOU FROM MEDICAL APPOINTMENTS OR FROM GETTING MEDICATIONS?: PATIENT UNABLE TO ANSWER

## 2025-05-24 SDOH — HEALTH STABILITY: MENTAL HEALTH: HOW OFTEN DO YOU HAVE SIX OR MORE DRINKS ON ONE OCCASION?: PATIENT UNABLE TO ANSWER

## 2025-05-24 SDOH — ECONOMIC STABILITY: HOUSING INSECURITY: AT ANY TIME IN THE PAST 12 MONTHS, WERE YOU HOMELESS OR LIVING IN A SHELTER (INCLUDING NOW)?: PATIENT UNABLE TO ANSWER

## 2025-05-24 SDOH — HEALTH STABILITY: MENTAL HEALTH
DO YOU FEEL STRESS - TENSE, RESTLESS, NERVOUS, OR ANXIOUS, OR UNABLE TO SLEEP AT NIGHT BECAUSE YOUR MIND IS TROUBLED ALL THE TIME - THESE DAYS?: PATIENT UNABLE TO ANSWER

## 2025-05-24 SDOH — SOCIAL STABILITY: SOCIAL INSECURITY
WITHIN THE LAST YEAR, HAVE YOU BEEN KICKED, HIT, SLAPPED, OR OTHERWISE PHYSICALLY HURT BY YOUR PARTNER OR EX-PARTNER?: PATIENT UNABLE TO ANSWER

## 2025-05-24 SDOH — ECONOMIC STABILITY: FOOD INSECURITY
WITHIN THE PAST 12 MONTHS, YOU WORRIED THAT YOUR FOOD WOULD RUN OUT BEFORE YOU GOT THE MONEY TO BUY MORE.: PATIENT UNABLE TO ANSWER

## 2025-05-24 SDOH — SOCIAL STABILITY: SOCIAL INSECURITY: ARE YOU MARRIED, WIDOWED, DIVORCED, SEPARATED, NEVER MARRIED, OR LIVING WITH A PARTNER?: PATIENT UNABLE TO ANSWER

## 2025-05-24 SDOH — SOCIAL STABILITY: SOCIAL INSECURITY: WITHIN THE LAST YEAR, HAVE YOU BEEN AFRAID OF YOUR PARTNER OR EX-PARTNER?: PATIENT UNABLE TO ANSWER

## 2025-05-24 SDOH — HEALTH STABILITY: MENTAL HEALTH: HOW OFTEN DO YOU HAVE A DRINK CONTAINING ALCOHOL?: PATIENT UNABLE TO ANSWER

## 2025-05-24 SDOH — ECONOMIC STABILITY: FOOD INSECURITY
HOW HARD IS IT FOR YOU TO PAY FOR THE VERY BASICS LIKE FOOD, HOUSING, MEDICAL CARE, AND HEATING?: PATIENT UNABLE TO ANSWER

## 2025-05-24 SDOH — SOCIAL STABILITY: SOCIAL NETWORK: HOW OFTEN DO YOU ATTEND CHURCH OR RELIGIOUS SERVICES?: PATIENT UNABLE TO ANSWER

## 2025-05-24 SDOH — HEALTH STABILITY: PHYSICAL HEALTH
HOW OFTEN DO YOU NEED TO HAVE SOMEONE HELP YOU WHEN YOU READ INSTRUCTIONS, PAMPHLETS, OR OTHER WRITTEN MATERIAL FROM YOUR DOCTOR OR PHARMACY?: PATIENT UNABLE TO RESPOND

## 2025-05-24 SDOH — HEALTH STABILITY: PHYSICAL HEALTH: ON AVERAGE, HOW MANY MINUTES DO YOU ENGAGE IN EXERCISE AT THIS LEVEL?: PATIENT UNABLE TO ANSWER

## 2025-05-24 SDOH — SOCIAL STABILITY: SOCIAL INSECURITY
WITHIN THE LAST YEAR, HAVE YOU BEEN HUMILIATED OR EMOTIONALLY ABUSED IN OTHER WAYS BY YOUR PARTNER OR EX-PARTNER?: PATIENT UNABLE TO ANSWER

## 2025-05-24 SDOH — SOCIAL STABILITY: SOCIAL NETWORK
DO YOU BELONG TO ANY CLUBS OR ORGANIZATIONS SUCH AS CHURCH GROUPS, UNIONS, FRATERNAL OR ATHLETIC GROUPS, OR SCHOOL GROUPS?: PATIENT UNABLE TO ANSWER

## 2025-05-24 SDOH — HEALTH STABILITY: MENTAL HEALTH: HOW MANY DRINKS CONTAINING ALCOHOL DO YOU HAVE ON A TYPICAL DAY WHEN YOU ARE DRINKING?: PATIENT UNABLE TO ANSWER

## 2025-05-24 SDOH — ECONOMIC STABILITY: HOUSING INSECURITY
IN THE LAST 12 MONTHS, WAS THERE A TIME WHEN YOU WERE NOT ABLE TO PAY THE MORTGAGE OR RENT ON TIME?: PATIENT UNABLE TO ANSWER

## 2025-05-24 SDOH — ECONOMIC STABILITY: INCOME INSECURITY
IN THE PAST 12 MONTHS HAS THE ELECTRIC, GAS, OIL, OR WATER COMPANY THREATENED TO SHUT OFF SERVICES IN YOUR HOME?: PATIENT UNABLE TO ANSWER

## 2025-05-24 SDOH — SOCIAL STABILITY: SOCIAL NETWORK: HOW OFTEN DO YOU ATTEND MEETINGS OF THE CLUBS OR ORGANIZATIONS YOU BELONG TO?: PATIENT UNABLE TO ANSWER

## 2025-05-24 SDOH — SOCIAL STABILITY: SOCIAL INSECURITY
WITHIN THE LAST YEAR, HAVE YOU BEEN RAPED OR FORCED TO HAVE ANY KIND OF SEXUAL ACTIVITY BY YOUR PARTNER OR EX-PARTNER?: PATIENT UNABLE TO ANSWER

## 2025-05-24 SDOH — HEALTH STABILITY: PHYSICAL HEALTH
ON AVERAGE, HOW MANY DAYS PER WEEK DO YOU ENGAGE IN MODERATE TO STRENUOUS EXERCISE (LIKE A BRISK WALK)?: PATIENT UNABLE TO ANSWER

## 2025-05-24 SDOH — ECONOMIC STABILITY: FOOD INSECURITY
WITHIN THE PAST 12 MONTHS, THE FOOD YOU BOUGHT JUST DIDN'T LAST AND YOU DIDN'T HAVE MONEY TO GET MORE.: PATIENT UNABLE TO ANSWER

## 2025-05-24 SDOH — SOCIAL STABILITY: SOCIAL NETWORK: HOW OFTEN DO YOU GET TOGETHER WITH FRIENDS OR RELATIVES?: PATIENT UNABLE TO ANSWER

## 2025-05-24 ASSESSMENT — ACTIVITIES OF DAILY LIVING (ADL)
PATIENT'S MEMORY ADEQUATE TO SAFELY COMPLETE DAILY ACTIVITIES?: UNABLE TO ASSESS
ADEQUATE_TO_COMPLETE_ADL: UNABLE TO ASSESS
DRESSING YOURSELF: UNABLE TO ASSESS
TOILETING: UNABLE TO ASSESS
DRESSING YOURSELF: UNABLE TO ASSESS
LACK_OF_TRANSPORTATION: PATIENT UNABLE TO ANSWER
WALKS IN HOME: UNABLE TO ASSESS
HEARING - LEFT EAR: UNABLE TO ASSESS
HEARING - RIGHT EAR: UNABLE TO ASSESS
BATHING: UNABLE TO ASSESS
FEEDING YOURSELF: UNABLE TO ASSESS
WALKS IN HOME: UNABLE TO ASSESS
TOILETING: UNABLE TO ASSESS
HEARING - RIGHT EAR: UNABLE TO ASSESS
JUDGMENT_ADEQUATE_SAFELY_COMPLETE_DAILY_ACTIVITIES: UNABLE TO ASSESS
GROOMING: UNABLE TO ASSESS
PATIENT'S MEMORY ADEQUATE TO SAFELY COMPLETE DAILY ACTIVITIES?: UNABLE TO ASSESS
ADEQUATE_TO_COMPLETE_ADL: UNABLE TO ASSESS
JUDGMENT_ADEQUATE_SAFELY_COMPLETE_DAILY_ACTIVITIES: UNABLE TO ASSESS
ASSISTIVE_DEVICE: WALKER
FEEDING YOURSELF: UNABLE TO ASSESS
BATHING: UNABLE TO ASSESS
GROOMING: UNABLE TO ASSESS
HEARING - LEFT EAR: UNABLE TO ASSESS

## 2025-05-24 ASSESSMENT — LIFESTYLE VARIABLES
AUDIT-C TOTAL SCORE: -1
SKIP TO QUESTIONS 9-10: 0

## 2025-05-24 ASSESSMENT — PAIN - FUNCTIONAL ASSESSMENT
PAIN_FUNCTIONAL_ASSESSMENT: CPOT (CRITICAL CARE PAIN OBSERVATION TOOL)
PAIN_FUNCTIONAL_ASSESSMENT: 0-10

## 2025-05-24 ASSESSMENT — COGNITIVE AND FUNCTIONAL STATUS - GENERAL: PATIENT BASELINE BEDBOUND: UNABLE TO ASSESS AT THIS TIME

## 2025-05-24 ASSESSMENT — PAIN SCALES - GENERAL
PAINLEVEL_OUTOF10: 0 - NO PAIN
PAINLEVEL_OUTOF10: 0 - NO PAIN

## 2025-05-24 NOTE — PROGRESS NOTES
"    [facility] (formerly [facility])[3]  NEUROSCIENCE INTENSIVE CARE UNIT  DAILY PROGRESS NOTE       Patient Name: Enmanuel Ahumada   MRN: [id number]     Admit Date: 2025     : 1955 AGE: 69 y.o. GENDER: male      Subjective    69 year-old male with past medical history of hypertension, hyperlipidemia, drug use (marijuana), smalls's esophagus, GBM s/p resection ([facility], 2023) with resultant left HH s/p chemotherapy/RT, and s/p redo craniotomy for tumor resection (25) who presented with 1-2 days of lethargy and weakness. CTH demonstrated diffuse pneumocephalus within surgical site and ventriculomegaly.     Significant Events:  - 3/16: MRI brain with worsening disease  - : 1 week incisional drainage, s/p right cranial wound washout and mesh cranioplasty, CTH with left temporal ICH  - : DVT ultrasound diffuse left lower extremity DVTs, right upper extremity DVT s/p IVCF, thrombocytopenic with multiple platelet transfusions    Interval Events: Admitted to NSU.     Objective   VITALS:  Temp:  [36.3 °C (97.3 °F)-37.4 °C (99.3 °F)] 36.6 °C (97.9 °F)  Heart Rate:  [] 95  Resp:  [6-25] 18  BP: (129-158)/() 139/100  INTAKE/OUTPUT:  Intake/Output Summary (Last 24 hours) at 2025 0914  Last data filed at 2025 0800  Gross per 24 hour   Intake 1188.2 ml   Output 550 ml   Net 638.2 ml         PHYSICAL EXAM:  NEURO:  - Drowsy, confused, did not answer orientation questions, said \"good morning, yes, hello\"  - Pupils 2mm/reactive bilaterally  - RUE 5/5 strength, LUE 4/5 strength, RLE 4/5 strength, LLE 3/5 strength  CV:  - RRR on telemetry, NSR-ST  RESP:  - Regular, unlabored  - Oxygen: NC  :  - External catheter in place  GI:  - Abdomen NT/ND, soft  SKIN:  - Surgical incision clean/dry/intact    MEDICATIONS:  Scheduled: PRN: Continuous:   Scheduled Medications[1] PRN Medications[2] Continuous Medications[3]     LAB RESULTS:  Results from last 72 hours   Lab Units 25  0245 25 "   GLUCOSE mg/dL 86 98   SODIUM mmol/L 140 139   POTASSIUM mmol/L 3.7 3.5   CHLORIDE mmol/L 102 101   CO2 mmol/L 27 27   ANION GAP mmol/L 15 15   BUN mg/dL 15 16   CREATININE mg/dL 0.85 0.92   EGFR mL/min/1.73m*2 >90 >90   CALCIUM mg/dL 9.4 9.7   PHOSPHORUS mg/dL 3.9  --    ALBUMIN g/dL 3.9 4.2   MAGNESIUM mg/dL  --  1.64      Results from last 72 hours   Lab Units 05/24/25  0808 05/24/25  0245   WBC AUTO x10*3/uL 6.0 5.9   NRBC AUTO /100 WBCs 0.0 0.0   RBC AUTO x10*6/uL 2.83* 3.31*   HEMOGLOBIN g/dL 9.1* 10.4*   HEMATOCRIT % 27.3* 29.6*   MCV fL 97 89   MCH pg 32.2 31.4   MCHC g/dL 33.3 35.1   RDW % 15.9* 16.0*   PLATELETS AUTO x10*3/uL 185 215      Results from last 72 hours   Lab Units 05/24/25  0808 05/24/25  0245 05/23/25  2048 05/23/25  1421   WBC AUTO x10*3/uL 6.0 5.9 5.1 5.1   NRBC AUTO /100 WBCs 0.0 0.0 0.0 0.0   RBC AUTO x10*6/uL 2.83* 3.31* 3.56* 3.05*   HEMOGLOBIN g/dL 9.1* 10.4* 11.3* 10.0*   HEMATOCRIT % 27.3* 29.6* 31.5* 28.7*   MCV fL 97 89 89 94   MCH pg 32.2 31.4 31.7 32.8   MCHC g/dL 33.3 35.1 35.9 34.8   RDW % 15.9* 16.0* 16.0* 16.1*   PLATELETS AUTO x10*3/uL 185 215 202 180   NEUTROS PCT AUTO %  --   --  67.6 65.0   IG PCT AUTO %  --   --  1.2* 1.4*   LYMPHS PCT AUTO %  --   --  14.4 15.2   MONOS PCT AUTO %  --   --  16.0 17.6   EOS PCT AUTO %  --   --  0.4 0.6   BASOS PCT AUTO %  --   --  0.4 0.2   NEUTROS ABS x10*3/uL  --   --  3.43 3.29   IG AUTO x10*3/uL  --   --  0.06 0.07   LYMPHS ABS AUTO x10*3/uL  --   --  0.73* 0.77*   MONOS ABS AUTO x10*3/uL  --   --  0.81 0.89   EOS ABS AUTO x10*3/uL  --   --  0.02 0.03   BASOS ABS AUTO x10*3/uL  --   --  0.02 0.01      Results from last 72 hours   Lab Units 05/24/25  0808 05/24/25  0245   PROTIME seconds 13.9* 13.4*   INR  1.3* 1.2*   APTT seconds 30 31      Results from last 72 hours   Lab Units 05/24/25  0245 05/23/25  2048 05/23/25  1421   ALK PHOS U/L  --  94 76   BILIRUBIN TOTAL mg/dL  --  0.8 0.8   PROTEIN TOTAL g/dL  --  6.8 6.1*   ALT U/L  --   36 30   AST U/L  --  17 17   ALBUMIN g/dL 3.9 4.2 3.8      Results from last 7 days   Lab Units 05/23/25  2048   BLOOD CULTURE  Loaded on Instrument - Culture in progress  Loaded on Instrument - Culture in progress      Results from last 7 days   Lab Units 05/24/25  0245 05/23/25  2328 05/23/25 2048 05/21/25  0515 05/19/25  1340   SODIUM mmol/L 140  --  139   < >  --    SPEC GRAV UR   --  1.050*  --   --  1.021    < > = values in this interval not displayed.     IMAGING RESULTS:  CT venogram head w and wo iv contrast   Final Result   Noncontrast head CT:        Findings are essentially unchanged compared to same day head CT   performed at 15:16. There is redemonstration of extensive   pneumocephalus with similar appearing air-fluid level in the right   parieto-occipital resection cavity.        Additional findings including evolution of the prior left temporal   parenchymal hematoma is also stable.        CT venogram:        The visualized intracranial venous structures are patent.        I personally reviewed the images/study and I agree with the findings   as stated by Geraldo Plummer MD. This study was interpreted at   [facility], [place], [place].        MACRO:   None.        Signed by: Manish Valdez 5/23/2025 10:45 PM   Dictation workstation:   [id number]      CT head wo IV contrast   Final Result   Noncontrast head CT:        Findings are essentially unchanged compared to same day head CT   performed at 15:16. There is redemonstration of extensive   pneumocephalus with similar appearing air-fluid level in the right   parieto-occipital resection cavity.        Additional findings including evolution of the prior left temporal   parenchymal hematoma is also stable.        CT venogram:        The visualized intracranial venous structures are patent.        I personally reviewed the images/study and I agree with the findings   as stated by Geraldo Plummer MD. This study was  interpreted at   [place], [place].        MACRO:   None.        Signed by: Manish Valdez 5/23/2025 10:45 PM   Dictation workstation:   [id number]      XR chest 1 view   Final Result   1. No evidence of acute cardiopulmonary process.   2. Left upper extremity PICC with tip terminating in the right   atrium. Repositioning can be considered based on physician preference.        I personally reviewed the images/study and I agree with the findings   as stated by Geraldo Plummer MD. This study was interpreted at   [place], [place].        MACRO:   None        Signed by: Manish Valdez 5/23/2025 10:33 PM   Dictation workstation:   [id number]        Assessment/Plan    NEURO:  #Diffuse pneumocephalus within surgical site  #Ventriculomegaly  #GBM s/p resection ([facility], 12/2023) with resultant left HH s/p chemotherapy/RT  Assessment:  - Neurologically: see above  - See above history and significant events  Plan:  - NSU  - Neuro Checks: Q1H  - Maintain HOB flat  - Repeat imaging Sunday 5/25, ENT consult pending repeat imaging  - Brivaracetam 50mg BID  - Dexamethasone 2mg daily  - Pain: acetaminophen PRN  - Nausea: ondansetron PRN  - Anxiety: hydroxyzine HCL PRN  - PT/OT    CARDIOVASCULAR:  #HLD  #Diffuse left lower extremity DVTs, right upper extremity DVT s/p IVCF  Assessment:  - Hemodynamically stable  Plan:  - Continue to monitor on telemetry  - Continue heparin infusion per protocol  - Atorvastatin 40mg daily  - BP goal: SBP < 160    RESPIRATORY:  #No active issues  Assessment:  - NC  Plan:  - Continuous pulse oximetry   - Cycle O2  - O2 PRN to maintain SpO2 > 94%, wean as tolerated  - Incentive spirometry while awake    RENAL/:  #No active issues  Assessment:  Results from last 72 hours   Lab Units 05/24/25  0245 05/23/25  2048   BUN mg/dL 15 16   CREATININE mg/dL 0.85 0.92   Plan:  - Monitor with daily  RFP    FEN/GI:  #Dyspepsia  Assessment:  - Last BM Date: PTA  Plan:  - Monitor and replace electrolytes per protocol  - Famotidine 10mg daily  - IVF: NS @ 75 mL/hr  - Diet: NPO Diet; Effective now    - Bowel regimen: Miralax, Carissa-Colace, bisacodyl suppository PRN    ENDOCRINE:  #No active issues  Assessment:  Results from last 7 days   Lab Units 25  0245 25  0238 25  0146 25  2048 25  1421 25  0515 25  1300 25  0454   POCT GLUCOSE mg/dL  --  81 79  --   --   --   --   --    GLUCOSE mg/dL 86  --   --  98 95 86 139*  147* 96   SODIUM mmol/L 140  --   --  139 139 140 138  138 138   Plan:  - Accuchecks & ISS PRN     HEMATOLOGY:  #Anemia, stable  Assessment:  Results from last 7 days   Lab Units 25  0808 25  0245 25  2048   HEMOGLOBIN g/dL 9.1* 10.4* 11.3*   HEMATOCRIT % 27.3* 29.6* 31.5*   PLATELETS AUTO x10*3/uL 185 215 202   Plan:  - Continue to monitor with daily CBC and Coag panel    INFECTIOUS DISEASE:  #Intracranial infection  Assessment:  Results from last 7 days   Lab Units 25  0808 25  0245 25  2048   WBC AUTO x10*3/uL 6.0 5.9 5.1   - Temp (24hrs), Av.7 °C (98 °F), Min:36.3 °C (97.3 °F), Max:37.4 °C (99.3 °F)  - Afebrile  - 4/6: brain biopsy tissue/wound culture positive for abundant staphylococcus epidermidis  Plan:  - Continue to monitor for s/sx of infection  - Pan culture for temperature > 38.4 C  - Continue vancomycin (dosed per pharmacy)  - Monitor infectious work-up obtained overnight    MUSCULOSKELETAL:  - No acute issues    SKIN:  - No acute issues  - Turns and skin care per NSU protocol    ACCESS:  - PIV  - PICC line    PROPHYLAXIS:  - DVT Ppx: SCDs  - GI Ppx: Famotidine, Pantoprazole    RESTRAINTS:  Not indicated/Patient does not meet criteria for restraints    José Luis Fontenot, APRN-CNP  Neuroscience Intensive Care    Total critical care time of 45 minutes, with > 50% of time spent in direct contact with  patient/family for education, counseling and coordination of care.       [1] atorvastatin, 40 mg, oral, Daily  brivaracetam, 50 mg, oral, q12h LA NENA  calcium carbonate-vitamin D3, 1 tablet, oral, Daily  dexAMETHasone, 2 mg, oral, q AM  famotidine, 10 mg, oral, Daily  lidocaine, 10 mL, infiltration, Once  lidocaine, , ,   lidocaine PF, 10 mL, subcutaneous, Once  pantoprazole, 40 mg, oral, Daily before breakfast  polyethylene glycol, 17 g, oral, Daily  potassium chloride, 20 mEq, intravenous, Once  sennosides-docusate sodium, 2 tablet, oral, BID  vancomycin, 1,000 mg, intravenous, q24h  vitamin B complex-vitamin C-folic acid, 1 capsule, oral, Daily     [2] PRN medications: acetaminophen, bisacodyl, hydrOXYzine HCL, lidocaine, ondansetron, vancomycin  [3] heparin, 0-4,000 Units/hr, Last Rate: 700 Units/hr (05/24/25 0806)  sodium chloride 0.9%, 75 mL/hr, Last Rate: 75 mL/hr (05/24/25 0700)

## 2025-05-24 NOTE — PROGRESS NOTES
"Communication Note    Patient Name: Enmanuel Ahumada \"Dash\"  MRN: [id number]  Today's Date: 5/24/2025   Room: 05/05-A    Discipline: Occupational Therapy      Missed Visit Reason:  (Per RN, pt to remain flat in bed. Defer OT at this time.)      05/24/25 at 10:47 AM   Simona Aparicio OT   Rehab Office: [phone]     "

## 2025-05-24 NOTE — CONSULTS
Vancomycin Dosing by Pharmacy- INITIAL    Enmanuel Ahumada is a 69 y.o. year old male who Pharmacy has been consulted for vancomycin dosing for surgical wound infection. Based on the patient's indication and renal status this patient will be dosed based on a goal AUC of 400-600.     Renal function is currently stable.    Visit Vitals  BP (!) 133/100 (BP Location: Right arm, Patient Position: Sitting)   Pulse 90   Temp 36.6 °C (97.9 °F) (Temporal)   Resp 19        Lab Results   Component Value Date    CREATININE 0.92 2025    CREATININE 0.80 2025    CREATININE 0.78 2025    CREATININE 1.06 2025    CREATININE 1.11 2025        Patient weight is as follows:   Vitals:    25 1930   Weight: 61.2 kg (135 lb)       Cultures:  No results found for the encounter in last 14 days.        No intake/output data recorded.  I/O during current shift:  No intake/output data recorded.    Temp (24hrs), Av.8 °C (98.2 °F), Min:36.4 °C (97.5 °F), Max:37.4 °C (99.3 °F)         Assessment/Plan     Patient will not be given a loading dose. Patient receives 1000 mg q24h at home. Last dose  @0930. 12 hour-level 18.1, will continue dose.     Will initiate vancomycin maintenance, 1000 mg every 24 hours to start @0900 tomorrow morning.     This dosing regimen is predicted by [employer] to result in the following pharmacokinetic parameters:  Loading dose: N/A  Regimen: 1000 mg IV every 24 hours.  Start time: 09:30 on 2025  Exposure target: AUC24 (range) 400-600 mg/L.hr   TIM53-39: 512 mg/L.hr  AUC24,ss: 526 mg/L.hr  Probability of AUC24 > 400: 99 %  Ctrough,ss: 14.6 mg/L  Probability of Ctrough,ss > 20: 4 %      Follow-up level will be ordered on  at 0500, unless clinically indicated sooner.  Will continue to monitor renal function daily while on vancomycin and order serum creatinine at least every 48 hours if not already ordered.  Follow for continued vancomycin needs, clinical response, and  signs/symptoms of toxicity.       Grisel Johnson, PharmD

## 2025-05-24 NOTE — ED PROVIDER NOTES
CC: Altered Mental Status     HPI:   Patient is a 69-year-old male with past medical history of hypertension, hyperlipidemia, glioblastoma status postresection on chemotherapy (Avastin on 5/19/2025) presenting as a transfer for neurosurgical evaluation.  Patient recently was hospitalized for a month in April for infection of his craniotomy site and then sent to rehab and discharged yesterday.  He was doing well at rehab and required assistance with a walker.  Wife notes that as soon as they got home, he had significant instability using a walker and stumbled.  He has been more disoriented over the past day and a half per wife.  Has weakness in his bilateral lower extremity that has been progressively worsening over the past day.  He was supposed to be a direct admit for neurosurgery however he felt significantly weak and they called ambulance and was taken to [facility].  No recent fevers or chills, chest pain, dizziness, nausea or vomiting.    Limitations to History: none  Additional History Obtained from: wife, EMS    PMHx/PSHx:  Per HPI.   - has a past medical history of Brain cancer (Multi), GERD (gastroesophageal reflux disease), Hyperlipidemia, Hypertension, and Seizure disorder (Multi).  - has a past surgical history that includes Other surgical history (07/24/2019); Other surgical history (07/26/2021); Brain surgery; Cholecystectomy (January 25, 2020); Vasectomy (Abt 1991); and IR CVC PICC (4/15/2025).    Social History:  - Tobacco:  reports that he has never smoked. He has never been exposed to tobacco smoke. He has never used smokeless tobacco.   - Alcohol:  reports that he does not currently use alcohol.   - Drugs:  reports current drug use. Drug: Marijuana.     Medications: Reviewed in EMR.     Allergies:  Patient has no known allergies.    ???????????????????????????????????????????????????????????????  Triage Vitals:  T 36.6 °C (97.9 °F)  HR 90  BP (!) 133/100  RR 19  O2 99 % None (Room  air)    Physical Exam  Constitutional:       Appearance: Normal appearance.   HENT:      Head: Normocephalic and atraumatic.      Nose: No congestion or rhinorrhea.      Mouth/Throat:      Pharynx: Oropharynx is clear.   Eyes:      General: No scleral icterus.     Extraocular Movements: Extraocular movements intact.      Conjunctiva/sclera: Conjunctivae normal.      Pupils: Pupils are equal, round, and reactive to light.   Neck:      Vascular: No carotid bruit.   Cardiovascular:      Rate and Rhythm: Normal rate and regular rhythm.      Pulses: Normal pulses.      Heart sounds: Normal heart sounds. No murmur heard.     No friction rub. No gallop.   Pulmonary:      Effort: Pulmonary effort is normal. No respiratory distress.      Breath sounds: Normal breath sounds. No wheezing, rhonchi or rales.   Abdominal:      General: Abdomen is flat. There is no distension.      Palpations: Abdomen is soft.      Tenderness: There is no abdominal tenderness. There is no guarding.   Musculoskeletal:         General: No swelling or tenderness. Normal range of motion.      Cervical back: Normal range of motion and neck supple.   Skin:     General: Skin is warm and dry.      Capillary Refill: Capillary refill takes less than 2 seconds.   Neurological:      General: No focal deficit present.      Mental Status: He is alert. He is disoriented.      GCS: GCS eye subscore is 4. GCS verbal subscore is 4. GCS motor subscore is 6.      Cranial Nerves: No cranial nerve deficit or dysarthria.      Sensory: No sensory deficit.      Motor: Weakness (in bilateral LE with L>R with dorsi and plantar flexion) present.       ???????????????????????????????????????????????????????????????  EKG (per my interpretation): Sinus rhythm with a rate of 83.  No AR QRS prolongation.  No obvious ST elevation or depression noted.  No ASMITA.  QTc 465.  Left axis deviation    ED Course  Diagnoses as of 05/23/25 2105   Dehiscence of incision, initial encounter        Medical Decision Making:  Patient is a 69-year-old male with past medical history of hypertension, hyperlipidemia, glioblastoma status postresection on chemotherapy presenting as a transfer for neurosurgical evaluation.  Neurosurgery was immediately consulted as CT head at outside hospital showed significant pneumocephalus that was new compared to prior CT scans.  There is extensive by hemispheric pneumocephalus as well as air-fluid levels in the right parietal occipital resection cavity and occipital horn of the left ventricle.  Neurosurgery recommended CT of the facial bones for further pneumocephalus evaluation as well as MRI brain with and without.  Preoperative lab work was obtained and patient is pending acceptance to likely neurosurgical ICU for likely surgical management and acute neurologic monitoring.  Infectious workup was also initiated.  Care was overseen by attending physician agrees with plan and disposition.    External records reviewed: recent inpatient, clinic, and prior ED notes  Diagnostic imaging independently reviewed/interpreted by me (as reflected in MDM) includes: MRI Head, CT Facial bones, CXR  Social Determinants Affecting Care: None identified  Discussion of management with other providers: attending, NSGY  Prescription Drug Consideration: per inpatient team  Escalation of Care: none    Impression:   Weakness  Pneumocephalus    Disposition: Handoff      Procedures ? SmartLinks last updated 5/23/2025 9:05 PM        Sultana Bernabe MD  Resident  05/23/25 [id number]

## 2025-05-24 NOTE — H&P
"History Of Present Illness  Enmanuel Ahumada \"Dash\" is a 69 y.o. male presenting with wound drainage.    69 M h/o HTN, HLD, smalls's esophagus, RO GBM s/p resection ([facility], 12/2023) with resultant L HH s/p chemo/RT presenting with tumor progression, 2/20 s/p redo crani tumor resection (TH, NTR), 3/16 MRI w/ worsening disease, 4/5 p/w 1wk inciision drainage, 4/6 s/p R cranial wound washout & mesh cranioplasty (kaleb purulence), CTH L temporal ICH, 4/11 DVT US diffuse LLE DVTs, RUE brachial DVT s/p IVC filter (on eliquis), thrombocytopenic with multiple plt transfusions, 5/23 p/w 1-2d of lethargy and weakness, CTH diffuse pneumocephalus within surgical site, vents, ventriculomegaly    Rehab he was walking around with his walker and doing well.  He came home 1 to 2 days ago and since has been more lethargic, has been more generally weak and has not been able to ambulate without significant help.  He has also been developing headache.  No nausea no vomiting.  Wife states that she has not seen any drainage from his incision but she has frequently found his pillow to be wet.     Past Medical History  Medical History[1]    Surgical History  Surgical History[2]     Social History  He reports that he has never smoked. He has never been exposed to tobacco smoke. He has never used smokeless tobacco. He reports that he does not currently use alcohol. He reports current drug use. Drug: Marijuana.    Family History  Family History[3]     Allergies  Patient has no known allergies.    Review of Systems  Negative other than above  Physical Exam  No acute distress  Well-perfused  Equal chest bilaterally  Incision well-healed, 4 small pin site holes in the incision unable to be probed given a small they are  Slightly confused    Awake O x 2  Pupil equal round reactive to light, face symmetry, tongue midline  Right upper extremity 5  Right lower extremity proximally 4 distally 5  Left upper extremity 4  Left lower extremity " "4  Last Recorded Vitals  Blood pressure (!) 133/100, pulse 90, temperature 36.6 °C (97.9 °F), temperature source Temporal, resp. rate 19, height 1.702 m (5' 7\"), weight 61.2 kg (135 lb), SpO2 99%.    Relevant Results        Imaging as above     Assessment & Plan      69 M h/o HTN, HLD, smalls's esophagus, RO GBM s/p resection ([facility], 12/2023) with resultant L HH s/p chemo/RT presenting with tumor progression, 2/20 s/p redo crani tumor resection (TH, NTR), 3/16 MRI w/ worsening disease, 4/5 p/w 1wk inciision drainage, 4/6 s/p R cranial wound washout & mesh cranioplasty (kaleb purulence), CTH L temporal ICH, 4/11 DVT US diffuse LLE DVTs, RUE brachial DVT s/p IVC filter (on eliquis), thrombocytopenic with multiple plt transfusions, 5/23 p/w 1-2d of lethargy and weakness, CTH diffuse pneumocephalus within surgical site, vents, ventriculomegaly    - Will admit to neuro ICU  - Every hour neurochecks  - MRI brain with and without contrast  - Will continue antibiotics   - Will obtain new infectious workup   - Will obtain CT face to evaluate for source of pneumocephalus  - Open patient on high flow nasal cannula help with removing pneumocephalus  - SCDs  - Home meds as appropriate  - ENT consult pending imaging        Valeriano Castro MD         [1]   Past Medical History:  Diagnosis Date    Brain cancer (Multi)     GERD (gastroesophageal reflux disease)     Hyperlipidemia     Hypertension     Seizure disorder (Multi)    [2]   Past Surgical History:  Procedure Laterality Date    BRAIN SURGERY      CHOLECYSTECTOMY  January 25, 2020    IR CVC PICC  4/15/2025    IR CVC PICC 4/15/2025 Dario Martinez MD [facility]    OTHER SURGICAL HISTORY  07/24/2019    Colonoscopy    OTHER SURGICAL HISTORY  07/26/2021    Endoscopy    VASECTOMY  Abt 1991   [3] No family history on file.    "

## 2025-05-24 NOTE — CARE PLAN
The patient's goals for the shift include rest     The clinical goals for the shift include Pt. would like rest overnight    Over the shift, the patient did not make progress toward the following goals. Barriers to progression include frequent checks. Recommendations to address these barriers include grouping care.

## 2025-05-24 NOTE — PROGRESS NOTES
"Physical Therapy                 Therapy Communication Note    Patient Name: Enmanuel Ahumada \"Dash\"  MRN: [id number]  Department: [facility]  Room: 05/05-A  Today's Date: 5/24/2025     Discipline: Physical Therapy    Missed Visit:   Yes     Missed Visit Reason:  Per RN, pt's HOB to remain flat per MD at this time. Will hold PT at this time.     Missed Time: Attempt    Comment:      "

## 2025-05-24 NOTE — PROGRESS NOTES
Emergency Medicine Transition of Care Note.    I received Enmanuel Ahumada in signout from Dr. Bernabe.  Please see the previous ED provider note for all HPI, PE and MDM up to the time of signout at 2300. This is in addition to the primary record.    In brief Enmanuel Ahumada is an 69 y.o. male presenting for   Chief Complaint   Patient presents with    Altered Mental Status     At the time of signout we were awaiting: final nsgy recs    Medical Decision Making  atient is a 69-year-old male with past medical history of hypertension, hyperlipidemia, glioblastoma status postresection on chemotherapy (Avastin on 5/19/2025) presenting as a transfer for neurosurgical evaluation in setting of pneumocephalus with post op infection.  Patient was cultured cover with broad-spectrum antibiotics.  Patient was admitted to the NSU by neurosurgery for close neuromonitoring.  Patient remained vitally stable throughout his time in the ED.        Please see ED course for updates on patient status, results, and disposition.     Diagnoses as of 05/24/25 0031   Dehiscence of incision, initial encounter   Pneumocephalus           Final diagnoses:   [T81.31XA] Dehiscence of incision, initial encounter   [G93.89] Pneumocephalus           Procedure  Procedures    Patient seen and discussed with Jeff Barr MD;Tiff*    Dunia Marie,   PGY-3 Emergency Medicine

## 2025-05-25 ENCOUNTER — APPOINTMENT (OUTPATIENT)
Dept: RADIOLOGY | Facility: HOSPITAL | Age: 70
DRG: 919 | End: 2025-05-25
Payer: MEDICARE

## 2025-05-25 VITALS
BODY MASS INDEX: 20.55 KG/M2 | HEART RATE: 78 BPM | RESPIRATION RATE: 13 BRPM | SYSTOLIC BLOOD PRESSURE: 155 MMHG | TEMPERATURE: 97.2 F | OXYGEN SATURATION: 100 % | HEIGHT: 67 IN | DIASTOLIC BLOOD PRESSURE: 92 MMHG | WEIGHT: 130.95 LBS

## 2025-05-25 LAB
ALBUMIN SERPL BCP-MCNC: 3.5 G/DL (ref 3.4–5)
ANION GAP SERPL CALC-SCNC: 12 MMOL/L (ref 10–20)
APTT PPP: 54 SECONDS (ref 26–36)
BACTERIA BLD CULT: NORMAL
BACTERIA BLD CULT: NORMAL
BASOPHILS # BLD AUTO: 0.01 X10*3/UL (ref 0–0.1)
BASOPHILS NFR BLD AUTO: 0.2 %
BUN SERPL-MCNC: 15 MG/DL (ref 6–23)
CALCIUM SERPL-MCNC: 8.6 MG/DL (ref 8.6–10.6)
CHLORIDE SERPL-SCNC: 102 MMOL/L (ref 98–107)
CO2 SERPL-SCNC: 26 MMOL/L (ref 21–32)
CREAT SERPL-MCNC: 0.77 MG/DL (ref 0.5–1.3)
EGFRCR SERPLBLD CKD-EPI 2021: >90 ML/MIN/1.73M*2
EOSINOPHIL # BLD AUTO: 0.02 X10*3/UL (ref 0–0.7)
EOSINOPHIL NFR BLD AUTO: 0.3 %
ERYTHROCYTE [DISTWIDTH] IN BLOOD BY AUTOMATED COUNT: 15.6 % (ref 11.5–14.5)
GLUCOSE BLD MANUAL STRIP-MCNC: 83 MG/DL (ref 74–99)
GLUCOSE SERPL-MCNC: 86 MG/DL (ref 74–99)
HCT VFR BLD AUTO: 27 % (ref 41–52)
HGB BLD-MCNC: 9.1 G/DL (ref 13.5–17.5)
IMM GRANULOCYTES # BLD AUTO: 0.08 X10*3/UL (ref 0–0.7)
IMM GRANULOCYTES NFR BLD AUTO: 1.4 % (ref 0–0.9)
INR PPP: 1.3 (ref 0.9–1.1)
LYMPHOCYTES # BLD AUTO: 0.85 X10*3/UL (ref 1.2–4.8)
LYMPHOCYTES NFR BLD AUTO: 14.7 %
MAGNESIUM SERPL-MCNC: 1.5 MG/DL (ref 1.6–2.4)
MCH RBC QN AUTO: 32 PG (ref 26–34)
MCHC RBC AUTO-ENTMCNC: 33.7 G/DL (ref 32–36)
MCV RBC AUTO: 95 FL (ref 80–100)
MONOCYTES # BLD AUTO: 0.82 X10*3/UL (ref 0.1–1)
MONOCYTES NFR BLD AUTO: 14.2 %
NEUTROPHILS # BLD AUTO: 4.01 X10*3/UL (ref 1.2–7.7)
NEUTROPHILS NFR BLD AUTO: 69.2 %
NRBC BLD-RTO: 0 /100 WBCS (ref 0–0)
PHOSPHATE SERPL-MCNC: 2.8 MG/DL (ref 2.5–4.9)
PLATELET # BLD AUTO: 185 X10*3/UL (ref 150–450)
POTASSIUM SERPL-SCNC: 3.6 MMOL/L (ref 3.5–5.3)
PROTHROMBIN TIME: 14.6 SECONDS (ref 9.8–12.4)
RBC # BLD AUTO: 2.84 X10*6/UL (ref 4.5–5.9)
SODIUM SERPL-SCNC: 136 MMOL/L (ref 136–145)
UFH PPP CHRO-ACNC: 0.6 IU/ML (ref ?–1.1)
WBC # BLD AUTO: 5.8 X10*3/UL (ref 4.4–11.3)

## 2025-05-25 PROCEDURE — 85610 PROTHROMBIN TIME: CPT

## 2025-05-25 PROCEDURE — 82947 ASSAY GLUCOSE BLOOD QUANT: CPT

## 2025-05-25 PROCEDURE — 70450 CT HEAD/BRAIN W/O DYE: CPT | Performed by: RADIOLOGY

## 2025-05-25 PROCEDURE — 80069 RENAL FUNCTION PANEL: CPT

## 2025-05-25 PROCEDURE — 2500000004 HC RX 250 GENERAL PHARMACY W/ HCPCS (ALT 636 FOR OP/ED): Mod: JZ

## 2025-05-25 PROCEDURE — 85025 COMPLETE CBC W/AUTO DIFF WBC: CPT

## 2025-05-25 PROCEDURE — 2500000004 HC RX 250 GENERAL PHARMACY W/ HCPCS (ALT 636 FOR OP/ED): Mod: JZ | Performed by: REGISTERED NURSE

## 2025-05-25 PROCEDURE — 85520 HEPARIN ASSAY: CPT

## 2025-05-25 PROCEDURE — 70450 CT HEAD/BRAIN W/O DYE: CPT

## 2025-05-25 PROCEDURE — 85730 THROMBOPLASTIN TIME PARTIAL: CPT

## 2025-05-25 PROCEDURE — 83735 ASSAY OF MAGNESIUM: CPT

## 2025-05-25 PROCEDURE — 2500000004 HC RX 250 GENERAL PHARMACY W/ HCPCS (ALT 636 FOR OP/ED): Mod: JW

## 2025-05-25 PROCEDURE — 2060000001 HC INTERMEDIATE ICU ROOM DAILY

## 2025-05-25 PROCEDURE — 37799 UNLISTED PX VASCULAR SURGERY: CPT

## 2025-05-25 RX ORDER — MAGNESIUM SULFATE HEPTAHYDRATE 40 MG/ML
2 INJECTION, SOLUTION INTRAVENOUS ONCE
Status: COMPLETED | OUTPATIENT
Start: 2025-05-25 | End: 2025-05-25

## 2025-05-25 RX ORDER — POTASSIUM CHLORIDE 14.9 MG/ML
20 INJECTION INTRAVENOUS ONCE
Status: COMPLETED | OUTPATIENT
Start: 2025-05-25 | End: 2025-05-25

## 2025-05-25 RX ORDER — SODIUM CHLORIDE 9 MG/ML
75 INJECTION, SOLUTION INTRAVENOUS CONTINUOUS
Status: ACTIVE | OUTPATIENT
Start: 2025-05-25 | End: 2025-05-26

## 2025-05-25 RX ADMIN — POTASSIUM CHLORIDE 20 MEQ: 14.9 INJECTION, SOLUTION INTRAVENOUS at 04:52

## 2025-05-25 RX ADMIN — HEPARIN SODIUM 700 UNITS/HR: 10000 INJECTION, SOLUTION INTRAVENOUS at 18:00

## 2025-05-25 RX ADMIN — SODIUM CHLORIDE 75 ML/HR: 0.9 INJECTION, SOLUTION INTRAVENOUS at 04:24

## 2025-05-25 RX ADMIN — MAGNESIUM SULFATE HEPTAHYDRATE 2 G: 40 INJECTION, SOLUTION INTRAVENOUS at 04:52

## 2025-05-25 RX ADMIN — DEXAMETHASONE SODIUM PHOSPHATE 2 MG: 4 INJECTION, SOLUTION INTRA-ARTICULAR; INTRALESIONAL; INTRAMUSCULAR; INTRAVENOUS; SOFT TISSUE at 08:46

## 2025-05-25 RX ADMIN — VANCOMYCIN HYDROCHLORIDE 1000 MG: 1 INJECTION, SOLUTION INTRAVENOUS at 08:46

## 2025-05-25 RX ADMIN — SODIUM CHLORIDE 75 ML/HR: 0.9 INJECTION, SOLUTION INTRAVENOUS at 08:49

## 2025-05-25 RX ADMIN — PANTOPRAZOLE SODIUM 40 MG: 40 INJECTION, POWDER, FOR SOLUTION INTRAVENOUS at 08:46

## 2025-05-25 RX ADMIN — BRIVARACETAM 50 MG: 50 INJECTION, SUSPENSION INTRAVENOUS at 08:44

## 2025-05-25 RX ADMIN — BRIVARACETAM 50 MG: 50 INJECTION, SUSPENSION INTRAVENOUS at 20:17

## 2025-05-25 ASSESSMENT — PAIN SCALES - GENERAL
PAINLEVEL_OUTOF10: 0 - NO PAIN

## 2025-05-25 ASSESSMENT — PAIN - FUNCTIONAL ASSESSMENT
PAIN_FUNCTIONAL_ASSESSMENT: 0-10

## 2025-05-25 NOTE — PROGRESS NOTES
"Enmanuel Ahumada \"Petey" is a 69 y.o. male on day 1 of admission presenting with Dehiscence of incision, initial encounter.    Subjective   NAEO       Objective     Physical Exam  Awake O x 2  Ou3R, face symmetry, tongue midline  Right upper extremity 5  Right lower extremity proximally 4 distally 5  Left upper extremity 4  Left lower extremity 4    Last Recorded Vitals  Blood pressure (!) 152/95, pulse 79, temperature 36.7 °C (98.1 °F), temperature source Temporal, resp. rate 18, height 1.702 m (5' 7.01\"), weight 59.4 kg (130 lb 15.3 oz), SpO2 95%.  Intake/Output last 3 Shifts:  I/O last 3 completed shifts:  In: 2082.5 (35.3 mL/kg) [I.V.:1116.8 (18.9 mL/kg); IV Piggyback:965.7]  Out: 1125 (19.1 mL/kg) [Urine:1125 (0.5 mL/kg/hr)]  Weight: 59 kg     Relevant Results               This patient has a central line   Reason for the central line remaining today? Parenteral medication      Results for orders placed or performed during the hospital encounter of 05/23/25 (from the past 24 hours)   aPTT - baseline   Result Value Ref Range    aPTT 30 26 - 36 seconds   CBC   Result Value Ref Range    WBC 6.0 4.4 - 11.3 x10*3/uL    nRBC 0.0 0.0 - 0.0 /100 WBCs    RBC 2.83 (L) 4.50 - 5.90 x10*6/uL    Hemoglobin 9.1 (L) 13.5 - 17.5 g/dL    Hematocrit 27.3 (L) 41.0 - 52.0 %    MCV 97 80 - 100 fL    MCH 32.2 26.0 - 34.0 pg    MCHC 33.3 32.0 - 36.0 g/dL    RDW 15.9 (H) 11.5 - 14.5 %    Platelets 185 150 - 450 x10*3/uL   Protime-INR   Result Value Ref Range    Protime 13.9 (H) 9.8 - 12.4 seconds    INR 1.3 (H) 0.9 - 1.1   Heparin Assay, UFH   Result Value Ref Range    Heparin Unfractionated 0.5 See Comment Below for Therapeutic Ranges IU/mL   Heparin Assay, UFH   Result Value Ref Range    Heparin Unfractionated 0.6 See Comment Below for Therapeutic Ranges IU/mL   Coagulation Screen   Result Value Ref Range    Protime 14.6 (H) 9.8 - 12.4 seconds    INR 1.3 (H) 0.9 - 1.1    aPTT 54 (H) 26 - 36 seconds   Renal Function Panel "   Result Value Ref Range    Glucose 86 74 - 99 mg/dL    Sodium 136 136 - 145 mmol/L    Potassium 3.6 3.5 - 5.3 mmol/L    Chloride 102 98 - 107 mmol/L    Bicarbonate 26 21 - 32 mmol/L    Anion Gap 12 10 - 20 mmol/L    Urea Nitrogen 15 6 - 23 mg/dL    Creatinine 0.77 0.50 - 1.30 mg/dL    eGFR >90 >60 mL/min/1.73m*2    Calcium 8.6 8.6 - 10.6 mg/dL    Phosphorus 2.8 2.5 - 4.9 mg/dL    Albumin 3.5 3.4 - 5.0 g/dL   Magnesium   Result Value Ref Range    Magnesium 1.50 (L) 1.60 - 2.40 mg/dL   CBC and Auto Differential   Result Value Ref Range    WBC 5.8 4.4 - 11.3 x10*3/uL    nRBC 0.0 0.0 - 0.0 /100 WBCs    RBC 2.84 (L) 4.50 - 5.90 x10*6/uL    Hemoglobin 9.1 (L) 13.5 - 17.5 g/dL    Hematocrit 27.0 (L) 41.0 - 52.0 %    MCV 95 80 - 100 fL    MCH 32.0 26.0 - 34.0 pg    MCHC 33.7 32.0 - 36.0 g/dL    RDW 15.6 (H) 11.5 - 14.5 %    Platelets 185 150 - 450 x10*3/uL    Neutrophils % 69.2 40.0 - 80.0 %    Immature Granulocytes %, Automated 1.4 (H) 0.0 - 0.9 %    Lymphocytes % 14.7 13.0 - 44.0 %    Monocytes % 14.2 2.0 - 10.0 %    Eosinophils % 0.3 0.0 - 6.0 %    Basophils % 0.2 0.0 - 2.0 %    Neutrophils Absolute 4.01 1.20 - 7.70 x10*3/uL    Immature Granulocytes Absolute, Automated 0.08 0.00 - 0.70 x10*3/uL    Lymphocytes Absolute 0.85 (L) 1.20 - 4.80 x10*3/uL    Monocytes Absolute 0.82 0.10 - 1.00 x10*3/uL    Eosinophils Absolute 0.02 0.00 - 0.70 x10*3/uL    Basophils Absolute 0.01 0.00 - 0.10 x10*3/uL   Heparin Assay, UFH   Result Value Ref Range    Heparin Unfractionated 0.6 See Comment Below for Therapeutic Ranges IU/mL   POCT GLUCOSE   Result Value Ref Range    POCT Glucose 83 74 - 99 mg/dL     *Note: Due to a large number of results and/or encounters for the requested time period, some results have not been displayed. A complete set of results can be found in Results Review.                Assessment & Plan  Dehiscence of incision, initial encounter    69 M h/o HTN, HLD, smalls's esophagus, RO GBM s/p resection ([facility], 12/2023)  with resultant L HH s/p chemo/RT presenting with tumor progression, 2/20 s/p redo crani tumor resection (TH, NTR), 3/16 MRI w/ worsening disease, 4/5 p/w 1wk inciision drainage, 4/6 s/p R cranial wound washout & mesh cranioplasty (kaleb purulence), CTH L temporal ICH, 4/11 DVT US diffuse LLE DVTs, RUE brachial DVT s/p IVC filter (on eliquis), thrombocytopenic with multiple plt transfusions, 5/23 p/w 1-2d of lethargy and weakness, CTH diffuse pneumocephalus within surgical site, vents, ventriculomegaly     KRYSTAL  HOB flat (5/26)  Bcx  Tues 5/27 AM  Cycle O2 (HFNC)  DOAC restart plan  SCDs, SQH      Arcelia Chester MD

## 2025-05-25 NOTE — PROGRESS NOTES
"  Communication Note    Patient Name: Enmanuel Ahumada \"Dash\"  MRN: [id number]  Today's Date: 5/25/2025   Room: 05/05A    Discipline: Physical Therapy      PT Missed Visit: Yes  Missed Visit Reason:  (Pt to remain with HOB flat. Hold PT.)      05/25/25 at 7:41 AM   Shira Coates PT   Rehab Office: [phone]        "

## 2025-05-25 NOTE — PROGRESS NOTES
"Occupational Therapy    Communication Note    Patient Name: Enmanuel Ahumada \"Dash\"  MRN: [id number]  Today's Date: 5/25/2025   Room: 05/05-A    Discipline: Occupational Therapy      Missed Visit Reason: Patient placed on medical hold (Patient to remain with HOB flat; will hold OT.)      05/25/25 at 7:42 AM   Vale Dixon OT   Rehab Office: [phone]     "

## 2025-05-26 LAB
ALBUMIN SERPL BCP-MCNC: 3.5 G/DL (ref 3.4–5)
ANION GAP SERPL CALC-SCNC: 14 MMOL/L (ref 10–20)
APTT PPP: 66 SECONDS (ref 26–36)
BASOPHILS # BLD AUTO: 0.03 X10*3/UL (ref 0–0.1)
BASOPHILS NFR BLD AUTO: 0.5 %
BUN SERPL-MCNC: 13 MG/DL (ref 6–23)
CALCIUM SERPL-MCNC: 8.7 MG/DL (ref 8.6–10.6)
CHLORIDE SERPL-SCNC: 103 MMOL/L (ref 98–107)
CO2 SERPL-SCNC: 23 MMOL/L (ref 21–32)
CREAT SERPL-MCNC: 0.75 MG/DL (ref 0.5–1.3)
EGFRCR SERPLBLD CKD-EPI 2021: >90 ML/MIN/1.73M*2
EOSINOPHIL # BLD AUTO: 0.03 X10*3/UL (ref 0–0.7)
EOSINOPHIL NFR BLD AUTO: 0.5 %
ERYTHROCYTE [DISTWIDTH] IN BLOOD BY AUTOMATED COUNT: 15.8 % (ref 11.5–14.5)
GLUCOSE SERPL-MCNC: 80 MG/DL (ref 74–99)
HCT VFR BLD AUTO: 26.9 % (ref 41–52)
HGB BLD-MCNC: 9.2 G/DL (ref 13.5–17.5)
IMM GRANULOCYTES # BLD AUTO: 0.08 X10*3/UL (ref 0–0.7)
IMM GRANULOCYTES NFR BLD AUTO: 1.3 % (ref 0–0.9)
INR PPP: 1.3 (ref 0.9–1.1)
LYMPHOCYTES # BLD AUTO: 0.81 X10*3/UL (ref 1.2–4.8)
LYMPHOCYTES NFR BLD AUTO: 13.2 %
MAGNESIUM SERPL-MCNC: 1.96 MG/DL (ref 1.6–2.4)
MCH RBC QN AUTO: 32.7 PG (ref 26–34)
MCHC RBC AUTO-ENTMCNC: 34.2 G/DL (ref 32–36)
MCV RBC AUTO: 96 FL (ref 80–100)
MONOCYTES # BLD AUTO: 0.81 X10*3/UL (ref 0.1–1)
MONOCYTES NFR BLD AUTO: 13.2 %
NEUTROPHILS # BLD AUTO: 4.37 X10*3/UL (ref 1.2–7.7)
NEUTROPHILS NFR BLD AUTO: 71.3 %
NRBC BLD-RTO: 0 /100 WBCS (ref 0–0)
PHOSPHATE SERPL-MCNC: 2.9 MG/DL (ref 2.5–4.9)
PLATELET # BLD AUTO: 181 X10*3/UL (ref 150–450)
POTASSIUM SERPL-SCNC: 4.1 MMOL/L (ref 3.5–5.3)
PROTHROMBIN TIME: 14.1 SECONDS (ref 9.8–12.4)
RBC # BLD AUTO: 2.81 X10*6/UL (ref 4.5–5.9)
SODIUM SERPL-SCNC: 136 MMOL/L (ref 136–145)
UFH PPP CHRO-ACNC: 0.6 IU/ML (ref ?–1.1)
VANCOMYCIN SERPL-MCNC: 10.2 UG/ML (ref 5–20)
WBC # BLD AUTO: 6.1 X10*3/UL (ref 4.4–11.3)

## 2025-05-26 PROCEDURE — 2500000004 HC RX 250 GENERAL PHARMACY W/ HCPCS (ALT 636 FOR OP/ED): Mod: JZ | Performed by: STUDENT IN AN ORGANIZED HEALTH CARE EDUCATION/TRAINING PROGRAM

## 2025-05-26 PROCEDURE — 85025 COMPLETE CBC W/AUTO DIFF WBC: CPT

## 2025-05-26 PROCEDURE — 37799 UNLISTED PX VASCULAR SURGERY: CPT

## 2025-05-26 PROCEDURE — 2500000004 HC RX 250 GENERAL PHARMACY W/ HCPCS (ALT 636 FOR OP/ED): Mod: JZ

## 2025-05-26 PROCEDURE — 85520 HEPARIN ASSAY: CPT

## 2025-05-26 PROCEDURE — 2060000001 HC INTERMEDIATE ICU ROOM DAILY

## 2025-05-26 PROCEDURE — 83735 ASSAY OF MAGNESIUM: CPT

## 2025-05-26 PROCEDURE — 2500000001 HC RX 250 WO HCPCS SELF ADMINISTERED DRUGS (ALT 637 FOR MEDICARE OP)

## 2025-05-26 PROCEDURE — 85730 THROMBOPLASTIN TIME PARTIAL: CPT

## 2025-05-26 PROCEDURE — 80202 ASSAY OF VANCOMYCIN: CPT

## 2025-05-26 PROCEDURE — 80069 RENAL FUNCTION PANEL: CPT

## 2025-05-26 PROCEDURE — 2500000001 HC RX 250 WO HCPCS SELF ADMINISTERED DRUGS (ALT 637 FOR MEDICARE OP): Performed by: STUDENT IN AN ORGANIZED HEALTH CARE EDUCATION/TRAINING PROGRAM

## 2025-05-26 RX ORDER — SODIUM CHLORIDE 9 MG/ML
75 INJECTION, SOLUTION INTRAVENOUS CONTINUOUS
Status: ACTIVE | OUTPATIENT
Start: 2025-05-26 | End: 2025-05-27

## 2025-05-26 RX ORDER — LIDOCAINE HYDROCHLORIDE 10 MG/ML
INJECTION, SOLUTION INFILTRATION; PERINEURAL
Status: DISPENSED
Start: 2025-05-26 | End: 2025-05-27

## 2025-05-26 RX ADMIN — SODIUM CHLORIDE 75 ML/HR: 0.9 INJECTION, SOLUTION INTRAVENOUS at 15:16

## 2025-05-26 RX ADMIN — PANTOPRAZOLE SODIUM 40 MG: 40 INJECTION, POWDER, FOR SOLUTION INTRAVENOUS at 09:13

## 2025-05-26 RX ADMIN — BRIVARACETAM 50 MG: 50 INJECTION, SUSPENSION INTRAVENOUS at 09:13

## 2025-05-26 RX ADMIN — DEXAMETHASONE SODIUM PHOSPHATE 2 MG: 4 INJECTION, SOLUTION INTRA-ARTICULAR; INTRALESIONAL; INTRAMUSCULAR; INTRAVENOUS; SOFT TISSUE at 09:13

## 2025-05-26 RX ADMIN — BRIVARACETAM 50 MG: 50 INJECTION, SUSPENSION INTRAVENOUS at 20:53

## 2025-05-26 RX ADMIN — HYDROXYZINE HYDROCHLORIDE 10 MG: 10 TABLET ORAL at 20:53

## 2025-05-26 RX ADMIN — SODIUM CHLORIDE 75 ML/HR: 0.9 INJECTION, SOLUTION INTRAVENOUS at 09:14

## 2025-05-26 RX ADMIN — SENNOSIDES AND DOCUSATE SODIUM 2 TABLET: 50; 8.6 TABLET ORAL at 20:53

## 2025-05-26 RX ADMIN — VANCOMYCIN HYDROCHLORIDE 1000 MG: 1 INJECTION, SOLUTION INTRAVENOUS at 09:13

## 2025-05-26 SDOH — HEALTH STABILITY: MENTAL HEALTH: HOW MANY DRINKS CONTAINING ALCOHOL DO YOU HAVE ON A TYPICAL DAY WHEN YOU ARE DRINKING?: PATIENT DOES NOT DRINK

## 2025-05-26 SDOH — ECONOMIC STABILITY: FOOD INSECURITY: WITHIN THE PAST 12 MONTHS, THE FOOD YOU BOUGHT JUST DIDN'T LAST AND YOU DIDN'T HAVE MONEY TO GET MORE.: NEVER TRUE

## 2025-05-26 SDOH — SOCIAL STABILITY: SOCIAL NETWORK
DO YOU BELONG TO ANY CLUBS OR ORGANIZATIONS SUCH AS CHURCH GROUPS, UNIONS, FRATERNAL OR ATHLETIC GROUPS, OR SCHOOL GROUPS?: NO

## 2025-05-26 SDOH — ECONOMIC STABILITY: FOOD INSECURITY: WITHIN THE PAST 12 MONTHS, YOU WORRIED THAT YOUR FOOD WOULD RUN OUT BEFORE YOU GOT THE MONEY TO BUY MORE.: NEVER TRUE

## 2025-05-26 SDOH — HEALTH STABILITY: PHYSICAL HEALTH: ON AVERAGE, HOW MANY DAYS PER WEEK DO YOU ENGAGE IN MODERATE TO STRENUOUS EXERCISE (LIKE A BRISK WALK)?: 0 DAYS

## 2025-05-26 SDOH — SOCIAL STABILITY: SOCIAL INSECURITY: WITHIN THE LAST YEAR, HAVE YOU BEEN AFRAID OF YOUR PARTNER OR EX-PARTNER?: PATIENT UNABLE TO ANSWER

## 2025-05-26 SDOH — SOCIAL STABILITY: SOCIAL NETWORK
IN A TYPICAL WEEK, HOW MANY TIMES DO YOU TALK ON THE PHONE WITH FAMILY, FRIENDS, OR NEIGHBORS?: MORE THAN THREE TIMES A WEEK

## 2025-05-26 SDOH — HEALTH STABILITY: MENTAL HEALTH
DO YOU FEEL STRESS - TENSE, RESTLESS, NERVOUS, OR ANXIOUS, OR UNABLE TO SLEEP AT NIGHT BECAUSE YOUR MIND IS TROUBLED ALL THE TIME - THESE DAYS?: TO SOME EXTENT

## 2025-05-26 SDOH — HEALTH STABILITY: PHYSICAL HEALTH
HOW OFTEN DO YOU NEED TO HAVE SOMEONE HELP YOU WHEN YOU READ INSTRUCTIONS, PAMPHLETS, OR OTHER WRITTEN MATERIAL FROM YOUR DOCTOR OR PHARMACY?: RARELY

## 2025-05-26 SDOH — HEALTH STABILITY: MENTAL HEALTH: HOW OFTEN DO YOU HAVE SIX OR MORE DRINKS ON ONE OCCASION?: NEVER

## 2025-05-26 SDOH — HEALTH STABILITY: MENTAL HEALTH: HOW OFTEN DO YOU HAVE A DRINK CONTAINING ALCOHOL?: NEVER

## 2025-05-26 SDOH — SOCIAL STABILITY: SOCIAL NETWORK: HOW OFTEN DO YOU ATTEND MEETINGS OF THE CLUBS OR ORGANIZATIONS YOU BELONG TO?: NEVER

## 2025-05-26 SDOH — ECONOMIC STABILITY: INCOME INSECURITY: IN THE PAST 12 MONTHS HAS THE ELECTRIC, GAS, OIL, OR WATER COMPANY THREATENED TO SHUT OFF SERVICES IN YOUR HOME?: NO

## 2025-05-26 SDOH — SOCIAL STABILITY: SOCIAL NETWORK: HOW OFTEN DO YOU ATTEND CHURCH OR RELIGIOUS SERVICES?: NEVER

## 2025-05-26 SDOH — SOCIAL STABILITY: SOCIAL INSECURITY: ARE YOU MARRIED, WIDOWED, DIVORCED, SEPARATED, NEVER MARRIED, OR LIVING WITH A PARTNER?: MARRIED

## 2025-05-26 SDOH — SOCIAL STABILITY: SOCIAL NETWORK: HOW OFTEN DO YOU GET TOGETHER WITH FRIENDS OR RELATIVES?: MORE THAN THREE TIMES A WEEK

## 2025-05-26 ASSESSMENT — PAIN - FUNCTIONAL ASSESSMENT
PAIN_FUNCTIONAL_ASSESSMENT: 0-10

## 2025-05-26 ASSESSMENT — ACTIVITIES OF DAILY LIVING (ADL): LACK_OF_TRANSPORTATION: NO

## 2025-05-26 ASSESSMENT — PAIN SCALES - GENERAL
PAINLEVEL_OUTOF10: 0 - NO PAIN

## 2025-05-26 ASSESSMENT — LIFESTYLE VARIABLES
SKIP TO QUESTIONS 9-10: 1
AUDIT-C TOTAL SCORE: 0

## 2025-05-26 NOTE — PROGRESS NOTES
"Enmanuel Ahumada \"Petey" is a 69 y.o. male on day 2 of admission presenting with Dehiscence of incision, initial encounter.    Subjective   NAEO       Objective     Physical Exam  Awake O x 2  Ou3R, face symmetry, tongue midline  Right upper extremity 5  Right lower extremity proximally 4 distally 5  Left upper extremity 4 (spastic)  Left lower extremity 4    Last Recorded Vitals  Blood pressure (!) 159/103, pulse 77, temperature 36.2 °C (97.2 °F), temperature source Temporal, resp. rate 17, height 1.702 m (5' 7.01\"), weight 55.3 kg (121 lb 14.6 oz), SpO2 100%.  Intake/Output last 3 Shifts:  I/O last 3 completed shifts:  In: 3283.6 (55.3 mL/kg) [I.V.:2983.6 (50.2 mL/kg); IV Piggyback:300]  Out: 1775 (29.9 mL/kg) [Urine:1775 (0.8 mL/kg/hr)]  Weight: 59.4 kg     Relevant Results               This patient has a central line   Reason for the central line remaining today? Parenteral medication      Results for orders placed or performed during the hospital encounter of 05/23/25 (from the past 24 hours)   POCT GLUCOSE   Result Value Ref Range    POCT Glucose 83 74 - 99 mg/dL     *Note: Due to a large number of results and/or encounters for the requested time period, some results have not been displayed. A complete set of results can be found in Results Review.                Assessment & Plan  Dehiscence of incision, initial encounter    69 M h/o HTN, HLD, smalls's esophagus, RO GBM s/p resection ([facility], 12/2023) with resultant L HH s/p chemo/RT presenting with tumor progression, 2/20 s/p redo crani tumor resection (TH, NTR), 3/16 MRI w/ worsening disease, 4/5 p/w 1wk inciision drainage, 4/6 s/p R cranial wound washout & mesh cranioplasty (kaleb purulence), CTH L temporal ICH, 4/11 DVT US diffuse LLE DVTs, RUE brachial DVT s/p IVC filter (on eliquis), thrombocytopenic with multiple plt transfusions, 5/23 p/w 1-2d of lethargy and weakness, CTH diffuse pneumocephalus within surgical site, vents, ventriculomegaly "     KRYSTAL  HOB flat   Bcx  Tues 5/27 AM  Cycle O2 (HFNC)  DOAC restart plan  SCDs, hep gtt      Valeriano Castro MD

## 2025-05-26 NOTE — PROGRESS NOTES
Vancomycin Dosing by Pharmacy  FOLLOW UP    Enmanuel Ahumada is a 69 y.o. male who Pharmacy is consulted to dose vancomycin for surgical wound infection.     Based on the patient's indication and renal status, this patient is being dosed based on a goal vancomycin AUC of 400-600.     Current vancomycin dose: 1000 mg every 24 hours    Estimated vancomycin AUC on current dose: 405 mg/L.hr    Renal function is currently stable.    Estimated Creatinine Clearance: 74.8 mL/min (by C-G formula based on SCr of 0.75 mg/dL).    Results from last 7 days   Lab Units 05/26/25  0308 05/25/25  0253 05/24/25  0808 05/24/25  0245 05/23/25  2048   CREATININE mg/dL 0.75 0.77  --  0.85 0.92   BUN mg/dL 13 15  --  15 16   WBC AUTO x10*3/uL 6.1 5.8 6.0 5.9 5.1        Visit Vitals  BP (!) 161/96   Pulse 83   Temp 36.3 °C (97.3 °F) (Temporal)   Resp 15       Staph/MRSA Screen Culture   Date/Time Value Ref Range Status   04/08/2025 08:26 AM No Staphylococcus aureus isolated  Final     Gram Stain   Date/Time Value Ref Range Status   04/06/2025 10:07 AM (4+) Abundant Polymorphonuclear leukocytes (A)  Final   04/06/2025 10:07 AM (4+) Abundant Gram positive cocci (A)  Final     Blood Culture   Date/Time Value Ref Range Status   05/23/2025 08:48 PM No growth at 2 days  Preliminary   05/23/2025 08:48 PM No growth at 2 days  Preliminary     Tissue/Wound Culture/Smear   Date/Time Value Ref Range Status   04/06/2025 10:07 AM (4+) Abundant Staphylococcus epidermidis (A)  Final        Susceptibility data from last 90 days.  Collected Specimen Info Organism Clindamycin Erythromycin Oxacillin Tetracycline Trimethoprim/Sulfamethoxazole Vancomycin   04/06/25 Swab from BRAIN BIOPSY Staphylococcus epidermidis  S  S  R  S  R  S   04/06/25 Swab from BRAIN BIOPSY Staphylococcus epidermidis         04/06/25 Swab from BRAIN BIOPSY Staphylococcus epidermidis             Assessment/Plan    AUC is within goal range. Continue current vancomycin regimen. This  dosing regimen is predicted by [employer] to result in the following pharmacokinetic parameters:   Regimen: 1000 mg IV every 24 hours.  Start time: 08:46 on 05/26/2025  Exposure target: AUC24 (range) 400-600 mg/L.hr   CHT65-56: 405 mg/L.hr  AUC24,ss: 404 mg/L.hr  Probability of AUC24 > 400: 54 %  Ctrough,ss: 9.3 mg/L  Probability of Ctrough,ss > 20: 0 %    The next vancomycin level will be ordered for 5/27 at AM, unless clinically indicated sooner.  Will continue to monitor renal function daily while on vancomycin and order serum creatinine at least every 48 hours if not already ordered.  Will follow for continued vancomycin needs, clinical response, and signs/symptoms of toxicity.       Bob Alfred, PharmD

## 2025-05-26 NOTE — PROGRESS NOTES
05/26/25 1301   Discharge Planning   Living Arrangements Spouse/significant other   Support Systems Spouse/significant other;Children   Assistance Needed Patient was [place] home from [place] where walking 150' to having a total change in condition and requiring almost total care.   Type of Residence Private residence   Number of Stairs to Enter Residence 3   Number of Stairs Within Residence 10   Do you have animals or pets at home? No   Who is requesting discharge planning? Provider   Home or Post Acute Services Other (Comment)   Type of Post Acute Facility Services Other (Comment)   Expected Discharge Disposition Othe   Does the patient need discharge transport arranged? Yes   RoundTrip coordination needed? Yes   Has discharge transport been arranged? No   Financial Resource Strain   How hard is it for you to pay for the very basics like food, housing, medical care, and heating? Not hard   Housing Stability   In the last 12 months, was there a time when you were not able to pay the mortgage or rent on time? N   In the past 12 months, how many times have you moved where you were living? 0   At any time in the past 12 months, were you homeless or living in a shelter (including now)? N   Transportation Needs   In the past 12 months, has lack of transportation kept you from medical appointments or from getting medications? no   In the past 12 months, has lack of transportation kept you from meetings, work, or from getting things needed for daily living? No   Patient Choice   Provider Choice list and CMS website (https://medicare.gov/care-compare#search) for post-acute Quality and Resource Measure Data were provided and reviewed with: Family   Patient / Family choosing to utilize agency / facility established prior to hospitalization Yes   Intensity of Service   Intensity of Service >30 min     TRANSFER  F:  [place] [facility]     READMIT(s)  Feb / March / April    ICU TREATMENT PLAN  day 2 of admission presenting  with Dehiscence of incision, initial encounter. 5/23 p/w 1-2d of lethargy and weakness, CTH diffuse pneumocephalus within surgical site, vents, ventriculomegaly     DC PLAN  TBD - Care Transitions is following to develop a safe & supportive discharge plan in collaboration with multidisciplinary team (&) patient/family/significant others.      PAYOR   [employer]   [employer] Medicare    PT/OT   JOSEY d/t orders to lay flat     ADDITIONAL INFORMATION  Met with wife (Brittany) at bedside. Read and reviewed details documented in 5/22 Social Work note. In summary patient was home one day from Acute Rehab, [facility] and represented back to hospital. Now in the [facility] ICU. Wife c/f that this might not be the QOL patient would want and inquired about Palliative/Hospice information with [facility]. Wife agreeable to referrals to both for information sessions only. SW will continue to follow up in the days to come.     ADDENDUM  Reply in CP that [facility] to meet with patient and wife today 230-3pm     COMPLETED  (X) 05/26 - Hospice of [facility] referral in [facility] for information session only.     Theresa Rothman (LSW, MSW)

## 2025-05-26 NOTE — PROGRESS NOTES
"Physical Therapy                 Therapy Communication Note    Patient Name: Enmanuel Ahumada \"Dash\"  MRN: [id number]  Department: [facility]  Room: 05/05-A  Today's Date: 5/26/2025     Discipline: Physical Therapy    Missed Visit: PT Missed Visit: Yes     Missed Visit Reason: Missed Visit Reason: Patient placed on medical hold (HOB flat; PT will hold and re-attempt as medically appropriate)    Missed Time: Attempt    Comment:      "

## 2025-05-26 NOTE — CARE PLAN
Problem: Pain - Adult  Goal: Verbalizes/displays adequate comfort level or baseline comfort level  Outcome: Progressing     Problem: Safety - Adult  Goal: Free from fall injury  Outcome: Progressing     Problem: Discharge Planning  Goal: Discharge to home or other facility with appropriate resources  Outcome: Progressing     Problem: Chronic Conditions and Co-morbidities  Goal: Patient's chronic conditions and co-morbidity symptoms are monitored and maintained or improved  Outcome: Progressing     Problem: Fall/Injury  Goal: Not fall by end of shift  Outcome: Progressing  Goal: Be free from injury by end of the shift  Outcome: Progressing     Problem: Skin  Goal: Decreased wound size/increased tissue granulation at next dressing change  Outcome: Progressing  Flowsheets (Taken 5/25/2025 2217)  Decreased wound size/increased tissue granulation at next dressing change: Promote sleep for wound healing  Goal: Participates in plan/prevention/treatment measures  Outcome: Progressing  Flowsheets (Taken 5/25/2025 2217)  Participates in plan/prevention/treatment measures: Discuss with provider PT/OT consult  Goal: Prevent/manage excess moisture  Outcome: Progressing  Flowsheets (Taken 5/25/2025 2217)  Prevent/manage excess moisture: Monitor for/manage infection if present  Goal: Prevent/minimize sheer/friction injuries  Outcome: Progressing  Flowsheets (Taken 5/25/2025 2217)  Prevent/minimize sheer/friction injuries: Utilize specialty bed per algorithm  Goal: Promote/optimize nutrition  Outcome: Progressing  Flowsheets (Taken 5/25/2025 2217)  Promote/optimize nutrition: Discuss with provider if NPO > 2 days  Goal: Promote skin healing  Outcome: Progressing  Flowsheets (Taken 5/25/2025 2217)  Promote skin healing: Assess skin/pad under line(s)/device(s)

## 2025-05-26 NOTE — PROGRESS NOTES
"Occupational Therapy                 Therapy Communication Note    Patient Name: Enmanuel Ahumada \"Dash\"  MRN: [id number]  Department: [facility]  Room: 05/05-A  Today's Date: 5/26/2025     Discipline: Occupational Therapy    Missed Visit: OT Missed Visit: Yes     Missed Visit Reason: Missed Visit Reason:  (HOB flat; OT will hold and re-attempt as medically appropriate)    Missed Time: Attempt    Comment:      "

## 2025-05-27 ENCOUNTER — HOME CARE VISIT (OUTPATIENT)
Dept: HOME HEALTH SERVICES | Facility: HOME HEALTH | Age: 70
End: 2025-05-27
Payer: MEDICARE

## 2025-05-27 ENCOUNTER — APPOINTMENT (OUTPATIENT)
Dept: RADIOLOGY | Facility: HOSPITAL | Age: 70
DRG: 919 | End: 2025-05-27
Payer: MEDICARE

## 2025-05-27 LAB
ALBUMIN SERPL BCP-MCNC: 3.6 G/DL (ref 3.4–5)
ANION GAP SERPL CALC-SCNC: 16 MMOL/L (ref 10–20)
APTT PPP: 79 SECONDS (ref 26–36)
BASOPHILS # BLD AUTO: 0.01 X10*3/UL (ref 0–0.1)
BASOPHILS NFR BLD AUTO: 0.2 %
BUN SERPL-MCNC: 13 MG/DL (ref 6–23)
CALCIUM SERPL-MCNC: 8.8 MG/DL (ref 8.6–10.6)
CHLORIDE SERPL-SCNC: 102 MMOL/L (ref 98–107)
CO2 SERPL-SCNC: 23 MMOL/L (ref 21–32)
CREAT SERPL-MCNC: 0.77 MG/DL (ref 0.5–1.3)
EGFRCR SERPLBLD CKD-EPI 2021: >90 ML/MIN/1.73M*2
EOSINOPHIL # BLD AUTO: 0.01 X10*3/UL (ref 0–0.7)
EOSINOPHIL NFR BLD AUTO: 0.2 %
ERYTHROCYTE [DISTWIDTH] IN BLOOD BY AUTOMATED COUNT: 15.6 % (ref 11.5–14.5)
GLUCOSE BLD MANUAL STRIP-MCNC: 109 MG/DL (ref 74–99)
GLUCOSE BLD MANUAL STRIP-MCNC: 75 MG/DL (ref 74–99)
GLUCOSE SERPL-MCNC: 77 MG/DL (ref 74–99)
HCT VFR BLD AUTO: 27 % (ref 41–52)
HGB BLD-MCNC: 9.8 G/DL (ref 13.5–17.5)
HOLD SPECIMEN: 293
HOLD SPECIMEN: NORMAL
HOLD SPECIMEN: NORMAL
IMM GRANULOCYTES # BLD AUTO: 0.09 X10*3/UL (ref 0–0.7)
IMM GRANULOCYTES NFR BLD AUTO: 1.5 % (ref 0–0.9)
INR PPP: 1.3 (ref 0.9–1.1)
LYMPHOCYTES # BLD AUTO: 0.84 X10*3/UL (ref 1.2–4.8)
LYMPHOCYTES NFR BLD AUTO: 14.4 %
MAGNESIUM SERPL-MCNC: 1.79 MG/DL (ref 1.6–2.4)
MCH RBC QN AUTO: 32.5 PG (ref 26–34)
MCHC RBC AUTO-ENTMCNC: 36.3 G/DL (ref 32–36)
MCV RBC AUTO: 89 FL (ref 80–100)
MONOCYTES # BLD AUTO: 0.81 X10*3/UL (ref 0.1–1)
MONOCYTES NFR BLD AUTO: 13.8 %
NEUTROPHILS # BLD AUTO: 4.09 X10*3/UL (ref 1.2–7.7)
NEUTROPHILS NFR BLD AUTO: 69.9 %
NRBC BLD-RTO: 0 /100 WBCS (ref 0–0)
PHOSPHATE SERPL-MCNC: 3.5 MG/DL (ref 2.5–4.9)
PLATELET # BLD AUTO: 179 X10*3/UL (ref 150–450)
POTASSIUM SERPL-SCNC: 3.8 MMOL/L (ref 3.5–5.3)
PROTHROMBIN TIME: 13.9 SECONDS (ref 9.8–12.4)
RBC # BLD AUTO: 3.02 X10*6/UL (ref 4.5–5.9)
SODIUM SERPL-SCNC: 137 MMOL/L (ref 136–145)
UFH PPP CHRO-ACNC: 0.6 IU/ML (ref ?–1.1)
UFH PPP CHRO-ACNC: 0.6 IU/ML (ref ?–1.1)
UFH PPP CHRO-ACNC: 0.7 IU/ML (ref ?–1.1)
VANCOMYCIN SERPL-MCNC: 14.7 UG/ML (ref 5–20)
WBC # BLD AUTO: 5.9 X10*3/UL (ref 4.4–11.3)

## 2025-05-27 PROCEDURE — 36415 COLL VENOUS BLD VENIPUNCTURE: CPT

## 2025-05-27 PROCEDURE — 80069 RENAL FUNCTION PANEL: CPT

## 2025-05-27 PROCEDURE — 70450 CT HEAD/BRAIN W/O DYE: CPT | Performed by: RADIOLOGY

## 2025-05-27 PROCEDURE — 97161 PT EVAL LOW COMPLEX 20 MIN: CPT | Mod: GP

## 2025-05-27 PROCEDURE — 2060000001 HC INTERMEDIATE ICU ROOM DAILY

## 2025-05-27 PROCEDURE — 70450 CT HEAD/BRAIN W/O DYE: CPT

## 2025-05-27 PROCEDURE — 85025 COMPLETE CBC W/AUTO DIFF WBC: CPT

## 2025-05-27 PROCEDURE — 2500000004 HC RX 250 GENERAL PHARMACY W/ HCPCS (ALT 636 FOR OP/ED)

## 2025-05-27 PROCEDURE — 2500000004 HC RX 250 GENERAL PHARMACY W/ HCPCS (ALT 636 FOR OP/ED): Mod: JZ | Performed by: PHYSICIAN ASSISTANT

## 2025-05-27 PROCEDURE — 82947 ASSAY GLUCOSE BLOOD QUANT: CPT

## 2025-05-27 PROCEDURE — 85610 PROTHROMBIN TIME: CPT

## 2025-05-27 PROCEDURE — 97530 THERAPEUTIC ACTIVITIES: CPT | Mod: GO

## 2025-05-27 PROCEDURE — 74018 RADEX ABDOMEN 1 VIEW: CPT

## 2025-05-27 PROCEDURE — 92610 EVALUATE SWALLOWING FUNCTION: CPT | Mod: GN | Performed by: SPEECH-LANGUAGE PATHOLOGIST

## 2025-05-27 PROCEDURE — 2500000004 HC RX 250 GENERAL PHARMACY W/ HCPCS (ALT 636 FOR OP/ED): Mod: JZ

## 2025-05-27 PROCEDURE — 80202 ASSAY OF VANCOMYCIN: CPT | Performed by: NEUROLOGICAL SURGERY

## 2025-05-27 PROCEDURE — 83735 ASSAY OF MAGNESIUM: CPT

## 2025-05-27 PROCEDURE — 37799 UNLISTED PX VASCULAR SURGERY: CPT

## 2025-05-27 PROCEDURE — 85520 HEPARIN ASSAY: CPT

## 2025-05-27 PROCEDURE — 97166 OT EVAL MOD COMPLEX 45 MIN: CPT | Mod: GO

## 2025-05-27 PROCEDURE — 2500000001 HC RX 250 WO HCPCS SELF ADMINISTERED DRUGS (ALT 637 FOR MEDICARE OP): Performed by: PHYSICIAN ASSISTANT

## 2025-05-27 RX ORDER — PSYLLIUM HUSK 0.4 G
1 CAPSULE ORAL DAILY
Status: DISCONTINUED | OUTPATIENT
Start: 2025-05-28 | End: 2025-05-30 | Stop reason: HOSPADM

## 2025-05-27 RX ORDER — AMOXICILLIN 250 MG
2 CAPSULE ORAL 2 TIMES DAILY
Status: DISCONTINUED | OUTPATIENT
Start: 2025-05-27 | End: 2025-05-30 | Stop reason: HOSPADM

## 2025-05-27 RX ORDER — FAMOTIDINE 20 MG/1
10 TABLET, FILM COATED ORAL DAILY
Status: DISCONTINUED | OUTPATIENT
Start: 2025-05-28 | End: 2025-05-30 | Stop reason: HOSPADM

## 2025-05-27 RX ORDER — PSYLLIUM HUSK 0.4 G
1 CAPSULE ORAL DAILY
Status: CANCELLED
Start: 2025-05-28

## 2025-05-27 RX ORDER — ONDANSETRON 4 MG/1
8 TABLET, FILM COATED ORAL EVERY 8 HOURS PRN
Status: DISCONTINUED | OUTPATIENT
Start: 2025-05-27 | End: 2025-05-30 | Stop reason: HOSPADM

## 2025-05-27 RX ORDER — ACETAMINOPHEN 325 MG/1
650 TABLET ORAL EVERY 4 HOURS PRN
Status: DISCONTINUED | OUTPATIENT
Start: 2025-05-27 | End: 2025-05-27

## 2025-05-27 RX ORDER — ACETAMINOPHEN 325 MG/1
650 TABLET ORAL EVERY 4 HOURS PRN
Status: DISCONTINUED | OUTPATIENT
Start: 2025-05-27 | End: 2025-05-30 | Stop reason: HOSPADM

## 2025-05-27 RX ORDER — ATORVASTATIN CALCIUM 40 MG/1
40 TABLET, FILM COATED ORAL NIGHTLY
Status: DISCONTINUED | OUTPATIENT
Start: 2025-05-27 | End: 2025-05-30 | Stop reason: HOSPADM

## 2025-05-27 RX ORDER — SODIUM CHLORIDE 9 MG/ML
75 INJECTION, SOLUTION INTRAVENOUS CONTINUOUS
Status: DISCONTINUED | OUTPATIENT
Start: 2025-05-27 | End: 2025-05-28

## 2025-05-27 RX ORDER — VANCOMYCIN HYDROCHLORIDE 1 G/200ML
1 INJECTION, SOLUTION INTRAVENOUS EVERY 24 HOURS
Status: CANCELLED
Start: 2025-05-28

## 2025-05-27 RX ORDER — POLYETHYLENE GLYCOL 3350 17 G/17G
17 POWDER, FOR SOLUTION ORAL DAILY
Status: DISCONTINUED | OUTPATIENT
Start: 2025-05-28 | End: 2025-05-30 | Stop reason: HOSPADM

## 2025-05-27 RX ORDER — HYDROXYZINE HYDROCHLORIDE 10 MG/1
10 TABLET, FILM COATED ORAL EVERY 6 HOURS PRN
Status: DISCONTINUED | OUTPATIENT
Start: 2025-05-27 | End: 2025-05-30 | Stop reason: HOSPADM

## 2025-05-27 RX ADMIN — HEPARIN SODIUM 600 UNITS/HR: 10000 INJECTION, SOLUTION INTRAVENOUS at 09:53

## 2025-05-27 RX ADMIN — ATORVASTATIN CALCIUM 40 MG: 40 TABLET, FILM COATED ORAL at 23:00

## 2025-05-27 RX ADMIN — BRIVARACETAM 50 MG: 50 INJECTION, SUSPENSION INTRAVENOUS at 09:10

## 2025-05-27 RX ADMIN — SODIUM CHLORIDE 75 ML/HR: 9 INJECTION, SOLUTION INTRAVENOUS at 11:26

## 2025-05-27 RX ADMIN — DEXAMETHASONE SODIUM PHOSPHATE 2 MG: 4 INJECTION, SOLUTION INTRA-ARTICULAR; INTRALESIONAL; INTRAMUSCULAR; INTRAVENOUS; SOFT TISSUE at 09:10

## 2025-05-27 RX ADMIN — SENNOSIDES AND DOCUSATE SODIUM 2 TABLET: 50; 8.6 TABLET ORAL at 23:00

## 2025-05-27 RX ADMIN — VANCOMYCIN HYDROCHLORIDE 1000 MG: 1 INJECTION, SOLUTION INTRAVENOUS at 09:10

## 2025-05-27 RX ADMIN — BRIVARACETAM 50 MG: 10 SOLUTION ORAL at 23:00

## 2025-05-27 ASSESSMENT — COGNITIVE AND FUNCTIONAL STATUS - GENERAL
STANDING UP FROM CHAIR USING ARMS: A LOT
MOBILITY SCORE: 12
MOVING TO AND FROM BED TO CHAIR: A LOT
TURNING FROM BACK TO SIDE WHILE IN FLAT BAD: A LOT
EATING MEALS: A LOT
WALKING IN HOSPITAL ROOM: A LOT
PERSONAL GROOMING: A LOT
DRESSING REGULAR LOWER BODY CLOTHING: TOTAL
DAILY ACTIVITIY SCORE: 9
HELP NEEDED FOR BATHING: TOTAL
MOVING FROM LYING ON BACK TO SITTING ON SIDE OF FLAT BED WITH BEDRAILS: A LITTLE
CLIMB 3 TO 5 STEPS WITH RAILING: TOTAL
DRESSING REGULAR UPPER BODY CLOTHING: A LOT
TOILETING: TOTAL

## 2025-05-27 ASSESSMENT — PAIN - FUNCTIONAL ASSESSMENT
PAIN_FUNCTIONAL_ASSESSMENT: 0-10

## 2025-05-27 ASSESSMENT — PAIN SCALES - GENERAL
PAINLEVEL_OUTOF10: 0 - NO PAIN

## 2025-05-27 ASSESSMENT — ACTIVITIES OF DAILY LIVING (ADL)
ADL_ASSISTANCE: INDEPENDENT
ADL_ASSISTANCE: INDEPENDENT
ADLS_ADDRESSED: NO
BATHING_ASSISTANCE: MAXIMAL

## 2025-05-27 ASSESSMENT — VISUAL ACUITY: VA_NORMAL: 1

## 2025-05-27 NOTE — PROGRESS NOTES
"Speech-Language Pathology  Adult Inpatient Clinical Bedside Swallow Evaluation    Patient Name: Enmanuel Ahumada \"Dash\"  MRN: [id number]  Today's Date: 5/27/2025   Start Time: 1025  Stop Time: 1045  Time Calculation (min): 20    History of Present Illness:     Patient is a 69 y.o. male presenting with dehiscence of incision.     PMH: HTN, HLD, smalls's esophagus, RO GBM s/p resection ([facility], 12/2023) with resultant L HH s/p chemo/RT presenting with tumor progression, 02/20 s/p redo crani tumor resection (TH, NTR), 3/16 MRI w/ worsening disease, 04/05 p/w 1wk incision drainage, 04/06 s/p R cranial wound washout & mesh cranioplasty (kaleb purulence), CTH L temporal ICH, 04/11 DVT US diffuse LLE DVTs, RUE brachial DVT s/p IVC filter (on eliquis), thrombocytopenic with multiple plt transfusions, 05/23 p/w 1-2d of lethargy and weakness, CTH diffuse pneumocephalus within surgical site, vents, ventriculomegaly    Assessment:   Clinical bedside swallow evaluation completed. Pt OOB in chair, wife present. Pt slow to respond, however alert and oriented to self, and place without cueing. Functional oral musculature upon exam with full dentition. Trial of ice chips; pt manipulates and chews initially without coughing. Trials of single sip of thin liquids results in multiple swallow immediately follow by a strong post prandial cough. Concern for pharyngeal dysphagia and airway invasion. Repeat trials of ice chips results in post prandial cough. No safe for PO or ready for an instrumental at this time. SLP to continue to follow to ascertain readiness for PO diet vs need for an instrumental exam.     Discussion of results and recommendations with RN and MD.     Recommendations:  NPO    Frequent, aggressive oral care is strongly recommended to improve infection control as well as reduce dental plaque and bacteria on oropharyngeal surfaces which may increase the risk nosocomial infections, including pneumonia.    Goal:   Pt will " tolerate least restrictive diet without s/s aspiration, respiratory compromise, or overt difficulty throughout admission.  Start: 05/27/25, End: 6/9/25  Status: goal initiated         Plan:  SLP Services Indicated: Yes  Frequency: 2x week  Discussed POC with patient and spouse  SLP - OK to Discharge    Pain:   0-10  0 = No pain.     Inpatient Education:  Extensive education provided to patient and spouse regarding current swallow function, recommendations/results, and POC.      Consultations/Referrals/Coordination of Services:   N/A

## 2025-05-27 NOTE — CONSULTS
"Nutrition Initial Assessment:   Nutrition Assessment    Reason for Assessment: Admission nursing screening    Patient is a 69 y.o. male presenting with dehiscence of incision.    PMH: HTN, HLD, smalls's esophagus, RO GBM s/p resection ([facility], 12/2023) with resultant L HH s/p chemo/RT presenting with tumor progression, 02/20 s/p redo crani tumor resection ([facility], NTR), 3/16 MRI w/ worsening disease, 04/05 p/w 1wk incision drainage, 04/06 s/p R cranial wound washout & mesh cranioplasty (kaleb purulence), CTH L temporal ICH, 04/11 DVT US diffuse LLE DVTs, RUE brachial DVT s/p IVC filter (on eliquis), thrombocytopenic with multiple plt transfusions, 05/23 p/w 1-2d of lethargy and weakness, CTH diffuse pneumocephalus within surgical site, vents, ventriculomegaly    Nutrition History:  Food and Nutrient History: Met with pt and spouse this AM, pt sleeping during conversation, information obtained from pt's wife at bedside. Per wife's report, pt has not had anything to eat or drink since last Thursday (05/22). She reported that he was eating well at rehab, spent 2 weeks there after recent discharge, and was only home for 24hours before this admission. At rehab, pt was eating soft foods at first, but was able to eat \"everything\" before leaving rehab or was drinking chocolate Boost if he was not feeling hungry. After leaving rehab, pt was requesting chiptole, but his wife stated that he was unable to eat this and has not had anything since. Per his wife, pt was not having any GI issues and chewing/swallowing difficulties resolved prior to leaving rehab. Pt was seen by nutrition services during recent admission, diagnosed with moderate malnutrition r/t chronic disease 04/09.  Vitamin/Herbal Supplement Use: none  Food Allergy:  (NKFA)       Anthropometrics:  Height: 170.2 cm (5' 7.01\")   Weight: 56.7 kg (125 lb)   BMI (Calculated): 19.57  IBW/kg (Dietitian Calculated): 67.3 kg  Percent of IBW: 84 %    Weight History:   Wt Readings " from Last 30 Encounters:   05/27/25 56.7 kg (125 lb)   05/23/25 61.2 kg (135 lb)   05/19/25 60.6 kg (133 lb 9.6 oz)   05/16/25 63.5 kg (139 lb 14.4 oz)   05/12/25 62.1 kg (137 lb)   04/26/25 67 kg (147 lb 11.3 oz) ---15% weight loss from this date to present (significant)   04/04/25 68.1 kg (150 lb 2.1 oz)   03/31/25 69.1 kg (152 lb 5.4 oz)   03/07/25 71.1 kg (156 lb 11.2 oz)   02/22/25 73.6 kg (162 lb 4.1 oz)   02/07/25 72.5 kg (159 lb 14.4 oz)   01/30/25 74.5 kg (164 lb 3.9 oz)   01/06/25 78.7 kg (173 lb 9.6 oz)   01/06/25 77.8 kg (171 lb 7.9 oz)   11/01/24 75.9 kg (167 lb 5.3 oz)   09/25/24 70.8 kg (156 lb 1.6 oz)   09/16/24 70.3 kg (154 lb 15.7 oz)   08/08/24 70.2 kg (154 lb 12.2 oz)   07/01/24 71.7 kg (158 lb 1.1 oz)   06/28/24 71.8 kg (158 lb 4.6 oz)   05/23/24 73.7 kg (162 lb 6.4 oz) --->23% weight loss from this date to present (significant)       Weight Change %:  Weight History / % Weight Change: Per pt's wife, UBW was 165# before surgery, but pt has been losing weight and was ~137# a week and a half ago. Pt's wife stated that pt looks thinner. Significant weight loss of 15% x 1 months, 23% x 1 year.  Significant Weight Loss: Yes  Interpretation of Weight Loss: >20% in 1 year    Nutrition Focused Physical Exam Findings:    Subcutaneous Fat Loss:   Orbital Fat Pads: Mild-Moderate (slight dark circles and slight hollowing)  Buccal Fat Pads: Mild-Moderate (flat cheeks, minimal bounce)  Muscle Wasting:  Temporalis: Severe (hollowed scooping depression)  Pectoralis (Clavicular Region): Mild-Moderate (some protrusion of clavicle)  Deltoid/Trapezius: Mild-Moderate (slight protrusion of acromion process)  Interosseous: Mild-Moderate (slightly depressed area between thumb and forefinger)  Edema:  Edema:  (non-pitting)  Edema Location: generalized  Physical Findings:  Nails: Negative  Skin: Positive (sutures to head)    Nutrition Significant Labs:  CBC Trend:   Results from last 7 days   Lab Units 05/27/25  3909  "05/26/25  0308 05/25/25  0253 05/24/25  0808   WBC AUTO x10*3/uL 5.9 6.1 5.8 6.0   RBC AUTO x10*6/uL 3.02* 2.81* 2.84* 2.83*   HEMOGLOBIN g/dL 9.8* 9.2* 9.1* 9.1*   HEMATOCRIT % 27.0* 26.9* 27.0* 27.3*   MCV fL 89 96 95 97   PLATELETS AUTO x10*3/uL 179 181 185 185    , Renal Lab Trend:   Results from last 7 days   Lab Units 05/27/25  0239 05/26/25  0308 05/25/25  0253 05/24/25  0245   POTASSIUM mmol/L 3.8 4.1 3.6 3.7   PHOSPHORUS mg/dL 3.5 2.9 2.8 3.9   SODIUM mmol/L 137 136 136 140   MAGNESIUM mg/dL 1.79 1.96 1.50*  --    EGFR mL/min/1.73m*2 >90 >90 >90 >90   BUN mg/dL 13 13 15 15   CREATININE mg/dL 0.77 0.75 0.77 0.85    , Vit D: No results found for: \"VITD25\"     Nutrition Specific Medications:  Scheduled medications  atorvastatin, 40 mg, oral, Daily  brivaracetam, 50 mg, intravenous, q12h LA NENA  calcium carbonate-vitamin D3, 1 tablet, oral, Daily  dexAMETHasone, 2 mg, intravenous, q AM  famotidine, 10 mg, oral, Daily  pantoprazole, 40 mg, intravenous, Daily  polyethylene glycol, 17 g, oral, Daily  sennosides-docusate sodium, 2 tablet, oral, BID  vancomycin, 1,000 mg, intravenous, q24h  vitamin B complex-vitamin C-folic acid, 1 capsule, oral, Daily    PRN medications  PRN medications: bisacodyl, ondansetron, vancomycin      I/O:   Last BM Date: 05/26/25; Stool Appearance: Unable to assess (05/26/25 0800)    Dietary Orders (From admission, onward)       Start     Ordered    05/24/25 0010  NPO Diet; Effective now  Diet effective now         05/24/25 0009                     Estimated Needs:   Total Energy Estimated Needs in 24 hours (kCal):  (4500-2775)  Method for Estimating Needs: 30-35kcal/kg x ABW  Total Protein Estimated Needs in 24 Hours (g): 85 g  Method for Estimating 24 Hour Protein Needs: 1.5g/kg x ABW  Method for Estimating 24 Hour Fluid Needs: 1mL/kcal or per team        Nutrition Diagnosis   Malnutrition Diagnosis  Patient has Malnutrition Diagnosis: Yes  Diagnosis Status: New  Malnutrition Diagnosis: " Severe malnutrition related to chronic disease or condition  Related to: GBM with progression, s/p crani now with dehiscence of incision  As Evidenced by: moderate fat losses, moderate-severe muscle wasting, intake likely meeting <75% of increased needs for >/= 1 month, 23% weight loss x 1 year, 15% x 1 month.  Additional Assessment Information: Reports of improved PO intake PTA likely did not meet pt's increased needs considering NFPE and significant weight loss of 15% x 1 month. Current malnutrition status is likely acute-on-chronic.            Nutrition Interventions/Recommendations   Nutrition prescription for oral nutrition    Nutrition Recommendations:    1. Resume PO full liquid or appropriate consistency/texture diet as able  ---If pt continues prolonged NPO, consider initiation of nutrition support as pt is day 5 without significant nutrition intake per report of spouse     ---Pending appropriate diet order, please order Boost VHC (530kcal, 22g protein) BID     2. Add daily MVI       3. Consider SLP consult for swallow eval considering pt with hx of chewing/swallowing difficulty    Nutrition Interventions/Goals:   Meals and Snacks: General healthful diet  Goal: Resume oral diet as medically appropriate         Nutrition Monitoring and Evaluation   Food/Nutrient Related History Monitoring  Monitoring and Evaluation Plan: Intake / amount of food  Intake / Amount of food: Consumes at least 75% or more of meals/snacks/supplements    Anthropometric Measurements  Monitoring and Evaluation Plan: Body weight  Body Weight: Body weight - Promote weight restoration              Goal Status: New goal(s) identified    Time Spent (min): 60 minutes

## 2025-05-27 NOTE — PROGRESS NOTES
05/27/25 1535   Discharge Planning   Home or Post Acute Services Post acute facilities (Rehab/SNF/etc)   Type of Post Acute Facility Services Skilled nursing   Expected Discharge Disposition SNF   Patient Choice   Provider Choice list and CMS website (https://medicare.gov/care-compare#search) for post-acute Quality and Resource Measure Data were provided and reviewed with: Family     Social Work Discharge Planning note:    -Plan per medical team: Pt is not medically ready for discharge.   -Payer: O Medicare  -Status: Inpatient  -Discharge disposition: Pt is currently with PT and OT recommendations for High intensity, but may progress to Mod intensity. Pt did not appear to be fully oriented so CLARK called Pt's wife, Brittany, to further discuss discharge planning.        Brittany reported that she had her informational meeting with [facility] yesterday and it was very helpful. Brittany would like to take Pt home with hospice, but she is not sure that she could manage his care in his current physical condition. Brittany would like to consider SNF placement at this time, to see if Pt can get strong enough to return home after some rehab.        CLARK electronically sent Brittany a list of Skilled facilities. Brittany had SW send referrals to all the 4 and 5 start facilities in their home area. Once Brittany knows which facilities can accept, she will give her order of preference from there. CLARK will continue to follow to assist with discharge planning.    -Anticipated Date of Discharge:  5/30/25    This KAYCEE Odell, LSW    Office: [phone]  Secure chat via [employer]

## 2025-05-27 NOTE — PROGRESS NOTES
Vancomycin Dosing by Pharmacy- FOLLOW UP    Enmanuel Ahumada is a 69 y.o. year old male who Pharmacy has been consulted for vancomycin dosing for surgical wound infection. Based on the patient's indication and renal status this patient is being dosed based on a goal AUC of 400-600.     Renal function is currently stable.    Current vancomycin dose: 1000 mg given every 24 hours    Estimated vancomycin AUC on current dose: 415 mg/L.hr     Visit Vitals  BP (!) 135/115   Pulse 97   Temp 36.3 °C (97.3 °F) (Temporal)   Resp 14        Lab Results   Component Value Date    CREATININE 0.77 2025    CREATININE 0.75 2025    CREATININE 0.77 2025    CREATININE 0.85 2025        Patient weight is as follows:   Vitals:    25 0000   Weight: 56.7 kg (125 lb)       Cultures:  No results found for the encounter in last 14 days.       I/O last 3 completed shifts:  In: 2606.2 (47.1 mL/kg) [I.V.:2606.2 (47.1 mL/kg)]  Out: 2650 (47.9 mL/kg) [Urine:2650 (1.3 mL/kg/hr)]  Weight: 55.3 kg   I/O during current shift:  I/O this shift:  In: 738 [I.V.:738]  Out: 400 [Urine:400]    Temp (24hrs), Av.4 °C (97.5 °F), Min:36.2 °C (97.2 °F), Max:36.7 °C (98 °F)      Assessment/Plan    Within goal AUC range. Continue current vancomycin regimen.    This dosing regimen is predicted by [employer] to result in the following pharmacokinetic parameters:  Loading dose: N/A  Regimen: 1000 mg IV every 24 hours.  Start time: 09:13 on 2025  Exposure target: AUC24 (range) 400-600 mg/L.hr   UXJ20-30: 414 mg/L.hr  AUC24,ss: 415 mg/L.hr  Probability of AUC24 > 400: 66 %  Ctrough,ss: 10.3 mg/L  Probability of Ctrough,ss > 20: 0 %      The next level will be obtained on  at 0500. May be obtained sooner if clinically indicated.   Will continue to monitor renal function daily while on vancomycin and order serum creatinine at least every 48 hours if not already ordered.  Follow for continued vancomycin needs, clinical  response, and signs/symptoms of toxicity.       Grisel Johnson, PharmD

## 2025-05-27 NOTE — PROGRESS NOTES
"Enmanuel Ahumada \"Petey" is a 69 y.o. male on day 3 of admission presenting with Dehiscence of incision, initial encounter.    Subjective   NAEO       Objective     Physical Exam  Awake O x 1, 2 w choice  abulic  Ou3R, face symmetry, tongue midline  Right upper extremity 5  Right lower extremity proximally 4 distally 5  Left upper extremity 4 (spastic)  Left lower extremity 4    Last Recorded Vitals  Blood pressure (!) 135/115, pulse 97, temperature 36.3 °C (97.3 °F), temperature source Temporal, resp. rate 14, height 1.702 m (5' 7.01\"), weight 56.7 kg (125 lb), SpO2 99%.  Intake/Output last 3 Shifts:  I/O last 3 completed shifts:  In: 2606.2 (47.1 mL/kg) [I.V.:2606.2 (47.1 mL/kg)]  Out: 2650 (47.9 mL/kg) [Urine:2650 (1.3 mL/kg/hr)]  Weight: 55.3 kg     Relevant Results               This patient has a central line   Reason for the central line remaining today? Parenteral medication      Results for orders placed or performed during the hospital encounter of 05/23/25 (from the past 24 hours)   Heparin Assay, UFH   Result Value Ref Range    Heparin Unfractionated 0.6 See Comment Below for Therapeutic Ranges IU/mL   Coagulation Screen   Result Value Ref Range    Protime 13.9 (H) 9.8 - 12.4 seconds    INR 1.3 (H) 0.9 - 1.1    aPTT 79 (H) 26 - 36 seconds   Renal Function Panel   Result Value Ref Range    Glucose 77 74 - 99 mg/dL    Sodium 137 136 - 145 mmol/L    Potassium 3.8 3.5 - 5.3 mmol/L    Chloride 102 98 - 107 mmol/L    Bicarbonate 23 21 - 32 mmol/L    Anion Gap 16 10 - 20 mmol/L    Urea Nitrogen 13 6 - 23 mg/dL    Creatinine 0.77 0.50 - 1.30 mg/dL    eGFR >90 >60 mL/min/1.73m*2    Calcium 8.8 8.6 - 10.6 mg/dL    Phosphorus 3.5 2.5 - 4.9 mg/dL    Albumin 3.6 3.4 - 5.0 g/dL   Magnesium   Result Value Ref Range    Magnesium 1.79 1.60 - 2.40 mg/dL   CBC and Auto Differential   Result Value Ref Range    WBC 5.9 4.4 - 11.3 x10*3/uL    nRBC 0.0 0.0 - 0.0 /100 WBCs    RBC 3.02 (L) 4.50 - 5.90 x10*6/uL    Hemoglobin " 9.8 (L) 13.5 - 17.5 g/dL    Hematocrit 27.0 (L) 41.0 - 52.0 %    MCV 89 80 - 100 fL    MCH 32.5 26.0 - 34.0 pg    MCHC 36.3 (H) 32.0 - 36.0 g/dL    RDW 15.6 (H) 11.5 - 14.5 %    Platelets 179 150 - 450 x10*3/uL    Neutrophils % 69.9 40.0 - 80.0 %    Immature Granulocytes %, Automated 1.5 (H) 0.0 - 0.9 %    Lymphocytes % 14.4 13.0 - 44.0 %    Monocytes % 13.8 2.0 - 10.0 %    Eosinophils % 0.2 0.0 - 6.0 %    Basophils % 0.2 0.0 - 2.0 %    Neutrophils Absolute 4.09 1.20 - 7.70 x10*3/uL    Immature Granulocytes Absolute, Automated 0.09 0.00 - 0.70 x10*3/uL    Lymphocytes Absolute 0.84 (L) 1.20 - 4.80 x10*3/uL    Monocytes Absolute 0.81 0.10 - 1.00 x10*3/uL    Eosinophils Absolute 0.01 0.00 - 0.70 x10*3/uL    Basophils Absolute 0.01 0.00 - 0.10 x10*3/uL   Vancomycin   Result Value Ref Range    Vancomycin 14.7 5.0 - 20.0 ug/mL   Heparin Assay, UFH   Result Value Ref Range    Heparin Unfractionated 0.7 See Comment Below for Therapeutic Ranges IU/mL     *Note: Due to a large number of results and/or encounters for the requested time period, some results have not been displayed. A complete set of results can be found in Results Review.                Assessment & Plan  Dehiscence of incision, initial encounter    69 M h/o HTN, HLD, smalls's esophagus, RO GBM s/p resection ([facility], 12/2023) with resultant L HH s/p chemo/RT presenting with tumor progression, 2/20 s/p redo crani tumor resection (TH, NTR), 3/16 MRI w/ worsening disease, 4/5 p/w 1wk inciision drainage, 4/6 s/p R cranial wound washout & mesh cranioplasty (kaleb purulence), CTH L temporal ICH, 4/11 DVT US diffuse LLE DVTs, RUE brachial DVT s/p IVC filter (on eliquis), thrombocytopenic with multiple plt transfusions, 5/23 p/w 1-2d of lethargy and weakness, CTH diffuse pneumocephalus within surgical site, vents, ventriculomegaly     CTV no obvious sinus fx or mastoid violation, no sinus thrombus, MRI brain subacute L temporal hemorrhage, no enhancement of diffusion  restriction, 5/24 incision over sewn, 5/25 CTH improving pneumocephalus, 5/26 incision oversewn, 5/27 CTH slightly decr pneumocephalus    KRYSTAL  No HOB restriction  Bcx  Cycle O2 (HFNC)  DOAC restart plan  SCDs, hep gtt  Ok to work w PTOT    Arcelia Chester MD

## 2025-05-27 NOTE — NURSING NOTE
Nutrition support      CORTRAK placed per order     Cortrak inserted to 70 cm via right nares. Tube held in position by Bridle. Patient tolerated tube placement fairly. Bedside RN assisted with tube placement and is up to date on the plan of care.  Awaiting KUB read prior to utilization of tube. Guidewire removed from CORTRAK.

## 2025-05-27 NOTE — PROGRESS NOTES
Nutrition Note:   Nutrition Assessment      SLP recs for NPO, team asking for TF recs.    Nutrition Significant Labs:  CBC Trend:   Results from last 7 days   Lab Units 05/27/25  0239 05/26/25  0308 05/25/25  0253 05/24/25  0808   WBC AUTO x10*3/uL 5.9 6.1 5.8 6.0   RBC AUTO x10*6/uL 3.02* 2.81* 2.84* 2.83*   HEMOGLOBIN g/dL 9.8* 9.2* 9.1* 9.1*   HEMATOCRIT % 27.0* 26.9* 27.0* 27.3*   MCV fL 89 96 95 97   PLATELETS AUTO x10*3/uL 179 181 185 185    , Renal Lab Trend:   Results from last 7 days   Lab Units 05/27/25 0239 05/26/25  0308 05/25/25  0253 05/24/25  0245   POTASSIUM mmol/L 3.8 4.1 3.6 3.7   PHOSPHORUS mg/dL 3.5 2.9 2.8 3.9   SODIUM mmol/L 137 136 136 140   MAGNESIUM mg/dL 1.79 1.96 1.50*  --    EGFR mL/min/1.73m*2 >90 >90 >90 >90   BUN mg/dL 13 13 15 15   CREATININE mg/dL 0.77 0.75 0.77 0.85        Nutrition Specific Medications:  Scheduled medications  atorvastatin, 40 mg, nasogastric tube, Nightly  brivaracetam, 50 mg, nasogastric tube, BID  [START ON 5/28/2025] calcium carbonate-vitamin D3, 1 tablet, g-tube, Daily  dexAMETHasone, 2 mg, intravenous, q AM  [START ON 5/28/2025] famotidine, 10 mg, nasogastric tube, Daily  pantoprazole, 40 mg, intravenous, Daily  [START ON 5/28/2025] polyethylene glycol, 17 g, nasogastric tube, Daily  sennosides-docusate sodium, 2 tablet, nasogastric tube, BID  vancomycin, 1,000 mg, intravenous, q24h  [START ON 5/28/2025] vitamin B complex-vitamin C-folic acid, 1 capsule, nasogastric tube, Daily    PRN medications  PRN medications: bisacodyl, ondansetron, vancomycin      Estimated Needs:   Total Energy Estimated Needs in 24 hours (kCal):  (2952-9477)  Method for Estimating Needs: 30-35kcal/kg x ABW  Total Protein Estimated Needs in 24 Hours (g): 85 g  Method for Estimating 24 Hour Protein Needs: 1.5g/kg x ABW  Method for Estimating 24 Hour Fluid Needs: 1mL/kcal or per team        Nutrition Interventions/Recommendations   Nutrition prescription for enteral  nutrition    Nutrition Recommendations:    Pivot 1.5 @ 10mL/hr, increase by 10mL/hr q12hrs to goal rate of 50mL/hr  ---FWF per team  ---Monitor RFP + Mag for s/s of refeeding; hold TF @ rate and replete lytes, if low, prior to advancing  ---This regimen provides 1200mL, 1800kcal, 113g protein, 900mL H2O    Nutrition Interventions/Goals:   Enteral Intake: Insert enteral feeding tube  Goal: Initiate Pivot 1.5 and advance to goal rate of 50mL/hr          Nutrition Monitoring and Evaluation   Food/Nutrient Related History Monitoring  Monitoring and Evaluation Plan: Enteral and parenteral nutrition intake determination  Intake / Amount of food: Consumes at least 75% or more of meals/snacks/supplements  Enteral and Parenteral Nutrition Intake Determination: Enteral nutrition intake - Other  Criteria: Initiate enteral feeds    Anthropometric Measurements  Monitoring and Evaluation Plan: Body weight  Body Weight: Body weight - Promote weight restoration    Biochemical Data, Medical Tests and Procedures  Monitoring and Evaluation Plan: Electrolyte/renal panel  Electrolyte and Renal Panel: Electrolytes within normal limits         Goal Status: New goal(s) identified    Time Spent (min): 15 minutes

## 2025-05-27 NOTE — SIGNIFICANT EVENT
NEUROSURGERY IV ANTIBIOTIC DISCHARGE PLANNING    Briefly, Enmanuel Ahumada is a 69 year old male with a past medical history of HTN, HLD, smalls's esophagus and RO GBM s/p resection ([facility], 12/2023) with resultant L HH s/p chemo/RT complicated by tumor progression requiring redo crani tumor resection ([facility], 2/20, NTR) with subsequent MRI 3/16 demonstrating worsening disease. Course further complicated by incisional breakdown requiring R cranial wound washout and mesh cranioplasty (kaleb purulence intraop). Hospital course with noted post operative CTH with L temporal ICH, DVT US with diffuse LLE DVTs, RUE brachial DVT s/p IVC filter (on eliquis) and thrombocytopenia requiring multiple plt transfusions. Patient returned to the [facility] ED 5/23 with 1-2 days of lethargy and weakness, CTH diffuse pneumocephalus within surgical site/ventricles and ventriculomegaly. CTV with no obvious sinus fracture or mastoid violation, no sinus thrombus. MRI brain subacute L temporal hemorrhage, no enhancement of diffusion restriction. Patient admitted to the neurosurgery service for further management.     Patient currently receiving IV vancomycin via PICC (per previous ID recommendations during last admission), see last daily progress note dated 4/8/2025       ·  Pharmacy to assist with dosing, currently 1g [id number]        ·  End date 6/15/2025        ·  AM creatinine stable and WNL 5/27 (0.77)        ·  Plan to obtain at least weekly CBC with diff, chem 7, quantitative CRP, vancomycin trough        ·  Outpatient follow up appointment with ID scheduled 6/10/2025     Patient not medically ready for discharge at this time, planning for acute rehab placement, will continue to monitor for needed changes in IV ABX or need to reengage ID service     Leeann Boggs PA-C  Neurosurgery   [facility]  Team Pager #[phone]

## 2025-05-27 NOTE — PROGRESS NOTES
"Occupational Therapy    Evaluation/Treatment    Patient Name: Enmanuel Ahumada \"Dash\"  MRN: [id number]  Department: [facility]  Room: 05/05-A  Today's Date: 05/27/25  Time Calculation  Start Time: 0947  Stop Time: 1030  Time Calculation (min): 43 min       Assessment:  OT Assessment: Pt presents with deficits in ADLs, IADLs, functional mobility, bed mobility, cognition and functional transfers. Pt would be appropriate for HIGH skilled OT to return to [facility] safely.  Prognosis: Good  Barriers to Discharge Home: Caregiver assistance, Physical needs, Cognition needs  Caregiver Assistance: Caregiver assistance needed per identified barriers - however, level of patient's required assistance exceeds assistance available at home  Cognition Needs: Cognition-related high falls risk, 24hr supervision for safety awareness needed  Physical Needs: 24hr mobility assistance needed, 24hr ADL assistance needed, High falls risk due to function or environment  Evaluation/Treatment Tolerance: Patient tolerated treatment well  Medical Staff Made Aware: Yes  End of Session Communication: Bedside nurse  End of Session Patient Position: Up in chair, Alarm off, caregiver present  OT Assessment Results: Decreased ADL status, Decreased upper extremity range of motion, Decreased upper extremity strength, Decreased cognition, Decreased functional mobility, Decreased IADLs, Decreased safe judgment during ADL  Prognosis: Good  Evaluation/Treatment Tolerance: Patient tolerated treatment well  Medical Staff Made Aware: Yes  Strengths: Support and attitude of living partners, Rehab experience  Barriers to Participation: Premorbid level of function, Insight into problems, Attitude of self, Ability to acquire knowledge  Plan:  Treatment Interventions: ADL retraining, Functional transfer training, UE strengthening/ROM, Cognitive reorientation, Equipment evaluation/education, Neuromuscular reeducation, Compensatory technique education  OT " Frequency: 4 times per week  OT Discharge Recommendations: High intensity level of continued care  Equipment Recommended upon Discharge:  (TBD)  OT Recommended Transfer Status: Assist of 2  OT - OK to Discharge: Yes  Treatment Interventions: ADL retraining, Functional transfer training, UE strengthening/ROM, Cognitive reorientation, Equipment evaluation/education, Neuromuscular reeducation, Compensatory technique education    Subjective   Current Problem:  1. Dehiscence of incision, initial encounter        2. Pneumocephalus          OT Visit Info:  OT Received On: 05/27/25  General Visit Info:   General  Reason for Referral: 5/23 p/w 1-2d of lethargy and weakness, CTH diffuse pneumocephalus within surgical site, vents, ventriculomegaly.5/25 CTH improving pneumocephalus, 5/26 incision oversewn, 5/27 CTH slightly decr pneumocephalus  Past Medical History Relevant to Rehab: h/o HTN, HLD, smalls's esophagus, RO GBM s/p resection ([facility], 12/2023) with resultant L HH s/p chemo/RT presenting with tumor progression, 2/20 s/p redo crani tumor resection ([facility], NTR), 3/16 MRI w/ worsening disease, 4/5 p/w 1wk inciision drainage, 4/6 s/p R cranial wound washout & mesh cranioplasty (kaleb purulence), CTH L temporal ICH, 4/11 DVT US diffuse LLE DVTs, RUE brachial DVT s/p IVC filter (on eliquis), thrombocytopenic with multiple plt transfusions,  Family/Caregiver Present: Yes  Caregiver Feedback: Wife present and supportive  Prior to Session Communication: Bedside nurse  Patient Position Received: Bed, 3 rail up, Alarm off, caregiver present  Preferred Learning Style: auditory, verbal, visual  General Comment: Pt supine in bed upon entry. Low arousal that increased during activity, aggreeable to therapy. Heparin drip   Precautions:  Medical Precautions: Fall precautions  Precautions Comment: SBP <160     05/27/25 0947 05/27/25 1020   Vital Signs   Vitals Session Pre OT During OT   Heart Rate 94 (!) 140   Resp 14  --    SpO2 100 %  --     /79  --    MAP (mmHg) 93  --    Patient Position  --  Standing   Vital Signs Comment  --  During bed>chair transfer HR increased to 140 but after 2 minute rest, HR went back to 100      05/27/25 1030   Vital Signs   Vitals Session Post OT   Heart Rate 105   Resp 16   SpO2 98 %   BP (!) 127/96       Vital Signs Comment: During bed>chair transfer HR increased to 140 but after 2 minute rest, HR went back to 100     Pain:  Pain Assessment  Pain Assessment: 0-10  0-10 (Numeric) Pain Score: 0 - No pain    Objective   Cognition:  Overall Cognitive Status: Impaired  Arousal/Alertness: Delayed responses to stimuli  Orientation Level: Disoriented to time, Disoriented to situation (Oriented to self, and place with cues.)  Following Commands: Follows one step commands with repetition (<50%)  Cognition Comments: Pt p/w variable arousal and attention throughout session. Delayed responses to commands and required increased processing time and cueing.  Attention: Exceptions to WFL  Sustained Attention: Impaired (Required Max VC's to attend to task for longer durations)  Problem Solving: Exceptions to WFL  Simple Functional Tasks: Impaired  Safety/Judgement: Exceptions to WFL  Unable to Self-Monitor and Self-Correct Consistently: Moderate  Other (Comment): Pt required Max VC's to maintain midline posture when seated and self correct left lateral lean  Insight: Mild  Processing Speed: Delayed           Home Living:  Type of Home: House  Lives With: Spouse  Home Adaptive Equipment: None  Home Layout: Two level  Alternate Level Stairs-Rails: Right  Alternate Level Stairs-Number of Steps: 14  Home Access: Stairs to enter with rails  Entrance Stairs-Rails: Right  Entrance Stairs-Number of Steps: 3  Bathroom Shower/Tub: Walk-in shower  Bathroom Toilet: Standard  Bathroom Equipment: Grab bars in shower  Bathroom Accessibility: Half bath on first floor, shower on second floor  Home Living Comments: Per wife, there is a bedroom set  up on the first level and a half bath, but to shower pt would need to ambulate upstairs.  Prior Function:  Level of [place]: Independent with ADLs and functional transfers  Receives Help From: Family  ADL Assistance: Independent  Homemaking Assistance: Needs assistance  Ambulatory Assistance: Independent  Hand Dominance: Right  Prior Function Comments: Per wife, pt was at acute rehab where he was recieving assistance with ADLs and IADLs. He was able to ambulate into bathroom and up/down stairs, as of 5/21/25. Wife reports a fall less than a week ago where he fell to his knees and had no injuries.  IADL History:  Homemaking Responsibilities: No  ADL:  Eating Assistance: Moderate (Anticipated)  Grooming Assistance: Moderate  Bathing Assistance: Maximal (Anticipated)  UE Dressing Assistance: Maximal  LE Dressing Assistance: Total (Anticipated)  Toileting Assistance with Device: Total  Activities of Daily Living:      Grooming  Grooming Level of Assistance: Moderate assistance  Grooming Where Assessed: Edge of bed  Grooming Comments: Pt required Max VC's to obtain toothbrush and bring to mouth. Pt required further cueing to brush teeth and Mod A to complete teeth brushing.              UE Dressing  UE Dressing Level of Assistance: Maximum assistance  UE Dressing Where Assessed: Bed level  UE Dressing Comments: Max A for donning gown, pt able to lift arms to thread BUE into sleeves         Toileting  Toileting Level of Assistance: Dependent  Where Assessed: Bed level  Toileting Comments: Rolling R>L for cornell care and change of soiled chucks pads and linens.  Activity Tolerance:  Endurance: Tolerates less than 10 min exercise with changes in vital signs  Early Mobility/Exercise Safety Screen: Proceed with mobilization - No exclusion criteria met  Functional Standing Tolerance:     Bed Mobility/Transfers: Bed Mobility  Bed Mobility: Yes  Bed Mobility 1  Bed Mobility 1: Rolling right, Rolling left  Level of Assistance  1: Moderate assistance  Bed Mobility Comments 1: R<>L rolling completed for cornell care  Bed Mobility 2  Bed Mobility  2: Supine to sitting  Level of Assistance 2: Maximum assistance (x2)    Transfers  Transfer: Yes  Transfer 1  Transfer From 1: Sit to  Transfer to 1: Stand  Technique 1: Sit to stand  Transfer Level of Assistance 1: Maximum assistance (x2)  Trials/Comments 1: Arm in arm assistance, BLE knees blocked, use of draw sheet. Pt required Max VC's to take steps and tactile cues to move feet towards chair.  Transfers 2  Transfer From 2: Bed to  Transfer to 2: Chair with arms  Technique 2: Stand pivot  Transfer Level of Assistance 2: Maximum assistance (x2)  Trials/Comments 2: Arm in arm assistance, BLE knees blocked, use of draw sheet. Pt required Max VC's to take steps and tactile cues to move feet towards chair.    Sitting Balance:  Static Sitting Balance  Static Sitting-Balance Support: Feet supported  Static Sitting-Level of Assistance: Moderate assistance  Static Sitting-Comment/Number of Minutes: x15 minutes. Required Max VC's and tactile cues to self-correct L lateral lean and maintain midline posture. Pt did not correct with cueing.       Therapy/Activity: Therapeutic Activity  Therapeutic Activity Performed: Yes  Therapeutic Activity 1: Increased time for rest break following transfer, iniated breathing techniques, HR >140s  Therapeutic Activity 2: Cognitive reorientation to place, time and situation to prevent further decline in orientation.  Therapeutic Activity 3: Facilitated upright positioning with skilled vitals assessment, stable; progressed to EOB.  Therapeutic Activity 4: EOB x 15 minutes Mod A for correction of left lateral lean with assisted teeth brushing       Vision:Vision - Basic Assessment  Current Vision: No visual deficits  Sensation:  Light Touch: No apparent deficits     Perception:  Inattention/Neglect: Appears intact  Coordination:  Movements are Fluid and Coordinated: No  Upper  Body Coordination: Bradykinesia   Hand Function:  Hand Function  Gross Grasp: Functional  Extremities: RUE Strength  R Shoulder Flexion: 3+/5  R Shoulder Extension: 3+/5  R Elbow Flexion: 3+/5  R Elbow Extension: 3+/5  R Wrist Flexion: 3+/5  R Wrist Extension: 3+/5 and LUE Strength  L Shoulder Flexion: 2-/5  L Shoulder Extension: 2-/5  L Elbow Flexion: 2-/5  L Elbow Extension: 2-/5  L Wrist Flexion: 3+/5  L Wrist Extension: 3+/5      Outcome Measures: [facility] Daily Activity  Putting on and taking off regular lower body clothing: Total  Bathing (including washing, rinsing, drying): Total  Putting on and taking off regular upper body clothing: A lot  Toileting, which includes using toilet, bedpan or urinal: Total  Taking care of personal grooming such as brushing teeth: A lot  Eating Meals: A lot  Daily Activity - Total Score: 9        , Confusion Assessment Method-ICU (CAM-ICU)  Feature 1: Acute Onset or Fluctuating Course: Positive  Feature 2: Inattention: Positive  Feature 4: Disorganized Thinking: Positive  Overall CAM-ICU: Positive  , and Early Mobility/Exercise Safety Screen: Proceed with mobilization - No exclusion criteria met  ICU Mobility Scale: Transferring bed to chair [5]    Education Documentation  Body Mechanics, taught by ELIZABETH Singh at 5/27/2025 11:17 AM.  Learner: Significant Other, Patient  Readiness: Acceptance  Method: Explanation  Response: Demonstrated Understanding  Comment: OT POC    Precautions, taught by ELIZABETH Singh at 5/27/2025 11:17 AM.  Learner: Significant Other, Patient  Readiness: Acceptance  Method: Explanation  Response: Demonstrated Understanding  Comment: OT POC    ADL Training, taught by ELIZABETH Singh at 5/27/2025 11:17 AM.  Learner: Significant Other, Patient  Readiness: Acceptance  Method: Explanation  Response: Demonstrated Understanding  Comment: OT POC    Education Comments  No comments found.        OP EDUCATION:       Goals:  Encounter Problems        Encounter Problems (Active)       ADLs       Patient will perform UB and LB bathing with moderate assist  level of assistance.       Start:  05/27/25    Expected End:  06/17/25            Patient with complete upper body dressing with minimal assist  level of assistance donning and doffing all UE clothes with PRN adaptive equipment while edge of bed        Start:  05/27/25    Expected End:  06/17/25            Patient with complete lower body dressing with minimal assist  level of assistance donning and doffing all LE clothes  with PRN adaptive equipment while edge of bed        Start:  05/27/25    Expected End:  06/17/25            Patient will complete daily grooming tasks brushing teeth with minimal assist  level of assistance and PRN adaptive equipment while edge of bed .       Start:  05/27/25    Expected End:  06/17/25               BALANCE       Patientt will maintain static sitting balance during ADL task with moderate assist level of assistance in order to demonstrate decreased risk of falling and improved postural control.       Start:  05/27/25    Expected End:  06/17/25               COGNITION/SAFETY       Patient will follow 75% Simple commands to allow improved ADL performance.       Start:  05/27/25    Expected End:  06/17/25               TRANSFERS       Patient will perform bed mobility minimal assist  level of assistance in order to improve safety and independence with mobility       Start:  05/27/25    Expected End:  06/17/25            Patient will complete sit to stand transfer with moderate assist  level of assistance and least restrictive device in order to improve safety and prepare for out of bed mobility.       Start:  05/27/25    Expected End:  06/17/25               Completion of this session, clinical decision making, and documentation performed under the supervision/direction of Magaly MCMAHON/ALMA

## 2025-05-27 NOTE — PROGRESS NOTES
"Physical Therapy    Physical Therapy Evaluation    Patient Name: Enmanuel Ahumada \"Dash\"  MRN: [id number]  Today's Date: 5/27/2025   Room: 05/05  Time Calculation  Start Time: 1141  Stop Time: 1153  Time Calculation (min): 12 min    Assessment/Plan   PT Assessment  PT Assessment Results: Decreased strength, Decreased endurance, Impaired balance, Decreased mobility, Decreased coordination, Decreased cognition  Rehab Prognosis: Good  Barriers to Discharge Home: Physical needs, Caregiver assistance  Caregiver Assistance: Caregiver assistance needed per identified barriers - however, level of patient's required assistance exceeds assistance available at home  Physical Needs: High falls risk due to function or environment  Evaluation/Treatment Tolerance: Patient tolerated treatment well  Strengths: Support and attitude of living partners  End of Session Communication: Bedside nurse  Assessment Comment: Pt to benefit from ongoing PT services to address the above limitations and to facilitate timely return to prior level of function  End of Session Patient Position: Bed, 3 rail up, Alarm on  IP OR SWING BED PT PLAN  Inpatient or Swing Bed: Inpatient  PT Plan  Treatment/Interventions: Home exercise program, Therapeutic activity, Therapeutic exercise, Endurance training, Strengthening, Neuromuscular re-education, Balance training, Stair training, Gait training, Transfer training, Bed mobility, Postural re-education  PT Plan: Ongoing PT  PT Frequency: 5 times per week  PT Discharge Recommendations: High intensity level of continued care  Equipment Recommended upon Discharge:  (TBD)  PT Recommended Transfer Status: Assist x2  PT - OK to Discharge: Yes      Subjective   General Visit Information:  Reason for Referral: 5/23 pt p/w 1-2d of lethargy and weakness in setting of recent RO tumor resection. Found to have pneumocephalus within surgical site, vents, ventriculomegaly. CTV no obvious sinus fx or mastoid violation, no " sinus thrombus, MRI brain subacute L temporal hemorrhage, no enhancement of diffusion restriction, 5/24 incision over sewn, 5/25 CTH improving pneumocephalus, 5/26 incision oversewn, 5/27 CTH slightly decr pneumocephalus.  Past Medical History Relevant to Rehab: HTN, HLD, smalls's esophagus, RO GBM s/p resection ([facility], 12/2023) with resultant L HH s/p chemo/RT presenting with tumor progression, 2/20 s/p redo crani tumor resection ([facility], NTR), 3/16 MRI w/ worsening disease, 4/5 p/w 1wk inciision drainage, 4/6 s/p R cranial wound washout & mesh cranioplasty (kaleb purulence), CTH L temporal ICH, 4/11 DVT US diffuse LLE DVTs, RUE brachial DVT s/p IVC filter (on eliquis), thrombocytopenic with multiple plt transfusions  Co-Treatment:  (Rehab aide assisted with room setup and line mgmt)  Prior to Session Communication: Bedside nurse  Patient Position Received: Up in chair, Alarm off, not on at start of session  Family/Caregiver Present: No  Caregiver Feedback: n/a  General Comment: Pt pleasant and cooperative.     Home Living:  Home Living  Type of Home: House  Lives With: Spouse  Home Adaptive Equipment: None  Home Layout: Two level  Alternate Level Stairs-Rails: Right  Alternate Level Stairs-Number of Steps: 14  Home Access: Stairs to enter with rails  Entrance Stairs-Rails: Right  Entrance Stairs-Number of Steps: 3  Bathroom Shower/Tub: Walk-in shower  Bathroom Toilet: Standard  Bathroom Equipment: Grab bars in shower  Bathroom Accessibility: Half bath on first floor, shower on second floor  Home Living Comments: Per EMR Bedroom set up on the first level and a half bath, but to shower is upstairs    Prior Level of Function:  Prior Function Per Pt/Caregiver Report  Level of Tok: Independent with ADLs and functional transfers  Receives Help From: Family  ADL Assistance: Independent  Homemaking Assistance: Needs assistance  Ambulatory Assistance:  (Previously independent, needing assist since last  admission.)  Vocational: Retired  Leisure: Volunteering  Hand Dominance: Right    Precautions:  Precautions  Hearing/Visual Limitations: WFL  Medical Precautions: Fall precautions  Precautions Comment: SBP <160    Vital Signs:   Date/Time Vitals Session Patient Position Pulse Resp SpO2 BP MAP (mmHg)    05/27/25 1020 During OT  Standing  140  --  --  --  --     05/27/25 1030 Post OT  --  105  16  98 %  127/96  107     05/27/25 1141 Pre PT  Sitting  107  17  98 %  122/98  106     05/27/25 1146 During PT  --  122  --  --  --  --     05/27/25 1153 Post PT  Lying  99  9  99 %  114/102  108     05/27/25 1200 --  --  91  14  98 %  124/95  105             Objective   Lines/Tubes/Drains:  PICC - Adult 04/30/25 Single lumen Left Brachial vein (Active)   Number of days: 27       External Urinary Catheter Male (Active)   Number of days: 3       Continuous Medications/Drips:  Continuous Medications[1]    Oxygen: room air     Pain:  Pain Assessment  Pain Assessment: 0-10  0-10 (Numeric) Pain Score: 0 - No pain    Cognition:  Cognition  Overall Cognitive Status: Impaired  Arousal/Alertness: Delayed responses to stimuli  Orientation Level: Disoriented to place, Disoriented to time, Disoriented to situation  Following Commands: Follows one step commands with repetition (25-50% accuracy. Improves with demo.)  Cognition Comments: Expressive and receptive language deficits appreciated.  Insight: Mild  Impulsive: Mildly  Processing Speed: Delayed      Extremity/Trunk Assessments:  Strength:                 RLE   RLE : Exceptions to WFL  Strength RLE  R Hip Flexion: 3/5  R Knee Flexion: 3/5  R Knee Extension: 3+/5  R Ankle Dorsiflexion: 3/5  R Ankle Plantar Flexion: 3/5    LLE   LLE : Exceptions to WFL  Strength LLE  L Hip Flexion: 3/5  L Knee Flexion: 3/5  L Knee Extension: 3+/5  L Ankle Dorsiflexion: 3/5  L Ankle Plantar Flexion: 3/5    General Assessments:         Activity Tolerance  Endurance: Tolerates 10 - 20 min exercise with  multiple rests  Early Mobility/Exercise Safety Screen: Proceed with mobilization - No exclusion criteria met  Sensation  Light Touch: No apparent deficits  Coordination  Movements are Fluid and Coordinated: No  Upper Body Coordination: Bradykinesia  Lower Body Coordination: Very short, shuffling steps  Static Sitting Balance  Static Sitting-Balance Support: Bilateral upper extremity supported, Feet supported  Static Sitting-Level of Assistance: Moderate assistance  Dynamic Sitting Balance  Dynamic Sitting-Balance Support: Bilateral upper extremity supported, Feet supported  Dynamic Sitting-Level of Assistance: Moderate assistance  Dynamic Sitting-Balance: Forward lean, Lateral lean  Static Standing Balance  Static Standing-Balance Support: Bilateral upper extremity supported  Static Standing-Level of Assistance: Maximum assistance  Dynamic Standing Balance  Dynamic Standing-Balance Support: Bilateral upper extremity supported  Dynamic Standing-Level of Assistance: Maximum assistance  Dynamic Standing-Balance: Turning    Functional Assessments:  Bed Mobility 1  Bed Mobility 1: Sitting to supine  Level of Assistance 1: Maximum assistance  Bed Mobility Comments 1: Cues for task inititaion    Transfers  Transfer: Yes  Transfer 1  Technique 1: Sit to stand, Stand to sit  Transfer Device 1: Gait belt  Transfer Level of Assistance 1: Maximum assistance, Arm in arm assistance  Trials/Comments 1: Cues for proper hand placement, task initation, and controlling speed when sitting.    Ambulation/Gait Training  Ambulation/Gait Training Performed: Yes  Ambulation/Gait Training 1  Surface 1: Level tile  Device 1:  (BUE arm in arm assist)  Gait Support Devices: Gait belt  Assistance 1: Maximum assistance  Quality of Gait 1: Narrow base of support, Diminished heel strike, Decreased step length, Shuffling gait (Therapist assists with weight shifting. Cues to maximize B step length and initiation)  Comments/Distance (ft) 1: 1 ft  laterally to L    Stairs  Stairs: No         Outcome Measures:  [facility] Basic Mobility  Turning from your back to your side while in a flat bed without using bedrails: A little  Moving from lying on your back to sitting on the side of a flat bed without using bedrails: A lot  Moving to and from bed to chair (including a wheelchair): A lot  Standing up from a chair using your arms (e.g. wheelchair or bedside chair): A lot  To walk in hospital room: A lot  Climbing 3-5 steps with railing: Total  Basic Mobility - Total Score: 12                   FSS-ICU  Ambulation: Walks <50 feet with any assistance x1 or walks any distance with assistance x2 people  Rolling: Minimal assistance (performs 75% or more of task)  Sitting: Moderate assistance (performs 50 - 74% of task)  Transfer Sit-to-Stand: Maximal assistance (performs 25% - 49% of task)  Transfer Supine-to-Sit: Maximal assistance (performs 25% - 49% of task)  Total Score: 12    ICU Mobility Screen  Early Mobility/Exercise Safety Screen: Proceed with mobilization - No exclusion criteria met  ICU Mobility Scale: Transferring bed to chair                        Encounter Problems       Encounter Problems (Active)       PT Problem       Patient will perform bed mobility with </= close sup to reduce risk of developing decubitus ulcers.  (Progressing)       Start:  05/27/25    Expected End:  06/10/25            Patient will perform sit to stand and stand to sit transfers with </= close sup and LRD to increase functional strength.  (Progressing)       Start:  05/27/25    Expected End:  06/10/25            Patient will ambulate at least 50 ft. with </= close sup and LRD to improve tolerance of household distances.  (Progressing)       Start:  05/27/25    Expected End:  06/10/25            Patient will ascend and descend >/= 5 steps with railing and </= close sup to facilitate safe navigation of stairs in the home.    (Progressing)       Start:  05/27/25    Expected End:   06/10/25            Patient will complete the Timed Up and Go Test in <14 sec to indicate decreased falls risk.  (Progressing)       Start:  05/27/25    Expected End:  06/10/25               Pain - Adult              Education Documentation  Body Mechanics, taught by Shira Coates PT at 5/27/2025 12:14 PM.  Learner: Patient  Readiness: Nonacceptance  Method: Explanation  Response: No Evidence of Learning  Comment: PT expectations, mobility precautions, d/c recs.    Precautions, taught by Shira Coates PT at 5/27/2025 12:14 PM.  Learner: Patient  Readiness: Nonacceptance  Method: Explanation  Response: No Evidence of Learning  Comment: PT expectations, mobility precautions, d/c recs.    Mobility Training, taught by Shira Coates PT at 5/27/2025 12:14 PM.  Learner: Patient  Readiness: Nonacceptance  Method: Explanation  Response: No Evidence of Learning  Comment: PT expectations, mobility precautions, d/c recs.    Education Comments  No comments found.            05/27/25 at 12:15 PM   Shira Coates, PT   Rehab Office: [phone]             [1] heparin, 0-4,000 Units/hr, Last Rate: 600 Units/hr (05/27/25 1200)  sodium chloride 0.9%, 75 mL/hr, Last Rate: 75 mL/hr (05/27/25 1126)

## 2025-05-27 NOTE — HOSPITAL COURSE
Enmanuel Ahumada is a 69 y.o. male with a past medical history of HTN, HLD, smalls's esophagus and RO GBM s/p resection ([facility], 12/2023) with resultant L HH s/p chemo/RT complicated by tumor progression requiring redo crani tumor resection ([facility], 2/20, NTR) with subsequent MRI 3/16 demonstrating worsening disease. Course further complicated by incisional breakdown requiring R cranial wound washout and mesh cranioplasty (kaleb purulence intraop). Hospital course with noted post operative CTH with L temporal ICH, DVT US with diffuse LLE DVTs, RUE brachial DVT s/p IVC filter (on eliquis) and thrombocytopenia requiring multiple plt transfusions. Patient returned to the [facility] ED 5/23 with 1-2 days of lethargy and weakness, CTH diffuse pneumocephalus within surgical site/ventricles and ventriculomegaly. CTV with no obvious sinus fracture or mastoid violation, no sinus thrombus. MRI brain subacute L temporal hemorrhage, no enhancement of diffusion restriction. Patient admitted to the neurosurgery service for further management.     5/24 Incision over sewn.   5/25 CTH improving pneumocephalus.   5/26 Incision oversewn.   5/27 CTH slightly decreased pneumocephalus. HOB restrictions lifted. SLP evaluated patient with evidence of aspiration, dobhoff placed for enteral access.   5/28 Family requested Pallitive Care/Supportive Onc consult  5/30 DNR-ml; discharged to [facility] facility    PT/OT evaluated patient and recommended acute rehab placement at discharge.     On the day of discharge, the patient was seen and evaluated by the neurosurgery team and deemed suitable for discharge. The patient was given detailed discharge instructions and were scheduled to follow up as an outpatient.

## 2025-05-28 ENCOUNTER — APPOINTMENT (OUTPATIENT)
Dept: PRIMARY CARE | Facility: CLINIC | Age: 70
End: 2025-05-28
Payer: MEDICARE

## 2025-05-28 ENCOUNTER — HOME CARE VISIT (OUTPATIENT)
Dept: HOME HEALTH SERVICES | Facility: HOME HEALTH | Age: 70
End: 2025-05-28
Payer: MEDICARE

## 2025-05-28 LAB
ALBUMIN SERPL BCP-MCNC: 3.5 G/DL (ref 3.4–5)
ANION GAP SERPL CALC-SCNC: 13 MMOL/L (ref 10–20)
BACTERIA BLD CULT: NORMAL
BACTERIA BLD CULT: NORMAL
BASOPHILS # BLD AUTO: 0.01 X10*3/UL (ref 0–0.1)
BASOPHILS NFR BLD AUTO: 0.1 %
BUN SERPL-MCNC: 14 MG/DL (ref 6–23)
CALCIUM SERPL-MCNC: 8.6 MG/DL (ref 8.6–10.6)
CHLORIDE SERPL-SCNC: 103 MMOL/L (ref 98–107)
CO2 SERPL-SCNC: 25 MMOL/L (ref 21–32)
CREAT SERPL-MCNC: 0.8 MG/DL (ref 0.5–1.3)
EGFRCR SERPLBLD CKD-EPI 2021: >90 ML/MIN/1.73M*2
EOSINOPHIL # BLD AUTO: 0.05 X10*3/UL (ref 0–0.7)
EOSINOPHIL NFR BLD AUTO: 0.7 %
ERYTHROCYTE [DISTWIDTH] IN BLOOD BY AUTOMATED COUNT: 15.8 % (ref 11.5–14.5)
GLUCOSE BLD MANUAL STRIP-MCNC: 127 MG/DL (ref 74–99)
GLUCOSE BLD MANUAL STRIP-MCNC: 93 MG/DL (ref 74–99)
GLUCOSE BLD MANUAL STRIP-MCNC: 95 MG/DL (ref 74–99)
GLUCOSE SERPL-MCNC: 89 MG/DL (ref 74–99)
HCT VFR BLD AUTO: 27.8 % (ref 41–52)
HGB BLD-MCNC: 9.8 G/DL (ref 13.5–17.5)
IMM GRANULOCYTES # BLD AUTO: 0.11 X10*3/UL (ref 0–0.7)
IMM GRANULOCYTES NFR BLD AUTO: 1.6 % (ref 0–0.9)
LYMPHOCYTES # BLD AUTO: 0.88 X10*3/UL (ref 1.2–4.8)
LYMPHOCYTES NFR BLD AUTO: 13 %
MAGNESIUM SERPL-MCNC: 1.69 MG/DL (ref 1.6–2.4)
MCH RBC QN AUTO: 31.8 PG (ref 26–34)
MCHC RBC AUTO-ENTMCNC: 35.3 G/DL (ref 32–36)
MCV RBC AUTO: 90 FL (ref 80–100)
MONOCYTES # BLD AUTO: 0.71 X10*3/UL (ref 0.1–1)
MONOCYTES NFR BLD AUTO: 10.5 %
NEUTROPHILS # BLD AUTO: 4.99 X10*3/UL (ref 1.2–7.7)
NEUTROPHILS NFR BLD AUTO: 74.1 %
NRBC BLD-RTO: 0 /100 WBCS (ref 0–0)
PHOSPHATE SERPL-MCNC: 3.1 MG/DL (ref 2.5–4.9)
PLATELET # BLD AUTO: 173 X10*3/UL (ref 150–450)
POTASSIUM SERPL-SCNC: 3.5 MMOL/L (ref 3.5–5.3)
RBC # BLD AUTO: 3.08 X10*6/UL (ref 4.5–5.9)
SODIUM SERPL-SCNC: 137 MMOL/L (ref 136–145)
UFH PPP CHRO-ACNC: 0.5 IU/ML (ref ?–1.1)
WBC # BLD AUTO: 6.8 X10*3/UL (ref 4.4–11.3)

## 2025-05-28 PROCEDURE — 80069 RENAL FUNCTION PANEL: CPT

## 2025-05-28 PROCEDURE — 85520 HEPARIN ASSAY: CPT

## 2025-05-28 PROCEDURE — 99221 1ST HOSP IP/OBS SF/LOW 40: CPT | Performed by: STUDENT IN AN ORGANIZED HEALTH CARE EDUCATION/TRAINING PROGRAM

## 2025-05-28 PROCEDURE — 2060000001 HC INTERMEDIATE ICU ROOM DAILY

## 2025-05-28 PROCEDURE — 82947 ASSAY GLUCOSE BLOOD QUANT: CPT

## 2025-05-28 PROCEDURE — 99223 1ST HOSP IP/OBS HIGH 75: CPT | Performed by: NURSE PRACTITIONER

## 2025-05-28 PROCEDURE — 85025 COMPLETE CBC W/AUTO DIFF WBC: CPT

## 2025-05-28 PROCEDURE — 2500000001 HC RX 250 WO HCPCS SELF ADMINISTERED DRUGS (ALT 637 FOR MEDICARE OP): Performed by: PHYSICIAN ASSISTANT

## 2025-05-28 PROCEDURE — 2500000004 HC RX 250 GENERAL PHARMACY W/ HCPCS (ALT 636 FOR OP/ED): Mod: JZ

## 2025-05-28 PROCEDURE — 2500000001 HC RX 250 WO HCPCS SELF ADMINISTERED DRUGS (ALT 637 FOR MEDICARE OP): Performed by: NURSE PRACTITIONER

## 2025-05-28 PROCEDURE — 2500000001 HC RX 250 WO HCPCS SELF ADMINISTERED DRUGS (ALT 637 FOR MEDICARE OP)

## 2025-05-28 PROCEDURE — 2500000004 HC RX 250 GENERAL PHARMACY W/ HCPCS (ALT 636 FOR OP/ED)

## 2025-05-28 PROCEDURE — 37799 UNLISTED PX VASCULAR SURGERY: CPT

## 2025-05-28 PROCEDURE — 83735 ASSAY OF MAGNESIUM: CPT

## 2025-05-28 RX ORDER — POTASSIUM CHLORIDE 1.5 G/1.58G
20 POWDER, FOR SOLUTION ORAL 2 TIMES DAILY
Status: DISCONTINUED | OUTPATIENT
Start: 2025-05-28 | End: 2025-05-30 | Stop reason: HOSPADM

## 2025-05-28 RX ORDER — LANOLIN ALCOHOL/MO/W.PET/CERES
400 CREAM (GRAM) TOPICAL DAILY
Status: DISCONTINUED | OUTPATIENT
Start: 2025-05-28 | End: 2025-05-30 | Stop reason: HOSPADM

## 2025-05-28 RX ADMIN — Medication 1 TABLET: at 08:39

## 2025-05-28 RX ADMIN — PANTOPRAZOLE SODIUM 40 MG: 40 INJECTION, POWDER, FOR SOLUTION INTRAVENOUS at 08:39

## 2025-05-28 RX ADMIN — MAGNESIUM OXIDE TAB 400 MG (241.3 MG ELEMENTAL MG) 1 TABLET: 400 (241.3 MG) TAB at 08:39

## 2025-05-28 RX ADMIN — ATORVASTATIN CALCIUM 40 MG: 40 TABLET, FILM COATED ORAL at 20:53

## 2025-05-28 RX ADMIN — FAMOTIDINE 10 MG: 20 TABLET, FILM COATED ORAL at 08:39

## 2025-05-28 RX ADMIN — SENNOSIDES AND DOCUSATE SODIUM 2 TABLET: 50; 8.6 TABLET ORAL at 20:53

## 2025-05-28 RX ADMIN — VANCOMYCIN HYDROCHLORIDE 1000 MG: 1 INJECTION, SOLUTION INTRAVENOUS at 10:03

## 2025-05-28 RX ADMIN — BRIVARACETAM 50 MG: 10 SOLUTION ORAL at 20:53

## 2025-05-28 RX ADMIN — POTASSIUM CHLORIDE 20 MEQ: 1.5 POWDER, FOR SOLUTION ORAL at 20:53

## 2025-05-28 RX ADMIN — ASCORBIC ACID, THIAMINE MONONITRATE,RIBOFLAVIN, NIACINAMIDE, PYRIDOXINE HYDROCHLORIDE, FOLIC ACID, CYANOCOBALAMIN, BIOTIN, CALCIUM PANTOTHENATE, 1 CAPSULE: 100; 1.5; 1.7; 20; 10; 1; 6000; 150000; 5 CAPSULE, LIQUID FILLED ORAL at 08:40

## 2025-05-28 RX ADMIN — POTASSIUM CHLORIDE 20 MEQ: 1.5 POWDER, FOR SOLUTION ORAL at 08:39

## 2025-05-28 RX ADMIN — BRIVARACETAM 50 MG: 10 SOLUTION ORAL at 08:41

## 2025-05-28 RX ADMIN — DEXAMETHASONE SODIUM PHOSPHATE 2 MG: 4 INJECTION, SOLUTION INTRA-ARTICULAR; INTRALESIONAL; INTRAMUSCULAR; INTRAVENOUS; SOFT TISSUE at 08:39

## 2025-05-28 SDOH — SOCIAL STABILITY: SOCIAL INSECURITY: DO YOU FEEL UNSAFE GOING BACK TO THE PLACE WHERE YOU ARE LIVING?: NO

## 2025-05-28 SDOH — SOCIAL STABILITY: SOCIAL INSECURITY: HAS ANYONE EVER THREATENED TO HURT YOUR FAMILY OR YOUR PETS?: NO

## 2025-05-28 SDOH — SOCIAL STABILITY: SOCIAL INSECURITY: ARE YOU OR HAVE YOU BEEN THREATENED OR ABUSED PHYSICALLY, EMOTIONALLY, OR SEXUALLY BY ANYONE?: NO

## 2025-05-28 SDOH — SOCIAL STABILITY: SOCIAL INSECURITY: DOES ANYONE TRY TO KEEP YOU FROM HAVING/CONTACTING OTHER FRIENDS OR DOING THINGS OUTSIDE YOUR HOME?: NO

## 2025-05-28 SDOH — SOCIAL STABILITY: SOCIAL INSECURITY: HAVE YOU HAD ANY THOUGHTS OF HARMING ANYONE ELSE?: NO

## 2025-05-28 SDOH — SOCIAL STABILITY: SOCIAL INSECURITY: WERE YOU ABLE TO COMPLETE ALL THE BEHAVIORAL HEALTH SCREENINGS?: YES

## 2025-05-28 SDOH — SOCIAL STABILITY: SOCIAL INSECURITY: DO YOU FEEL ANYONE HAS EXPLOITED OR TAKEN ADVANTAGE OF YOU FINANCIALLY OR OF YOUR PERSONAL PROPERTY?: NO

## 2025-05-28 SDOH — SOCIAL STABILITY: SOCIAL INSECURITY: ARE THERE ANY APPARENT SIGNS OF INJURIES/BEHAVIORS THAT COULD BE RELATED TO ABUSE/NEGLECT?: NO

## 2025-05-28 SDOH — SOCIAL STABILITY: SOCIAL INSECURITY: ABUSE: ADULT

## 2025-05-28 ASSESSMENT — COGNITIVE AND FUNCTIONAL STATUS - GENERAL
STANDING UP FROM CHAIR USING ARMS: A LOT
WALKING IN HOSPITAL ROOM: A LOT
TOILETING: TOTAL
MOVING FROM LYING ON BACK TO SITTING ON SIDE OF FLAT BED WITH BEDRAILS: A LOT
DRESSING REGULAR UPPER BODY CLOTHING: A LOT
TURNING FROM BACK TO SIDE WHILE IN FLAT BAD: A LOT
DAILY ACTIVITIY SCORE: 9
MOBILITY SCORE: 11
MOVING TO AND FROM BED TO CHAIR: A LOT
PERSONAL GROOMING: A LOT
HELP NEEDED FOR BATHING: TOTAL
CLIMB 3 TO 5 STEPS WITH RAILING: TOTAL
DRESSING REGULAR LOWER BODY CLOTHING: A LOT
EATING MEALS: TOTAL

## 2025-05-28 ASSESSMENT — PAIN SCALES - GENERAL
PAINLEVEL_OUTOF10: 0 - NO PAIN

## 2025-05-28 NOTE — PROGRESS NOTES
"Enmanuel Ahumada \"Petey" is a 69 y.o. male on day 4 of admission presenting with Dehiscence of incision, initial encounter.    Subjective   No events overnight    Objective     Physical Exam  Awake O x 2  Right upper extremity 5  Right lower extremity proximally 4 distally 5  Left upper extremity 4 (spastic)  Left lower extremity 4    Last Recorded Vitals  Blood pressure 113/82, pulse 74, temperature 36.6 °C (97.9 °F), temperature source Temporal, resp. rate 14, height 1.702 m (5' 7.01\"), weight 60.3 kg (132 lb 15 oz), SpO2 100%.  Intake/Output last 3 Shifts:  I/O last 3 completed shifts:  In: 3978.4 (66 mL/kg) [I.V.:2668.4 (44.3 mL/kg); NG/GT:710; IV Piggyback:600]  Out: 1050 (17.4 mL/kg) [Urine:1050 (0.5 mL/kg/hr)]  Weight: 60.3 kg     Relevant Results    Results for orders placed or performed during the hospital encounter of 05/23/25 (from the past 24 hours)   Heparin Assay, UFH   Result Value Ref Range    Heparin Unfractionated 0.6 See Comment Below for Therapeutic Ranges IU/mL   POCT GLUCOSE   Result Value Ref Range    POCT Glucose 109 (H) 74 - 99 mg/dL   POCT GLUCOSE   Result Value Ref Range    POCT Glucose 75 74 - 99 mg/dL   POCT GLUCOSE   Result Value Ref Range    POCT Glucose 95 74 - 99 mg/dL   CBC and Auto Differential   Result Value Ref Range    WBC 6.8 4.4 - 11.3 x10*3/uL    nRBC 0.0 0.0 - 0.0 /100 WBCs    RBC 3.08 (L) 4.50 - 5.90 x10*6/uL    Hemoglobin 9.8 (L) 13.5 - 17.5 g/dL    Hematocrit 27.8 (L) 41.0 - 52.0 %    MCV 90 80 - 100 fL    MCH 31.8 26.0 - 34.0 pg    MCHC 35.3 32.0 - 36.0 g/dL    RDW 15.8 (H) 11.5 - 14.5 %    Platelets 173 150 - 450 x10*3/uL    Neutrophils % 74.1 40.0 - 80.0 %    Immature Granulocytes %, Automated 1.6 (H) 0.0 - 0.9 %    Lymphocytes % 13.0 13.0 - 44.0 %    Monocytes % 10.5 2.0 - 10.0 %    Eosinophils % 0.7 0.0 - 6.0 %    Basophils % 0.1 0.0 - 2.0 %    Neutrophils Absolute 4.99 1.20 - 7.70 x10*3/uL    Immature Granulocytes Absolute, Automated 0.11 0.00 - 0.70 x10*3/uL "    Lymphocytes Absolute 0.88 (L) 1.20 - 4.80 x10*3/uL    Monocytes Absolute 0.71 0.10 - 1.00 x10*3/uL    Eosinophils Absolute 0.05 0.00 - 0.70 x10*3/uL    Basophils Absolute 0.01 0.00 - 0.10 x10*3/uL   Magnesium   Result Value Ref Range    Magnesium 1.69 1.60 - 2.40 mg/dL   Renal Function Panel   Result Value Ref Range    Glucose 89 74 - 99 mg/dL    Sodium 137 136 - 145 mmol/L    Potassium 3.5 3.5 - 5.3 mmol/L    Chloride 103 98 - 107 mmol/L    Bicarbonate 25 21 - 32 mmol/L    Anion Gap 13 10 - 20 mmol/L    Urea Nitrogen 14 6 - 23 mg/dL    Creatinine 0.80 0.50 - 1.30 mg/dL    eGFR >90 >60 mL/min/1.73m*2    Calcium 8.6 8.6 - 10.6 mg/dL    Phosphorus 3.1 2.5 - 4.9 mg/dL    Albumin 3.5 3.4 - 5.0 g/dL   Heparin Assay, UFH   Result Value Ref Range    Heparin Unfractionated 0.5 See Comment Below for Therapeutic Ranges IU/mL   POCT GLUCOSE   Result Value Ref Range    POCT Glucose 93 74 - 99 mg/dL     *Note: Due to a large number of results and/or encounters for the requested time period, some results have not been displayed. A complete set of results can be found in Results Review.               Assessment & Plan  Dehiscence of incision, initial encounter    69 M h/o HTN, HLD, smalls's esophagus, RO GBM s/p resection ([facility], 12/2023) with resultant L HH s/p chemo/RT presenting with tumor progression, 2/20 s/p redo crani tumor resection ([facility], NTR), 3/16 MRI w/ worsening disease, 4/5 p/w 1wk inciision drainage, 4/6 s/p R cranial wound washout & mesh cranioplasty (kaleb purulence), CTH L temporal ICH, 4/11 DVT US diffuse LLE DVTs, RUE brachial DVT s/p IVC filter (on eliquis), thrombocytopenic with multiple plt transfusions, 5/23 p/w 1-2d of lethargy and weakness, CTH diffuse pneumocephalus within surgical site, vents, ventriculomegaly     CTV no obvious sinus fx or mastoid violation, no sinus thrombus, MRI brain subacute L temporal hemorrhage, no enhancement of diffusion restriction, 5/24 incision over sewn, 5/25 CTH improving  pneumocephalus, 5/26 incision oversewn, 5/27 CTH slightly decr pneumocephalus    KRYSTAL  No HOB restriction  SLP eval   Con't to cycle O2 (HFNC)  Vanc until 6/15   DOAC restart plan  SCDs, hep gtt  PTOT - rehab     Arnoldo Acuna MD

## 2025-05-28 NOTE — CONSULTS
SUPPORTIVE AND PALLIATIVE ONCOLOGY CONSULT      SERVICE DATE: 5/28/2025    ASSESSMENT/PLAN:  Enmanuel Ahumada is a 69 y.o. male diagnosed with GBM. PMH significant for HTN, HLD, smalls's esophagus. GBM s/p resection ([facility], 12/2023) with resultant L HH s/p chemo/RT presenting with tumor progression, 2/20 s/p redo crani tumor resection ([facility], NTR), 3/16 MRI w/ worsening disease, 4/5 p/w 1wk inciision drainage, 4/6 s/p R cranial wound washout & mesh cranioplasty (kaleb purulence), CTH L temporal ICH, 4/11 DVT US diffuse LLE DVTs, RUE brachial DVT s/p IVC filter (on eliquis), thrombocytopenic with multiple plt transfusions.  Admitted 5/23/2025 for further evaluation and management of pneumocephalus. Supportive and Palliative Oncology is consulted for hospice transition.     Pt seen in [facility].  Denies pain or uncontrolled sx.  States he wants to go home.    Symptom Management Plan:  Recommended changes are bolded    Constipation  At risk for constipation related to prolonged immobility in the setting of hospitalization , currently not constipated  Usual bowel pattern: every day  Home regimen: Miralax 17 gm daily  LBM today  Continue 2 senna-s BID  Goal to have BM without straining q48-72h, adjust regimen as needed    Disposition:  Please  start the process of having prior authorization with meds to beds deliver medications to patient prior to discharge via [facility] pharmacy. Prescriptions will need to be sent 48-72 hours prior to discharge so that a prior authorization can be completed.     Discharge date: unknown pending hospice      SIGNATURE: JERRICA Rogers  PAGER/CONTACT:  Contact information:  Supportive and Palliative Oncology  Monday-Friday 8 AM-5 PM  Epic Secure chat or pager [phone].  After hours and weekends:  pager [phone]    ==========================================================================================================================    Inpatient consult to [facility] Supportive  Oncology  Consult performed by: Belem Truong, ALEXA-CNP  Consult ordered by: ALEXA Rosas-CNP          PALLIATIVE MEDICINE OUTPATIENT PROVIDER:  None  CURRENT ATTENDING PROVIDER: Nataliya Cortez MD     Medical Oncologist: Kyle Rolon MD   Radiation Oncologist: No care team member to display  Primary Physician: Ricardo Youngblood  [phone]    REASON FOR CONSULT/CHIEF CONSULT COMPLAINT: hospice transition    Subjective   HISTORY OF PRESENT ILLNESS: Enmanuel Ahumada is a 69 y.o. male diagnosed with GBM. PMH significant for HTN, HLD, smalls's esophagus. GBM s/p resection ([facility], 12/2023) with resultant L HH s/p chemo/RT presenting with tumor progression, 2/20 s/p redo crani tumor resection ([facility], NTR), 3/16 MRI w/ worsening disease, 4/5 p/w 1wk inciision drainage, 4/6 s/p R cranial wound washout & mesh cranioplasty (kaleb purulence), CTH L temporal ICH, 4/11 DVT US diffuse LLE DVTs, RUE brachial DVT s/p IVC filter (on eliquis), thrombocytopenic with multiple plt transfusions.  Admitted 5/23/2025 for further evaluation and management of pneumocephalus. Supportive and Palliative Oncology is consulted for hospice transition.       Symptom Assessment:  Pain:none  Headache: none  Dizziness:a little  Lack of energy: very much  Difficulty sleeping: none  Worrying: none  Anxiety: none  Depressive symptoms/low mood: none  Pain in mouth/swallowing: none  Dry mouth: none  Taste changes: none  Shortness of breath: none  Lack of appetite: none   Nausea: none  Vomiting: none  Constipation: none  Diarrhea: none  Sore muscles: none  Numbness or tingling in hands/feet/other: none    ECOG Performance Status:   [] 0 Fully active, able to carry on all pre-disease performance without restriction  [] 1 Restricted in physically strenuous activity but ambulatory and able to carry out work of a light or sedentary nature, e.g., light house work, office work  [] 2 Ambulatory and capable of all selfcare but unable to carry  out any work activities; up and about more than 50% of waking hours  [] 3 Capable of only limited selfcare; confined to bed or chair more than 50% of waking hours  [x] 4 Completely disabled; cannot carry on any selfcare; totally confined to bed or chair  [] 5 Dead       Information obtained from: chart review, interview of patient, discussion with RN, and discussion with primary team  ______________________________________________________________________     Oncology History Overview Note   68 year old left-handed male with h/o HLD, cholecystectomy, Osborn's esophagus, on ASA 81 for cardioprotection presented to [facility] ED on 12/24/23 with 6 weeks of headaches, 3 weeks of increased confusion and running into things. Left-sided hemianopia on exam. CT head revealed a 1 mm midline leftward shift with a 7.3 cm x 7.1 cm x 5 cm right parietal mass. Transferred to [facility] for neurosurgical management.     GBM (glioblastoma multiforme) (Multi)   12/29/2023 Surgery    Right craniotomy for tumor resection (Ritu Carrasco MD)     12/29/2023 Pathology    Final diagnosis reported 1/18/24:  A, B.  Right brain mass, biopsy and removal:  - Malignant glioma, morphologically consistent with glioblastoma; positive for MGMT methylation     12/29/2023 Initial Diagnosis    GBM (glioblastoma multiforme) (CMS/HCC)     1/3/2024 Tumor Board    [facility] Tumor Board tumor board recommendations: Referral to radiation oncology and neuro-oncology.       2/15/2024 - 3/21/2024 Chemotherapy    Temozolomide with Concurrent Radiation, 42 Day Cycles     2/15/2024 - 3/28/2024 Radiation Therapy    Radiation Treatments       Active   No active radiation treatments to show.     Completed   Brain Boost_R (Started on 3/20/2024)   Most recent fraction: 182 cGy given on 3/28/2024   Total given: 1,274 cGy / 1,273 cGy  (7 of 7 fractions)   Elapsed Days: 8   Technique: 3D        Partial brain R (Started on 2/15/2024)   Most recent fraction: 182 cGy given on  3/19/2024   Total given: 4,186 cGy / 4,182 cGy  (23 of 23 fractions)   Elapsed Days: 33   Technique: 3D                        5/1/2024 - 1/27/2025 Chemotherapy    Temozolomide (5/28), 28 Day Cycles     3/18/2025 - 3/18/2025 Research Study Participant    ([facility]) [id number] GBM - SJ-PEG 20 / [place], 42 Day Cycles  Plan Provider: Kyle Rolon MD  Treatment goal: [No plan goal]  Line of treatment: [No plan line of treatment]  Associated studies: GBM AGILE GLOBAL ADAPTIVE TRIAL MASTER PROTOCOL     3/20/2025 - 3/20/2025 Research Study Participant    ([facility]) [id number] GBM - SJ-PEG 20 / [place], 42 Day Cycles  Plan Provider: Kyle Rolon MD  Treatment goal: [No plan goal]  Line of treatment: [No plan line of treatment]  Associated studies: GBM AGILE GLOBAL ADAPTIVE TRIAL MASTER PROTOCOL     4/4/2025 -  Chemotherapy    [place], 42 Day Cycles         Medical History[1]  Surgical History[2]  Family History[3]     SOCIAL HISTORY:   40 yrs.  Reports 8 children.  Retired from [employer].  Served in [employer].  Social History:  reports that he has never smoked. He has never been exposed to tobacco smoke. He has never used smokeless tobacco. He reports that he does not currently use alcohol. He reports current drug use. Drug: Marijuana.    REVIEW OF SYSTEMS:  Review of systems negative unless noted in HPI.       Objective       Lab Results   Component Value Date    WBC 6.8 05/28/2025    HGB 9.8 (L) 05/28/2025    HCT 27.8 (L) 05/28/2025    MCV 90 05/28/2025     05/28/2025      Lab Results   Component Value Date    GLUCOSE 89 05/28/2025    CALCIUM 8.6 05/28/2025     05/28/2025    K 3.5 05/28/2025    CO2 25 05/28/2025     05/28/2025    BUN 14 05/28/2025    CREATININE 0.80 05/28/2025     Lab Results   Component Value Date    ALT 36 05/23/2025    AST 17 05/23/2025    ALKPHOS 94 05/23/2025    BILITOT 0.8 05/23/2025     Estimated Creatinine Clearance: 76.4 mL/min (by C-G formula based on  SCr of 0.8 mg/dL).     Encounter Date: 05/23/25   ECG 12 lead   Result Value    Ventricular Rate 83    Atrial Rate 83    MT Interval 140    QRS Duration 84    QT Interval 396    QTC Calculation(Bazett) 465    P Axis 64    R Axis -35    T Axis 12    QRS Count 14    Q Onset 227    P Onset 157    P Offset 207    T Offset 425    QTC Fredericia 441    Narrative    Normal sinus rhythm  Left axis deviation  Inferior infarct (cited on or before 26-APR-2025)  Abnormal ECG  When compared with ECG of 26-APR-2025 03:11,  Questionable change in initial forces of Inferior leads  Nonspecific T wave abnormality, improved in Anterior leads    See ED provider note for full interpretation and clinical correlation  Confirmed by Della Ovalle ([id number]) on 5/23/2025 8:43:34 PM     Wt Readings from Last 5 Encounters:   05/28/25 60.3 kg (132 lb 15 oz)   05/23/25 61.2 kg (135 lb)   05/19/25 60.6 kg (133 lb 9.6 oz)   05/16/25 63.5 kg (139 lb 14.4 oz)   05/12/25 62.1 kg (137 lb)       Current Outpatient Medications   Medication Instructions    acetaminophen (TYLENOL) 650 mg, Every 6 hours PRN    apixaban (ELIQUIS) 5 mg, oral, 2 times daily    atorvastatin (LIPITOR) 40 mg, oral, Daily    bisacodyl (DULCOLAX) 10 mg, rectal, Daily PRN    Briviact 50 mg, oral, Every 12 hours scheduled    CALCIUM CARBONATE-VITAMIN D3 ORAL 1 capsule, Daily    dexAMETHasone (DECADRON) 4 mg, oral, Every morning    famotidine (PEPCID) 10 mg, oral, Daily    heparin sodium,porcine/PF (HEPARIN, PORCINE, LOCK FLUSH IV) 5 mL, intravenous, Daily PRN, Use SASH method. Use 5mL after 20 mL NS after labs    hydrOXYzine HCL (ATARAX) 10 mg, oral, Every 6 hours PRN    krill-omega-3-dha-epa-lipids [id number] mg capsule 1 capsule, Daily    ondansetron (ZOFRAN) 8 mg, oral, Every 8 hours PRN, Take one hour prior to each chemotherapy dose in addition to every 8 hours as needed for nausea/vomiting    ondansetron (ZOFRAN) 8 mg, oral, Every 8 hours PRN    pantoprazole (ProtoNix) 40 mg EC  tablet TAKE 1 TABLET BY MOUTH IN THE MORNING 30 MINUTES BEFORE BREAKFAST.    polyethylene glycol (GLYCOLAX, MIRALAX) 17 g, oral, Daily    prochlorperazine (COMPAZINE) 10 mg, oral, Every 6 hours PRN    sennosides (SENNA LAX ORAL) 1 tablet, Daily    SODIUM CHLORIDE 0.9 %, FLUSH, INJ 10 mL, intravenous, Daily PRN, Use 10 mL to flush using SASH method, use 20 mL NS after lab draws    sodium chloride 0.9% parenteral solution 250 mL with vancomycin 1,000 mg recon soln 1,000 mg 1,000 mg, intravenous, Every 24 hours    ubidecarenone (COENZYME Q10, BULK, MISC) 1 Capful, Daily    vitamin B complex-vitamin C-folic acid (Nephrocaps) 1 mg capsule 1 capsule, oral, Daily     Scheduled medications   Scheduled Medications[4]  Continuous medications  Continuous Medications[5]  PRN medications  acetaminophen, 650 mg, q4h PRN  bisacodyl, 10 mg, Daily PRN  hydrOXYzine HCL, 10 mg, q6h PRN  ondansetron, 8 mg, q8h PRN  vancomycin, , Daily PRN         Allergies: RX Allergies[6]             PHYSICAL EXAMINATION:  Vital Signs:   Vital signs reviewed  Vitals:    05/28/25 1400   BP: (!) 131/94   Pulse: 80   Resp: 14   Temp:    SpO2: 99%     Pain Score: 0 - No pain     Physical Exam  Well-developed  M Laying in bed, NAD  A&O x 2, pleasant and cooperative with interview & exam  Breathing comfortably on 15L  +NGT  Abd soft, NTND, BS +  No edema in ext      ==========================================================================================================================    PALLIATIVE CARE ENCOUNTER:    Introduction to Supportive and Palliative Oncology:  Spoke with patient at bedside  Introduced the role and philosophy of Supportive and Palliative oncology in the evaluation and management of symptoms during cancer treatment  Palliative care was introduced as a service for patients with serious illness to help with symptoms, assist with goals of care conversations, navigate complex decision making, improve quality of life for  "patients, and provide support both patients and families.    Medical Decision Making/Goals of Care/Advance Care Planning:  Patient's current clinical condition, including diagnosis, prognosis, and management plan, and goals of care were discussed.   Life limiting disease: GBM  Family: Supportive   Performance status: Major  limitations due to disease process  Joys/meaning/strength: Family and [place]  Understanding of health:   5/28:  Spoke with pt.  States his goal is to \"get out of here and go home.\"  States his wife has been taking care of him for awhile now.  He states he wishes things would be \" normal like before.\"  States he is tired of going through tests and procedures.  He knows we are meeting with his wife here tomorrow to discuss going home.  They have already met with hospice.  Dr. Rolon agrees with this plan as well.  Information:Wants full disclosure  Goals: symptom control  Worries and fears now and future: being a burden   Code status discussion:  Discussed previously and Full code    Advance Directives  Existence of Advance Directives:Yes, documentation or copy in medical record  Decision maker: HCPOA is wife Brittany    Supportive Interventions: Interventions: SPO Spiritual Care: referral placed      Signature and billing:    Medical complexity was high level due to due to complexity of problems, extensive data review, and high risk of management/treatment.    DATA   Diagnostic tests and information reviewed for today's visit:  Conversation with primary team, Most recent labs and imaging results, Medications       Some elements copied from Dr. Rodriguez note on 5/28/25, the elements have been updated and all reflect current decision making from today, 5/28/2025.    Plan of Care discussed with: Provider, RN, Patient    Thank you for asking Supportive and Palliative Oncology to assist with care of this patient.  Recommendations will be communicated back to the consulting service by way of shared " electronic medical record/secure chat/email or face-to-face.   We will continue to follow peripherally.  Please contact us for additional questions or concerns.      SIGNATURE: ALEXA Rogers-CNP  PAGER/CONTACT:  Contact information:  Supportive and Palliative Oncology  Monday-Friday 8 AM-5 PM  Epic Secure chat or pager [phone].  After hours and weekends:  pager [phone]        [1]   Past Medical History:  Diagnosis Date    Brain cancer (Multi)     GERD (gastroesophageal reflux disease)     Hyperlipidemia     Hypertension     Seizure disorder (Multi)    [2]   Past Surgical History:  Procedure Laterality Date    BRAIN SURGERY      CHOLECYSTECTOMY  January 25, 2020    IR CVC PICC  4/15/2025    IR CVC PICC 4/15/2025 Dario Martinez MD [facility]GIO    OTHER SURGICAL HISTORY  07/24/2019    Colonoscopy    OTHER SURGICAL HISTORY  07/26/2021    Endoscopy    VASECTOMY  Abt 1991   [3] No family history on file.  [4] atorvastatin, 40 mg, nasogastric tube, Nightly  brivaracetam, 50 mg, nasogastric tube, BID  calcium carbonate-vitamin D3, 1 tablet, g-tube, Daily  dexAMETHasone, 2 mg, intravenous, q AM  famotidine, 10 mg, nasogastric tube, Daily  magnesium oxide, 400 mg of magnesium oxide, oral, Daily  pantoprazole, 40 mg, intravenous, Daily  polyethylene glycol, 17 g, nasogastric tube, Daily  potassium chloride, 20 mEq, oral, BID  sennosides-docusate sodium, 2 tablet, nasogastric tube, BID  vancomycin, 1,000 mg, intravenous, q24h  vitamin B complex-vitamin C-folic acid, 1 capsule, nasogastric tube, Daily  [5] heparin, 0-4,000 Units/hr, Last Rate: 600 Units/hr (05/28/25 0600)  [6] No Known Allergies

## 2025-05-28 NOTE — CONSULTS
Name: Enmanuel Ahumada  MRN: [id number]  Encounter Date: 5/28/2025  PCP: Ricardo Youngblood DO  Heme-Onc: Dr. Rolon     Reason for consult: GBM   Attending Provider: Dr. Cortez    Hematology/ Oncology Consult Note      History of Present Illness   Enmanuel Ahumada is a 69 y.o. 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 Patient has had several complications over the past month with DVT, thrombocytopenia, wound infection. He was admitted again 5/23/25 after several days of weakness and was admitted to the NSU for management of pneumocephalus. During this admission, family have been considering hospice so oncology was consulted to comment.     Patient seen alone at bedside. He state he feels relatively well and much better overall compared to admission. He denies any current issues.       Past Medical history   Medical History[1]      Past Surgical History   Surgical History[2]      Family History    Family History[3]      Social History   Social History[4]      Allergies   Allergies[5]    Medications   atorvastatin, 40 mg, Nightly  brivaracetam, 50 mg, BID  calcium carbonate-vitamin D3, 1 tablet, Daily  dexAMETHasone, 2 mg, q AM  famotidine, 10 mg, Daily  magnesium oxide, 400 mg of magnesium oxide, Daily  pantoprazole, 40 mg, Daily  polyethylene glycol, 17 g, Daily  potassium chloride, 20 mEq, BID  sennosides-docusate sodium, 2 tablet, BID  vancomycin, 1,000 mg, q24h  vitamin B complex-vitamin C-folic acid, 1 capsule, Daily      heparin, Last Rate: 600 Units/hr (05/28/25 0600)      acetaminophen, 650 mg, q4h PRN  bisacodyl, 10 mg, Daily PRN  hydrOXYzine HCL, 10 mg, q6h PRN  ondansetron, 8 mg, q8h PRN  vancomycin, , Daily PRN        Review of Systems   Review of Systems   10 pt ROS reviewed and negative aside from above    Physical Exam   Blood pressure (!) 131/94, pulse 80,  "temperature 36.9 °C (98.4 °F), temperature source Temporal, resp. rate 14, height 1.702 m (5' 7.01\"), weight 60.3 kg (132 lb 15 oz), SpO2 99%.    ECO    Gen: awake, alert, in no acute distress  HEENT: s/p crani   CV: RRR  Pulm: CTAB on room air   Abd: soft, NT/ND  Ext: no LE edema  Skin: warm and dry  Neuro: A&Ox2, moves all 4 extremities spontaneously     Labs     Lab Results   Component Value Date    GLUCOSE 89 2025    CALCIUM 8.6 2025     2025    K 3.5 2025    CO2 25 2025     2025    BUN 14 2025    CREATININE 0.80 2025       Lab Results   Component Value Date    WBC 6.8 2025    HGB 9.8 (L) 2025    HCT 27.8 (L) 2025    MCV 90 2025     2025       Lab Results   Component Value Date    ALT 36 2025    AST 17 2025    ALKPHOS 94 2025    BILITOT 0.8 2025       Imaging   MRI Brain 25:  IMPRESSION:  Severely limited exam secondary to susceptibility artifact from  extensive pneumocephalus and motion artifact.      1. Evolving postoperative changes from right posterior craniotomy for  resection of an underlying mass.      2. Large amount of intracranial pneumocephalus, including within the  surgical cavity, is probably unchanged since the recent CT head study.      3. Enhancement within the brain parenchyma surrounding the resection  cavity has decreased since the prior MRI brain study.      4. Subacute hematoma located within the left lateral temporal lobe is  new since the prior MRI but unchanged in extent compared to the  recent CT head study.      5. New, compared to the prior MRI brain, subdural fluid collection  along the right posterior interhemispheric falx and adjacent to the  surgical cavity which may reflect evolving postoperative changes. The  inferior portion of this fluid collection demonstrates questionable  restricted diffusion which may be artifactual and due to air within  the " adjacent surgical cavity, recommend attention on follow-up  imaging to exclude the presence of infectious process.      6. Supratentorial ventriculomegaly is unchanged since the recent CT  head but is new since the prior MRI brain.      If clinically warranted, consider repeat MRI of the brain with  intravenous contrast, after resolution of intracranial pneumocephalus  to reassess findings.        Assessment/Plan     Enmanuel Ahumada is a 69 y.o. 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 Patient has had several complications over the past month with DVT, thrombocytopenia, wound infection. He was admitted again 5/23/25 after several days of weakness and was admitted to the NSU for management of pneumocephalus. During this admission, family have been considering hospice so oncology was consulted to comment.     Discussed course to date with patient and our concern that further systemic treatment will not help to improve patient's quality of life. Spoke with wife Brittany over the phone afterwards. She explains patient was home for 24hrs over the past 2 months and she is very concerned this is not the type of quality of life Mr. Ahumada would have wanted (being fully dependent, not at home). She has had an informational meeting with hospice and after discussing with her children, she is very interested in pursuing hospice and bringing patient home. Discussed that this is a very reasonable option at this point given the recent progression of his GBM, multitude of complications, and performance status if aligned with patient's goals.     Recommendations:  - hospice is a reasonable option  - appreciate palliative care involvement   - if patient does not pursue hospice, f/u outpatient with Dr. Rolon.     Thank you for this consult. Patient discussed with attending physician, Dr. Rolon, who  agrees with the above.     Patrick Rodriguez MD  Hematology-Oncology Fellow, PGY5  Hematology Consult Pager: [phone]  Oncology Consult Pager: [phone]          [1]   Past Medical History:  Diagnosis Date    Brain cancer (Multi)     GERD (gastroesophageal reflux disease)     Hyperlipidemia     Hypertension     Seizure disorder (Multi)    [2]   Past Surgical History:  Procedure Laterality Date    BRAIN SURGERY      CHOLECYSTECTOMY  January 25, 2020    IR CVC PICC  4/15/2025    IR CVC PICC 4/15/2025 Dario Martinez MD [facility]    OTHER SURGICAL HISTORY  07/24/2019    Colonoscopy    OTHER SURGICAL HISTORY  07/26/2021    Endoscopy    VASECTOMY  Abt 1991   [3] No family history on file.  [4]   Social History  Socioeconomic History    Marital status:    Tobacco Use    Smoking status: Never     Passive exposure: Never    Smokeless tobacco: Never   Vaping Use    Vaping status: Never Used   Substance and Sexual Activity    Alcohol use: Not Currently    Drug use: Yes     Types: Marijuana     Comment: thc sleep gummies    Sexual activity: Yes     Partners: Female     Birth control/protection: Male Sterilization     Social Drivers of Health     Financial Resource Strain: Low Risk  (5/26/2025)    Overall Financial Resource Strain (CARDIA)     Difficulty of Paying Living Expenses: Not hard at all   Food Insecurity: No Food Insecurity (5/26/2025)    Hunger Vital Sign     Worried About Running Out of Food in the Last Year: Never true     Ran Out of Food in the Last Year: Never true   Transportation Needs: No Transportation Needs (5/26/2025)    PRAPARE - Transportation     Lack of Transportation (Medical): No     Lack of Transportation (Non-Medical): No   Physical Activity: Unknown (5/26/2025)    Exercise Vital Sign     Days of Exercise per Week: 0 days     Minutes of Exercise per Session: Patient unable to answer   Recent Concern: Physical Activity - Inactive (4/5/2025)    Exercise Vital Sign     Days of Exercise per Week: 0 days      Minutes of Exercise per Session: 0 min   Stress: Stress Concern Present (5/26/2025)    [employer] - Occupational Stress Questionnaire     Feeling of Stress : To some extent   Social Connections: Moderately Isolated (5/26/2025)    Social Connection and Isolation Panel [NHANES]     Frequency of Communication with Friends and Family: More than three times a week     Frequency of Social Gatherings with Friends and Family: More than three times a week     Attends [employer] Services: Never     Active Member of Clubs or Organizations: No     Attends Club or Organization Meetings: Never     Marital Status:    Intimate Partner Violence: Patient Unable To Answer (5/26/2025)    Humiliation, Afraid, Rape, and Kick questionnaire     Fear of Current or Ex-Partner: Patient unable to answer     Emotionally Abused: Patient unable to answer     Physically Abused: Patient unable to answer     Sexually Abused: Patient unable to answer   Housing Stability: Low Risk  (5/26/2025)    Housing Stability Vital Sign     Unable to Pay for Housing in the Last Year: No     Number of Times Moved in the Last Year: 0     Homeless in the Last Year: No   [5] No Known Allergies

## 2025-05-28 NOTE — SIGNIFICANT EVENT
NEUROSURGERY IV ANTIBIOTIC DISCHARGE PLANNING     Briefly, Enmanuel Ahumada is a 69 year old male with a past medical history of HTN, HLD, smalls's esophagus and RO GBM s/p resection ([facility], 12/2023) with resultant L HH s/p chemo/RT complicated by tumor progression requiring redo crani tumor resection (TH, 2/20, NTR) with subsequent MRI 3/16 demonstrating worsening disease. Course further complicated by incisional breakdown requiring R cranial wound washout and mesh cranioplasty (kaleb purulence intraop). Hospital course with noted post operative CTH with L temporal ICH, DVT US with diffuse LLE DVTs, RUE brachial DVT s/p IVC filter (on eliquis) and thrombocytopenia requiring multiple plt transfusions. Patient returned to the [facility] ED 5/23 with 1-2 days of lethargy and weakness, CTH diffuse pneumocephalus within surgical site/ventricles and ventriculomegaly. CTV with no obvious sinus fracture or mastoid violation, no sinus thrombus. MRI brain subacute L temporal hemorrhage, no enhancement of diffusion restriction. Patient admitted to the neurosurgery service for further management.      Patient currently receiving IV vancomycin via PICC (per previous ID recommendations during last admission), see last daily progress note dated 4/8/2025       ·  Pharmacy to assist with dosing, currently 1g [id number]        ·  End date 6/15/2025        ·  AM creatinine stable and WNL 5/28 (0.80)        * next Vanco level due 5/29 0500       ·  Plan to obtain at least weekly CBC with diff, chem 7, quantitative CRP, vancomycin trough        ·  Outpatient follow up appointment with ID scheduled 6/10/2025      Patient not medically ready for discharge at this time, planning for acute rehab placement, will continue to monitor for needed changes in IV ABX or need to reengage ID service      Yun Leal APRHAWA-CNP  Neurosurgery   [facility]  Team Pager #[phone]

## 2025-05-28 NOTE — PROGRESS NOTES
Social Work Discharge Planning note:    CLARK just met with this patient's wife, Brittany, and she is requesting a GOC (or maybe a Palliative Care) meeting. Brittany and their children are feeling that Pt has been through enough, and they are considering taking him home with hospice, but they want to make sure they are not missing anything. Brittany had an informational meeting with [facility] this past Monday. CLARK sent the above message to the medical team and will follow to assist with meeting set up.     This SW   KAYCEE Saini, LSW    Office: [phone]  Secure chat via [facility]

## 2025-05-29 ENCOUNTER — APPOINTMENT (OUTPATIENT)
Dept: RADIOLOGY | Facility: HOSPITAL | Age: 70
DRG: 919 | End: 2025-05-29
Payer: MEDICARE

## 2025-05-29 ENCOUNTER — HOME CARE VISIT (OUTPATIENT)
Dept: HOME HEALTH SERVICES | Facility: HOME HEALTH | Age: 70
End: 2025-05-29
Payer: MEDICARE

## 2025-05-29 ENCOUNTER — HOME INFUSION (OUTPATIENT)
Dept: INFUSION THERAPY | Age: 70
End: 2025-05-29
Payer: MEDICARE

## 2025-05-29 LAB
ALBUMIN SERPL BCP-MCNC: 3.6 G/DL (ref 3.4–5)
ANION GAP SERPL CALC-SCNC: 12 MMOL/L (ref 10–20)
BASOPHILS # BLD AUTO: 0.01 X10*3/UL (ref 0–0.1)
BASOPHILS NFR BLD AUTO: 0.1 %
BUN SERPL-MCNC: 14 MG/DL (ref 6–23)
CALCIUM SERPL-MCNC: 9.2 MG/DL (ref 8.6–10.6)
CHLORIDE SERPL-SCNC: 101 MMOL/L (ref 98–107)
CO2 SERPL-SCNC: 28 MMOL/L (ref 21–32)
CREAT SERPL-MCNC: 0.74 MG/DL (ref 0.5–1.3)
EGFRCR SERPLBLD CKD-EPI 2021: >90 ML/MIN/1.73M*2
EOSINOPHIL # BLD AUTO: 0.05 X10*3/UL (ref 0–0.7)
EOSINOPHIL NFR BLD AUTO: 0.7 %
ERYTHROCYTE [DISTWIDTH] IN BLOOD BY AUTOMATED COUNT: 15.9 % (ref 11.5–14.5)
GLUCOSE BLD MANUAL STRIP-MCNC: 116 MG/DL (ref 74–99)
GLUCOSE SERPL-MCNC: 115 MG/DL (ref 74–99)
HCT VFR BLD AUTO: 28.4 % (ref 41–52)
HGB BLD-MCNC: 9.8 G/DL (ref 13.5–17.5)
IMM GRANULOCYTES # BLD AUTO: 0.08 X10*3/UL (ref 0–0.7)
IMM GRANULOCYTES NFR BLD AUTO: 1.2 % (ref 0–0.9)
LYMPHOCYTES # BLD AUTO: 0.91 X10*3/UL (ref 1.2–4.8)
LYMPHOCYTES NFR BLD AUTO: 13.4 %
MAGNESIUM SERPL-MCNC: 1.64 MG/DL (ref 1.6–2.4)
MCH RBC QN AUTO: 32 PG (ref 26–34)
MCHC RBC AUTO-ENTMCNC: 34.5 G/DL (ref 32–36)
MCV RBC AUTO: 93 FL (ref 80–100)
MONOCYTES # BLD AUTO: 0.63 X10*3/UL (ref 0.1–1)
MONOCYTES NFR BLD AUTO: 9.3 %
NEUTROPHILS # BLD AUTO: 5.1 X10*3/UL (ref 1.2–7.7)
NEUTROPHILS NFR BLD AUTO: 75.3 %
NRBC BLD-RTO: 0 /100 WBCS (ref 0–0)
PHOSPHATE SERPL-MCNC: 2.8 MG/DL (ref 2.5–4.9)
PLATELET # BLD AUTO: 159 X10*3/UL (ref 150–450)
POTASSIUM SERPL-SCNC: 3.5 MMOL/L (ref 3.5–5.3)
RBC # BLD AUTO: 3.06 X10*6/UL (ref 4.5–5.9)
SODIUM SERPL-SCNC: 137 MMOL/L (ref 136–145)
UFH PPP CHRO-ACNC: 0.4 IU/ML (ref ?–1.1)
VANCOMYCIN SERPL-MCNC: 10.1 UG/ML (ref 5–20)
WBC # BLD AUTO: 6.8 X10*3/UL (ref 4.4–11.3)

## 2025-05-29 PROCEDURE — 83735 ASSAY OF MAGNESIUM: CPT

## 2025-05-29 PROCEDURE — 1100000001 HC PRIVATE ROOM DAILY

## 2025-05-29 PROCEDURE — 2500000001 HC RX 250 WO HCPCS SELF ADMINISTERED DRUGS (ALT 637 FOR MEDICARE OP): Performed by: NURSE PRACTITIONER

## 2025-05-29 PROCEDURE — 80202 ASSAY OF VANCOMYCIN: CPT | Performed by: NEUROLOGICAL SURGERY

## 2025-05-29 PROCEDURE — 85520 HEPARIN ASSAY: CPT

## 2025-05-29 PROCEDURE — 74018 RADEX ABDOMEN 1 VIEW: CPT | Performed by: RADIOLOGY

## 2025-05-29 PROCEDURE — 74018 RADEX ABDOMEN 1 VIEW: CPT

## 2025-05-29 PROCEDURE — 2500000004 HC RX 250 GENERAL PHARMACY W/ HCPCS (ALT 636 FOR OP/ED): Mod: JZ | Performed by: NURSE PRACTITIONER

## 2025-05-29 PROCEDURE — 2500000004 HC RX 250 GENERAL PHARMACY W/ HCPCS (ALT 636 FOR OP/ED): Performed by: NURSE PRACTITIONER

## 2025-05-29 PROCEDURE — 2500000004 HC RX 250 GENERAL PHARMACY W/ HCPCS (ALT 636 FOR OP/ED): Performed by: PATHOLOGY

## 2025-05-29 PROCEDURE — 80069 RENAL FUNCTION PANEL: CPT

## 2025-05-29 PROCEDURE — 82947 ASSAY GLUCOSE BLOOD QUANT: CPT

## 2025-05-29 PROCEDURE — 85025 COMPLETE CBC W/AUTO DIFF WBC: CPT

## 2025-05-29 RX ADMIN — PANTOPRAZOLE SODIUM 40 MG: 40 INJECTION, POWDER, FOR SOLUTION INTRAVENOUS at 08:36

## 2025-05-29 RX ADMIN — VANCOMYCIN HYDROCHLORIDE 1250 MG: 1.25 INJECTION, POWDER, LYOPHILIZED, FOR SOLUTION INTRAVENOUS at 11:21

## 2025-05-29 RX ADMIN — BRIVARACETAM 50 MG: 10 SOLUTION ORAL at 08:47

## 2025-05-29 RX ADMIN — ASCORBIC ACID, THIAMINE MONONITRATE,RIBOFLAVIN, NIACINAMIDE, PYRIDOXINE HYDROCHLORIDE, FOLIC ACID, CYANOCOBALAMIN, BIOTIN, CALCIUM PANTOTHENATE, 1 CAPSULE: 100; 1.5; 1.7; 20; 10; 1; 6000; 150000; 5 CAPSULE, LIQUID FILLED ORAL at 08:36

## 2025-05-29 RX ADMIN — FAMOTIDINE 10 MG: 20 TABLET, FILM COATED ORAL at 08:33

## 2025-05-29 RX ADMIN — DEXAMETHASONE SODIUM PHOSPHATE 2 MG: 4 INJECTION, SOLUTION INTRA-ARTICULAR; INTRALESIONAL; INTRAMUSCULAR; INTRAVENOUS; SOFT TISSUE at 08:36

## 2025-05-29 RX ADMIN — MAGNESIUM OXIDE TAB 400 MG (241.3 MG ELEMENTAL MG) 1 TABLET: 400 (241.3 MG) TAB at 08:41

## 2025-05-29 RX ADMIN — Medication 1 TABLET: at 08:33

## 2025-05-29 RX ADMIN — HEPARIN SODIUM 600 UNITS/HR: 10000 INJECTION, SOLUTION INTRAVENOUS at 00:09

## 2025-05-29 RX ADMIN — POLYETHYLENE GLYCOL 3350 17 G: 17 POWDER, FOR SOLUTION ORAL at 08:40

## 2025-05-29 RX ADMIN — SENNOSIDES AND DOCUSATE SODIUM 2 TABLET: 50; 8.6 TABLET ORAL at 08:33

## 2025-05-29 RX ADMIN — POTASSIUM CHLORIDE 20 MEQ: 1.5 POWDER, FOR SOLUTION ORAL at 08:36

## 2025-05-29 ASSESSMENT — PAIN SCALES - GENERAL
PAINLEVEL_OUTOF10: 0 - NO PAIN

## 2025-05-29 ASSESSMENT — COGNITIVE AND FUNCTIONAL STATUS - GENERAL
EATING MEALS: TOTAL
MOVING TO AND FROM BED TO CHAIR: A LITTLE
WALKING IN HOSPITAL ROOM: TOTAL
HELP NEEDED FOR BATHING: TOTAL
TURNING FROM BACK TO SIDE WHILE IN FLAT BAD: A LITTLE
DRESSING REGULAR LOWER BODY CLOTHING: TOTAL
WALKING IN HOSPITAL ROOM: A LOT
MOVING FROM LYING ON BACK TO SITTING ON SIDE OF FLAT BED WITH BEDRAILS: A LITTLE
DRESSING REGULAR UPPER BODY CLOTHING: TOTAL
MOVING FROM LYING ON BACK TO SITTING ON SIDE OF FLAT BED WITH BEDRAILS: A LOT
CLIMB 3 TO 5 STEPS WITH RAILING: TOTAL
DAILY ACTIVITIY SCORE: 7
PERSONAL GROOMING: TOTAL
TURNING FROM BACK TO SIDE WHILE IN FLAT BAD: A LOT
MOBILITY SCORE: 10
MOVING TO AND FROM BED TO CHAIR: A LOT
STANDING UP FROM CHAIR USING ARMS: A LOT
STANDING UP FROM CHAIR USING ARMS: A LOT
CLIMB 3 TO 5 STEPS WITH RAILING: A LOT
TOILETING: A LOT
MOBILITY SCORE: 15

## 2025-05-29 ASSESSMENT — PAIN - FUNCTIONAL ASSESSMENT
PAIN_FUNCTIONAL_ASSESSMENT: 0-10

## 2025-05-29 NOTE — PROGRESS NOTES
Pt discharged to [facility] 5/22 and reported back to [facility] ED 5/23. Pt is now being discussed as a hospice candidate. Pt not likely to return to [facility].    Gold copy to intake.     [facility]

## 2025-05-29 NOTE — PROGRESS NOTES
Per previous Care Transitions note, family is interested in taking patient home with comfort care and referral was submitted to [facility]. LSW followed up with patient and wife at bedside. Wife confirmed she has a meeting with [facility] tomorrow. [facility] confirmed meeting at bedside tomorrow between 930am-10am. LSW notified Neurosurgery. Care Transitions will conitnue to follow.       HEBER Louis

## 2025-05-29 NOTE — PROGRESS NOTES
"Enmanuel Ahumada \"Petey" is a 69 y.o. male on day 5 of admission presenting with Dehiscence of incision, initial encounter.    Subjective   No events overnight    Objective     Physical Exam  Awake O x 2  Right upper extremity 5  Right lower extremity proximally 4 distally 5  Left upper extremity 4 (spastic)  Left lower extremity 4    Last Recorded Vitals  Blood pressure (!) 114/93, pulse 82, temperature 36.3 °C (97.4 °F), temperature source Temporal, resp. rate 13, height 1.702 m (5' 7.01\"), weight 60.1 kg (132 lb 8 oz), SpO2 98%.  Intake/Output last 3 Shifts:  I/O last 3 completed shifts:  In: 2836 (47.2 mL/kg) [I.V.:1026 (17.1 mL/kg); NG/GT:1610; IV Piggyback:200]  Out: 2350 (39.1 mL/kg) [Urine:2350 (1.1 mL/kg/hr)]  Weight: 60.1 kg     Relevant Results    Results for orders placed or performed during the hospital encounter of 05/23/25 (from the past 24 hours)   POCT GLUCOSE   Result Value Ref Range    POCT Glucose 127 (H) 74 - 99 mg/dL   CBC and Auto Differential   Result Value Ref Range    WBC 6.8 4.4 - 11.3 x10*3/uL    nRBC 0.0 0.0 - 0.0 /100 WBCs    RBC 3.06 (L) 4.50 - 5.90 x10*6/uL    Hemoglobin 9.8 (L) 13.5 - 17.5 g/dL    Hematocrit 28.4 (L) 41.0 - 52.0 %    MCV 93 80 - 100 fL    MCH 32.0 26.0 - 34.0 pg    MCHC 34.5 32.0 - 36.0 g/dL    RDW 15.9 (H) 11.5 - 14.5 %    Platelets 159 150 - 450 x10*3/uL    Neutrophils % 75.3 40.0 - 80.0 %    Immature Granulocytes %, Automated 1.2 (H) 0.0 - 0.9 %    Lymphocytes % 13.4 13.0 - 44.0 %    Monocytes % 9.3 2.0 - 10.0 %    Eosinophils % 0.7 0.0 - 6.0 %    Basophils % 0.1 0.0 - 2.0 %    Neutrophils Absolute 5.10 1.20 - 7.70 x10*3/uL    Immature Granulocytes Absolute, Automated 0.08 0.00 - 0.70 x10*3/uL    Lymphocytes Absolute 0.91 (L) 1.20 - 4.80 x10*3/uL    Monocytes Absolute 0.63 0.10 - 1.00 x10*3/uL    Eosinophils Absolute 0.05 0.00 - 0.70 x10*3/uL    Basophils Absolute 0.01 0.00 - 0.10 x10*3/uL   Magnesium   Result Value Ref Range    Magnesium 1.64 1.60 - 2.40 " mg/dL   Renal Function Panel   Result Value Ref Range    Glucose 115 (H) 74 - 99 mg/dL    Sodium 137 136 - 145 mmol/L    Potassium 3.5 3.5 - 5.3 mmol/L    Chloride 101 98 - 107 mmol/L    Bicarbonate 28 21 - 32 mmol/L    Anion Gap 12 10 - 20 mmol/L    Urea Nitrogen 14 6 - 23 mg/dL    Creatinine 0.74 0.50 - 1.30 mg/dL    eGFR >90 >60 mL/min/1.73m*2    Calcium 9.2 8.6 - 10.6 mg/dL    Phosphorus 2.8 2.5 - 4.9 mg/dL    Albumin 3.6 3.4 - 5.0 g/dL   Vancomycin   Result Value Ref Range    Vancomycin 10.1 5.0 - 20.0 ug/mL   Heparin Assay, UFH   Result Value Ref Range    Heparin Unfractionated 0.4 See Comment Below for Therapeutic Ranges IU/mL     *Note: Due to a large number of results and/or encounters for the requested time period, some results have not been displayed. A complete set of results can be found in Results Review.               Assessment & Plan  Dehiscence of incision, initial encounter    69 M h/o HTN, HLD, smalls's esophagus, RO GBM s/p resection ([facility], 12/2023) with resultant L HH s/p chemo/RT presenting with tumor progression, 2/20 s/p redo crani tumor resection (TH, NTR), 3/16 MRI w/ worsening disease, 4/5 p/w 1wk inciision drainage, 4/6 s/p R cranial wound washout & mesh cranioplasty (kaleb purulence), CTH L temporal ICH, 4/11 DVT US diffuse LLE DVTs, RUE brachial DVT s/p IVC filter (on eliquis), thrombocytopenic with multiple plt transfusions, 5/23 p/w 1-2d of lethargy and weakness, CTH diffuse pneumocephalus within surgical site, vents, ventriculomegaly     CTV no obvious sinus fx or mastoid violation, no sinus thrombus, MRI brain subacute L temporal hemorrhage, no enhancement of diffusion restriction, 5/24 incision over sewn, 5/25 CTH improving pneumocephalus, 5/26 incision oversewn, 5/27 CTH slightly decr pneumocephalus    KRYSTAL  No HOB restriction  SLP eval   Con't to cycle O2 (HFNC)  Vanc until 6/15   DOAC restart plan  SCDs, hep gtt  PTOT - rehab     Rivera Parson MD

## 2025-05-29 NOTE — PROGRESS NOTES
Vancomycin Dosing by Pharmacy- FOLLOW UP    Enmanuel Ahumada is a 69 y.o. year old male who Pharmacy has been consulted for vancomycin dosing for . Based on the patient's indication and renal status this patient is being dosed based on a goal AUC of 400-600.     Renal function is currently stable.    Current vancomycin dose: 1000 mg given every 24 hours    Estimated vancomycin AUC on current dose: 353 mg/L.hr     Visit Vitals  /88 (BP Location: Right arm, Patient Position: Lying)   Pulse 76   Temp 37.2 °C (99 °F) (Temporal)   Resp 15        Lab Results   Component Value Date    CREATININE 0.74 2025    CREATININE 0.80 2025    CREATININE 0.77 2025    CREATININE 0.75 2025        Patient weight is as follows:   Vitals:    25 0000   Weight: 60.3 kg (132 lb 15 oz)       Cultures:  No results found for the encounter in last 14 days.       I/O last 3 completed shifts:  In: 3880.5 (64.4 mL/kg) [I.V.:1740.5 (28.9 mL/kg); NG/GT:1340; IV Piggyback:800]  Out: 1650 (27.4 mL/kg) [Urine:1650 (0.8 mL/kg/hr)]  Weight: 60.3 kg   I/O during current shift:  I/O this shift:  In: 270 [NG/GT:270]  Out: 700 [Urine:700]    Temp (24hrs), Av.8 °C (98.2 °F), Min:36.5 °C (97.7 °F), Max:37.2 °C (99 °F)      Assessment/Plan    Below goal AUC. Orders placed for new vancomcyin regimen of 1250 mg every 24 hours to begin at 10;00.  New pAUC = 437    This dosing regimen is predicted by [employer] to result in the following pharmacokinetic parameters:    Regimen: 1250 mg IV every 24 hours.  Start time: 10:03 on 2025  Exposure target: AUC24 (range) 400-600 mg/L.hr   [id number]: 426 mg/L.hr  AUC24,ss: 437 mg/L.hr  Probability of AUC24 > 400: 82 %  Ctrough,ss: 11.1 mg/L  Probability of Ctrough,ss > 20: 0 %      The next level will be obtained on  at am lab draw. May be obtained sooner if clinically indicated.   Will continue to monitor renal function daily while on vancomycin and order serum creatinine  at least every 48 hours if not already ordered.  Follow for continued vancomycin needs, clinical response, and signs/symptoms of toxicity.       Dilan Triplett, PharmD

## 2025-05-29 NOTE — CARE PLAN
Problem: Safety - Adult  Goal: Free from fall injury  Outcome: Progressing     Problem: Fall/Injury  Goal: Not fall by end of shift  Outcome: Progressing  Goal: Be free from injury by end of the shift  Outcome: Progressing     Problem: Skin  Goal: Decreased wound size/increased tissue granulation at next dressing change  Outcome: Progressing  Flowsheets (Taken 5/29/2025 0125)  Decreased wound size/increased tissue granulation at next dressing change: Promote sleep for wound healing  Goal: Participates in plan/prevention/treatment measures  Outcome: Progressing  Flowsheets (Taken 5/29/2025 0125)  Participates in plan/prevention/treatment measures: Elevate heels  Goal: Prevent/manage excess moisture  Outcome: Progressing  Flowsheets (Taken 5/29/2025 0125)  Prevent/manage excess moisture: Cleanse incontinence/protect with barrier cream  Goal: Prevent/minimize sheer/friction injuries  Outcome: Progressing  Flowsheets (Taken 5/29/2025 0125)  Prevent/minimize sheer/friction injuries: Turn/reposition every 2 hours/use positioning/transfer devices  Goal: Promote/optimize nutrition  Outcome: Progressing  Flowsheets (Taken 5/29/2025 0125)  Promote/optimize nutrition: Monitor/record intake including meals  Goal: Promote skin healing  Outcome: Progressing  Flowsheets (Taken 5/29/2025 0125)  Promote skin healing: Protective dressings over bony prominences     Problem: General Stroke  Goal: Maintain BP within ordered limits throughout shift  Outcome: Progressing   The patient's goals for the shift include felicia    The clinical goals for the shift include pt will be free from falls by  end of shift    Over the shift, the patient did make progress toward the following goals. Barriers to progression include new surroundings, weakness. Recommendations to address these barriers include hourly rounding, bed alarm.

## 2025-05-30 VITALS
TEMPERATURE: 96.4 F | SYSTOLIC BLOOD PRESSURE: 123 MMHG | HEIGHT: 67 IN | OXYGEN SATURATION: 96 % | HEART RATE: 84 BPM | WEIGHT: 125.66 LBS | DIASTOLIC BLOOD PRESSURE: 87 MMHG | BODY MASS INDEX: 19.72 KG/M2 | RESPIRATION RATE: 18 BRPM

## 2025-05-30 LAB
ALBUMIN SERPL BCP-MCNC: 3.8 G/DL (ref 3.4–5)
ANION GAP SERPL CALC-SCNC: 13 MMOL/L (ref 10–20)
BASOPHILS # BLD AUTO: 0.03 X10*3/UL (ref 0–0.1)
BASOPHILS NFR BLD AUTO: 0.4 %
BUN SERPL-MCNC: 15 MG/DL (ref 6–23)
CALCIUM SERPL-MCNC: 9.7 MG/DL (ref 8.6–10.6)
CHLORIDE SERPL-SCNC: 100 MMOL/L (ref 98–107)
CO2 SERPL-SCNC: 29 MMOL/L (ref 21–32)
CREAT SERPL-MCNC: 0.83 MG/DL (ref 0.5–1.3)
EGFRCR SERPLBLD CKD-EPI 2021: >90 ML/MIN/1.73M*2
EOSINOPHIL # BLD AUTO: 0.04 X10*3/UL (ref 0–0.7)
EOSINOPHIL NFR BLD AUTO: 0.5 %
ERYTHROCYTE [DISTWIDTH] IN BLOOD BY AUTOMATED COUNT: 15.8 % (ref 11.5–14.5)
GLUCOSE BLD MANUAL STRIP-MCNC: 114 MG/DL (ref 74–99)
GLUCOSE SERPL-MCNC: 93 MG/DL (ref 74–99)
HCT VFR BLD AUTO: 29 % (ref 41–52)
HGB BLD-MCNC: 10.3 G/DL (ref 13.5–17.5)
IMM GRANULOCYTES # BLD AUTO: 0.08 X10*3/UL (ref 0–0.7)
IMM GRANULOCYTES NFR BLD AUTO: 1 % (ref 0–0.9)
LYMPHOCYTES # BLD AUTO: 0.8 X10*3/UL (ref 1.2–4.8)
LYMPHOCYTES NFR BLD AUTO: 10.4 %
MAGNESIUM SERPL-MCNC: 1.75 MG/DL (ref 1.6–2.4)
MCH RBC QN AUTO: 32.2 PG (ref 26–34)
MCHC RBC AUTO-ENTMCNC: 35.5 G/DL (ref 32–36)
MCV RBC AUTO: 91 FL (ref 80–100)
MONOCYTES # BLD AUTO: 0.64 X10*3/UL (ref 0.1–1)
MONOCYTES NFR BLD AUTO: 8.4 %
NEUTROPHILS # BLD AUTO: 6.07 X10*3/UL (ref 1.2–7.7)
NEUTROPHILS NFR BLD AUTO: 79.3 %
NRBC BLD-RTO: 0 /100 WBCS (ref 0–0)
PHOSPHATE SERPL-MCNC: 3.5 MG/DL (ref 2.5–4.9)
PLATELET # BLD AUTO: 176 X10*3/UL (ref 150–450)
POTASSIUM SERPL-SCNC: 3.5 MMOL/L (ref 3.5–5.3)
RBC # BLD AUTO: 3.2 X10*6/UL (ref 4.5–5.9)
SODIUM SERPL-SCNC: 138 MMOL/L (ref 136–145)
UFH PPP CHRO-ACNC: 0.5 IU/ML (ref ?–1.1)
VANCOMYCIN SERPL-MCNC: 13.6 UG/ML (ref 5–20)
WBC # BLD AUTO: 7.7 X10*3/UL (ref 4.4–11.3)

## 2025-05-30 PROCEDURE — 83735 ASSAY OF MAGNESIUM: CPT

## 2025-05-30 PROCEDURE — 2500000004 HC RX 250 GENERAL PHARMACY W/ HCPCS (ALT 636 FOR OP/ED): Performed by: PATHOLOGY

## 2025-05-30 PROCEDURE — 85025 COMPLETE CBC W/AUTO DIFF WBC: CPT

## 2025-05-30 PROCEDURE — 99239 HOSP IP/OBS DSCHRG MGMT >30: CPT | Performed by: NURSE PRACTITIONER

## 2025-05-30 PROCEDURE — 2500000004 HC RX 250 GENERAL PHARMACY W/ HCPCS (ALT 636 FOR OP/ED): Performed by: STUDENT IN AN ORGANIZED HEALTH CARE EDUCATION/TRAINING PROGRAM

## 2025-05-30 PROCEDURE — 80202 ASSAY OF VANCOMYCIN: CPT | Performed by: PATHOLOGY

## 2025-05-30 PROCEDURE — 85520 HEPARIN ASSAY: CPT

## 2025-05-30 PROCEDURE — 80069 RENAL FUNCTION PANEL: CPT

## 2025-05-30 PROCEDURE — 82947 ASSAY GLUCOSE BLOOD QUANT: CPT

## 2025-05-30 PROCEDURE — 2500000004 HC RX 250 GENERAL PHARMACY W/ HCPCS (ALT 636 FOR OP/ED): Performed by: NURSE PRACTITIONER

## 2025-05-30 RX ORDER — UBIDECARENONE
1 POWDER (GRAM) MISCELLANEOUS DAILY
Status: CANCELLED
Start: 2025-05-30

## 2025-05-30 RX ORDER — POLYETHYLENE GLYCOL 3350 17 G/17G
17 POWDER, FOR SOLUTION ORAL DAILY
Status: CANCELLED
Start: 2025-05-30

## 2025-05-30 RX ORDER — FAMOTIDINE 10 MG/1
10 TABLET ORAL DAILY
Status: CANCELLED
Start: 2025-05-30

## 2025-05-30 RX ORDER — FOLIC ACID/MULTIVIT,IRON,MINER 0.4MG-18MG
1 TABLET ORAL DAILY
Status: CANCELLED
Start: 2025-05-30

## 2025-05-30 RX ORDER — ATORVASTATIN CALCIUM 40 MG/1
40 TABLET, FILM COATED ORAL DAILY
Status: CANCELLED
Start: 2025-05-30

## 2025-05-30 RX ORDER — DEXAMETHASONE 2 MG/1
4 TABLET ORAL EVERY MORNING
Status: CANCELLED
Start: 2025-05-30

## 2025-05-30 RX ORDER — MAGNESIUM SULFATE HEPTAHYDRATE 40 MG/ML
2 INJECTION, SOLUTION INTRAVENOUS ONCE
Status: COMPLETED | OUTPATIENT
Start: 2025-05-30 | End: 2025-05-30

## 2025-05-30 RX ORDER — ACETAMINOPHEN 325 MG/1
650 TABLET ORAL EVERY 6 HOURS PRN
Status: CANCELLED
Start: 2025-05-30

## 2025-05-30 RX ORDER — HYDROXYZINE HYDROCHLORIDE 10 MG/1
10 TABLET, FILM COATED ORAL EVERY 6 HOURS PRN
Status: CANCELLED
Start: 2025-05-30

## 2025-05-30 RX ORDER — ONDANSETRON 8 MG/1
8 TABLET, FILM COATED ORAL EVERY 8 HOURS PRN
Status: CANCELLED
Start: 2025-05-30

## 2025-05-30 RX ORDER — PROCHLORPERAZINE MALEATE 10 MG
10 TABLET ORAL EVERY 6 HOURS PRN
Status: CANCELLED
Start: 2025-05-30

## 2025-05-30 RX ORDER — LANOLIN ALCOHOL/MO/W.PET/CERES
400 CREAM (GRAM) TOPICAL DAILY
Start: 2025-05-31

## 2025-05-30 RX ORDER — SENNOSIDES 8.6 MG/1
2 TABLET ORAL DAILY
Status: CANCELLED
Start: 2025-05-30

## 2025-05-30 RX ORDER — BISACODYL 10 MG/1
10 SUPPOSITORY RECTAL DAILY PRN
Status: CANCELLED
Start: 2025-05-30

## 2025-05-30 RX ORDER — POTASSIUM CHLORIDE 1.5 G/1.58G
20 POWDER, FOR SOLUTION ORAL 2 TIMES DAILY
Start: 2025-05-30

## 2025-05-30 RX ADMIN — PANTOPRAZOLE SODIUM 40 MG: 40 INJECTION, POWDER, FOR SOLUTION INTRAVENOUS at 10:14

## 2025-05-30 RX ADMIN — VANCOMYCIN HYDROCHLORIDE 1250 MG: 1.25 INJECTION, POWDER, LYOPHILIZED, FOR SOLUTION INTRAVENOUS at 10:15

## 2025-05-30 RX ADMIN — DEXAMETHASONE SODIUM PHOSPHATE 2 MG: 4 INJECTION, SOLUTION INTRA-ARTICULAR; INTRALESIONAL; INTRAMUSCULAR; INTRAVENOUS; SOFT TISSUE at 10:14

## 2025-05-30 RX ADMIN — MAGNESIUM SULFATE HEPTAHYDRATE 2 G: 40 INJECTION, SOLUTION INTRAVENOUS at 13:24

## 2025-05-30 RX ADMIN — SODIUM BICARBONATE: 650 TABLET ORAL at 02:01

## 2025-05-30 ASSESSMENT — COGNITIVE AND FUNCTIONAL STATUS - GENERAL
HELP NEEDED FOR BATHING: TOTAL
MOVING FROM LYING ON BACK TO SITTING ON SIDE OF FLAT BED WITH BEDRAILS: A LOT
MOBILITY SCORE: 12
STANDING UP FROM CHAIR USING ARMS: A LOT
DRESSING REGULAR LOWER BODY CLOTHING: TOTAL
TOILETING: A LOT
WALKING IN HOSPITAL ROOM: A LOT
PERSONAL GROOMING: TOTAL
CLIMB 3 TO 5 STEPS WITH RAILING: A LOT
MOVING TO AND FROM BED TO CHAIR: A LOT
DAILY ACTIVITIY SCORE: 8
EATING MEALS: TOTAL
TURNING FROM BACK TO SIDE WHILE IN FLAT BAD: A LOT
DRESSING REGULAR UPPER BODY CLOTHING: A LOT

## 2025-05-30 ASSESSMENT — PAIN SCALES - GENERAL
PAINLEVEL_OUTOF10: 0 - NO PAIN

## 2025-05-30 ASSESSMENT — PAIN - FUNCTIONAL ASSESSMENT
PAIN_FUNCTIONAL_ASSESSMENT: 0-10

## 2025-05-30 NOTE — CARE PLAN
The clinical goals for the shift include safety, maintain day/night rhythm    Patient is oriented to self and place, vitals WNL at baseline. Denies pain. Anticipated discharge with hospice care this evening, transport scheduled for 2000. Patient and family updated and agreeable to plan of care at this time.      Problem: Pain - Adult  Goal: Verbalizes/displays adequate comfort level or baseline comfort level  Outcome: Progressing     Problem: Safety - Adult  Goal: Free from fall injury  Outcome: Progressing     Problem: Discharge Planning  Goal: Discharge to home or other facility with appropriate resources  Outcome: Progressing     Problem: Chronic Conditions and Co-morbidities  Goal: Patient's chronic conditions and co-morbidity symptoms are monitored and maintained or improved  Outcome: Progressing     Problem: Nutrition  Goal: Nutrient intake appropriate for maintaining nutritional needs  Outcome: Progressing     Problem: Fall/Injury  Goal: Not fall by end of shift  Outcome: Progressing  Goal: Be free from injury by end of the shift  Outcome: Progressing  Goal: Verbalize understanding of personal risk factors for fall in the hospital  Outcome: Progressing     Problem: Skin  Goal: Participates in plan/prevention/treatment measures  Outcome: Progressing  Goal: Prevent/manage excess moisture  Outcome: Progressing  Flowsheets (Taken 5/29/2025 0125 by Estrella Coombs RN)  Prevent/manage excess moisture: Cleanse incontinence/protect with barrier cream  Goal: Prevent/minimize sheer/friction injuries  Outcome: Progressing  Flowsheets (Taken 5/30/2025 1520)  Prevent/minimize sheer/friction injuries:   Complete micro-shifts as needed if patient unable. Adjust patient position to relieve pressure points, not a full turn   Turn/reposition every 2 hours/use positioning/transfer devices   Use pull sheet  Goal: Promote/optimize nutrition  Outcome: Progressing

## 2025-05-30 NOTE — PROGRESS NOTES
"Enmanuel Ahumada \"Petey" is a 69 y.o. male on day 6 of admission presenting with Dehiscence of incision, initial encounter.    Subjective   No events overnight    Objective     Physical Exam  Awake O x 2  Right upper extremity 5  Right lower extremity proximally 4 distally 5  Left upper extremity 4 (spastic)  Left lower extremity 4    Last Recorded Vitals  Blood pressure (!) 147/97, pulse 71, temperature 36.1 °C (97 °F), temperature source Temporal, resp. rate 18, height 1.702 m (5' 7.01\"), weight 60.1 kg (132 lb 8 oz), SpO2 96%.  Intake/Output last 3 Shifts:  I/O last 3 completed shifts:  In: 1684 (28 mL/kg) [I.V.:54 (0.9 mL/kg); NG/GT:1180; IV Piggyback:450]  Out: 2000 (33.3 mL/kg) [Urine:2000 (0.9 mL/kg/hr)]  Weight: 60.1 kg     Relevant Results    Results for orders placed or performed during the hospital encounter of 05/23/25 (from the past 24 hours)   CBC and Auto Differential   Result Value Ref Range    WBC 6.8 4.4 - 11.3 x10*3/uL    nRBC 0.0 0.0 - 0.0 /100 WBCs    RBC 3.06 (L) 4.50 - 5.90 x10*6/uL    Hemoglobin 9.8 (L) 13.5 - 17.5 g/dL    Hematocrit 28.4 (L) 41.0 - 52.0 %    MCV 93 80 - 100 fL    MCH 32.0 26.0 - 34.0 pg    MCHC 34.5 32.0 - 36.0 g/dL    RDW 15.9 (H) 11.5 - 14.5 %    Platelets 159 150 - 450 x10*3/uL    Neutrophils % 75.3 40.0 - 80.0 %    Immature Granulocytes %, Automated 1.2 (H) 0.0 - 0.9 %    Lymphocytes % 13.4 13.0 - 44.0 %    Monocytes % 9.3 2.0 - 10.0 %    Eosinophils % 0.7 0.0 - 6.0 %    Basophils % 0.1 0.0 - 2.0 %    Neutrophils Absolute 5.10 1.20 - 7.70 x10*3/uL    Immature Granulocytes Absolute, Automated 0.08 0.00 - 0.70 x10*3/uL    Lymphocytes Absolute 0.91 (L) 1.20 - 4.80 x10*3/uL    Monocytes Absolute 0.63 0.10 - 1.00 x10*3/uL    Eosinophils Absolute 0.05 0.00 - 0.70 x10*3/uL    Basophils Absolute 0.01 0.00 - 0.10 x10*3/uL   Magnesium   Result Value Ref Range    Magnesium 1.64 1.60 - 2.40 mg/dL   Renal Function Panel   Result Value Ref Range    Glucose 115 (H) 74 - 99 mg/dL "    Sodium 137 136 - 145 mmol/L    Potassium 3.5 3.5 - 5.3 mmol/L    Chloride 101 98 - 107 mmol/L    Bicarbonate 28 21 - 32 mmol/L    Anion Gap 12 10 - 20 mmol/L    Urea Nitrogen 14 6 - 23 mg/dL    Creatinine 0.74 0.50 - 1.30 mg/dL    eGFR >90 >60 mL/min/1.73m*2    Calcium 9.2 8.6 - 10.6 mg/dL    Phosphorus 2.8 2.5 - 4.9 mg/dL    Albumin 3.6 3.4 - 5.0 g/dL   Vancomycin   Result Value Ref Range    Vancomycin 10.1 5.0 - 20.0 ug/mL   Heparin Assay, UFH   Result Value Ref Range    Heparin Unfractionated 0.4 See Comment Below for Therapeutic Ranges IU/mL   POCT GLUCOSE   Result Value Ref Range    POCT Glucose 116 (H) 74 - 99 mg/dL     *Note: Due to a large number of results and/or encounters for the requested time period, some results have not been displayed. A complete set of results can be found in Results Review.               Assessment & Plan  Dehiscence of incision, initial encounter    69 M h/o HTN, HLD, smalls's esophagus, RO GBM s/p resection ([facility], 12/2023) with resultant L HH s/p chemo/RT presenting with tumor progression, 2/20 s/p redo crani tumor resection (TH, NTR), 3/16 MRI w/ worsening disease, 4/5 p/w 1wk inciision drainage, 4/6 s/p R cranial wound washout & mesh cranioplasty (kaleb purulence), CTH L temporal ICH, 4/11 DVT US diffuse LLE DVTs, RUE brachial DVT s/p IVC filter (on eliquis), thrombocytopenic with multiple plt transfusions, 5/23 p/w 1-2d of lethargy and weakness, CTH diffuse pneumocephalus within surgical site, vents, ventriculomegaly     CTV no obvious sinus fx or mastoid violation, no sinus thrombus, MRI brain subacute L temporal hemorrhage, no enhancement of diffusion restriction, 5/24 incision over sewn, 5/25 CTH improving pneumocephalus, 5/26 incision oversewn, 5/27 CTH slightly decr pneumocephalus    Floor  Vanc until 6/15   SCDs, hep gtt  PTOT - rehab   Palliative care discussion in AM    Rivera Parson MD

## 2025-05-30 NOTE — DISCHARGE SUMMARY
Discharge Diagnosis  Dehiscence of incision, initial encounter    Issues Requiring Follow-Up      Test Results Pending At Discharge  Pending Labs       No current pending labs.            Hospital Course  Enmanuel Ahumada is a 69 y.o. male with a past medical history of HTN, HLD, smalls's esophagus and RO GBM s/p resection ([facility], 12/2023) with resultant L HH s/p chemo/RT complicated by tumor progression requiring redo crani tumor resection (TH, 2/20, NTR) with subsequent MRI 3/16 demonstrating worsening disease. Course further complicated by incisional breakdown requiring R cranial wound washout and mesh cranioplasty (kaleb purulence intraop). Hospital course with noted post operative CTH with L temporal ICH, DVT US with diffuse LLE DVTs, RUE brachial DVT s/p IVC filter (on eliquis) and thrombocytopenia requiring multiple plt transfusions. Patient returned to the [facility] ED 5/23 with 1-2 days of lethargy and weakness, CTH diffuse pneumocephalus within surgical site/ventricles and ventriculomegaly. CTV with no obvious sinus fracture or mastoid violation, no sinus thrombus. MRI brain subacute L temporal hemorrhage, no enhancement of diffusion restriction. Patient admitted to the neurosurgery service for further management.     5/24 Incision over sewn.   5/25 CTH improving pneumocephalus.   5/26 Incision oversewn.   5/27 CTH slightly decreased pneumocephalus. HOB restrictions lifted. SLP evaluated patient with evidence of aspiration, dobhoff placed for enteral access.   5/28 Family requested Pallitive Care/Supportive Onc consult  5/30 DNR-ml; discharged to [facility] facility    PT/OT evaluated patient and recommended acute rehab placement at discharge.     On the day of discharge, the patient was seen and evaluated by the neurosurgery team and deemed suitable for discharge. The patient was given detailed discharge instructions and were scheduled to follow up as an outpatient.    Pertinent Physical Exam At  Time of Discharge  Physical Exam  General: in bed, awake, no distress  HEENT: PERRL; EOMI; tongue midline  Neuro: Awake, oriented x 2; SANCHEZ's; Right upper extremity 5, Right lower extremity proximally 4 distally 5, Left upper extremity 4 (spastic), Left lower extremity 4  Cardiac: regular rate and rhythm  Resp: respirations easy and regular  Abd: BS +x4, soft, non-tender, non-distended  Extr: no edema; pulses palpable x4   Skin: warm, dry, intact.     Psych: appropriate and cooperative   Home Medications     Medication List      START taking these medications     dexAMETHasone 4 mg/mL injection; Commonly known as: Decadron; Infuse 0.5   mL (2 mg) into a venous catheter once daily in the morning.; Start taking   on: May 31, 2025   magnesium oxide 400 mg (241.3 mg elemental) tablet; Commonly known as:   Mag-Ox; Take 1 tablet by mouth once daily.; Start taking on: May 31, 2025   potassium chloride 20 mEq packet; Commonly known as: Klor-Con; Take 20   mEq by mouth 2 times a day.   vancomycin 1250 mg/250 mL IV; Commonly known as: Vancocin; Infuse 250 mL   (1,250 mg) at 200 mL/hr over 75 minutes into a venous catheter once every   24 hours. Stop date 6/15/2025; Start taking on: May 31, 2025     CHANGE how you take these medications     ondansetron 8 mg tablet; Commonly known as: Zofran; Take 1 tablet (8 mg)   by mouth every 8 hours if needed for nausea or vomiting.; What changed:   Another medication with the same name was removed. Continue taking this   medication, and follow the directions you see here.     CONTINUE taking these medications     acetaminophen 325 mg tablet; Commonly known as: Tylenol   atorvastatin 40 mg tablet; Commonly known as: Lipitor; TAKE 1 TABLET BY   MOUTH EVERY DAY   bisacodyl 10 mg suppository; Commonly known as: Dulcolax; Insert 1   suppository (10 mg) into the rectum once daily as needed for constipation.   Briviact 50 mg tablet tablet; Generic drug: brivaracetam; Take 1 tablet   (50 mg) by  mouth every 12 hours.   COENZYME Q10 (BULK) MISC   famotidine 10 mg tablet; Commonly known as: Pepcid; Take 1 tablet (10   mg) by mouth once daily.   hydrOXYzine HCL 10 mg tablet; Commonly known as: Atarax; Take 1 tablet   (10 mg) by mouth every 6 hours if needed for anxiety (Agitation).   krill-omega-3-dha-epa-lipids 693-11-22-50 mg capsule   polyethylene glycol 17 gram packet; Commonly known as: Glycolax,   Miralax; Take 17 g by mouth once daily.   prochlorperazine 10 mg tablet; Commonly known as: Compazine; Take 1   tablet (10 mg) by mouth every 6 hours if needed for nausea or vomiting.   SENNA LAX ORAL   SODIUM CHLORIDE 0.9 % (FLUSH) INJ   vitamin B complex-vitamin C-folic acid 1 mg capsule; Commonly known as:   Nephrocaps; Take 1 capsule by mouth once daily.     STOP taking these medications     apixaban 5 mg tablet; Commonly known as: Eliquis   CALCIUM CARBONATE-VITAMIN D3 ORAL   dexAMETHasone 2 mg tablet; Commonly known as: Decadron   HEPARIN (PORCINE) LOCK FLUSH IV   pantoprazole 40 mg EC tablet; Commonly known as: ProtoNix   sodium chloride 0.9% parenteral solution 250 mL with vancomycin 1,000 mg   recon soln 1,000 mg     I spent a total of 35 minutes with the patient and family and greater than 50% was spent in counseling and coordination of care.   Outpatient Follow-Up  Future Appointments   Date Time Provider Department Center   6/2/2025 11:30 [facility]   6/6/2025  2:00 PM [facility]   6/10/2025 10:00 AM Amy DALE MD [id number] [facility]   6/12/2025  9:30 AM Kyle Rolon MD [id number] [facility]   6/16/2025 10:00 [facility]   6/17/2025 10:00 AM Shanon Olmstead APRN-CNP [id number] [facility]   6/30/2025 10:00 AM [facility]   8/8/2025  9:40 AM DO DARNELL [id number] [place]       Yun Leal APRN-CNP

## 2025-05-30 NOTE — PROGRESS NOTES
MICHELW followed up with [facility] to get update on bedside meeting that was scheduled this morning between 930-10am. Pending update. Care Transitions will continue to follow.     1411-HWR reports there was a mix up with scheduling and meeting was rescheduled between 130-2pm. LSW notified team. Pending update from HWR. Care Transitions will continue to follow.     1421-HWR confirmed plan is for patient to admit to their [place] inpatient unit for medication management and care training before he goes home with spouse. HWR notified the team and requested discharge order. No further Care Transitions needs.     HEBER Louis

## 2025-05-30 NOTE — NURSING NOTE
RN Hospice Note    Dash Ahumada is a Hospice Patient.   Hospice terminal diagnosis: GBM  Physician: Diego  Visit type: Initial Visit: Consents signed, Admission date TBD    Comments/recommendations: Met with spouse per her request outside of pt's room d/t report of pt with confusion.  Presented hospice philosophy and services; concern as NG was removed and pt with difficulty swallowing, unable to administer many of pt's medications including anti-seizure med, only receiving IV meds at this time.  Will need to determine safe way to administer medications, determine which meds are necessary to pt comfort at this time, and also provide education and care training/support to patient's spouse.  For this reason [facility]; offered could go directly home as pt not currently symptomatic, but spouse refused and felt that stop at [facility] would be most beneficial for patient and family.  Reviewed possibility of pt condition rapidly changing, not being able to make it home safely, may be at [facility] for duration and possibility of R&B charges if pt stable but unable to discharge and spouse verbalized understanding and agreement.  Consents signed, transport set up for 8pm with [facility]    Discharge Planning:  [facility]    The following is to be completed:  Discharge order: done  State DNR signed by MD: done  Nursing facility referral/transfer form: n/a  Medication reconciliation: done  PAS/RR or convalescent stay form: n/a  Prescriptions for al narcotics/new medications: n/a  Transportation: done  Other: n/a    Plan of care reviewed with patient/family members Brittany Bullardter, spouse   Plan of care reviewed with hospital staff members: Nathan Cheema RN; Patrizia RODAS; Belem Truong NP; Dr. Cortez; Yun Leal NP; Leeann Boggs PA    Please notify [facility] of any changes in condition. Thank you.  Office: [phone] (8  am-6:30 pm M-F and 8 am-4:30 pm weekends and holidays)   [phone] (6:30 pm-8 am M-F and 4:30 pm-8 am weekends and holidays)    Christal Mari RN

## 2025-05-30 NOTE — PROGRESS NOTES
Vancomycin Dosing by Pharmacy- FOLLOW UP    Enmanuel Ahumada is a 69 y.o. year old male who Pharmacy has been consulted for vancomycin dosing for surgical wound infection. Based on the patient's indication and renal status this patient is being dosed based on a goal AUC of 400-600.     Renal function is currently stable.    Current vancomycin dose: 1250 mg given every 24 hours    Estimated vancomycin AUC on current dose: 466 mg/L.hr     Visit Vitals  /90 (BP Location: Right arm, Patient Position: Lying)   Pulse 72   Temp 36 °C (96.8 °F) (Temporal)   Resp 16        Lab Results   Component Value Date    CREATININE 0.83 2025    CREATININE 0.74 2025    CREATININE 0.80 2025    CREATININE 0.77 2025        Patient weight is as follows:   Vitals:    25 0600   Weight: 60.1 kg (132 lb 8 oz)       Cultures:  No results found for the encounter in last 14 days.       I/O last 3 completed shifts:  In: 1684 (28 mL/kg) [I.V.:54 (0.9 mL/kg); NG/GT:1180; IV Piggyback:450]  Out: 2000 (33.3 mL/kg) [Urine:2000 (0.9 mL/kg/hr)]  Weight: 60.1 kg   I/O during current shift:  I/O this shift:  In: 225 [I.V.:225]  Out: -     Temp (24hrs), Av.3 °C (97.3 °F), Min:36 °C (96.8 °F), Max:36.6 °C (97.8 °F)      Assessment/Plan    Within goal AUC range. Continue current vancomycin regimen.    This dosing regimen is predicted by [employer] to result in the following pharmacokinetic parameters:    Regimen: 1250 mg IV every 24 hours.  Start time: 11:21 on 2025  Exposure target: AUC24 (range) 400-600 mg/L.hr   THK83-51: 453 mg/L.hr  AUC24,ss: 466 mg/L.hr  Probability of AUC24 > 400: 94 %  Ctrough,ss: 12.5 mg/L  Probability of Ctrough,ss > 20: 0 %      The next level will be obtained on  at am lab draw. May be obtained sooner if clinically indicated.   Will continue to monitor renal function daily while on vancomycin and order serum creatinine at least every 48 hours if not already ordered.  Follow for  continued vancomycin needs, clinical response, and signs/symptoms of toxicity.       Dilan Triplett, PharmD

## 2025-05-31 NOTE — NURSING NOTE
2100: Patient discharged to [facility] via stretcher. PICC line remains intact and flushed with normal saline with positive blood return. Belongings taken with spouse and drawings remain with patient. Report called on previous shift.     Krissy Waggoner RN

## 2025-06-02 ENCOUNTER — APPOINTMENT (OUTPATIENT)
Dept: HEMATOLOGY/ONCOLOGY | Facility: CLINIC | Age: 70
End: 2025-06-02
Payer: MEDICARE

## 2025-06-05 NOTE — DOCUMENTATION CLARIFICATION NOTE
"    PATIENT:               JULIENNE RIOS  ACCT #:                  [id number]  MRN:                       [id number]  :                       1955  ADMIT DATE:       2025 7:23 PM  DISCH DATE:        2025 9:28 PM  RESPONDING PROVIDER #:        [id number]          PROVIDER RESPONSE TEXT:    I agree with dietician diagnosis of Severe Malnutrition on 2025    CDI QUERY TEXT:    Clarification    Instruction:    Based on your assessment of the patient and the clinical information, please provide the requested documentation by clicking on the appropriate radio button and enter any additional information if prompted.    Question: Please further clarify this patient nutritional status as    When answering this query, please exercise your independent professional judgment. The fact that a question is being asked, does not imply that any particular answer is desired or expected.    The patient's clinical indicators include:  Clinical Information: 69 YOM presenting with dehiscence of incision.    Clinical Indicators: BMI 19.57    25 Nutrition Consult note: \"Nutrition Diagnosis  Malnutrition Diagnosis  Patient has Malnutrition Diagnosis: Yes  Diagnosis Status: New  Malnutrition Diagnosis: Severe malnutrition related to chronic disease or condition  Related to: GBM with progression, s/p crani now with dehiscence of incision  As Evidenced by: moderate fat losses, moderate-severe muscle wasting, intake likely meeting <75% of increased needs for >/= 1 month, 23% weight loss x 1 year, 15% x 1 month.  Additional Assessment Information: Reports of improved PO intake PTA likely did not meet pt's increased needs considering NFPE and significant weight loss of 15% x 1 month. Current malnutrition status is likely acute-on-chronic.\"    Treatment: Nutrition consult, Boost VHC (530kcal, 22g protein) BID    Risk Factors: moderate fat losses, moderate-severe muscle wasting, intake likely meeting <75% of increased needs " for >/= 1 month, 23% weight loss x 1 year, 15% x 1 month  Options provided:  -- I agree with dietician diagnosis of Severe Malnutrition on 05/27/2025  -- Other - I will add my own diagnosis  -- Refer to Clinical Documentation Reviewer    Query created by: Lesli Mcintosh on 6/5/2025 12:58 PM      Electronically signed by:  JANET BECERRIL 6/5/2025 5:52 PM

## 2025-06-06 ENCOUNTER — APPOINTMENT (OUTPATIENT)
Dept: RADIOLOGY | Facility: HOSPITAL | Age: 70
End: 2025-06-06
Payer: MEDICARE

## 2025-06-08 ASSESSMENT — ENCOUNTER SYMPTOMS
SPEECH DIFFICULTY: 1
APPETITE CHANGE: 1
FATIGUE: 1
EXTREMITY WEAKNESS: 1

## 2025-06-10 ENCOUNTER — APPOINTMENT (OUTPATIENT)
Dept: INFECTIOUS DISEASES | Facility: CLINIC | Age: 70
End: 2025-06-10
Payer: MEDICARE

## 2025-06-12 ENCOUNTER — APPOINTMENT (OUTPATIENT)
Dept: HEMATOLOGY/ONCOLOGY | Facility: HOSPITAL | Age: 70
End: 2025-06-12
Payer: MEDICARE

## 2025-06-16 ENCOUNTER — APPOINTMENT (OUTPATIENT)
Dept: HEMATOLOGY/ONCOLOGY | Facility: CLINIC | Age: 70
End: 2025-06-16
Payer: MEDICARE

## 2025-06-17 ENCOUNTER — APPOINTMENT (OUTPATIENT)
Dept: PALLIATIVE MEDICINE | Facility: HOSPITAL | Age: 70
End: 2025-06-17
Payer: MEDICARE

## 2025-06-30 ENCOUNTER — APPOINTMENT (OUTPATIENT)
Dept: HEMATOLOGY/ONCOLOGY | Facility: CLINIC | Age: 70
End: 2025-06-30
Payer: MEDICARE

## 2025-08-08 ENCOUNTER — APPOINTMENT (OUTPATIENT)
Dept: PRIMARY CARE | Facility: CLINIC | Age: 70
End: 2025-08-08
Payer: MEDICARE

## (undated) DEVICE — DRESSING, GAUZE, PETROLATUM, PATCH, XEROFORM, 1 X 8 IN, STERILE

## (undated) DEVICE — SEALANT, HEMOSTATIC, FLOSEAL, 10 ML

## (undated) DEVICE — TAPE, SILK, DURAPORE, 3 IN X 10 YD, LF

## (undated) DEVICE — BAG, PLASTIC, 10 X 17 IN

## (undated) DEVICE — SUTURE, NUROLON, 4-0, 18 IN, TF, CONTROL RELEASE, MULTIPACK, BLACK

## (undated) DEVICE — MANIFOLD, 4 PORT NEPTUNE STANDARD

## (undated) DEVICE — DRAPE, IRRIGATION, W/POUCH, ADHESIVE STRIP, STERI DRAPE, 19 X 23 IN, DISPOSABLE, STERILE

## (undated) DEVICE — DRAPE PACK, CRANIOTOMY, CUSTOM, UHC

## (undated) DEVICE — TOWEL, SURGICAL, NEURO, O/R, 16 X 26, BLUE, STERILE

## (undated) DEVICE — PREP, SKIN, DURAPREP, 6 CC

## (undated) DEVICE — NEEDLE, ELECTRODE, SUBDERMAL, PAIRED, 2.0 LEAD, DISP

## (undated) DEVICE — SEALANT, DURASEAL, 5ML

## (undated) DEVICE — FLOSEAL, MATRIX, HEMOSTATIC, FULL STERILE PREP, 5ML

## (undated) DEVICE — CATHETER TRAY, SURESTEP, 16FR, URINE METER W/STATLOCK

## (undated) DEVICE — COVER, TABLE, UHC

## (undated) DEVICE — DRAIN, WOUND, ROUND, W/TROCAR, HOLE PATTERN, 10 IN, MEDIUM, 1/8 X 49 IN

## (undated) DEVICE — COVER, CART, 45 X 27 X 48 IN, CLEAR

## (undated) DEVICE — BUR, STRAIGHT ROUTER

## (undated) DEVICE — DRAPE, TIBURON, SPLIT SHEET, REINF ADH STRIP, 77X122

## (undated) DEVICE — Device

## (undated) DEVICE — SPONGE, HEMOSTATIC, GELATIN, SURGIFOAM, 8 X 12.5 CM X 10 MM

## (undated) DEVICE — NEEDLE, MICRODISSECTION STR 4CM

## (undated) DEVICE — COTTON BALL, DBL STRUNG, XRAY DETECT, MEDIUM, 0.75IN, ST(5PK)

## (undated) DEVICE — COLLECTION/DELIVERY SYSTEM, COPAN ESWAB, REG SIZE SWAB

## (undated) DEVICE — PAD, GROUNDING, ELECTROSURGICAL, PATIENT PLATE, W/O CORD, ADULT LARGE

## (undated) DEVICE — SPHERE, STEALTHSTATION, 5-PK

## (undated) DEVICE — TUBING, SMOKE EVAC, 3/8 X 10 FT

## (undated) DEVICE — GOWN, SURGICAL, SMARTGOWN, LARGE, STERILE

## (undated) DEVICE — DRESSING, TRANSPARENT, TEGADERM, 2-3/8 X 2-3/4 IN

## (undated) DEVICE — CONTAINER, SPECIMEN, 120 ML, STERILE

## (undated) DEVICE — RETRACTOR, HOOK, FISH, NEURO

## (undated) DEVICE — DRAPE, MICROSCOPE, FOR ZEISS 65MM, VARI-LENS II, 52 X 150

## (undated) DEVICE — APPLICATOR, PREP, CHLORAPREP, W/ORANGE TINT, 10.5ML

## (undated) DEVICE — REST, HEAD, BAGEL, 9 IN

## (undated) DEVICE — DRAPE, FLUID WARMER

## (undated) DEVICE — DEVICE, ADHERUS, AUTOSPRAY, ET DURAL SEALANT, NO TIP

## (undated) DEVICE — MARKER, SURGICAL, SKIN, STANDARD, W/RULER, LF

## (undated) DEVICE — BALL, COTTON, STANDARD, STERILE

## (undated) DEVICE — STOCKINETTE, IMPERVIOUS, 12 X 48 IN, LF, STERILE

## (undated) DEVICE — SPONGE, HEMOSTATIC, CELLULOSE, SURGICEL, FIBRILLAR, 2 X 4 IN

## (undated) DEVICE — TISSEEL FIBRIN SEALANT, PRIMA, FROZEN, 10ML

## (undated) DEVICE — STAY SET, SURGICAL, 5MM SHARP HOOK, 8PK

## (undated) DEVICE — PAD, GROUNDING, ELECTROSURGICAL, W/9 FT CABLE, POLYHESIVE II, ADULT, LF

## (undated) DEVICE — SYRINGE, 50 CC, LUER LOCK

## (undated) DEVICE — SUTURE, VICRYL, 2-0, 27 IN, BR/SH 27, VIOLET

## (undated) DEVICE — GOWN, SURGICAL, SMARTGOWN, XLARGE, STERILE

## (undated) DEVICE — BUR, ROUND 5MM OSTEON, ELITE, SOFT TOUCH

## (undated) DEVICE — DRAPE, SHEET, 17 X 23 IN

## (undated) DEVICE — ELECTRODE, CORKSCREW NEEDLE 1.5M LENGTH

## (undated) DEVICE — DRAPE, SMARTDRAPE, FOR TIVATO MICROSCOPE

## (undated) DEVICE — BUR, PERFORATOR, CRANIAL, ACRA-CUT 14/11MM, CDM

## (undated) DEVICE — EVACUATOR, WOUND, CLOSED, 3 SPRING, 400 CC, Y CONNECTING TUBE

## (undated) DEVICE — STOCKINETTE, IMPERVIOUS, 12 X 48 IN, STERILE

## (undated) DEVICE — BUR, ROUTER BIT, FA2, TAPERED, 2.3MM

## (undated) DEVICE — CLAMP, DRAPE/TUBE, DISPOSABLE, NS

## (undated) DEVICE — ADHESIVE, SKIN, MASTISOL, 2/3 CC VIAL

## (undated) DEVICE — SET, CARTRIDGE AND TUBING, CUSA

## (undated) DEVICE — TRAY, FOLEY, URINE METER, 16FR, SILICONE, W/STATLOCK

## (undated) DEVICE — MARKER, SKIN, RULER AND LABEL PACK, CUSTOM

## (undated) DEVICE — SYRINGE, 2 OZ, IRRIGATION, BULB, EAR/ULCER, BLUE, LF

## (undated) DEVICE — ELECTRODE, GROUND PLATE

## (undated) DEVICE — COTTON BALL, DOUBLE STRING, SMALL

## (undated) DEVICE — DRESSING, GAUZE, PETROLATUM, XEROFORM, 5 X 9 IN, STERILE